# Patient Record
Sex: FEMALE | Race: WHITE | NOT HISPANIC OR LATINO | Employment: OTHER | URBAN - METROPOLITAN AREA
[De-identification: names, ages, dates, MRNs, and addresses within clinical notes are randomized per-mention and may not be internally consistent; named-entity substitution may affect disease eponyms.]

---

## 2017-02-10 ENCOUNTER — TRANSFERRED RECORDS (OUTPATIENT)
Dept: HEALTH INFORMATION MANAGEMENT | Facility: CLINIC | Age: 52
End: 2017-02-10

## 2017-02-17 ENCOUNTER — TRANSFERRED RECORDS (OUTPATIENT)
Dept: HEALTH INFORMATION MANAGEMENT | Facility: CLINIC | Age: 52
End: 2017-02-17

## 2017-02-17 ENCOUNTER — HOSPITAL ENCOUNTER (INPATIENT)
Facility: CLINIC | Age: 52
LOS: 10 days | Discharge: HOME HEALTH | DRG: 579 | End: 2017-02-27
Attending: INTERNAL MEDICINE | Admitting: INTERNAL MEDICINE
Payer: MEDICARE

## 2017-02-17 DIAGNOSIS — Z76.89 HEALTH CARE HOME: Primary | ICD-10-CM

## 2017-02-17 DIAGNOSIS — L89.94: ICD-10-CM

## 2017-02-17 PROBLEM — K85.90 PANCREATITIS: Status: ACTIVE | Noted: 2017-02-17

## 2017-02-17 PROCEDURE — 99223 1ST HOSP IP/OBS HIGH 75: CPT | Mod: AI | Performed by: INTERNAL MEDICINE

## 2017-02-17 PROCEDURE — 12000007 ZZH R&B INTERMEDIATE

## 2017-02-17 RX ORDER — POTASSIUM CHLORIDE 1.5 G/1.58G
20-40 POWDER, FOR SOLUTION ORAL
Status: DISCONTINUED | OUTPATIENT
Start: 2017-02-17 | End: 2017-02-27 | Stop reason: HOSPADM

## 2017-02-17 RX ORDER — GABAPENTIN 600 MG/1
600 TABLET ORAL 2 TIMES DAILY
Status: DISCONTINUED | OUTPATIENT
Start: 2017-02-17 | End: 2017-02-27 | Stop reason: HOSPADM

## 2017-02-17 RX ORDER — GABAPENTIN 300 MG/1
300 CAPSULE ORAL DAILY
Status: DISCONTINUED | OUTPATIENT
Start: 2017-02-18 | End: 2017-02-18

## 2017-02-17 RX ORDER — POTASSIUM CHLORIDE 1500 MG/1
20-40 TABLET, EXTENDED RELEASE ORAL
Status: DISCONTINUED | OUTPATIENT
Start: 2017-02-17 | End: 2017-02-27 | Stop reason: HOSPADM

## 2017-02-17 RX ORDER — POTASSIUM CHLORIDE 29.8 MG/ML
20 INJECTION INTRAVENOUS
Status: DISCONTINUED | OUTPATIENT
Start: 2017-02-17 | End: 2017-02-27 | Stop reason: HOSPADM

## 2017-02-17 RX ORDER — POTASSIUM CHLORIDE 7.45 MG/ML
10 INJECTION INTRAVENOUS
Status: DISCONTINUED | OUTPATIENT
Start: 2017-02-17 | End: 2017-02-27 | Stop reason: HOSPADM

## 2017-02-17 RX ORDER — FERROUS SULFATE 325(65) MG
325 TABLET ORAL
Status: ON HOLD | COMMUNITY
End: 2019-01-09

## 2017-02-17 RX ORDER — OXYBUTYNIN CHLORIDE 5 MG/1
5 TABLET, EXTENDED RELEASE ORAL
Status: DISCONTINUED | OUTPATIENT
Start: 2017-02-18 | End: 2017-02-27 | Stop reason: HOSPADM

## 2017-02-17 RX ORDER — OXYCODONE HYDROCHLORIDE 5 MG/1
5-10 TABLET ORAL
Status: DISCONTINUED | OUTPATIENT
Start: 2017-02-17 | End: 2017-02-27 | Stop reason: HOSPADM

## 2017-02-17 RX ORDER — ONDANSETRON 2 MG/ML
4 INJECTION INTRAMUSCULAR; INTRAVENOUS EVERY 6 HOURS PRN
Status: DISCONTINUED | OUTPATIENT
Start: 2017-02-17 | End: 2017-02-27 | Stop reason: HOSPADM

## 2017-02-17 RX ORDER — MULTIPLE VITAMINS W/ MINERALS TAB 9MG-400MCG
1 TAB ORAL DAILY
Status: ON HOLD | COMMUNITY
End: 2019-01-09

## 2017-02-17 RX ORDER — NALOXONE HYDROCHLORIDE 0.4 MG/ML
.1-.4 INJECTION, SOLUTION INTRAMUSCULAR; INTRAVENOUS; SUBCUTANEOUS
Status: DISCONTINUED | OUTPATIENT
Start: 2017-02-17 | End: 2017-02-27 | Stop reason: HOSPADM

## 2017-02-17 RX ORDER — SODIUM CHLORIDE 9 MG/ML
INJECTION, SOLUTION INTRAVENOUS CONTINUOUS
Status: DISCONTINUED | OUTPATIENT
Start: 2017-02-17 | End: 2017-02-20

## 2017-02-17 RX ORDER — ACETAMINOPHEN 325 MG/1
650 TABLET ORAL EVERY 4 HOURS PRN
Status: DISCONTINUED | OUTPATIENT
Start: 2017-02-17 | End: 2017-02-27 | Stop reason: HOSPADM

## 2017-02-17 RX ORDER — TRAZODONE HYDROCHLORIDE 100 MG/1
100 TABLET ORAL AT BEDTIME
Status: DISCONTINUED | OUTPATIENT
Start: 2017-02-17 | End: 2017-02-27 | Stop reason: HOSPADM

## 2017-02-17 RX ORDER — ONDANSETRON 4 MG/1
4 TABLET, ORALLY DISINTEGRATING ORAL EVERY 6 HOURS PRN
Status: DISCONTINUED | OUTPATIENT
Start: 2017-02-17 | End: 2017-02-27 | Stop reason: HOSPADM

## 2017-02-17 RX ORDER — SUMATRIPTAN SUCCINATE 6 MG/.5ML
6 INJECTION, SOLUTION SUBCUTANEOUS 2 TIMES DAILY PRN
Status: ON HOLD | COMMUNITY
End: 2019-01-09

## 2017-02-17 RX ORDER — HYDROMORPHONE HYDROCHLORIDE 1 MG/ML
.3-.5 INJECTION, SOLUTION INTRAMUSCULAR; INTRAVENOUS; SUBCUTANEOUS
Status: DISCONTINUED | OUTPATIENT
Start: 2017-02-17 | End: 2017-02-27 | Stop reason: HOSPADM

## 2017-02-17 RX ORDER — ZOLPIDEM TARTRATE 5 MG/1
5 TABLET ORAL
Status: DISCONTINUED | OUTPATIENT
Start: 2017-02-17 | End: 2017-02-27 | Stop reason: HOSPADM

## 2017-02-17 NOTE — IP AVS SNAPSHOT
[facility] [facility]    [address] [place] [address]    Phone:  [phone]                                       After Visit Summary   2/17/2017    Felicia Ellison    MRN: [id number]           After Visit Summary Signature Page     I have received my discharge instructions, and my questions have been answered. I have discussed any challenges I see with this plan with the nurse or doctor.    ..........................................................................................................................................  Patient/Patient Representative Signature      ..........................................................................................................................................  Patient Representative Print Name and Relationship to Patient    ..................................................               ................................................  Date                                            Time    ..........................................................................................................................................  Reviewed by Signature/Title    ...................................................              ..............................................  Date                                                            Time

## 2017-02-17 NOTE — IP AVS SNAPSHOT
MRN:[id number]                      After Visit Summary   2/17/2017    Felicia Ellison    MRN: [id number]           Thank you!     Thank you for choosing [facility] for your care. Our goal is always to provide you with excellent care. Hearing back from our patients is one way we can continue to improve our services. Please take a few minutes to complete the written survey that you may receive in the mail after you visit with us. Thank you!        Patient Information     Date Of Birth          1965        About your hospital stay     You were admitted on:  February 17, 2017 You last received care in the:  [facility] [facility]    You were discharged on:  February 27, 2017       Who to Call     For medical emergencies, please call 251.  For non-urgent questions about your medical care, please call your primary care provider or clinic, [phone]  For questions related to your surgery, please call your surgery clinic        Attending Provider     Provider Specialty    Reese Urias MD Internal Medicine       Primary Care Provider Office Phone # Fax #    Ivan Baeza [phone]       [facility] [address] [facility] [address]        After Care Instructions     Diet       Follow this diet upon discharge: Orders Placed This Encounter      Snacks/Supplements Adult: Ensure Plus (Adult); With Meals      Advance Diet as Tolerated: Regular Diet Adult                  Follow-up Appointments     Follow-up and recommended labs and tests        Follow up with primary care provider, Ivan Baeza, within 7 days for hospital follow- up.  No follow up labs or test are needed.                  Additional Services     Home care nursing referral       RN  Home Care:  Agency:    [place], [place]  Clinical Supervisor: Praneeth Mazariegos RN    Call [phone]    [address] [place] [address]  Tel: [phone]  Fax: [phone]  Email: [email or web address]      RN RESUME providing: Skilled nurse visits - frequency per home care evaluation or previous orders    RN to assess --- hydration, nutrition and bowel status, signs/symptoms of infection, neurologic status, skin integrity, respiratory and cardiac status, pain level and activity tolerance, vital signs and weight, lab draws if ordered, home safety.     RN to teach/reinforce teaching ---medication, disease, and symptom management    RN to assist with communication to --- Primary Care Provider  Wound Management:Bilateral IT and and bilateral sacrum:  CWOCN will change [facility] Wound VAC MWF using black foam and -125mmHg, continuous ** recommend continuing with [facility] Wound VAC upon discharge.   See [facility] Inpatient last wound recommendations      ______________________________________________________      Your provider has ordered home care nursing services. If you have not been contacted within 2 days of your discharge please call the home care agency number listed above.    Your provider has ordered home care nursing services. If you have not been contacted within 2 days of your discharge please call the inpatient department phone number at the above #.                  Further instructions from your care team       Going Home with A Wound Vac  Usually your doctor, home health agency or wound care clinic trained in VAC therapy will change your dressing. There are specific dressings that need to be used for your VAC dressing change. There are also specific settings that need to be set on the machine at discharge.   When you are discharged, make sure you bring along the box that has been left in your room from the [facility] representative. (You need to leave with a portable VAC machine, not the hospital s.)  **Dressings will be changed according to what your MD has ordered. It is usually 3 times/week or as needed.  **You should not shower ( or get dressing wet) unless this  has been approved by your MD.  **Keep the machine plugged in as much as possible to prevent having to recharge a full 12 hours. The battery may run for about 12 hours.    **At home: If there is a problem and the machine registers  air leak , it usually means the dressing is loose.  You may try to patch the dressing with new Tegaderm or clear [facility] drape. If this does not work, and Home Care is following the patient, call the Home Care nurse.  If the machine malfunctions, read the manual and/or call [employer] at #[phone]  How to Change the Canister on the Wound VAC  1. Close white clamp on foam dressing tube.  2. Disconnect tubes from each other - hold canister tube up to allow drainage to flow down tube and into canister. Close clamp on canister tube.  3. Press therapy button to off.  4. Push in canister release button at front of machine and remove canister from machine. You may need to jiggle it out.  5. Insert a new canister into machine. Jiggle into place until you hear a click.  6. Hook tubes together, unclamp clamps.  7. Press therapy button; press therapy  on  to restart.      ** If you have any serious bleeding, feel faint or feel dizzy, turn off the machine and call 911.  **You may also need to call your MD for a fever over 102, increase in redness, swelling, bleeding, bad swell, increase in pain warmth around dressing site.  **If you have a question or problem with the bandage call your MD, home care nurse or wound care center.  **If your machine isn t working right and the alarms keep going off: Read about  alarms  in your manual and/or call [employer] at [phone].    Follow up Appointment:  ____________________    Doctor's Phone Number: ____________________      You are being discharged with home care services through [facility]. [facility] will contact you to schedule initial visit. If you have any questions prior to this call, please contact them at [phone].    Wound  "care:(Vac dressing last changed on Friday 2-24-17  Bilateral IT and and bilateral sacrum: CWOCN will change [facility] Wound VAC MWF using black foam and -125mmHg, continuous   1.  Remove all dressings  2. Clean wound with MicroKlenz  3.  Sacral wounds connect under skin bridge  4.  Black sponge to each sacral wound bed with foam piece tunneled under bridge to connect sponges  5.  Black foam to IT wounds. Pack into tunnels and undermining  6.  Skin prep all juan pablo-wound skin   7;  Secure all with Vac drape  8.  Pad and bridges sacral wounds x 2  with Rt IT wound and bridge to anterior aspect of thigh  9. Pad and bridge Lt IT wound to anterior aspect of thigh  10:  Face adaptor so that they come out of patients waistband of pants  11.  VAC @ 125mm/hg cont suction  12.  Use ostomy paste in any crease to create seal     Bilateral heels, right shin, left calf: MWF  1. Clean wound with MicroKlenz Spray, pat dry  2. Paint with No Sting Skin Prep (#287556) and allow to dry thoroughly  3. Press a Mepilex  Border Dressing (#098343) to the area, making sure to conform nicely to skin curvatures  4. Time and date dressing change and yusuf with a \"T\" for treatment of a wound  5. Reposition pt every 1 to 2 hours when in bed and hourly when up to the chair to relieve pressure and promote perfusion to tissue  6. Keep heels elevated at all times    Colostomy pouch: Change on 2-27-17  Pt can resume her pouching system used prior to hosptialization         Pending Results     No orders found from 2/15/2017 to 2/18/2017.            Statement of Approval     Ordered          02/26/17 1353  I have reviewed and agree with all the recommendations and orders detailed in this document.  EFFECTIVE NOW     Approved and electronically signed by:  Michelle Gonzalez MD             Admission Information     Date & Time Department Dept. Phone    2/17/2017 [place] [address] [facility] [phone]      Your Vitals Were     Blood " "Pressure Pulse Temperature Respirations Height Weight    123/64 (BP Location: Right arm) 91 98  F (36.7  C) (Oral) 16 1.753 m (5' 9\") 69 kg (152 lb 1.9 oz)    Pulse Oximetry BMI (Body Mass Index)                98% 22.46 kg/m2          [employer] Information     [employer] gives you secure access to your electronic health record. If you see a primary care provider, you can also send messages to your care team and make appointments. If you have questions, please call your primary care clinic.  If you do not have a primary care provider, please call [phone] and they will assist you.        Care EveryWhere ID     This is your Care EveryWhere ID. This could be used by other organizations to access your [facility] medical records  [id number]           Review of your medicines      START taking        Dose / Directions    sulfamethoxazole-trimethoprim 800-160 MG per tablet   Commonly known as:  BACTRIM DS/SEPTRA DS   Indication:  Skin and Soft Tissue Infection   Used for:  Decubitus skin ulcer, stage IV (H)        Dose:  1 tablet   Take 1 tablet by mouth 2 times daily (with meals) for 14 days   Quantity:  28 tablet   Refills:  0         CONTINUE these medicines which have NOT CHANGED        Dose / Directions    AMBIEN PO        Dose:  10 mg   Take 10 mg by mouth nightly as needed for sleep   Refills:  0       BOOST   Used for:  Paraplegic immobility syndrome        I can a day   Quantity:  30 Can   Refills:  12       CALCIUM CITRATE + D 315-250 MG-UNIT Tabs per tablet   Generic drug:  calcium citrate-vitamin D        Dose:  2 tablet   Take 2 tablets by mouth 2 times daily   Refills:  0       Disposable Underpads 30\"x36\" Misc   Used for:  Osteomyelitis of hip (H), Paraplegia following spinal cord injury (H)        USE UP TO 6 TIMES DAILY AS NEEDED FOR INCONTINENCE   Quantity:  180 pad   Refills:  12       enoxaparin 40 MG/0.4ML injection   Commonly known as:  LOVENOX        Dose:  40 mg   Inject 40 mg Subcutaneous every 12 " hours   Refills:  0       ferrous sulfate 325 (65 FE) MG tablet   Commonly known as:  IRON        Dose:  325 mg   Take 325 mg by mouth daily (with breakfast)   Refills:  0       FUROSEMIDE PO        Dose:  20 mg   Take 20 mg by mouth 2 times daily   Refills:  0       * GABAPENTIN PO        Dose:  600 mg   Take 600 mg by mouth 2 times daily Takes 2 x 300 mg in a.m. 1 x 300 mg in afternoon and 2 x 300 mg at night.   Refills:  0       * GABAPENTIN PO        Dose:  300 mg   Take 300 mg by mouth daily In the afternoon.  Takes 2 x 300 mg in a.m. 1 x 300 mg in afternoon and 2 x 300 mg at night.   Refills:  0       K-DUR PO        Dose:  20 mEq   Take 20 mEq by mouth 2 times daily   Refills:  0       multivitamin, therapeutic with minerals Tabs tablet        Dose:  1 tablet   Take 1 tablet by mouth daily Certavite   Refills:  0       OMEPRAZOLE PO        Dose:  20 mg   Take 20 mg by mouth every morning   Refills:  0       * order for DME   Used for:  Paraplegic immobility syndrome, Pressure ulcer of right buttock, Flaccid paraplegia (H)        Equipment being ordered: Trapeze for hospital bed   Quantity:  1 each   Refills:  0       * order for DME   Used for:  Flaccid paraplegia (H), Paraplegic immobility syndrome        Equipment being ordered: [phone]   Quantity:  1 each   Refills:  0       * order for DME   Used for:  Paraplegia following spinal cord injury (H)        Equipment being ordered: handivan   Quantity:  1 each   Refills:  0       * order for DME   Used for:  Urinary retention, Flaccid paraplegia (H), Paraplegic immobility syndrome        Equipment being ordered:  40 cc bulb for catheter  [phone]   Quantity:  1 Units   Refills:  0       * order for DME   Used for:  Poor appetite, Generalized weakness, Paraplegia following spinal cord injury (H)        Equipment being ordered: protein drink  Prostat [address] [place]   Quantity:  1 Package   Refills:  12       * order  "for DME   Used for:  Paraplegia following spinal cord injury (H), Chronic osteomyelitis of hip (H)        [facility] Phone# [phone] Fax:  [phone]   Group 2 mattress overlay Diagnosis:  Stage 4 Decubitus Ulcer   Quantity:  1 Device   Refills:  0       * order for DME   Used for:  Chronic UTI (urinary tract infection), Neurogenic bladder        Equipment being ordered:  Catheters with bag attached.   Quantity:  3 catheter   Refills:  12       * order for DME   Used for:  Pressure ulcer, buttock(707.05)        Equipment being ordered: [facility] Fax [phone]  Primary Dressing Hydrofera Blue 6x6   Qty 1 box Primary Dressing Fibracol Qty 1box Secondary dressing 4x4 nonsterile gauze   Qty 200 ct Secondary Dressing 2\" medipore tape Qty 3 rolls  Length of Need: 1 month Frequency of dressing change: daily   Quantity:  30 days   Refills:  2       * order for DME   Used for:  Pressure ulcer        Equipment being ordered: eval for appropriate shower bench and adaptive equipment while showering by Rosanna Rivera. [facility] [employer] Fax [phone]  Primary Dressing Fibrocol    Qty 1 box Length of Need: 1 month Frequency of dressing change: daily Eval for appropriate shower bench and adaptive equipment while showering by Rosanna Rivera.   Quantity:  30 days   Refills:  0       oxybutynin 5 MG 24 hr tablet   Commonly known as:  DITROPAN-XL        Dose:  5 mg   Take 5 mg by mouth 2 times daily   Refills:  0       RANITIDINE HCL PO        Dose:  150 mg   Take 150 mg by mouth 2 times daily   Refills:  0       SERTRALINE HCL PO        Dose:  50 mg   Take 50 mg by mouth daily   Refills:  0       STERI-PAD STERILE 4\"X4\" Pads   Used for:  Change of dressing        Gauze pads for ileal diversion dressing daily and prn   Quantity:  1 each   Refills:  12       SUMAtriptan Succinate Refill 6 MG/0.5ML Soct   Commonly known as:  IMITREX        Dose:  6 mg   Inject 6 mg Subcutaneous 2 times daily as needed for " "migraine Inject 6 mg at onset of headache  May repeat x 1 dose in 2 hours if needed.  Max 2 doses  In 24 hrs.   Refills:  0       TRAMADOL HCL PO        Dose:  50 mg   Take 50 mg by mouth every 8 hours as needed for moderate pain or moderate to severe pain   Refills:  0       TRAZODONE HCL PO        Dose:  100 mg   Take 100 mg by mouth At Bedtime 2 x 50 mg tabs   Refills:  0       * Notice:  This list has 11 medication(s) that are the same as other medications prescribed for you. Read the directions carefully, and ask your doctor or other care provider to review them with you.      STOP taking     CEPHALEXIN PO                Where to get your medicines      These medications were sent to [facility] [place]  [place], [place] - [address]  [address], [place] [address]     Phone:  [phone]     sulfamethoxazole-trimethoprim 800-160 MG per tablet                Protect others around you: Learn how to safely use, store and throw away your medicines at www.disposemymeds.org.             Medication List: This is a list of all your medications and when to take them. Check marks below indicate your daily home schedule. Keep this list as a reference.      Medications           Morning Afternoon Evening Bedtime As Needed    AMBIEN PO   Take 10 mg by mouth nightly as needed for sleep   Last time this was given:  5 mg on 2/27/2017 12:06 AM                                   BOOST   I can a day                                   CALCIUM CITRATE + D 315-250 MG-UNIT Tabs per tablet   Take 2 tablets by mouth 2 times daily   Generic drug:  calcium citrate-vitamin D                                      Disposable Underpads 30\"x36\" Misc   USE UP TO 6 TIMES DAILY AS NEEDED FOR INCONTINENCE                                   enoxaparin 40 MG/0.4ML injection   Commonly known as:  LOVENOX   Inject 40 mg Subcutaneous every 12 hours   Last time this was given:  40 mg on 2/27/2017  2:45 PM                            "           ferrous sulfate 325 (65 FE) MG tablet   Commonly known as:  IRON   Take 325 mg by mouth daily (with breakfast)                                   FUROSEMIDE PO   Take 20 mg by mouth 2 times daily   Last time this was given:  40 mg on 2/27/2017  8:19 AM                                      * GABAPENTIN PO   Take 600 mg by mouth 2 times daily Takes 2 x 300 mg in a.m. 1 x 300 mg in afternoon and 2 x 300 mg at night.   Last time this was given:  300 mg on 2/27/2017  2:44 PM                                         * GABAPENTIN PO   Take 300 mg by mouth daily In the afternoon.  Takes 2 x 300 mg in a.m. 1 x 300 mg in afternoon and 2 x 300 mg at night.   Last time this was given:  300 mg on 2/27/2017  2:44 PM                                   K-DUR PO   Take 20 mEq by mouth 2 times daily   Last time this was given:  40 mEq on 2/18/2017  6:51 PM                                      multivitamin, therapeutic with minerals Tabs tablet   Take 1 tablet by mouth daily Certavite   Last time this was given:  1 tablet on 2/27/2017  8:19 AM                                   OMEPRAZOLE PO   Take 20 mg by mouth every morning   Last time this was given:  20 mg on 2/27/2017  8:19 AM                                   * order for DME   Equipment being ordered: Trapeze for hospital bed                                * order for DME   Equipment being ordered: Over the bed table. Fax to- [place] at [phone]                                * order for DME   Equipment being ordered: handivan                                * order for DME   Equipment being ordered:  40 cc bulb for catheter  [phone]                                * order for DME   Equipment being ordered: protein drink  Prostat 64 [address] [place]                                * order for DME   [facility] Phone# [phone] Fax:  [phone]   Group 2 mattress overlay Diagnosis:  Stage 4 Decubitus Ulcer                                * order for DME  "  Equipment being ordered:  Catheters with bag attached.                                * order for DME   Equipment being ordered: [facility] Fax [phone]  Primary Dressing Hydrofera Blue 6x6   Qty 1 box Primary Dressing Fibracol Qty 1box Secondary dressing 4x4 nonsterile gauze   Qty 200 ct Secondary Dressing 2\" medipore tape Qty 3 rolls  Length of Need: 1 month Frequency of dressing change: daily                                * order for DME   Equipment being ordered: eval for appropriate shower bench and adaptive equipment while showering by Rosanna Rivera. [facility] Fax [phone]  Primary Dressing Fibrocol    Qty 1 box Length of Need: 1 month Frequency of dressing change: daily Eval for appropriate shower bench and adaptive equipment while showering by Rosanna Rivera.                                oxybutynin 5 MG 24 hr tablet   Commonly known as:  DITROPAN-XL   Take 5 mg by mouth 2 times daily   Last time this was given:  5 mg on 2/27/2017  8:19 AM                                      RANITIDINE HCL PO   Take 150 mg by mouth 2 times daily   Last time this was given:  150 mg on 2/27/2017  8:19 AM                                      SERTRALINE HCL PO   Take 50 mg by mouth daily   Last time this was given:  50 mg on 2/27/2017  8:19 AM                                   STERI-PAD STERILE 4\"X4\" Pads   Gauze pads for ileal diversion dressing daily and prn                                sulfamethoxazole-trimethoprim 800-160 MG per tablet   Commonly known as:  BACTRIM DS/SEPTRA DS   Take 1 tablet by mouth 2 times daily (with meals) for 14 days   Last time this was given:  1 tablet on 2/27/2017  8:19 AM            For 14 days           For 14 days               SUMAtriptan Succinate Refill 6 MG/0.5ML Soct   Commonly known as:  IMITREX   Inject 6 mg Subcutaneous 2 times daily as needed for migraine Inject 6 mg at onset of headache  May repeat x 1 dose in 2 hours if needed.  Max 2 doses  In 24 hrs.     "                               TRAMADOL HCL PO   Take 50 mg by mouth every 8 hours as needed for moderate pain or moderate to severe pain                                   TRAZODONE HCL PO   Take 100 mg by mouth At Bedtime 2 x 50 mg tabs   Last time this was given:  100 mg on 2/26/2017 10:33 PM                                   * Notice:  This list has 11 medication(s) that are the same as other medications prescribed for you. Read the directions carefully, and ask your doctor or other care provider to review them with you.

## 2017-02-18 ENCOUNTER — ANESTHESIA (OUTPATIENT)
Dept: SURGERY | Facility: CLINIC | Age: 52
DRG: 579 | End: 2017-02-18
Payer: MEDICARE

## 2017-02-18 ENCOUNTER — ANESTHESIA EVENT (OUTPATIENT)
Dept: SURGERY | Facility: CLINIC | Age: 52
DRG: 579 | End: 2017-02-18
Payer: MEDICARE

## 2017-02-18 LAB
ALBUMIN SERPL-MCNC: 1.4 G/DL (ref 3.4–5)
ALP SERPL-CCNC: 97 U/L (ref 40–150)
ALT SERPL W P-5'-P-CCNC: 6 U/L (ref 0–50)
ANION GAP SERPL CALCULATED.3IONS-SCNC: 6 MMOL/L (ref 3–14)
AST SERPL W P-5'-P-CCNC: 8 U/L (ref 0–45)
BILIRUB DIRECT SERPL-MCNC: <0.1 MG/DL (ref 0–0.2)
BILIRUB SERPL-MCNC: 0.3 MG/DL (ref 0.2–1.3)
BUN SERPL-MCNC: 9 MG/DL (ref 7–30)
CALCIUM SERPL-MCNC: 7.6 MG/DL (ref 8.5–10.1)
CHLORIDE SERPL-SCNC: 112 MMOL/L (ref 94–109)
CO2 SERPL-SCNC: 28 MMOL/L (ref 20–32)
CREAT SERPL-MCNC: 0.54 MG/DL (ref 0.52–1.04)
ERYTHROCYTE [DISTWIDTH] IN BLOOD BY AUTOMATED COUNT: 19.2 % (ref 10–15)
GFR SERPL CREATININE-BSD FRML MDRD: ABNORMAL ML/MIN/1.7M2
GLUCOSE SERPL-MCNC: 79 MG/DL (ref 70–99)
GRAM STN SPEC: ABNORMAL
HCT VFR BLD AUTO: 27.3 % (ref 35–47)
HGB BLD-MCNC: 7.6 G/DL (ref 11.7–15.7)
HGB BLD-MCNC: 8 G/DL (ref 11.7–15.7)
INR PPP: 1.38 (ref 0.86–1.14)
LIPASE SERPL-CCNC: 930 U/L (ref 73–393)
Lab: ABNORMAL
MCH RBC QN AUTO: 23.4 PG (ref 26.5–33)
MCHC RBC AUTO-ENTMCNC: 29.3 G/DL (ref 31.5–36.5)
MCV RBC AUTO: 80 FL (ref 78–100)
MICRO REPORT STATUS: ABNORMAL
PLATELET # BLD AUTO: 353 10E9/L (ref 150–450)
POTASSIUM SERPL-SCNC: 2.9 MMOL/L (ref 3.4–5.3)
POTASSIUM SERPL-SCNC: 3.3 MMOL/L (ref 3.4–5.3)
POTASSIUM SERPL-SCNC: 4.5 MMOL/L (ref 3.4–5.3)
PROT SERPL-MCNC: 5.7 G/DL (ref 6.8–8.8)
RBC # BLD AUTO: 3.42 10E12/L (ref 3.8–5.2)
SODIUM SERPL-SCNC: 146 MMOL/L (ref 133–144)
SPECIMEN SOURCE: ABNORMAL
WBC # BLD AUTO: 9.9 10E9/L (ref 4–11)

## 2017-02-18 PROCEDURE — 87076 CULTURE ANAEROBE IDENT EACH: CPT | Performed by: SURGERY

## 2017-02-18 PROCEDURE — 0KBN0ZZ EXCISION OF RIGHT HIP MUSCLE, OPEN APPROACH: ICD-10-PCS | Performed by: SURGERY

## 2017-02-18 PROCEDURE — 25000132 ZZH RX MED GY IP 250 OP 250 PS 637: Mod: GY | Performed by: INTERNAL MEDICINE

## 2017-02-18 PROCEDURE — 25000125 ZZHC RX 250: Performed by: ANESTHESIOLOGY

## 2017-02-18 PROCEDURE — 40000170 ZZH STATISTIC PRE-PROCEDURE ASSESSMENT II: Performed by: SURGERY

## 2017-02-18 PROCEDURE — 99215 OFFICE O/P EST HI 40 MIN: CPT

## 2017-02-18 PROCEDURE — 36000063 ZZH SURGERY LEVEL 4 EA 15 ADDTL MIN: Performed by: SURGERY

## 2017-02-18 PROCEDURE — 87070 CULTURE OTHR SPECIMN AEROBIC: CPT | Performed by: SURGERY

## 2017-02-18 PROCEDURE — 36000093 ZZH SURGERY LEVEL 4 1ST 30 MIN: Performed by: SURGERY

## 2017-02-18 PROCEDURE — 84132 ASSAY OF SERUM POTASSIUM: CPT | Performed by: INTERNAL MEDICINE

## 2017-02-18 PROCEDURE — 11046 DBRDMT MUSC&/FSCA EA ADDL: CPT | Performed by: SURGERY

## 2017-02-18 PROCEDURE — 83690 ASSAY OF LIPASE: CPT | Performed by: INTERNAL MEDICINE

## 2017-02-18 PROCEDURE — 87106 FUNGI IDENTIFICATION YEAST: CPT | Performed by: SURGERY

## 2017-02-18 PROCEDURE — 87205 SMEAR GRAM STAIN: CPT | Performed by: SURGERY

## 2017-02-18 PROCEDURE — 85027 COMPLETE CBC AUTOMATED: CPT | Performed by: INTERNAL MEDICINE

## 2017-02-18 PROCEDURE — 0KBP0ZZ EXCISION OF LEFT HIP MUSCLE, OPEN APPROACH: ICD-10-PCS | Performed by: SURGERY

## 2017-02-18 PROCEDURE — 36415 COLL VENOUS BLD VENIPUNCTURE: CPT | Performed by: SURGERY

## 2017-02-18 PROCEDURE — 25000128 H RX IP 250 OP 636: Performed by: NURSE ANESTHETIST, CERTIFIED REGISTERED

## 2017-02-18 PROCEDURE — 27210995 ZZH RX 272: Performed by: SURGERY

## 2017-02-18 PROCEDURE — 36415 COLL VENOUS BLD VENIPUNCTURE: CPT | Performed by: INTERNAL MEDICINE

## 2017-02-18 PROCEDURE — 87176 TISSUE HOMOGENIZATION CULTR: CPT | Performed by: SURGERY

## 2017-02-18 PROCEDURE — 27210794 ZZH OR GENERAL SUPPLY STERILE: Performed by: SURGERY

## 2017-02-18 PROCEDURE — 37000009 ZZH ANESTHESIA TECHNICAL FEE, EACH ADDTL 15 MIN: Performed by: SURGERY

## 2017-02-18 PROCEDURE — 25800025 ZZH RX 258: Performed by: ANESTHESIOLOGY

## 2017-02-18 PROCEDURE — 37000008 ZZH ANESTHESIA TECHNICAL FEE, 1ST 30 MIN: Performed by: SURGERY

## 2017-02-18 PROCEDURE — A9270 NON-COVERED ITEM OR SERVICE: HCPCS | Mod: GY | Performed by: INTERNAL MEDICINE

## 2017-02-18 PROCEDURE — 87077 CULTURE AEROBIC IDENTIFY: CPT | Performed by: SURGERY

## 2017-02-18 PROCEDURE — 25000128 H RX IP 250 OP 636: Performed by: INTERNAL MEDICINE

## 2017-02-18 PROCEDURE — 71000014 ZZH RECOVERY PHASE 1 LEVEL 2 FIRST HR: Performed by: SURGERY

## 2017-02-18 PROCEDURE — 85610 PROTHROMBIN TIME: CPT | Performed by: INTERNAL MEDICINE

## 2017-02-18 PROCEDURE — 80048 BASIC METABOLIC PNL TOTAL CA: CPT | Performed by: INTERNAL MEDICINE

## 2017-02-18 PROCEDURE — 80076 HEPATIC FUNCTION PANEL: CPT | Performed by: INTERNAL MEDICINE

## 2017-02-18 PROCEDURE — S0077 INJECTION, CLINDAMYCIN PHOSP: HCPCS | Performed by: NURSE ANESTHETIST, CERTIFIED REGISTERED

## 2017-02-18 PROCEDURE — 12000007 ZZH R&B INTERMEDIATE

## 2017-02-18 PROCEDURE — 99207 ZZC MOONLIGHTING INDICATOR: CPT | Performed by: INTERNAL MEDICINE

## 2017-02-18 PROCEDURE — 85018 HEMOGLOBIN: CPT | Performed by: SURGERY

## 2017-02-18 PROCEDURE — 97606 NEG PRS WND THER DME>50 SQCM: CPT

## 2017-02-18 PROCEDURE — 25000128 H RX IP 250 OP 636

## 2017-02-18 PROCEDURE — 99233 SBSQ HOSP IP/OBS HIGH 50: CPT | Performed by: INTERNAL MEDICINE

## 2017-02-18 PROCEDURE — 87186 SC STD MICRODIL/AGAR DIL: CPT | Performed by: SURGERY

## 2017-02-18 PROCEDURE — 71000015 ZZH RECOVERY PHASE 1 LEVEL 2 EA ADDTL HR: Performed by: SURGERY

## 2017-02-18 PROCEDURE — 25000125 ZZHC RX 250: Performed by: NURSE ANESTHETIST, CERTIFIED REGISTERED

## 2017-02-18 PROCEDURE — 11043 DBRDMT MUSC&/FSCA 1ST 20/<: CPT | Performed by: SURGERY

## 2017-02-18 PROCEDURE — 87075 CULTR BACTERIA EXCEPT BLOOD: CPT | Performed by: SURGERY

## 2017-02-18 RX ORDER — ALBUTEROL SULFATE 0.83 MG/ML
2.5 SOLUTION RESPIRATORY (INHALATION) EVERY 4 HOURS PRN
Status: DISCONTINUED | OUTPATIENT
Start: 2017-02-18 | End: 2017-02-18 | Stop reason: HOSPADM

## 2017-02-18 RX ORDER — MAGNESIUM HYDROXIDE 1200 MG/15ML
LIQUID ORAL PRN
Status: DISCONTINUED | OUTPATIENT
Start: 2017-02-18 | End: 2017-02-18 | Stop reason: HOSPADM

## 2017-02-18 RX ORDER — MEROPENEM 1 G/1
1 INJECTION, POWDER, FOR SOLUTION INTRAVENOUS EVERY 6 HOURS
Status: DISCONTINUED | OUTPATIENT
Start: 2017-02-18 | End: 2017-02-18

## 2017-02-18 RX ORDER — MEPERIDINE HYDROCHLORIDE 25 MG/ML
12.5 INJECTION INTRAMUSCULAR; INTRAVENOUS; SUBCUTANEOUS EVERY 5 MIN PRN
Status: DISCONTINUED | OUTPATIENT
Start: 2017-02-18 | End: 2017-02-18 | Stop reason: HOSPADM

## 2017-02-18 RX ORDER — VANCOMYCIN HYDROCHLORIDE 1 G/200ML
1000 INJECTION, SOLUTION INTRAVENOUS EVERY 12 HOURS
Status: DISCONTINUED | OUTPATIENT
Start: 2017-02-19 | End: 2017-02-20

## 2017-02-18 RX ORDER — ONDANSETRON 2 MG/ML
INJECTION INTRAMUSCULAR; INTRAVENOUS PRN
Status: DISCONTINUED | OUTPATIENT
Start: 2017-02-18 | End: 2017-02-18

## 2017-02-18 RX ORDER — HYDROMORPHONE HYDROCHLORIDE 1 MG/ML
.3-.5 INJECTION, SOLUTION INTRAMUSCULAR; INTRAVENOUS; SUBCUTANEOUS EVERY 5 MIN PRN
Status: DISCONTINUED | OUTPATIENT
Start: 2017-02-18 | End: 2017-02-18 | Stop reason: HOSPADM

## 2017-02-18 RX ORDER — FENTANYL CITRATE 50 UG/ML
INJECTION, SOLUTION INTRAMUSCULAR; INTRAVENOUS PRN
Status: DISCONTINUED | OUTPATIENT
Start: 2017-02-18 | End: 2017-02-18

## 2017-02-18 RX ORDER — ONDANSETRON 2 MG/ML
4 INJECTION INTRAMUSCULAR; INTRAVENOUS EVERY 30 MIN PRN
Status: DISCONTINUED | OUTPATIENT
Start: 2017-02-18 | End: 2017-02-18 | Stop reason: HOSPADM

## 2017-02-18 RX ORDER — SODIUM CHLORIDE, SODIUM LACTATE, POTASSIUM CHLORIDE, CALCIUM CHLORIDE 600; 310; 30; 20 MG/100ML; MG/100ML; MG/100ML; MG/100ML
INJECTION, SOLUTION INTRAVENOUS CONTINUOUS
Status: DISCONTINUED | OUTPATIENT
Start: 2017-02-18 | End: 2017-02-18 | Stop reason: HOSPADM

## 2017-02-18 RX ORDER — CLINDAMYCIN PHOSPHATE 900 MG/50ML
INJECTION, SOLUTION INTRAVENOUS PRN
Status: DISCONTINUED | OUTPATIENT
Start: 2017-02-18 | End: 2017-02-18

## 2017-02-18 RX ORDER — FENTANYL CITRATE 50 UG/ML
25-50 INJECTION, SOLUTION INTRAMUSCULAR; INTRAVENOUS
Status: DISCONTINUED | OUTPATIENT
Start: 2017-02-18 | End: 2017-02-18 | Stop reason: HOSPADM

## 2017-02-18 RX ORDER — HYDRALAZINE HYDROCHLORIDE 20 MG/ML
2.5-5 INJECTION INTRAMUSCULAR; INTRAVENOUS EVERY 10 MIN PRN
Status: DISCONTINUED | OUTPATIENT
Start: 2017-02-18 | End: 2017-02-18 | Stop reason: HOSPADM

## 2017-02-18 RX ORDER — PROPOFOL 10 MG/ML
INJECTION, EMULSION INTRAVENOUS CONTINUOUS PRN
Status: DISCONTINUED | OUTPATIENT
Start: 2017-02-18 | End: 2017-02-18

## 2017-02-18 RX ORDER — ONDANSETRON 4 MG/1
4 TABLET, ORALLY DISINTEGRATING ORAL EVERY 30 MIN PRN
Status: DISCONTINUED | OUTPATIENT
Start: 2017-02-18 | End: 2017-02-18 | Stop reason: HOSPADM

## 2017-02-18 RX ORDER — MEROPENEM 500 MG/1
500 INJECTION, POWDER, FOR SOLUTION INTRAVENOUS EVERY 6 HOURS
Status: DISCONTINUED | OUTPATIENT
Start: 2017-02-18 | End: 2017-02-21

## 2017-02-18 RX ORDER — GABAPENTIN 300 MG/1
300 CAPSULE ORAL EVERY 24 HOURS
Status: DISCONTINUED | OUTPATIENT
Start: 2017-02-19 | End: 2017-02-27 | Stop reason: HOSPADM

## 2017-02-18 RX ADMIN — LIDOCAINE HYDROCHLORIDE 0.2 ML: 10 INJECTION, SOLUTION EPIDURAL; INFILTRATION; INTRACAUDAL; PERINEURAL at 11:34

## 2017-02-18 RX ADMIN — TRAZODONE HYDROCHLORIDE 100 MG: 100 TABLET ORAL at 21:40

## 2017-02-18 RX ADMIN — OXYCODONE HYDROCHLORIDE 10 MG: 5 TABLET ORAL at 00:45

## 2017-02-18 RX ADMIN — OXYCODONE HYDROCHLORIDE 10 MG: 5 TABLET ORAL at 03:54

## 2017-02-18 RX ADMIN — PHENYLEPHRINE HYDROCHLORIDE 100 MCG: 10 INJECTION, SOLUTION INTRAMUSCULAR; INTRAVENOUS; SUBCUTANEOUS at 12:37

## 2017-02-18 RX ADMIN — FENTANYL CITRATE 25 MCG: 50 INJECTION, SOLUTION INTRAMUSCULAR; INTRAVENOUS at 12:09

## 2017-02-18 RX ADMIN — POTASSIUM CHLORIDE 40 MEQ: 1500 TABLET, EXTENDED RELEASE ORAL at 05:55

## 2017-02-18 RX ADMIN — OXYCODONE HYDROCHLORIDE 10 MG: 5 TABLET ORAL at 10:46

## 2017-02-18 RX ADMIN — OXYBUTYNIN CHLORIDE 5 MG: 5 TABLET, EXTENDED RELEASE ORAL at 08:36

## 2017-02-18 RX ADMIN — GABAPENTIN 600 MG: 600 TABLET, FILM COATED ORAL at 21:40

## 2017-02-18 RX ADMIN — MIDAZOLAM HYDROCHLORIDE 0.5 MG: 1 INJECTION, SOLUTION INTRAMUSCULAR; INTRAVENOUS at 11:55

## 2017-02-18 RX ADMIN — ENOXAPARIN SODIUM 40 MG: 40 INJECTION SUBCUTANEOUS at 15:38

## 2017-02-18 RX ADMIN — POTASSIUM CHLORIDE 40 MEQ: 1500 TABLET, EXTENDED RELEASE ORAL at 03:53

## 2017-02-18 RX ADMIN — PHENYLEPHRINE HYDROCHLORIDE 100 MCG: 10 INJECTION, SOLUTION INTRAMUSCULAR; INTRAVENOUS; SUBCUTANEOUS at 12:23

## 2017-02-18 RX ADMIN — PROPOFOL 70 MCG/KG/MIN: 10 INJECTION, EMULSION INTRAVENOUS at 12:03

## 2017-02-18 RX ADMIN — ZOLPIDEM TARTRATE 5 MG: 5 TABLET, FILM COATED ORAL at 21:40

## 2017-02-18 RX ADMIN — SODIUM CHLORIDE: 9 INJECTION, SOLUTION INTRAVENOUS at 00:45

## 2017-02-18 RX ADMIN — PHENYLEPHRINE HYDROCHLORIDE 100 MCG: 10 INJECTION, SOLUTION INTRAMUSCULAR; INTRAVENOUS; SUBCUTANEOUS at 12:21

## 2017-02-18 RX ADMIN — PHENYLEPHRINE HYDROCHLORIDE 200 MCG: 10 INJECTION, SOLUTION INTRAMUSCULAR; INTRAVENOUS; SUBCUTANEOUS at 12:26

## 2017-02-18 RX ADMIN — PHENYLEPHRINE HYDROCHLORIDE 100 MCG: 10 INJECTION, SOLUTION INTRAMUSCULAR; INTRAVENOUS; SUBCUTANEOUS at 12:28

## 2017-02-18 RX ADMIN — PHENYLEPHRINE HYDROCHLORIDE 100 MCG: 10 INJECTION, SOLUTION INTRAMUSCULAR; INTRAVENOUS; SUBCUTANEOUS at 12:34

## 2017-02-18 RX ADMIN — SODIUM CHLORIDE, POTASSIUM CHLORIDE, SODIUM LACTATE AND CALCIUM CHLORIDE: 600; 310; 30; 20 INJECTION, SOLUTION INTRAVENOUS at 13:50

## 2017-02-18 RX ADMIN — PHENYLEPHRINE HYDROCHLORIDE 100 MCG: 10 INJECTION, SOLUTION INTRAMUSCULAR; INTRAVENOUS; SUBCUTANEOUS at 12:18

## 2017-02-18 RX ADMIN — OXYBUTYNIN CHLORIDE 5 MG: 5 TABLET, EXTENDED RELEASE ORAL at 15:39

## 2017-02-18 RX ADMIN — PHENYLEPHRINE HYDROCHLORIDE 100 MCG: 10 INJECTION, SOLUTION INTRAMUSCULAR; INTRAVENOUS; SUBCUTANEOUS at 12:38

## 2017-02-18 RX ADMIN — MIDAZOLAM HYDROCHLORIDE 1 MG: 1 INJECTION, SOLUTION INTRAMUSCULAR; INTRAVENOUS at 11:51

## 2017-02-18 RX ADMIN — GABAPENTIN 600 MG: 600 TABLET, FILM COATED ORAL at 00:44

## 2017-02-18 RX ADMIN — PHENYLEPHRINE HYDROCHLORIDE 100 MCG: 10 INJECTION, SOLUTION INTRAMUSCULAR; INTRAVENOUS; SUBCUTANEOUS at 12:31

## 2017-02-18 RX ADMIN — GABAPENTIN 600 MG: 600 TABLET, FILM COATED ORAL at 08:36

## 2017-02-18 RX ADMIN — ONDANSETRON 4 MG: 2 INJECTION INTRAMUSCULAR; INTRAVENOUS at 12:37

## 2017-02-18 RX ADMIN — ENOXAPARIN SODIUM 40 MG: 40 INJECTION SUBCUTANEOUS at 00:44

## 2017-02-18 RX ADMIN — CLINDAMYCIN PHOSPHATE 900 MG: 18 INJECTION, SOLUTION INTRAVENOUS at 12:06

## 2017-02-18 RX ADMIN — SODIUM CHLORIDE, POTASSIUM CHLORIDE, SODIUM LACTATE AND CALCIUM CHLORIDE: 600; 310; 30; 20 INJECTION, SOLUTION INTRAVENOUS at 11:34

## 2017-02-18 RX ADMIN — MIDAZOLAM HYDROCHLORIDE 0.5 MG: 1 INJECTION, SOLUTION INTRAMUSCULAR; INTRAVENOUS at 12:00

## 2017-02-18 RX ADMIN — PHENYLEPHRINE HYDROCHLORIDE 100 MCG: 10 INJECTION, SOLUTION INTRAMUSCULAR; INTRAVENOUS; SUBCUTANEOUS at 12:12

## 2017-02-18 RX ADMIN — SODIUM CHLORIDE 1000 ML: 9 INJECTION, SOLUTION INTRAVENOUS at 10:45

## 2017-02-18 RX ADMIN — POTASSIUM CHLORIDE 40 MEQ: 1500 TABLET, EXTENDED RELEASE ORAL at 18:51

## 2017-02-18 RX ADMIN — GABAPENTIN 300 MG: 300 CAPSULE ORAL at 08:36

## 2017-02-18 RX ADMIN — MEROPENEM 500 MG: 500 INJECTION, POWDER, FOR SOLUTION INTRAVENOUS at 19:59
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
## 2017-02-18 NOTE — PLAN OF CARE
Problem: Goal Outcome Summary  Goal: Goal Outcome Summary  Outcome: No Change  Wound vac dressing changed to bilateral IT wounds, sacral wound more superficial will need surgical debridement, packed with NS wet to dry. Pt self caths her ureterstomy--did x1. Colostomy with small amount of formed stool on pm shift, switched to drainable bag while in hospital

## 2017-02-18 NOTE — ANESTHESIA POSTPROCEDURE EVALUATION
Patient: Felicia Ellison    Procedure(s):  INCISION & DRAINAGE OF BILATERAL DECUBITUS ULCER - Wound Class: II-Clean Contaminated    Diagnosis:UNKNOW  Diagnosis Additional Information: No value filed.    Anesthesia Type:  MAC    Note:  Anesthesia Post Evaluation    Patient location during evaluation: PACU  Patient participation: Able to fully participate in evaluation  Level of consciousness: awake  Pain management: adequate  Airway patency: patent  Cardiovascular status: acceptable  Respiratory status: acceptable  Hydration status: acceptable  PONV: none     Anesthetic complications: None          Last vitals:  Vitals:    02/18/17 1320 02/18/17 1330 02/18/17 1340   BP: (!) 85/47 (!) 83/47 101/57   Pulse:      Resp: 12 12 12   Temp:      SpO2: 100% 100% 99%         Electronically Signed By: Stephanie Byrne MD, MD  February 18, 2017  2:08 PM

## 2017-02-18 NOTE — PROGRESS NOTES
[facility] RN NOTE: Multiple pressure ulcers POA    Pt arrived to unit last evening , a direct admit from [facility]. Pt is usually seen at the [facility], but daughter insisted she come here last night. Pt has seen Dr. Pulido in the past for these chronic PU's, but states not for a couple of years, she has been going to the wound center in [place] weekly to the wound Dr there and has HC RN's and her daughter do the dressing changes.Wounds measure as follow:  1) Left IT:9 x8.5 x2cm with undermining to1 cm from 8-10:00. Wound bed 80% red, moist, 20% stringy, soft yellow slough in the middle of the wound bed.  2) Right IT: 5.5 x3 x 2.1 cmwith undermining present to 4.6 cm from 12-2:00, able to probe to the bone; 20% yellow stringy slough in wound bed, rest is red, though drainage is purulent  3) There are three sacral wounds extending from right to left and measure R) 6x6 x1.5 cm with 50% pink wound bed, 50% adherhant slough/eschar present; Middle)3x 4.3x 0.5 cm with 80% slough/black eschar present; Left)4x5x1 with undermining present to 0.8 cm from 11-1:00. All three wound have deep purple vs dusky wound edges.  4) Left heel: 1.5 x 0.5 x0.2 cm with crusty yellow slough present on outer edge of wound, easily removed with cleansing  5) Inner left calf and anterior right shin both with 3x1cm deep purple DTI present which pt states just happened in the past 24 hours prior to admission.    All wounds cleansed with wound cleanser, wound vac applied to IT wounds, Aquacel Ag and Mepilex to left heel, Mepilex to DTI's, Wet-to dry to sacral wounds til evaluated by surgery for possible debridement. Orders in EPIC. Face to face time > 90 minutes.

## 2017-02-18 NOTE — PROGRESS NOTES
Surgery Update    Extensive debridement required for right sided sacral decubitus ulcer. Sacrum is exposed. Pt will likely require a rotational flap for coverage at some point in the future and this would be best addressed at the [facility]. For now, continue with moist to dry dressings with once daily dressing changes to sacral wounds. Surgery will change at bedside tomorrow and then appreciate Wound RN team ongoing management and recommendations.     Gram stain of tissue culture with GPC, GNR and yeast present, as bone is exposed - she would likely benefit from broad spectrum abx coverage. I will discuss with her Hospitalist.   Sanjana Ladd MD  [facility]  [phone]

## 2017-02-18 NOTE — BRIEF OP NOTE
[facility] Brief Operative Note    Pre-operative diagnosis: Infected decubital ulcer   Post-operative diagnosis same   Procedure: Procedure(s):  LEFT LATERAL POSITION DOWN - Wound Class: II-Clean Contaminated   Surgeon(s): Surgeon(s) and Role:     * Sanjana Ladd MD - Primary     * jaqueline Burciaga MD - Assisting   Estimated blood loss: * No values recorded between 2/18/2017 12:08 PM and 2/18/2017 12:45 PM *    Specimens:   ID Type Source Tests Collected by Time Destination   1 : sacral decubitus ulcer Tissue Spine, Sacrum ANAEROBIC BACTERIAL CULTURE, GRAM STAIN, TISSUE CULTURE AEROBIC BACTERIAL Sanjana Ladd MD 2/18/2017 12:10 PM       Findings: Necrotic and infected tissue resected, some exposuer of the sacrum with ischemic tissue over it

## 2017-02-18 NOTE — PHARMACY-VANCOMYCIN DOSING SERVICE
Pharmacy Vancomycin Initial Note  Date of Service 2017  Patient's  1965  51 year old, female    Indication: Skin and Soft Tissue Infection    Current estimated CrCl = CrCl cannot be calculated (Unknown ideal weight.).    Creatinine for last 3 days  2017:  8:25 AM Creatinine 0.54 mg/dL    Recent Vancomycin Level(s) for last 3 days  No results found for requested labs within last 72 hours.      Vancomycin IV Administrations (past 72 hours)      No vancomycin orders with administrations in past 72 hours.               at 14:00pm patient received 1000 mg IV Vancomycin at outside hospital prior to admission to [facility].      Nephrotoxins and other renal medications (Future)    Start     Dose/Rate Route Frequency Ordered Stop    17 0200  vancomycin (VANCOCIN) 1000 mg in dextrose 5% 200 mL PREMIX      1,000 mg Intravenous EVERY 12 HOURS 17 1348            Contrast Orders - past 72 hours     None                Plan:  1.  Start vancomycin 1000 mg IV q12h.   2.  Goal Trough Level: 10-15 mg/L   3.  Pharmacy will check trough levels as appropriate in 1-3 Days.    4. Serum creatinine levels will be ordered a minimum of twice weekly.    5. Lattimore method utilized to dose vancomycin therapy: Method 1    Shelly Garnett, FatoumataD, BCPS
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
## 2017-02-18 NOTE — PHARMACY-ADMISSION MEDICATION HISTORY
Admission medication history interview status for the 2/17/2017  admission is complete. See EPIC admission navigator for prior to admission medications     Medication history source reliability:Good    Actions taken by pharmacist (provider contacted, etc): Collected additional home medication list from daughter, and added them epic med list.      Additional medication history information not noted on PTA med list :None    Medication reconciliation/reorder completed by provider prior to medication history? No    Time spent in this activity: 20 min    Prior to Admission medications    Medication Sig Last Dose Taking? Auth Provider   RANITIDINE HCL PO Take 150 mg by mouth 2 times daily 2/17/2017 at Unknown time Yes Unknown, Entered By History   OMEPRAZOLE PO Take 20 mg by mouth every morning 2/17/2017 at Unknown time Yes Unknown, Entered By History   SERTRALINE HCL PO Take 50 mg by mouth daily 2/17/2017 at Unknown time Yes Unknown, Entered By History   CEPHALEXIN PO Take 1,000 mg by mouth 3 times daily 2/17/2017 at Unknown time Yes Unknown, Entered By History   enoxaparin (LOVENOX) 40 MG/0.4ML injection Inject 40 mg Subcutaneous every 12 hours 2/17/2017 at am Yes Unknown, Entered By History   GABAPENTIN PO Take 600 mg by mouth 2 times daily Takes 2 x 300 mg in a.m. 1 x 300 mg in afternoon and 2 x 300 mg at night. 2/17/2017 at am Yes Unknown, Entered By History   GABAPENTIN PO Take 300 mg by mouth daily In the afternoon.  Takes 2 x 300 mg in a.m. 1 x 300 mg in afternoon and 2 x 300 mg at night. 2/16/2017 at Unknown time Yes Unknown, Entered By History   Potassium Chloride Jacqueline CR (K-DUR PO) Take 20 mEq by mouth 2 times daily 2/17/2017 at am Yes Unknown, Entered By History   SUMAtriptan Succinate Refill (IMITREX) 6 MG/0.5ML SOCT Inject 6 mg Subcutaneous 2 times daily as needed for migraine Inject 6 mg at onset of headache  May repeat x 1 dose in 2 hours if needed.  Max 2 doses  In 24 hrs. prn Yes Unknown, Entered By  "History   FUROSEMIDE PO Take 20 mg by mouth 2 times daily 2/17/2017 at am Yes Unknown, Entered By History   multivitamin, therapeutic with minerals (THERA-VIT-M) TABS tablet Take 1 tablet by mouth daily Certavite 2/17/2017 at am Yes Unknown, Entered By History   calcium citrate-vitamin D (CALCIUM CITRATE + D) 315-250 MG-UNIT TABS per tablet Take 2 tablets by mouth 2 times daily 2/17/2017 at am Yes Unknown, Entered By History   ferrous sulfate (IRON) 325 (65 FE) MG tablet Take 325 mg by mouth daily (with breakfast) 2/17/2017 at am Yes Unknown, Entered By History   TRAMADOL HCL PO Take 50 mg by mouth every 8 hours as needed for moderate pain or moderate to severe pain prn Yes Unknown, Entered By History   TRAZODONE HCL PO Take 100 mg by mouth At Bedtime 2 x 50 mg tabs 2/16/2017 at hs Yes Unknown, Entered By History   Zolpidem Tartrate (AMBIEN PO) Take 10 mg by mouth nightly as needed for sleep prn Yes Unknown, Entered By History   oxybutynin (DITROPAN-XL) 5 MG 24 hr tablet Take 5 mg by mouth 2 times daily 2/17/2017 at am Yes Reported, Patient   order for DME Equipment being ordered: eval for appropriate shower bench and adaptive equipment while showering by Rosanna Rivera.  [employer] Fax [phone]    Primary Dressing Fibrocol    Qty 1 box  Length of Need: 1 month  Frequency of dressing change: daily  Eval for appropriate shower bench and adaptive equipment while showering by Rosanna Rivera.   Penny Pulido MD   Incontinence Supply Disposable (DISPOSABLE UNDERPADS 30\"X36\") MISC USE UP TO 6 TIMES DAILY AS NEEDED FOR INCONTINENCE   Ivan Baeza MD   order for DME Equipment being ordered: [employer] Fax [phone]    Primary Dressing Hydrofera Blue 6x6   Qty 1 box  Primary Dressing Fibracol Qty 1box  Secondary dressing 4x4 nonsterile gauze   Qty 200 ct  Secondary Dressing 2\" medipore tape Qty 3 rolls    Length of Need: 1 month  Frequency of dressing change: daily   Penny Pulido MD " "  Gauze Pads & Dressings (STERI-PAD STERILE) 4\"X4\" PADS Gauze pads for ileal diversion dressing daily and prn   Ivan Baeza MD   ORDER FOR DME Equipment being ordered:  Catheters with bag attached.   vIan Baeza MD   ORDER FOR DME [employer]  Phone# [phone]  Fax:  [phone]      Group 2 mattress overlay  Diagnosis:  Stage 4 Decubitus Ulcer   Penny Pulido MD   ORDER FOR DME Equipment being ordered: protein drink  Prostat 64  Coborns Ambrose   Ivan Baeza MD   ORDER FOR DME Equipment being ordered:  40 cc bulb for catheter   [phone]   Ivan Baeza MD   Nutritional Supplements (BOOST) LIQD I can a day   Ivan Baeza MD   ORDER FOR DME Equipment being ordered: handivan   Ivan Baeza MD   ORDER FOR DME Equipment being ordered: Over the bed table.  Fax to- Nataliia at [phone]   Ivan Baeza MD   ORDER FOR DME Equipment being ordered: Trapeze for hospital bed   Ivan Baeza MD       "

## 2017-02-18 NOTE — OR NURSING
Spoke to daniel Whitney to transfer to floor with additional 500 cc fluid flush for low SBP at 85. Pt is stable, no complaints noted, AAOx3-4.

## 2017-02-18 NOTE — ANESTHESIA CARE TRANSFER NOTE
Patient: Felicia Ellison    Procedure(s):  LEFT LATERAL POSITION DOWN - Wound Class: II-Clean Contaminated    Diagnosis: UNKNOW  Diagnosis Additional Information: No value filed.    Anesthesia Type:   MAC     Note:  Airway :Face Mask  Patient transferred to:PACU        Vitals: (Last set prior to Anesthesia Care Transfer)    CRNA VITALS  2/18/2017 1214 - 2/18/2017 1251      2/18/2017             Pulse: 90    SpO2: 98 %    Resp Rate (set): 10                Electronically Signed By: VERENA Kirby CRNA  February 18, 2017  12:51 PM

## 2017-02-18 NOTE — ADDENDUM NOTE
Addendum  created 02/18/17 1423 by Tala Torres APRN CRNA    Anesthesia Intra Flowsheets edited

## 2017-02-18 NOTE — ANESTHESIA PREPROCEDURE EVALUATION
Anesthesia Evaluation     .        ROS/MED HX    ENT/Pulmonary:      (-) sleep apnea   Neurologic:     (+)Spinal cord injury (1991) level of injury: T1     Cardiovascular:         METS/Exercise Tolerance:     Hematologic:         Musculoskeletal:         GI/Hepatic:     (+) GERD       Renal/Genitourinary:         Endo:         Psychiatric:         Infectious Disease: Comment: Sacral decubitus ulcer        Malignancy:         Other:                              Anesthesia Plan      History & Physical Review  History and physical reviewed and following examination; no interval change.    ASA Status:  3 .    NPO Status:  > 8 hours    Plan for MAC Reason for MAC:  Deep or markedly invasive procedure (G8)  PONV prophylaxis:  Ondansetron (or other 5HT-3)       Postoperative Care  Postoperative pain management:  IV analgesics.      Consents  Anesthetic plan, risks, benefits and alternatives discussed with:  Patient..                          .

## 2017-02-19 ENCOUNTER — ANESTHESIA EVENT (OUTPATIENT)
Dept: MEDSURG UNIT | Facility: CLINIC | Age: 52
DRG: 579 | End: 2017-02-19
Payer: MEDICARE

## 2017-02-19 ENCOUNTER — ANESTHESIA (OUTPATIENT)
Dept: MEDSURG UNIT | Facility: CLINIC | Age: 52
DRG: 579 | End: 2017-02-19
Payer: MEDICARE

## 2017-02-19 LAB
CHOLEST SERPL-MCNC: 66 MG/DL
ERYTHROCYTE [DISTWIDTH] IN BLOOD BY AUTOMATED COUNT: 19.3 % (ref 10–15)
HCT VFR BLD AUTO: 25.2 % (ref 35–47)
HDLC SERPL-MCNC: 23 MG/DL
HGB BLD-MCNC: 7.3 G/DL (ref 11.7–15.7)
LDLC SERPL CALC-MCNC: 28 MG/DL
LIPASE SERPL-CCNC: 624 U/L (ref 73–393)
MCH RBC QN AUTO: 23.2 PG (ref 26.5–33)
MCHC RBC AUTO-ENTMCNC: 29 G/DL (ref 31.5–36.5)
MCV RBC AUTO: 80 FL (ref 78–100)
NONHDLC SERPL-MCNC: 43 MG/DL
PLATELET # BLD AUTO: 329 10E9/L (ref 150–450)
POTASSIUM SERPL-SCNC: 4.8 MMOL/L (ref 3.4–5.3)
RBC # BLD AUTO: 3.14 10E12/L (ref 3.8–5.2)
TRIGL SERPL-MCNC: 73 MG/DL
WBC # BLD AUTO: 7.8 10E9/L (ref 4–11)

## 2017-02-19 PROCEDURE — 12000007 ZZH R&B INTERMEDIATE

## 2017-02-19 PROCEDURE — 85027 COMPLETE CBC AUTOMATED: CPT | Performed by: FAMILY MEDICINE

## 2017-02-19 PROCEDURE — 99233 SBSQ HOSP IP/OBS HIGH 50: CPT | Performed by: INTERNAL MEDICINE

## 2017-02-19 PROCEDURE — 25000128 H RX IP 250 OP 636: Performed by: INTERNAL MEDICINE

## 2017-02-19 PROCEDURE — 25000125 ZZHC RX 250: Performed by: FAMILY MEDICINE

## 2017-02-19 PROCEDURE — A9270 NON-COVERED ITEM OR SERVICE: HCPCS | Mod: GY | Performed by: INTERNAL MEDICINE

## 2017-02-19 PROCEDURE — 36415 COLL VENOUS BLD VENIPUNCTURE: CPT | Performed by: FAMILY MEDICINE

## 2017-02-19 PROCEDURE — 99207 ZZC MOONLIGHTING INDICATOR: CPT | Performed by: INTERNAL MEDICINE

## 2017-02-19 PROCEDURE — 25000132 ZZH RX MED GY IP 250 OP 250 PS 637: Mod: GY | Performed by: INTERNAL MEDICINE

## 2017-02-19 PROCEDURE — 25000128 H RX IP 250 OP 636

## 2017-02-19 PROCEDURE — 37000011 ZZH ANESTHESIA WARD SERVICE: Performed by: NURSE ANESTHETIST, CERTIFIED REGISTERED

## 2017-02-19 PROCEDURE — 40000671 ZZH STATISTIC ANESTHESIA CASE

## 2017-02-19 PROCEDURE — 80061 LIPID PANEL: CPT | Performed by: FAMILY MEDICINE

## 2017-02-19 PROCEDURE — 84132 ASSAY OF SERUM POTASSIUM: CPT | Performed by: INTERNAL MEDICINE

## 2017-02-19 PROCEDURE — 83690 ASSAY OF LIPASE: CPT | Performed by: FAMILY MEDICINE

## 2017-02-19 RX ADMIN — SODIUM CHLORIDE: 9 INJECTION, SOLUTION INTRAVENOUS at 15:12

## 2017-02-19 RX ADMIN — GABAPENTIN 600 MG: 600 TABLET, FILM COATED ORAL at 21:15

## 2017-02-19 RX ADMIN — ENOXAPARIN SODIUM 40 MG: 40 INJECTION SUBCUTANEOUS at 04:40

## 2017-02-19 RX ADMIN — GABAPENTIN 300 MG: 300 CAPSULE ORAL at 14:11

## 2017-02-19 RX ADMIN — MEROPENEM 500 MG: 500 INJECTION, POWDER, FOR SOLUTION INTRAVENOUS at 21:15

## 2017-02-19 RX ADMIN — ENOXAPARIN SODIUM 40 MG: 40 INJECTION SUBCUTANEOUS at 16:24

## 2017-02-19 RX ADMIN — TRAZODONE HYDROCHLORIDE 100 MG: 100 TABLET ORAL at 21:15

## 2017-02-19 RX ADMIN — OXYCODONE HYDROCHLORIDE 10 MG: 5 TABLET ORAL at 18:06

## 2017-02-19 RX ADMIN — GABAPENTIN 600 MG: 600 TABLET, FILM COATED ORAL at 09:15

## 2017-02-19 RX ADMIN — MEROPENEM 500 MG: 500 INJECTION, POWDER, FOR SOLUTION INTRAVENOUS at 01:34

## 2017-02-19 RX ADMIN — MEROPENEM 500 MG: 500 INJECTION, POWDER, FOR SOLUTION INTRAVENOUS at 08:08

## 2017-02-19 RX ADMIN — OXYBUTYNIN CHLORIDE 5 MG: 5 TABLET, EXTENDED RELEASE ORAL at 16:24

## 2017-02-19 RX ADMIN — SODIUM CHLORIDE: 9 INJECTION, SOLUTION INTRAVENOUS at 01:34

## 2017-02-19 RX ADMIN — MEROPENEM 500 MG: 500 INJECTION, POWDER, FOR SOLUTION INTRAVENOUS at 14:11

## 2017-02-19 RX ADMIN — ZOLPIDEM TARTRATE 5 MG: 5 TABLET, FILM COATED ORAL at 21:15

## 2017-02-19 RX ADMIN — VANCOMYCIN HYDROCHLORIDE 1000 MG: 1 INJECTION, SOLUTION INTRAVENOUS at 02:18

## 2017-02-19 RX ADMIN — VANCOMYCIN HYDROCHLORIDE 1000 MG: 1 INJECTION, SOLUTION INTRAVENOUS at 17:41

## 2017-02-19 RX ADMIN — OXYBUTYNIN CHLORIDE 5 MG: 5 TABLET, EXTENDED RELEASE ORAL at 09:15

## 2017-02-19 NOTE — ANESTHESIA CARE TRANSFER NOTE
Patient: Felicia RIOS [facility] IV start    Diagnosis: pancreatitis     Anesthesia Type:   None    Note:    Patient transferred to:Medical/Surgical Unit  Comments: Patient in hospital bed. Alert, oriented. Tolerated IV start without complications.      Vitals: (Last set prior to Anesthesia Care Transfer)              Electronically Signed By: VERENA Fletcher CRNA  February 19, 2017  5:49 PM

## 2017-02-19 NOTE — PLAN OF CARE
Problem: Goal Outcome Summary  Goal: Goal Outcome Summary  Outcome: No Change  A&OX4.  VSS.  Pain controlled with po pain meds.  Wound vac at 125 mmHg to IT wounds CD&I .  Dressing above wound vac CD&I.  Reposition every 2 hours.  Tolerating clear liquid diet.  Adequate u/o.
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
## 2017-02-19 NOTE — PLAN OF CARE
Problem: Goal Outcome Summary  Goal: Goal Outcome Summary  Outcome: Improving  Required no pain medicine past 12 hours. Allowing staff to reposition her q2h or so. Dressing to sacral area is C/D/I, Wound vac at 125 continuous to bilateral IT wounds, minimal drainage noted.

## 2017-02-19 NOTE — PROGRESS NOTES
General Surgery Progress Note    Date of service: 2/19/2017    Subjective: Denies pain, feels fatigued    Objective:  BP (!) 83/45 (BP Location: Right arm)  Pulse 91  Temp 97.3  F (36.3  C) (Axillary)  Resp 16  SpO2 94%  GEN: NAD  PULM: breathing comfortably  HEART: RRR  ABD: soft, distended, NT  EXTR: normal      Assessment and Plan:  Felicia Ellison is a 51 yof with Hx of paraplegia, multiple medical problems admitted with acute pancreatitis and  Found have an infected decubital ulcer now POD#1 s/p decubital ulcer debridement  - will change the dressing today, ok for nursing to do daily wet-to dry dressing changes afterwards  - continue meropenem/vanc until cultures back  - on CLD, pancreatitis mgmt per primary  - soft BP overnight, will re-check hemoglobin    Steffanie Burciaga MD, PGY4  [phone]    Addendum: pt doing well. Afebrile. Lipase improving. hgb down slightly with intermittently low BP. Dressing changed at bedside and wound bed clean. No bleeding. Dressing replaced with moist kerlix, covered with fluffs and a large ABD pad.   - RN or wound RN to perform future dressing changes, appreciate assistance.   - please call with further questions or concerns  - [facility], Dr. Pulido following discharge for ongoing management of wounds and discussion regarding wound coverage in the future.     Sanjana Ladd MD  [facility]  [phone]

## 2017-02-19 NOTE — PROGRESS NOTES
GI Progress Note:  Upper abdomen still uncomfortable.  Would like to eat, however.  No nausea/vomiting.  Had necrotic sacral ulcers debrided yesterday.  PE: VSS, afebrile.  Abd: soft, tender in upper abdomen, +bs, no masses. Surgical scars, ACE, colostomy.  Labs: lipase was 930 yesterday, 634 today.  A/P: Acute pancreatitis of unclear etiology.  Discussed with MRI--rods are titanium, but they may decrease ability to see the images in that area. Can try.    Plan:  Clear liquids only.  Recheck lipase.  MRCP tomorrow.    Beth Urbina MD  [facility]  Office: [phone]  Cell:  [phone]  Monday through Thursday 8am to 5pm, Friday 8am to 4pm

## 2017-02-19 NOTE — PROGRESS NOTES
[facility] Internal Medicine Progress Note     Date of Service (when I saw the patient): 2/18/2017    REASON FOR ADMISSION / INTERVAL HISTORY:  Pancreatitis and severe decub ulcers to bone. See details below. Feels ok. No new complaints today.    ASSESSMENT/PLAN:   ACUTE PANCREATITIS WITH TRANSVERSE COLITIS  Presented with vomiting, abd pain and elevated lipase. Outside CT showed cystic dilatation of pancreatic duct. GI to see.   -continue clear liq diet. .    B NECROTIC SACRAL ULCERS  For  debridement today.  - Surgery following.    HYPO K  Continue replacement protocol    CRONIC ANEMIA  Likely iron def, cronic disease. Labs at baseline.    GERD  Continue meds    NEUROPATHIC PAIN  Continue meds    DEPRESSION  Continue meds    T1 PARAPLEGIA  The patient is on both Lovenox at 60 mg subcu every 12 hours as well as 1 mg of warfarin at home per record.  -currently on lovenox 40sq daily.    DISPO  Home in 2-4 days.    DAVID FRANCO MD   Pg [phone]    DVT Prhylaxis: Enoxaprain (Lovenox) SQ  Code Status: Full Code    ROS:  As described in A/P and Exam.  Otherwise ALL are  negative.    PHYSICAL EXAM:  All vitals have been reviewed    Blood pressure 94/54, pulse 91, temperature 97.3  F (36.3  C), temperature source Axillary, resp. rate 16, SpO2 94 %, not currently breastfeeding.         GENERAL APPEARANCE: healthy, alert and no distress  EYES: conjunctiva clear, eyes grossly normal  HENT: external ears and nose normal   NECK: supple, no masses or adenopathy  RESP: lungs clear to auscultation - no rales, rhonchi or wheezes  CV: regular rate and rhythm, normal S1 S2, no S3 or S4 and no murmur, click or rub   ABDOMEN: soft, mild tenderness epigastrium, no HSM or masses and bowel sounds normal  MS: no clubbing, cyanosis; no edema--buttock ulcers in dressing  NEURO: -non-focal moves all 4 extr    ROUTINE  LABS (Last four results)  CMP    Recent Labs  Lab 02/18/17 1955 02/18/17  0825 02/18/17  0245   NA  --  146*  --     POTASSIUM 4.5 3.3* 2.9*   CHLORIDE  --  112*  --    CO2  --  28  --    ANIONGAP  --  6  --    GLC  --  79  --    BUN  --  9  --    CR  --  0.54  --    GFRESTIMATED  --  >90Non  GFR Calc  --    GFRESTBLACK  --  >90African American GFR Calc  --    JOSH  --  7.6*  --    PROTTOTAL  --  5.7*  --    ALBUMIN  --  1.4*  --    BILITOTAL  --  0.3  --    ALKPHOS  --  97  --    AST  --  8  --    ALT  --  6  --      CBC    Recent Labs  Lab 02/18/17  1523 02/18/17  0825   WBC  --  9.9   RBC  --  3.42*   HGB 7.6* 8.0*   HCT  --  27.3*   MCV  --  80   MCH  --  23.4*   MCHC  --  29.3*   RDW  --  19.2*   PLT  --  353     INR    Recent Labs  Lab 02/18/17  0825   INR 1.38*     Arterial Blood GasNo lab results found in last 7 days.    No results found for this or any previous visit (from the past 24 hour(s)).

## 2017-02-20 ENCOUNTER — APPOINTMENT (OUTPATIENT)
Dept: MRI IMAGING | Facility: CLINIC | Age: 52
DRG: 579 | End: 2017-02-20
Attending: INTERNAL MEDICINE
Payer: MEDICARE

## 2017-02-20 LAB
ABO + RH BLD: NORMAL
ABO + RH BLD: NORMAL
ALBUMIN SERPL-MCNC: 1.1 G/DL (ref 3.4–5)
ANION GAP SERPL CALCULATED.3IONS-SCNC: 8 MMOL/L (ref 3–14)
BLD GP AB SCN SERPL QL: NORMAL
BLD PROD TYP BPU: NORMAL
BLD PROD TYP BPU: NORMAL
BLD UNIT ID BPU: 0
BLOOD BANK CMNT PATIENT-IMP: NORMAL
BLOOD PRODUCT CODE: NORMAL
BPU ID: NORMAL
BUN SERPL-MCNC: 6 MG/DL (ref 7–30)
CALCIUM SERPL-MCNC: 7.2 MG/DL (ref 8.5–10.1)
CHLORIDE SERPL-SCNC: 112 MMOL/L (ref 94–109)
CO2 SERPL-SCNC: 22 MMOL/L (ref 20–32)
CREAT SERPL-MCNC: 0.48 MG/DL (ref 0.52–1.04)
ERYTHROCYTE [DISTWIDTH] IN BLOOD BY AUTOMATED COUNT: 19.1 % (ref 10–15)
ERYTHROCYTE [SEDIMENTATION RATE] IN BLOOD BY WESTERGREN METHOD: 61 MM/H (ref 0–30)
FERRITIN SERPL-MCNC: 222 NG/ML (ref 8–252)
FOLATE SERPL-MCNC: 5.1 NG/ML
GFR SERPL CREATININE-BSD FRML MDRD: ABNORMAL ML/MIN/1.7M2
GLUCOSE SERPL-MCNC: 80 MG/DL (ref 70–99)
HAPTOGLOB SERPL-MCNC: 203 MG/DL (ref 15–200)
HCT VFR BLD AUTO: 22.8 % (ref 35–47)
HEMOCCULT STL QL: NEGATIVE
HGB BLD-MCNC: 6.9 G/DL (ref 11.7–15.7)
IRON SATN MFR SERPL: 14 % (ref 15–46)
IRON SERPL-MCNC: 10 UG/DL (ref 35–180)
LDH SERPL L TO P-CCNC: 99 U/L (ref 81–234)
LIPASE SERPL-CCNC: 533 U/L (ref 73–393)
MCH RBC QN AUTO: 24 PG (ref 26.5–33)
MCHC RBC AUTO-ENTMCNC: 30.3 G/DL (ref 31.5–36.5)
MCV RBC AUTO: 79 FL (ref 78–100)
NUM BPU REQUESTED: 1
PLATELET # BLD AUTO: 283 10E9/L (ref 150–450)
POTASSIUM SERPL-SCNC: 3.5 MMOL/L (ref 3.4–5.3)
RBC # BLD AUTO: 2.88 10E12/L (ref 3.8–5.2)
SODIUM SERPL-SCNC: 142 MMOL/L (ref 133–144)
SPECIMEN EXP DATE BLD: NORMAL
T4 FREE SERPL-MCNC: 0.96 NG/DL (ref 0.76–1.46)
TIBC SERPL-MCNC: 70 UG/DL (ref 240–430)
TRANSFUSION STATUS PATIENT QL: NORMAL
TRANSFUSION STATUS PATIENT QL: NORMAL
TSH SERPL DL<=0.005 MIU/L-ACNC: 4.05 MU/L (ref 0.4–4)
VANCOMYCIN SERPL-MCNC: 21.5 MG/L
VIT B12 SERPL-MCNC: 315 PG/ML (ref 193–986)
WBC # BLD AUTO: 6.2 10E9/L (ref 4–11)

## 2017-02-20 PROCEDURE — 25500064 ZZH RX 255 OP 636: Performed by: INTERNAL MEDICINE

## 2017-02-20 PROCEDURE — A9585 GADOBUTROL INJECTION: HCPCS | Performed by: INTERNAL MEDICINE

## 2017-02-20 PROCEDURE — 74183 MRI ABD W/O CNTR FLWD CNTR: CPT

## 2017-02-20 PROCEDURE — A9270 NON-COVERED ITEM OR SERVICE: HCPCS | Mod: GY | Performed by: INTERNAL MEDICINE

## 2017-02-20 PROCEDURE — 84439 ASSAY OF FREE THYROXINE: CPT | Performed by: INTERNAL MEDICINE

## 2017-02-20 PROCEDURE — 97606 NEG PRS WND THER DME>50 SQCM: CPT

## 2017-02-20 PROCEDURE — 25800025 ZZH RX 258: Performed by: INTERNAL MEDICINE

## 2017-02-20 PROCEDURE — 80048 BASIC METABOLIC PNL TOTAL CA: CPT | Performed by: INTERNAL MEDICINE

## 2017-02-20 PROCEDURE — 27210995 ZZH RX 272: Performed by: INTERNAL MEDICINE

## 2017-02-20 PROCEDURE — 25000132 ZZH RX MED GY IP 250 OP 250 PS 637: Mod: GY | Performed by: INTERNAL MEDICINE

## 2017-02-20 PROCEDURE — 83690 ASSAY OF LIPASE: CPT | Performed by: INTERNAL MEDICINE

## 2017-02-20 PROCEDURE — 25000128 H RX IP 250 OP 636: Performed by: INTERNAL MEDICINE

## 2017-02-20 PROCEDURE — 12000007 ZZH R&B INTERMEDIATE

## 2017-02-20 PROCEDURE — 27210222 ZZH KIT SHRLOCK 6FR POWER PICC

## 2017-02-20 PROCEDURE — 82607 VITAMIN B-12: CPT | Performed by: INTERNAL MEDICINE

## 2017-02-20 PROCEDURE — 86923 COMPATIBILITY TEST ELECTRIC: CPT | Performed by: INTERNAL MEDICINE

## 2017-02-20 PROCEDURE — 86901 BLOOD TYPING SEROLOGIC RH(D): CPT | Performed by: INTERNAL MEDICINE

## 2017-02-20 PROCEDURE — P9016 RBC LEUKOCYTES REDUCED: HCPCS | Performed by: INTERNAL MEDICINE

## 2017-02-20 PROCEDURE — 99233 SBSQ HOSP IP/OBS HIGH 50: CPT | Performed by: INTERNAL MEDICINE

## 2017-02-20 PROCEDURE — 80202 ASSAY OF VANCOMYCIN: CPT | Performed by: INTERNAL MEDICINE

## 2017-02-20 PROCEDURE — 83550 IRON BINDING TEST: CPT | Performed by: INTERNAL MEDICINE

## 2017-02-20 PROCEDURE — 82746 ASSAY OF FOLIC ACID SERUM: CPT | Performed by: INTERNAL MEDICINE

## 2017-02-20 PROCEDURE — 36415 COLL VENOUS BLD VENIPUNCTURE: CPT | Performed by: INTERNAL MEDICINE

## 2017-02-20 PROCEDURE — 36569 INSJ PICC 5 YR+ W/O IMAGING: CPT

## 2017-02-20 PROCEDURE — 83010 ASSAY OF HAPTOGLOBIN QUANT: CPT | Performed by: INTERNAL MEDICINE

## 2017-02-20 PROCEDURE — 85027 COMPLETE CBC AUTOMATED: CPT | Performed by: INTERNAL MEDICINE

## 2017-02-20 PROCEDURE — 83615 LACTATE (LD) (LDH) ENZYME: CPT | Performed by: INTERNAL MEDICINE

## 2017-02-20 PROCEDURE — 82728 ASSAY OF FERRITIN: CPT | Performed by: INTERNAL MEDICINE

## 2017-02-20 PROCEDURE — 99215 OFFICE O/P EST HI 40 MIN: CPT

## 2017-02-20 PROCEDURE — 82040 ASSAY OF SERUM ALBUMIN: CPT | Performed by: INTERNAL MEDICINE

## 2017-02-20 PROCEDURE — 83540 ASSAY OF IRON: CPT | Performed by: INTERNAL MEDICINE

## 2017-02-20 PROCEDURE — 85652 RBC SED RATE AUTOMATED: CPT | Performed by: INTERNAL MEDICINE

## 2017-02-20 PROCEDURE — 82272 OCCULT BLD FECES 1-3 TESTS: CPT | Performed by: INTERNAL MEDICINE

## 2017-02-20 PROCEDURE — P9047 ALBUMIN (HUMAN), 25%, 50ML: HCPCS | Performed by: INTERNAL MEDICINE

## 2017-02-20 PROCEDURE — 25000125 ZZHC RX 250: Performed by: FAMILY MEDICINE

## 2017-02-20 PROCEDURE — 86900 BLOOD TYPING SEROLOGIC ABO: CPT | Performed by: INTERNAL MEDICINE

## 2017-02-20 PROCEDURE — 86850 RBC ANTIBODY SCREEN: CPT | Performed by: INTERNAL MEDICINE

## 2017-02-20 PROCEDURE — 25000128 H RX IP 250 OP 636

## 2017-02-20 PROCEDURE — 84443 ASSAY THYROID STIM HORMONE: CPT | Performed by: INTERNAL MEDICINE

## 2017-02-20 RX ORDER — GADOBUTROL 604.72 MG/ML
15 INJECTION INTRAVENOUS ONCE
Status: COMPLETED | OUTPATIENT
Start: 2017-02-20 | End: 2017-02-20

## 2017-02-20 RX ORDER — DEXTROSE MONOHYDRATE, SODIUM CHLORIDE, AND POTASSIUM CHLORIDE 50; 1.49; 4.5 G/1000ML; G/1000ML; G/1000ML
INJECTION, SOLUTION INTRAVENOUS CONTINUOUS
Status: DISCONTINUED | OUTPATIENT
Start: 2017-02-20 | End: 2017-02-26

## 2017-02-20 RX ORDER — ALBUMIN (HUMAN) 12.5 G/50ML
12.5 SOLUTION INTRAVENOUS ONCE
Status: COMPLETED | OUTPATIENT
Start: 2017-02-20 | End: 2017-02-20

## 2017-02-20 RX ORDER — FUROSEMIDE 10 MG/ML
20 INJECTION INTRAMUSCULAR; INTRAVENOUS ONCE
Status: COMPLETED | OUTPATIENT
Start: 2017-02-20 | End: 2017-02-20

## 2017-02-20 RX ORDER — ACYCLOVIR 200 MG/1
60 CAPSULE ORAL ONCE
Status: COMPLETED | OUTPATIENT
Start: 2017-02-20 | End: 2017-02-20

## 2017-02-20 RX ORDER — VANCOMYCIN HYDROCHLORIDE 1 G/200ML
1000 INJECTION, SOLUTION INTRAVENOUS
Status: DISCONTINUED | OUTPATIENT
Start: 2017-02-21 | End: 2017-02-27

## 2017-02-20 RX ADMIN — OXYCODONE HYDROCHLORIDE 10 MG: 5 TABLET ORAL at 17:20

## 2017-02-20 RX ADMIN — SODIUM CHLORIDE 60 ML: 9 INJECTION, SOLUTION INTRAMUSCULAR; INTRAVENOUS; SUBCUTANEOUS at 16:16

## 2017-02-20 RX ADMIN — SERTRALINE HYDROCHLORIDE 50 MG: 50 TABLET, FILM COATED ORAL at 18:45

## 2017-02-20 RX ADMIN — MEROPENEM 500 MG: 500 INJECTION, POWDER, FOR SOLUTION INTRAVENOUS at 22:01

## 2017-02-20 RX ADMIN — GADOBUTROL 15 ML: 604.72 INJECTION INTRAVENOUS at 16:16

## 2017-02-20 RX ADMIN — MEROPENEM 500 MG: 500 INJECTION, POWDER, FOR SOLUTION INTRAVENOUS at 17:44

## 2017-02-20 RX ADMIN — FUROSEMIDE 20 MG: 10 INJECTION, SOLUTION INTRAVENOUS at 22:14

## 2017-02-20 RX ADMIN — SODIUM CHLORIDE: 9 INJECTION, SOLUTION INTRAVENOUS at 08:23

## 2017-02-20 RX ADMIN — OXYBUTYNIN CHLORIDE 5 MG: 5 TABLET, EXTENDED RELEASE ORAL at 17:48

## 2017-02-20 RX ADMIN — VANCOMYCIN HYDROCHLORIDE 1000 MG: 1 INJECTION, SOLUTION INTRAVENOUS at 06:25

## 2017-02-20 RX ADMIN — OMEPRAZOLE 20 MG: 20 CAPSULE, DELAYED RELEASE ORAL at 18:45

## 2017-02-20 RX ADMIN — GABAPENTIN 300 MG: 300 CAPSULE ORAL at 17:19

## 2017-02-20 RX ADMIN — OXYBUTYNIN CHLORIDE 5 MG: 5 TABLET, EXTENDED RELEASE ORAL at 07:47

## 2017-02-20 RX ADMIN — ENOXAPARIN SODIUM 40 MG: 40 INJECTION SUBCUTANEOUS at 03:43

## 2017-02-20 RX ADMIN — TRAZODONE HYDROCHLORIDE 100 MG: 100 TABLET ORAL at 22:14

## 2017-02-20 RX ADMIN — GABAPENTIN 600 MG: 600 TABLET, FILM COATED ORAL at 22:13

## 2017-02-20 RX ADMIN — ALBUMIN (HUMAN) 12.5 G: 12.5 SOLUTION INTRAVENOUS at 22:12

## 2017-02-20 RX ADMIN — MEROPENEM 500 MG: 500 INJECTION, POWDER, FOR SOLUTION INTRAVENOUS at 03:43

## 2017-02-20 RX ADMIN — GABAPENTIN 600 MG: 600 TABLET, FILM COATED ORAL at 07:48

## 2017-02-20 RX ADMIN — MEROPENEM 500 MG: 500 INJECTION, POWDER, FOR SOLUTION INTRAVENOUS at 09:08

## 2017-02-20 RX ADMIN — POTASSIUM CHLORIDE, DEXTROSE MONOHYDRATE AND SODIUM CHLORIDE: 150; 5; 450 INJECTION, SOLUTION INTRAVENOUS at 17:23
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
## 2017-02-20 NOTE — PLAN OF CARE
Problem: Goal Outcome Summary  Goal: Goal Outcome Summary  See [facility] nurses note for detailed account of wounds and dressing changes. NPO after breakfast for MRI . Hungry . Daughter here most of day shift and observed dressing changes.  Wound vac patent. Remedios Ruelas RN

## 2017-02-20 NOTE — PROGRESS NOTES
[facility] Internal Medicine Progress Note     Date of Service (when I saw the patient): 02/20/2017      REASON FOR ADMISSION / INTERVAL HISTORY:  Acute pancreatitis, colitis and severe ilateral decub ulcers to bone.     ASSESSMENT/PLAN:   ACUTE PANCREATITIS WITH TRANSVERSE COLITIS  Presented with vomiting, abd pain and elevated lipase. Outside CT showed cystic dilatation of pancreatic duct. 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  Cannot brewer ERCP due to spinal rods.  Plan MRCP this afternoon, and follow-up endoscopy tomorrow.  clinically patient much improved, resume full caloric diet tomorrow afternoon    B NECROTIC SACRAL ULCERS  S/p debridement 2/18. GS showing GPC/GPR.  -continue vanc/meropenam, adjust antibiotics pending culture results    HYPO K- Continue replacement protocol,adjusting IV fluids    CHRONIC ANEMIA- Iron studies negative for deficiency most likely anemia of chronic disease, transfuse one unit of packed red blood cells today for hemoglobin less than seven and significant intravascular protein depletion     HYPOALBUMINEMIA- Due to intermittent poor p.o. Intake and ongoing chronic protein loss from decubitus ulcers  Patient developed progressive edema.   With albumin approaching 1.0 we'll transfuse one vial of albumin in addition to one unit of packed red blood cells.  Lasix ×1 tonight    NEUROPATHIC PAIN- Continue meds    DEPRESSION- Continue meds    T1 PARAPLEGIA- The patient is on both Lovenox at 60 mg subcu every 12 hours as well as 1 mg of warfarin at home per record.  -currently was on lovenox 40sq daily- holding the Lovenox to ensure hemoglobin not having ongoing reduction.    DISPO: due to severe bilateral decubitus ulcers infected with severe hypoalbuminemia and addition to patient's acute but transient pancreatitis-patient may benefit from long-term care hospitalization for wound care and consideration of surgical treatment  of the decubitus prior to returning home.    Ziggy oLpez M.D.    DVT Prhylaxis: Enoxaprain (Lovenox) SQ  Code Status: Full Code    ROS:  As described in A/P and Exam.  Otherwise ALL are  negative.    PHYSICAL EXAM:  All vitals have been reviewed    Blood pressure 114/67, pulse 90, temperature 98.1  F (36.7  C), temperature source Oral, resp. rate 16, SpO2 96 %, not currently breastfeeding.    I/O this shift:  In: 400 [P.O.:400]  Out: 450 [Urine:300; Stool:150]    GENERAL APPEARANCE: healthy, alert and no distress  RESP: lungs clear to auscultation - no rales, rhonchi or wheezes  CV: regular rate and rhythm, normal S1 S2, no S3 or S4 and 1/6 systolic murmur  ABDOMEN: soft, mild tenderness epigastrium, no HSM or masses and bowel sounds normal  MS:  diffuse 1+ edema--ischial and sacral ulcers bilateral and dressed  NEURO: -non-focal moves all 4 extr    ROUTINE  LABS (Last four results)  CMP    Recent Labs  Lab 02/20/17  0814 02/19/17  1105 02/18/17  1955 02/18/17  0825     --   --  146*   POTASSIUM 3.5 4.8 4.5 3.3*   CHLORIDE 112*  --   --  112*   CO2 22  --   --  28   ANIONGAP 8  --   --  6   GLC 80  --   --  79   BUN 6*  --   --  9   CR 0.48*  --   --  0.54   GFRESTIMATED >90Non  GFR Calc  --   --  >90Non  GFR Calc   GFRESTBLACK >90African American GFR Calc  --   --  >90African American GFR Calc   JOSH 7.2*  --   --  7.6*   PROTTOTAL  --   --   --  5.7*   ALBUMIN 1.1*  --   --  1.4*   BILITOTAL  --   --   --  0.3   ALKPHOS  --   --   --  97   AST  --   --   --  8   ALT  --   --   --  6     CBC    Recent Labs  Lab 02/20/17  0814 02/19/17  1105 02/18/17  1523 02/18/17  0825   WBC 6.2 7.8  --  9.9   RBC 2.88* 3.14*  --  3.42*   HGB 6.9* 7.3* 7.6* 8.0*   HCT 22.8* 25.2*  --  27.3*   MCV 79 80  --  80   MCH 24.0* 23.2*  --  23.4*   MCHC 30.3* 29.0*  --  29.3*   RDW 19.1* 19.3*  --  19.2*    329  --  353     INR    Recent Labs  Lab 02/18/17  0825   INR 1.38*     Arterial Blood  GasNo lab results found in last 7 days.    No results found for this or any previous visit (from the past 24 hour(s)).

## 2017-02-20 NOTE — PROGRESS NOTES
GASTROENTEROLOGY PROGRESS NOTE     SUBJECTIVE: Feels well. Denies any abdominal pain. Wants to eat.     OBJECTIVE:   /62 (BP Location: Right arm)  Pulse 94  Temp 98.2  F (36.8  C) (Oral)  Resp 16  SpO2 96%   Temp (24hrs), Av.7  F (37.1  C), Min:98.1  F (36.7  C), Max:100.1  F (37.8  C)     No data found.     Intake/Output Summary (Last 24 hours) at 17 1051  Last data filed at 17 0910   Gross per 24 hour   Intake             4396 ml   Output             1300 ml   Net             3096 ml      PHYSICAL EXAM   Constitutional: Chronically ill appearing, in bed, no acute distress  Abdomen: Soft, non-tender, non-distended, normally active bowel sounds. Colostomy in LLQ with brown/green thin liquid in bag.     Additional Comments:   ROS, FH, SH: See initial GI consult for details.     I have reviewed the patient's new clinical lab results:   Recent Labs   Lab Test  1714  17   1105  17   1523  17   0825   14   0632   10/21/13   0920   WBC  6.2  7.8   --   9.9   --    --    < >   --    HGB  6.9*  7.3*  7.6*  8.0*   < >   --    < >  11.2*   MCV  79  80   --   80   --    --    < >   --    PLT  283  329   --   353   < >   --    < >   --    INR   --    --    --   1.38*   --   1.02   --   1.13    < > = values in this interval not displayed.      Recent Labs   Lab Test  17   0814  17   1105  17   1955  17   0825   14   0815  04/15/14   0700   NA  142   --    --   146*   --    --   142   POTASSIUM  3.5  4.8  4.5  3.3*   < >   --   4.2   CHLORIDE  112*   --    --   112*   --    --   111*   CO2  22   --    --   28   --    --   26   BUN  6*   --    --   9   --    --   10   CR  0.48*   --    --   0.54   --   0.40*  0.40*   ANIONGAP  8   --    --   6   --    --   5.1*   JOSH  7.2*   --    --   7.6*   --    --   8.2*    < > = values in this interval not displayed.      Recent Labs   Lab Test  17   0814  17   0750  17   0825  01/20/15    1230  09/04/14   1059  04/15/14   0700   12/28/13   0848   09/13/13   1446  01/18/13   1700   ALBUMIN  1.1*   --   1.4*  3.6   --   2.3*   < >  3.7*   < >   --    --    BILITOTAL   --    --   0.3   --    --   <0.1*   --   0.6   < >   --    --    ALT   --    --   6   --    --   22   --   10   < >   --    --    AST   --    --   8   --    --   15   --   37   < >   --    --    ALKPHOS   --    --   97   --    --   81   --   173*   < >   --    --    PROTEIN   --    --    --    --   30*   --    --    --    --   30*  Trace*   LIPASE  533*  624*  930*   --    --    --    --    --    --    --    --     < > = values in this interval not displayed.      Assessment:  51 year old female presenting to OSH with abdominal pain and vomiting. Found to have lipase of 1308 and CT suggesting fat stranding in the peripancreatic region consistent with pancreatitis. Circumferential thickening of the gastric wall and thickening of the transverse colon also noted. Lipase trending down. LFTs normal. Hgb has dropped slightly since admission though no signs of bleeding     Plan:    -MRCP today  -Ok to advance diet as tolerated following MRCP  -NPO at midnight  -EGD planned for tomorrow to evaluate gastric thickening seen on CT and anemia  -No need to follow Lipase  -Further recommendations to follow    John Gallegos PA-C  [facility]  Cell:  [phone] Monday through Friday [id number]  Office: [phone]

## 2017-02-20 NOTE — PLAN OF CARE
Problem: Goal Outcome Summary  Goal: Goal Outcome Summary  Outcome: No Change  Progressing per POC

## 2017-02-21 LAB
ANION GAP SERPL CALCULATED.3IONS-SCNC: 8 MMOL/L (ref 3–14)
BUN SERPL-MCNC: 5 MG/DL (ref 7–30)
CALCIUM SERPL-MCNC: 6.7 MG/DL (ref 8.5–10.1)
CHLORIDE SERPL-SCNC: 112 MMOL/L (ref 94–109)
CO2 SERPL-SCNC: 20 MMOL/L (ref 20–32)
CREAT SERPL-MCNC: 0.4 MG/DL (ref 0.52–1.04)
GFR SERPL CREATININE-BSD FRML MDRD: ABNORMAL ML/MIN/1.7M2
GLUCOSE SERPL-MCNC: 250 MG/DL (ref 70–99)
HGB BLD-MCNC: 7.6 G/DL (ref 11.7–15.7)
INR PPP: 1.41 (ref 0.86–1.14)
POTASSIUM SERPL-SCNC: 4.1 MMOL/L (ref 3.4–5.3)
SODIUM SERPL-SCNC: 140 MMOL/L (ref 133–144)

## 2017-02-21 PROCEDURE — 85018 HEMOGLOBIN: CPT | Performed by: INTERNAL MEDICINE

## 2017-02-21 PROCEDURE — 25000128 H RX IP 250 OP 636: Performed by: INTERNAL MEDICINE

## 2017-02-21 PROCEDURE — A9270 NON-COVERED ITEM OR SERVICE: HCPCS | Mod: GY | Performed by: INTERNAL MEDICINE

## 2017-02-21 PROCEDURE — E2402 NEG PRESS WOUND THERAPY PUMP: HCPCS

## 2017-02-21 PROCEDURE — 25000132 ZZH RX MED GY IP 250 OP 250 PS 637: Mod: GY | Performed by: INTERNAL MEDICINE

## 2017-02-21 PROCEDURE — 25800025 ZZH RX 258

## 2017-02-21 PROCEDURE — 25000128 H RX IP 250 OP 636

## 2017-02-21 PROCEDURE — 99233 SBSQ HOSP IP/OBS HIGH 50: CPT | Performed by: INTERNAL MEDICINE

## 2017-02-21 PROCEDURE — 85610 PROTHROMBIN TIME: CPT | Performed by: INTERNAL MEDICINE

## 2017-02-21 PROCEDURE — 40000257 ZZH STATISTIC CONSULT NO CHARGE VASC ACCESS

## 2017-02-21 PROCEDURE — 12000007 ZZH R&B INTERMEDIATE

## 2017-02-21 PROCEDURE — 80048 BASIC METABOLIC PNL TOTAL CA: CPT | Performed by: INTERNAL MEDICINE

## 2017-02-21 PROCEDURE — 25000125 ZZHC RX 250: Performed by: INTERNAL MEDICINE

## 2017-02-21 PROCEDURE — 40000239 ZZH STATISTIC VAT ROUNDS

## 2017-02-21 RX ORDER — BETA-CAROTENE 7500 MCG
25000 CAPSULE ORAL DAILY
Status: DISCONTINUED | OUTPATIENT
Start: 2017-02-21 | End: 2017-02-27 | Stop reason: HOSPADM

## 2017-02-21 RX ORDER — CEFTRIAXONE 2 G/1
2 INJECTION, POWDER, FOR SOLUTION INTRAMUSCULAR; INTRAVENOUS EVERY 24 HOURS
Status: DISCONTINUED | OUTPATIENT
Start: 2017-02-21 | End: 2017-02-27

## 2017-02-21 RX ORDER — ZINC SULFATE 50(220)MG
220 CAPSULE ORAL DAILY
Status: DISCONTINUED | OUTPATIENT
Start: 2017-02-21 | End: 2017-02-27 | Stop reason: HOSPADM

## 2017-02-21 RX ORDER — ASCORBIC ACID 500 MG
500 TABLET ORAL DAILY
Status: DISCONTINUED | OUTPATIENT
Start: 2017-02-21 | End: 2017-02-27 | Stop reason: HOSPADM

## 2017-02-21 RX ORDER — FUROSEMIDE 10 MG/ML
40 INJECTION INTRAMUSCULAR; INTRAVENOUS ONCE
Status: COMPLETED | OUTPATIENT
Start: 2017-02-21 | End: 2017-02-21

## 2017-02-21 RX ORDER — MULTIPLE VITAMINS W/ MINERALS TAB 9MG-400MCG
1 TAB ORAL DAILY
Status: DISCONTINUED | OUTPATIENT
Start: 2017-02-21 | End: 2017-02-27 | Stop reason: HOSPADM

## 2017-02-21 RX ADMIN — OXYBUTYNIN CHLORIDE 5 MG: 5 TABLET, EXTENDED RELEASE ORAL at 17:09

## 2017-02-21 RX ADMIN — MEROPENEM 500 MG: 500 INJECTION, POWDER, FOR SOLUTION INTRAVENOUS at 03:38

## 2017-02-21 RX ADMIN — POTASSIUM CHLORIDE, DEXTROSE MONOHYDRATE AND SODIUM CHLORIDE: 150; 5; 450 INJECTION, SOLUTION INTRAVENOUS at 20:12

## 2017-02-21 RX ADMIN — MEROPENEM 500 MG: 500 INJECTION, POWDER, FOR SOLUTION INTRAVENOUS at 08:41

## 2017-02-21 RX ADMIN — SERTRALINE HYDROCHLORIDE 50 MG: 50 TABLET, FILM COATED ORAL at 08:45

## 2017-02-21 RX ADMIN — OXYBUTYNIN CHLORIDE 5 MG: 5 TABLET, EXTENDED RELEASE ORAL at 08:45

## 2017-02-21 RX ADMIN — GABAPENTIN 300 MG: 300 CAPSULE ORAL at 15:03

## 2017-02-21 RX ADMIN — OXYCODONE HYDROCHLORIDE 10 MG: 5 TABLET ORAL at 02:07

## 2017-02-21 RX ADMIN — IRON SUCROSE 300 MG: 20 INJECTION, SOLUTION INTRAVENOUS at 15:11

## 2017-02-21 RX ADMIN — TRAZODONE HYDROCHLORIDE 100 MG: 100 TABLET ORAL at 20:30

## 2017-02-21 RX ADMIN — OXYCODONE HYDROCHLORIDE AND ACETAMINOPHEN 500 MG: 500 TABLET ORAL at 15:03

## 2017-02-21 RX ADMIN — OMEPRAZOLE 20 MG: 20 CAPSULE, DELAYED RELEASE ORAL at 08:45

## 2017-02-21 RX ADMIN — OXYCODONE HYDROCHLORIDE 5 MG: 5 TABLET ORAL at 08:53

## 2017-02-21 RX ADMIN — MEROPENEM 500 MG: 500 INJECTION, POWDER, FOR SOLUTION INTRAVENOUS at 15:05

## 2017-02-21 RX ADMIN — CEFTRIAXONE 2 G: 2 INJECTION, POWDER, FOR SOLUTION INTRAMUSCULAR; INTRAVENOUS at 17:09

## 2017-02-21 RX ADMIN — GABAPENTIN 600 MG: 600 TABLET, FILM COATED ORAL at 08:45

## 2017-02-21 RX ADMIN — GABAPENTIN 600 MG: 600 TABLET, FILM COATED ORAL at 20:13

## 2017-02-21 RX ADMIN — RANITIDINE 150 MG: 150 TABLET ORAL at 20:13

## 2017-02-21 RX ADMIN — ZOLPIDEM TARTRATE 5 MG: 5 TABLET, FILM COATED ORAL at 20:30

## 2017-02-21 RX ADMIN — POTASSIUM CHLORIDE, DEXTROSE MONOHYDRATE AND SODIUM CHLORIDE: 150; 5; 450 INJECTION, SOLUTION INTRAVENOUS at 07:15

## 2017-02-21 RX ADMIN — FUROSEMIDE 40 MG: 10 INJECTION, SOLUTION INTRAMUSCULAR; INTRAVENOUS at 15:04

## 2017-02-21 RX ADMIN — Medication 25000 UNITS: at 15:03

## 2017-02-21 RX ADMIN — ENOXAPARIN SODIUM 40 MG: 40 INJECTION SUBCUTANEOUS at 15:05

## 2017-02-21 RX ADMIN — MULTIPLE VITAMINS W/ MINERALS TAB 1 TABLET: TAB at 15:03

## 2017-02-21 RX ADMIN — VANCOMYCIN HYDROCHLORIDE 1000 MG: 1 INJECTION, SOLUTION INTRAVENOUS at 02:01

## 2017-02-21 RX ADMIN — ZINC SULFATE CAP 220 MG (50 MG ELEMENTAL ZN) 220 MG: 220 (50 ZN) CAP at 15:03

## 2017-02-21 RX ADMIN — VANCOMYCIN HYDROCHLORIDE 1000 MG: 1 INJECTION, SOLUTION INTRAVENOUS at 20:20

## 2017-02-21 NOTE — CONSULTS
"CLINICAL NUTRITION SERVICES  -  ASSESSMENT NOTE      Recommendations Ordered by Registered Dietitian (RD):   Ensure BID between meals  Per PI protocol: Daily Thera-Vit-M; Vit C (500 mg), Vit A (25,000 IU), Zinc sulfate (220 mg) daily x 10 days     Malnutrition:   Unable to determine due to unknown weight status          REASON FOR ASSESSMENT  Felicia Ellison is a 51 year old female seen by Registered Dietitian for RN Consult - poor healing, pt requesting Boost or Ensure - may need TPN, PICC inserted today.      NUTRITION HISTORY  - Information obtained from the pt. She is on a high-protein diet at home as instructed by the [facility]. She drinks Ensure or Boost, 0-4 servings/day, depending on \"how thirsty I am.\"  She loves milk, usually drinks 3-4 servings/day, \"I'd drink a gallon a day if I could afford it.\"  She usually has ess or meatg at every meal. She also takes a multivit but sometimes runs out and can't afford it.  Pt hasn't had anything solid to eat since last Thursday (2/16). She woke up sick on Friday with N/V.      CURRENT NUTRITION ORDERS  Diet Order:     Full liquid  --> starting regular.  Visited with pt during breakfast, she ordered 2 glasses of milk, a muffin, cereal and a breakfast sandwich.  \"I may not be able to eat it all, it's my first solid meal in 5 days.\"      PHYSICAL FINDINGS  Observed  No nutrition-related physical findings observed  Obtained from Chart/Interdisciplinary Team  Paraplegia  Edema -+1 pitting, extremeties    Per WOCN assessment:  #1, & # 2 Sacral PI: Stage 4 PI - wound VAC in place  #3 & #4 IT PU: Stage 4 PI  #5: Lt heel: Probable healing stage 3 PI  #6: Rt heel: DTI  #7 & #8: DTI's  #9: Lt ear lobe: small unstageable on outer ear      ANTHROPOMETRICS  Height: 5' 9\"  Weight: 110-115# (52.3 kg) per pt's report. She has not been weighed yet.  There is no weight on file to calculate BMI.  IBW: 61.4 kg +/- 10% (adjusted for paraplegia)  Weight History: No recent " weights  Wt Readings from Last 10 Encounters:   03/26/14 56.7 kg (125 lb)   01/15/14 54.3 kg (119 lb 9.6 oz)   12/28/13 54.5 kg (120 lb 2.4 oz)   11/30/13 59 kg (130 lb)   10/30/13 58.5 kg (128 lb 15.5 oz)   10/17/13 59 kg (130 lb)   09/16/13 59 kg (130 lb)   08/29/13 59 kg (130 lb)   08/09/13 59 kg (130 lb)   03/01/13 59 kg (130 lb)       LABS  Labs reviewed    MEDICATIONS  Medications reviewed    Dosing Weight 52.3 kg - estimated wt    ASSESSED NUTRITION NEEDS:  Estimated Energy Needs: 1261-9102 kcals (30-35 Kcal/Kg)  Justification: underweight, protein sparing  Estimated Protein Needs:  grams protein (1.5-2 g pro/Kg)  Justification: wound healing    MALNUTRITION:  % Weight Loss:  Unable to assess  % Intake:  </= 50% for >/= 5 days (severe malnutrition)  Subcutaneous Fat Loss:  None observed  Muscle Loss:  None observed  Fluid Retention:  - not nutrition-related    Malnutrition Diagnosis: Unable to determine due to unknown weight status      NUTRITION DIAGNOSIS:  Increased nutrient needs (protein and vit/min) related to healing as evidenced by multiple PIs    NUTRITION INTERVENTIONS  Recommendations / Nutrition Prescription  Continue current diet  Nutritional supplements    Implementation  Nutrition education: discussed protein and vit/min needs for healing  Composition of Meals and Snacks - encouraged meat/eggs/dairy at every meal Medical Food Supplement - Ensure BID between meals  Multivitamin/Mineral - Per PI protocol ordered: Daily Thera-Vit-M; Vit C (500 mg), Vit A (25,000 IU), Zinc sulfate (220 mg) daily x 10 days    Nutrition Goals  Pt will consume at least 80 gm pro/daily    MONITORING AND EVALUATION:  Progress towards goals will be monitored and evaluated per protocol and Practice Guidelines    Robina Fall RD  Pager [phone] (M-F)            [phone] (W/E & Hol)

## 2017-02-21 NOTE — PROGRESS NOTES
Briefly met with patient today to introduce role of case management I introduced the concept of LTAC at discharge for care after hospital stay. Patient adamantly states she would only go home with her daughter's help and home care agency help. I explained that case management would continue to follow along to help finalize the discharge plan. I returned a phone call to patient's home care agency [facility][note 3]. to Ashleigh Posey and needed to leave a message. Ashleigh's phone number is [phone].

## 2017-02-21 NOTE — PLAN OF CARE
Problem: Goal Outcome Summary  Goal: Goal Outcome Summary  Outcome: No Change  Pt feels very bloated. Abdomen distended. Wt up. Lasix IV given. Self caths using urostomy and minimal assist. Colostomy with flatus and stool. Pt changed pouch herself. Paraplegic. Multiple sacral wounds, dressings changed. Wound vac to other buttocks and hip wounds intact. Turned and repositioned often. Heels elevated.

## 2017-02-21 NOTE — PROGRESS NOTES
"GASTROENTEROLOGY PROGRESS NOTE     SUBJECTIVE: Feeling well. Tolerating regular diet without symptoms. Feels bloated. Legs feel swollen as well.      OBJECTIVE:   /65  Pulse 87  Temp 98  F (36.7  C) (Oral)  Resp 16  Ht 1.753 m (5' 9\")  Wt 71 kg (156 lb 9.6 oz)  SpO2 97%  BMI 23.13 kg/m2   Temp (24hrs), Av.3  F (36.8  C), Min:98.1  F (36.7  C), Max:98.7  F (37.1  C)     No data found.     Intake/Output Summary (Last 24 hours) at 17 1056  Last data filed at 17 0600   Gross per 24 hour   Intake             1850 ml   Output              975 ml   Net              875 ml      PHYSICAL EXAM   Constitutional: Chronically ill appearing, in bed, no acute distress    Additional Comments:   ROS, FH, SH: See initial GI consult for details.     I have reviewed the patient's new clinical lab results:   Recent Labs   Lab Test  17   0650  17   0814  17   1105   17   0825   14   0632   WBC   --   6.2  7.8   --   9.9   --    --    HGB  7.6*  6.9*  7.3*   < >  8.0*   < >   --    MCV   --   79  80   --   80   --    --    PLT   --   283  329   --   353   < >   --    INR  1.41*   --    --    --   1.38*   --   1.02    < > = values in this interval not displayed.      Recent Labs   Lab Test  17   0650  17   0814  17   1105   17   0825   NA  140  142   --    --   146*   POTASSIUM  4.1  3.5  4.8   < >  3.3*   CHLORIDE  112*  112*   --    --   112*   CO2  20  22   --    --   28   BUN  5*  6*   --    --   9   CR  0.40*  0.48*   --    --   0.54   ANIONGAP  8  8   --    --   6   JOSH  6.7*  7.2*   --    --   7.6*    < > = values in this interval not displayed.      Recent Labs   Lab Test  17   0814  17   0750  17   0825  01/20/15   1230  14   1059  04/15/14   0700   13   0848   13   1446  13   1700   ALBUMIN  1.1*   --   1.4*  3.6   --   2.3*   < >  3.7*   < >   --    --    BILITOTAL   --    --   0.3   --    --   <0.1*   --   0.6   " < >   --    --    ALT   --    --   6   --    --   22   --   10   < >   --    --    AST   --    --   8   --    --   15   --   37   < >   --    --    ALKPHOS   --    --   97   --    --   81   --   173*   < >   --    --    PROTEIN   --    --    --    --   30*   --    --    --    --   30*  Trace*   LIPASE  533*  624*  930*   --    --    --    --    --    --    --    --     < > = values in this interval not displayed.   MRCP 2/20/17  FINDINGS: The exam is limited related to artifact due to the presence  of extensive surgical hardware in the thoracolumbar spine. No evidence  for fatty infiltration of the liver. There is mild splenomegaly,  increased slightly since the previous exam. The spleen measures up to  13.5 cm in length. No convincing evidence for pancreatic or biliary  ductal dilatation. There is a small probable fluid collection in or  adjacent to the pancreatic tail (series 1600 image 46) measuring 2.2 x  1.7 cm. The pancreas is otherwise not well seen, but appears atrophic  and/or fatty replaced. No other peripancreatic fluid collections are  identified. The liver, spleen, adrenal glands, and kidneys are  otherwise unremarkable. No hydronephrosis. There is a moderate amount  of free fluid in the pelvis. There is also a small amount of fluid  noted within both paracolic gutters. Moderate amount of stool and  fluid in the ascending colon. There is extensive subcutaneous edema  throughout the abdomen and pelvis.     Assessment: 51 year old female presenting to OSH with abdominal pain and vomiting. Found to have lipase of 1308 and CT suggesting fat stranding in the peripancreatic region consistent with pancreatitis. Circumferential thickening of the gastric wall and thickening of the transverse colon also noted. Lipase trending down. LFTs normal. Hgb has dropped slightly since admission though no signs of bleeding    Plan:   -Diet as tolerated today. NPO at midnight  -EGD planned for tomorrow to evaluate gastric  thickening seen on CT and anemia  -No need to follow Lipase  -Will plan for EUS in 6-8 weeks to reassess fluid collection seen on MRCP  -Will defer further diuresis to IM  -Further recommendations to follow    John Gallegos PA-C  [facility]  Cell:  [phone] Monday through Friday [id number]  Office: [phone]
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
## 2017-02-21 NOTE — PHARMACY-VANCOMYCIN DOSING SERVICE
.Pharmacy Vancomycin Note  Date of Service 2017  Patient's  1965   51 year old, female    Indication: Skin and Soft Tissue Infection with possible Osteomyelitis  Goal Trough Level: 15-20 mg/L  Day of Therapy: 2  Current Vancomycin regimen:  1000 mg IV q12h    Current estimated CrCl = CrCl cannot be calculated (Unknown ideal weight.).    Creatinine for last 3 days  2017:  8:25 AM Creatinine 0.54 mg/dL  2017:  8:14 AM Creatinine 0.48 mg/dL    Recent Vancomycin Levels (past 3 days)  2017:  7:15 PM Vancomycin Level 21.5 mg/L    Vancomycin IV Administrations (past 72 hours)                   vancomycin (VANCOCIN) 1000 mg in dextrose 5% 200 mL PREMIX (mg) 1,000 mg New Bag 17 0625     1,000 mg New Bag 17 1741     1,000 mg New Bag  0218                Nephrotoxins and other renal medications (Future)    Start     Dose/Rate Route Frequency Ordered Stop    17 0100  vancomycin (VANCOCIN) 1000 mg in dextrose 5% 200 mL PREMIX      1,000 mg Intravenous EVERY 18 HOURS 17 2041      17 2230  furosemide (LASIX) injection 20 mg      20 mg Intravenous ONCE 17 1428               Contrast Orders - past 72 hours (72h ago through future)    Start     Dose/Rate Route Frequency Ordered Stop    17 1615  gadobutrol (GADAVIST) injection 15 mL      15 mL Intravenous ONCE 17 1610 17 1616          Interpretation of levels and current regimen:  Trough level is  Supratherapeutic    Has serum creatinine changed > 50% in last 72 hours: No    Urine output:  good urine output    Renal Function: Stable    Plan:  1.  Decrease Dose to 1000 mg IV q18h  2.  Pharmacy will check trough levels as appropriate in 1-3 Days.    3. Serum creatinine levels will be ordered daily for the first week of therapy and at least twice weekly for subsequent weeks.      Shelby Christi        .

## 2017-02-21 NOTE — PROGRESS NOTES
I spoke with Dr. Steffanie Burcigaa MD, PGY4, regarding wound care plan for sacral wounds.  Tomorrow I will do VAC dressing change to the bilateral IT's and if appropriate I will also VAC the sacral wounds, too.    No face to face time today

## 2017-02-21 NOTE — PLAN OF CARE
Problem: Goal Outcome Summary  Goal: Goal Outcome Summary  Outcome: No Change  VSS. Paraplegic. C/o pain in hips relieved with oxycodone. LS clear. Colostomy intact. Voiding - pt self-caths herself. PICC line intact. Pressure wounds to buttocks, wound vac intact. Repositioned q2hrs.

## 2017-02-21 NOTE — PLAN OF CARE
Problem: Goal Outcome Summary  Goal: Goal Outcome Summary  Outcome: No Change  Pt is A/O. AVSS. Pt is paraplegic no sensation below hips.  LS clear. BS active, colostomy bag is intact with BM. Voiding, self caths herself needs some help with getting supplies.  PICC line with triple lumen is intact, infusing. Pt was repositioned q 2 hr. Pt had multiple deep wounds on sacral area, covered with dressings and foam with wound vac. Pt has wounds on legs covered with foam, CDI. Pt tolerating full liquids advance as tolerated. Pt received one unit of blood. Pt is infusing albumin currently. Pain is controlled with Oxy. Denies N/V.
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
## 2017-02-22 ENCOUNTER — ANESTHESIA EVENT (OUTPATIENT)
Dept: GASTROENTEROLOGY | Facility: CLINIC | Age: 52
DRG: 579 | End: 2017-02-22
Payer: MEDICARE

## 2017-02-22 ENCOUNTER — ANESTHESIA (OUTPATIENT)
Dept: GASTROENTEROLOGY | Facility: CLINIC | Age: 52
DRG: 579 | End: 2017-02-22
Payer: MEDICARE

## 2017-02-22 LAB
ANION GAP SERPL CALCULATED.3IONS-SCNC: 9 MMOL/L (ref 3–14)
BUN SERPL-MCNC: 4 MG/DL (ref 7–30)
CALCIUM SERPL-MCNC: 7.1 MG/DL (ref 8.5–10.1)
CHLORIDE SERPL-SCNC: 112 MMOL/L (ref 94–109)
CO2 SERPL-SCNC: 21 MMOL/L (ref 20–32)
CREAT SERPL-MCNC: 0.44 MG/DL (ref 0.52–1.04)
ERYTHROCYTE [DISTWIDTH] IN BLOOD BY AUTOMATED COUNT: 18.3 % (ref 10–15)
GFR SERPL CREATININE-BSD FRML MDRD: ABNORMAL ML/MIN/1.7M2
GLUCOSE SERPL-MCNC: 99 MG/DL (ref 70–99)
HCT VFR BLD AUTO: 28.5 % (ref 35–47)
HGB BLD-MCNC: 9 G/DL (ref 11.7–15.7)
MCH RBC QN AUTO: 24.5 PG (ref 26.5–33)
MCHC RBC AUTO-ENTMCNC: 31.6 G/DL (ref 31.5–36.5)
MCV RBC AUTO: 78 FL (ref 78–100)
PLATELET # BLD AUTO: 278 10E9/L (ref 150–450)
POTASSIUM SERPL-SCNC: 3 MMOL/L (ref 3.4–5.3)
POTASSIUM SERPL-SCNC: 3.5 MMOL/L (ref 3.4–5.3)
RBC # BLD AUTO: 3.67 10E12/L (ref 3.8–5.2)
SODIUM SERPL-SCNC: 142 MMOL/L (ref 133–144)
UPPER GI ENDOSCOPY: NORMAL
WBC # BLD AUTO: 7 10E9/L (ref 4–11)

## 2017-02-22 PROCEDURE — 25000132 ZZH RX MED GY IP 250 OP 250 PS 637: Mod: GY | Performed by: INTERNAL MEDICINE

## 2017-02-22 PROCEDURE — 25000128 H RX IP 250 OP 636: Performed by: INTERNAL MEDICINE

## 2017-02-22 PROCEDURE — 37000008 ZZH ANESTHESIA TECHNICAL FEE, 1ST 30 MIN: Performed by: INTERNAL MEDICINE

## 2017-02-22 PROCEDURE — A9270 NON-COVERED ITEM OR SERVICE: HCPCS | Mod: GY | Performed by: INTERNAL MEDICINE

## 2017-02-22 PROCEDURE — 84132 ASSAY OF SERUM POTASSIUM: CPT | Performed by: INTERNAL MEDICINE

## 2017-02-22 PROCEDURE — 85027 COMPLETE CBC AUTOMATED: CPT | Performed by: INTERNAL MEDICINE

## 2017-02-22 PROCEDURE — 40000239 ZZH STATISTIC VAT ROUNDS

## 2017-02-22 PROCEDURE — 0DJ08ZZ INSPECTION OF UPPER INTESTINAL TRACT, VIA NATURAL OR ARTIFICIAL OPENING ENDOSCOPIC: ICD-10-PCS | Performed by: INTERNAL MEDICINE

## 2017-02-22 PROCEDURE — 25800025 ZZH RX 258

## 2017-02-22 PROCEDURE — 99232 SBSQ HOSP IP/OBS MODERATE 35: CPT | Performed by: HOSPITALIST

## 2017-02-22 PROCEDURE — 40000010 ZZH STATISTIC ANES STAT CODE-CRNA PER MINUTE: Performed by: INTERNAL MEDICINE

## 2017-02-22 PROCEDURE — 80048 BASIC METABOLIC PNL TOTAL CA: CPT | Performed by: INTERNAL MEDICINE

## 2017-02-22 PROCEDURE — 25000125 ZZHC RX 250: Performed by: INTERNAL MEDICINE

## 2017-02-22 PROCEDURE — 43235 EGD DIAGNOSTIC BRUSH WASH: CPT | Performed by: INTERNAL MEDICINE

## 2017-02-22 PROCEDURE — 25800025 ZZH RX 258: Performed by: NURSE ANESTHETIST, CERTIFIED REGISTERED

## 2017-02-22 PROCEDURE — 25000125 ZZHC RX 250: Performed by: NURSE ANESTHETIST, CERTIFIED REGISTERED

## 2017-02-22 PROCEDURE — E2402 NEG PRESS WOUND THERAPY PUMP: HCPCS

## 2017-02-22 PROCEDURE — 99215 OFFICE O/P EST HI 40 MIN: CPT

## 2017-02-22 PROCEDURE — 25000128 H RX IP 250 OP 636: Performed by: NURSE ANESTHETIST, CERTIFIED REGISTERED

## 2017-02-22 PROCEDURE — 25800025 ZZH RX 258: Performed by: SURGERY

## 2017-02-22 PROCEDURE — 12000007 ZZH R&B INTERMEDIATE

## 2017-02-22 PROCEDURE — 97606 NEG PRS WND THER DME>50 SQCM: CPT

## 2017-02-22 RX ORDER — SODIUM CHLORIDE, SODIUM LACTATE, POTASSIUM CHLORIDE, CALCIUM CHLORIDE 600; 310; 30; 20 MG/100ML; MG/100ML; MG/100ML; MG/100ML
INJECTION, SOLUTION INTRAVENOUS CONTINUOUS
Status: DISCONTINUED | OUTPATIENT
Start: 2017-02-22 | End: 2017-02-26

## 2017-02-22 RX ORDER — PROPOFOL 10 MG/ML
INJECTION, EMULSION INTRAVENOUS CONTINUOUS PRN
Status: DISCONTINUED | OUTPATIENT
Start: 2017-02-22 | End: 2017-02-22

## 2017-02-22 RX ORDER — MEPERIDINE HYDROCHLORIDE 25 MG/ML
12.5 INJECTION INTRAMUSCULAR; INTRAVENOUS; SUBCUTANEOUS
Status: DISCONTINUED | OUTPATIENT
Start: 2017-02-22 | End: 2017-02-22

## 2017-02-22 RX ORDER — FENTANYL CITRATE 50 UG/ML
INJECTION, SOLUTION INTRAMUSCULAR; INTRAVENOUS PRN
Status: DISCONTINUED | OUTPATIENT
Start: 2017-02-22 | End: 2017-02-22

## 2017-02-22 RX ORDER — SODIUM CHLORIDE, SODIUM LACTATE, POTASSIUM CHLORIDE, CALCIUM CHLORIDE 600; 310; 30; 20 MG/100ML; MG/100ML; MG/100ML; MG/100ML
INJECTION, SOLUTION INTRAVENOUS CONTINUOUS PRN
Status: DISCONTINUED | OUTPATIENT
Start: 2017-02-22 | End: 2017-02-22

## 2017-02-22 RX ORDER — FENTANYL CITRATE 50 UG/ML
25-50 INJECTION, SOLUTION INTRAMUSCULAR; INTRAVENOUS
Status: DISCONTINUED | OUTPATIENT
Start: 2017-02-22 | End: 2017-02-22 | Stop reason: HOSPADM

## 2017-02-22 RX ORDER — NALOXONE HYDROCHLORIDE 0.4 MG/ML
.1-.4 INJECTION, SOLUTION INTRAMUSCULAR; INTRAVENOUS; SUBCUTANEOUS
Status: DISCONTINUED | OUTPATIENT
Start: 2017-02-22 | End: 2017-02-22

## 2017-02-22 RX ORDER — ONDANSETRON 2 MG/ML
4 INJECTION INTRAMUSCULAR; INTRAVENOUS EVERY 30 MIN PRN
Status: DISCONTINUED | OUTPATIENT
Start: 2017-02-22 | End: 2017-02-22

## 2017-02-22 RX ORDER — ONDANSETRON 4 MG/1
4 TABLET, ORALLY DISINTEGRATING ORAL EVERY 30 MIN PRN
Status: DISCONTINUED | OUTPATIENT
Start: 2017-02-22 | End: 2017-02-22

## 2017-02-22 RX ORDER — LIDOCAINE 40 MG/G
CREAM TOPICAL
Status: DISCONTINUED | OUTPATIENT
Start: 2017-02-22 | End: 2017-02-22 | Stop reason: HOSPADM

## 2017-02-22 RX ADMIN — RANITIDINE 150 MG: 150 TABLET ORAL at 14:23

## 2017-02-22 RX ADMIN — FENTANYL CITRATE 50 MCG: 50 INJECTION, SOLUTION INTRAMUSCULAR; INTRAVENOUS at 12:57

## 2017-02-22 RX ADMIN — POTASSIUM CHLORIDE 20 MEQ: 29.8 INJECTION, SOLUTION INTRAVENOUS at 09:16

## 2017-02-22 RX ADMIN — TRAZODONE HYDROCHLORIDE 100 MG: 100 TABLET ORAL at 20:25

## 2017-02-22 RX ADMIN — IRON SUCROSE 300 MG: 20 INJECTION, SOLUTION INTRAVENOUS at 14:25

## 2017-02-22 RX ADMIN — Medication 25000 UNITS: at 14:38

## 2017-02-22 RX ADMIN — ENOXAPARIN SODIUM 40 MG: 40 INJECTION SUBCUTANEOUS at 03:31

## 2017-02-22 RX ADMIN — OXYCODONE HYDROCHLORIDE AND ACETAMINOPHEN 500 MG: 500 TABLET ORAL at 14:38

## 2017-02-22 RX ADMIN — VANCOMYCIN HYDROCHLORIDE 1000 MG: 1 INJECTION, SOLUTION INTRAVENOUS at 14:35

## 2017-02-22 RX ADMIN — RANITIDINE 150 MG: 150 TABLET ORAL at 19:28

## 2017-02-22 RX ADMIN — MIDAZOLAM HYDROCHLORIDE 1 MG: 1 INJECTION, SOLUTION INTRAMUSCULAR; INTRAVENOUS at 12:57

## 2017-02-22 RX ADMIN — CEFTRIAXONE 2 G: 2 INJECTION, POWDER, FOR SOLUTION INTRAMUSCULAR; INTRAVENOUS at 19:26

## 2017-02-22 RX ADMIN — SODIUM CHLORIDE, POTASSIUM CHLORIDE, SODIUM LACTATE AND CALCIUM CHLORIDE: 600; 310; 30; 20 INJECTION, SOLUTION INTRAVENOUS at 20:32

## 2017-02-22 RX ADMIN — SERTRALINE HYDROCHLORIDE 50 MG: 50 TABLET, FILM COATED ORAL at 14:38

## 2017-02-22 RX ADMIN — MIDAZOLAM HYDROCHLORIDE 1 MG: 1 INJECTION, SOLUTION INTRAMUSCULAR; INTRAVENOUS at 13:00

## 2017-02-22 RX ADMIN — POTASSIUM CHLORIDE, DEXTROSE MONOHYDRATE AND SODIUM CHLORIDE: 150; 5; 450 INJECTION, SOLUTION INTRAVENOUS at 09:19

## 2017-02-22 RX ADMIN — MULTIPLE VITAMINS W/ MINERALS TAB 1 TABLET: TAB at 14:38

## 2017-02-22 RX ADMIN — OXYBUTYNIN CHLORIDE 5 MG: 5 TABLET, EXTENDED RELEASE ORAL at 19:27

## 2017-02-22 RX ADMIN — OMEPRAZOLE 20 MG: 20 CAPSULE, DELAYED RELEASE ORAL at 14:23

## 2017-02-22 RX ADMIN — ZOLPIDEM TARTRATE 5 MG: 5 TABLET, FILM COATED ORAL at 20:25

## 2017-02-22 RX ADMIN — PROPOFOL 100 MCG/KG/MIN: 10 INJECTION, EMULSION INTRAVENOUS at 12:56

## 2017-02-22 RX ADMIN — ZINC SULFATE CAP 220 MG (50 MG ELEMENTAL ZN) 220 MG: 220 (50 ZN) CAP at 14:38

## 2017-02-22 RX ADMIN — GABAPENTIN 600 MG: 600 TABLET, FILM COATED ORAL at 19:27

## 2017-02-22 RX ADMIN — OXYCODONE HYDROCHLORIDE 10 MG: 5 TABLET ORAL at 22:02

## 2017-02-22 RX ADMIN — SODIUM CHLORIDE, POTASSIUM CHLORIDE, SODIUM LACTATE AND CALCIUM CHLORIDE: 600; 310; 30; 20 INJECTION, SOLUTION INTRAVENOUS at 12:51

## 2017-02-22 RX ADMIN — ENOXAPARIN SODIUM 40 MG: 40 INJECTION SUBCUTANEOUS at 14:24

## 2017-02-22 NOTE — PROVIDER NOTIFICATION
Page sent to GI regarding whether to hold Lovenox before tomorrow's procedure. Lovenox scheduled Q12hr, 0330 and 1530. Ok to give dose since EGD is scheduled for 1230.

## 2017-02-22 NOTE — ANESTHESIA CARE TRANSFER NOTE
Patient: Felicia Ellison    Procedure(s):  gastroscopy with mac - Wound Class: II-Clean Contaminated    Diagnosis: anemia  Diagnosis Additional Information: No value filed.    Anesthesia Type:   MAC     Note:  Airway :Nasal Cannula  Patient transferred to:PACU  Comments: O2 NC 3 lpm. Spontaneous respirations. Patient is drowsy and conversing with RN.      Vitals: (Last set prior to Anesthesia Care Transfer)    CRNA VITALS  2/22/2017 1242 - 2/22/2017 1317      2/22/2017             Pulse: 91    SpO2: 97 %    Resp Rate (set): 10                Electronically Signed By: VERENA Ramos CRNA  February 22, 2017  1:17 PM
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
## 2017-02-22 NOTE — PROGRESS NOTES
Surgery Update    There was no evidence of sacral osteomyelitis intraoperatively. The tissue overlying the periosteum was infected but the periosteum appeared healthy. Ok to place wound vac on sacral wounds. Appreciate WOC RN management. Please call with further questions.     Sanjana Ladd MD  [facility]  [phone]
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
## 2017-02-22 NOTE — PLAN OF CARE
Problem: Goal Outcome Summary  Goal: Goal Outcome Summary  Outcome: Improving  wound dressings to wound vac changed by [facility] nurse. the wounds look clean. Has been Npo until EGD. Completed and now taking sips of clear with out any chocking. Increased to regular diet. . Potassium replaced today. Hg 9.0 denies pain. No SOB.
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
## 2017-02-22 NOTE — PROGRESS NOTES
I received a return phone call from Ashleigh Posey RN at [facility] at [phone]. Ashleigh relays that [facility] does wound vac changes twice a week for patient and then patient receives her 3rd wound vac change per week at [facility] wound clinic. Ashleigh relays that their visits are approximately 1 1/2 hr to 2 hours in length each visit. Ashleigh is concerned about more cares if patient were to need IV antibiotics at home. Ashleigh relays that patient has a pressure relieving mattress at home. Patient also has her daughter as her PCA and another family friend as a second PCA. PCA help is not through [facility].

## 2017-02-22 NOTE — PROGRESS NOTES
[facility]  Progress Note  [facility]    Attempted visit but patient was not in room.      Shelbi Nava  Chaplain Resident  Pager [phone]

## 2017-02-22 NOTE — PLAN OF CARE
Problem: Goal Outcome Summary  Goal: Goal Outcome Summary  Outcome: Improving  Pt A/Ox4. VSS on RA. Self caths per urostomy with adequate output. Tolerating regular diet with a poor appetite. Dressings CDI. Wound vac in place to multiple wounds on continuous suction. Repositioned Q2H. Heels elevated. D/C pending.

## 2017-02-22 NOTE — PLAN OF CARE
Problem: Goal Outcome Summary  Goal: Goal Outcome Summary  Outcome: No Change  Repo Q2hr throughout the night. Denies pain. Slept between cares. VSS. Plan for EGD today, scheduled for 1230.

## 2017-02-22 NOTE — PROGRESS NOTES
During VAC dressing change today the pt said her daughter could do the dressing changes faster than me, so it sounds like the daughter does the VAC dressing changes. Yet per vm with Nikko Levi they are doing dressing changes and per discussion w Ros Damon our care care coordinator, per a discussion she had w home care agency they do the dressing changes twice a week.    Since there is so much confusion and no real plan for wound care at this time, upon discharge, I recommended to Ros Damon a care conference to coordinate cares.     Will follow up tomorrow zaynab Hardy

## 2017-02-23 LAB
BACTERIA SPEC CULT: ABNORMAL
CREAT SERPL-MCNC: 0.34 MG/DL (ref 0.52–1.04)
GFR SERPL CREATININE-BSD FRML MDRD: ABNORMAL ML/MIN/1.7M2
LIPASE SERPL-CCNC: 455 U/L (ref 73–393)
MICRO REPORT STATUS: ABNORMAL
MICROORGANISM SPEC CULT: ABNORMAL
MICROORGANISM SPEC CULT: ABNORMAL
SPECIMEN SOURCE: ABNORMAL
VANCOMYCIN SERPL-MCNC: 15 MG/L

## 2017-02-23 PROCEDURE — 25000125 ZZHC RX 250: Performed by: INTERNAL MEDICINE

## 2017-02-23 PROCEDURE — 25000132 ZZH RX MED GY IP 250 OP 250 PS 637: Mod: GY | Performed by: INTERNAL MEDICINE

## 2017-02-23 PROCEDURE — 12000007 ZZH R&B INTERMEDIATE

## 2017-02-23 PROCEDURE — A9270 NON-COVERED ITEM OR SERVICE: HCPCS | Mod: GY | Performed by: INTERNAL MEDICINE

## 2017-02-23 PROCEDURE — 25000128 H RX IP 250 OP 636: Performed by: INTERNAL MEDICINE

## 2017-02-23 PROCEDURE — E2402 NEG PRESS WOUND THERAPY PUMP: HCPCS

## 2017-02-23 PROCEDURE — 99232 SBSQ HOSP IP/OBS MODERATE 35: CPT | Performed by: HOSPITALIST

## 2017-02-23 PROCEDURE — 25000128 H RX IP 250 OP 636: Performed by: PHYSICIAN ASSISTANT

## 2017-02-23 PROCEDURE — 82565 ASSAY OF CREATININE: CPT | Performed by: INTERNAL MEDICINE

## 2017-02-23 PROCEDURE — 80202 ASSAY OF VANCOMYCIN: CPT | Performed by: INTERNAL MEDICINE

## 2017-02-23 PROCEDURE — 83690 ASSAY OF LIPASE: CPT | Performed by: HOSPITALIST

## 2017-02-23 PROCEDURE — 40000239 ZZH STATISTIC VAT ROUNDS

## 2017-02-23 RX ORDER — FUROSEMIDE 40 MG
40 TABLET ORAL DAILY
Status: DISCONTINUED | OUTPATIENT
Start: 2017-02-24 | End: 2017-02-27 | Stop reason: HOSPADM

## 2017-02-23 RX ORDER — FUROSEMIDE 10 MG/ML
40 INJECTION INTRAMUSCULAR; INTRAVENOUS ONCE
Status: COMPLETED | OUTPATIENT
Start: 2017-02-23 | End: 2017-02-23

## 2017-02-23 RX ADMIN — RANITIDINE 150 MG: 150 TABLET ORAL at 08:24

## 2017-02-23 RX ADMIN — GABAPENTIN 600 MG: 600 TABLET, FILM COATED ORAL at 08:24

## 2017-02-23 RX ADMIN — OMEPRAZOLE 20 MG: 20 CAPSULE, DELAYED RELEASE ORAL at 08:23

## 2017-02-23 RX ADMIN — OXYBUTYNIN CHLORIDE 5 MG: 5 TABLET, EXTENDED RELEASE ORAL at 15:58

## 2017-02-23 RX ADMIN — ZINC SULFATE CAP 220 MG (50 MG ELEMENTAL ZN) 220 MG: 220 (50 ZN) CAP at 08:24

## 2017-02-23 RX ADMIN — GABAPENTIN 600 MG: 600 TABLET, FILM COATED ORAL at 20:37

## 2017-02-23 RX ADMIN — OXYCODONE HYDROCHLORIDE 10 MG: 5 TABLET ORAL at 15:58

## 2017-02-23 RX ADMIN — RANITIDINE 150 MG: 150 TABLET ORAL at 20:37

## 2017-02-23 RX ADMIN — ENOXAPARIN SODIUM 40 MG: 40 INJECTION SUBCUTANEOUS at 14:20

## 2017-02-23 RX ADMIN — CEFTRIAXONE 2 G: 2 INJECTION, POWDER, FOR SOLUTION INTRAMUSCULAR; INTRAVENOUS at 18:21

## 2017-02-23 RX ADMIN — SERTRALINE HYDROCHLORIDE 50 MG: 50 TABLET, FILM COATED ORAL at 08:24

## 2017-02-23 RX ADMIN — GABAPENTIN 300 MG: 300 CAPSULE ORAL at 14:24

## 2017-02-23 RX ADMIN — Medication 25000 UNITS: at 08:23

## 2017-02-23 RX ADMIN — OXYCODONE HYDROCHLORIDE AND ACETAMINOPHEN 500 MG: 500 TABLET ORAL at 08:24

## 2017-02-23 RX ADMIN — ZOLPIDEM TARTRATE 5 MG: 5 TABLET, FILM COATED ORAL at 20:37

## 2017-02-23 RX ADMIN — MULTIPLE VITAMINS W/ MINERALS TAB 1 TABLET: TAB at 08:24

## 2017-02-23 RX ADMIN — FUROSEMIDE 40 MG: 10 INJECTION, SOLUTION INTRAMUSCULAR; INTRAVENOUS at 12:27

## 2017-02-23 RX ADMIN — ENOXAPARIN SODIUM 40 MG: 40 INJECTION SUBCUTANEOUS at 02:45

## 2017-02-23 RX ADMIN — OXYBUTYNIN CHLORIDE 5 MG: 5 TABLET, EXTENDED RELEASE ORAL at 08:13

## 2017-02-23 RX ADMIN — OXYCODONE HYDROCHLORIDE 10 MG: 5 TABLET ORAL at 08:24

## 2017-02-23 RX ADMIN — TRAZODONE HYDROCHLORIDE 100 MG: 100 TABLET ORAL at 21:38

## 2017-02-23 RX ADMIN — VANCOMYCIN HYDROCHLORIDE 1000 MG: 1 INJECTION, SOLUTION INTRAVENOUS at 08:11

## 2017-02-23 NOTE — PROGRESS NOTES
"GASTROENTEROLOGY PROGRESS NOTE     SUBJECTIVE: Feeling well. Denies any abdominal pain. Tolerating diet. Feels bloated since Lasix was discontinued     OBJECTIVE:   /67 (BP Location: Right arm)  Pulse 96  Temp 98.3  F (36.8  C) (Oral)  Resp 16  Ht 1.753 m (5' 9\")  Wt 66.7 kg (147 lb 0.8 oz)  SpO2 94%  BMI 21.72 kg/m2   Temp (24hrs), Av.2  F (36.8  C), Min:98.1  F (36.7  C), Max:98.3  F (36.8  C)     Patient Vitals for the past 72 hrs:   Weight   17 0822 66.7 kg (147 lb 0.8 oz)   17 1100 71 kg (156 lb 9.6 oz)      Intake/Output Summary (Last 24 hours) at 17 1106  Last data filed at 17 0900   Gross per 24 hour   Intake              870 ml   Output             2025 ml   Net            -1155 ml      PHYSICAL EXAM   Constitutional: Age appropriate, in bed, no acute distress    Additional Comments:   ROS, FH, SH: See initial GI consult for details.     I have reviewed the patient's new clinical lab results:   Recent Labs   Lab Test  17   0530  17   0650  17   0814  17   1105   17   0825   14   0632   WBC  7.0   --   6.2  7.8   --   9.9   --    --    HGB  9.0*  7.6*  6.9*  7.3*   < >  8.0*   < >   --    MCV  78   --   79  80   --   80   --    --    PLT  278   --   283  329   --   353   < >   --    INR   --   1.41*   --    --    --   1.38*   --   1.02    < > = values in this interval not displayed.      Recent Labs   Lab Test  17   0800  17   0530  17   0650  17   0814   NA   --    --   142  140  142   POTASSIUM   --   3.5  3.0*  4.1  3.5   CHLORIDE   --    --   112*  112*  112*   CO2   --    --   21  20  22   BUN   --    --   4*  5*  6*   CR  0.34*   --   0.44*  0.40*  0.48*   ANIONGAP   --    --   9  8  8   JOSH   --    --   7.1*  6.7*  7.2*      Recent Labs   Lab Test  17   0814  17   0750  17   0825  01/20/15   1230  14   1059  04/15/14   0700   13   0848   13   1446  " 01/18/13   1700   ALBUMIN  1.1*   --   1.4*  3.6   --   2.3*   < >  3.7*   < >   --    --    BILITOTAL   --    --   0.3   --    --   <0.1*   --   0.6   < >   --    --    ALT   --    --   6   --    --   22   --   10   < >   --    --    AST   --    --   8   --    --   15   --   37   < >   --    --    ALKPHOS   --    --   97   --    --   81   --   173*   < >   --    --    PROTEIN   --    --    --    --   30*   --    --    --    --   30*  Trace*   LIPASE  533*  624*  930*   --    --    --    --    --    --    --    --     < > = values in this interval not displayed.      MRCP 2/20/17  FINDINGS: The exam is limited related to artifact due to the presence  of extensive surgical hardware in the thoracolumbar spine. No evidence  for fatty infiltration of the liver. There is mild splenomegaly,  increased slightly since the previous exam. The spleen measures up to  13.5 cm in length. No convincing evidence for pancreatic or biliary  ductal dilatation. There is a small probable fluid collection in or  adjacent to the pancreatic tail (series 1600 image 46) measuring 2.2 x  1.7 cm. The pancreas is otherwise not well seen, but appears atrophic  and/or fatty replaced. No other peripancreatic fluid collections are  identified. The liver, spleen, adrenal glands, and kidneys are  otherwise unremarkable. No hydronephrosis. There is a moderate amount  of free fluid in the pelvis. There is also a small amount of fluid  noted within both paracolic gutters. Moderate amount of stool and  fluid in the ascending colon. There is extensive subcutaneous edema   throughout the abdomen and pelvis.     EGD 2/22/17  Findings:        The esophagus was normal.        The stomach was normal.        The examined duodenum was normal.      Assessment: 51 year old female presenting to OSH with abdominal pain and vomiting. Found to have lipase of 1308 and CT suggesting fat stranding in the peripancreatic region consistent with pancreatitis. Circumferential  thickening of the gastric wall and thickening of the transverse colon also noted. Lipase trending down. LFTs normal. Hgb has dropped slightly since admission though no signs of bleeding     Plan:   -Diet as tolerated   -No need to follow Lipase  -Will plan for EUS in 6-8 weeks to reassess fluid collection seen on MRCP. Our office will call to schedule this  -Will defer further diuresis to IM. Restarted PTA Lasix  -No further GI recommendations at this time. Will sign off. Please call with any questions.    John Gallegos PA-C  [facility]  Cell:  [phone] Monday through Friday [id number]  Office: [phone]
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
## 2017-02-23 NOTE — PROGRESS NOTES
Care Transition Initial Assessment - RN        Met with: Patient.    DATA   Active Problems:    Pancreatitis       Cognitive Status: awake, alert and oriented.      Contact information and PCP information verified: Yes    Lives With: child(rao), adult     Insurance concerns: No Insurance issues identified    ASSESSMENT  Patient currently receives the following services:  Home care through [facility] and attends wound clinic once a week at [facility]        Identified issues/concerns regarding health management: none  Met with patient again today to further discuss discharge plan. I have received input from [facility] ( Ashleigh Posey RN at [facility] at [phone]) and from our wound care RN whom had been in contact with manager at [facility] wound clinic. La Hyatt is the manager I spoke to Vivien whom has cared for patient. The phone number is [phone]. I expressed the information that has been presented to me regarding an increase in the size of her wounds and an increase in the number of wounds being packed for wound vac. Patient very much wants to go home with previous level of care. Patient relays that she has 2 PCA's, her daughter and the mother of her daughter's boyfriend.  She states Marian is there 8 hrs a day Monday through Friday and her daughter takes over the other hours. She relays that her daughter works 6 pm to 6 am, so Marian is there while daughter sleeps. Patient confirms that daughter assists with the dressing changes done by [facility].  Patient has concerns about her current level of feeling bloated and asked about restarting her lasix. I inquired about her nutrition at home and patient states that Marian makes sure I eat breakfast and lunch. Patient states she drinks ensure or boost at home to supplement her diet. I asked for permission to discuss discharge plan with her daughter, patient gave me verbal permission to discuss with her  daughter. I called her daughter this afternoon and left a message for return phone call.     Vivien at [facility] states that patient has a low air loss group 2 mattress at home.   PLAN  Financial costs for the patient include.  Patient given options and choices for discharge yes, chooses to go home again with home care services  Patient anticipates discharging to Home with resumption of previous cares        Patient anticipates needs for home equipment: No    Plan/Disposition: Home   Appointments:         Care  (CTS) will continue to follow as needed.

## 2017-02-23 NOTE — PHARMACY-VANCOMYCIN DOSING SERVICE
Pharmacy Vancomycin Note  Date of Service 2017  Patient's  1965   51 year old, female    Indication: Skin and Soft Tissue Infection  Goal Trough Level: 10-15 mg/L  Day of Therapy: 5  Current Vancomycin regimen:  1000 mg IV q18h    Current estimated CrCl = Estimated Creatinine Clearance: 203.8 mL/min (based on Cr of 0.34).    Creatinine for last 3 days  2017:  6:50 AM Creatinine 0.40 mg/dL  2017:  5:30 AM Creatinine 0.44 mg/dL  2017:  8:00 AM Creatinine 0.34 mg/dL    Recent Vancomycin Levels (past 3 days)  2017:  7:15 PM Vancomycin Level 21.5 mg/L  2017:  8:00 AM Vancomycin Level 15.0 mg/L    Vancomycin IV Administrations (past 72 hours)                   vancomycin (VANCOCIN) 1000 mg in dextrose 5% 200 mL PREMIX (mg) 1,000 mg New Bag 17 0811     1,000 mg New Bag 17 1435     1,000 mg New Bag 17 2020     1,000 mg New Bag  0201                Nephrotoxins and other renal medications (Future)    Start     Dose/Rate Route Frequency Ordered Stop    17 0100  vancomycin (VANCOCIN) 1000 mg in dextrose 5% 200 mL PREMIX      1,000 mg Intravenous EVERY 18 HOURS 17 2041               Contrast Orders - past 72 hours (72h ago through future)    Start     Dose/Rate Route Frequency Ordered Stop    17 1615  gadobutrol (GADAVIST) injection 15 mL      15 mL Intravenous ONCE 17 1610 17 1616          Interpretation of levels and current regimen:  Trough level is  Therapeutic    Has serum creatinine changed > 50% in last 72 hours: No    Urine output:  unable to determine    Renal Function: Stable    Plan:  1.  Continue Current Dose  2.  Pharmacy will check trough levels as appropriate in 3-5 Days.    3. Serum creatinine levels will be ordered a minimum of twice weekly.      Deidra Sparks        .
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
## 2017-02-24 ENCOUNTER — APPOINTMENT (OUTPATIENT)
Dept: GENERAL RADIOLOGY | Facility: CLINIC | Age: 52
DRG: 579 | End: 2017-02-24
Attending: INTERNAL MEDICINE
Payer: MEDICARE

## 2017-02-24 LAB
CREAT SERPL-MCNC: 0.33 MG/DL (ref 0.52–1.04)
GFR SERPL CREATININE-BSD FRML MDRD: ABNORMAL ML/MIN/1.7M2

## 2017-02-24 PROCEDURE — 25000128 H RX IP 250 OP 636: Performed by: INTERNAL MEDICINE

## 2017-02-24 PROCEDURE — 25000132 ZZH RX MED GY IP 250 OP 250 PS 637: Mod: GY | Performed by: PHYSICIAN ASSISTANT

## 2017-02-24 PROCEDURE — A9270 NON-COVERED ITEM OR SERVICE: HCPCS | Mod: GY | Performed by: INTERNAL MEDICINE

## 2017-02-24 PROCEDURE — 40000239 ZZH STATISTIC VAT ROUNDS

## 2017-02-24 PROCEDURE — 25000132 ZZH RX MED GY IP 250 OP 250 PS 637: Mod: GY | Performed by: INTERNAL MEDICINE

## 2017-02-24 PROCEDURE — 82565 ASSAY OF CREATININE: CPT | Performed by: INTERNAL MEDICINE

## 2017-02-24 PROCEDURE — 12000007 ZZH R&B INTERMEDIATE

## 2017-02-24 PROCEDURE — A9270 NON-COVERED ITEM OR SERVICE: HCPCS | Mod: GY | Performed by: PHYSICIAN ASSISTANT

## 2017-02-24 PROCEDURE — 99213 OFFICE O/P EST LOW 20 MIN: CPT

## 2017-02-24 PROCEDURE — 25000125 ZZHC RX 250: Performed by: INTERNAL MEDICINE

## 2017-02-24 PROCEDURE — 74000 XR ABDOMEN 1 VW: CPT

## 2017-02-24 PROCEDURE — 99232 SBSQ HOSP IP/OBS MODERATE 35: CPT | Performed by: HOSPITALIST

## 2017-02-24 PROCEDURE — E2402 NEG PRESS WOUND THERAPY PUMP: HCPCS

## 2017-02-24 PROCEDURE — 97606 NEG PRS WND THER DME>50 SQCM: CPT

## 2017-02-24 RX ORDER — AMOXICILLIN 250 MG
1 CAPSULE ORAL 2 TIMES DAILY
Status: DISCONTINUED | OUTPATIENT
Start: 2017-02-24 | End: 2017-02-27 | Stop reason: HOSPADM

## 2017-02-24 RX ADMIN — OXYCODONE HYDROCHLORIDE AND ACETAMINOPHEN 500 MG: 500 TABLET ORAL at 10:32

## 2017-02-24 RX ADMIN — CEFTRIAXONE 2 G: 2 INJECTION, POWDER, FOR SOLUTION INTRAMUSCULAR; INTRAVENOUS at 19:50

## 2017-02-24 RX ADMIN — GABAPENTIN 600 MG: 600 TABLET, FILM COATED ORAL at 20:37

## 2017-02-24 RX ADMIN — OXYBUTYNIN CHLORIDE 5 MG: 5 TABLET, EXTENDED RELEASE ORAL at 10:32

## 2017-02-24 RX ADMIN — GABAPENTIN 600 MG: 600 TABLET, FILM COATED ORAL at 10:33

## 2017-02-24 RX ADMIN — SERTRALINE HYDROCHLORIDE 50 MG: 50 TABLET, FILM COATED ORAL at 10:33

## 2017-02-24 RX ADMIN — ZOLPIDEM TARTRATE 5 MG: 5 TABLET, FILM COATED ORAL at 21:30

## 2017-02-24 RX ADMIN — ZINC SULFATE CAP 220 MG (50 MG ELEMENTAL ZN) 220 MG: 220 (50 ZN) CAP at 10:32

## 2017-02-24 RX ADMIN — OXYCODONE HYDROCHLORIDE 10 MG: 5 TABLET ORAL at 02:03

## 2017-02-24 RX ADMIN — Medication 25000 UNITS: at 10:33

## 2017-02-24 RX ADMIN — FUROSEMIDE 40 MG: 40 TABLET ORAL at 10:32

## 2017-02-24 RX ADMIN — OXYCODONE HYDROCHLORIDE 10 MG: 5 TABLET ORAL at 06:00

## 2017-02-24 RX ADMIN — VANCOMYCIN HYDROCHLORIDE 1000 MG: 1 INJECTION, SOLUTION INTRAVENOUS at 02:01

## 2017-02-24 RX ADMIN — ENOXAPARIN SODIUM 40 MG: 40 INJECTION SUBCUTANEOUS at 02:03

## 2017-02-24 RX ADMIN — OMEPRAZOLE 20 MG: 20 CAPSULE, DELAYED RELEASE ORAL at 10:32

## 2017-02-24 RX ADMIN — OXYBUTYNIN CHLORIDE 5 MG: 5 TABLET, EXTENDED RELEASE ORAL at 16:39

## 2017-02-24 RX ADMIN — RANITIDINE 150 MG: 150 TABLET ORAL at 10:32

## 2017-02-24 RX ADMIN — GABAPENTIN 300 MG: 300 CAPSULE ORAL at 14:06

## 2017-02-24 RX ADMIN — ENOXAPARIN SODIUM 40 MG: 40 INJECTION SUBCUTANEOUS at 14:06

## 2017-02-24 RX ADMIN — VANCOMYCIN HYDROCHLORIDE 1000 MG: 1 INJECTION, SOLUTION INTRAVENOUS at 20:37

## 2017-02-24 RX ADMIN — SENNOSIDES AND DOCUSATE SODIUM 1 TABLET: 8.6; 5 TABLET ORAL at 20:37

## 2017-02-24 RX ADMIN — RANITIDINE 150 MG: 150 TABLET ORAL at 20:37

## 2017-02-24 RX ADMIN — TRAZODONE HYDROCHLORIDE 100 MG: 100 TABLET ORAL at 21:30

## 2017-02-24 RX ADMIN — MULTIPLE VITAMINS W/ MINERALS TAB 1 TABLET: TAB at 10:33

## 2017-02-24 NOTE — PLAN OF CARE
Problem: Goal Outcome Summary  Goal: Goal Outcome Summary  Outcome: No Change  VSS. Pain controlled with oxycodone. Buttocks wounds & wound vac CDI. Urostomy intact - pt self caths, colostomy intact with gas, minimal output, repositioned q2hrs.

## 2017-02-24 NOTE — PLAN OF CARE
Problem: Goal Outcome Summary  Goal: Goal Outcome Summary  Outcome: Improving  Vitally stable, A/Ox4, pleasant. Pain controlled with PRN Oxycodone.   GI: (+) flatus and stool in ostomy.  : pt straight cath'd self (urostomy) for 1000cc clear yellow urine this evening.   Surg: wound vac to suction, small amount output. Dressings reinforced d/t leak. Now improved.   Reposition Q2H minimally. On specialty mattress. Heels suspended.
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
## 2017-02-24 NOTE — PLAN OF CARE
Problem: Goal Outcome Summary  Goal: Goal Outcome Summary  Outcome: Improving  Pt A/Ox4. VSS on RA. Denies pain. Dressings changed. [place] patent to constant suction. Tolerating regular diet with a good appetite. Self caths per urostomy with adequate output. No output from colostomy. Repositioned Q2H. D/C pending.
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
## 2017-02-25 LAB
BACTERIA SPEC CULT: ABNORMAL
CREAT SERPL-MCNC: 0.33 MG/DL (ref 0.52–1.04)
GFR SERPL CREATININE-BSD FRML MDRD: ABNORMAL ML/MIN/1.7M2
Lab: ABNORMAL
MICRO REPORT STATUS: ABNORMAL
PLATELET # BLD AUTO: 218 10E9/L (ref 150–450)
SPECIMEN SOURCE: ABNORMAL

## 2017-02-25 PROCEDURE — A9270 NON-COVERED ITEM OR SERVICE: HCPCS | Mod: GY | Performed by: INTERNAL MEDICINE

## 2017-02-25 PROCEDURE — 25000132 ZZH RX MED GY IP 250 OP 250 PS 637: Mod: GY | Performed by: INTERNAL MEDICINE

## 2017-02-25 PROCEDURE — 25000125 ZZHC RX 250: Performed by: INTERNAL MEDICINE

## 2017-02-25 PROCEDURE — A9270 NON-COVERED ITEM OR SERVICE: HCPCS | Mod: GY | Performed by: PHYSICIAN ASSISTANT

## 2017-02-25 PROCEDURE — 12000007 ZZH R&B INTERMEDIATE

## 2017-02-25 PROCEDURE — E2402 NEG PRESS WOUND THERAPY PUMP: HCPCS

## 2017-02-25 PROCEDURE — 82565 ASSAY OF CREATININE: CPT | Performed by: INTERNAL MEDICINE

## 2017-02-25 PROCEDURE — 25000132 ZZH RX MED GY IP 250 OP 250 PS 637: Mod: GY | Performed by: PHYSICIAN ASSISTANT

## 2017-02-25 PROCEDURE — 25000128 H RX IP 250 OP 636: Performed by: INTERNAL MEDICINE

## 2017-02-25 PROCEDURE — 85049 AUTOMATED PLATELET COUNT: CPT | Performed by: INTERNAL MEDICINE

## 2017-02-25 PROCEDURE — 99232 SBSQ HOSP IP/OBS MODERATE 35: CPT | Performed by: INTERNAL MEDICINE

## 2017-02-25 PROCEDURE — 40000239 ZZH STATISTIC VAT ROUNDS

## 2017-02-25 RX ORDER — SUMATRIPTAN 25 MG/1
25 TABLET, FILM COATED ORAL ONCE
Status: COMPLETED | OUTPATIENT
Start: 2017-02-25 | End: 2017-02-25

## 2017-02-25 RX ADMIN — ZOLPIDEM TARTRATE 5 MG: 5 TABLET, FILM COATED ORAL at 21:15

## 2017-02-25 RX ADMIN — GABAPENTIN 300 MG: 300 CAPSULE ORAL at 13:38

## 2017-02-25 RX ADMIN — GABAPENTIN 600 MG: 600 TABLET, FILM COATED ORAL at 20:46

## 2017-02-25 RX ADMIN — Medication 25000 UNITS: at 09:35

## 2017-02-25 RX ADMIN — SENNOSIDES AND DOCUSATE SODIUM 1 TABLET: 8.6; 5 TABLET ORAL at 09:35

## 2017-02-25 RX ADMIN — RANITIDINE 150 MG: 150 TABLET ORAL at 20:46

## 2017-02-25 RX ADMIN — OXYCODONE HYDROCHLORIDE AND ACETAMINOPHEN 500 MG: 500 TABLET ORAL at 09:36

## 2017-02-25 RX ADMIN — MULTIPLE VITAMINS W/ MINERALS TAB 1 TABLET: TAB at 09:35

## 2017-02-25 RX ADMIN — RANITIDINE 150 MG: 150 TABLET ORAL at 09:35

## 2017-02-25 RX ADMIN — GABAPENTIN 600 MG: 600 TABLET, FILM COATED ORAL at 09:35

## 2017-02-25 RX ADMIN — OXYCODONE HYDROCHLORIDE 10 MG: 5 TABLET ORAL at 09:34

## 2017-02-25 RX ADMIN — ENOXAPARIN SODIUM 40 MG: 40 INJECTION SUBCUTANEOUS at 13:38

## 2017-02-25 RX ADMIN — ZINC SULFATE CAP 220 MG (50 MG ELEMENTAL ZN) 220 MG: 220 (50 ZN) CAP at 09:35

## 2017-02-25 RX ADMIN — CEFTRIAXONE 2 G: 2 INJECTION, POWDER, FOR SOLUTION INTRAMUSCULAR; INTRAVENOUS at 19:12

## 2017-02-25 RX ADMIN — ENOXAPARIN SODIUM 40 MG: 40 INJECTION SUBCUTANEOUS at 01:03

## 2017-02-25 RX ADMIN — OXYBUTYNIN CHLORIDE 5 MG: 5 TABLET, EXTENDED RELEASE ORAL at 09:35

## 2017-02-25 RX ADMIN — OMEPRAZOLE 20 MG: 20 CAPSULE, DELAYED RELEASE ORAL at 09:35

## 2017-02-25 RX ADMIN — SUMATRIPTAN 25 MG: 25 TABLET, FILM COATED ORAL at 20:47

## 2017-02-25 RX ADMIN — OXYBUTYNIN CHLORIDE 5 MG: 5 TABLET, EXTENDED RELEASE ORAL at 16:39

## 2017-02-25 RX ADMIN — VANCOMYCIN HYDROCHLORIDE 1000 MG: 1 INJECTION, SOLUTION INTRAVENOUS at 13:37

## 2017-02-25 RX ADMIN — TRAZODONE HYDROCHLORIDE 100 MG: 100 TABLET ORAL at 20:47

## 2017-02-25 RX ADMIN — SENNOSIDES AND DOCUSATE SODIUM 1 TABLET: 8.6; 5 TABLET ORAL at 20:46

## 2017-02-25 RX ADMIN — SERTRALINE HYDROCHLORIDE 50 MG: 50 TABLET, FILM COATED ORAL at 09:36

## 2017-02-25 RX ADMIN — FUROSEMIDE 40 MG: 40 TABLET ORAL at 09:36
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
## 2017-02-25 NOTE — PLAN OF CARE
Problem: Goal Outcome Summary  Goal: Goal Outcome Summary  Outcome: No Change  A/O. AVSS. Wound vac dressing changed today, intact. Self caths urostomy. Colostomy has no output for 2+ days, colace ordered now. Trapeze set up in room for patient to reposition self. Continuing to monitor.
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
## 2017-02-25 NOTE — PLAN OF CARE
Problem: Goal Outcome Summary  Goal: Goal Outcome Summary  Outcome: Improving  Pt A/Ox4. Soft BP, otherwise VSS on RA. Wound vac in place. Dressings CDI. Voiding per urostomy with adequate output. BS active. Medium soft BM.Tolerating regular diet with a good appetite. Repositioned Q2H. D/C pending.

## 2017-02-25 NOTE — PLAN OF CARE
Problem: Goal Outcome Summary  Goal: Goal Outcome Summary  Outcome: Improving  Pt A/Ox4. Soft BP, otherwise VSS on RA. Wound vac in place. Dressings CDI. Voiding per urostomy with adequate output. Medium soft BM.Tolerating regular diet with a good appetite. Repositioned Q2H. D/C pending.

## 2017-02-25 NOTE — PLAN OF CARE
Problem: Goal Outcome Summary  Goal: Goal Outcome Summary  Outcome: Improving  A/O, VSS ex soft BP asymptomatic, independent with turning and repositioning using trapeze, ostomy with little yellow/brown output and gas, wound vac WDL brown output, pt refused to self cath tonight (says she does not feel the need to), BS present, 3+ BLE edema, denies pain, possibly d/c Monday.

## 2017-02-26 LAB
CREAT SERPL-MCNC: 0.44 MG/DL (ref 0.52–1.04)
GFR SERPL CREATININE-BSD FRML MDRD: ABNORMAL ML/MIN/1.7M2
VANCOMYCIN SERPL-MCNC: 14.4 MG/L

## 2017-02-26 PROCEDURE — 25000132 ZZH RX MED GY IP 250 OP 250 PS 637: Mod: GY | Performed by: INTERNAL MEDICINE

## 2017-02-26 PROCEDURE — 99232 SBSQ HOSP IP/OBS MODERATE 35: CPT | Performed by: INTERNAL MEDICINE

## 2017-02-26 PROCEDURE — 40000239 ZZH STATISTIC VAT ROUNDS

## 2017-02-26 PROCEDURE — 25000128 H RX IP 250 OP 636: Performed by: INTERNAL MEDICINE

## 2017-02-26 PROCEDURE — 36415 COLL VENOUS BLD VENIPUNCTURE: CPT | Performed by: INTERNAL MEDICINE

## 2017-02-26 PROCEDURE — E2402 NEG PRESS WOUND THERAPY PUMP: HCPCS

## 2017-02-26 PROCEDURE — 12000007 ZZH R&B INTERMEDIATE

## 2017-02-26 PROCEDURE — A9270 NON-COVERED ITEM OR SERVICE: HCPCS | Mod: GY | Performed by: INTERNAL MEDICINE

## 2017-02-26 PROCEDURE — 25000132 ZZH RX MED GY IP 250 OP 250 PS 637: Mod: GY | Performed by: PHYSICIAN ASSISTANT

## 2017-02-26 PROCEDURE — P9047 ALBUMIN (HUMAN), 25%, 50ML: HCPCS | Performed by: INTERNAL MEDICINE

## 2017-02-26 PROCEDURE — 82565 ASSAY OF CREATININE: CPT | Performed by: INTERNAL MEDICINE

## 2017-02-26 PROCEDURE — A9270 NON-COVERED ITEM OR SERVICE: HCPCS | Mod: GY | Performed by: PHYSICIAN ASSISTANT

## 2017-02-26 PROCEDURE — 40000257 ZZH STATISTIC CONSULT NO CHARGE VASC ACCESS

## 2017-02-26 PROCEDURE — 25000125 ZZHC RX 250: Performed by: INTERNAL MEDICINE

## 2017-02-26 PROCEDURE — 80202 ASSAY OF VANCOMYCIN: CPT | Performed by: INTERNAL MEDICINE

## 2017-02-26 RX ORDER — ALBUMIN (HUMAN) 12.5 G/50ML
25 SOLUTION INTRAVENOUS EVERY 6 HOURS
Status: COMPLETED | OUTPATIENT
Start: 2017-02-26 | End: 2017-02-27

## 2017-02-26 RX ORDER — FUROSEMIDE 10 MG/ML
40 INJECTION INTRAMUSCULAR; INTRAVENOUS ONCE
Status: COMPLETED | OUTPATIENT
Start: 2017-02-26 | End: 2017-02-26

## 2017-02-26 RX ORDER — SULFAMETHOXAZOLE AND TRIMETHOPRIM 400; 80 MG/1; MG/1
1 TABLET ORAL 2 TIMES DAILY
Qty: 28 TABLET | Refills: 0 | Status: SHIPPED | OUTPATIENT
Start: 2017-02-26 | End: 2017-02-27

## 2017-02-26 RX ADMIN — ENOXAPARIN SODIUM 40 MG: 40 INJECTION SUBCUTANEOUS at 00:36

## 2017-02-26 RX ADMIN — ZINC SULFATE CAP 220 MG (50 MG ELEMENTAL ZN) 220 MG: 220 (50 ZN) CAP at 07:45

## 2017-02-26 RX ADMIN — GABAPENTIN 600 MG: 600 TABLET, FILM COATED ORAL at 07:44

## 2017-02-26 RX ADMIN — GABAPENTIN 600 MG: 600 TABLET, FILM COATED ORAL at 20:17

## 2017-02-26 RX ADMIN — SENNOSIDES AND DOCUSATE SODIUM 1 TABLET: 8.6; 5 TABLET ORAL at 07:45

## 2017-02-26 RX ADMIN — OXYBUTYNIN CHLORIDE 5 MG: 5 TABLET, EXTENDED RELEASE ORAL at 16:38

## 2017-02-26 RX ADMIN — RANITIDINE 150 MG: 150 TABLET ORAL at 20:17

## 2017-02-26 RX ADMIN — OXYCODONE HYDROCHLORIDE 10 MG: 5 TABLET ORAL at 22:11

## 2017-02-26 RX ADMIN — RANITIDINE 150 MG: 150 TABLET ORAL at 07:46

## 2017-02-26 RX ADMIN — TRAZODONE HYDROCHLORIDE 100 MG: 100 TABLET ORAL at 22:33

## 2017-02-26 RX ADMIN — OXYBUTYNIN CHLORIDE 5 MG: 5 TABLET, EXTENDED RELEASE ORAL at 07:45

## 2017-02-26 RX ADMIN — FUROSEMIDE 40 MG: 10 INJECTION, SOLUTION INTRAMUSCULAR; INTRAVENOUS at 18:40

## 2017-02-26 RX ADMIN — MULTIPLE VITAMINS W/ MINERALS TAB 1 TABLET: TAB at 07:44

## 2017-02-26 RX ADMIN — GABAPENTIN 300 MG: 300 CAPSULE ORAL at 13:16

## 2017-02-26 RX ADMIN — OXYCODONE HYDROCHLORIDE 10 MG: 5 TABLET ORAL at 08:24

## 2017-02-26 RX ADMIN — VANCOMYCIN HYDROCHLORIDE 1000 MG: 1 INJECTION, SOLUTION INTRAVENOUS at 07:39

## 2017-02-26 RX ADMIN — FUROSEMIDE 40 MG: 40 TABLET ORAL at 07:45

## 2017-02-26 RX ADMIN — SENNOSIDES AND DOCUSATE SODIUM 1 TABLET: 8.6; 5 TABLET ORAL at 20:17

## 2017-02-26 RX ADMIN — OMEPRAZOLE 20 MG: 20 CAPSULE, DELAYED RELEASE ORAL at 07:46

## 2017-02-26 RX ADMIN — OXYCODONE HYDROCHLORIDE AND ACETAMINOPHEN 500 MG: 500 TABLET ORAL at 07:46

## 2017-02-26 RX ADMIN — ALBUMIN (HUMAN) 25 G: 12.5 SOLUTION INTRAVENOUS at 22:32

## 2017-02-26 RX ADMIN — HYDROMORPHONE HYDROCHLORIDE 0.5 MG: 1 INJECTION, SOLUTION INTRAMUSCULAR; INTRAVENOUS; SUBCUTANEOUS at 16:49

## 2017-02-26 RX ADMIN — CEFTRIAXONE 2 G: 2 INJECTION, POWDER, FOR SOLUTION INTRAMUSCULAR; INTRAVENOUS at 18:41

## 2017-02-26 RX ADMIN — SERTRALINE HYDROCHLORIDE 50 MG: 50 TABLET, FILM COATED ORAL at 07:45

## 2017-02-26 RX ADMIN — Medication 25000 UNITS: at 07:44

## 2017-02-26 RX ADMIN — ENOXAPARIN SODIUM 40 MG: 40 INJECTION SUBCUTANEOUS at 13:16

## 2017-02-26 RX ADMIN — ALBUMIN (HUMAN) 25 G: 12.5 SOLUTION INTRAVENOUS at 17:43

## 2017-02-26 ASSESSMENT — PAIN DESCRIPTION - DESCRIPTORS: DESCRIPTORS: BURNING
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
## 2017-02-26 NOTE — PROGRESS NOTES
"D: Bedside nurse indicates pt is ready for d/c today per physician report to nurse.  ? If Wound Vac can be approved and delivered today for pt d/c.  I: Contacted [facility] Wound Vac supplier.  They will put in a request for dispensing of wound vac for today.  They should be able to deliver the [facility] Wound Vac Supplies w/i a 2-3 hour time as they have put a \"rush\" order on the delivery.  Bedside RN and Hospitalist aware of response from [facility]. LVM for home nursing: Ashleigh Posey RN at [facility] at [phone] that pt will likely d/c today.   A: [facility] Home Wound Vac to be delivered this afternoon .  P: Care Coord to fax over post hosp IATF and AVS when it becomes available.     Ale Dupree Care Transitions Nurse,  RN, BSN, [facility]  Cell Phone # [phone]      "

## 2017-02-26 NOTE — PLAN OF CARE
Problem: Goal Outcome Summary  Goal: Goal Outcome Summary  Outcome: No Change  VSS. Had a migraine on shift, expressed frustration when soft light on for cares. Wound vac intact. Good light brown stool output in colostomy. Urostomy for voiding. Trapeze in room for patient to self reposition. Pending discharge. PICC not able to draw blood, IR contacted and lab to draw morning labs.

## 2017-02-26 NOTE — PLAN OF CARE
Problem: Goal Outcome Summary  Goal: Goal Outcome Summary  Outcome: Improving  Pt A/Ox4. Soft BP, otherwise VSS on RA. Wound vac in place. Dressings CDI. Voiding per urostomy with adequate output. BS active. PRN oxycodone for pain with relief. Tolerating regular diet with a good appetite. Repositioned Q2H. D/C pending.

## 2017-02-26 NOTE — PROGRESS NOTES
[facility] Progress Note  Chart Reviewed, Pt discussed with CC, RN and physician.   Physician has medically cleared pt for discharge home today with wound vac CC has ordered wound vac to be delivered to hospital. Pt's states that her usual transportation company is not open on weekends and her daughter is sick and unable to transport.  Intervention:   [facility] spoke with pt regarding transportation and discussed [employer] services. Base rate and milage costs were discussed and pt is on [place] so likely that transportation will be covered.    Team Members notified:   RN, CC, [facility]      Plan: Discharge home with wound vac at 4:30pm via [employer] in pt's own chair.  Barriers: Awaiting wound vac delivery.  Follow up plan: Pt has [facility] following up with pt at home tomorrow. All contact information given to pt in AVS.    Addendum: Pt's discharge has been postponed until tomorrow per physician. Anticipate discharge early Monday.  [employer] notified and ride cancelled for today.

## 2017-02-26 NOTE — DISCHARGE SUMMARY
[facility]  Discharge Summary        Felicia Ellison MRN# [id number]   YOB: 1965 Age: 51 year old     Date of Admission:  2/17/2017  Date of Discharge:  2/27/2017  Admitting Physician:  Reese Urias MD  Discharge Physician: Michelle Gonzalez MD  Discharging Service: Hospitalist     Primary Provider: Ivan Espinosa  Primary Care Physician Phone Number: [phone]         Discharge Diagnoses:         Acute pancreatitis and colitits  Decubitus skin ulcer, stage IV (H)  Hypokalemia  Sever malnutrition  Sever hypoalbuminemia        Problem Oriented Hospital Course (Providers):    Felicia Ellison was admitted on 2/17/2017 by Reese Urias MD and I would refer you to their history and physical.  The following problems were addressed during her hospitalization:  Acute pancreatitis/transverse colitis: Resolved.   ---MRCP 02/20 was limited study and showed small probable fluid collection in the pancreatic tail measuring 2.2 cm, no convincing evidence for biliary or pancreatic ductal dilatation.  -- LFT normal except for low alb of 1.4.TG 73.  ---Lipase 930-->624-->533.>> 455 on 2/23/17  ---GI was consulted an followed.   -- EGD was done on 02/21 was unremarkable for any new pathology  ---Plan for EUS in 6-8 weeks to reassess fluid collection seen on MRCP      B/L necrotic sacral decubitus ulcers, s/p excisional debridement on 02/18.  --- Surgery, ID and wound care was consulted and followed  ---Culture growing coag neg staph, Enterococcus faecalis, and Klebsiella pneumoniae.  ---placed on IV vancomycin and ceftriaxone during her hospital stay  --, no osteo and will be on PO bactrim for 2 weeks upon discharge     GERD:  -Cont on PTA ranitidine 150 mg po bid and omeprazole 20 mg daily.      Hypokalemia. Resolved  -Replaced per protocol.      Acute blood loss anemia on Chronic anemia. Stable  Baseline Hgb around 9. Hgb down to 6.9 on 02/20 post-op transfused with one  unit PRBC.  Iron profile: Ferritin 222, iron 10,TIBC 70, ISAT 14% suggestive of both iron deficiency and anemia of chronic disease.  Occult blood neg, B12 315.     Recent Labs  Lab 02/22/17  0530 02/21/17  0650 02/20/17  0814 02/19/17  1105 02/18/17  1523   HGB 9.0* 7.6* 6.9* 7.3* 7.6*      -Conditional order for transfusion if Hgb <7.  -EGD from today(2/22) was unremarkable for source of bleeding.  - continue po iron supplement     Hypoalbuminemia/peripheral edema:  --Alb 1.4. Due to intermittent poor oral intake and ongoing chronic protein loss from decubitus ulcers.  --She was given albumin effusion.  -- advised to supplement diet with high protein content foods     T1 paraplegia with paralytic ileus and neurogenic bladder, s/p diversion ileostomy and urostomy.  -Cont ostomy care per [facility] RN.      Neuropathic pain.  -Cont on PTA Neurontin      Depression.  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      DVT Prhylaxis: Enoxaprain (Lovenox) SQ     Patient was seen prior to discharge and she was found to be stable.           Code Status:      Full Code        Brief Hospital Stay Summary Sent Home With Patient in AVS:               Important Results:      See below.        Pending Results:        Unresulted Labs Ordered in the Past 30 Days of this Admission     No orders found from 12/19/2016 to 2/18/2017.            Discharge Instructions and Follow-Up:      Follow-up Appointments     Follow-up and recommended labs and tests        Follow up with primary care provider, Ivan Baeza, within 7 days for   hospital follow- up.  No follow up labs or test are needed.                      Discharge Disposition:      Discharged to home        Discharge Medications:        Current Discharge Medication List      START taking these medications    Details   sulfamethoxazole-trimethoprim (BACTRIM) 400-80 MG per tablet Take 1 tablet by  mouth 2 times daily for 14 days  Qty: 28 tablet, Refills: 0    Associated Diagnoses: Decubitus skin ulcer, stage IV (H)         CONTINUE these medications which have NOT CHANGED    Details   RANITIDINE HCL PO Take 150 mg by mouth 2 times daily      OMEPRAZOLE PO Take 20 mg by mouth every morning      SERTRALINE HCL PO Take 50 mg by mouth daily      enoxaparin (LOVENOX) 40 MG/0.4ML injection Inject 40 mg Subcutaneous every 12 hours      !! GABAPENTIN PO Take 600 mg by mouth 2 times daily Takes 2 x 300 mg in a.m. 1 x 300 mg in afternoon and 2 x 300 mg at night.      !! GABAPENTIN PO Take 300 mg by mouth daily In the afternoon.  Takes 2 x 300 mg in a.m. 1 x 300 mg in afternoon and 2 x 300 mg at night.      Potassium Chloride Jacqueline CR (K-DUR PO) Take 20 mEq by mouth 2 times daily      SUMAtriptan Succinate Refill (IMITREX) 6 MG/0.5ML SOCT Inject 6 mg Subcutaneous 2 times daily as needed for migraine Inject 6 mg at onset of headache  May repeat x 1 dose in 2 hours if needed.  Max 2 doses  In 24 hrs.      FUROSEMIDE PO Take 20 mg by mouth 2 times daily      multivitamin, therapeutic with minerals (THERA-VIT-M) TABS tablet Take 1 tablet by mouth daily Certavite      calcium citrate-vitamin D (CALCIUM CITRATE + D) 315-250 MG-UNIT TABS per tablet Take 2 tablets by mouth 2 times daily      ferrous sulfate (IRON) 325 (65 FE) MG tablet Take 325 mg by mouth daily (with breakfast)      TRAMADOL HCL PO Take 50 mg by mouth every 8 hours as needed for moderate pain or moderate to severe pain      TRAZODONE HCL PO Take 100 mg by mouth At Bedtime 2 x 50 mg tabs      Zolpidem Tartrate (AMBIEN PO) Take 10 mg by mouth nightly as needed for sleep      oxybutynin (DITROPAN-XL) 5 MG 24 hr tablet Take 5 mg by mouth 2 times daily      !! order for DME Equipment being ordered: eval for appropriate shower bench and adaptive equipment while showering by Rosanna Rivera.  [employer] Fax [phone]    Primary Dressing Fibrocol    Qty 1  "box  Length of Need: 1 month  Frequency of dressing change: daily  Eval for appropriate shower bench and adaptive equipment while showering by Rosanna Rivera.  Qty: 30 days, Refills: 0    Associated Diagnoses: Pressure ulcer      Incontinence Supply Disposable (DISPOSABLE UNDERPADS 30\"X36\") MISC USE UP TO 6 TIMES DAILY AS NEEDED FOR INCONTINENCE  Qty: 180 pad, Refills: 12    Associated Diagnoses: Osteomyelitis of hip (H); Paraplegia following spinal cord injury (H)      !! order for DME Equipment being ordered: [employer] Fax [phone]    Primary Dressing Hydrofera Blue 6x6   Qty 1 box  Primary Dressing Fibracol Qty 1box  Secondary dressing 4x4 nonsterile gauze   Qty 200 ct  Secondary Dressing 2\" medipore tape Qty 3 rolls    Length of Need: 1 month  Frequency of dressing change: daily  Qty: 30 days, Refills: 2    Associated Diagnoses: Pressure ulcer, buttock(707.05)      Gauze Pads & Dressings (STERI-PAD STERILE) 4\"X4\" PADS Gauze pads for ileal diversion dressing daily and prn  Qty: 1 each, Refills: 12    Associated Diagnoses: Change of dressing      !! ORDER FOR DME Equipment being ordered:  Catheters with bag attached.  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 ORDER FOR DME [employer]  Phone# [phone]  Fax:  [phone]      Group 2 mattress overlay  Diagnosis:  Stage 4 Decubitus Ulcer  Qty: 1 Device, Refills: 0    Associated Diagnoses: Paraplegia following spinal cord injury (H); Chronic osteomyelitis of hip (H)      !! ORDER FOR DME Equipment being ordered: protein drink  Prostat 64  [employer] [place]  Qty: 1 Package, Refills: 12    Associated Diagnoses: Poor appetite; Generalized weakness; Paraplegia following spinal cord injury (H)      !! ORDER FOR DME Equipment being ordered:  40 cc bulb for catheter " "  [phone]  Qty: 1 Units, Refills: 0    Associated Diagnoses: Urinary retention; Flaccid paraplegia (H); Paraplegic immobility syndrome      Nutritional Supplements (BOOST) LIQD I can a day  Qty: 30 Can, Refills: 12    Associated Diagnoses: Paraplegic immobility syndrome      !! ORDER FOR DME Equipment being ordered: handivan  Qty: 1 each, Refills: 0    Associated Diagnoses: Paraplegia following spinal cord injury (H)      !! ORDER FOR DME Equipment being ordered: Over the bed table.  Fax to- [facility] at [phone]  Qty: 1 each, Refills: 0    Associated Diagnoses: Flaccid paraplegia (H); Paraplegic immobility syndrome      !! ORDER FOR DME Equipment being ordered: Trapeze for hospital bed  Qty: 1 each, Refills: 0    Comments: Fax to [facility] at [phone]  Associated Diagnoses: Paraplegic immobility syndrome; Pressure ulcer of right buttock; Flaccid paraplegia (H)       !! - Potential duplicate medications found. Please discuss with provider.      STOP taking these medications       CEPHALEXIN PO Comments:   Reason for Stopping:                 Allergies:         Allergies   Allergen Reactions     Penicillins Unknown     Patient states it makes her \"climb the walls and hyperactive.\"     Levaquin Rash     Rash only with po Levaquin...able to take IV Levaquin per pt           Consultations This Hospital Stay:      Consultation during this admission received from gastroenterology, infectious disease and surgery        Condition and Physical on Discharge:      Discharge condition: Stable   Vitals: Heart Rate: 93, Blood pressure 102/62, pulse 93, temperature 97.9  F (36.6  C), temperature source Axillary, resp. rate 18, height 1.753 m (5' 9\"), weight 65.8 kg (145 lb 1 oz), SpO2 92 %, not currently breastfeeding.     Constitutional: Awake, alert. No apparent distress   Lungs: Clear to auscultation bilaterally, no crackles or wheezing   Cardiovascular: Regular HR, normal S1 and S2, and no murmur noted   Abdomen: " Normal bowel sounds, soft, non-distended, non-tender   Skin: No rashes, no cyanosis.   Other: LE:                  Discharge Time:      Greater than 30 minutes.        Image Results From This Hospital Stay (For Non-EPIC Providers):        Results for orders placed or performed during the hospital encounter of 02/17/17   MR Abdomen MRCP w/o & w Contrast    Narrative    MR ABDOMEN MRCP WITHOUT AND WITH CONTRAST   2/20/2017 4:09 PM     HISTORY: Pancreatitis.    TECHNIQUE: Multisequence, multiplanar imaging of the abdomen was  performed without IV gadolinium contrast. A total of 15 mL Gadavist  gadolinium contrast was then administered intravenously. Additional  dynamic postcontrast T1 fat-sat sequences were performed. MRCP imaging  was also performed.    COMPARISON: CT of the abdomen and pelvis performed 12/28/2013.    FINDINGS: The exam is limited related to artifact due to the presence  of extensive surgical hardware in the thoracolumbar spine. No evidence  for fatty infiltration of the liver. There is mild splenomegaly,  increased slightly since the previous exam. The spleen measures up to  13.5 cm in length. No convincing evidence for pancreatic or biliary  ductal dilatation. There is a small probable fluid collection in or  adjacent to the pancreatic tail (series 1600 image 46) measuring 2.2 x  1.7 cm. The pancreas is otherwise not well seen, but appears atrophic  and/or fatty replaced. No other peripancreatic fluid collections are  identified. The liver, spleen, adrenal glands, and kidneys are  otherwise unremarkable. No hydronephrosis. There is a moderate amount  of free fluid in the pelvis. There is also a small amount of fluid  noted within both paracolic gutters. Moderate amount of stool and  fluid in the ascending colon. There is extensive subcutaneous edema  throughout the abdomen and pelvis.      Impression    IMPRESSION:   1. Small probable fluid collection in the pancreatic tail measures 2.2  cm, and may  represent pseudocyst or abscess.  2. Moderate amount of fluid in the pelvis, with a small amount of  fluid in both paracolic gutters.  3. No convincing evidence for biliary or pancreatic ductal dilatation.  4. Extensive subcutaneous edema throughout the abdomen and pelvis.  5. Mild splenomegaly.  6. Moderate amount of stool and fluid in the ascending colon.  7. The exam is limited related to artifact due to the presence of  extensive surgical hardware in the thoracolumbar spine. Consider CT  for further evaluation.    GAL VANESSA MD   XR Abdomen 1 View    Narrative    ABDOMEN ONE VIEW  2/24/2017 6:08 PM     HISTORY:  Possible small bowel obstruction.    COMPARISON: None.    FINDINGS: Normal bowel gas pattern. No evidence for small bowel  obstruction. Postoperative changes in the pelvis and thoracolumbar  spine.      Impression    IMPRESSION: No evidence for bowel obstruction.    KATE LEE MD           Most Recent Lab Results In EPIC (For Non-EPIC Providers):    Most Recent 3 CBC's:  Recent Labs   Lab Test  02/25/17   0629  02/22/17   0530  02/21/17   0650  02/20/17   0814  02/19/17   1105   WBC   --   7.0   --   6.2  7.8   HGB   --   9.0*  7.6*  6.9*  7.3*   MCV   --   78   --   79  80   PLT  218  278   --   283  329      Most Recent 3 BMP's:  Recent Labs   Lab Test  02/26/17   0726  02/25/17   0629  02/24/17   0555   02/22/17 2020 02/22/17   0530  02/21/17   0650  02/20/17   0814   NA   --    --    --    --    --   142  140  142   POTASSIUM   --    --    --    --   3.5  3.0*  4.1  3.5   CHLORIDE   --    --    --    --    --   112*  112*  112*   CO2   --    --    --    --    --   21  20  22   BUN   --    --    --    --    --   4*  5*  6*   CR  0.44*  0.33*  0.33*   < >   --   0.44*  0.40*  0.48*   ANIONGAP   --    --    --    --    --   9  8  8   JOSH   --    --    --    --    --   7.1*  6.7*  7.2*   GLC   --    --    --    --    --   99  250*  80    < > = values in this interval not displayed.      Most Recent 3 Troponin's:No lab results found.    Invalid input(s): TROP, TROPONINIES  Most Recent 3 INR's:  Recent Labs   Lab Test  02/21/17   0650  02/18/17   0825  04/14/14   0632   INR  1.41*  1.38*  1.02     Most Recent 2 LFT's:  Recent Labs   Lab Test  02/18/17   0825  04/15/14   0700   AST  8  15   ALT  6  22   ALKPHOS  97  81   BILITOTAL  0.3  <0.1*     Most Recent Cholesterol Panel:  Recent Labs   Lab Test  02/19/17   0750   CHOL  66   LDL  28   HDL  23*   TRIG  73     Most Recent 6 Bacteria Isolates From Any Culture (See EPIC Reports for Culture Details):  Recent Labs   Lab Test  02/18/17   1210  09/04/14   1105  04/14/14   0955  01/15/14   1005  12/28/13   0922  09/13/13   1502   CULT  Moderate growth Peptostreptococcus anaerobius Susceptibility testing not   routinely done  *  Heavy growth Enterococcus faecalis  Light growth Klebsiella pneumoniae  Light growth Coagulase negative Staphylococcus Susceptibility testing not   routinely done  Light growth Candida glabrata Susceptibility testing not routinely done  *  >100,000 colonies/mL Escherichia coli  10,000 to 50,000 colonies/mL Mixed gram positive tulio  *  Culture negative after 4 weeks  No anaerobes isolated  On day 2, isolated in broth only: Staphylococcus aureus*  Culture negative after 4 weeks  On day 2, isolated in broth only: Gram positive cocci in clusters Refer to  Routine Aerobic Culture for Identification. No anaerobes isolated*  On day 2, isolated in broth only: Staphylococcus aureus*  >100,000 colonies/mL Mixed gram negative and positive tulio  >100,000 colonies/mL Pseudomonas aeruginosa     Most Recent TSH, T4 and HgbA1c:   Recent Labs   Lab Test  02/20/17   1059  02/20/15   1404   TSH  4.05*   --    T4  0.96   --    A1C   --   5.2            Zhao Gonzalez MD  Internal medicine Hospitalist:   Pager [phone]

## 2017-02-26 NOTE — PHARMACY-VANCOMYCIN DOSING SERVICE
Pharmacy Vancomycin Note  Date of Service 2017  Patient's  1965   51 year old, female    Indication: Skin and Soft Tissue Infection  Goal Trough Level: 10-15 mg/L  Day of Therapy: 8  Current Vancomycin regimen:  1000 mg IV q18h    Current estimated CrCl = Estimated Creatinine Clearance: 155.4 mL/min (based on Cr of 0.44).    Creatinine for last 3 days  2017:  5:55 AM Creatinine 0.33 mg/dL  2017:  6:29 AM Creatinine 0.33 mg/dL  2017:  7:26 AM Creatinine 0.44 mg/dL    Recent Vancomycin Levels (past 3 days)  2017:  7:26 AM Vancomycin Level 14.4 mg/L    Vancomycin IV Administrations (past 72 hours)                   vancomycin (VANCOCIN) 1000 mg in dextrose 5% 200 mL PREMIX (mg) 1,000 mg New Bag 17 0739     1,000 mg New Bag 17 1337     1,000 mg New Bag 17 2037     1,000 mg New Bag  0201                Nephrotoxins and other renal medications (Future)    Start     Dose/Rate Route Frequency Ordered Stop    17 0900  furosemide (LASIX) tablet 40 mg      40 mg Oral DAILY 17 1144      17 0100  vancomycin (VANCOCIN) 1000 mg in dextrose 5% 200 mL PREMIX      1,000 mg Intravenous EVERY 18 HOURS 17 2041               Contrast Orders - past 72 hours     None          Interpretation of levels and current regimen:  Trough level is  Therapeutic    Has serum creatinine changed > 50% in last 72 hours: No    Urine output:  unable to determine    Renal Function: Stable    Plan:  1.  Continue Current Dose  2.  Pharmacy will check trough levels as appropriate in 3-5 Days.    3. Serum creatinine levels will be ordered a minimum of twice weekly.      Deidra Sparks        .
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
## 2017-02-26 NOTE — PLAN OF CARE
Problem: Goal Outcome Summary  Goal: Goal Outcome Summary  Outcome: No Change  A/O. AVSS. Wound vac intact. Self caths urostomy. Good soft stool to colostomy. Trapeze set up in room for patient to reposition self. Will likely discharge Monday. Continuing to monitor.

## 2017-02-27 VITALS
RESPIRATION RATE: 16 BRPM | DIASTOLIC BLOOD PRESSURE: 64 MMHG | BODY MASS INDEX: 22.53 KG/M2 | TEMPERATURE: 98 F | HEIGHT: 69 IN | OXYGEN SATURATION: 98 % | WEIGHT: 152.12 LBS | SYSTOLIC BLOOD PRESSURE: 123 MMHG | HEART RATE: 91 BPM

## 2017-02-27 LAB
CREAT SERPL-MCNC: 0.37 MG/DL (ref 0.52–1.04)
GFR SERPL CREATININE-BSD FRML MDRD: ABNORMAL ML/MIN/1.7M2

## 2017-02-27 PROCEDURE — 25000132 ZZH RX MED GY IP 250 OP 250 PS 637: Mod: GY | Performed by: INTERNAL MEDICINE

## 2017-02-27 PROCEDURE — 25000125 ZZHC RX 250: Performed by: INTERNAL MEDICINE

## 2017-02-27 PROCEDURE — 25000128 H RX IP 250 OP 636: Performed by: INTERNAL MEDICINE

## 2017-02-27 PROCEDURE — A9270 NON-COVERED ITEM OR SERVICE: HCPCS | Mod: GY | Performed by: INTERNAL MEDICINE

## 2017-02-27 PROCEDURE — P9047 ALBUMIN (HUMAN), 25%, 50ML: HCPCS | Performed by: INTERNAL MEDICINE

## 2017-02-27 PROCEDURE — 36415 COLL VENOUS BLD VENIPUNCTURE: CPT | Performed by: INTERNAL MEDICINE

## 2017-02-27 PROCEDURE — 82565 ASSAY OF CREATININE: CPT | Performed by: INTERNAL MEDICINE

## 2017-02-27 PROCEDURE — 99211 OFF/OP EST MAY X REQ PHY/QHP: CPT

## 2017-02-27 PROCEDURE — E2402 NEG PRESS WOUND THERAPY PUMP: HCPCS

## 2017-02-27 PROCEDURE — 25000131 ZZH RX MED GY IP 250 OP 636 PS 637: Mod: GY | Performed by: INTERNAL MEDICINE

## 2017-02-27 PROCEDURE — A9270 NON-COVERED ITEM OR SERVICE: HCPCS | Mod: GY | Performed by: PHYSICIAN ASSISTANT

## 2017-02-27 PROCEDURE — 99214 OFFICE O/P EST MOD 30 MIN: CPT

## 2017-02-27 PROCEDURE — 25000132 ZZH RX MED GY IP 250 OP 250 PS 637: Mod: GY | Performed by: PHYSICIAN ASSISTANT

## 2017-02-27 PROCEDURE — 99239 HOSP IP/OBS DSCHRG MGMT >30: CPT | Performed by: INTERNAL MEDICINE

## 2017-02-27 RX ORDER — SULFAMETHOXAZOLE/TRIMETHOPRIM 800-160 MG
1 TABLET ORAL 2 TIMES DAILY WITH MEALS
Status: DISCONTINUED | OUTPATIENT
Start: 2017-02-27 | End: 2017-02-27 | Stop reason: HOSPADM

## 2017-02-27 RX ORDER — SULFAMETHOXAZOLE/TRIMETHOPRIM 800-160 MG
1 TABLET ORAL 2 TIMES DAILY WITH MEALS
Qty: 28 TABLET | Refills: 0 | Status: SHIPPED | OUTPATIENT
Start: 2017-02-27 | End: 2017-03-13

## 2017-02-27 RX ORDER — SUMATRIPTAN 6 MG/.5ML
6 INJECTION, SOLUTION SUBCUTANEOUS 2 TIMES DAILY PRN
Status: DISCONTINUED | OUTPATIENT
Start: 2017-02-27 | End: 2017-02-27 | Stop reason: HOSPADM

## 2017-02-27 RX ADMIN — RANITIDINE 150 MG: 150 TABLET ORAL at 08:19

## 2017-02-27 RX ADMIN — ALBUMIN (HUMAN) 25 G: 12.5 SOLUTION INTRAVENOUS at 04:40

## 2017-02-27 RX ADMIN — ENOXAPARIN SODIUM 40 MG: 40 INJECTION SUBCUTANEOUS at 00:32

## 2017-02-27 RX ADMIN — SERTRALINE HYDROCHLORIDE 50 MG: 50 TABLET, FILM COATED ORAL at 08:19

## 2017-02-27 RX ADMIN — MULTIPLE VITAMINS W/ MINERALS TAB 1 TABLET: TAB at 08:19

## 2017-02-27 RX ADMIN — ZOLPIDEM TARTRATE 5 MG: 5 TABLET, FILM COATED ORAL at 00:06

## 2017-02-27 RX ADMIN — OMEPRAZOLE 20 MG: 20 CAPSULE, DELAYED RELEASE ORAL at 08:19

## 2017-02-27 RX ADMIN — Medication 25000 UNITS: at 08:19

## 2017-02-27 RX ADMIN — SENNOSIDES AND DOCUSATE SODIUM 1 TABLET: 8.6; 5 TABLET ORAL at 08:19

## 2017-02-27 RX ADMIN — VANCOMYCIN HYDROCHLORIDE 1000 MG: 1 INJECTION, SOLUTION INTRAVENOUS at 02:32

## 2017-02-27 RX ADMIN — OXYCODONE HYDROCHLORIDE AND ACETAMINOPHEN 500 MG: 500 TABLET ORAL at 08:19

## 2017-02-27 RX ADMIN — OXYBUTYNIN CHLORIDE 5 MG: 5 TABLET, EXTENDED RELEASE ORAL at 08:19

## 2017-02-27 RX ADMIN — GABAPENTIN 300 MG: 300 CAPSULE ORAL at 14:44

## 2017-02-27 RX ADMIN — ZINC SULFATE CAP 220 MG (50 MG ELEMENTAL ZN) 220 MG: 220 (50 ZN) CAP at 08:19

## 2017-02-27 RX ADMIN — ENOXAPARIN SODIUM 40 MG: 40 INJECTION SUBCUTANEOUS at 14:45

## 2017-02-27 RX ADMIN — FUROSEMIDE 40 MG: 40 TABLET ORAL at 08:19

## 2017-02-27 RX ADMIN — GABAPENTIN 600 MG: 600 TABLET, FILM COATED ORAL at 08:19

## 2017-02-27 RX ADMIN — SULFAMETHOXAZOLE AND TRIMETHOPRIM 1 TABLET: 800; 160 TABLET ORAL at 08:19

## 2017-02-27 RX ADMIN — SUMATRIPTAN SUCCINATE 6 MG: 6 INJECTION SUBCUTANEOUS at 10:41

## 2017-02-27 NOTE — PLAN OF CARE
Problem: Goal Outcome Summary  Goal: Goal Outcome Summary  Outcome: No Change  A/O. AVSS. Wound vac intact. Self caths urostomy. Good soft stool to colostomy. Trapeze set up in room for patient to reposition self. Giving 3 doses of albumin tonight for generalized edema. Will likely discharge Monday. Continuing to monitor.

## 2017-02-27 NOTE — PROGRESS NOTES
Wheelchair ride arranged with [facility] for 3:30 pm today. I spoke to Dano and confirmed wheelchair transport with patient's own wheelchair. Bedside RN and patient updated with the time.     Addendum: Home [employer] wound vac has been delivered to patient's room. Update patient's home care, bedside RN, patient and her daughter Arlette that wound vac changes due tomorrow, deferred one day due to patient preference for earlier discharge time. Discussed with [facility] RN. Home care orders faxed to [facility] at [phone].

## 2017-02-27 NOTE — PLAN OF CARE
Problem: Goal Outcome Summary  Goal: Goal Outcome Summary  Outcome: No Change  Patient A/Ox 4. Tachycardic other VSS, afebrile.LS clear. 2+ edema on legs and ankles and 3+ on feet. Both legs elevated with pillows.  POD 8. Wounds dressing CDI. Wound vac intact. Self caths urostomy. Good soft stool to colostomy. Trapeze set up in room for patient to reposition self. Giving 1 dose of albumin on shift. Will likely discharge today. Will continuing monitoring.

## 2017-02-27 NOTE — PLAN OF CARE
"Problem: Goal Outcome Summary  Goal: Goal Outcome Summary  Outcome: Adequate for Discharge Date Met:  02/27/17  VSS, Lung sounds diminished. Bowel sounds active, passing gas through colostomy. Wound vac to -125hhmg. Dressings to heel, shin and calf changed before patient left. Patient refused to have wound vac changed stating \"my daughter can change it tomorrow.\" Turn and reposition every 2 hours and PRN. Self caths herself through urostomy. Tolerating regular diet. Imitrex given PRN for c/o H/A with good relief. Ceiling lift and assist of 2 to home w/c. Discharge instructions given to patient along with d/c meds. Denies pain.       "

## 2017-02-28 ENCOUNTER — TELEPHONE (OUTPATIENT)
Dept: FAMILY MEDICINE | Facility: OTHER | Age: 52
End: 2017-02-28

## 2017-02-28 NOTE — TELEPHONE ENCOUNTER
ED / Discharge Outreach Protocol    Patient Contact    Attempt # 1    Was call answered?  No.  Unable to leave message. Sounded like someone answered but unable to get through. Will attempt to call at another time.     Susan Spence RN  [facility]

## 2017-02-28 NOTE — TELEPHONE ENCOUNTER
Type of Visit  INPATIENT  Diagnosis/Reason for Visit  Decubitus skin ulcer, stage IV  Date of Visit  02-  # of ED Visits in past year  0      Please call patient for follow up.

## 2017-03-01 NOTE — TELEPHONE ENCOUNTER
Attempted to call the patient. Patient's daughter answered. She said they are no longer seeing Dr. Baeza in [place]. She said they moved and now are 80 miles away from [place]. Daughter said the appointment was scheduled in error for Monday and patient sent a MyChart request to cancel that appointment.     Daughter and patient are requesting for Dr. Baeza to be taken off as her primary. Patient is now seeing Dr. Tony Padilla at [facility].     Chart updated.     Susan Spence RN  [facility]      Other (This appointment should have been scheduled with Dr. Tony Padilla at [facility] as he is my new Primary Care Provider.)

## 2017-03-01 NOTE — PROGRESS NOTES
Call received from Vivien at patient's wound care clinic. Updated notes faxed to Vivien per her request to [phone].

## 2017-03-07 NOTE — ANESTHESIA POSTPROCEDURE EVALUATION
Patient: Felicia Ellison    Procedure(s):  gastroscopy with mac - Wound Class: II-Clean Contaminated    Diagnosis:anemia  Diagnosis Additional Information: No value filed.    Anesthesia Type:  MAC    Note:  Anesthesia Post Evaluation    Patient location during evaluation: PACU  Patient participation: Able to fully participate in evaluation  Level of consciousness: awake and alert  Pain management: adequate  Airway patency: patent  Cardiovascular status: acceptable and hemodynamically stable  Respiratory status: acceptable and unassisted  Hydration status: acceptable  PONV: none     Anesthetic complications: None          Last vitals:  Vitals:    02/27/17 0740 02/27/17 0800 02/27/17 1228   BP: 119/62  123/64   Pulse: 91     Resp: 18 18 16   Temp: 36.8  C (98.3  F)  36.7  C (98  F)   SpO2: 97%  98%         Electronically Signed By: Jonh Mercedes MD  March 7, 2017  7:48 AM

## 2017-04-11 ENCOUNTER — SURGERY (OUTPATIENT)
Age: 52
End: 2017-04-11

## 2017-04-11 ENCOUNTER — HOSPITAL ENCOUNTER (OUTPATIENT)
Facility: CLINIC | Age: 52
Discharge: HOME | End: 2017-04-11
Attending: INTERNAL MEDICINE | Admitting: INTERNAL MEDICINE
Payer: MEDICARE

## 2017-04-11 ENCOUNTER — ANESTHESIA EVENT (OUTPATIENT)
Dept: GASTROENTEROLOGY | Facility: CLINIC | Age: 52
End: 2017-04-11
Payer: MEDICARE

## 2017-04-11 ENCOUNTER — ANESTHESIA (OUTPATIENT)
Dept: GASTROENTEROLOGY | Facility: CLINIC | Age: 52
End: 2017-04-11
Payer: MEDICARE

## 2017-04-11 VITALS
SYSTOLIC BLOOD PRESSURE: 120 MMHG | TEMPERATURE: 97.3 F | DIASTOLIC BLOOD PRESSURE: 84 MMHG | RESPIRATION RATE: 16 BRPM | HEIGHT: 69 IN | OXYGEN SATURATION: 100 %

## 2017-04-11 LAB — UPPER EUS: NORMAL

## 2017-04-11 PROCEDURE — 37000008 ZZH ANESTHESIA TECHNICAL FEE, 1ST 30 MIN: Performed by: INTERNAL MEDICINE

## 2017-04-11 PROCEDURE — 25000128 H RX IP 250 OP 636: Performed by: INTERNAL MEDICINE

## 2017-04-11 PROCEDURE — 25000125 ZZHC RX 250: Performed by: NURSE ANESTHETIST, CERTIFIED REGISTERED

## 2017-04-11 PROCEDURE — 25000128 H RX IP 250 OP 636: Performed by: NURSE ANESTHETIST, CERTIFIED REGISTERED

## 2017-04-11 PROCEDURE — 88172 CYTP DX EVAL FNA 1ST EA SITE: CPT | Mod: 26 | Performed by: INTERNAL MEDICINE

## 2017-04-11 PROCEDURE — 88173 CYTOPATH EVAL FNA REPORT: CPT | Mod: 26 | Performed by: INTERNAL MEDICINE

## 2017-04-11 PROCEDURE — 37000009 ZZH ANESTHESIA TECHNICAL FEE, EACH ADDTL 15 MIN: Performed by: INTERNAL MEDICINE

## 2017-04-11 PROCEDURE — 88172 CYTP DX EVAL FNA 1ST EA SITE: CPT | Performed by: INTERNAL MEDICINE

## 2017-04-11 PROCEDURE — 88305 TISSUE EXAM BY PATHOLOGIST: CPT | Mod: 26 | Performed by: INTERNAL MEDICINE

## 2017-04-11 PROCEDURE — 25800025 ZZH RX 258: Performed by: NURSE ANESTHETIST, CERTIFIED REGISTERED

## 2017-04-11 PROCEDURE — 88173 CYTOPATH EVAL FNA REPORT: CPT | Performed by: INTERNAL MEDICINE

## 2017-04-11 PROCEDURE — 40000010 ZZH STATISTIC ANES STAT CODE-CRNA PER MINUTE: Performed by: INTERNAL MEDICINE

## 2017-04-11 PROCEDURE — 43242 EGD US FINE NEEDLE BX/ASPIR: CPT | Performed by: INTERNAL MEDICINE

## 2017-04-11 PROCEDURE — 25000566 ZZH SEVOFLURANE, EA 15 MIN: Performed by: INTERNAL MEDICINE

## 2017-04-11 PROCEDURE — 88305 TISSUE EXAM BY PATHOLOGIST: CPT | Performed by: INTERNAL MEDICINE

## 2017-04-11 RX ORDER — NALOXONE HYDROCHLORIDE 0.4 MG/ML
.1-.4 INJECTION, SOLUTION INTRAMUSCULAR; INTRAVENOUS; SUBCUTANEOUS
Status: DISCONTINUED | OUTPATIENT
Start: 2017-04-11 | End: 2017-04-11 | Stop reason: HOSPADM

## 2017-04-11 RX ORDER — PROPOFOL 10 MG/ML
INJECTION, EMULSION INTRAVENOUS CONTINUOUS PRN
Status: DISCONTINUED | OUTPATIENT
Start: 2017-04-11 | End: 2017-04-11

## 2017-04-11 RX ORDER — GLYCOPYRROLATE 0.2 MG/ML
INJECTION, SOLUTION INTRAMUSCULAR; INTRAVENOUS PRN
Status: DISCONTINUED | OUTPATIENT
Start: 2017-04-11 | End: 2017-04-11

## 2017-04-11 RX ORDER — HYDRALAZINE HYDROCHLORIDE 20 MG/ML
2.5-5 INJECTION INTRAMUSCULAR; INTRAVENOUS EVERY 10 MIN PRN
Status: DISCONTINUED | OUTPATIENT
Start: 2017-04-11 | End: 2017-04-11 | Stop reason: HOSPADM

## 2017-04-11 RX ORDER — ALBUTEROL SULFATE 0.83 MG/ML
2.5 SOLUTION RESPIRATORY (INHALATION) EVERY 4 HOURS PRN
Status: DISCONTINUED | OUTPATIENT
Start: 2017-04-11 | End: 2017-04-11 | Stop reason: HOSPADM

## 2017-04-11 RX ORDER — FENTANYL CITRATE 50 UG/ML
INJECTION, SOLUTION INTRAMUSCULAR; INTRAVENOUS PRN
Status: DISCONTINUED | OUTPATIENT
Start: 2017-04-11 | End: 2017-04-11

## 2017-04-11 RX ORDER — FENTANYL CITRATE 50 UG/ML
25-50 INJECTION, SOLUTION INTRAMUSCULAR; INTRAVENOUS
Status: DISCONTINUED | OUTPATIENT
Start: 2017-04-11 | End: 2017-04-11 | Stop reason: HOSPADM

## 2017-04-11 RX ORDER — PROPOFOL 10 MG/ML
INJECTION, EMULSION INTRAVENOUS PRN
Status: DISCONTINUED | OUTPATIENT
Start: 2017-04-11 | End: 2017-04-11

## 2017-04-11 RX ORDER — SODIUM CHLORIDE, SODIUM LACTATE, POTASSIUM CHLORIDE, CALCIUM CHLORIDE 600; 310; 30; 20 MG/100ML; MG/100ML; MG/100ML; MG/100ML
INJECTION, SOLUTION INTRAVENOUS CONTINUOUS
Status: DISCONTINUED | OUTPATIENT
Start: 2017-04-11 | End: 2017-04-11 | Stop reason: HOSPADM

## 2017-04-11 RX ORDER — CEFTAZIDIME 1 G/1
1 INJECTION, POWDER, FOR SOLUTION INTRAMUSCULAR; INTRAVENOUS ONCE
Status: COMPLETED | OUTPATIENT
Start: 2017-04-11 | End: 2017-04-11

## 2017-04-11 RX ORDER — LIDOCAINE 40 MG/G
CREAM TOPICAL
Status: DISCONTINUED | OUTPATIENT
Start: 2017-04-11 | End: 2017-04-11 | Stop reason: HOSPADM

## 2017-04-11 RX ORDER — SODIUM CHLORIDE, SODIUM LACTATE, POTASSIUM CHLORIDE, CALCIUM CHLORIDE 600; 310; 30; 20 MG/100ML; MG/100ML; MG/100ML; MG/100ML
INJECTION, SOLUTION INTRAVENOUS CONTINUOUS PRN
Status: DISCONTINUED | OUTPATIENT
Start: 2017-04-11 | End: 2017-04-11

## 2017-04-11 RX ORDER — LIDOCAINE HYDROCHLORIDE 20 MG/ML
INJECTION, SOLUTION INFILTRATION; PERINEURAL PRN
Status: DISCONTINUED | OUTPATIENT
Start: 2017-04-11 | End: 2017-04-11

## 2017-04-11 RX ORDER — ONDANSETRON 4 MG/1
4 TABLET, ORALLY DISINTEGRATING ORAL EVERY 30 MIN PRN
Status: DISCONTINUED | OUTPATIENT
Start: 2017-04-11 | End: 2017-04-11 | Stop reason: HOSPADM

## 2017-04-11 RX ORDER — MEPERIDINE HYDROCHLORIDE 25 MG/ML
12.5 INJECTION INTRAMUSCULAR; INTRAVENOUS; SUBCUTANEOUS
Status: DISCONTINUED | OUTPATIENT
Start: 2017-04-11 | End: 2017-04-11 | Stop reason: HOSPADM

## 2017-04-11 RX ORDER — ONDANSETRON 2 MG/ML
4 INJECTION INTRAMUSCULAR; INTRAVENOUS EVERY 30 MIN PRN
Status: DISCONTINUED | OUTPATIENT
Start: 2017-04-11 | End: 2017-04-11 | Stop reason: HOSPADM

## 2017-04-11 RX ORDER — ONDANSETRON 2 MG/ML
INJECTION INTRAMUSCULAR; INTRAVENOUS PRN
Status: DISCONTINUED | OUTPATIENT
Start: 2017-04-11 | End: 2017-04-11

## 2017-04-11 RX ORDER — FLUMAZENIL 0.1 MG/ML
0.2 INJECTION, SOLUTION INTRAVENOUS
Status: DISCONTINUED | OUTPATIENT
Start: 2017-04-11 | End: 2017-04-11 | Stop reason: HOSPADM

## 2017-04-11 RX ORDER — NEOSTIGMINE METHYLSULFATE 1 MG/ML
VIAL (ML) INJECTION PRN
Status: DISCONTINUED | OUTPATIENT
Start: 2017-04-11 | End: 2017-04-11

## 2017-04-11 RX ADMIN — CEFTAZIDIME 1 G: 1 INJECTION, POWDER, FOR SOLUTION INTRAMUSCULAR; INTRAVENOUS at 12:49

## 2017-04-11 RX ADMIN — FENTANYL CITRATE 75 MCG: 50 INJECTION, SOLUTION INTRAMUSCULAR; INTRAVENOUS at 11:17

## 2017-04-11 RX ADMIN — GLYCOPYRROLATE 0.4 MG: 0.2 INJECTION, SOLUTION INTRAMUSCULAR; INTRAVENOUS at 11:57

## 2017-04-11 RX ADMIN — PROPOFOL 50 MG: 10 INJECTION, EMULSION INTRAVENOUS at 11:20

## 2017-04-11 RX ADMIN — Medication 3 MG: at 11:57

## 2017-04-11 RX ADMIN — SODIUM CHLORIDE, POTASSIUM CHLORIDE, SODIUM LACTATE AND CALCIUM CHLORIDE: 600; 310; 30; 20 INJECTION, SOLUTION INTRAVENOUS at 10:58

## 2017-04-11 RX ADMIN — PHENYLEPHRINE HYDROCHLORIDE 50 MCG: 10 INJECTION, SOLUTION INTRAMUSCULAR; INTRAVENOUS; SUBCUTANEOUS at 11:50

## 2017-04-11 RX ADMIN — PROPOFOL 100 MCG/KG/MIN: 10 INJECTION, EMULSION INTRAVENOUS at 11:01

## 2017-04-11 RX ADMIN — LIDOCAINE HYDROCHLORIDE 40 MG: 20 INJECTION, SOLUTION INFILTRATION; PERINEURAL at 11:01

## 2017-04-11 RX ADMIN — MIDAZOLAM HYDROCHLORIDE 1 MG: 1 INJECTION, SOLUTION INTRAMUSCULAR; INTRAVENOUS at 11:00

## 2017-04-11 RX ADMIN — ONDANSETRON 4 MG: 2 INJECTION INTRAMUSCULAR; INTRAVENOUS at 11:06

## 2017-04-11 RX ADMIN — ROCURONIUM BROMIDE 40 MG: 10 INJECTION INTRAVENOUS at 11:19

## 2017-04-11 RX ADMIN — PHENYLEPHRINE HYDROCHLORIDE 150 MCG: 10 INJECTION, SOLUTION INTRAMUSCULAR; INTRAVENOUS; SUBCUTANEOUS at 11:29

## 2017-04-11 RX ADMIN — MIDAZOLAM HYDROCHLORIDE 1 MG: 1 INJECTION, SOLUTION INTRAMUSCULAR; INTRAVENOUS at 11:04

## 2017-04-11 RX ADMIN — PHENYLEPHRINE HYDROCHLORIDE 150 MCG: 10 INJECTION, SOLUTION INTRAMUSCULAR; INTRAVENOUS; SUBCUTANEOUS at 11:35

## 2017-04-11 RX ADMIN — FENTANYL CITRATE 25 MCG: 50 INJECTION, SOLUTION INTRAMUSCULAR; INTRAVENOUS at 11:01

## 2017-04-11 ASSESSMENT — ENCOUNTER SYMPTOMS: DYSRHYTHMIAS: 0

## 2017-04-11 ASSESSMENT — LIFESTYLE VARIABLES: TOBACCO_USE: 0

## 2017-04-11 ASSESSMENT — COPD QUESTIONNAIRES: COPD: 0
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
## 2017-04-11 NOTE — ANESTHESIA CARE TRANSFER NOTE
Patient: Felicia Ellison    Procedure(s):  COMBINED ENDOSCOPIC ULTRASOUND, ESOPHAGOSCOPY, GASTROSCOPY, DUODENOSCOPY (EGD) - Wound Class: II-Clean Contaminated    Diagnosis: PANCREATITIS,PANCREATIC CYST  Diagnosis Additional Information: No value filed.    Anesthesia Type:   MAC     Note:  Airway :Face Mask  Patient transferred to:PACU        Vitals: (Last set prior to Anesthesia Care Transfer)    CRNA VITALS  4/11/2017 1144 - 4/11/2017 1225      4/11/2017             NIBP: 113/58    Pulse: 75    NIBP Mean: 83    Ht Rate: 75    SpO2: 100 %                Electronically Signed By: VERENA Kirby CRNA  April 11, 2017  12:25 PM

## 2017-04-12 LAB — COPATH REPORT: NORMAL

## 2017-05-26 ENCOUNTER — TELEPHONE (OUTPATIENT)
Dept: SURGERY | Facility: CLINIC | Age: 52
End: 2017-05-26

## 2017-05-26 NOTE — TELEPHONE ENCOUNTER
Attempted to call patient to see if she is still using wound vac.  A RX from [facility] was faxed to our office requesting refill.  Encouraged patient to call at her convenience in order to discuss situation.    Maddy Iraheta RN

## 2017-05-30 ENCOUNTER — TELEPHONE (OUTPATIENT)
Dept: SURGERY | Facility: CLINIC | Age: 52
End: 2017-05-30

## 2017-05-30 NOTE — TELEPHONE ENCOUNTER
Name of caller: Catherine at Wound Vac renewal    Reason for Call:  questions    Surgeon:  Dr. Ladd    Recent Surgery:  No..wondering about picking up the wound vac.    If yes, when & what type:  N/A      Best phone number to reach pt at is: [phone]  Ok to leave a message with medical info? yes    Pharmacy preferred (if calling for a refill): na

## 2017-05-30 NOTE — TELEPHONE ENCOUNTER
Per daughter patient is still using wound vac, let Catherine know this and that patient is being followed by [facility] and [facility]. She made note of this and will discuss a refill request with their clinic.    Annika Ellis RN BSN

## 2017-08-30 ENCOUNTER — SURGERY (OUTPATIENT)
Age: 52
End: 2017-08-30

## 2017-08-30 ENCOUNTER — ANESTHESIA EVENT (OUTPATIENT)
Dept: SURGERY | Facility: CLINIC | Age: 52
End: 2017-08-30
Payer: MEDICARE

## 2017-08-30 ENCOUNTER — ANESTHESIA (OUTPATIENT)
Dept: SURGERY | Facility: CLINIC | Age: 52
End: 2017-08-30
Payer: MEDICARE

## 2017-08-30 ENCOUNTER — HOSPITAL ENCOUNTER (OUTPATIENT)
Facility: CLINIC | Age: 52
Discharge: HOME | End: 2017-08-30
Attending: UROLOGY | Admitting: UROLOGY
Payer: MEDICARE

## 2017-08-30 VITALS
RESPIRATION RATE: 16 BRPM | OXYGEN SATURATION: 98 % | BODY MASS INDEX: 18.51 KG/M2 | DIASTOLIC BLOOD PRESSURE: 70 MMHG | WEIGHT: 125 LBS | TEMPERATURE: 96.6 F | HEIGHT: 69 IN | SYSTOLIC BLOOD PRESSURE: 118 MMHG

## 2017-08-30 DIAGNOSIS — N31.9 NEUROGENIC BLADDER: Primary | ICD-10-CM

## 2017-08-30 LAB — POTASSIUM SERPL-SCNC: 3.8 MMOL/L (ref 3.4–5.3)

## 2017-08-30 PROCEDURE — 25000132 ZZH RX MED GY IP 250 OP 250 PS 637: Mod: GY | Performed by: UROLOGY

## 2017-08-30 PROCEDURE — 27210995 ZZH RX 272: Performed by: UROLOGY

## 2017-08-30 PROCEDURE — 36000093 ZZH SURGERY LEVEL 4 1ST 30 MIN: Performed by: UROLOGY

## 2017-08-30 PROCEDURE — 84132 ASSAY OF SERUM POTASSIUM: CPT | Performed by: ANESTHESIOLOGY

## 2017-08-30 PROCEDURE — C1769 GUIDE WIRE: HCPCS | Performed by: UROLOGY

## 2017-08-30 PROCEDURE — 71000015 ZZH RECOVERY PHASE 1 LEVEL 2 EA ADDTL HR: Performed by: UROLOGY

## 2017-08-30 PROCEDURE — 25800025 ZZH RX 258: Performed by: UROLOGY

## 2017-08-30 PROCEDURE — 25000125 ZZHC RX 250: Performed by: NURSE ANESTHETIST, CERTIFIED REGISTERED

## 2017-08-30 PROCEDURE — 71000014 ZZH RECOVERY PHASE 1 LEVEL 2 FIRST HR: Performed by: UROLOGY

## 2017-08-30 PROCEDURE — 40000170 ZZH STATISTIC PRE-PROCEDURE ASSESSMENT II: Performed by: UROLOGY

## 2017-08-30 PROCEDURE — 36415 COLL VENOUS BLD VENIPUNCTURE: CPT | Performed by: ANESTHESIOLOGY

## 2017-08-30 PROCEDURE — 27210794 ZZH OR GENERAL SUPPLY STERILE: Performed by: UROLOGY

## 2017-08-30 PROCEDURE — 37000008 ZZH ANESTHESIA TECHNICAL FEE, 1ST 30 MIN: Performed by: UROLOGY

## 2017-08-30 PROCEDURE — 82365 CALCULUS SPECTROSCOPY: CPT | Performed by: UROLOGY

## 2017-08-30 PROCEDURE — S0077 INJECTION, CLINDAMYCIN PHOSP: HCPCS | Performed by: UROLOGY

## 2017-08-30 PROCEDURE — 25000566 ZZH SEVOFLURANE, EA 15 MIN: Performed by: UROLOGY

## 2017-08-30 PROCEDURE — 25000125 ZZHC RX 250: Performed by: UROLOGY

## 2017-08-30 PROCEDURE — 71000027 ZZH RECOVERY PHASE 2 EACH 15 MINS: Performed by: UROLOGY

## 2017-08-30 PROCEDURE — 25000128 H RX IP 250 OP 636: Performed by: NURSE ANESTHETIST, CERTIFIED REGISTERED

## 2017-08-30 PROCEDURE — 36000063 ZZH SURGERY LEVEL 4 EA 15 ADDTL MIN: Performed by: UROLOGY

## 2017-08-30 PROCEDURE — 88300 SURGICAL PATH GROSS: CPT | Performed by: UROLOGY

## 2017-08-30 PROCEDURE — 37000009 ZZH ANESTHESIA TECHNICAL FEE, EACH ADDTL 15 MIN: Performed by: UROLOGY

## 2017-08-30 PROCEDURE — 88300 SURGICAL PATH GROSS: CPT | Mod: 26 | Performed by: UROLOGY

## 2017-08-30 PROCEDURE — A9270 NON-COVERED ITEM OR SERVICE: HCPCS | Mod: GY | Performed by: UROLOGY

## 2017-08-30 RX ORDER — SODIUM CHLORIDE, SODIUM LACTATE, POTASSIUM CHLORIDE, CALCIUM CHLORIDE 600; 310; 30; 20 MG/100ML; MG/100ML; MG/100ML; MG/100ML
INJECTION, SOLUTION INTRAVENOUS CONTINUOUS
Status: DISCONTINUED | OUTPATIENT
Start: 2017-08-30 | End: 2017-08-30 | Stop reason: HOSPADM

## 2017-08-30 RX ORDER — ONDANSETRON 4 MG/1
4 TABLET, ORALLY DISINTEGRATING ORAL EVERY 30 MIN PRN
Status: DISCONTINUED | OUTPATIENT
Start: 2017-08-30 | End: 2017-08-30 | Stop reason: HOSPADM

## 2017-08-30 RX ORDER — CLINDAMYCIN PHOSPHATE 900 MG/50ML
900 INJECTION, SOLUTION INTRAVENOUS
Status: COMPLETED | OUTPATIENT
Start: 2017-08-30 | End: 2017-08-30

## 2017-08-30 RX ORDER — SODIUM CHLORIDE, SODIUM LACTATE, POTASSIUM CHLORIDE, CALCIUM CHLORIDE 600; 310; 30; 20 MG/100ML; MG/100ML; MG/100ML; MG/100ML
INJECTION, SOLUTION INTRAVENOUS CONTINUOUS PRN
Status: DISCONTINUED | OUTPATIENT
Start: 2017-08-30 | End: 2017-08-30

## 2017-08-30 RX ORDER — FENTANYL CITRATE 50 UG/ML
INJECTION, SOLUTION INTRAMUSCULAR; INTRAVENOUS PRN
Status: DISCONTINUED | OUTPATIENT
Start: 2017-08-30 | End: 2017-08-30

## 2017-08-30 RX ORDER — LIDOCAINE HYDROCHLORIDE 20 MG/ML
INJECTION, SOLUTION INFILTRATION; PERINEURAL PRN
Status: DISCONTINUED | OUTPATIENT
Start: 2017-08-30 | End: 2017-08-30

## 2017-08-30 RX ORDER — PROPOFOL 10 MG/ML
INJECTION, EMULSION INTRAVENOUS PRN
Status: DISCONTINUED | OUTPATIENT
Start: 2017-08-30 | End: 2017-08-30

## 2017-08-30 RX ORDER — NALOXONE HYDROCHLORIDE 0.4 MG/ML
.1-.4 INJECTION, SOLUTION INTRAMUSCULAR; INTRAVENOUS; SUBCUTANEOUS
Status: DISCONTINUED | OUTPATIENT
Start: 2017-08-30 | End: 2017-08-30 | Stop reason: HOSPADM

## 2017-08-30 RX ORDER — ONDANSETRON 2 MG/ML
INJECTION INTRAMUSCULAR; INTRAVENOUS PRN
Status: DISCONTINUED | OUTPATIENT
Start: 2017-08-30 | End: 2017-08-30

## 2017-08-30 RX ORDER — OXYCODONE HYDROCHLORIDE 5 MG/1
5 TABLET ORAL ONCE
Status: COMPLETED | OUTPATIENT
Start: 2017-08-30 | End: 2017-08-30

## 2017-08-30 RX ORDER — EPHEDRINE SULFATE 50 MG/ML
INJECTION, SOLUTION INTRAMUSCULAR; INTRAVENOUS; SUBCUTANEOUS PRN
Status: DISCONTINUED | OUTPATIENT
Start: 2017-08-30 | End: 2017-08-30

## 2017-08-30 RX ORDER — FENTANYL CITRATE 50 UG/ML
25-50 INJECTION, SOLUTION INTRAMUSCULAR; INTRAVENOUS
Status: DISCONTINUED | OUTPATIENT
Start: 2017-08-30 | End: 2017-08-30 | Stop reason: HOSPADM

## 2017-08-30 RX ORDER — ONDANSETRON 2 MG/ML
4 INJECTION INTRAMUSCULAR; INTRAVENOUS EVERY 30 MIN PRN
Status: DISCONTINUED | OUTPATIENT
Start: 2017-08-30 | End: 2017-08-30 | Stop reason: HOSPADM

## 2017-08-30 RX ORDER — HYDROMORPHONE HYDROCHLORIDE 1 MG/ML
.3-.5 INJECTION, SOLUTION INTRAMUSCULAR; INTRAVENOUS; SUBCUTANEOUS EVERY 10 MIN PRN
Status: DISCONTINUED | OUTPATIENT
Start: 2017-08-30 | End: 2017-08-30 | Stop reason: HOSPADM

## 2017-08-30 RX ORDER — PROPOFOL 10 MG/ML
INJECTION, EMULSION INTRAVENOUS CONTINUOUS PRN
Status: DISCONTINUED | OUTPATIENT
Start: 2017-08-30 | End: 2017-08-30

## 2017-08-30 RX ORDER — CLINDAMYCIN HCL 300 MG
300 CAPSULE ORAL 3 TIMES DAILY
Qty: 15 CAPSULE | Refills: 0 | Status: SHIPPED | OUTPATIENT
Start: 2017-08-30 | End: 2017-09-04

## 2017-08-30 RX ADMIN — PHENYLEPHRINE HYDROCHLORIDE 100 MCG: 10 INJECTION, SOLUTION INTRAMUSCULAR; INTRAVENOUS; SUBCUTANEOUS at 10:36

## 2017-08-30 RX ADMIN — Medication 5 MG: at 10:58

## 2017-08-30 RX ADMIN — CLINDAMYCIN PHOSPHATE 900 MG: 18 INJECTION, SOLUTION INTRAVENOUS at 10:20

## 2017-08-30 RX ADMIN — MIDAZOLAM HYDROCHLORIDE 1 MG: 1 INJECTION, SOLUTION INTRAMUSCULAR; INTRAVENOUS at 10:12

## 2017-08-30 RX ADMIN — Medication 5 MG: at 10:42

## 2017-08-30 RX ADMIN — SODIUM CHLORIDE, POTASSIUM CHLORIDE, SODIUM LACTATE AND CALCIUM CHLORIDE: 600; 310; 30; 20 INJECTION, SOLUTION INTRAVENOUS at 10:11

## 2017-08-30 RX ADMIN — Medication 5 MG: at 10:27

## 2017-08-30 RX ADMIN — LIDOCAINE HYDROCHLORIDE 20 MG: 20 INJECTION, SOLUTION INFILTRATION; PERINEURAL at 10:12

## 2017-08-30 RX ADMIN — Medication 5 MG: at 10:40

## 2017-08-30 RX ADMIN — WATER 1000 ML: 100 IRRIGANT IRRIGATION at 10:39

## 2017-08-30 RX ADMIN — WATER 2000 ML: 100 INJECTION, SOLUTION INTRAVENOUS at 10:39

## 2017-08-30 RX ADMIN — PROPOFOL 100 MCG/KG/MIN: 10 INJECTION, EMULSION INTRAVENOUS at 10:18

## 2017-08-30 RX ADMIN — ONDANSETRON 4 MG: 2 INJECTION INTRAMUSCULAR; INTRAVENOUS at 10:44

## 2017-08-30 RX ADMIN — Medication 5 MG: at 10:45

## 2017-08-30 RX ADMIN — FENTANYL CITRATE 50 MCG: 50 INJECTION, SOLUTION INTRAMUSCULAR; INTRAVENOUS at 10:12

## 2017-08-30 RX ADMIN — PROPOFOL 150 MG: 10 INJECTION, EMULSION INTRAVENOUS at 10:12

## 2017-08-30 RX ADMIN — OXYCODONE HYDROCHLORIDE 5 MG: 5 TABLET ORAL at 12:33

## 2017-08-30 RX ADMIN — WATER 2000 ML: 100 INJECTION, SOLUTION INTRAVENOUS at 11:01

## 2017-08-30 NOTE — ANESTHESIA PREPROCEDURE EVALUATION
Anesthesia Evaluation     . Pt has had prior anesthetic.     History of anesthetic complications   - PONV        ROS/MED HX    ENT/Pulmonary:      (-) sleep apnea   Neurologic:     (+)migraines, Spinal cord injury (T12-L1, lower body flaccid paralysis)     Cardiovascular:     (+) hypertension----. : . . . :. .       METS/Exercise Tolerance:     Hematologic:     (+) Anemia, History of Transfusion -      Musculoskeletal: Comment: Hx of osteomyelitis  (+) arthritis, , , -       GI/Hepatic:     (+) GERD (absolutely no symptoms on medications) Asymptomatic on medication, liver disease (hx of portal vein thrombosis),       Renal/Genitourinary:         Endo:         Psychiatric:     (+) psychiatric history anxiety and depression      Infectious Disease:         Malignancy:         Other:                     Physical Exam  Normal systems: dental    Airway   Mallampati: II  TM distance: >3 FB  Neck ROM: full    Dental     Cardiovascular   Rhythm and rate: regular      Pulmonary    breath sounds clear to auscultation                    Anesthesia Plan      History & Physical Review  History and physical reviewed and following examination; no interval change.    ASA Status:  4 .        Plan for General and LMA with Intravenous induction. Maintenance will be Balanced.    PONV prophylaxis:  Ondansetron (or other 5HT-3) and Dexamethasone or Solumedrol       Postoperative Care  Postoperative pain management:  IV analgesics.      Consents  Anesthetic plan, risks, benefits and alternatives discussed with:  Patient..                          .

## 2017-08-30 NOTE — ANESTHESIA POSTPROCEDURE EVALUATION
Patient: Felicia Ellison    Procedure(s):  FLEXIBLE CYTOSCOPY/ pouchoscopy HOLMIUM LASER LITHOTRIPSY FOR CONTENTIENT URINARY DIVERSION STONES  - Wound Class: II-Clean Contaminated   - Wound Class: II-Clean Contaminated    Diagnosis:NEUROGENIC BLADDER AND BLADDER STONES   Diagnosis Additional Information: No value filed.    Anesthesia Type:  General, LMA    Note:  Anesthesia Post Evaluation    Patient location during evaluation: PACU  Patient participation: Able to fully participate in evaluation  Level of consciousness: awake  Airway patency: patent  Cardiovascular status: acceptable  Respiratory status: acceptable  Hydration status: acceptable     Anesthetic complications: None          Last vitals:  Vitals:    08/30/17 1233 08/30/17 1245 08/30/17 1334   BP:   118/70   Resp:  14 16   Temp:  35.9  C (96.6  F)    SpO2: 99% 98% 98%         Electronically Signed By: Desirae Mchugh  August 30, 2017  3:39 PM

## 2017-08-30 NOTE — OR NURSING
PNDS met, po per I&O sheet. Pt dressed, up in personal wheelchair and transported to Phase 2. [place] catheter secured with Medipore tape and open to gravity drainage.

## 2017-08-30 NOTE — DISCHARGE INSTRUCTIONS
Same Day Surgery Discharge Instructions for  Sedation and General Anesthesia       It's not unusual to feel dizzy, light-headed or faint for up to 24 hours after surgery or while taking pain medication.  If you have these symptoms: sit for a few minutes before standing and have someone assist you when you get up to walk or use the bathroom.      You should rest and relax for the next 24 hours. We recommend you make arrangements to have an adult stay with you for at least 24 hours after your discharge.  Avoid hazardous and strenuous activity.      DO NOT DRIVE any vehicle or operate mechanical equipment for 24 hours following the end of your surgery.  Even though you may feel normal, your reactions may be affected by the medication you have received.      Do not drink alcoholic beverages for 24 hours following surgery.       Slowly progress to your regular diet as you feel able. It's not unusual to feel nauseated and/or vomit after receiving anesthesia.  If you develop these symptoms, drink clear liquids (apple juice, ginger ale, broth, 7-up, etc. ) until you feel better.  If your nausea and vomiting persists for 24 hours, please notify your surgeon.        All narcotic pain medications, along with inactivity and anesthesia, can cause constipation. Drinking plenty of liquids and increasing fiber intake will help.      For any questions of a medical nature, call your surgeon.      Do not make important decisions for 24 hours.      If you had general anesthesia, you may have a sore throat for a couple of days related to the breathing tube used during surgery.  You may use Cepacol lozenges to help with this discomfort.  If it worsens or if you develop a fever, contact your surgeon.       If you feel your pain is not well managed with the pain medications prescribed by your surgeon, please contact your surgeon's office to let them know so they can address your concerns.       Cystoscopy and  Holmium Laser Discharge  Instructions    Holmium laser lithotripsy was used to break up your kidney stone(s). It is normal to have visible blood in the urine, burning, urgency and frequency following this procedure. These symptoms may last for a few days to weeks.     Diet:    To help pass stone fragments and clear the blood out of the urine, it is important to drink 6-8 glasses of fluids per day at home - at least 3-4 glasses should be water.       Return to the diet that you were on before the procedure, unless you are given specific diet instructions.    Activity:    Walk short distances and increase as your strength allows.    You may climb stairs.    Do not do strenuous exercise or heavy lifting until approved by surgeon.    Do not drive while taking narcotic pain medications.    Bathing:    You may take a shower.           Call your physician if these signs/symptoms are present:    Pain that is not relieved by a short rest or ordered pain medications.    Temperature at or above 101.0 F or chills.    Inability or difficulty urinating.     Excessive blood in urine.    Any questions or concerns.      You received 1 Oxycodone 5 mg tablet at 12:35 pm.

## 2017-08-30 NOTE — IP AVS SNAPSHOT
MRN:[id number]                      After Visit Summary   8/30/2017    Felicia Ellison    MRN: [id number]           Thank you!     Thank you for choosing [facility] for your care. Our goal is always to provide you with excellent care. Hearing back from our patients is one way we can continue to improve our services. Please take a few minutes to complete the written survey that you may receive in the mail after you visit with us. Thank you!        Patient Information     Date Of Birth          1965        About your hospital stay     You were admitted on:  August 30, 2017 You last received care in the:  [facility] PACU    You were discharged on:  August 30, 2017       Who to Call     For medical emergencies, please call 911.  For non-urgent questions about your medical care, please call your primary care provider or clinic, [phone]  For questions related to your surgery, please call your surgery clinic        Attending Provider     Provider Specialty    Russ Cristobal MD Urology       Primary Care Provider Office Phone # Fax #    Tony Padilla [phone]      After Care Instructions     Diet Instructions       Resume pre procedure diet            Discharge Instructions       Patient to arrange for follow up appointment in 1 week for catheter removal            Encourage fluids       Encourage fluids at home to keep urine clear to light pink                  Further instructions from your care team       Same Day Surgery Discharge Instructions for  Sedation and General Anesthesia       It's not unusual to feel dizzy, light-headed or faint for up to 24 hours after surgery or while taking pain medication.  If you have these symptoms: sit for a few minutes before standing and have someone assist you when you get up to walk or use the bathroom.      You should rest and relax for the next 24 hours. We recommend you make arrangements to have an adult stay  with you for at least 24 hours after your discharge.  Avoid hazardous and strenuous activity.      DO NOT DRIVE any vehicle or operate mechanical equipment for 24 hours following the end of your surgery.  Even though you may feel normal, your reactions may be affected by the medication you have received.      Do not drink alcoholic beverages for 24 hours following surgery.       Slowly progress to your regular diet as you feel able. It's not unusual to feel nauseated and/or vomit after receiving anesthesia.  If you develop these symptoms, drink clear liquids (apple juice, ginger ale, broth, 7-up, etc. ) until you feel better.  If your nausea and vomiting persists for 24 hours, please notify your surgeon.        All narcotic pain medications, along with inactivity and anesthesia, can cause constipation. Drinking plenty of liquids and increasing fiber intake will help.      For any questions of a medical nature, call your surgeon.      Do not make important decisions for 24 hours.      If you had general anesthesia, you may have a sore throat for a couple of days related to the breathing tube used during surgery.  You may use Cepacol lozenges to help with this discomfort.  If it worsens or if you develop a fever, contact your surgeon.       If you feel your pain is not well managed with the pain medications prescribed by your surgeon, please contact your surgeon's office to let them know so they can address your concerns.       Cystoscopy and  Holmium Laser Discharge Instructions    Holmium laser lithotripsy was used to break up your kidney stone(s). It is normal to have visible blood in the urine, burning, urgency and frequency following this procedure. These symptoms may last for a few days to weeks.     Diet:    To help pass stone fragments and clear the blood out of the urine, it is important to drink 6-8 glasses of fluids per day at home - at least 3-4 glasses should be water.       Return to the diet that you  "were on before the procedure, unless you are given specific diet instructions.    Activity:    Walk short distances and increase as your strength allows.    You may climb stairs.    Do not do strenuous exercise or heavy lifting until approved by surgeon.    Do not drive while taking narcotic pain medications.    Bathing:    You may take a shower.           Call your physician if these signs/symptoms are present:    Pain that is not relieved by a short rest or ordered pain medications.    Temperature at or above 101.0 F or chills.    Inability or difficulty urinating.     Excessive blood in urine.    Any questions or concerns.      You received 1 Oxycodone 5 mg tablet at 12:35 pm.    Pending Results     No orders found from 8/28/2017 to 8/31/2017.            Admission Information     Date & Time Provider Department Dept. Phone    8/30/2017 Russ Cristobal MD [facility] PACU [phone]      Your Vitals Were     Blood Pressure Temperature Respirations Height Weight Last Period    110/63 96.3  F (35.7  C) 12 1.753 m (5' 9\") 56.7 kg (125 lb) (LMP Unknown)    Pulse Oximetry BMI (Body Mass Index)                99% 18.46 kg/m2          MyChart Information     [employer] gives you secure access to your electronic health record. If you see a primary care provider, you can also send messages to your care team and make appointments. If you have questions, please call your primary care clinic.  If you do not have a primary care provider, please call [phone] and they will assist you.        Care EveryWhere ID     This is your Care EveryWhere ID. This could be used by other organizations to access your [facility] medical records  [id number]        Equal Access to Services     [facility] : Amberly Jeff, waelisabeth martin, qaybyg ferrer. So [facility] [phone].    ATENCIÓN: Si habla español, tiene a gomez disposición servicios gratuitos de " "adamepifanio kyaepifanio. Froy al [phone].    We comply with applicable federal civil rights laws and [place] laws. We do not discriminate on the basis of race, color, national origin, age, disability sex, sexual orientation or gender identity.               Review of your medicines      START taking        Dose / Directions    clindamycin 300 MG capsule   Commonly known as:  CLEOCIN   Used for:  Neurogenic bladder        Dose:  300 mg   Take 1 capsule (300 mg) by mouth 3 times daily for 5 days   Quantity:  15 capsule   Refills:  0         CONTINUE these medicines which have NOT CHANGED        Dose / Directions    AMBIEN PO        Dose:  10 mg   Take 10 mg by mouth nightly as needed for sleep   Refills:  0       BOOST   Used for:  Paraplegic immobility syndrome        I can a day   Quantity:  30 Can   Refills:  12       Calcium 250 MG Caps        Dose:  1 tablet   Take 1 tablet by mouth 2 times daily   Refills:  0       CALCIUM CITRATE + D 315-250 MG-UNIT Tabs per tablet   Generic drug:  calcium citrate-vitamin D        Dose:  2 tablet   Take 2 tablets by mouth 2 times daily   Refills:  0       CEPHALEXIN PO        Dose:  500 mg   Take 500 mg by mouth 3 times daily   Refills:  0       COMPAZINE PO        Dose:  10 mg   Take 10 mg by mouth every 6 hours as needed for nausea   Refills:  0       Disposable Underpads 30\"x36\" Misc   Used for:  Osteomyelitis of hip (H), Paraplegia following spinal cord injury (H)        USE UP TO 6 TIMES DAILY AS NEEDED FOR INCONTINENCE   Quantity:  180 pad   Refills:  12       DOXYCYCLINE HYCLATE PO        Dose:  100 mg   Take 100 mg by mouth 2 times daily   Refills:  0       enoxaparin 40 MG/0.4ML injection   Commonly known as:  LOVENOX        Dose:  60 mg   Inject 60 mg Subcutaneous every 12 hours   Refills:  0       ferrous sulfate 325 (65 FE) MG tablet   Commonly known as:  IRON        Dose:  325 mg   Take 325 mg by mouth daily (with breakfast)   Refills:  0       FUROSEMIDE PO "        Dose:  40 mg   Take 40 mg by mouth daily   Refills:  0       K-DUR PO        Dose:  20 mEq   Take 20 mEq by mouth 2 times daily   Refills:  0       LYRICA PO        Dose:  75 mg   Take 75 mg by mouth 2 times daily   Refills:  0       multivitamin, therapeutic with minerals Tabs tablet        Dose:  1 tablet   Take 1 tablet by mouth daily Certavite   Refills:  0       * order for DME   Used for:  Paraplegic immobility syndrome, Pressure ulcer of right buttock, Flaccid paraplegia (H)        Equipment being ordered: Trapeze for hospital bed   Quantity:  1 each   Refills:  0       * order for DME   Used for:  Flaccid paraplegia (H), Paraplegic immobility syndrome        Equipment being ordered: [facility] bed table. [phone]   Quantity:  1 each   Refills:  0       * order for DME   Used for:  Paraplegia following spinal cord injury (H)        Equipment being ordered: handivan   Quantity:  1 each   Refills:  0       * order for DME   Used for:  Urinary retention, Flaccid paraplegia (H), Paraplegic immobility syndrome        Equipment being ordered:  40 cc bulb for catheter  [phone]   Quantity:  1 Units   Refills:  0       * order for DME   Used for:  Poor appetite, Generalized weakness, Paraplegia following spinal cord injury (H)        Equipment being ordered: protein drink  Prostat 64 Coborns Ambrose   Quantity:  1 Package   Refills:  12       * order for DME   Used for:  Paraplegia following spinal cord injury (H), Chronic osteomyelitis of hip (H)        [facility] [employer] Phone# [phone] Fax:  [phone]   Group 2 mattress overlay Diagnosis:  Stage 4 Decubitus Ulcer   Quantity:  1 Device   Refills:  0       * order for DME   Used for:  Chronic UTI (urinary tract infection), Neurogenic bladder        Equipment being ordered:  Catheters with bag attached.   Quantity:  3 catheter   Refills:  12       * order for DME   Used for:  Pressure ulcer, buttock(707.05)        Equipment  "being ordered: [facility] Fax [phone]  Primary Dressing Hydrofera Blue 6x6   Qty 1 box Primary Dressing Fibracol Qty 1box Secondary dressing 4x4 nonsterile gauze   Qty 200 ct Secondary Dressing 2\" medipore tape Qty 3 rolls  Length of Need: 1 month Frequency of dressing change: daily   Quantity:  30 days   Refills:  2       * order for DME   Used for:  Pressure ulcer        Equipment being ordered: eval for appropriate shower bench and adaptive equipment while showering by Rosanna Rivera. [employer] Fax [phone]  Primary Dressing Fibrocol    Qty 1 box Length of Need: 1 month Frequency of dressing change: daily Eval for appropriate shower bench and adaptive equipment while showering by Rosanna Rivera.   Quantity:  30 days   Refills:  0       oxybutynin 5 MG 24 hr tablet   Commonly known as:  DITROPAN-XL        Dose:  10 mg   Take 10 mg by mouth daily   Refills:  0       RANITIDINE HCL PO        Dose:  150 mg   Take 150 mg by mouth 2 times daily   Refills:  0       STERI-PAD STERILE 4\"X4\" Pads   Used for:  Change of dressing        Gauze pads for ileal diversion dressing daily and prn   Quantity:  1 each   Refills:  12       SUMAtriptan Succinate Refill 6 MG/0.5ML Soct   Commonly known as:  IMITREX        Dose:  6 mg   Inject 6 mg Subcutaneous 2 times daily as needed for migraine Inject 6 mg at onset of headache  May repeat x 1 dose in 2 hours if needed.  Max 2 doses  In 24 hrs.   Refills:  0       TRAMADOL HCL PO        Dose:  50 mg   Take 50 mg by mouth every 8 hours as needed for moderate pain or moderate to severe pain   Refills:  0       TRAZODONE HCL PO        Dose:  100 mg   Take 100 mg by mouth At Bedtime 2 x 50 mg tabs   Refills:  0       * Notice:  This list has 9 medication(s) that are the same as other medications prescribed for you. Read the directions carefully, and ask your doctor or other care provider to review them with you.         Where to get your medicines      Some of these " "will need a paper prescription and others can be bought over the counter. Ask your nurse if you have questions.     Bring a paper prescription for each of these medications     clindamycin 300 MG capsule                Protect others around you: Learn how to safely use, store and throw away your medicines at www.disposemymeds.org.             Medication List: This is a list of all your medications and when to take them. Check marks below indicate your daily home schedule. Keep this list as a reference.      Medications           Morning Afternoon Evening Bedtime As Needed    AMBIEN PO   Take 10 mg by mouth nightly as needed for sleep                                BOOST   I can a day                                Calcium 250 MG Caps   Take 1 tablet by mouth 2 times daily                                CALCIUM CITRATE + D 315-250 MG-UNIT Tabs per tablet   Take 2 tablets by mouth 2 times daily   Generic drug:  calcium citrate-vitamin D                                CEPHALEXIN PO   Take 500 mg by mouth 3 times daily                                clindamycin 300 MG capsule   Commonly known as:  CLEOCIN   Take 1 capsule (300 mg) by mouth 3 times daily for 5 days                                COMPAZINE PO   Take 10 mg by mouth every 6 hours as needed for nausea                                Disposable Underpads 30\"x36\" Misc   USE UP TO 6 TIMES DAILY AS NEEDED FOR INCONTINENCE                                DOXYCYCLINE HYCLATE PO   Take 100 mg by mouth 2 times daily                                enoxaparin 40 MG/0.4ML injection   Commonly known as:  LOVENOX   Inject 60 mg Subcutaneous every 12 hours                                ferrous sulfate 325 (65 FE) MG tablet   Commonly known as:  IRON   Take 325 mg by mouth daily (with breakfast)                                FUROSEMIDE PO   Take 40 mg by mouth daily                                K-DUR PO   Take 20 mEq by mouth 2 times daily                                " "LYRICA PO   Take 75 mg by mouth 2 times daily                                multivitamin, therapeutic with minerals Tabs tablet   Take 1 tablet by mouth daily Certavite                                * order for DME   Equipment being ordered: Trapeze for hospital bed                                * order for DME   Equipment being ordered: Over the bed table. Fax to- [facility] at [phone]                                * order for DME   Equipment being ordered: handivan                                * order for DME   Equipment being ordered:  40 cc bulb for catheter  [phone]                                * order for DME   Equipment being ordered: protein drink  Prostat 64 Coborns Ambrose                                * order for DME   [facility] [employer] Phone# [phone] Fax:  [phone]   Group 2 mattress overlay Diagnosis:  Stage 4 Decubitus Ulcer                                * order for DME   Equipment being ordered:  Catheters with bag attached.                                * order for DME   Equipment being ordered: [facility] Fax [phone]  Primary Dressing Hydrofera Blue 6x6   Qty 1 box Primary Dressing Fibracol Qty 1box Secondary dressing 4x4 nonsterile gauze   Qty 200 ct Secondary Dressing 2\" medipore tape Qty 3 rolls  Length of Need: 1 month Frequency of dressing change: daily                                * order for DME   Equipment being ordered: eval for appropriate shower bench and adaptive equipment while showering by Rosanna Rivera. [employer] Fax [phone]  Primary Dressing Fibrocol    Qty 1 box Length of Need: 1 month Frequency of dressing change: daily Eval for appropriate shower bench and adaptive equipment while showering by Rosanna Rivera.                                oxybutynin 5 MG 24 hr tablet   Commonly known as:  DITROPAN-XL   Take 10 mg by mouth daily                                RANITIDINE HCL PO   Take 150 mg by mouth 2 times daily                  " "              STERI-PAD STERILE 4\"X4\" Pads   Gauze pads for ileal diversion dressing daily and prn                                SUMAtriptan Succinate Refill 6 MG/0.5ML Soct   Commonly known as:  IMITREX   Inject 6 mg Subcutaneous 2 times daily as needed for migraine Inject 6 mg at onset of headache  May repeat x 1 dose in 2 hours if needed.  Max 2 doses  In 24 hrs.                                TRAMADOL HCL PO   Take 50 mg by mouth every 8 hours as needed for moderate pain or moderate to severe pain                                TRAZODONE HCL PO   Take 100 mg by mouth At Bedtime 2 x 50 mg tabs                                * Notice:  This list has 9 medication(s) that are the same as other medications prescribed for you. Read the directions carefully, and ask your doctor or other care provider to review them with you.      "

## 2017-08-30 NOTE — ANESTHESIA CARE TRANSFER NOTE
Patient: Felicia Ellison    Procedure(s):  FLEXIBLE CYTOSCOPY, HOLMIUM LASER LITHOTRIPSY FOR CONTENTIENT URINARY DIVERSION STONES  - Wound Class: II-Clean Contaminated   - Wound Class: II-Clean Contaminated    Diagnosis: NEUROGENIC BLADDER AND BLADDER STONES   Diagnosis Additional Information: No value filed.    Anesthesia Type:   General, LMA     Note:  Airway :Face Mask  Patient transferred to:PACU  Comments: O2 8 lpm mask spontaneous respirations. sats are 99%. Patient is awake and conversing. Asking for a drink of water. Report to RN.      Vitals: (Last set prior to Anesthesia Care Transfer)    CRNA VITALS  8/30/2017 1105 - 8/30/2017 1143      8/30/2017             Resp Rate (observed): (!)  1    Resp Rate (set): 10                Electronically Signed By: VERENA Ramos CRNA  August 30, 2017  11:43 AM

## 2017-08-30 NOTE — OR NURSING
Dressing covered decubiti coccyx and bilateral posterior hip area.  Moderate pink clear drainage from coccyx dressing. Patient states has had wound vacuum on all areas currently. Wound vac removed before she came to hospital. Being seeing by wound care nurse.  Protective dressings on bilateral heels. Patient states she has no wounds under the dressings.      Patient states she has hyper sensitivity feeling below hips.    Patient positioned on her side with bilateral feet elevated with pillows.Decubiti discussed with OR nurse. Daughter requests patient be on soft OR bed and turned off decubitus as much as possible.  Also feet elevated off bed for protection.

## 2017-08-30 NOTE — BRIEF OP NOTE
[facility] Brief Operative Note    Pre-operative diagnosis: NEUROGENIC BLADDER AND BLADDER STONES    Post-operative diagnosis: Same   Procedure: Flexible pouchoscopy laser lithotripsy   Surgeon: Russ Cristobal MD, MD   Assistant(s): None   Anesthesia: General endotracheal anesthesia   Estimated blood loss: Minimal   Total IV fluids: (See anesthesia record)   Blood transfusion: No transfusion was given during surgery   Total urine output: Not measured   Drains: Ambrose catheter   Specimens: Fragments of bladder stones   Implants: None   Findings: See dictation   Complications: None   Condition: Stable   Comments: See dictated operative report for full details.    Russ Cristobal MD

## 2017-08-30 NOTE — IP AVS SNAPSHOT
[place] [address]    [place] [address]    Phone:  [phone]                                       After Visit Summary   8/30/2017    Felicia Ellison    MRN: [id number]           After Visit Summary Signature Page     I have received my discharge instructions, and my questions have been answered. I have discussed any challenges I see with this plan with the nurse or doctor.    ..........................................................................................................................................  Patient/Patient Representative Signature      ..........................................................................................................................................  Patient Representative Print Name and Relationship to Patient    ..................................................               ................................................  Date                                            Time    ..........................................................................................................................................  Reviewed by Signature/Title    ...................................................              ..............................................  Date                                                            Time

## 2017-08-30 NOTE — OP NOTE
DATE OF PROCEDURE:  2017      PROCEDURE:  Flexible pouchoscopy and laser lithotripsy.      PREOPERATIVE DIAGNOSIS:  Stones incontinent diversion.      POSTOPERATIVE DIAGNOSIS:  Stones incontinent diversion.      OPERATIVE NOTE:  Informed consent was obtained from Kishor Ellison.  She was brought to the operating room, given anesthesia on the gurney.  The patient and family request that she stay on a gurney if possible for this procedure because of the decubitus ulcer she has.  Sterile prep and drape were applied and surgical timeout was taken.  Flexible cystoscope was introduced through the catheterizable stoma and gained access into the pouch easily.  Inspection was performed and revealed 2 somewhat sizable stones.  The remainder of the pouch was surprisingly stone free (given the appearance of her most recent CT scan).  The findings on the CT may have represented some slightly calcified mucus which has subsequently been irrigated out.  The 365 nanometer laser fiber was placed and laser timeout was taken.  The stones were fragmented using stone dusting setting into numerous tiny fragments.  Once the stone fragmentation was done, I placed a catheter into the pouch and irrigated out, was able to collect some of the fragments were sent for stone analysis.  This was an 18 [place] catheter.  Balloon was inflated with 10 mL sterile water and this was placed to drainage.  We will leave the catheter to drainage for a week and have her return to clinic next week for catheter removal in the office and probably schedule a followup CT scan to be done in 2-3 months.  She tolerated the procedure well and there were no complications, no blood loss.         KASHMIR VALENTE MD             D: 2017 11:40   T: 2017 15:06   MT: KAROLYN#[id number]      Name:     KISHOR ELLISON   MRN:      [id number]        Account:        [id number]   :      1965           Procedure Date: 2017      Document:  [id number]       cc: Russ Cristobal MD

## 2017-08-31 LAB — COPATH REPORT: NORMAL

## 2017-09-03 LAB
APPEARANCE STONE: NORMAL
COMPN STONE: NORMAL
NUMBER STONE: 1
SIZE STONE: NORMAL MM
WT STONE: 10 MG

## 2017-09-27 NOTE — TELEPHONE ENCOUNTER
Routing refill request to provider for review/approval because:  Medication is reported/historical  Patient needs to be seen because it has been more than 1 year since last office visit.  Patient has not been seen since 2015    Susan Spence RN  [facility]

## 2017-09-27 NOTE — TELEPHONE ENCOUNTER
Calcium + D    Last Written Prescription Date: NA  Last Fill Quantity: NA, # refills: NA  Last Office Visit with [facility], [facility] or [facility] prescribing provider: 2/20/15       DEXA Scan:  No order of DX HIP/PELVIS/SPINE is found.     No order of DX HIP/PELVIS/SPINE W LAT FRACTION ANALYSIS is found.       Creatinine   Date Value Ref Range Status   02/27/2017 0.37 (L) 0.52 - 1.04 mg/dL Final

## 2017-09-28 NOTE — TELEPHONE ENCOUNTER
RN called and notified pharmacy that patient is no longer under Dr. Medrano's care.     Susan Spence RN  [facility]

## 2017-09-28 NOTE — TELEPHONE ENCOUNTER
Ivan Baeza MD   You 16 hours ago (4:02 PM)                 Please notify pharmacy patient no longer under my care.   Electronically signed by Ivan Baeza MD   (Routing comment)

## 2017-10-09 NOTE — TELEPHONE ENCOUNTER
calcium citrate-vitamin D (CALCIUM CITRATE + D) 315-250 MG-UNIT TABS per tablet      Last Written Prescription Date:  ?  Last Fill Quantity: ?,   # refills: ?  Last Office Visit with [facility], [facility] or [facility] prescribing provider: 2/20/15  Future Office visit:       Routing refill request to provider for review/approval because:  Medication is reported/historical

## 2017-10-10 NOTE — TELEPHONE ENCOUNTER
RN called and informed pharmacy again that patient is no longer seen under the care of Dr. Baeza.     RX refused.     Susan Spence RN  [facility]

## 2018-12-12 ENCOUNTER — TRANSFERRED RECORDS (OUTPATIENT)
Dept: HEALTH INFORMATION MANAGEMENT | Facility: CLINIC | Age: 53
End: 2018-12-12

## 2018-12-12 ENCOUNTER — HOSPITAL ENCOUNTER (INPATIENT)
Facility: CLINIC | Age: 53
LOS: 28 days | Discharge: HOSPICE | DRG: 870 | End: 2019-01-09
Attending: HOSPITALIST | Admitting: HOSPITALIST
Payer: MEDICARE

## 2018-12-12 ENCOUNTER — APPOINTMENT (OUTPATIENT)
Dept: GENERAL RADIOLOGY | Facility: CLINIC | Age: 53
DRG: 870 | End: 2018-12-12
Attending: HOSPITALIST
Payer: MEDICARE

## 2018-12-12 DIAGNOSIS — R65.21 SEPTIC SHOCK (H): Primary | ICD-10-CM

## 2018-12-12 DIAGNOSIS — J96.22 ACUTE ON CHRONIC RESPIRATORY FAILURE WITH HYPOXIA AND HYPERCAPNIA (H): ICD-10-CM

## 2018-12-12 DIAGNOSIS — Z51.5 HOSPICE CARE PATIENT: ICD-10-CM

## 2018-12-12 DIAGNOSIS — A41.9 SEPTIC SHOCK (H): Primary | ICD-10-CM

## 2018-12-12 DIAGNOSIS — J96.21 ACUTE ON CHRONIC RESPIRATORY FAILURE WITH HYPOXIA AND HYPERCAPNIA (H): ICD-10-CM

## 2018-12-12 PROBLEM — I31.39 PERICARDIAL EFFUSION: Status: ACTIVE | Noted: 2018-12-12

## 2018-12-12 LAB
ALBUMIN SERPL-MCNC: 1.4 G/DL (ref 3.4–5)
ALP SERPL-CCNC: 232 U/L (ref 40–150)
ALT SERPL W P-5'-P-CCNC: 10 U/L (ref 0–50)
ALT SERPL-CCNC: 13 U/L (ref 14–63)
ANION GAP SERPL CALCULATED.3IONS-SCNC: 9 MMOL/L (ref 3–14)
AST SERPL W P-5'-P-CCNC: 9 U/L (ref 0–45)
AST SERPL-CCNC: 15 U/L (ref 15–37)
BASOPHILS # BLD AUTO: 0 10E9/L (ref 0–0.2)
BASOPHILS NFR BLD AUTO: 0.2 %
BILIRUB SERPL-MCNC: 0.4 MG/DL (ref 0.2–1.3)
BUN SERPL-MCNC: 7 MG/DL (ref 7–30)
CALCIUM SERPL-MCNC: 7.3 MG/DL (ref 8.5–10.1)
CHLORIDE SERPL-SCNC: 107 MMOL/L (ref 94–109)
CO2 SERPL-SCNC: 22 MMOL/L (ref 20–32)
CREAT SERPL-MCNC: 0.47 MG/DL (ref 0.51–0.95)
CREAT SERPL-MCNC: 0.5 MG/DL (ref 0.52–1.04)
CRP SERPL-MCNC: 98.6 MG/L (ref 0–8)
DIFFERENTIAL METHOD BLD: ABNORMAL
EOSINOPHIL # BLD AUTO: 0.2 10E9/L (ref 0–0.7)
EOSINOPHIL NFR BLD AUTO: 1.3 %
ERYTHROCYTE [DISTWIDTH] IN BLOOD BY AUTOMATED COUNT: 17.3 % (ref 10–15)
GFR SERPL CREATININE-BSD FRML MDRD: >60 ML/MIN/1.73ME2 (ref 60–150)
GFR SERPL CREATININE-BSD FRML MDRD: >90 ML/MIN/1.7M2
GLUCOSE SERPL-MCNC: 113 MG/DL (ref 70–99)
GLUCOSE SERPL-MCNC: 158 MG/DL (ref 74–100)
HCT VFR BLD AUTO: 34.1 % (ref 35–47)
HGB BLD-MCNC: 10.6 G/DL (ref 11.7–15.7)
IMM GRANULOCYTES # BLD: 0.1 10E9/L (ref 0–0.4)
IMM GRANULOCYTES NFR BLD: 0.6 %
LACTATE BLD-SCNC: 3.6 MMOL/L (ref 0.7–2)
LYMPHOCYTES # BLD AUTO: 1.9 10E9/L (ref 0.8–5.3)
LYMPHOCYTES NFR BLD AUTO: 10.7 %
MCH RBC QN AUTO: 25.7 PG (ref 26.5–33)
MCHC RBC AUTO-ENTMCNC: 31.1 G/DL (ref 31.5–36.5)
MCV RBC AUTO: 83 FL (ref 78–100)
MONOCYTES # BLD AUTO: 0.8 10E9/L (ref 0–1.3)
MONOCYTES NFR BLD AUTO: 4.6 %
NEUTROPHILS # BLD AUTO: 14.6 10E9/L (ref 1.6–8.3)
NEUTROPHILS NFR BLD AUTO: 82.6 %
NRBC # BLD AUTO: 0 10*3/UL
NRBC BLD AUTO-RTO: 0 /100
PLATELET # BLD AUTO: 337 10E9/L (ref 150–450)
POTASSIUM SERPL-SCNC: 3.3 MMOL/L (ref 3.5–5.1)
POTASSIUM SERPL-SCNC: 3.5 MMOL/L (ref 3.4–5.3)
PROT SERPL-MCNC: 6.4 G/DL (ref 6.8–8.8)
RBC # BLD AUTO: 4.12 10E12/L (ref 3.8–5.2)
SODIUM SERPL-SCNC: 138 MMOL/L (ref 133–144)
T4 FREE SERPL-MCNC: 0.97 NG/DL (ref 0.76–1.46)
TSH SERPL DL<=0.005 MIU/L-ACNC: 5.38 MU/L (ref 0.4–4)
WBC # BLD AUTO: 17.7 10E9/L (ref 4–11)

## 2018-12-12 PROCEDURE — 86039 ANTINUCLEAR ANTIBODIES (ANA): CPT | Performed by: HOSPITALIST

## 2018-12-12 PROCEDURE — 99223 1ST HOSP IP/OBS HIGH 75: CPT | Mod: AI | Performed by: HOSPITALIST

## 2018-12-12 PROCEDURE — 25000132 ZZH RX MED GY IP 250 OP 250 PS 637: Mod: GY | Performed by: HOSPITALIST

## 2018-12-12 PROCEDURE — 36415 COLL VENOUS BLD VENIPUNCTURE: CPT | Performed by: HOSPITALIST

## 2018-12-12 PROCEDURE — 83605 ASSAY OF LACTIC ACID: CPT | Performed by: HOSPITALIST

## 2018-12-12 PROCEDURE — 86140 C-REACTIVE PROTEIN: CPT | Performed by: HOSPITALIST

## 2018-12-12 PROCEDURE — 80053 COMPREHEN METABOLIC PANEL: CPT | Performed by: HOSPITALIST

## 2018-12-12 PROCEDURE — 84145 PROCALCITONIN (PCT): CPT | Performed by: HOSPITALIST

## 2018-12-12 PROCEDURE — 25000128 H RX IP 250 OP 636: Performed by: HOSPITALIST

## 2018-12-12 PROCEDURE — 84439 ASSAY OF FREE THYROXINE: CPT | Performed by: HOSPITALIST

## 2018-12-12 PROCEDURE — 71045 X-RAY EXAM CHEST 1 VIEW: CPT

## 2018-12-12 PROCEDURE — 87476 LYME DIS DNA AMP PROBE: CPT | Performed by: HOSPITALIST

## 2018-12-12 PROCEDURE — 84443 ASSAY THYROID STIM HORMONE: CPT | Performed by: HOSPITALIST

## 2018-12-12 PROCEDURE — 85610 PROTHROMBIN TIME: CPT | Performed by: HOSPITALIST

## 2018-12-12 PROCEDURE — 87040 BLOOD CULTURE FOR BACTERIA: CPT | Performed by: HOSPITALIST

## 2018-12-12 PROCEDURE — 86038 ANTINUCLEAR ANTIBODIES: CPT | Performed by: HOSPITALIST

## 2018-12-12 PROCEDURE — 21000001 ZZH R&B HEART CARE

## 2018-12-12 PROCEDURE — 85025 COMPLETE CBC W/AUTO DIFF WBC: CPT | Performed by: HOSPITALIST

## 2018-12-12 PROCEDURE — A9270 NON-COVERED ITEM OR SERVICE: HCPCS | Mod: GY | Performed by: HOSPITALIST

## 2018-12-12 RX ORDER — AMOXICILLIN 250 MG
2 CAPSULE ORAL 2 TIMES DAILY
Status: DISCONTINUED | OUTPATIENT
Start: 2018-12-12 | End: 2018-12-17

## 2018-12-12 RX ORDER — ZOLPIDEM TARTRATE 5 MG/1
10 TABLET ORAL
Status: DISCONTINUED | OUTPATIENT
Start: 2018-12-12 | End: 2018-12-29

## 2018-12-12 RX ORDER — CEFTRIAXONE 2 G/1
2 INJECTION, POWDER, FOR SOLUTION INTRAMUSCULAR; INTRAVENOUS EVERY 24 HOURS
Status: DISCONTINUED | OUTPATIENT
Start: 2018-12-12 | End: 2018-12-13

## 2018-12-12 RX ORDER — PREGABALIN 100 MG/1
100 CAPSULE ORAL 2 TIMES DAILY
Status: DISCONTINUED | OUTPATIENT
Start: 2018-12-12 | End: 2018-12-13

## 2018-12-12 RX ORDER — METOCLOPRAMIDE 5 MG/1
10 TABLET ORAL EVERY 6 HOURS PRN
Status: DISCONTINUED | OUTPATIENT
Start: 2018-12-12 | End: 2019-01-06

## 2018-12-12 RX ORDER — PROCHLORPERAZINE MALEATE 10 MG
10 TABLET ORAL EVERY 6 HOURS PRN
Status: DISCONTINUED | OUTPATIENT
Start: 2018-12-12 | End: 2019-01-09 | Stop reason: HOSPADM

## 2018-12-12 RX ORDER — ONDANSETRON 4 MG/1
4 TABLET, ORALLY DISINTEGRATING ORAL EVERY 6 HOURS PRN
Status: DISCONTINUED | OUTPATIENT
Start: 2018-12-12 | End: 2019-01-09 | Stop reason: HOSPADM

## 2018-12-12 RX ORDER — TRAMADOL HYDROCHLORIDE 50 MG/1
50 TABLET ORAL EVERY 8 HOURS PRN
Status: DISCONTINUED | OUTPATIENT
Start: 2018-12-12 | End: 2018-12-29

## 2018-12-12 RX ORDER — LIDOCAINE 40 MG/G
CREAM TOPICAL
Status: DISCONTINUED | OUTPATIENT
Start: 2018-12-12 | End: 2019-01-05

## 2018-12-12 RX ORDER — PREGABALIN 100 MG/1
100 CAPSULE ORAL 2 TIMES DAILY
Status: ON HOLD | COMMUNITY
End: 2019-01-09

## 2018-12-12 RX ORDER — POTASSIUM CHLORIDE 7.45 MG/ML
10 INJECTION INTRAVENOUS
Status: DISCONTINUED | OUTPATIENT
Start: 2018-12-12 | End: 2018-12-26

## 2018-12-12 RX ORDER — OXYBUTYNIN CHLORIDE 10 MG/1
10 TABLET, EXTENDED RELEASE ORAL DAILY
Status: DISCONTINUED | OUTPATIENT
Start: 2018-12-13 | End: 2018-12-14

## 2018-12-12 RX ORDER — BISACODYL 10 MG
10 SUPPOSITORY, RECTAL RECTAL DAILY PRN
Status: DISCONTINUED | OUTPATIENT
Start: 2018-12-12 | End: 2019-01-09 | Stop reason: HOSPADM

## 2018-12-12 RX ORDER — HYDROMORPHONE HYDROCHLORIDE 1 MG/ML
0.2 INJECTION, SOLUTION INTRAMUSCULAR; INTRAVENOUS; SUBCUTANEOUS
Status: DISCONTINUED | OUTPATIENT
Start: 2018-12-12 | End: 2019-01-06

## 2018-12-12 RX ORDER — NALOXONE HYDROCHLORIDE 0.4 MG/ML
.1-.4 INJECTION, SOLUTION INTRAMUSCULAR; INTRAVENOUS; SUBCUTANEOUS
Status: DISCONTINUED | OUTPATIENT
Start: 2018-12-12 | End: 2018-12-15

## 2018-12-12 RX ORDER — ACETAMINOPHEN 325 MG/1
650 TABLET ORAL EVERY 4 HOURS PRN
Status: DISCONTINUED | OUTPATIENT
Start: 2018-12-12 | End: 2019-01-06

## 2018-12-12 RX ORDER — ONDANSETRON 2 MG/ML
4 INJECTION INTRAMUSCULAR; INTRAVENOUS EVERY 6 HOURS PRN
Status: DISCONTINUED | OUTPATIENT
Start: 2018-12-12 | End: 2019-01-09 | Stop reason: HOSPADM

## 2018-12-12 RX ORDER — POTASSIUM CHLORIDE 29.8 MG/ML
20 INJECTION INTRAVENOUS
Status: DISCONTINUED | OUTPATIENT
Start: 2018-12-12 | End: 2018-12-26

## 2018-12-12 RX ORDER — AMOXICILLIN 250 MG
1 CAPSULE ORAL 2 TIMES DAILY
Status: DISCONTINUED | OUTPATIENT
Start: 2018-12-12 | End: 2018-12-17

## 2018-12-12 RX ORDER — AMOXICILLIN 250 MG
2 CAPSULE ORAL 2 TIMES DAILY PRN
Status: DISCONTINUED | OUTPATIENT
Start: 2018-12-12 | End: 2018-12-29

## 2018-12-12 RX ORDER — PROCHLORPERAZINE 25 MG
25 SUPPOSITORY, RECTAL RECTAL EVERY 12 HOURS PRN
Status: DISCONTINUED | OUTPATIENT
Start: 2018-12-12 | End: 2019-01-09 | Stop reason: HOSPADM

## 2018-12-12 RX ORDER — TRAZODONE HYDROCHLORIDE 50 MG/1
100 TABLET, FILM COATED ORAL AT BEDTIME
Status: DISCONTINUED | OUTPATIENT
Start: 2018-12-12 | End: 2018-12-19

## 2018-12-12 RX ORDER — METOCLOPRAMIDE HYDROCHLORIDE 5 MG/ML
10 INJECTION INTRAMUSCULAR; INTRAVENOUS EVERY 6 HOURS PRN
Status: DISCONTINUED | OUTPATIENT
Start: 2018-12-12 | End: 2019-01-06

## 2018-12-12 RX ORDER — PREGABALIN 75 MG/1
75 CAPSULE ORAL 2 TIMES DAILY
Status: DISCONTINUED | OUTPATIENT
Start: 2018-12-12 | End: 2018-12-12

## 2018-12-12 RX ORDER — POTASSIUM CL/LIDO/0.9 % NACL 10MEQ/0.1L
10 INTRAVENOUS SOLUTION, PIGGYBACK (ML) INTRAVENOUS
Status: DISCONTINUED | OUTPATIENT
Start: 2018-12-12 | End: 2018-12-26

## 2018-12-12 RX ORDER — POTASSIUM CHLORIDE 1500 MG/1
20-40 TABLET, EXTENDED RELEASE ORAL
Status: DISCONTINUED | OUTPATIENT
Start: 2018-12-12 | End: 2018-12-26

## 2018-12-12 RX ORDER — POTASSIUM CHLORIDE 1.5 G/1.58G
20-40 POWDER, FOR SOLUTION ORAL
Status: DISCONTINUED | OUTPATIENT
Start: 2018-12-12 | End: 2018-12-26

## 2018-12-12 RX ORDER — AMOXICILLIN 250 MG
1 CAPSULE ORAL 2 TIMES DAILY PRN
Status: DISCONTINUED | OUTPATIENT
Start: 2018-12-12 | End: 2019-01-09 | Stop reason: HOSPADM

## 2018-12-12 RX ORDER — MAGNESIUM SULFATE HEPTAHYDRATE 40 MG/ML
4 INJECTION, SOLUTION INTRAVENOUS EVERY 4 HOURS PRN
Status: DISCONTINUED | OUTPATIENT
Start: 2018-12-12 | End: 2018-12-15

## 2018-12-12 RX ORDER — MAGNESIUM SULFATE HEPTAHYDRATE 40 MG/ML
2 INJECTION, SOLUTION INTRAVENOUS DAILY PRN
Status: DISCONTINUED | OUTPATIENT
Start: 2018-12-12 | End: 2018-12-15

## 2018-12-12 RX ADMIN — RANITIDINE 150 MG: 150 TABLET ORAL at 23:02

## 2018-12-12 RX ADMIN — SODIUM CHLORIDE 1000 ML: 9 INJECTION, SOLUTION INTRAVENOUS at 23:03

## 2018-12-12 RX ADMIN — TRAZODONE HYDROCHLORIDE 100 MG: 100 TABLET ORAL at 23:03

## 2018-12-12 RX ADMIN — ENOXAPARIN SODIUM 60 MG: 60 INJECTION SUBCUTANEOUS at 23:02

## 2018-12-12 RX ADMIN — PREGABALIN 100 MG: 100 CAPSULE ORAL at 23:02

## 2018-12-12 ASSESSMENT — MIFFLIN-ST. JEOR: SCORE: 1304.38

## 2018-12-13 ENCOUNTER — APPOINTMENT (OUTPATIENT)
Dept: CARDIOLOGY | Facility: CLINIC | Age: 53
DRG: 870 | End: 2018-12-13
Attending: INTERNAL MEDICINE
Payer: MEDICARE

## 2018-12-13 ENCOUNTER — APPOINTMENT (OUTPATIENT)
Dept: GENERAL RADIOLOGY | Facility: CLINIC | Age: 53
DRG: 870 | End: 2018-12-13
Attending: INTERNAL MEDICINE
Payer: MEDICARE

## 2018-12-13 ENCOUNTER — APPOINTMENT (OUTPATIENT)
Dept: GENERAL RADIOLOGY | Facility: CLINIC | Age: 53
DRG: 870 | End: 2018-12-13
Attending: SURGERY
Payer: MEDICARE

## 2018-12-13 LAB
ANION GAP SERPL CALCULATED.3IONS-SCNC: 7 MMOL/L (ref 3–14)
BASE DEFICIT BLDA-SCNC: 2.9 MMOL/L
BUN SERPL-MCNC: 7 MG/DL (ref 7–30)
CA-I BLD-MCNC: 4.3 MG/DL (ref 4.4–5.2)
CALCIUM SERPL-MCNC: 6.7 MG/DL (ref 8.5–10.1)
CHLORIDE SERPL-SCNC: 110 MMOL/L (ref 94–109)
CO2 SERPL-SCNC: 23 MMOL/L (ref 20–32)
CREAT SERPL-MCNC: 0.52 MG/DL (ref 0.52–1.04)
ERYTHROCYTE [DISTWIDTH] IN BLOOD BY AUTOMATED COUNT: 17.3 % (ref 10–15)
FLUAV+FLUBV RNA SPEC QL NAA+PROBE: NEGATIVE
FLUAV+FLUBV RNA SPEC QL NAA+PROBE: NEGATIVE
GFR SERPL CREATININE-BSD FRML MDRD: >90 ML/MIN/1.7M2
GLUCOSE BLDC GLUCOMTR-MCNC: 85 MG/DL (ref 70–99)
GLUCOSE BLDC GLUCOMTR-MCNC: 89 MG/DL (ref 70–99)
GLUCOSE BLDC GLUCOMTR-MCNC: 99 MG/DL (ref 70–99)
GLUCOSE SERPL-MCNC: 110 MG/DL (ref 70–99)
HCO3 BLD-SCNC: 22 MMOL/L (ref 21–28)
HCT VFR BLD AUTO: 28.5 % (ref 35–47)
HGB BLD-MCNC: 8.8 G/DL (ref 11.7–15.7)
INR PPP: 1.63 (ref 0.86–1.14)
L PNEUMO1 AG UR QL IA: NORMAL
LACTATE SERPL-SCNC: 1.4 MMOL/L (ref 0.4–2)
MAGNESIUM SERPL-MCNC: 1.7 MG/DL (ref 1.6–2.3)
MCH RBC QN AUTO: 25.4 PG (ref 26.5–33)
MCHC RBC AUTO-ENTMCNC: 30.9 G/DL (ref 31.5–36.5)
MCV RBC AUTO: 82 FL (ref 78–100)
O2/TOTAL GAS SETTING VFR VENT: 60 %
OXYHGB MFR BLD: 93 % (ref 92–100)
PCO2 BLD: 40 MM HG (ref 35–45)
PH BLD: 7.36 PH (ref 7.35–7.45)
PLATELET # BLD AUTO: 299 10E9/L (ref 150–450)
PO2 BLD: 69 MM HG (ref 80–105)
POTASSIUM SERPL-SCNC: 3.3 MMOL/L (ref 3.4–5.3)
POTASSIUM SERPL-SCNC: 3.3 MMOL/L (ref 3.4–5.3)
PROCALCITONIN SERPL-MCNC: 0.25 NG/ML
PROCALCITONIN SERPL-MCNC: 0.26 NG/ML
RBC # BLD AUTO: 3.47 10E12/L (ref 3.8–5.2)
RSV RNA SPEC NAA+PROBE: NEGATIVE
S PNEUM AG SPEC QL: NORMAL
SODIUM SERPL-SCNC: 140 MMOL/L (ref 133–144)
SPECIMEN SOURCE: NORMAL
WBC # BLD AUTO: 15.2 10E9/L (ref 4–11)

## 2018-12-13 PROCEDURE — 00000146 ZZHCL STATISTIC GLUCOSE BY METER IP

## 2018-12-13 PROCEDURE — 94660 CPAP INITIATION&MGMT: CPT

## 2018-12-13 PROCEDURE — 25000128 H RX IP 250 OP 636

## 2018-12-13 PROCEDURE — 25000132 ZZH RX MED GY IP 250 OP 250 PS 637: Mod: GY | Performed by: HOSPITALIST

## 2018-12-13 PROCEDURE — 80048 BASIC METABOLIC PNL TOTAL CA: CPT | Performed by: INTERNAL MEDICINE

## 2018-12-13 PROCEDURE — 85027 COMPLETE CBC AUTOMATED: CPT | Performed by: SURGERY

## 2018-12-13 PROCEDURE — 93306 TTE W/DOPPLER COMPLETE: CPT | Mod: 26 | Performed by: INTERNAL MEDICINE

## 2018-12-13 PROCEDURE — 25000128 H RX IP 250 OP 636: Performed by: HOSPITALIST

## 2018-12-13 PROCEDURE — 99291 CRITICAL CARE FIRST HOUR: CPT | Performed by: INTERNAL MEDICINE

## 2018-12-13 PROCEDURE — 82805 BLOOD GASES W/O2 SATURATION: CPT | Performed by: INTERNAL MEDICINE

## 2018-12-13 PROCEDURE — 25500064 ZZH RX 255 OP 636: Performed by: HOSPITALIST

## 2018-12-13 PROCEDURE — 71045 X-RAY EXAM CHEST 1 VIEW: CPT

## 2018-12-13 PROCEDURE — 93010 ELECTROCARDIOGRAM REPORT: CPT | Performed by: INTERNAL MEDICINE

## 2018-12-13 PROCEDURE — 99207 ZZC NON-BILLABLE SERV PER CHARTING: CPT | Performed by: INTERNAL MEDICINE

## 2018-12-13 PROCEDURE — 83735 ASSAY OF MAGNESIUM: CPT | Performed by: INTERNAL MEDICINE

## 2018-12-13 PROCEDURE — 25000128 H RX IP 250 OP 636: Performed by: INTERNAL MEDICINE

## 2018-12-13 PROCEDURE — 40000264 ECHOCARDIOGRAM COMPLETE

## 2018-12-13 PROCEDURE — 36415 COLL VENOUS BLD VENIPUNCTURE: CPT | Performed by: INTERNAL MEDICINE

## 2018-12-13 PROCEDURE — 20000003 ZZH R&B ICU

## 2018-12-13 PROCEDURE — 3E043XZ INTRODUCTION OF VASOPRESSOR INTO CENTRAL VEIN, PERCUTANEOUS APPROACH: ICD-10-PCS | Performed by: SURGERY

## 2018-12-13 PROCEDURE — 36600 WITHDRAWAL OF ARTERIAL BLOOD: CPT

## 2018-12-13 PROCEDURE — 93005 ELECTROCARDIOGRAM TRACING: CPT

## 2018-12-13 PROCEDURE — A9270 NON-COVERED ITEM OR SERVICE: HCPCS | Mod: GY | Performed by: HOSPITALIST

## 2018-12-13 PROCEDURE — 87899 AGENT NOS ASSAY W/OPTIC: CPT | Performed by: SURGERY

## 2018-12-13 PROCEDURE — 84132 ASSAY OF SERUM POTASSIUM: CPT | Performed by: INTERNAL MEDICINE

## 2018-12-13 PROCEDURE — 87899 AGENT NOS ASSAY W/OPTIC: CPT | Performed by: INTERNAL MEDICINE

## 2018-12-13 PROCEDURE — 25000128 H RX IP 250 OP 636: Performed by: SURGERY

## 2018-12-13 PROCEDURE — 71045 X-RAY EXAM CHEST 1 VIEW: CPT | Mod: 77

## 2018-12-13 PROCEDURE — 25000125 ZZHC RX 250: Performed by: SURGERY

## 2018-12-13 PROCEDURE — 83605 ASSAY OF LACTIC ACID: CPT | Performed by: INTERNAL MEDICINE

## 2018-12-13 PROCEDURE — 82330 ASSAY OF CALCIUM: CPT | Performed by: INTERNAL MEDICINE

## 2018-12-13 PROCEDURE — 87631 RESP VIRUS 3-5 TARGETS: CPT | Performed by: SURGERY

## 2018-12-13 PROCEDURE — 84145 PROCALCITONIN (PCT): CPT | Performed by: SURGERY

## 2018-12-13 PROCEDURE — 40000275 ZZH STATISTIC RCP TIME EA 10 MIN

## 2018-12-13 PROCEDURE — 99222 1ST HOSP IP/OBS MODERATE 55: CPT | Mod: 25 | Performed by: INTERNAL MEDICINE

## 2018-12-13 PROCEDURE — G0463 HOSPITAL OUTPT CLINIC VISIT: HCPCS | Performed by: NURSE PRACTITIONER

## 2018-12-13 RX ORDER — HYDROMORPHONE HYDROCHLORIDE 1 MG/ML
.3-.5 INJECTION, SOLUTION INTRAMUSCULAR; INTRAVENOUS; SUBCUTANEOUS
Status: DISCONTINUED | OUTPATIENT
Start: 2018-12-13 | End: 2018-12-13

## 2018-12-13 RX ORDER — MAGNESIUM SULFATE HEPTAHYDRATE 40 MG/ML
4 INJECTION, SOLUTION INTRAVENOUS EVERY 4 HOURS PRN
Status: DISCONTINUED | OUTPATIENT
Start: 2018-12-13 | End: 2019-01-06

## 2018-12-13 RX ORDER — POTASSIUM CHLORIDE 29.8 MG/ML
20 INJECTION INTRAVENOUS
Status: DISCONTINUED | OUTPATIENT
Start: 2018-12-13 | End: 2019-01-06

## 2018-12-13 RX ORDER — FENTANYL CITRATE 50 UG/ML
50 INJECTION, SOLUTION INTRAMUSCULAR; INTRAVENOUS ONCE
Status: COMPLETED | OUTPATIENT
Start: 2018-12-13 | End: 2018-12-13

## 2018-12-13 RX ORDER — NOREPINEPHRINE BITARTRATE/D5W 16MG/250ML
0.03-0.4 PLASTIC BAG, INJECTION (ML) INTRAVENOUS CONTINUOUS
Status: DISCONTINUED | OUTPATIENT
Start: 2018-12-13 | End: 2018-12-29

## 2018-12-13 RX ORDER — CEFEPIME HYDROCHLORIDE 1 G/1
1 INJECTION, POWDER, FOR SOLUTION INTRAMUSCULAR; INTRAVENOUS EVERY 12 HOURS
Status: DISCONTINUED | OUTPATIENT
Start: 2018-12-13 | End: 2018-12-26

## 2018-12-13 RX ORDER — POTASSIUM CHLORIDE 1500 MG/1
20-40 TABLET, EXTENDED RELEASE ORAL
Status: DISCONTINUED | OUTPATIENT
Start: 2018-12-13 | End: 2019-01-06

## 2018-12-13 RX ORDER — HEPARIN SODIUM 5000 [USP'U]/.5ML
5000 INJECTION, SOLUTION INTRAVENOUS; SUBCUTANEOUS EVERY 8 HOURS
Status: DISCONTINUED | OUTPATIENT
Start: 2018-12-13 | End: 2018-12-14 | Stop reason: ALTCHOICE

## 2018-12-13 RX ORDER — POTASSIUM CHLORIDE 1.5 G/1.58G
20-40 POWDER, FOR SOLUTION ORAL
Status: DISCONTINUED | OUTPATIENT
Start: 2018-12-13 | End: 2019-01-06

## 2018-12-13 RX ORDER — POTASSIUM CHLORIDE 7.45 MG/ML
10 INJECTION INTRAVENOUS
Status: DISCONTINUED | OUTPATIENT
Start: 2018-12-13 | End: 2019-01-06

## 2018-12-13 RX ORDER — SODIUM CHLORIDE, SODIUM LACTATE, POTASSIUM CHLORIDE, CALCIUM CHLORIDE 600; 310; 30; 20 MG/100ML; MG/100ML; MG/100ML; MG/100ML
INJECTION, SOLUTION INTRAVENOUS CONTINUOUS
Status: DISCONTINUED | OUTPATIENT
Start: 2018-12-13 | End: 2018-12-18

## 2018-12-13 RX ORDER — VANCOMYCIN HYDROCHLORIDE 1 G/200ML
1000 INJECTION, SOLUTION INTRAVENOUS EVERY 8 HOURS
Status: DISCONTINUED | OUTPATIENT
Start: 2018-12-13 | End: 2018-12-14

## 2018-12-13 RX ORDER — MAGNESIUM SULFATE HEPTAHYDRATE 40 MG/ML
2 INJECTION, SOLUTION INTRAVENOUS DAILY PRN
Status: DISCONTINUED | OUTPATIENT
Start: 2018-12-13 | End: 2019-01-06

## 2018-12-13 RX ORDER — POTASSIUM CL/LIDO/0.9 % NACL 10MEQ/0.1L
10 INTRAVENOUS SOLUTION, PIGGYBACK (ML) INTRAVENOUS
Status: DISCONTINUED | OUTPATIENT
Start: 2018-12-13 | End: 2019-01-06

## 2018-12-13 RX ORDER — FENTANYL CITRATE 50 UG/ML
INJECTION, SOLUTION INTRAMUSCULAR; INTRAVENOUS
Status: COMPLETED
Start: 2018-12-13 | End: 2018-12-13

## 2018-12-13 RX ADMIN — POTASSIUM CHLORIDE 20 MEQ: 29.8 INJECTION, SOLUTION INTRAVENOUS at 11:07

## 2018-12-13 RX ADMIN — HEPARIN SODIUM 5000 UNITS: 10000 INJECTION, SOLUTION INTRAVENOUS; SUBCUTANEOUS at 18:55

## 2018-12-13 RX ADMIN — FENTANYL CITRATE 25 MCG: 50 INJECTION, SOLUTION INTRAMUSCULAR; INTRAVENOUS at 06:58

## 2018-12-13 RX ADMIN — POTASSIUM CHLORIDE 20 MEQ: 29.8 INJECTION, SOLUTION INTRAVENOUS at 18:58

## 2018-12-13 RX ADMIN — METRONIDAZOLE 500 MG: 500 INJECTION, SOLUTION INTRAVENOUS at 13:48

## 2018-12-13 RX ADMIN — SODIUM CHLORIDE, POTASSIUM CHLORIDE, SODIUM LACTATE AND CALCIUM CHLORIDE: 600; 310; 30; 20 INJECTION, SOLUTION INTRAVENOUS at 06:23

## 2018-12-13 RX ADMIN — VANCOMYCIN HYDROCHLORIDE 1000 MG: 1 INJECTION, SOLUTION INTRAVENOUS at 12:47

## 2018-12-13 RX ADMIN — RANITIDINE 150 MG: 150 TABLET ORAL at 11:18

## 2018-12-13 RX ADMIN — MAGNESIUM SULFATE IN WATER 2 G: 40 INJECTION, SOLUTION INTRAVENOUS at 12:10

## 2018-12-13 RX ADMIN — AZITHROMYCIN MONOHYDRATE 500 MG: 500 INJECTION, POWDER, LYOPHILIZED, FOR SOLUTION INTRAVENOUS at 11:16

## 2018-12-13 RX ADMIN — METRONIDAZOLE 500 MG: 500 INJECTION, SOLUTION INTRAVENOUS at 06:33

## 2018-12-13 RX ADMIN — HUMAN ALBUMIN MICROSPHERES AND PERFLUTREN 9 ML: 10; .22 INJECTION, SOLUTION INTRAVENOUS at 09:50

## 2018-12-13 RX ADMIN — CEFEPIME 1 G: 1 INJECTION, POWDER, FOR SOLUTION INTRAMUSCULAR; INTRAVENOUS at 17:35

## 2018-12-13 RX ADMIN — CEFTRIAXONE SODIUM 2 G: 2 INJECTION, POWDER, FOR SOLUTION INTRAMUSCULAR; INTRAVENOUS at 00:02

## 2018-12-13 RX ADMIN — POTASSIUM CHLORIDE 20 MEQ: 29.8 INJECTION, SOLUTION INTRAVENOUS at 19:59

## 2018-12-13 RX ADMIN — SODIUM CHLORIDE 500 ML: 9 INJECTION, SOLUTION INTRAVENOUS at 04:23

## 2018-12-13 RX ADMIN — SODIUM CHLORIDE 500 ML: 9 INJECTION, SOLUTION INTRAVENOUS at 03:50

## 2018-12-13 RX ADMIN — VANCOMYCIN HYDROCHLORIDE 1000 MG: 1 INJECTION, SOLUTION INTRAVENOUS at 05:44

## 2018-12-13 RX ADMIN — HEPARIN SODIUM 5000 UNITS: 10000 INJECTION, SOLUTION INTRAVENOUS; SUBCUTANEOUS at 10:47

## 2018-12-13 RX ADMIN — SENNOSIDES AND DOCUSATE SODIUM 2 TABLET: 8.6; 5 TABLET ORAL at 11:18

## 2018-12-13 RX ADMIN — METRONIDAZOLE 500 MG: 500 INJECTION, SOLUTION INTRAVENOUS at 17:45

## 2018-12-13 RX ADMIN — MIDAZOLAM HYDROCHLORIDE 0.5 MG: 1 INJECTION, SOLUTION INTRAMUSCULAR; INTRAVENOUS at 06:56

## 2018-12-13 RX ADMIN — NOREPINEPHRINE BITARTRATE 0.03 MCG/KG/MIN: 1 INJECTION INTRAVENOUS at 07:10

## 2018-12-13 RX ADMIN — SODIUM CHLORIDE, POTASSIUM CHLORIDE, SODIUM LACTATE AND CALCIUM CHLORIDE: 600; 310; 30; 20 INJECTION, SOLUTION INTRAVENOUS at 15:00

## 2018-12-13 RX ADMIN — CEFEPIME 1 G: 1 INJECTION, POWDER, FOR SOLUTION INTRAMUSCULAR; INTRAVENOUS at 06:16

## 2018-12-13 RX ADMIN — VANCOMYCIN HYDROCHLORIDE 1000 MG: 1 INJECTION, SOLUTION INTRAVENOUS at 19:55

## 2018-12-13 RX ADMIN — POTASSIUM CHLORIDE 20 MEQ: 29.8 INJECTION, SOLUTION INTRAVENOUS at 10:09

## 2018-12-13 ASSESSMENT — ACTIVITIES OF DAILY LIVING (ADL)
ADLS_ACUITY_SCORE: 24
ADLS_ACUITY_SCORE: 25
ADLS_ACUITY_SCORE: 24
ADLS_ACUITY_SCORE: 25

## 2018-12-13 ASSESSMENT — MIFFLIN-ST. JEOR: SCORE: 1233.38

## 2018-12-13 NOTE — PROVIDER NOTIFICATION
MD Notification    Notified Person: MD    Notified Person Name: Dr Dickinson    Notification Date/Time: 12/13/18, 0257    Notification Interaction: Talked with MD    Purpose of Notification: hypotensive    Orders Received: stat lactic, cxray, fluid bolus and start abx    Comments:
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
## 2018-12-13 NOTE — PROGRESS NOTES
[facility]    Medicine Rapid Response - Hospitalist Service       Date of Admission:  12/12/2018  Assessment & Plan   Felicia Ellison is a 53 year old female admitted this evening by Dr. Castillo with paraplegia due to spinal cord injury years ago with bilateral decubitus ulcers, ileoconduit, h/o osteomyelitis of the pelvis, h/o MRSA, depression/anxiety.  She was in her usual state of health until a couple of days ago when she began feeling short of breath.  Today she went to the [address] [place] [address] where she was found to have bilateral pleural effusions and a large pericardial effusion without signs of tamponade on echocardiogram.  She was transferred to [facility] for further evaluation. She has a leukocytosis and has developed fever of 101.3. I was called tonight due to persistent hypotension since admission in the 70's to 80's and worsening hypoxia.     Severe sepsis with hypotension (T101.3, SBP 70-80s, lactic acid 3, WBC 17, CRP 98, procal 0.26)   Acute Hypoxic Respiratory Failure suspect secondary to PNA with bilateral interstitial infiltrates with small pleural effusions. , PE ruled out on outside CT  Large pericardial effusion without signs of tamponade, nl LV and valve fxn on outside transthoracic echocardiogram   Bilateral sacral decubitus ulcers without sign of superficial infection, h/o osteomyelitis of the pelvis   H/o MRSA.   T1 paraplegia with paralytic ileus and neurogenic bladder, s/p ileal diversion ileostomy and urostomy  The patient does not have any history of cardiac or renal disease.  Her echocardiogram at outside facility showed normal LV size and function.  No significant valvular abnormalities.  Her creatinine was normal.  It is unclear if this is an infectious or inflammatory process. CT at outside facility negative for PE. Blood cultures from admission remain negative, TSH 5.38. Here lactic acid elevated at 3.6 at admission > down to 1.4 after IVF. CRP  up at 98.6, Procal 0.26.   - Repeat echocardiogram tomorrow and cardiology consulted tomorrow  - S/p 1 L bolus at admission, give additional 1 L now until SBP > 90.   - BiPAP for hypoxia  - Discussed with intensivist, Dr. Sullivan, regarding persistent hypotension and worsening hypoxia, requiring BiPAP. May need central line. Requested he do a bedside echo and order stat echo if needed to assess cardiac function possible tamponade,CHF.   - Broaden abx coverage from ceftriaxone to vancomycin + cefepime.   - Diagnostic thoracentesis in the morning  - Wound care consulted  - ID consulted  - K and Mg protocols ordered.     Chronic Pain - I can not find documentation regarding this but note that she is on Beluca and oxycontin  - She will not receive this while on BiPAP and having low BP.  - Monitor for signs of withdrawal.     Time Spent on this Encounter   I spent 43 minutes managing the critical care of Felicia Schneider GABRIEL Ellison in relation to the issues listed in this note.      Christina Lovelace MD  Hospitalist Service  [facility]    ______________________________________________________________________    Interval History   As above.     Data reviewed today: I reviewed all medications, new labs and imaging results over the last 24 hours. I personally reviewed the chest x-ray image(s) showing as above. Telemetry shows sinus tach.    Physical Exam   Vital Signs: Temp: 101.3  F (38.5  C) Temp src: Oral BP: (!) 85/58 Pulse: 82 Heart Rate: 110 Resp: 16 SpO2: 94 % O2 Device: BiPAP/CPAP Oxygen Delivery: 15 LPM  Weight: 141 lbs 1.51 oz  Constitutional: NAD,   Neuropsyche:alert and oriented to situation but can not name hospital, answers questions appropriately.   Respiratory: Appears mildly SOB, saturating in the 80s on 15 L, decreased lung sounds, crackles noted in the lower lung fields on the left.    Cardiovascular: Regular rate and rhythm, trace edema.  GI: soft, NT/ND, BS normal  Skin/Integumen: No acute  rash, bruising or sign of bleeding. Rolled over to exam decubitus ulcers. All tract deep, no discharge, fluctuance, or warmth to suggest infection.    Data   Recent Labs   Lab 12/12/18 2120 12/12/18  0010   WBC 17.7*  --    HGB 10.6*  --    MCV 83  --      --    INR  --  1.63*     --    POTASSIUM 3.5  --    CHLORIDE 107  --    CO2 22  --    BUN 7  --    CR 0.50*  --    ANIONGAP 9  --    JOSH 7.3*  --    *  --    ALBUMIN 1.4*  --    PROTTOTAL 6.4*  --    BILITOTAL 0.4  --    ALKPHOS 232*  --    ALT 10  --    AST 9  --      No results found for this or any previous visit (from the past 24 hour(s)).
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
## 2018-12-13 NOTE — PHARMACY-ADMISSION MEDICATION HISTORY
Admission medication history interview status for the 12/12/2018  admission is complete. See EPIC admission navigator for prior to admission medications     Medication history source reliability:Moderate    Actions taken by pharmacist (provider contacted, etc):added belbuca per daughter. Removed compazine, keflex.  Clarified Lovenox as 60mg.  Lyrica as 100mg bid. Belbuca as BID. KCl as TID.     Additional medication history information not noted on PTA med list :Patient's pharmacy [facility] is closed.    Medication reconciliation/reorder completed by provider prior to medication history? No    Time spent in this activity: 20 minutes    Prior to Admission medications    Medication Sig Last Dose Taking? Auth Provider   Buprenorphine HCl (BELBUCA) 450 MCG FILM buccal film Place 450 mcg inside cheek 2 times daily 12/11/2018 at Unknown time Yes Unknown, Entered By History   DOXYCYCLINE HYCLATE PO Take 100 mg by mouth 2 times daily 12/11/2018 at Unknown time Yes Reported, Patient   enoxaparin (LOVENOX) 60 MG/0.6ML syringe Inject 60 mg Subcutaneous 2 times daily 12/11/2018 at 2100 Yes Unknown, Entered By History   ferrous sulfate (IRON) 325 (65 FE) MG tablet Take 325 mg by mouth daily (with breakfast) 12/11/2018 at Unknown time Yes Unknown, Entered By History   FUROSEMIDE PO Take 40 mg by mouth daily  12/11/2018 at Unknown time Yes Unknown, Entered By History   multivitamin, therapeutic with minerals (THERA-VIT-M) TABS tablet Take 1 tablet by mouth daily Certavite 12/11/2018 at Unknown time Yes Unknown, Entered By History   oxybutynin (DITROPAN-XL) 5 MG 24 hr tablet Take 10 mg by mouth daily  12/12/2018 at am Yes Reported, Patient   Potassium Chloride Jacqueline CR (K-DUR PO) Take 20 mEq by mouth 3 times daily 12/11/2018 at Unknown time Yes Unknown, Entered By History   Pregabalin (LYRICA PO) Take 100 mg by mouth 2 times daily 12/11/2018 at Unknown time Yes Reported, Patient   RANITIDINE HCL PO Take 150 mg by mouth 2 times daily  12/11/2018 at Unknown time Yes Unknown, Entered By History   SUMAtriptan Succinate Refill (IMITREX) 6 MG/0.5ML SOCT Inject 6 mg Subcutaneous 2 times daily as needed for migraine Inject 6 mg at onset of headache  May repeat x 1 dose in 2 hours if needed.  Max 2 doses  In 24 hrs. prn Yes Unknown, Entered By History   TRAMADOL HCL PO Take 50 mg by mouth every 8 hours as needed for moderate pain or moderate to severe pain prn Yes Unknown, Entered By History   TRAZODONE HCL PO Take 100 mg by mouth At Bedtime 2 x 50 mg tabs 12/11/2018 at hs Yes Unknown, Entered By History   Zolpidem Tartrate (AMBIEN PO) Take 10 mg by mouth nightly as needed for sleep prn Yes Unknown, Entered By History   calcium citrate-vitamin D (CALCIUM CITRATE + D) 315-250 MG-UNIT TABS per tablet Take 2 tablets by mouth 2 times daily More than a month at ran out  Unknown, Entered By History
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
## 2018-12-13 NOTE — PLAN OF CARE
Patient alert and oriented (knows in hospital in [place], knows the president). PERRL. Paraplegic, wheelchair bound at baseline. BP soft, IV fluid bolus give. Femoral line placed at bedside. Levophed started. IV abx infusing. Lung sounds fine crackles/diminished. On BiPAP. NPO. Multiple wounds. Tele: ST. Will continue to monitor.

## 2018-12-13 NOTE — PROGRESS NOTES
Discussed with  Intensivist, will sign off until patient is ready to be transferred out of icu, please call us .

## 2018-12-13 NOTE — PROGRESS NOTES
RRT called for hypotension, AMS, newly febrile and inability to get sats above 90%. Pt put on bipap and sent to ICU.  See chart for details  Report called to Olga ROJAS

## 2018-12-13 NOTE — CONSULTS
"Consult Date:  12/13/2018      CARDIOLOGY CONSULTATION      Ms. Ellison is a 53-year-old paraplegic who was brought in for symptoms of increasing shortness of breath.  She denies any fever, chills, cough, chest pain, nausea, vomiting or diarrhea.  She had some worsening with increasing lower extremity edema, came to the emergency room at [address] [place] [address].  A chest CT was negative for acute pulmonary embolism; however, did show bilateral pleural effusions and possible pericardial effusion.  Apparently, an echo was done at that site showing a \"large pericardial effusion without evidence of tamponade.\"  She was transferred to [facility] and Cardiology consultation was requested.  She was initially admitted to the floor, but became subsequently hypotensive and was transferred to the Intensive Care Unit, where I am seeing her.  She is on BiPAP.  She is alert and oriented x3.  Chest x-ray shows bilateral pulmonary infiltrates.  She has chronic wounds on her skin without cellulitis.  She denies any previous cardiac history.  Upon my seeing the patient, she was getting an echocardiogram performed, which showed a small pericardial effusion, but large bilateral pleural effusions.  LV function was within normal limits.  Chest x-ray again shows bilateral infiltrates and fairly diffuse.  Her EKG shows sinus tachycardia with nonspecific ST abnormalities.  No acute ischemia.      ASSESSMENT AND PLAN:   1.  Pericardial effusion:  My review of the echocardiogram shows actually only a small pericardial effusion.  There is some echogenic material within this of unclear etiology and there is no evidence of tamponade.  Most likely, at the off-site [address] [place] [address], the pleural effusion was mistaken for a pericardial effusion.  Certainly, this is not large enough to tap or have her undergo a pericardial window.  It is not likely cause for her hypotension.   2.  Bilateral infiltrates:  With limited echocardiogram pending, of " course we need to review this in entirety, but there is no significant evidence of valvular heart disease and left ventricular function is normal.  I would be more concerned over possible pneumonia.  I would leave this to the Intensive Care Unit.  Certainly, we could perform a right heart catheterization should this be necessary.   3.  Hypotension:  Concerned over sepsis.  LV dysfunction does not appear to be an issue and there is no evidence of pulmonary embolism.   4.  Neurologic:  The patient is a paraplegic.  She is alert and oriented.      Thank you for having me see this patient.  We will continue to follow along with you as needed.  Please do not hesitate to call me with any questions or concerns.         IVET BHATT MD [facility]             D: 2018   T: 2018   MT: FOSTER      Name:     KISHOR PINEDA   MRN:      [id number]        Account:       [id number]   :      1965           Consult Date:  2018      Document: [id number]       cc: Tony Padilla MD

## 2018-12-13 NOTE — CONSULTS
Consult Date:  12/13/2018      LOCATION:  Room 371, ICU [facility].        REFERRING PHYSICIAN:  Christina Lovelace MD      IMPRESSION:   1.  A 53-year-old female with acute sepsis, underlying paraplegia and a chronic wound, but this appears to be respiratory infection, has infiltrates and significant pleural fluid, initially felt to have major pericardial fluid, but this turns out to be a false result, likely conventional community-acquired pneumonia, although at risk for other pathogens.   2.  Paraplegia including chronic neurogenic bladder with ileal diversion and ileostomy.   3.  Chronic pain syndrome.   4.  Chronic bilateral sacral decubitus wounds, never been diagnosed with osteomyelitis, previous infection 18 months ago but no recent suggestion of infection and appearance not looking like infection currently.   5.  ALLERGIES LISTED TO LEVAQUIN AND PENICILLIN.  The Levaquin was a rash.  The penicillin caused some kind of hyperactive reactive thing so was not a true allergy.      RECOMMENDATIONS:   1.  Continue the broad antibiotics that have been started with cefepime and vancomycin while awaiting cultures.  Add Zithromax on the assumption this is a conventional respiratory infection, in fact the imaging has the appearance of atypical infection.   2.  Await pleural tap, if infected looking, obviously other interventions.      HISTORY OF PRESENT ILLNESS:  This 53-year-old female is seen in consultation due to apparent pneumonia.  The patient is known to our group from previous where she had an infected decubitus wound.  She has chronic paraplegia and has had chronic wounds.  She has not previously been known to have deep infection.  With this, she has also had a history of prior pancreatitis and prior MRSA and also ileal conduit done from a bladder purpose standpoint.  She has not recently required much in the way of antibiotics nor had major infections.  She had a relatively abrupt onset of  respiratory symptoms including cough and shortness of breath and fever.  She was seen at the [facility] ER where a workup suggested a major pericardial and pleural fluid collection along with possible infiltrates and sepsis.  She was transferred here.  Workup now shown the pleural fluid is significant, but the pericardial fluid was not real.  She has been on broad antibiotics, feels better currently, still with active respiratory symptoms and hypoxia.      SOCIAL HISTORY:  No recent travels or exposures.  No one else she has been around has been ill.  She does have children, but has not really had upper respiratory infections recently.      PAST MEDICAL HISTORY:  Chronic pain syndrome, prior paraplegia, bilateral sacral decubitus wounds, prior MRSA, prior recurrent urine infections, has ileal conduit, not really having recurrent infections more recently.      SOCIAL AND FAMILY HISTORY:  No recent travel or exposures, has the known MRSA.      MEDICATIONS:  As listed.      ALLERGIES:  Among them are LEVAQUIN with a rash and PENICILLIN which caused some type of neurologic reaction.      REVIEW OF SYSTEMS:  Ongoing shortness of breath, feels better at present.  Some degree of chills.      PHYSICAL EXAMINATION:   GENERAL:  The patient appears stated age, does not look toxic or ill, although requiring a fair amount of oxygen, cognition is intact.   HEENT:  No thrush or intraoral lesions.  Pupils reactive.   NECK:  Supple and nontender.   HEART:  Regular rhythm, no major murmur.   LUNGS:  Decreased breath sounds laterally.   ABDOMEN:  Soft and nontender.   EXTREMITIES:  The decubitus wound is partially seen.  No real erythema or signs of infection around it.   NEUROLOGIC:  Findings of paraplegia.      LABORATORY AND IMAGING:  As above.  Blood cultures are pending.  White count 17,700.      Thank you very much for this consultation.  I will follow the patient with you.         MICHAELA HUNT MD             D: 12/13/2018   T:  2018   MT: DANIEL      Name:     KISHOR PINEDA JO   MRN:      [id number]        Account:       [id number]   :      1965           Consult Date:  2018      Document: [id number]

## 2018-12-13 NOTE — CONSULTS
ID consult dictated IMP 1 52 yo female acute sepsis, appears to be resp source    REC await cxs, same broad coverage and atypical coverage with zithromax
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
## 2018-12-14 ENCOUNTER — APPOINTMENT (OUTPATIENT)
Dept: ULTRASOUND IMAGING | Facility: CLINIC | Age: 53
DRG: 870 | End: 2018-12-14
Attending: HOSPITALIST
Payer: MEDICARE

## 2018-12-14 LAB
ALBUMIN SERPL-MCNC: 1.1 G/DL (ref 3.4–5)
ALP SERPL-CCNC: 196 U/L (ref 40–150)
ALT SERPL W P-5'-P-CCNC: 8 U/L (ref 0–50)
ANA PAT SER IF-IMP: ABNORMAL
ANA SER QL IF: ABNORMAL
ANA TITR SER IF: ABNORMAL {TITER}
ANION GAP SERPL CALCULATED.3IONS-SCNC: 7 MMOL/L (ref 3–14)
AST SERPL W P-5'-P-CCNC: 14 U/L (ref 0–45)
B BURGDOR DNA SPEC QL NAA+PROBE: NOT DETECTED
BILIRUB SERPL-MCNC: 0.4 MG/DL (ref 0.2–1.3)
BUN SERPL-MCNC: 5 MG/DL (ref 7–30)
CALCIUM SERPL-MCNC: 7.3 MG/DL (ref 8.5–10.1)
CHLORIDE SERPL-SCNC: 115 MMOL/L (ref 94–109)
CO2 SERPL-SCNC: 21 MMOL/L (ref 20–32)
CORTIS SERPL-MCNC: 21.4 UG/DL (ref 4–22)
CREAT SERPL-MCNC: 0.35 MG/DL (ref 0.52–1.04)
ERYTHROCYTE [DISTWIDTH] IN BLOOD BY AUTOMATED COUNT: 17.1 % (ref 10–15)
GFR SERPL CREATININE-BSD FRML MDRD: >90 ML/MIN/1.7M2
GLUCOSE BLDC GLUCOMTR-MCNC: 118 MG/DL (ref 70–99)
GLUCOSE BLDC GLUCOMTR-MCNC: 89 MG/DL (ref 70–99)
GLUCOSE BLDC GLUCOMTR-MCNC: 98 MG/DL (ref 70–99)
GLUCOSE SERPL-MCNC: 98 MG/DL (ref 70–99)
HCT VFR BLD AUTO: 27.9 % (ref 35–47)
HGB BLD-MCNC: 8.8 G/DL (ref 11.7–15.7)
MAGNESIUM SERPL-MCNC: 2.2 MG/DL (ref 1.6–2.3)
MCH RBC QN AUTO: 25.7 PG (ref 26.5–33)
MCHC RBC AUTO-ENTMCNC: 31.5 G/DL (ref 31.5–36.5)
MCV RBC AUTO: 81 FL (ref 78–100)
PLATELET # BLD AUTO: 344 10E9/L (ref 150–450)
POTASSIUM SERPL-SCNC: 3.8 MMOL/L (ref 3.4–5.3)
POTASSIUM SERPL-SCNC: 3.9 MMOL/L (ref 3.4–5.3)
PROT SERPL-MCNC: 5.3 G/DL (ref 6.8–8.8)
RBC # BLD AUTO: 3.43 10E12/L (ref 3.8–5.2)
SODIUM SERPL-SCNC: 143 MMOL/L (ref 133–144)
SPECIMEN SOURCE: NORMAL
VANCOMYCIN SERPL-MCNC: 23 MG/L
WBC # BLD AUTO: 17.5 10E9/L (ref 4–11)

## 2018-12-14 PROCEDURE — 80053 COMPREHEN METABOLIC PANEL: CPT | Performed by: HOSPITALIST

## 2018-12-14 PROCEDURE — 25000128 H RX IP 250 OP 636: Performed by: SURGERY

## 2018-12-14 PROCEDURE — 82533 TOTAL CORTISOL: CPT | Performed by: SURGERY

## 2018-12-14 PROCEDURE — 25000128 H RX IP 250 OP 636: Performed by: INTERNAL MEDICINE

## 2018-12-14 PROCEDURE — 84132 ASSAY OF SERUM POTASSIUM: CPT | Performed by: INTERNAL MEDICINE

## 2018-12-14 PROCEDURE — 25000128 H RX IP 250 OP 636: Performed by: HOSPITALIST

## 2018-12-14 PROCEDURE — 40000275 ZZH STATISTIC RCP TIME EA 10 MIN

## 2018-12-14 PROCEDURE — 94660 CPAP INITIATION&MGMT: CPT

## 2018-12-14 PROCEDURE — 00000146 ZZHCL STATISTIC GLUCOSE BY METER IP

## 2018-12-14 PROCEDURE — 80202 ASSAY OF VANCOMYCIN: CPT | Performed by: HOSPITALIST

## 2018-12-14 PROCEDURE — 25000128 H RX IP 250 OP 636: Performed by: NURSE PRACTITIONER

## 2018-12-14 PROCEDURE — 85027 COMPLETE CBC AUTOMATED: CPT | Performed by: HOSPITALIST

## 2018-12-14 PROCEDURE — G0463 HOSPITAL OUTPT CLINIC VISIT: HCPCS | Performed by: NURSE PRACTITIONER

## 2018-12-14 PROCEDURE — 40000257 ZZH STATISTIC CONSULT NO CHARGE VASC ACCESS

## 2018-12-14 PROCEDURE — 99291 CRITICAL CARE FIRST HOUR: CPT | Performed by: INTERNAL MEDICINE

## 2018-12-14 PROCEDURE — 27211316 ZZ H MASK, CPAP TOTAL HEADGEAR, LATEX FREE

## 2018-12-14 PROCEDURE — 83735 ASSAY OF MAGNESIUM: CPT | Performed by: INTERNAL MEDICINE

## 2018-12-14 PROCEDURE — 20000003 ZZH R&B ICU

## 2018-12-14 PROCEDURE — P9047 ALBUMIN (HUMAN), 25%, 50ML: HCPCS | Performed by: SURGERY

## 2018-12-14 RX ORDER — ALBUMIN (HUMAN) 12.5 G/50ML
SOLUTION INTRAVENOUS
Status: DISCONTINUED
Start: 2018-12-14 | End: 2018-12-15 | Stop reason: HOSPADM

## 2018-12-14 RX ORDER — ALBUMIN (HUMAN) 12.5 G/50ML
25 SOLUTION INTRAVENOUS ONCE
Status: COMPLETED | OUTPATIENT
Start: 2018-12-14 | End: 2018-12-14

## 2018-12-14 RX ORDER — VANCOMYCIN HYDROCHLORIDE 1 G/200ML
1000 INJECTION, SOLUTION INTRAVENOUS EVERY 12 HOURS
Status: DISCONTINUED | OUTPATIENT
Start: 2018-12-14 | End: 2018-12-16

## 2018-12-14 RX ADMIN — Medication 0.2 MG: at 20:55

## 2018-12-14 RX ADMIN — HEPARIN SODIUM 5000 UNITS: 10000 INJECTION, SOLUTION INTRAVENOUS; SUBCUTANEOUS at 09:54

## 2018-12-14 RX ADMIN — Medication 0.2 MG: at 04:37

## 2018-12-14 RX ADMIN — METRONIDAZOLE 500 MG: 500 INJECTION, SOLUTION INTRAVENOUS at 06:48

## 2018-12-14 RX ADMIN — SODIUM CHLORIDE, POTASSIUM CHLORIDE, SODIUM LACTATE AND CALCIUM CHLORIDE 500 ML: 600; 310; 30; 20 INJECTION, SOLUTION INTRAVENOUS at 04:29

## 2018-12-14 RX ADMIN — Medication 0.2 MG: at 18:16

## 2018-12-14 RX ADMIN — POTASSIUM CHLORIDE 20 MEQ: 29.8 INJECTION, SOLUTION INTRAVENOUS at 05:07

## 2018-12-14 RX ADMIN — METRONIDAZOLE 500 MG: 500 INJECTION, SOLUTION INTRAVENOUS at 00:02

## 2018-12-14 RX ADMIN — Medication 0.2 MG: at 11:52

## 2018-12-14 RX ADMIN — CEFEPIME 1 G: 1 INJECTION, POWDER, FOR SOLUTION INTRAMUSCULAR; INTRAVENOUS at 06:15

## 2018-12-14 RX ADMIN — VANCOMYCIN HYDROCHLORIDE 1000 MG: 1 INJECTION, SOLUTION INTRAVENOUS at 19:38

## 2018-12-14 RX ADMIN — HEPARIN SODIUM 5000 UNITS: 10000 INJECTION, SOLUTION INTRAVENOUS; SUBCUTANEOUS at 02:10

## 2018-12-14 RX ADMIN — SODIUM CHLORIDE, POTASSIUM CHLORIDE, SODIUM LACTATE AND CALCIUM CHLORIDE 50 ML/HR: 600; 310; 30; 20 INJECTION, SOLUTION INTRAVENOUS at 04:52

## 2018-12-14 RX ADMIN — CEFEPIME 1 G: 1 INJECTION, POWDER, FOR SOLUTION INTRAMUSCULAR; INTRAVENOUS at 18:02

## 2018-12-14 RX ADMIN — SODIUM CHLORIDE, POTASSIUM CHLORIDE, SODIUM LACTATE AND CALCIUM CHLORIDE: 600; 310; 30; 20 INJECTION, SOLUTION INTRAVENOUS at 23:50

## 2018-12-14 RX ADMIN — POTASSIUM CHLORIDE 20 MEQ: 29.8 INJECTION, SOLUTION INTRAVENOUS at 00:53

## 2018-12-14 RX ADMIN — ALBUMIN HUMAN 25 G: 0.25 SOLUTION INTRAVENOUS at 06:13

## 2018-12-14 RX ADMIN — ALBUMIN HUMAN 25 G: 0.25 SOLUTION INTRAVENOUS at 15:51

## 2018-12-14 RX ADMIN — ENOXAPARIN SODIUM 60 MG: 60 INJECTION SUBCUTANEOUS at 22:55

## 2018-12-14 RX ADMIN — VANCOMYCIN HYDROCHLORIDE 1000 MG: 1 INJECTION, SOLUTION INTRAVENOUS at 05:08

## 2018-12-14 RX ADMIN — HYDROCORTISONE SODIUM SUCCINATE 50 MG: 100 INJECTION, POWDER, FOR SOLUTION INTRAMUSCULAR; INTRAVENOUS at 20:15

## 2018-12-14 RX ADMIN — AZITHROMYCIN MONOHYDRATE 500 MG: 500 INJECTION, POWDER, LYOPHILIZED, FOR SOLUTION INTRAVENOUS at 09:54

## 2018-12-14 ASSESSMENT — ACTIVITIES OF DAILY LIVING (ADL)
ADLS_ACUITY_SCORE: 24
ADLS_ACUITY_SCORE: 24
ADLS_ACUITY_SCORE: 25
ADLS_ACUITY_SCORE: 24

## 2018-12-14 ASSESSMENT — MIFFLIN-ST. JEOR: SCORE: 1508.38

## 2018-12-14 ASSESSMENT — PAIN DESCRIPTION - DESCRIPTORS
DESCRIPTORS: HEADACHE

## 2018-12-14 NOTE — PROVIDER NOTIFICATION
Notified Person: MD hood     Notification Date/Time: 12/14/2018 0546    Notification Interaction: in person    Purpose of Notification: low UOP    Orders Received: YES   Comments:   Give 25 g 25% albumin IV once.

## 2018-12-14 NOTE — PLAN OF CARE
Pts on the vit C study for septic shock. Requiring more levophed for blood pressure overnight. Given 500 ml LR bolus for HR >140. SR post bolus. Low UOP and MD aware. BiPAP all night and sating in mid 90's. Several coccyx and bilateral heel pressure injuries. WOC nurse following. Electrolyte being replaced per protocol. On several abx. Given one time dilaudid for headache. Q 2 hrs turn/oral care. R femoral central line prn dressing change done. Will monitor closely.

## 2018-12-14 NOTE — PLAN OF CARE
Pt able to make needs known. Continues on low dose norepi 0.03mg/kg/min. Pt turned/repo [id number]. AquaCell AG placed in wounds. Daughter at bedside today and updated with plan of care. Pt slept most of shift. Pt continues on bipap- trialed  off on oxymiser 10LPM and pt only maintained sats of 90%. Cont to monitor.

## 2018-12-14 NOTE — PROGRESS NOTES
"CLINICAL NUTRITION SERVICES  -  ASSESSMENT NOTE      RECOMMENDATIONS FOR MD/PROVIDER TO ORDER:   Nutrition to be addressed in the next 48-72 hrs.   Future/Additional Recommendations:   Offer oral liquid nutritional supplements when diet advanced  Consider PI protocol vit/min in future   Malnutrition:   % Weight Loss:  None noted  % Intake:  Unable to determine without recent nutrition history  Subcutaneous Fat Loss:  Unable to assess  Muscle Loss:  Unable to assess  Fluid Retention:  Mild - May possibly be nutrition related    Malnutrition Diagnosis: Unable to determine due to lack of nutrition history        REASON FOR ASSESSMENT  Felicia Ellison is a 53 year old female seen by Registered Dietitian for Interdisciplinary rounds - Multiple wounds and NPO x 3 days      NUTRITION HISTORY  - Unable to obtain nutrition history as pt resting on bipap.     Per RD visit with pt on 2/21/2017:  She is on a high-protein diet at home as instructed by the [facility]. She drinks Ensure or Boost, 0-4 servings/day, depending on \"how thirsty I am.\"  She loves milk, usually drinks 3-4 servings/day, \"I'd drink a gallon a day if I could afford it.\"  She usually has egg or meat at every meal. She also takes a multivit but sometimes runs out and can't afford it.  Note during this admission, pt received Ensure Plus BID and the PI Protocol (Thera-Vit-M, Shoaib C, Vit A, and Zinc sulfate).    Per Epic records, pt lives with her sister and was in her usual state of health up until a couple of days PTA when she becam short of breath.  No food allergies/intolerances noted.      CURRENT NUTRITION ORDERS  Diet Order:     NPO     Current Intake/Tolerance:  NPO x 3 days.      PHYSICAL FINDINGS  Observed  Unable to assess   Obtained from Chart/Interdisciplinary Team  Edema - 2+ LE and 1+ trace generalized  12/13:  WOCN  1. Left ear with 0.7 x 1.2 cm hard black eschar, no drainage.   2. Right achilles pressure ulcer, at least Stage " "III, 0.6 x 2.2 cm, thin eschar, no drainage. Periwound tissue intact. Is using foam cup. Right shin with old healed ulcer.  3.Left foot dorsal 1.5 x 0.6 cm, flat blood blister, not open./ Left heel  3.3 x 1.6 cm light purple,old  healed ulcer.  4.Right IT pressure ulcer 6.6 x 2.4 x 0.3 cm, white moist tissue. Small amount serous drainage. Periwound tissue intact. Has been using AquaCel at home.  5. Coccyx proximal stage III pressure ulcer 1 x 3.2 x 0.3 cm, pink tissue.Scant serous drainage.  6. Coccyx distal shear 3 x 6 x 0.1 cm, red tissue, scant serous drainage.  7. Left IT open area 4 x 7 cm with tunneling 7.2 cm. Pink tissue. Pt habitus is unusual due to Paraplegia & surgeries, difficult to assess exactly where wound is.      ANTHROPOMETRICS  Height: 5' 9\"  Admit Weight:  64 kg   Body mass index is not validated in paraplegia  IBW: 61.4 kg (adjusted for paraplegia)  % IBW:  104% IBW  Weight History:    - Usual body weight 56-69 kg over the past year.  - Question accuracy of today's weight 84.4 kg.    Wt Readings from Last 20 Encounters:   12/14/18 84.4 kg (186 lb 1.1 oz)   08/30/17 56.7 kg (125 lb)   02/27/17 69 kg (152 lb 1.9 oz)   03/26/14 56.7 kg (125 lb)   01/15/14 54.3 kg (119 lb 9.6 oz)   12/28/13 54.5 kg (120 lb 2.4 oz)   11/30/13 59 kg (130 lb)   10/30/13 58.5 kg (128 lb 15.5 oz)   10/17/13 59 kg (130 lb)   09/16/13 59 kg (130 lb)   08/29/13 59 kg (130 lb)   08/09/13 59 kg (130 lb)   03/01/13 59 kg (130 lb)   01/31/13 59.1 kg (130 lb 4.8 oz)   01/28/13 59.1 kg (130 lb 4.8 oz)   01/02/13 56.7 kg (125 lb)   12/26/12 56.9 kg (125 lb 6.4 oz)   12/04/12 59.2 kg (130 lb 8 oz)   11/15/12 59.2 kg (130 lb 8 oz)   11/08/12 59.2 kg (130 lb 8 oz)       LABS  Labs reviewed  BUN 5 (L) - Likely indicates indaquate protein intake    MEDICATIONS  Medications reviewed  LR at 50 mL/hr - for fluid delivery  Norepinephrine - for hypotension      ASSESSED NUTRITION NEEDS PER APPROVED PRACTICE GUIDELINES:    Dosing Weight:  64 " kg (admit wt)   Estimated Energy Needs:  4809-3319 kcals (25-30 Kcal/Kg)  Justification: wound and paraplegia  Estimated Protein Needs:   grams protein (1.5-1.8 g pro/Kg)  Justification: wound healing and hypercatabolism with acute illness  Estimated Fluid Needs:  0026-0185 mL (1 mL/Kcal)  Justification: maintenance    MALNUTRITION:  % Weight Loss:  None noted  % Intake:  Unable to determine without recent nutrition history  Subcutaneous Fat Loss:  Unable to assess  Muscle Loss:  Unable to assess  Fluid Retention:  Mild - May possibly be nutrition related    Malnutrition Diagnosis: Unable to determine due to lack of nutrition history      NUTRITION DIAGNOSIS:  Inadequate protein-energy intake related to impaired respiratory status on bipap and increased needs for wound healing as evidenced by PI stage 3, NPO, and meeting 0% estimated needs      NUTRITION INTERVENTIONS  Recommendations / Nutrition Prescription  ADAT and offer oral liquid nutritional supplements  Consider PI protocol vit/min in future      Implementation  Nutrition education: Not appropriate at this time due to patient condition  Collaboration and Referral of Nutrition care - Pt was discussed during ICU interdisciplinary rounds this morning.    Nutrition Goals  Nutrition to be addressed in the next 48-72 hrs.      MONITORING AND EVALUATION:  Progress towards goals will be monitored and evaluated per protocol and Practice Guidelines    Nirmala Mijares, RD, LD, CNSC
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
## 2018-12-14 NOTE — PROVIDER NOTIFICATION
Notified Person: MD hood    Notification Date/Time: 12/14/2018 0425     Notification Interaction: telephone    Purpose of Notification: tachycardia 140's and no UOP for 4 hrs @0200/0400 f    Orders Received: yes    Comments:  Give 500 LR bolus one time.  Will reassess after am lab.

## 2018-12-14 NOTE — PHARMACY-VANCOMYCIN DOSING SERVICE
Pharmacy Vancomycin Note  Date of Service 2018  Patient's  1965   53 year old, female    Indication: Sepsis  Goal Trough Level: 15-20 mg/L  Day of Therapy: 2  Current Vancomycin regimen:  1000 mg IV q8h    Current estimated CrCl = Estimated Creatinine Clearance: 165.1 mL/min (A) (based on SCr of 0.35 mg/dL (L)).    Creatinine for last 3 days  2018:  9:20 PM Creatinine 0.50 mg/dL  2018:  3:20 AM Creatinine 0.52 mg/dL  2018:  4:18 AM Creatinine 0.35 mg/dL    Recent Vancomycin Levels (past 3 days)  2018:  4:18 AM Vancomycin Level 23.0 mg/L    Vancomycin IV Administrations (past 72 hours)                   vancomycin (VANCOCIN) 1000 mg in dextrose 5% 200 mL PREMIX (mg) 1,000 mg New Bag 18 0508     1,000 mg New Bag 18 1955     1,000 mg New Bag  1247     1,000 mg New Bag  0544                Nephrotoxins and other renal medications (From now, onward)    Start     Dose/Rate Route Frequency Ordered Stop    18 1800  vancomycin (VANCOCIN) 1000 mg in dextrose 5% 200 mL PREMIX      1,000 mg  200 mL/hr over 1 Hours Intravenous EVERY 12 HOURS 18 0526      18 0630  phenylephrine (PATITO-SYNEPHRINE) 50 mg in sodium chloride 0.9 % 250 mL infusion      0.5-6 mcg/kg/min × 56.9 kg  8.5-102.4 mL/hr  Intravenous CONTINUOUS 18 0627      18 0615  norepinephrine (LEVOPHED) 16 mg in D5W 250 mL infusion      0.03-0.4 mcg/kg/min × 56.9 kg  1.6-21.3 mL/hr  Intravenous CONTINUOUS 18 0605               Contrast Orders - past 72 hours (72h ago, onward)    Start     Dose/Rate Route Frequency Ordered Stop    18 1000  perflutren diluted 1mL to 2mL with saline (OPTISON) diluted injection 9 mL     Comments:  NDC # 4962-2992-92    9 mL Intravenous ONCE 18 0950 18 0950          Interpretation of levels and current regimen:  Trough level is  Supratherapeutic    Has serum creatinine changed > 50% in last 72 hours: No    Urine output:      Renal  Function: Stable    Plan:  1.  Decrease Dose to 1000mg q12h  2.  Pharmacy will check trough levels as appropriate in 1-3 Days.    3. Serum creatinine levels will be ordered daily for the first week of therapy and at least twice weekly for subsequent weeks.      Omar Del Angel        .

## 2018-12-14 NOTE — PROGRESS NOTES
Patient has been on BiPAP 16/8 FiO2 70%. Alarm volume level 10 all day.   Switched to total face mask this am.  SpO2 low to mid 90's. Desaturates quickly when BiPAP mask removed.     Will continue to monitor.    12/14/2018  Celina Lovett, RRT

## 2018-12-14 NOTE — PROGRESS NOTES
[facility]  Infectious Disease Progress Note          Assessment and Plan:   IMPRESSION:   1.  A 53-year-old female with acute sepsis, underlying paraplegia and a chronic wound, but this appears to be respiratory infection, has infiltrates and significant pleural fluid, initially felt to have major pericardial fluid, but this turns out to be a false result, likely conventional community-acquired pneumonia, although at risk for other pathogens.   2.  Paraplegia including chronic neurogenic bladder with ileal diversion and ileostomy.   3.  Chronic pain syndrome.   4.  Chronic bilateral sacral decubitus wounds, never been diagnosed with osteomyelitis, previous infection 18 months ago but no recent suggestion of infection and appearance not looking like infection currently.   5.  ALLERGIES LISTED TO LEVAQUIN AND PENICILLIN.  The Levaquin was a rash.  The penicillin caused some kind of hyperactive reactive thing so was not a true allergy.   6 MRSA colonized     RECOMMENDATIONS:   1.  Continue  cefepime and vancomycin while awaiting cultures.  AZithromax on the assumption this is a conventional respiratory infection, in fact the imaging has the appearance of atypical infection.                Interval History:   no new complaints and doing well; no cp, sob, n/v/d, or abd pain. No fever recurrence, O2 about same no new focal sxs, WBC 17 K flu neg              Medications:       azithromycin  500 mg Intravenous Q24H     Buprenorphine HCl  450 mcg Buccal BID     ceFEPIme (MAXIPIME) IV  1 g Intravenous Q12H     heparin  5,000 Units Subcutaneous Q8H     ranitidine  150 mg Oral BID     senna-docusate  1 tablet Oral BID    Or     senna-docusate  2 tablet Oral BID     sodium chloride (PF)  3 mL Intracatheter Q8H     traZODone  100 mg Oral At Bedtime     vancomycin (VANCOCIN) IV  1,000 mg Intravenous Q12H                  Physical Exam:   Blood pressure 117/59, pulse 82, temperature 98.3  F (36.8  C),  "temperature source Axillary, resp. rate (!) 32, height 1.753 m (5' 9\"), weight 84.4 kg (186 lb 1.1 oz), SpO2 92 %, not currently breastfeeding.  Wt Readings from Last 2 Encounters:   12/14/18 84.4 kg (186 lb 1.1 oz)   08/30/17 56.7 kg (125 lb)     Vital Signs with Ranges  Temp:  [97.7  F (36.5  C)-98.7  F (37.1  C)] 98.3  F (36.8  C)  Heart Rate:  [] 100  Resp:  [10-99] 32  BP: ()/() 117/59  FiO2 (%):  [60 %] 60 %  SpO2:  [88 %-99 %] 92 %    Constitutional: Awake, alert, cooperative, mild rsp  apparent distress    Lungs: Clear to auscultation bilaterally, few crackles no wheezing   Cardiovascular: Regular rate and rhythm, normal S1 and S2, and no murmur noted   Abdomen: Normal bowel sounds, soft, non-distended, non-tender   Skin: No rashes, no cyanosis, no edema wd not seen   Other:           Data:   All microbiology laboratory data reviewed.  Recent Labs   Lab Test 12/14/18  0418 12/13/18  1111 12/12/18 2120   WBC 17.5* 15.2* 17.7*   HGB 8.8* 8.8* 10.6*   HCT 27.9* 28.5* 34.1*   MCV 81 82 83    299 337     Recent Labs   Lab Test 12/14/18  0418 12/13/18  0320 12/12/18  2120   CR 0.35* 0.52 0.50*     Recent Labs   Lab Test 02/20/17  1745   SED 61*     Recent Labs   Lab Test 12/12/18  2120 12/12/18  2115 02/18/17  1210 09/04/14  1105 04/14/14  0955 01/15/14  1005 12/28/13  0922 09/13/13  1502 01/18/13  1700   CULT No growth after 2 days No growth after 2 days Moderate growth Peptostreptococcus anaerobius Susceptibility testing not   routinely done  *  Heavy growth Enterococcus faecalis  Light growth Klebsiella pneumoniae  Light growth Coagulase negative Staphylococcus Susceptibility testing not   routinely done  Light growth Candida glabrata Susceptibility testing not routinely done  * >100,000 colonies/mL Escherichia coli  10,000 to 50,000 colonies/mL Mixed gram positive tulio  * Culture negative after 4 weeks  No anaerobes isolated  On day 2, isolated in broth only: Staphylococcus " aureus* Culture negative after 4 weeks  On day 2, isolated in broth only: Gram positive cocci in clusters Refer to  Routine Aerobic Culture for Identification. No anaerobes isolated*  On day 2, isolated in broth only: Staphylococcus aureus* >100,000 colonies/mL Mixed gram negative and positive tulio >100,000 colonies/mL Pseudomonas aeruginosa <10,000 colonies/mL Mixed gram positive tulio

## 2018-12-14 NOTE — PROGRESS NOTES
PICC order noted.   In reviewing chart found that  blood culture was  drawn on 12/12/18. Negative at this point, but will natanael for 72 hour results on 12/15/18, to place PICC.   Pt. Has functional groin line, MD okay with leaving one more day.

## 2018-12-15 ENCOUNTER — APPOINTMENT (OUTPATIENT)
Dept: GENERAL RADIOLOGY | Facility: CLINIC | Age: 53
DRG: 870 | End: 2018-12-15
Attending: SURGERY
Payer: MEDICARE

## 2018-12-15 ENCOUNTER — ANESTHESIA (OUTPATIENT)
Dept: INTENSIVE CARE | Facility: CLINIC | Age: 53
DRG: 870 | End: 2018-12-15
Payer: MEDICARE

## 2018-12-15 ENCOUNTER — ANESTHESIA EVENT (OUTPATIENT)
Dept: INTENSIVE CARE | Facility: CLINIC | Age: 53
DRG: 870 | End: 2018-12-15
Payer: MEDICARE

## 2018-12-15 ENCOUNTER — APPOINTMENT (OUTPATIENT)
Dept: GENERAL RADIOLOGY | Facility: CLINIC | Age: 53
DRG: 870 | End: 2018-12-15
Attending: HOSPITALIST
Payer: MEDICARE

## 2018-12-15 LAB
ANION GAP SERPL CALCULATED.3IONS-SCNC: 10 MMOL/L (ref 3–14)
BASE DEFICIT BLDV-SCNC: 11.4 MMOL/L
BUN SERPL-MCNC: 5 MG/DL (ref 7–30)
CALCIUM SERPL-MCNC: 7.7 MG/DL (ref 8.5–10.1)
CHLORIDE SERPL-SCNC: 119 MMOL/L (ref 94–109)
CO2 SERPL-SCNC: 17 MMOL/L (ref 20–32)
CREAT SERPL-MCNC: 0.35 MG/DL (ref 0.52–1.04)
ERYTHROCYTE [DISTWIDTH] IN BLOOD BY AUTOMATED COUNT: 17.6 % (ref 10–15)
GFR SERPL CREATININE-BSD FRML MDRD: >90 ML/MIN/1.7M2
GLUCOSE BLDC GLUCOMTR-MCNC: 131 MG/DL (ref 70–99)
GLUCOSE SERPL-MCNC: 118 MG/DL (ref 70–99)
HCO3 BLDV-SCNC: 17 MMOL/L (ref 21–28)
HCT VFR BLD AUTO: 26.2 % (ref 35–47)
HGB BLD-MCNC: 8.1 G/DL (ref 11.7–15.7)
INTERPRETATION ECG - MUSE: NORMAL
MCH RBC QN AUTO: 25.7 PG (ref 26.5–33)
MCHC RBC AUTO-ENTMCNC: 30.9 G/DL (ref 31.5–36.5)
MCV RBC AUTO: 83 FL (ref 78–100)
OXYHGB MFR BLDV: 82 %
PCO2 BLDV: 47 MM HG (ref 40–50)
PH BLDV: 7.16 PH (ref 7.32–7.43)
PLATELET # BLD AUTO: 309 10E9/L (ref 150–450)
PO2 BLDV: 55 MM HG (ref 25–47)
POTASSIUM SERPL-SCNC: 3.6 MMOL/L (ref 3.4–5.3)
PROCALCITONIN SERPL-MCNC: 0.36 NG/ML
RBC # BLD AUTO: 3.15 10E12/L (ref 3.8–5.2)
SODIUM SERPL-SCNC: 146 MMOL/L (ref 133–144)
WBC # BLD AUTO: 21.5 10E9/L (ref 4–11)

## 2018-12-15 PROCEDURE — 25000132 ZZH RX MED GY IP 250 OP 250 PS 637: Mod: GY | Performed by: SURGERY

## 2018-12-15 PROCEDURE — 25000125 ZZHC RX 250: Performed by: INTERNAL MEDICINE

## 2018-12-15 PROCEDURE — 25000128 H RX IP 250 OP 636: Performed by: NURSE PRACTITIONER

## 2018-12-15 PROCEDURE — 25000125 ZZHC RX 250: Performed by: SURGERY

## 2018-12-15 PROCEDURE — 82805 BLOOD GASES W/O2 SATURATION: CPT | Performed by: SURGERY

## 2018-12-15 PROCEDURE — 25000128 H RX IP 250 OP 636: Performed by: INTERNAL MEDICINE

## 2018-12-15 PROCEDURE — 85027 COMPLETE CBC AUTOMATED: CPT | Performed by: SURGERY

## 2018-12-15 PROCEDURE — 99291 CRITICAL CARE FIRST HOUR: CPT | Performed by: INTERNAL MEDICINE

## 2018-12-15 PROCEDURE — 25000125 ZZHC RX 250: Performed by: NURSE ANESTHETIST, CERTIFIED REGISTERED

## 2018-12-15 PROCEDURE — 40000008 ZZH STATISTIC AIRWAY CARE

## 2018-12-15 PROCEDURE — A9270 NON-COVERED ITEM OR SERVICE: HCPCS | Mod: GY | Performed by: SURGERY

## 2018-12-15 PROCEDURE — 25000128 H RX IP 250 OP 636: Performed by: SURGERY

## 2018-12-15 PROCEDURE — 84145 PROCALCITONIN (PCT): CPT | Performed by: INTERNAL MEDICINE

## 2018-12-15 PROCEDURE — 71045 X-RAY EXAM CHEST 1 VIEW: CPT

## 2018-12-15 PROCEDURE — 27210222 ZZH KIT SHRLOCK 6FR POWER PICC

## 2018-12-15 PROCEDURE — 20000003 ZZH R&B ICU

## 2018-12-15 PROCEDURE — 36569 INSJ PICC 5 YR+ W/O IMAGING: CPT

## 2018-12-15 PROCEDURE — 40000671 ZZH STATISTIC ANESTHESIA CASE

## 2018-12-15 PROCEDURE — 40000275 ZZH STATISTIC RCP TIME EA 10 MIN

## 2018-12-15 PROCEDURE — 5A1955Z RESPIRATORY VENTILATION, GREATER THAN 96 CONSECUTIVE HOURS: ICD-10-PCS | Performed by: INTERNAL MEDICINE

## 2018-12-15 PROCEDURE — 25000128 H RX IP 250 OP 636: Performed by: HOSPITALIST

## 2018-12-15 PROCEDURE — 40000986 XR CHEST PORT 1 VW

## 2018-12-15 PROCEDURE — 94002 VENT MGMT INPAT INIT DAY: CPT

## 2018-12-15 PROCEDURE — 94660 CPAP INITIATION&MGMT: CPT

## 2018-12-15 PROCEDURE — 87633 RESP VIRUS 12-25 TARGETS: CPT | Performed by: INTERNAL MEDICINE

## 2018-12-15 PROCEDURE — 00000146 ZZHCL STATISTIC GLUCOSE BY METER IP

## 2018-12-15 PROCEDURE — 25000128 H RX IP 250 OP 636: Performed by: NURSE ANESTHETIST, CERTIFIED REGISTERED

## 2018-12-15 PROCEDURE — 80048 BASIC METABOLIC PNL TOTAL CA: CPT | Performed by: SURGERY

## 2018-12-15 PROCEDURE — P9047 ALBUMIN (HUMAN), 25%, 50ML: HCPCS | Performed by: INTERNAL MEDICINE

## 2018-12-15 PROCEDURE — 25000128 H RX IP 250 OP 636

## 2018-12-15 PROCEDURE — 31500 INSERT EMERGENCY AIRWAY: CPT

## 2018-12-15 RX ORDER — PROPOFOL 10 MG/ML
INJECTION, EMULSION INTRAVENOUS PRN
Status: DISCONTINUED | OUTPATIENT
Start: 2018-12-15 | End: 2018-12-15

## 2018-12-15 RX ORDER — PROPOFOL 10 MG/ML
INJECTION, EMULSION INTRAVENOUS
Status: COMPLETED
Start: 2018-12-15 | End: 2018-12-15

## 2018-12-15 RX ORDER — PROPOFOL 10 MG/ML
5-75 INJECTION, EMULSION INTRAVENOUS CONTINUOUS
Status: DISCONTINUED | OUTPATIENT
Start: 2018-12-15 | End: 2018-12-17

## 2018-12-15 RX ORDER — NALOXONE HYDROCHLORIDE 0.4 MG/ML
.1-.4 INJECTION, SOLUTION INTRAMUSCULAR; INTRAVENOUS; SUBCUTANEOUS
Status: DISCONTINUED | OUTPATIENT
Start: 2018-12-15 | End: 2019-01-06

## 2018-12-15 RX ORDER — CHLORHEXIDINE GLUCONATE ORAL RINSE 1.2 MG/ML
15 SOLUTION DENTAL EVERY 12 HOURS
Status: DISCONTINUED | OUTPATIENT
Start: 2018-12-15 | End: 2019-01-03

## 2018-12-15 RX ORDER — ALBUMIN (HUMAN) 12.5 G/50ML
50 SOLUTION INTRAVENOUS ONCE
Status: COMPLETED | OUTPATIENT
Start: 2018-12-15 | End: 2018-12-15

## 2018-12-15 RX ORDER — SODIUM CHLORIDE 9 MG/ML
INJECTION, SOLUTION INTRAVENOUS CONTINUOUS
Status: DISCONTINUED | OUTPATIENT
Start: 2018-12-15 | End: 2018-12-18

## 2018-12-15 RX ORDER — LIDOCAINE HYDROCHLORIDE 20 MG/ML
INJECTION, SOLUTION INFILTRATION; PERINEURAL PRN
Status: DISCONTINUED | OUTPATIENT
Start: 2018-12-15 | End: 2018-12-15

## 2018-12-15 RX ADMIN — PROPOFOL 60 MG: 10 INJECTION, EMULSION INTRAVENOUS at 06:18

## 2018-12-15 RX ADMIN — HYDROCORTISONE SODIUM SUCCINATE 50 MG: 100 INJECTION, POWDER, FOR SOLUTION INTRAMUSCULAR; INTRAVENOUS at 03:05

## 2018-12-15 RX ADMIN — PROPOFOL 5 MCG/KG/MIN: 10 INJECTION, EMULSION INTRAVENOUS at 06:35

## 2018-12-15 RX ADMIN — MIDAZOLAM HYDROCHLORIDE 2 MG: 1 INJECTION, SOLUTION INTRAMUSCULAR; INTRAVENOUS at 05:45

## 2018-12-15 RX ADMIN — CHLORHEXIDINE GLUCONATE 15 ML: 1.2 RINSE ORAL at 20:26

## 2018-12-15 RX ADMIN — CEFEPIME 1 G: 1 INJECTION, POWDER, FOR SOLUTION INTRAMUSCULAR; INTRAVENOUS at 18:24

## 2018-12-15 RX ADMIN — HYDROCORTISONE SODIUM SUCCINATE 50 MG: 100 INJECTION, POWDER, FOR SOLUTION INTRAMUSCULAR; INTRAVENOUS at 20:24

## 2018-12-15 RX ADMIN — HYDROCORTISONE SODIUM SUCCINATE 50 MG: 100 INJECTION, POWDER, FOR SOLUTION INTRAMUSCULAR; INTRAVENOUS at 16:40

## 2018-12-15 RX ADMIN — SODIUM CHLORIDE, POTASSIUM CHLORIDE, SODIUM LACTATE AND CALCIUM CHLORIDE: 600; 310; 30; 20 INJECTION, SOLUTION INTRAVENOUS at 20:03

## 2018-12-15 RX ADMIN — FAMOTIDINE 20 MG: 10 INJECTION, SOLUTION INTRAVENOUS at 21:53

## 2018-12-15 RX ADMIN — PROPOFOL 45 MCG/KG/MIN: 10 INJECTION, EMULSION INTRAVENOUS at 14:27

## 2018-12-15 RX ADMIN — ENOXAPARIN SODIUM 60 MG: 60 INJECTION SUBCUTANEOUS at 21:51

## 2018-12-15 RX ADMIN — HYDROCORTISONE SODIUM SUCCINATE 50 MG: 100 INJECTION, POWDER, FOR SOLUTION INTRAMUSCULAR; INTRAVENOUS at 09:54

## 2018-12-15 RX ADMIN — FAMOTIDINE 20 MG: 10 INJECTION, SOLUTION INTRAVENOUS at 09:54

## 2018-12-15 RX ADMIN — LIDOCAINE HYDROCHLORIDE 80 MG: 20 INJECTION, SOLUTION INFILTRATION; PERINEURAL at 06:18

## 2018-12-15 RX ADMIN — VANCOMYCIN HYDROCHLORIDE 1000 MG: 1 INJECTION, SOLUTION INTRAVENOUS at 20:28

## 2018-12-15 RX ADMIN — VANCOMYCIN HYDROCHLORIDE 1000 MG: 1 INJECTION, SOLUTION INTRAVENOUS at 07:51

## 2018-12-15 RX ADMIN — AZITHROMYCIN MONOHYDRATE 500 MG: 500 INJECTION, POWDER, LYOPHILIZED, FOR SOLUTION INTRAVENOUS at 12:04

## 2018-12-15 RX ADMIN — ENOXAPARIN SODIUM 60 MG: 60 INJECTION SUBCUTANEOUS at 11:25

## 2018-12-15 RX ADMIN — PROPOFOL 45 MCG/KG/MIN: 10 INJECTION, EMULSION INTRAVENOUS at 10:08

## 2018-12-15 RX ADMIN — CEFEPIME 1 G: 1 INJECTION, POWDER, FOR SOLUTION INTRAMUSCULAR; INTRAVENOUS at 06:08

## 2018-12-15 RX ADMIN — ALBUMIN HUMAN 50 G: 0.25 SOLUTION INTRAVENOUS at 09:55

## 2018-12-15 RX ADMIN — NOREPINEPHRINE BITARTRATE 0.1 MCG/KG/MIN: 1 INJECTION INTRAVENOUS at 11:33

## 2018-12-15 RX ADMIN — PROPOFOL 45 MCG/KG/MIN: 10 INJECTION, EMULSION INTRAVENOUS at 18:27

## 2018-12-15 RX ADMIN — CHLORHEXIDINE GLUCONATE 15 ML: 1.2 RINSE ORAL at 09:53

## 2018-12-15 RX ADMIN — PROPOFOL 40 MCG/KG/MIN: 10 INJECTION, EMULSION INTRAVENOUS at 22:07

## 2018-12-15 ASSESSMENT — ACTIVITIES OF DAILY LIVING (ADL)
ADLS_ACUITY_SCORE: 25

## 2018-12-15 ASSESSMENT — MIFFLIN-ST. JEOR: SCORE: 1532.38

## 2018-12-15 NOTE — PROGRESS NOTES
Patient evaluated due to increased RR. Bipap changed from full facemask shield to face mask with no change in settings. Patient's RR increased from mid to high 20's to now consistently in the 30's. However she is able to have a conversation with me. Denies current difficulty breathing, but did state to her nurse that she is having a harder time breathing.     Exam reveals increased WOB with worsening CXR. 2 mg of Versed given to help relax the patient. She is mildly sedate with continued increased RR. I spoke with patient and daughter and recommend intubation.

## 2018-12-15 NOTE — PROGRESS NOTES
[facility] ICU RESPIRATORY NOTE  Date of Admission:12/12/18  Date of Intubation (most recent):12/15/18  Reason for Mechanical Ventilation: airway protection  Number of Days on Mechanical Ventilation: 1  Met Criteria for Pressure Support Trial: Yes  Length of Pressure Support Trial:  Reason for Stopping Pressure Support Trial:  Reason for No Pressure Support Trial: Per MD    Significant Events Today: none  Ventilation Mode: CMV/AC  (Continuous Mandatory Ventilation/ Assist Control)  FiO2 (%): 50 %  Rate Set (breaths/minute): 16 breaths/min  Tidal Volume Set (mL): 450 mL  PEEP (cm H2O): 10 cmH2O  Oxygen Concentration (%): 50 %  Resp: 15         ABG Results:  Recent Labs   Lab 12/13/18  0538   PH 7.36   PCO2 40   PO2 69*   HCO3 22   O2PER 60        ETT appearance on chest x-ray: good position    Plan:   Continue full vent support and assess for weaning readiness

## 2018-12-15 NOTE — PROGRESS NOTES
Patient on BiPAP 16/8 FiO2 80% total face mask overnight. Alarm volume set at 10. Spo2 in the mid 90's. RT will continue to monitor.

## 2018-12-15 NOTE — PROGRESS NOTES
[facility]  Infectious Disease Progress Note          Assessment and Plan:   IMPRESSION:   1.  A 53-year-old female with acute sepsis, underlying paraplegia and a chronic wound, but this appears to be respiratory infection, has infiltrates and significant pleural fluid, initially felt to have major pericardial fluid, but this turns out to be a false result, likely conventional community-acquired pneumonia, although at risk for other pathogens.   2.  Paraplegia including chronic neurogenic bladder with ileal diversion and ileostomy.   3.  Chronic pain syndrome.   4.  Chronic bilateral sacral decubitus wounds, never been diagnosed with osteomyelitis, previous infection 18 months ago but no recent suggestion of infection and appearance not looking like infection currently.   5.  ALLERGIES LISTED TO LEVAQUIN AND PENICILLIN.  The Levaquin was a rash.  The penicillin caused some kind of hyperactive reactive thing so was not a true allergy.   6 MRSA colonized     RECOMMENDATIONS:   1.  Continue  cefepime and vancomycin while awaiting cultures.  Zithromax on the assumption this is a conventional respiratory infection, in fact the imaging has the appearance of atypical infection.   2 Vent and sedated 70% O2               Interval History:   Vent sedated. No fever recurrence, O2 about same no new focal sxs, WBC 17 K flu neg              Medications:       azithromycin  500 mg Intravenous Q24H     Buprenorphine HCl  450 mcg Buccal BID     ceFEPIme (MAXIPIME) IV  1 g Intravenous Q12H     chlorhexidine  15 mL Mouth/Throat Q12H     enoxaparin  60 mg Subcutaneous Q12H     famotidine  20 mg Intravenous Q12H     hydrocortisone sodium succinate PF  50 mg Intravenous Q6H     senna-docusate  1 tablet Oral BID    Or     senna-docusate  2 tablet Oral BID     sodium chloride (PF)  10 mL Intracatheter Q8H     sodium chloride (PF)  3 mL Intracatheter Q8H     traZODone  100 mg Oral At Bedtime     vancomycin (VANCOCIN)  "IV  1,000 mg Intravenous Q12H                  Physical Exam:   Blood pressure 94/42, pulse 82, temperature 98.7  F (37.1  C), temperature source Axillary, resp. rate 14, height 1.753 m (5' 9\"), weight 86.8 kg (191 lb 5.8 oz), SpO2 100 %, not currently breastfeeding.  Wt Readings from Last 2 Encounters:   12/15/18 86.8 kg (191 lb 5.8 oz)   08/30/17 56.7 kg (125 lb)     Vital Signs with Ranges  Temp:  [98.5  F (36.9  C)-98.7  F (37.1  C)] 98.7  F (37.1  C)  Heart Rate:  [] 83  Resp:  [12-41] 14  BP: ()/(42-90) 94/42  FiO2 (%):  [60 %-100 %] 100 %  SpO2:  [73 %-100 %] 100 %    Constitutional: Vent and sedated   Lungs: Congestion to auscultation bilaterally, few crackles no wheezing   Cardiovascular: Regular rate and rhythm, normal S1 and S2, and no murmur noted   Abdomen: Normal bowel sounds, soft, non-distended, non-tender   Skin: No rashes, no cyanosis, no edema wd not seen   Other:           Data:   All microbiology laboratory data reviewed.  Recent Labs   Lab Test 12/15/18  0440 12/14/18  0418 12/13/18  1111   WBC 21.5* 17.5* 15.2*   HGB 8.1* 8.8* 8.8*   HCT 26.2* 27.9* 28.5*   MCV 83 81 82    344 299     Recent Labs   Lab Test 12/15/18  0440 12/14/18  0418 12/13/18  0320   CR 0.35* 0.35* 0.52     Recent Labs   Lab Test 02/20/17  1745   SED 61*     Recent Labs   Lab Test 12/12/18  2120 12/12/18  2115 02/18/17  1210 09/04/14  1105 04/14/14  0955 01/15/14  1005 12/28/13  0922 09/13/13  1502 01/18/13  1700   CULT No growth after 3 days No growth after 3 days Moderate growth Peptostreptococcus anaerobius Susceptibility testing not   routinely done  *  Heavy growth Enterococcus faecalis  Light growth Klebsiella pneumoniae  Light growth Coagulase negative Staphylococcus Susceptibility testing not   routinely done  Light growth Candida glabrata Susceptibility testing not routinely done  * >100,000 colonies/mL Escherichia coli  10,000 to 50,000 colonies/mL Mixed gram positive tulio  * Culture negative " after 4 weeks  No anaerobes isolated  On day 2, isolated in broth only: Staphylococcus aureus* Culture negative after 4 weeks  On day 2, isolated in broth only: Gram positive cocci in clusters Refer to  Routine Aerobic Culture for Identification. No anaerobes isolated*  On day 2, isolated in broth only: Staphylococcus aureus* >100,000 colonies/mL Mixed gram negative and positive tulio >100,000 colonies/mL Pseudomonas aeruginosa <10,000 colonies/mL Mixed gram positive tulio

## 2018-12-15 NOTE — PLAN OF CARE
Pt is alert and oriented, becoming increasingly anxious overnight. Increased O2 demands and shortness of breath. BP stable on Levo. Decision was made to intubate around 0640. Daughter notified. Multiple coccyx/buttocks pressure ulcers- all dressings changed. Ambrose required irrigation in order to drain urine. No output from colostomy. CXR this am worsening. PICC to be placed today.
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
## 2018-12-15 NOTE — PLAN OF CARE
[facility]   Intensive Care Unit   Nursing Note    Vital signs stable during the day on levo to keep MAP > 65.  Pt on ABX.  PERRL.  Moves upper extremities, lower paralyzed. Follows commands.  Tolerating Bipap. Dtr updated at the bedside this AM. Labs noted.  Planning for a PICC for tomorrow. Continue to monitor closely.        ROUTINE IP LABS (Last four results)  BMP  Recent Labs   Lab 12/14/18  0418 12/14/18  0000 12/13/18  1740 12/13/18  0320 12/12/18 2120     --   --  140 138   POTASSIUM 3.9 3.8 3.3* 3.3* 3.5   CHLORIDE 115*  --   --  110* 107   JOSH 7.3*  --   --  6.7* 7.3*   CO2 21  --   --  23 22   BUN 5*  --   --  7 7   CR 0.35*  --   --  0.52 0.50*   GLC 98  --   --  110* 113*     CBC  Recent Labs   Lab 12/14/18  0418 12/13/18  1111 12/12/18 2120   WBC 17.5* 15.2* 17.7*   RBC 3.43* 3.47* 4.12   HGB 8.8* 8.8* 10.6*   HCT 27.9* 28.5* 34.1*   MCV 81 82 83   MCH 25.7* 25.4* 25.7*   MCHC 31.5 30.9* 31.1*   RDW 17.1* 17.3* 17.3*    299 337       Richard Bah RN

## 2018-12-16 LAB
ABO + RH BLD: NORMAL
ABO + RH BLD: NORMAL
ALBUMIN SERPL-MCNC: 2.3 G/DL (ref 3.4–5)
ALP SERPL-CCNC: 109 U/L (ref 40–150)
ALT SERPL W P-5'-P-CCNC: 6 U/L (ref 0–50)
ANION GAP SERPL CALCULATED.3IONS-SCNC: 9 MMOL/L (ref 3–14)
ANION GAP SERPL CALCULATED.3IONS-SCNC: NORMAL MMOL/L (ref 6–17)
AST SERPL W P-5'-P-CCNC: 17 U/L (ref 0–45)
BASOPHILS # BLD AUTO: 0 10E9/L (ref 0–0.2)
BASOPHILS NFR BLD AUTO: 0 %
BILIRUB DIRECT SERPL-MCNC: 0.3 MG/DL (ref 0–0.2)
BILIRUB SERPL-MCNC: 0.7 MG/DL (ref 0.2–1.3)
BLD GP AB SCN SERPL QL: NORMAL
BLD PROD TYP BPU: NORMAL
BLOOD BANK CMNT PATIENT-IMP: NORMAL
BUN SERPL-MCNC: 5 MG/DL (ref 7–30)
BUN SERPL-MCNC: NORMAL MG/DL (ref 7–30)
CALCIUM SERPL-MCNC: 7.7 MG/DL (ref 8.5–10.1)
CALCIUM SERPL-MCNC: NORMAL MG/DL (ref 8.5–10.1)
CHLORIDE SERPL-SCNC: 121 MMOL/L (ref 94–109)
CHLORIDE SERPL-SCNC: NORMAL MMOL/L (ref 94–109)
CO2 SERPL-SCNC: 19 MMOL/L (ref 20–32)
CO2 SERPL-SCNC: NORMAL MMOL/L (ref 20–32)
CREAT SERPL-MCNC: 0.32 MG/DL (ref 0.52–1.04)
CREAT SERPL-MCNC: NORMAL MG/DL (ref 0.52–1.04)
DIFFERENTIAL METHOD BLD: ABNORMAL
EOSINOPHIL # BLD AUTO: 0 10E9/L (ref 0–0.7)
EOSINOPHIL NFR BLD AUTO: 0.2 %
ERYTHROCYTE [DISTWIDTH] IN BLOOD BY AUTOMATED COUNT: 17.2 % (ref 10–15)
ERYTHROCYTE [DISTWIDTH] IN BLOOD BY AUTOMATED COUNT: 17.2 % (ref 10–15)
ERYTHROCYTE [DISTWIDTH] IN BLOOD BY AUTOMATED COUNT: NORMAL % (ref 10–15)
FLUAV H1 2009 PAND RNA SPEC QL NAA+PROBE: NEGATIVE
FLUAV H1 RNA SPEC QL NAA+PROBE: NEGATIVE
FLUAV H3 RNA SPEC QL NAA+PROBE: NEGATIVE
FLUAV RNA SPEC QL NAA+PROBE: NEGATIVE
FLUBV RNA SPEC QL NAA+PROBE: NEGATIVE
GFR SERPL CREATININE-BSD FRML MDRD: >90 ML/MIN/1.7M2
GFR SERPL CREATININE-BSD FRML MDRD: NORMAL ML/MIN/1.7M2
GLUCOSE BLDC GLUCOMTR-MCNC: 100 MG/DL (ref 70–99)
GLUCOSE BLDC GLUCOMTR-MCNC: 125 MG/DL (ref 70–99)
GLUCOSE SERPL-MCNC: 113 MG/DL (ref 70–99)
GLUCOSE SERPL-MCNC: NORMAL MG/DL (ref 70–99)
HADV DNA SPEC QL NAA+PROBE: NEGATIVE
HADV DNA SPEC QL NAA+PROBE: NEGATIVE
HCT VFR BLD AUTO: 20.8 % (ref 35–47)
HCT VFR BLD AUTO: 22.1 % (ref 35–47)
HCT VFR BLD AUTO: NORMAL % (ref 35–47)
HGB BLD-MCNC: 6.6 G/DL (ref 11.7–15.7)
HGB BLD-MCNC: 7 G/DL (ref 11.7–15.7)
HGB BLD-MCNC: NORMAL G/DL (ref 11.7–15.7)
HMPV RNA SPEC QL NAA+PROBE: NEGATIVE
HPIV1 RNA SPEC QL NAA+PROBE: NEGATIVE
HPIV2 RNA SPEC QL NAA+PROBE: NEGATIVE
HPIV3 RNA SPEC QL NAA+PROBE: NEGATIVE
IMM GRANULOCYTES # BLD: 0 10E9/L (ref 0–0.4)
IMM GRANULOCYTES NFR BLD: 0.7 %
LDH SERPL L TO P-CCNC: 265 U/L (ref 81–234)
LYMPHOCYTES # BLD AUTO: 0.6 10E9/L (ref 0.8–5.3)
LYMPHOCYTES NFR BLD AUTO: 11.1 %
MAGNESIUM SERPL-MCNC: 1.9 MG/DL (ref 1.6–2.3)
MCH RBC QN AUTO: 25.3 PG (ref 26.5–33)
MCH RBC QN AUTO: 25.5 PG (ref 26.5–33)
MCH RBC QN AUTO: NORMAL PG (ref 26.5–33)
MCHC RBC AUTO-ENTMCNC: 31.7 G/DL (ref 31.5–36.5)
MCHC RBC AUTO-ENTMCNC: 31.7 G/DL (ref 31.5–36.5)
MCHC RBC AUTO-ENTMCNC: NORMAL G/DL (ref 31.5–36.5)
MCV RBC AUTO: 80 FL (ref 78–100)
MCV RBC AUTO: 80 FL (ref 78–100)
MCV RBC AUTO: NORMAL FL (ref 78–100)
MICROBIOLOGIST REVIEW: NORMAL
MONOCYTES # BLD AUTO: 0.2 10E9/L (ref 0–1.3)
MONOCYTES NFR BLD AUTO: 3.4 %
NEUTROPHILS # BLD AUTO: 4.7 10E9/L (ref 1.6–8.3)
NEUTROPHILS NFR BLD AUTO: 84.6 %
NRBC # BLD AUTO: 0 10*3/UL
NRBC BLD AUTO-RTO: 0 /100
NUM BPU REQUESTED: 1
PLATELET # BLD AUTO: 128 10E9/L (ref 150–450)
PLATELET # BLD AUTO: 139 10E9/L (ref 150–450)
PLATELET # BLD AUTO: NORMAL 10E9/L (ref 150–450)
POTASSIUM SERPL-SCNC: 2.3 MMOL/L (ref 3.4–5.3)
POTASSIUM SERPL-SCNC: 3.3 MMOL/L (ref 3.4–5.3)
POTASSIUM SERPL-SCNC: 3.5 MMOL/L (ref 3.4–5.3)
POTASSIUM SERPL-SCNC: NORMAL MMOL/L (ref 3.4–5.3)
PROT SERPL-MCNC: 5.4 G/DL (ref 6.8–8.8)
RBC # BLD AUTO: 2.59 10E12/L (ref 3.8–5.2)
RBC # BLD AUTO: 2.77 10E12/L (ref 3.8–5.2)
RBC # BLD AUTO: NORMAL 10E12/L (ref 3.8–5.2)
RHINOVIRUS RNA SPEC QL NAA+PROBE: NEGATIVE
RSV RNA SPEC QL NAA+PROBE: NEGATIVE
RSV RNA SPEC QL NAA+PROBE: NEGATIVE
SODIUM SERPL-SCNC: 149 MMOL/L (ref 133–144)
SODIUM SERPL-SCNC: NORMAL MMOL/L (ref 133–144)
SPECIMEN EXP DATE BLD: NORMAL
SPECIMEN SOURCE: NORMAL
VANCOMYCIN SERPL-MCNC: 25.6 MG/L
WBC # BLD AUTO: 5.6 10E9/L (ref 4–11)
WBC # BLD AUTO: 5.7 10E9/L (ref 4–11)
WBC # BLD AUTO: NORMAL 10E9/L (ref 4–11)

## 2018-12-16 PROCEDURE — 25000132 ZZH RX MED GY IP 250 OP 250 PS 637: Mod: GY | Performed by: HOSPITALIST

## 2018-12-16 PROCEDURE — 83615 LACTATE (LD) (LDH) ENZYME: CPT | Performed by: INTERNAL MEDICINE

## 2018-12-16 PROCEDURE — 25000128 H RX IP 250 OP 636: Performed by: NURSE PRACTITIONER

## 2018-12-16 PROCEDURE — 83735 ASSAY OF MAGNESIUM: CPT | Performed by: INTERNAL MEDICINE

## 2018-12-16 PROCEDURE — 20000003 ZZH R&B ICU

## 2018-12-16 PROCEDURE — A9270 NON-COVERED ITEM OR SERVICE: HCPCS | Mod: GY | Performed by: HOSPITALIST

## 2018-12-16 PROCEDURE — 25000128 H RX IP 250 OP 636: Performed by: INTERNAL MEDICINE

## 2018-12-16 PROCEDURE — 80202 ASSAY OF VANCOMYCIN: CPT | Performed by: HOSPITALIST

## 2018-12-16 PROCEDURE — 25000132 ZZH RX MED GY IP 250 OP 250 PS 637: Mod: GY | Performed by: SURGERY

## 2018-12-16 PROCEDURE — 86901 BLOOD TYPING SEROLOGIC RH(D): CPT | Performed by: HOSPITALIST

## 2018-12-16 PROCEDURE — A9270 NON-COVERED ITEM OR SERVICE: HCPCS | Mod: GY | Performed by: SURGERY

## 2018-12-16 PROCEDURE — 25000128 H RX IP 250 OP 636: Performed by: HOSPITALIST

## 2018-12-16 PROCEDURE — 80076 HEPATIC FUNCTION PANEL: CPT | Performed by: INTERNAL MEDICINE

## 2018-12-16 PROCEDURE — 86923 COMPATIBILITY TEST ELECTRIC: CPT | Performed by: HOSPITALIST

## 2018-12-16 PROCEDURE — 40000008 ZZH STATISTIC AIRWAY CARE

## 2018-12-16 PROCEDURE — 25000125 ZZHC RX 250: Performed by: INTERNAL MEDICINE

## 2018-12-16 PROCEDURE — 40000275 ZZH STATISTIC RCP TIME EA 10 MIN

## 2018-12-16 PROCEDURE — 94003 VENT MGMT INPAT SUBQ DAY: CPT

## 2018-12-16 PROCEDURE — 25000128 H RX IP 250 OP 636: Performed by: SURGERY

## 2018-12-16 PROCEDURE — 86900 BLOOD TYPING SEROLOGIC ABO: CPT | Performed by: HOSPITALIST

## 2018-12-16 PROCEDURE — 40000239 ZZH STATISTIC VAT ROUNDS

## 2018-12-16 PROCEDURE — 00000146 ZZHCL STATISTIC GLUCOSE BY METER IP

## 2018-12-16 PROCEDURE — 99291 CRITICAL CARE FIRST HOUR: CPT | Performed by: INTERNAL MEDICINE

## 2018-12-16 PROCEDURE — 85027 COMPLETE CBC AUTOMATED: CPT | Performed by: HOSPITALIST

## 2018-12-16 PROCEDURE — 86850 RBC ANTIBODY SCREEN: CPT | Performed by: HOSPITALIST

## 2018-12-16 PROCEDURE — 84132 ASSAY OF SERUM POTASSIUM: CPT | Performed by: INTERNAL MEDICINE

## 2018-12-16 PROCEDURE — 87070 CULTURE OTHR SPECIMN AEROBIC: CPT | Performed by: INTERNAL MEDICINE

## 2018-12-16 PROCEDURE — 85025 COMPLETE CBC W/AUTO DIFF WBC: CPT | Performed by: INTERNAL MEDICINE

## 2018-12-16 PROCEDURE — 80048 BASIC METABOLIC PNL TOTAL CA: CPT | Performed by: HOSPITALIST

## 2018-12-16 RX ADMIN — CHLORHEXIDINE GLUCONATE 15 ML: 1.2 RINSE ORAL at 09:18

## 2018-12-16 RX ADMIN — HYDROCORTISONE SODIUM SUCCINATE 50 MG: 100 INJECTION, POWDER, FOR SOLUTION INTRAMUSCULAR; INTRAVENOUS at 13:57

## 2018-12-16 RX ADMIN — SENNOSIDES AND DOCUSATE SODIUM 2 TABLET: 8.6; 5 TABLET ORAL at 21:49

## 2018-12-16 RX ADMIN — PROPOFOL 40 MCG/KG/MIN: 10 INJECTION, EMULSION INTRAVENOUS at 20:00

## 2018-12-16 RX ADMIN — POTASSIUM CHLORIDE 20 MEQ: 29.8 INJECTION, SOLUTION INTRAVENOUS at 07:41

## 2018-12-16 RX ADMIN — HYDROCORTISONE SODIUM SUCCINATE 50 MG: 100 INJECTION, POWDER, FOR SOLUTION INTRAMUSCULAR; INTRAVENOUS at 09:10

## 2018-12-16 RX ADMIN — TRAZODONE HYDROCHLORIDE 100 MG: 100 TABLET ORAL at 21:49

## 2018-12-16 RX ADMIN — CHLORHEXIDINE GLUCONATE 15 ML: 1.2 RINSE ORAL at 21:48

## 2018-12-16 RX ADMIN — POTASSIUM CHLORIDE 20 MEQ: 29.8 INJECTION, SOLUTION INTRAVENOUS at 17:11

## 2018-12-16 RX ADMIN — FAMOTIDINE 20 MG: 10 INJECTION, SOLUTION INTRAVENOUS at 21:49

## 2018-12-16 RX ADMIN — CEFEPIME 1 G: 1 INJECTION, POWDER, FOR SOLUTION INTRAMUSCULAR; INTRAVENOUS at 17:11

## 2018-12-16 RX ADMIN — Medication 0.2 MG: at 21:49

## 2018-12-16 RX ADMIN — AZITHROMYCIN MONOHYDRATE 500 MG: 500 INJECTION, POWDER, LYOPHILIZED, FOR SOLUTION INTRAVENOUS at 09:42

## 2018-12-16 RX ADMIN — POTASSIUM CHLORIDE 20 MEQ: 29.8 INJECTION, SOLUTION INTRAVENOUS at 14:42

## 2018-12-16 RX ADMIN — PROPOFOL 40 MCG/KG/MIN: 10 INJECTION, EMULSION INTRAVENOUS at 07:48

## 2018-12-16 RX ADMIN — CEFEPIME 1 G: 1 INJECTION, POWDER, FOR SOLUTION INTRAMUSCULAR; INTRAVENOUS at 06:15

## 2018-12-16 RX ADMIN — MAGNESIUM SULFATE HEPTAHYDRATE 2 G: 40 INJECTION, SOLUTION INTRAVENOUS at 16:00

## 2018-12-16 RX ADMIN — PROPOFOL 40 MCG/KG/MIN: 10 INJECTION, EMULSION INTRAVENOUS at 03:41

## 2018-12-16 RX ADMIN — HYDROCORTISONE SODIUM SUCCINATE 50 MG: 100 INJECTION, POWDER, FOR SOLUTION INTRAMUSCULAR; INTRAVENOUS at 21:48

## 2018-12-16 RX ADMIN — PROPOFOL 35 MCG/KG/MIN: 10 INJECTION, EMULSION INTRAVENOUS at 16:00

## 2018-12-16 RX ADMIN — POTASSIUM CHLORIDE 20 MEQ: 29.8 INJECTION, SOLUTION INTRAVENOUS at 08:43

## 2018-12-16 RX ADMIN — FAMOTIDINE 20 MG: 10 INJECTION, SOLUTION INTRAVENOUS at 09:12

## 2018-12-16 RX ADMIN — HYDROCORTISONE SODIUM SUCCINATE 50 MG: 100 INJECTION, POWDER, FOR SOLUTION INTRAMUSCULAR; INTRAVENOUS at 02:13

## 2018-12-16 RX ADMIN — POTASSIUM CHLORIDE 20 MEQ: 29.8 INJECTION, SOLUTION INTRAVENOUS at 06:23

## 2018-12-16 RX ADMIN — ENOXAPARIN SODIUM 60 MG: 60 INJECTION SUBCUTANEOUS at 21:48

## 2018-12-16 ASSESSMENT — ACTIVITIES OF DAILY LIVING (ADL)
ADLS_ACUITY_SCORE: 25

## 2018-12-16 ASSESSMENT — MIFFLIN-ST. JEOR: SCORE: 1557.38

## 2018-12-16 NOTE — PROGRESS NOTES
[facility] ICU RESPIRATORY NOTE    Date of Admission: 12/12/2018  Date of Intubation (most recent): 12/16/2018  Reason for Mechanical Ventilation: Respiratory failure  Number of Days on Mechanical Ventilation: 1  Met Criteria for Pressure Support Trial: No  Length of Pressure Support Trial: N/A  Reason for Stopping Pressure Support Trial: N/A  Reason for No Pressure Support Trial: per MD    Significant Events Today: None overnight    ABG Results:   Recent Labs   Lab 12/13/18  0538   PH 7.36   PCO2 40   PO2 69*   HCO3 22   O2PER 60     Ventilation Mode: CMV/AC  (Continuous Mandatory Ventilation/ Assist Control)  FiO2 (%): 50 %  Rate Set (breaths/minute): 16 breaths/min  Tidal Volume Set (mL): 450 mL  PEEP (cm H2O): 10 cmH2O  Oxygen Concentration (%): 50 %  Resp: 8    Plan: Continue patient on full ventilatory support and assess for weaning/PS. RT will continue to monitor.

## 2018-12-16 NOTE — PLAN OF CARE
Arouses to vigorous stimulation. Appears quite weak, minimal movement to BUE; paraplegia. PERRL. Able to nod yes and no to questions. Sedation vacation performed; became tachypneic into 40s overbreathing vent; sedation resumed. Levophed required intermittently to keep MAP > 65. Adequate UO per ileal conduit; lots of sediment and mucous noted; positional, needing irrigation periodically. Colostomy with small amt output. Multiple wounds to buttocks/coccyx, bilateral heels, bilateral ears, shins. Dressings C/D/I. WOC consult placed for L ear suspected PI. Prevalon boots placed. PICC patent. K replaced, recheck at 3.3; replacement in progress. Also, Mg ordered from pharmacy for replacement. Dr. Hong stated to hold off on blood transfusion after recheck Hgb. Lovenox held per Dr. Hong d/t drop in platelets. Daughter updated.

## 2018-12-16 NOTE — PROGRESS NOTES
[facility]  Infectious Disease Progress Note          Assessment and Plan:   IMPRESSION:   1.  A 53-year-old female with acute sepsis, underlying paraplegia and a chronic wound, but this appears to be respiratory infection, has infiltrates and significant pleural fluid, initially felt to have major pericardial fluid, but this turns out to be a false result, likely conventional community-acquired pneumonia, although at risk for other pathogens.   2.  Paraplegia including chronic neurogenic bladder with ileal diversion and ileostomy.   3.  Chronic pain syndrome.   4.  Chronic bilateral sacral decubitus wounds, never been diagnosed with osteomyelitis, previous infection 18 months ago but no recent suggestion of infection and appearance not looking like infection currently.   5.  ALLERGIES LISTED TO LEVAQUIN AND PENICILLIN.  The Levaquin was a rash.  The penicillin caused some kind of hyperactive reactive thing so was not a true allergy.   6 MRSA colonized     RECOMMENDATIONS:   1.  Continue  cefepime and vancomycin , cxs all neg.  Zithromax on the assumption this is a conventional respiratory infection, in fact the imaging has the appearance of atypical infection.   2 Vent and sedated 40% O2, slight better               Interval History:   Vent sedated. No fever recurrence, O2 about same no new focal sxs, WBC 5 K flu neg              Medications:       azithromycin  500 mg Intravenous Q24H     Buprenorphine HCl  450 mcg Buccal BID     ceFEPIme (MAXIPIME) IV  1 g Intravenous Q12H     chlorhexidine  15 mL Mouth/Throat Q12H     enoxaparin  60 mg Subcutaneous Q12H     famotidine  20 mg Intravenous Q12H     hydrocortisone sodium succinate PF  50 mg Intravenous Q6H     senna-docusate  1 tablet Oral BID    Or     senna-docusate  2 tablet Oral BID     sodium chloride (PF)  10 mL Intracatheter Q8H     sodium chloride (PF)  3 mL Intracatheter Q8H     traZODone  100 mg Oral At Bedtime     vancomycin place  "paige - receiving intermittent dosing  1 each Intravenous See Admin Instructions                  Physical Exam:   Blood pressure 97/48, pulse 82, temperature 97.5  F (36.4  C), temperature source Axillary, resp. rate 17, height 1.753 m (5' 9\"), weight 89.3 kg (196 lb 13.9 oz), SpO2 100 %, not currently breastfeeding.  Wt Readings from Last 2 Encounters:   12/16/18 89.3 kg (196 lb 13.9 oz)   08/30/17 56.7 kg (125 lb)     Vital Signs with Ranges  Temp:  [97.3  F (36.3  C)-97.5  F (36.4  C)] 97.5  F (36.4  C)  Heart Rate:  [65-93] 65  Resp:  [0-33] 17  BP: ()/(46-77) 97/48  FiO2 (%):  [50 %-60 %] 50 %  SpO2:  [79 %-100 %] 100 %    Constitutional: Vent and sedated   Lungs: Congestion to auscultation bilaterally, few crackles no wheezing   Cardiovascular: Regular rate and rhythm, normal S1 and S2, and no murmur noted   Abdomen: Normal bowel sounds, soft, non-distended, non-tender   Skin: No rashes, no cyanosis, no edema wd not seen   Other:           Data:   All microbiology laboratory data reviewed.  Recent Labs   Lab Test 12/16/18  0525 12/16/18  0437 12/15/18  0440   WBC 5.7 Canceled, Test credited 21.5*   HGB 6.6* Canceled, Test credited 8.1*   HCT 20.8* Canceled, Test credited 26.2*   MCV 80 Canceled, Test credited 83   * Canceled, Test credited 309     Recent Labs   Lab Test 12/16/18  0525 12/16/18  0437 12/15/18  0440   CR 0.32* Canceled, Test credited 0.35*     Recent Labs   Lab Test 02/20/17  1745   SED 61*     Recent Labs   Lab Test 12/12/18  2120 12/12/18  2115 02/18/17  1210 09/04/14  1105 04/14/14  0955 01/15/14  1005 12/28/13  0922 09/13/13  1502 01/18/13  1700   CULT No growth after 4 days No growth after 4 days Moderate growth Peptostreptococcus anaerobius Susceptibility testing not   routinely done  *  Heavy growth Enterococcus faecalis  Light growth Klebsiella pneumoniae  Light growth Coagulase negative Staphylococcus Susceptibility testing not   routinely done  Light growth Candida " glabrata Susceptibility testing not routinely done  * >100,000 colonies/mL Escherichia coli  10,000 to 50,000 colonies/mL Mixed gram positive tulio  * Culture negative after 4 weeks  No anaerobes isolated  On day 2, isolated in broth only: Staphylococcus aureus* Culture negative after 4 weeks  On day 2, isolated in broth only: Gram positive cocci in clusters Refer to  Routine Aerobic Culture for Identification. No anaerobes isolated*  On day 2, isolated in broth only: Staphylococcus aureus* >100,000 colonies/mL Mixed gram negative and positive tulio >100,000 colonies/mL Pseudomonas aeruginosa <10,000 colonies/mL Mixed gram positive tulio

## 2018-12-16 NOTE — PROGRESS NOTES
[facility] RESP[facility]  Date of Admission:12/12/2018  Date of Intubation (most recent):12/15/2018  Reason for Mechanical Ventilation:Respiratory failure  Number of Days on Mechanical Ventilation:2  Met Criteria for Pressure Support Trial:No  Reason for No Pressure Support Trial:PEEP 10    Ventilation Mode: CMV/AC  (Continuous Mandatory Ventilation/ Assist Control)  FiO2 (%): 40 %  Rate Set (breaths/minute): 16 breaths/min  Tidal Volume Set (mL): 450 mL  PEEP (cm H2O): 10 cmH2O  Oxygen Concentration (%): 40 %  Resp: 25        Significant Events Today:None    ABG Results:  Recent Labs   Lab 12/13/18  0538   PH 7.36   PCO2 40   PO2 69*   HCO3 22   O2PER 60         ETT appearance on chest x-ray: In good position    Plan:  Will cont full vent support for now and assess for weaning readiness daily.  12/16/2018  Kylie Silva RRT

## 2018-12-16 NOTE — PHARMACY-VANCOMYCIN DOSING SERVICE
Pharmacy Vancomycin Note  Date of Service 2018  Patient's  1965   53 year old, female    Indication: Sepsis  Goal Trough Level: 15-20 mg/L  Day of Therapy: 4  Current Vancomycin regimen:  1000 mg IV q12h    Current estimated CrCl = Estimated Creatinine Clearance: 239.2 mL/min (A) (based on SCr of 0.32 mg/dL (L)).    Creatinine for last 3 days  2018:  4:18 AM Creatinine 0.35 mg/dL  12/15/2018:  4:40 AM Creatinine 0.35 mg/dL  2018:  4:37 AM Creatinine Canceled, Test credited mg/dL;  5:25 AM Creatinine 0.32 mg/dL    Recent Vancomycin Levels (past 3 days)  2018:  4:18 AM Vancomycin Level 23.0 mg/L  2018:  9:00 AM Vancomycin Level 25.6 mg/L    Vancomycin IV Administrations (past 72 hours)                   vancomycin (VANCOCIN) 1000 mg in dextrose 5% 200 mL PREMIX (mg) 1,000 mg New Bag 12/15/18 2028     1,000 mg New Bag  0751     1,000 mg New Bag 18 1938                Nephrotoxins and other renal medications (From now, onward)    Start     Dose/Rate Route Frequency Ordered Stop    18 1800  vancomycin (VANCOCIN) 1000 mg in dextrose 5% 200 mL PREMIX      1,000 mg  200 mL/hr over 1 Hours Intravenous EVERY 12 HOURS 18 0526      18 0630  phenylephrine (PATITO-SYNEPHRINE) 50 mg in sodium chloride 0.9 % 250 mL infusion      0.5-6 mcg/kg/min × 56.9 kg  8.5-102.4 mL/hr  Intravenous CONTINUOUS 18 0627      18 0615  norepinephrine (LEVOPHED) 16 mg in D5W 250 mL infusion      0.03-0.4 mcg/kg/min × 56.9 kg  1.6-21.3 mL/hr  Intravenous CONTINUOUS 18 0605               Contrast Orders - past 72 hours (72h ago, onward)    None          Interpretation of levels and current regimen:  Trough level is  Supratherapeutic    Has serum creatinine changed > 50% in last 72 hours: No    Urine output:  diminished urine output    Renal Function: Worsening    Plan:  1.  Change to Intermittent doses based on levels  2.  Pharmacy will check trough levels as appropriate  in AM 12-17.    3. Serum creatinine levels will be ordered a minimum of twice weekly.      Laura Sykes        .

## 2018-12-16 NOTE — PLAN OF CARE
[facility]   Intensive Care Unit   Nursing Note    Vital signs stable during the day.  PERRL.  Pt was sedated and tolerating ventilator on propofol at 45.  Good urine output. Labs noted.  Dtr updated at the bedside. Continue to monitor closely.      ROUTINE IP LABS (Last four results)  BMP  Recent Labs   Lab 12/15/18  0440 12/14/18  0418 12/14/18  0000 12/13/18  1740 12/13/18  0320 12/12/18 2120   * 143  --   --  140 138   POTASSIUM 3.6 3.9 3.8 3.3* 3.3* 3.5   CHLORIDE 119* 115*  --   --  110* 107   JOSH 7.7* 7.3*  --   --  6.7* 7.3*   CO2 17* 21  --   --  23 22   BUN 5* 5*  --   --  7 7   CR 0.35* 0.35*  --   --  0.52 0.50*   * 98  --   --  110* 113*     CBC  Recent Labs   Lab 12/15/18  0440 12/14/18  0418 12/13/18  1111 12/12/18 2120   WBC 21.5* 17.5* 15.2* 17.7*   RBC 3.15* 3.43* 3.47* 4.12   HGB 8.1* 8.8* 8.8* 10.6*   HCT 26.2* 27.9* 28.5* 34.1*   MCV 83 81 82 83   MCH 25.7* 25.7* 25.4* 25.7*   MCHC 30.9* 31.5 30.9* 31.1*   RDW 17.6* 17.1* 17.3* 17.3*    344 299 337       Richard Bah RN

## 2018-12-16 NOTE — PROGRESS NOTES
TELE:  Notified of hgb 6.6 this morning and k of 2.3.  K repletion protocol already ordered.  Ordering one unit of blood--pt is hemodynamically stable, no active bleeding known.

## 2018-12-17 ENCOUNTER — APPOINTMENT (OUTPATIENT)
Dept: GENERAL RADIOLOGY | Facility: CLINIC | Age: 53
DRG: 870 | End: 2018-12-17
Attending: INTERNAL MEDICINE
Payer: MEDICARE

## 2018-12-17 LAB
ANION GAP SERPL CALCULATED.3IONS-SCNC: 13 MMOL/L (ref 3–14)
BUN SERPL-MCNC: 7 MG/DL (ref 7–30)
CALCIUM SERPL-MCNC: 7.9 MG/DL (ref 8.5–10.1)
CHLORIDE SERPL-SCNC: 120 MMOL/L (ref 94–109)
CO2 SERPL-SCNC: 17 MMOL/L (ref 20–32)
CREAT SERPL-MCNC: 0.35 MG/DL (ref 0.52–1.04)
ERYTHROCYTE [DISTWIDTH] IN BLOOD BY AUTOMATED COUNT: 17.5 % (ref 10–15)
GFR SERPL CREATININE-BSD FRML MDRD: >90 ML/MIN/1.7M2
GLUCOSE SERPL-MCNC: 115 MG/DL (ref 70–99)
HCT VFR BLD AUTO: 23.1 % (ref 35–47)
HGB BLD-MCNC: 7.4 G/DL (ref 11.7–15.7)
MAGNESIUM SERPL-MCNC: 2.5 MG/DL (ref 1.6–2.3)
MCH RBC QN AUTO: 25.5 PG (ref 26.5–33)
MCHC RBC AUTO-ENTMCNC: 32 G/DL (ref 31.5–36.5)
MCV RBC AUTO: 80 FL (ref 78–100)
PLATELET # BLD AUTO: 165 10E9/L (ref 150–450)
POTASSIUM SERPL-SCNC: 3.1 MMOL/L (ref 3.4–5.3)
POTASSIUM SERPL-SCNC: 3.4 MMOL/L (ref 3.4–5.3)
RBC # BLD AUTO: 2.9 10E12/L (ref 3.8–5.2)
SODIUM SERPL-SCNC: 150 MMOL/L (ref 133–144)
VANCOMYCIN SERPL-MCNC: 17.6 MG/L
WBC # BLD AUTO: 6.6 10E9/L (ref 4–11)

## 2018-12-17 PROCEDURE — A9270 NON-COVERED ITEM OR SERVICE: HCPCS | Mod: GY | Performed by: INTERNAL MEDICINE

## 2018-12-17 PROCEDURE — A9270 NON-COVERED ITEM OR SERVICE: HCPCS | Mod: GY | Performed by: HOSPITALIST

## 2018-12-17 PROCEDURE — 40000275 ZZH STATISTIC RCP TIME EA 10 MIN

## 2018-12-17 PROCEDURE — 25000128 H RX IP 250 OP 636: Performed by: INTERNAL MEDICINE

## 2018-12-17 PROCEDURE — 25000128 H RX IP 250 OP 636: Performed by: NURSE PRACTITIONER

## 2018-12-17 PROCEDURE — 40000239 ZZH STATISTIC VAT ROUNDS

## 2018-12-17 PROCEDURE — 71045 X-RAY EXAM CHEST 1 VIEW: CPT

## 2018-12-17 PROCEDURE — 25000125 ZZHC RX 250: Performed by: NURSE PRACTITIONER

## 2018-12-17 PROCEDURE — 84132 ASSAY OF SERUM POTASSIUM: CPT | Performed by: INTERNAL MEDICINE

## 2018-12-17 PROCEDURE — 25000125 ZZHC RX 250: Performed by: INTERNAL MEDICINE

## 2018-12-17 PROCEDURE — 20000003 ZZH R&B ICU

## 2018-12-17 PROCEDURE — 80202 ASSAY OF VANCOMYCIN: CPT | Performed by: SURGERY

## 2018-12-17 PROCEDURE — 83735 ASSAY OF MAGNESIUM: CPT | Performed by: SURGERY

## 2018-12-17 PROCEDURE — 25000132 ZZH RX MED GY IP 250 OP 250 PS 637: Mod: GY | Performed by: INTERNAL MEDICINE

## 2018-12-17 PROCEDURE — 25000128 H RX IP 250 OP 636: Performed by: SURGERY

## 2018-12-17 PROCEDURE — 94003 VENT MGMT INPAT SUBQ DAY: CPT

## 2018-12-17 PROCEDURE — 80048 BASIC METABOLIC PNL TOTAL CA: CPT | Performed by: SURGERY

## 2018-12-17 PROCEDURE — 40000008 ZZH STATISTIC AIRWAY CARE

## 2018-12-17 PROCEDURE — 25000128 H RX IP 250 OP 636: Performed by: HOSPITALIST

## 2018-12-17 PROCEDURE — 85027 COMPLETE CBC AUTOMATED: CPT | Performed by: SURGERY

## 2018-12-17 PROCEDURE — 99291 CRITICAL CARE FIRST HOUR: CPT | Performed by: INTERNAL MEDICINE

## 2018-12-17 PROCEDURE — 25000132 ZZH RX MED GY IP 250 OP 250 PS 637: Mod: GY | Performed by: HOSPITALIST

## 2018-12-17 RX ORDER — VANCOMYCIN HYDROCHLORIDE 1 G/200ML
1000 INJECTION, SOLUTION INTRAVENOUS ONCE
Status: COMPLETED | OUTPATIENT
Start: 2018-12-17 | End: 2018-12-17

## 2018-12-17 RX ORDER — ZINC SULFATE 50(220)MG
220 CAPSULE ORAL DAILY
Status: DISCONTINUED | OUTPATIENT
Start: 2018-12-17 | End: 2018-12-17

## 2018-12-17 RX ORDER — ZINC SULFATE 50(220)MG
220 CAPSULE ORAL
Status: DISCONTINUED | OUTPATIENT
Start: 2018-12-17 | End: 2018-12-19

## 2018-12-17 RX ORDER — FUROSEMIDE 10 MG/ML
20 INJECTION INTRAMUSCULAR; INTRAVENOUS ONCE
Status: COMPLETED | OUTPATIENT
Start: 2018-12-17 | End: 2018-12-17

## 2018-12-17 RX ORDER — PROPOFOL 10 MG/ML
5-75 INJECTION, EMULSION INTRAVENOUS CONTINUOUS
Status: DISCONTINUED | OUTPATIENT
Start: 2018-12-17 | End: 2018-12-18

## 2018-12-17 RX ADMIN — SODIUM CHLORIDE, POTASSIUM CHLORIDE, SODIUM LACTATE AND CALCIUM CHLORIDE: 600; 310; 30; 20 INJECTION, SOLUTION INTRAVENOUS at 16:56

## 2018-12-17 RX ADMIN — SODIUM CHLORIDE, POTASSIUM CHLORIDE, SODIUM LACTATE AND CALCIUM CHLORIDE: 600; 310; 30; 20 INJECTION, SOLUTION INTRAVENOUS at 01:23

## 2018-12-17 RX ADMIN — POTASSIUM CHLORIDE 40 MEQ: 1.5 POWDER, FOR SOLUTION ORAL at 06:07

## 2018-12-17 RX ADMIN — AZITHROMYCIN MONOHYDRATE 500 MG: 500 INJECTION, POWDER, LYOPHILIZED, FOR SOLUTION INTRAVENOUS at 10:38

## 2018-12-17 RX ADMIN — HYDROCORTISONE SODIUM SUCCINATE 50 MG: 100 INJECTION, POWDER, FOR SOLUTION INTRAMUSCULAR; INTRAVENOUS at 01:31

## 2018-12-17 RX ADMIN — POTASSIUM CHLORIDE 20 MEQ: 1.5 POWDER, FOR SOLUTION ORAL at 08:54

## 2018-12-17 RX ADMIN — SODIUM CHLORIDE: 9 INJECTION, SOLUTION INTRAVENOUS at 00:27

## 2018-12-17 RX ADMIN — MULTIVITAMIN 15 ML: LIQUID ORAL at 18:36

## 2018-12-17 RX ADMIN — HYDROCORTISONE SODIUM SUCCINATE 50 MG: 100 INJECTION, POWDER, FOR SOLUTION INTRAMUSCULAR; INTRAVENOUS at 08:51

## 2018-12-17 RX ADMIN — CEFEPIME 1 G: 1 INJECTION, POWDER, FOR SOLUTION INTRAMUSCULAR; INTRAVENOUS at 18:34

## 2018-12-17 RX ADMIN — CHLORHEXIDINE GLUCONATE 15 ML: 1.2 RINSE ORAL at 08:50

## 2018-12-17 RX ADMIN — CEFEPIME 1 G: 1 INJECTION, POWDER, FOR SOLUTION INTRAMUSCULAR; INTRAVENOUS at 06:01

## 2018-12-17 RX ADMIN — POTASSIUM CHLORIDE 20 MEQ: 29.8 INJECTION, SOLUTION INTRAVENOUS at 21:51

## 2018-12-17 RX ADMIN — SODIUM CHLORIDE 1000 ML: 9 INJECTION, SOLUTION INTRAVENOUS at 18:42

## 2018-12-17 RX ADMIN — HYDROCORTISONE SODIUM SUCCINATE 50 MG: 100 INJECTION, POWDER, FOR SOLUTION INTRAMUSCULAR; INTRAVENOUS at 18:44

## 2018-12-17 RX ADMIN — PROPOFOL 10 MCG/KG/MIN: 10 INJECTION, EMULSION INTRAVENOUS at 16:51

## 2018-12-17 RX ADMIN — FAMOTIDINE 20 MG: 10 INJECTION, SOLUTION INTRAVENOUS at 08:58

## 2018-12-17 RX ADMIN — SENNOSIDES AND DOCUSATE SODIUM 1 TABLET: 8.6; 5 TABLET ORAL at 08:54

## 2018-12-17 RX ADMIN — Medication 7 ML: at 18:36

## 2018-12-17 RX ADMIN — CHLORHEXIDINE GLUCONATE 15 ML: 1.2 RINSE ORAL at 20:51

## 2018-12-17 RX ADMIN — ZINC SULFATE CAP 220 MG (50 MG ELEMENTAL ZN) 220 MG: 220 (50 ZN) CAP at 18:37

## 2018-12-17 RX ADMIN — DEXMEDETOMIDINE HYDROCHLORIDE 0.2 MCG/KG/HR: 100 INJECTION, SOLUTION INTRAVENOUS at 12:25

## 2018-12-17 RX ADMIN — FUROSEMIDE 20 MG: 10 INJECTION, SOLUTION INTRAMUSCULAR; INTRAVENOUS at 02:57

## 2018-12-17 RX ADMIN — ENOXAPARIN SODIUM 60 MG: 60 INJECTION SUBCUTANEOUS at 12:26

## 2018-12-17 RX ADMIN — FAMOTIDINE 20 MG: 10 INJECTION, SOLUTION INTRAVENOUS at 21:10

## 2018-12-17 RX ADMIN — VANCOMYCIN HYDROCHLORIDE 1000 MG: 1 INJECTION, SOLUTION INTRAVENOUS at 10:42

## 2018-12-17 RX ADMIN — PROPOFOL 40 MCG/KG/MIN: 10 INJECTION, EMULSION INTRAVENOUS at 01:24

## 2018-12-17 RX ADMIN — PROPOFOL 40 MCG/KG/MIN: 10 INJECTION, EMULSION INTRAVENOUS at 06:06

## 2018-12-17 ASSESSMENT — ACTIVITIES OF DAILY LIVING (ADL)
ADLS_ACUITY_SCORE: 25

## 2018-12-17 ASSESSMENT — MIFFLIN-ST. JEOR: SCORE: 1573.38

## 2018-12-17 NOTE — PROGRESS NOTES
[facility]  Infectious Disease Progress Note          Assessment and Plan:   IMPRESSION:   1.  A 53-year-old female with acute sepsis, underlying paraplegia and a chronic wound, but this appears to be respiratory infection, has infiltrates and significant pleural fluid, initially felt to have major pericardial fluid, but this turns out to be a false result, likely conventional community-acquired pneumonia, although at risk for other pathogens.   2.  Paraplegia including chronic neurogenic bladder with ileal diversion and ileostomy.   3.  Chronic pain syndrome.   4.  Chronic bilateral sacral decubitus wounds, never been diagnosed with osteomyelitis, previous infection 18 months ago but no recent suggestion of infection and appearance not looking like infection currently.   5.  ALLERGIES LISTED TO LEVAQUIN AND PENICILLIN.  The Levaquin was a rash.  The penicillin caused some kind of hyperactive reactive thing so was not a true allergy.   6 MRSA colonized     RECOMMENDATIONS:   1.  Continue  cefepime and vancomycin , cxs all neg.  Zithromax on the assumption this is a conventional respiratory infection, in fact the imaging has the appearance of atypical infection( day 5 completes tx today).   2 Vent and sedated 40% O2, slight better overall               Interval History:   Vent sedated. No fever recurrence, O2 about same no new focal sxs, WBC 5 K flu neg              Medications:       azithromycin  500 mg Intravenous Q24H     Buprenorphine HCl  450 mcg Buccal BID     ceFEPIme (MAXIPIME) IV  1 g Intravenous Q12H     chlorhexidine  15 mL Mouth/Throat Q12H     enoxaparin  60 mg Subcutaneous Q12H     famotidine  20 mg Intravenous Q12H     hydrocortisone sodium succinate PF  50 mg Intravenous Q6H     senna-docusate  1 tablet Oral BID    Or     senna-docusate  2 tablet Oral BID     sodium chloride (PF)  10 mL Intracatheter Q8H     sodium chloride (PF)  3 mL Intracatheter Q8H     traZODone  100 mg  "Oral At Bedtime     vancomycin place paige - receiving intermittent dosing  1 each Intravenous See Admin Instructions                  Physical Exam:   Blood pressure (!) 87/60, pulse 82, temperature 97.4  F (36.3  C), temperature source Axillary, resp. rate 29, height 1.753 m (5' 9\"), weight 90.9 kg (200 lb 6.4 oz), SpO2 98 %, not currently breastfeeding.  Wt Readings from Last 2 Encounters:   12/17/18 90.9 kg (200 lb 6.4 oz)   08/30/17 56.7 kg (125 lb)     Vital Signs with Ranges  Temp:  [97.4  F (36.3  C)-97.7  F (36.5  C)] 97.4  F (36.3  C)  Heart Rate:  [] 74  Resp:  [12-31] 29  BP: ()/(36-79) 87/60  FiO2 (%):  [40 %] 40 %  SpO2:  [94 %-100 %] 98 %    Constitutional: Vent and sedated   Lungs: Congestion to auscultation bilaterally, few crackles no wheezing   Cardiovascular: Regular rate and rhythm, normal S1 and S2, and no murmur noted   Abdomen: Normal bowel sounds, soft, non-distended, non-tender   Skin: No rashes, no cyanosis, no edema wd not seen   Other:           Data:   All microbiology laboratory data reviewed.  Recent Labs   Lab Test 12/17/18  0420 12/16/18  1050 12/16/18  0525   WBC 6.6 5.6 5.7   HGB 7.4* 7.0* 6.6*   HCT 23.1* 22.1* 20.8*   MCV 80 80 80    139* 128*     Recent Labs   Lab Test 12/17/18  0420 12/16/18  0525 12/16/18  0437   CR 0.35* 0.32* Canceled, Test credited     Recent Labs   Lab Test 02/20/17  1745   SED 61*     Recent Labs   Lab Test 12/16/18  0920 12/12/18  2120 12/12/18  2115 02/18/17  1210 09/04/14  1105 04/14/14  0955 01/15/14  1005 12/28/13  0922 09/13/13  1502   CULT Culture negative monitoring continues No growth after 5 days No growth after 5 days Moderate growth Peptostreptococcus anaerobius Susceptibility testing not   routinely done  *  Heavy growth Enterococcus faecalis  Light growth Klebsiella pneumoniae  Light growth Coagulase negative Staphylococcus Susceptibility testing not   routinely done  Light growth Candida glabrata Susceptibility testing " not routinely done  * >100,000 colonies/mL Escherichia coli  10,000 to 50,000 colonies/mL Mixed gram positive tulio  * Culture negative after 4 weeks  No anaerobes isolated  On day 2, isolated in broth only: Staphylococcus aureus* Culture negative after 4 weeks  On day 2, isolated in broth only: Gram positive cocci in clusters Refer to  Routine Aerobic Culture for Identification. No anaerobes isolated*  On day 2, isolated in broth only: Staphylococcus aureus* >100,000 colonies/mL Mixed gram negative and positive tulio >100,000 colonies/mL Pseudomonas aeruginosa

## 2018-12-17 NOTE — PROGRESS NOTES
MD notified: 24mL UO in past 3hrs, irrigated with  positional return. bladder scanned per MD. One time dose IV Lasix 20mg given. Will continue to monitor.
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
## 2018-12-17 NOTE — CONSULTS
CLINICAL NUTRITION SERVICES - REASSESSMENT NOTE      Recommendations Ordered by Registered Dietitian (RD): Goal TF Replete with Fiber at 75 mL/hr to provide:  1800 kcal (28 kcal/kg), 115 g protein (1.8 g/kg), 223 g CHO, 27 g fiber, 1494 mL H2O, 3600 International Units Vit A, 360 mg Vit C, 29 mg Elemental Zinc (128 mg Zinc Sulfate)  Flushes 60 mL every 4 hours   Add the following per Pressure Injury Protocol --->  Certavite daily  Tri-Vi-Sol 7 mL daily x 10 days (5250 International Units Vit A, 245 mg Vit C, 2800 International Units Vit D)  Zinc Sulfate 250 mg every 48 hours x 10 days   Total (TF + Tri-Vi-Sol + Zinc Sulfate)= 8850 International Units Vit A, 605 mg Vit C, and 375 mg Zinc Sulfate (daily average)   Malnutrition:   % Weight Loss:  None noted  % Intake:  </= 50% for >/= 5 days (severe malnutrition)  Subcutaneous Fat Loss:  None observed  Muscle Loss:  None observed  Fluid Retention:  None noted    Malnutrition Diagnosis: Patient does not meet two of the above criteria necessary for diagnosing malnutrition       EVALUATION OF PROGRESS TOWARD GOALS   Diet:  NPO on vent, now day #6 NPO  Nutrition Support:  Asked to see patient for TF initiation today       ASSESSED NUTRITION NEEDS:  Dosing Weight:  64 kg (admit wt)   Estimated Energy Needs:  7034-9879 kcals (25-30 Kcal/Kg)  Justification: wound and paraplegia  Estimated Protein Needs:   grams protein (1.5-1.8 g pro/Kg)  Justification: wound healing and hypercatabolism with acute illness      NEW FINDINGS:   12/15:  Intubated  12/17:  Begin TF via OG today    IVF LR at 50 mL/hr  Na 150 (H), K 3.1 (L), Mg 2.5 (H)  Stool 200 mL, NG 25 mL   Weight 90.9 kg (up 26.9 kg from admit)    Per WOCN eval 12/13:  1. Left ear with 0.7 x 1.2 cm hard black eschar, no drainage.   2. Right achilles pressure ulcer, at least Stage III, 0.6 x 2.2 cm, thin eschar, no drainage. Periwound tissue intact. Is using foam cup. Right shin with old healed ulcer.  3.Left foot dorsal  1.5 x 0.6 cm, flat blood blister, not open./ Left heel  3.3 x 1.6 cm light purple,old  healed ulcer.  4.Right IT pressure ulcer 6.6 x 2.4 x 0.3 cm, white moist tissue. Small amount serous drainage. Periwound tissue intact. Has been using AquaCel at home.  5. Coccyx proximal stage III pressure ulcer 1 x 3.2 x 0.3 cm, pink tissue.Scant serous drainage.  6. Coccyx distal shear 3 x 6 x 0.1 cm, red tissue, scant serous drainage.  7. Left IT open area 4 x 7 cm with tunneling 7.2 cm. Pink tissue. Pt habitus is unusual due to Paraplegia & surgeries, difficult to assess exactly where wound is.      Previous Goals (12/14):   Nutrition to be addressed in the next 48-72 hrs  Evaluation: Met    Previous Nutrition Diagnosis (12/14):   Inadequate protein-energy intake related to impaired respiratory status on bipap and increased needs for wound healing as evidenced by PI stage 3, NPO, and meeting 0% estimated needs  Evaluation: No change      MALNUTRITION  % Weight Loss:  None noted  % Intake:  </= 50% for >/= 5 days (severe malnutrition)  Subcutaneous Fat Loss:  None observed  Muscle Loss:  None observed  Fluid Retention:  None noted    Malnutrition Diagnosis: Patient does not meet two of the above criteria necessary for diagnosing malnutrition    CURRENT NUTRITION DIAGNOSIS  Inadequate protein-energy intake related to NPO on vent, plan to start TF as evidenced by meeting 0% needs     INTERVENTIONS  Recommendations / Nutrition Prescription  Goal TF Replete with Fiber at 75 mL/hr to provide:  1800 kcal (28 kcal/kg), 115 g protein (1.8 g/kg), 223 g CHO, 27 g fiber, 1494 mL H2O, 3600 International Units Vit A, 360 mg Vit C, 29 mg Elemental Zinc (128 mg Zinc Sulfate)  Flushes 60 mL every 4 hours   Add the following per Pressure Injury Protocol --->  Certavite daily  Tri-Vi-Sol 7 mL daily x 10 days (5250 International Units Vit A, 245 mg Vit C, 2800 International Units Vit D)  Zinc Sulfate 250 mg every 48 hours x 10 days   Total (TF +  Tri-Vi-Sol + Zinc Sulfate)= 8850 International Units Vit A, 605 mg Vit C, and 375 mg Zinc Sulfate (daily average)    Implementation  EN Composition, EN Schedule, and Feeding Tube Flush:  Orders placed to begin TF at 15 mL/hr and increase every 12 hours by 20 mL to goal.  Flushes as above.  Multivitamin/Mineral:  Tri-Vi-Sol and Zinc Sulfate ordered as above.  Collaboration and Referral of Nutrition care:  Patient discussed today during interdisciplinary bedside rounds.     Goals  TF will meet % needs     MONITORING AND EVALUATION:  Progress towards goals will be monitored and evaluated per protocol and Practice Guidelines    Susanna Tao RD, LD, CNSC   Clinical Dietitian - [facility]

## 2018-12-17 NOTE — PROGRESS NOTES
Called for low UOP.  Cr has been stable.  Patient overall volume up approx 6 liters.  245 in last shift.  Suprapubic cath flushing well.  On LR infusion.    Continue with present cares.  If no urine output over next 2-3 hours re scan bladder.  Otherwise no urgent need for fluid/diuretics.    Tate Hoang MD  ICU    Addendum: no UOP after several hours. Anasarca.  Lasix 20. Will need to follow K closely.

## 2018-12-17 NOTE — PROGRESS NOTES
[facility] ICU RESPIRATORY NOTE    Date of Admission: 12/12/2018  Date of Intubation (most recent): 12/15/2018  Reason for Mechanical Ventilation: Respiratory failure  Number of Days on Mechanical Ventilation: 3  Met Criteria for Pressure Support Trial: No  Length of Pressure Support Trial: N/A  Reason for Stopping Pressure Support Trial: N/A  Reason for No Pressure Support Trial: per MD    Significant Events Today: None overnight    ABG Results:   Recent Labs   Lab 12/13/18  0538   PH 7.36   PCO2 40   PO2 69*   HCO3 22   O2PER 60     Ventilation Mode: CMV/AC  (Continuous Mandatory Ventilation/ Assist Control)  FiO2 (%): 40 %  Rate Set (breaths/minute): 16 breaths/min  Tidal Volume Set (mL): 450 mL  PEEP (cm H2O): 10 cmH2O  Oxygen Concentration (%): 40 %  Resp: 19    Plan: Continue patient on full ventilatory support and assess for daily weaning readiness.

## 2018-12-18 ENCOUNTER — APPOINTMENT (OUTPATIENT)
Dept: CT IMAGING | Facility: CLINIC | Age: 53
DRG: 870 | End: 2018-12-18
Attending: INTERNAL MEDICINE
Payer: MEDICARE

## 2018-12-18 ENCOUNTER — APPOINTMENT (OUTPATIENT)
Dept: GENERAL RADIOLOGY | Facility: CLINIC | Age: 53
DRG: 870 | End: 2018-12-18
Attending: INTERNAL MEDICINE
Payer: MEDICARE

## 2018-12-18 LAB
ANION GAP SERPL CALCULATED.3IONS-SCNC: 11 MMOL/L (ref 3–14)
ANION GAP SERPL CALCULATED.3IONS-SCNC: 9 MMOL/L (ref 3–14)
BACTERIA SPEC CULT: NO GROWTH
BASE DEFICIT BLDA-SCNC: 9.1 MMOL/L
BUN SERPL-MCNC: 10 MG/DL (ref 7–30)
BUN SERPL-MCNC: 12 MG/DL (ref 7–30)
CALCIUM SERPL-MCNC: 7.8 MG/DL (ref 8.5–10.1)
CALCIUM SERPL-MCNC: 7.8 MG/DL (ref 8.5–10.1)
CHLORIDE SERPL-SCNC: 122 MMOL/L (ref 94–109)
CHLORIDE SERPL-SCNC: 122 MMOL/L (ref 94–109)
CO2 SERPL-SCNC: 16 MMOL/L (ref 20–32)
CO2 SERPL-SCNC: 19 MMOL/L (ref 20–32)
CREAT SERPL-MCNC: 0.33 MG/DL (ref 0.52–1.04)
CREAT SERPL-MCNC: 0.35 MG/DL (ref 0.52–1.04)
ERYTHROCYTE [DISTWIDTH] IN BLOOD BY AUTOMATED COUNT: 17.9 % (ref 10–15)
GFR SERPL CREATININE-BSD FRML MDRD: >90 ML/MIN/1.7M2
GFR SERPL CREATININE-BSD FRML MDRD: >90 ML/MIN/1.7M2
GLUCOSE BLDC GLUCOMTR-MCNC: 130 MG/DL (ref 70–99)
GLUCOSE BLDC GLUCOMTR-MCNC: 145 MG/DL (ref 70–99)
GLUCOSE SERPL-MCNC: 126 MG/DL (ref 70–99)
GLUCOSE SERPL-MCNC: 143 MG/DL (ref 70–99)
HCO3 BLD-SCNC: 15 MMOL/L (ref 21–28)
HCT VFR BLD AUTO: 23.7 % (ref 35–47)
HGB BLD-MCNC: 7.6 G/DL (ref 11.7–15.7)
Lab: NORMAL
Lab: NORMAL
MAGNESIUM SERPL-MCNC: 2.4 MG/DL (ref 1.6–2.3)
MCH RBC QN AUTO: 25.6 PG (ref 26.5–33)
MCHC RBC AUTO-ENTMCNC: 32.1 G/DL (ref 31.5–36.5)
MCV RBC AUTO: 80 FL (ref 78–100)
O2/TOTAL GAS SETTING VFR VENT: 50 %
OXYHGB MFR BLD: 95 % (ref 92–100)
PCO2 BLD: 27 MM HG (ref 35–45)
PH BLD: 7.37 PH (ref 7.35–7.45)
PHOSPHATE SERPL-MCNC: 1.6 MG/DL (ref 2.5–4.5)
PHOSPHATE SERPL-MCNC: 2.6 MG/DL (ref 2.5–4.5)
PLATELET # BLD AUTO: 201 10E9/L (ref 150–450)
PO2 BLD: 78 MM HG (ref 80–105)
POTASSIUM SERPL-SCNC: 3.2 MMOL/L (ref 3.4–5.3)
POTASSIUM SERPL-SCNC: 3.7 MMOL/L (ref 3.4–5.3)
POTASSIUM SERPL-SCNC: 3.7 MMOL/L (ref 3.4–5.3)
PROCALCITONIN SERPL-MCNC: 0.16 NG/ML
RBC # BLD AUTO: 2.97 10E12/L (ref 3.8–5.2)
SODIUM SERPL-SCNC: 149 MMOL/L (ref 133–144)
SODIUM SERPL-SCNC: 150 MMOL/L (ref 133–144)
SPECIMEN SOURCE: NORMAL
VANCOMYCIN SERPL-MCNC: 18.3 MG/L
WBC # BLD AUTO: 10.8 10E9/L (ref 4–11)

## 2018-12-18 PROCEDURE — 93005 ELECTROCARDIOGRAM TRACING: CPT

## 2018-12-18 PROCEDURE — 85027 COMPLETE CBC AUTOMATED: CPT | Performed by: INTERNAL MEDICINE

## 2018-12-18 PROCEDURE — 36600 WITHDRAWAL OF ARTERIAL BLOOD: CPT

## 2018-12-18 PROCEDURE — 84100 ASSAY OF PHOSPHORUS: CPT | Performed by: INTERNAL MEDICINE

## 2018-12-18 PROCEDURE — 25000128 H RX IP 250 OP 636: Performed by: INTERNAL MEDICINE

## 2018-12-18 PROCEDURE — 25000132 ZZH RX MED GY IP 250 OP 250 PS 637: Mod: GY | Performed by: INTERNAL MEDICINE

## 2018-12-18 PROCEDURE — 82805 BLOOD GASES W/O2 SATURATION: CPT | Performed by: INTERNAL MEDICINE

## 2018-12-18 PROCEDURE — 99291 CRITICAL CARE FIRST HOUR: CPT | Performed by: INTERNAL MEDICINE

## 2018-12-18 PROCEDURE — 25000125 ZZHC RX 250

## 2018-12-18 PROCEDURE — 25000128 H RX IP 250 OP 636: Performed by: ANESTHESIOLOGY

## 2018-12-18 PROCEDURE — 40000275 ZZH STATISTIC RCP TIME EA 10 MIN

## 2018-12-18 PROCEDURE — A9270 NON-COVERED ITEM OR SERVICE: HCPCS | Mod: GY | Performed by: HOSPITALIST

## 2018-12-18 PROCEDURE — 84132 ASSAY OF SERUM POTASSIUM: CPT | Performed by: INTERNAL MEDICINE

## 2018-12-18 PROCEDURE — P9041 ALBUMIN (HUMAN),5%, 50ML: HCPCS | Performed by: INTERNAL MEDICINE

## 2018-12-18 PROCEDURE — 25000128 H RX IP 250 OP 636: Performed by: NURSE PRACTITIONER

## 2018-12-18 PROCEDURE — 00000146 ZZHCL STATISTIC GLUCOSE BY METER IP

## 2018-12-18 PROCEDURE — 40000281 ZZH STATISTIC TRANSPORT TIME EA 15 MIN

## 2018-12-18 PROCEDURE — 83735 ASSAY OF MAGNESIUM: CPT | Performed by: INTERNAL MEDICINE

## 2018-12-18 PROCEDURE — 80048 BASIC METABOLIC PNL TOTAL CA: CPT | Performed by: INTERNAL MEDICINE

## 2018-12-18 PROCEDURE — 27210995 ZZH RX 272: Performed by: ANESTHESIOLOGY

## 2018-12-18 PROCEDURE — 80202 ASSAY OF VANCOMYCIN: CPT | Performed by: INTERNAL MEDICINE

## 2018-12-18 PROCEDURE — 25000132 ZZH RX MED GY IP 250 OP 250 PS 637: Mod: GY | Performed by: HOSPITALIST

## 2018-12-18 PROCEDURE — 84145 PROCALCITONIN (PCT): CPT | Performed by: INTERNAL MEDICINE

## 2018-12-18 PROCEDURE — 25000128 H RX IP 250 OP 636: Performed by: SURGERY

## 2018-12-18 PROCEDURE — 20000003 ZZH R&B ICU

## 2018-12-18 PROCEDURE — 25000125 ZZHC RX 250: Performed by: INTERNAL MEDICINE

## 2018-12-18 PROCEDURE — 27210429 ZZH NUTRITION PRODUCT INTERMEDIATE LITER

## 2018-12-18 PROCEDURE — 93010 ELECTROCARDIOGRAM REPORT: CPT | Performed by: INTERNAL MEDICINE

## 2018-12-18 PROCEDURE — 74176 CT ABD & PELVIS W/O CONTRAST: CPT

## 2018-12-18 PROCEDURE — 71045 X-RAY EXAM CHEST 1 VIEW: CPT

## 2018-12-18 PROCEDURE — 40000239 ZZH STATISTIC VAT ROUNDS

## 2018-12-18 PROCEDURE — 94003 VENT MGMT INPAT SUBQ DAY: CPT

## 2018-12-18 PROCEDURE — 25000125 ZZHC RX 250: Performed by: SURGERY

## 2018-12-18 PROCEDURE — 25000128 H RX IP 250 OP 636: Performed by: HOSPITALIST

## 2018-12-18 PROCEDURE — A9270 NON-COVERED ITEM OR SERVICE: HCPCS | Mod: GY | Performed by: INTERNAL MEDICINE

## 2018-12-18 RX ORDER — SODIUM CHLORIDE 9 MG/ML
INJECTION, SOLUTION INTRAVENOUS CONTINUOUS
Status: DISCONTINUED | OUTPATIENT
Start: 2018-12-18 | End: 2018-12-18

## 2018-12-18 RX ORDER — DEXTROSE MONOHYDRATE 50 MG/ML
INJECTION, SOLUTION INTRAVENOUS CONTINUOUS
Status: DISCONTINUED | OUTPATIENT
Start: 2018-12-18 | End: 2018-12-27 | Stop reason: ALTCHOICE

## 2018-12-18 RX ORDER — PROPOFOL 10 MG/ML
5-75 INJECTION, EMULSION INTRAVENOUS CONTINUOUS
Status: DISCONTINUED | OUTPATIENT
Start: 2018-12-18 | End: 2018-12-21

## 2018-12-18 RX ORDER — ALBUMIN, HUMAN INJ 5% 5 %
SOLUTION INTRAVENOUS
Status: DISCONTINUED
Start: 2018-12-18 | End: 2018-12-19 | Stop reason: HOSPADM

## 2018-12-18 RX ORDER — ALBUMIN, HUMAN INJ 5% 5 %
25 SOLUTION INTRAVENOUS ONCE
Status: COMPLETED | OUTPATIENT
Start: 2018-12-18 | End: 2018-12-18

## 2018-12-18 RX ORDER — SODIUM CHLORIDE 450 MG/100ML
INJECTION, SOLUTION INTRAVENOUS CONTINUOUS
Status: DISCONTINUED | OUTPATIENT
Start: 2018-12-18 | End: 2018-12-22

## 2018-12-18 RX ORDER — VANCOMYCIN HYDROCHLORIDE 1 G/200ML
1000 INJECTION, SOLUTION INTRAVENOUS ONCE
Status: COMPLETED | OUTPATIENT
Start: 2018-12-18 | End: 2018-12-18

## 2018-12-18 RX ADMIN — MULTIVITAMIN 15 ML: LIQUID ORAL at 08:36

## 2018-12-18 RX ADMIN — CEFEPIME 1 G: 1 INJECTION, POWDER, FOR SOLUTION INTRAMUSCULAR; INTRAVENOUS at 06:17

## 2018-12-18 RX ADMIN — ENOXAPARIN SODIUM 60 MG: 60 INJECTION SUBCUTANEOUS at 11:46

## 2018-12-18 RX ADMIN — CEFEPIME 1 G: 1 INJECTION, POWDER, FOR SOLUTION INTRAMUSCULAR; INTRAVENOUS at 18:25

## 2018-12-18 RX ADMIN — DEXTROSE MONOHYDRATE: 50 INJECTION, SOLUTION INTRAVENOUS at 17:52

## 2018-12-18 RX ADMIN — POTASSIUM CHLORIDE 20 MEQ: 29.8 INJECTION, SOLUTION INTRAVENOUS at 05:03

## 2018-12-18 RX ADMIN — ENOXAPARIN SODIUM 60 MG: 60 INJECTION SUBCUTANEOUS at 01:14

## 2018-12-18 RX ADMIN — PROPOFOL 30 MCG/KG/MIN: 10 INJECTION, EMULSION INTRAVENOUS at 12:46

## 2018-12-18 RX ADMIN — LIDOCAINE HYDROCHLORIDE: 20 JELLY TOPICAL at 11:25

## 2018-12-18 RX ADMIN — VANCOMYCIN HYDROCHLORIDE 1000 MG: 1 INJECTION, SOLUTION INTRAVENOUS at 12:51

## 2018-12-18 RX ADMIN — HYDROCORTISONE SODIUM SUCCINATE 50 MG: 100 INJECTION, POWDER, FOR SOLUTION INTRAMUSCULAR; INTRAVENOUS at 08:03

## 2018-12-18 RX ADMIN — HYDROCORTISONE SODIUM SUCCINATE 50 MG: 100 INJECTION, POWDER, FOR SOLUTION INTRAMUSCULAR; INTRAVENOUS at 20:56

## 2018-12-18 RX ADMIN — NOREPINEPHRINE BITARTRATE 0.03 MCG/KG/MIN: 1 INJECTION INTRAVENOUS at 00:02

## 2018-12-18 RX ADMIN — MIDAZOLAM 1 MG/HR: 5 INJECTION INTRAMUSCULAR; INTRAVENOUS at 14:22

## 2018-12-18 RX ADMIN — PROPOFOL 30 MCG/KG/MIN: 10 INJECTION, EMULSION INTRAVENOUS at 08:11

## 2018-12-18 RX ADMIN — ALBUMIN HUMAN 25 G: 0.05 INJECTION, SOLUTION INTRAVENOUS at 12:41

## 2018-12-18 RX ADMIN — Medication 7 ML: at 08:36

## 2018-12-18 RX ADMIN — TRAZODONE HYDROCHLORIDE 100 MG: 100 TABLET ORAL at 01:15

## 2018-12-18 RX ADMIN — CHLORHEXIDINE GLUCONATE 15 ML: 1.2 RINSE ORAL at 19:56

## 2018-12-18 RX ADMIN — POTASSIUM CHLORIDE 20 MEQ: 29.8 INJECTION, SOLUTION INTRAVENOUS at 17:11

## 2018-12-18 RX ADMIN — HYDROCORTISONE SODIUM SUCCINATE 50 MG: 100 INJECTION, POWDER, FOR SOLUTION INTRAMUSCULAR; INTRAVENOUS at 14:36

## 2018-12-18 RX ADMIN — SODIUM PHOSPHATE, MONOBASIC, MONOHYDRATE 20 MMOL: 276; 142 INJECTION, SOLUTION INTRAVENOUS at 05:12

## 2018-12-18 RX ADMIN — Medication 25 MCG/HR: at 00:23

## 2018-12-18 RX ADMIN — SODIUM CHLORIDE: 9 INJECTION, SOLUTION INTRAVENOUS at 00:25

## 2018-12-18 RX ADMIN — FAMOTIDINE 20 MG: 10 INJECTION, SOLUTION INTRAVENOUS at 08:47

## 2018-12-18 RX ADMIN — ENOXAPARIN SODIUM 60 MG: 60 INJECTION SUBCUTANEOUS at 23:49

## 2018-12-18 RX ADMIN — PROPOFOL 30 MCG/KG/MIN: 10 INJECTION, EMULSION INTRAVENOUS at 01:56

## 2018-12-18 RX ADMIN — POTASSIUM CHLORIDE 20 MEQ: 1.5 POWDER, FOR SOLUTION ORAL at 11:45

## 2018-12-18 RX ADMIN — POTASSIUM CHLORIDE 20 MEQ: 29.8 INJECTION, SOLUTION INTRAVENOUS at 03:55

## 2018-12-18 RX ADMIN — HYDROCORTISONE SODIUM SUCCINATE 50 MG: 100 INJECTION, POWDER, FOR SOLUTION INTRAMUSCULAR; INTRAVENOUS at 01:18

## 2018-12-18 RX ADMIN — TRAZODONE HYDROCHLORIDE 100 MG: 100 TABLET ORAL at 22:18

## 2018-12-18 RX ADMIN — SODIUM CHLORIDE: 4.5 INJECTION, SOLUTION INTRAVENOUS at 17:51

## 2018-12-18 RX ADMIN — FAMOTIDINE 20 MG: 10 INJECTION, SOLUTION INTRAVENOUS at 20:56

## 2018-12-18 RX ADMIN — CHLORHEXIDINE GLUCONATE 15 ML: 1.2 RINSE ORAL at 08:37

## 2018-12-18 ASSESSMENT — ACTIVITIES OF DAILY LIVING (ADL)
ADLS_ACUITY_SCORE: 25
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
## 2018-12-18 NOTE — PROGRESS NOTES
Date of Admission: 12/12/2018  Date of Intubation (most recent): 12/15/2018  Reason for Mechanical Ventilation: Respiratory failure  Number of Days on Mechanical Ventilation: 4  Met Criteria for Pressure Support Trial: No  Length of Pressure Support Trial: N/A  Reason for Stopping Pressure Support Trial: N/A  Reason for No Pressure Support Trial: per MD    Ventilation Mode: CMV/AC  (Continuous Mandatory Ventilation/ Assist Control)  FiO2 (%): 40 %  Rate Set (breaths/minute): 16 breaths/min  Tidal Volume Set (mL): 400 mL  PEEP (cm H2O): 5 cmH2O  Oxygen Concentration (%): 40 %  Resp: (!) 31    Plan: continue with full vent support.  Assess for daily weaning readiness.  Bull Pantoja

## 2018-12-18 NOTE — PLAN OF CARE
Patient started shift just returned to bed from chair, RR high 30s, bilateral upper extremities cold; adjusted patient in bed, elevated legs to try to compensate for dependent edema during time in chair.  Mid-shift, patient's BPs steadily declined to MAP below 65, Propofol paused briefly, levophed restarted, fentanyl drip started.  Patient had an initial response with sinus bradycardia into the 40s, Dr. Biggs called to bedside, but patient resolved to A-Fib with RVR by the time he arrived.  Patient ends shift with another slow decline in BP and a bump up in Levophed, RR dropped below 30 with O2 sats dropping to 87%.; RT increased FiO2 to 50%.

## 2018-12-18 NOTE — PLAN OF CARE
Assumed care of patient from [id number]. Propofol weaned to off today and pressure support trial attempted. Resp rate into 30s with this attempt at pressure support trial so returned vent settings to CMV. Switched from propofol to dex this shift but was unable to maintain due to bradycardia on dex. Currently on low dose propofol and goal RASS achieved. Levo titrated off and MAP within goal of > 65. Restraints dc'd at beginning of shift this a.m. because patient is not moving upper extremities. UO dropped off toward end of shift. Discussed with Dr. Dominguez and received order for a liter bolus of NS which is currently infusing. Patient was diligently turned every two hours to protect skin. Patient has multiple wounds and wound cleansing and multiple dressing changes were done today. Reviewed plan of care with patient's daughter via phone. All daughter's questions answered. Patient was safely transferred up to chair this shift. See other documentation.     Mary Bach RN
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
## 2018-12-18 NOTE — PROVIDER NOTIFICATION
Na recheck this afternoon 150 after free water increased this morning (prior Na 149).  Tube feed residuals high and unsure how much of free water patient is absorbing.  Dr. Way notified.  IV fluids changed.

## 2018-12-19 ENCOUNTER — APPOINTMENT (OUTPATIENT)
Dept: GENERAL RADIOLOGY | Facility: CLINIC | Age: 53
DRG: 870 | End: 2018-12-19
Attending: INTERNAL MEDICINE
Payer: MEDICARE

## 2018-12-19 ENCOUNTER — APPOINTMENT (OUTPATIENT)
Dept: GENERAL RADIOLOGY | Facility: CLINIC | Age: 53
DRG: 870 | End: 2018-12-19
Attending: ORTHOPAEDIC SURGERY
Payer: MEDICARE

## 2018-12-19 ENCOUNTER — APPOINTMENT (OUTPATIENT)
Dept: ULTRASOUND IMAGING | Facility: CLINIC | Age: 53
DRG: 870 | End: 2018-12-19
Attending: HOSPITALIST
Payer: MEDICARE

## 2018-12-19 LAB
ALBUMIN SERPL-MCNC: 2.4 G/DL (ref 3.4–5)
ALP SERPL-CCNC: 91 U/L (ref 40–150)
ALT SERPL W P-5'-P-CCNC: 10 U/L (ref 0–50)
ANA PAT SER IF-IMP: ABNORMAL
ANA SER QL IF: ABNORMAL
ANA TITR SER IF: ABNORMAL {TITER}
ANION GAP SERPL CALCULATED.3IONS-SCNC: 7 MMOL/L (ref 3–14)
APPEARANCE FLD: CLEAR
APPEARANCE FLD: NORMAL
AST SERPL W P-5'-P-CCNC: 12 U/L (ref 0–45)
BILIRUB DIRECT SERPL-MCNC: 0.2 MG/DL (ref 0–0.2)
BILIRUB SERPL-MCNC: 0.5 MG/DL (ref 0.2–1.3)
BLD PROD TYP BPU: NORMAL
BLD UNIT ID BPU: 0
BLOOD PRODUCT CODE: NORMAL
BPU ID: NORMAL
BUN SERPL-MCNC: 12 MG/DL (ref 7–30)
CALCIUM SERPL-MCNC: 7.3 MG/DL (ref 8.5–10.1)
CHLORIDE SERPL-SCNC: 118 MMOL/L (ref 94–109)
CO2 SERPL-SCNC: 19 MMOL/L (ref 20–32)
COLOR FLD: NORMAL
COLOR FLD: YELLOW
CREAT SERPL-MCNC: 0.32 MG/DL (ref 0.52–1.04)
CRYSTALS SNV MICRO: NORMAL
ERYTHROCYTE [DISTWIDTH] IN BLOOD BY AUTOMATED COUNT: 18.5 % (ref 10–15)
GFR SERPL CREATININE-BSD FRML MDRD: >90 ML/MIN/{1.73_M2}
GLUCOSE BLDC GLUCOMTR-MCNC: 125 MG/DL (ref 70–99)
GLUCOSE BLDC GLUCOMTR-MCNC: 127 MG/DL (ref 70–99)
GLUCOSE SERPL-MCNC: 189 MG/DL (ref 70–99)
GRAM STN SPEC: NORMAL
HCT VFR BLD AUTO: 22.2 % (ref 35–47)
HGB BLD-MCNC: 6.8 G/DL (ref 11.7–15.7)
INR PPP: 1.43 (ref 0.86–1.14)
LDH FLD L TO P-CCNC: 140 U/L
LYMPHOCYTES NFR FLD MANUAL: 49 %
LYMPHOCYTES NFR FLD MANUAL: 7 %
MAGNESIUM SERPL-MCNC: 2.2 MG/DL (ref 1.6–2.3)
MCH RBC QN AUTO: 25.3 PG (ref 26.5–33)
MCHC RBC AUTO-ENTMCNC: 30.6 G/DL (ref 31.5–36.5)
MCV RBC AUTO: 83 FL (ref 78–100)
MONOS+MACROS NFR FLD MANUAL: 9 %
NEUTS BAND NFR FLD MANUAL: 27 %
NEUTS BAND NFR FLD MANUAL: 93 %
OTHER CELLS FLD MANUAL: 15 %
PHOSPHATE SERPL-MCNC: 2.4 MG/DL (ref 2.5–4.5)
PLATELET # BLD AUTO: 180 10E9/L (ref 150–450)
POTASSIUM SERPL-SCNC: 3.6 MMOL/L (ref 3.4–5.3)
PROCALCITONIN SERPL-MCNC: 0.17 NG/ML
PROT FLD-MCNC: 2.1 G/DL
PROT SERPL-MCNC: 5.4 G/DL (ref 6.8–8.8)
RBC # BLD AUTO: 2.69 10E12/L (ref 3.8–5.2)
RHEUMATOID FACT SER NEPH-ACNC: <20 IU/ML (ref 0–20)
SODIUM SERPL-SCNC: 144 MMOL/L (ref 133–144)
SPECIMEN SOURCE FLD: NORMAL
SPECIMEN SOURCE: NORMAL
SPECIMEN SOURCE: NORMAL
TRANSFUSION STATUS PATIENT QL: NORMAL
TRANSFUSION STATUS PATIENT QL: NORMAL
TROPONIN I SERPL-MCNC: 0.02 UG/L (ref 0–0.04)
VANCOMYCIN SERPL-MCNC: 16.6 MG/L
WBC # BLD AUTO: 17.5 10E9/L (ref 4–11)
WBC # FLD AUTO: 6261 /UL
WBC # FLD AUTO: NORMAL /UL

## 2018-12-19 PROCEDURE — 84145 PROCALCITONIN (PCT): CPT | Performed by: INTERNAL MEDICINE

## 2018-12-19 PROCEDURE — 25000125 ZZHC RX 250: Performed by: SURGERY

## 2018-12-19 PROCEDURE — 89060 EXAM SYNOVIAL FLUID CRYSTALS: CPT | Performed by: PHYSICIAN ASSISTANT

## 2018-12-19 PROCEDURE — A9270 NON-COVERED ITEM OR SERVICE: HCPCS | Mod: GY | Performed by: INTERNAL MEDICINE

## 2018-12-19 PROCEDURE — 87070 CULTURE OTHR SPECIMN AEROBIC: CPT | Performed by: INTERNAL MEDICINE

## 2018-12-19 PROCEDURE — 84100 ASSAY OF PHOSPHORUS: CPT | Performed by: INTERNAL MEDICINE

## 2018-12-19 PROCEDURE — 40000275 ZZH STATISTIC RCP TIME EA 10 MIN

## 2018-12-19 PROCEDURE — 87015 SPECIMEN INFECT AGNT CONCNTJ: CPT | Performed by: INTERNAL MEDICINE

## 2018-12-19 PROCEDURE — 99291 CRITICAL CARE FIRST HOUR: CPT | Mod: 25 | Performed by: INTERNAL MEDICINE

## 2018-12-19 PROCEDURE — P9016 RBC LEUKOCYTES REDUCED: HCPCS | Performed by: HOSPITALIST

## 2018-12-19 PROCEDURE — A9270 NON-COVERED ITEM OR SERVICE: HCPCS | Mod: GY | Performed by: HOSPITALIST

## 2018-12-19 PROCEDURE — 25000125 ZZHC RX 250: Performed by: INTERNAL MEDICINE

## 2018-12-19 PROCEDURE — 25000125 ZZHC RX 250: Performed by: HOSPITALIST

## 2018-12-19 PROCEDURE — 84157 ASSAY OF PROTEIN OTHER: CPT | Performed by: HOSPITALIST

## 2018-12-19 PROCEDURE — 86431 RHEUMATOID FACTOR QUANT: CPT | Performed by: INTERNAL MEDICINE

## 2018-12-19 PROCEDURE — 80202 ASSAY OF VANCOMYCIN: CPT | Performed by: HOSPITALIST

## 2018-12-19 PROCEDURE — 25000132 ZZH RX MED GY IP 250 OP 250 PS 637: Mod: GY | Performed by: INTERNAL MEDICINE

## 2018-12-19 PROCEDURE — 20000003 ZZH R&B ICU

## 2018-12-19 PROCEDURE — 86039 ANTINUCLEAR ANTIBODIES (ANA): CPT | Performed by: INTERNAL MEDICINE

## 2018-12-19 PROCEDURE — 88305 TISSUE EXAM BY PATHOLOGIST: CPT | Performed by: HOSPITALIST

## 2018-12-19 PROCEDURE — 25000128 H RX IP 250 OP 636: Performed by: SURGERY

## 2018-12-19 PROCEDURE — 00000146 ZZHCL STATISTIC GLUCOSE BY METER IP

## 2018-12-19 PROCEDURE — 00000155 ZZHCL STATISTIC H-CELL BLOCK W/STAIN: Performed by: HOSPITALIST

## 2018-12-19 PROCEDURE — 87205 SMEAR GRAM STAIN: CPT | Performed by: PHYSICIAN ASSISTANT

## 2018-12-19 PROCEDURE — 89051 BODY FLUID CELL COUNT: CPT | Performed by: HOSPITALIST

## 2018-12-19 PROCEDURE — 25000128 H RX IP 250 OP 636: Performed by: INTERNAL MEDICINE

## 2018-12-19 PROCEDURE — 0W9B3ZZ DRAINAGE OF LEFT PLEURAL CAVITY, PERCUTANEOUS APPROACH: ICD-10-PCS | Performed by: PHYSICIAN ASSISTANT

## 2018-12-19 PROCEDURE — 87015 SPECIMEN INFECT AGNT CONCNTJ: CPT | Performed by: PHYSICIAN ASSISTANT

## 2018-12-19 PROCEDURE — 40000008 ZZH STATISTIC AIRWAY CARE

## 2018-12-19 PROCEDURE — 85027 COMPLETE CBC AUTOMATED: CPT | Performed by: INTERNAL MEDICINE

## 2018-12-19 PROCEDURE — 25000128 H RX IP 250 OP 636: Performed by: HOSPITALIST

## 2018-12-19 PROCEDURE — 0S9B3ZX DRAINAGE OF LEFT HIP JOINT, PERCUTANEOUS APPROACH, DIAGNOSTIC: ICD-10-PCS | Performed by: PHYSICIAN ASSISTANT

## 2018-12-19 PROCEDURE — 80048 BASIC METABOLIC PNL TOTAL CA: CPT | Performed by: INTERNAL MEDICINE

## 2018-12-19 PROCEDURE — 84484 ASSAY OF TROPONIN QUANT: CPT | Performed by: SURGERY

## 2018-12-19 PROCEDURE — G0463 HOSPITAL OUTPT CLINIC VISIT: HCPCS

## 2018-12-19 PROCEDURE — 20610 DRAIN/INJ JOINT/BURSA W/O US: CPT | Mod: LT

## 2018-12-19 PROCEDURE — 86038 ANTINUCLEAR ANTIBODIES: CPT | Performed by: INTERNAL MEDICINE

## 2018-12-19 PROCEDURE — 40000239 ZZH STATISTIC VAT ROUNDS

## 2018-12-19 PROCEDURE — 36620 INSERTION CATHETER ARTERY: CPT | Performed by: INTERNAL MEDICINE

## 2018-12-19 PROCEDURE — 94003 VENT MGMT INPAT SUBQ DAY: CPT

## 2018-12-19 PROCEDURE — 88112 CYTOPATH CELL ENHANCE TECH: CPT | Performed by: HOSPITALIST

## 2018-12-19 PROCEDURE — 25000128 H RX IP 250 OP 636: Performed by: NURSE PRACTITIONER

## 2018-12-19 PROCEDURE — 88305 TISSUE EXAM BY PATHOLOGIST: CPT | Mod: 26 | Performed by: HOSPITALIST

## 2018-12-19 PROCEDURE — 89051 BODY FLUID CELL COUNT: CPT | Performed by: PHYSICIAN ASSISTANT

## 2018-12-19 PROCEDURE — 83735 ASSAY OF MAGNESIUM: CPT | Performed by: INTERNAL MEDICINE

## 2018-12-19 PROCEDURE — 85610 PROTHROMBIN TIME: CPT | Performed by: INTERNAL MEDICINE

## 2018-12-19 PROCEDURE — 40000281 ZZH STATISTIC TRANSPORT TIME EA 15 MIN

## 2018-12-19 PROCEDURE — 87070 CULTURE OTHR SPECIMN AEROBIC: CPT | Performed by: PHYSICIAN ASSISTANT

## 2018-12-19 PROCEDURE — 83615 LACTATE (LD) (LDH) ENZYME: CPT | Performed by: HOSPITALIST

## 2018-12-19 PROCEDURE — 40000986 XR CHEST PORT 1 VW

## 2018-12-19 PROCEDURE — 00000102 ZZHCL STATISTIC CYTO WRIGHT STAIN TC: Performed by: HOSPITALIST

## 2018-12-19 PROCEDURE — 87205 SMEAR GRAM STAIN: CPT | Performed by: INTERNAL MEDICINE

## 2018-12-19 PROCEDURE — 88112 CYTOPATH CELL ENHANCE TECH: CPT | Mod: 26 | Performed by: HOSPITALIST

## 2018-12-19 PROCEDURE — 32555 ASPIRATE PLEURA W/ IMAGING: CPT

## 2018-12-19 PROCEDURE — 27210995 ZZH RX 272: Performed by: ANESTHESIOLOGY

## 2018-12-19 PROCEDURE — 25000132 ZZH RX MED GY IP 250 OP 250 PS 637: Mod: GY | Performed by: HOSPITALIST

## 2018-12-19 PROCEDURE — 80076 HEPATIC FUNCTION PANEL: CPT | Performed by: INTERNAL MEDICINE

## 2018-12-19 RX ORDER — WATER 10 ML/10ML
INJECTION INTRAMUSCULAR; INTRAVENOUS; SUBCUTANEOUS
Status: DISCONTINUED
Start: 2018-12-19 | End: 2018-12-19 | Stop reason: HOSPADM

## 2018-12-19 RX ORDER — DEXTROSE MONOHYDRATE 25 G/50ML
25-50 INJECTION, SOLUTION INTRAVENOUS
Status: CANCELLED | OUTPATIENT
Start: 2018-12-19

## 2018-12-19 RX ORDER — VANCOMYCIN HYDROCHLORIDE 1 G/200ML
1000 INJECTION, SOLUTION INTRAVENOUS ONCE
Status: COMPLETED | OUTPATIENT
Start: 2018-12-19 | End: 2018-12-19

## 2018-12-19 RX ORDER — NICOTINE POLACRILEX 4 MG
15-30 LOZENGE BUCCAL
Status: CANCELLED | OUTPATIENT
Start: 2018-12-19

## 2018-12-19 RX ADMIN — POTASSIUM CHLORIDE 20 MEQ: 1.5 POWDER, FOR SOLUTION ORAL at 06:50

## 2018-12-19 RX ADMIN — NOREPINEPHRINE BITARTRATE 0.1 MCG/KG/MIN: 1 INJECTION INTRAVENOUS at 17:30

## 2018-12-19 RX ADMIN — CEFEPIME 1 G: 1 INJECTION, POWDER, FOR SOLUTION INTRAMUSCULAR; INTRAVENOUS at 08:18

## 2018-12-19 RX ADMIN — DOCUSATE SODIUM 100 MG: 50 LIQUID ORAL at 08:18

## 2018-12-19 RX ADMIN — VANCOMYCIN HYDROCHLORIDE 1000 MG: 1 INJECTION, SOLUTION INTRAVENOUS at 18:03

## 2018-12-19 RX ADMIN — MIDAZOLAM 1 MG/HR: 5 INJECTION INTRAMUSCULAR; INTRAVENOUS at 23:27

## 2018-12-19 RX ADMIN — SODIUM CHLORIDE 50 ML/HR: 4.5 INJECTION, SOLUTION INTRAVENOUS at 23:28

## 2018-12-19 RX ADMIN — MULTIVITAMIN 15 ML: LIQUID ORAL at 08:18

## 2018-12-19 RX ADMIN — SODIUM PHOSPHATE, MONOBASIC, MONOHYDRATE 15 MMOL: 276; 142 INJECTION, SOLUTION INTRAVENOUS at 08:42

## 2018-12-19 RX ADMIN — Medication 7 ML: at 08:50

## 2018-12-19 RX ADMIN — ENOXAPARIN SODIUM 60 MG: 60 INJECTION SUBCUTANEOUS at 14:19

## 2018-12-19 RX ADMIN — ENOXAPARIN SODIUM 60 MG: 60 INJECTION SUBCUTANEOUS at 23:42

## 2018-12-19 RX ADMIN — LIDOCAINE HYDROCHLORIDE 3 ML: 10 INJECTION, SOLUTION EPIDURAL; INFILTRATION; INTRACAUDAL; PERINEURAL at 12:57

## 2018-12-19 RX ADMIN — CEFEPIME 1 G: 1 INJECTION, POWDER, FOR SOLUTION INTRAMUSCULAR; INTRAVENOUS at 17:09

## 2018-12-19 RX ADMIN — FAMOTIDINE 20 MG: 10 INJECTION, SOLUTION INTRAVENOUS at 08:34

## 2018-12-19 RX ADMIN — Medication 50 MCG/HR: at 07:49

## 2018-12-19 RX ADMIN — CHLORHEXIDINE GLUCONATE 15 ML: 1.2 RINSE ORAL at 08:50

## 2018-12-19 RX ADMIN — CHLORHEXIDINE GLUCONATE 15 ML: 1.2 RINSE ORAL at 20:03

## 2018-12-19 RX ADMIN — SODIUM CHLORIDE: 4.5 INJECTION, SOLUTION INTRAVENOUS at 08:14

## 2018-12-19 RX ADMIN — FAMOTIDINE 20 MG: 10 INJECTION, SOLUTION INTRAVENOUS at 21:58

## 2018-12-19 ASSESSMENT — ACTIVITIES OF DAILY LIVING (ADL)
ADLS_ACUITY_SCORE: 25
ADLS_ACUITY_SCORE: 23
ADLS_ACUITY_SCORE: 25
ADLS_ACUITY_SCORE: 25

## 2018-12-19 ASSESSMENT — MIFFLIN-ST. JEOR: SCORE: 1640.38

## 2018-12-19 NOTE — PROGRESS NOTES
[facility]    Urology Progress Note     Assessment & Plan  Felicia Ellison is a 53 year old female with septic shock from unknown origin and respiratory distress in the context of urinary pouch diversion. Mcintyre in place on CT and pouch is decompressed. .    Plan:    Hand irrigate mcintyre qshift and PRN to clear mucus obstruction    Mcintyre and Abx per primary team    Urology will sign off for now. Please re-consult if we can help in any way.    Walter Ruelas PA-C 12/19/2018 10:20 AM  [facility]  Mon-Fri, 7am - 5pm  Text page [phone]  Office: [phone]      Interval History   Mcintyre output picked up and appears to be draining well on CT.    Physical Exam   Temp: 96.8  F (36  C) Temp src: Esophageal BP: 107/65   Heart Rate: 107 Resp: 16 SpO2: 97 % O2 Device: Mechanical Ventilator    Vitals:    12/16/18 0430 12/17/18 0400 12/19/18 0400   Weight: 89.3 kg (196 lb 13.9 oz) 90.9 kg (200 lb 6.4 oz) 97.6 kg (215 lb 2.7 oz)     Vital Signs with Ranges  Temp:  [92.7  F (33.7  C)-97.7  F (36.5  C)] 96.8  F (36  C)  Heart Rate:  [] 107  Resp:  [0-41] 16  BP: ()/(35-83) 107/65  FiO2 (%):  [60 %] 60 %  SpO2:  [92 %-99 %] 97 %  I/O last 3 completed shifts:  In: 5122.58 [I.V.:2382.58; NG/GT:1380]  Out: 665 [Urine:485; Other:70; Stool:110]    General: Patient sedated intubated, RN at bedside.  Head: Normocephalic, atraumatic  Neck: Symmetric  Respiratory: Intubated  Genitourinary: Mcintyre in place draining clear urine    Medications     IV fluid REPLACEMENT ONLY       D5W 10 mL/hr at 12/19/18 0817     fentaNYL 50 mcg/hr (12/19/18 0749)     - MEDICATION INSTRUCTIONS -       midazolam 3 mg/hr (12/19/18 0814)     - MEDICATION INSTRUCTIONS -       norepinephrine 0.13 mcg/kg/min (12/19/18 0816)     propofol (DIPRIVAN) infusion Stopped (12/18/18 1700)     NaCl 50 mL/hr at 12/19/18 0814       Buprenorphine HCl  450 mcg Buccal BID     ceFEPIme (MAXIPIME) IV  1 g Intravenous Q12H      chlorhexidine  15 mL Mouth/Throat Q12H     sennosides  5-10 mL Oral or Feeding Tube BID    And     docusate   mg Oral BID     enoxaparin  60 mg Subcutaneous Q12H     famotidine  20 mg Intravenous Q12H     [START ON 12/24/2018] influenza vaccine adult (product based on age)  0.5 mL Intramuscular Prior to discharge     multivitamins w/minerals  15 mL Per Feeding Tube Daily     sodium chloride (PF)  10 mL Intracatheter Q8H     sterile water (preservative free)         traZODone  100 mg Oral At Bedtime     vancomycin place paige - receiving intermittent dosing  1 each Intravenous See Admin Instructions     vitamin A-D & C drops  7 mL Per OG Tube Daily     zinc sulfate  220 mg Per OG Tube Q48H       Data   Results for orders placed or performed during the hospital encounter of 12/12/18 (from the past 24 hour(s))   CT Chest Abdomen Pelvis w/o Contrast    Narrative    PROCEDURE:  CT of the chest, abdomen and pelvis without contrast    DATE OF PROCEDURE:  12/18/2018 10:52 AM    DOSE:  677 mGy-cm    CLINICAL HISTORY/INDICATION:  Complication of internal prosthetic device, implants and grafts.  Evaluate ileal conduit.    COMPARISON:  None    TECHNIQUE:  CT of the chest, abdomen and pelvis was performed without the  administration of intravenous contrast. Coronal and axial MIP  reformats were performed. Radiation dose for this scan was reduced  using automated exposure control, adjustment of the mA and/or kV  according to patient size, or iterative reconstruction technique.     FINDINGS:  Support lines and tubes:  Left upper extremity PICC tip terminates in the low SVC. Endotracheal  tube terminates above the karen. Enteric tube terminates in the  gastric antrum. Percutaneous jejunostomy tube retention balloon  inflated in the left lower quadrant jejunum.    Chest:   The heart is not enlarged. Thyroid gland is unremarkable. Bilateral  moderate to large pleural effusions. Small volume pericardial  effusion. Diffuse  bilateral airspace opacities with bilateral  compressive atelectasis. The central airways are patent.    Abdomen/Pelvis:  Extensive metallic streak artifact from spinal hardware. Examination  of the abdominal viscera is limited without intravenous contrast.  Diffuse anasarca. The liver is noncirrhotic in morphology. The spleen  and bilateral adrenal glands are unremarkable. No hydronephrosis.  Moderate volume ascites. Right lower quadrant ileal conduit is patent,  there is a small bowel containing parastomal hernia. Hyperdense linear  surgical suture material extends from the percutaneous jejunostomy  site through the peritoneal lining and terminates in the midline  posterior pelvis no evidence of obstruction, the large and small bowel  are nondistended.    Prior right femoral head resection. Moderate to large left hip  effusion and left hip superior/posterior subluxation      Impression    IMPRESSION:  1.  Bilateral moderate to large pleural effusions.  2.  Extensive bilateral pulmonary opacities with atelectasis  3.  Small bowel containing para-stomal hernia  4.  Diffuse anasarca  5.  Moderate to large left hip effusion.  6.  Superior posterior left hip subluxation.    BAKARI FISHER MD   EKG 12-lead, tracing only   Result Value Ref Range    Interpretation ECG Click View Image link to view waveform and result    Basic metabolic panel   Result Value Ref Range    Sodium 150 (H) 133 - 144 mmol/L    Potassium 3.7 3.4 - 5.3 mmol/L    Chloride 122 (H) 94 - 109 mmol/L    Carbon Dioxide 19 (L) 20 - 32 mmol/L    Anion Gap 9 3 - 14 mmol/L    Glucose 143 (H) 70 - 99 mg/dL    Urea Nitrogen 12 7 - 30 mg/dL    Creatinine 0.33 (L) 0.52 - 1.04 mg/dL    GFR Estimate >90 >60 mL/min/1.7m2    GFR Estimate If Black >90 >60 mL/min/1.7m2    Calcium 7.8 (L) 8.5 - 10.1 mg/dL   Glucose by meter   Result Value Ref Range    Glucose 145 (H) 70 - 99 mg/dL   Orthopedic Surgery IP Consult: Patient to be seen: Routine - within 24 hours; could  left hip effusion be infected?; Consultant may enter orders: Yes    Narrative    Juancarlos Stinson MD     12/18/2018  8:34 PM  Ortho consult received.  Imaging reviewed.  IR aspiration of the   left hip ordered to eval for infection.  Full consult to follow.        Juancarlos Stinson MD  [phone]     Phosphorus   Result Value Ref Range    Phosphorus 2.6 2.5 - 4.5 mg/dL   Glucose by meter   Result Value Ref Range    Glucose 127 (H) 70 - 99 mg/dL   CBC (AM Draw)   Result Value Ref Range    WBC 17.5 (H) 4.0 - 11.0 10e9/L    RBC Count 2.69 (L) 3.8 - 5.2 10e12/L    Hemoglobin 6.8 (LL) 11.7 - 15.7 g/dL    Hematocrit 22.2 (L) 35.0 - 47.0 %    MCV 83 78 - 100 fl    MCH 25.3 (L) 26.5 - 33.0 pg    MCHC 30.6 (L) 31.5 - 36.5 g/dL    RDW 18.5 (H) 10.0 - 15.0 %    Platelet Count 180 150 - 450 10e9/L   Basic metabolic panel   Result Value Ref Range    Sodium 144 133 - 144 mmol/L    Potassium 3.6 3.4 - 5.3 mmol/L    Chloride 118 (H) 94 - 109 mmol/L    Carbon Dioxide 19 (L) 20 - 32 mmol/L    Anion Gap 7 3 - 14 mmol/L    Glucose 189 (H) 70 - 99 mg/dL    Urea Nitrogen 12 7 - 30 mg/dL    Creatinine 0.32 (L) 0.52 - 1.04 mg/dL    GFR Estimate >90 >60 mL/min/[1.73_m2]    GFR Estimate If Black >90 >60 mL/min/[1.73_m2]    Calcium 7.3 (L) 8.5 - 10.1 mg/dL   Magnesium (AM Draw)   Result Value Ref Range    Magnesium 2.2 1.6 - 2.3 mg/dL   Phosphorus (AM Draw)   Result Value Ref Range    Phosphorus 2.4 (L) 2.5 - 4.5 mg/dL   Rheumatoid factor   Result Value Ref Range    Rheumatoid Factor <20 <20 IU/mL   Procalcitonin   Result Value Ref Range    Procalcitonin 0.17 ng/ml   INR (AM Draw)   Result Value Ref Range    INR 1.43 (H) 0.86 - 1.14   Hepatic panel   Result Value Ref Range    Bilirubin Direct 0.2 0.0 - 0.2 mg/dL    Bilirubin Total 0.5 0.2 - 1.3 mg/dL    Albumin 2.4 (L) 3.4 - 5.0 g/dL    Protein Total 5.4 (L) 6.8 - 8.8 g/dL    Alkaline Phosphatase 91 40 - 150 U/L    ALT 10 0 - 50 U/L    AST 12 0 - 45 U/L   Troponin I   Result Value Ref Range     Troponin I ES 0.023 0.000 - 0.045 ug/L
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
## 2018-12-19 NOTE — PROGRESS NOTES
RADIOLOGY PROCEDURE NOTE  Patient name: Felicia Ellison  MRN: [id number]  : 1965    Pre-procedure diagnosis: Left hip pain, effusion  Post-procedure diagnosis: Same    Procedure Date/Time: 2018  12:56 PM  Procedure: Left hip aspiration  Estimated blood loss: None  Specimen(s) collected with description: 8 CC red synovial fluid  The patient tolerated the procedure well with no immediate complications.  Significant findings:none    See imaging dictation for procedural details.    Provider name: Allen Salvador  Assistant(s):None

## 2018-12-19 NOTE — PROVIDER NOTIFICATION
This morning bladder scanned patient as she had low UOP all night and known issues with positional mcintyre to ileal conduit.  Bladder scan read >999.  Irrigated mcintyre with no more urine out and so mcintyre removed and new one placed.  No urine returned.  Updated Dr. Dominguez who consulted urology.  Urology at bedside.  Irrigated mcintyre which seems to be patent.  Bladder scanned after irrigating and bladder scan read in the low 100's even though very little besides irrigant was returned.  So bladder scans may not be accurate on this patient, per urology.  Orders placed to flush the mcintyre in the ileal conduit every 8 hours.  CT obtained of abdomen/pelvis per urologies recommendations.

## 2018-12-19 NOTE — PLAN OF CARE
Pt sedated, not following commands. Opens eyes with turns and repeated stimulation. On levo gtt.Coarse lung sounds. Irrigated and adequate UO from ileal conduit. Small amount of stool in ileostomy overnight. Potassium replaced this morning. One unit of blood given. MD notified of pt's change in heart rhythm, troponin being checked. Will continue to monitor
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
## 2018-12-19 NOTE — PHARMACY-VANCOMYCIN DOSING SERVICE
Pharmacy Vancomycin Note  Date of Service 2018  Patient's  1965   53 year old, female    Indication: Sepsis  Goal Trough Level: 15-20 mg/L  Day of Therapy: 7  Current Vancomycin regimen Intermittent dosing per levels    Current estimated CrCl = Estimated Creatinine Clearance: 250 mL/min (A) (based on SCr of 0.32 mg/dL (L)).    Creatinine for last 3 days  2018:  4:20 AM Creatinine 0.35 mg/dL  2018:  3:10 AM Creatinine 0.35 mg/dL;  2:53 PM Creatinine 0.33 mg/dL  2018:  4:20 AM Creatinine 0.32 mg/dL    Recent Vancomycin Levels (past 3 days)  2018:  4:20 AM Vancomycin Level 17.6 mg/L  2018:  9:58 AM Vancomycin Level 18.3 mg/L  2018:  2:30 PM Vancomycin Level 16.6 mg/L    Vancomycin IV Administrations (past 72 hours)                   vancomycin (VANCOCIN) 1000 mg in dextrose 5% 200 mL PREMIX (mg) 1,000 mg New Bag 18 1251                Nephrotoxins and other renal medications (From now, onward)    Start     Dose/Rate Route Frequency Ordered Stop    18 1615  vancomycin (VANCOCIN) 1000 mg in dextrose 5% 200 mL PREMIX      1,000 mg  200 mL/hr over 1 Hours Intravenous ONCE 18 1604      18 1013  vancomycin place paige - receiving intermittent dosing      1 each Intravenous SEE ADMIN INSTRUCTIONS 18 1013      18 0615  norepinephrine (LEVOPHED) 16 mg in D5W 250 mL infusion      0.03-0.4 mcg/kg/min × 56.9 kg  1.6-21.3 mL/hr  Intravenous CONTINUOUS 18 0605               Contrast Orders - past 72 hours (72h ago, onward)    None          Interpretation of levels and current regimen:  Trough level is  Therapeutic    Has serum creatinine changed > 50% in last 72 hours: No    Urine output:  good urine output    Renal Function: Stable    Plan:  1.  give 1000mg x 1 dose  2.  Pharmacy will check trough levels as appropriate in 1-3 Days.    3. Serum creatinine levels will be ordered daily for the first week of therapy and at least twice  weekly for subsequent weeks.      Reese Peralta        .

## 2018-12-19 NOTE — PROGRESS NOTES
Patient is scheduled for a left hip aspiration on 12/19/18 at 1130.  NPO is  3 hours prior to procedure.  Patient's present medications and allergies were reviewed with the patient s nurse and the patient is on lovenox.  The nurse will talk to the patient s provider to enter the proper medication hold and lab work if applicable.   The patient will be consented for the procedure when they come down to radiology.   Blood Thinner Holds for Joint Injections  No need to hold-     Aspirin, NSAIDs-Ibuprofen, Motrin, Advil, etc. BOSCH-2 inhibitors-Celebrex, Vioxx, Bextra. Salsalate    Platelet inhibitors- Clopidogrel/Plavix; Ticlopidine/Ticlid; Dipyridamole/Persantine; Aggrenox; Cilostazol/Pletal, Prasugrel /Effient;  Ticagrelor/Brilinta    Blood thinners    Coumadin (Warfarin)-Hold 3 days, need INR 1.5 or less.     Dabigatrin (Pradaxa) Hold 2 days (4 doses)     Rivaroxaban (Xarelto) Hold 24 hours (1dose)    IV Heparin (therapeutic-infusion) -Hold 4 hours & check PTT  prior to procedure   SC Heparin (Prophylactic-5000 units BID or TID) -Hold 8-12 hours (1 dose)    SC Enoxaparin     Therapeutic-1 mg/kg Q24h or q12h)-hold 24 hrs (2 doses)     Prophylactic-- 30mg BID or 40mg Qday) -hold 12-24 hrs (1 dose)  SC Arixtra-     Therapeutic-5-7.5mg q24h)- Hold 24 hours (1 dose)    Prophylactic-2.5 mg q24h)- hold 24 hours (1 dose)

## 2018-12-19 NOTE — PROGRESS NOTES
[facility]  Infectious Disease Progress Note          Assessment and Plan:   IMPRESSION:   1.  A 53-year-old female with acute sepsis, underlying paraplegia and a chronic wound, but this appears to be respiratory infection, has infiltrates and significant pleural fluid, initially felt to have major pericardial fluid, but this turns out to be a false result, likely conventional community-acquired pneumonia, although at risk for other pathogens.   2.  Paraplegia including chronic neurogenic bladder with ileal diversion and ileostomy.   3.  Chronic pain syndrome.   4.  Chronic bilateral sacral decubitus wounds, never been diagnosed with osteomyelitis, previous infection 18 months ago but no recent suggestion of infection and appearance not looking like infection currently.   5.  ALLERGIES LISTED TO LEVAQUIN AND PENICILLIN.  The Levaquin was a rash.  The penicillin caused some kind of hyperactive reactive thing so was not a true allergy.   6 MRSA colonized  7 Fluid collection in hip ?     RECOMMENDATIONS:   1.  Continue  cefepime and vancomycin , cxs all neg.  Zithromax on the assumption this is a conventional respiratory infection, in fact the imaging has the appearance of atypical infection(  completed tx ). Probably discontinue vanco soon  2 Vent and sedated 50% O2,worsened incl CXR last PM  3 Ileal conduit dysfct, urology noted  4 Hip fluid, aspirate pending               Interval History:   Vent sedated. No fever recurrence, O2 worse  no new focal sxs, WBC 10 K flu neg ileal conduit not draining 1000cc residual CT noted. ? Hip issue aspiration pending              Medications:       ceFEPIme (MAXIPIME) IV  1 g Intravenous Q12H     chlorhexidine  15 mL Mouth/Throat Q12H     sennosides  5-10 mL Oral or Feeding Tube BID    And     docusate   mg Oral BID     enoxaparin  60 mg Subcutaneous Q12H     famotidine  20 mg Intravenous Q12H     [START ON 12/24/2018] influenza vaccine adult (product  "based on age)  0.5 mL Intramuscular Prior to discharge     multivitamins w/minerals  15 mL Per Feeding Tube Daily     sterile water (preservative free)         traZODone  100 mg Oral At Bedtime     vancomycin place paige - receiving intermittent dosing  1 each Intravenous See Admin Instructions     vitamin A-D & C drops  7 mL Per OG Tube Daily     zinc sulfate  220 mg Per OG Tube Q48H                  Physical Exam:   Blood pressure 97/54, pulse 82, temperature 96.1  F (35.6  C), resp. rate 16, height 1.753 m (5' 9\"), weight 97.6 kg (215 lb 2.7 oz), SpO2 98 %, not currently breastfeeding.  Wt Readings from Last 2 Encounters:   12/19/18 97.6 kg (215 lb 2.7 oz)   08/30/17 56.7 kg (125 lb)     Vital Signs with Ranges  Temp:  [92.7  F (33.7  C)-97.7  F (36.5  C)] 96.1  F (35.6  C)  Heart Rate:  [] 90  Resp:  [0-41] 16  BP: ()/(35-83) 97/54  FiO2 (%):  [60 %] 60 %  SpO2:  [87 %-100 %] 98 %    Constitutional: Vent and sedated   Lungs: Congestion to auscultation bilaterally, few crackles no wheezing   Cardiovascular: Regular rate and rhythm, normal S1 and S2, and no murmur noted   Abdomen: Normal bowel sounds, soft, non-distended, non-tender   Skin: No rashes, no cyanosis, no edema wd not seen   Other:           Data:   All microbiology laboratory data reviewed.  Recent Labs   Lab Test 12/19/18  0420 12/18/18  0310 12/17/18  0420   WBC 17.5* 10.8 6.6   HGB 6.8* 7.6* 7.4*   HCT 22.2* 23.7* 23.1*   MCV 83 80 80    201 165     Recent Labs   Lab Test 12/19/18  0420 12/18/18  1453 12/18/18  0310   CR 0.32* 0.33* 0.35*     Recent Labs   Lab Test 02/20/17  1745   SED 61*     Recent Labs   Lab Test 12/16/18  0920 12/12/18  2120 12/12/18  2115 02/18/17  1210 09/04/14  1105 04/14/14  0955 01/15/14  1005 12/28/13  0922 09/13/13  1502   CULT No growth No growth No growth Moderate growth Peptostreptococcus anaerobius Susceptibility testing not   routinely done  *  Heavy growth Enterococcus faecalis  Light growth " Klebsiella pneumoniae  Light growth Coagulase negative Staphylococcus Susceptibility testing not   routinely done  Light growth Candida glabrata Susceptibility testing not routinely done  * >100,000 colonies/mL Escherichia coli  10,000 to 50,000 colonies/mL Mixed gram positive tulio  * Culture negative after 4 weeks  No anaerobes isolated  On day 2, isolated in broth only: Staphylococcus aureus* Culture negative after 4 weeks  On day 2, isolated in broth only: Gram positive cocci in clusters Refer to  Routine Aerobic Culture for Identification. No anaerobes isolated*  On day 2, isolated in broth only: Staphylococcus aureus* >100,000 colonies/mL Mixed gram negative and positive tulio >100,000 colonies/mL Pseudomonas aeruginosa

## 2018-12-19 NOTE — CONSULTS
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      Impression    IMPRESSION:  Ultrasound-guided left thoracentesis.     MESERET PEREZ PA-C   XR Chest Port 1 View    Narrative    CHEST ONE VIEW PORTABLE   12/19/2018 2:40 PM     HISTORY: Post left thoracentesis.    COMPARISON: 12/18/2018      Impression    IMPRESSION: No pneumothorax on either side. The tip of the  endotracheal tube is 4.5 cm above the karen. A feeding catheter  extends below the left hemidiaphragm. Left-sided PICC line unchanged.  Groundglass opacities in left lung have improved. There are persistent  groundglass opacities throughout the right lung. No significant  pleural effusion on either side.    VINCENT WALLACE MD   Lactic acid level STAT for sepsis protocol   Result Value Ref Range    Lactate for Sepsis Protocol 3.6 (H) 0.7 - 2.0 mmol/L   Comprehensive metabolic panel   Result Value Ref Range    Sodium 138 133 - 144 mmol/L    Potassium 3.5 3.4 - 5.3 mmol/L    Chloride 107 94 - 109 mmol/L    Carbon Dioxide 22 20 - 32 mmol/L    Anion Gap 9 3 - 14 mmol/L    Glucose 113 (H) 70 - 99 mg/dL    Urea Nitrogen 7 7 - 30 mg/dL    Creatinine 0.50 (L) 0.52 - 1.04 mg/dL    GFR Estimate >90 >60 mL/min/1.7m2    GFR Estimate If Black >90 >60 mL/min/1.7m2    Calcium 7.3 (L)  8.5 - 10.1 mg/dL    Bilirubin Total 0.4 0.2 - 1.3 mg/dL    Albumin 1.4 (L) 3.4 - 5.0 g/dL    Protein Total 6.4 (L) 6.8 - 8.8 g/dL    Alkaline Phosphatase 232 (H) 40 - 150 U/L    ALT 10 0 - 50 U/L    AST 9 0 - 45 U/L   CBC with platelets differential   Result Value Ref Range    WBC 17.7 (H) 4.0 - 11.0 10e9/L    RBC Count 4.12 3.8 - 5.2 10e12/L    Hemoglobin 10.6 (L) 11.7 - 15.7 g/dL    Hematocrit 34.1 (L) 35.0 - 47.0 %    MCV 83 78 - 100 fl    MCH 25.7 (L) 26.5 - 33.0 pg    MCHC 31.1 (L) 31.5 - 36.5 g/dL    RDW 17.3 (H) 10.0 - 15.0 %    Platelet Count 337 150 - 450 10e9/L    Diff Method Automated Method     % Neutrophils 82.6 %    % Lymphocytes 10.7 %    % Monocytes 4.6 %    % Eosinophils 1.3 %    % Basophils 0.2 %    % Immature Granulocytes 0.6 %    Nucleated RBCs 0 0 /100    Absolute Neutrophil 14.6 (H) 1.6 - 8.3 10e9/L    Absolute Lymphocytes 1.9 0.8 - 5.3 10e9/L    Absolute Monocytes 0.8 0.0 - 1.3 10e9/L    Absolute Eosinophils 0.2 0.0 - 0.7 10e9/L    Absolute Basophils 0.0 0.0 - 0.2 10e9/L    Abs Immature Granulocytes 0.1 0 - 0.4 10e9/L    Absolute Nucleated RBC 0.0    CRP inflammation   Result Value Ref Range    CRP Inflammation 98.6 (H) 0.0 - 8.0 mg/L   TSH with free T4 reflex   Result Value Ref Range    TSH 5.38 (H) 0.40 - 4.00 mU/L   Procalcitonin   Result Value Ref Range    Procalcitonin 0.26 ng/ml   Anti Nuclear Radha IgG by IFA with Reflex   Result Value Ref Range    JJ interpretation Borderline Positive (A) NEG^Negative    JJ pattern 1 HOMOGENEOUS     JJ titer 1 1:80    Lyme disease DNA detection by PCR   Result Value Ref Range    Lyme DNAPCR Specimen Plasma     Lyme DNAPCR Not Detected    T4 free   Result Value Ref Range    T4 Free 0.97 0.76 - 1.46 ng/dL   INR   Result Value Ref Range    INR 1.63 (H) 0.86 - 1.14   Lactic acid   Result Value Ref Range    Lactic Acid 1.4 0.4 - 2.0 mmol/L   Basic metabolic panel   Result Value Ref Range    Sodium 140 133 - 144 mmol/L    Potassium 3.3 (L) 3.4 - 5.3 mmol/L     Chloride 110 (H) 94 - 109 mmol/L    Carbon Dioxide 23 20 - 32 mmol/L    Anion Gap 7 3 - 14 mmol/L    Glucose 110 (H) 70 - 99 mg/dL    Urea Nitrogen 7 7 - 30 mg/dL    Creatinine 0.52 0.52 - 1.04 mg/dL    GFR Estimate >90 >60 mL/min/1.7m2    GFR Estimate If Black >90 >60 mL/min/1.7m2    Calcium 6.7 (L) 8.5 - 10.1 mg/dL   Magnesium   Result Value Ref Range    Magnesium 1.7 1.6 - 2.3 mg/dL   Glucose by meter   Result Value Ref Range    Glucose 89 70 - 99 mg/dL   Blood gas arterial and oxyhgb   Result Value Ref Range    pH Arterial 7.36 7.35 - 7.45 pH    pCO2 Arterial 40 35 - 45 mm Hg    pO2 Arterial 69 (L) 80 - 105 mm Hg    Bicarbonate Arterial 22 21 - 28 mmol/L    FIO2 60     Oxyhemoglobin Arterial 93 92 - 100 %    Base Deficit Art 2.9 mmol/L   Calcium ionized whole blood   Result Value Ref Range    Calcium Ionized Whole Blood 4.3 (L) 4.4 - 5.2 mg/dL   Legionella pneumonia antigen urine   Result Value Ref Range    Specimen Description Catheterized Urine     L Pneumo Urine Antigen       Presumptive negative for Legionella pneumophilia serogroup 1 antigen in urine, suggesting   no recent or current infection.  Infection due to Legionella cannot be ruled out, since   other serogroups and species may cause disease, antigen may not be present in urine in   early infection, and the level of antigen present in the urine may be below detectable   limits of the test.     CBC with platelets   Result Value Ref Range    WBC 15.2 (H) 4.0 - 11.0 10e9/L    RBC Count 3.47 (L) 3.8 - 5.2 10e12/L    Hemoglobin 8.8 (L) 11.7 - 15.7 g/dL    Hematocrit 28.5 (L) 35.0 - 47.0 %    MCV 82 78 - 100 fl    MCH 25.4 (L) 26.5 - 33.0 pg    MCHC 30.9 (L) 31.5 - 36.5 g/dL    RDW 17.3 (H) 10.0 - 15.0 %    Platelet Count 299 150 - 450 10e9/L   Procalcitonin   Result Value Ref Range    Procalcitonin 0.25 ng/ml   Potassium   Result Value Ref Range    Potassium 3.3 (L) 3.4 - 5.3 mmol/L   Glucose by meter   Result Value Ref Range    Glucose 99 70 - 99  mg/dL   Glucose by meter   Result Value Ref Range    Glucose 85 70 - 99 mg/dL   Vancomycin level   Result Value Ref Range    Vancomycin Level 23.0 mg/L   Potassium   Result Value Ref Range    Potassium 3.8 3.4 - 5.3 mmol/L   Magnesium   Result Value Ref Range    Magnesium 2.2 1.6 - 2.3 mg/dL   CBC with platelets   Result Value Ref Range    WBC 17.5 (H) 4.0 - 11.0 10e9/L    RBC Count 3.43 (L) 3.8 - 5.2 10e12/L    Hemoglobin 8.8 (L) 11.7 - 15.7 g/dL    Hematocrit 27.9 (L) 35.0 - 47.0 %    MCV 81 78 - 100 fl    MCH 25.7 (L) 26.5 - 33.0 pg    MCHC 31.5 31.5 - 36.5 g/dL    RDW 17.1 (H) 10.0 - 15.0 %    Platelet Count 344 150 - 450 10e9/L   Comprehensive metabolic panel (AM Draw)   Result Value Ref Range    Sodium 143 133 - 144 mmol/L    Potassium 3.9 3.4 - 5.3 mmol/L    Chloride 115 (H) 94 - 109 mmol/L    Carbon Dioxide 21 20 - 32 mmol/L    Anion Gap 7 3 - 14 mmol/L    Glucose 98 70 - 99 mg/dL    Urea Nitrogen 5 (L) 7 - 30 mg/dL    Creatinine 0.35 (L) 0.52 - 1.04 mg/dL    GFR Estimate >90 >60 mL/min/1.7m2    GFR Estimate If Black >90 >60 mL/min/1.7m2    Calcium 7.3 (L) 8.5 - 10.1 mg/dL    Bilirubin Total 0.4 0.2 - 1.3 mg/dL    Albumin 1.1 (L) 3.4 - 5.0 g/dL    Protein Total 5.3 (L) 6.8 - 8.8 g/dL    Alkaline Phosphatase 196 (H) 40 - 150 U/L    ALT 8 0 - 50 U/L    AST 14 0 - 45 U/L   Glucose by meter   Result Value Ref Range    Glucose 98 70 - 99 mg/dL   Glucose by meter   Result Value Ref Range    Glucose 89 70 - 99 mg/dL   Cortisol   Result Value Ref Range    Cortisol Serum 21.4 4 - 22 ug/dL   Glucose by meter   Result Value Ref Range    Glucose 118 (H) 70 - 99 mg/dL   CBC (AM Draw)   Result Value Ref Range    WBC 21.5 (H) 4.0 - 11.0 10e9/L    RBC Count 3.15 (L) 3.8 - 5.2 10e12/L    Hemoglobin 8.1 (L) 11.7 - 15.7 g/dL    Hematocrit 26.2 (L) 35.0 - 47.0 %    MCV 83 78 - 100 fl    MCH 25.7 (L) 26.5 - 33.0 pg    MCHC 30.9 (L) 31.5 - 36.5 g/dL    RDW 17.6 (H) 10.0 - 15.0 %    Platelet Count 309 150 - 450 10e9/L   Basic  metabolic panel   Result Value Ref Range    Sodium 146 (H) 133 - 144 mmol/L    Potassium 3.6 3.4 - 5.3 mmol/L    Chloride 119 (H) 94 - 109 mmol/L    Carbon Dioxide 17 (L) 20 - 32 mmol/L    Anion Gap 10 3 - 14 mmol/L    Glucose 118 (H) 70 - 99 mg/dL    Urea Nitrogen 5 (L) 7 - 30 mg/dL    Creatinine 0.35 (L) 0.52 - 1.04 mg/dL    GFR Estimate >90 >60 mL/min/1.7m2    GFR Estimate If Black >90 >60 mL/min/1.7m2    Calcium 7.7 (L) 8.5 - 10.1 mg/dL   Blood gas venous with oxyhemoglobin (AM Draw)   Result Value Ref Range    Ph Venous 7.16 (LL) 7.32 - 7.43 pH    PCO2 Venous 47 40 - 50 mm Hg    PO2 Venous 55 (H) 25 - 47 mm Hg    Bicarbonate Venous 17 (L) 21 - 28 mmol/L    Oxyhemoglobin Venous 82 %    Base Deficit Venous 11.4 mmol/L   Procalcitonin level   Result Value Ref Range    Procalcitonin 0.36 ng/ml   Glucose by meter   Result Value Ref Range    Glucose 131 (H) 70 - 99 mg/dL   CBC (AM Draw)   Result Value Ref Range    WBC Canceled, Test credited 4.0 - 11.0 10e9/L    RBC Count Canceled, Test credited 3.8 - 5.2 10e12/L    Hemoglobin Canceled, Test credited 11.7 - 15.7 g/dL    Hematocrit Canceled, Test credited 35.0 - 47.0 %    MCV Canceled, Test credited 78 - 100 fl    MCH Canceled, Test credited 26.5 - 33.0 pg    MCHC Canceled, Test credited 31.5 - 36.5 g/dL    RDW Canceled, Test credited 10.0 - 15.0 %    Platelet Count Canceled, Test credited 150 - 450 10e9/L   Basic metabolic panel   Result Value Ref Range    Sodium Canceled, Test credited 133 - 144 mmol/L    Potassium Canceled, Test credited 3.4 - 5.3 mmol/L    Chloride Canceled, Test credited 94 - 109 mmol/L    Carbon Dioxide Canceled, Test credited 20 - 32 mmol/L    Anion Gap Canceled, Test credited 6 - 17 mmol/L    Glucose Canceled, Test credited 70 - 99 mg/dL    Urea Nitrogen Canceled, Test credited 7 - 30 mg/dL    Creatinine Canceled, Test credited 0.52 - 1.04 mg/dL    GFR Estimate Canceled, Test credited >60 mL/min/1.7m2    GFR Estimate If Black Canceled, Test  credited >60 mL/min/1.7m2    Calcium Canceled, Test credited 8.5 - 10.1 mg/dL     *Note: Due to a large number of results and/or encounters for the requested time period, some results have not been displayed. A complete set of results can be found in Results Review.          Attestation:  I have reviewed today's vital signs, notes, medications, labs and imaging with Dr. Stinson.  Amount of time performed on this consult: 30 minutes.    Christina Cabello PA-C

## 2018-12-19 NOTE — PROCEDURES
"Procedure/Surgery Information   [facility]     Procedure Note  Date of Service (when I performed the procedure): 12/19/2018    Procedure: arterial line insertion       I have reviewed the lab findings, diagnostic data, medications, and the plan for procedure. I have determined this patient to be an appropriate candidate for the planned procedure and have reassessed the patient IMMEDIATELY PRIOR to ocedure.  Prior to the start of the procedure and with procedural staff participation, I verbally confirmed the patient s identity using two indicators, relevant allergies, that the procedure was appropriate and matched the consent or emergent situation, and that the correct equipment/implants were available. Immediately prior to starting the procedure I conducted the Time Out with the procedural staff and re-confirmed the patient s name, procedure, and site/side. (The Joint Commission universal protocol was followed.)  Yes    Insert arterial line  Date/Time: 12/19/2018 5:04 PM  Performed by: Mitchell Dominguez MD  Authorized by: Mitchell Dominguez MD   Consent: Verbal consent obtained. Written consent obtained.  Consent given by: guardian  Patient understanding: patient states understanding of the procedure being performed  Patient consent: the patient's understanding of the procedure matches consent given  Procedure consent: procedure consent matches procedure scheduled  Relevant documents: relevant documents present and verified  Test results: test results available and properly labeled  Site marked: the operative site was marked  Imaging studies: imaging studies available  Required items: required blood products, implants, devices, and special equipment available  Patient identity confirmed: hospital-assigned identification number  Time out: Immediately prior to procedure a \"time out\" was called to verify the correct patient, procedure, equipment, support staff and site/side marked as " required.  Preparation: Patient was prepped and draped in the usual sterile fashion.  Indications: multiple ABGs, respiratory failure and hemodynamic monitoring  Location: right brachial  Anesthesia: local infiltration    Anesthesia:  Local Anesthetic: lidocaine 1% without epinephrine  Anesthetic total: 3 mL    Sedation:  Patient sedated: no    Needle gauge: 20  Seldinger technique: Seldinger technique used  Number of attempts: 4  Post-procedure: dressing applied  Post-procedure CMS: normal  Patient tolerance: Patient tolerated the procedure well with no immediate complications        Performed by: Mitchell Dominguez  Authorized by: Mitchell Dominguez

## 2018-12-19 NOTE — PROGRESS NOTES
Unable to complete [place] suicide severity rating scale due to patient status. Intubated and sedated. Unable to complete screening tool for decision maker/ guardianship

## 2018-12-19 NOTE — PLAN OF CARE
Patient started shift out this morning quite tachypneic on vent (35-40).  Chest x-ray and ABG obtained.  Fent gtt increased which helped significantly with resp rate.  FiO2 up to 60% and PEEP increased to 8.  Down for chest/abd/pelvis CT after patient seen by urology for difficulties with ileal conduit (see note).  Hopes for a bronch this afternoon but unable to reach Arlette (daughter) for consent before Dr. Dominguez left for the day.  When she called back, phone consent signed per Dr. Way and writing staff for bronch tomorrow.  Switched from Propofol to Versed to see if this would help with pressor needs.  SR this morning and this afternoon patient has been having frequent PVC's and PAC's (12 lead obtained) which seemed to affect patients blood pressure (levo titrated to keep MAP 65).  Not mobilized to chair today d/t tachypnea.  Tube feedings increasing toward goal and free water increased for high sodiums.  Residuals increasing.  Sodium remained high tonight so IV fluids changed.  Potassium replaced today x3 and phos replaced.  Low UOP (md's aware).  500ml albumin given over 2 hours this afternoon with no improvement in UOP or BP's.  Daughter updated over phone and Trang REYNA at bedside to visit.

## 2018-12-19 NOTE — CONSULTS
Ortho consult received.  Imaging reviewed.  IR aspiration of the left hip ordered to eval for infection.  Full consult to follow.      Juancarlos Stinson MD  [phone]

## 2018-12-20 LAB
ALBUMIN SERPL-MCNC: 2 G/DL (ref 3.4–5)
ALP SERPL-CCNC: 95 U/L (ref 40–150)
ALT SERPL W P-5'-P-CCNC: 10 U/L (ref 0–50)
ANION GAP SERPL CALCULATED.3IONS-SCNC: 8 MMOL/L (ref 3–14)
APPEARANCE FLD: NORMAL
AST SERPL W P-5'-P-CCNC: 18 U/L (ref 0–45)
BASE DEFICIT BLDA-SCNC: 6.5 MMOL/L
BILIRUB SERPL-MCNC: 0.7 MG/DL (ref 0.2–1.3)
BUN SERPL-MCNC: 11 MG/DL (ref 7–30)
CALCIUM SERPL-MCNC: 7.6 MG/DL (ref 8.5–10.1)
CHLORIDE SERPL-SCNC: 121 MMOL/L (ref 94–109)
CO2 SERPL-SCNC: 19 MMOL/L (ref 20–32)
COLOR FLD: COLORLESS
CREAT SERPL-MCNC: 0.32 MG/DL (ref 0.52–1.04)
EOSINOPHIL NFR FLD MANUAL: 6 %
ERYTHROCYTE [DISTWIDTH] IN BLOOD BY AUTOMATED COUNT: 18.2 % (ref 10–15)
GFR SERPL CREATININE-BSD FRML MDRD: >90 ML/MIN/{1.73_M2}
GLUCOSE BLDC GLUCOMTR-MCNC: 111 MG/DL (ref 70–99)
GLUCOSE BLDC GLUCOMTR-MCNC: 113 MG/DL (ref 70–99)
GLUCOSE BLDC GLUCOMTR-MCNC: 78 MG/DL (ref 70–99)
GLUCOSE BLDC GLUCOMTR-MCNC: 90 MG/DL (ref 70–99)
GLUCOSE SERPL-MCNC: 87 MG/DL (ref 70–99)
GRAM STN SPEC: NORMAL
HCO3 BLD-SCNC: 19 MMOL/L (ref 21–28)
HCT VFR BLD AUTO: 27.3 % (ref 35–47)
HGB BLD-MCNC: 8.7 G/DL (ref 11.7–15.7)
KOH PREP SPEC: NORMAL
LYMPHOCYTES NFR FLD MANUAL: 5 %
MAGNESIUM SERPL-MCNC: 2.1 MG/DL (ref 1.6–2.3)
MCH RBC QN AUTO: 25.8 PG (ref 26.5–33)
MCHC RBC AUTO-ENTMCNC: 31.9 G/DL (ref 31.5–36.5)
MCV RBC AUTO: 81 FL (ref 78–100)
MONOS+MACROS NFR FLD MANUAL: 2 %
NEUTS BAND NFR FLD MANUAL: 87 %
O2/TOTAL GAS SETTING VFR VENT: 60 %
OXYHGB MFR BLD: 99 % (ref 92–100)
PCO2 BLD: 35 MM HG (ref 35–45)
PH BLD: 7.34 PH (ref 7.35–7.45)
PHOSPHATE SERPL-MCNC: 2 MG/DL (ref 2.5–4.5)
PLATELET # BLD AUTO: 101 10E9/L (ref 150–450)
PO2 BLD: 147 MM HG (ref 80–105)
POTASSIUM SERPL-SCNC: 3.2 MMOL/L (ref 3.4–5.3)
POTASSIUM SERPL-SCNC: 3.7 MMOL/L (ref 3.4–5.3)
PROT SERPL-MCNC: 4.9 G/DL (ref 6.8–8.8)
RBC # BLD AUTO: 3.37 10E12/L (ref 3.8–5.2)
SODIUM SERPL-SCNC: 148 MMOL/L (ref 133–144)
SPECIMEN SOURCE FLD: NORMAL
SPECIMEN SOURCE: NORMAL
SPECIMEN SOURCE: NORMAL
VANCOMYCIN SERPL-MCNC: 16.5 MG/L
WBC # BLD AUTO: 18.4 10E9/L (ref 4–11)
WBC # FLD AUTO: 883 /UL

## 2018-12-20 PROCEDURE — 80053 COMPREHEN METABOLIC PANEL: CPT | Performed by: INTERNAL MEDICINE

## 2018-12-20 PROCEDURE — 00000146 ZZHCL STATISTIC GLUCOSE BY METER IP

## 2018-12-20 PROCEDURE — 84132 ASSAY OF SERUM POTASSIUM: CPT | Performed by: INTERNAL MEDICINE

## 2018-12-20 PROCEDURE — 40000257 ZZH STATISTIC CONSULT NO CHARGE VASC ACCESS

## 2018-12-20 PROCEDURE — 25000125 ZZHC RX 250: Performed by: INTERNAL MEDICINE

## 2018-12-20 PROCEDURE — 87106 FUNGI IDENTIFICATION YEAST: CPT | Performed by: INTERNAL MEDICINE

## 2018-12-20 PROCEDURE — 25000128 H RX IP 250 OP 636: Performed by: INTERNAL MEDICINE

## 2018-12-20 PROCEDURE — 40000008 ZZH STATISTIC AIRWAY CARE

## 2018-12-20 PROCEDURE — 84100 ASSAY OF PHOSPHORUS: CPT | Performed by: INTERNAL MEDICINE

## 2018-12-20 PROCEDURE — 31624 DX BRONCHOSCOPE/LAVAGE: CPT | Performed by: INTERNAL MEDICINE

## 2018-12-20 PROCEDURE — 87070 CULTURE OTHR SPECIMN AEROBIC: CPT | Performed by: INTERNAL MEDICINE

## 2018-12-20 PROCEDURE — 99291 CRITICAL CARE FIRST HOUR: CPT | Mod: 25 | Performed by: INTERNAL MEDICINE

## 2018-12-20 PROCEDURE — 94003 VENT MGMT INPAT SUBQ DAY: CPT

## 2018-12-20 PROCEDURE — 87116 MYCOBACTERIA CULTURE: CPT | Performed by: INTERNAL MEDICINE

## 2018-12-20 PROCEDURE — 87015 SPECIMEN INFECT AGNT CONCNTJ: CPT | Performed by: INTERNAL MEDICINE

## 2018-12-20 PROCEDURE — 83735 ASSAY OF MAGNESIUM: CPT | Performed by: INTERNAL MEDICINE

## 2018-12-20 PROCEDURE — 80202 ASSAY OF VANCOMYCIN: CPT | Performed by: INTERNAL MEDICINE

## 2018-12-20 PROCEDURE — 40000275 ZZH STATISTIC RCP TIME EA 10 MIN

## 2018-12-20 PROCEDURE — 87081 CULTURE SCREEN ONLY: CPT | Performed by: INTERNAL MEDICINE

## 2018-12-20 PROCEDURE — 85027 COMPLETE CBC AUTOMATED: CPT | Performed by: INTERNAL MEDICINE

## 2018-12-20 PROCEDURE — 25000128 H RX IP 250 OP 636: Performed by: NURSE PRACTITIONER

## 2018-12-20 PROCEDURE — G0463 HOSPITAL OUTPT CLINIC VISIT: HCPCS

## 2018-12-20 PROCEDURE — 87206 SMEAR FLUORESCENT/ACID STAI: CPT | Performed by: INTERNAL MEDICINE

## 2018-12-20 PROCEDURE — 88108 CYTOPATH CONCENTRATE TECH: CPT | Performed by: INTERNAL MEDICINE

## 2018-12-20 PROCEDURE — 40000239 ZZH STATISTIC VAT ROUNDS

## 2018-12-20 PROCEDURE — 31622 DX BRONCHOSCOPE/WASH: CPT | Performed by: INTERNAL MEDICINE

## 2018-12-20 PROCEDURE — 25000128 H RX IP 250 OP 636: Performed by: ANESTHESIOLOGY

## 2018-12-20 PROCEDURE — 82805 BLOOD GASES W/O2 SATURATION: CPT | Performed by: INTERNAL MEDICINE

## 2018-12-20 PROCEDURE — 87210 SMEAR WET MOUNT SALINE/INK: CPT | Performed by: INTERNAL MEDICINE

## 2018-12-20 PROCEDURE — 88108 CYTOPATH CONCENTRATE TECH: CPT | Mod: 26 | Performed by: INTERNAL MEDICINE

## 2018-12-20 PROCEDURE — 0B9F8ZX DRAINAGE OF RIGHT LOWER LUNG LOBE, VIA NATURAL OR ARTIFICIAL OPENING ENDOSCOPIC, DIAGNOSTIC: ICD-10-PCS | Performed by: INTERNAL MEDICINE

## 2018-12-20 PROCEDURE — 88312 SPECIAL STAINS GROUP 1: CPT | Performed by: INTERNAL MEDICINE

## 2018-12-20 PROCEDURE — 20000003 ZZH R&B ICU

## 2018-12-20 PROCEDURE — 87102 FUNGUS ISOLATION CULTURE: CPT | Performed by: INTERNAL MEDICINE

## 2018-12-20 PROCEDURE — 89051 BODY FLUID CELL COUNT: CPT | Performed by: INTERNAL MEDICINE

## 2018-12-20 PROCEDURE — 87205 SMEAR GRAM STAIN: CPT | Performed by: INTERNAL MEDICINE

## 2018-12-20 PROCEDURE — 87633 RESP VIRUS 12-25 TARGETS: CPT | Performed by: INTERNAL MEDICINE

## 2018-12-20 PROCEDURE — 88312 SPECIAL STAINS GROUP 1: CPT | Mod: 26 | Performed by: INTERNAL MEDICINE

## 2018-12-20 RX ORDER — FENTANYL CITRATE 50 UG/ML
50 INJECTION, SOLUTION INTRAMUSCULAR; INTRAVENOUS ONCE
Status: COMPLETED | OUTPATIENT
Start: 2018-12-20 | End: 2018-12-20

## 2018-12-20 RX ORDER — VANCOMYCIN HYDROCHLORIDE 1 G/200ML
1000 INJECTION, SOLUTION INTRAVENOUS EVERY 24 HOURS
Status: DISCONTINUED | OUTPATIENT
Start: 2018-12-20 | End: 2018-12-23

## 2018-12-20 RX ADMIN — POTASSIUM CHLORIDE 20 MEQ: 29.8 INJECTION, SOLUTION INTRAVENOUS at 07:35

## 2018-12-20 RX ADMIN — CEFEPIME 1 G: 1 INJECTION, POWDER, FOR SOLUTION INTRAMUSCULAR; INTRAVENOUS at 17:52

## 2018-12-20 RX ADMIN — Medication 50 MCG/HR: at 11:03

## 2018-12-20 RX ADMIN — ENOXAPARIN SODIUM 60 MG: 60 INJECTION SUBCUTANEOUS at 11:03

## 2018-12-20 RX ADMIN — DEXTROSE MONOHYDRATE: 50 INJECTION, SOLUTION INTRAVENOUS at 08:45

## 2018-12-20 RX ADMIN — CHLORHEXIDINE GLUCONATE 15 ML: 1.2 RINSE ORAL at 19:32

## 2018-12-20 RX ADMIN — MIDAZOLAM HYDROCHLORIDE 2 MG: 1 INJECTION, SOLUTION INTRAMUSCULAR; INTRAVENOUS at 11:41

## 2018-12-20 RX ADMIN — FAMOTIDINE 20 MG: 10 INJECTION, SOLUTION INTRAVENOUS at 09:57

## 2018-12-20 RX ADMIN — DEXTROSE MONOHYDRATE: 50 INJECTION, SOLUTION INTRAVENOUS at 17:52

## 2018-12-20 RX ADMIN — CEFEPIME 1 G: 1 INJECTION, POWDER, FOR SOLUTION INTRAMUSCULAR; INTRAVENOUS at 06:04

## 2018-12-20 RX ADMIN — FAMOTIDINE 20 MG: 10 INJECTION, SOLUTION INTRAVENOUS at 21:13

## 2018-12-20 RX ADMIN — FENTANYL CITRATE 50 MCG: 50 INJECTION, SOLUTION INTRAMUSCULAR; INTRAVENOUS at 11:41

## 2018-12-20 RX ADMIN — CHLORHEXIDINE GLUCONATE 15 ML: 1.2 RINSE ORAL at 08:11

## 2018-12-20 RX ADMIN — POTASSIUM CHLORIDE 20 MEQ: 29.8 INJECTION, SOLUTION INTRAVENOUS at 00:23

## 2018-12-20 RX ADMIN — POTASSIUM CHLORIDE 20 MEQ: 29.8 INJECTION, SOLUTION INTRAVENOUS at 06:06

## 2018-12-20 RX ADMIN — DEXTROSE MONOHYDRATE 10 ML/HR: 50 INJECTION, SOLUTION INTRAVENOUS at 06:05

## 2018-12-20 RX ADMIN — VANCOMYCIN HYDROCHLORIDE 1000 MG: 1 INJECTION, SOLUTION INTRAVENOUS at 19:33

## 2018-12-20 RX ADMIN — POTASSIUM CHLORIDE 20 MEQ: 29.8 INJECTION, SOLUTION INTRAVENOUS at 13:05

## 2018-12-20 RX ADMIN — SODIUM PHOSPHATE, MONOBASIC, MONOHYDRATE 15 MMOL: 276; 142 INJECTION, SOLUTION INTRAVENOUS at 09:57

## 2018-12-20 ASSESSMENT — ACTIVITIES OF DAILY LIVING (ADL)
ADLS_ACUITY_SCORE: 25

## 2018-12-20 ASSESSMENT — MIFFLIN-ST. JEOR: SCORE: 1636.38

## 2018-12-20 NOTE — PROGRESS NOTES
Bronchoscopy done at bedside from 11:40 to 11:47, 2 mg Versed, 50 mcg Fentanyl given per MD order. Time out done 11:40, consent signed, R BAL sample sent.

## 2018-12-20 NOTE — OR NURSING
Patient tolerated bronchoscopy procedure well, with sedation and monitoring per ICU RN staff.  BAL specimen was obtained.  Labeling and processing of specimen done by staff in ICU.  Procedure time 4 minutes.
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
## 2018-12-20 NOTE — PLAN OF CARE
Bronchoscopy completed today, pt tolerated well. RASS -1 on 0.5 mg Versed, titrating Levophed. Up to chair. CMV @ 50% FiO2, 16 R, 400 Vt, PEEP 7. Ambrose irrigated. K and phos replaced. Opens eyes to command at times, not following other commands.

## 2018-12-20 NOTE — PROGRESS NOTES
Patient sleeping soundly on my visit   Continue to watch cultures - although patient has been on antibiotics   Cell count 6261, not concerning at this time for native hip infection    Will continue to follow  Christina Cabello PAC

## 2018-12-20 NOTE — PROGRESS NOTES
[facility]  Infectious Disease Progress Note          Assessment and Plan:   IMPRESSION:   1.  A 53-year-old female with acute sepsis, underlying paraplegia and a chronic wound, but this appears to be respiratory infection, has infiltrates and significant pleural fluid, initially felt to have major pericardial fluid, but this turns out to be a false result, likely conventional community-acquired pneumonia, although at risk for other pathogens.   2.  Paraplegia including chronic neurogenic bladder with ileal diversion and ileostomy.   3.  Chronic pain syndrome.   4.  Chronic bilateral sacral decubitus wounds, never been diagnosed with osteomyelitis, previous infection 18 months ago but no recent suggestion of infection and appearance not looking like infection currently.   5.  ALLERGIES LISTED TO LEVAQUIN AND PENICILLIN.  The Levaquin was a rash.  The penicillin caused some kind of hyperactive reactive thing so was not a true allergy.   6 MRSA colonized  7 Fluid collection in hip ?     RECOMMENDATIONS:   1.   cefepime and vancomycin day 8 , cxs all neg.  Zithromax   completed tx ). ? discontinue vanco soon  2 Vent and sedated 50% O2,some pressors  3 Ileal conduit dysfct, urology noted  4 Hip fluid, aspirate 6 K WBc with minimal PMNs , cx pending, even on ABX low WBC/PMNsso doubt  infection  5 Discussed with Dr Dominguez, general failure to improve, not clearly active untreated infection issue(procal low, no fever, cxs neg)               Interval History:   Vent sedated. No fever recurrence, O2 worse  no new focal sxs, WBC 10 K flu neg ileal conduit not draining 1000cc residual CT noted. ? Hip issue aspiration 6 K WBc not PMNs              Medications:       ceFEPIme (MAXIPIME) IV  1 g Intravenous Q12H     chlorhexidine  15 mL Mouth/Throat Q12H     enoxaparin  60 mg Subcutaneous Q12H     famotidine  20 mg Intravenous Q12H     [START ON 12/24/2018] influenza vaccine adult (product based on age)   "0.5 mL Intramuscular Prior to discharge     vancomycin place paige - receiving intermittent dosing  1 each Intravenous See Admin Instructions                  Physical Exam:   Blood pressure 123/69, pulse 82, temperature 97  F (36.1  C), resp. rate 27, height 1.753 m (5' 9\"), weight 97.2 kg (214 lb 4.6 oz), SpO2 95 %, not currently breastfeeding.  Wt Readings from Last 2 Encounters:   12/20/18 97.2 kg (214 lb 4.6 oz)   08/30/17 56.7 kg (125 lb)     Vital Signs with Ranges  Temp:  [95.4  F (35.2  C)-97.3  F (36.3  C)] 97  F (36.1  C)  Heart Rate:  [] 91  Resp:  [8-27] 27  BP: ()/(45-92) 123/69  MAP:  [59 mmHg-101 mmHg] 101 mmHg  Arterial Line BP: ()/(40-70) 141/70  FiO2 (%):  [60 %] 60 %  SpO2:  [87 %-100 %] 95 %    Constitutional: Vent and sedated   Lungs: Congestion to auscultation bilaterally, few crackles no wheezing   Cardiovascular: Regular rate and rhythm, normal S1 and S2, and no murmur noted   Abdomen: Normal bowel sounds, soft, non-distended, non-tender   Skin: No rashes, no cyanosis, no edema wd not seen   Other:           Data:   All microbiology laboratory data reviewed.  Recent Labs   Lab Test 12/20/18  0533 12/19/18  0420 12/18/18  0310   WBC 18.4* 17.5* 10.8   HGB 8.7* 6.8* 7.6*   HCT 27.3* 22.2* 23.7*   MCV 81 83 80   * 180 201     Recent Labs   Lab Test 12/20/18  0533 12/19/18  0420 12/18/18  1453   CR 0.32* 0.32* 0.33*     Recent Labs   Lab Test 02/20/17  1745   SED 61*     Recent Labs   Lab Test 12/19/18  1250 12/16/18  0920 12/12/18  2120 12/12/18  2115 02/18/17  1210 09/04/14  1105 04/14/14  0955 01/15/14  1005 12/28/13  0922   CULT PENDING No growth No growth No growth Moderate growth Peptostreptococcus anaerobius Susceptibility testing not   routinely done  *  Heavy growth Enterococcus faecalis  Light growth Klebsiella pneumoniae  Light growth Coagulase negative Staphylococcus Susceptibility testing not   routinely done  Light growth Candida glabrata Susceptibility " testing not routinely done  * >100,000 colonies/mL Escherichia coli  10,000 to 50,000 colonies/mL Mixed gram positive tulio  * Culture negative after 4 weeks  No anaerobes isolated  On day 2, isolated in broth only: Staphylococcus aureus* Culture negative after 4 weeks  On day 2, isolated in broth only: Gram positive cocci in clusters Refer to  Routine Aerobic Culture for Identification. No anaerobes isolated*  On day 2, isolated in broth only: Staphylococcus aureus* >100,000 colonies/mL Mixed gram negative and positive tulio

## 2018-12-20 NOTE — PLAN OF CARE
Patient had L thoracentesis and L Hip aspiration. Bronch scheduled for tomorrow. Weaning Levo and versed. Arterial line placed. Conduit draining properly urology at bedside this morning will irrigate as needed adaquate flow. TF on hold due to high residuals.

## 2018-12-20 NOTE — PROGRESS NOTES
Date of Admission: 12/12/2018  Date of Intubation (most recent): 12/15/2018  Reason for Mechanical Ventilation: Respiratory failure  Number of Days on Mechanical Ventilation: 5  Met Criteria for Pressure Support Trial: No  Length of Pressure Support Trial: N/A  Reason for Stopping Pressure Support Trial: N/A  Reason for No Pressure Support Trial: per MD    Ventilation Mode: CMV/AC  (Continuous Mandatory Ventilation/ Assist Control)  FiO2 (%): 60 %  Rate Set (breaths/minute): 16 breaths/min  Tidal Volume Set (mL): 400 mL  PEEP (cm H2O): 8 cmH2O  Oxygen Concentration (%): 60 %  Resp: 23    Recent Labs   Lab 12/18/18  0829 12/13/18  0538   PH 7.37 7.36   PCO2 27* 40   PO2 78* 69*   HCO3 15* 22   O2PER 50 60     Plan: Continue with full vent support. Assess for daily weaning readiness.  Will continue to follow.  Bull Pantoja
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
## 2018-12-20 NOTE — PROCEDURES
Procedure/Surgery Information   [facility]     Procedure Note  Date of Service (when I performed the procedure): 12/20/2018    Procedure: Bronchoscopy with bronchoalveolar lavage    Indication:  Sedation is required to allow for severe bilateral infiltrates in the setting of respiratory failure and mechanical ventilation     Consent obtained from relative (daughter) after discussing the risks, benefits and alternatives.    PO Intake:  Appropriately NPO for procedure    Objective:  Vitals were reviewed  Physical Exam    I have reviewed the lab findings, diagnostic data, medications, and the plan for procedure. I have determined this patient to be an appropriate candidate for the planned procedure and have reassessed the patient IMMEDIATELY PRIOR to ocedure.  Prior to the start of the procedure and with procedural staff participation, I verbally confirmed the patient s identity using two indicators, relevant allergies, that the procedure was appropriate and matched the consent or emergent situation, and that the correct equipment/implants were available. Immediately prior to starting the procedure I conducted the Time Out with the procedural staff and re-confirmed the patient s name, procedure, and site/side. (The Joint Commission universal protocol was followed.)  Yes    Procedures      Sedatives: routine mechanical ventilation medications     Vital signs, airway and pulse oximetry were monitored and remained stable throughout the procedure and appropriate sedation was maintained until the procedure was complete.  The patient was monitored by staff until sedation discharge criteria were met.    Patient tolerance: Patient tolerated the procedure well with no immediate complications.    Time of sedation in minutes:  5 minutes from beginning to end of physician one to one monitoring.    Findings: a mild degree of diffuse erythema throughout airways in both lungs with a mild amount of purulent  secretions. BAL was performed in RLL.     10-20 mls of BAL fluid were sent to lab for evaluation.     Performed by: Mitchell Dominguez  Authorized by: Mitchell Dominguez

## 2018-12-21 LAB
ALBUMIN SERPL-MCNC: 1.6 G/DL (ref 3.4–5)
ALP SERPL-CCNC: 95 U/L (ref 40–150)
ALT SERPL W P-5'-P-CCNC: 8 U/L (ref 0–50)
ANION GAP SERPL CALCULATED.3IONS-SCNC: 8 MMOL/L (ref 3–14)
AST SERPL W P-5'-P-CCNC: 13 U/L (ref 0–45)
BILIRUB SERPL-MCNC: 0.7 MG/DL (ref 0.2–1.3)
BUN SERPL-MCNC: 9 MG/DL (ref 7–30)
CALCIUM SERPL-MCNC: 7.1 MG/DL (ref 8.5–10.1)
CHLORIDE SERPL-SCNC: 118 MMOL/L (ref 94–109)
CMV DNA SPEC NAA+PROBE-ACNC: NORMAL [IU]/ML
CMV DNA SPEC NAA+PROBE-LOG#: NORMAL {LOG_IU}/ML
CO2 SERPL-SCNC: 19 MMOL/L (ref 20–32)
COPATH REPORT: NORMAL
COPATH REPORT: NORMAL
CREAT SERPL-MCNC: 0.28 MG/DL (ref 0.52–1.04)
ERYTHROCYTE [DISTWIDTH] IN BLOOD BY AUTOMATED COUNT: 19 % (ref 10–15)
FLUAV H1 2009 PAND RNA SPEC QL NAA+PROBE: NEGATIVE
FLUAV H1 RNA SPEC QL NAA+PROBE: NEGATIVE
FLUAV H3 RNA SPEC QL NAA+PROBE: NEGATIVE
FLUAV RNA SPEC QL NAA+PROBE: NEGATIVE
FLUBV RNA SPEC QL NAA+PROBE: NEGATIVE
GFR SERPL CREATININE-BSD FRML MDRD: >90 ML/MIN/{1.73_M2}
GLUCOSE BLDC GLUCOMTR-MCNC: 102 MG/DL (ref 70–99)
GLUCOSE BLDC GLUCOMTR-MCNC: 104 MG/DL (ref 70–99)
GLUCOSE BLDC GLUCOMTR-MCNC: 113 MG/DL (ref 70–99)
GLUCOSE BLDC GLUCOMTR-MCNC: 118 MG/DL (ref 70–99)
GLUCOSE SERPL-MCNC: 97 MG/DL (ref 70–99)
HADV DNA SPEC QL NAA+PROBE: NEGATIVE
HADV DNA SPEC QL NAA+PROBE: NEGATIVE
HBA1C MFR BLD: NORMAL % (ref 0–5.6)
HCT VFR BLD AUTO: 25.2 % (ref 35–47)
HGB BLD-MCNC: 8.2 G/DL (ref 11.7–15.7)
HMPV RNA SPEC QL NAA+PROBE: NEGATIVE
HPIV1 RNA SPEC QL NAA+PROBE: NEGATIVE
HPIV2 RNA SPEC QL NAA+PROBE: NEGATIVE
HPIV3 RNA SPEC QL NAA+PROBE: NEGATIVE
MAGNESIUM SERPL-MCNC: 2 MG/DL (ref 1.6–2.3)
MCH RBC QN AUTO: 26.1 PG (ref 26.5–33)
MCHC RBC AUTO-ENTMCNC: 32.5 G/DL (ref 31.5–36.5)
MCV RBC AUTO: 80 FL (ref 78–100)
MICROBIOLOGIST REVIEW: NORMAL
PHOSPHATE SERPL-MCNC: 2 MG/DL (ref 2.5–4.5)
PLATELET # BLD AUTO: 89 10E9/L (ref 150–450)
POTASSIUM SERPL-SCNC: 2.9 MMOL/L (ref 3.4–5.3)
POTASSIUM SERPL-SCNC: 3.9 MMOL/L (ref 3.4–5.3)
PROT SERPL-MCNC: 4.3 G/DL (ref 6.8–8.8)
RBC # BLD AUTO: 3.14 10E12/L (ref 3.8–5.2)
RHINOVIRUS RNA SPEC QL NAA+PROBE: NEGATIVE
RSV RNA SPEC QL NAA+PROBE: NEGATIVE
RSV RNA SPEC QL NAA+PROBE: NEGATIVE
SODIUM SERPL-SCNC: 145 MMOL/L (ref 133–144)
SPECIMEN SOURCE: NORMAL
SPECIMEN SOURCE: NORMAL
TRIGL SERPL-MCNC: 101 MG/DL
WBC # BLD AUTO: 18.3 10E9/L (ref 4–11)

## 2018-12-21 PROCEDURE — A9270 NON-COVERED ITEM OR SERVICE: HCPCS | Mod: GY | Performed by: INTERNAL MEDICINE

## 2018-12-21 PROCEDURE — 99291 CRITICAL CARE FIRST HOUR: CPT | Performed by: INTERNAL MEDICINE

## 2018-12-21 PROCEDURE — 3E0436Z INTRODUCTION OF NUTRITIONAL SUBSTANCE INTO CENTRAL VEIN, PERCUTANEOUS APPROACH: ICD-10-PCS | Performed by: HOSPITALIST

## 2018-12-21 PROCEDURE — 94003 VENT MGMT INPAT SUBQ DAY: CPT

## 2018-12-21 PROCEDURE — 83735 ASSAY OF MAGNESIUM: CPT | Performed by: INTERNAL MEDICINE

## 2018-12-21 PROCEDURE — 84100 ASSAY OF PHOSPHORUS: CPT | Performed by: INTERNAL MEDICINE

## 2018-12-21 PROCEDURE — 25000128 H RX IP 250 OP 636: Performed by: SURGERY

## 2018-12-21 PROCEDURE — 25000125 ZZHC RX 250: Performed by: INTERNAL MEDICINE

## 2018-12-21 PROCEDURE — 25000128 H RX IP 250 OP 636: Performed by: INTERNAL MEDICINE

## 2018-12-21 PROCEDURE — 25000128 H RX IP 250 OP 636: Performed by: NURSE PRACTITIONER

## 2018-12-21 PROCEDURE — 80053 COMPREHEN METABOLIC PANEL: CPT | Performed by: INTERNAL MEDICINE

## 2018-12-21 PROCEDURE — 25000125 ZZHC RX 250: Performed by: HOSPITALIST

## 2018-12-21 PROCEDURE — 00000146 ZZHCL STATISTIC GLUCOSE BY METER IP

## 2018-12-21 PROCEDURE — 25000125 ZZHC RX 250: Performed by: SURGERY

## 2018-12-21 PROCEDURE — 40000275 ZZH STATISTIC RCP TIME EA 10 MIN

## 2018-12-21 PROCEDURE — 83036 HEMOGLOBIN GLYCOSYLATED A1C: CPT | Performed by: HOSPITALIST

## 2018-12-21 PROCEDURE — 27210995 ZZH RX 272: Performed by: ANESTHESIOLOGY

## 2018-12-21 PROCEDURE — 20000003 ZZH R&B ICU

## 2018-12-21 PROCEDURE — 40000008 ZZH STATISTIC AIRWAY CARE

## 2018-12-21 PROCEDURE — 84478 ASSAY OF TRIGLYCERIDES: CPT | Performed by: INTERNAL MEDICINE

## 2018-12-21 PROCEDURE — 85027 COMPLETE CBC AUTOMATED: CPT | Performed by: INTERNAL MEDICINE

## 2018-12-21 PROCEDURE — 84132 ASSAY OF SERUM POTASSIUM: CPT | Performed by: INTERNAL MEDICINE

## 2018-12-21 PROCEDURE — 40000239 ZZH STATISTIC VAT ROUNDS

## 2018-12-21 PROCEDURE — 25000132 ZZH RX MED GY IP 250 OP 250 PS 637: Mod: GY | Performed by: INTERNAL MEDICINE

## 2018-12-21 RX ORDER — NICOTINE POLACRILEX 4 MG
15-30 LOZENGE BUCCAL
Status: DISCONTINUED | OUTPATIENT
Start: 2018-12-21 | End: 2019-01-06

## 2018-12-21 RX ORDER — DEXTROSE MONOHYDRATE 25 G/50ML
25-50 INJECTION, SOLUTION INTRAVENOUS
Status: DISCONTINUED | OUTPATIENT
Start: 2018-12-21 | End: 2019-01-06

## 2018-12-21 RX ORDER — BUMETANIDE 0.25 MG/ML
1 INJECTION INTRAMUSCULAR; INTRAVENOUS EVERY 12 HOURS
Status: DISCONTINUED | OUTPATIENT
Start: 2018-12-22 | End: 2018-12-22

## 2018-12-21 RX ADMIN — POTASSIUM CHLORIDE 40 MEQ: 1.5 POWDER, FOR SOLUTION ORAL at 06:30

## 2018-12-21 RX ADMIN — FAMOTIDINE 20 MG: 10 INJECTION, SOLUTION INTRAVENOUS at 10:31

## 2018-12-21 RX ADMIN — ENOXAPARIN SODIUM 60 MG: 60 INJECTION SUBCUTANEOUS at 00:04

## 2018-12-21 RX ADMIN — Medication 50 MCG/HR: at 17:22

## 2018-12-21 RX ADMIN — ASCORBIC ACID, VITAMIN A PALMITATE, CHOLECALCIFEROL, THIAMINE HYDROCHLORIDE, RIBOFLAVIN-5 PHOSPHATE SODIUM, PYRIDOXINE HYDROCHLORIDE, NIACINAMIDE, DEXPANTHENOL, ALPHA-TOCOPHEROL ACETATE, VITAMIN K1, FOLIC ACID, BIOTIN, CYANOCOBALAMIN: 200; 3300; 200; 6; 3.6; 6; 40; 15; 10; 150; 600; 60; 5 INJECTION, SOLUTION INTRAVENOUS at 16:25

## 2018-12-21 RX ADMIN — CEFEPIME 1 G: 1 INJECTION, POWDER, FOR SOLUTION INTRAMUSCULAR; INTRAVENOUS at 05:03

## 2018-12-21 RX ADMIN — FAMOTIDINE 20 MG: 10 INJECTION, SOLUTION INTRAVENOUS at 21:53

## 2018-12-21 RX ADMIN — VANCOMYCIN HYDROCHLORIDE 1000 MG: 1 INJECTION, SOLUTION INTRAVENOUS at 19:58

## 2018-12-21 RX ADMIN — CHLORHEXIDINE GLUCONATE 15 ML: 1.2 RINSE ORAL at 20:03

## 2018-12-21 RX ADMIN — POTASSIUM CHLORIDE 20 MEQ: 1.5 POWDER, FOR SOLUTION ORAL at 11:48

## 2018-12-21 RX ADMIN — NOREPINEPHRINE BITARTRATE 0.1 MCG/KG/MIN: 1 INJECTION INTRAVENOUS at 11:49

## 2018-12-21 RX ADMIN — DEXTROSE MONOHYDRATE: 50 INJECTION, SOLUTION INTRAVENOUS at 18:57

## 2018-12-21 RX ADMIN — CHLORHEXIDINE GLUCONATE 15 ML: 1.2 RINSE ORAL at 08:12

## 2018-12-21 RX ADMIN — ENOXAPARIN SODIUM 60 MG: 60 INJECTION SUBCUTANEOUS at 11:48

## 2018-12-21 RX ADMIN — I.V. FAT EMULSION 250 ML: 20 EMULSION INTRAVENOUS at 20:03

## 2018-12-21 RX ADMIN — DEXTROSE MONOHYDRATE: 50 INJECTION, SOLUTION INTRAVENOUS at 10:41

## 2018-12-21 RX ADMIN — SODIUM PHOSPHATE, MONOBASIC, MONOHYDRATE 15 MMOL: 276; 142 INJECTION, SOLUTION INTRAVENOUS at 05:46

## 2018-12-21 RX ADMIN — DEXTROSE MONOHYDRATE: 50 INJECTION, SOLUTION INTRAVENOUS at 00:20

## 2018-12-21 RX ADMIN — MAGNESIUM SULFATE HEPTAHYDRATE 2 G: 40 INJECTION, SOLUTION INTRAVENOUS at 05:19

## 2018-12-21 RX ADMIN — POTASSIUM CHLORIDE 40 MEQ: 1.5 POWDER, FOR SOLUTION ORAL at 04:35

## 2018-12-21 RX ADMIN — CEFEPIME 1 G: 1 INJECTION, POWDER, FOR SOLUTION INTRAMUSCULAR; INTRAVENOUS at 18:27

## 2018-12-21 RX ADMIN — SODIUM CHLORIDE: 4.5 INJECTION, SOLUTION INTRAVENOUS at 13:38

## 2018-12-21 ASSESSMENT — ACTIVITIES OF DAILY LIVING (ADL)
ADLS_ACUITY_SCORE: 27
ADLS_ACUITY_SCORE: 25

## 2018-12-21 ASSESSMENT — MIFFLIN-ST. JEOR: SCORE: 1614.38

## 2018-12-21 NOTE — PROGRESS NOTES
Ortho will continue to follow peripherally  Please call if questions or concerns or patient needed to be evaluated for any changes    Christina MORE

## 2018-12-21 NOTE — PROGRESS NOTES
[facility]  Infectious Disease Progress Note          Assessment and Plan:   IMPRESSION:   1.  A 53-year-old female with acute sepsis, underlying paraplegia and a chronic wound, but this appears to be respiratory infection, has infiltrates and significant pleural fluid, initially felt to have major pericardial fluid, but this turns out to be a false result, likely conventional community-acquired pneumonia, although at risk for other pathogens.   2.  Paraplegia including chronic neurogenic bladder with ileal diversion and ileostomy.   3.  Chronic pain syndrome.   4.  Chronic bilateral sacral decubitus wounds, never been diagnosed with osteomyelitis, previous infection 18 months ago but no recent suggestion of infection and appearance not looking like infection currently.   5.  ALLERGIES LISTED TO LEVAQUIN AND PENICILLIN.  The Levaquin was a rash.  The penicillin caused some kind of hyperactive reactive thing so was not a true allergy.   6 MRSA colonized  7 Fluid collection in hip ?     RECOMMENDATIONS:   1.   cefepime and vancomycin day 9 , cxs all neg.  Zithromax   completed tx   2 Vent and sedated 50% O2,some pressors  3 Ileal conduit dysfct, urology noted  4 Hip fluid, aspirate 6 K WBc with minimal PMNs , cx pending, even on ABX low WBC/PMNsso doubt  infection  5 Discussed with Dr Dominguez, general failure to improve, not clearly active untreated infection issue(procal low, no fever, cxs neg)               Interval History:   Vent sedated. No fever recurrence, O2 worse  no new focal sxs, WBC 10 K flu neg ileal conduit not draining 1000cc residual CT noted. ? Hip issue aspiration 6 K WBc not PMNs              Medications:       ceFEPIme (MAXIPIME) IV  1 g Intravenous Q12H     chlorhexidine  15 mL Mouth/Throat Q12H     enoxaparin  60 mg Subcutaneous Q12H     famotidine  20 mg Intravenous Q12H     [START ON 12/24/2018] influenza vaccine adult (product based on age)  0.5 mL Intramuscular Prior  "to discharge     vancomycin (VANCOCIN) IV  1,000 mg Intravenous Q24H                  Physical Exam:   Blood pressure 123/69, pulse 82, temperature 98.2  F (36.8  C), resp. rate 28, height 1.753 m (5' 9\"), weight 95 kg (209 lb 7 oz), SpO2 93 %, not currently breastfeeding.  Wt Readings from Last 2 Encounters:   12/21/18 95 kg (209 lb 7 oz)   08/30/17 56.7 kg (125 lb)     Vital Signs with Ranges  Temp:  [96.98  F (36.1  C)-98.4  F (36.9  C)] 98.2  F (36.8  C)  Heart Rate:  [] 81  Resp:  [11-35] 28  MAP:  [59 mmHg-107 mmHg] 71 mmHg  Arterial Line BP: ()/(39-77) 104/48  FiO2 (%):  [50 %-60 %] 60 %  SpO2:  [91 %-100 %] 93 %    Constitutional: Vent and sedated   Lungs: Congestion to auscultation bilaterally, few crackles no wheezing   Cardiovascular: Regular rate and rhythm, normal S1 and S2, and no murmur noted   Abdomen: Normal bowel sounds, soft, non-distended, non-tender   Skin: No rashes, no cyanosis, no edema wd not seen   Other:           Data:   All microbiology laboratory data reviewed.  Recent Labs   Lab Test 12/21/18  0408 12/20/18  0533 12/19/18  0420   WBC 18.3* 18.4* 17.5*   HGB 8.2* 8.7* 6.8*   HCT 25.2* 27.3* 22.2*   MCV 80 81 83   PLT 89* 101* 180     Recent Labs   Lab Test 12/21/18  0408 12/20/18  0533 12/19/18  0420   CR 0.28* 0.32* 0.32*     Recent Labs   Lab Test 02/20/17  1745   SED 61*     Recent Labs   Lab Test 12/20/18  1149 12/19/18  1425 12/19/18  1250 12/16/18  0920 12/12/18  2120 12/12/18  2115 02/18/17  1210 09/04/14  1105 04/14/14  0955   CULT PENDING  PENDING  PENDING Culture negative monitoring continues Culture negative monitoring continues No growth No growth No growth Moderate growth Peptostreptococcus anaerobius Susceptibility testing not   routinely done  *  Heavy growth Enterococcus faecalis  Light growth Klebsiella pneumoniae  Light growth Coagulase negative Staphylococcus Susceptibility testing not   routinely done  Light growth Candida glabrata Susceptibility " testing not routinely done  * >100,000 colonies/mL Escherichia coli  10,000 to 50,000 colonies/mL Mixed gram positive tulio  * Culture negative after 4 weeks  No anaerobes isolated  On day 2, isolated in broth only: Staphylococcus aureus*

## 2018-12-21 NOTE — PROGRESS NOTES
[facility] ICU RESPIRATORY NOTE  Date of Admission:  Date of Intubation (most recent): 12/12/2018  Reason for Mechanical Ventilation: Airway protection   Number of Days on Mechanical Ventilation: 9  Met Criteria for Pressure Support Trial:  NO   Length of Pressure Support Trial:  Reason for Stopping Pressure Support Trial:  Reason for No Pressure Support Trial: High PEEP and per MD     Significant Events Today: desaturated to 85-87%   Ventilation Mode: CMV/AC  (Continuous Mandatory Ventilation/ Assist Control)  FiO2 (%): 60 %  Rate Set (breaths/minute): 16 breaths/min  Tidal Volume Set (mL): 400 mL  PEEP (cm H2O): 7 cmH2O  Oxygen Concentration (%): 50 %  Resp: 29    ABG Results:  Recent Labs   Lab 12/20/18  0533 12/18/18  0829   PH 7.34* 7.37   PCO2 35 27*   PO2 147* 78*   HCO3 19* 15*   O2PER 60 50       ETT appearance on chest x-ray: ETT in good position    Plan: Patient remain on full vent support.    Amara Narayanan RT  12/21/2018

## 2018-12-21 NOTE — PLAN OF CARE
Remains sedated/intubated. RASS -1 on Fentanyl and Versed. Requiring slightly more Levo than previous shift. K+/Mg/Phos replaced. Continue to monitor.

## 2018-12-21 NOTE — PROGRESS NOTES
[facility] ICU RESPIRATORY NOTE    Date of Admission: 12/12/2018  Date of Intubation (most recent): 12/12/18  Reason for Mechanical Ventilation: Airway protection  Number of Days on Mechanical Ventilation: 10  Met Criteria for Pressure Support Trial: No  Length of Pressure Support Trial: N/A  Reason for Stopping Pressure Support Trial: N/A  Reason for No Pressure Support Trial: per MD    Significant Events Today: None overnight    ABG Results:   Recent Labs   Lab 12/20/18  0533 12/18/18  0829   PH 7.34* 7.37   PCO2 35 27*   PO2 147* 78*   HCO3 19* 15*   O2PER 60 50       Ventilation Mode: CMV/AC  (Continuous Mandatory Ventilation/ Assist Control)  FiO2 (%): 50 %  Rate Set (breaths/minute): 16 breaths/min  Tidal Volume Set (mL): 400 mL  PEEP (cm H2O): 7 cmH2O  Oxygen Concentration (%): 50 %  Resp: 20    Plan: Continue patient on full ventilatory support and assess for daily weaning readiness.
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
## 2018-12-21 NOTE — CONSULTS
CLINICAL NUTRITION SERVICES - REASSESSMENT NOTE      RECOMMENDATIONS FOR MD/PROVIDER TO ORDER:   Recommend discontinue D5 IVF to avoid over-feeding.      Recommendations Ordered by Registered Dietitian (RD):     -Discontinue TF regimen and PI protocol via enteral route.     Parenteral Regimen: 3-step progression per risk for refeeding syndrome    Step 1: Initiate D15 AA5 at rate of 50 mL/hr with 250 mL 20% lipids ever-other day.   This will provide 1102 Kcals (17 Kcal/kg), 60 g pro (0.9g/kg), 180 g CHO (GIR 2), and 23% of Kcals from fat.   --ONLY once pt receives ~100% of initial continuous TPN with K+/Mg++/Phos within acceptable range, advance TPN to step 2.     Step 2: Initiate (custom) D11.8, AA6 at 80 mL/hr with 250 mL 20% lipids 5 days per week.   This will provide 1592 Kcals (25 Kcal/kg), 115 g pro (1.8 g/kg), 228 g CHO (GIR 2.5), and 22% Kcals from fat.   -Per PI protocol, add 500 mg Vitamin C x 10 days, and 5 additional mg Zinc daily x 10 days.   --ONLY once pt receives ~100% of initial continuous TPN with K+/Mg++/Phos within acceptable range, advance TPN to step 3.     Step 3: Advance custom PN to D14.3, AA6, at 80 mL/hr and 250 mL 20% lipids every day.   This will provide 1900 Kcals (30 Kcal/kg), 115 g pro (1.8 g/kg), 276 g CHO (GIR 3), and 26% Kcals from fat.      --Use dosing weight 64 kg     Malnutrition: 12/21  % Weight Loss:  None noted  % Intake:  </= 50% for >/= 5 days (severe malnutrition)  Subcutaneous Fat Loss:  None observed  Muscle Loss:  None observed  Fluid Retention:  4+ generalized anasarca/likely partly nutrition related      Malnutrition Diagnosis: Severe malnutrition in the context of acute illness.      EVALUATION OF PROGRESS TOWARD GOALS   Diet: NPO     Nutrition Support:   -TF initiated 12/17 via OGT: Replete with fiber at 75 mL/hr to provide 1800 Kcals (28 Kcal/kg), 115 g protein (1.8 g/kg), 223 g CHO, 27 g fiber, 1494 mL H2O with 60 mL   -Free H2O flushes Q4h.   -Pt was also ordered  the following per Pressure Injury Protocol --->  Certavite daily, Tri-Vi-Sol 7 mL daily x 10 days (5250 International Units Vit A, 245 mg Vit C, 2800 International Units Vit D)  Zinc Sulfate 250 mg every 48 hours x 10 days.   -D5 IV running at 60 mL/hr providing 72 g CHO, 245 Kcal  -1/2 NS running at 50 mL/hr.     Intake/tolerance:    -TF has been held since 12/19 per high GRV.   -BGM 90, 111, 113, 104, 97, 113 - no sliding scale insulin at this time  -No stool output per colostomy noted to I/O since 12/19    ASSESSED NUTRITION NEEDS:  Dosing Weight: 64 kg (per admit wt 12/12)   Estimated Energy Needs:  (Three-steps, due to risk for refeeding)  Step 1: 960-1280 Kcals (15-20 Kcal/kg)  Step 2: 8286-3426 Kcals (20-25 Kcal/kg)  Step 3: 4072-3696 Kcal (25-30 Kcal/kg)  - wound and paraplegia  Estimated Protein Needs:   grams protein (1.5-1.8 g pro/Kg) - wound healing and hypercatabolism with acute illness    NEW FINDINGS:   -Nutrition support: Referral received 12/21 to discontinue EN, and begin PN per poor tolerance to TF.   -Current weight 95 kg; Weight up 4.1 kg since last nutrition assessment 12/17. (Likely r/t severe anasarca/4+ edema noted per chart)  -Refeeding labs:   K+ 2.9 (L) --> 3.9 (acceptable after IV bump) on 12/21  Mg++ 2 acceptable on 12/21  Phos 2 (L) on 12/21  Sodium 145 (H) - improved from 148 yesterday.    12/18:  CT of abd/chest/pelvis =  1.  Bilateral moderate to large pleural effusions.  2.  Extensive bilateral pulmonary opacities with atelectasis  3.  Small bowel containing para-stomal hernia  4.  Diffuse anasarca  5.  Moderate to large left hip effusion.  6.  Superior posterior left hip subluxation.  12/19:  Left hip aspiration --> 8 CC red synovial fluid removed   12/19:  Left thoracentesis = 900 mL removed   12/20:  Bronchoscopy with bronchoalveolar lavage  12/21: Family care conference pending.     Previous Goals: 12/17  TF will meet % needs   Evaluation: Not met    Previous  Nutrition Diagnosis:   Inadequate protein-energy intake related to NPO on vent, plan to start TF as evidenced by meeting 0% needs   Evaluation: Improving, modified to nutrition dx below    Malnutrition: 12/21 - updated  % Weight Loss:  None noted  % Intake:  </= 50% for >/= 5 days (severe malnutrition)  Subcutaneous Fat Loss:  None observed  Muscle Loss:  None observed  Fluid Retention:  4+ generalized anasarca/likely partly nutrition related      Malnutrition Diagnosis: Severe malnutrition in the context of acute illness.     CURRENT NUTRITION DIAGNOSIS  Inadequate protein-energy intake related to TF held x 3 days per high GRV with continued NPO status on vent as evidenced by nutrition needs unmet x 9 days since admission per failed EN, D5 IVF meeting 15% of energy and 0% protein needs, with referral to begin TPN support.    INTERVENTIONS  Recommendations / Nutrition Prescription    Nutrition Support Parenteral:  Type of Access: Central  Parenteral Frequency:  Continuous  Parenteral Regimen: 3-step progression per risk for refeeding syndrome    Step 1: Initiate D15 AA5 at rate of 50 mL/hr with 250 mL 20% lipids ever-other day.   This will provide 1102 Kcals (17 Kcal/kg), 60 g pro (0.9g/kg), 180 g CHO (GIR 2), and 23% of Kcals from fat.   --ONLY once pt receives ~100% of initial continuous TPN with K+/Mg++/Phos within acceptable range, advance TPN to step 2.     Recommend discontinue D5 IVF to avoid over-feeding.     Step 2: Initiate (custom) D11.8, AA6 at 80 mL/hr with 250 mL 20% lipids 5 days per week.   This will provide 1592 Kcals (25 Kcal/kg), 115 g pro (1.8 g/kg), 228 g CHO (GIR 2.5), and 22% Kcals from fat.   -Per PI protocol, add 500 mg Vitamin C x 10 days, and 5 additional mg Zinc daily x 10 days.   --ONLY once pt receives ~100% of initial continuous TPN with K+/Mg++/Phos within acceptable range, advance TPN to step 3.     Step 3: Advance custom PN to D14.3, AA6, at 80 mL/hr and 250 mL 20% lipids every day.    This will provide 1900 Kcals (30 Kcal/kg), 115 g pro (1.8 g/kg), 276 g CHO (GIR 3), and 26% Kcals from fat.    --Use dosing weight 64 kg      Implementation  Collaboration of care: Patient was discussed during interdisciplinary rounds, and later with Dr. Dominguez, who advised it would be acceptable to initiate a standard TPN with moderate fluid delivery. He will address adjustment of IVFs later today. Discussed with pharmacist, the plan to begin Clinimix at low rate tonight.     Discontinue EN regimen, and PI protocol via enteral route.   Initiate PN Composition as above.     Goals  Patient will tolerate PN without refeeding syndrome.       MONITORING AND EVALUATION:  Progress towards goals will be monitored and evaluated per protocol and Practice Guidelines    Stephanie Tidwell Dietetic Intern

## 2018-12-21 NOTE — PHARMACY-VANCOMYCIN DOSING SERVICE
Pharmacy Vancomycin Note  Date of Service 2018  Patient's  1965   53 year old, female    Indication: Sepsis  Goal Trough Level: 15-20 mg/L  Day of Therapy: 8  Current Vancomycin regimen: intermittent based on levels. Currently getting 1gm about every 24 hours    Current estimated CrCl = Estimated Creatinine Clearance: 249.4 mL/min (A) (based on SCr of 0.32 mg/dL (L)).    Creatinine for last 3 days  2018:  3:10 AM Creatinine 0.35 mg/dL;  2:53 PM Creatinine 0.33 mg/dL  2018:  4:20 AM Creatinine 0.32 mg/dL  2018:  5:33 AM Creatinine 0.32 mg/dL    Recent Vancomycin Levels (past 3 days)  2018:  9:58 AM Vancomycin Level 18.3 mg/L  2018:  2:30 PM Vancomycin Level 16.6 mg/L  2018:  6:00 PM Vancomycin Level 16.5 mg/L    Vancomycin IV Administrations (past 72 hours)                   vancomycin (VANCOCIN) 1000 mg in dextrose 5% 200 mL PREMIX (mg) 1,000 mg New Bag 18 1803                Nephrotoxins and other renal medications (From now, onward)    Start     Dose/Rate Route Frequency Ordered Stop    18 1900  vancomycin (VANCOCIN) 1000 mg in dextrose 5% 200 mL PREMIX      1,000 mg  200 mL/hr over 1 Hours Intravenous EVERY 24 HOURS 18 1852      18 0615  norepinephrine (LEVOPHED) 16 mg in D5W 250 mL infusion      0.03-0.4 mcg/kg/min × 56.9 kg  1.6-21.3 mL/hr  Intravenous CONTINUOUS 18 0605               Contrast Orders - past 72 hours (72h ago, onward)    None          Interpretation of levels and current regimen:  Trough level is  Therapeutic  Has serum creatinine changed > 50% in last 72 hours: No  Urine output:  good urine output  Renal Function: Stable    Plan:  1.  Schedule Vancomycin 1000mg IV q24hrs  2.  Pharmacy will check trough levels as appropriate in 1-3 Days.    3. Serum creatinine levels will be ordered daily for the first week of therapy and at least twice weekly for subsequent weeks.      Jillian Parker        .

## 2018-12-21 NOTE — PROGRESS NOTES
[facility] ICU RESPIRATORY NOTE  Date of Admission: 12/12/2018  Date of Intubation (most recent): 12/12/18  Reason for Mechanical Ventilation: airway protection  Number of Days on Mechanical Ventilation: 9  Met Criteria for Pressure Support Trial: No   Reason for No Pressure Support Trial: per MD    Ventilation Mode: CMV/AC  (Continuous Mandatory Ventilation/ Assist Control)  FiO2 (%): 50 %  Rate Set (breaths/minute): 16 breaths/min  Tidal Volume Set (mL): 400 mL  PEEP (cm H2O): 7 cmH2O  Oxygen Concentration (%): 50 %  Resp: 19      Significant Events Today: Patient had a bronchoscopy done today.     ABG Results:   Recent Labs   Lab 12/20/18  0533 12/18/18  0829   PH 7.34* 7.37   PCO2 35 27*   PO2 147* 78*   HCO3 19* 15*   O2PER 60 50       Plan:  Will continue full ventilatory support for now and assess for weaning readiness daily.       12/20/2018  Celina Lovett, RRT

## 2018-12-22 ENCOUNTER — APPOINTMENT (OUTPATIENT)
Dept: GENERAL RADIOLOGY | Facility: CLINIC | Age: 53
DRG: 870 | End: 2018-12-22
Attending: INTERNAL MEDICINE
Payer: MEDICARE

## 2018-12-22 LAB
ALBUMIN SERPL-MCNC: 1.4 G/DL (ref 3.4–5)
ALP SERPL-CCNC: 101 U/L (ref 40–150)
ALT SERPL W P-5'-P-CCNC: 9 U/L (ref 0–50)
ANION GAP SERPL CALCULATED.3IONS-SCNC: 10 MMOL/L (ref 3–14)
AST SERPL W P-5'-P-CCNC: 15 U/L (ref 0–45)
BACTERIA SPEC CULT: ABNORMAL
BASE DEFICIT BLDA-SCNC: 4.2 MMOL/L
BILIRUB DIRECT SERPL-MCNC: 0.2 MG/DL (ref 0–0.2)
BILIRUB SERPL-MCNC: 0.5 MG/DL (ref 0.2–1.3)
BUN SERPL-MCNC: 6 MG/DL (ref 7–30)
CALCIUM SERPL-MCNC: 6.8 MG/DL (ref 8.5–10.1)
CHLORIDE SERPL-SCNC: 114 MMOL/L (ref 94–109)
CO2 SERPL-SCNC: 19 MMOL/L (ref 20–32)
CREAT SERPL-MCNC: 0.29 MG/DL (ref 0.52–1.04)
ERYTHROCYTE [DISTWIDTH] IN BLOOD BY AUTOMATED COUNT: 20 % (ref 10–15)
GFR SERPL CREATININE-BSD FRML MDRD: >90 ML/MIN/{1.73_M2}
GLUCOSE BLDC GLUCOMTR-MCNC: 135 MG/DL (ref 70–99)
GLUCOSE BLDC GLUCOMTR-MCNC: 143 MG/DL (ref 70–99)
GLUCOSE BLDC GLUCOMTR-MCNC: 149 MG/DL (ref 70–99)
GLUCOSE BLDC GLUCOMTR-MCNC: 149 MG/DL (ref 70–99)
GLUCOSE BLDC GLUCOMTR-MCNC: 153 MG/DL (ref 70–99)
GLUCOSE BLDC GLUCOMTR-MCNC: 153 MG/DL (ref 70–99)
GLUCOSE SERPL-MCNC: 144 MG/DL (ref 70–99)
HCO3 BLD-SCNC: 20 MMOL/L (ref 21–28)
HCT VFR BLD AUTO: 24.4 % (ref 35–47)
HGB BLD-MCNC: 7.8 G/DL (ref 11.7–15.7)
INR PPP: 1.53 (ref 0.86–1.14)
MAGNESIUM SERPL-MCNC: 2.2 MG/DL (ref 1.6–2.3)
MCH RBC QN AUTO: 25.7 PG (ref 26.5–33)
MCHC RBC AUTO-ENTMCNC: 32 G/DL (ref 31.5–36.5)
MCV RBC AUTO: 81 FL (ref 78–100)
OXYHGB MFR BLD: 95 % (ref 92–100)
PCO2 BLD: 32 MM HG (ref 35–45)
PH BLD: 7.41 PH (ref 7.35–7.45)
PHOSPHATE SERPL-MCNC: 2 MG/DL (ref 2.5–4.5)
PLATELET # BLD AUTO: 88 10E9/L (ref 150–450)
PO2 BLD: 76 MM HG (ref 80–105)
POTASSIUM SERPL-SCNC: 2.9 MMOL/L (ref 3.4–5.3)
POTASSIUM SERPL-SCNC: 3.6 MMOL/L (ref 3.4–5.3)
PROT SERPL-MCNC: 4.2 G/DL (ref 6.8–8.8)
RBC # BLD AUTO: 3.03 10E12/L (ref 3.8–5.2)
SODIUM SERPL-SCNC: 143 MMOL/L (ref 133–144)
SPECIMEN SOURCE: ABNORMAL
WBC # BLD AUTO: 18.3 10E9/L (ref 4–11)

## 2018-12-22 PROCEDURE — 25000128 H RX IP 250 OP 636: Performed by: INTERNAL MEDICINE

## 2018-12-22 PROCEDURE — 25000125 ZZHC RX 250: Performed by: INTERNAL MEDICINE

## 2018-12-22 PROCEDURE — 99291 CRITICAL CARE FIRST HOUR: CPT | Performed by: INTERNAL MEDICINE

## 2018-12-22 PROCEDURE — 40000239 ZZH STATISTIC VAT ROUNDS

## 2018-12-22 PROCEDURE — 84132 ASSAY OF SERUM POTASSIUM: CPT | Performed by: INTERNAL MEDICINE

## 2018-12-22 PROCEDURE — 40000275 ZZH STATISTIC RCP TIME EA 10 MIN

## 2018-12-22 PROCEDURE — 71045 X-RAY EXAM CHEST 1 VIEW: CPT

## 2018-12-22 PROCEDURE — 84100 ASSAY OF PHOSPHORUS: CPT | Performed by: INTERNAL MEDICINE

## 2018-12-22 PROCEDURE — 25000131 ZZH RX MED GY IP 250 OP 636 PS 637: Mod: GY | Performed by: HOSPITALIST

## 2018-12-22 PROCEDURE — 85610 PROTHROMBIN TIME: CPT | Performed by: INTERNAL MEDICINE

## 2018-12-22 PROCEDURE — 82248 BILIRUBIN DIRECT: CPT | Performed by: INTERNAL MEDICINE

## 2018-12-22 PROCEDURE — 94003 VENT MGMT INPAT SUBQ DAY: CPT

## 2018-12-22 PROCEDURE — 82805 BLOOD GASES W/O2 SATURATION: CPT | Performed by: INTERNAL MEDICINE

## 2018-12-22 PROCEDURE — 80053 COMPREHEN METABOLIC PANEL: CPT | Performed by: INTERNAL MEDICINE

## 2018-12-22 PROCEDURE — 20000003 ZZH R&B ICU

## 2018-12-22 PROCEDURE — 85027 COMPLETE CBC AUTOMATED: CPT | Performed by: INTERNAL MEDICINE

## 2018-12-22 PROCEDURE — 83735 ASSAY OF MAGNESIUM: CPT | Performed by: INTERNAL MEDICINE

## 2018-12-22 PROCEDURE — 25000128 H RX IP 250 OP 636: Performed by: NURSE PRACTITIONER

## 2018-12-22 PROCEDURE — 00000146 ZZHCL STATISTIC GLUCOSE BY METER IP

## 2018-12-22 PROCEDURE — 94002 VENT MGMT INPAT INIT DAY: CPT

## 2018-12-22 RX ORDER — BUMETANIDE 0.25 MG/ML
1 INJECTION INTRAMUSCULAR; INTRAVENOUS EVERY 8 HOURS
Status: DISCONTINUED | OUTPATIENT
Start: 2018-12-22 | End: 2018-12-29

## 2018-12-22 RX ADMIN — SODIUM PHOSPHATE, MONOBASIC, MONOHYDRATE 15 MMOL: 276; 142 INJECTION, SOLUTION INTRAVENOUS at 11:28

## 2018-12-22 RX ADMIN — BUMETANIDE 1 MG: 0.25 INJECTION INTRAMUSCULAR; INTRAVENOUS at 08:26

## 2018-12-22 RX ADMIN — FAMOTIDINE 20 MG: 10 INJECTION, SOLUTION INTRAVENOUS at 10:01

## 2018-12-22 RX ADMIN — FAMOTIDINE 20 MG: 10 INJECTION, SOLUTION INTRAVENOUS at 22:00

## 2018-12-22 RX ADMIN — INSULIN ASPART 1 UNITS: 100 INJECTION, SOLUTION INTRAVENOUS; SUBCUTANEOUS at 00:38

## 2018-12-22 RX ADMIN — POTASSIUM CHLORIDE 20 MEQ: 29.8 INJECTION, SOLUTION INTRAVENOUS at 07:38

## 2018-12-22 RX ADMIN — DEXTROSE MONOHYDRATE: 50 INJECTION, SOLUTION INTRAVENOUS at 15:30

## 2018-12-22 RX ADMIN — VANCOMYCIN HYDROCHLORIDE 1000 MG: 1 INJECTION, SOLUTION INTRAVENOUS at 19:34

## 2018-12-22 RX ADMIN — Medication 50 MCG/HR: at 22:45

## 2018-12-22 RX ADMIN — BUMETANIDE 1 MG: 0.25 INJECTION INTRAMUSCULAR; INTRAVENOUS at 15:49

## 2018-12-22 RX ADMIN — INSULIN ASPART 1 UNITS: 100 INJECTION, SOLUTION INTRAVENOUS; SUBCUTANEOUS at 04:18

## 2018-12-22 RX ADMIN — CHLORHEXIDINE GLUCONATE 15 ML: 1.2 RINSE ORAL at 19:34

## 2018-12-22 RX ADMIN — INSULIN ASPART 1 UNITS: 100 INJECTION, SOLUTION INTRAVENOUS; SUBCUTANEOUS at 08:38

## 2018-12-22 RX ADMIN — POTASSIUM CHLORIDE 20 MEQ: 29.8 INJECTION, SOLUTION INTRAVENOUS at 09:06

## 2018-12-22 RX ADMIN — ENOXAPARIN SODIUM 60 MG: 60 INJECTION SUBCUTANEOUS at 00:38

## 2018-12-22 RX ADMIN — ENOXAPARIN SODIUM 60 MG: 60 INJECTION SUBCUTANEOUS at 13:20

## 2018-12-22 RX ADMIN — CEFEPIME 1 G: 1 INJECTION, POWDER, FOR SOLUTION INTRAMUSCULAR; INTRAVENOUS at 18:16

## 2018-12-22 RX ADMIN — POTASSIUM CHLORIDE 20 MEQ: 29.8 INJECTION, SOLUTION INTRAVENOUS at 16:29

## 2018-12-22 RX ADMIN — INSULIN ASPART 1 UNITS: 100 INJECTION, SOLUTION INTRAVENOUS; SUBCUTANEOUS at 15:49

## 2018-12-22 RX ADMIN — POTASSIUM CHLORIDE 20 MEQ: 29.8 INJECTION, SOLUTION INTRAVENOUS at 06:35

## 2018-12-22 RX ADMIN — CHLORHEXIDINE GLUCONATE 15 ML: 1.2 RINSE ORAL at 07:38

## 2018-12-22 RX ADMIN — INSULIN ASPART 1 UNITS: 100 INJECTION, SOLUTION INTRAVENOUS; SUBCUTANEOUS at 12:12

## 2018-12-22 RX ADMIN — CEFEPIME 1 G: 1 INJECTION, POWDER, FOR SOLUTION INTRAMUSCULAR; INTRAVENOUS at 05:56

## 2018-12-22 ASSESSMENT — ACTIVITIES OF DAILY LIVING (ADL)
ADLS_ACUITY_SCORE: 27
ADLS_ACUITY_SCORE: 23
ADLS_ACUITY_SCORE: 27
ADLS_ACUITY_SCORE: 27
ADLS_ACUITY_SCORE: 23
ADLS_ACUITY_SCORE: 23

## 2018-12-22 NOTE — PLAN OF CARE
Neuro: Opens eyes to voice.  PERRLA.  Does not follow commands. On fent and versed drips. Paraplegic.    Cardiac:  SR/SR. Requires Norepi to maintain MAP > 65.  Resp:  SpO2 drops any time she is positioned on her left side.  Increased FiO2 from 50% to 70% and repositioned stayed off left side.  LS are coarse throughout.      GI:  Minimal output in colostomy.  OG to LIS-300 ml ouput.  TPN started early evening.    :  Ileo conduit requires frequent irrigation d/t mucus plugging.   Labs: see flow sheet-electrolytes replaced per protocol.    Plan: Family conference today-Code status changed to DNR
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
## 2018-12-22 NOTE — PROGRESS NOTES
[facility]  Infectious Disease Progress Note          Assessment and Plan:   IMPRESSION:   1.  A 53-year-old female with acute sepsis, underlying paraplegia and a chronic wound, but this appears to be respiratory infection, has infiltrates and significant pleural fluid, initially felt to have major pericardial fluid, but this turns out to be a false result, likely conventional community-acquired pneumonia, although at risk for other pathogens.   2.  Paraplegia including chronic neurogenic bladder with ileal diversion and ileostomy.   3.  Chronic pain syndrome.   4.  Chronic bilateral sacral decubitus wounds, never been diagnosed with osteomyelitis, previous infection 18 months ago but no recent suggestion of infection and appearance not looking like infection currently.   5.  ALLERGIES LISTED TO LEVAQUIN AND PENICILLIN.  The Levaquin was a rash.  The penicillin caused some kind of hyperactive reactive thing so was not a true allergy.   6 MRSA colonized.        RECOMMENDATIONS:   1. Cefepime and vancomycin day 10, cultures continue to be neg  2 Ileal conduit dysfct, urology noted  3 Hip fluid, aspirate 6 K WBc with minimal PMNs , cx pending, even on ABX low WBC/PMNsso doubt  infection  4 General failure to improve, not clearly active untreated infection issue(procal low, no fever, cxs neg)               Interval History:   Vent sedated. No fever recurrence, O2 worse  no new focal sxs, WBC 10 K flu neg ileal conduit not draining 1000cc residual CT noted. ? Hip issue aspiration 6 K WBc not PMNs              Medications:       bumetanide  1 mg Intravenous Q8H     ceFEPIme (MAXIPIME) IV  1 g Intravenous Q12H     chlorhexidine  15 mL Mouth/Throat Q12H     enoxaparin  60 mg Subcutaneous Q12H     famotidine  20 mg Intravenous Q12H     [START ON 12/24/2018] influenza vaccine adult (product based on age)  0.5 mL Intramuscular Prior to discharge     insulin aspart  1-12 Units Subcutaneous Q4H     lipids  " 250 mL Intravenous Q48H     vancomycin (VANCOCIN) IV  1,000 mg Intravenous Q24H                  Physical Exam:   Blood pressure 123/69, pulse 82, temperature 99  F (37.2  C), resp. rate (!) 32, height 1.753 m (5' 9\"), weight 95 kg (209 lb 7 oz), SpO2 93 %, not currently breastfeeding.  Wt Readings from Last 2 Encounters:   12/21/18 95 kg (209 lb 7 oz)   08/30/17 56.7 kg (125 lb)     Vital Signs with Ranges  Temp:  [97.2  F (36.2  C)-99  F (37.2  C)] 99  F (37.2  C)  Heart Rate:  [] 87  Resp:  [16-70] 32  MAP:  [59 mmHg-101 mmHg] 67 mmHg  Arterial Line BP: ()/(41-74) 97/48  FiO2 (%):  [60 %-70 %] 70 %  SpO2:  [86 %-98 %] 93 %    Constitutional: Vent and sedated   Lungs: Congestion to auscultation bilaterally, few crackles no wheezing   Cardiovascular: Regular rate and rhythm, normal S1 and S2, and no murmur noted   Abdomen: Normal bowel sounds, soft, non-distended, non-tender   Skin: No rashes, no cyanosis, no edema wd not seen   Other:           Data:   All microbiology laboratory data reviewed.  Recent Labs   Lab Test 12/22/18  0415 12/21/18  0408 12/20/18  0533   WBC 18.3* 18.3* 18.4*   HGB 7.8* 8.2* 8.7*   HCT 24.4* 25.2* 27.3*   MCV 81 80 81   PLT 88* 89* 101*     Recent Labs   Lab Test 12/22/18  0415 12/21/18  0408 12/20/18  0533   CR 0.29* 0.28* 0.32*     Recent Labs   Lab Test 02/20/17  1745   SED 61*     Recent Labs   Lab Test 12/20/18  1149 12/19/18  1425 12/19/18  1250 12/16/18  0920 12/12/18  2120 12/12/18  2115 02/18/17  1210 09/04/14  1105 04/14/14  0955   CULT Culture in progress  Culture negative monitoring continues  No growth after 18 hours  Culture negative after 18 hours Culture negative monitoring continues Culture negative monitoring continues No growth No growth No growth Moderate growth Peptostreptococcus anaerobius Susceptibility testing not   routinely done  *  Heavy growth Enterococcus faecalis  Light growth Klebsiella pneumoniae  Light growth Coagulase negative " Staphylococcus Susceptibility testing not   routinely done  Light growth Candida glabrata Susceptibility testing not routinely done  * >100,000 colonies/mL Escherichia coli  10,000 to 50,000 colonies/mL Mixed gram positive tulio  * Culture negative after 4 weeks  No anaerobes isolated  On day 2, isolated in broth only: Staphylococcus aureus*
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
## 2018-12-22 NOTE — PLAN OF CARE
AVSS, no notable pain. Tolerating vent. Daughter and son at bedside most of day    Neuro: Obtunded, withdraws with upper extremities to pain. Not following commands. PERRLA  CV: MAP 65 maintained with levo. SR/ST 90s-110s. Pulses weak. Severe anasarca.  Pulm: Full vent all day, increased PEEP from 7 to 8. Coarse lungs  GI/: OG to LIS. BS hypoactive, no output to colostomy. TPN infusing. Good UOP from catheter. Bumex given 2x  Skin: Numerous skin tears and pressure injuries. Dressings changed per POC  Plan: Family plans to go to comfort care once all family members have had a chance to say goodbye.

## 2018-12-22 NOTE — PROGRESS NOTES
[facility] ICU RESPIRATORY NOTE  Date of Admission: 12/12/2018  Date of Intubation (most recent): 12/12/18  Reason for Mechanical Ventilation: airway protection  Number of Days on Mechanical Ventilation: 10  Met Criteria for Pressure Support Trial:  Length of Pressure Support Trial:  Reason for Stopping Pressure Support Trial:  Reason for No Pressure Support Trial: high peep and per MD    Significant Events Today:  NO issues overnight. Pt on full vent support.   ABG Results:  arterial blood gases    Plan:  Pt to remain on full vent support. And assess and wean per protocol.     Julio Cesar Tiwari, RRT

## 2018-12-22 NOTE — PLAN OF CARE
Neuro: Opens eyes to voice.  PERRLA.  Does not follow commands. On fentanyl and versed drips. Paraplegic baseline    Cardiac:  SR/ST. Requires Levophed to maintain MAP > 65.  Resp:  Lung sounds coarse throughout. No issues with sats dropping with positioning      GI:  Minimal output in colostomy.  OG to LIS-100 ml ouput.  TPN running   :  Ileo conduit requires frequent irrigation d/t mucus plugging, adequate urine output   Labs: Potassium and Phosphorus requiring replacement this AM   Plan: Family updated this morning. Will continue to monitor.

## 2018-12-23 ENCOUNTER — APPOINTMENT (OUTPATIENT)
Dept: GENERAL RADIOLOGY | Facility: CLINIC | Age: 53
DRG: 870 | End: 2018-12-23
Attending: SURGERY
Payer: MEDICARE

## 2018-12-23 LAB
ANION GAP SERPL CALCULATED.3IONS-SCNC: 8 MMOL/L (ref 3–14)
BASE EXCESS BLDA CALC-SCNC: 0.5 MMOL/L
BUN SERPL-MCNC: 5 MG/DL (ref 7–30)
CALCIUM SERPL-MCNC: 6.7 MG/DL (ref 8.5–10.1)
CHLORIDE SERPL-SCNC: 111 MMOL/L (ref 94–109)
CO2 SERPL-SCNC: 24 MMOL/L (ref 20–32)
CREAT SERPL-MCNC: 0.23 MG/DL (ref 0.52–1.04)
ERYTHROCYTE [DISTWIDTH] IN BLOOD BY AUTOMATED COUNT: 21.1 % (ref 10–15)
GFR SERPL CREATININE-BSD FRML MDRD: >90 ML/MIN/{1.73_M2}
GLUCOSE BLDC GLUCOMTR-MCNC: 133 MG/DL (ref 70–99)
GLUCOSE BLDC GLUCOMTR-MCNC: 133 MG/DL (ref 70–99)
GLUCOSE BLDC GLUCOMTR-MCNC: 144 MG/DL (ref 70–99)
GLUCOSE BLDC GLUCOMTR-MCNC: 145 MG/DL (ref 70–99)
GLUCOSE BLDC GLUCOMTR-MCNC: 149 MG/DL (ref 70–99)
GLUCOSE BLDC GLUCOMTR-MCNC: 151 MG/DL (ref 70–99)
GLUCOSE SERPL-MCNC: 134 MG/DL (ref 70–99)
HCO3 BLD-SCNC: 25 MMOL/L (ref 21–28)
HCT VFR BLD AUTO: 23.8 % (ref 35–47)
HGB BLD-MCNC: 7.7 G/DL (ref 11.7–15.7)
INTERPRETATION ECG - MUSE: NORMAL
MAGNESIUM SERPL-MCNC: 2.1 MG/DL (ref 1.6–2.3)
MCH RBC QN AUTO: 26.3 PG (ref 26.5–33)
MCHC RBC AUTO-ENTMCNC: 32.4 G/DL (ref 31.5–36.5)
MCV RBC AUTO: 81 FL (ref 78–100)
OXYHGB MFR BLD: 97 % (ref 92–100)
PCO2 BLD: 37 MM HG (ref 35–45)
PH BLD: 7.43 PH (ref 7.35–7.45)
PHOSPHATE SERPL-MCNC: 2.3 MG/DL (ref 2.5–4.5)
PLATELET # BLD AUTO: 75 10E9/L (ref 150–450)
PO2 BLD: 100 MM HG (ref 80–105)
POTASSIUM SERPL-SCNC: 2.7 MMOL/L (ref 3.4–5.3)
POTASSIUM SERPL-SCNC: 3.4 MMOL/L (ref 3.4–5.3)
RBC # BLD AUTO: 2.93 10E12/L (ref 3.8–5.2)
SODIUM SERPL-SCNC: 143 MMOL/L (ref 133–144)
VANCOMYCIN SERPL-MCNC: 10.4 MG/L
WBC # BLD AUTO: 14.6 10E9/L (ref 4–11)

## 2018-12-23 PROCEDURE — 25000132 ZZH RX MED GY IP 250 OP 250 PS 637: Mod: GY | Performed by: SURGERY

## 2018-12-23 PROCEDURE — 40000239 ZZH STATISTIC VAT ROUNDS

## 2018-12-23 PROCEDURE — 40000008 ZZH STATISTIC AIRWAY CARE

## 2018-12-23 PROCEDURE — 71045 X-RAY EXAM CHEST 1 VIEW: CPT

## 2018-12-23 PROCEDURE — 25000125 ZZHC RX 250: Performed by: INTERNAL MEDICINE

## 2018-12-23 PROCEDURE — 25000128 H RX IP 250 OP 636: Performed by: NURSE PRACTITIONER

## 2018-12-23 PROCEDURE — 82805 BLOOD GASES W/O2 SATURATION: CPT | Performed by: SURGERY

## 2018-12-23 PROCEDURE — 94003 VENT MGMT INPAT SUBQ DAY: CPT

## 2018-12-23 PROCEDURE — 80202 ASSAY OF VANCOMYCIN: CPT | Performed by: HOSPITALIST

## 2018-12-23 PROCEDURE — A9270 NON-COVERED ITEM OR SERVICE: HCPCS | Mod: GY | Performed by: SURGERY

## 2018-12-23 PROCEDURE — 84100 ASSAY OF PHOSPHORUS: CPT | Performed by: INTERNAL MEDICINE

## 2018-12-23 PROCEDURE — 25000128 H RX IP 250 OP 636: Performed by: INTERNAL MEDICINE

## 2018-12-23 PROCEDURE — 00000146 ZZHCL STATISTIC GLUCOSE BY METER IP

## 2018-12-23 PROCEDURE — 40000275 ZZH STATISTIC RCP TIME EA 10 MIN

## 2018-12-23 PROCEDURE — 99291 CRITICAL CARE FIRST HOUR: CPT | Performed by: INTERNAL MEDICINE

## 2018-12-23 PROCEDURE — 20000003 ZZH R&B ICU

## 2018-12-23 PROCEDURE — 25000125 ZZHC RX 250: Performed by: HOSPITALIST

## 2018-12-23 PROCEDURE — 85027 COMPLETE CBC AUTOMATED: CPT | Performed by: INTERNAL MEDICINE

## 2018-12-23 PROCEDURE — 83735 ASSAY OF MAGNESIUM: CPT | Performed by: INTERNAL MEDICINE

## 2018-12-23 PROCEDURE — 84132 ASSAY OF SERUM POTASSIUM: CPT | Performed by: INTERNAL MEDICINE

## 2018-12-23 PROCEDURE — 80048 BASIC METABOLIC PNL TOTAL CA: CPT | Performed by: INTERNAL MEDICINE

## 2018-12-23 RX ADMIN — FAMOTIDINE 20 MG: 10 INJECTION, SOLUTION INTRAVENOUS at 09:16

## 2018-12-23 RX ADMIN — FAMOTIDINE 20 MG: 10 INJECTION, SOLUTION INTRAVENOUS at 22:04

## 2018-12-23 RX ADMIN — POTASSIUM CHLORIDE 20 MEQ: 29.8 INJECTION, SOLUTION INTRAVENOUS at 06:15

## 2018-12-23 RX ADMIN — ENOXAPARIN SODIUM 60 MG: 60 INJECTION SUBCUTANEOUS at 00:17

## 2018-12-23 RX ADMIN — POTASSIUM CHLORIDE 20 MEQ: 29.8 INJECTION, SOLUTION INTRAVENOUS at 18:31

## 2018-12-23 RX ADMIN — ENOXAPARIN SODIUM 60 MG: 60 INJECTION SUBCUTANEOUS at 11:54

## 2018-12-23 RX ADMIN — POTASSIUM CHLORIDE 20 MEQ: 29.8 INJECTION, SOLUTION INTRAVENOUS at 09:03

## 2018-12-23 RX ADMIN — VANCOMYCIN HYDROCHLORIDE 1500 MG: 5 INJECTION, POWDER, LYOPHILIZED, FOR SOLUTION INTRAVENOUS at 19:49

## 2018-12-23 RX ADMIN — POTASSIUM CHLORIDE 20 MEQ: 29.8 INJECTION, SOLUTION INTRAVENOUS at 07:48

## 2018-12-23 RX ADMIN — ASCORBIC ACID, VITAMIN A PALMITATE, CHOLECALCIFEROL, THIAMINE HYDROCHLORIDE, RIBOFLAVIN-5 PHOSPHATE SODIUM, PYRIDOXINE HYDROCHLORIDE, NIACINAMIDE, DEXPANTHENOL, ALPHA-TOCOPHEROL ACETATE, VITAMIN K1, FOLIC ACID, BIOTIN, CYANOCOBALAMIN: 200; 3300; 200; 6; 3.6; 6; 40; 15; 10; 150; 600; 60; 5 INJECTION, SOLUTION INTRAVENOUS at 13:03

## 2018-12-23 RX ADMIN — MICAFUNGIN SODIUM 100 MG: 10 INJECTION, POWDER, LYOPHILIZED, FOR SOLUTION INTRAVENOUS at 11:54

## 2018-12-23 RX ADMIN — CHLORHEXIDINE GLUCONATE 15 ML: 1.2 RINSE ORAL at 09:16

## 2018-12-23 RX ADMIN — INSULIN ASPART 1 UNITS: 100 INJECTION, SOLUTION INTRAVENOUS; SUBCUTANEOUS at 13:04

## 2018-12-23 RX ADMIN — I.V. FAT EMULSION 250 ML: 20 EMULSION INTRAVENOUS at 20:01

## 2018-12-23 RX ADMIN — SODIUM PHOSPHATE, MONOBASIC, MONOHYDRATE 15 MMOL: 276; 142 INJECTION, SOLUTION INTRAVENOUS at 10:30

## 2018-12-23 RX ADMIN — HYDROCORTISONE SODIUM SUCCINATE 50 MG: 100 INJECTION, POWDER, FOR SOLUTION INTRAMUSCULAR; INTRAVENOUS at 22:04

## 2018-12-23 RX ADMIN — BUMETANIDE 1 MG: 0.25 INJECTION INTRAMUSCULAR; INTRAVENOUS at 00:16

## 2018-12-23 RX ADMIN — INSULIN ASPART 1 UNITS: 100 INJECTION, SOLUTION INTRAVENOUS; SUBCUTANEOUS at 16:18

## 2018-12-23 RX ADMIN — INSULIN ASPART 1 UNITS: 100 INJECTION, SOLUTION INTRAVENOUS; SUBCUTANEOUS at 08:54

## 2018-12-23 RX ADMIN — BUMETANIDE 1 MG: 0.25 INJECTION INTRAMUSCULAR; INTRAVENOUS at 08:44

## 2018-12-23 RX ADMIN — BUMETANIDE 1 MG: 0.25 INJECTION INTRAMUSCULAR; INTRAVENOUS at 16:07

## 2018-12-23 RX ADMIN — INSULIN ASPART 1 UNITS: 100 INJECTION, SOLUTION INTRAVENOUS; SUBCUTANEOUS at 04:19

## 2018-12-23 RX ADMIN — CHLORHEXIDINE GLUCONATE 15 ML: 1.2 RINSE ORAL at 20:00

## 2018-12-23 RX ADMIN — CEFEPIME 1 G: 1 INJECTION, POWDER, FOR SOLUTION INTRAMUSCULAR; INTRAVENOUS at 17:46

## 2018-12-23 RX ADMIN — CEFEPIME 1 G: 1 INJECTION, POWDER, FOR SOLUTION INTRAMUSCULAR; INTRAVENOUS at 05:13

## 2018-12-23 ASSESSMENT — ACTIVITIES OF DAILY LIVING (ADL)
ADLS_ACUITY_SCORE: 27
ADLS_ACUITY_SCORE: 31
ADLS_ACUITY_SCORE: 27
ADLS_ACUITY_SCORE: 31

## 2018-12-23 NOTE — PROGRESS NOTES
[facility]  Infectious Disease Progress Note          Assessment and Plan:   IMPRESSION:   1.  A 53-year-old female with acute sepsis, underlying paraplegia and a chronic wound, but this appears to be respiratory infection, has infiltrates and significant pleural fluid, initially felt to have major pericardial fluid, but this turns out to be a false result, likely conventional community-acquired pneumonia, although at risk for other pathogens.   2.  Paraplegia including chronic neurogenic bladder with ileal diversion and ileostomy.   3.  Chronic pain syndrome.   4.  Chronic bilateral sacral decubitus wounds, never been diagnosed with osteomyelitis, previous infection 18 months ago but no recent suggestion of infection and appearance not looking like infection currently.   5.  ALLERGIES LISTED TO LEVAQUIN AND PENICILLIN.  The Levaquin was a rash.  The penicillin caused some kind of hyperactive reactive thing so was not a true allergy.   6 MRSA colonized  7 Fluid collection in hip, 6 K WBc with minimal PMNs , cx pending, even on ABX low WBC/PMNsso doubt  Infection       RECOMMENDATIONS:   1. Cefepime and vancomycin day 11, cultures continue to be neg  General failure to improve, not clearly active untreated infection issue(procal low, no fever, cxs neg)  2. I am aware of the candida in the BAL cultures, not a pathogen in these settings, discussed with Dr. Mead.                Interval History:   On the vent, awake but non responsive               Medications:       bumetanide  1 mg Intravenous Q8H     ceFEPIme (MAXIPIME) IV  1 g Intravenous Q12H     chlorhexidine  15 mL Mouth/Throat Q12H     enoxaparin  60 mg Subcutaneous Q12H     famotidine  20 mg Intravenous Q12H     [START ON 12/24/2018] influenza vaccine adult (product based on age)  0.5 mL Intramuscular Prior to discharge     insulin aspart  1-12 Units Subcutaneous Q4H     lipids  250 mL Intravenous Q48H     micafungin  100 mg  "Intravenous Q24H     vancomycin (VANCOCIN) IV  1,000 mg Intravenous Q24H                  Physical Exam:   Blood pressure 123/69, pulse 82, temperature 98.1  F (36.7  C), resp. rate (!) 42, height 1.753 m (5' 9\"), weight 95 kg (209 lb 7 oz), SpO2 91 %, not currently breastfeeding.  Wt Readings from Last 2 Encounters:   12/21/18 95 kg (209 lb 7 oz)   08/30/17 56.7 kg (125 lb)     Vital Signs with Ranges  Temp:  [97.7  F (36.5  C)-99  F (37.2  C)] 98.1  F (36.7  C)  Heart Rate:  [] 109  Resp:  [12-47] 42  MAP:  [62 mmHg-103 mmHg] 83 mmHg  Arterial Line BP: (101-157)/(45-76) 126/60  FiO2 (%):  [70 %] 70 %  SpO2:  [90 %-97 %] 91 %    Constitutional: Vent and sedated   Lungs: Congestion to auscultation bilaterally, few crackles no wheezing   Cardiovascular: Regular rate and rhythm, normal S1 and S2, and no murmur noted   Abdomen: Normal bowel sounds, soft, non-distended, non-tender   Skin: No rashes, no cyanosis, no edema wd not seen   Other:           Data:   All microbiology laboratory data reviewed.  Recent Labs   Lab Test 12/23/18  0420 12/22/18  0415 12/21/18  0408   WBC 14.6* 18.3* 18.3*   HGB 7.7* 7.8* 8.2*   HCT 23.8* 24.4* 25.2*   MCV 81 81 80   PLT 75* 88* 89*     Recent Labs   Lab Test 12/23/18  0420 12/22/18  0415 12/21/18  0408   CR 0.23* 0.29* 0.28*     Recent Labs   Lab Test 02/20/17  1745   SED 61*     Recent Labs   Lab Test 12/20/18  1149 12/19/18  1425 12/19/18  1250 12/16/18  0920 12/12/18  2120 12/12/18  2115 02/18/17  1210 09/04/14  1105 04/14/14  0955   CULT Light growth  Candida albicans / dubliniensis  Candida albicans and Candida dubliniensis are not routinely speciated  Susceptibility testing not routinely done  *  Culture negative monitoring continues  No growth after 18 hours  Culture negative after 18 hours Culture negative monitoring continues Culture negative monitoring continues No growth No growth No growth Moderate growth Peptostreptococcus anaerobius Susceptibility testing " not   routinely done  *  Heavy growth Enterococcus faecalis  Light growth Klebsiella pneumoniae  Light growth Coagulase negative Staphylococcus Susceptibility testing not   routinely done  Light growth Candida glabrata Susceptibility testing not routinely done  * >100,000 colonies/mL Escherichia coli  10,000 to 50,000 colonies/mL Mixed gram positive tulio  * Culture negative after 4 weeks  No anaerobes isolated  On day 2, isolated in broth only: Staphylococcus aureus*

## 2018-12-23 NOTE — PROGRESS NOTES
[facility] ICU RESPIRATORY NOTE  Date of Admission: 12/12/2018  Date of Intubation (most recent): 12/12/18  Reason for Mechanical Ventilation: Airway protection  Number of Days on Mechanical Ventilation: 11  Met Criteria for Pressure Support Trial:NO  Reason for No Pressure Support Trial: high peep and per MD     Significant Events Today: Increased Pplat and Ppeak pressures overnight, MD notified, vent changes made, chest xray/abg ordered.    Recent Labs   Lab 12/22/18  0640 12/20/18  0533 12/18/18  0829   PH 7.41 7.34* 7.37   PCO2 32* 35 27*   PO2 76* 147* 78*   HCO3 20* 19* 15*   O2PER  --  60 50     Ventilation Mode: CMV/AC  (Continuous Mandatory Ventilation/ Assist Control)  FiO2 (%): 70 %  Rate Set (breaths/minute): 20 breaths/min  Tidal Volume Set (mL): 350 mL  PEEP (cm H2O): 5 cmH2O  Oxygen Concentration (%): 70 %  Resp: (!) 31    Seamus Armstrong RT

## 2018-12-23 NOTE — PROGRESS NOTES
TELE- ICU Staff:    Called for increased plateau pressures.  Will increase the vent rate and decrease the tidal volumes.  Will check a CXR.  Will recheck the ABG after the changes.  I examined the patient today.  I reviewed the patient's medical record and the progress notes today.  I  agree with the exam findings as documented in the ICU team progress note today.   I agree with the clinical assessment and the surgical care plan that I formulated with the ICU team members today.    Tc Escobedo MD, PhD

## 2018-12-23 NOTE — PLAN OF CARE
Neuro: Opens eyes to voice.  PERRLA.  Does not follow commands. On fentanyl and versed drips. Paraplegic baseline    Cardiac:  SR/ST. Requires Levophed to maintain MAP > 65.  Resp:  Lung sounds coarse throughout. No issues with sats dropping with positioning. Decreased tidal volume and increased respiratory rate after ABG     GI:  Minimal output in colostomy.  OG to LIS-100 ml ouput.  TPN running   :  Ileo conduit requires occasional irrigation d/t mucus plugging, large urine output   Labs: Potassium and Phosphorus requiring replacement this AM   Plan: Will continue to monitor.

## 2018-12-23 NOTE — PROGRESS NOTES
[facility] ICU RESPIRATORY NOTE  Date of Admission: 12/12/2018  Date of Intubation (most recent): 12/12/18  Reason for Mechanical Ventilation: Airway protection  Number of Days on Mechanical Ventilation: 10  Met Criteria for Pressure Support Trial:NO  Reason for No Pressure Support Trial: high peep and per MD     Significant Events Today: PEEP increased from 7 to PEEP of 8.    ABG Results:7.41/ 32/ 76/ 20 @06:40 today 12/22/18  Plan:  Pt remain on full ventilator support and will assess daily for PS.     Jonathan Valentino RT.
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
## 2018-12-24 ENCOUNTER — APPOINTMENT (OUTPATIENT)
Dept: CARDIOLOGY | Facility: CLINIC | Age: 53
DRG: 870 | End: 2018-12-24
Attending: INTERNAL MEDICINE
Payer: MEDICARE

## 2018-12-24 LAB
ACID FAST STN SPEC QL: NORMAL
ACID FAST STN SPEC QL: NORMAL
ALBUMIN SERPL-MCNC: 1.6 G/DL (ref 3.4–5)
ALP SERPL-CCNC: 142 U/L (ref 40–150)
ALT SERPL W P-5'-P-CCNC: 9 U/L (ref 0–50)
ANION GAP SERPL CALCULATED.3IONS-SCNC: 6 MMOL/L (ref 3–14)
AST SERPL W P-5'-P-CCNC: 21 U/L (ref 0–45)
BACTERIA SPEC CULT: NO GROWTH
BACTERIA SPEC CULT: NO GROWTH
BILIRUB SERPL-MCNC: 0.5 MG/DL (ref 0.2–1.3)
BUN SERPL-MCNC: 5 MG/DL (ref 7–30)
CALCIUM SERPL-MCNC: 6.9 MG/DL (ref 8.5–10.1)
CHLORIDE SERPL-SCNC: 108 MMOL/L (ref 94–109)
CO2 SERPL-SCNC: 28 MMOL/L (ref 20–32)
CREAT SERPL-MCNC: 0.22 MG/DL (ref 0.52–1.04)
ERYTHROCYTE [DISTWIDTH] IN BLOOD BY AUTOMATED COUNT: 21.7 % (ref 10–15)
GFR SERPL CREATININE-BSD FRML MDRD: >90 ML/MIN/{1.73_M2}
GLUCOSE BLDC GLUCOMTR-MCNC: 143 MG/DL (ref 70–99)
GLUCOSE BLDC GLUCOMTR-MCNC: 146 MG/DL (ref 70–99)
GLUCOSE BLDC GLUCOMTR-MCNC: 158 MG/DL (ref 70–99)
GLUCOSE BLDC GLUCOMTR-MCNC: 168 MG/DL (ref 70–99)
GLUCOSE SERPL-MCNC: 156 MG/DL (ref 70–99)
HCT VFR BLD AUTO: 24.7 % (ref 35–47)
HGB BLD-MCNC: 7.8 G/DL (ref 11.7–15.7)
INR PPP: 1.17 (ref 0.86–1.14)
LACTATE BLD-SCNC: 1.3 MMOL/L (ref 0.7–2)
MAGNESIUM SERPL-MCNC: 2 MG/DL (ref 1.6–2.3)
MCH RBC QN AUTO: 26.2 PG (ref 26.5–33)
MCHC RBC AUTO-ENTMCNC: 31.6 G/DL (ref 31.5–36.5)
MCV RBC AUTO: 83 FL (ref 78–100)
PHOSPHATE SERPL-MCNC: 2.6 MG/DL (ref 2.5–4.5)
PLATELET # BLD AUTO: 89 10E9/L (ref 150–450)
POTASSIUM SERPL-SCNC: 2.9 MMOL/L (ref 3.4–5.3)
POTASSIUM SERPL-SCNC: 3.2 MMOL/L (ref 3.4–5.3)
PREALB SERPL IA-MCNC: 7 MG/DL (ref 15–45)
PROT SERPL-MCNC: 5.5 G/DL (ref 6.8–8.8)
RBC # BLD AUTO: 2.98 10E12/L (ref 3.8–5.2)
SODIUM SERPL-SCNC: 142 MMOL/L (ref 133–144)
SPECIMEN SOURCE: NORMAL
WBC # BLD AUTO: 14 10E9/L (ref 4–11)

## 2018-12-24 PROCEDURE — 40000275 ZZH STATISTIC RCP TIME EA 10 MIN

## 2018-12-24 PROCEDURE — 25000128 H RX IP 250 OP 636: Performed by: INTERNAL MEDICINE

## 2018-12-24 PROCEDURE — 85610 PROTHROMBIN TIME: CPT | Performed by: INTERNAL MEDICINE

## 2018-12-24 PROCEDURE — 94003 VENT MGMT INPAT SUBQ DAY: CPT

## 2018-12-24 PROCEDURE — 85027 COMPLETE CBC AUTOMATED: CPT | Performed by: INTERNAL MEDICINE

## 2018-12-24 PROCEDURE — 25000125 ZZHC RX 250: Performed by: INTERNAL MEDICINE

## 2018-12-24 PROCEDURE — 40000239 ZZH STATISTIC VAT ROUNDS

## 2018-12-24 PROCEDURE — 84134 ASSAY OF PREALBUMIN: CPT | Performed by: INTERNAL MEDICINE

## 2018-12-24 PROCEDURE — 83605 ASSAY OF LACTIC ACID: CPT | Performed by: INTERNAL MEDICINE

## 2018-12-24 PROCEDURE — 99291 CRITICAL CARE FIRST HOUR: CPT | Performed by: INTERNAL MEDICINE

## 2018-12-24 PROCEDURE — 93321 DOPPLER ECHO F-UP/LMTD STD: CPT | Mod: 26 | Performed by: INTERNAL MEDICINE

## 2018-12-24 PROCEDURE — 93325 DOPPLER ECHO COLOR FLOW MAPG: CPT | Mod: 26 | Performed by: INTERNAL MEDICINE

## 2018-12-24 PROCEDURE — 84100 ASSAY OF PHOSPHORUS: CPT | Performed by: INTERNAL MEDICINE

## 2018-12-24 PROCEDURE — 25500064 ZZH RX 255 OP 636: Performed by: HOSPITALIST

## 2018-12-24 PROCEDURE — 00000146 ZZHCL STATISTIC GLUCOSE BY METER IP

## 2018-12-24 PROCEDURE — 40000264 ECHOCARDIOGRAM LIMITED

## 2018-12-24 PROCEDURE — 40000008 ZZH STATISTIC AIRWAY CARE

## 2018-12-24 PROCEDURE — 80053 COMPREHEN METABOLIC PANEL: CPT | Performed by: INTERNAL MEDICINE

## 2018-12-24 PROCEDURE — 83735 ASSAY OF MAGNESIUM: CPT | Performed by: INTERNAL MEDICINE

## 2018-12-24 PROCEDURE — 25000128 H RX IP 250 OP 636: Performed by: NURSE PRACTITIONER

## 2018-12-24 PROCEDURE — 40000257 ZZH STATISTIC CONSULT NO CHARGE VASC ACCESS

## 2018-12-24 PROCEDURE — 20000003 ZZH R&B ICU

## 2018-12-24 PROCEDURE — 93308 TTE F-UP OR LMTD: CPT | Mod: 26 | Performed by: INTERNAL MEDICINE

## 2018-12-24 PROCEDURE — 84132 ASSAY OF SERUM POTASSIUM: CPT | Performed by: INTERNAL MEDICINE

## 2018-12-24 RX ORDER — SODIUM CHLORIDE 9 MG/ML
INJECTION, SOLUTION INTRAVENOUS CONTINUOUS
Status: DISCONTINUED | OUTPATIENT
Start: 2018-12-24 | End: 2019-01-05

## 2018-12-24 RX ADMIN — INSULIN ASPART 2 UNITS: 100 INJECTION, SOLUTION INTRAVENOUS; SUBCUTANEOUS at 12:22

## 2018-12-24 RX ADMIN — INSULIN ASPART 1 UNITS: 100 INJECTION, SOLUTION INTRAVENOUS; SUBCUTANEOUS at 17:10

## 2018-12-24 RX ADMIN — MAGNESIUM SULFATE HEPTAHYDRATE 2 G: 40 INJECTION, SOLUTION INTRAVENOUS at 17:06

## 2018-12-24 RX ADMIN — DEXTROSE MONOHYDRATE: 50 INJECTION, SOLUTION INTRAVENOUS at 03:18

## 2018-12-24 RX ADMIN — BUMETANIDE 1 MG: 0.25 INJECTION INTRAMUSCULAR; INTRAVENOUS at 17:04

## 2018-12-24 RX ADMIN — HUMAN ALBUMIN MICROSPHERES AND PERFLUTREN 7 ML: 10; .22 INJECTION, SOLUTION INTRAVENOUS at 14:00

## 2018-12-24 RX ADMIN — INSULIN ASPART 1 UNITS: 100 INJECTION, SOLUTION INTRAVENOUS; SUBCUTANEOUS at 19:58

## 2018-12-24 RX ADMIN — HYDROCORTISONE SODIUM SUCCINATE 50 MG: 100 INJECTION, POWDER, FOR SOLUTION INTRAMUSCULAR; INTRAVENOUS at 15:06

## 2018-12-24 RX ADMIN — FAMOTIDINE 20 MG: 10 INJECTION, SOLUTION INTRAVENOUS at 11:37

## 2018-12-24 RX ADMIN — ENOXAPARIN SODIUM 60 MG: 60 INJECTION SUBCUTANEOUS at 12:21

## 2018-12-24 RX ADMIN — CHLORHEXIDINE GLUCONATE 15 ML: 1.2 RINSE ORAL at 08:58

## 2018-12-24 RX ADMIN — Medication 50 MCG/HR: at 04:50

## 2018-12-24 RX ADMIN — SODIUM CHLORIDE: 9 INJECTION, SOLUTION INTRAVENOUS at 08:54

## 2018-12-24 RX ADMIN — POTASSIUM CHLORIDE 20 MEQ: 29.8 INJECTION, SOLUTION INTRAVENOUS at 08:59

## 2018-12-24 RX ADMIN — INSULIN ASPART 1 UNITS: 100 INJECTION, SOLUTION INTRAVENOUS; SUBCUTANEOUS at 00:56

## 2018-12-24 RX ADMIN — HYDROCORTISONE SODIUM SUCCINATE 50 MG: 100 INJECTION, POWDER, FOR SOLUTION INTRAMUSCULAR; INTRAVENOUS at 06:53

## 2018-12-24 RX ADMIN — POTASSIUM CHLORIDE 20 MEQ: 29.8 INJECTION, SOLUTION INTRAVENOUS at 15:07

## 2018-12-24 RX ADMIN — CEFEPIME 1 G: 1 INJECTION, POWDER, FOR SOLUTION INTRAMUSCULAR; INTRAVENOUS at 18:27

## 2018-12-24 RX ADMIN — ENOXAPARIN SODIUM 60 MG: 60 INJECTION SUBCUTANEOUS at 00:37

## 2018-12-24 RX ADMIN — CEFEPIME 1 G: 1 INJECTION, POWDER, FOR SOLUTION INTRAMUSCULAR; INTRAVENOUS at 06:53

## 2018-12-24 RX ADMIN — BUMETANIDE 1 MG: 0.25 INJECTION INTRAMUSCULAR; INTRAVENOUS at 08:59

## 2018-12-24 RX ADMIN — VANCOMYCIN HYDROCHLORIDE 1500 MG: 5 INJECTION, POWDER, LYOPHILIZED, FOR SOLUTION INTRAVENOUS at 20:00

## 2018-12-24 RX ADMIN — MIDAZOLAM 2 MG/HR: 5 INJECTION INTRAMUSCULAR; INTRAVENOUS at 01:13

## 2018-12-24 RX ADMIN — INSULIN ASPART 2 UNITS: 100 INJECTION, SOLUTION INTRAVENOUS; SUBCUTANEOUS at 09:09

## 2018-12-24 RX ADMIN — BUMETANIDE 1 MG: 0.25 INJECTION INTRAMUSCULAR; INTRAVENOUS at 00:37

## 2018-12-24 RX ADMIN — HYDROCORTISONE SODIUM SUCCINATE 50 MG: 100 INJECTION, POWDER, FOR SOLUTION INTRAMUSCULAR; INTRAVENOUS at 20:18

## 2018-12-24 RX ADMIN — POTASSIUM CHLORIDE 20 MEQ: 29.8 INJECTION, SOLUTION INTRAVENOUS at 11:37

## 2018-12-24 RX ADMIN — CHLORHEXIDINE GLUCONATE 15 ML: 1.2 RINSE ORAL at 19:53

## 2018-12-24 ASSESSMENT — ACTIVITIES OF DAILY LIVING (ADL)
ADLS_ACUITY_SCORE: 27
ADLS_ACUITY_SCORE: 31

## 2018-12-24 ASSESSMENT — MIFFLIN-ST. JEOR: SCORE: 1574.38

## 2018-12-24 NOTE — PROGRESS NOTES
[facility] ICU RESPIRATORY NOTE  Date of Admission: 12/12/2018  Date of Intubation (most recent): 12/12/18  Reason for Mechanical Ventilation: Acute respiratory failure  Number of Days on Mechanical Ventilation: 13  Met Criteria for Pressure Support Trial: No  Reason for No Pressure Support Trial: No per MD    Significant Events Today: None    ABG Results:  Recent Labs   Lab 12/23/18  0415 12/22/18  0640 12/20/18  0533 12/18/18  0829   PH 7.43 7.41 7.34* 7.37   PCO2 37 32* 35 27*   PO2 100 76* 147* 78*   HCO3 25 20* 19* 15*   O2PER  --   --  60 50     Ventilation Mode: CMV/AC  (Continuous Mandatory Ventilation/ Assist Control)  FiO2 (%): (S) 70 %  Rate Set (breaths/minute): 20 breaths/min  Tidal Volume Set (mL): 350 mL  PEEP (cm H2O): 10 cmH2O  Oxygen Concentration (%): 70 %  Resp: 20    Plan:  Will continue full ventilatory support for now and assess for weaning readiness daily.     12/24/2018  Celina Lovett, RRT

## 2018-12-24 NOTE — PHARMACY-VANCOMYCIN DOSING SERVICE
Pharmacy Vancomycin Note  Date of Service 2018  Patient's  1965   53 year old, female    Indication: Sepsis  Goal Trough Level: 15-20 mg/L  Day of Therapy: 10  Current Vancomycin regimen:  1000 mg IV q24h    Current estimated CrCl = Estimated Creatinine Clearance: 343 mL/min (A) (based on SCr of 0.23 mg/dL (L)).    Creatinine for last 3 days  2018:  4:08 AM Creatinine 0.28 mg/dL  2018:  4:15 AM Creatinine 0.29 mg/dL  2018:  4:20 AM Creatinine 0.23 mg/dL    Recent Vancomycin Levels (past 3 days)  2018:  5:55 PM Vancomycin Level 10.4 mg/L    Vancomycin IV Administrations (past 72 hours)                   vancomycin (VANCOCIN) 1000 mg in dextrose 5% 200 mL PREMIX (mg) 1,000 mg New Bag 18 193     1,000 mg New Bag 18     1,000 mg New Bag 18 1933                Nephrotoxins and other renal medications (From now, onward)    Start     Dose/Rate Route Frequency Ordered Stop    18 1900  vancomycin (VANCOCIN) 1,500 mg in sodium chloride 0.9 % 250 mL intermittent infusion      1,500 mg  over 90 Minutes Intravenous EVERY 24 HOURS 18 1841      18 1626  bumetanide (BUMEX) injection 1 mg      1 mg Intravenous EVERY 8 HOURS 18 0920      18 0615  norepinephrine (LEVOPHED) 16 mg in D5W 250 mL infusion      0.03-0.4 mcg/kg/min × 56.9 kg  1.6-21.3 mL/hr  Intravenous CONTINUOUS 18 0605          Interpretation of levels and current regimen:  Trough level is  Subtherapeutic  Has serum creatinine changed > 50% in last 72 hours: No  Urine output:  good urine output  Renal Function: Stable    Plan:  1.  Increase Dose to 1500mg (15mg/kg) IV q24hrs  2.  Pharmacy will check trough levels as appropriate in 3-4 doses.    3. Serum creatinine levels will be ordered daily for the first week of therapy and at least twice weekly for subsequent weeks.      Jillian Rea

## 2018-12-24 NOTE — PROGRESS NOTES
[facility]  Infectious Disease Progress Note          Assessment and Plan:   IMPRESSION:   1.  A 53-year-old female with acute sepsis, underlying paraplegia and a chronic wound, but this appears to be respiratory infection, has infiltrates and significant pleural fluid, initially felt to have major pericardial fluid, but this turns out to be a false result, likely conventional community-acquired pneumonia, although at risk for other pathogens.   2.  Paraplegia including chronic neurogenic bladder with ileal diversion and ileostomy.   3.  Chronic pain syndrome.   4.  Chronic bilateral sacral decubitus wounds, never been diagnosed with osteomyelitis, previous infection 18 months ago but no recent suggestion of infection and appearance not looking like infection currently.   5.  ALLERGIES LISTED TO LEVAQUIN AND PENICILLIN.  The Levaquin was a rash.  The penicillin caused some kind of hyperactive reactive thing so was not a true allergy.   6 MRSA colonized  7 Fluid collection in hip, 6 K WBc with minimal PMNs , cx pending, even on ABX low WBC/PMNsso doubt  Infection       RECOMMENDATIONS:   1. Cefepime and vancomycin day 12, cultures continue to be neg  General failure to improve, not clearly active untreated infection issue(procal low, no fever, cxs neg)  2. I am aware of the candida in the BAL cultures, not a pathogen in these settings, discussed with Dr. Mead on 12/23               Interval History:   On the vent, awake but non responsive               Medications:       bumetanide  1 mg Intravenous Q8H     ceFEPIme (MAXIPIME) IV  1 g Intravenous Q12H     chlorhexidine  15 mL Mouth/Throat Q12H     enoxaparin  60 mg Subcutaneous Q12H     famotidine  20 mg Intravenous Q12H     hydrocortisone sodium succinate PF  50 mg Intravenous Q8H     influenza vaccine adult (product based on age)  0.5 mL Intramuscular Prior to discharge     insulin aspart  1-12 Units Subcutaneous Q4H     lipids  250 mL  "Intravenous Q48H     vancomycin (VANCOCIN) IV  1,500 mg Intravenous Q24H                  Physical Exam:   Blood pressure 103/59, pulse 82, temperature 97.9  F (36.6  C), resp. rate 25, height 1.753 m (5' 9\"), weight 91 kg (200 lb 9.9 oz), SpO2 97 %, not currently breastfeeding.  Wt Readings from Last 2 Encounters:   12/24/18 91 kg (200 lb 9.9 oz)   08/30/17 56.7 kg (125 lb)     Vital Signs with Ranges  Temp:  [97.2  F (36.2  C)-98.4  F (36.9  C)] 97.9  F (36.6  C)  Heart Rate:  [] 98  Resp:  [19-82] 25  BP: ()/(40-69) 103/59  MAP:  [46 mmHg-109 mmHg] 86 mmHg  Arterial Line BP: ()/(34-76) 130/60  FiO2 (%):  [70 %-100 %] 80 %  SpO2:  [88 %-100 %] 97 %    Constitutional: Vent and sedated   Lungs: Congestion to auscultation bilaterally, few crackles no wheezing   Cardiovascular: Regular rate and rhythm, normal S1 and S2, and no murmur noted   Abdomen: Normal bowel sounds, soft, non-distended, non-tender   Skin: No rashes, no cyanosis, no edema wd not seen   Other:           Data:   All microbiology laboratory data reviewed.  Recent Labs   Lab Test 12/24/18  0325 12/23/18  0420 12/22/18  0415   WBC 14.0* 14.6* 18.3*   HGB 7.8* 7.7* 7.8*   HCT 24.7* 23.8* 24.4*   MCV 83 81 81   PLT 89* 75* 88*     Recent Labs   Lab Test 12/24/18  0325 12/23/18  0420 12/22/18  0415   CR 0.22* 0.23* 0.29*     Recent Labs   Lab Test 02/20/17  1745   SED 61*     Recent Labs   Lab Test 12/20/18  1149 12/19/18  1425 12/19/18  1250 12/16/18  0920 12/12/18  2120 12/12/18  2115 02/18/17  1210 09/04/14  1105 04/14/14  0955   CULT Culture received and in progress.  Positive AFB results are called as soon as detected.    Final report to follow in 7 to 8 weeks.    Light growth  Candida albicans / dubliniensis  Candida albicans and Candida dubliniensis are not routinely speciated  Susceptibility testing not routinely done  *  Culture negative monitoring continues  No growth after 18 hours  Culture negative after 18 hours No " growth No growth No growth No growth No growth Moderate growth Peptostreptococcus anaerobius Susceptibility testing not   routinely done  *  Heavy growth Enterococcus faecalis  Light growth Klebsiella pneumoniae  Light growth Coagulase negative Staphylococcus Susceptibility testing not   routinely done  Light growth Candida glabrata Susceptibility testing not routinely done  * >100,000 colonies/mL Escherichia coli  10,000 to 50,000 colonies/mL Mixed gram positive tulio  * Culture negative after 4 weeks  No anaerobes isolated  On day 2, isolated in broth only: Staphylococcus aureus*

## 2018-12-24 NOTE — PROGRESS NOTES
[facility] ICU RESPIRATORY NOTE  Date of Admission: 12/12/2018  Date of Intubation (most recent): 12/12/2018  Reason for Mechanical Ventilation: Acute respiratory failure   Number of Days on Mechanical Ventilation:  12  Reason for No Pressure Support Trial: High PEEP per MD     Significant Events Today: Patient increased PEEP to 10     Ventilation Mode: CMV/AC  (Continuous Mandatory Ventilation/ Assist Control)  FiO2 (%): 70 %  Rate Set (breaths/minute): 20 breaths/min  Tidal Volume Set (mL): 350 mL  PEEP (cm H2O): 10 cmH2O  Oxygen Concentration (%): 70 %  Resp: (!) 33    ABG Results:  Recent Labs   Lab 12/23/18  0415 12/22/18  0640 12/20/18  0533 12/18/18  0829   PH 7.43 7.41 7.34* 7.37   PCO2 37 32* 35 27*   PO2 100 76* 147* 78*   HCO3 25 20* 19* 15*   O2PER  --   --  60 50     ETT appearance on chest x-ray: ETT in good position     Plan:  Patient remain on full vent support and will continue to monitor.    Amara Narayanan RT  12/23/2018

## 2018-12-24 NOTE — PLAN OF CARE
Pt rested quietly through the night attempted to wean levo from .14 to .11,  pt was ok with this until pt repositioned, pt remains on versed@ 2mgs and fentanyl at 50mcgs. Neuro status remains the same opens eyes and moves arms spontaneously but not on command. Copious amt of urine drained after bumex given , cath did need to be flushed to allow to drain better.

## 2018-12-24 NOTE — PROGRESS NOTES
[facility] ICU RESPIRATORY NOTE  Date of Admission: 12/12/2018  Date of Intubation (most recent): 12/12/2018  Reason for Mechanical Ventilation: Acute respiratory failure   Number of Days on Mechanical Ventilation:  13  Reason for No Pressure Support Trial: High PEEP per MD      Significant Events Today: None overnight    Recent Labs   Lab 12/23/18  0415 12/22/18  0640 12/20/18  0533 12/18/18  0829   PH 7.43 7.41 7.34* 7.37   PCO2 37 32* 35 27*   PO2 100 76* 147* 78*   HCO3 25 20* 19* 15*   O2PER  --   --  60 50     Ventilation Mode: CMV/AC  (Continuous Mandatory Ventilation/ Assist Control)  FiO2 (%): 80 %  Rate Set (breaths/minute): 20 breaths/min  Tidal Volume Set (mL): 350 mL  PEEP (cm H2O): 10 cmH2O  Oxygen Concentration (%): 80 %  Resp: 26      Seamus Armstrong RT

## 2018-12-25 LAB
GLUCOSE BLDC GLUCOMTR-MCNC: 153 MG/DL (ref 70–99)
GLUCOSE BLDC GLUCOMTR-MCNC: 163 MG/DL (ref 70–99)
GLUCOSE BLDC GLUCOMTR-MCNC: 163 MG/DL (ref 70–99)
GLUCOSE BLDC GLUCOMTR-MCNC: 167 MG/DL (ref 70–99)
GLUCOSE BLDC GLUCOMTR-MCNC: 171 MG/DL (ref 70–99)
GLUCOSE BLDC GLUCOMTR-MCNC: 173 MG/DL (ref 70–99)
MAGNESIUM SERPL-MCNC: 2.5 MG/DL (ref 1.6–2.3)
PHOSPHATE SERPL-MCNC: 1.9 MG/DL (ref 2.5–4.5)
POTASSIUM SERPL-SCNC: 3 MMOL/L (ref 3.4–5.3)
POTASSIUM SERPL-SCNC: 3.5 MMOL/L (ref 3.4–5.3)

## 2018-12-25 PROCEDURE — 25000125 ZZHC RX 250: Performed by: INTERNAL MEDICINE

## 2018-12-25 PROCEDURE — 40000275 ZZH STATISTIC RCP TIME EA 10 MIN

## 2018-12-25 PROCEDURE — 25000128 H RX IP 250 OP 636: Performed by: INTERNAL MEDICINE

## 2018-12-25 PROCEDURE — 83876 ASSAY MYELOPEROXIDASE: CPT | Performed by: INTERNAL MEDICINE

## 2018-12-25 PROCEDURE — 94003 VENT MGMT INPAT SUBQ DAY: CPT

## 2018-12-25 PROCEDURE — 84132 ASSAY OF SERUM POTASSIUM: CPT | Performed by: INTERNAL MEDICINE

## 2018-12-25 PROCEDURE — 40000008 ZZH STATISTIC AIRWAY CARE

## 2018-12-25 PROCEDURE — 25000128 H RX IP 250 OP 636: Performed by: NURSE PRACTITIONER

## 2018-12-25 PROCEDURE — 20000003 ZZH R&B ICU

## 2018-12-25 PROCEDURE — 25000128 H RX IP 250 OP 636: Performed by: SURGERY

## 2018-12-25 PROCEDURE — 84100 ASSAY OF PHOSPHORUS: CPT | Performed by: INTERNAL MEDICINE

## 2018-12-25 PROCEDURE — 25000125 ZZHC RX 250: Performed by: SURGERY

## 2018-12-25 PROCEDURE — 83516 IMMUNOASSAY NONANTIBODY: CPT | Performed by: INTERNAL MEDICINE

## 2018-12-25 PROCEDURE — 25000125 ZZHC RX 250: Performed by: HOSPITALIST

## 2018-12-25 PROCEDURE — 00000146 ZZHCL STATISTIC GLUCOSE BY METER IP

## 2018-12-25 PROCEDURE — 83735 ASSAY OF MAGNESIUM: CPT | Performed by: INTERNAL MEDICINE

## 2018-12-25 PROCEDURE — 40000239 ZZH STATISTIC VAT ROUNDS

## 2018-12-25 RX ADMIN — FAMOTIDINE 20 MG: 10 INJECTION, SOLUTION INTRAVENOUS at 00:01

## 2018-12-25 RX ADMIN — INSULIN ASPART 1 UNITS: 100 INJECTION, SOLUTION INTRAVENOUS; SUBCUTANEOUS at 00:10

## 2018-12-25 RX ADMIN — SODIUM PHOSPHATE, MONOBASIC, MONOHYDRATE 20 MMOL: 276; 142 INJECTION, SOLUTION INTRAVENOUS at 14:29

## 2018-12-25 RX ADMIN — ENOXAPARIN SODIUM 60 MG: 60 INJECTION SUBCUTANEOUS at 00:01

## 2018-12-25 RX ADMIN — CHLORHEXIDINE GLUCONATE 15 ML: 1.2 RINSE ORAL at 20:23

## 2018-12-25 RX ADMIN — BUMETANIDE 1 MG: 0.25 INJECTION INTRAMUSCULAR; INTRAVENOUS at 16:32

## 2018-12-25 RX ADMIN — POTASSIUM CHLORIDE 20 MEQ: 29.8 INJECTION, SOLUTION INTRAVENOUS at 08:08

## 2018-12-25 RX ADMIN — HYDROCORTISONE SODIUM SUCCINATE 50 MG: 100 INJECTION, POWDER, FOR SOLUTION INTRAMUSCULAR; INTRAVENOUS at 20:13

## 2018-12-25 RX ADMIN — NOREPINEPHRINE BITARTRATE 0.08 MCG/KG/MIN: 1 INJECTION INTRAVENOUS at 08:35

## 2018-12-25 RX ADMIN — INSULIN ASPART 1 UNITS: 100 INJECTION, SOLUTION INTRAVENOUS; SUBCUTANEOUS at 20:26

## 2018-12-25 RX ADMIN — ASCORBIC ACID, VITAMIN A PALMITATE, CHOLECALCIFEROL, THIAMINE HYDROCHLORIDE, RIBOFLAVIN-5 PHOSPHATE SODIUM, PYRIDOXINE HYDROCHLORIDE, NIACINAMIDE, DEXPANTHENOL, ALPHA-TOCOPHEROL ACETATE, VITAMIN K1, FOLIC ACID, BIOTIN, CYANOCOBALAMIN: 200; 3300; 200; 6; 3.6; 6; 40; 15; 10; 150; 600; 60; 5 INJECTION, SOLUTION INTRAVENOUS at 08:02

## 2018-12-25 RX ADMIN — INSULIN ASPART 2 UNITS: 100 INJECTION, SOLUTION INTRAVENOUS; SUBCUTANEOUS at 16:39

## 2018-12-25 RX ADMIN — BUMETANIDE 1 MG: 0.25 INJECTION INTRAMUSCULAR; INTRAVENOUS at 00:01

## 2018-12-25 RX ADMIN — CEFEPIME 1 G: 1 INJECTION, POWDER, FOR SOLUTION INTRAMUSCULAR; INTRAVENOUS at 18:38

## 2018-12-25 RX ADMIN — ENOXAPARIN SODIUM 60 MG: 60 INJECTION SUBCUTANEOUS at 11:37

## 2018-12-25 RX ADMIN — CEFEPIME 1 G: 1 INJECTION, POWDER, FOR SOLUTION INTRAMUSCULAR; INTRAVENOUS at 08:01

## 2018-12-25 RX ADMIN — FAMOTIDINE 20 MG: 10 INJECTION, SOLUTION INTRAVENOUS at 11:37

## 2018-12-25 RX ADMIN — FAMOTIDINE 20 MG: 10 INJECTION, SOLUTION INTRAVENOUS at 23:08

## 2018-12-25 RX ADMIN — INSULIN ASPART 1 UNITS: 100 INJECTION, SOLUTION INTRAVENOUS; SUBCUTANEOUS at 11:43

## 2018-12-25 RX ADMIN — BUMETANIDE 1 MG: 0.25 INJECTION INTRAMUSCULAR; INTRAVENOUS at 08:08

## 2018-12-25 RX ADMIN — HYDROCORTISONE SODIUM SUCCINATE 50 MG: 100 INJECTION, POWDER, FOR SOLUTION INTRAMUSCULAR; INTRAVENOUS at 08:07

## 2018-12-25 RX ADMIN — Medication 50 MCG/HR: at 11:48

## 2018-12-25 RX ADMIN — ENOXAPARIN SODIUM 60 MG: 60 INJECTION SUBCUTANEOUS at 23:08

## 2018-12-25 RX ADMIN — I.V. FAT EMULSION 250 ML: 20 EMULSION INTRAVENOUS at 20:14

## 2018-12-25 RX ADMIN — HYDROCORTISONE SODIUM SUCCINATE 50 MG: 100 INJECTION, POWDER, FOR SOLUTION INTRAMUSCULAR; INTRAVENOUS at 14:22

## 2018-12-25 RX ADMIN — HYDROCORTISONE SODIUM SUCCINATE 50 MG: 100 INJECTION, POWDER, FOR SOLUTION INTRAMUSCULAR; INTRAVENOUS at 02:43

## 2018-12-25 RX ADMIN — CHLORHEXIDINE GLUCONATE 15 ML: 1.2 RINSE ORAL at 08:28

## 2018-12-25 RX ADMIN — VANCOMYCIN HYDROCHLORIDE 1500 MG: 5 INJECTION, POWDER, LYOPHILIZED, FOR SOLUTION INTRAVENOUS at 19:17

## 2018-12-25 RX ADMIN — INSULIN ASPART 2 UNITS: 100 INJECTION, SOLUTION INTRAVENOUS; SUBCUTANEOUS at 08:28

## 2018-12-25 ASSESSMENT — ACTIVITIES OF DAILY LIVING (ADL)
ADLS_ACUITY_SCORE: 26
ADLS_ACUITY_SCORE: 27
ADLS_ACUITY_SCORE: 26
ADLS_ACUITY_SCORE: 26

## 2018-12-25 NOTE — PROGRESS NOTES
[facility]  Infectious Disease Progress Note          Assessment and Plan:   IMPRESSION:   1.  A 53-year-old female with acute sepsis, underlying paraplegia and a chronic wound, but this appears to be respiratory infection, has infiltrates and significant pleural fluid, initially felt to have major pericardial fluid, but this turns out to be a false result, likely conventional community-acquired pneumonia, although at risk for other pathogens.   2.  Paraplegia including chronic neurogenic bladder with ileal diversion and ileostomy.   3.  Chronic pain syndrome.   4.  Chronic bilateral sacral decubitus wounds, never been diagnosed with osteomyelitis, previous infection 18 months ago but no recent suggestion of infection and appearance not looking like infection currently.   5.  ALLERGIES LISTED TO LEVAQUIN AND PENICILLIN.  The Levaquin was a rash.  The penicillin caused some kind of hyperactive reactive thing so was not a true allergy.   6 MRSA colonized  7 Fluid collection in hip, 6 K WBc with minimal PMNs , cx pending, even on ABX low WBC/PMNsso doubt  Infection       RECOMMENDATIONS:   1. Cefepime and vancomycin day 13,empiric tx cultures continue to be neg, no fever  General failure to improve, not clearly active untreated infection issue(procal low, no fever, cxs neg)  2.  candida in the BAL cultures, not a pathogen in these settings,   3Agree poor prognsis, ongoing discussion               Interval History:   On the vent, awake but minimally  responsive  cxs neg except candida sputum              Medications:       bumetanide  1 mg Intravenous Q8H     ceFEPIme (MAXIPIME) IV  1 g Intravenous Q12H     chlorhexidine  15 mL Mouth/Throat Q12H     enoxaparin  60 mg Subcutaneous Q12H     famotidine  20 mg Intravenous Q12H     hydrocortisone sodium succinate PF  50 mg Intravenous Q6H     influenza vaccine adult (product based on age)  0.5 mL Intramuscular Prior to discharge     insulin aspart   "1-12 Units Subcutaneous Q4H     lipids  250 mL Intravenous Q48H     vancomycin (VANCOCIN) IV  1,500 mg Intravenous Q24H                  Physical Exam:   Blood pressure 150/83, pulse 82, temperature 98.1  F (36.7  C), resp. rate 20, height 1.753 m (5' 9\"), weight 91 kg (200 lb 9.9 oz), SpO2 93 %, not currently breastfeeding.  Wt Readings from Last 2 Encounters:   12/24/18 91 kg (200 lb 9.9 oz)   08/30/17 56.7 kg (125 lb)     Vital Signs with Ranges  Temp:  [97.7  F (36.5  C)-98.4  F (36.9  C)] 98.1  F (36.7  C)  Heart Rate:  [] 68  Resp:  [9-30] 20  BP: (150)/(83) 150/83  MAP:  [69 mmHg-107 mmHg] 81 mmHg  Arterial Line BP: (109-153)/(48-75) 118/57  FiO2 (%):  [70 %] 70 %  SpO2:  [92 %-99 %] 93 %    Constitutional: Vent and sedated   Lungs: Congestion to auscultation bilaterally, few crackles no wheezing   Cardiovascular: Regular rate and rhythm, normal S1 and S2, and no murmur noted   Abdomen: Normal bowel sounds, soft, non-distended, non-tender   Skin: No rashes, no cyanosis, no edema wd not seen   Other:           Data:   All microbiology laboratory data reviewed.  Recent Labs   Lab Test 12/24/18  0325 12/23/18  0420 12/22/18  0415   WBC 14.0* 14.6* 18.3*   HGB 7.8* 7.7* 7.8*   HCT 24.7* 23.8* 24.4*   MCV 83 81 81   PLT 89* 75* 88*     Recent Labs   Lab Test 12/24/18  0325 12/23/18  0420 12/22/18  0415   CR 0.22* 0.23* 0.29*     Recent Labs   Lab Test 02/20/17  1745   SED 61*     Recent Labs   Lab Test 12/20/18  1149 12/19/18  1425 12/19/18  1250 12/16/18  0920 12/12/18  2120 12/12/18  2115 02/18/17  1210 09/04/14  1105 04/14/14  0955   CULT Yeast isolated*  Culture received and in progress.  Positive AFB results are called as soon as detected.    Final report to follow in 7 to 8 weeks.    Light growth  Candida albicans / dubliniensis  Candida albicans and Candida dubliniensis are not routinely speciated  Susceptibility testing not routinely done  *  Culture negative monitoring continues  No growth after " 18 hours No growth No growth No growth No growth No growth Moderate growth Peptostreptococcus anaerobius Susceptibility testing not   routinely done  *  Heavy growth Enterococcus faecalis  Light growth Klebsiella pneumoniae  Light growth Coagulase negative Staphylococcus Susceptibility testing not   routinely done  Light growth Candida glabrata Susceptibility testing not routinely done  * >100,000 colonies/mL Escherichia coli  10,000 to 50,000 colonies/mL Mixed gram positive tulio  * Culture negative after 4 weeks  No anaerobes isolated  On day 2, isolated in broth only: Staphylococcus aureus*

## 2018-12-25 NOTE — PLAN OF CARE
Pt sedated and intubated. VSS. Allyson and L PICC dressing changed. Repositioned frequently to prevent additional skin breakdown. Will continue to monitor.

## 2018-12-25 NOTE — PROGRESS NOTES
[facility] ICU RESPIRATORY NOTE    Date of Admission: 12/12/2018  Date of Intubation (most recent): 12/12/2018  Reason for Mechanical Ventilation: Acute Respiratory Failure  Number of Days on Mechanical Ventilation: 14  Met Criteria for Pressure Support Trial: No  Length of Pressure Support Trial: N/A  Reason for Stopping Pressure Support Trial: N/A  Reason for No Pressure Support Trial: per MD    Significant Events Today: None overnight    ABG Results:   Recent Labs   Lab 12/23/18  0415 12/22/18  0640 12/20/18  0533 12/18/18  0829   PH 7.43 7.41 7.34* 7.37   PCO2 37 32* 35 27*   PO2 100 76* 147* 78*   HCO3 25 20* 19* 15*   O2PER  --   --  60 50     Ventilation Mode: CMV/AC  (Continuous Mandatory Ventilation/ Assist Control)  FiO2 (%): 70 %  Rate Set (breaths/minute): 20 breaths/min  Tidal Volume Set (mL): 350 mL  PEEP (cm H2O): 10 cmH2O  Oxygen Concentration (%): 70 %  Resp: 24    Plan: Continue patient on full ventilatory support and assess for weaning readiness daily.
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
## 2018-12-25 NOTE — PLAN OF CARE
Pt remains intubated and sedated. Opens eyes. Withdraws. Not following commands. Edematous, weeping. Remains on levophed. Multiple wounds. Family at bedside. Planning for comfort cares on Wednesday. Updated on plan of care. Continue to monitor.

## 2018-12-26 ENCOUNTER — APPOINTMENT (OUTPATIENT)
Dept: GENERAL RADIOLOGY | Facility: CLINIC | Age: 53
DRG: 870 | End: 2018-12-26
Attending: INTERNAL MEDICINE
Payer: MEDICARE

## 2018-12-26 LAB
ANION GAP SERPL CALCULATED.3IONS-SCNC: 7 MMOL/L (ref 3–14)
BUN SERPL-MCNC: 14 MG/DL (ref 7–30)
CALCIUM SERPL-MCNC: 7 MG/DL (ref 8.5–10.1)
CHLORIDE SERPL-SCNC: 103 MMOL/L (ref 94–109)
CO2 SERPL-SCNC: 33 MMOL/L (ref 20–32)
CREAT SERPL-MCNC: 0.21 MG/DL (ref 0.52–1.04)
GFR SERPL CREATININE-BSD FRML MDRD: >90 ML/MIN/{1.73_M2}
GLUCOSE BLDC GLUCOMTR-MCNC: 110 MG/DL (ref 70–99)
GLUCOSE BLDC GLUCOMTR-MCNC: 130 MG/DL (ref 70–99)
GLUCOSE BLDC GLUCOMTR-MCNC: 133 MG/DL (ref 70–99)
GLUCOSE BLDC GLUCOMTR-MCNC: 137 MG/DL (ref 70–99)
GLUCOSE BLDC GLUCOMTR-MCNC: 140 MG/DL (ref 70–99)
GLUCOSE BLDC GLUCOMTR-MCNC: 141 MG/DL (ref 70–99)
GLUCOSE BLDC GLUCOMTR-MCNC: 152 MG/DL (ref 70–99)
GLUCOSE SERPL-MCNC: 133 MG/DL (ref 70–99)
MYELOPEROXIDASE AB SER-ACNC: <0.2 AI (ref 0–0.9)
POTASSIUM SERPL-SCNC: 2.6 MMOL/L (ref 3.4–5.3)
PROTEINASE3 IGG SER-ACNC: <0.2 AI (ref 0–0.9)
SODIUM SERPL-SCNC: 143 MMOL/L (ref 133–144)

## 2018-12-26 PROCEDURE — 40000239 ZZH STATISTIC VAT ROUNDS

## 2018-12-26 PROCEDURE — 20000003 ZZH R&B ICU

## 2018-12-26 PROCEDURE — 25000128 H RX IP 250 OP 636: Performed by: INTERNAL MEDICINE

## 2018-12-26 PROCEDURE — 80048 BASIC METABOLIC PNL TOTAL CA: CPT | Performed by: INTERNAL MEDICINE

## 2018-12-26 PROCEDURE — 71045 X-RAY EXAM CHEST 1 VIEW: CPT

## 2018-12-26 PROCEDURE — 40000008 ZZH STATISTIC AIRWAY CARE

## 2018-12-26 PROCEDURE — 99291 CRITICAL CARE FIRST HOUR: CPT | Performed by: INTERNAL MEDICINE

## 2018-12-26 PROCEDURE — 25000132 ZZH RX MED GY IP 250 OP 250 PS 637: Mod: GY | Performed by: NURSE PRACTITIONER

## 2018-12-26 PROCEDURE — 25000125 ZZHC RX 250: Performed by: SURGERY

## 2018-12-26 PROCEDURE — 25000132 ZZH RX MED GY IP 250 OP 250 PS 637: Mod: GY | Performed by: INTERNAL MEDICINE

## 2018-12-26 PROCEDURE — 25000125 ZZHC RX 250: Performed by: INTERNAL MEDICINE

## 2018-12-26 PROCEDURE — 25000128 H RX IP 250 OP 636: Performed by: SURGERY

## 2018-12-26 PROCEDURE — 40000275 ZZH STATISTIC RCP TIME EA 10 MIN

## 2018-12-26 PROCEDURE — 94003 VENT MGMT INPAT SUBQ DAY: CPT

## 2018-12-26 PROCEDURE — 25000128 H RX IP 250 OP 636: Performed by: NURSE PRACTITIONER

## 2018-12-26 PROCEDURE — A9270 NON-COVERED ITEM OR SERVICE: HCPCS | Mod: GY | Performed by: NURSE PRACTITIONER

## 2018-12-26 PROCEDURE — 27210429 ZZH NUTRITION PRODUCT INTERMEDIATE LITER

## 2018-12-26 PROCEDURE — 00000146 ZZHCL STATISTIC GLUCOSE BY METER IP

## 2018-12-26 RX ADMIN — BUMETANIDE 1 MG: 0.25 INJECTION INTRAMUSCULAR; INTRAVENOUS at 08:11

## 2018-12-26 RX ADMIN — POTASSIUM CHLORIDE 20 MEQ: 29.8 INJECTION, SOLUTION INTRAVENOUS at 12:27

## 2018-12-26 RX ADMIN — HYDROCORTISONE SODIUM SUCCINATE 50 MG: 100 INJECTION, POWDER, FOR SOLUTION INTRAMUSCULAR; INTRAVENOUS at 02:31

## 2018-12-26 RX ADMIN — FAMOTIDINE 20 MG: 10 INJECTION, SOLUTION INTRAVENOUS at 09:31

## 2018-12-26 RX ADMIN — MULTIVITAMIN 15 ML: LIQUID ORAL at 18:06

## 2018-12-26 RX ADMIN — CHLORHEXIDINE GLUCONATE 15 ML: 1.2 RINSE ORAL at 19:43

## 2018-12-26 RX ADMIN — ENOXAPARIN SODIUM 60 MG: 60 INJECTION SUBCUTANEOUS at 23:21

## 2018-12-26 RX ADMIN — ENOXAPARIN SODIUM 60 MG: 60 INJECTION SUBCUTANEOUS at 14:15

## 2018-12-26 RX ADMIN — HYDROCORTISONE SODIUM SUCCINATE 50 MG: 100 INJECTION, POWDER, FOR SOLUTION INTRAMUSCULAR; INTRAVENOUS at 21:03

## 2018-12-26 RX ADMIN — POTASSIUM CHLORIDE 20 MEQ: 29.8 INJECTION, SOLUTION INTRAVENOUS at 11:03

## 2018-12-26 RX ADMIN — POTASSIUM CHLORIDE 20 MEQ: 29.8 INJECTION, SOLUTION INTRAVENOUS at 02:31

## 2018-12-26 RX ADMIN — BUMETANIDE 1 MG: 0.25 INJECTION INTRAMUSCULAR; INTRAVENOUS at 15:45

## 2018-12-26 RX ADMIN — BUMETANIDE 1 MG: 0.25 INJECTION INTRAMUSCULAR; INTRAVENOUS at 23:39

## 2018-12-26 RX ADMIN — HYDROCORTISONE SODIUM SUCCINATE 50 MG: 100 INJECTION, POWDER, FOR SOLUTION INTRAMUSCULAR; INTRAVENOUS at 14:43

## 2018-12-26 RX ADMIN — BUMETANIDE 1 MG: 0.25 INJECTION INTRAMUSCULAR; INTRAVENOUS at 02:31

## 2018-12-26 RX ADMIN — HYDROCORTISONE SODIUM SUCCINATE 50 MG: 100 INJECTION, POWDER, FOR SOLUTION INTRAMUSCULAR; INTRAVENOUS at 08:11

## 2018-12-26 RX ADMIN — Medication 50 MCG/HR: at 18:04

## 2018-12-26 RX ADMIN — MICONAZOLE NITRATE: 2 POWDER TOPICAL at 23:21

## 2018-12-26 RX ADMIN — INSULIN ASPART 1 UNITS: 100 INJECTION, SOLUTION INTRAVENOUS; SUBCUTANEOUS at 19:46

## 2018-12-26 RX ADMIN — POTASSIUM CHLORIDE 20 MEQ: 29.8 INJECTION, SOLUTION INTRAVENOUS at 09:31

## 2018-12-26 RX ADMIN — FAMOTIDINE 20 MG: 10 INJECTION, SOLUTION INTRAVENOUS at 21:19

## 2018-12-26 RX ADMIN — CHLORHEXIDINE GLUCONATE 15 ML: 1.2 RINSE ORAL at 08:50

## 2018-12-26 RX ADMIN — CEFEPIME 1 G: 1 INJECTION, POWDER, FOR SOLUTION INTRAMUSCULAR; INTRAVENOUS at 06:20

## 2018-12-26 RX ADMIN — MIDAZOLAM 1 MG/HR: 5 INJECTION INTRAMUSCULAR; INTRAVENOUS at 15:39

## 2018-12-26 RX ADMIN — NOREPINEPHRINE BITARTRATE 0.03 MCG/KG/MIN: 1 INJECTION INTRAVENOUS at 23:22

## 2018-12-26 ASSESSMENT — ACTIVITIES OF DAILY LIVING (ADL)
ADLS_ACUITY_SCORE: 27
ADLS_ACUITY_SCORE: 29
ADLS_ACUITY_SCORE: 29
ADLS_ACUITY_SCORE: 27

## 2018-12-26 NOTE — PROGRESS NOTES
[facility] ICU RESPIRATORY NOTE  Date of Admission: 12/12/2018  Date of Intubation (most recent): 12/15/2018  Reason for Mechanical Ventilation: Septic shock   Number of Days on Mechanical Ventilation: 12  Met Criteria for Pressure Support Trial: No  Reason for No Pressure Support Trial: Per MD    Significant Events Today: None   Ventilation Mode: CMV/AC  (Continuous Mandatory Ventilation/ Assist Control)  FiO2 (%): 50 %  Rate Set (breaths/minute): 20 breaths/min  Tidal Volume Set (mL): 350 mL  PEEP (cm H2O): 5 cmH2O  Oxygen Concentration (%): 50 %  Resp: 21    ABG Results:   Recent Labs   Lab 12/23/18  0415 12/22/18  0640 12/20/18  0533   PH 7.43 7.41 7.34*   PCO2 37 32* 35   PO2 100 76* 147*   HCO3 25 20* 19*   O2PER  --   --  60     ETT appearance on chest x-ray: ETT in good position     Plan:  Patient to remain on full vent support     Amara Narayanan RT  12/26/2018

## 2018-12-26 NOTE — PLAN OF CARE
Family told of meeting with palliative care on 12/27/18 at 10am. Patient's children will be present.

## 2018-12-26 NOTE — PROGRESS NOTES
SPIRITUAL HEALTH SERVICES Progress Note  [facility] ICU    Visit attempts per palliative consult and lengthy hospitalization.  Pt vented, no family present.  SH team will continue to follow and chaplains are available for pt/family support.                                                                                                                                                 Dagmar Lima M.A.  Staff   Pager [phone]  Phone [phone]

## 2018-12-26 NOTE — PROGRESS NOTES
[facility]  Infectious Disease Progress Note          Assessment and Plan:   IMPRESSION:   1.  A 53-year-old female with acute sepsis, underlying paraplegia and a chronic wound, but this appears to be respiratory infection, has infiltrates and significant pleural fluid, initially felt to have major pericardial fluid, but this turns out to be a false result, likely conventional community-acquired pneumonia, although at risk for other pathogens.   2.  Paraplegia including chronic neurogenic bladder with ileal diversion and ileostomy.   3.  Chronic pain syndrome.   4.  Chronic bilateral sacral decubitus wounds, never been diagnosed with osteomyelitis, previous infection 18 months ago but no recent suggestion of infection and appearance not looking like infection currently.   5.  ALLERGIES LISTED TO LEVAQUIN AND PENICILLIN.  The Levaquin was a rash.  The penicillin caused some kind of hyperactive reactive thing so was not a true allergy.   6 MRSA colonized  7 Fluid collection in hip, 6 K WBc with minimal PMNs , cx pending, even on ABX low WBC/PMNsso doubt  Infection       RECOMMENDATIONS:   1. Cefepime and vancomycin  13days,empiric tx cultures continue to be neg, no fever  General failure to improve, not clearly active untreated infection issue(procal low, no fever, cxs neg)  2.  candida in the BAL cultures, not a pathogen in these settings,   3Agree poor prognsis, ongoing discussion, discussed with family and Dr Hong stop ABX and watch               Interval History:   On the vent, awake but again quite  responsive  cxs neg except candida sputum              Medications:       bumetanide  1 mg Intravenous Q8H     chlorhexidine  15 mL Mouth/Throat Q12H     enoxaparin  60 mg Subcutaneous Q12H     famotidine  20 mg Intravenous Q12H     hydrocortisone sodium succinate PF  50 mg Intravenous Q6H     influenza vaccine adult (product based on age)  0.5 mL Intramuscular Prior to discharge     insulin  "aspart  1-12 Units Subcutaneous Q4H     multivitamins w/minerals  15 mL Per Feeding Tube Daily                  Physical Exam:   Blood pressure 106/57, pulse 82, temperature 98.1  F (36.7  C), resp. rate 19, height 1.753 m (5' 9\"), weight 91 kg (200 lb 9.9 oz), SpO2 96 %, not currently breastfeeding.  Wt Readings from Last 2 Encounters:   12/24/18 91 kg (200 lb 9.9 oz)   08/30/17 56.7 kg (125 lb)     Vital Signs with Ranges  Temp:  [96.8  F (36  C)-98.1  F (36.7  C)] 98.1  F (36.7  C)  Heart Rate:  [] 86  Resp:  [8-32] 19  BP: ()/(55-79) 106/57  MAP:  [66 mmHg-108 mmHg] 84 mmHg  Arterial Line BP: ()/(44-86) 91/79  FiO2 (%):  [60 %] 60 %  SpO2:  [91 %-99 %] 96 %    Constitutional: Vent and awake   Lungs: Congestion to auscultation bilaterally, few crackles no wheezing   Cardiovascular: Regular rate and rhythm, normal S1 and S2, and no murmur noted   Abdomen: Normal bowel sounds, soft, non-distended, non-tender   Skin: No rashes, no cyanosis, no edema wd not seen   Other:           Data:   All microbiology laboratory data reviewed.  Recent Labs   Lab Test 12/24/18  0325 12/23/18  0420 12/22/18  0415   WBC 14.0* 14.6* 18.3*   HGB 7.8* 7.7* 7.8*   HCT 24.7* 23.8* 24.4*   MCV 83 81 81   PLT 89* 75* 88*     Recent Labs   Lab Test 12/26/18  0840 12/24/18  0325 12/23/18  0420   CR 0.21* 0.22* 0.23*     Recent Labs   Lab Test 02/20/17  1745   SED 61*     Recent Labs   Lab Test 12/20/18  1149 12/19/18  1425 12/19/18  1250 12/16/18  0920 12/12/18  2120 12/12/18  2115 02/18/17  1210 09/04/14  1105 04/14/14  0955   CULT No growth after 6 days  Candida albicans / dubliniensis  isolated  Candida albicans and Candida dubliniensis are not routinely speciated  *  Culture received and in progress.  Positive AFB results are called as soon as detected.    Final report to follow in 7 to 8 weeks.    Light growth  Candida albicans / dubliniensis  Candida albicans and Candida dubliniensis are not routinely " speciated  Susceptibility testing not routinely done  *  Culture negative monitoring continues No growth No growth No growth No growth No growth Moderate growth Peptostreptococcus anaerobius Susceptibility testing not   routinely done  *  Heavy growth Enterococcus faecalis  Light growth Klebsiella pneumoniae  Light growth Coagulase negative Staphylococcus Susceptibility testing not   routinely done  Light growth Candida glabrata Susceptibility testing not routinely done  * >100,000 colonies/mL Escherichia coli  10,000 to 50,000 colonies/mL Mixed gram positive tulio  * Culture negative after 4 weeks  No anaerobes isolated  On day 2, isolated in broth only: Staphylococcus aureus*

## 2018-12-26 NOTE — PROGRESS NOTES
[facility] ICU RESPIRATORY NOTE  Date of Admission: 12/12/2018  Date of Intubation (most recent):  12/15/18  Reason for Mechanical Ventilation:  Septic shock  Number of Days on Mechanical Ventilation:   11  Met Criteria for Pressure Support Trial:  No  Length of Pressure Support Trial:  Reason for Stopping Pressure Support Trial:  Reason for No Pressure Support Trial:  Pt on FIO2 70% peep 10    Significant Events Today:  None    ABG Results:    ETT appearance on chest x-ray:    Skin Assessment:    Plan:  Pt to remain on full vent support

## 2018-12-26 NOTE — PROVIDER NOTIFICATION
DATE:  12/26/2018   TIME OF RECEIPT FROM LAB:  0915  LAB TEST:  potassium  LAB VALUE:  2.6  RESULTS GIVEN WITH READ-BACK TO (PROVIDER):  Kathryn Suazo NP   TIME LAB VALUE REPORTED TO PROVIDER:   0919

## 2018-12-26 NOTE — CONSULTS
CLINICAL NUTRITION SERVICES - REASSESSMENT NOTE      Recommendations Ordered by Registered Dietitian (RD):   1.  Tonight at 6 pm taper and discontinue TPN and Lipid per standard protocol   2.  Begin trophic TF - Replete with Fiber at 15 mL/hr to provide:  360 kcal (6 kcal/kg), 23 g protein (0.4 g/kg), 45 g CHO, 5 g fiber, 299 mL H2O  Total with D5= 482 kcal (8 kcal/kg)  Flushes 60 mL every 4 hours  Certavite via FT    Future/Additional Recommendations: If above tolerated, recommend eventual goal TF of Replete with Fiber at 75 mL/hr to provide:  1800 Kcals (28 kcal/kg), 115 g protein (1.8 g/kg), 223 g CHO, 27 g fiber, 1494 mL H2O   Malnutrition: (12/21)  % Weight Loss:  None noted  % Intake:  </= 50% for >/= 5 days (severe malnutrition)  Subcutaneous Fat Loss:  None observed  Muscle Loss:  None observed  Fluid Retention:  4+ generalized anasarca/likely partly nutrition related      Malnutrition Diagnosis: Severe malnutrition in the context of acute illness       EVALUATION OF PROGRESS TOWARD GOALS   Diet:  NPO on vent   Nutrition Support:  Patient started on TPN 12/21 (step #1) and continues as follows ~    Nutrition Support Parenteral:  Type of Access: Central line   Parenteral Frequency:  Continuous  Parenteral Regimen: D15 AA5 at 50 mL/hr + Lipid 250 mL every 48 hours   Total Parenteral Provisions: 1102 kcal (17 kcal/kg), 60 g protein (0.9 g/kg), 180 g CHO (GIR 2), 23% fat   Patient also receiving D5 at 30 mL/hr= 36 g CHO, 122 kcal   Total = 1224 kcal (19 kcal/kg), 216 g CHO     Intake/Tolerance:    TPN has remained at step #1 (of 3 steps) due to refeeding labs:    12/26:  K 2.6 (L), no Mg or Phos  12/25:  K 3.0 (L), Mg 2.5 (H), Phos 1.9 (L)  12/24:  K 2.9 (L), Mg and Phos normal     -171 range with High sliding scale insulin on Solucortef  Weight 91 kg  (12/24) (up 27 kg from admit), I/O 4016/3010 - On IV Bumex       ASSESSED NUTRITION NEEDS:  Dosing Weight: 64 kg (per admit wt 12/12)   Estimated Energy  Needs:  (Three-steps, due to risk for refeeding)  Step 1: 960-1280 Kcals (15-20 Kcal/kg)  Step 2: 0846-9281 Kcals (20-25 Kcal/kg)  Step 3: 9038-5952 Kcal (25-30 Kcal/kg)  - wound and paraplegia  Estimated Protein Needs:   grams protein (1.5-1.8 g pro/Kg) - wound healing and hypercatabolism with acute illness      NEW FINDINGS:   Patient was previously receiving the following per Pressure Injury Protocol (however they were discontinued on 12/19, two days into treatment course)  Certavite daily, Tri-Vi-Sol 7 mL daily x 10 days (5250 International Units Vit A, 245 mg Vit C, 2800 International Units Vit D)  Zinc Sulfate 250 mg every 48 hours x 10 days    No new WOCN eval since 12/19     Have been asked to start trickle TF and discontinue TPN tonight per Dr. Hong       Previous Goals (12/21):   Patient will tolerate PN without refeeding syndrome.  Evaluation: Not met    Previous Nutrition Diagnosis (12/21):   Inadequate protein-energy intake related to TF held x 3 days per high GRV with continued NPO status on vent as evidenced by nutrition needs unmet x 9 days since admission per failed EN, D5 IVF meeting 15% of energy and 0% protein needs, with referral to begin TPN support.  Evaluation: Improving      CURRENT NUTRITION DIAGNOSIS  Inadequate protein-energy intake related to NPO, TPN remains at step #1 due to refeeding risk as evidenced by meeting 2/3 protein and 3/4 energy needs via current regimen     INTERVENTIONS  Recommendations / Nutrition Prescription  1.  Tonight at 6 pm taper and discontinue TPN and Lipid per standard protocol   2.  Begin trophic TF - Replete with Fiber at 15 mL/hr to provide:  360 kcal (6 kcal/kg), 23 g protein (0.4 g/kg), 45 g CHO, 5 g fiber, 299 mL H2O  Total with D5= 482 kcal (8 kcal/kg)  Flushes 60 mL every 4 hours  Certavite via FT     If above tolerated, recommend eventual goal TF of Replete with Fiber at 75 mL/hr to provide:  1800 Kcals (28 kcal/kg), 115 g protein (1.8 g/kg),  223 g CHO, 27 g fiber, 1494 mL H2O    Implementation  EN Composition, EN Schedule, PN Composition, PN Schedule, Flushes, and Multivitamin/Mineral:  Above TPN, TF, flushes, and Certavite orders written.  Collaboration and Referral of Nutrition care:  Patient discussed today during interdisciplinary bedside rounds.  Discussed above plan with Dr. Hong who was in agreement.     Goals  Patient will achieve goal TF within the next 2-3 days     MONITORING AND EVALUATION:  Progress towards goals will be monitored and evaluated per protocol and Practice Guidelines    Susanna Tao RD, LD, [facility]   Clinical Dietitian - [facility]

## 2018-12-26 NOTE — PLAN OF CARE
Pt remains intubated and sedated. Opens eyes. Withdraws. Following some commands. Nods head. Edematous, weeping. Remains on levophed. Multiple wounds. Family at bedside. Updated on plan of care. Continue to monitor.

## 2018-12-26 NOTE — PROGRESS NOTES
[facility] ICU RESPIRATORY NOTE  Date of Admission: 12/12/2018  Date of Intubation (most recent):  12/15/18  Reason for Mechanical Ventilation:  Septic shock  Number of Days on Mechanical Ventilation:   12  Met Criteria for Pressure Support Trial:  No  Length of Pressure Support Trial:  Reason for Stopping Pressure Support Trial:  Reason for No Pressure Support Trial:  Per MD     Significant Events Today:  None     ABG Results:   Recent Labs   Lab 12/23/18  0415 12/22/18  0640 12/20/18  0533   PH 7.43 7.41 7.34*   PCO2 37 32* 35   PO2 100 76* 147*   HCO3 25 20* 19*   O2PER  --   --  60     Ventilation Mode: CMV/AC  (Continuous Mandatory Ventilation/ Assist Control)  FiO2 (%): 60 %  Rate Set (breaths/minute): 20 breaths/min  Tidal Volume Set (mL): 350 mL  PEEP (cm H2O): 10 cmH2O  Oxygen Concentration (%): 60 %  Resp: 20         ETT appearance on chest x-ray:     Skin Assessment:     Plan:  Pt to remain on full vent support    Seamus Armstrong RT

## 2018-12-27 ENCOUNTER — APPOINTMENT (OUTPATIENT)
Dept: GENERAL RADIOLOGY | Facility: CLINIC | Age: 53
DRG: 870 | End: 2018-12-27
Attending: ANESTHESIOLOGY
Payer: MEDICARE

## 2018-12-27 LAB
ALBUMIN SERPL-MCNC: 1.5 G/DL (ref 3.4–5)
ALP SERPL-CCNC: 202 U/L (ref 40–150)
ALT SERPL W P-5'-P-CCNC: 10 U/L (ref 0–50)
ANION GAP SERPL CALCULATED.3IONS-SCNC: 6 MMOL/L (ref 3–14)
AST SERPL W P-5'-P-CCNC: 15 U/L (ref 0–45)
BILIRUB SERPL-MCNC: 0.3 MG/DL (ref 0.2–1.3)
BUN SERPL-MCNC: 15 MG/DL (ref 7–30)
CALCIUM SERPL-MCNC: 6.7 MG/DL (ref 8.5–10.1)
CHLORIDE SERPL-SCNC: 105 MMOL/L (ref 94–109)
CO2 SERPL-SCNC: 32 MMOL/L (ref 20–32)
CREAT SERPL-MCNC: 0.23 MG/DL (ref 0.52–1.04)
GFR SERPL CREATININE-BSD FRML MDRD: >90 ML/MIN/{1.73_M2}
GLUCOSE BLDC GLUCOMTR-MCNC: 123 MG/DL (ref 70–99)
GLUCOSE BLDC GLUCOMTR-MCNC: 131 MG/DL (ref 70–99)
GLUCOSE BLDC GLUCOMTR-MCNC: 133 MG/DL (ref 70–99)
GLUCOSE BLDC GLUCOMTR-MCNC: 134 MG/DL (ref 70–99)
GLUCOSE BLDC GLUCOMTR-MCNC: 165 MG/DL (ref 70–99)
GLUCOSE SERPL-MCNC: 124 MG/DL (ref 70–99)
MAGNESIUM SERPL-MCNC: 2.1 MG/DL (ref 1.6–2.3)
PHOSPHATE SERPL-MCNC: 2.3 MG/DL (ref 2.5–4.5)
PLATELET # BLD AUTO: 92 10E9/L (ref 150–450)
POTASSIUM SERPL-SCNC: 2.3 MMOL/L (ref 3.4–5.3)
POTASSIUM SERPL-SCNC: 2.6 MMOL/L (ref 3.4–5.3)
PROT SERPL-MCNC: 4.9 G/DL (ref 6.8–8.8)
SODIUM SERPL-SCNC: 143 MMOL/L (ref 133–144)

## 2018-12-27 PROCEDURE — 25000128 H RX IP 250 OP 636: Performed by: INTERNAL MEDICINE

## 2018-12-27 PROCEDURE — A9270 NON-COVERED ITEM OR SERVICE: HCPCS | Mod: GY | Performed by: NURSE PRACTITIONER

## 2018-12-27 PROCEDURE — 25000125 ZZHC RX 250: Performed by: INTERNAL MEDICINE

## 2018-12-27 PROCEDURE — 25000128 H RX IP 250 OP 636: Performed by: SURGERY

## 2018-12-27 PROCEDURE — 84100 ASSAY OF PHOSPHORUS: CPT | Performed by: INTERNAL MEDICINE

## 2018-12-27 PROCEDURE — 83735 ASSAY OF MAGNESIUM: CPT | Performed by: INTERNAL MEDICINE

## 2018-12-27 PROCEDURE — 25000128 H RX IP 250 OP 636: Performed by: NURSE PRACTITIONER

## 2018-12-27 PROCEDURE — P9047 ALBUMIN (HUMAN), 25%, 50ML: HCPCS | Performed by: INTERNAL MEDICINE

## 2018-12-27 PROCEDURE — 36593 DECLOT VASCULAR DEVICE: CPT

## 2018-12-27 PROCEDURE — 84132 ASSAY OF SERUM POTASSIUM: CPT | Performed by: NURSE PRACTITIONER

## 2018-12-27 PROCEDURE — 40000008 ZZH STATISTIC AIRWAY CARE

## 2018-12-27 PROCEDURE — 40000275 ZZH STATISTIC RCP TIME EA 10 MIN

## 2018-12-27 PROCEDURE — 25000132 ZZH RX MED GY IP 250 OP 250 PS 637: Mod: GY | Performed by: HOSPITALIST

## 2018-12-27 PROCEDURE — 40000239 ZZH STATISTIC VAT ROUNDS

## 2018-12-27 PROCEDURE — A9270 NON-COVERED ITEM OR SERVICE: HCPCS | Mod: GY | Performed by: HOSPITALIST

## 2018-12-27 PROCEDURE — 71045 X-RAY EXAM CHEST 1 VIEW: CPT

## 2018-12-27 PROCEDURE — 40000257 ZZH STATISTIC CONSULT NO CHARGE VASC ACCESS

## 2018-12-27 PROCEDURE — 25000132 ZZH RX MED GY IP 250 OP 250 PS 637: Mod: GY | Performed by: ANESTHESIOLOGY

## 2018-12-27 PROCEDURE — 25000132 ZZH RX MED GY IP 250 OP 250 PS 637: Mod: GY | Performed by: NURSE PRACTITIONER

## 2018-12-27 PROCEDURE — 80053 COMPREHEN METABOLIC PANEL: CPT | Performed by: INTERNAL MEDICINE

## 2018-12-27 PROCEDURE — 20000003 ZZH R&B ICU

## 2018-12-27 PROCEDURE — 00000146 ZZHCL STATISTIC GLUCOSE BY METER IP

## 2018-12-27 PROCEDURE — 99291 CRITICAL CARE FIRST HOUR: CPT | Performed by: INTERNAL MEDICINE

## 2018-12-27 PROCEDURE — 94003 VENT MGMT INPAT SUBQ DAY: CPT

## 2018-12-27 PROCEDURE — 25000125 ZZHC RX 250

## 2018-12-27 PROCEDURE — 25000125 ZZHC RX 250: Performed by: SURGERY

## 2018-12-27 PROCEDURE — 99223 1ST HOSP IP/OBS HIGH 75: CPT | Performed by: NURSE PRACTITIONER

## 2018-12-27 PROCEDURE — A9270 NON-COVERED ITEM OR SERVICE: HCPCS | Mod: GY | Performed by: ANESTHESIOLOGY

## 2018-12-27 PROCEDURE — 85049 AUTOMATED PLATELET COUNT: CPT | Performed by: INTERNAL MEDICINE

## 2018-12-27 PROCEDURE — 27210429 ZZH NUTRITION PRODUCT INTERMEDIATE LITER

## 2018-12-27 RX ORDER — POTASSIUM CHLORIDE 29.8 MG/ML
20 INJECTION INTRAVENOUS
Status: DISPENSED | OUTPATIENT
Start: 2018-12-27 | End: 2018-12-27

## 2018-12-27 RX ORDER — WATER 10 ML/10ML
INJECTION INTRAMUSCULAR; INTRAVENOUS; SUBCUTANEOUS
Status: COMPLETED
Start: 2018-12-27 | End: 2018-12-27

## 2018-12-27 RX ORDER — POTASSIUM CHLORIDE 1.5 G/1.58G
40 POWDER, FOR SOLUTION ORAL ONCE
Status: COMPLETED | OUTPATIENT
Start: 2018-12-27 | End: 2018-12-27

## 2018-12-27 RX ORDER — ALBUMIN (HUMAN) 12.5 G/50ML
50 SOLUTION INTRAVENOUS ONCE
Status: COMPLETED | OUTPATIENT
Start: 2018-12-27 | End: 2018-12-27

## 2018-12-27 RX ADMIN — Medication 1 MG: at 00:56

## 2018-12-27 RX ADMIN — HYDROCORTISONE SODIUM SUCCINATE 50 MG: 100 INJECTION, POWDER, FOR SOLUTION INTRAMUSCULAR; INTRAVENOUS at 02:52

## 2018-12-27 RX ADMIN — POTASSIUM CHLORIDE 40 MEQ: 1.5 POWDER, FOR SOLUTION ORAL at 06:52

## 2018-12-27 RX ADMIN — FAMOTIDINE 20 MG: 10 INJECTION, SOLUTION INTRAVENOUS at 20:38

## 2018-12-27 RX ADMIN — POTASSIUM CHLORIDE 20 MEQ: 29.8 INJECTION, SOLUTION INTRAVENOUS at 22:00

## 2018-12-27 RX ADMIN — POTASSIUM CHLORIDE 20 MEQ: 29.8 INJECTION, SOLUTION INTRAVENOUS at 18:15

## 2018-12-27 RX ADMIN — POTASSIUM CHLORIDE 20 MEQ: 29.8 INJECTION, SOLUTION INTRAVENOUS at 20:35

## 2018-12-27 RX ADMIN — ALBUMIN HUMAN 50 G: 0.25 SOLUTION INTRAVENOUS at 08:36

## 2018-12-27 RX ADMIN — NOREPINEPHRINE BITARTRATE 0.03 MCG/KG/MIN: 1 INJECTION INTRAVENOUS at 02:52

## 2018-12-27 RX ADMIN — CHLORHEXIDINE GLUCONATE 15 ML: 1.2 RINSE ORAL at 07:26

## 2018-12-27 RX ADMIN — WATER: 1 INJECTION INTRAMUSCULAR; INTRAVENOUS; SUBCUTANEOUS at 16:28

## 2018-12-27 RX ADMIN — HYDROCORTISONE SODIUM SUCCINATE 50 MG: 100 INJECTION, POWDER, FOR SOLUTION INTRAMUSCULAR; INTRAVENOUS at 20:37

## 2018-12-27 RX ADMIN — FAMOTIDINE 20 MG: 10 INJECTION, SOLUTION INTRAVENOUS at 08:06

## 2018-12-27 RX ADMIN — SODIUM CHLORIDE: 9 INJECTION, SOLUTION INTRAVENOUS at 02:53

## 2018-12-27 RX ADMIN — SODIUM PHOSPHATE, MONOBASIC, MONOHYDRATE 15 MMOL: 276; 142 INJECTION, SOLUTION INTRAVENOUS at 14:08

## 2018-12-27 RX ADMIN — HYDROCORTISONE SODIUM SUCCINATE 50 MG: 100 INJECTION, POWDER, FOR SOLUTION INTRAMUSCULAR; INTRAVENOUS at 08:04

## 2018-12-27 RX ADMIN — POTASSIUM CHLORIDE 20 MEQ: 29.8 INJECTION, SOLUTION INTRAVENOUS at 19:24

## 2018-12-27 RX ADMIN — HYDROCORTISONE SODIUM SUCCINATE 50 MG: 100 INJECTION, POWDER, FOR SOLUTION INTRAMUSCULAR; INTRAVENOUS at 15:11

## 2018-12-27 RX ADMIN — ALTEPLASE 2 MG: 2.2 INJECTION, POWDER, LYOPHILIZED, FOR SOLUTION INTRAVENOUS at 14:21

## 2018-12-27 RX ADMIN — MULTIVITAMIN 15 ML: LIQUID ORAL at 08:06

## 2018-12-27 RX ADMIN — ENOXAPARIN SODIUM 60 MG: 60 INJECTION SUBCUTANEOUS at 12:34

## 2018-12-27 RX ADMIN — BUMETANIDE 1 MG: 0.25 INJECTION INTRAMUSCULAR; INTRAVENOUS at 16:58

## 2018-12-27 RX ADMIN — MIDAZOLAM 1 MG/HR: 5 INJECTION INTRAMUSCULAR; INTRAVENOUS at 02:52

## 2018-12-27 RX ADMIN — CHLORHEXIDINE GLUCONATE 15 ML: 1.2 RINSE ORAL at 20:39

## 2018-12-27 RX ADMIN — BUMETANIDE 1 MG: 0.25 INJECTION INTRAMUSCULAR; INTRAVENOUS at 08:05

## 2018-12-27 ASSESSMENT — ACTIVITIES OF DAILY LIVING (ADL)
ADLS_ACUITY_SCORE: 29
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
## 2018-12-27 NOTE — PROGRESS NOTES
[facility] ICU RESPIRATORY NOTE  Date of Admission: 12/12/2018  Date of Intubation (most recent): 12/15/2018  Reason for Mechanical Ventilation: Septic shock   Number of Days on Mechanical Ventilation: 13  Met Criteria for Pressure Support Trial: No  Reason for No Pressure Support Trial: Per MD     Significant Events Today: family care conference done    Ventilation Mode: CMV/AC  (Continuous Mandatory Ventilation/ Assist Control)  FiO2 (%): 60 %  Rate Set (breaths/minute): 20 breaths/min  Tidal Volume Set (mL): 350 mL  PEEP (cm H2O): 8 cmH2O  Oxygen Concentration (%): 60 %  Resp: 19    Recent Labs   Lab 12/23/18  0415 12/22/18  0640   PH 7.43 7.41   PCO2 37 32*   PO2 100 76*   HCO3 25 20*       ETT appearance on chest x-ray: ETT in good position      Plan:  Patient to remain on full vent support      Amara Narayanan RT  12/27/2018

## 2018-12-27 NOTE — PLAN OF CARE
Pt neuro unchanged remains alert and oriented with gestures to assessment. Pt spontaneously moves RUE, LUE weak but spontaneous, Parapalegic. Pt gestures she can sense above the waist. Pt back on levophed d/t BP parameters. Pt still on full vent support with versed gtt. Pt minimal output out of illeostomy and tolerating tube feeds. Pt urostomy with great output from bumex iv doses. Pt skin intact with dressings being followed by wound care nurse. Pt explained care and given choices through shift. Family updated on days yesterday and plans to have conference today.

## 2018-12-27 NOTE — PROGRESS NOTES
RN observed ETT at 20 cm at the lip, prev. CXR confirmed proper placement at 22 cm at the lip. RT advanced ETT back to 22 cm. RN to consult MD for repeat CXR if necessary.     Seamus Armstrong RT

## 2018-12-27 NOTE — PROGRESS NOTES
[facility]  Infectious Disease Progress Note          Assessment and Plan:   IMPRESSION:   1.  A 53-year-old female with acute sepsis, underlying paraplegia and a chronic wound, but this appears to be respiratory infection, has infiltrates and significant pleural fluid, initially felt to have major pericardial fluid, but this turns out to be a false result, likely conventional community-acquired pneumonia, although at risk for other pathogens.   2.  Paraplegia including chronic neurogenic bladder with ileal diversion and ileostomy.   3.  Chronic pain syndrome.   4.  Chronic bilateral sacral decubitus wounds, never been diagnosed with osteomyelitis, previous infection 18 months ago but no recent suggestion of infection and appearance not looking like infection currently.   5.  ALLERGIES LISTED TO LEVAQUIN AND PENICILLIN.  The Levaquin was a rash.  The penicillin caused some kind of hyperactive reactive thing so was not a true allergy.   6 MRSA colonized  7 Fluid collection in hip, 6 K WBc with minimal PMNs , cx pending, even on ABX low WBC/PMNsso doubt  Infection       RECOMMENDATIONS:   1. Cefepime and vancomycin  13days,empiric tx cultures continue to be neg, no fever, now off wo change  General failure to improve, not clearly active untreated infection issue(procal low, no fever, cxs neg)  2.  candida in the BAL cultures, not a pathogen in these settings,   3Agree poor prognsis, ongoing discussion, discussed with family , will follow peripherally               Interval History:   On the vent, awake but again quite  responsive  cxs neg except candida sputum              Medications:       alteplase  2 mg Intravenous Once     bumetanide  1 mg Intravenous Q8H     chlorhexidine  15 mL Mouth/Throat Q12H     enoxaparin  60 mg Subcutaneous Q12H     famotidine  20 mg Intravenous Q12H     hydrocortisone sodium succinate PF  50 mg Intravenous Q6H     influenza vaccine adult (product based on age)   "0.5 mL Intramuscular Prior to discharge     insulin aspart  1-12 Units Subcutaneous Q4H     multivitamins w/minerals  15 mL Per Feeding Tube Daily                  Physical Exam:   Blood pressure 100/53, pulse 82, temperature 98.8  F (37.1  C), resp. rate 19, height 1.753 m (5' 9\"), weight 91 kg (200 lb 9.9 oz), SpO2 97 %, not currently breastfeeding.  Wt Readings from Last 2 Encounters:   12/24/18 91 kg (200 lb 9.9 oz)   08/30/17 56.7 kg (125 lb)     Vital Signs with Ranges  Temp:  [97.16  F (36.2  C)-99  F (37.2  C)] 98.8  F (37.1  C)  Heart Rate:  [] 93  Resp:  [9-30] 19  BP: ()/(44-87) 100/53  FiO2 (%):  [50 %-60 %] 60 %  SpO2:  [90 %-100 %] 97 %    Constitutional: Vent and awake   Lungs: Congestion to auscultation bilaterally, few crackles no wheezing   Cardiovascular: Regular rate and rhythm, normal S1 and S2, and no murmur noted   Abdomen: Normal bowel sounds, soft, non-distended, non-tender   Skin: No rashes, no cyanosis, no edema wd not seen   Other:           Data:   All microbiology laboratory data reviewed.  Recent Labs   Lab Test 12/27/18  0604 12/24/18  0325 12/23/18  0420 12/22/18  0415   WBC  --  14.0* 14.6* 18.3*   HGB  --  7.8* 7.7* 7.8*   HCT  --  24.7* 23.8* 24.4*   MCV  --  83 81 81   PLT 92* 89* 75* 88*     Recent Labs   Lab Test 12/27/18  0604 12/26/18  0840 12/24/18  0325   CR 0.23* 0.21* 0.22*     Recent Labs   Lab Test 02/20/17  1745   SED 61*     Recent Labs   Lab Test 12/20/18  1149 12/19/18  1425 12/19/18  1250 12/16/18  0920 12/12/18  2120 12/12/18  2115 02/18/17  1210 09/04/14  1105 04/14/14  0955   CULT No growth after 6 days  Candida albicans / dubliniensis  isolated  Candida albicans and Candida dubliniensis are not routinely speciated  *  Culture received and in progress.  Positive AFB results are called as soon as detected.    Final report to follow in 7 to 8 weeks.    Light growth  Candida albicans / dubliniensis  Candida albicans and Candida dubliniensis are not " routinely speciated  Susceptibility testing not routinely done  *  Culture negative monitoring continues No growth No growth No growth No growth No growth Moderate growth Peptostreptococcus anaerobius Susceptibility testing not   routinely done  *  Heavy growth Enterococcus faecalis  Light growth Klebsiella pneumoniae  Light growth Coagulase negative Staphylococcus Susceptibility testing not   routinely done  Light growth Candida glabrata Susceptibility testing not routinely done  * >100,000 colonies/mL Escherichia coli  10,000 to 50,000 colonies/mL Mixed gram positive tulio  * Culture negative after 4 weeks  No anaerobes isolated  On day 2, isolated in broth only: Staphylococcus aureus*
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
## 2018-12-27 NOTE — PROGRESS NOTES
[facility] ICU RESPIRATORY NOTE  Date of Admission: 12/12/2018  Date of Intubation (most recent): 12/15/2018  Reason for Mechanical Ventilation: Septic shock   Number of Days on Mechanical Ventilation: 13  Met Criteria for Pressure Support Trial: No  Reason for No Pressure Support Trial: Per MD     Significant Events Today: None     Recent Labs   Lab 12/23/18  0415 12/22/18  0640 12/20/18  0533   PH 7.43 7.41 7.34*   PCO2 37 32* 35   PO2 100 76* 147*   HCO3 25 20* 19*   O2PER  --   --  60     Ventilation Mode: CMV/AC  (Continuous Mandatory Ventilation/ Assist Control)  FiO2 (%): 60 %  Rate Set (breaths/minute): 20 breaths/min  Tidal Volume Set (mL): 350 mL  PEEP (cm H2O): 8 cmH2O  Oxygen Concentration (%): 60 %  Resp: 20    Will continue to follow.     Seamus Armstrong RT

## 2018-12-27 NOTE — PROGRESS NOTES
Patient continues with bilateral arm edema.  Note clot in Right Subclavian and Right internal jugular.  Picc line in Left Arm.  Frequent dressing changes secondary to weepy. Spoke with RN about possible ultrasound.  Red lumen flushes but doesn't draw.  RN to obtain alteplase order.

## 2018-12-27 NOTE — PROGRESS NOTES
BRIEF NUTRITION NOTE:    Monitoring for ability to increase TF rate.    A full Nutrition Reassessment ws completed 12/26.  See note for details.    NEW FINDINGS:  TPN was discontinued yesterday.  D5 IVF is now off.  Pt is tolerating TF via NG Replete with Fiber at 15 mL/hr.  Stool:  50 mL yest, 400 mL today.  GRV 15, 25 (minimal).  K 2.6 (L)  Mg 2.1 (NL)  Phos 2.3 (L) - Pt on replacement protocols.    INTERVENTIONS:  Collaboration with other providers - Discussed pt during ICU interdisciplinary rounds and was advised by Kathryn Schmidt CNP to go ahead and advance the TF rate towards goal.    Enteral Nutrition - Modify rate -->   Step 1:  Will increase TF Replete with Fiber now to 30 mL/hr and after 8 hrs increase to 45 mL/hr = 1080 kcals (17 kcal/kg), 69 gm pro (1.1 gm/kg), 900 mL H20, 16 gm fiber, 134 gm CHO.    Step 2:  After 24 hrs will increase TF Replete with Fiber to 60 mL/hr = 1440 kcals (23 kcal/kg), 92 gm pro (1.4 gm/kg), 1195 mL H20, 22 gm fiber, 179 gm CHO    Step 3:  After 24 hrs will increase TF Replete with Fiber to goal 75 mL/hr = 1800 Kcals (28 kcal/kg), 115 g protein (1.8 g/kg), 223 g CHO, 27 g fiber, 1494 mL H2O     Nirmala Mijares RD, LD, [facility]

## 2018-12-28 LAB
BLD PROD TYP BPU: NORMAL
BLD UNIT ID BPU: 0
BLOOD PRODUCT CODE: NORMAL
BPU ID: NORMAL
ERYTHROCYTE [DISTWIDTH] IN BLOOD BY AUTOMATED COUNT: 23.3 % (ref 10–15)
GLUCOSE BLDC GLUCOMTR-MCNC: 128 MG/DL (ref 70–99)
GLUCOSE BLDC GLUCOMTR-MCNC: 131 MG/DL (ref 70–99)
GLUCOSE BLDC GLUCOMTR-MCNC: 140 MG/DL (ref 70–99)
GLUCOSE BLDC GLUCOMTR-MCNC: 141 MG/DL (ref 70–99)
GLUCOSE BLDC GLUCOMTR-MCNC: 149 MG/DL (ref 70–99)
GLUCOSE BLDC GLUCOMTR-MCNC: 155 MG/DL (ref 70–99)
HCT VFR BLD AUTO: 21.9 % (ref 35–47)
HGB BLD-MCNC: 6.7 G/DL (ref 11.7–15.7)
HGB BLD-MCNC: 7.4 G/DL (ref 11.7–15.7)
MAGNESIUM SERPL-MCNC: 2.2 MG/DL (ref 1.6–2.3)
MCH RBC QN AUTO: 26.3 PG (ref 26.5–33)
MCHC RBC AUTO-ENTMCNC: 30.6 G/DL (ref 31.5–36.5)
MCV RBC AUTO: 86 FL (ref 78–100)
PHOSPHATE SERPL-MCNC: 2.8 MG/DL (ref 2.5–4.5)
PLATELET # BLD AUTO: 123 10E9/L (ref 150–450)
POTASSIUM SERPL-SCNC: 3.3 MMOL/L (ref 3.4–5.3)
POTASSIUM SERPL-SCNC: 4.1 MMOL/L (ref 3.4–5.3)
RBC # BLD AUTO: 2.55 10E12/L (ref 3.8–5.2)
TRANSFUSION STATUS PATIENT QL: NORMAL
TRANSFUSION STATUS PATIENT QL: NORMAL
WBC # BLD AUTO: 6.5 10E9/L (ref 4–11)

## 2018-12-28 PROCEDURE — 25000128 H RX IP 250 OP 636: Performed by: INTERNAL MEDICINE

## 2018-12-28 PROCEDURE — 25000128 H RX IP 250 OP 636: Performed by: NURSE PRACTITIONER

## 2018-12-28 PROCEDURE — 85018 HEMOGLOBIN: CPT | Performed by: INTERNAL MEDICINE

## 2018-12-28 PROCEDURE — 40000008 ZZH STATISTIC AIRWAY CARE

## 2018-12-28 PROCEDURE — 27210429 ZZH NUTRITION PRODUCT INTERMEDIATE LITER

## 2018-12-28 PROCEDURE — A9270 NON-COVERED ITEM OR SERVICE: HCPCS | Mod: GY | Performed by: NURSE PRACTITIONER

## 2018-12-28 PROCEDURE — 99291 CRITICAL CARE FIRST HOUR: CPT | Performed by: INTERNAL MEDICINE

## 2018-12-28 PROCEDURE — 84132 ASSAY OF SERUM POTASSIUM: CPT | Performed by: NURSE PRACTITIONER

## 2018-12-28 PROCEDURE — 86923 COMPATIBILITY TEST ELECTRIC: CPT | Performed by: HOSPITALIST

## 2018-12-28 PROCEDURE — P9016 RBC LEUKOCYTES REDUCED: HCPCS | Performed by: HOSPITALIST

## 2018-12-28 PROCEDURE — 25000132 ZZH RX MED GY IP 250 OP 250 PS 637: Mod: GY | Performed by: INTERNAL MEDICINE

## 2018-12-28 PROCEDURE — 84100 ASSAY OF PHOSPHORUS: CPT | Performed by: NURSE PRACTITIONER

## 2018-12-28 PROCEDURE — A9270 NON-COVERED ITEM OR SERVICE: HCPCS | Mod: GY | Performed by: INTERNAL MEDICINE

## 2018-12-28 PROCEDURE — 40000239 ZZH STATISTIC VAT ROUNDS

## 2018-12-28 PROCEDURE — 20000003 ZZH R&B ICU

## 2018-12-28 PROCEDURE — 25000132 ZZH RX MED GY IP 250 OP 250 PS 637: Mod: GY | Performed by: NURSE PRACTITIONER

## 2018-12-28 PROCEDURE — 86900 BLOOD TYPING SEROLOGIC ABO: CPT | Performed by: HOSPITALIST

## 2018-12-28 PROCEDURE — 94003 VENT MGMT INPAT SUBQ DAY: CPT

## 2018-12-28 PROCEDURE — 25000125 ZZHC RX 250: Performed by: INTERNAL MEDICINE

## 2018-12-28 PROCEDURE — 86901 BLOOD TYPING SEROLOGIC RH(D): CPT | Performed by: HOSPITALIST

## 2018-12-28 PROCEDURE — 40000275 ZZH STATISTIC RCP TIME EA 10 MIN

## 2018-12-28 PROCEDURE — 83735 ASSAY OF MAGNESIUM: CPT | Performed by: NURSE PRACTITIONER

## 2018-12-28 PROCEDURE — 00000146 ZZHCL STATISTIC GLUCOSE BY METER IP

## 2018-12-28 PROCEDURE — 86850 RBC ANTIBODY SCREEN: CPT | Performed by: HOSPITALIST

## 2018-12-28 PROCEDURE — 85027 COMPLETE CBC AUTOMATED: CPT | Performed by: NURSE PRACTITIONER

## 2018-12-28 RX ADMIN — CHLORHEXIDINE GLUCONATE 15 ML: 1.2 RINSE ORAL at 07:38

## 2018-12-28 RX ADMIN — Medication 50 MCG/HR: at 00:56

## 2018-12-28 RX ADMIN — INSULIN ASPART 1 UNITS: 100 INJECTION, SOLUTION INTRAVENOUS; SUBCUTANEOUS at 00:28

## 2018-12-28 RX ADMIN — SODIUM CHLORIDE: 9 INJECTION, SOLUTION INTRAVENOUS at 13:26

## 2018-12-28 RX ADMIN — ENOXAPARIN SODIUM 60 MG: 60 INJECTION SUBCUTANEOUS at 00:26

## 2018-12-28 RX ADMIN — HYDROCORTISONE SODIUM SUCCINATE 50 MG: 100 INJECTION, POWDER, FOR SOLUTION INTRAMUSCULAR; INTRAVENOUS at 20:47

## 2018-12-28 RX ADMIN — HYDROCORTISONE SODIUM SUCCINATE 50 MG: 100 INJECTION, POWDER, FOR SOLUTION INTRAMUSCULAR; INTRAVENOUS at 04:34

## 2018-12-28 RX ADMIN — INSULIN ASPART 1 UNITS: 100 INJECTION, SOLUTION INTRAVENOUS; SUBCUTANEOUS at 06:23

## 2018-12-28 RX ADMIN — INSULIN ASPART 1 UNITS: 100 INJECTION, SOLUTION INTRAVENOUS; SUBCUTANEOUS at 19:45

## 2018-12-28 RX ADMIN — FAMOTIDINE 20 MG: 10 INJECTION, SOLUTION INTRAVENOUS at 20:47

## 2018-12-28 RX ADMIN — BUMETANIDE 1 MG: 0.25 INJECTION INTRAMUSCULAR; INTRAVENOUS at 16:12

## 2018-12-28 RX ADMIN — MULTIVITAMIN 15 ML: LIQUID ORAL at 09:16

## 2018-12-28 RX ADMIN — FAMOTIDINE 20 MG: 10 INJECTION, SOLUTION INTRAVENOUS at 09:36

## 2018-12-28 RX ADMIN — POTASSIUM CHLORIDE 20 MEQ: 1.5 POWDER, FOR SOLUTION ORAL at 04:34

## 2018-12-28 RX ADMIN — POTASSIUM CHLORIDE 40 MEQ: 1.5 POWDER, FOR SOLUTION ORAL at 01:49

## 2018-12-28 RX ADMIN — ENOXAPARIN SODIUM 60 MG: 60 INJECTION SUBCUTANEOUS at 12:44

## 2018-12-28 RX ADMIN — HYDROCORTISONE SODIUM SUCCINATE 50 MG: 100 INJECTION, POWDER, FOR SOLUTION INTRAMUSCULAR; INTRAVENOUS at 16:10

## 2018-12-28 RX ADMIN — BUMETANIDE 1 MG: 0.25 INJECTION INTRAMUSCULAR; INTRAVENOUS at 09:29

## 2018-12-28 RX ADMIN — HYDROCORTISONE SODIUM SUCCINATE 50 MG: 100 INJECTION, POWDER, FOR SOLUTION INTRAMUSCULAR; INTRAVENOUS at 09:34

## 2018-12-28 RX ADMIN — CHLORHEXIDINE GLUCONATE 15 ML: 1.2 RINSE ORAL at 19:45

## 2018-12-28 RX ADMIN — INSULIN ASPART 1 UNITS: 100 INJECTION, SOLUTION INTRAVENOUS; SUBCUTANEOUS at 12:50

## 2018-12-28 RX ADMIN — BUMETANIDE 1 MG: 0.25 INJECTION INTRAMUSCULAR; INTRAVENOUS at 00:26

## 2018-12-28 ASSESSMENT — ACTIVITIES OF DAILY LIVING (ADL)
ADLS_ACUITY_SCORE: 29

## 2018-12-28 ASSESSMENT — MIFFLIN-ST. JEOR: SCORE: 1533.38

## 2018-12-28 NOTE — PLAN OF CARE
AVSS, no notable pain. Tolerating vent. Daughter and son at bedside most of day     Neuro: Alert, following commands. PERRLA  CV: Normotensive. Off levo most of day.   Pulm: Full vent all day. Coarse lungs  GI/: OG to LIS. BS hypoactive, good output to colostomy. TF infusing. Good UOP from catheter. Bumex given 2x  Skin: Numerous skin tears and pressure injuries. Dressings changed per POC  Plan: Family met with palliative and pt wishes to stay on vent and get a trach if needed.

## 2018-12-28 NOTE — PROGRESS NOTES
"[facility]  Infectious Disease Progress Note          Assessment and Plan:   IMPRESSION:   1.  A 53-year-old female with acute sepsis, underlying paraplegia and a chronic wound, but this appears to be respiratory infection, has infiltrates and significant pleural fluid, initially felt to have major pericardial fluid, but this turns out to be a false result, likely conventional community-acquired pneumonia, although at risk for other pathogens.   2.  Paraplegia including chronic neurogenic bladder with ileal diversion and ileostomy.   3.  Chronic pain syndrome.   4.  Chronic bilateral sacral decubitus wounds, never been diagnosed with osteomyelitis, previous infection 18 months ago but no recent suggestion of infection and appearance not looking like infection currently.   5.  ALLERGIES LISTED TO LEVAQUIN AND PENICILLIN.  The Levaquin was a rash.  The penicillin caused some kind of hyperactive reactive thing so was not a true allergy.   6 MRSA colonized  7 Fluid collection in hip, 6 K WBc with minimal PMNs , cx pending, even on ABX low WBC/PMNsso doubt  Infection       RECOMMENDATIONS:   1. Off abx no clear infection, will sign off, call if issues               Interval History:   On the vent, awake but again quite  responsive  cxs neg except candida sputum              Medications:       bumetanide  1 mg Intravenous Q8H     chlorhexidine  15 mL Mouth/Throat Q12H     enoxaparin  60 mg Subcutaneous Q12H     famotidine  20 mg Intravenous Q12H     hydrocortisone sodium succinate PF  50 mg Intravenous Q6H     influenza vaccine adult (product based on age)  0.5 mL Intramuscular Prior to discharge     insulin aspart  1-12 Units Subcutaneous Q4H     multivitamins w/minerals  15 mL Per Feeding Tube Daily                  Physical Exam:   Blood pressure 114/61, pulse 82, temperature 98.6  F (37  C), resp. rate 19, height 1.753 m (5' 9\"), weight 86.9 kg (191 lb 9.3 oz), SpO2 95 %, not currently " breastfeeding.  Wt Readings from Last 2 Encounters:   12/28/18 86.9 kg (191 lb 9.3 oz)   08/30/17 56.7 kg (125 lb)     Vital Signs with Ranges  Temp:  [96.4  F (35.8  C)-99.1  F (37.3  C)] 98.6  F (37  C)  Heart Rate:  [71-99] 77  Resp:  [10-27] 19  BP: ()/(40-85) 114/61  FiO2 (%):  [40 %-60 %] 40 %  SpO2:  [94 %-100 %] 95 %    Constitutional: Vent and awake   Lungs: Congestion to auscultation bilaterally, few crackles no wheezing   Cardiovascular: Regular rate and rhythm, normal S1 and S2, and no murmur noted   Abdomen: Normal bowel sounds, soft, non-distended, non-tender   Skin: No rashes, no cyanosis, no edema wd not seen   Other:           Data:   All microbiology laboratory data reviewed.  Recent Labs   Lab Test 12/28/18  0615 12/27/18  0604 12/24/18  0325 12/23/18  0420   WBC 6.5  --  14.0* 14.6*   HGB 6.7*  --  7.8* 7.7*   HCT 21.9*  --  24.7* 23.8*   MCV 86  --  83 81   * 92* 89* 75*     Recent Labs   Lab Test 12/27/18  0604 12/26/18  0840 12/24/18  0325   CR 0.23* 0.21* 0.22*     Recent Labs   Lab Test 02/20/17  1745   SED 61*     Recent Labs   Lab Test 12/20/18  1149 12/19/18  1425 12/19/18  1250 12/16/18  0920 12/12/18  2120 12/12/18  2115 02/18/17  1210 09/04/14  1105 04/14/14  0955   CULT No growth after 8 days  Candida albicans / dubliniensis  isolated  Candida albicans and Candida dubliniensis are not routinely speciated  *  Culture received and in progress.  Positive AFB results are called as soon as detected.    Final report to follow in 7 to 8 weeks.    Light growth  Candida albicans / dubliniensis  Candida albicans and Candida dubliniensis are not routinely speciated  Susceptibility testing not routinely done  *  Culture negative monitoring continues No growth No growth No growth No growth No growth Moderate growth Peptostreptococcus anaerobius Susceptibility testing not   routinely done  *  Heavy growth Enterococcus faecalis  Light growth Klebsiella pneumoniae  Light growth  Coagulase negative Staphylococcus Susceptibility testing not   routinely done  Light growth Candida glabrata Susceptibility testing not routinely done  * >100,000 colonies/mL Escherichia coli  10,000 to 50,000 colonies/mL Mixed gram positive tulio  * Culture negative after 4 weeks  No anaerobes isolated  On day 2, isolated in broth only: Staphylococcus aureus*

## 2018-12-28 NOTE — PROGRESS NOTES
SPIRITUAL HEALTH SERVICES Progress Note  [facility] ICU    Visited per follow up for palliative consult.      visited with Pt and Pt's daughter. Pt was awake, alert, and feeling better. Pt shared she is hungry and wishes to eat not using a feeding tube.  inquired for further needs. Pt had no needs at this time from .      provided a kind and caring presence, listening, acknowledged Pt's improvements and desire to eat not through a feeding tube.      is available if needs arise.      Mando Bernardo  Chaplain Resident
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
## 2018-12-28 NOTE — PROGRESS NOTES
Date of Admission: 12/12/2018  Date of Intubation (most recent): 12/15/2018  Reason for Mechanical Ventilation: Septic shock   Number of Days on Mechanical Ventilation: 14  Met Criteria for Pressure Support Trial: No  Reason for No Pressure Support Trial: Per MD    Ventilation Mode: CMV/AC  (Continuous Mandatory Ventilation/ Assist Control)  FiO2 (%): 60 %  Rate Set (breaths/minute): 20 breaths/min  Tidal Volume Set (mL): 350 mL  PEEP (cm H2O): 8 cmH2O  Oxygen Concentration (%): 60 %  Resp: 20      Recent Labs   Lab 12/23/18  0415 12/22/18  0640   PH 7.43 7.41   PCO2 37 32*   PO2 100 76*   HCO3 25 20*     Will continue to follow.  Bull Pantoja

## 2018-12-28 NOTE — PROVIDER NOTIFICATION
notified Person: MD gibbons    Notification Date/Time: 12/28/2018 0648    Notification Interaction: telephone    Purpose of Notification:     Orders Received: yes    Comments:  1 unit PRBC
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
## 2018-12-29 ENCOUNTER — APPOINTMENT (OUTPATIENT)
Dept: ULTRASOUND IMAGING | Facility: CLINIC | Age: 53
DRG: 870 | End: 2018-12-29
Attending: INTERNAL MEDICINE
Payer: MEDICARE

## 2018-12-29 LAB
ERYTHROCYTE [DISTWIDTH] IN BLOOD BY AUTOMATED COUNT: 21.7 % (ref 10–15)
GLUCOSE BLDC GLUCOMTR-MCNC: 104 MG/DL (ref 70–99)
GLUCOSE BLDC GLUCOMTR-MCNC: 126 MG/DL (ref 70–99)
GLUCOSE BLDC GLUCOMTR-MCNC: 140 MG/DL (ref 70–99)
GLUCOSE BLDC GLUCOMTR-MCNC: 142 MG/DL (ref 70–99)
GLUCOSE BLDC GLUCOMTR-MCNC: 162 MG/DL (ref 70–99)
HCT VFR BLD AUTO: 23.2 % (ref 35–47)
HGB BLD-MCNC: 7.2 G/DL (ref 11.7–15.7)
MAGNESIUM SERPL-MCNC: 2.1 MG/DL (ref 1.6–2.3)
MCH RBC QN AUTO: 27.4 PG (ref 26.5–33)
MCHC RBC AUTO-ENTMCNC: 31 G/DL (ref 31.5–36.5)
MCV RBC AUTO: 88 FL (ref 78–100)
PHOSPHATE SERPL-MCNC: 2.6 MG/DL (ref 2.5–4.5)
PLATELET # BLD AUTO: 90 10E9/L (ref 150–450)
POTASSIUM SERPL-SCNC: 2.5 MMOL/L (ref 3.4–5.3)
POTASSIUM SERPL-SCNC: 2.9 MMOL/L (ref 3.4–5.3)
POTASSIUM SERPL-SCNC: 3.1 MMOL/L (ref 3.4–5.3)
RBC # BLD AUTO: 2.63 10E12/L (ref 3.8–5.2)
WBC # BLD AUTO: 5.2 10E9/L (ref 4–11)

## 2018-12-29 PROCEDURE — 25000128 H RX IP 250 OP 636: Performed by: INTERNAL MEDICINE

## 2018-12-29 PROCEDURE — 84132 ASSAY OF SERUM POTASSIUM: CPT | Performed by: INTERNAL MEDICINE

## 2018-12-29 PROCEDURE — 93971 EXTREMITY STUDY: CPT | Mod: RT

## 2018-12-29 PROCEDURE — 25000132 ZZH RX MED GY IP 250 OP 250 PS 637: Mod: GY | Performed by: NURSE PRACTITIONER

## 2018-12-29 PROCEDURE — 25000132 ZZH RX MED GY IP 250 OP 250 PS 637: Mod: GY | Performed by: INTERNAL MEDICINE

## 2018-12-29 PROCEDURE — 40000275 ZZH STATISTIC RCP TIME EA 10 MIN

## 2018-12-29 PROCEDURE — 83735 ASSAY OF MAGNESIUM: CPT | Performed by: INTERNAL MEDICINE

## 2018-12-29 PROCEDURE — 25000132 ZZH RX MED GY IP 250 OP 250 PS 637: Mod: GY | Performed by: HOSPITALIST

## 2018-12-29 PROCEDURE — 20000003 ZZH R&B ICU

## 2018-12-29 PROCEDURE — 85027 COMPLETE CBC AUTOMATED: CPT | Performed by: INTERNAL MEDICINE

## 2018-12-29 PROCEDURE — A9270 NON-COVERED ITEM OR SERVICE: HCPCS | Mod: GY | Performed by: HOSPITALIST

## 2018-12-29 PROCEDURE — A9270 NON-COVERED ITEM OR SERVICE: HCPCS | Mod: GY | Performed by: INTERNAL MEDICINE

## 2018-12-29 PROCEDURE — 84100 ASSAY OF PHOSPHORUS: CPT | Performed by: INTERNAL MEDICINE

## 2018-12-29 PROCEDURE — 40000008 ZZH STATISTIC AIRWAY CARE

## 2018-12-29 PROCEDURE — 25000128 H RX IP 250 OP 636: Performed by: NURSE PRACTITIONER

## 2018-12-29 PROCEDURE — 40000239 ZZH STATISTIC VAT ROUNDS

## 2018-12-29 PROCEDURE — 27210429 ZZH NUTRITION PRODUCT INTERMEDIATE LITER

## 2018-12-29 PROCEDURE — 94003 VENT MGMT INPAT SUBQ DAY: CPT

## 2018-12-29 PROCEDURE — A9270 NON-COVERED ITEM OR SERVICE: HCPCS | Mod: GY | Performed by: NURSE PRACTITIONER

## 2018-12-29 PROCEDURE — 00000146 ZZHCL STATISTIC GLUCOSE BY METER IP

## 2018-12-29 PROCEDURE — 99291 CRITICAL CARE FIRST HOUR: CPT | Performed by: INTERNAL MEDICINE

## 2018-12-29 RX ORDER — BUMETANIDE 0.25 MG/ML
2 INJECTION INTRAMUSCULAR; INTRAVENOUS EVERY 8 HOURS
Status: DISCONTINUED | OUTPATIENT
Start: 2018-12-29 | End: 2018-12-30

## 2018-12-29 RX ORDER — MAGNESIUM SULFATE HEPTAHYDRATE 40 MG/ML
2 INJECTION, SOLUTION INTRAVENOUS ONCE
Status: COMPLETED | OUTPATIENT
Start: 2018-12-29 | End: 2018-12-30

## 2018-12-29 RX ADMIN — POTASSIUM CHLORIDE 40 MEQ: 1.5 POWDER, FOR SOLUTION ORAL at 21:42

## 2018-12-29 RX ADMIN — MULTIVITAMIN 15 ML: LIQUID ORAL at 09:10

## 2018-12-29 RX ADMIN — BUMETANIDE 2 MG: 0.25 INJECTION INTRAMUSCULAR; INTRAVENOUS at 16:27

## 2018-12-29 RX ADMIN — POTASSIUM CHLORIDE 40 MEQ: 1.5 POWDER, FOR SOLUTION ORAL at 06:47

## 2018-12-29 RX ADMIN — INSULIN ASPART 1 UNITS: 100 INJECTION, SOLUTION INTRAVENOUS; SUBCUTANEOUS at 00:35

## 2018-12-29 RX ADMIN — BUMETANIDE 2 MG: 0.25 INJECTION INTRAMUSCULAR; INTRAVENOUS at 09:09

## 2018-12-29 RX ADMIN — BUMETANIDE 1 MG: 0.25 INJECTION INTRAMUSCULAR; INTRAVENOUS at 00:34

## 2018-12-29 RX ADMIN — ENOXAPARIN SODIUM 60 MG: 60 INJECTION SUBCUTANEOUS at 00:34

## 2018-12-29 RX ADMIN — INSULIN ASPART 1 UNITS: 100 INJECTION, SOLUTION INTRAVENOUS; SUBCUTANEOUS at 04:29

## 2018-12-29 RX ADMIN — HYDROCORTISONE SODIUM SUCCINATE 50 MG: 100 INJECTION, POWDER, FOR SOLUTION INTRAMUSCULAR; INTRAVENOUS at 16:22

## 2018-12-29 RX ADMIN — CHLORHEXIDINE GLUCONATE 15 ML: 1.2 RINSE ORAL at 08:40

## 2018-12-29 RX ADMIN — Medication 25 MCG/HR: at 08:16

## 2018-12-29 RX ADMIN — HYDROCORTISONE SODIUM SUCCINATE 50 MG: 100 INJECTION, POWDER, FOR SOLUTION INTRAMUSCULAR; INTRAVENOUS at 04:19

## 2018-12-29 RX ADMIN — RANITIDINE HYDROCHLORIDE 150 MG: 150 SOLUTION ORAL at 21:42

## 2018-12-29 RX ADMIN — RANITIDINE HYDROCHLORIDE 150 MG: 150 SOLUTION ORAL at 09:12

## 2018-12-29 RX ADMIN — POTASSIUM CHLORIDE 40 MEQ: 1.5 POWDER, FOR SOLUTION ORAL at 04:57

## 2018-12-29 RX ADMIN — CHLORHEXIDINE GLUCONATE 15 ML: 1.2 RINSE ORAL at 19:58

## 2018-12-29 RX ADMIN — INSULIN ASPART 1 UNITS: 100 INJECTION, SOLUTION INTRAVENOUS; SUBCUTANEOUS at 12:38

## 2018-12-29 RX ADMIN — POTASSIUM CHLORIDE 40 MEQ: 1.5 POWDER, FOR SOLUTION ORAL at 12:06

## 2018-12-29 RX ADMIN — POTASSIUM CHLORIDE 40 MEQ: 1.5 POWDER, FOR SOLUTION ORAL at 14:23

## 2018-12-29 RX ADMIN — ENOXAPARIN SODIUM 60 MG: 60 INJECTION SUBCUTANEOUS at 12:06

## 2018-12-29 RX ADMIN — INSULIN ASPART 1 UNITS: 100 INJECTION, SOLUTION INTRAVENOUS; SUBCUTANEOUS at 19:57

## 2018-12-29 ASSESSMENT — MIFFLIN-ST. JEOR: SCORE: 1533.38

## 2018-12-29 ASSESSMENT — ACTIVITIES OF DAILY LIVING (ADL)
ADLS_ACUITY_SCORE: 29
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
## 2018-12-29 NOTE — PROGRESS NOTES
[facility] ICU RESPIRATORY NOTE  Date of Admission: 12/12/2018  Date of Intubation (most recent):  12/12/18  Reason for Mechanical Ventilation:  Septic shock  Number of Days on Mechanical Ventilation:  18  Met Criteria for Pressure Support Trial:  Yes  Length of Pressure Support Trial:  16/8 for 50 minutes  Reason for Stopping Pressure Support Trial:  Reason for No Pressure Support Trial:  Per MD    Significant Events Today:  None    ABG Results:    ETT appearance on chest x-ray:    Skin Assessment:    Plan:  Pt to remain on full vent support overnight

## 2018-12-29 NOTE — PROGRESS NOTES
Neuro: Alert. Follows commands.   Pulmonary: On full vent support. PS 18/7 for an hour.   CV: SR.   : urostomy patent with adequate UOP  GI: Colostomy on LLQ. TF at 60 with water flush.   Extremities: RUE purposeful movement. LUE localized movements. BLE paraplegic.   Skin: multiple chronic wounds. +3 generalized edema.   Line: PICC on LUE.   Plan: daily PS. Continue to monitor.
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
## 2018-12-29 NOTE — PROGRESS NOTES
[facility] ICU RESPIRATORY NOTE    Date of Admission: 12/12/2018  Date of Intubation (most recent): 12/15/2018  Reason for Mechanical Ventilation: Septic shock   Number of Days on Mechanical Ventilation: 15  Met Criteria for Pressure Support Trial: No  Reason for No Pressure Support Trial: Per MD     Significant Events Today: None overnight    Recent Labs   Lab 12/23/18  0415 12/22/18  0640   PH 7.43 7.41   PCO2 37 32*   PO2 100 76*   HCO3 25 20*     Ventilation Mode: CMV/AC  (Continuous Mandatory Ventilation/ Assist Control)  FiO2 (%): 40 %  Rate Set (breaths/minute): 20 breaths/min  Tidal Volume Set (mL): 350 mL  PEEP (cm H2O): 8 cmH2O  Oxygen Concentration (%): 40 %  Resp: 19    Plan: Continue patient on full ventilatory support and assess for daily weaning as tolerated.

## 2018-12-29 NOTE — PLAN OF CARE
Pt resting comfortably in bed today, denies pain or other complaints, continuing to titrate down FiO2 as able, see VS, well tolerated, pt remains off pressors today, see MAR, minimal versed gtt and fentanyl gtt to maintain comfort, however pt still alert, oriented, able to follow directions and communicate using letter-board appropriately, IV steroids and bumex continue, see MAR

## 2018-12-30 LAB
ANION GAP SERPL CALCULATED.3IONS-SCNC: 4 MMOL/L (ref 3–14)
BASE EXCESS BLDV CALC-SCNC: 10.9 MMOL/L
BASE EXCESS BLDV CALC-SCNC: 13.3 MMOL/L
BUN SERPL-MCNC: 18 MG/DL (ref 7–30)
CALCIUM SERPL-MCNC: 7.4 MG/DL (ref 8.5–10.1)
CHLORIDE SERPL-SCNC: 110 MMOL/L (ref 94–109)
CO2 SERPL-SCNC: 34 MMOL/L (ref 20–32)
CREAT SERPL-MCNC: 0.26 MG/DL (ref 0.52–1.04)
ERYTHROCYTE [DISTWIDTH] IN BLOOD BY AUTOMATED COUNT: 22.8 % (ref 10–15)
GFR SERPL CREATININE-BSD FRML MDRD: >90 ML/MIN/{1.73_M2}
GLUCOSE BLDC GLUCOMTR-MCNC: 112 MG/DL (ref 70–99)
GLUCOSE BLDC GLUCOMTR-MCNC: 123 MG/DL (ref 70–99)
GLUCOSE BLDC GLUCOMTR-MCNC: 125 MG/DL (ref 70–99)
GLUCOSE BLDC GLUCOMTR-MCNC: 136 MG/DL (ref 70–99)
GLUCOSE BLDC GLUCOMTR-MCNC: 137 MG/DL (ref 70–99)
GLUCOSE BLDC GLUCOMTR-MCNC: 142 MG/DL (ref 70–99)
GLUCOSE BLDC GLUCOMTR-MCNC: 142 MG/DL (ref 70–99)
GLUCOSE BLDC GLUCOMTR-MCNC: 197 MG/DL (ref 70–99)
GLUCOSE SERPL-MCNC: 141 MG/DL (ref 70–99)
HCO3 BLDV-SCNC: 36 MMOL/L (ref 21–28)
HCO3 BLDV-SCNC: 38 MMOL/L (ref 21–28)
HCT VFR BLD AUTO: 26 % (ref 35–47)
HGB BLD-MCNC: 7.7 G/DL (ref 11.7–15.7)
MAGNESIUM SERPL-MCNC: 2.7 MG/DL (ref 1.6–2.3)
MCH RBC QN AUTO: 27 PG (ref 26.5–33)
MCHC RBC AUTO-ENTMCNC: 29.6 G/DL (ref 31.5–36.5)
MCV RBC AUTO: 91 FL (ref 78–100)
OXYHGB MFR BLDV: 65 %
OXYHGB MFR BLDV: 77 %
PCO2 BLDV: 46 MM HG (ref 40–50)
PCO2 BLDV: 51 MM HG (ref 40–50)
PH BLDV: 7.46 PH (ref 7.32–7.43)
PH BLDV: 7.52 PH (ref 7.32–7.43)
PHOSPHATE SERPL-MCNC: 1.6 MG/DL (ref 2.5–4.5)
PLATELET # BLD AUTO: 102 10E9/L (ref 150–450)
PO2 BLDV: 36 MM HG (ref 25–47)
PO2 BLDV: 42 MM HG (ref 25–47)
POTASSIUM SERPL-SCNC: 2.7 MMOL/L (ref 3.4–5.3)
POTASSIUM SERPL-SCNC: 3.2 MMOL/L (ref 3.4–5.3)
POTASSIUM SERPL-SCNC: 3.6 MMOL/L (ref 3.4–5.3)
POTASSIUM SERPL-SCNC: 3.9 MMOL/L (ref 3.4–5.3)
PREALB SERPL IA-MCNC: 20 MG/DL (ref 15–45)
RBC # BLD AUTO: 2.85 10E12/L (ref 3.8–5.2)
SODIUM SERPL-SCNC: 148 MMOL/L (ref 133–144)
WBC # BLD AUTO: 16.9 10E9/L (ref 4–11)

## 2018-12-30 PROCEDURE — 94003 VENT MGMT INPAT SUBQ DAY: CPT

## 2018-12-30 PROCEDURE — 80048 BASIC METABOLIC PNL TOTAL CA: CPT | Performed by: INTERNAL MEDICINE

## 2018-12-30 PROCEDURE — 25000132 ZZH RX MED GY IP 250 OP 250 PS 637: Mod: GY | Performed by: HOSPITALIST

## 2018-12-30 PROCEDURE — 20000003 ZZH R&B ICU

## 2018-12-30 PROCEDURE — 25000125 ZZHC RX 250

## 2018-12-30 PROCEDURE — 25000125 ZZHC RX 250: Performed by: INTERNAL MEDICINE

## 2018-12-30 PROCEDURE — A9270 NON-COVERED ITEM OR SERVICE: HCPCS | Mod: GY | Performed by: HOSPITALIST

## 2018-12-30 PROCEDURE — 00000146 ZZHCL STATISTIC GLUCOSE BY METER IP

## 2018-12-30 PROCEDURE — 84132 ASSAY OF SERUM POTASSIUM: CPT | Performed by: INTERNAL MEDICINE

## 2018-12-30 PROCEDURE — 27210429 ZZH NUTRITION PRODUCT INTERMEDIATE LITER

## 2018-12-30 PROCEDURE — 84100 ASSAY OF PHOSPHORUS: CPT | Performed by: INTERNAL MEDICINE

## 2018-12-30 PROCEDURE — 25000132 ZZH RX MED GY IP 250 OP 250 PS 637: Mod: GY | Performed by: INTERNAL MEDICINE

## 2018-12-30 PROCEDURE — A9270 NON-COVERED ITEM OR SERVICE: HCPCS | Mod: GY | Performed by: NURSE PRACTITIONER

## 2018-12-30 PROCEDURE — A9270 NON-COVERED ITEM OR SERVICE: HCPCS | Mod: GY | Performed by: SURGERY

## 2018-12-30 PROCEDURE — 40000239 ZZH STATISTIC VAT ROUNDS

## 2018-12-30 PROCEDURE — 40000008 ZZH STATISTIC AIRWAY CARE

## 2018-12-30 PROCEDURE — 84134 ASSAY OF PREALBUMIN: CPT | Performed by: INTERNAL MEDICINE

## 2018-12-30 PROCEDURE — 25000128 H RX IP 250 OP 636: Performed by: INTERNAL MEDICINE

## 2018-12-30 PROCEDURE — 99291 CRITICAL CARE FIRST HOUR: CPT | Performed by: INTERNAL MEDICINE

## 2018-12-30 PROCEDURE — 25000128 H RX IP 250 OP 636: Performed by: SURGERY

## 2018-12-30 PROCEDURE — 25000132 ZZH RX MED GY IP 250 OP 250 PS 637: Mod: GY | Performed by: SURGERY

## 2018-12-30 PROCEDURE — 25000128 H RX IP 250 OP 636: Performed by: NURSE PRACTITIONER

## 2018-12-30 PROCEDURE — 40000275 ZZH STATISTIC RCP TIME EA 10 MIN

## 2018-12-30 PROCEDURE — 84132 ASSAY OF SERUM POTASSIUM: CPT | Performed by: SURGERY

## 2018-12-30 PROCEDURE — 82805 BLOOD GASES W/O2 SATURATION: CPT | Performed by: SURGERY

## 2018-12-30 PROCEDURE — 85027 COMPLETE CBC AUTOMATED: CPT | Performed by: INTERNAL MEDICINE

## 2018-12-30 PROCEDURE — 83735 ASSAY OF MAGNESIUM: CPT | Performed by: INTERNAL MEDICINE

## 2018-12-30 PROCEDURE — 25000132 ZZH RX MED GY IP 250 OP 250 PS 637: Mod: GY | Performed by: NURSE PRACTITIONER

## 2018-12-30 PROCEDURE — 40000257 ZZH STATISTIC CONSULT NO CHARGE VASC ACCESS

## 2018-12-30 PROCEDURE — A9270 NON-COVERED ITEM OR SERVICE: HCPCS | Mod: GY | Performed by: INTERNAL MEDICINE

## 2018-12-30 RX ORDER — POTASSIUM CHLORIDE 29.8 MG/ML
20 INJECTION INTRAVENOUS ONCE
Status: COMPLETED | OUTPATIENT
Start: 2018-12-30 | End: 2018-12-30

## 2018-12-30 RX ORDER — ACETAZOLAMIDE 250 MG/1
500 TABLET ORAL ONCE
Status: COMPLETED | OUTPATIENT
Start: 2018-12-30 | End: 2018-12-30

## 2018-12-30 RX ORDER — ACETAZOLAMIDE 500 MG/5ML
250 INJECTION, POWDER, LYOPHILIZED, FOR SOLUTION INTRAVENOUS ONCE
Status: COMPLETED | OUTPATIENT
Start: 2018-12-30 | End: 2018-12-30

## 2018-12-30 RX ORDER — FUROSEMIDE 10 MG/ML
20 INJECTION INTRAMUSCULAR; INTRAVENOUS DAILY
Status: DISCONTINUED | OUTPATIENT
Start: 2018-12-31 | End: 2018-12-31

## 2018-12-30 RX ORDER — WATER 10 ML/10ML
INJECTION INTRAMUSCULAR; INTRAVENOUS; SUBCUTANEOUS
Status: COMPLETED
Start: 2018-12-30 | End: 2018-12-30

## 2018-12-30 RX ADMIN — MAGNESIUM SULFATE IN WATER 2 G: 40 INJECTION, SOLUTION INTRAVENOUS at 00:13

## 2018-12-30 RX ADMIN — POTASSIUM CHLORIDE 40 MEQ: 1.5 POWDER, FOR SOLUTION ORAL at 20:52

## 2018-12-30 RX ADMIN — ENOXAPARIN SODIUM 60 MG: 60 INJECTION SUBCUTANEOUS at 23:40

## 2018-12-30 RX ADMIN — ENOXAPARIN SODIUM 60 MG: 60 INJECTION SUBCUTANEOUS at 00:16

## 2018-12-30 RX ADMIN — POTASSIUM CHLORIDE 20 MEQ: 1.5 POWDER, FOR SOLUTION ORAL at 11:02

## 2018-12-30 RX ADMIN — SODIUM CHLORIDE: 9 INJECTION, SOLUTION INTRAVENOUS at 19:42

## 2018-12-30 RX ADMIN — RANITIDINE HYDROCHLORIDE 150 MG: 150 SOLUTION ORAL at 08:45

## 2018-12-30 RX ADMIN — POTASSIUM CHLORIDE 20 MEQ: 29.8 INJECTION, SOLUTION INTRAVENOUS at 01:27

## 2018-12-30 RX ADMIN — ENOXAPARIN SODIUM 60 MG: 60 INJECTION SUBCUTANEOUS at 11:55

## 2018-12-30 RX ADMIN — BUMETANIDE 2 MG: 0.25 INJECTION INTRAMUSCULAR; INTRAVENOUS at 00:16

## 2018-12-30 RX ADMIN — INSULIN ASPART 3 UNITS: 100 INJECTION, SOLUTION INTRAVENOUS; SUBCUTANEOUS at 08:51

## 2018-12-30 RX ADMIN — SODIUM PHOSPHATE, MONOBASIC, MONOHYDRATE 20 MMOL: 276; 142 INJECTION, SOLUTION INTRAVENOUS at 07:01

## 2018-12-30 RX ADMIN — ACETAZOLAMIDE 500 MG: 250 TABLET ORAL at 16:55

## 2018-12-30 RX ADMIN — HYDROCORTISONE SODIUM SUCCINATE 50 MG: 100 INJECTION, POWDER, FOR SOLUTION INTRAMUSCULAR; INTRAVENOUS at 04:34

## 2018-12-30 RX ADMIN — Medication 1 MG: at 01:26

## 2018-12-30 RX ADMIN — MULTIVITAMIN 15 ML: LIQUID ORAL at 08:45

## 2018-12-30 RX ADMIN — Medication 1 MG: at 23:40

## 2018-12-30 RX ADMIN — POTASSIUM CHLORIDE 40 MEQ: 1.5 POWDER, FOR SOLUTION ORAL at 05:48

## 2018-12-30 RX ADMIN — METOPROLOL TARTRATE 12.5 MG: 100 TABLET ORAL at 02:34

## 2018-12-30 RX ADMIN — POTASSIUM CHLORIDE 40 MEQ: 1.5 POWDER, FOR SOLUTION ORAL at 18:56

## 2018-12-30 RX ADMIN — METOPROLOL TARTRATE 25 MG: 100 TABLET ORAL at 09:25

## 2018-12-30 RX ADMIN — METOPROLOL TARTRATE 12.5 MG: 100 TABLET ORAL at 00:16

## 2018-12-30 RX ADMIN — RANITIDINE HYDROCHLORIDE 150 MG: 150 SOLUTION ORAL at 20:24

## 2018-12-30 RX ADMIN — CHLORHEXIDINE GLUCONATE 15 ML: 1.2 RINSE ORAL at 20:24

## 2018-12-30 RX ADMIN — WATER: 1 INJECTION INTRAMUSCULAR; INTRAVENOUS; SUBCUTANEOUS at 04:34

## 2018-12-30 RX ADMIN — CHLORHEXIDINE GLUCONATE 15 ML: 1.2 RINSE ORAL at 08:58

## 2018-12-30 RX ADMIN — ACETAZOLAMIDE 250 MG: 500 INJECTION, POWDER, LYOPHILIZED, FOR SOLUTION INTRAVENOUS at 02:29

## 2018-12-30 RX ADMIN — INSULIN ASPART 1 UNITS: 100 INJECTION, SOLUTION INTRAVENOUS; SUBCUTANEOUS at 20:24

## 2018-12-30 RX ADMIN — METOPROLOL TARTRATE 25 MG: 100 TABLET ORAL at 20:52

## 2018-12-30 ASSESSMENT — ACTIVITIES OF DAILY LIVING (ADL)
ADLS_ACUITY_SCORE: 33
ADLS_ACUITY_SCORE: 29

## 2018-12-30 NOTE — PLAN OF CARE
Neuro: Pt is alert and will call appropriately. Pt does not complain of any pain. Continuous fent gtt remains the same, as well as versed gtt. Pt did request melatonin for sleep which seemed to help.   CV: SR to ST. See previous note about abnormal hr. Bp stable. Metoprolol given x2 per md. Potassium replaced this am, as well as phosphorus.   Pulm: Increased FiO2 to 50%, 350, RR 20, peep 10. Coarse LS. Thick secretions through ett.  GI/: Active bs. Good urine output. 1 time dose of diamox per md. Medium soft stool out of ostomy.   Skin: See wound LDA.   Plan: Pt is aware of her POC, plan for p/s today. Will continue to monitor.

## 2018-12-30 NOTE — PROGRESS NOTES
Notified MD of pt going into what looks like SVT in the 150's. Pt shortly after converted back into a SR. Orders to draw potassium, given 2g of magnesium, draw vbg, increase FiO2 to 50% and give metoprolol. Will continue to monitor.
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
## 2018-12-30 NOTE — PROGRESS NOTES
ICU staff:    Patient with run of SVT.  Electrolytes replaced and small dose of oral metoprolol started.  VBG demonstrates a metabolic alkalosis most likely due to diuresis.  The patient is now nearly 10 Kg below her admit weight.  DCed the Bumex and given one dose of diamox.  Will recheck the VBG in the am.  No additional runs of SVT noted.      Tc Escobedo MD, PhD

## 2018-12-31 LAB
ABO + RH BLD: NORMAL
ABO + RH BLD: NORMAL
ALBUMIN SERPL-MCNC: 1.9 G/DL (ref 3.4–5)
ALBUMIN UR-MCNC: 30 MG/DL
ALP SERPL-CCNC: 237 U/L (ref 40–150)
ALT SERPL W P-5'-P-CCNC: 13 U/L (ref 0–50)
ANION GAP SERPL CALCULATED.3IONS-SCNC: 4 MMOL/L (ref 3–14)
APPEARANCE UR: CLEAR
AST SERPL W P-5'-P-CCNC: 12 U/L (ref 0–45)
BILIRUB SERPL-MCNC: 0.5 MG/DL (ref 0.2–1.3)
BILIRUB UR QL STRIP: NEGATIVE
BLD GP AB SCN SERPL QL: NORMAL
BLD PROD TYP BPU: NORMAL
BLD PROD TYP BPU: NORMAL
BLD UNIT ID BPU: 0
BLOOD BANK CMNT PATIENT-IMP: NORMAL
BLOOD PRODUCT CODE: NORMAL
BPU ID: NORMAL
BUN SERPL-MCNC: 20 MG/DL (ref 7–30)
CALCIUM SERPL-MCNC: 7.2 MG/DL (ref 8.5–10.1)
CHLORIDE SERPL-SCNC: 113 MMOL/L (ref 94–109)
CO2 SERPL-SCNC: 31 MMOL/L (ref 20–32)
COLOR UR AUTO: YELLOW
CREAT SERPL-MCNC: 0.31 MG/DL (ref 0.52–1.04)
ERYTHROCYTE [DISTWIDTH] IN BLOOD BY AUTOMATED COUNT: 23.2 % (ref 10–15)
GFR SERPL CREATININE-BSD FRML MDRD: >90 ML/MIN/{1.73_M2}
GLUCOSE BLDC GLUCOMTR-MCNC: 120 MG/DL (ref 70–99)
GLUCOSE BLDC GLUCOMTR-MCNC: 121 MG/DL (ref 70–99)
GLUCOSE BLDC GLUCOMTR-MCNC: 137 MG/DL (ref 70–99)
GLUCOSE BLDC GLUCOMTR-MCNC: 148 MG/DL (ref 70–99)
GLUCOSE SERPL-MCNC: 147 MG/DL (ref 70–99)
GLUCOSE UR STRIP-MCNC: NEGATIVE MG/DL
GRAM STN SPEC: NORMAL
GRAM STN SPEC: NORMAL
HCT VFR BLD AUTO: 22.6 % (ref 35–47)
HGB BLD-MCNC: 6.8 G/DL (ref 11.7–15.7)
HGB BLD-MCNC: 7.7 G/DL (ref 11.7–15.7)
HGB UR QL STRIP: NEGATIVE
INR PPP: 1.42 (ref 0.86–1.14)
KETONES UR STRIP-MCNC: NEGATIVE MG/DL
LEUKOCYTE ESTERASE UR QL STRIP: NEGATIVE
MAGNESIUM SERPL-MCNC: 2.4 MG/DL (ref 1.6–2.3)
MCH RBC QN AUTO: 27.4 PG (ref 26.5–33)
MCHC RBC AUTO-ENTMCNC: 30.1 G/DL (ref 31.5–36.5)
MCV RBC AUTO: 91 FL (ref 78–100)
MUCOUS THREADS #/AREA URNS LPF: PRESENT /LPF
NITRATE UR QL: NEGATIVE
NUM BPU REQUESTED: 2
PH UR STRIP: 8 PH (ref 5–7)
PHOSPHATE SERPL-MCNC: 1.9 MG/DL (ref 2.5–4.5)
PHOSPHATE SERPL-MCNC: 2.8 MG/DL (ref 2.5–4.5)
PLATELET # BLD AUTO: 98 10E9/L (ref 150–450)
POTASSIUM SERPL-SCNC: 4.2 MMOL/L (ref 3.4–5.3)
PREALB SERPL IA-MCNC: 14 MG/DL (ref 15–45)
PROCALCITONIN SERPL-MCNC: 0.27 NG/ML
PROT SERPL-MCNC: 5.4 G/DL (ref 6.8–8.8)
RBC # BLD AUTO: 2.48 10E12/L (ref 3.8–5.2)
RBC #/AREA URNS AUTO: 1 /HPF (ref 0–2)
SODIUM SERPL-SCNC: 148 MMOL/L (ref 133–144)
SOURCE: ABNORMAL
SP GR UR STRIP: 1.01 (ref 1–1.03)
SPECIMEN EXP DATE BLD: NORMAL
SPECIMEN SOURCE: NORMAL
TRANS CELLS #/AREA URNS HPF: 1 /HPF (ref 0–1)
TRANSFUSION STATUS PATIENT QL: NORMAL
TRANSFUSION STATUS PATIENT QL: NORMAL
UROBILINOGEN UR STRIP-MCNC: NORMAL MG/DL (ref 0–2)
WBC # BLD AUTO: 15.5 10E9/L (ref 4–11)
WBC #/AREA URNS AUTO: 20 /HPF (ref 0–5)

## 2018-12-31 PROCEDURE — 80053 COMPREHEN METABOLIC PANEL: CPT | Performed by: INTERNAL MEDICINE

## 2018-12-31 PROCEDURE — A9270 NON-COVERED ITEM OR SERVICE: HCPCS | Mod: GY | Performed by: HOSPITALIST

## 2018-12-31 PROCEDURE — 99291 CRITICAL CARE FIRST HOUR: CPT | Performed by: INTERNAL MEDICINE

## 2018-12-31 PROCEDURE — 84100 ASSAY OF PHOSPHORUS: CPT | Performed by: INTERNAL MEDICINE

## 2018-12-31 PROCEDURE — 00000146 ZZHCL STATISTIC GLUCOSE BY METER IP

## 2018-12-31 PROCEDURE — 87205 SMEAR GRAM STAIN: CPT | Performed by: INTERNAL MEDICINE

## 2018-12-31 PROCEDURE — 25000132 ZZH RX MED GY IP 250 OP 250 PS 637: Mod: GY | Performed by: SURGERY

## 2018-12-31 PROCEDURE — 87086 URINE CULTURE/COLONY COUNT: CPT | Performed by: HOSPITALIST

## 2018-12-31 PROCEDURE — 83735 ASSAY OF MAGNESIUM: CPT | Performed by: INTERNAL MEDICINE

## 2018-12-31 PROCEDURE — 27210429 ZZH NUTRITION PRODUCT INTERMEDIATE LITER

## 2018-12-31 PROCEDURE — 25000128 H RX IP 250 OP 636: Performed by: INTERNAL MEDICINE

## 2018-12-31 PROCEDURE — 25000132 ZZH RX MED GY IP 250 OP 250 PS 637: Mod: GY | Performed by: NURSE PRACTITIONER

## 2018-12-31 PROCEDURE — 85018 HEMOGLOBIN: CPT | Performed by: INTERNAL MEDICINE

## 2018-12-31 PROCEDURE — 87070 CULTURE OTHR SPECIMN AEROBIC: CPT | Performed by: INTERNAL MEDICINE

## 2018-12-31 PROCEDURE — A9270 NON-COVERED ITEM OR SERVICE: HCPCS | Mod: GY | Performed by: NURSE PRACTITIONER

## 2018-12-31 PROCEDURE — 40000239 ZZH STATISTIC VAT ROUNDS

## 2018-12-31 PROCEDURE — 25000128 H RX IP 250 OP 636: Performed by: NURSE PRACTITIONER

## 2018-12-31 PROCEDURE — 85027 COMPLETE CBC AUTOMATED: CPT | Performed by: INTERNAL MEDICINE

## 2018-12-31 PROCEDURE — 87106 FUNGI IDENTIFICATION YEAST: CPT | Performed by: INTERNAL MEDICINE

## 2018-12-31 PROCEDURE — 84134 ASSAY OF PREALBUMIN: CPT | Performed by: INTERNAL MEDICINE

## 2018-12-31 PROCEDURE — 81001 URINALYSIS AUTO W/SCOPE: CPT | Performed by: INTERNAL MEDICINE

## 2018-12-31 PROCEDURE — A9270 NON-COVERED ITEM OR SERVICE: HCPCS | Mod: GY | Performed by: INTERNAL MEDICINE

## 2018-12-31 PROCEDURE — 94003 VENT MGMT INPAT SUBQ DAY: CPT

## 2018-12-31 PROCEDURE — 40000008 ZZH STATISTIC AIRWAY CARE

## 2018-12-31 PROCEDURE — 25000132 ZZH RX MED GY IP 250 OP 250 PS 637: Mod: GY | Performed by: HOSPITALIST

## 2018-12-31 PROCEDURE — 20000003 ZZH R&B ICU

## 2018-12-31 PROCEDURE — 40000275 ZZH STATISTIC RCP TIME EA 10 MIN

## 2018-12-31 PROCEDURE — A9270 NON-COVERED ITEM OR SERVICE: HCPCS | Mod: GY | Performed by: SURGERY

## 2018-12-31 PROCEDURE — 25000132 ZZH RX MED GY IP 250 OP 250 PS 637: Mod: GY | Performed by: INTERNAL MEDICINE

## 2018-12-31 PROCEDURE — 85610 PROTHROMBIN TIME: CPT | Performed by: INTERNAL MEDICINE

## 2018-12-31 PROCEDURE — 84145 PROCALCITONIN (PCT): CPT | Performed by: INTERNAL MEDICINE

## 2018-12-31 PROCEDURE — 25000125 ZZHC RX 250: Performed by: INTERNAL MEDICINE

## 2018-12-31 PROCEDURE — P9016 RBC LEUKOCYTES REDUCED: HCPCS | Performed by: HOSPITALIST

## 2018-12-31 RX ORDER — DEXTROSE MONOHYDRATE 50 MG/ML
INJECTION, SOLUTION INTRAVENOUS CONTINUOUS
Status: DISCONTINUED | OUTPATIENT
Start: 2018-12-31 | End: 2019-01-01

## 2018-12-31 RX ORDER — FUROSEMIDE 10 MG/ML
20 INJECTION INTRAMUSCULAR; INTRAVENOUS 2 TIMES DAILY
Status: DISCONTINUED | OUTPATIENT
Start: 2018-12-31 | End: 2019-01-01

## 2018-12-31 RX ADMIN — FUROSEMIDE 20 MG: 10 INJECTION, SOLUTION INTRAMUSCULAR; INTRAVENOUS at 08:37

## 2018-12-31 RX ADMIN — SODIUM CHLORIDE: 9 INJECTION, SOLUTION INTRAVENOUS at 02:41

## 2018-12-31 RX ADMIN — INSULIN ASPART 1 UNITS: 100 INJECTION, SOLUTION INTRAVENOUS; SUBCUTANEOUS at 04:32

## 2018-12-31 RX ADMIN — DEXTROSE MONOHYDRATE: 50 INJECTION, SOLUTION INTRAVENOUS at 22:52

## 2018-12-31 RX ADMIN — RANITIDINE HYDROCHLORIDE 150 MG: 150 SOLUTION ORAL at 21:54

## 2018-12-31 RX ADMIN — POTASSIUM CHLORIDE 20 MEQ: 1.5 POWDER, FOR SOLUTION ORAL at 00:02

## 2018-12-31 RX ADMIN — DEXTROSE MONOHYDRATE: 50 INJECTION, SOLUTION INTRAVENOUS at 13:13

## 2018-12-31 RX ADMIN — INSULIN ASPART 1 UNITS: 100 INJECTION, SOLUTION INTRAVENOUS; SUBCUTANEOUS at 08:48

## 2018-12-31 RX ADMIN — FUROSEMIDE 20 MG: 10 INJECTION, SOLUTION INTRAVENOUS at 20:58

## 2018-12-31 RX ADMIN — METOPROLOL TARTRATE 25 MG: 100 TABLET ORAL at 21:53

## 2018-12-31 RX ADMIN — POTASSIUM PHOSPHATE, MONOBASIC AND POTASSIUM PHOSPHATE, DIBASIC 20 MMOL: 224; 236 INJECTION, SOLUTION INTRAVENOUS at 06:04

## 2018-12-31 RX ADMIN — MULTIVITAMIN 15 ML: LIQUID ORAL at 08:37

## 2018-12-31 RX ADMIN — CHLORHEXIDINE GLUCONATE 15 ML: 1.2 RINSE ORAL at 08:37

## 2018-12-31 RX ADMIN — ENOXAPARIN SODIUM 60 MG: 60 INJECTION SUBCUTANEOUS at 13:12

## 2018-12-31 RX ADMIN — RANITIDINE HYDROCHLORIDE 150 MG: 150 SOLUTION ORAL at 08:37

## 2018-12-31 RX ADMIN — CHLORHEXIDINE GLUCONATE 15 ML: 1.2 RINSE ORAL at 20:58

## 2018-12-31 ASSESSMENT — ACTIVITIES OF DAILY LIVING (ADL)
ADLS_ACUITY_SCORE: 33

## 2018-12-31 ASSESSMENT — MIFFLIN-ST. JEOR: SCORE: 1507.38

## 2018-12-31 NOTE — PROGRESS NOTES
Date of Admission: 12/12/2018  Date of Intubation (most recent): 12/15/2018  Reason for Mechanical Ventilation: Septic shock   Number of Days on Mechanical Ventilation: 17  Met Criteria for Pressure Support Trial: No  Reason for No Pressure Support Trial: Per MD     Significant Events Today: None overnight    Ventilation Mode: CMV/AC  (Continuous Mandatory Ventilation/ Assist Control)  FiO2 (%): 40 %  Rate Set (breaths/minute): 16 breaths/min  Tidal Volume Set (mL): 350 mL  PEEP (cm H2O): 10 cmH2O  Pressure Support (cm H2O): 16 cmH2O  Oxygen Concentration (%): 40 %  Resp: 25      No lab results found in last 7 days.   Plan: continue full vent support tonight

## 2018-12-31 NOTE — PLAN OF CARE
VSS - Alert and oriented today, on P/S 16/10 for 2 hours today - thick creamy secretions, small to moderate amount, lungs are coarse, 350 out in the colostomy in a 12 hour period, leaking around the mcintyre cath today so it was replaced and she continued to leak around the mcintyre cath, so the indwelling cath was removed and we are doing her home straight cath kits now, large amounts of sediment has been coming out, 1500 ml of urine showed up on the bladder scanner and 1200 ml was removed, this will be done q 4 hours or as needed.  K+ 2.7 is being replaced, TF is at goal, wound dressings are due to be changed alvaro, all dressings are C/D/I, dtr has been updated twice today

## 2018-12-31 NOTE — PLAN OF CARE
Pt intubated, alert and following commands. On fentanyl gtt. Able to nod yes and no to questions appropriately. Coarse lung sounds. VS stable throughout night. TF at goal. Colostomy bag emptied once during shift. Straight catheterized q 4 hours with good UO. Potassium replaced. Potassium phosphate replaced. Hemoglobin of 6.8 this morning. Transfusing 1 unit of blood. Will continue to monitor

## 2018-12-31 NOTE — PROGRESS NOTES
Brief Palliative Care team note.    Chart reviewed and discussed with Dr Casanova. Pt continues to improve slowly, will likely need a trach but expected to have recovery of lung function. Pt and family agreeable with plan for trach. Palliative Care will sign off today, please do not hesitate to re-consult if further needs arise. Thank you.    Shonna ALBARADO CNP  Pager: [phone]  Palliative Medicine  December 31, 2018

## 2018-12-31 NOTE — PLAN OF CARE
Patient remains vented, did not tolerate vent wean this shift. Tolerating vent well, fentanyl drip discontinued per Md, patient denies pain and is resting comfortable. Moves upper extremities well, alert and follows all commands, nods head appropriately to questions. Vital signs stable except temp 38.0. AM metoprolol held per Dr Hong for decreased blood pressure. No visitors this shift.
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
## 2019-01-01 ENCOUNTER — APPOINTMENT (OUTPATIENT)
Dept: GENERAL RADIOLOGY | Facility: CLINIC | Age: 54
DRG: 870 | End: 2019-01-01
Attending: INTERNAL MEDICINE
Payer: MEDICARE

## 2019-01-01 LAB
ANION GAP SERPL CALCULATED.3IONS-SCNC: 7 MMOL/L (ref 3–14)
BACTERIA SPEC CULT: NO GROWTH
BASE EXCESS BLDV CALC-SCNC: 4.1 MMOL/L
BASE EXCESS BLDV CALC-SCNC: 4.6 MMOL/L
BASOPHILS # BLD AUTO: 0 10E9/L (ref 0–0.2)
BASOPHILS NFR BLD AUTO: 0.1 %
BUN SERPL-MCNC: 16 MG/DL (ref 7–30)
CALCIUM SERPL-MCNC: 7.5 MG/DL (ref 8.5–10.1)
CHLORIDE SERPL-SCNC: 106 MMOL/L (ref 94–109)
CO2 SERPL-SCNC: 28 MMOL/L (ref 20–32)
CREAT SERPL-MCNC: 0.28 MG/DL (ref 0.52–1.04)
DIFFERENTIAL METHOD BLD: ABNORMAL
EOSINOPHIL # BLD AUTO: 0.7 10E9/L (ref 0–0.7)
EOSINOPHIL NFR BLD AUTO: 6.2 %
ERYTHROCYTE [DISTWIDTH] IN BLOOD BY AUTOMATED COUNT: 20.9 % (ref 10–15)
GFR SERPL CREATININE-BSD FRML MDRD: >90 ML/MIN/{1.73_M2}
GLUCOSE BLDC GLUCOMTR-MCNC: 118 MG/DL (ref 70–99)
GLUCOSE BLDC GLUCOMTR-MCNC: 122 MG/DL (ref 70–99)
GLUCOSE BLDC GLUCOMTR-MCNC: 125 MG/DL (ref 70–99)
GLUCOSE BLDC GLUCOMTR-MCNC: 145 MG/DL (ref 70–99)
GLUCOSE BLDC GLUCOMTR-MCNC: 87 MG/DL (ref 70–99)
GLUCOSE BLDC GLUCOMTR-MCNC: 93 MG/DL (ref 70–99)
GLUCOSE SERPL-MCNC: 142 MG/DL (ref 70–99)
HCO3 BLDV-SCNC: 29 MMOL/L (ref 21–28)
HCO3 BLDV-SCNC: 29 MMOL/L (ref 21–28)
HCT VFR BLD AUTO: 24.2 % (ref 35–47)
HGB BLD-MCNC: 7.6 G/DL (ref 11.7–15.7)
IMM GRANULOCYTES # BLD: 0 10E9/L (ref 0–0.4)
IMM GRANULOCYTES NFR BLD: 0.4 %
LYMPHOCYTES # BLD AUTO: 1 10E9/L (ref 0.8–5.3)
LYMPHOCYTES NFR BLD AUTO: 9.2 %
Lab: NORMAL
MAGNESIUM SERPL-MCNC: 2.2 MG/DL (ref 1.6–2.3)
MCH RBC QN AUTO: 27.6 PG (ref 26.5–33)
MCHC RBC AUTO-ENTMCNC: 31.4 G/DL (ref 31.5–36.5)
MCV RBC AUTO: 88 FL (ref 78–100)
MONOCYTES # BLD AUTO: 0.4 10E9/L (ref 0–1.3)
MONOCYTES NFR BLD AUTO: 3.8 %
NEUTROPHILS # BLD AUTO: 8.5 10E9/L (ref 1.6–8.3)
NEUTROPHILS NFR BLD AUTO: 80.3 %
NRBC # BLD AUTO: 0 10*3/UL
NRBC BLD AUTO-RTO: 0 /100
O2/TOTAL GAS SETTING VFR VENT: ABNORMAL %
OXYHGB MFR BLDV: 70 %
OXYHGB MFR BLDV: 75 %
PCO2 BLDV: 42 MM HG (ref 40–50)
PCO2 BLDV: 44 MM HG (ref 40–50)
PH BLDV: 7.43 PH (ref 7.32–7.43)
PH BLDV: 7.45 PH (ref 7.32–7.43)
PHOSPHATE SERPL-MCNC: 2.5 MG/DL (ref 2.5–4.5)
PLATELET # BLD AUTO: 77 10E9/L (ref 150–450)
PO2 BLDV: 37 MM HG (ref 25–47)
PO2 BLDV: 41 MM HG (ref 25–47)
POTASSIUM SERPL-SCNC: 3 MMOL/L (ref 3.4–5.3)
POTASSIUM SERPL-SCNC: 3.1 MMOL/L (ref 3.4–5.3)
RBC # BLD AUTO: 2.75 10E12/L (ref 3.8–5.2)
SODIUM SERPL-SCNC: 141 MMOL/L (ref 133–144)
SPECIMEN SOURCE: NORMAL
WBC # BLD AUTO: 10.6 10E9/L (ref 4–11)

## 2019-01-01 PROCEDURE — 25000128 H RX IP 250 OP 636: Performed by: INTERNAL MEDICINE

## 2019-01-01 PROCEDURE — 25000132 ZZH RX MED GY IP 250 OP 250 PS 637: Mod: GY | Performed by: NURSE PRACTITIONER

## 2019-01-01 PROCEDURE — 80048 BASIC METABOLIC PNL TOTAL CA: CPT | Performed by: INTERNAL MEDICINE

## 2019-01-01 PROCEDURE — A9270 NON-COVERED ITEM OR SERVICE: HCPCS | Mod: GY | Performed by: HOSPITALIST

## 2019-01-01 PROCEDURE — 40000275 ZZH STATISTIC RCP TIME EA 10 MIN

## 2019-01-01 PROCEDURE — 25000128 H RX IP 250 OP 636

## 2019-01-01 PROCEDURE — 40000239 ZZH STATISTIC VAT ROUNDS

## 2019-01-01 PROCEDURE — 94003 VENT MGMT INPAT SUBQ DAY: CPT

## 2019-01-01 PROCEDURE — A9270 NON-COVERED ITEM OR SERVICE: HCPCS | Mod: GY | Performed by: NURSE PRACTITIONER

## 2019-01-01 PROCEDURE — 83735 ASSAY OF MAGNESIUM: CPT | Performed by: INTERNAL MEDICINE

## 2019-01-01 PROCEDURE — 25000128 H RX IP 250 OP 636: Performed by: NURSE PRACTITIONER

## 2019-01-01 PROCEDURE — 82805 BLOOD GASES W/O2 SATURATION: CPT | Performed by: INTERNAL MEDICINE

## 2019-01-01 PROCEDURE — 25000132 ZZH RX MED GY IP 250 OP 250 PS 637: Mod: GY | Performed by: SURGERY

## 2019-01-01 PROCEDURE — 84132 ASSAY OF SERUM POTASSIUM: CPT | Performed by: INTERNAL MEDICINE

## 2019-01-01 PROCEDURE — 84100 ASSAY OF PHOSPHORUS: CPT | Performed by: INTERNAL MEDICINE

## 2019-01-01 PROCEDURE — 00000146 ZZHCL STATISTIC GLUCOSE BY METER IP

## 2019-01-01 PROCEDURE — 85025 COMPLETE CBC W/AUTO DIFF WBC: CPT | Performed by: INTERNAL MEDICINE

## 2019-01-01 PROCEDURE — A9270 NON-COVERED ITEM OR SERVICE: HCPCS | Mod: GY | Performed by: SURGERY

## 2019-01-01 PROCEDURE — 25000132 ZZH RX MED GY IP 250 OP 250 PS 637: Mod: GY | Performed by: HOSPITALIST

## 2019-01-01 PROCEDURE — 25000128 H RX IP 250 OP 636: Performed by: HOSPITALIST

## 2019-01-01 PROCEDURE — 25000125 ZZHC RX 250: Performed by: SURGERY

## 2019-01-01 PROCEDURE — 71045 X-RAY EXAM CHEST 1 VIEW: CPT

## 2019-01-01 PROCEDURE — 20000003 ZZH R&B ICU

## 2019-01-01 RX ORDER — FUROSEMIDE 10 MG/ML
40 INJECTION INTRAMUSCULAR; INTRAVENOUS EVERY 8 HOURS
Status: DISCONTINUED | OUTPATIENT
Start: 2019-01-01 | End: 2019-01-02

## 2019-01-01 RX ORDER — LORAZEPAM 2 MG/ML
INJECTION INTRAMUSCULAR
Status: COMPLETED
Start: 2019-01-01 | End: 2019-01-01

## 2019-01-01 RX ORDER — METOPROLOL TARTRATE 1 MG/ML
5 INJECTION, SOLUTION INTRAVENOUS EVERY 12 HOURS
Status: DISCONTINUED | OUTPATIENT
Start: 2019-01-01 | End: 2019-01-02

## 2019-01-01 RX ORDER — LORAZEPAM 2 MG/ML
1 INJECTION INTRAMUSCULAR ONCE
Status: COMPLETED | OUTPATIENT
Start: 2019-01-01 | End: 2019-01-01

## 2019-01-01 RX ADMIN — DEXTROSE MONOHYDRATE: 50 INJECTION, SOLUTION INTRAVENOUS at 08:43

## 2019-01-01 RX ADMIN — INSULIN ASPART 1 UNITS: 100 INJECTION, SOLUTION INTRAVENOUS; SUBCUTANEOUS at 08:35

## 2019-01-01 RX ADMIN — ACETAMINOPHEN 650 MG: 325 TABLET, FILM COATED ORAL at 05:54

## 2019-01-01 RX ADMIN — METOPROLOL TARTRATE 5 MG: 5 INJECTION, SOLUTION INTRAVENOUS at 21:23

## 2019-01-01 RX ADMIN — POTASSIUM CHLORIDE 20 MEQ: 29.8 INJECTION, SOLUTION INTRAVENOUS at 16:39

## 2019-01-01 RX ADMIN — POTASSIUM CHLORIDE 20 MEQ: 29.8 INJECTION, SOLUTION INTRAVENOUS at 23:45

## 2019-01-01 RX ADMIN — CHLORHEXIDINE GLUCONATE 15 ML: 1.2 RINSE ORAL at 08:29

## 2019-01-01 RX ADMIN — Medication 0.2 MG: at 18:13

## 2019-01-01 RX ADMIN — FUROSEMIDE 40 MG: 10 INJECTION, SOLUTION INTRAVENOUS at 17:13

## 2019-01-01 RX ADMIN — ENOXAPARIN SODIUM 60 MG: 60 INJECTION SUBCUTANEOUS at 12:09

## 2019-01-01 RX ADMIN — ACETAMINOPHEN 650 MG: 325 TABLET, FILM COATED ORAL at 00:20

## 2019-01-01 RX ADMIN — ENOXAPARIN SODIUM 60 MG: 60 INJECTION SUBCUTANEOUS at 00:20

## 2019-01-01 RX ADMIN — METOPROLOL TARTRATE 25 MG: 100 TABLET ORAL at 08:44

## 2019-01-01 RX ADMIN — POTASSIUM CHLORIDE 20 MEQ: 29.8 INJECTION, SOLUTION INTRAVENOUS at 18:13

## 2019-01-01 RX ADMIN — MULTIVITAMIN 15 ML: LIQUID ORAL at 08:44

## 2019-01-01 RX ADMIN — Medication 0.2 MG: at 20:04

## 2019-01-01 RX ADMIN — LORAZEPAM 1 MG: 2 INJECTION, SOLUTION INTRAMUSCULAR; INTRAVENOUS at 23:43

## 2019-01-01 RX ADMIN — FUROSEMIDE 20 MG: 10 INJECTION, SOLUTION INTRAVENOUS at 08:44

## 2019-01-01 RX ADMIN — RANITIDINE HYDROCHLORIDE 150 MG: 150 SOLUTION ORAL at 08:44

## 2019-01-01 RX ADMIN — LORAZEPAM 1 MG: 2 INJECTION INTRAMUSCULAR at 23:43

## 2019-01-01 ASSESSMENT — ACTIVITIES OF DAILY LIVING (ADL)
ADLS_ACUITY_SCORE: 32
ADLS_ACUITY_SCORE: 33

## 2019-01-01 ASSESSMENT — MIFFLIN-ST. JEOR: SCORE: 1515.38

## 2019-01-01 ASSESSMENT — PAIN DESCRIPTION - DESCRIPTORS: DESCRIPTORS: HEADACHE
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
## 2019-01-01 NOTE — PROGRESS NOTES
Pt was extubated @ 12:20am to BIPAP 12/5 40% RR 12. BBS clear equally, no stridor. Pt feels comfortable and denies shortness of breath. Vital signs stable. SpO2 98%. Will continue to follow.

## 2019-01-01 NOTE — PROGRESS NOTES
On PS 7/5 40% for 80 minutes with RR in high 20's but comfortable and denies shortness of breath.  O2 saturations and HR unchanged. VBG essentially unchanged c/w earlier today on full ventilator support    P: extubate to bipap.      -------------------------------------------------------  12:37 PM  Updated Note    Doing well on bipap 12/5 with  and comfortable.  Will plan on at least 50% on bipap in next 24 hours.

## 2019-01-01 NOTE — PLAN OF CARE
Remains vented, VSS. PRN tylenol for headaches. Straight cathed x 3, good urine output. Liquid stool from ostomy. Temp normalized overnight.

## 2019-01-02 ENCOUNTER — APPOINTMENT (OUTPATIENT)
Dept: SPEECH THERAPY | Facility: CLINIC | Age: 54
DRG: 870 | End: 2019-01-02
Attending: NURSE PRACTITIONER
Payer: MEDICARE

## 2019-01-02 LAB
ANION GAP SERPL CALCULATED.3IONS-SCNC: 7 MMOL/L (ref 3–14)
BACTERIA SPEC CULT: ABNORMAL
BUN SERPL-MCNC: 11 MG/DL (ref 7–30)
CALCIUM SERPL-MCNC: 7.4 MG/DL (ref 8.5–10.1)
CHLORIDE SERPL-SCNC: 108 MMOL/L (ref 94–109)
CO2 SERPL-SCNC: 29 MMOL/L (ref 20–32)
CREAT SERPL-MCNC: 0.24 MG/DL (ref 0.52–1.04)
ERYTHROCYTE [DISTWIDTH] IN BLOOD BY AUTOMATED COUNT: 20.4 % (ref 10–15)
GFR SERPL CREATININE-BSD FRML MDRD: >90 ML/MIN/{1.73_M2}
GLUCOSE BLDC GLUCOMTR-MCNC: 108 MG/DL (ref 70–99)
GLUCOSE BLDC GLUCOMTR-MCNC: 81 MG/DL (ref 70–99)
GLUCOSE SERPL-MCNC: 78 MG/DL (ref 70–99)
HCT VFR BLD AUTO: 26.6 % (ref 35–47)
HGB BLD-MCNC: 8.2 G/DL (ref 11.7–15.7)
MAGNESIUM SERPL-MCNC: 2.1 MG/DL (ref 1.6–2.3)
MCH RBC QN AUTO: 27.2 PG (ref 26.5–33)
MCHC RBC AUTO-ENTMCNC: 30.8 G/DL (ref 31.5–36.5)
MCV RBC AUTO: 88 FL (ref 78–100)
PHOSPHATE SERPL-MCNC: 3 MG/DL (ref 2.5–4.5)
PLATELET # BLD AUTO: 97 10E9/L (ref 150–450)
POTASSIUM SERPL-SCNC: 3.4 MMOL/L (ref 3.4–5.3)
POTASSIUM SERPL-SCNC: 3.7 MMOL/L (ref 3.4–5.3)
RBC # BLD AUTO: 3.02 10E12/L (ref 3.8–5.2)
SODIUM SERPL-SCNC: 144 MMOL/L (ref 133–144)
SPECIMEN SOURCE: ABNORMAL
WBC # BLD AUTO: 7.5 10E9/L (ref 4–11)

## 2019-01-02 PROCEDURE — 00000146 ZZHCL STATISTIC GLUCOSE BY METER IP

## 2019-01-02 PROCEDURE — 25000132 ZZH RX MED GY IP 250 OP 250 PS 637: Mod: GY | Performed by: HOSPITALIST

## 2019-01-02 PROCEDURE — 40000225 ZZH STATISTIC SLP WARD VISIT: Performed by: SPEECH-LANGUAGE PATHOLOGIST

## 2019-01-02 PROCEDURE — 25000128 H RX IP 250 OP 636: Performed by: NURSE PRACTITIONER

## 2019-01-02 PROCEDURE — 84132 ASSAY OF SERUM POTASSIUM: CPT | Performed by: SURGERY

## 2019-01-02 PROCEDURE — 25000128 H RX IP 250 OP 636: Performed by: INTERNAL MEDICINE

## 2019-01-02 PROCEDURE — 25000128 H RX IP 250 OP 636

## 2019-01-02 PROCEDURE — 25000128 H RX IP 250 OP 636: Performed by: HOSPITALIST

## 2019-01-02 PROCEDURE — 25000132 ZZH RX MED GY IP 250 OP 250 PS 637: Mod: GY | Performed by: INTERNAL MEDICINE

## 2019-01-02 PROCEDURE — 20000003 ZZH R&B ICU

## 2019-01-02 PROCEDURE — 85027 COMPLETE CBC AUTOMATED: CPT | Performed by: INTERNAL MEDICINE

## 2019-01-02 PROCEDURE — 84100 ASSAY OF PHOSPHORUS: CPT | Performed by: INTERNAL MEDICINE

## 2019-01-02 PROCEDURE — 92610 EVALUATE SWALLOWING FUNCTION: CPT | Mod: GN | Performed by: SPEECH-LANGUAGE PATHOLOGIST

## 2019-01-02 PROCEDURE — 80048 BASIC METABOLIC PNL TOTAL CA: CPT | Performed by: INTERNAL MEDICINE

## 2019-01-02 PROCEDURE — 92526 ORAL FUNCTION THERAPY: CPT | Mod: GN | Performed by: SPEECH-LANGUAGE PATHOLOGIST

## 2019-01-02 PROCEDURE — A9270 NON-COVERED ITEM OR SERVICE: HCPCS | Mod: GY | Performed by: SURGERY

## 2019-01-02 PROCEDURE — 25000132 ZZH RX MED GY IP 250 OP 250 PS 637: Mod: GY | Performed by: SURGERY

## 2019-01-02 PROCEDURE — 25000125 ZZHC RX 250: Performed by: SURGERY

## 2019-01-02 PROCEDURE — A9270 NON-COVERED ITEM OR SERVICE: HCPCS | Mod: GY | Performed by: INTERNAL MEDICINE

## 2019-01-02 PROCEDURE — A9270 NON-COVERED ITEM OR SERVICE: HCPCS | Mod: GY | Performed by: HOSPITALIST

## 2019-01-02 PROCEDURE — 83735 ASSAY OF MAGNESIUM: CPT | Performed by: INTERNAL MEDICINE

## 2019-01-02 PROCEDURE — 40000239 ZZH STATISTIC VAT ROUNDS

## 2019-01-02 RX ORDER — TRAZODONE HYDROCHLORIDE 50 MG/1
100 TABLET, FILM COATED ORAL AT BEDTIME
Status: DISCONTINUED | OUTPATIENT
Start: 2019-01-02 | End: 2019-01-02

## 2019-01-02 RX ORDER — FUROSEMIDE 20 MG
20 TABLET ORAL DAILY
Status: DISCONTINUED | OUTPATIENT
Start: 2019-01-03 | End: 2019-01-06

## 2019-01-02 RX ORDER — LORAZEPAM 2 MG/ML
INJECTION INTRAMUSCULAR
Status: COMPLETED
Start: 2019-01-02 | End: 2019-01-02

## 2019-01-02 RX ORDER — LORAZEPAM 2 MG/ML
1 INJECTION INTRAMUSCULAR ONCE
Status: COMPLETED | OUTPATIENT
Start: 2019-01-02 | End: 2019-01-02

## 2019-01-02 RX ORDER — TRAZODONE HYDROCHLORIDE 100 MG/1
100 TABLET ORAL AT BEDTIME
Status: DISCONTINUED | OUTPATIENT
Start: 2019-01-02 | End: 2019-01-06

## 2019-01-02 RX ORDER — METOPROLOL TARTRATE 25 MG/1
25 TABLET, FILM COATED ORAL 2 TIMES DAILY
Status: DISCONTINUED | OUTPATIENT
Start: 2019-01-02 | End: 2019-01-03

## 2019-01-02 RX ADMIN — Medication 0.2 MG: at 21:20

## 2019-01-02 RX ADMIN — FUROSEMIDE 40 MG: 10 INJECTION, SOLUTION INTRAVENOUS at 08:27

## 2019-01-02 RX ADMIN — APIXABAN 5 MG: 5 TABLET, FILM COATED ORAL at 23:43

## 2019-01-02 RX ADMIN — ENOXAPARIN SODIUM 60 MG: 60 INJECTION SUBCUTANEOUS at 13:42

## 2019-01-02 RX ADMIN — FUROSEMIDE 40 MG: 10 INJECTION, SOLUTION INTRAVENOUS at 01:35

## 2019-01-02 RX ADMIN — METOPROLOL TARTRATE 5 MG: 5 INJECTION, SOLUTION INTRAVENOUS at 08:27

## 2019-01-02 RX ADMIN — LORAZEPAM 1 MG: 2 INJECTION INTRAMUSCULAR; INTRAVENOUS at 02:13

## 2019-01-02 RX ADMIN — ENOXAPARIN SODIUM 60 MG: 60 INJECTION SUBCUTANEOUS at 01:35

## 2019-01-02 RX ADMIN — POTASSIUM CHLORIDE 20 MEQ: 29.8 INJECTION, SOLUTION INTRAVENOUS at 01:36

## 2019-01-02 RX ADMIN — POTASSIUM CHLORIDE 20 MEQ: 29.8 INJECTION, SOLUTION INTRAVENOUS at 05:28

## 2019-01-02 RX ADMIN — TRAZODONE HYDROCHLORIDE 100 MG: 50 TABLET ORAL at 23:43

## 2019-01-02 RX ADMIN — LORAZEPAM 1 MG: 2 INJECTION INTRAMUSCULAR at 02:13

## 2019-01-02 RX ADMIN — METOPROLOL TARTRATE 25 MG: 25 TABLET ORAL at 21:20

## 2019-01-02 RX ADMIN — ACETAMINOPHEN 650 MG: 325 TABLET, FILM COATED ORAL at 19:39

## 2019-01-02 ASSESSMENT — ACTIVITIES OF DAILY LIVING (ADL)
ADLS_ACUITY_SCORE: 28

## 2019-01-02 ASSESSMENT — MIFFLIN-ST. JEOR: SCORE: 1518.38

## 2019-01-02 NOTE — PROGRESS NOTES
CLINICAL NUTRITION SERVICES - REASSESSMENT NOTE      Malnutrition: (12/21)  % Weight Loss:  None noted  % Intake:  </= 50% for >/= 5 days (severe malnutrition)  Subcutaneous Fat Loss:  None observed  Muscle Loss:  None observed  Fluid Retention:  4+ generalized anasarca/likely partly nutrition related      Malnutrition Diagnosis: Severe malnutrition in the context of acute illness       EVALUATION OF PROGRESS TOWARD GOALS   Diet: Full Liquid Nectar Thick (initiated 1/2)  Nutrition Support:  Patient started on TF 12/26, advanced to goal on 12/28, but off 1/1 for extubation.  Now on diet per SLP (as above)  Intake:  Noted 60 mL po so far today (only to take small amounts via spoon per SLP)      ASSESSED NUTRITION NEEDS:  Dosing Weight: 64 kg (per admit wt 12/12)   Estimated Energy Needs: 3640-9324 Kcal (25-30 Kcal/kg) - wound and paraplegia  Estimated Protein Needs:   grams protein (1.5-1.8 g pro/Kg) - wound healing and hypercatabolism with acute illness      NEW FINDINGS:   1/1:  Extubated     I/O 1555/6315, weight up to 85.4 kg (up 21.4 kg from admit) - On IV Lasix   BGM < 150 - High sliding scale insulin   Stool 25 mL   Patient receiving Certavite daily per [facility] Protocol     Noted change to DNR/DNI status     Previous Goals (12/26):   Patient will achieve goal TF within the next 2-3 days   Evaluation: Met    Previous Nutrition Diagnosis (12/26):   Inadequate protein-energy intake related to NPO, TPN remains at step #1 due to refeeding risk as evidenced by meeting 2/3 protein and 3/4 energy needs via current regimen   Evaluation: Declining      CURRENT NUTRITION DIAGNOSIS  Inadequate protein-energy intake related to TF off yesterday with extubation, now on thickened liquids via spoon only as evidenced by meeting minimal needs (60 mL po so far today)    INTERVENTIONS  Recommendations / Nutrition Prescription  Continue Full Liquid Nectar Thick diet as tolerated - advance diet per SLP      Implementation  Collaboration and Referral of Nutrition care:  Patient discussed today during interdisciplinary bedside rounds     Goals  Diet will advance within the next 48 hours     MONITORING AND EVALUATION:  Progress towards goals will be monitored and evaluated per protocol and Practice Guidelines    Susanna Tao RD, LD, CNSC   Clinical Dietitian - [facility]
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
## 2019-01-03 ENCOUNTER — APPOINTMENT (OUTPATIENT)
Dept: SPEECH THERAPY | Facility: CLINIC | Age: 54
DRG: 870 | End: 2019-01-03
Attending: NURSE PRACTITIONER
Payer: MEDICARE

## 2019-01-03 LAB
BACTERIA SPEC CULT: NORMAL
GLUCOSE BLDC GLUCOMTR-MCNC: 111 MG/DL (ref 70–99)
GLUCOSE BLDC GLUCOMTR-MCNC: 75 MG/DL (ref 70–99)
GLUCOSE BLDC GLUCOMTR-MCNC: 85 MG/DL (ref 70–99)
GLUCOSE BLDC GLUCOMTR-MCNC: 87 MG/DL (ref 70–99)
LACTATE BLD-SCNC: 1.4 MMOL/L (ref 0.7–2)
SPECIMEN SOURCE: NORMAL

## 2019-01-03 PROCEDURE — G0463 HOSPITAL OUTPT CLINIC VISIT: HCPCS

## 2019-01-03 PROCEDURE — 83605 ASSAY OF LACTIC ACID: CPT | Performed by: INTERNAL MEDICINE

## 2019-01-03 PROCEDURE — 92526 ORAL FUNCTION THERAPY: CPT | Mod: GN | Performed by: SPEECH-LANGUAGE PATHOLOGIST

## 2019-01-03 PROCEDURE — 25000132 ZZH RX MED GY IP 250 OP 250 PS 637: Mod: GY | Performed by: INTERNAL MEDICINE

## 2019-01-03 PROCEDURE — A9270 NON-COVERED ITEM OR SERVICE: HCPCS | Mod: GY | Performed by: INTERNAL MEDICINE

## 2019-01-03 PROCEDURE — A9270 NON-COVERED ITEM OR SERVICE: HCPCS | Mod: GY | Performed by: SURGERY

## 2019-01-03 PROCEDURE — 00000146 ZZHCL STATISTIC GLUCOSE BY METER IP

## 2019-01-03 PROCEDURE — 25000128 H RX IP 250 OP 636: Performed by: INTERNAL MEDICINE

## 2019-01-03 PROCEDURE — 40000239 ZZH STATISTIC VAT ROUNDS

## 2019-01-03 PROCEDURE — 25000132 ZZH RX MED GY IP 250 OP 250 PS 637: Mod: GY | Performed by: SURGERY

## 2019-01-03 PROCEDURE — 12000000 ZZH R&B MED SURG/OB

## 2019-01-03 PROCEDURE — 40000225 ZZH STATISTIC SLP WARD VISIT: Performed by: SPEECH-LANGUAGE PATHOLOGIST

## 2019-01-03 RX ORDER — METOPROLOL TARTRATE 50 MG
50 TABLET ORAL 2 TIMES DAILY
Status: DISCONTINUED | OUTPATIENT
Start: 2019-01-03 | End: 2019-01-06

## 2019-01-03 RX ADMIN — SODIUM CHLORIDE: 9 INJECTION, SOLUTION INTRAVENOUS at 20:03

## 2019-01-03 RX ADMIN — METOPROLOL TARTRATE 25 MG: 25 TABLET ORAL at 08:00

## 2019-01-03 RX ADMIN — APIXABAN 5 MG: 5 TABLET, FILM COATED ORAL at 13:28

## 2019-01-03 RX ADMIN — FUROSEMIDE 20 MG: 20 TABLET ORAL at 08:00

## 2019-01-03 RX ADMIN — TRAZODONE HYDROCHLORIDE 100 MG: 50 TABLET ORAL at 21:08

## 2019-01-03 RX ADMIN — METOPROLOL TARTRATE 50 MG: 50 TABLET ORAL at 21:07

## 2019-01-03 ASSESSMENT — ACTIVITIES OF DAILY LIVING (ADL)
ADLS_ACUITY_SCORE: 28

## 2019-01-03 ASSESSMENT — PAIN DESCRIPTION - DESCRIPTORS: DESCRIPTORS: ACHING

## 2019-01-03 ASSESSMENT — MIFFLIN-ST. JEOR: SCORE: 1475.38

## 2019-01-03 NOTE — PROGRESS NOTES
[facility]    Urology Progress Note     Assessment & Plan   Felicia Ellison is a 53 year old female who was admitted on 12/12/2018. Patient with Mitrofanoff urinary diversion, urology originally consulted given concerns for urinary obstruction with elevated PVRs; now re-consulted for evaluation and management of urinary leakage from urostomy site.     Plan:   -Renal fxn stable, continue to monitor along with UOP   -Mcintyre within urostomy/urinary diversion site replaced without issue, irrigated easily and good UOP-- urine is clear. Would recommend continuing with mcintyre and urostomy pouch for leakage.   -Originally discussed with RN to irrigate mcintyre catheter qshift for mucous-- after discussion with Dr. Tom, unlikely mucous will be in issue so would only recommend irrigations prn if mcintyre not draining well.   -If patient begins to feel full or mcintyre appears to not be draining well, RN to manipulate mcintyre position (can push further into diversion site) and irrigate until good output or easy irrigation.     Will reevaluate tomorrow AM.     Continue mcintyre until patient returns to baseline and out of ICU, then will attempt resuming intermittent self catheterizations.     Vera Xiao PA-C  [facility]  [email or web address]  Text Page (7am to 5pm)    Interval History   Urology asked to re-evaluate patient, now with concerns for urinary leakage at diversion/urostomy site and catheter within urostomy only intermittently draining.   Patient does note she feels full at times, like her urine is not adequately draining. Currently, she denies any discomfort.     AVSS  Creat stable     Physical Exam   Temp: 98.6  F (37  C) Temp src: Axillary BP: 141/68   Heart Rate: 103 Resp: (!) 34 SpO2: 95 % O2 Device: Nasal cannula Oxygen Delivery: 5 LPM  Vitals:    01/01/19 0400 01/02/19 0630 01/03/19 0600   Weight: 85.1 kg (187 lb 9.8 oz) 85.4 kg (188 lb 4.4 oz) 81.1 kg (178  lb 12.7 oz)     Vital Signs with Ranges  Temp:  [98  F (36.7  C)-99.1  F (37.3  C)] 98.6  F (37  C)  Heart Rate:  [] 103  Resp:  [17-46] 34  BP: (119-178)/(52-97) 141/68  SpO2:  [89 %-100 %] 95 %  I/O last 3 completed shifts:  In: 495 [P.O.:300; I.V.:195]  Out: 1040 [Urine:940; Stool:100]    Constitutional: Sitting up in bed, NA  Respiratory: breathing somewhat labored   GI: soft, NT, mildly distended  Genitourinary: urinary diversion site in right abdomen. Mcintyre replaced with ostomy appliance around site (see below procedure note). Urine clear/yellow.  Neuropsychiatric: Alert     PROCEDURE:   After removal of current mcintyre within urostomy and removal of ostomy appliance, betadine was used to sterilize the site. A well lubricated 16F mcintyre was inserted into the urostomy site without resistance. It was inserted approximately half the length of the catheter and then irrigated with NS, 30-40cc at a time, returns were clear/yellow and without mucous. An ostomy appliance was then applied over the diversion site with the mcintyre looped inside the bag. The ostomy appliance was connected to catheter bag tubing and she rapidly put out approximately 200cc of yellow urine.     Medications     IV fluid REPLACEMENT ONLY       sodium chloride Stopped (01/03/19 0930)       apixaban ANTICOAGULANT  5 mg Oral BID    Followed by     [START ON 1/10/2019] apixaban ANTICOAGULANT  5 mg Oral BID     chlorhexidine  15 mL Mouth/Throat Q12H     furosemide  20 mg Oral Daily     influenza vaccine adult (product based on age)  0.5 mL Intramuscular Prior to discharge     insulin aspart  1-12 Units Subcutaneous Q4H     metoprolol tartrate  50 mg Oral BID     multivitamins w/minerals  15 mL Per Feeding Tube Daily     traZODone  100 mg Oral At Bedtime       Data   Results for orders placed or performed during the hospital encounter of 12/12/18 (from the past 24 hour(s))   Glucose by meter   Result Value Ref Range    Glucose 108 (H) 70 - 99 mg/dL    Glucose by meter   Result Value Ref Range    Glucose 87 70 - 99 mg/dL   Glucose by meter   Result Value Ref Range    Glucose 75 70 - 99 mg/dL   Glucose by meter   Result Value Ref Range    Glucose 85 70 - 99 mg/dL

## 2019-01-03 NOTE — PLAN OF CARE
Pt alert and oriented x4. On NC 5L. Clear lung sounds. VS stable throughout night. Replaced mcintyre catheter into ileal conduit. Draining appropriately. Colostomy bag emptied two times this shift. Will transfer to floor today.

## 2019-01-03 NOTE — PLAN OF CARE
Discharge Planner SLP   Patient plan for discharge: Did not state  Current status: SLP: Pt alert with family present. Pt requesting thin liquids and more solid foods (regular foods at baseline without lower dentures). Thin liquids and ice chips trialed with good timing/initiation and no overt s/sx of aspiration during the trials. Pt tolerated puree and semi-solids. Solid arlin cracker trialed with prolonged oral management and increased work of breathing. Pt/family verbalized agreement with recommended: upgrade to dysphagia diet level 2 and thin liquids (straws ok) 1:1 assist for feeding with pt sitting fully upright. Slow rate, small bites and sips, alternate solids and liquids.  Barriers to return to prior living situation: Deconditioning  Recommendations for discharge: Per Care Team  Rationale for recommendations: Pt will likely meet SLP goals during hospitalization       Entered by: Violet Singh 01/03/2019 3:24 PM

## 2019-01-03 NOTE — PLAN OF CARE
Pt A&Ox4. VSS on 5l NC. LS coarse. Colostomy patent. Ileal conduit leaking, balloon deflated and advanced, secured deeply and leakage resolved. SLP ordered full liquid diet with nectar thick liquids, pt tolerating well. To tx to floor tomorrow. Will cont to monitor.
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
## 2019-01-03 NOTE — PROGRESS NOTES
[facility]    Hospitalist Progress Note/ICU signout note    Assessment & Plan   Felicia Ellison is a 53 year old female who was admitted on 12/12/2018.  She was admitted to the floor with concerns of pneumonia and later developed acute respiratory failure.  She is a  paraplegic.  In the emergency room during her admission she had a CT chest was negative for PE but indicated bilateral pleural effusions and possible pericardial effusion, a transthoracic echocardiogram was performed and it showed normal LV function and no significant valvular abnormalities.  She had a large pericardial effusion without signs of tamponade noted in the echo.  Following admission on 12/13 she developed fever of 101.3 and also became persistently hypotensive with the worsening hypoxia.  The patient was transferred to ICU the same night and later on she got intubated.  Patient was seen by infectious disease and was on multiple IV antibiotics.  Patient had an extensive stay in the ICU of 22 days.  Acute respiratory failure  --Patient had developed ARDS when was on ventilator for total 19 days and got extubated 1/1.  Currently tolerating 5 L of O2 and she has decided to be DNI for future needs  --Continue nebs, diuresis, wean oxygen as possible  --She had received Diamox for short while  --Continue Lasix 20 mg once a day  --Her weight remains higher than admission weight but patient appears to be adequately diuresed.  Septic shock  --Off pressors and antibiotics since 1 week  --No positive cultures or fever noted up until 1/1  --Current plan is to monitor off antibiotics  --Leukopenia noted, please monitor  Few episodes of SVT  --Continue metoprolol 50 mg twice daily  Paraplegia with neurogenic bladder  Chronic ileostomy and ileal conduit  --Concern of drainage around her ileal conduit noted  --Had intermittent catheterization via a 14 F Lorene-cath catheter -- but was leaking and having high residuals so mcintyre replaced.   Still leaking--so urostomy bag placed.  Decubitus ulcers  --Wound care RN to follow  Pericardial effusion  --Resolved  Anemia of chronic disease  --Monitor hemoglobin, received 1 unit PRBC on 12/23  Thrombocytopenia  --Stable  Left hip effusion on CT-no infection  Chronic pain  --continue prn medications   Right upper extremity DVT with history of portal vein thrombosis  Right upper extremity nonocclusive DVT in right subclavian and IJ  -- she was on warfarin for a while and family had switched to Lovenox due to difficulty of maintaining her INR  --She was started on Eliquis 5 mg twice daily-plan is to change it into 2.5 mg twice daily after 1 month  --She has currently PICC line Access   --She received Lovenox at treatment dose for 2 weeks already.  Decubitus ulcer   --Right achilles pressure ulcer, at least Stage III  --Coccyx proximal stage III pressure ulcer, wound care RN consult   Emaciation due to Malnutrition  --  Wt. loss due to decreased intake, anorexia,  starvation, malnutrition  --Severe malnutrition in the context of acute illness  DVT Prophylaxis: On Eliquis  Code Status: DNR/DNI     Disposition: Expected discharge in 4-5 days to TCU versus LTAC    Tasha Way MD  Text Page   (7am to 6pm)    Interval History   Patient is feeling much better today, she is on 5 L nasal cannula oxygen, tired, tolerating diet okay.  -Data reviewed today: I reviewed all new labs and imaging results over the last 24 hours.     Physical Exam   Temp: 98.6  F (37  C) Temp src: Axillary BP: (!) 178/97   Heart Rate: 87 Resp: (!) 32 SpO2: 90 % O2 Device: Nasal cannula Oxygen Delivery: 5 LPM  Vitals:    01/01/19 0400 01/02/19 0630 01/03/19 0600   Weight: 85.1 kg (187 lb 9.8 oz) 85.4 kg (188 lb 4.4 oz) 81.1 kg (178 lb 12.7 oz)     Vital Signs with Ranges  Temp:  [98  F (36.7  C)-99.1  F (37.3  C)] 98.6  F (37  C)  Heart Rate:  [] 87  Resp:  [17-46] 32  BP: (119-178)/(52-97) 178/97  SpO2:  [90 %-100 %] 90 %  I/O last 3  completed shifts:  In: 420 [P.O.:180; I.V.:240]  Out: 1205 [Urine:1105; Stool:100]    Constitutional: Awake, alert, cooperative, tired looking  Respiratory: Bilateral basilar crackles present  Cardiovascular: Regular rate and rhythm, normal S1 and S2, and no murmur noted  GI: Normal bowel sounds, soft, non-distended, non-tender  Skin/Integumen: No rashes, no cyanosis, 2+ lower extremity edema present  Neuro : Paraplegic, some contractions bilateral lower extremity   Medications     IV fluid REPLACEMENT ONLY       sodium chloride Stopped (01/03/19 0930)       apixaban ANTICOAGULANT  5 mg Oral BID    Followed by     [START ON 1/10/2019] apixaban ANTICOAGULANT  5 mg Oral BID     chlorhexidine  15 mL Mouth/Throat Q12H     furosemide  20 mg Oral Daily     influenza vaccine adult (product based on age)  0.5 mL Intramuscular Prior to discharge     insulin aspart  1-12 Units Subcutaneous Q4H     metoprolol tartrate  50 mg Oral BID     multivitamins w/minerals  15 mL Per Feeding Tube Daily     traZODone  100 mg Oral At Bedtime       Data   Recent Labs   Lab 01/02/19  0835 01/02/19  0438 01/01/19  2248 01/01/19  1128 01/01/19  0430 12/31/18  1219 12/31/18  0406   WBC  --  7.5  --   --  10.6  --  15.5*   HGB  --  8.2*  --   --  7.6* 7.7* 6.8*   MCV  --  88  --   --  88  --  91   PLT  --  97*  --   --  77*  --  98*   INR  --   --   --   --   --   --  1.42*   NA  --  144  --  141  --   --  148*   POTASSIUM 3.7 3.4 3.1* 3.0*  --   --  4.2   CHLORIDE  --  108  --  106  --   --  113*   CO2  --  29  --  28  --   --  31   BUN  --  11  --  16  --   --  20   CR  --  0.24*  --  0.28*  --   --  0.31*   ANIONGAP  --  7  --  7  --   --  4   JOSH  --  7.4*  --  7.5*  --   --  7.2*   GLC  --  78  --  142*  --   --  147*   ALBUMIN  --   --   --   --   --   --  1.9*   PROTTOTAL  --   --   --   --   --   --  5.4*   BILITOTAL  --   --   --   --   --   --  0.5   ALKPHOS  --   --   --   --   --   --  237*   ALT  --   --   --   --   --   --  13    AST  --   --   --   --   --   --  12     Recent Labs   Lab 01/03/19  0758 01/03/19  0408 01/02/19  2354 01/02/19  1943 01/02/19  1340 01/02/19  0438  01/01/19  1128  12/31/18  0406  12/30/18  0450   GLC  --   --   --   --   --  78  --  142*  --  147*  --  141*   BGM 85 75 87 108* 81  --    < >  --    < >  --    < >  --     < > = values in this interval not displayed.       Imaging:   No results found for this or any previous visit (from the past 24 hour(s)).

## 2019-01-03 NOTE — PROVIDER NOTIFICATION
notified of pt's leaking and minimal UO mcintyre catheter that was placed into her ileal conduit. Removed mcintyre and straight cathed with 100 ml of urine output. Placed a 16f mcintyre catheter with excellent UO. MD now aware

## 2019-01-04 ENCOUNTER — APPOINTMENT (OUTPATIENT)
Dept: SPEECH THERAPY | Facility: CLINIC | Age: 54
DRG: 870 | End: 2019-01-04
Attending: HOSPITALIST
Payer: MEDICARE

## 2019-01-04 LAB
GLUCOSE BLDC GLUCOMTR-MCNC: 100 MG/DL (ref 70–99)
GLUCOSE BLDC GLUCOMTR-MCNC: 120 MG/DL (ref 70–99)
GLUCOSE BLDC GLUCOMTR-MCNC: 149 MG/DL (ref 70–99)
GLUCOSE BLDC GLUCOMTR-MCNC: 88 MG/DL (ref 70–99)

## 2019-01-04 PROCEDURE — 40000225 ZZH STATISTIC SLP WARD VISIT

## 2019-01-04 PROCEDURE — A9270 NON-COVERED ITEM OR SERVICE: HCPCS | Mod: GY | Performed by: SURGERY

## 2019-01-04 PROCEDURE — A9270 NON-COVERED ITEM OR SERVICE: HCPCS | Mod: GY | Performed by: INTERNAL MEDICINE

## 2019-01-04 PROCEDURE — 40000239 ZZH STATISTIC VAT ROUNDS

## 2019-01-04 PROCEDURE — 25000132 ZZH RX MED GY IP 250 OP 250 PS 637: Mod: GY | Performed by: INTERNAL MEDICINE

## 2019-01-04 PROCEDURE — 94640 AIRWAY INHALATION TREATMENT: CPT

## 2019-01-04 PROCEDURE — 25000132 ZZH RX MED GY IP 250 OP 250 PS 637: Mod: GY | Performed by: SURGERY

## 2019-01-04 PROCEDURE — A9270 NON-COVERED ITEM OR SERVICE: HCPCS | Mod: GY | Performed by: HOSPITALIST

## 2019-01-04 PROCEDURE — 25000132 ZZH RX MED GY IP 250 OP 250 PS 637: Mod: GY | Performed by: HOSPITALIST

## 2019-01-04 PROCEDURE — 00000146 ZZHCL STATISTIC GLUCOSE BY METER IP

## 2019-01-04 PROCEDURE — 92526 ORAL FUNCTION THERAPY: CPT | Mod: GN

## 2019-01-04 PROCEDURE — 99232 SBSQ HOSP IP/OBS MODERATE 35: CPT | Performed by: HOSPITALIST

## 2019-01-04 PROCEDURE — 25000125 ZZHC RX 250

## 2019-01-04 PROCEDURE — 12000000 ZZH R&B MED SURG/OB

## 2019-01-04 PROCEDURE — 99207 ZZC APP CREDIT; MD BILLING SHARED VISIT: CPT | Performed by: INTERNAL MEDICINE

## 2019-01-04 RX ORDER — ALBUTEROL SULFATE 0.83 MG/ML
SOLUTION RESPIRATORY (INHALATION)
Status: COMPLETED
Start: 2019-01-04 | End: 2019-01-04

## 2019-01-04 RX ORDER — FUROSEMIDE 20 MG
20 TABLET ORAL ONCE
Status: COMPLETED | OUTPATIENT
Start: 2019-01-04 | End: 2019-01-04

## 2019-01-04 RX ADMIN — FUROSEMIDE 20 MG: 20 TABLET ORAL at 23:07

## 2019-01-04 RX ADMIN — TRAZODONE HYDROCHLORIDE 100 MG: 50 TABLET ORAL at 20:07

## 2019-01-04 RX ADMIN — METOPROLOL TARTRATE 50 MG: 50 TABLET ORAL at 20:07

## 2019-01-04 RX ADMIN — Medication 1 MG: at 01:20

## 2019-01-04 RX ADMIN — ACETAMINOPHEN 650 MG: 325 TABLET, FILM COATED ORAL at 01:20

## 2019-01-04 RX ADMIN — ALBUTEROL SULFATE 2.5 MG: 2.5 SOLUTION RESPIRATORY (INHALATION) at 23:59

## 2019-01-04 RX ADMIN — Medication 1 MG: at 20:07

## 2019-01-04 RX ADMIN — APIXABAN 5 MG: 5 TABLET, FILM COATED ORAL at 01:06

## 2019-01-04 RX ADMIN — FUROSEMIDE 20 MG: 20 TABLET ORAL at 08:55

## 2019-01-04 RX ADMIN — METOPROLOL TARTRATE 50 MG: 50 TABLET ORAL at 08:55

## 2019-01-04 RX ADMIN — ACETAMINOPHEN 650 MG: 325 TABLET, FILM COATED ORAL at 12:20

## 2019-01-04 RX ADMIN — APIXABAN 5 MG: 5 TABLET, FILM COATED ORAL at 12:20

## 2019-01-04 ASSESSMENT — ACTIVITIES OF DAILY LIVING (ADL)
ADLS_ACUITY_SCORE: 28
ADLS_ACUITY_SCORE: 28
ADLS_ACUITY_SCORE: 29
ADLS_ACUITY_SCORE: 28

## 2019-01-04 NOTE — PLAN OF CARE
A&Ox4, slow to respond (baseline). VSS on 5L NC. Reports generalized aches, repositioning helps. Paraplegic, no sensation below waist. Up with lift, up to chair during day time. Multiple wounds to buttocks, bilat ears, bilat feet and heels, WOC following. Repo 2hr, boots on. L PICC, triple lumen, good blood return. PCU collect. Colostomy. Ileal conduit, covered with colostomy bag with catheter in stoma. TELE NSR/ST with occ PVCs. Bgm q4hr. DD2 diet with thin liquids, needs help to feed. Plan for dismissal to TCU in 4-5 days.

## 2019-01-04 NOTE — PROVIDER NOTIFICATION
MD Notification    Notified Person: MD    Notified Person Name: Juanjo     Notification Date/Time: 1/4/19 3852    Notification Interaction: web-paged     Purpose of Notification: Do you still want Q4 blood sugar checks and NS @ 10ml/hr? Eating and drinking appropriately. Not diabetic. Thank you      Orders Received:    Comments:

## 2019-01-04 NOTE — PROVIDER NOTIFICATION
MD Notification    Notified Person: MD    Notified Person Name: Dameon    Notification Date/Time:1621    Notification Interaction: No response    Purpose of Notification:Low UOP, only 100cc on day shift, bladder scanned for 208. Irrigated/manipulated position.    Orders Received:    Comments:

## 2019-01-04 NOTE — PROGRESS NOTES
CLINICAL NUTRITION SERVICES - REASSESSMENT NOTE      Recommendations Ordered by Registered Dietitian (RD):   Medical Food Supplement - will send Boost Plus for PM supplement; send ProStat drink with all meals and ProStat applesauce with lunch     Malnutrition: (12/21)  % Weight Loss:  None noted  % Intake:  </= 50% for >/= 5 days (severe malnutrition)  Subcutaneous Fat Loss:  None observed  Muscle Loss:  None observed  Fluid Retention:  4+ generalized anasarca/likely partly nutrition related      Malnutrition Diagnosis: Severe malnutrition in the context of acute illness       EVALUATION OF PROGRESS TOWARD GOALS   Diet:  DD2, thin liquids  Intake: Poor - she had mashed potatoes for lunch. She is willing to try some supplents.        NEW FINDINGS:   Weights are trending back down.  Vitals:    12/12/18 2100 12/13/18 0454 12/14/18 0600 12/15/18 0615   Weight: 64 kg (141 lb 1.5 oz) 56.9 kg (125 lb 7.1 oz) 84.4 kg (186 lb 1.1 oz) 86.8 kg (191 lb 5.8 oz)    12/16/18 0430 12/17/18 0400 12/19/18 0400 12/20/18 0645   Weight: 89.3 kg (196 lb 13.9 oz) 90.9 kg (200 lb 6.4 oz) 97.6 kg (215 lb 2.7 oz) 97.2 kg (214 lb 4.6 oz)    12/21/18 0000 12/24/18 0530 12/28/18 0645 12/29/18 0445   Weight: 95 kg (209 lb 7 oz) 91 kg (200 lb 9.9 oz) 86.9 kg (191 lb 9.3 oz) 86.9 kg (191 lb 9.3 oz)    12/31/18 0000 01/01/19 0400 01/02/19 0630 01/03/19 0600   Weight: 84.3 kg (185 lb 13.6 oz) 85.1 kg (187 lb 9.8 oz) 85.4 kg (188 lb 4.4 oz) 81.1 kg (178 lb 12.7 oz)         Previous Goals (1/2):   Diet will advance within the next 48 hours   Evaluation: Met    Previous Nutrition Diagnosis:   Inadequate protein-energy intake related to TF off yesterday with extubation, now on thickened liquids via spoon only as evidenced by meeting minimal needs (60 mL po so far today)  Evaluation: Improving      CURRENT NUTRITION DIAGNOSIS  Inadequate oral intake related to decreased appetite as evidenced by ordering minimally at  meals    INTERVENTIONS  Recommendations / Nutrition Prescription  Diet per SLP    Implementation  Composition of Meals and Snacks - encouraged pt to order milk with meals and other protein sources.  Medical Food Supplement - will send Boost Plus for PM supplement; send ProStat drink with all meals and ProStat applesauce with lunch    Goals  Pt will consume at least 75% of supplements      MONITORING AND EVALUATION:  Progress towards goals will be monitored and evaluated per protocol and Practice Guidelines    Robina Fall RD  Pager [phone] (M-F)            [phone] (W/E & Hol)

## 2019-01-04 NOTE — PLAN OF CARE
Discharge Planner SLP   Patient plan for discharge: Not stated this date.   Current status: Swallow Tx provided this AM. Pt tolerated semi-solids with thin liquids by cup and straw. No overt s/sx of aspiration noted. Pt maintained O2 stats between 93-96 during po intake, however via observation and pt report increased effortful breath was noted. SLP assessed tolerance of regular solids. Increased effortful breath was noted, as well as allowance of cracker to partically dissolve in oral cavity prior to swallow initation, resulting in prolonged oral phase of the swallow and increased fatigue. Recommend continuation of Dysphagia Diet Level 2 semi-solids with thin liquids (straws ok). Recommend use of safe swallow precautions sitting upright, small bites/sips, alternate liquids/solids. ST will continue to follow to ensure tolerance and advance diet as indicated.   Barriers to return to prior living situation: Weakness, level of assist   Recommendations for discharge: TCU  Rationale for recommendations: Skilled ST intervention indicated at next level of care to ensure safe po intake and advance diet as indicated.        Entered by: Joelle Barrera 01/04/2019 10:56 AM

## 2019-01-04 NOTE — PLAN OF CARE
A+Ox4, up with lift. Total cares, paraplegic. Turn/repo Q2. Colostomy intact. Illeal Conduit with catheter intact, low output. Tolerating DD2 with thins. Q4 B/149. Left PICC. LS clear, abd muscle use. Able to wean to 3L via NC from 5L oxymask. Tylenol x1 for generalized pain, with relief. All dressings CDI. Tele: NSR. IVF @ 10. Other VSS, denies SOB, n/v. Urology/speech following. Nursing will continue to monitor.    1515: UO total 100 for my shift. Manipulated catheter. Bladder scan 208. To irrigate and attempt appropriate catheter position.

## 2019-01-04 NOTE — PROGRESS NOTES
[facility]    Urology Progress Note     Assessment & Plan   Felicia Ellison is a 53 year old female who was admitted on 12/12/2018. Urology following for management of continent urinary diversion with native bladder, mcintyre currently in place through diversion as well as utilization of ostomy bag given leakage. Urine draining well.     Plan:   -Continue mcintyre through diversion site and ostomy device until patient back to baseline and ready for discharge, then resume intermittent self catheterizations   -RN to monitor for UOP, if catheter does not appear to be draining well or patient feeling full, irrigate and manipulate mcnityre positioning until draining/irrigating well.     Vera Xiao PA-C  [facility]  [email or web address]  Text Page (7am to 5pm)    Interval History   No issues with catheter, UOP adequate, clear in appearance   Now out of ICU   Denies pain     Physical Exam   Temp: 98.1  F (36.7  C) Temp src: Axillary BP: 131/72   Heart Rate: 87 Resp: 24 SpO2: 95 % O2 Device: Oxymask Oxygen Delivery: 4 LPM  Vitals:    01/01/19 0400 01/02/19 0630 01/03/19 0600   Weight: 85.1 kg (187 lb 9.8 oz) 85.4 kg (188 lb 4.4 oz) 81.1 kg (178 lb 12.7 oz)     Vital Signs with Ranges  Temp:  [97.3  F (36.3  C)-98.6  F (37  C)] 98.1  F (36.7  C)  Heart Rate:  [] 87  Resp:  [24-45] 24  BP: (131-155)/(68-79) 131/72  SpO2:  [86 %-98 %] 95 %  I/O last 3 completed shifts:  In: 409.5 [P.O.:350; I.V.:59.5]  Out: 945 [Urine:945]    Constitutional: Sitting up in bed, NA  Respiratory: breathing somewhat labored   GI: soft, NT, mildly distended  Genitourinary: urinary diversion site in right abdomen. Mcintyre in place with ostomy appliance around site. Urine clear/yellow.  Neuropsychiatric: Alert       Medications     IV fluid REPLACEMENT ONLY       sodium chloride 10 mL/hr at 01/03/19 2003       apixaban ANTICOAGULANT  5 mg Oral BID    Followed by     [START ON 1/10/2019]  apixaban ANTICOAGULANT  5 mg Oral BID     furosemide  20 mg Oral Daily     influenza vaccine adult (product based on age)  0.5 mL Intramuscular Prior to discharge     insulin aspart  1-12 Units Subcutaneous Q4H     metoprolol tartrate  50 mg Oral BID     multivitamins w/minerals  15 mL Per Feeding Tube Daily     traZODone  100 mg Oral At Bedtime       Data   Results for orders placed or performed during the hospital encounter of 12/12/18 (from the past 24 hour(s))   Lactic acid level STAT for sepsis protocol   Result Value Ref Range    Lactate for Sepsis Protocol 1.4 0.7 - 2.0 mmol/L   Glucose by meter   Result Value Ref Range    Glucose 111 (H) 70 - 99 mg/dL   Glucose by meter   Result Value Ref Range    Glucose 88 70 - 99 mg/dL   Glucose by meter   Result Value Ref Range    Glucose 120 (H) 70 - 99 mg/dL   Glucose by meter   Result Value Ref Range    Glucose 100 (H) 70 - 99 mg/dL

## 2019-01-04 NOTE — PROGRESS NOTES
[facility]    Medicine Progress Note - Hospitalist Service       Date of Admission:  12/12/2018  Assessment & Plan     Felicia Ellison is a 53 year old female who was admitted on 12/12/2018.  She was admitted to the floor with concerns of pneumonia and later developed acute respiratory failure.  She is a  paraplegic.  In the emergency room during her admission she had a CT chest was negative for PE but indicated bilateral pleural effusions and possible pericardial effusion, a transthoracic echocardiogram was performed and it showed normal LV function and no significant valvular abnormalities.  She had a large pericardial effusion without signs of tamponade noted in the echo.  Following admission on 12/13 she developed fever of 101.3 and also became persistently hypotensive with the worsening hypoxia.  The patient was transferred to ICU the same night and later on she got intubated.  Patient was seen by infectious disease and was on multiple IV antibiotics.  Patient had an extensive stay in the ICU of 22 days.  She was transferred out of the intensive care unit on January 3.    Acute respiratory failure  --Patient had developed ARDS when was on ventilator for total 19 days and got extubated 1/1.  Currently tolerating 5 L of O2 and she has decided to be DNI for future needs  --Continue nebs, diuresis, wean oxygen as possible  --She had received Diamox for short while  --Continue Lasix 20 mg once a day  --Her weight remains higher than admission weight but patient appears to be adequately diuresed.  January 4- no weight recorded today but yesterday she was down 4.3 kg from the previous day.    Septic shock  --Off pressors and antibiotics since 1 week  --No positive cultures or fever noted up until 1/1  --Current plan is to monitor off antibiotics  January 4- the patient is afebrile and her last white blood cell count was 7.5 thousand on January 2.      Episodes of SVT  --Continue metoprolol 50 mg  twice daily    Paraplegia with neurogenic bladder  Chronic ileostomy and ileal conduit  --Concern of drainage around her ileal conduit noted  --Had intermittent catheterization via a 14 F Lorene-cath catheter -- but was leaking and having high residuals so mcintyre replaced.  Still leaking--so urostomy bag placed.    Decubitus ulcers  --Wound care RN following    Pericardial effusion  --Resolved    Anemia of chronic disease  --Monitor hemoglobin, received 1 unit PRBC on 12/23 January 4- hemoglobin was 8.2 g on January 2.  We will repeat this in the morning.    Thrombocytopenia  Repeat platelet count tomorrow    Left hip effusion on CT-no infection  Chronic pain  --continue prn medications     Right upper extremity DVT with history of portal vein thrombosis  Right upper extremity nonocclusive DVT in right subclavian and IJ  -- she was on warfarin for a while and family had switched to Lovenox due to difficulty of maintaining her INR  --She was started on Eliquis 5 mg twice daily-plan is to change it into 2.5 mg twice daily after 1 month  --She has currently PICC line Access   --She received Lovenox at treatment dose for 2 weeks already.  January 4-she is on apixaban    DVT Prophylaxis: On apixaban  Code Status: DNR/DNI   Disposition: Expected discharge in 3-4 days to TCU versus LTAC  Diet: Combination Diet Dysphagia Diet Level 2: Mechan Altered; Thin Liquids (water, ice chips, juice, milk gelatin, ice cream, etc)  Snacks/Supplements Adult: Boost Plus; Between Meals  Snacks/Supplements Adult: Other; ProStat with H2O at all meals; ProStat with applesauce at lunch.; With Meals         The patient's care was discussed with the Patient.    Efrain Geiger MD  Hospitalist Service  [facility] [place]    ______________________________________________________________________    Interval History   No complaints     Data reviewed today: I reviewed all medications, new labs and imaging results over the last 24 hours.  I personally reviewed no images or EKG's today.    Physical Exam   Vital Signs: Temp: 98.1  F (36.7  C) Temp src: Axillary BP: 131/72   Heart Rate: 87 Resp: 20 SpO2: 95 % O2 Device: Nasal cannula Oxygen Delivery: 3 LPM  Weight: 178 lbs 12.69 oz  Constitutional: awake, alert, cooperative, no apparent distress, and appears stated age  Respiratory: No increased work of breathing, good air exchange, clear to auscultation bilaterally, no crackles or wheezing  Cardiovascular: Normal apical impulse, regular rate and rhythm, normal S1 and S2, no S3 or S4, and no murmur noted  Skin: no rashes  Neurologic: Awake, alert, oriented to name, place and time.  Cranial nerves II-XII are grossly intact.  Motor is 5 out of 5 bilaterally.  Cerebellar finger to nose, heel to shin intact.  Sensory is intact.  Babinski down going, Romberg negative, and gait is normal.  Neuropsychiatric: General: normal, calm and normal eye contact    Data   Recent Labs   Lab 01/02/19  0835 01/02/19  0438 01/01/19  2248 01/01/19  1128 01/01/19  0430 12/31/18  1219 12/31/18  0406   WBC  --  7.5  --   --  10.6  --  15.5*   HGB  --  8.2*  --   --  7.6* 7.7* 6.8*   MCV  --  88  --   --  88  --  91   PLT  --  97*  --   --  77*  --  98*   INR  --   --   --   --   --   --  1.42*   NA  --  144  --  141  --   --  148*   POTASSIUM 3.7 3.4 3.1* 3.0*  --   --  4.2   CHLORIDE  --  108  --  106  --   --  113*   CO2  --  29  --  28  --   --  31   BUN  --  11  --  16  --   --  20   CR  --  0.24*  --  0.28*  --   --  0.31*   ANIONGAP  --  7  --  7  --   --  4   JOSH  --  7.4*  --  7.5*  --   --  7.2*   GLC  --  78  --  142*  --   --  147*   ALBUMIN  --   --   --   --   --   --  1.9*   PROTTOTAL  --   --   --   --   --   --  5.4*   BILITOTAL  --   --   --   --   --   --  0.5   ALKPHOS  --   --   --   --   --   --  237*   ALT  --   --   --   --   --   --  13   AST  --   --   --   --   --   --  12     No results found for this or any previous visit (from the past 24  hour(s)).

## 2019-01-04 NOTE — PLAN OF CARE
A&Ox4, slow to respond (baseline). VSS on 5L NC. C/o lower back pain, prn tylenol given with relief, Paraplegic, no sensation below waist. Up with 2 assist/lift. Multiple wounds to buttocks, bilat ears, bilat feet and heels, WOC following. Repo 2hr, boots on. L PICC, triple lumen, Colostomy. Ileal conduit, covered with colostomy bag with catheter in stoma.tele NSR. Bgm q4hr 88 and 120. DD2 diet with thin liquids, needs help to feed. Plan for TCU at discharge.

## 2019-01-04 NOTE — PROVIDER NOTIFICATION
MD Notification    Notified Person: MD    Notified Person Name: Juanjo    Notification Date/Time: 1630    Notification Interaction: Talked with MD    Purpose of Notification: Low UOP, q4 blood sugars?    Orders Received: Discontinue q4 blood sugars and insulin, start NS at 100ml/hr.    Comments:

## 2019-01-05 ENCOUNTER — APPOINTMENT (OUTPATIENT)
Dept: GENERAL RADIOLOGY | Facility: CLINIC | Age: 54
DRG: 870 | End: 2019-01-05
Attending: HOSPITALIST
Payer: MEDICARE

## 2019-01-05 LAB
ANION GAP SERPL CALCULATED.3IONS-SCNC: 5 MMOL/L (ref 3–14)
ANION GAP SERPL CALCULATED.3IONS-SCNC: 9 MMOL/L (ref 3–14)
BASE DEFICIT BLDA-SCNC: 1.4 MMOL/L
BASE EXCESS BLDA CALC-SCNC: 2.4 MMOL/L
BASE EXCESS BLDA CALC-SCNC: 2.7 MMOL/L
BUN SERPL-MCNC: 13 MG/DL (ref 7–30)
BUN SERPL-MCNC: 9 MG/DL (ref 7–30)
CALCIUM SERPL-MCNC: 7.6 MG/DL (ref 8.5–10.1)
CALCIUM SERPL-MCNC: 8.3 MG/DL (ref 8.5–10.1)
CHLORIDE SERPL-SCNC: 110 MMOL/L (ref 94–109)
CHLORIDE SERPL-SCNC: 114 MMOL/L (ref 94–109)
CO2 SERPL-SCNC: 24 MMOL/L (ref 20–32)
CO2 SERPL-SCNC: 28 MMOL/L (ref 20–32)
CREAT SERPL-MCNC: 0.24 MG/DL (ref 0.52–1.04)
CREAT SERPL-MCNC: 0.6 MG/DL (ref 0.52–1.04)
ERYTHROCYTE [DISTWIDTH] IN BLOOD BY AUTOMATED COUNT: 19.7 % (ref 10–15)
GFR SERPL CREATININE-BSD FRML MDRD: >90 ML/MIN/{1.73_M2}
GFR SERPL CREATININE-BSD FRML MDRD: >90 ML/MIN/{1.73_M2}
GLUCOSE BLDC GLUCOMTR-MCNC: 117 MG/DL (ref 70–99)
GLUCOSE SERPL-MCNC: 151 MG/DL (ref 70–99)
GLUCOSE SERPL-MCNC: 97 MG/DL (ref 70–99)
HCO3 BLD-SCNC: 26 MMOL/L (ref 21–28)
HCO3 BLD-SCNC: 27 MMOL/L (ref 21–28)
HCO3 BLD-SCNC: 28 MMOL/L (ref 21–28)
HCT VFR BLD AUTO: 27.8 % (ref 35–47)
HGB BLD-MCNC: 8.2 G/DL (ref 11.7–15.7)
LACTATE BLD-SCNC: 0.7 MMOL/L (ref 0.7–2)
MAGNESIUM SERPL-MCNC: 2 MG/DL (ref 1.6–2.3)
MCH RBC QN AUTO: 26.3 PG (ref 26.5–33)
MCHC RBC AUTO-ENTMCNC: 29.5 G/DL (ref 31.5–36.5)
MCV RBC AUTO: 89 FL (ref 78–100)
NT-PROBNP SERPL-MCNC: 3276 PG/ML (ref 0–900)
O2/TOTAL GAS SETTING VFR VENT: 100 %
O2/TOTAL GAS SETTING VFR VENT: 40 %
O2/TOTAL GAS SETTING VFR VENT: 80 %
OXYHGB MFR BLD: 94 % (ref 92–100)
OXYHGB MFR BLD: 94 % (ref 92–100)
OXYHGB MFR BLD: 96 % (ref 92–100)
PCO2 BLD: 38 MM HG (ref 35–45)
PCO2 BLD: 44 MM HG (ref 35–45)
PCO2 BLD: 56 MM HG (ref 35–45)
PH BLD: 7.28 PH (ref 7.35–7.45)
PH BLD: 7.4 PH (ref 7.35–7.45)
PH BLD: 7.45 PH (ref 7.35–7.45)
PHOSPHATE SERPL-MCNC: 3 MG/DL (ref 2.5–4.5)
PLATELET # BLD AUTO: 220 10E9/L (ref 150–450)
PO2 BLD: 71 MM HG (ref 80–105)
PO2 BLD: 83 MM HG (ref 80–105)
PO2 BLD: 88 MM HG (ref 80–105)
POTASSIUM SERPL-SCNC: 3 MMOL/L (ref 3.4–5.3)
POTASSIUM SERPL-SCNC: 4.9 MMOL/L (ref 3.4–5.3)
PROCALCITONIN SERPL-MCNC: <0.05 NG/ML
RBC # BLD AUTO: 3.12 10E12/L (ref 3.8–5.2)
SODIUM SERPL-SCNC: 143 MMOL/L (ref 133–144)
SODIUM SERPL-SCNC: 147 MMOL/L (ref 133–144)
TROPONIN I SERPL-MCNC: 0.04 UG/L (ref 0–0.04)
TROPONIN I SERPL-MCNC: 0.24 UG/L (ref 0–0.04)
TROPONIN I SERPL-MCNC: 0.32 UG/L (ref 0–0.04)
WBC # BLD AUTO: 6.8 10E9/L (ref 4–11)

## 2019-01-05 PROCEDURE — 25000128 H RX IP 250 OP 636: Performed by: INTERNAL MEDICINE

## 2019-01-05 PROCEDURE — 25000132 ZZH RX MED GY IP 250 OP 250 PS 637: Mod: GY | Performed by: HOSPITALIST

## 2019-01-05 PROCEDURE — A9270 NON-COVERED ITEM OR SERVICE: HCPCS | Mod: GY | Performed by: INTERNAL MEDICINE

## 2019-01-05 PROCEDURE — 25000128 H RX IP 250 OP 636: Performed by: HOSPITALIST

## 2019-01-05 PROCEDURE — 25000128 H RX IP 250 OP 636: Performed by: NURSE PRACTITIONER

## 2019-01-05 PROCEDURE — 83605 ASSAY OF LACTIC ACID: CPT | Performed by: INTERNAL MEDICINE

## 2019-01-05 PROCEDURE — 93010 ELECTROCARDIOGRAM REPORT: CPT | Performed by: INTERNAL MEDICINE

## 2019-01-05 PROCEDURE — 40000239 ZZH STATISTIC VAT ROUNDS

## 2019-01-05 PROCEDURE — 84484 ASSAY OF TROPONIN QUANT: CPT | Performed by: NURSE PRACTITIONER

## 2019-01-05 PROCEDURE — 94640 AIRWAY INHALATION TREATMENT: CPT | Mod: 76

## 2019-01-05 PROCEDURE — 84145 PROCALCITONIN (PCT): CPT | Performed by: NURSE PRACTITIONER

## 2019-01-05 PROCEDURE — 21000001 ZZH R&B HEART CARE

## 2019-01-05 PROCEDURE — 40000275 ZZH STATISTIC RCP TIME EA 10 MIN

## 2019-01-05 PROCEDURE — 82805 BLOOD GASES W/O2 SATURATION: CPT | Performed by: INTERNAL MEDICINE

## 2019-01-05 PROCEDURE — 00000146 ZZHCL STATISTIC GLUCOSE BY METER IP

## 2019-01-05 PROCEDURE — 36600 WITHDRAWAL OF ARTERIAL BLOOD: CPT

## 2019-01-05 PROCEDURE — 99233 SBSQ HOSP IP/OBS HIGH 50: CPT | Performed by: HOSPITALIST

## 2019-01-05 PROCEDURE — 83735 ASSAY OF MAGNESIUM: CPT | Performed by: HOSPITALIST

## 2019-01-05 PROCEDURE — 83880 ASSAY OF NATRIURETIC PEPTIDE: CPT | Performed by: HOSPITALIST

## 2019-01-05 PROCEDURE — 94660 CPAP INITIATION&MGMT: CPT

## 2019-01-05 PROCEDURE — 84100 ASSAY OF PHOSPHORUS: CPT | Performed by: HOSPITALIST

## 2019-01-05 PROCEDURE — 82805 BLOOD GASES W/O2 SATURATION: CPT | Performed by: NURSE PRACTITIONER

## 2019-01-05 PROCEDURE — 93005 ELECTROCARDIOGRAM TRACING: CPT

## 2019-01-05 PROCEDURE — 80048 BASIC METABOLIC PNL TOTAL CA: CPT | Performed by: HOSPITALIST

## 2019-01-05 PROCEDURE — 85027 COMPLETE CBC AUTOMATED: CPT | Performed by: HOSPITALIST

## 2019-01-05 PROCEDURE — 25000132 ZZH RX MED GY IP 250 OP 250 PS 637: Mod: GY | Performed by: INTERNAL MEDICINE

## 2019-01-05 PROCEDURE — 71045 X-RAY EXAM CHEST 1 VIEW: CPT

## 2019-01-05 PROCEDURE — 80048 BASIC METABOLIC PNL TOTAL CA: CPT | Performed by: NURSE PRACTITIONER

## 2019-01-05 RX ORDER — LORAZEPAM 0.5 MG/1
.5-1 TABLET ORAL EVERY 6 HOURS PRN
Status: DISCONTINUED | OUTPATIENT
Start: 2019-01-05 | End: 2019-01-06

## 2019-01-05 RX ORDER — ALBUTEROL SULFATE 0.83 MG/ML
2.5 SOLUTION RESPIRATORY (INHALATION)
Status: DISCONTINUED | OUTPATIENT
Start: 2019-01-05 | End: 2019-01-09 | Stop reason: HOSPADM

## 2019-01-05 RX ORDER — FUROSEMIDE 10 MG/ML
40 INJECTION INTRAMUSCULAR; INTRAVENOUS ONCE
Status: COMPLETED | OUTPATIENT
Start: 2019-01-05 | End: 2019-01-05

## 2019-01-05 RX ORDER — MULTIPLE VITAMINS W/ MINERALS TAB 9MG-400MCG
1 TAB ORAL DAILY
Status: DISCONTINUED | OUTPATIENT
Start: 2019-01-05 | End: 2019-01-06

## 2019-01-05 RX ORDER — KETOROLAC TROMETHAMINE 15 MG/ML
15 INJECTION, SOLUTION INTRAMUSCULAR; INTRAVENOUS ONCE
Status: COMPLETED | OUTPATIENT
Start: 2019-01-05 | End: 2019-01-05

## 2019-01-05 RX ORDER — NITROGLYCERIN 0.4 MG/1
0.4 TABLET SUBLINGUAL EVERY 5 MIN PRN
Status: DISCONTINUED | OUTPATIENT
Start: 2019-01-05 | End: 2019-01-09 | Stop reason: HOSPADM

## 2019-01-05 RX ORDER — LIDOCAINE 40 MG/G
CREAM TOPICAL
Status: DISCONTINUED | OUTPATIENT
Start: 2019-01-05 | End: 2019-01-09 | Stop reason: HOSPADM

## 2019-01-05 RX ORDER — NITROGLYCERIN 20 MG/100ML
0.07-2 INJECTION INTRAVENOUS CONTINUOUS
Status: DISCONTINUED | OUTPATIENT
Start: 2019-01-05 | End: 2019-01-06

## 2019-01-05 RX ADMIN — POTASSIUM CHLORIDE 10 MEQ: 7.46 INJECTION, SOLUTION INTRAVENOUS at 10:38

## 2019-01-05 RX ADMIN — FUROSEMIDE 20 MG: 20 TABLET ORAL at 08:07

## 2019-01-05 RX ADMIN — FUROSEMIDE 40 MG: 10 INJECTION, SOLUTION INTRAVENOUS at 10:38

## 2019-01-05 RX ADMIN — ONDANSETRON 4 MG: 2 INJECTION INTRAMUSCULAR; INTRAVENOUS at 13:58

## 2019-01-05 RX ADMIN — POTASSIUM CHLORIDE 10 MEQ: 7.46 INJECTION, SOLUTION INTRAVENOUS at 09:35

## 2019-01-05 RX ADMIN — POTASSIUM CHLORIDE 10 MEQ: 7.46 INJECTION, SOLUTION INTRAVENOUS at 08:19

## 2019-01-05 RX ADMIN — SODIUM CHLORIDE: 9 INJECTION, SOLUTION INTRAVENOUS at 00:36

## 2019-01-05 RX ADMIN — MULTIPLE VITAMINS W/ MINERALS TAB 1 TABLET: TAB at 08:19

## 2019-01-05 RX ADMIN — ENOXAPARIN SODIUM 80 MG: 80 INJECTION SUBCUTANEOUS at 18:20

## 2019-01-05 RX ADMIN — KETOROLAC TROMETHAMINE 15 MG: 15 INJECTION, SOLUTION INTRAMUSCULAR; INTRAVENOUS at 18:48

## 2019-01-05 RX ADMIN — METOPROLOL TARTRATE 50 MG: 50 TABLET ORAL at 08:07

## 2019-01-05 RX ADMIN — POTASSIUM CHLORIDE 10 MEQ: 7.46 INJECTION, SOLUTION INTRAVENOUS at 11:39

## 2019-01-05 RX ADMIN — APIXABAN 5 MG: 5 TABLET, FILM COATED ORAL at 00:13

## 2019-01-05 ASSESSMENT — ACTIVITIES OF DAILY LIVING (ADL)
ADLS_ACUITY_SCORE: 29
ADLS_ACUITY_SCORE: 29
ADLS_ACUITY_SCORE: 31
ADLS_ACUITY_SCORE: 29
ADLS_ACUITY_SCORE: 31

## 2019-01-05 ASSESSMENT — PAIN DESCRIPTION - DESCRIPTORS: DESCRIPTORS: SORE

## 2019-01-05 NOTE — PROGRESS NOTES
Hospitalist Cross Cover  1/4/2019    Called about low urine output. Chart reviewed. Patient has neurogenic bladder, ileostomy & ileal conduit with concern of ongoing leaking. Low urine output today and saline IVF ordered. Still with ~ 100 ml urine output in 6 hours. Suspect IVF not helping urine output in this case due to very low albumin (1.9) and 3rd spacing, is chronically lasix dependent. Prior to admission on chronic lasix 40 mg daily but has only had this resumed on 1/2 at half her usual dose. This was entirely appropriate given septic shock and acute respiratory failure.     Plan: Give additional 20 mg lasix tonight. Depending on response, increase to home dose (40 mg daily) tomorrow morning. Consider stopping IVF at that time.     Addendum: Called about patient appearing more short of breath. O2 sat 94% on 4 L. 190 ml of urine output since lasix dose above. She is paraplegic so has difficulty using usual muscles of breathing and difficulty with cough. Suspect IVF from earlier today is causing pulmonary edema. Plan: Discontinue IVF immediately. Trial of Bipap to see if this helps reduce work of breathing.     Tiara Radford MD

## 2019-01-05 NOTE — PROVIDER NOTIFICATION
MD Notification    Notified Person: MD    Notified Person Name: Arlette Holm (House PA)    Notification Date/Time: 1/5/18 1450    Notification Interaction: verbal     Purpose of Notification: Troponin 0.241    Orders Received: look at nitroglycerin options    Comments:

## 2019-01-05 NOTE — SIGNIFICANT EVENT
Flying squad called for respiratory distress.  BiPAP placed per RT and sats still in 70's.  Sternal retractions and accessory muscle use.  House officer, Arlette OVERTON at bedside.  Stat chest xray ordered.  Patient is DNR/DNI. Attempted to call daughter, Arlette, with no answer.  Will call back and leave a message.

## 2019-01-05 NOTE — PROGRESS NOTES
Transfer    S- Transfer to [facility].    B- Cart with bypap and on Tele    A- Brief systems assessment: On Bipap    R- Transfer to CICU per physician orders. Continue to monitor pt and update physician as needed.      Code status: Full Code  Skin: Decubitus ulcers, wound care orders present  Fall Risk: Yes- Department fall risk interventions implemented.  Isolation: None  Patient belongings: Sent with patient  Medication drips upon transfer: None

## 2019-01-05 NOTE — PROVIDER NOTIFICATION
MD Notification    Notified Person: MD    Notified Person Name: Justus (on-call urologist)    Notification Date/Time: 1/5/19 1322    Notification Interaction: web-paged    Purpose of Notification: Catheter not draining. Scanned 999.    Orders Received:    Comments: 2nd page No reply, re-paging

## 2019-01-05 NOTE — PROVIDER NOTIFICATION
MD Notification    Notified Person: MD    Notified Person Name: Justus    Notification Date/Time: 1/5/18 6057    Notification Interaction: answering service called MD's cell    Purpose of Notification: No output in bag. Bladder scan 999    Orders Received: Verbally per MD, remove current indwelling catheter, either straight cath or place another indwelling to remove current volume.    Comments: (4th page)......pager system was down.

## 2019-01-05 NOTE — PLAN OF CARE
SLP: Per chart review, pt is on Bipap with respiratory decline. Will hold SLP treatment and PO trials while pt is on Bipap and until stable 2-4 hours off Bipap.

## 2019-01-05 NOTE — SIGNIFICANT EVENT
Went up to 88 to transfer patient down to CCU.  Report had been given.  Patient transferred on BiPAP per RT, NA and flying lovelace RN to 274.  Tolerated well.  Family accompanied on transfer.  Spoke with Arlette (daughter) at length and she feels like she may want to move toward comfort cares.  Currently speaking with her mom.  Sivakumar NP updated.

## 2019-01-05 NOTE — PROGRESS NOTES
House NP follow up note    I reviewed pt's labs   -trop, elevated 0.04, possibly stress induced from hypoxia  -abg improving  -on arrival to revisit pt, she was taken off of NIV and placed on oxymask 12L  -RN provided update re: malfunctioning urostomy    As staff were trying to manipulate urostomy she became progressively more hypoxic and required reinitiation of bipap because  She was noted to be quite hypoxic as low as 60s on oxymask despite increase to 15L.  O2 tubing was not connected to wall supply, once reconnected, her oxygenation did improve albeit on 16/8, 1.0 FiO2.      Problem list    Acute hypoxic resp failure, failed trial of oxymask after <15 minutes w/ recurrent, severe hypoxia, as low as 61-68% on 15L oxymask  -continue bipap  -I called dght to inform her of significant change in pt's status.  She reiterated several times over pt was DNI and that pt had discussed this w/ Dr. Hong (Intensivist) and insisted on DNI.  Dght Arlette wants to honor pt's wishes  -dght will come to bedside    CP, troponinemia  -continue to cycle trops  -repeat ekg, unchanged from earlier today except now with TWI in V3 (previously just in V1-V2)  -subsequent even higher 0.241  -will change apixaban to enoxparin  -start nitroglycerin infusion     Urinary retention/malfunctioning urostomy  -RN staff have d/w urology, continue to try to drain urine w/ straight cath through urostomy    Pt will be transferred to higher level of care CICU for ongoing bipap support.  She is now bipap dependent at this point, having failed oxymask trial. We will reinitiate palliative consultation.     An additional 45 minutes was spent w/ pt from 2-245pm.  Case d/w Dr. Juanjo Holm, [facility]  Hospitalist house NP  [phone]    Addendum:    Upon transfer to CICU pt's dght arrived.  Dr. Hong has graciously agreed to speak w/ pt and dght re: revisiting goals of treatment as her current condition is likely to not be survivable given the severity  of her hypoxic respiratory failure and bipap dependency.  Plan is as follows:  -continue current treatment until am with low threshold to initiate comfort care in event of clinical deterioration or worsening hypoxia or dyspnea overnight  -dght will stay w/ pt tonight.   -[facility] plans to remove life supporting therapies in am of 1/6/19 and focus soley on comfort, honor pt's wishes and not prolong her suffering.   -[facility] is calling additional loved ones tonight.   -letter provided to pt's [facility]'s employer for work excuse    Arlette Holm [facility]  Hospitalist house NP  [phone]
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
## 2019-01-05 NOTE — PLAN OF CARE
A+Ox4, up with lift. Total cares, paraplegic. Turn/repo Q2. Colostomy intact. Abd discomfort/distention. Bladder scanned for 999, Urology contacted. Illeal Conduit ostomy removed. Straight cathed for 525 with multiple attempts, poor drainage, mucus plugs, PA contacted flying squad nurse. NPO, unable to take off BIPAP after 0900. Left PICC. LS clear, abd muscle use. All dressings CDI. Nausea gave Zofran x1 with relief. Tele: NSR. Other VSS. Urology/speech following. Nursing will continue to monitor.    1500: will transfer to CICU.

## 2019-01-05 NOTE — PROVIDER NOTIFICATION
MD Notification    Notified Person: MD    Notified Person Name: Belen (Urology)    Notification Date/Time: 1/5/18 1250     Notification Interaction: web-paged    Purpose of Notification: Catheter still not draining despite manipulations/irrigation. Bladder scanned for 999. Please advise.    Orders Received:    Comments: no answer, so re-paged service, Dr. Jerome is on-call

## 2019-01-05 NOTE — PROGRESS NOTES
[facility]    Medicine Progress Note - Hospitalist Service       Date of Admission:  12/12/2018  Assessment & Plan     Felicia Ellison is a 53 year old female who was admitted on 12/12/2018.  She was admitted to the floor with concerns of pneumonia and later developed acute respiratory failure.  She is a  paraplegic.  In the emergency room during her admission she had a CT chest was negative for PE but indicated bilateral pleural effusions and possible pericardial effusion, a transthoracic echocardiogram was performed and it showed normal LV function and no significant valvular abnormalities.  She had a large pericardial effusion without signs of tamponade noted in the echo.  Following admission on 12/13 she developed fever of 101.3 and also became persistently hypotensive with the worsening hypoxia.  The patient was transferred to ICU the same night and later on she got intubated.  Patient was seen by infectious disease and was on multiple IV antibiotics.  Patient had an extensive stay in the ICU of 22 days.  She was transferred out of the intensive care unit on January 3.  This morning she suddenly become hypoxic and is requiring BiLevel positive airway pressure.    Acute respiratory failure  --Patient had developed ARDS when was on ventilator for total 19 days and got extubated 1/1.  Currently tolerating 5 L of O2 and she has decided to be DNI for future needs  --Continue nebs, diuresis, wean oxygen as possible  --She had received Diamox for short while  --Continue Lasix 20 mg once a day  --Her weight remains higher than admission weight but patient appears to be adequately diuresed.  January 4- no weight recorded today but yesterday she was down 4.3 kg from the previous day.  January 5- She suddenly become hypoxic and is on BiLevel positive airway pressure.  She has not responded to Lasix.  Her daughter does not want her intubated.  Chest X-ray shows bilateral  Infiltrates likely from the  ARDS.  Pro-calcitonin is negative.  Troponin is borderline.  She has been on apixaban so pulmonary embolism seems less likely. We will continue with BiLevel positive airway pressure but her prognosis is guarded.  She will probably be moved to a higher level of care- either C or back to the intensive care unit.    Septic shock  --Off pressors and antibiotics since 1 week  --No positive cultures or fever noted up until 1/1  --Current plan is to monitor off antibiotics  January 4- the patient is afebrile and her last white blood cell count was 7.5 thousand on January 2.      Episodes of SVT  --Continue metoprolol 50 mg twice daily    Paraplegia with neurogenic bladder  Chronic ileostomy and ileal conduit  --Concern of drainage around her ileal conduit noted  --Had intermittent catheterization via a 14 F Lorene-cath catheter -- but was leaking and having high residuals so mcintyre replaced.  Still leaking--so urostomy bag placed.    Decubitus ulcers  --Wound care RN following    Pericardial effusion  --Resolved    Anemia of chronic disease  --Monitor hemoglobin, received 1 unit PRBC on 12/23 January 4- hemoglobin was 8.2 g on January 2.  We will repeat this in the morning.    Thrombocytopenia  Repeat platelet count tomorrow    Left hip effusion on CT-no infection  Chronic pain  --continue prn medications     Right upper extremity DVT with history of portal vein thrombosis  Right upper extremity nonocclusive DVT in right subclavian and IJ  -- she was on warfarin for a while and family had switched to Lovenox due to difficulty of maintaining her INR  --She was started on Eliquis 5 mg twice daily-plan is to change it into 2.5 mg twice daily after 1 month  --She has currently PICC line Access   --She received Lovenox at treatment dose for 2 weeks already.  January 4-she is on apixaban    DVT Prophylaxis: On apixaban  Code Status: DNR/DNI   Disposition: Expected discharge was to transitional care unit in a few days but now  unclear with her acute respiratory decompensation  Diet: Snacks/Supplements Adult: Boost Plus; Between Meals  Snacks/Supplements Adult: Other; ProStat with H2O at all meals; ProStat with applesauce at lunch.; With Meals  Combination Diet Dysphagia Diet Level 2: Mechan Altered; Thin Liquids (water, ice chips, juice, milk gelatin, ice cream, etc)  Room Service         The patient's care was discussed with the Patient.    Efrain Geiger MD  Hospitalist Service  [facility]    ______________________________________________________________________    Interval History   No complaints     Data reviewed today: I reviewed all medications, new labs and imaging results over the last 24 hours. I personally reviewed no images or EKG's today.    Physical Exam   Vital Signs: Temp: 98.4  F (36.9  C) Temp src: Axillary BP: 135/73 Pulse: 98 Heart Rate: 90 Resp: 29 SpO2: 93 % O2 Device: BiPAP/CPAP Oxygen Delivery: 15 LPM  Weight: 178 lbs 12.69 oz  Constitutional: on BiLevel positive airway pressure   Respiratory: trachypneic, bilateral rhonchi present  Cardiovascular:  regular rate and rhythm, normal S1 and S2, no S3 or S4, and no murmur noted  Skin: no rashes  Neurologic: she was awake and alert this morning- she is still following commands  Neuropsychiatric: General: normal, calm and normal eye contact    Data   Recent Labs   Lab 01/05/19  1420 01/05/19  1045 01/05/19  0600 01/02/19  0835 01/02/19  0438  01/01/19  0430  12/31/18  0406   WBC  --   --  6.8  --  7.5  --  10.6  --  15.5*   HGB  --   --  8.2*  --  8.2*  --  7.6*   < > 6.8*   MCV  --   --  89  --  88  --  88  --  91   PLT  --   --  220  --  97*  --  77*  --  98*   INR  --   --   --   --   --   --   --   --  1.42*     --  147*  --  144   < >  --   --  148*   POTASSIUM 4.9  --  3.0* 3.7 3.4   < >  --   --  4.2   CHLORIDE 110*  --  114*  --  108   < >  --   --  113*   CO2 24  --  28  --  29   < >  --   --  31   BUN 13  --  9  --  11   < >  --    --  20   CR 0.60  --  0.24*  --  0.24*   < >  --   --  0.31*   ANIONGAP 9  --  5  --  7   < >  --   --  4   JOSH 8.3*  --  7.6*  --  7.4*   < >  --   --  7.2*   *  --  97  --  78   < >  --   --  147*   ALBUMIN  --   --   --   --   --   --   --   --  1.9*   PROTTOTAL  --   --   --   --   --   --   --   --  5.4*   BILITOTAL  --   --   --   --   --   --   --   --  0.5   ALKPHOS  --   --   --   --   --   --   --   --  237*   ALT  --   --   --   --   --   --   --   --  13   AST  --   --   --   --   --   --   --   --  12   TROPI 0.241* 0.040  --   --   --   --   --   --   --     < > = values in this interval not displayed.     Recent Results (from the past 24 hour(s))   XR Chest Port 1 View    Narrative    PORTABLE CHEST ONE VIEW   1/5/2019 10:00 AM    HISTORY: Declining respiratory status.    COMPARISON: 1/1/2019      Impression    IMPRESSION: Again seen is a left arm PICC line in the superior vena  cava. Previously seen endotracheal tube and nasogastric tube have been  removed. Again seen are surgical changes of the lower thoracic spine.  There continues to be moderate to marked diffuse hazy opacification of  both mid to lower lungs consistent with infiltrates. These are  unchanged. No other abnormality is noted. Other than for the removal  of the endotracheal tube and nasogastric tube, I see no other  significant change since a previous study.     TONY RDZ MD

## 2019-01-05 NOTE — PROVIDER NOTIFICATION
MD Notification    Notified Person: MD    Notified Person Name: brenton velasco    Notification Date/Time: 2235    Notification Interaction: talked to MD    Purpose of Notification: low UOP    Orders Received: 20 mg PO lasix to be ordered.    Comments:

## 2019-01-05 NOTE — PROGRESS NOTES
0900: RRT called for worsening respiratory status. Diaphoretic, increased external muscle use with respirations, increased respiratory rate, and more lethargic (talking less). I increased O2, switched to oxymask 10L, no improvement. @ 0920 RT paged to put back on BIPAP, RRT called as well. See MD note for interventions.

## 2019-01-05 NOTE — PLAN OF CARE
A/Ox4. VS: last /93, on scheduled metoprolol, Tachypnic 24 RR, O2 95% on 4L NC. Other VSS. DD2 thin liquid, total feed, tolerated diet. Good intake. Colostomy patent. Ileal conduit leaking, mcintyre in place, low UOP this shift, urology following - irrigated/repostioned at beginning of shift per urology note, MD notified, NS 100ml/hr started. Still low UOP at HS, 100cc this shift, MD notified again, 20mg PO lasix ordered. Max bladder scan 194cc. PICC infusing. Electrolytes to be checked in AM. BS/insulin discontinued this evening. Tele NSR. Dressings CD&I. Turn/repo q2. Braces to bilat ankles. Contact precautions maintained. Nursing continue to monitor.

## 2019-01-06 LAB — TROPONIN I SERPL-MCNC: 0.21 UG/L (ref 0–0.04)

## 2019-01-06 PROCEDURE — 40000239 ZZH STATISTIC VAT ROUNDS

## 2019-01-06 PROCEDURE — 99232 SBSQ HOSP IP/OBS MODERATE 35: CPT | Performed by: INTERNAL MEDICINE

## 2019-01-06 PROCEDURE — 40000257 ZZH STATISTIC CONSULT NO CHARGE VASC ACCESS

## 2019-01-06 PROCEDURE — 25000128 H RX IP 250 OP 636: Performed by: INTERNAL MEDICINE

## 2019-01-06 PROCEDURE — 40000886 ZZH STATISTIC STEP DOWN HRS DAY

## 2019-01-06 PROCEDURE — 40000275 ZZH STATISTIC RCP TIME EA 10 MIN

## 2019-01-06 PROCEDURE — 94660 CPAP INITIATION&MGMT: CPT

## 2019-01-06 PROCEDURE — 25000132 ZZH RX MED GY IP 250 OP 250 PS 637: Mod: GY | Performed by: INTERNAL MEDICINE

## 2019-01-06 PROCEDURE — 84484 ASSAY OF TROPONIN QUANT: CPT | Performed by: HOSPITALIST

## 2019-01-06 PROCEDURE — 40000884 ZZH STATISTIC STEP DOWN HRS NIGHT

## 2019-01-06 PROCEDURE — 25000128 H RX IP 250 OP 636: Performed by: HOSPITALIST

## 2019-01-06 PROCEDURE — 12000000 ZZH R&B MED SURG/OB

## 2019-01-06 RX ORDER — HYDROMORPHONE HYDROCHLORIDE 1 MG/ML
.3-.5 INJECTION, SOLUTION INTRAMUSCULAR; INTRAVENOUS; SUBCUTANEOUS
Status: DISCONTINUED | OUTPATIENT
Start: 2019-01-06 | End: 2019-01-06

## 2019-01-06 RX ORDER — PROCHLORPERAZINE 25 MG
25 SUPPOSITORY, RECTAL RECTAL EVERY 12 HOURS PRN
Status: DISCONTINUED | OUTPATIENT
Start: 2019-01-06 | End: 2019-01-06

## 2019-01-06 RX ORDER — ACETAMINOPHEN 325 MG/1
650 TABLET ORAL EVERY 6 HOURS PRN
Status: DISCONTINUED | OUTPATIENT
Start: 2019-01-06 | End: 2019-01-09 | Stop reason: HOSPADM

## 2019-01-06 RX ORDER — LORAZEPAM 0.5 MG/1
.5-1 TABLET ORAL
Status: DISCONTINUED | OUTPATIENT
Start: 2019-01-06 | End: 2019-01-09 | Stop reason: HOSPADM

## 2019-01-06 RX ORDER — MORPHINE SULFATE 10 MG/5ML
5-10 SOLUTION ORAL
Status: DISCONTINUED | OUTPATIENT
Start: 2019-01-06 | End: 2019-01-09 | Stop reason: HOSPADM

## 2019-01-06 RX ORDER — LORAZEPAM 2 MG/ML
.5-1 INJECTION INTRAMUSCULAR
Status: DISCONTINUED | OUTPATIENT
Start: 2019-01-06 | End: 2019-01-09 | Stop reason: HOSPADM

## 2019-01-06 RX ORDER — ACETAMINOPHEN 650 MG/1
650 SUPPOSITORY RECTAL EVERY 6 HOURS PRN
Status: DISCONTINUED | OUTPATIENT
Start: 2019-01-06 | End: 2019-01-09 | Stop reason: HOSPADM

## 2019-01-06 RX ORDER — ONDANSETRON 4 MG/1
4 TABLET, ORALLY DISINTEGRATING ORAL EVERY 6 HOURS PRN
Status: DISCONTINUED | OUTPATIENT
Start: 2019-01-06 | End: 2019-01-06

## 2019-01-06 RX ORDER — LORAZEPAM 2 MG/ML
.5-1 INJECTION INTRAMUSCULAR EVERY 4 HOURS PRN
Status: DISCONTINUED | OUTPATIENT
Start: 2019-01-06 | End: 2019-01-06

## 2019-01-06 RX ORDER — ONDANSETRON 2 MG/ML
4 INJECTION INTRAMUSCULAR; INTRAVENOUS EVERY 6 HOURS PRN
Status: DISCONTINUED | OUTPATIENT
Start: 2019-01-06 | End: 2019-01-06

## 2019-01-06 RX ORDER — NALOXONE HYDROCHLORIDE 0.4 MG/ML
.1-.4 INJECTION, SOLUTION INTRAMUSCULAR; INTRAVENOUS; SUBCUTANEOUS
Status: DISCONTINUED | OUTPATIENT
Start: 2019-01-06 | End: 2019-01-09 | Stop reason: HOSPADM

## 2019-01-06 RX ORDER — SALIVA STIMULANT COMB. NO.3
1 SPRAY, NON-AEROSOL (ML) MUCOUS MEMBRANE
Status: DISCONTINUED | OUTPATIENT
Start: 2019-01-06 | End: 2019-01-09 | Stop reason: HOSPADM

## 2019-01-06 RX ORDER — PROCHLORPERAZINE MALEATE 5 MG
10 TABLET ORAL EVERY 6 HOURS PRN
Status: DISCONTINUED | OUTPATIENT
Start: 2019-01-06 | End: 2019-01-06

## 2019-01-06 RX ORDER — BISACODYL 10 MG
10 SUPPOSITORY, RECTAL RECTAL
Status: DISCONTINUED | OUTPATIENT
Start: 2019-01-09 | End: 2019-01-09 | Stop reason: HOSPADM

## 2019-01-06 RX ORDER — MORPHINE SULFATE 100 MG/5ML
5-10 SOLUTION ORAL
Status: DISCONTINUED | OUTPATIENT
Start: 2019-01-06 | End: 2019-01-09 | Stop reason: HOSPADM

## 2019-01-06 RX ORDER — ATROPINE SULFATE 10 MG/ML
1-2 SOLUTION/ DROPS OPHTHALMIC
Status: DISCONTINUED | OUTPATIENT
Start: 2019-01-06 | End: 2019-01-09 | Stop reason: HOSPADM

## 2019-01-06 RX ORDER — MORPHINE SULFATE 2 MG/ML
1-2 INJECTION, SOLUTION INTRAMUSCULAR; INTRAVENOUS
Status: DISCONTINUED | OUTPATIENT
Start: 2019-01-06 | End: 2019-01-09 | Stop reason: HOSPADM

## 2019-01-06 RX ADMIN — LORAZEPAM 1 MG: 2 INJECTION, SOLUTION INTRAMUSCULAR; INTRAVENOUS at 00:50

## 2019-01-06 RX ADMIN — LORAZEPAM 1 MG: 2 INJECTION, SOLUTION INTRAMUSCULAR; INTRAVENOUS at 09:22

## 2019-01-06 RX ADMIN — ONDANSETRON 4 MG: 2 INJECTION INTRAMUSCULAR; INTRAVENOUS at 18:26

## 2019-01-06 RX ADMIN — LORAZEPAM 1 MG: 2 INJECTION, SOLUTION INTRAMUSCULAR; INTRAVENOUS at 05:07

## 2019-01-06 RX ADMIN — Medication 0.2 MG: at 02:15

## 2019-01-06 RX ADMIN — MORPHINE SULFATE 10 MG: 100 SOLUTION ORAL at 19:55

## 2019-01-06 RX ADMIN — MORPHINE SULFATE 5 MG: 100 SOLUTION ORAL at 14:03

## 2019-01-06 RX ADMIN — HYDROMORPHONE HYDROCHLORIDE 0.3 MG: 1 INJECTION, SOLUTION INTRAMUSCULAR; INTRAVENOUS; SUBCUTANEOUS at 06:15

## 2019-01-06 RX ADMIN — HYDROMORPHONE HYDROCHLORIDE 0.5 MG: 1 INJECTION, SOLUTION INTRAMUSCULAR; INTRAVENOUS; SUBCUTANEOUS at 08:36

## 2019-01-06 RX ADMIN — MORPHINE SULFATE 1 MG: 2 INJECTION, SOLUTION INTRAMUSCULAR; INTRAVENOUS at 12:51

## 2019-01-06 RX ADMIN — Medication 0.2 MG: at 04:57

## 2019-01-06 RX ADMIN — LORAZEPAM 0.5 MG: 2 INJECTION INTRAMUSCULAR; INTRAVENOUS at 18:28

## 2019-01-06 RX ADMIN — MORPHINE SULFATE 10 MG: 100 SOLUTION ORAL at 22:38

## 2019-01-06 ASSESSMENT — ACTIVITIES OF DAILY LIVING (ADL)
ADLS_ACUITY_SCORE: 29
ADLS_ACUITY_SCORE: 29

## 2019-01-06 NOTE — PROGRESS NOTES
Pt was started with Non-invasive ventilatory support for decreased level of consciousness and increased oxygen demands. BiPAP setting were adjusted per patient comfort and oxygen demands. Pt tolerated the BiPAP fine throughout my shift.and pt is transferred to CCU on BiPAP without incident. CCU RT will continue to follow.      Thanks.

## 2019-01-06 NOTE — PLAN OF CARE
Speech Language Therapy Discharge Summary    Reason for therapy discharge:    Change in medical status.    Progress towards therapy goal(s). See goals on Care Plan in [facility] electronic health record for goal details.  Change to comfort care     Therapy recommendation(s):    No further therapy is recommended.

## 2019-01-06 NOTE — PROVIDER NOTIFICATION
MD Notification    Notified Person: MD    Notified Person Name: Carroll negron    Notification Date/Time: 01/05/19 7:54 PM     Notification Interaction: Talked with MD    Purpose of Notification: fyi increased trop    Orders Received: trop at midnight, starting heparin    Comments:

## 2019-01-06 NOTE — PROGRESS NOTES
[facility]    Hospitalist Progress Note    Assessment & Plan     Felicia Ellison is a 53 year old female who was admitted on 12/12/2018.  She was admitted to the floor with concerns of pneumonia and later developed acute respiratory failure.  She is a  paraplegic.  In the emergency room during her admission she had a CT chest was negative for PE but indicated bilateral pleural effusions and possible pericardial effusion, a transthoracic echocardiogram was performed and it showed normal LV function and no significant valvular abnormalities.  She had a large pericardial effusion without signs of tamponade noted in the echo.  Following admission on 12/13 she developed fever of 101.3 and also became persistently hypotensive with the worsening hypoxia.  The patient was transferred to ICU the same night and later on she got intubated.  Patient was seen by infectious disease and was on multiple IV antibiotics.  Patient had an extensive stay in the ICU of 22 days.  She was transferred out of the intensive care unit on January 3  -today, 1/6/19, the patient and family requested to be comfort care.       Acute respiratory failure  --Patient had developed ARDS when was on ventilator for total 19 days and got extubated 1/1.  Currently tolerating 5 L of O2 and she has decided to be DNI for future needs  --Continue nebs, diuresis, wean oxygen as possible  --She had received Diamox for short while  --Continue Lasix 20 mg once a day  --Her weight remains higher than admission weight but patient appears to be adequately diuresed.  January 4- no weight recorded today but yesterday she was down 4.3 kg from the previous day.  January 5- She suddenly become hypoxic and is on BiLevel positive airway pressure.  She has not responded to Lasix.  Her daughter does not want her intubated.  Chest X-ray shows bilateral  Infiltrates likely from the ARDS.  Pro-calcitonin is negative.  Troponin is borderline.  She has been on  apixaban   -off bipap, and  Comfort care  -transfer to 8th floor  -palliative care consult as they had signed off  -SW consult in am for hospice     Septic shock  --Off pressors and antibiotics since 1 week  --No positive cultures or fever noted up until 1/1  --Current plan is to monitor off antibiotics  January 4- the patient is afebrile and her last white blood cell count was 7.5 thousand on January 2.        Episodes of SVT  --stop metoprolol 50 mg twice daily due to comfort care     Paraplegia with neurogenic bladder  Chronic ileostomy and ileal conduit  --Concern of drainage around her ileal conduit noted  --Had intermittent catheterization via a 14 F Lorene-cath catheter -- but was leaking and having high residuals so mcintyre replaced.  Still leaking--so urostomy bag placed.     Decubitus ulcers  --Wound care RN following     Pericardial effusion  --Resolved     Anemia of chronic disease  --Monitor hemoglobin, received 1 unit PRBC on 12/23 January 4- hemoglobin was 8.2 g on January 2. .     Thrombocytopenia     Left hip effusion on CT-no infection  Chronic pain  --continue prn medications      Right upper extremity DVT with history of portal vein thrombosis  Right upper extremity nonocclusive DVT in right subclavian and IJ  -- she was on warfarin for a while and family had switched to Lovenox due to difficulty of maintaining her INR  --She was started on Eliquis 5 mg twice daily-plan is to change it into 2.5 mg twice daily after 1 month  --She has currently PICC line Access   --She received Lovenox at treatment dose for 2 weeks already.  January 4-she is on apixaban   1/5-stop all nonessental meds     DVT Prophylaxis: On apixaban  Code Status: DNR/DNI   Disposition:comfort care, probable hospice         # Pain Assessment:  Current Pain Score 1/6/2019   Patient currently in pain? yes   Pain score (0-10) 4   Pain location -   Pain descriptors -   CPOT pain score -   end of life meds like morphine    DVT Prophylaxis:  comfort care  Code Status: DNR/DNI        Text Page (7am - 6pm)  Eli Menard MD    Interval History   Patient decided on comfort care.  Discussed with daughter in room and son by facetime phone.  They are in agreement as this is their mother's wish.    -Data reviewed today: I reviewed all new labs and imaging results over the last 24 hours. I personally reviewed no images or EKG's today.    Physical Exam   Temp: 98.4  F (36.9  C) Temp src: Axillary BP: 109/70 Pulse: 107 Heart Rate: 116 Resp: 18 SpO2: 93 % O2 Device: Nasal cannula Oxygen Delivery: 8 LPM  Vitals:    01/01/19 0400 01/02/19 0630 01/03/19 0600   Weight: 85.1 kg (187 lb 9.8 oz) 85.4 kg (188 lb 4.4 oz) 81.1 kg (178 lb 12.7 oz)     Vital Signs with Ranges  Temp:  [97.8  F (36.6  C)-98.4  F (36.9  C)] 98.4  F (36.9  C)  Pulse:  [] 107  Heart Rate:  [] 116  Resp:  [18-46] 18  BP: (100-153)/(55-86) 109/70  FiO2 (%):  [60 %-100 %] 60 %  SpO2:  [85 %-100 %] 93 %  I/O last 3 completed shifts:  In: 20 [I.V.:20]  Out: 1435 [Urine:1435]    Constitutional: alert   Respiratory: coarse breath sounds   Cardiovascular: regular rate and rhythm without murmer or rub   GI:positive bowel sounds, non-tender   Skin/Integumen: no rashes    Other:        Medications     IV fluid REPLACEMENT ONLY       - MEDICATION INSTRUCTIONS -       - MEDICATION INSTRUCTIONS -         influenza vaccine adult (product based on age)  0.5 mL Intramuscular Prior to discharge       Data   Recent Labs   Lab 01/06/19  0023 01/05/19  1910 01/05/19  1420  01/05/19  0600 01/02/19  0835 01/02/19  0438  01/01/19  0430  12/31/18  0406   WBC  --   --   --   --  6.8  --  7.5  --  10.6  --  15.5*   HGB  --   --   --   --  8.2*  --  8.2*  --  7.6*   < > 6.8*   MCV  --   --   --   --  89  --  88  --  88  --  91   PLT  --   --   --   --  220  --  97*  --  77*  --  98*   INR  --   --   --   --   --   --   --   --   --   --  1.42*   NA  --   --  143  --  147*  --  144   < >  --   --  148*    POTASSIUM  --   --  4.9  --  3.0* 3.7 3.4   < >  --   --  4.2   CHLORIDE  --   --  110*  --  114*  --  108   < >  --   --  113*   CO2  --   --  24  --  28  --  29   < >  --   --  31   BUN  --   --  13  --  9  --  11   < >  --   --  20   CR  --   --  0.60  --  0.24*  --  0.24*   < >  --   --  0.31*   ANIONGAP  --   --  9  --  5  --  7   < >  --   --  4   JOSH  --   --  8.3*  --  7.6*  --  7.4*   < >  --   --  7.2*   GLC  --   --  151*  --  97  --  78   < >  --   --  147*   ALBUMIN  --   --   --   --   --   --   --   --   --   --  1.9*   PROTTOTAL  --   --   --   --   --   --   --   --   --   --  5.4*   BILITOTAL  --   --   --   --   --   --   --   --   --   --  0.5   ALKPHOS  --   --   --   --   --   --   --   --   --   --  237*   ALT  --   --   --   --   --   --   --   --   --   --  13   AST  --   --   --   --   --   --   --   --   --   --  12   TROPI 0.208* 0.322* 0.241*   < >  --   --   --   --   --   --   --     < > = values in this interval not displayed.       Recent Results (from the past 24 hour(s))   XR Chest Port 1 View    Narrative    PORTABLE CHEST ONE VIEW   1/5/2019 10:00 AM    HISTORY: Declining respiratory status.    COMPARISON: 1/1/2019      Impression    IMPRESSION: Again seen is a left arm PICC line in the superior vena  cava. Previously seen endotracheal tube and nasogastric tube have been  removed. Again seen are surgical changes of the lower thoracic spine.  There continues to be moderate to marked diffuse hazy opacification of  both mid to lower lungs consistent with infiltrates. These are  unchanged. No other abnormality is noted. Other than for the removal  of the endotracheal tube and nasogastric tube, I see no other  significant change since a previous study.     TONY RDZ MD

## 2019-01-06 NOTE — PROVIDER NOTIFICATION
MD Notification    Notified Person: MD    Notified Person Name: Adonay    Notification Date/Time: 01/06/19 5:44 AM     Notification Interaction: Talked with MD    Purpose of Notification: Gave pt ativan and dilaudid, continues to be uncomfortable, still quite alert. Requesting increased comfort medication. Came off bipap this am.     Orders Received: increased dilaudid    Comments:

## 2019-01-06 NOTE — PROGRESS NOTES
Cross Cover    Notified that patient's troponin continues to rise. 0.04--> 0.241 --> 0.322. Could be secondary to hypoxia, will need to trend.  Switch from lovenox to heparin gtt.  Recheck troponin at midnight.    Patient without chest pain currently. Nitroglycerin gtt not running. Continue to monitor for chest pain, provide comfort measures as necessary. Patient not interested in Angiogram even if needed.

## 2019-01-06 NOTE — PLAN OF CARE
Comfort cares made over night. Ordered reviewed with morning with Dr. Menard. Palliative care reconsulted for 1/7. Transfer to station 88 for comfort cares with all belongings and family.

## 2019-01-06 NOTE — PROGRESS NOTES
Report to [facility] on [facility] for transfer. Daughter, Arlette, took all belongings with pt. Pt's dentures in a cup in personal belongings bag and transported to stat 88. Pt eyes open spontaneously on transfer. Use of air mat to move pt. NA took pt upstairs, volunteer took belongings. Denies pain on transfer. Continue comfort cares, 8 L oxygen NC on transport as pt was requiring 60% bipap over noc and wanted to have pt settled without changes during transfer.

## 2019-01-06 NOTE — PROGRESS NOTES
MD Notification    Notified Person: MD    Notified Person Name: Adonay    Notification Date/Time: 01/06/19 1:35 AM     Notification Interaction: Talked with MD    Purpose of Notification: Pt and her family have decided to go comfort cares in the morning. They have requested that we not take BP overnight and would like to possibly take off bipap.     Orders Received: Discontinuing certain medications, adding comfort care medications.     Comments:

## 2019-01-06 NOTE — PLAN OF CARE
"A/O, calm, VSS ex O2sats % on bipap 70% FiO2, RR 20s-30s c accessory muscle use, feels breathing not significantly improved from AM. Tele SR 90s. Denied CP or pressure so nitroglycerin gtt held, house NP aware. C/o buttocks pain 5/10, offered IV dilaudid but pt declined, \"I'm not ready to die,\" confirmed she was afraid narcotics would negatively impact outcome. Given one time dose of IV Toradol instead with improvement, also discussed how smaller dilaudid doses could be used if her pain persisted as well as the benefits of reducing air hunger.   Ileal conduit straigtht cathed with 125 ml output. Colostomy intact. PICC site WDL ex grey lumen occluded and white lumen does not give blood return. Troponin collected, pending. Plan to continue bipap therapy.     "

## 2019-01-06 NOTE — PROVIDER NOTIFICATION
MD Notification    Notified Person: MD    Notified Person Name: Adonay     Notification Date/Time: 01/06/19 12:43 AM     Notification Interaction: Talked with MD    Purpose of Notification: Need po ativan switched to iv ativan r/t NPO status while on bipap    Orders Received: Switched to iv ativan    Comments:

## 2019-01-06 NOTE — PROGRESS NOTES
SPIRITUAL HEALTH SERVICES  Progress Note  [facility] ONCOLOGY    Pt transferred from [facility] to Oncology today. Follow-up  visit given pt has been critical, and was seen by on-call  yesterday.     Pt calm and peace, and family/friends gathered and eating, appreciative of support, but essentially denying any spiritual or emotional care needs at this time.      assured pt/family of our continued availability, prayers and support as needed and helpful to them.  team f/u per unit /on-call  coverage as requested.       GABRIEL Paula.Div  Staff   Pager [phone]

## 2019-01-06 NOTE — PROGRESS NOTES
"SPIRITUAL HEALTH SERVICES Progress Note  [facility] CCU    Visit per on call page.  Pt removed from bipap this morning and comfort care started.  Daughter and friend present at bedside.  Pt was awake when  arrived.       provided scripture, prayer and commendation.  Daughter and friend shared their love and support with pt.  Pt expressed thankfulness that she was not alone.      Pt's son left yesterday for [place], \"before they got the news.\"      SH remains available as needs arise.      Areli Mcnally  Chaplain Resident  "

## 2019-01-06 NOTE — PLAN OF CARE
"Pt on Bipap for most of the night, family present during duration of shift. Family together decided to switch to comfort cares. Pt came off bipap in the am, gave dilaudid and ativan for comfort. Pt refused VS. Tele ST. A&Ox4. Contact precautions maintained. Turned and repositioned through the night for comfort, straight cathed urethral conduit x2, second cath yielded very little urine. Does c/o SOB with coming off bipap, improved with medications. Pt and family state \"we do not want to prolong life, we are only interested in remaining comfortable\". Pt was able to get some sleep while on bipap. Pt and family would like to talk to palliative today to discuss plan. Continue to monitor.    "

## 2019-01-07 PROCEDURE — 25000128 H RX IP 250 OP 636: Performed by: INTERNAL MEDICINE

## 2019-01-07 PROCEDURE — 40000239 ZZH STATISTIC VAT ROUNDS

## 2019-01-07 PROCEDURE — 25000125 ZZHC RX 250: Performed by: INTERNAL MEDICINE

## 2019-01-07 PROCEDURE — 12000000 ZZH R&B MED SURG/OB

## 2019-01-07 PROCEDURE — 99232 SBSQ HOSP IP/OBS MODERATE 35: CPT | Performed by: STUDENT IN AN ORGANIZED HEALTH CARE EDUCATION/TRAINING PROGRAM

## 2019-01-07 PROCEDURE — 25000132 ZZH RX MED GY IP 250 OP 250 PS 637: Mod: GY | Performed by: INTERNAL MEDICINE

## 2019-01-07 PROCEDURE — A9270 NON-COVERED ITEM OR SERVICE: HCPCS | Mod: GY | Performed by: INTERNAL MEDICINE

## 2019-01-07 RX ADMIN — MORPHINE SULFATE 5 MG: 10 SOLUTION ORAL at 08:18

## 2019-01-07 RX ADMIN — MORPHINE SULFATE 5 MG: 10 SOLUTION ORAL at 18:19

## 2019-01-07 RX ADMIN — LORAZEPAM 0.5 MG: 0.5 TABLET ORAL at 10:37

## 2019-01-07 RX ADMIN — ATROPINE SULFATE 2 DROP: 10 SOLUTION/ DROPS OPHTHALMIC at 22:49

## 2019-01-07 RX ADMIN — LORAZEPAM 0.5 MG: 2 INJECTION INTRAMUSCULAR; INTRAVENOUS at 03:27

## 2019-01-07 RX ADMIN — LORAZEPAM 0.5 MG: 0.5 TABLET ORAL at 16:36

## 2019-01-07 RX ADMIN — MORPHINE SULFATE 5 MG: 10 SOLUTION ORAL at 14:22

## 2019-01-07 RX ADMIN — MORPHINE SULFATE 5 MG: 10 SOLUTION ORAL at 22:49

## 2019-01-07 ASSESSMENT — ACTIVITIES OF DAILY LIVING (ADL)
ADLS_ACUITY_SCORE: 29
ADLS_ACUITY_SCORE: 29
ADLS_ACUITY_SCORE: 27
ADLS_ACUITY_SCORE: 28
ADLS_ACUITY_SCORE: 29
ADLS_ACUITY_SCORE: 29

## 2019-01-07 NOTE — PLAN OF CARE
Comfort cares continued. RR 20s reporting shortness of breath occasionally. Pt feels more comfortable remaining on O2 for comfort (3LPM). Ativan given with improvement. Morphine given x2 as well for pain. Offered repositioning for comfort and patient refused a few times. Pt also requested that wound dressings not be changed. Pt awake and tolerating ice chips and some pop. Straight cathed via illeal conduit x2, last at 1430. SW sent referrals for hospice placement.
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
## 2019-01-07 NOTE — PLAN OF CARE
Comfort cares. Alert but unable to speak, garbled language. Able to communicate discomfort. PRN morphine given x1 for dyspnea and pain, PRN ativan given x1. 6L O2 for comfort, family requesting O2 to be monitored. Satting mid 90s. Wounds throughout body, dressings CDI. Family requesting dressings not to be changed unless saturated. Illeal conduit-straight cath as needed. Last at 0430 for 500ml. Colostomy, mostly air. PICC SL. Bedrest. Turn/repo as family allows, frequently refusing. Plan for palliative meeting today. Continue to monitor.
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
## 2019-01-07 NOTE — PROGRESS NOTES
BRIEF NUTRITION NOTE    Reason for note:  Felicia Ellison is a 53 year old female originally seen by Registered Dietitian for Provider Order - Registered Dietitian to Assess and Order TF per Medical Nutrition protocol    Pt has transferred to [facility], nutrition will no longer be following. Please re-consult if a Registered Dietitian is needed in the future.       Eliana Brewster, RD, LD  Clinical Dietitian

## 2019-01-07 NOTE — PROGRESS NOTES
[facility]    Hospitalist Progress Note    Assessment & Plan     Felicia Ellison is a 53 year old female who was admitted on 12/12/2018.  She was admitted to the floor with concerns of pneumonia and later developed acute respiratory failure.  She is a  paraplegic.  In the emergency room during her admission she had a CT chest was negative for PE but indicated bilateral pleural effusions and possible pericardial effusion, a transthoracic echocardiogram was performed and it showed normal LV function and no significant valvular abnormalities.  She had a large pericardial effusion without signs of tamponade noted in the echo.  Following admission on 12/13 she developed fever of 101.3 and also became persistently hypotensive with the worsening hypoxia.  The patient was transferred to ICU the same night and later on she got intubated.  Patient was seen by infectious disease and was on multiple IV antibiotics.  Patient had an extensive stay in the ICU of 22 days.  She was transferred out of the intensive care unit on January 3  1/6/19, the patient and family requested to be comfort care.    Acute respiratory failure  --Patient had developed ARDS when was on ventilator for total 19 days and got extubated 1/1.  Currently tolerating 5 L of O2 and she has decided to be DNI for future needs  --Continue nebs, diuresis, wean oxygen as possible  --She had received Diamox for short while  --Continue Lasix 20 mg once a day  --Her weight remains higher than admission weight but patient appears to be adequately diuresed.  January 4- no weight recorded today but yesterday she was down 4.3 kg from the previous day.  January 5- She suddenly become hypoxic and is on BiLevel positive airway pressure.  She has not responded to Lasix.  Her daughter does not want her intubated.  Chest X-ray shows bilateral  Infiltrates likely from the ARDS.  Pro-calcitonin is negative.  Troponin is borderline.  She has been on apixaban    - off bipap  - Comfort care  - SW consulted for hospice     Septic shock  --Off pressors and antibiotics since 1 week  --No positive cultures or fever noted up until 1/1  --Current plan is to monitor off antibiotics  January 4- the patient is afebrile and her last white blood cell count was 7.5 thousand on January 2.  01/07 - comfort care, no more blood draws        Episodes of SVT  --stop metoprolol 50 mg twice daily due to comfort care     Paraplegia with neurogenic bladder  Chronic ileostomy and ileal conduit  --Concern of drainage around her ileal conduit noted  --Had intermittent catheterization via a 14 F Lorene-cath catheter -- but was leaking and having high residuals so mcintyre replaced.  Still leaking--so urostomy bag placed.     Decubitus ulcers  --Wound care RN following     Pericardial effusion  --Resolved     Anemia of chronic disease  --Monitor hemoglobin, received 1 unit PRBC on 12/23 January 4- hemoglobin was 8.2 g on January 2. No further hgb checks     Thrombocytopenia     Left hip effusion on CT-no infection  Chronic pain  --continue prn medications for comfort only     Right upper extremity DVT with history of portal vein thrombosis  Right upper extremity nonocclusive DVT in right subclavian and IJ  -- she was on warfarin for a while and family had switched to Lovenox due to difficulty of maintaining her INR  --She was started on Eliquis 5 mg twice daily-plan is to change it into 2.5 mg twice daily after 1 month  --She has currently PICC line Access   --She received Lovenox at treatment dose for 2 weeks already.  January 4-she is on apixaban   1/5-stop all nonessental meds     DVT Prophylaxis: None, on comfort care  Code Status: DNR/DNI   Disposition:comfort care, hospice    # Pain Assessment:  Current Pain Score 1/7/2019   Patient currently in pain? yes   Pain score (0-10) -   Pain location -   Pain descriptors -   rFLACC pain score 3   CPOT pain score -   end of life meds like morphine    DVT  Prophylaxis: comfort care  Code Status: DNR/DNI    Text Page (7am - 6pm)  Renan Benton MD    Interval History     Patient on comfort care.  Discussed with daughter in room  No new complaints, patient is comfortable  Family would like hospice center    -Data reviewed today: I reviewed all new labs and imaging results over the last 24 hours. I personally reviewed no images or EKG's today.    Physical Exam             Resp: 22 SpO2: 95 % O2 Device: Nasal cannula Oxygen Delivery: 3 LPM  Vitals:    01/01/19 0400 01/02/19 0630 01/03/19 0600   Weight: 85.1 kg (187 lb 9.8 oz) 85.4 kg (188 lb 4.4 oz) 81.1 kg (178 lb 12.7 oz)     Vital Signs with Ranges  Resp:  [18-24] 22  SpO2:  [92 %-95 %] 95 %  I/O last 3 completed shifts:  In: 10 [P.O.:10]  Out: 700 [Urine:700]    Constitutional: alert   Respiratory: coarse breath sounds   Cardiovascular: regular rate and rhythm without murmer or rub   GI:positive bowel sounds, non-tender   Skin/Integumen: no rashes    Other:  normal mood and affect      Medications     IV fluid REPLACEMENT ONLY       - MEDICATION INSTRUCTIONS -       - MEDICATION INSTRUCTIONS -         influenza vaccine adult (product based on age)  0.5 mL Intramuscular Prior to discharge       Data   Recent Labs   Lab 01/06/19  0023 01/05/19  1910 01/05/19  1420  01/05/19  0600 01/02/19  0835 01/02/19  0438  01/01/19  0430   WBC  --   --   --   --  6.8  --  7.5  --  10.6   HGB  --   --   --   --  8.2*  --  8.2*  --  7.6*   MCV  --   --   --   --  89  --  88  --  88   PLT  --   --   --   --  220  --  97*  --  77*   NA  --   --  143  --  147*  --  144   < >  --    POTASSIUM  --   --  4.9  --  3.0* 3.7 3.4   < >  --    CHLORIDE  --   --  110*  --  114*  --  108   < >  --    CO2  --   --  24  --  28  --  29   < >  --    BUN  --   --  13  --  9  --  11   < >  --    CR  --   --  0.60  --  0.24*  --  0.24*   < >  --    ANIONGAP  --   --  9  --  5  --  7   < >  --    JOSH  --   --  8.3*  --  7.6*  --  7.4*   < >  --    GLC  --    --  151*  --  97  --  78   < >  --    TROPI 0.208* 0.322* 0.241*   < >  --   --   --   --   --     < > = values in this interval not displayed.       No results found for this or any previous visit (from the past 24 hour(s)).

## 2019-01-07 NOTE — PLAN OF CARE
Alert and oriented X4.  Transitioned to comfort cares this evening.  Encouraged reposition every 2 hours.  Colostomy intact, no output this shift.  DD2/thin liquid diet, only had ice chips this evening.  Weaned from 8L NC to 4L NC.  Left PICC line Saline locked.  Ativan 0.5mg given X1 (1828) for some dyspnea.  Zofran 4mg given X1 (1826) for nausea.  Several wounds covered with mepilex dressings, family requesting not to do dressing changes unless they are saturated.  Will continue to monitor.

## 2019-01-08 LAB
INTERPRETATION ECG - MUSE: NORMAL
INTERPRETATION ECG - MUSE: NORMAL

## 2019-01-08 PROCEDURE — 40000239 ZZH STATISTIC VAT ROUNDS

## 2019-01-08 PROCEDURE — 25000132 ZZH RX MED GY IP 250 OP 250 PS 637: Mod: GY | Performed by: INTERNAL MEDICINE

## 2019-01-08 PROCEDURE — A9270 NON-COVERED ITEM OR SERVICE: HCPCS | Mod: GY | Performed by: INTERNAL MEDICINE

## 2019-01-08 PROCEDURE — 99231 SBSQ HOSP IP/OBS SF/LOW 25: CPT | Performed by: STUDENT IN AN ORGANIZED HEALTH CARE EDUCATION/TRAINING PROGRAM

## 2019-01-08 PROCEDURE — 12000000 ZZH R&B MED SURG/OB

## 2019-01-08 PROCEDURE — 25000131 ZZH RX MED GY IP 250 OP 636 PS 637: Mod: GY | Performed by: HOSPITALIST

## 2019-01-08 RX ADMIN — MORPHINE SULFATE 5 MG: 10 SOLUTION ORAL at 05:50

## 2019-01-08 RX ADMIN — ACETAMINOPHEN 650 MG: 325 TABLET, FILM COATED ORAL at 11:11

## 2019-01-08 RX ADMIN — MORPHINE SULFATE 5 MG: 10 SOLUTION ORAL at 21:04

## 2019-01-08 RX ADMIN — MORPHINE SULFATE 5 MG: 10 SOLUTION ORAL at 07:58

## 2019-01-08 RX ADMIN — ONDANSETRON 4 MG: 4 TABLET, ORALLY DISINTEGRATING ORAL at 11:11

## 2019-01-08 ASSESSMENT — ACTIVITIES OF DAILY LIVING (ADL)
ADLS_ACUITY_SCORE: 27

## 2019-01-08 ASSESSMENT — MIFFLIN-ST. JEOR: SCORE: 1480.38

## 2019-01-08 NOTE — PLAN OF CARE
Comfort cares continue. A&O, slightly garbled speech. Good spirits, making jokes. Straight cathed x2 today, output 500 and 300. Turning and repo for pt's comfort only, repositioned a few times. Dressing changed on coccyx.  DD2 + thin liquids, good appetite. Morphine given x1 for pain at beginning of shift. Tylenol given x1 for headache, effective. No pain rest of day. Zofran x1 for nausea, effective. Awaiting discharge placement.

## 2019-01-08 NOTE — PLAN OF CARE
Formal assessment and vital signs deferred due to comfort cares. Opens eyes to voice and will attempt to respond but garbled speech. On 2.5 L of O2 for comfort. Gave morphine x1 for pain-effective. Family requests not to have dressing changes on wound. Illeal conduit straight cathed q4. Colostomy with zero output. PICC saline lock. Bedrest, turned and repositioned as tolerated.

## 2019-01-08 NOTE — PROGRESS NOTES
SPIRITUAL HEALTH SERVICES Progress Note  [facility]    Pt was sleeping soundly, no family present.      SH will continue to follow, available as needs arise.      Areli Mcnally  Chaplain Resident

## 2019-01-08 NOTE — PLAN OF CARE
Oriented.  Comfort cares.  Morphine given x2.  Repositioned x3.  Straight cath x2, nikki urine.  Ice chips given.  Nursing will continue to monitor.

## 2019-01-08 NOTE — PROGRESS NOTES
[facility]    Hospitalist Progress Note    Date of Service: 01/08/2019    Assessment & Plan     Felicia Ellison is a 53 year old female who was admitted on 12/12/2018.  She was admitted to the floor with concerns of pneumonia and later developed acute respiratory failure.  She is a  paraplegic.  In the emergency room during her admission she had a CT chest was negative for PE but indicated bilateral pleural effusions and possible pericardial effusion, a transthoracic echocardiogram was performed and it showed normal LV function and no significant valvular abnormalities.  She had a large pericardial effusion without signs of tamponade noted in the echo.  Following admission on 12/13 she developed fever of 101.3 and also became persistently hypotensive with the worsening hypoxia.  The patient was transferred to ICU the same night and later on she got intubated.  Patient was seen by infectious disease and was on multiple IV antibiotics.  Patient had an extensive stay in the ICU of 22 days.  She was transferred out of the intensive care unit on January 3  1/6/19, the patient and family requested to be comfort care. Currently working on placement    Acute respiratory failure  --Patient had developed ARDS when was on ventilator for total 19 days and got extubated 1/1.  Currently tolerating 5 L of O2 and she has decided to be DNI for future needs  --Continue nebs, diuresis, wean oxygen as possible  --She had received Diamox for short while  --Continue Lasix 20 mg once a day  --Her weight remains higher than admission weight but patient appears to be adequately diuresed.  January 4- no weight recorded today but yesterday she was down 4.3 kg from the previous day.  January 5- She suddenly become hypoxic and is on BiLevel positive airway pressure.  She has not responded to Lasix.  Her daughter does not want her intubated.  Chest X-ray shows bilateral  Infiltrates likely from the ARDS.  Pro-calcitonin is  negative.  Troponin is borderline.  She has been on apixaban that has now been topped  - off bipap  - Comfort care  - SW consulted for hospice     Septic shock  --Off pressors and antibiotics since 1 week  --No positive cultures or fever noted up until 1/1  --Current plan is to monitor off antibiotics  January 4- the patient is afebrile and her last white blood cell count was 7.5 thousand on January 2.  01/07 - comfort care, no more blood draws        Episodes of SVT  --stop metoprolol 50 mg twice daily due to comfort care     Paraplegia with neurogenic bladder  Chronic ileostomy and ileal conduit  --Concern of drainage around her ileal conduit noted  --Had intermittent catheterization via a 14 F Lorene-cath catheter -- but was leaking and having high residuals so mcintyre replaced.  Still leaking--so urostomy bag placed.     Decubitus ulcers  --Wound care RN following     Pericardial effusion  --Resolved     Anemia of chronic disease  --Monitor hemoglobin, received 1 unit PRBC on 12/23 January 4- hemoglobin was 8.2 g on January 2. No further hgb checks     Thrombocytopenia     Left hip effusion on CT-no infection  Chronic pain  --continue prn medications for comfort only     Right upper extremity DVT with history of portal vein thrombosis  Right upper extremity nonocclusive DVT in right subclavian and IJ  -- she was on warfarin for a while and family had switched to Lovenox due to difficulty of maintaining her INR  --She was started on Eliquis 5 mg twice daily-plan is to change it into 2.5 mg twice daily after 1 month  --She has currently PICC line Access   --She received Lovenox at treatment dose for 2 weeks already.  January 4-she is on apixaban - now stopped  1/5-stop all nonessental meds     DVT Prophylaxis: None, on comfort care  Code Status: DNR/DNI   Disposition:comfort care, hospice    # Pain Assessment:  Current Pain Score 1/8/2019   Patient currently in pain? yes   Pain score (0-10) 5   Pain location -   Pain  descriptors -   rFLACC pain score -   CPOT pain score -   end of life meds like morphine    DVT Prophylaxis: comfort care  Code Status: DNR/DNI    Text Page (7am - 6pm)  Renan Benton MD    Interval History     Patient on comfort care.  Discussed with daughter in room  Comfortable, no complaints today  Currently working on placement    -Data reviewed today: I reviewed all new labs and imaging results over the last 24 hours. I personally reviewed no images or EKG's today.    Physical Exam             Resp: 20        Vitals:    01/02/19 0630 01/03/19 0600 01/08/19 0705   Weight: 85.4 kg (188 lb 4.4 oz) 81.1 kg (178 lb 12.7 oz) 81.6 kg (179 lb 14.3 oz)     Vital Signs with Ranges  Resp:  [20-24] 20  I/O last 3 completed shifts:  In: -   Out: 600 [Urine:600]    Constitutional: alert   Respiratory: coarse breath sounds   Cardiovascular: regular rate and rhythm without murmer or rub   GI:positive bowel sounds, non-tender   Skin/Integumen: no rashes    Other:  normal mood and affect      Medications     IV fluid REPLACEMENT ONLY       - MEDICATION INSTRUCTIONS -       - MEDICATION INSTRUCTIONS -         influenza vaccine adult (product based on age)  0.5 mL Intramuscular Prior to discharge       Data   Recent Labs   Lab 01/06/19  0023 01/05/19  1910 01/05/19  1420  01/05/19  0600 01/02/19  0835 01/02/19  0438   WBC  --   --   --   --  6.8  --  7.5   HGB  --   --   --   --  8.2*  --  8.2*   MCV  --   --   --   --  89  --  88   PLT  --   --   --   --  220  --  97*   NA  --   --  143  --  147*  --  144   POTASSIUM  --   --  4.9  --  3.0* 3.7 3.4   CHLORIDE  --   --  110*  --  114*  --  108   CO2  --   --  24  --  28  --  29   BUN  --   --  13  --  9  --  11   CR  --   --  0.60  --  0.24*  --  0.24*   ANIONGAP  --   --  9  --  5  --  7   JOSH  --   --  8.3*  --  7.6*  --  7.4*   GLC  --   --  151*  --  97  --  78   TROPI 0.208* 0.322* 0.241*   < >  --   --   --     < > = values in this interval not displayed.       No results  found for this or any previous visit (from the past 24 hour(s)).

## 2019-01-08 NOTE — PROGRESS NOTES
Patient is comfort cares.  Per RN will defer routine Picc checks unless dressing becomes nonocclusive.  Staff will call us if RD needed.

## 2019-01-09 VITALS
DIASTOLIC BLOOD PRESSURE: 70 MMHG | WEIGHT: 179.9 LBS | HEIGHT: 69 IN | RESPIRATION RATE: 20 BRPM | HEART RATE: 107 BPM | BODY MASS INDEX: 26.64 KG/M2 | OXYGEN SATURATION: 95 % | SYSTOLIC BLOOD PRESSURE: 109 MMHG | TEMPERATURE: 98.4 F

## 2019-01-09 PROBLEM — Z51.5 HOSPICE CARE PATIENT: Status: ACTIVE | Noted: 2019-01-09

## 2019-01-09 PROCEDURE — 25000132 ZZH RX MED GY IP 250 OP 250 PS 637: Mod: GY | Performed by: INTERNAL MEDICINE

## 2019-01-09 PROCEDURE — A9270 NON-COVERED ITEM OR SERVICE: HCPCS | Mod: GY | Performed by: INTERNAL MEDICINE

## 2019-01-09 PROCEDURE — G0463 HOSPITAL OUTPT CLINIC VISIT: HCPCS

## 2019-01-09 PROCEDURE — 40000239 ZZH STATISTIC VAT ROUNDS

## 2019-01-09 PROCEDURE — 40000905 ZZH STATISTIC WOC PT EDUCATION, > 60 MIN

## 2019-01-09 PROCEDURE — 99238 HOSP IP/OBS DSCHRG MGMT 30/<: CPT | Performed by: HOSPITALIST

## 2019-01-09 PROCEDURE — 40000257 ZZH STATISTIC CONSULT NO CHARGE VASC ACCESS

## 2019-01-09 RX ORDER — SALIVA STIMULANT COMB. NO.3
1 SPRAY, NON-AEROSOL (ML) MUCOUS MEMBRANE
Start: 2019-01-09 | End: 2019-02-08

## 2019-01-09 RX ORDER — LORAZEPAM 2 MG/ML
1 CONCENTRATE ORAL EVERY 8 HOURS PRN
Qty: 30 ML | Refills: 0 | Status: SHIPPED | OUTPATIENT
Start: 2019-01-09 | End: 2019-02-08

## 2019-01-09 RX ORDER — ALBUTEROL SULFATE 0.83 MG/ML
2.5 SOLUTION RESPIRATORY (INHALATION)
Status: ON HOLD
Start: 2019-01-09 | End: 2019-05-19

## 2019-01-09 RX ORDER — ACETAMINOPHEN 325 MG/1
650 TABLET ORAL EVERY 6 HOURS PRN
Start: 2019-01-09 | End: 2019-02-08

## 2019-01-09 RX ORDER — ONDANSETRON HYDROCHLORIDE 4 MG/5ML
4 SOLUTION ORAL 2 TIMES DAILY PRN
Start: 2019-01-09 | End: 2019-01-16

## 2019-01-09 RX ORDER — ATROPINE SULFATE 10 MG/ML
1 SOLUTION/ DROPS OPHTHALMIC
Start: 2019-01-09 | End: 2019-01-10

## 2019-01-09 RX ORDER — MORPHINE SULFATE 100 MG/5ML
5 SOLUTION ORAL
Qty: 118 ML | Refills: 0 | Status: SHIPPED | OUTPATIENT
Start: 2019-01-09 | End: 2019-02-08

## 2019-01-09 RX ADMIN — MORPHINE SULFATE 10 MG: 10 SOLUTION ORAL at 17:17

## 2019-01-09 RX ADMIN — ACETAMINOPHEN 650 MG: 325 TABLET, FILM COATED ORAL at 01:18

## 2019-01-09 RX ADMIN — MORPHINE SULFATE 10 MG: 10 SOLUTION ORAL at 01:48

## 2019-01-09 ASSESSMENT — ACTIVITIES OF DAILY LIVING (ADL)
ADLS_ACUITY_SCORE: 27

## 2019-01-09 NOTE — DISCHARGE SUMMARY
[facility]  Hospitalist Discharge Summary       Date of Admission:  12/12/2018  Date of Discharge:  1/9/2019  Discharging Provider: Efrain Geiger MD    Discharge Diagnoses   Septic shock due to pneumonia   Acute respiratory failure requiring mechanical ventilation due to acute respiratory distress syndrome  Right upper extremity deep venous thrombosis   Supraventricular tachycardia  Left hip effusion on CT  Paraplegia with neurogenic bladder  Chronic ileostomy and ileal conduit  Decubitus ulcers  Anemia of chronic disease  Thrombocytopenia  Left hip effusion on CT    Follow-ups Needed After Discharge   Follow-up Appointments     Follow-up and recommended labs and tests       As needed               Unresulted Labs Ordered in the Past 30 Days of this Admission     Date and Time Order Name Status Description    12/20/2018 1152 Nocardia culture - BAL Site 1 Preliminary     12/20/2018 1152 Fungus Culture, non-blood - BAL Site 1 Preliminary     12/20/2018 1152 AFB Culture Non Blood - BAL site 1 Preliminary     12/12/2018 2120 T4 free In process       These results will be followed up by primary care provider     Hospital Course    Felicia Ellison is a 53 year old female who was admitted on 12/12/2018.  She was admitted to the floor with concerns of pneumonia and later developed acute respiratory failure.  She is a  paraplegic.  In the emergency room during her admission she had a CT chest was negative for PE but indicated bilateral pleural effusions and possible pericardial effusion, a transthoracic echocardiogram was performed and it showed normal LV function and no significant valvular abnormalities.  She had a large pericardial effusion without signs of tamponade noted in the echo.  Following admission on 12/13 she developed fever of 101.3 and also became persistently hypotensive with the worsening hypoxia.  The patient was transferred to ICU the same night and later on she got intubated.   Patient was seen by infectious disease and was on multiple IV antibiotics.  Patient had an extensive stay in the ICU of 22 days.  She was transferred out of the intensive care unit on January 3  1/6/19, the patient and family requested to be comfort care.       Acute respiratory failure  --Patient had developed ARDS when was on ventilator for total 19 days and got extubated 1/1.  Currently tolerating 5 L of O2 and she has decided to be DNI for future needs  --Continue nebs, diuresis, wean oxygen as possible  --She had received Diamox for short while  --Continue Lasix 20 mg once a day  --Her weight remains higher than admission weight but patient appears to be adequately diuresed.  January 4- no weight recorded today but yesterday she was down 4.3 kg from the previous day.  January 5- She suddenly become hypoxic and is on BiLevel positive airway pressure.  She has not responded to Lasix.  Her daughter does not want her intubated.  Chest X-ray shows bilateral  Infiltrates likely from the ARDS.  Pro-calcitonin is negative.  Troponin is borderline.  She has been on apixaban   - off bipap  - Comfort care  - SW consulted for hospice     Septic shock  --Off pressors and antibiotics since 1 week  --No positive cultures or fever noted up until 1/1  --Current plan is to monitor off antibiotics  January 4- the patient is afebrile and her last white blood cell count was 7.5 thousand on January 2.  01/07 - comfort care, no more blood draws        Episodes of SVT  --stop metoprolol 50 mg twice daily due to comfort care     Paraplegia with neurogenic bladder  Chronic ileostomy and ileal conduit  --Concern of drainage around her ileal conduit noted  --Had intermittent catheterization via a 14 F Lorene-cath catheter -- but was leaking and having high residuals so mcintyre replaced.  Still leaking--so urostomy bag placed.     Decubitus ulcers  --Wound care RN following     Pericardial effusion  --Resolved     Anemia of chronic  disease  --Monitor hemoglobin, received 1 unit PRBC on 12/23 January 4- hemoglobin was 8.2 g on January 2. No further hgb checks     Thrombocytopenia     Left hip effusion on CT-no infection  Chronic pain  --continue prn medications for comfort only     Right upper extremity DVT with history of portal vein thrombosis  Right upper extremity nonocclusive DVT in right subclavian and IJ  -- she was on warfarin for a while and family had switched to Lovenox due to difficulty of maintaining her INR  --She was started on Eliquis 5 mg twice daily-plan is to change it into 2.5 mg twice daily after 1 month  --She has currently PICC line Access   --She received Lovenox at treatment dose for 2 weeks already.  January 4-she is on apixaban   1/5-stop all nonessental meds    Consultations This Hospital Stay   CARDIOLOGY IP CONSULT  PHARMACY IP CONSULT  [id number]  PHARMACY TO DOSE VANCO  PHARMACY TO DOSE VANCO  WOUND OSTOMY CONTINENCE NURSE  IP CONSULT  INTENSIVIST IP CONSULT  INFECTIOUS DISEASES IP CONSULT  VASCULAR ACCESS ADULT IP CONSULT  WOUND OSTOMY CONTINENCE NURSE  IP CONSULT  NUTRITION SERVICES ADULT IP CONSULT  PHARMACY IP CONSULT  UROLOGY IP CONSULT  CARDIOLOGY IP CONSULT  CARDIOLOGY IP CONSULT  ORTHOPEDIC SURGERY IP CONSULT  PHARMACY/NUTRITION TO START AND MANAGE TPN  PHARMACY IP CONSULT  PHARMACY IP CONSULT  PHARMACY IP CONSULT  PALLIATIVE CARE ADULT IP CONSULT  NUTRITION SERVICES ADULT IP CONSULT  PHARMACY IP CONSULT  PHARMACY IP CONSULT  SPEECH LANGUAGE PATH ADULT IP CONSULT  UROLOGY IP CONSULT  UROLOGY IP CONSULT  PHARMACY TO DOSE HEPARIN  SPIRITUAL HEALTH SERVICES IP CONSULT  PALLIATIVE CARE ADULT IP CONSULT  PALLIATIVE CARE ADULT IP CONSULT  SOCIAL WORK IP CONSULT    Code Status   DNR/DNI    Time Spent on this Encounter   I, Efrain Geiger, personally saw the patient today and spent less than or equal to 30 minutes discharging this patient.       Efrain Geiger MD  [facility]  Hospital  ______________________________________________________________________    Physical Exam   Vital Signs:           Resp: 20        Weight: 179 lbs 14.33 oz  Constitutional: awake, alert, cooperative, no apparent distress, and appears stated age  Neurologic: Awake, alert, oriented to name, place and time.  Cranial nerves II-XII are grossly intact.  Motor is 5 out of 5 bilaterally.  Cerebellar finger to nose, heel to shin intact.  Sensory is intact.  Babinski down going, Romberg negative, and gait is normal.  Neuropsychiatric: General: normal, calm and normal eye contact       Primary Care Physician   Tony Padilla    Discharge Disposition   Discharged to short-term care facility  Condition at discharge: Terminal    Significant Results and Procedures   Most Recent 3 CBC's:  Recent Labs   Lab Test 01/05/19  0600 01/02/19  0438 01/01/19  0430   WBC 6.8 7.5 10.6   HGB 8.2* 8.2* 7.6*   MCV 89 88 88    97* 77*     Most Recent 3 BMP's:  Recent Labs   Lab Test 01/05/19  1420 01/05/19  0600 01/02/19  0835 01/02/19  0438    147*  --  144   POTASSIUM 4.9 3.0* 3.7 3.4   CHLORIDE 110* 114*  --  108   CO2 24 28  --  29   BUN 13 9  --  11   CR 0.60 0.24*  --  0.24*   ANIONGAP 9 5  --  7   JOSH 8.3* 7.6*  --  7.4*   * 97  --  78   ,   Results for orders placed or performed during the hospital encounter of 12/12/18   XR Chest Port 1 View    Narrative    XR CHEST PORT 1 VW  12/12/2018 10:54 PM     HISTORY:  sob; sob    COMPARISON: None.    FINDINGS:  The heart is enlarged. There are bilateral pleural  effusions, left larger than right. There are extensive bilateral  interstitial and alveolar opacities.      Impression    IMPRESSION: Cardiomegaly, pleural effusions and bilateral interstitial  and alveolar opacities. This pattern would be compatible with  congestive heart failure or fluid overload. Pneumonia cannot be  excluded.    PIPER ENCISO MD   XR Chest Port 1 View    Narrative    CHEST SINGLE VIEW  PORTABLE   12/13/2018 5:50 AM     HISTORY: Increased oxygen demand.    COMPARISON: 12/12/2018 at 2247 hours.    FINDINGS: Several patchy opacities scattered within both lungs, right  lung more prominent than left, mildly increased. Possible  cardiomegaly. Probable small left pleural effusion again noted.  Partial visualization of fusion hardware in the lower thoracic and  upper lumbar spine.      Impression    IMPRESSION:  1. Several patchy opacities scattered within both lungs, mildly  increased since 12/12/2018 at 2247 hours. These are nonspecific, but  most likely represent pneumonia or pulmonary edema.  2. Probable small left pleural effusion again noted.    JOS HARO MD   XR Chest Port 1 View    Narrative    XR CHEST PORT 1 VW 12/13/2018 7:40 AM    HISTORY: Attempted placement of subclavian line. Evaluate for  pneumothorax.    COMPARISON: 12/13/2018    FINDINGS: No pneumothorax. Increasing bilateral airspace opacity.      Impression    IMPRESSION: No pneumothorax.    MICHAELA SILVA MD   US Upper Ext Venous Duplex Limited Bilat    Narrative    ULTRASOUND UPPER EXTREMITY VENOUS DUPLEX LIMITED BILATERAL 12/14/2018  11:48 AM    HISTORY: Evaluate for clot/stenosis. Extremity swelling.    TECHNIQUE: Venous Doppler US.?Color flow and spectral Doppler with  waveform analysis performed.    FINDINGS: Nonocclusive thrombus seen within the right subclavian and  internal jugular vein. No evidence of left upper extremity deep venous  thrombosis.       Impression    IMPRESSION: Positive for right internal jugular and subclavian DVT.    AMANDA YOUNG MD   XR Chest Port 1 View    Narrative    XR CHEST PORT 1 VIEW 12/15/2018 5:50 AM     HISTORY: Evaluate for effusions.     COMPARISON: 12/13/2018.    FINDINGS: Spinal fusion rods again seen. The degree of cardiac  enlargement is similar. Suboptimal inspiration with hypoventilatory  changes.      Impression    IMPRESSION:  Prominent bilateral pulmonary edema versus  infiltrate,  which is increased on the left. No pleural effusion is identified.    AMANDA YOUNG MD   XR Chest Port 1 View    Narrative    CHEST PORTABLE ONE VIEW December 15, 2018 6:54 AM     INDICATION: Tube placement.    COMPARISON: 12/15/2018 at 0528 hours.      Impression    IMPRESSION: New ETT with tip in the mid trachea approximately 2.6 cm  above the karen. Otherwise no significant change. Stable heart size.  Diffuse bilateral alveolar opacities. Spinal fusion rods again seen.    SHAYNA JAIME MD   XR Chest 1 View    Narrative    CHEST ONE VIEW  12/17/2018 11:50 AM     HISTORY: Follow up respiratory failure.    COMPARISON: 12/15/2018.      Impression    IMPRESSION: Interval placement of a left-sided PICC line with its tip  in the expected region of the cavoatrial junction. Endotracheal tube  is again noted, unchanged in position, about 3 cm above the karen.  Interval placement of a nasogastric tube coursing into the stomach.  The previously seen bilateral pulmonary infiltrates have moderately  diminished with increased aeration on both sides. Heart size remains  normal.    JUDITH RASCON MD   XR Chest Port 1 View    Narrative    CHEST PORTABLE ONE VIEW 12/18/2018 8:00 AM     HISTORY: Follow up respiratory failure.    COMPARISON: 12/17/2018 at 1151.      Impression    IMPRESSION: No change in position of previously seen tubes and lines.  Interval addition of an esophageal lead with its tip near the  gastroesophageal junction. Interval increase in pulmonary vascular  congestion with increasing confluent infiltrates in the right mid and  lower lung zone. New small left pleural effusion.    JUDITH RASCON MD   CT Chest Abdomen Pelvis w/o Contrast    Narrative    PROCEDURE:  CT of the chest, abdomen and pelvis without contrast    DATE OF PROCEDURE:  12/18/2018 10:52 AM    DOSE:  677 mGy-cm    CLINICAL HISTORY/INDICATION:  Complication of internal prosthetic device, implants and grafts.  Evaluate ileal  conduit.    COMPARISON:  None    TECHNIQUE:  CT of the chest, abdomen and pelvis was performed without the  administration of intravenous contrast. Coronal and axial MIP  reformats were performed. Radiation dose for this scan was reduced  using automated exposure control, adjustment of the mA and/or kV  according to patient size, or iterative reconstruction technique.     FINDINGS:  Support lines and tubes:  Left upper extremity PICC tip terminates in the low SVC. Endotracheal  tube terminates above the karen. Enteric tube terminates in the  gastric antrum. Percutaneous jejunostomy tube retention balloon  inflated in the left lower quadrant jejunum.    Chest:   The heart is not enlarged. Thyroid gland is unremarkable. Bilateral  moderate to large pleural effusions. Small volume pericardial  effusion. Diffuse bilateral airspace opacities with bilateral  compressive atelectasis. The central airways are patent.    Abdomen/Pelvis:  Extensive metallic streak artifact from spinal hardware. Examination  of the abdominal viscera is limited without intravenous contrast.  Diffuse anasarca. The liver is noncirrhotic in morphology. The spleen  and bilateral adrenal glands are unremarkable. No hydronephrosis.  Moderate volume ascites. Right lower quadrant ileal conduit is patent,  there is a small bowel containing parastomal hernia. Hyperdense linear  surgical suture material extends from the percutaneous jejunostomy  site through the peritoneal lining and terminates in the midline  posterior pelvis no evidence of obstruction, the large and small bowel  are nondistended.    Prior right femoral head resection. Moderate to large left hip  effusion and left hip superior/posterior subluxation      Impression    IMPRESSION:  1.  Bilateral moderate to large pleural effusions.  2.  Extensive bilateral pulmonary opacities with atelectasis  3.  Small bowel containing para-stomal hernia  4.  Diffuse anasarca  5.  Moderate to large left  hip effusion.  6.  Superior posterior left hip subluxation.    BAKARI FISHER MD   XR Joint Aspiration Major Left    Narrative    EXAM: XR JOINT ASPIRATION MAJOR LEFT  12/19/2018 1:20 PM       History:  Left hip effusion, sepsis.     PROCEDURE: The risks (including bleeding, infection, and allergy to  contrast and medications) and benefits of the procedure were explained  to the patient and consent was obtained.  Using sterile technique and  fluoroscopic guidance, a #20 gauge needle was placed into the Left hip  joint using an anterior approach.  About 8 mL of thin, red synovial  fluid was aspirated..  No initial complication. Fluid sent to lab for  analysis.      Fluoroscopy time: 0.1 minutes.  Number of Images: 1  Medications used: 3 mL Local Lidocaine 1%.      Impression    IMPRESSION:  Technically successful left hip aspiration.    MESERET PEREZ PA-C   US Thoracentesis    Narrative     EXAM: US THORACENTESIS       12/19/2018 2:28 PM       HISTORY: Pleural effusions.      PROCEDURE:  Written informed consent was obtained from the patient  prior to the procedure. The risks and benefits including bleeding,  infection and pneumothorax were discussed and the patient wished to  continue. Initial ultrasound images demonstrated a left pleural fluid  collection. The skin overlying this collection was marked, prepped,  draped and anesthetized in usual sterile fashion utilizing 10mL  lidocaine.  Thoracentesis catheter was then placed into the pleural  fluid collection with return of  900 mL nikki-colored fluid. Patient  tolerated the procedure well. Followup chest x-ray was ordered.      US guidance was utilized to enter the left pleural space for  thoracentesis with permanent image recoding.      Impression    IMPRESSION:  Ultrasound-guided left thoracentesis.     MESERET PEREZ PA-C   XR Chest Port 1 View    Narrative    CHEST ONE VIEW PORTABLE   12/19/2018 2:40 PM     HISTORY: Post left  thoracentesis.    COMPARISON: 12/18/2018      Impression    IMPRESSION: No pneumothorax on either side. The tip of the  endotracheal tube is 4.5 cm above the karen. A feeding catheter  extends below the left hemidiaphragm. Left-sided PICC line unchanged.  Groundglass opacities in left lung have improved. There are persistent  groundglass opacities throughout the right lung. No significant  pleural effusion on either side.    VINCENT WALLACE MD   XR Chest Port 1 View    Narrative    XR CHEST PORT 1 VW 12/22/2018 9:45 AM    COMPARISON: 12/19/2018    HISTORY: Follow-up respiratory failure.      Impression    IMPRESSION: Support devices unchanged. Diffuse opacities over both  lungs are again seen, unchanged on the RIGHT and slightly worsened on  the LEFT. No pneumothorax seen on either side.    XI CABRERA MD   XR Chest Port 1 View    Narrative    CHEST ONE VIEW PORTABLE   12/23/2018 6:08 AM     HISTORY: Increasing plateau pressures. Evaluate chest x-ray and rule  out pneumothorax.    COMPARISON: 12/22/2018      Impression    IMPRESSION: Endotracheal tube in good position. Nasogastric tube  passes into the abdomen. Bilateral alveolar infiltrates in both lungs  are unchanged. Thoracolumbar spinal fusion instrumentation.    SANTO ARGUELLES MD   XR Chest Port 1 View    Narrative    CHEST ONE VIEW SEMI-UPRIGHT 12/26/2018 4:36 PM     HISTORY: Intubated on high oxygen levels.    COMPARISON: December 23, 2018      Impression    IMPRESSION: Increased bilateral infiltrates compared to previous.  Tubes and lines unchanged.    ARIN HUYNH MD   XR Chest 1 View    Narrative    XR CHEST 1 VW  12/27/2018 12:40 AM     HISTORY: Reverify ET tube placement.    COMPARISON: 12/26/2018.    FINDINGS: Semiupright portable chest. ET tube in place with tip 4 cm  above the karen. NG tube passes into the stomach. A left PICC is in  place. There is no pneumothorax. The heart size is normal. There are  again bilateral pulmonary infiltrates  without significant change.      Impression    IMPRESSION: ET tube 4 cm above the karen.    MAURILIO LEONG MD   US Upper Extremity Venous Duplex Right    Narrative    US UPPER EXTREMITY VENOUS DUPLEX RIGHT 12/29/2018 4:02 PM    HISTORY: Previous DVT. Evaluate for resolution.    COMPARISON: 12/14/2018    TECHNIQUE:  Venous Doppler US of the right upper extremity. Color flow  and spectral Doppler with waveform analysis performed.    FINDINGS: Persistent nonocclusive thrombus in the right internal  jugular vein and right subclavian vein. The deep veins in the right  upper extremity are otherwise compressible and demonstrate normal  venous augmentation, waveforms and color Doppler flow.       Impression    IMPRESSION: Persistent nonocclusive thrombus in the internal jugular  and subclavian veins, similar to 12/14/2018.    MICHAELA SILVA MD   XR Chest Port 1 View    Narrative    XR CHEST PORT 1 VW  1/1/2019 5:20 AM     HISTORY:  ARDS    COMPARISON: Film performed on 12/27/2018    FINDINGS:  ET tube and NG tube are in place. Temperature probe is also  noted. Left PICC line tip is projected over the superior vena cava.  There are extensive interstitial and alveolar infiltrates in both  lungs. These have not changed significantly.      Impression    IMPRESSION: Stable, no significant change in the bilateral  infiltrates.    PIPER ENCISO MD   XR Chest Port 1 View    Narrative    PORTABLE CHEST ONE VIEW   1/5/2019 10:00 AM    HISTORY: Declining respiratory status.    COMPARISON: 1/1/2019      Impression    IMPRESSION: Again seen is a left arm PICC line in the superior vena  cava. Previously seen endotracheal tube and nasogastric tube have been  removed. Again seen are surgical changes of the lower thoracic spine.  There continues to be moderate to marked diffuse hazy opacification of  both mid to lower lungs consistent with infiltrates. These are  unchanged. No other abnormality is noted. Other than for the removal  of the  endotracheal tube and nasogastric tube, I see no other  significant change since a previous study.     TONY RDZ MD       Discharge Orders      General info for SNF    Length of Stay Estimate: Short Term Care: Estimated # of Days 31-90  Condition at Discharge: Terminal  Level of care:skilled   Rehabilitation Potential: Poor  Admission H&P remains valid and up-to-date: Yes  Recent Chemotherapy: N/A  Use Nursing Home Standing Orders: Yes     Mantoux instructions    Give two-step Mantoux (PPD) Per Facility Policy Yes     Reason for your hospital stay    Severe pneumonia     Activity - Up with nursing assistance     Follow-up and recommended labs and tests     As needed     DNR/DNI     Oxygen - Nasal cannula    2 Lpm by nasal cannula to keep O2 sats 92% or greater.     Advance Diet as Tolerated    Follow this diet upon discharge: Orders Placed This Encounter      Combination Diet Dysphagia Diet Level 2: Mechan Altered; Thin Liquids (water, ice chips, juice, milk gelatin, ice cream, etc)     Discharge Medications   Current Discharge Medication List      START taking these medications    Details   acetaminophen (TYLENOL) 325 MG tablet Take 2 tablets (650 mg) by mouth every 6 hours as needed for mild pain or fever (temperature over 100  F )    Associated Diagnoses: Hospice care patient      albuterol (PROVENTIL) (2.5 MG/3ML) 0.083% neb solution Take 1 vial (2.5 mg) by nebulization every 2 hours as needed for wheezing    Associated Diagnoses: Hospice care patient      artificial saliva (BIOTENE MT) SOLN solution Take 1 mL (1 spray) by mouth every hour as needed for dry mouth    Associated Diagnoses: Hospice care patient      atropine 1 % ophthalmic solution Place 1 drop under the tongue every hour as needed for other (secretions)    Associated Diagnoses: Hospice care patient      hypromellose-dextran (ARTIFICAL TEARS) 0.1-0.3 % ophthalmic solution Place 1 drop into both eyes every hour as needed for dry eyes     Associated Diagnoses: Hospice care patient      LORazepam (ATIVAN) 2 MG/ML (HIGH CONC) solution Take 0.5 mLs (1 mg) by mouth every 8 hours as needed for anxiety  Qty: 30 mL, Refills: 0    Associated Diagnoses: Hospice care patient      morphine sulfate HIGH CONCENTRATE (ROXANOL) 10 mg/0.5 mL (HIGH CONC) solution Place 0.25 mLs (5 mg) under the tongue every 2 hours as needed  Qty: 118 mL, Refills: 0    Associated Diagnoses: Hospice care patient      ondansetron (ZOFRAN) 4 MG/5ML solution Take 5 mLs (4 mg) by mouth 2 times daily as needed for nausea or vomiting    Associated Diagnoses: Hospice care patient         STOP taking these medications       Buprenorphine HCl (BELBUCA) 450 MCG FILM buccal film Comments:   Reason for Stopping:         calcium citrate-vitamin D (CALCIUM CITRATE + D) 315-250 MG-UNIT TABS per tablet Comments:   Reason for Stopping:         DOXYCYCLINE HYCLATE PO Comments:   Reason for Stopping:         enoxaparin (LOVENOX) 60 MG/0.6ML syringe Comments:   Reason for Stopping:         ferrous sulfate (IRON) 325 (65 FE) MG tablet Comments:   Reason for Stopping:         FUROSEMIDE PO Comments:   Reason for Stopping:         multivitamin, therapeutic with minerals (THERA-VIT-M) TABS tablet Comments:   Reason for Stopping:         oxybutynin (DITROPAN-XL) 5 MG 24 hr tablet Comments:   Reason for Stopping:         Potassium Chloride Jacqueline CR (K-DUR PO) Comments:   Reason for Stopping:         pregabalin (LYRICA) 100 MG capsule Comments:   Reason for Stopping:         RANITIDINE HCL PO Comments:   Reason for Stopping:         SUMAtriptan Succinate Refill (IMITREX) 6 MG/0.5ML SOCT Comments:   Reason for Stopping:         TRAMADOL HCL PO Comments:   Reason for Stopping:         TRAZODONE HCL PO Comments:   Reason for Stopping:         Zolpidem Tartrate (AMBIEN PO) Comments:   Reason for Stopping:             Allergies   Allergies   Allergen Reactions     Penicillins Anaphylaxis     Patient states it makes  "her \"climb the walls and hyperactive.\"     Acetaminophen Nausea and Vomiting     Levaquin Rash     Rash only with po Levaquin...able to take IV Levaquin per pt     "

## 2019-01-09 NOTE — PLAN OF CARE
Comfort cares continue. Turned and repositioned for comfort only. PICC in right arm, will be removed later today prior to discharge. Ileostomy patent, good output. Ileal conduit present on RLQ, straight cathed q4h. DD2 with thins, good appetite. Did not need pain meds this shift. Plan to discharge to TCU today at 5:30.

## 2019-01-09 NOTE — PLAN OF CARE
Comfort cares maintained, formal assessment and vital signs deferred. PRN morphine given x1. 2.5L of O2 for comfort. Illeal conduit- straight cath q4-q6. Colostomy patent. PICC saline locked. Bedrest and turned and repositioned q2h.

## 2019-01-09 NOTE — PROGRESS NOTES
VALARIE Note:    D/I:  SW spoke with patient's daughter Liliam about additional referrals being sent to facilities closer to the [facility] as we had not heard from any of the facilities near [place] being able to accept patient.  Daughter does not want patient to go to [facility] in [place].      SW received call from patient's daughter Liliam requesting that referrals be sent to [facility] as she is staying with her boyfriend close to [place].  SW sent referrals via DOD.     Addendum:    Discharge Planning: Received discharge orders for patient.  Patient has been accepted at [facility] for today. Patient in need of stretcher transportation to be arranged secondary to discharging on hospice and unable to manage oxygen needs.  Call placed to [facility] and patient will be transported at 5:30 today. Patient and daughter Liliam updated of the plan and are in agreement with the plan.  Updated facility and Maria Isabel at [facility] ([phone]) and faxed the orders ([phone]) and the PAS.  PCS form completed and faxed to [facility] ([phone]). Original along with facesheet at [facility] station for HE . [facility] Confirmed they will take care of medications for patient.     PAS-RR    D: Per DHS regulation, SW completed and submitted PAS-RR to [place] [employer] Direct Connect via the Senior LinkAge Line.  PAS-RR confirmation # is : [id number].    I: SW spoke with patient and they are aware a PAS-RR has been submitted.  VALARIE reviewed with patient that they may be contacted for a follow up appointment within 10 days of hospital discharge if their SNF stay is < 30 days.  Contact information for Senior [employer] Line was also provided.    A: Patient verbalized understanding.    P: Further questions may be directed to Senior [employer] Line at #[phone], option #4 for PAS-RR staff.      Praneeth Chaudhry, MSW, LGSW
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
## 2019-01-10 NOTE — PROGRESS NOTES
Pt discharged to [facility] in [place].  Pt received oral liquid morphine prior to discharge for 6/10 bilat hip pian.  PICC line removed earlier in the day.  Pt discharged via stretcher and transport.  Packet was sent with transport.

## 2019-01-17 LAB
BACTERIA SPEC CULT: NORMAL
FUNGUS SPEC CULT: ABNORMAL
FUNGUS SPEC CULT: ABNORMAL
SPECIMEN SOURCE: ABNORMAL
SPECIMEN SOURCE: NORMAL

## 2019-02-15 LAB
MYCOBACTERIUM SPEC CULT: NORMAL
MYCOBACTERIUM SPEC CULT: NORMAL
SPECIMEN SOURCE: NORMAL

## 2019-05-18 ENCOUNTER — TRANSFERRED RECORDS (OUTPATIENT)
Dept: HEALTH INFORMATION MANAGEMENT | Facility: CLINIC | Age: 54
End: 2019-05-18

## 2019-05-18 ENCOUNTER — HOSPITAL ENCOUNTER (INPATIENT)
Facility: CLINIC | Age: 54
LOS: 9 days | Discharge: HOME HEALTH | DRG: 100 | End: 2019-05-27
Attending: INTERNAL MEDICINE | Admitting: HOSPITALIST
Payer: COMMERCIAL

## 2019-05-18 ENCOUNTER — APPOINTMENT (OUTPATIENT)
Dept: GENERAL RADIOLOGY | Facility: CLINIC | Age: 54
DRG: 100 | End: 2019-05-18
Attending: SURGERY
Payer: COMMERCIAL

## 2019-05-18 DIAGNOSIS — N31.9 NEUROGENIC BLADDER: Primary | ICD-10-CM

## 2019-05-18 DIAGNOSIS — R56.9 SEIZURES (H): ICD-10-CM

## 2019-05-18 LAB
ALBUMIN SERPL-MCNC: 2.6 G/DL (ref 3.4–5)
ALP SERPL-CCNC: 204 U/L (ref 40–150)
ALT SERPL W P-5'-P-CCNC: 15 U/L (ref 0–50)
ALT SERPL-CCNC: 18 U/L (ref 14–63)
AMYLASE SERPL-CCNC: 35 U/L (ref 30–110)
ANION GAP SERPL CALCULATED.3IONS-SCNC: 13 MMOL/L (ref 3–14)
AST SERPL W P-5'-P-CCNC: 14 U/L (ref 0–45)
AST SERPL-CCNC: 19 U/L (ref 15–37)
BASE DEFICIT BLDA-SCNC: 7.4 MMOL/L
BILIRUB SERPL-MCNC: 0.3 MG/DL (ref 0.2–1.3)
BUN SERPL-MCNC: 17 MG/DL (ref 7–30)
CALCIUM SERPL-MCNC: 8 MG/DL (ref 8.5–10.1)
CHLORIDE SERPL-SCNC: 119 MMOL/L (ref 94–109)
CO2 SERPL-SCNC: 19 MMOL/L (ref 20–32)
CREAT SERPL-MCNC: 0.32 MG/DL (ref 0.51–0.95)
CREAT SERPL-MCNC: 0.32 MG/DL (ref 0.52–1.04)
ERYTHROCYTE [DISTWIDTH] IN BLOOD BY AUTOMATED COUNT: 22 % (ref 10–15)
GFR SERPL CREATININE-BSD FRML MDRD: >60 ML/MIN/1.73ME2 (ref 60–150)
GFR SERPL CREATININE-BSD FRML MDRD: >90 ML/MIN/{1.73_M2}
GLUCOSE BLDC GLUCOMTR-MCNC: 80 MG/DL (ref 70–99)
GLUCOSE SERPL-MCNC: 101 MG/DL (ref 70–99)
GLUCOSE SERPL-MCNC: 102 MG/DL (ref 74–100)
HCO3 BLD-SCNC: 18 MMOL/L (ref 21–28)
HCT VFR BLD AUTO: 35.7 % (ref 35–47)
HGB BLD-MCNC: 11.6 G/DL (ref 11.7–15.7)
INR PPP: 1.42 (ref 0.86–1.14)
LACTATE BLD-SCNC: 0.8 MMOL/L (ref 0.7–2)
LIPASE SERPL-CCNC: 42 U/L (ref 73–393)
MAGNESIUM SERPL-MCNC: 2.1 MG/DL (ref 1.6–2.3)
MCH RBC QN AUTO: 27.3 PG (ref 26.5–33)
MCHC RBC AUTO-ENTMCNC: 32.5 G/DL (ref 31.5–36.5)
MCV RBC AUTO: 84 FL (ref 78–100)
OXYHGB MFR BLD: 99 % (ref 92–100)
PCO2 BLD: 33 MM HG (ref 35–45)
PH BLD: 7.33 PH (ref 7.35–7.45)
PLATELET # BLD AUTO: 148 10E9/L (ref 150–450)
PO2 BLD: 179 MM HG (ref 80–105)
POTASSIUM SERPL-SCNC: 3 MMOL/L (ref 3.4–5.3)
POTASSIUM SERPL-SCNC: 3.6 MMOL/L (ref 3.5–5.1)
PROT SERPL-MCNC: 6.7 G/DL (ref 6.8–8.8)
RBC # BLD AUTO: 4.25 10E12/L (ref 3.8–5.2)
SODIUM SERPL-SCNC: 151 MMOL/L (ref 133–144)
WBC # BLD AUTO: 4.1 10E9/L (ref 4–11)

## 2019-05-18 PROCEDURE — 94002 VENT MGMT INPAT INIT DAY: CPT

## 2019-05-18 PROCEDURE — 40000275 ZZH STATISTIC RCP TIME EA 10 MIN

## 2019-05-18 PROCEDURE — 5A1945Z RESPIRATORY VENTILATION, 24-96 CONSECUTIVE HOURS: ICD-10-PCS | Performed by: SURGERY

## 2019-05-18 PROCEDURE — 20000003 ZZH R&B ICU

## 2019-05-18 PROCEDURE — 85027 COMPLETE CBC AUTOMATED: CPT | Performed by: SURGERY

## 2019-05-18 PROCEDURE — 99291 CRITICAL CARE FIRST HOUR: CPT | Mod: 25 | Performed by: SURGERY

## 2019-05-18 PROCEDURE — 82150 ASSAY OF AMYLASE: CPT | Performed by: SURGERY

## 2019-05-18 PROCEDURE — 00000146 ZZHCL STATISTIC GLUCOSE BY METER IP

## 2019-05-18 PROCEDURE — 40000986 XR CHEST PORT 1 VW

## 2019-05-18 PROCEDURE — 83735 ASSAY OF MAGNESIUM: CPT | Performed by: SURGERY

## 2019-05-18 PROCEDURE — 25000128 H RX IP 250 OP 636: Performed by: SURGERY

## 2019-05-18 PROCEDURE — 82805 BLOOD GASES W/O2 SATURATION: CPT | Performed by: SURGERY

## 2019-05-18 PROCEDURE — 80053 COMPREHEN METABOLIC PANEL: CPT | Performed by: SURGERY

## 2019-05-18 PROCEDURE — 83605 ASSAY OF LACTIC ACID: CPT | Performed by: SURGERY

## 2019-05-18 PROCEDURE — 85610 PROTHROMBIN TIME: CPT | Performed by: SURGERY

## 2019-05-18 PROCEDURE — 36620 INSERTION CATHETER ARTERY: CPT | Performed by: SURGERY

## 2019-05-18 PROCEDURE — 25800030 ZZH RX IP 258 OP 636: Performed by: SURGERY

## 2019-05-18 PROCEDURE — 83690 ASSAY OF LIPASE: CPT | Performed by: SURGERY

## 2019-05-18 PROCEDURE — 40000008 ZZH STATISTIC AIRWAY CARE

## 2019-05-18 RX ORDER — PROPOFOL 10 MG/ML
INJECTION, EMULSION INTRAVENOUS
Status: DISCONTINUED
Start: 2019-05-18 | End: 2019-05-19 | Stop reason: HOSPADM

## 2019-05-18 RX ORDER — NALOXONE HYDROCHLORIDE 0.4 MG/ML
.1-.4 INJECTION, SOLUTION INTRAMUSCULAR; INTRAVENOUS; SUBCUTANEOUS
Status: DISCONTINUED | OUTPATIENT
Start: 2019-05-18 | End: 2019-05-18

## 2019-05-18 RX ORDER — LEVETIRACETAM 10 MG/ML
1000 INJECTION INTRAVASCULAR EVERY 12 HOURS
Status: COMPLETED | OUTPATIENT
Start: 2019-05-19 | End: 2019-05-23

## 2019-05-18 RX ORDER — NALOXONE HYDROCHLORIDE 0.4 MG/ML
.1-.4 INJECTION, SOLUTION INTRAMUSCULAR; INTRAVENOUS; SUBCUTANEOUS
Status: DISCONTINUED | OUTPATIENT
Start: 2019-05-18 | End: 2019-05-27 | Stop reason: HOSPADM

## 2019-05-18 RX ORDER — CHLORHEXIDINE GLUCONATE ORAL RINSE 1.2 MG/ML
15 SOLUTION DENTAL EVERY 12 HOURS
Status: DISCONTINUED | OUTPATIENT
Start: 2019-05-18 | End: 2019-05-21

## 2019-05-18 RX ADMIN — MIDAZOLAM 2 MG/HR: 5 INJECTION INTRAMUSCULAR; INTRAVENOUS at 23:34

## 2019-05-19 ENCOUNTER — APPOINTMENT (OUTPATIENT)
Dept: MRI IMAGING | Facility: CLINIC | Age: 54
DRG: 100 | End: 2019-05-19
Attending: INTERNAL MEDICINE
Payer: COMMERCIAL

## 2019-05-19 LAB
ALBUMIN UR-MCNC: 100 MG/DL
ANION GAP SERPL CALCULATED.3IONS-SCNC: 9 MMOL/L (ref 3–14)
APPEARANCE UR: ABNORMAL
BILIRUB UR QL STRIP: NEGATIVE
BUN SERPL-MCNC: 16 MG/DL (ref 7–30)
CALCIUM SERPL-MCNC: 8.4 MG/DL (ref 8.5–10.1)
CHLORIDE SERPL-SCNC: 121 MMOL/L (ref 94–109)
CO2 SERPL-SCNC: 19 MMOL/L (ref 20–32)
COLOR UR AUTO: YELLOW
CREAT SERPL-MCNC: 0.3 MG/DL (ref 0.52–1.04)
ERYTHROCYTE [DISTWIDTH] IN BLOOD BY AUTOMATED COUNT: 22.1 % (ref 10–15)
GFR SERPL CREATININE-BSD FRML MDRD: >90 ML/MIN/{1.73_M2}
GLUCOSE BLDC GLUCOMTR-MCNC: 106 MG/DL (ref 70–99)
GLUCOSE BLDC GLUCOMTR-MCNC: 65 MG/DL (ref 70–99)
GLUCOSE BLDC GLUCOMTR-MCNC: 66 MG/DL (ref 70–99)
GLUCOSE BLDC GLUCOMTR-MCNC: 69 MG/DL (ref 70–99)
GLUCOSE BLDC GLUCOMTR-MCNC: 76 MG/DL (ref 70–99)
GLUCOSE BLDC GLUCOMTR-MCNC: 79 MG/DL (ref 70–99)
GLUCOSE BLDC GLUCOMTR-MCNC: 82 MG/DL (ref 70–99)
GLUCOSE SERPL-MCNC: 83 MG/DL (ref 70–99)
GLUCOSE UR STRIP-MCNC: NEGATIVE MG/DL
HCT VFR BLD AUTO: 34.2 % (ref 35–47)
HGB BLD-MCNC: 11.1 G/DL (ref 11.7–15.7)
HGB UR QL STRIP: ABNORMAL
KETONES UR STRIP-MCNC: 40 MG/DL
LEUKOCYTE ESTERASE UR QL STRIP: ABNORMAL
MCH RBC QN AUTO: 27.2 PG (ref 26.5–33)
MCHC RBC AUTO-ENTMCNC: 32.5 G/DL (ref 31.5–36.5)
MCV RBC AUTO: 84 FL (ref 78–100)
MRSA DNA SPEC QL NAA+PROBE: NEGATIVE
MUCOUS THREADS #/AREA URNS LPF: PRESENT /LPF
NITRATE UR QL: POSITIVE
PH UR STRIP: 8 PH (ref 5–7)
PLATELET # BLD AUTO: 172 10E9/L (ref 150–450)
POTASSIUM SERPL-SCNC: 4.4 MMOL/L (ref 3.4–5.3)
PROCALCITONIN SERPL-MCNC: <0.05 NG/ML
RBC # BLD AUTO: 4.08 10E12/L (ref 3.8–5.2)
RBC #/AREA URNS AUTO: 35 /HPF (ref 0–2)
SODIUM SERPL-SCNC: 149 MMOL/L (ref 133–144)
SOURCE: ABNORMAL
SP GR UR STRIP: 1.01 (ref 1–1.03)
SPECIMEN SOURCE: NORMAL
SQUAMOUS #/AREA URNS AUTO: 1 /HPF (ref 0–1)
UROBILINOGEN UR STRIP-MCNC: NORMAL MG/DL (ref 0–2)
WBC # BLD AUTO: 5.9 10E9/L (ref 4–11)
WBC #/AREA URNS AUTO: 16 /HPF (ref 0–5)
WBC CLUMPS #/AREA URNS HPF: PRESENT /HPF
YEAST #/AREA URNS HPF: ABNORMAL /HPF

## 2019-05-19 PROCEDURE — 87086 URINE CULTURE/COLONY COUNT: CPT | Performed by: SURGERY

## 2019-05-19 PROCEDURE — 25000132 ZZH RX MED GY IP 250 OP 250 PS 637: Performed by: INTERNAL MEDICINE

## 2019-05-19 PROCEDURE — 00000146 ZZHCL STATISTIC GLUCOSE BY METER IP

## 2019-05-19 PROCEDURE — 84145 PROCALCITONIN (PCT): CPT | Performed by: INTERNAL MEDICINE

## 2019-05-19 PROCEDURE — 40000275 ZZH STATISTIC RCP TIME EA 10 MIN

## 2019-05-19 PROCEDURE — 20000003 ZZH R&B ICU

## 2019-05-19 PROCEDURE — 95951 ZZHC EEG VIDEO EACH 24 HR: CPT

## 2019-05-19 PROCEDURE — 25000128 H RX IP 250 OP 636: Performed by: SURGERY

## 2019-05-19 PROCEDURE — 25000128 H RX IP 250 OP 636: Performed by: INTERNAL MEDICINE

## 2019-05-19 PROCEDURE — 40000008 ZZH STATISTIC AIRWAY CARE

## 2019-05-19 PROCEDURE — 87640 STAPH A DNA AMP PROBE: CPT | Performed by: SURGERY

## 2019-05-19 PROCEDURE — 93010 ELECTROCARDIOGRAM REPORT: CPT | Performed by: INTERNAL MEDICINE

## 2019-05-19 PROCEDURE — 36556 INSERT NON-TUNNEL CV CATH: CPT | Performed by: SURGERY

## 2019-05-19 PROCEDURE — 93005 ELECTROCARDIOGRAM TRACING: CPT

## 2019-05-19 PROCEDURE — 87186 SC STD MICRODIL/AGAR DIL: CPT | Performed by: SURGERY

## 2019-05-19 PROCEDURE — 80048 BASIC METABOLIC PNL TOTAL CA: CPT | Performed by: SURGERY

## 2019-05-19 PROCEDURE — 25800025 ZZH RX 258

## 2019-05-19 PROCEDURE — 99291 CRITICAL CARE FIRST HOUR: CPT | Performed by: INTERNAL MEDICINE

## 2019-05-19 PROCEDURE — A9585 GADOBUTROL INJECTION: HCPCS | Performed by: INTERNAL MEDICINE

## 2019-05-19 PROCEDURE — 87641 MR-STAPH DNA AMP PROBE: CPT | Performed by: SURGERY

## 2019-05-19 PROCEDURE — 94003 VENT MGMT INPAT SUBQ DAY: CPT

## 2019-05-19 PROCEDURE — 25500064 ZZH RX 255 OP 636: Performed by: INTERNAL MEDICINE

## 2019-05-19 PROCEDURE — 81001 URINALYSIS AUTO W/SCOPE: CPT | Performed by: SURGERY

## 2019-05-19 PROCEDURE — 25800030 ZZH RX IP 258 OP 636: Performed by: SURGERY

## 2019-05-19 PROCEDURE — 87088 URINE BACTERIA CULTURE: CPT | Performed by: SURGERY

## 2019-05-19 PROCEDURE — 70553 MRI BRAIN STEM W/O & W/DYE: CPT

## 2019-05-19 PROCEDURE — 25000132 ZZH RX MED GY IP 250 OP 250 PS 637: Performed by: SURGERY

## 2019-05-19 PROCEDURE — 99221 1ST HOSP IP/OBS SF/LOW 40: CPT | Mod: GC | Performed by: PSYCHIATRY & NEUROLOGY

## 2019-05-19 PROCEDURE — 40000281 ZZH STATISTIC TRANSPORT TIME EA 15 MIN

## 2019-05-19 PROCEDURE — 85027 COMPLETE CBC AUTOMATED: CPT | Performed by: SURGERY

## 2019-05-19 RX ORDER — METHADONE HYDROCHLORIDE 10 MG/ML
2.5 CONCENTRATE ORAL EVERY 12 HOURS
Status: DISCONTINUED | OUTPATIENT
Start: 2019-05-19 | End: 2019-05-27 | Stop reason: HOSPADM

## 2019-05-19 RX ORDER — POTASSIUM CHLORIDE 1500 MG/1
20 TABLET, EXTENDED RELEASE ORAL 2 TIMES DAILY
Status: ON HOLD | COMMUNITY
End: 2019-05-19

## 2019-05-19 RX ORDER — CEFTRIAXONE 1 G/1
1 INJECTION, POWDER, FOR SOLUTION INTRAMUSCULAR; INTRAVENOUS EVERY 24 HOURS
Status: COMPLETED | OUTPATIENT
Start: 2019-05-19 | End: 2019-05-21

## 2019-05-19 RX ORDER — ALBUTEROL SULFATE 0.83 MG/ML
2.5 SOLUTION RESPIRATORY (INHALATION)
Status: ON HOLD | COMMUNITY
End: 2020-01-04

## 2019-05-19 RX ORDER — SODIUM CHLORIDE 9 MG/ML
INJECTION, SOLUTION INTRAVENOUS CONTINUOUS
Status: DISCONTINUED | OUTPATIENT
Start: 2019-05-19 | End: 2019-05-22

## 2019-05-19 RX ORDER — FERROUS SULFATE 325(65) MG
325 TABLET ORAL
Status: ON HOLD | COMMUNITY
End: 2019-05-19

## 2019-05-19 RX ORDER — TRAZODONE HYDROCHLORIDE 50 MG/1
100 TABLET, FILM COATED ORAL AT BEDTIME
COMMUNITY
End: 2020-11-10

## 2019-05-19 RX ORDER — HEPARIN SODIUM 5000 [USP'U]/.5ML
5000 INJECTION, SOLUTION INTRAVENOUS; SUBCUTANEOUS EVERY 12 HOURS
Status: DISCONTINUED | OUTPATIENT
Start: 2019-05-19 | End: 2019-05-19

## 2019-05-19 RX ORDER — POTASSIUM CHLORIDE 1.5 G/1.58G
20-40 POWDER, FOR SOLUTION ORAL
Status: DISCONTINUED | OUTPATIENT
Start: 2019-05-19 | End: 2019-05-26

## 2019-05-19 RX ORDER — ACETAMINOPHEN 325 MG/1
650 TABLET ORAL EVERY 6 HOURS PRN
COMMUNITY
End: 2021-09-08

## 2019-05-19 RX ORDER — ONDANSETRON 4 MG/1
4 TABLET, FILM COATED ORAL DAILY PRN
Status: ON HOLD | COMMUNITY
End: 2019-05-19

## 2019-05-19 RX ORDER — ONDANSETRON 4 MG/1
4 TABLET, ORALLY DISINTEGRATING ORAL 2 TIMES DAILY PRN
COMMUNITY
End: 2020-09-02

## 2019-05-19 RX ORDER — MAGNESIUM SULFATE HEPTAHYDRATE 40 MG/ML
2 INJECTION, SOLUTION INTRAVENOUS DAILY PRN
Status: DISCONTINUED | OUTPATIENT
Start: 2019-05-19 | End: 2019-05-26

## 2019-05-19 RX ORDER — ZOLPIDEM TARTRATE 10 MG/1
10 TABLET ORAL AT BEDTIME
COMMUNITY
End: 2020-10-29

## 2019-05-19 RX ORDER — FAMOTIDINE 40 MG/5ML
20 POWDER, FOR SUSPENSION ORAL 2 TIMES DAILY
Status: DISCONTINUED | OUTPATIENT
Start: 2019-05-19 | End: 2019-05-19

## 2019-05-19 RX ORDER — OXYBUTYNIN CHLORIDE 5 MG/1
5 TABLET, EXTENDED RELEASE ORAL DAILY
Status: ON HOLD | COMMUNITY
End: 2020-01-04

## 2019-05-19 RX ORDER — DEXTROSE MONOHYDRATE 25 G/50ML
INJECTION, SOLUTION INTRAVENOUS
Status: COMPLETED
Start: 2019-05-19 | End: 2019-05-19

## 2019-05-19 RX ORDER — POTASSIUM CHLORIDE 1.5 G/1.58G
20 POWDER, FOR SOLUTION ORAL
Status: ON HOLD | COMMUNITY
End: 2022-04-24

## 2019-05-19 RX ORDER — BISACODYL 10 MG
10 SUPPOSITORY, RECTAL RECTAL DAILY PRN
Status: ON HOLD | COMMUNITY
End: 2020-01-04

## 2019-05-19 RX ORDER — MORPHINE SULFATE 30 MG/1
5 TABLET ORAL
Status: ON HOLD | COMMUNITY
End: 2020-01-04

## 2019-05-19 RX ORDER — FUROSEMIDE 20 MG
40 TABLET ORAL DAILY
COMMUNITY
End: 2020-12-15

## 2019-05-19 RX ORDER — MULTIVITAMIN,THERAPEUTIC
1 TABLET ORAL DAILY
Status: ON HOLD | COMMUNITY
End: 2019-05-19

## 2019-05-19 RX ORDER — POTASSIUM CHLORIDE 7.45 MG/ML
10 INJECTION INTRAVENOUS
Status: DISCONTINUED | OUTPATIENT
Start: 2019-05-19 | End: 2019-05-26

## 2019-05-19 RX ORDER — PREGABALIN 50 MG/1
50 CAPSULE ORAL 2 TIMES DAILY
Status: ON HOLD | COMMUNITY
End: 2019-05-19

## 2019-05-19 RX ORDER — POTASSIUM CHLORIDE 29.8 MG/ML
20 INJECTION INTRAVENOUS
Status: DISCONTINUED | OUTPATIENT
Start: 2019-05-19 | End: 2019-05-26

## 2019-05-19 RX ORDER — DEXTROSE, SODIUM CHLORIDE, SODIUM LACTATE, POTASSIUM CHLORIDE, AND CALCIUM CHLORIDE 5; .6; .31; .03; .02 G/100ML; G/100ML; G/100ML; G/100ML; G/100ML
INJECTION, SOLUTION INTRAVENOUS CONTINUOUS
Status: DISCONTINUED | OUTPATIENT
Start: 2019-05-19 | End: 2019-05-25

## 2019-05-19 RX ORDER — TRAZODONE HYDROCHLORIDE 100 MG/1
100 TABLET ORAL DAILY
Status: ON HOLD | COMMUNITY
End: 2019-05-19

## 2019-05-19 RX ORDER — DIPHENHYDRAMINE HCL 25 MG
25 CAPSULE ORAL DAILY PRN
Status: ON HOLD | COMMUNITY
End: 2019-05-19

## 2019-05-19 RX ORDER — OXYBUTYNIN CHLORIDE 5 MG/1
10 TABLET ORAL DAILY
Status: ON HOLD | COMMUNITY
End: 2019-05-19

## 2019-05-19 RX ORDER — GADOBUTROL 604.72 MG/ML
6 INJECTION INTRAVENOUS ONCE
Status: COMPLETED | OUTPATIENT
Start: 2019-05-19 | End: 2019-05-19

## 2019-05-19 RX ORDER — LORAZEPAM 2 MG/ML
1 CONCENTRATE ORAL EVERY 8 HOURS PRN
COMMUNITY
End: 2020-09-02 | Stop reason: ALTCHOICE

## 2019-05-19 RX ORDER — POTASSIUM CL/LIDO/0.9 % NACL 10MEQ/0.1L
10 INTRAVENOUS SOLUTION, PIGGYBACK (ML) INTRAVENOUS
Status: DISCONTINUED | OUTPATIENT
Start: 2019-05-19 | End: 2019-05-26

## 2019-05-19 RX ORDER — MORPHINE SULFATE 30 MG/1
5 TABLET ORAL EVERY 4 HOURS
Status: ON HOLD | COMMUNITY
End: 2020-01-04

## 2019-05-19 RX ORDER — PROCHLORPERAZINE MALEATE 10 MG
10 TABLET ORAL EVERY 6 HOURS PRN
Status: ON HOLD | COMMUNITY
End: 2019-05-19

## 2019-05-19 RX ORDER — SUMATRIPTAN 50 MG/1
50 TABLET, FILM COATED ORAL
Status: ON HOLD | COMMUNITY
End: 2019-05-19

## 2019-05-19 RX ORDER — POTASSIUM CHLORIDE 1500 MG/1
20-40 TABLET, EXTENDED RELEASE ORAL
Status: DISCONTINUED | OUTPATIENT
Start: 2019-05-19 | End: 2019-05-26

## 2019-05-19 RX ORDER — MAGNESIUM SULFATE HEPTAHYDRATE 40 MG/ML
4 INJECTION, SOLUTION INTRAVENOUS EVERY 4 HOURS PRN
Status: DISCONTINUED | OUTPATIENT
Start: 2019-05-19 | End: 2019-05-26

## 2019-05-19 RX ORDER — AMOXICILLIN 250 MG
2 CAPSULE ORAL DAILY
Status: ON HOLD | COMMUNITY
End: 2020-01-04

## 2019-05-19 RX ORDER — METHADONE HYDROCHLORIDE 5 MG/1
2.5 TABLET ORAL 2 TIMES DAILY
Status: ON HOLD | COMMUNITY
End: 2020-01-04

## 2019-05-19 RX ORDER — MAGNESIUM OXIDE 400 MG/1
400 TABLET ORAL 2 TIMES DAILY
Status: ON HOLD | COMMUNITY
End: 2019-05-19

## 2019-05-19 RX ADMIN — POTASSIUM CHLORIDE 40 MEQ: 1.5 POWDER, FOR SOLUTION ORAL at 01:56

## 2019-05-19 RX ADMIN — HEPARIN SODIUM 5000 UNITS: 5000 INJECTION, SOLUTION INTRAVENOUS; SUBCUTANEOUS at 10:21

## 2019-05-19 RX ADMIN — LEVETIRACETAM 1000 MG: 10 INJECTION INTRAVENOUS at 01:56

## 2019-05-19 RX ADMIN — CHLORHEXIDINE GLUCONATE 15 ML: 1.2 RINSE ORAL at 00:00

## 2019-05-19 RX ADMIN — GADOBUTROL 6 ML: 604.72 INJECTION INTRAVENOUS at 16:37

## 2019-05-19 RX ADMIN — CHLORHEXIDINE GLUCONATE 15 ML: 1.2 RINSE ORAL at 20:08

## 2019-05-19 RX ADMIN — METHADONE HYDROCHLORIDE 2.5 MG: 10 CONCENTRATE ORAL at 14:44

## 2019-05-19 RX ADMIN — ENOXAPARIN SODIUM 60 MG: 60 INJECTION SUBCUTANEOUS at 14:44

## 2019-05-19 RX ADMIN — POTASSIUM CHLORIDE 20 MEQ: 1.5 POWDER, FOR SOLUTION ORAL at 04:22

## 2019-05-19 RX ADMIN — CEFTRIAXONE 1 G: 1 INJECTION, POWDER, FOR SOLUTION INTRAMUSCULAR; INTRAVENOUS at 14:44

## 2019-05-19 RX ADMIN — CHLORHEXIDINE GLUCONATE 15 ML: 1.2 RINSE ORAL at 09:18

## 2019-05-19 RX ADMIN — DEXTROSE MONOHYDRATE 25 ML: 500 INJECTION PARENTERAL at 18:51

## 2019-05-19 RX ADMIN — RANITIDINE HYDROCHLORIDE 150 MG: 150 SOLUTION ORAL at 20:50

## 2019-05-19 RX ADMIN — LEVETIRACETAM 1000 MG: 10 INJECTION INTRAVENOUS at 13:14

## 2019-05-19 RX ADMIN — SODIUM CHLORIDE, SODIUM LACTATE, POTASSIUM CHLORIDE, CALCIUM CHLORIDE AND DEXTROSE MONOHYDRATE: 5; 600; 310; 30; 20 INJECTION, SOLUTION INTRAVENOUS at 11:30

## 2019-05-19 RX ADMIN — RANITIDINE HYDROCHLORIDE 150 MG: 150 SOLUTION ORAL at 10:21

## 2019-05-19 RX ADMIN — SODIUM CHLORIDE, POTASSIUM CHLORIDE, SODIUM LACTATE AND CALCIUM CHLORIDE 500 ML: 600; 310; 30; 20 INJECTION, SOLUTION INTRAVENOUS at 01:28

## 2019-05-19 ASSESSMENT — ACTIVITIES OF DAILY LIVING (ADL)
ADLS_ACUITY_SCORE: 27
ADLS_ACUITY_SCORE: 28
ADLS_ACUITY_SCORE: 28
ADLS_ACUITY_SCORE: 27
ADLS_ACUITY_SCORE: 27
ADLS_ACUITY_SCORE: 28

## 2019-05-19 NOTE — PROGRESS NOTES
LTV EEG [id number] started at 10:37am  Ordered by Dr. White.  Video consent by Dr. White for medical emergency

## 2019-05-19 NOTE — PROGRESS NOTES
INITIAL REPORT of Video-EEG Monitoring              DATE OF RECORDIN2019         I reviewed the first 1 hour of video-EEG monitoring of Felicia Ellison.        The EEG during sedated coma was abnormal due to generalized delta-theta slowing, with periods of brief, monorhythmic (1.5-2 Hz), triphasic waves or generalized periodic epileptiform discharges and brief, generalized electrodecrements.  No electrographic seizures were observed.         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  This recording did not show evidence of non-convulsive status epilepticus.    Screening for intermittent seizures will require a longer period of recording.   Seamus Del Cid M.D., [facility] [phone]
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
## 2019-05-19 NOTE — PROGRESS NOTES
Intensive Care Unit Note    Felicia Ellison MRN# [id number]   Age: 53 year old YOB: 1965          Requesting Physician: No referring provider defined for this encounter.       Assessment and Plan: 53-year-old woman with a history of paraplegia, history of complex hospital stay in December 2018 for pneumonia and effusions, admitted for encephalopathy and possible seizure activity.    Neuro: Possible seizures- concerning for complex partial seizures per transfer ED.  On Keppra.  On Ativan.  Neuro has been consulted MRI ordered EEG ordered.  Wean sedation to evaluate for extubation after MRI.    Therapeutic sedation-procedure meds.    Acute encephalopathy-likely due to above    CV: Personally stable.  Monitor closely.    Pulm: Acute respiratory failure secondary to neurologic issue.  Continue mechanical ventilation.    Renal: Very low creatinine makes assessment of kidney function difficult.  Avoid nephrotoxins.  Aim for volume even.    GI: N.p.o. for now.  Will need to consider starting tube feeds if she remains intubated tomorrow.    Heme: Mild anemia.  Monitor.    Mild coagulopathy: Concerning for nutritional deficit.  Monitor.    ID: No infectious prodrome.  No fever, low procalcitonin, normal white count.  PT to the neuro ICU but there does not seem to be concern clinically for meningitis.    Code Status: Full code    Family: Obtained by telephone today    Lines: Central line and arterial line    Drains: None    Diet: nP.o.    DVT Ppx: Start prophylaxis    PPI: Start famotidine.    30 minutes of critical care time thus far today.         History:     Felicia Ellison is a 53 year old female with sig h/o for prolonged intubation for pneumonia, paraplegia who is here for possible seizures and encephalopathy..    Synopsis: MRI and EEG today.         Medications:     Current Facility-Administered Medications   Medication     chlorhexidine (PERIDEX) 0.12 % solution 15 mL     levETIRAcetam (KEPPRA)  intermittent infusion 1,000 mg     magnesium sulfate 2 g in water intermittent infusion     magnesium sulfate 4 g in 100 mL sterile water (premade)     midazolam (VERSED) 1 mg/mL in sodium chloride 0.9 % 100 mL infusion     midazolam (VERSED) injection 1-3 mg     naloxone (NARCAN) injection 0.1-0.4 mg     potassium chloride (KLOR-CON) Packet 20-40 mEq     potassium chloride 10 mEq in 100 mL intermittent infusion with 10 mg lidocaine     potassium chloride 10 mEq in 100 mL sterile water intermittent infusion (premix)     potassium chloride 20 mEq in 50 mL intermittent infusion     potassium chloride ER (K-DUR/KLOR-CON M) CR tablet 20-40 mEq     potassium phosphate 10 mmol in D5W 250 mL intermittent infusion     potassium phosphate 15 mmol in D5W 250 mL intermittent infusion     potassium phosphate 20 mmol in D5W 250 mL intermittent infusion     potassium phosphate 20 mmol in D5W 500 mL intermittent infusion     potassium phosphate 25 mmol in D5W 500 mL intermittent infusion          Review of Systems:   Pertinent in history.         Physical Exam:     Temp:  [94.5  F (34.7  C)-97.3  F (36.3  C)] 97.3  F (36.3  C)  Pulse:  [78-97] 85  Heart Rate:  [] 85  Resp:  [9-28] 13  BP: ()/(43-76) 119/62  MAP:  [61 mmHg-108 mmHg] 73 mmHg  Arterial Line BP: ()/(47-82) 106/51  FiO2 (%):  [30 %] 30 %  SpO2:  [100 %] 100 %  Wt Readings from Last 4 Encounters:   01/08/19 81.6 kg (179 lb 14.3 oz)   08/30/17 56.7 kg (125 lb)   02/27/17 69 kg (152 lb 1.9 oz)   03/26/14 56.7 kg (125 lb)     General: Intubated and sedated    HEENT: Pupils equal round reactive to light, intubated    Lungs: Very small basilar crackles bilaterally.    Cardiovascular: Regular rate and rhythm, no murmurs    Abdomen: Abdomen soft, nontender    Musculoskeletal: Decreased bulk    Neurologic: Pupils equal round reactive to light, sedated, no clear neurologic deficits now.    Skin: No cyanosis, normal nails.         Current Laboratory Data:   All  laboratory and imaging data reviewed.    Results for orders placed or performed during the hospital encounter of 05/18/19 (from the past 24 hour(s))   Glucose by meter   Result Value Ref Range    Glucose 80 70 - 99 mg/dL   XR Chest Port 1 View    Narrative    CHEST ONE VIEW PORTABLE   5/18/2019 9:35 PM     HISTORY: Line placement.    COMPARISON: 1/5/2019      Impression    IMPRESSION: Endotracheal tube in good position. Nasogastric tube  passes into the stomach. Lungs are clear, normal heart size and  pulmonary vascularity. Thoracolumbar fusion instrumentation. Interval  clearing of pulmonary infiltrates since previous exam.    SANTO ARGUELLES MD   CBC with platelets   Result Value Ref Range    WBC 4.1 4.0 - 11.0 10e9/L    RBC Count 4.25 3.8 - 5.2 10e12/L    Hemoglobin 11.6 (L) 11.7 - 15.7 g/dL    Hematocrit 35.7 35.0 - 47.0 %    MCV 84 78 - 100 fl    MCH 27.3 26.5 - 33.0 pg    MCHC 32.5 31.5 - 36.5 g/dL    RDW 22.0 (H) 10.0 - 15.0 %    Platelet Count 148 (L) 150 - 450 10e9/L   Blood gas arterial and oxyhgb   Result Value Ref Range    pH Arterial 7.33 (L) 7.35 - 7.45 pH    pCO2 Arterial 33 (L) 35 - 45 mm Hg    pO2 Arterial 179 (H) 80 - 105 mm Hg    Bicarbonate Arterial 18 (L) 21 - 28 mmol/L    Oxyhemoglobin Arterial 99 92 - 100 %    Base Deficit Art 7.4 mmol/L   Magnesium   Result Value Ref Range    Magnesium 2.1 1.6 - 2.3 mg/dL   Comprehensive metabolic panel   Result Value Ref Range    Sodium 151 (H) 133 - 144 mmol/L    Potassium 3.0 (L) 3.4 - 5.3 mmol/L    Chloride 119 (H) 94 - 109 mmol/L    Carbon Dioxide 19 (L) 20 - 32 mmol/L    Anion Gap 13 3 - 14 mmol/L    Glucose 101 (H) 70 - 99 mg/dL    Urea Nitrogen 17 7 - 30 mg/dL    Creatinine 0.32 (L) 0.52 - 1.04 mg/dL    GFR Estimate >90 >60 mL/min/[1.73_m2]    GFR Estimate If Black >90 >60 mL/min/[1.73_m2]    Calcium 8.0 (L) 8.5 - 10.1 mg/dL    Bilirubin Total 0.3 0.2 - 1.3 mg/dL    Albumin 2.6 (L) 3.4 - 5.0 g/dL    Protein Total 6.7 (L) 6.8 - 8.8 g/dL    Alkaline  Phosphatase 204 (H) 40 - 150 U/L    ALT 15 0 - 50 U/L    AST 14 0 - 45 U/L   Lactic acid whole blood   Result Value Ref Range    Lactic Acid 0.8 0.7 - 2.0 mmol/L   Amylase   Result Value Ref Range    Amylase 35 30 - 110 U/L   Lipase   Result Value Ref Range    Lipase 42 (L) 73 - 393 U/L   INR   Result Value Ref Range    INR 1.42 (H) 0.86 - 1.14   UA with Microscopic reflex to Culture   Result Value Ref Range    Color Urine Yellow     Appearance Urine Cloudy     Glucose Urine Negative NEG^Negative mg/dL    Bilirubin Urine Negative NEG^Negative    Ketones Urine 40 (A) NEG^Negative mg/dL    Specific Gravity Urine 1.012 1.003 - 1.035    Blood Urine Moderate (A) NEG^Negative    pH Urine 8.0 (H) 5.0 - 7.0 pH    Protein Albumin Urine 100 (A) NEG^Negative mg/dL    Urobilinogen mg/dL Normal 0.0 - 2.0 mg/dL    Nitrite Urine Positive (A) NEG^Negative    Leukocyte Esterase Urine Large (A) NEG^Negative    Source Catheterized Urine     WBC Urine 16 (H) 0 - 5 /HPF    RBC Urine 35 (H) 0 - 2 /HPF    WBC Clumps Present (A) NEG^Negative /HPF    Yeast Urine Few (A) NEG^Negative /HPF    Squamous Epithelial /HPF Urine 1 0 - 1 /HPF    Mucous Urine Present (A) NEG^Negative /LPF   Glucose by meter   Result Value Ref Range    Glucose 79 70 - 99 mg/dL   CBC (1200)   Result Value Ref Range    WBC 5.9 4.0 - 11.0 10e9/L    RBC Count 4.08 3.8 - 5.2 10e12/L    Hemoglobin 11.1 (L) 11.7 - 15.7 g/dL    Hematocrit 34.2 (L) 35.0 - 47.0 %    MCV 84 78 - 100 fl    MCH 27.2 26.5 - 33.0 pg    MCHC 32.5 31.5 - 36.5 g/dL    RDW 22.1 (H) 10.0 - 15.0 %    Platelet Count 172 150 - 450 10e9/L   Basic metabolic panel   Result Value Ref Range    Sodium 149 (H) 133 - 144 mmol/L    Potassium 4.4 3.4 - 5.3 mmol/L    Chloride 121 (H) 94 - 109 mmol/L    Carbon Dioxide 19 (L) 20 - 32 mmol/L    Anion Gap 9 3 - 14 mmol/L    Glucose 83 70 - 99 mg/dL    Urea Nitrogen 16 7 - 30 mg/dL    Creatinine 0.30 (L) 0.52 - 1.04 mg/dL    GFR Estimate >90 >60 mL/min/[1.73_m2]    GFR  Estimate If Black >90 >60 mL/min/[1.73_m2]    Calcium 8.4 (L) 8.5 - 10.1 mg/dL   EKG 12-lead   Result Value Ref Range    Interpretation ECG Click View Image link to view waveform and result    Glucose by meter   Result Value Ref Range    Glucose 76 70 - 99 mg/dL   Procalcitonin   Result Value Ref Range    Procalcitonin <0.05 ng/ml

## 2019-05-19 NOTE — PROGRESS NOTES
[facility] ICU RESPIRATORY NOTE        Date of Admission: 5/18/2019    Date of Intubation (most recent): 5/19/2019    Reason for Mechanical Ventilation: Airway protection    Number of Days on Mechanical Ventilation: 1    Met Criteria for Pressure Support Trial: No    Reason for No Pressure Support Trial: Per MD    Significant Events Today: Pt transported to [facility].    ABG Results:   Recent Labs   Lab 05/18/19  2150   PH 7.33*   PCO2 33*   PO2 179*   HCO3 18*       Current Vent Settings: Ventilation Mode: CMV/AC  (Continuous Mandatory Ventilation/ Assist Control)  FiO2 (%): 30 %  Rate Set (breaths/minute): 14 breaths/min  Tidal Volume Set (mL): 400 mL  PEEP (cm H2O): 5 cmH2O  Oxygen Concentration (%): 30 %  Resp: 20      Plan: Will cont full vent support for now and will assess for weaning readiness daily.    Kylie Silva RRT
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
## 2019-05-19 NOTE — PROCEDURES
ICU Staff:      The patient was in the Trendelenburg position. The right neck and shoulder were prepped, and draped (head to toe) 1% lidocaine was infiltrated into the subcutaneous tissue and the right internal jugular vein could not be  accessed using US and the right subclavian vine was accessed but the wire would not thread. The patient was placed in the supine position. The right groin was prepped and draped and the right femoral vein was accessed using US and the triple lumen central venous cathter was placed to 20 cm.  All three ports rosibel back and flushed well.   Seldinger technique used and sterile technique maintained through out the procedure. 3-0 silk suture used to secure the central venous line. Antibacterial cuff placed and sterile dressing applied. CXR demonstrated no pneumothorax after attempted right internal jugular and right subclavian central line placement.   No complications noted.??     Tc Escobedo MD, PhD

## 2019-05-19 NOTE — PHARMACY-ADMISSION MEDICATION HISTORY
Admission medication history interview status for the 5/18/2019  admission is complete. See EPIC admission navigator for prior to admission medications     Medication history source reliability:Good    Actions taken by pharmacist (provider contacted, etc): [email or web address] and pt's daughter for medication information     Additional medication history information not noted on PTA med list : [facility] not send any information regarding a PCA. Patient appears to take morphine tablets for pain control.    Medications added: all except albuterol (albuterol was the only medication on the initial PTA medication list).    Medication reconciliation/reorder completed by provider prior to medication history? No    Time spent in this activity: 20 minutes    Prior to Admission medications    Medication Sig Last Dose Taking? Auth Provider   acetaminophen (TYLENOL) 325 MG tablet Take 650 mg by mouth every 6 hours as needed for mild pain  at prn Yes Unknown, Entered By History   albuterol (PROVENTIL) (2.5 MG/3ML) 0.083% neb solution Take 2.5 mg by nebulization every 2 hours as needed for wheezing  at prn Yes Unknown, Entered By History   bisacodyl (DULCOLAX) 10 MG suppository Place 10 mg rectally daily as needed for constipation  at prn Yes Unknown, Entered By History   enoxaparin (LOVENOX) 60 MG/0.6ML syringe Inject 60 mg Subcutaneous every 12 hours  5/17/2019 Yes Unknown, Entered By History   furosemide (LASIX) 20 MG tablet Take 40 mg by mouth daily  5/17/2019 Yes Unknown, Entered By History   hyoscyamine (LEVSIN/SL) 0.125 MG sublingual tablet Place 0.125 mg under the tongue every 4 hours as needed  at prn Yes Unknown, Entered By History   hypromellose-dextran (ARTIFICAL TEARS) 0.1-0.3 % ophthalmic solution Place 1 drop into both eyes every hour as needed for dry eyes  at prn Yes Unknown, Entered By History   LORazepam (ATIVAN) 2 MG/ML (HIGH CONC) solution Take 1 mg by mouth every 8 hours as needed for  anxiety May repeat after an hour if ineffective  at prn Yes Unknown, Entered By History   methadone (DOLOPHINE) 5 MG tablet Take 2.5 mg by mouth 2 times daily 5/17/2019 Yes Unknown, Entered By History   morphine 5 MG solu-tab Take 5 mg by mouth every 2 hours as needed   at prn Yes Unknown, Entered By History   morphine 5 MG solu-tab Take 5 mg by mouth every 4 hours 5/17/2019 Yes Unknown, Entered By History   ondansetron (ZOFRAN-ODT) 4 MG ODT tab Take 4 mg by mouth 2 times daily as needed for nausea or vomiting  at prn Yes Unknown, Entered By History   oxybutynin ER (DITROPAN-XL) 5 MG 24 hr tablet Take 5 mg by mouth daily 5/17/2019 Yes Unknown, Entered By History   potassium chloride (KLOR-CON) 20 MEQ packet Take 20 mEq by mouth 3 times daily With meals 5/17/2019 Yes Unknown, Entered By History   senna-docusate (SENOKOT-S/PERICOLACE) 8.6-50 MG tablet Take 2 tablets by mouth daily 5/17/2019 Yes Unknown, Entered By History   traZODone (DESYREL) 50 MG tablet Take 50 mg by mouth At Bedtime  5/17/2019 Yes Unknown, Entered By History   UNABLE TO FIND Take 1 spray by mouth every 2 hours as needed MEDICATION NAME: Seamus-Stir/saliva substitute (e-lytes/carboxymethylcellulose)  at prn Yes Unknown, Entered By History   zolpidem (AMBIEN) 10 MG tablet Take 10 mg by mouth At Bedtime 5/17/2019 Yes Unknown, Entered By History

## 2019-05-19 NOTE — PROGRESS NOTES
Intensivist:  Notified that home med list completed.  Restarted home methadone and lovenox.    Also notified that pt has dirty UA.  Added empiric ceftriaxone while awaiting cultures.

## 2019-05-19 NOTE — PROCEDURES
ICU Staff:     The patient was in the supine position. The right groin was clipped, prepped, and draped (head to toe) 1% lidocaine was infiltrated into the subcutaneous tissue and the right femoral artery was accessed using US and the arterial line cathter was placed. Seldinger technique used and sterile technique maintained through out the procedure. 3-0 silk suture used to secure the arterial line. Antibacterial cuff placed and sterile dressing applied. Good arterial tracing. No complications noted.??     Tc Escobedo MD, PhD

## 2019-05-19 NOTE — PLAN OF CARE
The patient responds to repeated stimuli. She does not open her eyes or follow commands. Pupils equal 5mm and briskly reactive to light.   Patient is intubated and sedated with versed at 2mg/hr. Patient tolerating ventilator. O2 saturations >92%.   Patient's daughter called around 02:00 to inquire how the patient accessed her ileo-conduit for urine draining. Instructed to use 14Fr straight catheter. Attempted x2 with patient's home catheters that were sent with her. Catheters were inserted but no flow occurred. Each time the catheter was removed it was filled with cottage cheese consistency mucous/sediment. 14Fr mcintyre was placed per Dr. Escobedo's instruction and urine flow was achieved. Very sediment and mucous laden. Valeria color. Foul odor.   Mcintyre left in place per MD orders.   Patient's ileostomy bag was changed. Stoma protrudes. Intact/pink.   Patient has multiple wounds on her buttocks/coccyx/sacral area with some tunneling. Abrasions on right arm and leg.   BP within normal limits per Arterial line.   Potassium replaced via NG tube. Tube secured 59cm at the nare.   Reji Park RN 9:53 AM 05/19/19

## 2019-05-20 LAB
ANION GAP SERPL CALCULATED.3IONS-SCNC: 9 MMOL/L (ref 3–14)
BASE DEFICIT BLDA-SCNC: 5.5 MMOL/L
BUN SERPL-MCNC: 10 MG/DL (ref 7–30)
CALCIUM SERPL-MCNC: 8.3 MG/DL (ref 8.5–10.1)
CHLORIDE SERPL-SCNC: 124 MMOL/L (ref 94–109)
CO2 SERPL-SCNC: 20 MMOL/L (ref 20–32)
CREAT SERPL-MCNC: 0.35 MG/DL (ref 0.52–1.04)
GFR SERPL CREATININE-BSD FRML MDRD: >90 ML/MIN/{1.73_M2}
GLUCOSE BLDC GLUCOMTR-MCNC: 90 MG/DL (ref 70–99)
GLUCOSE BLDC GLUCOMTR-MCNC: 94 MG/DL (ref 70–99)
GLUCOSE BLDC GLUCOMTR-MCNC: 96 MG/DL (ref 70–99)
GLUCOSE BLDC GLUCOMTR-MCNC: 96 MG/DL (ref 70–99)
GLUCOSE SERPL-MCNC: 108 MG/DL (ref 70–99)
HCO3 BLD-SCNC: 18 MMOL/L (ref 21–28)
MAGNESIUM SERPL-MCNC: 1.9 MG/DL (ref 1.6–2.3)
O2/TOTAL GAS SETTING VFR VENT: 30 %
PCO2 BLD: 28 MM HG (ref 35–45)
PH BLD: 7.41 PH (ref 7.35–7.45)
PHOSPHATE SERPL-MCNC: 2.5 MG/DL (ref 2.5–4.5)
PO2 BLD: 183 MM HG (ref 80–105)
POTASSIUM SERPL-SCNC: 2.8 MMOL/L (ref 3.4–5.3)
POTASSIUM SERPL-SCNC: 3.5 MMOL/L (ref 3.4–5.3)
SODIUM SERPL-SCNC: 150 MMOL/L (ref 133–144)
SODIUM SERPL-SCNC: 153 MMOL/L (ref 133–144)

## 2019-05-20 PROCEDURE — 84132 ASSAY OF SERUM POTASSIUM: CPT | Performed by: SURGERY

## 2019-05-20 PROCEDURE — 25000128 H RX IP 250 OP 636: Performed by: INTERNAL MEDICINE

## 2019-05-20 PROCEDURE — 40000275 ZZH STATISTIC RCP TIME EA 10 MIN

## 2019-05-20 PROCEDURE — 20000003 ZZH R&B ICU

## 2019-05-20 PROCEDURE — 99291 CRITICAL CARE FIRST HOUR: CPT | Performed by: SURGERY

## 2019-05-20 PROCEDURE — G0463 HOSPITAL OUTPT CLINIC VISIT: HCPCS

## 2019-05-20 PROCEDURE — 25800030 ZZH RX IP 258 OP 636: Performed by: SURGERY

## 2019-05-20 PROCEDURE — 25000128 H RX IP 250 OP 636: Performed by: SURGERY

## 2019-05-20 PROCEDURE — 82803 BLOOD GASES ANY COMBINATION: CPT | Performed by: SURGERY

## 2019-05-20 PROCEDURE — 94003 VENT MGMT INPAT SUBQ DAY: CPT

## 2019-05-20 PROCEDURE — 99233 SBSQ HOSP IP/OBS HIGH 50: CPT | Mod: GC | Performed by: PSYCHIATRY & NEUROLOGY

## 2019-05-20 PROCEDURE — 00000146 ZZHCL STATISTIC GLUCOSE BY METER IP

## 2019-05-20 PROCEDURE — 80048 BASIC METABOLIC PNL TOTAL CA: CPT | Performed by: INTERNAL MEDICINE

## 2019-05-20 PROCEDURE — 27210429 ZZH NUTRITION PRODUCT INTERMEDIATE LITER

## 2019-05-20 PROCEDURE — 25000128 H RX IP 250 OP 636

## 2019-05-20 PROCEDURE — 25000132 ZZH RX MED GY IP 250 OP 250 PS 637: Performed by: SURGERY

## 2019-05-20 PROCEDURE — 95951 ZZHC EEG VIDEO EACH 24 HR: CPT

## 2019-05-20 PROCEDURE — 25000132 ZZH RX MED GY IP 250 OP 250 PS 637: Performed by: INTERNAL MEDICINE

## 2019-05-20 PROCEDURE — 25000125 ZZHC RX 250: Performed by: SURGERY

## 2019-05-20 PROCEDURE — 40000008 ZZH STATISTIC AIRWAY CARE

## 2019-05-20 PROCEDURE — 84100 ASSAY OF PHOSPHORUS: CPT | Performed by: SURGERY

## 2019-05-20 PROCEDURE — 83735 ASSAY OF MAGNESIUM: CPT | Performed by: SURGERY

## 2019-05-20 PROCEDURE — 84295 ASSAY OF SERUM SODIUM: CPT | Performed by: SURGERY

## 2019-05-20 RX ORDER — OXYCODONE HCL 5 MG/5 ML
2.5-5 SOLUTION, ORAL ORAL EVERY 4 HOURS PRN
Status: DISCONTINUED | OUTPATIENT
Start: 2019-05-20 | End: 2019-05-25

## 2019-05-20 RX ORDER — ONDANSETRON 2 MG/ML
INJECTION INTRAMUSCULAR; INTRAVENOUS
Status: COMPLETED
Start: 2019-05-20 | End: 2019-05-20

## 2019-05-20 RX ORDER — ZINC SULFATE 50(220)MG
220 CAPSULE ORAL EVERY OTHER DAY
Status: DISCONTINUED | OUTPATIENT
Start: 2019-05-20 | End: 2019-05-27 | Stop reason: HOSPADM

## 2019-05-20 RX ORDER — ONDANSETRON 2 MG/ML
4 INJECTION INTRAMUSCULAR; INTRAVENOUS EVERY 6 HOURS PRN
Status: DISCONTINUED | OUTPATIENT
Start: 2019-05-20 | End: 2019-05-25

## 2019-05-20 RX ADMIN — MULTIVITAMIN 15 ML: LIQUID ORAL at 18:07

## 2019-05-20 RX ADMIN — RANITIDINE HYDROCHLORIDE 150 MG: 150 SOLUTION ORAL at 09:15

## 2019-05-20 RX ADMIN — POTASSIUM CHLORIDE 40 MEQ: 1.5 POWDER, FOR SOLUTION ORAL at 05:53

## 2019-05-20 RX ADMIN — SODIUM CHLORIDE, SODIUM LACTATE, POTASSIUM CHLORIDE, CALCIUM CHLORIDE AND DEXTROSE MONOHYDRATE: 5; 600; 310; 30; 20 INJECTION, SOLUTION INTRAVENOUS at 07:42

## 2019-05-20 RX ADMIN — POTASSIUM CHLORIDE 20 MEQ: 1.5 POWDER, FOR SOLUTION ORAL at 16:26

## 2019-05-20 RX ADMIN — Medication 7 ML: at 18:07

## 2019-05-20 RX ADMIN — ENOXAPARIN SODIUM 60 MG: 60 INJECTION SUBCUTANEOUS at 13:41

## 2019-05-20 RX ADMIN — OXYCODONE HYDROCHLORIDE 5 MG: 5 SOLUTION ORAL at 18:07

## 2019-05-20 RX ADMIN — ENOXAPARIN SODIUM 60 MG: 60 INJECTION SUBCUTANEOUS at 01:31

## 2019-05-20 RX ADMIN — ONDANSETRON 4 MG: 2 INJECTION INTRAMUSCULAR; INTRAVENOUS at 14:42

## 2019-05-20 RX ADMIN — CHLORHEXIDINE GLUCONATE 15 ML: 1.2 RINSE ORAL at 20:07

## 2019-05-20 RX ADMIN — POTASSIUM CHLORIDE 20 MEQ: 1.5 POWDER, FOR SOLUTION ORAL at 09:15

## 2019-05-20 RX ADMIN — METHADONE HYDROCHLORIDE 2.5 MG: 10 CONCENTRATE ORAL at 14:24

## 2019-05-20 RX ADMIN — POTASSIUM PHOSPHATE, MONOBASIC AND POTASSIUM PHOSPHATE, DIBASIC 10 MMOL: 224; 236 INJECTION, SOLUTION INTRAVENOUS at 18:07

## 2019-05-20 RX ADMIN — LEVETIRACETAM 1000 MG: 10 INJECTION INTRAVENOUS at 01:29

## 2019-05-20 RX ADMIN — LEVETIRACETAM 1000 MG: 10 INJECTION INTRAVENOUS at 13:40

## 2019-05-20 RX ADMIN — MAGNESIUM SULFATE HEPTAHYDRATE 2 G: 40 INJECTION, SOLUTION INTRAVENOUS at 16:26

## 2019-05-20 RX ADMIN — RANITIDINE HYDROCHLORIDE 150 MG: 150 SOLUTION ORAL at 20:07

## 2019-05-20 RX ADMIN — MIDAZOLAM HYDROCHLORIDE 2 MG: 1 INJECTION, SOLUTION INTRAMUSCULAR; INTRAVENOUS at 21:05

## 2019-05-20 RX ADMIN — CHLORHEXIDINE GLUCONATE 15 ML: 1.2 RINSE ORAL at 07:42

## 2019-05-20 RX ADMIN — CEFTRIAXONE 1 G: 1 INJECTION, POWDER, FOR SOLUTION INTRAMUSCULAR; INTRAVENOUS at 13:41

## 2019-05-20 RX ADMIN — ZINC SULFATE CAP 220 MG (50 MG ELEMENTAL ZN) 220 MG: 220 (50 ZN) CAP at 18:07

## 2019-05-20 ASSESSMENT — ACTIVITIES OF DAILY LIVING (ADL)
ADLS_ACUITY_SCORE: 28

## 2019-05-20 NOTE — PLAN OF CARE
Patient versed off this morning. Patient awake, answering yes/no ?s appropriately. Attempting to write notes but is too weak. Did poorly on wean. Will attempt to wean again this afternoon. EEg continues for today. WOC RN changed all dressings. UOP okay. Irrigated x1 this shift. UOP still with lots of sediment. Will continue to monitor closely.

## 2019-05-20 NOTE — PROGRESS NOTES
EEG CLINICAL NEUROPHYSIOLOGY PRELIMINARY REPORT    Video-EEG through 0800 today reviewed. Off sedative drips. Runs of semirhythmic delta. Runs of generalized periodic discharges (GPDs) at 1.5 Hz. GPDs occupy about 30% of ongoing tracing. About half of tracing consists of diffuse alpha and theta that does not appear to be reactive. No sleep rhythms.    Study continues consistent with moderate to severe diffuse encephalopathy. No seizures. Features do not meet standard criteria for nonconvulsive status epilepticus. Full report to follow.    Louis Escalante MD  Pager [phone]

## 2019-05-20 NOTE — PROGRESS NOTES
[facility] ICU RESPIRATORY NOTE        Date of Admission: 5/18/2019    Date of Intubation (most recent): 5/19/19    Reason for Mechanical Ventilation: Airway protection    Number of Days on Mechanical Ventilation: 2    Met Criteria for Pressure Support Trial: yes    Length of Pressure Support Trial: PS 1215 to 1255, tolerated ok, tidal volumes at times in the 200's.  Back to CMV, end of designated trial.      ABG Results:   Recent Labs   Lab 05/20/19  0433 05/18/19  2150   PH 7.41 7.33*   PCO2 28* 33*   PO2 183* 179*   HCO3 18* 18*   O2PER 30  --        Current Vent Settings: Ventilation Mode: CMV/AC  (Continuous Mandatory Ventilation/ Assist Control)  FiO2 (%): 30 %  Rate Set (breaths/minute): 14 breaths/min  Tidal Volume Set (mL): 400 mL  PEEP (cm H2O): 5 cmH2O  Pressure Support (cm H2O): 5 cmH2O  Oxygen Concentration (%): 30 %  Resp: 18      Plan: Continue ventilatory support.  SBT daily.    Sanjana Hodges, RRT

## 2019-05-20 NOTE — PROGRESS NOTES
[facility] ICU RESPIRATORY NOTE    Date of Admission: 5/18/2019  Date of Intubation (most recent): 5/19/2019  Reason for Mechanical Ventilation: Airway protection  Number of Days on Mechanical Ventilation: 2  Met Criteria for Pressure Support Trial: No  Reason for No Pressure Support Trial: per MD    Significant Events Today: None overnight    ABG Results:   Recent Labs   Lab 05/20/19  0433 05/18/19  2150   PH 7.41 7.33*   PCO2 28* 33*   PO2 183* 179*   HCO3 18* 18*   O2PER 30  --      Current Vent Settings: Ventilation Mode: CMV/AC  (Continuous Mandatory Ventilation/ Assist Control)  FiO2 (%): 30 %  Rate Set (breaths/minute): 14 breaths/min  Tidal Volume Set (mL): 400 mL  PEEP (cm H2O): 5 cmH2O  Oxygen Concentration (%): 30 %  Resp: 20    Skin Assessment: Clean, dry, intact    Plan: Continue patient on full ventilatory support and assess for weaning readiness.

## 2019-05-20 NOTE — PROCEDURES
Procedure Date: 05/20/2019      EEG # [facility] -1      TYPE OF STUDY:  Inpatient video EEG monitoring.      DURATION OF RECORDING:  EEG starts at 05/19/2019 at 10:37:04, ends at 05/20/2019 at 12:20:30 for a total of 24 hours 33 minutes and 51 seconds       HISTORY:  Inpatient video EEG monitoring was performed in Felicia Ellison, a 53 year old who presents with acute on chronic encephalopathy with acute respiratory failure.  He is encephalopathic and intubated in ICU.  He is currently off sedative drips.      TECHNICAL SUMMARY:  EEG is recorded from scalp electrodes placed according to the 10-20 International system.  Additional electrodes were utilized for referencing, artifact detection, and recording from other cerebral regions.  Video was continuously recorded.  Video was reviewed for clinical correlation and to assist with EEG interpretation.      FINDINGS:  Irregular diffuse delta predominates during the majority of the tracing.  There are brief desynchronizations, usually lasting no longer than 2 seconds.  Runs of generalized periodic sharp waves, typically occurring at a rate of 1.5 Hz, often with triphasic configuration are seen.  These do show in a AP lag with longitudinal bipolar montages.  Overall, generalized periodic discharges occupy about 30% of the ongoing tracing.  Rarely, these take on a spikier configuration but never for more than 1 second.  There are also periods of diffuse alpha and theta that do not appear to be reactive.  Normal sleep EEG patterns are not seen.  Techs do not yusuf periods with stimulation.  No particular changes are noted during self-arousals as marked by EMG      OTHER INTERICTAL ABNORMALITIES:  No focal epileptiform discharges or periodic lateralizing epileptiform discharges.      ICTAL ABNORMALITIES:  No electrographic seizures.      IMPRESSION:  Abnormal, consistent with a moderate to severe diffuse encephalopathy.  Triphasic waves are seen during portions of the  recording suggesting a metabolic encephalopathy.  No seizures are recorded.  Features of EEG do not meet standard criteria for nonconvulsive status epilepticus.         CELESTINE ARCEO MD             D: 2019   T: 2019   MT: JUVENTINO      Name:     KISHOR PINEDA   MRN:      [id number]        Account:        [id number]   :      1965           Procedure Date: 2019      Document: [id number]

## 2019-05-20 NOTE — PROGRESS NOTES
ICU staff  DOS 5/20/2019    Sleepy but responds to voice, weakly follows commands. EEG overnight demonstrated no convincing non-convulsive status epilepticus. Methadone added yesterday, also had empiric ceftriaxone started for possible UTI.    Vitals:   98.5  70s-80s  120s-130s/50s-60s  15-17  100    14/500/5/30, 7.41/28/183/18    I/O: 1019/1315    Exam:  Gen: Sleepy, awakens to voice, no apparent distress  HEENT: NC/AT, PERRL, anicteric  Pulm: Breathing comfortably on the ventilator, symmetric chest rise  Cor: RRR  Abdomen/GI: Soft, NT/ND, ileostomy viable/productive of liquid stool  : Ileal conduit with clear urine  Extremities: Warm, LE contractures  Skin: Warm, perfused  Neuro: Followed weakly with (B)UE  Psych: Appeared calm, comfortable    Data:   Labs reviewed  - Na 153 and trending up, K 2.8  - PCT negative  Video EEG not suggestive of NCSE    Assessment/plan:  54 y/o woman admitted with seizures and concern for status epilepticus.  CNS: Alert, followed some commands.  - Seizure disorder, ?status epilepticus: On levetiracetam. Neurology following.   - Stopped midazolam per neurology recommendations, start propofol if something needed for sedation.  - Paraplegia, neurogenic bladder/bowel  - Chronic pain: On home methadone.  Pulm: 14/400/5/30.  - Acute respiratory failure: Continue ventilator support. SBTs today.  CV: BP adequately controlled.  FEN/GI: Abdomen benign.  - Nutrition: Placed TF consult.  - Ileostomy status: Effluent pretty liquid; follow once we start diet. Fiber, etc., as indicated after that.  : UOP adequate overall. On D5LR MIVF.  - Hypernatremia: Trending up, added enteral free water to slowly correct. Will put in for a repeat level at 1900.   - Hypokalemia: Correct on protocols.  Heme: Hb 11.1 (11.6).   Msk: Extremities warm, well-perfused. WOCN consultation pending for multiple PTA skin wounds.  Endo: Glucose .  ID: Afebrile, WBCs 5.9.   - UTI: UA positive, culture pending. On  ceftriaxone in the meantime.  ICU: SCDs, LMWH, H2.  - Palliative care consultation for goals of care is pending. The patient had been DNR/DNI prior to her seizure episode.    This patient is critically ill to my assessment and requires ICU monitoring and cares. A total of 30 minutes critical care time spent on 5/20/2019.     Pedro Petersen MD, PhD  Surgical critical care  May 20, 2019, 2:14 PM

## 2019-05-20 NOTE — PROGRESS NOTES
Neuro: opens eyes with voice during sedation vacation.   Pulmonary: on full vent support.   CV: SR/ST  : ileal conduit draining after irrigation. Adequate UOP  GI: ostomy on LUQ with liquid stool  Extremities: some strength on BUE. Flaccid BLE  Skin: scattered abrasions. Wound on buttocks and L great toe.   Line: femoral line on R groin. A line on R groin  Family: Arlette updated at the bedside.   Plan: continue EEG. Pt likes to get updated.

## 2019-05-20 NOTE — CONSULTS
CLINICAL NUTRITION SERVICES  -  ASSESSMENT NOTE      RECOMMENDATIONS FOR MD/PROVIDER TO ORDER:   Recommend decrease D5 IVF rate as TF rate increases.   Recommendations Ordered by Registered Dietitian (RD):   Today:  Begin TF Replete with Fiber at 15 mL/hr = 360 kcals, 23 gm pro (0.4 gm/kg), 45 gm CHO, 5 gm fiber, 300 mL H20.  Total (TF + D5 IVF):  564 kcals (9 kcal/kg).  Free H20 60 mL every 4 hrs  Certavite daily     Per PI Protocol:  Tri-Vi-Sol 7 mL daily x 10 days = 5250 International Units Vit A, 245 mg Vit C, 2800 International Units Vit D.  Zinc Sulfate 220 mg every other day x 10 days (avg 25 mg Elemental Zinc per day)   Future/Additional Recommendations:   Recommend increase TF as tolerated as follows:  Step 1:  Increase TF Replete with Fiber now to 30 mL/hr and after 8 hrs increase to 45 mL/hr = 1080 kcals (17 kcal/kg), 69 gm pro (1.1 gm/kg), 900 mL H20, 16 gm fiber, 134 gm CHO.     Step 2:  After 24 hrs, if labs acceptable, increase TF Replete with Fiber to 60 mL/hr = 1440 kcals (23 kcal/kg), 92 gm pro (1.4 gm/kg), 1195 mL H20, 22 gm fiber, 179 gm CHO     Step 3:  After 24 hrs, if labs acceptable, increase TF Replete with Fiber to goal 75 mL/hr = 1800 Kcals (28 kcal/kg), 115 g protein (1.8 g/kg), 223 g CHO, 27 g fiber, 1494 mL H2O, 3600 International Units Vit A, 360 mg Vit C, 29 mg elemental Zinc.  Total (TF + Tri-Vi-Sol + Zinc Sulfate)= 8850 International Units Vit A, 605 mg Vit C, and 54 mg Zinc Sulfate (daily average)   Malnutrition:   % Weight Loss:  Up to 5% in 1 month (non-severe malnutrition)  % Intake:  Unable to determine without recent nutrition history  Subcutaneous Fat Loss:  Orbital region mild depletion and Upper arm region mild depletion  Muscle Loss:  Temporal region moderate depletion and Clavicle bone region moderate depletion  Fluid Retention:  Mild     Malnutrition Diagnosis: Severe malnutrition  In Context of:  Acute illness or injury  Chronic illness or disease        REASON FOR  "ASSESSMENT  Felicia Ellison is a 53 year old female seen by Registered Dietitian for Provider Order - Registered Dietitian to Assess and Order TF per Medical Nutrition Therapy Protocol      NUTRITION HISTORY  - Unable to obtain nutrition history as pt intubated and no family available.  Information obtained from Epic records.    Per RD visit with pt on 2/21/2017:  She is on a high-protein diet at home as instructed by the [facility] [place] [facility]. She drinks Ensure or Boost, 0-4 servings/day, depending on \"how thirsty I am.\"  She loves milk, usually drinks 3-4 servings/day, \"I'd drink a gallon a day if I could afford it.\"  She usually has egg or meat at every meal. She also takes a multivit but sometimes runs out and can't afford it.  Note during this admission, pt received Ensure Plus BID and the PI Protocol (Thera-Vit-M, Shoaib C, Vit A, and Zinc sulfate).     During lengthy [facility] stay in December 2018, pt was on TPN (with extra vitamins/minerals per PI Protocol) and then transitioned to TF for nutrition.    Her goal TF at that time was Replete with Fiber at 75 mL/hr which was eventually discontinued.  She received a DD2 with thin liquids diet + supplements Boost Plus and ProStat added to beverage and applesauce.  Pt was then transitioned to comfort cares (Jan 2019).    Per rounds, pt was previously DNR/DNI and in hospice care, but changed now to full code.  Palliative Care consult is pending.    CURRENT NUTRITION ORDERS  Diet Order:     NPO   Was asked to initiate TF via NG tube.    Current Intake/Tolerance:  NPO x 3 days.      NUTRITION FOCUSED PHYSICAL ASSESSMENT (NFPA)  Completed:  Yes Partial assessment          Observed:    Muscle wasting (refer to documentation in Malnutrition section)  Subcutaneous fat loss (refer to documentation in Malnutrition section)  Lackluster hair    Obtained from Chart/Interdisciplinary Team:  Edema - 2+ mild generalized  Pressure Injuries - WOCN completing assessment this pm & " "advised me to go ahead and order the aggressive PI Protocol.  Multiple wounds on her buttocks/coccyx/sacral area with some tunneling. Abrasions on right arm and leg.  Pt with h/o stage 3 PI's (multiple).    ANTHROPOMETRICS  Height: 5' 9\"  Weight:  61 kg  Body mass index is not validated with paraplegia.  Weight Status:  Appears underwt  IBW:  61.4 kg (adjusted for paraplegia)  % IBW:  99%  Weight History:  Weight down 3 kg (5%) in 5 months    Wt Readings from Last 20 Encounters:   05/19/19 61 kg (134 lb 7.7 oz)   12/12/18 64 kg   08/30/17 56.7 kg (125 lb)   02/27/17 69 kg (152 lb 1.9 oz)   03/26/14 56.7 kg (125 lb)   01/15/14 54.3 kg (119 lb 9.6 oz)   12/28/13 54.5 kg (120 lb 2.4 oz)   11/30/13 59 kg (130 lb)   10/30/13 58.5 kg (128 lb 15.5 oz)   10/17/13 59 kg (130 lb)   09/16/13 59 kg (130 lb)   08/29/13 59 kg (130 lb)   08/09/13 59 kg (130 lb)   03/01/13 59 kg (130 lb)   01/31/13 59.1 kg (130 lb 4.8 oz)   01/28/13 59.1 kg (130 lb 4.8 oz)   01/02/13 56.7 kg (125 lb)   12/26/12 56.9 kg (125 lb 6.4 oz)   12/04/12 59.2 kg (130 lb 8 oz)   11/15/12 59.2 kg (130 lb 8 oz)       LABS  Labs reviewed  Na 153 (H)  K 2.8 (L) --> 3.5 (NL)    MEDICATIONS  Medications reviewed  IVF D5 LR at 50 mL/hr = 60 gm CHO, 204 kcals - for fluid delivery      ASSESSED NUTRITION NEEDS PER APPROVED PRACTICE GUIDELINES:    Dosing Weight:  61 kg (current wt)  Estimated Energy Needs: (3 step progression due to risk for refeeding syndrome)'  Step 1:  915-1220 kcals (15-20 kcal/kg)  Step 2:  4969-9876 kcals (20-25 kcal/kg)  Step 3:  4150-4600 kcals (25-30 Kcal/Kg)  Justification: maintenance  Estimated Protein Needs:   grams protein (1.5-1.8 g pro/Kg)  Justification: Repletion, wound healing and hypercatabolism with critical illness  Estimated Fluid Needs:  3982-3253 mL (1 mL/Kcal)  Justification: maintenance    MALNUTRITION:  % Weight Loss:  Up to 5% in 1 month (non-severe malnutrition)  % Intake:  Unable to determine without recent " nutrition history  Subcutaneous Fat Loss:  Orbital region mild depletion and Upper arm region mild depletion  Muscle Loss:  Temporal region moderate depletion and Clavicle bone region moderate depletion  Fluid Retention:  Mild     Malnutrition Diagnosis: Severe malnutrition  In Context of:  Acute illness or injury  Chronic illness or disease    NUTRITION DIAGNOSIS:  Inadequate protein-energy intake related to intubation as evidenced by NPO status, D5 IVF meeting 13% energy and 0% protein needs, and TF planned.      NUTRITION INTERVENTIONS  Recommendations / Nutrition Prescription  Today:  Begin TF Replete with Fiber at 15 mL/hr = 360 kcals, 23 gm pro (0.4 gm/kg), 45 gm CHO, 5 gm fiber, 300 mL H20.  Total (TF + D5 IVF):  564 kcals (9 kcal/kg).  Free H20 60 mL every 4 hrs  Certavite daily     Per PI Protocol:  Tri-Vi-Sol 7 mL daily x 10 days = 5250 International Units Vit A, 245 mg Vit C, 2800 International Units Vit D.  Zinc Sulfate 220 mg every other day x 10 days (avg 25 mg Elemental Zinc per day)    Recommend increase TF as tolerated as follows:  Step 1:  Increase TF Replete with Fiber now to 30 mL/hr and after 8 hrs increase to 45 mL/hr = 1080 kcals (17 kcal/kg), 69 gm pro (1.1 gm/kg), 900 mL H20, 16 gm fiber, 134 gm CHO.     Step 2:  After 24 hrs, if labs acceptable, increase TF Replete with Fiber to 60 mL/hr = 1440 kcals (23 kcal/kg), 92 gm pro (1.4 gm/kg), 1195 mL H20, 22 gm fiber, 179 gm CHO     Step 3:  After 24 hrs, if labs acceptable, increase TF Replete with Fiber to goal 75 mL/hr = 1800 Kcals (28 kcal/kg), 115 g protein (1.8 g/kg), 223 g CHO, 27 g fiber, 1494 mL H2O, 3600 International Units Vit A, 360 mg Vit C, 29 mg elemental Zinc.  Total (TF + Tri-Vi-Sol + Zinc Sulfate)= 8850 International Units Vit A, 605 mg Vit C, and 54 mg Zinc Sulfate (daily average)    Recommend decrease D5 IVF rate as TF rate increases.    Implementation  Nutrition education: Not appropriate at this time due to patient  condition  Collaboration and Referral of Nutrition care - Pt was discussed during ICU interdisciplinary rounds this morning.    EN Composition, EN Schedule - Entered TF orders (trickle to begin) as above.  Feeding Tube Flush - Ordered std flushes for now  Multivitamin/Mineral - Ordered as above.    Nutrition Goals  Pt will tolerate TF without refeeding syndrome.      MONITORING AND EVALUATION:  Progress towards goals will be monitored and evaluated per protocol and Practice Guidelines    Nirmala Mijares, RD, LD, CNSC

## 2019-05-21 LAB
ANION GAP SERPL CALCULATED.3IONS-SCNC: 7 MMOL/L (ref 3–14)
BACTERIA SPEC CULT: ABNORMAL
BUN SERPL-MCNC: 4 MG/DL (ref 7–30)
CALCIUM SERPL-MCNC: 7.7 MG/DL (ref 8.5–10.1)
CHLORIDE SERPL-SCNC: 125 MMOL/L (ref 94–109)
CO2 SERPL-SCNC: 20 MMOL/L (ref 20–32)
CREAT SERPL-MCNC: 0.31 MG/DL (ref 0.52–1.04)
ERYTHROCYTE [DISTWIDTH] IN BLOOD BY AUTOMATED COUNT: 22.4 % (ref 10–15)
GFR SERPL CREATININE-BSD FRML MDRD: >90 ML/MIN/{1.73_M2}
GLUCOSE BLDC GLUCOMTR-MCNC: 98 MG/DL (ref 70–99)
GLUCOSE SERPL-MCNC: 104 MG/DL (ref 70–99)
HCT VFR BLD AUTO: 29.5 % (ref 35–47)
HGB BLD-MCNC: 9.5 G/DL (ref 11.7–15.7)
INTERPRETATION ECG - MUSE: NORMAL
Lab: ABNORMAL
MAGNESIUM SERPL-MCNC: 2.6 MG/DL (ref 1.6–2.3)
MCH RBC QN AUTO: 27.2 PG (ref 26.5–33)
MCHC RBC AUTO-ENTMCNC: 32.2 G/DL (ref 31.5–36.5)
MCV RBC AUTO: 85 FL (ref 78–100)
PHOSPHATE SERPL-MCNC: 3 MG/DL (ref 2.5–4.5)
PLATELET # BLD AUTO: 121 10E9/L (ref 150–450)
POTASSIUM SERPL-SCNC: 3.4 MMOL/L (ref 3.4–5.3)
POTASSIUM SERPL-SCNC: 3.7 MMOL/L (ref 3.4–5.3)
RBC # BLD AUTO: 3.49 10E12/L (ref 3.8–5.2)
SODIUM SERPL-SCNC: 152 MMOL/L (ref 133–144)
SPECIMEN SOURCE: ABNORMAL
WBC # BLD AUTO: 4.1 10E9/L (ref 4–11)

## 2019-05-21 PROCEDURE — 40000275 ZZH STATISTIC RCP TIME EA 10 MIN

## 2019-05-21 PROCEDURE — 85027 COMPLETE CBC AUTOMATED: CPT | Performed by: SURGERY

## 2019-05-21 PROCEDURE — 25000132 ZZH RX MED GY IP 250 OP 250 PS 637: Performed by: INTERNAL MEDICINE

## 2019-05-21 PROCEDURE — 25000128 H RX IP 250 OP 636: Performed by: INTERNAL MEDICINE

## 2019-05-21 PROCEDURE — 84100 ASSAY OF PHOSPHORUS: CPT | Performed by: INTERNAL MEDICINE

## 2019-05-21 PROCEDURE — 20000003 ZZH R&B ICU

## 2019-05-21 PROCEDURE — 84132 ASSAY OF SERUM POTASSIUM: CPT | Performed by: INTERNAL MEDICINE

## 2019-05-21 PROCEDURE — 25000132 ZZH RX MED GY IP 250 OP 250 PS 637: Performed by: SURGERY

## 2019-05-21 PROCEDURE — 94003 VENT MGMT INPAT SUBQ DAY: CPT

## 2019-05-21 PROCEDURE — 00000146 ZZHCL STATISTIC GLUCOSE BY METER IP

## 2019-05-21 PROCEDURE — 40000008 ZZH STATISTIC AIRWAY CARE

## 2019-05-21 PROCEDURE — 83735 ASSAY OF MAGNESIUM: CPT | Performed by: INTERNAL MEDICINE

## 2019-05-21 PROCEDURE — 40000061 ZZH STATISTIC EEG TIME EA 10 MIN

## 2019-05-21 PROCEDURE — 80048 BASIC METABOLIC PNL TOTAL CA: CPT | Performed by: SURGERY

## 2019-05-21 PROCEDURE — 99291 CRITICAL CARE FIRST HOUR: CPT | Performed by: SURGERY

## 2019-05-21 PROCEDURE — 36569 INSJ PICC 5 YR+ W/O IMAGING: CPT

## 2019-05-21 PROCEDURE — 27210219 ZZH KIT SHRLOCK 5FR DBL LUM PWR P

## 2019-05-21 PROCEDURE — 99232 SBSQ HOSP IP/OBS MODERATE 35: CPT | Mod: GC | Performed by: PSYCHIATRY & NEUROLOGY

## 2019-05-21 PROCEDURE — 27211407 ZZ H KIT CATH IV 18 OR 20 G, POWERGLIDE BASIC

## 2019-05-21 PROCEDURE — 25000128 H RX IP 250 OP 636: Performed by: SURGERY

## 2019-05-21 PROCEDURE — 25000128 H RX IP 250 OP 636: Performed by: NURSE PRACTITIONER

## 2019-05-21 RX ORDER — LIDOCAINE 40 MG/G
CREAM TOPICAL
Status: DISCONTINUED | OUTPATIENT
Start: 2019-05-21 | End: 2019-05-27 | Stop reason: HOSPADM

## 2019-05-21 RX ORDER — HEPARIN SODIUM,PORCINE 10 UNIT/ML
2-5 VIAL (ML) INTRAVENOUS
Status: DISCONTINUED | OUTPATIENT
Start: 2019-05-21 | End: 2019-05-27 | Stop reason: HOSPADM

## 2019-05-21 RX ADMIN — POTASSIUM CHLORIDE 20 MEQ: 1.5 POWDER, FOR SOLUTION ORAL at 09:24

## 2019-05-21 RX ADMIN — Medication 7 ML: at 08:51

## 2019-05-21 RX ADMIN — SODIUM CHLORIDE, SODIUM LACTATE, POTASSIUM CHLORIDE, CALCIUM CHLORIDE AND DEXTROSE MONOHYDRATE: 5; 600; 310; 30; 20 INJECTION, SOLUTION INTRAVENOUS at 01:40

## 2019-05-21 RX ADMIN — MULTIVITAMIN 15 ML: LIQUID ORAL at 08:51

## 2019-05-21 RX ADMIN — CHLORHEXIDINE GLUCONATE 15 ML: 1.2 RINSE ORAL at 08:51

## 2019-05-21 RX ADMIN — OXYCODONE HYDROCHLORIDE 5 MG: 5 SOLUTION ORAL at 21:42

## 2019-05-21 RX ADMIN — ENOXAPARIN SODIUM 60 MG: 60 INJECTION SUBCUTANEOUS at 13:51

## 2019-05-21 RX ADMIN — CEFTRIAXONE 1 G: 1 INJECTION, POWDER, FOR SOLUTION INTRAMUSCULAR; INTRAVENOUS at 13:51

## 2019-05-21 RX ADMIN — SODIUM CHLORIDE, SODIUM LACTATE, POTASSIUM CHLORIDE, CALCIUM CHLORIDE AND DEXTROSE MONOHYDRATE: 5; 600; 310; 30; 20 INJECTION, SOLUTION INTRAVENOUS at 21:49

## 2019-05-21 RX ADMIN — OXYCODONE HYDROCHLORIDE 5 MG: 5 SOLUTION ORAL at 01:27

## 2019-05-21 RX ADMIN — LEVETIRACETAM 1000 MG: 10 INJECTION INTRAVENOUS at 13:51

## 2019-05-21 RX ADMIN — ENOXAPARIN SODIUM 60 MG: 60 INJECTION SUBCUTANEOUS at 01:27

## 2019-05-21 RX ADMIN — MIDAZOLAM HYDROCHLORIDE 2 MG: 1 INJECTION, SOLUTION INTRAMUSCULAR; INTRAVENOUS at 01:27

## 2019-05-21 RX ADMIN — METHADONE HYDROCHLORIDE 2.5 MG: 10 CONCENTRATE ORAL at 02:30

## 2019-05-21 RX ADMIN — RANITIDINE HYDROCHLORIDE 150 MG: 150 SOLUTION ORAL at 08:51

## 2019-05-21 RX ADMIN — METHADONE HYDROCHLORIDE 2.5 MG: 10 CONCENTRATE ORAL at 14:39

## 2019-05-21 RX ADMIN — LEVETIRACETAM 1000 MG: 10 INJECTION INTRAVENOUS at 01:27

## 2019-05-21 ASSESSMENT — MIFFLIN-ST. JEOR: SCORE: 1242.38

## 2019-05-21 ASSESSMENT — ACTIVITIES OF DAILY LIVING (ADL)
ADLS_ACUITY_SCORE: 28
ADLS_ACUITY_SCORE: 27
ADLS_ACUITY_SCORE: 28
ADLS_ACUITY_SCORE: 27
ADLS_ACUITY_SCORE: 28
ADLS_ACUITY_SCORE: 28

## 2019-05-21 NOTE — PROGRESS NOTES
Attemped picc but unable to thread past shoulder.  Midline placed in same basilic vein that was accessed, Midline flushes and draws blood w/o difficulty. Floor nurse aware,

## 2019-05-21 NOTE — PROGRESS NOTES
[facility] ICU RESPIRATORY NOTE        Date of Admission: 5/18/2019    Date of Intubation (most recent): 5/19/2019    Reason for Mechanical Ventilation: Airway protection    Number of Days on Mechanical Ventilation: 4    Met Criteria for Pressure Support Trial: Yes    Reason for No Pressure Support Trial: Per MD    Significant Events Today: None    ABG Results:   Recent Labs   Lab 05/20/19  0433 05/18/19  2150   PH 7.41 7.33*   PCO2 28* 33*   PO2 183* 179*   HCO3 18* 18*   O2PER 30  --        Current Vent Settings: Ventilation Mode: CMV/AC  (Continuous Mandatory Ventilation/ Assist Control)  FiO2 (%): 21 %  Rate Set (breaths/minute): 14 breaths/min  Tidal Volume Set (mL): 400 mL  PEEP (cm H2O): 5 cmH2O  Pressure Support (cm H2O): 5 cmH2O  Oxygen Concentration (%): 21 %  Resp: 14      Skin Assessment: No skin issues at this time    Plan: To continue ventilatory support and assess for ability to wean.    Jean Torres

## 2019-05-21 NOTE — PROGRESS NOTES
ICU staff  DOS: 5/21/2019    I spoke with Ms Ellison this afternoon, several hours after extubation. She is alert, oriented and appropriate. She would like to remain full code status for now.    Pedro Petersen MD, PhD  Surgical critical care  May 21, 2019, 5:00 PM
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
## 2019-05-21 NOTE — PROGRESS NOTES
Pt extubated to Aerosol mask 35%, 5Litres.Breath sounds, Clear and diminished with no stridor. Respiratory status stable    Will continue to monitor
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
## 2019-05-21 NOTE — PROGRESS NOTES
Patient seen for a complete assessment yesterday - see note dated 5/20/19 for details.     Currently TF running as follows ~  Replete with Fiber at 15 mL/hr = 360 kcals, 23 gm pro (0.4 gm/kg), 45 gm CHO, 5 gm fiber, 300 mL H20  IVF D5 LR at 50 mL/hr = 60 gm CHO, 204 kcals  Total (TF + D5 IVF):  564 kcals (9 kcal/kg)  Free H20 60 mL every 4 hrs  Certavite daily     Per PI Protocol:  Tri-Vi-Sol 7 mL daily x 10 days = 5250 International Units Vit A, 245 mg Vit C, 2800 International Units Vit D.  Zinc Sulfate 220 mg every other day x 10 days (avg 25 mg Elemental Zinc per day)    Refeeding labs today --> K and Phos normal, Mg 2.6 (H)    Orders placed to increase TF to step #1 as recommended in yesterday's assessment:  Increase TF Replete with Fiber now to 30 mL/hr and after 8 hrs increase to 45 mL/hr = 1080 kcals (17 kcal/kg), 69 gm pro (1.1 gm/kg), 900 mL H20, 16 gm fiber, 134 gm CHO    Will monitor refeeding labs (K, Mg, and Phos) and ability to continue to increase TF as follows:  Step 2:  After 24 hrs, if labs acceptable, increase TF Replete with Fiber to 60 mL/hr = 1440 kcals (23 kcal/kg), 92 gm pro (1.4 gm/kg), 1195 mL H20, 22 gm fiber, 179 gm CHO     Step 3:  After 24 hrs, if labs acceptable, increase TF Replete with Fiber to goal 75 mL/hr = 1800 Kcals (28 kcal/kg), 115 g protein (1.8 g/kg), 223 g CHO, 27 g fiber, 1494 mL H2O, 3600 International Units Vit A, 360 mg Vit C, 29 mg elemental Zinc.  Total (TF + Tri-Vi-Sol + Zinc Sulfate)= 8850 International Units Vit A, 605 mg Vit C, and 54 mg Zinc Sulfate (daily average)    Susanna Tao, RD, LD, CNSC   Clinical Dietitian - [facility]

## 2019-05-21 NOTE — PROCEDURES
DATE OF STUDY:  05/20/2019       TYPE OF STUDY:  Inpatient video EEG monitoring        EEG # -2       DURATION OF THE RECORDING:  Study starts on 05/20/2019 at 12:22:04 p.m. and ends on 05/21/2019 at 10:42:26 a.m., for a total of 22 hours, 17 minutes and 20 seconds.      HISTORY:  Inpatient video EEG monitoring performed on Felicia Ellison, a 53-year-old who presented with acute on chronic encephalopathy with acute respiratory failure.  During treatment, jerking activity was noted that was thought to be potential seizure.  He has been treated with levetiracetam.  He is encephalopathic and intubated in the ICU.      TECHNICAL SUMMARY:  EEG is recorded from scalp electrodes placed according to the 10-20 international system.  Additional electrodes were utilized for referencing, artifact detection, and recording from other cerebral regions.  Video was continuously recorded.  Video was reviewed for clinical correlation and to assist with EEG interpretation.        FINDINGS:  The patient was reportedly on a midazolam drip at the beginning of the study, but this was tapered and discontinued as the study continued.  At the beginning of the study, 2 Hz delta activity predominates.  Occasional generalized sharp waves are noted.  These sometimes occur in brief runs at the rate of about 3 Hz, but for no longer than a second.  Individual generalized periodic sharp waves are seen on occasion as well.  As study continues, a posterior dominant rhythm emerges in the range of 9-10 Hz.  This occurs at around 8:00 p.m.  Runs of diffuse delta are seen, usually bifrontal, sometimes rhythmic.  Triphasic waves or sharp waves are not seen in the last 12 hours of the study or so.  In the morning hours of 05/21, periods with 9-10 Hz posterior dominant rhythm are noted with irregular delta.  When the patient awakens, persistence of delta is reduced.  During periods of clear wakefulness with myogenic activity and eye blink movement,  persistence of diffuse delta is about 30%.      IMPRESSION:  Abnormal.  Moderate to severe diffuse encephalopathy at study onset, improving to mild to moderate intensity as the study continues.  There is also the reemergence of a well-sustained and reactive posterior dominant rhythm.  This improvement occurs in tandem with the discontinuation of midazolam drip.  Triphasic waves also abated as the study was ongoing consistent with improving diffuse encephalopathy.  Electrographic seizures were not seen at any point.         CELESTINE ARCEO MD             D: 2019   T: 2019   MT: FOSTER      Name:     KISHOR PINEDA JO   MRN:      [id number]        Account:        [id number]   :      1965           Procedure Date: 2019      Document: [id number]

## 2019-05-21 NOTE — PROGRESS NOTES
ICU staff  DOS 5/21/2019    Alert this morning, interactive. Has been on pressure support for a couple of hours at the time of my visit. EEG reported as showing improvement over the last 8 hours.    Vitals:   97,9  50s-70s  13-17  110s-130s/50s-60s  %    14/400/5/21 <-> 5/5/21    I/O: 2202/1585    Exam:  Gen: Alert, interactive, no apparent distress  HEENT: NC/AT, anicteric  Pulm: Clear bilaterally, symmetric air entry; Vt 300s/RR 16, Vc ~1L  Cor: RRR  Abdomen/GI: Soft, nondistended, stoma viable  : Urostomy tube in place  Extremities: Warm, perfused  Skin: Warm, dry  Neuro: Eyes open spontaneously, follows commands with good strength, paraplegic  Psych: Appears calm    Data:   Labs awaiting collection  No new imaging  Urine culture from 5/19 resulting as polymicrobial    Assessment/plan:  54 y/o woman admitted with possible status epilepticus, respiratory failure requiring intubation. Improving from a mental status standpoint.  CNS: Alert, following commands with upper extremities.  - Pain: Acute/chronic. Continues on methadone.  - Sedation off.  - Seizures, status epilepticus: Continues on levetiracetam.  - Paraplegia  Pulm: Did well on PST this morning.  - Acute respiratory failure: Extubated today.  CV: BP controlled.  FEN/GI: Abdomen benign, stoma viable.  - Protein-calorie malnutrition: Continue tube feedings.   : UOP adequate overall via urostomy.  - Hypernatremia: Recheck labs now that midline is in place, continue on free water replacement for now.  Heme: Hb 11.1 yesterday, awaiting today's result.  Msk: Extremities warm, well-perfused.   Endo: Glucose 98.  ID: Afebrile, WBCs 5.9.  - ?UTI: vs colonized sample given that the patient has a urostomy. Stop antibiotic today.  ICU: LMWH, H2.    This patient is critically ill to my assessment and requires ICU monitoring and cares. A total of 30 minutes critical care time spent on 5/21/2019.     Pedro Petersen MD, PhD  Surgical critical care  May 21,  2019, 4:52 PM

## 2019-05-21 NOTE — PROGRESS NOTES
Palliative Care Team Note.    Consult received, chart reviewed and discussed with the ICU team. Felicia Schneider is known to our service. She was extubated this morning, appears to be stable. Nursing notes indicate pt's daughter will be coming tomorrow. Will hold off on consult until tomorrow when pt's daughter can be present to join in discussion. If more urgent needs arise, please do not hesitate to contact our team. Thank you.    Shonna ALBARADO CNP  Pager: [phone]  Palliative Medicine  May 21, 2019

## 2019-05-21 NOTE — PROGRESS NOTES
EEG CLINICAL NEUROPHYSIOLOGY PRELIMINARY REPORT    Video-EEG through 0600 today reviewed. Clearly improved over last 8 hours or so. Seven to 8 Hz posterior dominant rhythm now seen. Reactive. Moderate amounts of diffuse delta at times. Generalized periodic activity with triphasic configuration no longer noted.    No seizures. There has been clear improvement in the severity of encephalopathy tho still consistent with mild to moderate, at times moderate diffuse encephalopathy.    Louis Escalante MD  Pager [phone]

## 2019-05-21 NOTE — PLAN OF CARE
More  awake all day, extubated around 1200 doing well now on NC 4L.   Hoarse voice, no pain noted, lisa. TF.  VSS, urostomy positional with borderline urine output after irrigation.  Lots of flatus per illeostomy bag.  Cont. To monitor resp. Status.

## 2019-05-21 NOTE — PLAN OF CARE
Neuro: perrla, nods/gestures appropriately, fc, moves upper ext., lowers flaccid  CV: SR, normotensive, pp+  Pulm: full vent support, minimal clear secretions  GI/: abd soft bs+, tf started , mcintyre- irrigated per order (is positional-dressing changed)  Wound care nurse here for dressing changes at start of shift.     Gtts: D5LR IVMF;   Lines: art line,central line, vent    Update: Pt fc appears alert. Pt will ps today. Pt explained plan of care. Pt's daughter updated via phone and stated that she worked overnights but will be here tomorrow afternoon (5/22/19).

## 2019-05-22 ENCOUNTER — APPOINTMENT (OUTPATIENT)
Dept: OCCUPATIONAL THERAPY | Facility: CLINIC | Age: 54
DRG: 100 | End: 2019-05-22
Attending: NURSE PRACTITIONER
Payer: COMMERCIAL

## 2019-05-22 ENCOUNTER — APPOINTMENT (OUTPATIENT)
Dept: SPEECH THERAPY | Facility: CLINIC | Age: 54
DRG: 100 | End: 2019-05-22
Attending: SURGERY
Payer: COMMERCIAL

## 2019-05-22 LAB
ANION GAP SERPL CALCULATED.3IONS-SCNC: 7 MMOL/L (ref 3–14)
BUN SERPL-MCNC: 5 MG/DL (ref 7–30)
CALCIUM SERPL-MCNC: 7.6 MG/DL (ref 8.5–10.1)
CHLORIDE SERPL-SCNC: 122 MMOL/L (ref 94–109)
CO2 SERPL-SCNC: 20 MMOL/L (ref 20–32)
CREAT SERPL-MCNC: 0.29 MG/DL (ref 0.52–1.04)
ERYTHROCYTE [DISTWIDTH] IN BLOOD BY AUTOMATED COUNT: 22.1 % (ref 10–15)
GFR SERPL CREATININE-BSD FRML MDRD: >90 ML/MIN/{1.73_M2}
GLUCOSE BLDC GLUCOMTR-MCNC: 96 MG/DL (ref 70–99)
GLUCOSE SERPL-MCNC: 114 MG/DL (ref 70–99)
HCT VFR BLD AUTO: 29.2 % (ref 35–47)
HGB BLD-MCNC: 9.4 G/DL (ref 11.7–15.7)
MAGNESIUM SERPL-MCNC: 2.2 MG/DL (ref 1.6–2.3)
MCH RBC QN AUTO: 27.2 PG (ref 26.5–33)
MCHC RBC AUTO-ENTMCNC: 32.2 G/DL (ref 31.5–36.5)
MCV RBC AUTO: 85 FL (ref 78–100)
PHOSPHATE SERPL-MCNC: 2.7 MG/DL (ref 2.5–4.5)
PLATELET # BLD AUTO: 88 10E9/L (ref 150–450)
POTASSIUM SERPL-SCNC: 3.6 MMOL/L (ref 3.4–5.3)
RBC # BLD AUTO: 3.45 10E12/L (ref 3.8–5.2)
SODIUM SERPL-SCNC: 149 MMOL/L (ref 133–144)
WBC # BLD AUTO: 3.5 10E9/L (ref 4–11)

## 2019-05-22 PROCEDURE — 00000146 ZZHCL STATISTIC GLUCOSE BY METER IP

## 2019-05-22 PROCEDURE — 85027 COMPLETE CBC AUTOMATED: CPT | Performed by: SURGERY

## 2019-05-22 PROCEDURE — 25000132 ZZH RX MED GY IP 250 OP 250 PS 637: Performed by: SURGERY

## 2019-05-22 PROCEDURE — 80048 BASIC METABOLIC PNL TOTAL CA: CPT | Performed by: SURGERY

## 2019-05-22 PROCEDURE — 97110 THERAPEUTIC EXERCISES: CPT | Mod: GO

## 2019-05-22 PROCEDURE — 99231 SBSQ HOSP IP/OBS SF/LOW 25: CPT | Performed by: SURGERY

## 2019-05-22 PROCEDURE — 25000125 ZZHC RX 250: Performed by: SURGERY

## 2019-05-22 PROCEDURE — 84100 ASSAY OF PHOSPHORUS: CPT | Performed by: SURGERY

## 2019-05-22 PROCEDURE — 25800030 ZZH RX IP 258 OP 636: Performed by: SURGERY

## 2019-05-22 PROCEDURE — 25000132 ZZH RX MED GY IP 250 OP 250 PS 637: Performed by: HOSPITALIST

## 2019-05-22 PROCEDURE — 25000132 ZZH RX MED GY IP 250 OP 250 PS 637: Performed by: INTERNAL MEDICINE

## 2019-05-22 PROCEDURE — 92526 ORAL FUNCTION THERAPY: CPT | Mod: GN | Performed by: SPEECH-LANGUAGE PATHOLOGIST

## 2019-05-22 PROCEDURE — 12000000 ZZH R&B MED SURG/OB

## 2019-05-22 PROCEDURE — 83735 ASSAY OF MAGNESIUM: CPT | Performed by: SURGERY

## 2019-05-22 PROCEDURE — 92610 EVALUATE SWALLOWING FUNCTION: CPT | Mod: GN | Performed by: SPEECH-LANGUAGE PATHOLOGIST

## 2019-05-22 PROCEDURE — 97165 OT EVAL LOW COMPLEX 30 MIN: CPT | Mod: GO

## 2019-05-22 PROCEDURE — 99207 ZZC NON-BILLABLE SERV PER CHARTING: CPT | Performed by: HOSPITALIST

## 2019-05-22 PROCEDURE — 25000128 H RX IP 250 OP 636: Performed by: SURGERY

## 2019-05-22 PROCEDURE — 25000128 H RX IP 250 OP 636: Performed by: INTERNAL MEDICINE

## 2019-05-22 PROCEDURE — 40000239 ZZH STATISTIC VAT ROUNDS

## 2019-05-22 RX ORDER — ZOLPIDEM TARTRATE 5 MG/1
5-10 TABLET ORAL
Status: DISCONTINUED | OUTPATIENT
Start: 2019-05-22 | End: 2019-05-27 | Stop reason: HOSPADM

## 2019-05-22 RX ORDER — ACETAMINOPHEN 325 MG/1
650 TABLET ORAL EVERY 6 HOURS PRN
Status: DISCONTINUED | OUTPATIENT
Start: 2019-05-22 | End: 2019-05-27 | Stop reason: HOSPADM

## 2019-05-22 RX ORDER — ALBUTEROL SULFATE 0.83 MG/ML
2.5 SOLUTION RESPIRATORY (INHALATION)
Status: DISCONTINUED | OUTPATIENT
Start: 2019-05-22 | End: 2019-05-27 | Stop reason: HOSPADM

## 2019-05-22 RX ORDER — TRAZODONE HYDROCHLORIDE 50 MG/1
50 TABLET, FILM COATED ORAL AT BEDTIME
Status: DISCONTINUED | OUTPATIENT
Start: 2019-05-22 | End: 2019-05-27 | Stop reason: HOSPADM

## 2019-05-22 RX ORDER — BISACODYL 10 MG
10 SUPPOSITORY, RECTAL RECTAL DAILY PRN
Status: DISCONTINUED | OUTPATIENT
Start: 2019-05-22 | End: 2019-05-27 | Stop reason: HOSPADM

## 2019-05-22 RX ORDER — MORPHINE SULFATE 10 MG/5ML
5 SOLUTION ORAL
Status: DISCONTINUED | OUTPATIENT
Start: 2019-05-22 | End: 2019-05-25

## 2019-05-22 RX ORDER — OXYBUTYNIN CHLORIDE 5 MG/1
5 TABLET, EXTENDED RELEASE ORAL DAILY
Status: DISCONTINUED | OUTPATIENT
Start: 2019-05-22 | End: 2019-05-22 | Stop reason: ALTCHOICE

## 2019-05-22 RX ADMIN — OXYBUTYNIN CHLORIDE 2.5 MG: 5 TABLET ORAL at 20:58

## 2019-05-22 RX ADMIN — Medication 7 ML: at 13:50

## 2019-05-22 RX ADMIN — TRAZODONE HYDROCHLORIDE 50 MG: 50 TABLET ORAL at 21:01

## 2019-05-22 RX ADMIN — METHADONE HYDROCHLORIDE 2.5 MG: 10 CONCENTRATE ORAL at 13:45

## 2019-05-22 RX ADMIN — ZOLPIDEM TARTRATE 5 MG: 5 TABLET, FILM COATED ORAL at 20:58

## 2019-05-22 RX ADMIN — ENOXAPARIN SODIUM 60 MG: 60 INJECTION SUBCUTANEOUS at 01:08

## 2019-05-22 RX ADMIN — MULTIVITAMIN 15 ML: LIQUID ORAL at 13:50

## 2019-05-22 RX ADMIN — LEVETIRACETAM 1000 MG: 10 INJECTION INTRAVENOUS at 01:08

## 2019-05-22 RX ADMIN — METHADONE HYDROCHLORIDE 2.5 MG: 10 CONCENTRATE ORAL at 02:57

## 2019-05-22 RX ADMIN — LEVETIRACETAM 1000 MG: 10 INJECTION INTRAVENOUS at 13:13

## 2019-05-22 RX ADMIN — POTASSIUM PHOSPHATE, MONOBASIC AND POTASSIUM PHOSPHATE, DIBASIC 10 MMOL: 224; 236 INJECTION, SOLUTION INTRAVENOUS at 06:42

## 2019-05-22 RX ADMIN — POTASSIUM CHLORIDE 20 MEQ: 1.5 POWDER, FOR SOLUTION ORAL at 06:42

## 2019-05-22 RX ADMIN — ENOXAPARIN SODIUM 60 MG: 60 INJECTION SUBCUTANEOUS at 13:13

## 2019-05-22 RX ADMIN — ZINC SULFATE CAP 220 MG (50 MG ELEMENTAL ZN) 220 MG: 220 (50 ZN) CAP at 13:50

## 2019-05-22 ASSESSMENT — ACTIVITIES OF DAILY LIVING (ADL)
ADLS_ACUITY_SCORE: 24
ADLS_ACUITY_SCORE: 27
ADLS_ACUITY_SCORE: 24
ADLS_ACUITY_SCORE: 26

## 2019-05-22 NOTE — PLAN OF CARE
Pt. Passed bedside swollow per ST, A + O x4,  Irrigated urostomy x2 for thick mucous returns, urine output adequate. VSS, afebrile, mostly flatus from illeostomy.  Transferred to  [facility] 1 per cart and N/A and flying squad.  Walt. Tf well.
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
## 2019-05-22 NOTE — PLAN OF CARE
"Discharge Planner OT   Patient plan for discharge: return home     Current status: order received, chart reviewed, eval completed, tx initiated. Pt admitted for generalized seizures. Pt lives in wheelchair accessible home with daughter. Paraplegic at baseline and is wheelchair bound. Pt has 24/7 A with all ADL/IADL's and transfers from daughter or PCA that is with her when daughter works. Pt reports daughter or PCA \"lift her\" into wheelchair as she has no trunk control. Pt reports she is able to self propel her w/c with BUE's and able to self feed. Pt reports she spends most of the day in wheelchair or bed. Pt has private company that provides transportation as needed with PCA assistance.     OT instructed on BUE AROM exercises. Pt completed 3-4 sets of BUE AROM exercises x 10 reps each exercise including shoulder flexion, elbow flex/ext, forearm sup/pronation, wrist flex/ext, finger flex/ext, shoulder horizontal add/abduction. Pt required total A to brush hair this date. OT will continue to provide intervention for HEP for BUE strengthen as pt self propels wheelchair with BUE's at baseline.     Barriers to return to prior living situation: none anticipated     Recommendations for discharge: home with 24/7 A from PCA and daughter with all ADl/IADL's and transfers     Rationale for recommendations: anticipate pt is at/near baseline with function. Pt has 24/7 A available for all ADL/IADL's and transfers from daughter and PCA. OT will continue to see for BUE strengthening and ROM for functional mobility in wheelchair.        Entered by: Christina Eid 05/22/2019 1:36 PM       "

## 2019-05-22 NOTE — PROVIDER NOTIFICATION
YAJAIRA FERMIN called if pt. Could go to the medical floor.  MD approved with neuro checks q shift.

## 2019-05-22 NOTE — PLAN OF CARE
Pt's neuros unchanged this shift period. Pt fc, leona, a&o. Pt frustrated at times and mentions wanting to be discharged. Pt wound followed by wound care. Pt explained plans of care and all questions answered as able.

## 2019-05-22 NOTE — PLAN OF CARE
Freq on call light. OK for ice chips per Dr. Dominguez. Walt well. TF rate increased to 30ml/h. Attempting to refuse repositioning. Discussed need for every 2 hour turns d/t skin breakdown. Pt reluctantly agrees to repos. VSS. Cont to monitor.
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
## 2019-05-22 NOTE — PROGRESS NOTES
MD Notification    Notified Person: MD    Notified Person Name: Dr Mejía    Notification Date/Time:1710    Notification Interaction: page    Purpose of Notification:  Can you please change Oxybutynin to 2.5mg IR instead of 5mg ER since ER can't be crushed?    Orders Received:    Comments:

## 2019-05-22 NOTE — PLAN OF CARE
Pt AO, VSS on RA. Denies pain. Colostomy, Urostomy patent. Colostomy with small liquids stool. Urostomy with some sediment. Urostomy flushed.TF rate increased to 60ml/hr at 1800. Blancheable erythema on coccyx, mepilex in place. Foam drsg on R forearm. Mepilex on R shin, L foot, R groin. Rook boots on. Contact precautions. TF with DD1 thin liquids per speech. Midline on LUE. Turn/repo, A2/lift. Continue to monitor.

## 2019-05-22 NOTE — PLAN OF CARE
Discharge Planner SLP   Patient plan for discharge: Not state.   Current status: Bedside swallow evaluation completed per MD orders. Patient presents with moderate oral and pharyngeal dysphagia secondary to post intubation. Oral motor function was mildly impaired for ROM, her voice was good and cough was adequate. She did not have her upper denture here at time of the evaluation. She demonstrated premature entry of thin liquids via the cup and straw. No overt Sx of aspiration via the cup with small single sips. Reduced bolus coordination and AP transport of a pureed. Good bolus extraction and clearing. She completed 2-3 swallows per teaspoon amounts. No vocal or vital changes noted. Recommend: 1. DDL 1 with thin liquids and continue with TF. 2. Upright, no straws, small sinlge sips, verify each swallow and alternate liquids/solids every few bites. Hold diet if Sx of aspiration present or changes in her respiratory status.   Barriers to return to prior living situation: Deconditioning  Recommendations for discharge: TCU  Rationale for recommendations: Will need on going skilled ST needs if goals not met as an IP. Patient is below her baseline diet of regular and thin liquids. Patient motivated to return to previous function.        Entered by: Kylie Roach 05/22/2019 2:25 PM

## 2019-05-22 NOTE — PLAN OF CARE
"Discharge Planner PT   Patient plan for discharge: Per chart return home.  Current status: Evaluation orders acknowledged. Discussed pt with OT who states the pt is non-ambulatory, has assist for all transfers via \"lift\" from her daughter or PCA. The pt has services 24/7. Pt does propel her wc with UEs. OT to continue to see for conditioning/strength benefits. PT to complete order as pt demonstrates no acute skilled PT needs at this time.  Barriers to return to prior living situation: See OT note.  Recommendations for discharge: See OT note.  Rationale for recommendations: No skilled PT needs, PT order completed. OT to address current ADL and mobility needs.       Entered by: Annika Almeida 05/22/2019 4:46 PM       "

## 2019-05-22 NOTE — PROGRESS NOTES
ICU staff  DOS 5/22/2019    No major events overnight. Ms Ellison has no complaints this morning other than some long-standing lower extremity pain. She denies dyspnea.    Vitals:   98.3  60s-80s  13-15  110s/50s  99-100RA    I/O: 2230/1375    Exam:  Gen: Alert, appropriate, NAD  HEENT: NC/AT, anicteric  Pulm: Breathing comfortably on room air  Cor: RRR  Abdomen/GI: Soft, nontender/nondistended  : Deferred  Extremities: Warm, nonedmatous  Skin: Well-perfused  Neuro: GCS 15, follows commands   Psych: Calm, appropriate    Data:   Labs and imaging reviewed  - Platelets lower at 88 today    Assessment/plan:  52 y/o woman admitted with possible status epilepticus and respiratory failure; improving. Is extubated, on room air, and appropriate for transfer out of the ICU.  CNS: Alert, appropriate.  - Seizures: Continue levetiracetam, outpatient neurology follow-up.  - Pain: Acute/chronic. Methadone, oxycodone.  - Paraplegia: No acute issues.  Pulm: On room air.  CV: No acute hemodynamic issues.  FEN/GI: Abdomen benign.  - Protein-calorie malnutrition: Continue tube feedings.   - Speech therapy evaluation ordered.  - Ileostomy status  : UOP has been adequate overall.  - Ileal conduit/urostomy in place  - Mild hypernatremia slowly correcting with enteral free water.  Heme: Stable chronic anemia with no indication to transfuse. Thrombocytopenia of uncertain significance -- low probability of DON by 4t score (has been on enoxaparin since December 2018). Follow for now.  Msk: Extremities warm, well-perfused.   Endo: Glucose 104-114.  ID: Off antibiotics. (Ceftriaxone 5/19-5/21 for ?UTI vs polymicrobial colonization.)  ICU: LMWH, H2.  - Signed out to the hospital service, anticipate transfer.    Level 1 visit on 5/22/2019.     Pedro Petersen MD, PhD  Surgical critical care  May 22, 2019, 1:52 PM

## 2019-05-22 NOTE — PROGRESS NOTES
BRIEF NUTRITION NOTE:    Monitoring ability to increase TF rate.  A full Nutrition Assessment outlining TF plan was completed 5/20.  See note for details.    NEW FINDINGS:  TF Replete with Fiber via NG was increased from 30 mL/hr --> 45 mL/hr this am (0600).  D5 IVF changed from 50 mL/hr --> TKO.  Na 149 (H)    K 3.6 (NL)  Mg 2.2 (NL)  Phos 2.7 (NL) - acceptable.    INTERVENTIONS:  Enteral Nutrition - Modify rate --> At 6 pm tonight, will increase TF Replete with Fiber to 60 mL/hr = 1440 kcals (23 kcal/kg), 92 gm pro (1.4 gm/kg), 1195 mL H20, 22 gm fiber, 179 gm CHO.    RECOMMENDATIONS/FOLLOW UP:  Will monitor ability to increase TF rate to eventual goal 75 mL/hr tomorrow.    Nirmala Mijares, RD, LD,CNSC

## 2019-05-22 NOTE — PROGRESS NOTES
Transfer    S- Transfer to 611-1 from ICU    B- Patient alert/orient X4, paraplegic, has wounds to her coccyx/right groin skin irritated from removal of line/skin tear to RUE/bruising throughout.  Tube feeding infusing at 45cc/hr increasing to 60cc at 6pm. IVF at TKO.  Colostomy intact/neph tube draining light yellow with sediment.  Vss, denied any pain at this time.    A- Brief systems assessment: See above    R- Transfer to station 66 per physician orders. Continue to monitor pt and update physician as needed.     Code status:Full  Skin: pressure area to coccyx/bruising throughout/skin tear RUE/right groin has skin irritant from removal of line.  Fall Risk: Yes  Isolation: Yes  Patient belongings: At bedside  Medication drips upon transfer: Yes

## 2019-05-23 ENCOUNTER — APPOINTMENT (OUTPATIENT)
Dept: OCCUPATIONAL THERAPY | Facility: CLINIC | Age: 54
DRG: 100 | End: 2019-05-23
Attending: INTERNAL MEDICINE
Payer: COMMERCIAL

## 2019-05-23 ENCOUNTER — ANESTHESIA (OUTPATIENT)
Dept: MEDSURG UNIT | Facility: CLINIC | Age: 54
DRG: 100 | End: 2019-05-23
Payer: COMMERCIAL

## 2019-05-23 ENCOUNTER — APPOINTMENT (OUTPATIENT)
Dept: SPEECH THERAPY | Facility: CLINIC | Age: 54
DRG: 100 | End: 2019-05-23
Attending: SURGERY
Payer: COMMERCIAL

## 2019-05-23 ENCOUNTER — ANESTHESIA EVENT (OUTPATIENT)
Dept: MEDSURG UNIT | Facility: CLINIC | Age: 54
DRG: 100 | End: 2019-05-23
Payer: COMMERCIAL

## 2019-05-23 LAB
ANION GAP SERPL CALCULATED.3IONS-SCNC: 6 MMOL/L (ref 3–14)
BUN SERPL-MCNC: 7 MG/DL (ref 7–30)
CALCIUM SERPL-MCNC: 7.8 MG/DL (ref 8.5–10.1)
CHLORIDE SERPL-SCNC: 114 MMOL/L (ref 94–109)
CO2 SERPL-SCNC: 23 MMOL/L (ref 20–32)
CREAT SERPL-MCNC: 0.25 MG/DL (ref 0.52–1.04)
ERYTHROCYTE [DISTWIDTH] IN BLOOD BY AUTOMATED COUNT: 21.5 % (ref 10–15)
GFR SERPL CREATININE-BSD FRML MDRD: >90 ML/MIN/{1.73_M2}
GLUCOSE SERPL-MCNC: 109 MG/DL (ref 70–99)
HCT VFR BLD AUTO: 28.8 % (ref 35–47)
HGB BLD-MCNC: 9.4 G/DL (ref 11.7–15.7)
LACTATE BLD-SCNC: 1.4 MMOL/L (ref 0.7–2)
MAGNESIUM SERPL-MCNC: 2.1 MG/DL (ref 1.6–2.3)
MCH RBC QN AUTO: 27.4 PG (ref 26.5–33)
MCHC RBC AUTO-ENTMCNC: 32.6 G/DL (ref 31.5–36.5)
MCV RBC AUTO: 84 FL (ref 78–100)
PHOSPHATE SERPL-MCNC: 2.1 MG/DL (ref 2.5–4.5)
PLATELET # BLD AUTO: 85 10E9/L (ref 150–450)
POTASSIUM SERPL-SCNC: 4 MMOL/L (ref 3.4–5.3)
RBC # BLD AUTO: 3.43 10E12/L (ref 3.8–5.2)
SODIUM SERPL-SCNC: 143 MMOL/L (ref 133–144)
WBC # BLD AUTO: 3.3 10E9/L (ref 4–11)

## 2019-05-23 PROCEDURE — 25000132 ZZH RX MED GY IP 250 OP 250 PS 637: Performed by: HOSPITALIST

## 2019-05-23 PROCEDURE — 37000011 ZZH ANESTHESIA WARD SERVICE: Performed by: NURSE ANESTHETIST, CERTIFIED REGISTERED

## 2019-05-23 PROCEDURE — 40000239 ZZH STATISTIC VAT ROUNDS

## 2019-05-23 PROCEDURE — 83605 ASSAY OF LACTIC ACID: CPT | Performed by: HOSPITALIST

## 2019-05-23 PROCEDURE — 25000125 ZZHC RX 250: Performed by: HOSPITALIST

## 2019-05-23 PROCEDURE — 25000128 H RX IP 250 OP 636: Performed by: HOSPITALIST

## 2019-05-23 PROCEDURE — 83735 ASSAY OF MAGNESIUM: CPT | Performed by: HOSPITALIST

## 2019-05-23 PROCEDURE — 80048 BASIC METABOLIC PNL TOTAL CA: CPT | Performed by: HOSPITALIST

## 2019-05-23 PROCEDURE — 12000000 ZZH R&B MED SURG/OB

## 2019-05-23 PROCEDURE — 25800030 ZZH RX IP 258 OP 636: Performed by: HOSPITALIST

## 2019-05-23 PROCEDURE — 84100 ASSAY OF PHOSPHORUS: CPT | Performed by: HOSPITALIST

## 2019-05-23 PROCEDURE — 92526 ORAL FUNCTION THERAPY: CPT | Mod: GN

## 2019-05-23 PROCEDURE — 99232 SBSQ HOSP IP/OBS MODERATE 35: CPT | Performed by: HOSPITALIST

## 2019-05-23 PROCEDURE — 40000671 ZZH STATISTIC ANESTHESIA CASE

## 2019-05-23 PROCEDURE — 27210429 ZZH NUTRITION PRODUCT INTERMEDIATE LITER

## 2019-05-23 PROCEDURE — 25000131 ZZH RX MED GY IP 250 OP 636 PS 637: Performed by: HOSPITALIST

## 2019-05-23 PROCEDURE — 85027 COMPLETE CBC AUTOMATED: CPT | Performed by: HOSPITALIST

## 2019-05-23 RX ORDER — ONDANSETRON 4 MG/1
4 TABLET, ORALLY DISINTEGRATING ORAL EVERY 6 HOURS PRN
Status: DISCONTINUED | OUTPATIENT
Start: 2019-05-23 | End: 2019-05-27 | Stop reason: HOSPADM

## 2019-05-23 RX ORDER — PROCHLORPERAZINE MALEATE 5 MG
5 TABLET ORAL EVERY 6 HOURS PRN
Status: DISCONTINUED | OUTPATIENT
Start: 2019-05-23 | End: 2019-05-27 | Stop reason: HOSPADM

## 2019-05-23 RX ORDER — PROCHLORPERAZINE 25 MG
25 SUPPOSITORY, RECTAL RECTAL EVERY 12 HOURS PRN
Status: DISCONTINUED | OUTPATIENT
Start: 2019-05-23 | End: 2019-05-27 | Stop reason: HOSPADM

## 2019-05-23 RX ADMIN — ENOXAPARIN SODIUM 60 MG: 60 INJECTION SUBCUTANEOUS at 00:59

## 2019-05-23 RX ADMIN — POTASSIUM PHOSPHATE, MONOBASIC AND POTASSIUM PHOSPHATE, DIBASIC 15 MMOL: 224; 236 INJECTION, SOLUTION INTRAVENOUS at 09:59

## 2019-05-23 RX ADMIN — ZOLPIDEM TARTRATE 10 MG: 5 TABLET, FILM COATED ORAL at 20:51

## 2019-05-23 RX ADMIN — ONDANSETRON 4 MG: 4 TABLET, ORALLY DISINTEGRATING ORAL at 15:15

## 2019-05-23 RX ADMIN — POTASSIUM CHLORIDE 20 MEQ: 1.5 POWDER, FOR SOLUTION ORAL at 20:51

## 2019-05-23 RX ADMIN — OXYBUTYNIN CHLORIDE 2.5 MG: 5 TABLET ORAL at 20:51

## 2019-05-23 RX ADMIN — OXYCODONE HYDROCHLORIDE 5 MG: 5 SOLUTION ORAL at 14:20

## 2019-05-23 RX ADMIN — MORPHINE SULFATE 5 MG: 10 SOLUTION ORAL at 12:08

## 2019-05-23 RX ADMIN — LEVETIRACETAM 1000 MG: 10 INJECTION INTRAVENOUS at 00:53

## 2019-05-23 RX ADMIN — Medication 7 ML: at 09:59

## 2019-05-23 RX ADMIN — LEVETIRACETAM 1000 MG: 10 INJECTION INTRAVENOUS at 14:20

## 2019-05-23 RX ADMIN — METHADONE HYDROCHLORIDE 2.5 MG: 10 CONCENTRATE ORAL at 01:48

## 2019-05-23 RX ADMIN — TRAZODONE HYDROCHLORIDE 50 MG: 50 TABLET ORAL at 20:52

## 2019-05-23 RX ADMIN — MULTIVITAMIN 15 ML: LIQUID ORAL at 09:59

## 2019-05-23 RX ADMIN — PROCHLORPERAZINE EDISYLATE 5 MG: 5 INJECTION INTRAMUSCULAR; INTRAVENOUS at 20:51

## 2019-05-23 RX ADMIN — OXYBUTYNIN CHLORIDE 2.5 MG: 5 TABLET ORAL at 09:57

## 2019-05-23 RX ADMIN — OXYCODONE HYDROCHLORIDE 2.5 MG: 5 SOLUTION ORAL at 03:15

## 2019-05-23 RX ADMIN — METHADONE HYDROCHLORIDE 2.5 MG: 10 CONCENTRATE ORAL at 15:15

## 2019-05-23 ASSESSMENT — ACTIVITIES OF DAILY LIVING (ADL)
ADLS_ACUITY_SCORE: 28
ADLS_ACUITY_SCORE: 26
ADLS_ACUITY_SCORE: 26
ADLS_ACUITY_SCORE: 28
ADLS_ACUITY_SCORE: 28
ADLS_ACUITY_SCORE: 26

## 2019-05-23 ASSESSMENT — MIFFLIN-ST. JEOR: SCORE: 1254.38

## 2019-05-23 NOTE — PLAN OF CARE
Discharge Planner OT   Patient plan for discharge: return home  Current status: OTS educated Pt on UE resistive band exercises and dispensed handout. Pt was able to mimic exercises following OTS demonstration. Pt c/o of bladder pain, she stated she had informed nursing.  Barriers to return to prior living situation: none anticipated   Recommendations for discharge: home with 24/7 A from PCA and daughter with all ADl/IADL's and transfers   Rationale for recommendations: anticipate pt is at/near baseline with function. Pt has 24/7 A available for all ADL/IADL's and transfers from daughter and PCA. No further skilled IP OT needs identified at this time. Will complete order.       Entered by: Helena Lagunas 05/23/2019 12:37 PM     Occupational Therapy Discharge Summary    Reason for therapy discharge:    No further expectations of functional progress.    Progress towards therapy goal(s). See goals on Care Plan in [facility] electronic health record for goal details.  Goals partially met.  Barriers to achieving goals:   no further needs identified.    Therapy recommendation(s):    No further therapy is recommended.

## 2019-05-23 NOTE — ANESTHESIA CARE TRANSFER NOTE
Patient: Felicia Ellison    * No procedures listed *    Diagnosis: * No pre-op diagnosis entered *  Diagnosis Additional Information: No value filed.    Anesthesia Type:   No value filed.     Note:  Airway :Room Air  Patient transferred to:Medical/Surgical Unit  Comments: Report given to RN caring for ptHandoff Report: Identifed the Patient, Identified the Reponsible Provider, Reviewed the pertinent medical history, Discussed the surgical course, Reviewed Intra-OP anesthesia mangement and issues during anesthesia, Set expectations for post-procedure period and Allowed opportunity for questions and acknowledgement of understanding      Vitals: (Last set prior to Anesthesia Care Transfer)              Electronically Signed By: VERENA Cardenas CRNA  May 23, 2019  5:00 PM

## 2019-05-23 NOTE — PLAN OF CARE
DATE & TIME: 5/23 5167-1377  ORIENTATION: A&Ox4, calm, cooperative   BEHAVIOR & AGGRESSION TOOL COLOR: Green  CIWA SCORE: n/a  ABNL VS/O2: VSS except elevated -110 - sepsis protocol fired - lactic negative  MOBILITY: Total - pt is a paraplegic turn and repo q2h  PAIN MANAGMENT: morphine x1 , scheduled methadone, zofran for nausea, currently paging on call for compazine  DIET: upgraded to DD2 with thin liquids + TF   BOWEL/BLADDER: Urostomy and colostomy   ABNL LAB/BG: phos 2.1,  replaced.  K+ 4.0(on high protocol, to be replaced) PLT 85, lovenox discontinued  DRAIN/DEVICES: Urostomy, colostomy, L midline, NG tube for TF  TELEMETRY RHYTHM: n/a  SKIN: Wound to coccyx, mepilex in place. Foam drsg on R forearm. Mepilex on R shin, L foot, R groin  TESTS/PROCEDURES: n/a  D/C DAY/GOALS/PLACE: Pending   OTHER IMPORTANT INFO: TF running at 60 mL/hr, with 60 mL free water flushes q2h. Urostomy was irrigated with 50cc normal saline. Pt is on contact precautions for MRSA.

## 2019-05-23 NOTE — PLAN OF CARE
Discharge Planner SLP   Patient plan for discharge: Did not discuss  Current status: Pt tolerated thin liquids without overt s/sx of aspiration. Mastication of soft solids was slow but functional given time. Pt does not have her dentures but states her daughter may bring them by later. Discussed choosing softer foods until she has her dentures.     Recommend dysphagia diet 2 and thin liquids. Fully upright positioning, no straws, small bites/sips, select softer food items, slow rate of intake, alternate solids and liquids     Barriers to return to prior living situation: Below baseline  Recommendations for discharge: Home with 24 hr supervision from PCA and daughter per PT/OT recommendations   Rationale for recommendations: SLP at next level of care for management of dysphagia.          Entered by: Velia Pereira 05/23/2019 10:38 AM

## 2019-05-23 NOTE — PROGRESS NOTES
[facility]    Medicine Progress Note - Hospitalist Service       Date of Admission:  5/18/2019  Assessment & Plan     The patient is a 53-year-old female with a past medical history of paraplegia secondary to spinal cord injury, neurogenic bladder secondary to spinal cord injury, malnutrition, urinary retention, gastroesophageal reflux disease, osteoarthritis, malnutrition, and history of chronic pain, who was admitted to the ICU, intubated on 05/18/2019 and has since stabilized, extubated and ready for transfer off of the ICU on 05/22/2019.      Status epilepticus  Initially admitted in [place] and started on Keppra.  She required sedation and intubation for persistent seizures.  She was transferred to [facility] and underwent EEG monitoring and MRI)negative).  She stabilized on Keppra and was extubated on 5/21.  No further seizures- she is awake and alert.  Continue intravenous Keppra until she can tolerate oral medications.    Acute respiratory failure requiring mechanical ventiliation  Extubated 05/21/2019    Acute dysphagia   Continue tube feedings   Speech will re-evaluate today    Hypernatremia  Resolved   Continue D5LR today    Acute on chronic anemia  Hemoglobin stable overnight, monitor    Paraplegia secondary to spinal cord injury  Neurogenic bowel and bladder status post colostomy and urostomy   Stable     Multiple skin pressure wounds present on admission  WOC team following    Chronic pain  Continue methadone     Insomnia  Continue trazodone and zolpidem p.r.n.     Diet: Adult Formula Drip Feeding: Continuous Replete with Fiber; Nasogastric tube; Goal Rate: 60; mL/hr; Medication - Feeding Tube Flush Frequency: At least 15-30 mL water before and after medication administration and with tube clogging; Amount to Send...  Combination Diet Dysphagia Diet Level 2: Mechan Altered; Thin Liquids (water, ice chips, juice, milk gelatin, ice cream, etc)    DVT Prophylaxis: Enoxaparin (Lovenox) SQ  Ambrose  Catheter: not present  Code Status: Full Code      Disposition Plan   Expected discharge: 4 - 7 days, recommended to transitional care unit once neurologic status is stable and the patient is able to tolerate a diet.  Entered: Efrain Geiger MD 05/23/2019, 12:45 PM       The patient's care was discussed with the Care Coordinator/ and Patient.    Efrain Geiger MD  Hospitalist Service  [facility]    ______________________________________________________________________    Interval History   No pain or complaints     Data reviewed today: I reviewed all medications, new labs and imaging results over the last 24 hours. I personally reviewed no images or EKG's today.    Physical Exam   Vital Signs: Temp: 98.9  F (37.2  C) Temp src: Oral BP: 126/72 Pulse: 83 Heart Rate: 100 Resp: 16 SpO2: 99 % O2 Device: None (Room air)    Weight: 130 lbs 1.14 oz  Constitutional: awake, alert, cooperative, no apparent distress  Respiratory: No increased work of breathing, good air exchange, clear to auscultation bilaterally, no crackles or wheezing  Cardiovascular:  regular rate and rhythm, normal S1 and S2, no S3 or S4, and no murmur noted  GI: soft, + bowel sounds.  +colostomy and urostomy  Skin: no rashes and no jaundice  Neurologic: Awake, alert, conversant  Neuropsychiatric: General: normal, calm and normal eye contact  +nasogastric tube     Data   Recent Labs   Lab 05/23/19  0515 05/22/19  0511 05/21/19  1810  05/18/19  2150   WBC 3.3* 3.5* 4.1   < > 4.1   HGB 9.4* 9.4* 9.5*   < > 11.6*   MCV 84 85 85   < > 84   PLT 85* 88* 121*   < > 148*   INR  --   --   --   --  1.42*    149* 152*   < > 151*   POTASSIUM 4.0 3.6 3.7   < > 3.0*   CHLORIDE 114* 122* 125*   < > 119*   CO2 23 20 20   < > 19*   BUN 7 5* 4*   < > 17   CR 0.25* 0.29* 0.31*   < > 0.32*   ANIONGAP 6 7 7   < > 13   JOSH 7.8* 7.6* 7.7*   < > 8.0*   * 114* 104*   < > 101*   ALBUMIN  --   --   --   --  2.6*   PROTTOTAL   --   --   --   --  6.7*   BILITOTAL  --   --   --   --  0.3   ALKPHOS  --   --   --   --  204*   ALT  --   --   --   --  15   AST  --   --   --   --  14   LIPASE  --   --   --   --  42*    < > = values in this interval not displayed.     No results found for this or any previous visit (from the past 24 hour(s)).

## 2019-05-23 NOTE — PLAN OF CARE
DATE & TIME: 5/22-23 [id number]  ORIENTATION: A&Ox4, calm, cooperative   BEHAVIOR & AGGRESSION TOOL COLOR: Green  CIWA SCORE: n/a  ABNL VS/O2: VSS on RA   MOBILITY: Total - pt is a paraplegic  PAIN MANAGMENT: Oxycodone x1   DIET: DD1 with thin liquids + TF   BOWEL/BLADDER: Urostomy and colostomy   ABNL LAB/BG:Na 149, Cr 0.29, Ca 7.6, WBC 3.5, Hgb 9.4, Plt 88  DRAIN/DEVICES: Urostomy, colostomy, L midline, NG tube   TELEMETRY RHYTHM: n/a  SKIN: Wound to coccyx, mepilex in place. Foam drsg on R forearm. Mepilex on R shin, L foot, R groin  TESTS/PROCEDURES: n/a  D/C DAY/GOALS/PLACE: Pending   OTHER IMPORTANT INFO: TF running at 60 mL/hr, with 60 mL free water flushes q2h. Urostomy was irrigated with 50cc normal saline. Pt is on contact precautions for MRSA. L midline TKO at 10 mL/hr.

## 2019-05-24 ENCOUNTER — APPOINTMENT (OUTPATIENT)
Dept: GENERAL RADIOLOGY | Facility: CLINIC | Age: 54
DRG: 100 | End: 2019-05-24
Attending: HOSPITALIST
Payer: COMMERCIAL

## 2019-05-24 LAB
ALBUMIN UR-MCNC: 10 MG/DL
ANION GAP SERPL CALCULATED.3IONS-SCNC: 7 MMOL/L (ref 3–14)
APPEARANCE UR: CLEAR
BACTERIA #/AREA URNS HPF: ABNORMAL /HPF
BILIRUB UR QL STRIP: NEGATIVE
BUN SERPL-MCNC: 17 MG/DL (ref 7–30)
CALCIUM SERPL-MCNC: 8 MG/DL (ref 8.5–10.1)
CHLORIDE SERPL-SCNC: 111 MMOL/L (ref 94–109)
CO2 SERPL-SCNC: 23 MMOL/L (ref 20–32)
COLOR UR AUTO: ABNORMAL
CREAT SERPL-MCNC: 0.34 MG/DL (ref 0.52–1.04)
GFR SERPL CREATININE-BSD FRML MDRD: >90 ML/MIN/{1.73_M2}
GLUCOSE SERPL-MCNC: 120 MG/DL (ref 70–99)
GLUCOSE UR STRIP-MCNC: NEGATIVE MG/DL
HGB BLD-MCNC: 10.4 G/DL (ref 11.7–15.7)
HGB UR QL STRIP: ABNORMAL
KETONES UR STRIP-MCNC: NEGATIVE MG/DL
LACTATE BLD-SCNC: 1.2 MMOL/L (ref 0.7–2)
LEUKOCYTE ESTERASE UR QL STRIP: ABNORMAL
MUCOUS THREADS #/AREA URNS LPF: PRESENT /LPF
NITRATE UR QL: NEGATIVE
PH UR STRIP: 7.5 PH (ref 5–7)
PHOSPHATE SERPL-MCNC: 1.9 MG/DL (ref 2.5–4.5)
PHOSPHATE SERPL-MCNC: 2.8 MG/DL (ref 2.5–4.5)
PLATELET # BLD AUTO: 130 10E9/L (ref 150–450)
POTASSIUM SERPL-SCNC: 4.5 MMOL/L (ref 3.4–5.3)
RBC #/AREA URNS AUTO: 2 /HPF (ref 0–2)
SODIUM SERPL-SCNC: 141 MMOL/L (ref 133–144)
SOURCE: ABNORMAL
SP GR UR STRIP: 1.01 (ref 1–1.03)
UROBILINOGEN UR STRIP-MCNC: NORMAL MG/DL (ref 0–2)
WBC #/AREA URNS AUTO: 9 /HPF (ref 0–5)

## 2019-05-24 PROCEDURE — 99232 SBSQ HOSP IP/OBS MODERATE 35: CPT | Performed by: HOSPITALIST

## 2019-05-24 PROCEDURE — 74019 RADEX ABDOMEN 2 VIEWS: CPT

## 2019-05-24 PROCEDURE — 25800030 ZZH RX IP 258 OP 636: Performed by: HOSPITALIST

## 2019-05-24 PROCEDURE — 40000141 ZZH STATISTIC PERIPHERAL IV START W/O US GUIDANCE

## 2019-05-24 PROCEDURE — 83605 ASSAY OF LACTIC ACID: CPT | Performed by: HOSPITALIST

## 2019-05-24 PROCEDURE — 87088 URINE BACTERIA CULTURE: CPT | Performed by: SURGERY

## 2019-05-24 PROCEDURE — 25000125 ZZHC RX 250: Performed by: HOSPITALIST

## 2019-05-24 PROCEDURE — 36415 COLL VENOUS BLD VENIPUNCTURE: CPT | Performed by: HOSPITALIST

## 2019-05-24 PROCEDURE — 87086 URINE CULTURE/COLONY COUNT: CPT | Performed by: SURGERY

## 2019-05-24 PROCEDURE — 27210429 ZZH NUTRITION PRODUCT INTERMEDIATE LITER

## 2019-05-24 PROCEDURE — 85049 AUTOMATED PLATELET COUNT: CPT | Performed by: HOSPITALIST

## 2019-05-24 PROCEDURE — 12000000 ZZH R&B MED SURG/OB

## 2019-05-24 PROCEDURE — 80048 BASIC METABOLIC PNL TOTAL CA: CPT | Performed by: HOSPITALIST

## 2019-05-24 PROCEDURE — 84100 ASSAY OF PHOSPHORUS: CPT | Performed by: HOSPITALIST

## 2019-05-24 PROCEDURE — 25000132 ZZH RX MED GY IP 250 OP 250 PS 637: Performed by: HOSPITALIST

## 2019-05-24 PROCEDURE — 25000128 H RX IP 250 OP 636: Performed by: HOSPITALIST

## 2019-05-24 PROCEDURE — 87186 SC STD MICRODIL/AGAR DIL: CPT | Performed by: SURGERY

## 2019-05-24 PROCEDURE — 81001 URINALYSIS AUTO W/SCOPE: CPT | Performed by: HOSPITALIST

## 2019-05-24 PROCEDURE — 85018 HEMOGLOBIN: CPT | Performed by: HOSPITALIST

## 2019-05-24 RX ORDER — LEVETIRACETAM 500 MG/1
1000 TABLET ORAL 2 TIMES DAILY
Status: DISCONTINUED | OUTPATIENT
Start: 2019-05-24 | End: 2019-05-27 | Stop reason: HOSPADM

## 2019-05-24 RX ADMIN — OXYCODONE HYDROCHLORIDE 5 MG: 5 SOLUTION ORAL at 06:13

## 2019-05-24 RX ADMIN — POTASSIUM PHOSPHATE, MONOBASIC AND POTASSIUM PHOSPHATE, DIBASIC 20 MMOL: 224; 236 INJECTION, SOLUTION INTRAVENOUS at 10:54

## 2019-05-24 RX ADMIN — TRAZODONE HYDROCHLORIDE 50 MG: 50 TABLET ORAL at 21:51

## 2019-05-24 RX ADMIN — OXYCODONE HYDROCHLORIDE 5 MG: 5 SOLUTION ORAL at 17:01

## 2019-05-24 RX ADMIN — MULTIVITAMIN 15 ML: LIQUID ORAL at 09:14

## 2019-05-24 RX ADMIN — Medication 7 ML: at 09:14

## 2019-05-24 RX ADMIN — PROCHLORPERAZINE EDISYLATE 5 MG: 5 INJECTION INTRAMUSCULAR; INTRAVENOUS at 06:13

## 2019-05-24 RX ADMIN — OXYBUTYNIN CHLORIDE 2.5 MG: 5 TABLET ORAL at 21:51

## 2019-05-24 RX ADMIN — ZOLPIDEM TARTRATE 10 MG: 5 TABLET, FILM COATED ORAL at 21:51

## 2019-05-24 RX ADMIN — ZINC SULFATE CAP 220 MG (50 MG ELEMENTAL ZN) 220 MG: 220 (50 ZN) CAP at 09:14

## 2019-05-24 RX ADMIN — METHADONE HYDROCHLORIDE 2.5 MG: 10 CONCENTRATE ORAL at 13:41

## 2019-05-24 RX ADMIN — OXYBUTYNIN CHLORIDE 2.5 MG: 5 TABLET ORAL at 09:14

## 2019-05-24 RX ADMIN — METHADONE HYDROCHLORIDE 2.5 MG: 10 CONCENTRATE ORAL at 01:08

## 2019-05-24 RX ADMIN — LEVETIRACETAM 1000 MG: 500 TABLET, FILM COATED ORAL at 21:51

## 2019-05-24 ASSESSMENT — ACTIVITIES OF DAILY LIVING (ADL)
ADLS_ACUITY_SCORE: 26
ADLS_ACUITY_SCORE: 24
ADLS_ACUITY_SCORE: 26

## 2019-05-24 ASSESSMENT — MIFFLIN-ST. JEOR: SCORE: 1313.02

## 2019-05-24 NOTE — PROGRESS NOTES
CLINICAL NUTRITION SERVICES - REASSESSMENT NOTE      Recommendations Ordered by Registered Dietitian (RD):   Replete with Fiber goal 75 mL/hr = 1800 Kcals (28 kcal/kg), 115 g pro (1.8 g/kg), 223 g CHO, 27 g fiber, 1500 mL H2O, 3600 IU Vit A, 360 mg Vit C, 29 mg elemental Zinc.  Total (TF + Tri-Vi-Sol + Zinc Sulfate)= 8850 IU Vit A, 605 mg Vit C, and 54 mg Zinc Sulfate (daily avg)    Continue free H20 60 mL every 2 hrs (720 mL) T (TF + flushes) = 2220 mL/day   Malnutrition (5/20:   Malnutrition:   % Weight Loss:  Up to 5% in 1 month (non-severe malnutrition)  % Intake:  Unable to determine without recent nutrition history  Subcutaneous Fat Loss:  Orbital region mild depletion and Upper arm region mild depletion  Muscle Loss:  Temporal region moderate depletion and Clavicle bone region moderate depletion  Fluid Retention:  Mild      Malnutrition Diagnosis: Severe malnutrition  In Context of:  Acute illness or injury  Chronic illness or disease        EVALUATION OF PROGRESS TOWARD GOALS   Diet:  DD2, thin liquids. Ate well yesterday for breakfast but then developed abdominal pain and some nausea.  Also having pain today but RN thinks it's from the ileal conduit. Pt is to go down for xra.    Nutrition Support:  Step 2 - Replete with Fiber to 60 mL/hr = 1440 kcals (23 kcal/kg), 92 gm pro (1.4 gm/kg), 1195 mL H20, 22 gm fiber, 179 gm CHO  Free H20 60 mL every 2 hrs (720 mL) - MD order    Intake/Tolerance:  Pt appears to be tolerating TF. She is having stools via ostomy.  Note Phos has been low, 1.9 today, but is being replaced. K and Mag have been WNL.  Will advance TF to goal.      ASSESSED NUTRITION NEEDS:  Estimated Energy Needs: (3 step progression due to risk for refeeding syndrome)'  Step 1:  915-1220 kcals (15-20 kcal/kg)  Step 2:  5805-2452 kcals (20-25 kcal/kg)  Step 3:  9344-6368 kcals (25-30 Kcal/Kg)  Justification: maintenance  Estimated Protein Needs:   grams protein (1.5-1.8 g pro/Kg)  Justification:  Repletion, wound healing and hypercatabolism with critical illness  Estimated Fluid Needs:  8165-9004 mL (1 mL/Kcal)  Justification: maintenance      NEW FINDINGS:   Vitals:    05/19/19 0800 05/21/19 0500 05/23/19 0540 05/24/19 0608   Weight: 61 kg (134 lb 7.7 oz) 57.8 kg (127 lb 6.8 oz) 59 kg (130 lb 1.1 oz) 64.9 kg (143 lb)         Previous Goals:   Pt will tolerate TF without refeeding syndrome  Evaluation: Met    Previous Nutrition Diagnosis:   Inadequate protein-energy intake related to intubation as evidenced by NPO status, D5 IVF meeting 13% energy and 0% protein needs, and TF planned  Evaluation: Improving      CURRENT NUTRITION DIAGNOSIS  Predicted inadequate (oral) intake related to abdominal pain and nausea    INTERVENTIONS  Recommendations / Nutrition Prescription  Diet per SLP     Replete with Fiber goal 75 mL/hr = 1800 Kcals (28 kcal/kg), 115 g pro (1.8 g/kg), 223 g CHO, 27 g fiber, 1500 mL H2O, 3600 IU Vit A, 360 mg Vit C, 29 mg elemental Zinc.  Total (TF + Tri-Vi-Sol + Zinc Sulfate)= 8850 IU Vit A, 605 mg Vit C, and 54 mg Zinc Sulfate (daily avg)    Continue free H20 60 mL every 2 hrs (720 mL) T (TF + flushes) = 2220 mL/day    Implementation  EN Schedule - ordered the above increase  Collaboration and Referral of Nutrition care - discussed TF with BOB Singleton    Goals  TF will meet 100% nutritional needs      MONITORING AND EVALUATION:  Progress towards goals will be monitored and evaluated per protocol and Practice Guidelines      Robina Fall RD  Pager [phone] (M-F)            [phone] (W/E & Hol)

## 2019-05-24 NOTE — PROGRESS NOTES
[facility]    Medicine Progress Note - Hospitalist Service       Date of Admission:  5/18/2019  Assessment & Plan     The patient is a 53-year-old female with a past medical history of paraplegia secondary to spinal cord injury, neurogenic bladder secondary to spinal cord injury, malnutrition, gastroesophageal reflux disease, osteoarthritis, malnutrition, and history of chronic pain, who was admitted to the ICU on May 18, 2019 in status epilepticus, intubated and sedated.  She was treated with IV Keppra, extubated and transferred out of the ICU on 05/22/2019.     Status epilepticus  Initially admitted in [place] and started on Keppra.  She required sedation and intubation for persistent seizures.  She was transferred to [facility] and underwent EEG monitoring and MRI)negative).  She stabilized on Keppra and was extubated on 5/21.  No further seizures- she is awake and alert.  Change Keppra to oral.    Acute respiratory failure requiring mechanical ventiliation  Extubated 05/21/2019    Acute dysphagia   The patient is cleared for NDD2 diet.  Stop tube feedings when her abdominal pain has resolved.  See below.    Hypernatremia  Resolved   Continue D5LR today  Sodium   Date Value Ref Range Status   05/24/2019 141 133 - 144 mmol/L Final     Hypophosphatemia  Replace per protocol    Acute on chronic anemia  Hemoglobin stable overnight, monitor  Hemoglobin   Date Value Ref Range Status   05/24/2019 10.4 (L) 11.7 - 15.7 g/dL Final     Acute thrombocytopenia   Platelet count is increasing.  Enoxaparin was stopped yesterday.  We will check a PF4 antibody.  Platelet Count   Date Value Ref Range Status   05/24/2019 130 (L) 150 - 450 10e9/L Final     Paraplegia secondary to spinal cord injury  Neurogenic bowel and bladder status post colostomy and urostomy   Patient is having pain over her urostomy/ileal conduit site.  The nurse reports decreased urine output.  We will consult the urologist to evaluate  this.    Multiple skin pressure wounds present on admission  WOC team following    Chronic pain  Continue methadone     Insomnia  Continue trazodone and zolpidem p.r.n.     Admission here from December 12, 2018 to January 9, 2019 with septic shock due to pneumonia and right upper extremity DVT    Diet: Combination Diet Dysphagia Diet Level 2: Mechan Altered; Thin Liquids (water, ice chips, juice, milk gelatin, ice cream, etc)  Adult Formula Drip Feeding: Continuous Replete with Fiber; Nasogastric tube; Goal Rate: 75; mL/hr; Medication - Feeding Tube Flush Frequency: At least 15-30 mL water before and after medication administration and with tube clogging; Amount to Send...    DVT Prophylaxis: pneumatic compression device   Ambrose Catheter: not present  Code Status: Full Code      Disposition Plan   Expected discharge: 2-3 days, recommended to transitional care unit once neurologic status is stable and the patient is able to tolerate a diet.  Entered: Efrain Geiger MD 05/24/2019, 2:16 PM       The patient's care was discussed with the patient and nurse    Efrain Geiger MD  Hospitalist Service  [facility]    ______________________________________________________________________    Interval History   The patient is having right lower quadrant pain over her ilial conduit stoma.    Data reviewed today: I reviewed all medications, new labs and imaging results over the last 24 hours. I personally reviewed no images or EKG's today.    Physical Exam   Vital Signs: Temp: 98.6  F (37  C) Temp src: Oral BP: 117/66   Heart Rate: 115 Resp: 18 SpO2: 92 % O2 Device: None (Room air)    Weight: 143 lbs 0 oz  Constitutional: awake, alert, cooperative, no apparent distress  Respiratory: No increased work of breathing, good air exchange, clear to auscultation bilaterally, no crackles or wheezing  Cardiovascular:  regular rate and rhythm, normal S1 and S2, no S3 or S4, and no murmur noted  GI: There is  some distention and tenderness over the ilial conduit stoma.  There is brown liquid stool in the colostomy bag.  Good bowel sounds are present.  Skin: no rashes and no jaundice  Neurologic: Awake, alert, conversant  Neuropsychiatric: General: normal, calm and normal eye contact  +nasogastric tube     Data   Recent Labs   Lab 05/24/19  0922 05/23/19  0515 05/22/19  0511 05/21/19  1810  05/18/19  2150   WBC  --  3.3* 3.5* 4.1   < > 4.1   HGB 10.4* 9.4* 9.4* 9.5*   < > 11.6*   MCV  --  84 85 85   < > 84   * 85* 88* 121*   < > 148*   INR  --   --   --   --   --  1.42*    143 149* 152*   < > 151*   POTASSIUM 4.5 4.0 3.6 3.7   < > 3.0*   CHLORIDE 111* 114* 122* 125*   < > 119*   CO2 23 23 20 20   < > 19*   BUN 17 7 5* 4*   < > 17   CR 0.34* 0.25* 0.29* 0.31*   < > 0.32*   ANIONGAP 7 6 7 7   < > 13   JOSH 8.0* 7.8* 7.6* 7.7*   < > 8.0*   * 109* 114* 104*   < > 101*   ALBUMIN  --   --   --   --   --  2.6*   PROTTOTAL  --   --   --   --   --  6.7*   BILITOTAL  --   --   --   --   --  0.3   ALKPHOS  --   --   --   --   --  204*   ALT  --   --   --   --   --  15   AST  --   --   --   --   --  14   LIPASE  --   --   --   --   --  42*    < > = values in this interval not displayed.     No results found for this or any previous visit (from the past 24 hour(s)).

## 2019-05-24 NOTE — PLAN OF CARE
[employer]   [05/24/19 1419 Areli Art, RN - Registered Nurse]   DATE & TIME: 5/24 7-3pm  ORIENTATION: A&Ox4, calm, cooperative   BEHAVIOR & AGGRESSION TOOL COLOR: Green  CIWA SCORE: n/a  ABNL VS/O2: Tachycardia, otherwise VSS on RA.  MOBILITY: Total - pt is a paraplegic turn and repo q2h  PAIN MANAGMENT: abdominal pain due to urostomy not draining, urology involved & coming to see her  DIET: DD2 with thin liquids + TF   BOWEL/BLADDER: Urostomy and colostomy   ABNL LAB/BG: Plt 85, K+4.5, PHos 1.9/replaced & recheck at 6pm & in am  DRAIN/DEVICES: Urostomy, colostomy, NG tube for TF, PIV to R lower forearm  TELEMETRY RHYTHM: n/a  SKIN: Wound to coccyx, mepilex in place. Foam drsg on R forearm. Mepilex on R shin, L foot, R groin  TESTS/PROCEDURES: n/a  D/C DAY/GOALS/PLACE: Pending   OTHER IMPORTANT INFO: TF running at 75 mL/hr, with 60 mL free water flushes q2h. Pt on contact precautions for MRSA. R PIV TKO at 10 mL/hr. Irrigated ileal conduit with 50 ml NS, but not draining/urology consulted. No nausea, some pain but did not give anything for it.
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
## 2019-05-24 NOTE — PLAN OF CARE
SLP: ST meal follow up attempted. Pt pleasantly refused any PO at this time due to abdominal pain and nausea. Discussed SLP POC. Pt reported that she enjoys and agrees with DD2 diet until family is able to bring in her dentures. Pt agreeable to SLP meal follow up when she feels better.

## 2019-05-25 ENCOUNTER — APPOINTMENT (OUTPATIENT)
Dept: SPEECH THERAPY | Facility: CLINIC | Age: 54
DRG: 100 | End: 2019-05-25
Attending: INTERNAL MEDICINE
Payer: COMMERCIAL

## 2019-05-25 LAB
ERYTHROCYTE [DISTWIDTH] IN BLOOD BY AUTOMATED COUNT: 22 % (ref 10–15)
GLUCOSE BLDC GLUCOMTR-MCNC: 95 MG/DL (ref 70–99)
HCT VFR BLD AUTO: 28.5 % (ref 35–47)
HGB BLD-MCNC: 9.3 G/DL (ref 11.7–15.7)
MCH RBC QN AUTO: 27.4 PG (ref 26.5–33)
MCHC RBC AUTO-ENTMCNC: 32.6 G/DL (ref 31.5–36.5)
MCV RBC AUTO: 84 FL (ref 78–100)
PHOSPHATE SERPL-MCNC: 2.4 MG/DL (ref 2.5–4.5)
PLATELET # BLD AUTO: 112 10E9/L (ref 150–450)
RBC # BLD AUTO: 3.39 10E12/L (ref 3.8–5.2)
WBC # BLD AUTO: 7.2 10E9/L (ref 4–11)

## 2019-05-25 PROCEDURE — 85027 COMPLETE CBC AUTOMATED: CPT | Performed by: HOSPITALIST

## 2019-05-25 PROCEDURE — 99233 SBSQ HOSP IP/OBS HIGH 50: CPT | Performed by: INTERNAL MEDICINE

## 2019-05-25 PROCEDURE — 12000000 ZZH R&B MED SURG/OB

## 2019-05-25 PROCEDURE — 84100 ASSAY OF PHOSPHORUS: CPT | Performed by: HOSPITALIST

## 2019-05-25 PROCEDURE — 25000132 ZZH RX MED GY IP 250 OP 250 PS 637: Performed by: HOSPITALIST

## 2019-05-25 PROCEDURE — 36415 COLL VENOUS BLD VENIPUNCTURE: CPT | Performed by: HOSPITALIST

## 2019-05-25 PROCEDURE — 25000125 ZZHC RX 250: Performed by: HOSPITALIST

## 2019-05-25 PROCEDURE — 25000132 ZZH RX MED GY IP 250 OP 250 PS 637: Performed by: INTERNAL MEDICINE

## 2019-05-25 PROCEDURE — 25800030 ZZH RX IP 258 OP 636: Performed by: HOSPITALIST

## 2019-05-25 PROCEDURE — 25000128 H RX IP 250 OP 636: Performed by: HOSPITALIST

## 2019-05-25 PROCEDURE — 00000146 ZZHCL STATISTIC GLUCOSE BY METER IP

## 2019-05-25 PROCEDURE — 92526 ORAL FUNCTION THERAPY: CPT | Mod: GN

## 2019-05-25 RX ORDER — MORPHINE SULFATE 10 MG/5ML
5 SOLUTION ORAL EVERY 6 HOURS PRN
Status: DISCONTINUED | OUTPATIENT
Start: 2019-05-25 | End: 2019-05-26 | Stop reason: ALTCHOICE

## 2019-05-25 RX ORDER — FUROSEMIDE 20 MG
20 TABLET ORAL DAILY
Status: DISCONTINUED | OUTPATIENT
Start: 2019-05-25 | End: 2019-05-27 | Stop reason: HOSPADM

## 2019-05-25 RX ORDER — ONDANSETRON 2 MG/ML
4 INJECTION INTRAMUSCULAR; INTRAVENOUS EVERY 4 HOURS PRN
Status: DISCONTINUED | OUTPATIENT
Start: 2019-05-25 | End: 2019-05-27 | Stop reason: HOSPADM

## 2019-05-25 RX ORDER — OXYCODONE HCL 5 MG/5 ML
2.5-5 SOLUTION, ORAL ORAL EVERY 6 HOURS PRN
Status: DISCONTINUED | OUTPATIENT
Start: 2019-05-25 | End: 2019-05-27 | Stop reason: HOSPADM

## 2019-05-25 RX ADMIN — LEVETIRACETAM 1000 MG: 500 TABLET, FILM COATED ORAL at 10:10

## 2019-05-25 RX ADMIN — LEVETIRACETAM 1000 MG: 500 TABLET, FILM COATED ORAL at 20:41

## 2019-05-25 RX ADMIN — Medication 7 ML: at 10:10

## 2019-05-25 RX ADMIN — METHADONE HYDROCHLORIDE 2.5 MG: 10 CONCENTRATE ORAL at 14:27

## 2019-05-25 RX ADMIN — TRAZODONE HYDROCHLORIDE 50 MG: 50 TABLET ORAL at 21:54

## 2019-05-25 RX ADMIN — METHADONE HYDROCHLORIDE 2.5 MG: 10 CONCENTRATE ORAL at 01:13

## 2019-05-25 RX ADMIN — OXYBUTYNIN CHLORIDE 2.5 MG: 5 TABLET ORAL at 10:10

## 2019-05-25 RX ADMIN — ONDANSETRON 4 MG: 2 INJECTION INTRAMUSCULAR; INTRAVENOUS at 04:41

## 2019-05-25 RX ADMIN — OXYBUTYNIN CHLORIDE 2.5 MG: 5 TABLET ORAL at 20:41

## 2019-05-25 RX ADMIN — MULTIVITAMIN 15 ML: LIQUID ORAL at 10:10

## 2019-05-25 RX ADMIN — FUROSEMIDE 20 MG: 20 TABLET ORAL at 20:41

## 2019-05-25 RX ADMIN — ZOLPIDEM TARTRATE 10 MG: 5 TABLET, FILM COATED ORAL at 21:54

## 2019-05-25 RX ADMIN — POTASSIUM PHOSPHATE, MONOBASIC AND POTASSIUM PHOSPHATE, DIBASIC 15 MMOL: 224; 236 INJECTION, SOLUTION INTRAVENOUS at 21:44

## 2019-05-25 RX ADMIN — OXYCODONE HYDROCHLORIDE 5 MG: 5 SOLUTION ORAL at 08:47

## 2019-05-25 ASSESSMENT — ACTIVITIES OF DAILY LIVING (ADL)
ADLS_ACUITY_SCORE: 24
ADLS_ACUITY_SCORE: 24
ADLS_ACUITY_SCORE: 29
ADLS_ACUITY_SCORE: 24
ADLS_ACUITY_SCORE: 25
ADLS_ACUITY_SCORE: 29

## 2019-05-25 ASSESSMENT — MIFFLIN-ST. JEOR: SCORE: 1341.15

## 2019-05-25 NOTE — PLAN OF CARE
DATE & TIME: 5/25/19 day shift   ORIENTATION: alert and oriented x4   BEHAVIOR & AGGRESSION TOOL COLOR: GREEN  CIWA SCORE: NA   ABNL VS/O2:   MOBILITY: WC bound, uses lift, total care, turned and repositioned every 2 hours  PAIN MANAGMENT: oxycodone 5mg PO solution as needed  DIET:  Regular- DD2 with thin liquids  BOWEL/BLADDER: urostomy-great output. Must irrigate every shift. Colostomy bag, minimal output.  ABNL LAB/BG: Phos 2.4, will get replaced.  DRAIN/DEVICES: Urostomy attached to mcintyre bag, colostomy bag, and IV SL on L arm  TELEMETRY RHYTHM: NA  SKIN: edema +2/3, pale, wounds  TESTS/PROCEDURES: none ordered  D/C DAY/GOALS/PLACE: If tolerated lunch, NG can be pulled. Neurology following. Probably can discharge in 1-2 days once tolerating diet and neurology medications finalized.   OTHER IMPORTANT INFO: Patient complained of abdominal pain this AM, irrigated urostomy/mcintyre and over 2L of urine was drained.. Patient felt much better and had no more complains.

## 2019-05-25 NOTE — PROGRESS NOTES
"Urology Brief Follow-up Note:  Dx:  Neurogenic bladder, hx cystectomy and continent urinary diversion.      Subjective:  Having some occasional mucous plugging of the catheter.  Does clear with irrigation. Having goor urine output.  Urine clear.          Objective:    /61 (BP Location: Left arm)   Pulse 117   Temp 98.5  F (36.9  C) (Oral)   Resp 16   Ht 1.753 m (5' 9\")   Wt 67.7 kg (149 lb 3.2 oz)   LMP  (LMP Unknown)   SpO2 98%   BMI 22.03 kg/m        Intake/Output Summary (Last 24 hours) at 5/25/2019 1649  Last data filed at 5/25/2019 1340  Gross per 24 hour   Intake 6354 ml   Output 3650 ml   Net 2704 ml       Labs:    ROUTINE IP LABS (Last four results)  BMP  Recent Labs   Lab 05/24/19  0922 05/23/19  0515 05/22/19  0511 05/21/19  1810    143 149* 152*   POTASSIUM 4.5 4.0 3.6 3.7   CHLORIDE 111* 114* 122* 125*   JOSH 8.0* 7.8* 7.6* 7.7*   CO2 23 23 20 20   BUN 17 7 5* 4*   CR 0.34* 0.25* 0.29* 0.31*   * 109* 114* 104*     CBC  Recent Labs   Lab 05/25/19  0945 05/24/19  0922 05/23/19  0515 05/22/19  0511 05/21/19  1810   WBC 7.2  --  3.3* 3.5* 4.1   RBC 3.39*  --  3.43* 3.45* 3.49*   HGB 9.3* 10.4* 9.4* 9.4* 9.5*   HCT 28.5*  --  28.8* 29.2* 29.5*   MCV 84  --  84 85 85   MCH 27.4  --  27.4 27.2 27.2   MCHC 32.6  --  32.6 32.2 32.2   RDW 22.0*  --  21.5* 22.1* 22.4*   * 130* 85* 88* 121*     INR  Recent Labs   Lab 05/18/19  2150   INR 1.42*         Exam:    Gen: awake and alert  Ab:soft, catheter in her stoma  : as above  Ext/Neuro:  Stable deficits from SCI, no seizure activity.        Assessment/Plan:     1.  Continent urinary diversion, mucous plugging    2.  Continue q shift and PRN irrigation for clogging          Russ Cristobal MD  [facility]      "

## 2019-05-25 NOTE — PROGRESS NOTES
DATE & TIME: 2300-0730 5/25/19    ORIENTATION: A/Ox4  BEHAVIOR & AGGRESSION TOOL COLOR: GREEN  CIWA SCORE:  ABNL VS/O2: tachycardic. T 99.5  MOBILITY: Ax2; paraplegic; T/R   PAIN MANAGMENT: denies   DIET: DD2; thins. NG tube feedings   BOWEL/BLADDER: urostomy and colostomy   ABNL LAB/BG:   DRAIN/DEVICES: D5LR @10  TELEMETRY RHYTHM:   SKIN: wounds on coccyx; rt shin; ft foot; lf foot.   TESTS/PROCEDURES:   D/C DAY/GOALS/PLACE: Discharge in 2-3 days   OTHER IMPORTANT INFO:

## 2019-05-25 NOTE — PLAN OF CARE
DATE & TIME: 5/24 3598-7926  ORIENTATION: A&Ox4, calm, cooperative   BEHAVIOR & AGGRESSION TOOL COLOR: Green  CIWA SCORE: n/a  ABNL VS/O2: VSS except elevated -110 - sepsis protocol fired - lactic negative  MOBILITY: Total - pt is a paraplegic turn and repo q2h  PAIN MANAGMENT: oxycodone given x1 for generalized pain and HA  DIET: DD2 with thin liquids + TF   BOWEL/BLADDER: Urostomy and colostomy   ABNL LAB/BG: phos replaced , recheck 2000:  DRAIN/DEVICES: Urostomy, colostomy, PIV to TKO, NG tube for TF  TELEMETRY RHYTHM: n/a  SKIN: Wound to coccyx, mepilex in place CDI. Foam drsg on R forearm. Mepilex on R shin, L foot, R groin  TESTS/PROCEDURES: n/a  D/C DAY/GOALS/PLACE: Pending   OTHER IMPORTANT INFO: TF running at 75 mL/hr, with 60 mL free water flushes q2h. Urostomy was irrigated with 100cc normal saline. Pt is on contact precautions for MRSA.

## 2019-05-25 NOTE — PROGRESS NOTES
[facility]    Medicine Progress Note - Hospitalist Service       Date of Admission:  5/18/2019  Assessment & Plan   Felicia is a pleasant 53-year-old woman with a past medical history of paraplegia secondary to spinal cord injury, neurogenic bladder secondary to spinal cord injury, malnutrition, gastroesophageal reflux disease, osteoarthritis, malnutrition, and history of chronic pain, who was admitted to the ICU on May 18, 2019 in status epilepticus, intubated and sedated.  She was treated with IV Keppra, extubated and transferred out of the ICU on 05/22/2019.   Current problems include:    Status epilepticus  Initially admitted in [place] and started on Keppra.  She required sedation and intubation for persistent seizures.  She was transferred to [facility] and underwent EEG monitoring and MRI (negative).  She stabilized on IV Keppra and was extubated 5/21.  No further seizures and had been awake and alert.  - continue PO Keppra and seizure precautions    Acute respiratory failure requiring mechanical ventilation; resolved.    Nausea; much improved.    Edema, lower extremities and Right arm, all likely dependent and somewhat chronic, per Felicia.  - resume PTA lasix; discontinue IVF    Acute dysphagia; likely improving.   - continue NDD2 diet.  NG was removed.    Hypernatremia; resolved.  - discontinue IVF    Sodium   Date Value Ref Range Status   05/24/2019 141 133 - 144 mmol/L Final     Hypophosphatemia; improving.  - replace per protocol  - RD to reassess    Acute on chronic anemia; hemoglobin sl. Lower.  - recheck CBC 5/26    Hemoglobin   Date Value Ref Range Status   05/25/2019 9.3 (L) 11.7 - 15.7 g/dL Final     Acute thrombocytopenia; lower today.  Enoxaparin was stopped (?) 5/23.  - review PF4 antibody results tomorrow, if available    Platelet Count   Date Value Ref Range Status   05/25/2019 112 (L) 150 - 450 10e9/L Final     Paraplegia secondary to spinal cord injury; stable.    Neurogenic bowel  and bladder status post colostomy and urostomy; nausea and urine retention improved today.  - appreciate f/u by Urology    Multiple skin pressure wounds present on admission  - continue topical care per WOC team    Chronic pain; stable.  - continue methadone; have decreased frequency of PRN morphine and oxyCodone.     Insomnia  - continue trazodone and zolpidem p.r.n.       Diet: Combination Diet Dysphagia Diet Level 2: Mechan Altered; Thin Liquids (water, ice chips, juice, milk gelatin, ice cream, etc)    DVT Prophylaxis: pneumatic compression device   Ambrose Catheter: not present  Code Status: Full Code      Disposition Plan   Expected discharge: 2-3 days, recommended to transitional care unit once neurologic status is stable and the patient is able to tolerate a diet.  Will review options w/ LSW/ 5/26.    Entered: MIKE Huynh MD 05/25/2019, 11:46 PM      The patient's care was discussed with the patient and her nurse    Omar Huynh MD  Hospitalist Service  [facility]    ______________________________________________________________________    Interval History   Doing well today; nausea earlier has now resolved; NG tube was removed this afternoon.  Appetite for dinner good.  Asking to have her lasix re-started for her leg edema.  No abdom. Pain at present.  Had urinary retention ealier: RN flushed urostomy tube for 2000 ml output.    Data reviewed today: I reviewed all medications, new labs and imaging results over the last 24 hours. I personally reviewed no images or EKG's today.    Physical Exam   Vital Signs: Temp: 98.5  F (36.9  C) Temp src: Oral BP: 110/61   Heart Rate: 109 Resp: 16 SpO2: 98 % O2 Device: None (Room air)    Weight: 149 lbs 3.2 oz  Constitutional: awake, alert, cooperative, no apparent distress; sitting up in chair  HEENT: sclerae clear; edentulous. EOMI. MM's moist  Respiratory: good air exchange bilaterally; decreased at bases. No wheezing or  rhonchi  Cardiovascular:  regular rate and rhythm, normal S1 and S2, no S3 or S4, and no murmur noted  GI: abd. Flat; + BS. Ilial conduit stoma/catheter covered by dressing.  Colostomy bag intact. NT/ND  Skin: no rashes and no jaundice  Neurologic: Awake, conversant; no focal deficits      Data   Recent Labs   Lab 05/25/19  0945 05/24/19  0922 05/23/19  0515 05/22/19  0511   WBC 7.2  --  3.3* 3.5*   HGB 9.3* 10.4* 9.4* 9.4*   MCV 84  --  84 85   * 130* 85* 88*   NA  --  141 143 149*   POTASSIUM  --  4.5 4.0 3.6   CHLORIDE  --  111* 114* 122*   CO2  --  23 23 20   BUN  --  17 7 5*   CR  --  0.34* 0.25* 0.29*   ANIONGAP  --  7 6 7   JOSH  --  8.0* 7.8* 7.6*   GLC  --  120* 109* 114*

## 2019-05-25 NOTE — PLAN OF CARE
Discharge Planner SLP   Patient plan for discharge: Not stated this date.   Current status: Swallow Tx provided this date. Pt tolerated po trials of semi-solids and thin liuids by cup with no overt s/s of aspiration. Pt verbalized increased appetite due to reduction in nausea. Pt reported continued throat pain/soreness and agreed with continuation of current diet. Recommend continuation of DD2 semi-solids with thin liquids; straws ok; when pt sitting upright, alternate liquids/solids, small bites/sips. ST will follow to advance diet as indicated in alignment with access to dentures.   Barriers to return to prior living situation: Below baseline  Recommendations for discharge: Home with 24 hr supervision from PCA and daughter per PT/OT recommendations   Rationale for recommendations: Skilled ST intervention at next level of care for management of dysphagia.        Entered by: Joelle Barrera 05/25/2019 12:17 PM

## 2019-05-26 ENCOUNTER — APPOINTMENT (OUTPATIENT)
Dept: SPEECH THERAPY | Facility: CLINIC | Age: 54
DRG: 100 | End: 2019-05-26
Attending: INTERNAL MEDICINE
Payer: COMMERCIAL

## 2019-05-26 LAB
BACTERIA SPEC CULT: ABNORMAL
BACTERIA SPEC CULT: ABNORMAL
ERYTHROCYTE [DISTWIDTH] IN BLOOD BY AUTOMATED COUNT: 22.1 % (ref 10–15)
HCT VFR BLD AUTO: 29.1 % (ref 35–47)
HGB BLD-MCNC: 9.5 G/DL (ref 11.7–15.7)
Lab: ABNORMAL
MAGNESIUM SERPL-MCNC: 1.8 MG/DL (ref 1.6–2.3)
MCH RBC QN AUTO: 27.8 PG (ref 26.5–33)
MCHC RBC AUTO-ENTMCNC: 32.6 G/DL (ref 31.5–36.5)
MCV RBC AUTO: 85 FL (ref 78–100)
PHOSPHATE SERPL-MCNC: 3.8 MG/DL (ref 2.5–4.5)
PLATELET # BLD AUTO: 99 10E9/L (ref 150–450)
POTASSIUM SERPL-SCNC: 3.8 MMOL/L (ref 3.4–5.3)
RBC # BLD AUTO: 3.42 10E12/L (ref 3.8–5.2)
SPECIMEN SOURCE: ABNORMAL
WBC # BLD AUTO: 5 10E9/L (ref 4–11)

## 2019-05-26 PROCEDURE — 99207 ZZC CDG-MDM COMPONENT: MEETS LOW - DOWN CODED: CPT | Performed by: INTERNAL MEDICINE

## 2019-05-26 PROCEDURE — 25000132 ZZH RX MED GY IP 250 OP 250 PS 637: Performed by: HOSPITALIST

## 2019-05-26 PROCEDURE — 83735 ASSAY OF MAGNESIUM: CPT | Performed by: INTERNAL MEDICINE

## 2019-05-26 PROCEDURE — 12000000 ZZH R&B MED SURG/OB

## 2019-05-26 PROCEDURE — 25000132 ZZH RX MED GY IP 250 OP 250 PS 637: Performed by: INTERNAL MEDICINE

## 2019-05-26 PROCEDURE — 84100 ASSAY OF PHOSPHORUS: CPT | Performed by: INTERNAL MEDICINE

## 2019-05-26 PROCEDURE — 99232 SBSQ HOSP IP/OBS MODERATE 35: CPT | Performed by: INTERNAL MEDICINE

## 2019-05-26 PROCEDURE — 36415 COLL VENOUS BLD VENIPUNCTURE: CPT | Performed by: INTERNAL MEDICINE

## 2019-05-26 PROCEDURE — 85027 COMPLETE CBC AUTOMATED: CPT | Performed by: INTERNAL MEDICINE

## 2019-05-26 PROCEDURE — 92526 ORAL FUNCTION THERAPY: CPT | Mod: GN

## 2019-05-26 PROCEDURE — 84132 ASSAY OF SERUM POTASSIUM: CPT | Performed by: INTERNAL MEDICINE

## 2019-05-26 RX ORDER — MULTIPLE VITAMINS W/ MINERALS TAB 9MG-400MCG
1 TAB ORAL DAILY
Status: DISCONTINUED | OUTPATIENT
Start: 2019-05-26 | End: 2019-05-27 | Stop reason: HOSPADM

## 2019-05-26 RX ORDER — POTASSIUM CL/LIDO/0.9 % NACL 10MEQ/0.1L
10 INTRAVENOUS SOLUTION, PIGGYBACK (ML) INTRAVENOUS
Status: DISCONTINUED | OUTPATIENT
Start: 2019-05-26 | End: 2019-05-27 | Stop reason: HOSPADM

## 2019-05-26 RX ORDER — MAGNESIUM SULFATE HEPTAHYDRATE 40 MG/ML
4 INJECTION, SOLUTION INTRAVENOUS EVERY 4 HOURS PRN
Status: DISCONTINUED | OUTPATIENT
Start: 2019-05-26 | End: 2019-05-27 | Stop reason: HOSPADM

## 2019-05-26 RX ORDER — POTASSIUM CHLORIDE 29.8 MG/ML
20 INJECTION INTRAVENOUS
Status: DISCONTINUED | OUTPATIENT
Start: 2019-05-26 | End: 2019-05-27 | Stop reason: HOSPADM

## 2019-05-26 RX ORDER — POTASSIUM CHLORIDE 7.45 MG/ML
10 INJECTION INTRAVENOUS
Status: DISCONTINUED | OUTPATIENT
Start: 2019-05-26 | End: 2019-05-27 | Stop reason: HOSPADM

## 2019-05-26 RX ORDER — SULFAMETHOXAZOLE/TRIMETHOPRIM 800-160 MG
1 TABLET ORAL 2 TIMES DAILY
Qty: 30 TABLET | Refills: 1 | Status: ON HOLD | OUTPATIENT
Start: 2019-05-26 | End: 2020-01-04

## 2019-05-26 RX ORDER — POTASSIUM CHLORIDE 1500 MG/1
20-40 TABLET, EXTENDED RELEASE ORAL
Status: DISCONTINUED | OUTPATIENT
Start: 2019-05-26 | End: 2019-05-27 | Stop reason: HOSPADM

## 2019-05-26 RX ORDER — POTASSIUM CHLORIDE 1.5 G/1.58G
20-40 POWDER, FOR SOLUTION ORAL
Status: DISCONTINUED | OUTPATIENT
Start: 2019-05-26 | End: 2019-05-27 | Stop reason: HOSPADM

## 2019-05-26 RX ADMIN — FUROSEMIDE 20 MG: 20 TABLET ORAL at 08:49

## 2019-05-26 RX ADMIN — LEVETIRACETAM 1000 MG: 500 TABLET, FILM COATED ORAL at 08:49

## 2019-05-26 RX ADMIN — ZINC SULFATE CAP 220 MG (50 MG ELEMENTAL ZN) 220 MG: 220 (50 ZN) CAP at 08:49

## 2019-05-26 RX ADMIN — METHADONE HYDROCHLORIDE 2.5 MG: 10 CONCENTRATE ORAL at 14:18

## 2019-05-26 RX ADMIN — ZOLPIDEM TARTRATE 10 MG: 5 TABLET, FILM COATED ORAL at 22:01

## 2019-05-26 RX ADMIN — TRAZODONE HYDROCHLORIDE 50 MG: 50 TABLET ORAL at 22:01

## 2019-05-26 RX ADMIN — OXYBUTYNIN CHLORIDE 2.5 MG: 5 TABLET ORAL at 08:49

## 2019-05-26 RX ADMIN — MULTIPLE VITAMINS W/ MINERALS TAB 1 TABLET: TAB at 14:17

## 2019-05-26 RX ADMIN — OXYBUTYNIN CHLORIDE 2.5 MG: 5 TABLET ORAL at 22:01

## 2019-05-26 RX ADMIN — LEVETIRACETAM 1000 MG: 500 TABLET, FILM COATED ORAL at 22:01

## 2019-05-26 ASSESSMENT — ACTIVITIES OF DAILY LIVING (ADL)
ADLS_ACUITY_SCORE: 24
ADLS_ACUITY_SCORE: 27
ADLS_ACUITY_SCORE: 25
ADLS_ACUITY_SCORE: 23

## 2019-05-26 NOTE — PROGRESS NOTES
DATE & TIME: 2300-0730 5/26/19    ORIENTATION: A/Ox4   BEHAVIOR & AGGRESSION TOOL COLOR: GREEN  CIWA SCORE:    ABNL VS/O2: VSS. RA  MOBILITY: Ax2; paraplegic   PAIN MANAGMENT: denies   DIET: DD2; thins   BOWEL/BLADDER: ileostomy intact, output 0; urostomy intact, output 1300ml; irrigated 100 ml, ouput 150 ml. Denies nausea/vomiting/abdominal pain   ABNL LAB/BG: Ph 2.4. K 4.5. Mg 2.1.   DRAIN/DEVICES: saline locked   TELEMETRY RHYTHM:   SKIN: wounds: coccyx, ft foot, lf foot; CDI. Pulsate mattress in place.   TESTS/PROCEDURES:   D/C DAY/GOALS/PLACE: Discharge possible today or tomorrow; SW/PT/OT/ST consults today  OTHER IMPORTANT INFO:

## 2019-05-26 NOTE — PROGRESS NOTES
SW:  D:Per  Dr Huynh patient request to discharge today.  Her daughter is her primary care giver.   Writer spoke with daughter Arlette.    Daughter confirms patient has 24/7 care provided by herself, a private care giver and 17 year old grandtr.  She shares she works tonight and would feel better if she is initially at home when patient arrives to make sure patient is settled in before other caregivers take over their shifts.  Writer updated Dr Huynh.  Writer is asking MD to order resumption of home.  Per daughter home care is thru [facility].  [facility] will transport via w/c at 1300 tomorrow.

## 2019-05-26 NOTE — CONSULTS
"CLINICAL NUTRITION SERVICES - REASSESSMENT NOTE      Malnutrition: (5/20):   % Weight Loss:  Up to 5% in 1 month (non-severe malnutrition)  % Intake:  Unable to determine without recent nutrition history  Subcutaneous Fat Loss:  Orbital region mild depletion and Upper arm region mild depletion  Muscle Loss:  Temporal region moderate depletion and Clavicle bone region moderate depletion  Fluid Retention:  Mild      Malnutrition Diagnosis: Severe malnutrition  In Context of:  Acute illness or injury  Chronic illness or disease        EVALUATION OF PROGRESS TOWARD GOALS   Diet:  Regular diet with thin liquids     Nutrition Support:  TF was discontinued yesterday     Intake/Tolerance:  Visited with patient this afternoon.  She states that her appetite is good, \"it just takes me a long time to eat\".  She was able to eat 100% of the Cream of Wheat, yogurt, and whole milk this morning.  She is currently working on her lunch and has been able to eat 50% of a pizza and all of the sarita food cake so far.  She is also drinking whole milk.  Her abdominal pain is improved - \"it was my bladder -- it was full of phlegm and mucous\".      Asked to eval patient today per Provider Order --> Please f/u re. hypophosphatemia...any rec's prior to discharge?  Thanks.      NEW FINDINGS:   Noted Phos today 3.8 (NL) - Phos levels were running low when TF was advancing towards goal (refeeding risk), no recommendations on this today as level was normal     Patient is receiving the following per Pressure Injury Protocol -->     Per PI Protocol: (5/20-5/30)  Tri-Vi-Sol 7 mL daily x 10 days = 5250 IU Vit A, 245 mg Vit C, 2800 IU Vit D.  Zinc Sulfate 220 mg every other day x 10 days (avg 25 mg Elemental Zinc per day)  Certavtie daily     She has been refusing some on occasion  Tells me she is discharging tomorrow     Previous Goals (5/24):   TF will meet 100% nutritional needs  Evaluation: Not met, TF is off     Previous Nutrition Diagnosis (5/24): "   Predicted inadequate (oral) intake related to abdominal pain and nausea  Evaluation: Resolved       CURRENT NUTRITION DIAGNOSIS  Malnutrition related to weight loss with subsequent fat/muscle loss as evidenced by diagnosis of severe malnutrition     INTERVENTIONS  Recommendations / Nutrition Prescription  Continue regular diet with thin liquids   Continue Pressure Injury Protocol until discharge     Implementation  None     Goals  Patient will continue to consume > 75% meals    MONITORING AND EVALUATION:  Progress towards goals will be monitored and evaluated per protocol and Practice Guidelines    Susanna Tao RD, LD, CNSC   Clinical Dietitian - [facility]

## 2019-05-26 NOTE — PLAN OF CARE
DATE & TIME: 5/25/19, evening shift   ORIENTATION: alert and oriented x4   BEHAVIOR & AGGRESSION TOOL COLOR: GREEN  CIWA SCORE: NA   ABNL VS/O2: , other VS WDL  MOBILITY: lift, paraplegia  PAIN MANAGMENT: none needed  DIET: DD2 with thins, regular  BOWEL/BLADDER: urostomy attached to mcintyre bag; colostomy bag  ABNL LAB/BG: Phos 2.4, being replaced. Recheck in AM.  DRAIN/DEVICES: IV on L arm. Mcintyre bag; colostomy baf  TELEMETRY RHYTHM: NA  SKIN: very bruised; wounds as charted  TESTS/PROCEDURES: none  D/C DAY/GOALS/PLACE: PT/OT/ST and SW consulted for tomorrow. TCU vs home discharge.   OTHER IMPORTANT INFO: Urostomy irrigated with 100 ml sterile water, lots of mucus. Total UO was over 1L. Patient has no complains of abdominal pain, no nausea, vomiting. NG taken out. Pulsate mattress ordered and installed for skin prevention.

## 2019-05-26 NOTE — PLAN OF CARE
Discharge Planner SLP   Patient plan for discharge: home  Current status: Swallow tx completed. RN reported pt wishing to have regular diet. Pt had NGTF removed prior day, pt reports no more throat pain since removal and that eating/drinking is easier/no more globus sensation. Pt seen with thin liquids, general solids. Dentures not present but mastication complete with mild increased time. She reports only sometimes eating with dentures in at home but that she can eat pizza, fajitas without them in. No overt signs/sx aspiration noted. Pt judged appropriate for upgrades to regular solids/thin liquid with general safe swallow precautions.   Barriers to return to prior living situation: no SLP barriers  Recommendations for discharge: home  Rationale for recommendations: plan to follow up x1-2 more sessions during IP stay, if clinically tolerating her baseline diet will discharge; anticipate SLP goals will be met and pt will not require any follow up.        Entered by: Christina Devries 05/26/2019 10:55 AM

## 2019-05-26 NOTE — PLAN OF CARE
DATE & TIME: 5/26/19, day shift   ORIENTATION: alert and oriented x4   BEHAVIOR & AGGRESSION TOOL COLOR: GREEN  CIWA SCORE: NA   ABNL VS/O2: , other VS on RA  MOBILITY: lift, refuses to get out of bed into chair for meals  PAIN MANAGMENT: none needed, scheduled methadone  DIET: Regular  BOWEL/BLADDER: urostomy draining well; ileostomy good output (bag changed)  ABNL LAB/BG: WDL  DRAIN/DEVICES: mcintyre bag attached to urostomy  TELEMETRY RHYTHM: NA  SKIN: bruised, paled, wounds  TESTS/PROCEDURES: none  D/C DAY/GOALS/PLACE: Patient very adamant on leaving today, MD aware. SW/RN looking at scheduling a ride. Patient states she is ok to stay if no ride available.   OTHER IMPORTANT INFO: Irrigated urostomy x1 with 100cc, mucous clots seen.

## 2019-05-26 NOTE — PLAN OF CARE
"Discharge Planner PT   Patient plan for discharge: Home with 24 hour assist  Current status: New orders acknowledged. Per chart review, the pt is non-ambulatory, has assist for all transfers via \"lift\" from her daughter or PCA. The pt has services 24/7.  PT to complete order as pt demonstrates no acute skilled PT needs at this time.  Barriers to return to prior living situation: None  Recommendations for discharge: Per OT note, return home with resumption of 24 hour assist  Rationale for recommendations: No skilled PT needs, PT order completed.        Entered by: Tala Jesus 05/26/2019 10:18 AM      "

## 2019-05-26 NOTE — PROGRESS NOTES
[facility]    Medicine Progress Note - Hospitalist Service       Date of Admission:  5/18/2019  Assessment & Plan   Felicia is a pleasant 53-year-old woman with a past medical history of paraplegia secondary to spinal cord injury, neurogenic bladder secondary to spinal cord injury, malnutrition, gastroesophageal reflux disease, osteoarthritis, malnutrition, and history of chronic pain, who was admitted to the ICU on May 18, 2019 in status epilepticus, intubated and sedated.  She was treated with IV Keppra, extubated and transferred out of the ICU on 05/22/2019.   Current problems include:    Status epilepticus; resolved.  Initially admitted in [place] and started on Keppra; required sedation and intubation for persistent seizures.  Was transferred to [facility] and underwent EEG monitoring and MRI (negative).  She stabilized on IV Keppra and was extubated 5/21.  No further seizures and now back to baseline cognitive status.  - continue PO Keppra and seizure precautions  - f/u w/ Neurology 4-6 weeks to determine duration of Keppra    Acute respiratory failure requiring mechanical ventilation; resolved.    Nausea; much improved.    Edema, lower extremities and Right arm, all likely dependent and somewhat chronic, per Felicia.  Improved.  - continue PTA lasix.    Acute dysphagia; likely improving.   - continue NDD2 diet.  NG was removed.    Hypernatremia; resolved.  - discontinue IVF    Sodium   Date Value Ref Range Status   05/24/2019 141 133 - 144 mmol/L Final     Hypophosphatemia; improved.  - replaced per protocol    Acute on chronic anemia; hemoglobin stable.  - f/u w/ PCP after discharge.    Hemoglobin   Date Value Ref Range Status   05/26/2019 9.5 (L) 11.7 - 15.7 g/dL Final     Acute on chronic/recurrent thrombocytopenia; stable.  Enoxaparin was stopped (?) 5/23.  - PF4 antibody results not available in EHR.     Platelet Count   Date Value Ref Range Status   05/26/2019 99 (L) 150 - 450 10e9/L Final      Paraplegia secondary to spinal cord injury; stable.    Neurogenic bowel and bladder status post colostomy and urostomy; nausea and urine retention improved today.  - appreciate Urology f/u; discontinue catheter prior to discharge (Felicia will then resume self-cathing at home)    Recent urine Cx abnormalities; has grown multiple organisms in last 2 cultures and was treated w/ several days of ceftriaxone earlier this admission.  No Sx at present.  - will hold on further ABx at this time    Multiple skin pressure wounds present on admission; stable.  - continue topical care per WO team    Chronic pain; stable.  - continue methadone; have decreased frequency of PRN morphine and oxyCodone.     Insomnia  - continue trazodone and zolpidem p.r.n.       Diet: Combination Diet Regular Diet Adult    DVT Prophylaxis: pneumatic compression device   Ambrose Catheter: not present  Code Status: Full Code      Disposition Plan   Expected discharge: 5/27 to her daughter's home [place] [facility] working on logistics for tomorrow; I reviewed options w/ her today, 5/26.    Entered: MIKE Huynh MD 05/26/2019, 3:22 PM     The patient's care was discussed with the patient and her nurse.        Omar Huynh MD  Hospitalist Service  [facility]    ______________________________________________________________________    Interval History   No new issues today; doing well; no further nausea.  Appetite remains good.  No abdominal pain.  No F/C/SOB; no chest Sx. No new leg/arm Sx.    Data reviewed today: I reviewed all medications, new labs and imaging results over the last 24 hours. I personally reviewed no images or EKG's today.    Physical Exam   Vital Signs: Temp: 98.3  F (36.8  C) Temp src: Oral BP: 108/59   Heart Rate: 105 Resp: 16 SpO2: 99 % O2 Device: None (Room air)    Weight: 149 lbs 3.2 oz  Constitutional: awake, alert, cooperative, no apparent distress; sitting up in chair  HEENT: sclerae clear;  edentulous. EOMI. MM's moist  Respiratory: good air exchange bilaterally; decreased at bases. No wheezing or rhonchi  Cardiovascular:  regular rate and rhythm, normal S1 and S2, no S3 or S4, and no murmur noted  GI: abd. Flat; + BS. Ilial conduit stoma/catheter covered by dressing.  Colostomy bag intact. NT/ND  Skin: no rashes and no jaundice  Extremities: no edema.  Neurologic: Awake, conversant; no focal deficits      Data   Recent Labs   Lab 05/26/19  0931 05/25/19  0945 05/24/19  0922 05/23/19  0515 05/22/19  0511   WBC 5.0 7.2  --  3.3* 3.5*   HGB 9.5* 9.3* 10.4* 9.4* 9.4*   MCV 85 84  --  84 85   PLT 99* 112* 130* 85* 88*   NA  --   --  141 143 149*   POTASSIUM 3.8  --  4.5 4.0 3.6   CHLORIDE  --   --  111* 114* 122*   CO2  --   --  23 23 20   BUN  --   --  17 7 5*   CR  --   --  0.34* 0.25* 0.29*   ANIONGAP  --   --  7 6 7   JOSH  --   --  8.0* 7.8* 7.6*   GLC  --   --  120* 109* 114*

## 2019-05-26 NOTE — PROGRESS NOTES
"Urology Brief Follow-up Note:  Dx:  Continent urinary diversion.      Subjective:  Having good drainage via catheter, might need irrigation again soon.  Urine clear        Objective:    /59 (BP Location: Right arm)   Pulse 117   Temp 98.3  F (36.8  C) (Oral)   Resp 16   Ht 1.753 m (5' 9\")   Wt 67.7 kg (149 lb 3.2 oz)   LMP  (LMP Unknown)   SpO2 99%   BMI 22.03 kg/m        Intake/Output Summary (Last 24 hours) at 5/26/2019 0914  Last data filed at 5/26/2019 0500  Gross per 24 hour   Intake --   Output 4300 ml   Net -4300 ml       Labs:    ROUTINE IP LABS (Last four results)  BMP  Recent Labs   Lab 05/24/19  0922 05/23/19  0515 05/22/19  0511 05/21/19  1810    143 149* 152*   POTASSIUM 4.5 4.0 3.6 3.7   CHLORIDE 111* 114* 122* 125*   JOSH 8.0* 7.8* 7.6* 7.7*   CO2 23 23 20 20   BUN 17 7 5* 4*   CR 0.34* 0.25* 0.29* 0.31*   * 109* 114* 104*     CBC  Recent Labs   Lab 05/25/19  0945 05/24/19  0922 05/23/19  0515 05/22/19  0511 05/21/19  1810   WBC 7.2  --  3.3* 3.5* 4.1   RBC 3.39*  --  3.43* 3.45* 3.49*   HGB 9.3* 10.4* 9.4* 9.4* 9.5*   HCT 28.5*  --  28.8* 29.2* 29.5*   MCV 84  --  84 85 85   MCH 27.4  --  27.4 27.2 27.2   MCHC 32.6  --  32.6 32.2 32.2   RDW 22.0*  --  21.5* 22.1* 22.4*   * 130* 85* 88* 121*     INRNo lab results found in last 7 days.      Exam:    Gen: alert  Ab:soft  : mcintyre in place through [place]  Ext: no changes      Assessment/Plan:     1.  Continent urinary diversion.    2.  Can irrigate and remove the catheter before discharge.    3.  Send out with rx for generic bactrim for use at home if needed.          Russ Cristobal MD  [facility]  Pager:  [phone]  After 5pm and on weekends, please call [phone]    "

## 2019-05-27 VITALS
OXYGEN SATURATION: 97 % | DIASTOLIC BLOOD PRESSURE: 70 MMHG | BODY MASS INDEX: 22.1 KG/M2 | TEMPERATURE: 98.5 F | RESPIRATION RATE: 16 BRPM | HEIGHT: 69 IN | WEIGHT: 149.2 LBS | SYSTOLIC BLOOD PRESSURE: 131 MMHG | HEART RATE: 117 BPM

## 2019-05-27 PROCEDURE — 25000132 ZZH RX MED GY IP 250 OP 250 PS 637: Performed by: HOSPITALIST

## 2019-05-27 PROCEDURE — 99238 HOSP IP/OBS DSCHRG MGMT 30/<: CPT | Performed by: INTERNAL MEDICINE

## 2019-05-27 PROCEDURE — 25000132 ZZH RX MED GY IP 250 OP 250 PS 637: Performed by: INTERNAL MEDICINE

## 2019-05-27 PROCEDURE — 84100 ASSAY OF PHOSPHORUS: CPT | Performed by: HOSPITALIST

## 2019-05-27 RX ORDER — LEVETIRACETAM 1000 MG/1
1000 TABLET ORAL 2 TIMES DAILY
Qty: 60 TABLET | Refills: 0 | Status: ON HOLD | OUTPATIENT
Start: 2019-05-27 | End: 2020-01-04

## 2019-05-27 RX ADMIN — MULTIPLE VITAMINS W/ MINERALS TAB 1 TABLET: TAB at 08:44

## 2019-05-27 RX ADMIN — OXYCODONE HYDROCHLORIDE 5 MG: 5 SOLUTION ORAL at 12:15

## 2019-05-27 RX ADMIN — OXYBUTYNIN CHLORIDE 2.5 MG: 5 TABLET ORAL at 08:44

## 2019-05-27 RX ADMIN — LEVETIRACETAM 1000 MG: 500 TABLET, FILM COATED ORAL at 08:44

## 2019-05-27 RX ADMIN — FUROSEMIDE 20 MG: 20 TABLET ORAL at 08:44

## 2019-05-27 ASSESSMENT — ACTIVITIES OF DAILY LIVING (ADL)
ADLS_ACUITY_SCORE: 22
ADLS_ACUITY_SCORE: 22
ADLS_ACUITY_SCORE: 23
ADLS_ACUITY_SCORE: 22

## 2019-05-27 NOTE — PROGRESS NOTES
DATE & TIME: 2300-0730 5/27/19    ORIENTATION: A/Ox4   BEHAVIOR & AGGRESSION TOOL COLOR: GREEN  CIWA SCORE:    ABNL VS/O2: tachycardic. RA  MOBILITY: Ax2; paraplegic   PAIN MANAGMENT: denies  DIET: regular   BOWEL/BLADDER:  Urostomy present; ileostomy present.   ABNL LAB/BG: Mg 1.8. Ph 3.8. K 3.8. hgb 9.5.   DRAIN/DEVICES: saline locked   TELEMETRY RHYTHM:   SKIN: wounds present; see flowsheet  TESTS/PROCEDURES:   D/C DAY/GOALS/PLACE: Discharge home today @ 1300   OTHER IMPORTANT INFO: pt refusing seizure pads. Contact precautions maintained.

## 2019-05-27 NOTE — PROGRESS NOTES
Patient had VSS, denied any chest pain and SOB. Patient denies any pain. Ambrose discontinued that was in her urostomy. Irrigated first and then pulled. ABD dressing applied. IV acces taken out and tip intact. All wounds were cleaned and dressed yesterday and per WOC, to be changed every other day- on even days. All wounds are CDI. Medications were sent to home pharmacy. Patient's all belongings packed and sent with patient. Colostomy WDL. HE to  patient at 1300 with own wheelchair. Daughter updated by VALARIE. Will follow up with Neurology in 4-6 weeks.

## 2019-05-27 NOTE — DISCHARGE SUMMARY
[facility]  Hospitalist Discharge Summary       Date of Admission:  5/18/2019  Date of Discharge:  5/27/2019  1:18 PM  Discharging Provider: Aric Chapman DO      Discharge Diagnoses   1. Seizure with status epilepticus  2. Acute respiratory failure due to inability to protect the airway from #1  3. Hypernatremia   4. Acute on chronic anemia  5. Acute on chronic thrombocytopenia   6. Paraplegia secondary to spinal cord injury  7. Neurogenic bowel and bladder due to #6  8. Chronic pain   9. Multiple skin pressure wounds present on admission       Follow-ups Needed After Discharge   Follow-up Appointments     Follow-up and recommended labs and tests       Follow up with primary care provider, Tony Padilla, within 1-2 weeks,   for hospital follow- up. No follow up labs or test are needed.  Follow up with neurology as planned             Unresulted Labs Ordered in the Past 30 Days of this Admission     No orders found from 3/19/2019 to 5/19/2019.          Discharge Disposition   Discharged to home  Condition at discharge: Stable    Hospital Course     Status epilepticus  Initially admitted in [place] and started on Keppra; required sedation and intubation for persistent seizures.  Was transferred to [facility] and underwent EEG monitoring and MRI (negative).  She stabilized on IV Keppra and was extubated 5/21.  No further seizures and now back to baseline cognitive status.  - Continue PO Keppra and seizure precautions  - Follow up w/Neurology 4-6 weeks to determine duration of Keppra     Acute respiratory failure requiring mechanical ventilation; resolved.      Consultations This Hospital Stay   WOUND OSTOMY CONTINENCE NURSE  IP CONSULT  NEUROLOGY IP CONSULT  PALLIATIVE CARE ADULT IP CONSULT  NUTRITION SERVICES ADULT IP CONSULT  PHARMACY IP CONSULT  PHYSICAL THERAPY ADULT IP CONSULT  OCCUPATIONAL THERAPY ADULT IP CONSULT  VASCULAR ACCESS ADULT IP CONSULT  SPEECH LANGUAGE PATH ADULT IP CONSULT  UROLOGY  IP CONSULT  PHYSICAL THERAPY ADULT IP CONSULT  OCCUPATIONAL THERAPY ADULT IP CONSULT  SOCIAL WORK IP CONSULT  PHYSICAL THERAPY ADULT IP CONSULT  SOCIAL WORK IP CONSULT  NUTRITION SERVICES ADULT IP CONSULT    Code Status   Full Code    Time Spent on this Encounter   I, Aric OVERTONRona Champan, personally saw the patient today and spent less than or equal to 30 minutes discharging this patient.       Aric Chapman, DO  [facility]  ______________________________________________________________________    Physical Exam   Vital Signs: Temp: 98.5  F (36.9  C) Temp src: Oral BP: 131/70   Heart Rate: 103 Resp: 16 SpO2: 97 % O2 Device: None (Room air)    Weight: 149 lbs 3.2 oz  General Appearance: Resting comfortably.  NAD   Respiratory: Clear to auscultation.  No respiratory distress   Cardiovascular: RRR.  No murmurs   GI: Bowel sounds present.  Non-tender  Skin: No rashes.  No cyanosis  Other: No edema         Primary Care Physician   Tony Padilla    Discharge Orders      Home care nursing referral      Reason for your hospital stay    Seizure     Follow-up and recommended labs and tests     Follow up with primary care provider, Tony Padilla, within 1-2 weeks, for hospital follow- up. No follow up labs or test are needed.  Follow up with neurology as planned     Activity    Your activity upon discharge: activity as tolerated     Diet    Follow this diet upon discharge: Orders Placed This Encounter      Combination Diet Regular Diet Adult       Significant Results and Procedures   Most Recent 3 CBC's:  Recent Labs   Lab Test 05/26/19  0931 05/25/19  0945 05/24/19  0922 05/23/19  0515   WBC 5.0 7.2  --  3.3*   HGB 9.5* 9.3* 10.4* 9.4*   MCV 85 84  --  84   PLT 99* 112* 130* 85*     Most Recent 3 BMP's:  Recent Labs   Lab Test 05/26/19  0931 05/24/19  0922 05/23/19  0515 05/22/19  0511   NA  --  141 143 149*   POTASSIUM 3.8 4.5 4.0 3.6   CHLORIDE  --  111* 114* 122*   CO2  --  23 23 20   BUN  --  17 7 5*    CR  --  0.34* 0.25* 0.29*   ANIONGAP  --  7 6 7   JOSH  --  8.0* 7.8* 7.6*   GLC  --  120* 109* 114*   ,   Results for orders placed or performed during the hospital encounter of 05/18/19   XR Chest Port 1 View    Narrative    CHEST ONE VIEW PORTABLE   5/18/2019 9:35 PM     HISTORY: Line placement.    COMPARISON: 1/5/2019      Impression    IMPRESSION: Endotracheal tube in good position. Nasogastric tube  passes into the stomach. Lungs are clear, normal heart size and  pulmonary vascularity. Thoracolumbar fusion instrumentation. Interval  clearing of pulmonary infiltrates since previous exam.    SANTO ARGUELLES MD   MR Brain w/o & w Contrast    Narrative    MRI BRAIN WITHOUT AND WITH CONTRAST  5/19/2019 4:30 PM    HISTORY: Generalized weakness. Persistent seizures. Outside CT scan  negative. Recent 22 day ICU stay in December 2018.    TECHNIQUE:  Multiplanar, multisequence MRI of the brain without and  with 6 mL MU.    COMPARISON: None.    FINDINGS:  There is thinning of the posterior aspects of the body of  the corpus callosum and slight widening of the posterior aspects of  the walls of lateral ventricles associated with mild T2 hyperintensity  in the periventricular white matter. There are also small scattered  foci of prolonged T2 relaxation in the cortex and subcortical white  matter of the superior frontal gyri. Artifact from metal in the skin  is noted at the parietal vertex. The temporal lobes and hippocampal  formations in particular appear normal.  There is no evidence of  hemorrhage, mass, acute infarct, or anomaly.  There are no gadolinium  enhancing lesions.    The facial structures appear normal. The arteries at the base of the  brain and the dural venous sinuses appear patent.       Impression    IMPRESSION:  1. No acute abnormality.  2. Nonspecific white matter lesions in the parietal lobes associated  with thinning of the posterior aspect of the body of the corpus  callosum and slight widening of  the lateral aspects of the lateral  ventricles. The findings are suggestive of  periventricular  leukomalacia as the etiology, but differential diagnosis includes  chronic demyelination.      SANTO ARGUELLES MD   XR Abdomen 2 Views    Narrative    ABDOMEN TWO-THREE VIEWS  2019 5:29 PM     HISTORY: Abdominal pain    COMPARISON: None.    FINDINGS: Orogastric tube tip projects within the stomach bubble.  Minimal amount of stool. No free air. There are no air filled  distended loops of small bowel. The colon is not distended. The lung  bases are unremarkable.      Impression    IMPRESSION: Nonobstructed bowel gas pattern.    ARIN HUYNH MD       Discharge Medications   Current Discharge Medication List      START taking these medications    Details   levETIRAcetam (KEPPRA) 1000 MG tablet Take 1 tablet (1,000 mg) by mouth 2 times daily  Qty: 60 tablet, Refills: 0    Comments: Future refills by PCP Dr. Tony Padilla with phone number [phone].  Associated Diagnoses: Seizures (H)      sulfamethoxazole-trimethoprim (BACTRIM DS/SEPTRA DS) 800-160 MG tablet Take 1 tablet by mouth 2 times daily  Qty: 30 tablet, Refills: 1    Associated Diagnoses: Neurogenic bladder         CONTINUE these medications which have NOT CHANGED    Details   acetaminophen (TYLENOL) 325 MG tablet Take 650 mg by mouth every 6 hours as needed for mild pain      albuterol (PROVENTIL) (2.5 MG/3ML) 0.083% neb solution Take 2.5 mg by nebulization every 2 hours as needed for wheezing      bisacodyl (DULCOLAX) 10 MG suppository Place 10 mg rectally daily as needed for constipation      furosemide (LASIX) 20 MG tablet Take 40 mg by mouth daily       hyoscyamine (LEVSIN/SL) 0.125 MG sublingual tablet Place 0.125 mg under the tongue every 4 hours as needed      hypromellose-dextran (ARTIFICAL TEARS) 0.1-0.3 % ophthalmic solution Place 1 drop into both eyes every hour as needed for dry eyes      LORazepam (ATIVAN) 2 MG/ML (HIGH CONC) solution  "Take 1 mg by mouth every 8 hours as needed for anxiety May repeat after an hour if ineffective      methadone (DOLOPHINE) 5 MG tablet Take 2.5 mg by mouth 2 times daily      !! morphine 5 MG solu-tab Take 5 mg by mouth every 2 hours as needed       !! morphine 5 MG solu-tab Take 5 mg by mouth every 4 hours      ondansetron (ZOFRAN-ODT) 4 MG ODT tab Take 4 mg by mouth 2 times daily as needed for nausea or vomiting      oxybutynin ER (DITROPAN-XL) 5 MG 24 hr tablet Take 5 mg by mouth daily      potassium chloride (KLOR-CON) 20 MEQ packet Take 20 mEq by mouth 3 times daily With meals      senna-docusate (SENOKOT-S/PERICOLACE) 8.6-50 MG tablet Take 2 tablets by mouth daily      traZODone (DESYREL) 50 MG tablet Take 50 mg by mouth At Bedtime       UNABLE TO FIND Take 1 spray by mouth every 2 hours as needed MEDICATION NAME: Seamus-Stir/saliva substitute (e-lytes/carboxymethylcellulose)      zolpidem (AMBIEN) 10 MG tablet Take 10 mg by mouth At Bedtime       !! - Potential duplicate medications found. Please discuss with provider.      STOP taking these medications       enoxaparin (LOVENOX) 60 MG/0.6ML syringe Comments:   Reason for Stopping:             Allergies   Allergies   Allergen Reactions     Penicillins Anaphylaxis     Patient states it makes her \"climb the walls and hyperactive.\"     Acetaminophen Nausea and Vomiting     Levaquin Rash     Rash only with po Levaquin...able to take IV Levaquin per pt     "

## 2019-05-27 NOTE — PLAN OF CARE
DATE & TIME: 5/26/19, evening shift   ORIENTATION: alert and oriented x4   BEHAVIOR & AGGRESSION TOOL COLOR: GREEN  CIWA SCORE: NA   ABNL VS/O2: , other VS on RA   MOBILITY: lift, paraplegic  PAIN MANAGMENT: none needed  DIET: Regular diet  BOWEL/BLADDER: urostomy attached to mcintyre bag with good UO; colostomy bag-no BM this shift  ABNL LAB/BG: none  DRAIN/DEVICES: mcintyre bag; urostomy bag; IV SL on L arm  TELEMETRY RHYTHM: NA  SKIN: bruised, pale, wounds on buttocks, R great toe, L groin area- all cleaned and new dressings applied. Bilateral heels covered with mepilex as prevention  TESTS/PROCEDURES: none  D/C DAY/GOALS/PLACE: Most likely discharge tomorrow to home. PT/OT/ST and Neurology signed off. SW following for help in discharge planning and scheduling ride. Daughter updated by SW.  OTHER IMPORTANT INFO: Patient adamantly refusing seizure pads.

## 2019-05-27 NOTE — PLAN OF CARE
Discharge    Patient discharged to home via  with HE  Care plan note:done    Listed belongings gathered and returned to patient. Yes  Care Plan and Patient education resolved: Yes  Prescriptions if needed, hard copies sent with patient  Yes  Home and hospital acquired medications returned to patient: NA  Medication Bin checked and emptied on discharge Yes  Follow up appointment made for patient: No

## 2019-05-27 NOTE — PROGRESS NOTES
"Urology Brief Follow-up Note:  Dx:  Continent diversion    Subjective:  Urine draining OK, little mucous.  going home today.  Septra rx sent to [facility] in [place].          Objective:    /70 (BP Location: Left arm)   Pulse 117   Temp 98.5  F (36.9  C) (Oral)   Resp 16   Ht 1.753 m (5' 9\")   Wt 67.7 kg (149 lb 3.2 oz)   LMP  (LMP Unknown)   SpO2 97%   BMI 22.03 kg/m        Intake/Output Summary (Last 24 hours) at 5/27/2019 0935  Last data filed at 5/27/2019 0615  Gross per 24 hour   Intake 360 ml   Output 2025 ml   Net -1665 ml       Labs:    ROUTINE IP LABS (Last four results)  BMP  Recent Labs   Lab 05/26/19  0931 05/24/19  0922 05/23/19  0515 05/22/19  0511 05/21/19  1810   NA  --  141 143 149* 152*   POTASSIUM 3.8 4.5 4.0 3.6 3.7   CHLORIDE  --  111* 114* 122* 125*   JOSH  --  8.0* 7.8* 7.6* 7.7*   CO2  --  23 23 20 20   BUN  --  17 7 5* 4*   CR  --  0.34* 0.25* 0.29* 0.31*   GLC  --  120* 109* 114* 104*     CBC  Recent Labs   Lab 05/26/19  0931 05/25/19  0945 05/24/19  0922 05/23/19  0515 05/22/19  0511   WBC 5.0 7.2  --  3.3* 3.5*   RBC 3.42* 3.39*  --  3.43* 3.45*   HGB 9.5* 9.3* 10.4* 9.4* 9.4*   HCT 29.1* 28.5*  --  28.8* 29.2*   MCV 85 84  --  84 85   MCH 27.8 27.4  --  27.4 27.2   MCHC 32.6 32.6  --  32.6 32.2   RDW 22.1* 22.0*  --  21.5* 22.1*   PLT 99* 112* 130* 85* 88*     INRNo lab results found in last 7 days.      Exam:    Gen: alert   Ab:soft   : mcintyre in continent diversion  Ext: no change      Assessment/Plan:     1.  Continent diversion    2.  Please irrigate catheter & diversion before removing the mcintyre prior to discharge           Russ Cristobal MD  [facility]  Pager:  [phone]  After 5pm and on weekends, please call [phone]    "

## 2019-05-28 NOTE — PLAN OF CARE
Speech Language Therapy Discharge Summary    Reason for therapy discharge:    Discharged to home.    Progress towards therapy goal(s). See goals on Care Plan in [facility] electronic health record for goal details.  Goals met    Therapy recommendation(s):    No further therapy is recommended.Patient discharged on a regular diet and thin liquids.

## 2019-07-29 NOTE — PLAN OF CARE
Attempted to call patient and no answer. No voicemail to leave a message.   VSS. Levo weaned off. Tele SR. Tolerating current vent settings. Sedation per propofol. Good urine output. Colostomy bag changed. K+ low- replacing. Hgb low this am- MD notified. Needs blood consent obtained from daughter.

## 2019-12-21 NOTE — PROGRESS NOTES
CLINICAL NUTRITION SERVICES - REASSESSMENT NOTE      Recommendations Ordered by Registered Dietitian (RD):   Will send Ensure with meals instead of in-between         EVALUATION OF PROGRESS TOWARD GOALS   Diet:  Regular. Ensure BID between meals.    Intake:  Pt is eating % of large meals. She is drinking the Ensure and ordering 2 glasses of whole milk every meal.  She is ordering 100-150 gm pro daily.  Pt states the Ensure isn't very cold, likely has been sitting on the cart for a while before being brought to her. She would rather have it sent with meals.      Dosing Weight 52.3 kg - estimated wt   ASSESSED NUTRITION NEEDS:  Estimated Energy Needs: 6574-4928 kcals (30-35 Kcal/Kg)  Justification: underweight, protein sparing  Estimated Protein Needs:  grams protein (1.5-2 g pro/Kg)  Justification: wound healing    New Findings:  Per PI protocol: Daily Thera-Vit-M; Vit C (500 mg), Vit A (25,000 IU), Zinc sulfate (220 mg) daily x 10 days (ordered 2/21)    Previous Goals:   Pt will consume at least 80 gm pro/daily   Evaluation: Met    Previous Nutrition Diagnosis:   Increased nutrient needs (protein and vit/min) related to healing as evidenced by multiple PIs  Evaluation: No change      CURRENT NUTRITION DIAGNOSIS  Increased nutrient needs (protein and vit/min) related to healing as evidenced by multiple PIs    INTERVENTIONS  Recommendations / Nutrition Prescription  Continue regular diet, will send the Ensure with meals instead of in-between (and add ice)    Implementation  No interventions at this time    Goals  Pt will continue to consume at least 80 gm pro/day      MONITORING AND EVALUATION:  Progress towards goals will be monitored and evaluated per protocol and Practice Guidelines    Robina Fall RD  Pager [phone] (M-F)            [phone] (W/E & Hol)           Mom notified on negative pertussis culture results

## 2020-01-03 ENCOUNTER — TRANSFERRED RECORDS (OUTPATIENT)
Dept: HEALTH INFORMATION MANAGEMENT | Facility: CLINIC | Age: 55
End: 2020-01-03

## 2020-01-03 LAB
ALT SERPL-CCNC: 13 U/L (ref 14–63)
AST SERPL-CCNC: 15 U/L (ref 15–37)
CREAT SERPL-MCNC: 0.82 MG/DL (ref 0.51–0.95)
GFR SERPL CREATININE-BSD FRML MDRD: >60 ML/MIN/1.73ME2 (ref 60–150)
GLUCOSE SERPL-MCNC: 151 MG/DL (ref 74–100)
POTASSIUM SERPL-SCNC: 3.5 MMOL/L (ref 3.5–5.1)

## 2020-01-04 ENCOUNTER — APPOINTMENT (OUTPATIENT)
Dept: GENERAL RADIOLOGY | Facility: CLINIC | Age: 55
DRG: 100 | End: 2020-01-04
Attending: PHYSICIAN ASSISTANT
Payer: COMMERCIAL

## 2020-01-04 ENCOUNTER — HOSPITAL ENCOUNTER (INPATIENT)
Facility: CLINIC | Age: 55
LOS: 7 days | Discharge: HOME HEALTH | DRG: 100 | End: 2020-01-11
Attending: HOSPITALIST | Admitting: INTERNAL MEDICINE
Payer: COMMERCIAL

## 2020-01-04 DIAGNOSIS — N39.0 CHRONIC UTI (URINARY TRACT INFECTION): ICD-10-CM

## 2020-01-04 DIAGNOSIS — Z76.89 HEALTH CARE HOME: ICD-10-CM

## 2020-01-04 DIAGNOSIS — R56.9 SEIZURES (H): ICD-10-CM

## 2020-01-04 DIAGNOSIS — E53.8 FOLIC ACID DEFICIENCY: Primary | ICD-10-CM

## 2020-01-04 PROBLEM — R41.82 AMS (ALTERED MENTAL STATUS): Status: ACTIVE | Noted: 2020-01-04

## 2020-01-04 LAB
ALBUMIN SERPL-MCNC: 2.9 G/DL (ref 3.4–5)
ALP SERPL-CCNC: 206 U/L (ref 40–150)
ALT SERPL W P-5'-P-CCNC: 11 U/L (ref 0–50)
AMMONIA PLAS-SCNC: 46 UMOL/L (ref 10–50)
ANION GAP SERPL CALCULATED.3IONS-SCNC: 14 MMOL/L (ref 3–14)
AST SERPL W P-5'-P-CCNC: 14 U/L (ref 0–45)
BASE DEFICIT BLDA-SCNC: 17.4 MMOL/L
BASE DEFICIT BLDV-SCNC: 12.4 MMOL/L
BILIRUB DIRECT SERPL-MCNC: 0.1 MG/DL (ref 0–0.2)
BILIRUB SERPL-MCNC: 0.5 MG/DL (ref 0.2–1.3)
BUN SERPL-MCNC: 30 MG/DL (ref 7–30)
CALCIUM SERPL-MCNC: 8.1 MG/DL (ref 8.5–10.1)
CHLORIDE SERPL-SCNC: 120 MMOL/L (ref 94–109)
CK SERPL-CCNC: 59 U/L (ref 30–225)
CO2 SERPL-SCNC: 10 MMOL/L (ref 20–32)
CREAT SERPL-MCNC: 0.5 MG/DL (ref 0.52–1.04)
ERYTHROCYTE [DISTWIDTH] IN BLOOD BY AUTOMATED COUNT: 15.6 % (ref 10–15)
GFR SERPL CREATININE-BSD FRML MDRD: >90 ML/MIN/{1.73_M2}
GLUCOSE SERPL-MCNC: 140 MG/DL (ref 70–99)
HCO3 BLD-SCNC: 11 MMOL/L (ref 21–28)
HCO3 BLDV-SCNC: 13 MMOL/L (ref 21–28)
HCT VFR BLD AUTO: 38.3 % (ref 35–47)
HGB BLD-MCNC: 13 G/DL (ref 11.7–15.7)
LACTATE BLD-SCNC: 1.1 MMOL/L (ref 0.7–2)
LACTATE BLD-SCNC: 2.1 MMOL/L (ref 0.7–2)
MCH RBC QN AUTO: 27.7 PG (ref 26.5–33)
MCHC RBC AUTO-ENTMCNC: 33.9 G/DL (ref 31.5–36.5)
MCV RBC AUTO: 82 FL (ref 78–100)
O2/TOTAL GAS SETTING VFR VENT: ABNORMAL %
OXYHGB MFR BLD: 40 % (ref 92–100)
OXYHGB MFR BLDV: 71 %
PCO2 BLD: 34 MM HG (ref 35–45)
PCO2 BLDV: 26 MM HG (ref 40–50)
PH BLD: 7.12 PH (ref 7.35–7.45)
PH BLDV: 7.29 PH (ref 7.32–7.43)
PLATELET # BLD AUTO: 52 10E9/L (ref 150–450)
PO2 BLD: 27 MM HG (ref 80–105)
PO2 BLDV: 38 MM HG (ref 25–47)
POTASSIUM SERPL-SCNC: 2.9 MMOL/L (ref 3.4–5.3)
POTASSIUM SERPL-SCNC: 3.9 MMOL/L (ref 3.4–5.3)
PROT SERPL-MCNC: 6.9 G/DL (ref 6.8–8.8)
RBC # BLD AUTO: 4.69 10E12/L (ref 3.8–5.2)
SODIUM SERPL-SCNC: 144 MMOL/L (ref 133–144)
VALPROATE SERPL-MCNC: <3 MG/L (ref 50–100)
WBC # BLD AUTO: 9.5 10E9/L (ref 4–11)

## 2020-01-04 PROCEDURE — 40000061 ZZH STATISTIC EEG TIME EA 10 MIN

## 2020-01-04 PROCEDURE — 82140 ASSAY OF AMMONIA: CPT | Performed by: INTERNAL MEDICINE

## 2020-01-04 PROCEDURE — 80076 HEPATIC FUNCTION PANEL: CPT | Performed by: INTERNAL MEDICINE

## 2020-01-04 PROCEDURE — 0SJB3ZZ INSPECTION OF LEFT HIP JOINT, PERCUTANEOUS APPROACH: ICD-10-PCS | Performed by: RADIOLOGY

## 2020-01-04 PROCEDURE — 93005 ELECTROCARDIOGRAM TRACING: CPT

## 2020-01-04 PROCEDURE — 72170 X-RAY EXAM OF PELVIS: CPT

## 2020-01-04 PROCEDURE — 80164 ASSAY DIPROPYLACETIC ACD TOT: CPT | Performed by: PSYCHIATRY & NEUROLOGY

## 2020-01-04 PROCEDURE — 85027 COMPLETE CBC AUTOMATED: CPT | Performed by: INTERNAL MEDICINE

## 2020-01-04 PROCEDURE — 36415 COLL VENOUS BLD VENIPUNCTURE: CPT | Performed by: HOSPITALIST

## 2020-01-04 PROCEDURE — 80048 BASIC METABOLIC PNL TOTAL CA: CPT | Performed by: INTERNAL MEDICINE

## 2020-01-04 PROCEDURE — 25000128 H RX IP 250 OP 636: Performed by: INTERNAL MEDICINE

## 2020-01-04 PROCEDURE — 83605 ASSAY OF LACTIC ACID: CPT | Performed by: INTERNAL MEDICINE

## 2020-01-04 PROCEDURE — 99223 1ST HOSP IP/OBS HIGH 75: CPT | Mod: AI | Performed by: INTERNAL MEDICINE

## 2020-01-04 PROCEDURE — 40000275 ZZH STATISTIC RCP TIME EA 10 MIN

## 2020-01-04 PROCEDURE — 82805 BLOOD GASES W/O2 SATURATION: CPT | Performed by: HOSPITALIST

## 2020-01-04 PROCEDURE — 36415 COLL VENOUS BLD VENIPUNCTURE: CPT | Performed by: INTERNAL MEDICINE

## 2020-01-04 PROCEDURE — 84132 ASSAY OF SERUM POTASSIUM: CPT | Performed by: INTERNAL MEDICINE

## 2020-01-04 PROCEDURE — 80177 DRUG SCRN QUAN LEVETIRACETAM: CPT | Performed by: INTERNAL MEDICINE

## 2020-01-04 PROCEDURE — 95813 EEG EXTND MNTR 61-119 MIN: CPT

## 2020-01-04 PROCEDURE — 99207 ZZC APP CREDIT; MD BILLING SHARED VISIT: CPT | Performed by: INTERNAL MEDICINE

## 2020-01-04 PROCEDURE — 25800030 ZZH RX IP 258 OP 636: Performed by: INTERNAL MEDICINE

## 2020-01-04 PROCEDURE — 20610 DRAIN/INJ JOINT/BURSA W/O US: CPT | Mod: LT

## 2020-01-04 PROCEDURE — 93010 ELECTROCARDIOGRAM REPORT: CPT | Performed by: INTERNAL MEDICINE

## 2020-01-04 PROCEDURE — 25000125 ZZHC RX 250: Performed by: RADIOLOGY

## 2020-01-04 PROCEDURE — 12000047 ZZH R&B IMCU

## 2020-01-04 PROCEDURE — 82805 BLOOD GASES W/O2 SATURATION: CPT | Performed by: INTERNAL MEDICINE

## 2020-01-04 PROCEDURE — 82550 ASSAY OF CK (CPK): CPT | Performed by: INTERNAL MEDICINE

## 2020-01-04 PROCEDURE — 25000128 H RX IP 250 OP 636: Performed by: HOSPITALIST

## 2020-01-04 PROCEDURE — 36415 COLL VENOUS BLD VENIPUNCTURE: CPT | Performed by: PSYCHIATRY & NEUROLOGY

## 2020-01-04 RX ORDER — LIDOCAINE 40 MG/G
CREAM TOPICAL
Status: DISCONTINUED | OUTPATIENT
Start: 2020-01-04 | End: 2020-01-05

## 2020-01-04 RX ORDER — POTASSIUM CHLORIDE 1.5 G/1.58G
20-40 POWDER, FOR SOLUTION ORAL
Status: DISCONTINUED | OUTPATIENT
Start: 2020-01-04 | End: 2020-01-11 | Stop reason: HOSPADM

## 2020-01-04 RX ORDER — ACETAMINOPHEN 325 MG/1
650 TABLET ORAL EVERY 6 HOURS PRN
Status: DISCONTINUED | OUTPATIENT
Start: 2020-01-04 | End: 2020-01-04

## 2020-01-04 RX ORDER — POTASSIUM CHLORIDE 7.45 MG/ML
10 INJECTION INTRAVENOUS
Status: DISCONTINUED | OUTPATIENT
Start: 2020-01-04 | End: 2020-01-11 | Stop reason: HOSPADM

## 2020-01-04 RX ORDER — PROPRANOLOL HYDROCHLORIDE 40 MG/1
40 TABLET ORAL 2 TIMES DAILY
COMMUNITY
End: 2020-11-10

## 2020-01-04 RX ORDER — ACETAMINOPHEN 650 MG/1
650 SUPPOSITORY RECTAL EVERY 4 HOURS PRN
Status: DISCONTINUED | OUTPATIENT
Start: 2020-01-04 | End: 2020-01-11 | Stop reason: HOSPADM

## 2020-01-04 RX ORDER — OXYBUTYNIN CHLORIDE 5 MG/1
5 TABLET ORAL 2 TIMES DAILY
COMMUNITY
End: 2020-10-15

## 2020-01-04 RX ORDER — LEVETIRACETAM 750 MG/1
750 TABLET ORAL 2 TIMES DAILY
Status: ON HOLD | COMMUNITY
End: 2020-01-11

## 2020-01-04 RX ORDER — AMOXICILLIN 250 MG
2 CAPSULE ORAL 2 TIMES DAILY PRN
Status: DISCONTINUED | OUTPATIENT
Start: 2020-01-04 | End: 2020-01-11 | Stop reason: HOSPADM

## 2020-01-04 RX ORDER — NITROGLYCERIN 0.4 MG/1
0.4 TABLET SUBLINGUAL EVERY 5 MIN PRN
Status: DISCONTINUED | OUTPATIENT
Start: 2020-01-04 | End: 2020-01-08

## 2020-01-04 RX ORDER — CEFEPIME HYDROCHLORIDE 1 G/1
1 INJECTION, POWDER, FOR SOLUTION INTRAMUSCULAR; INTRAVENOUS EVERY 12 HOURS
Status: DISCONTINUED | OUTPATIENT
Start: 2020-01-04 | End: 2020-01-04 | Stop reason: DRUGHIGH

## 2020-01-04 RX ORDER — POTASSIUM CHLORIDE 1500 MG/1
20-40 TABLET, EXTENDED RELEASE ORAL
Status: DISCONTINUED | OUTPATIENT
Start: 2020-01-04 | End: 2020-01-11 | Stop reason: HOSPADM

## 2020-01-04 RX ORDER — LIDOCAINE 40 MG/G
CREAM TOPICAL
Status: DISCONTINUED | OUTPATIENT
Start: 2020-01-04 | End: 2020-01-06

## 2020-01-04 RX ORDER — CELECOXIB 200 MG/1
200 CAPSULE ORAL DAILY
COMMUNITY
End: 2020-09-02

## 2020-01-04 RX ORDER — LEVETIRACETAM 10 MG/ML
1000 INJECTION INTRAVASCULAR EVERY 12 HOURS
Status: DISCONTINUED | OUTPATIENT
Start: 2020-01-04 | End: 2020-01-04

## 2020-01-04 RX ORDER — NALOXONE HYDROCHLORIDE 0.4 MG/ML
.1-.4 INJECTION, SOLUTION INTRAMUSCULAR; INTRAVENOUS; SUBCUTANEOUS
Status: DISCONTINUED | OUTPATIENT
Start: 2020-01-04 | End: 2020-01-11 | Stop reason: HOSPADM

## 2020-01-04 RX ORDER — TOPIRAMATE 25 MG/1
25 TABLET, FILM COATED ORAL DAILY
Status: ON HOLD | COMMUNITY
End: 2020-01-11

## 2020-01-04 RX ORDER — VANCOMYCIN HYDROCHLORIDE 1 G/200ML
1000 INJECTION, SOLUTION INTRAVENOUS EVERY 12 HOURS
Status: DISCONTINUED | OUTPATIENT
Start: 2020-01-04 | End: 2020-01-05

## 2020-01-04 RX ORDER — ACETAMINOPHEN 325 MG/1
650 TABLET ORAL EVERY 4 HOURS PRN
Status: DISCONTINUED | OUTPATIENT
Start: 2020-01-04 | End: 2020-01-11 | Stop reason: HOSPADM

## 2020-01-04 RX ORDER — AMOXICILLIN 250 MG
1 CAPSULE ORAL 2 TIMES DAILY PRN
Status: DISCONTINUED | OUTPATIENT
Start: 2020-01-04 | End: 2020-01-11 | Stop reason: HOSPADM

## 2020-01-04 RX ORDER — POTASSIUM CHLORIDE 29.8 MG/ML
20 INJECTION INTRAVENOUS
Status: DISCONTINUED | OUTPATIENT
Start: 2020-01-04 | End: 2020-01-11 | Stop reason: HOSPADM

## 2020-01-04 RX ORDER — ONDANSETRON 4 MG/1
4 TABLET, ORALLY DISINTEGRATING ORAL EVERY 6 HOURS PRN
Status: DISCONTINUED | OUTPATIENT
Start: 2020-01-04 | End: 2020-01-11 | Stop reason: HOSPADM

## 2020-01-04 RX ORDER — ALBUTEROL SULFATE 0.83 MG/ML
2.5 SOLUTION RESPIRATORY (INHALATION)
Status: DISCONTINUED | OUTPATIENT
Start: 2020-01-04 | End: 2020-01-11 | Stop reason: HOSPADM

## 2020-01-04 RX ORDER — POTASSIUM CL/LIDO/0.9 % NACL 10MEQ/0.1L
10 INTRAVENOUS SOLUTION, PIGGYBACK (ML) INTRAVENOUS
Status: DISCONTINUED | OUTPATIENT
Start: 2020-01-04 | End: 2020-01-11 | Stop reason: HOSPADM

## 2020-01-04 RX ORDER — SUMATRIPTAN 50 MG/1
50 TABLET, FILM COATED ORAL
COMMUNITY
End: 2021-02-10

## 2020-01-04 RX ORDER — ONDANSETRON 2 MG/ML
4 INJECTION INTRAMUSCULAR; INTRAVENOUS EVERY 6 HOURS PRN
Status: DISCONTINUED | OUTPATIENT
Start: 2020-01-04 | End: 2020-01-11 | Stop reason: HOSPADM

## 2020-01-04 RX ADMIN — Medication 10 MEQ: at 15:00

## 2020-01-04 RX ADMIN — VANCOMYCIN HYDROCHLORIDE 1000 MG: 1 INJECTION, SOLUTION INTRAVENOUS at 18:23

## 2020-01-04 RX ADMIN — Medication 10 MEQ: at 09:42

## 2020-01-04 RX ADMIN — Medication 10 MEQ: at 13:02

## 2020-01-04 RX ADMIN — LIDOCAINE HYDROCHLORIDE 3 ML: 10 INJECTION, SOLUTION EPIDURAL; INFILTRATION; INTRACAUDAL; PERINEURAL at 13:59

## 2020-01-04 RX ADMIN — LEVETIRACETAM 1000 MG: 10 INJECTION INTRAVENOUS at 03:43

## 2020-01-04 RX ADMIN — DEXTROSE AND SODIUM CHLORIDE: 5; 900 INJECTION, SOLUTION INTRAVENOUS at 02:51

## 2020-01-04 RX ADMIN — VANCOMYCIN HYDROCHLORIDE 1000 MG: 1 INJECTION, SOLUTION INTRAVENOUS at 05:35

## 2020-01-04 RX ADMIN — Medication 10 MEQ: at 07:02

## 2020-01-04 RX ADMIN — CEFEPIME HYDROCHLORIDE 1 G: 1 INJECTION, POWDER, FOR SOLUTION INTRAMUSCULAR; INTRAVENOUS at 04:48

## 2020-01-04 RX ADMIN — Medication 10 MEQ: at 10:54

## 2020-01-04 RX ADMIN — Medication 10 MEQ: at 08:23

## 2020-01-04 RX ADMIN — CEFEPIME HYDROCHLORIDE 2 G: 2 INJECTION, POWDER, FOR SOLUTION INTRAVENOUS at 16:28

## 2020-01-04 RX ADMIN — LEVETIRACETAM 1000 MG: 10 INJECTION INTRAVENOUS at 16:29

## 2020-01-04 ASSESSMENT — ACTIVITIES OF DAILY LIVING (ADL)
ADLS_ACUITY_SCORE: 27

## 2020-01-04 NOTE — PROGRESS NOTES
MD Notification    Notified Person: MD    Notified Person Name: Lance    Notification Date/Time: 1/4/20 0400    Notification Interaction: Page    Purpose of Notification: No urine output despite multiple Ambrose attempts and placement verification, bladder scan 100s-200s    Orders Received: Observe, continue bladder scanning as needed     Comments:

## 2020-01-04 NOTE — PLAN OF CARE
SLP: Orders received, chart reviewed. Evaluation attempted however pt too lethargic, not responsive to verbal/tactile stimulation. Will complete evaluation when appropriate. Page [phone] as needed.
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
## 2020-01-04 NOTE — CONSULTS
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 total   Result Value Ref Range    CK Total 59 30 - 225 U/L   Blood gas venous with oxyhemoglobin   Result Value Ref Range    Ph Venous 7.29 (L) 7.32 - 7.43 pH    PCO2 Venous 26 (L) 40 - 50 mm Hg    PO2 Venous 38 25 - 47 mm Hg    Bicarbonate Venous 13 (L) 21 - 28 mmol/L    FIO2  Boby     Oxyhemoglobin Venous 71 %    Base Deficit Venous 12.4 mmol/L         total time : 70 minutes  More than 50% of the time was spent on  discussing/coordinating care with hospital staffs.

## 2020-01-04 NOTE — PROGRESS NOTES
Routine 1 HR VIDEO EEG  completed at patient's bedside. Procedure explained to patient prior to testing.   Ordered by Dr RAI. Video Observation initiated, patient informed. Dr Rai stated video was medically necessary for testing.    Halima Dougherty

## 2020-01-04 NOTE — CONSULTS
Consult Date:  01/04/2020      INFECTIOUS DISEASE CONSULTATION      REFERRING PHYSICIAN:  Dilcia Lucas MD      IMPRESSION:   1.  A 54-year-old female well known to me from prior admissions, currently admitted with altered mental status, question recurrent seizure problems versus a diffuse encephalopathy related to infection -- not entirely clear.   2.  Chronic paraplegia from prior spinal cord injury.   3.  Question current active infection, abnormal urine, new finding in her left hip, chronic wounds, history of aspiration, so multiple possible infection sites, but not entirely clear.  Cultures from the outside hospital are pending.   4.  New finding of left hip fracture, near destroyed head of the joint, question infection versus other causes.   5.  Prior history of right hip infection related to chronic wounds.   6.  Multiple chronic wounds without clear deep infection.   7.  Urinary tract infections and nephrolithiasis previously.   8.  History of aspiration.   9.  Seizure disorder.      RECOMMENDATIONS:   1.  Currently getting broad antibiotics with vancomycin and cefepime.  Await outside cultures and follow fever and clinical symptoms and adjust.   2.  Orthopedics evaluating the hip finding, difficult to know how to interpret this.  Some fluid present but not entirely clear that this is infection.   3.  Where to go with the antibiotics and overall treatment depending on clinical course, cultures, etc.      HISTORY:  This is a 54-year-old female is well known to us from prior admissions.  She has a history of chronic paraplegia and some cognitive dysfunction.  She has a history of both recurrent infections and also seizure disorder.  She has a history of chronic multiple wounds including active current wounds.  Previously she had a right hip area infection that required debridement and got extended antibiotics back in 2014, treated at the [facility].  She has subsequent to that had some wound infection issues.   I see a culture with multiple organisms done at [facility] from October, significance of which is unclear.  She has had some antibiotics as well.  She has also had a history of recurrent urine infections and urosepsis and at least 1 episode of aspiration pneumonia.  She currently presents with mental status changes and cognitive dysfunction.  Unclear whether she has had new active seizure or not, had some signs of infection and sepsis.  She had imaging done that showed some fluid and issues in her left hip that were new from any prior imaging.  These chronic changes in the right hip are no different.  Her wounds are otherwise stable at present.      PAST MEDICAL HISTORY:  Multiple chronic prior wounds, prior flap closure and prior right hip related issue in 2014, history of recurrent urosepsis, history of abnormal urinary anatomy and also significant nephrolithiasis, prior history of at least 1 episode of aspiration pneumonia, history of seizure disorder.      SOCIAL AND FAMILY HISTORY:  Prior history of MRSA.  No active alcohol or tobacco use.      ALLERGIES:  PENICILLIN WITH RASH.      REVIEW OF SYSTEMS:  Largely unobtainable at present and, per the records, unclear whether any focal symptoms were occurring previously.      PHYSICAL EXAMINATION:   GENERAL:  The patient is currently not able to be aroused.  Unclear whether she is postictal or simply this lethargic.   VITAL SIGNS:  Vital signs here have so far been unremarkable, although was mildly tachycardic at 100.  She is afebrile.   HEENT:  Nothing visibly noted.   NECK:  Supple and nontender.   HEART:  Slightly tachycardic at 100.   LUNGS:  Few crackles at the right base.   ABDOMEN:  Soft, nontender.   EXTREMITIES:  Wounds not fully seen.  Notes regarding description of this noted.  Extremities otherwise with the neurologic findings of paraplegia.      LABORATORY AND IMAGING:  Outside cultures are pending.  Imaging showed a new left hip abnormality, the  significance of which is unclear.  Creatinine within normal limits currently.  Lactic acid 2.1.  White blood cell count 9500.      Thank you very much for the consultation.  I will follow patient with you.         MICHAELA HUNT MD             D: 2020   T: 2020   MT: JUVENTINO      Name:     KISHOR PINEDA   MRN:      [id number]        Account:       [id number]   :      1965           Consult Date:  2020      Document: [id number]       cc: Tony Padilla MD

## 2020-01-04 NOTE — PHARMACY-ADMISSION MEDICATION HISTORY
Pharmacy Medication History  Admission medication history interview status for the 1/4/2020  admission is complete. See EPIC admission navigator for prior to admission medications     Medication history sources: Patient's family/friend (daughter) and [employer]  Medication history source reliability: Good  Adherence assessment: Good    Significant changes made to the medication list:  Removed albuterol, dulcolax, levsin, art tears, methadone, morphine, senna s, bactrim, saliva. Changed ditropan, keppra. Added topamax, sumatriptan, propranolol, celebrex from [facility]       Additional medication history information:   Pt is confused right now and not interviewable. She manages her own meds, but her daughter was very knowledgeable about them    Medication reconciliation completed by provider prior to medication history? Yes    Time spent in this activity: 60 minutes      Prior to Admission medications    Medication Sig Last Dose Taking? Auth Provider   acetaminophen (TYLENOL) 325 MG tablet Take 650 mg by mouth every 6 hours as needed for mild pain prn Yes Unknown, Entered By History   celecoxib (CELEBREX) 200 MG capsule Take 200 mg by mouth daily Past Week at Unknown time Yes Unknown, Entered By History   enoxaparin ANTICOAGULANT (LOVENOX) 60 MG/0.6ML syringe Inject 60 mg Subcutaneous daily Past Week at Unknown time Yes Unknown, Entered By History   furosemide (LASIX) 20 MG tablet Take 40 mg by mouth daily 20mg tab x2 = 40mg Past Week at Unknown time Yes Unknown, Entered By History   levETIRAcetam (KEPPRA) 750 MG tablet Take 750 mg by mouth 2 times daily Past Week at Unknown time Yes Unknown, Entered By History   LORazepam (ATIVAN) 2 MG/ML (HIGH CONC) solution Take 1 mg by mouth every 8 hours as needed for anxiety May repeat after an hour if ineffective prn Yes Unknown, Entered By History   ondansetron (ZOFRAN-ODT) 4 MG ODT tab Take 4 mg by mouth 2 times daily as needed for nausea or vomiting prn Yes Unknown,  Entered By History   oxybutynin (DITROPAN) 5 MG tablet Take 5 mg by mouth 2 times daily Past Week at Unknown time Yes Unknown, Entered By History   potassium chloride (KLOR-CON) 20 MEQ packet Take 20 mEq by mouth 3 times daily (with meals) With meals  Past Week at Unknown time Yes Unknown, Entered By History   propranolol (INDERAL) 40 MG tablet Take 40 mg by mouth 2 times daily Past Week at Unknown time Yes Unknown, Entered By History   SUMAtriptan (IMITREX) 50 MG tablet Take 50 mg by mouth at onset of headache for migraine Past Week at Unknown time Yes Unknown, Entered By History   topiramate (TOPAMAX) 25 MG tablet Take 25 mg by mouth daily Past Week at Unknown time Yes Unknown, Entered By History   traZODone (DESYREL) 50 MG tablet Take 100 mg by mouth At Bedtime 50mg tab x2 = 100mg Past Week at Unknown time Yes Unknown, Entered By History   zolpidem (AMBIEN) 10 MG tablet Take 10 mg by mouth At Bedtime Past Week at Unknown time Yes Unknown, Entered By History   Talked to Gwendolyn home care nurse . Called [facility] pharmacy for topamax, imitrex, propranolol, lovenox, celebrex details. She has not been compliant with fills of propranolol, lovenox. Gwendolyn has a list with differing information because patient does not tell her of any med changes per Gwendolyn. PLEASE NOTE lovenox is prescribed as 60 mg subcutaneous q12h. Added back albuterol nebs and tears based on Gwendolyn's list. Mukul Gama [facility]

## 2020-01-04 NOTE — CONSULTS
ID consult dictated IMP 1 55 yo female para, now ? Sz, mental status changes, not clear infection    REc await [facility] cxs, follow fever/sxs, broad ABX for now, not clear how tointerpret L hip imaging

## 2020-01-04 NOTE — PROVIDER NOTIFICATION
RT unable to draw blood for ABG and requested MD be paged to see if venous blood gas can be ordered. Order received.

## 2020-01-04 NOTE — PROGRESS NOTES
"Interim WOC note:  Asked to assist staff with catheterizing patient's neobladder which we were successful at doing about 06:30. Dark nikki, mucosy urine was obtained. Required manual massage over the area after the catheter was inserted to within 1 inch of the hub, to empty completely. Pt also has a colostomy in LUQ and had a closed end pouch on upon arrival which was full. I replaced with [place] 1 piece #[id number] flat drainable with Kaye applied to the back.Stoma is 1\" round, pink with lumen at apex but tips toward 7:00. I spoke with Deb Barragan, regular WOC RN to get input for wound care to chronic non-healing sacral and coccyx ulcers. I removed old dressings, cleansed with NS and repacked the three areas with damp rolled kerlix tucked into two of them which had tunnels present. All wound beds appear pink and moist with minimal slough present and serous drainage present.Covered entire area with large ABD pad, will do daily til seen on Monday by regular WOC staff.Orders placed in Epic for staff. In addition, rather than have staff attempt to catheterize her 4x/day, I placed a 14 FR red catheter into the neobladder and secured it with a stat-lock to her right pelvic region and connected to a mcintyre bag.  "

## 2020-01-04 NOTE — PROGRESS NOTES
MD Notification    Notified Person: MD    Notified Person Name: Lance    Notification Date/Time: 1/4/20 0515    Notification Interaction: Page    Purpose of Notification: Critical lab value blood gases- pH=7.12 and PO2=27    Orders Received:    Comments:

## 2020-01-04 NOTE — PROVIDER NOTIFICATION
MD Notification    Notified Person: MD    Notified Person Name: Lance     Notification Date/Time: 1/4/20 06:15    Notification Interaction: Page    Purpose of Notification: Critical lab value, CO2=10    Orders Received:    Comments:

## 2020-01-04 NOTE — PROCEDURES
Radiology procedure note:     Multiple attempts were made to aspirate fluid from the left hip without success. Landmarks of where the fluid was were used for the procedure. There is near complete destruction the femoral head and neck so contrast was not injected.     Jaycee Taylor, DO  Interventional Radiology

## 2020-01-04 NOTE — H&P
Admitted:     01/04/2020      PRIMARY CARE PHYSICIAN:  Tony Padilla MD       CHIEF COMPLAINT:  Altered mental status.      HISTORY OF PRESENT ILLNESS:  Had been limited due to the patient's altered mental status.  I did review her chart.  She is a transfer from [facility].  I also discussed with her daughter, Amanda, over the phone.      Mrs. 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  The patient lives at home with her daughter.  As mentioned above, she is wheelchair bound due to her paraplegia, but mentation seems to be fine.  Her daughter states that usually she is awake, alert, oriented x4.  Her daughter states that her mom was in her usual state of health on Thursday.  She went to work overnight.  Apparently on Friday morning, her granddaughter noticed the patient to be more confused and disoriented.  When her daughter came back home around 3:00 p.m., the patient was altered.  Her daughter describes as she was not able to wake her up.  There was initial concern for patient taking extra sleeping pills, at home she is on trazodone and Ambien, but her daughter states that only 1 pill of Ambien was missing.  Upon further questioning, her daughter states that on Thursday she did complain of some headache, described as a migraine headache.  Apparently, there were no recent fevers, reported no nausea, no vomiting, no reported chest pain, shortness of breath, no coughing.  There is no reported abdominal pain, nausea, diarrhea, no constipation.      The patient does have a history of seizure disorder.  In 05/2019, she was admitted to  [facility] with status epilepticus.  She was started on Keppra.  Apparently she did see a neurologist recently and was started on Topamax also.  Her daughter is not sure if Topamax was prescribed for seizures versus migraine.  The daughter states that there were no other seizures that she is aware of since 05/2019.  No reported seizure-like activity recently.      In [facility], her vitals seemed to be stable.  Her blood pressure was 115/91.  She had a pulse of 100-120s, respiratory rate was 20, oxygen saturation 99%-100% on room air, temperature is 98.2 orally and 99.2 rectally.  Her vitals remained stable during the ER stay.  She had basic blood work which showed a CBC with white blood cell count of 21.5, hemoglobin 16, hematocrit 48.9 and platelet count 573.  Lactic acid was 3.8.  Her ammonia level was elevated 177.  Her alcohol level was negative.  Lipase 48.  BMP with sodium 137, potassium 3.5, chloride 101, bicarb 9, BUN 50, creatinine 0.82, calcium 9.  LFTs with albumin 3.9, total protein 9.1, total bilirubin 0.7, AST 15, ALT 13.  Glucose was 151.  Her UA from straight catheterization showed yellow cloudy urine with white blood cells 20-50, negative nitrites.  Blood cultures were sent from ER.  Urine toxicology was negative.  Troponin was less than 0.017.  She also had a VBG which showed a low pH of 7.09, pCO2 of 27, pO2 of 49.  She had a CT of the head which did not show any acute abnormalities.  She had a CT of the chest, abdomen and pelvis which showed chronic bladder calculi and wall thickening of the ileal diversion, no sign of ascending infection, infection of the neobladder is not excluded.  There is a chronic-appearing but new from comparison  of a subcapital femoral neck fracture of the chronically neuropathic left hip with some fluid and presumed synovial thickening that extends to the left ischial decubitus ulcer and septic arthritis cannot be excluded.  Given her  elevated ammonia level, NG tube was placed, 1 dose of lactulose was given in ER.  She was also given 1 dose of cefepime and 1 dose of vancomycin and she was transferred to [facility].      I saw the patient after she arrived on station [id number].  The patient remains altered, able to wake up, but falls back to sleep.  She is paraplegic.  She seems to move both upper extremities.      Blood work that was done after she arrived here showed again the low pH on ABG of 7.12, pCO2 of 34, pO2 of 27, bicarbonate of 11.  Her BMP with sodium of 144, potassium 2.9, chloride 120, bicarbonate 10.  Her BUN was 30, creatinine 0.5.  LFTs with albumin 2.9, total protein 6.9.  Total bilirubin 0.5, alkaline phosphatase 206, ALT 11, AST 14.  Repeat ammonia level is 46.  CBC with white blood cells 9.5, hemoglobin 13, hematocrit 38.3 and platelet count 52.      PAST MEDICAL HISTORY:   1.  Paraplegia following a spinal cord injury.   2.  Decubitus ulcers.   3.  History of osteomyelitis of the hip.   4.  Neurogenic bladder, status post ileal diversion and neobladder.   5.  Insomnia.   6.  Gastroesophageal reflux disease.   7.  Depression.   8.  Migraine.   9.  Seizure disorders.   10.  Chronic anemia.   11.  History of portal vein thrombosis.      PAST SURGICAL HISTORY:   Past Surgical History:   Procedure Laterality Date     APPENDECTOMY       ARTHROTOMY HIP  4/14/2014    Procedure: Right Proximal  Femur Partial Resection,  Closure;  Surgeon: Roman Villegas MD;  Location: [facility]     BACK SURGERY  1991    stabilization of T12-L1 fracture     BRONCHOSCOPY FLEXIBLE N/A 12/20/2018    Procedure: BRONCHOSCOPY FLEXIBLE;  Surgeon: Mitchell Dominguez MD;  Location:  GI     C SKIN ALLOGRFT, TRNK/ARM/LEG <100SQCM  1992     CHOLECYSTECTOMY       COLONOSCOPY N/A 10/20/2014    Procedure: COLONOSCOPY;  Surgeon: Mike Barnett MD;  Location:  GI     COMBINED IRRIGATION AND DEBRIDEMENT HIP WITH FLAP CLOSURE  1/15/2014     Procedure: COMBINED IRRIGATION AND DEBRIDEMENT HIP WITH FLAP CLOSURE;  Right Trochantric Irrigation and Debridement,  VAC Placement and Right Ishial I&D with wound dressing applied.;  Surgeon: Penny Pulido MD;  [facility] OR     COMBINED IRRIGATION AND DEBRIDEMENT HIP WITH FLAP CLOSURE  4/14/2014    Procedure: Closure of Right Trochanteric Decubutus;  Surgeon: Penny Pulido MD;  [facility]: [facility] OR     CYSTOSCOPY FLEXIBLE N/A 8/30/2017    Procedure: CYSTOSCOPY FLEXIBLE;;  Surgeon: Russ Cristobal MD;  Location: [facility] OR     CYSTOSCOPY, CYSTOGRAM, COMBINED  9/16/2013    Procedure: COMBINED CYSTOSCOPY, CYSTOGRAM;  cystoscopy under anesthesia with cystogram;  Surgeon: Russ Cristobal MD;  Location:  OR     ESOPHAGOSCOPY, GASTROSCOPY, DUODENOSCOPY (EGD), COMBINED N/A 2/22/2017    Procedure: COMBINED ESOPHAGOSCOPY, GASTROSCOPY, DUODENOSCOPY (EGD);  Surgeon: Yosi Jeronimo DO;  Location:  GI     ESOPHAGOSCOPY, GASTROSCOPY, DUODENOSCOPY (EGD), COMBINED N/A 4/11/2017    Procedure: COMBINED ENDOSCOPIC ULTRASOUND, ESOPHAGOSCOPY, GASTROSCOPY, DUODENOSCOPY (EGD), FINE NEEDLE ASPIRATE/BIOPSY;  Surgeon: Taina Quarles MD;  Location:  GI     ILEAL DIVERSION  10/21/2013    Procedure: ILEAL DIVERSION;  CONTINENT URINARY DIVERSION WITH CATHETERIZABLE STOMA , RIGHT SALPHINGO-OOPHORECTOMY;  Surgeon: Russ Cristobal MD;  Location:  OR     INCISION AND DRAINAGE DECUBITUS WOUND, COMBINED N/A 2/18/2017    Procedure: COMBINED INCISION AND DRAINAGE DECUBITUS WOUND;  Surgeon: Sanjana Ladd MD;  Location: [facility] OR     IRRIGATION AND DEBRIDEMENT DECUBITUS WOUND, COMBINED  10/1/2012    Procedure: COMBINED IRRIGATION AND DEBRIDEMENT DECUBITUS WOUND;  Irrigation and Debridement of Bilateral Ischial Tuberosity Ulcers with Wound Vac Placement;  Surgeon: Roman Villegas MD;  Location: [facility] OR     IRRIGATION AND DEBRIDEMENT HIP, COMBINED  5/22/13    [facility]      LASER HOLMIUM  LITHOTRIPSY BLADDER N/A 8/30/2017    Procedure: LASER HOLMIUM LITHOTRIPSY BLADDER;  FLEXIBLE CYTOSCOPY/ pouchoscopy HOLMIUM LASER LITHOTRIPSY FOR CONTENTIENT URINARY DIVERSION STONES ;  Surgeon: Russ Cristobal MD;  Location: [facility] OR     RESECT FEMUR PROXIMAL WITH ALLOGRAFT  10/1/2012    Procedure: RESECT FEMUR PROXIMAL WITH ALLOGRAFT;  Right Proximal Femur Resection.          SOCIAL HISTORY:  The patient lives with her daughter.  Apparently, no reported smoking, no drug use and drinks alcohol occasionally.      FAMILY HISTORY:   Family History     Problem (# of Occurrences) Relation (Name,Age of Onset)    C.A.D. (1) Father    Cancer (1) Maternal Grandmother: unknown type     Diabetes (2) Father, Brother       Negative family history of: Breast Cancer           PRIOR TO ADMISSION MEDICATIONS:  Needs to be verified by the pharmacy.    Medication Sig Last Dose Taking? Auth Provider   acetaminophen (TYLENOL) 325 MG tablet Take 650 mg by mouth every 6 hours as needed for mild pain prn Yes Unknown, Entered By History   celecoxib (CELEBREX) 200 MG capsule Take 200 mg by mouth daily Past Week at Unknown time Yes Unknown, Entered By History   enoxaparin ANTICOAGULANT (LOVENOX) 60 MG/0.6ML syringe Inject 60 mg Subcutaneous daily Past Week at Unknown time Yes Unknown, Entered By History   furosemide (LASIX) 20 MG tablet Take 40 mg by mouth daily 20mg tab x2 = 40mg Past Week at Unknown time Yes Unknown, Entered By History   levETIRAcetam (KEPPRA) 750 MG tablet Take 750 mg by mouth 2 times daily Past Week at Unknown time Yes Unknown, Entered By History   LORazepam (ATIVAN) 2 MG/ML (HIGH CONC) solution Take 1 mg by mouth every 8 hours as needed for anxiety May repeat after an hour if ineffective prn Yes Unknown, Entered By History   ondansetron (ZOFRAN-ODT) 4 MG ODT tab Take 4 mg by mouth 2 times daily as needed for nausea or vomiting prn Yes Unknown, Entered By History   oxybutynin (DITROPAN) 5 MG tablet Take 5 mg by mouth  "2 times daily Past Week at Unknown time Yes Unknown, Entered By History   potassium chloride (KLOR-CON) 20 MEQ packet Take 20 mEq by mouth 3 times daily (with meals) With meals  Past Week at Unknown time Yes Unknown, Entered By History   propranolol (INDERAL) 40 MG tablet Take 40 mg by mouth 2 times daily Past Week at Unknown time Yes Unknown, Entered By History   SUMAtriptan (IMITREX) 50 MG tablet Take 50 mg by mouth at onset of headache for migraine Past Week at Unknown time Yes Unknown, Entered By History   topiramate (TOPAMAX) 25 MG tablet Take 25 mg by mouth daily Past Week at Unknown time Yes Unknown, Entered By History   traZODone (DESYREL) 50 MG tablet Take 100 mg by mouth At Bedtime 50mg tab x2 = 100mg Past Week at Unknown time Yes Unknown, Entered By History   zolpidem (AMBIEN) 10 MG tablet Take 10 mg by mouth At Bedtime Past Week at Unknown time Yes Unknown, Entered By History          ALLERGIES:    Allergies   Allergen Reactions     Penicillins Anaphylaxis     Patient states it makes her \"climb the walls and hyperactive.\"     Acetaminophen Nausea and Vomiting     Levaquin Rash     Rash only with po Levaquin...able to take IV Levaquin per pt        REVIEW OF SYSTEMS:  A 10-point review of systems was conducted and it was negative except for pertinent positives mentioned in history of present illness.      PHYSICAL EXAMINATION:   VITAL SIGNS:  Blood pressure is 130/60, heart rate 55, respiratory rate 20, oxygen saturation 100% on room air, temperature 98.4.   GENERAL:  The patient is sleepy, able to wake up, but falls back to sleep.  She seems confused.   HEENT:  Normocephalic, atraumatic.  Pupils are equally round and reactive to light.  Oral mucosa is moist.   NECK:  Supple.  No cervical lymphadenopathy, no thyromegaly.   CHEST:  There is bilateral air entry, no wheezing, no rales, no crackles.   CARDIOVASCULAR:  There is normal S1, S2, regular rate and rhythm, no murmurs, no rubs.   ABDOMEN:  Soft, " nontender, nondistended.  Colostomy bag in place with light brown soft stools.   EXTREMITIES:  No leg swelling, no calf tenderness.   NEUROLOGIC:  The patient is altered, unable to follow commands, but she seems to move upper extremities freely.  Unable to perform a full neurological examination.   PSYCHIATRIC EVALUATION:  Unable to assess.   MUSCULOSKELETAL:  She has paraplegia.  No obvious joint deformities.   SKIN:  She has a decubitus ulcer that is covered with a dressing.      LABORATORY DATA:  Reviewed and dictated above.      ASSESSMENT:  Ms. Felicia Ellison is a 54-year-old female with past medical history of flaccid paraplegia following a spinal cord injury, wheelchair bound, decubitus ulcers, osteomyelitis of the right hip, chronic anemia, malnutrition, history of seizure disorder, gastroesophageal reflux disease who initially was brought in to [facility] for evaluation of her altered mental status.  There was concern for sepsis, possibly related to septic arthritis of the right hip and she was transferred to [facility].      PLAN:   1.  Acute encephalopathy, unclear etiology at this time, but highly concerning for seizures.   As per the daughter, the patient is awake, alert, oriented x3 at baseline, she is wheelchair bound.  Apparently on Thursday she was at her baseline, but on Friday morning her granddaughter noticed her to be slightly more confused and later on during the day she was very sleepy and unable to stay awake.  She was taken initially to [place].  There was initial concern for sepsis due to her elevated white blood cells and lactic acid.  Her UA was abnormal and a CT of the abdomen and pelvis showed some fluid, presumed synovial thickening that extends to the left ischial decubitus ulcer and septic arthritis cannot be excluded.  She was given 1 dose of cefepime and 1 dose of vancomycin in [facility] and she was transferred to [facility].  She is noted to be acidotic with a pH of 7.12 and bicarbonate of 11 on ABG and bicarbonate of 10 on a BMP.  She has a history of seizure disorder with admission to [facility] for status epilepticus in 05/2019.  She had maintained on Keppra since then.  Apparently, she was recently started on Topamax by her neurologist for migraine.  She does not have leukocytosis, no fever.  Her lactic acid improved to 2.1.  Her vitals remain stable.  She is not hypoxic, not hypotensive.  We will check a Keppra level.  We will place her on seizure precautions.  Neurology consult requested.  Of note, her alcohol level was negative and urine toxicology was negative as well.   2.  Rule out septic arthritis.   She has a history of osteomyelitis of the right hip.  The details are not clear at this time.  In the [facility], her white blood cells were elevated at 22,000.  Lactic acid was also elevated, so there was a concern for sepsis.  CAT scan of the abdomen with findings as above.  She was given 1 dose of vancomycin and 1 dose of cefepime in ER.  Will continue same antibiotic coverage at this time.  Infectious Disease consult and Ortho consult requested.   3.  Possible urinary tract infection.  She has neobladder and she is able to straight cath herself 4 times daily.  The urinalysis obtained in [facility] was abnormal with white blood cells 20-50.  Again, she is getting cefepime and vancomycin at this time.  We will follow up urine cultures and monitor fever curve and white blood cell trend.   4.  Decubitus ulcers.  Wound care consult requested.   5.  Flaccid paraplegia/wheelchair-bound status.  Supportive care and frequent repositioning.   6.  History of insomnia.  I am holding her prior to admission trazodone and Ambien for now given altered mental status.   7.  Deep vein thrombosis prophylaxis:  Pneumatic compression device.      CODE STATUS:  I was not able to discuss code status at this time given  her altered mental status.  We will order full code as per prior code status orders.  This should be readdressed once her mentation improves.      DISPOSITION:  Admit inpatient.  Anticipate a couple of days of hospitalization.         BRIANNA GA MD             D: 2020   T: 2020   MT: MAITE      Name:     KISHOR PINEDA   MRN:      [id number]        Account:      [id number]   :      1965        Admitted:     2020                   Document: [id number]       cc: Tony Padilla MD
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
## 2020-01-04 NOTE — PROGRESS NOTES
[facility]  1 HR VIDEO  Video EEG dated 1/4/2020    Video EEG was performed without consent due to medical necessity.    Description:  This is a digital EEG performed in the standard international 10-20 electrode system.      Generalized slowing in both hemisphere with frequent delta waves 2-3 Hz.  There are frequent sharp waves seen on the left greater than the right, frontocentral areas.     No normal posterior alpha rhythm or normal sleep structures is present during the study.       Photic stimulation and hyperventilation were omitted. No seizures was present during the study and no seizure or seizure like movement was recorded in the video.  The one-lead EKG was unremarkable.     Impression:      Abnormal EEG with global slowing and frequent epileptiform activity on the both hemisphere, left greater than the right,   frontocentral area.    No seizure event during the study.

## 2020-01-04 NOTE — PROGRESS NOTES
Patient admitted today morning as a transfer from [facility] emergency department.  The patient had an NG tube placed in the emergency room  And  given lactulose due to altered mental status.  Her altered mental status seems to be multifactorial but the possibility of a seizure leading to this cannot be completely ruled out.  Discussed with the neurologist, she does have EEG indicating high probability of seizure.  On admission patient was in metabolic acidosis with increased lactate and ammonia levels.  This could be from sepsis versus a recent seizure.  We will recheck lactate levels, ammonia level and an ABG now.  Patient remained unresponsive during the day, mostly responding only to pain.  Her oxygen saturation is stable in room air, will transfer the patient to [facility] with neuro telemetry and neuro checks every 2 hours, continue seizure precautions, she is currently status post aspiration of her hip, labs are pending, continue cefepime and vancomycin, her urine culture is pending, they have been done at [facility].  Appreciate input from Ortho, neurology and infectious disease.  Continue hydration for now, wait for pending labs.  Transfer orders in place, discussed with the nursing staff.  The patient has an MRI which is pending, there is a plan for video EEG which will start tomorrow.  As per report from family even though the patient was  wheelchair-bound she was alert and oriented x3.

## 2020-01-04 NOTE — PHARMACY-VANCOMYCIN DOSING SERVICE
Pharmacy Vancomycin Initial Note  Date of Service 2020  Patient's  1965  54 year old, female    Indication: Sepsis    Current estimated CrCl = CrCl cannot be calculated (Patient's most recent lab result is older than the maximum 90 days allowed.).    Creatinine for last 3 days  No results found for requested labs within last 72 hours.    Recent Vancomycin Level(s) for last 3 days  No results found for requested labs within last 72 hours.      Vancomycin IV Administrations (past 72 hours)      No vancomycin orders with administrations in past 72 hours.                Nephrotoxins and other renal medications (From now, onward)    Start     Dose/Rate Route Frequency Ordered Stop    20 0500  vancomycin (VANCOCIN) 1000 mg in dextrose 5% 200 mL PREMIX      1,000 mg  200 mL/hr over 1 Hours Intravenous EVERY 12 HOURS 20 0218            Contrast Orders - past 72 hours (72h ago, onward)    None                Plan:  1.  Start vancomycin  1000 mg IV q12h.   2.  Goal Trough Level: 15-20 mg/L   3.  Pharmacy will check trough levels as appropriate in 1-3 Days.    4. Serum creatinine levels will be ordered daily for the first week of therapy and at least twice weekly for subsequent weeks.    5. [place] method utilized to dose vancomycin therapy: Method 1    Rayo Vogel [facility]

## 2020-01-04 NOTE — CONSULTS
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 5.3 mmol/L   Lactic acid whole blood   Result Value Ref Range    Lactic Acid 1.1 0.7 - 2.0 mmol/L   CK total   Result Value Ref Range    CK Total 59 30 - 225 U/L     total face to face time : 70 minutes

## 2020-01-05 ENCOUNTER — APPOINTMENT (OUTPATIENT)
Dept: GENERAL RADIOLOGY | Facility: CLINIC | Age: 55
DRG: 100 | End: 2020-01-05
Attending: HOSPITALIST
Payer: COMMERCIAL

## 2020-01-05 LAB
CREAT SERPL-MCNC: 0.43 MG/DL (ref 0.52–1.04)
GFR SERPL CREATININE-BSD FRML MDRD: >90 ML/MIN/{1.73_M2}
GLUCOSE BLDC GLUCOMTR-MCNC: 121 MG/DL (ref 70–99)
GLUCOSE BLDC GLUCOMTR-MCNC: 132 MG/DL (ref 70–99)
LEVETIRACETAM SERPL-MCNC: 46 UG/ML (ref 12–46)
MAGNESIUM SERPL-MCNC: 2 MG/DL (ref 1.6–2.3)
POTASSIUM SERPL-SCNC: 2.2 MMOL/L (ref 3.4–5.3)
TROPONIN I SERPL-MCNC: 0.02 UG/L (ref 0–0.04)
VANCOMYCIN SERPL-MCNC: 29.1 MG/L

## 2020-01-05 PROCEDURE — 93010 ELECTROCARDIOGRAM REPORT: CPT | Mod: 76 | Performed by: INTERNAL MEDICINE

## 2020-01-05 PROCEDURE — 40000986 XR ABDOMEN PORT 1 VW

## 2020-01-05 PROCEDURE — 83735 ASSAY OF MAGNESIUM: CPT | Performed by: HOSPITALIST

## 2020-01-05 PROCEDURE — 00000146 ZZHCL STATISTIC GLUCOSE BY METER IP

## 2020-01-05 PROCEDURE — 36415 COLL VENOUS BLD VENIPUNCTURE: CPT | Performed by: HOSPITALIST

## 2020-01-05 PROCEDURE — 25800030 ZZH RX IP 258 OP 636: Performed by: PSYCHIATRY & NEUROLOGY

## 2020-01-05 PROCEDURE — 93005 ELECTROCARDIOGRAM TRACING: CPT

## 2020-01-05 PROCEDURE — 95714 VEEG EA 12-26 HR UNMNTR: CPT

## 2020-01-05 PROCEDURE — 25000128 H RX IP 250 OP 636: Performed by: PSYCHIATRY & NEUROLOGY

## 2020-01-05 PROCEDURE — 82565 ASSAY OF CREATININE: CPT | Performed by: HOSPITALIST

## 2020-01-05 PROCEDURE — 84484 ASSAY OF TROPONIN QUANT: CPT | Performed by: HOSPITALIST

## 2020-01-05 PROCEDURE — 95700 EEG CONT REC W/VID EEG TECH: CPT

## 2020-01-05 PROCEDURE — 12000047 ZZH R&B IMCU

## 2020-01-05 PROCEDURE — 25800030 ZZH RX IP 258 OP 636: Performed by: HOSPITALIST

## 2020-01-05 PROCEDURE — 84132 ASSAY OF SERUM POTASSIUM: CPT | Performed by: HOSPITALIST

## 2020-01-05 PROCEDURE — 80177 DRUG SCRN QUAN LEVETIRACETAM: CPT | Performed by: HOSPITALIST

## 2020-01-05 PROCEDURE — 25000128 H RX IP 250 OP 636: Performed by: HOSPITALIST

## 2020-01-05 PROCEDURE — 80202 ASSAY OF VANCOMYCIN: CPT | Performed by: HOSPITALIST

## 2020-01-05 PROCEDURE — 25800030 ZZH RX IP 258 OP 636: Performed by: INTERNAL MEDICINE

## 2020-01-05 PROCEDURE — 99233 SBSQ HOSP IP/OBS HIGH 50: CPT | Performed by: INTERNAL MEDICINE

## 2020-01-05 RX ORDER — POTASSIUM CHLORIDE 1.5 G/1.58G
60 POWDER, FOR SOLUTION ORAL ONCE
Status: DISCONTINUED | OUTPATIENT
Start: 2020-01-05 | End: 2020-01-05

## 2020-01-05 RX ORDER — LIDOCAINE 40 MG/G
CREAM TOPICAL
Status: DISCONTINUED | OUTPATIENT
Start: 2020-01-05 | End: 2020-01-11 | Stop reason: HOSPADM

## 2020-01-05 RX ORDER — DEXTROSE MONOHYDRATE, SODIUM CHLORIDE, AND POTASSIUM CHLORIDE 50; 1.49; 9 G/1000ML; G/1000ML; G/1000ML
INJECTION, SOLUTION INTRAVENOUS CONTINUOUS
Status: DISCONTINUED | OUTPATIENT
Start: 2020-01-05 | End: 2020-01-06

## 2020-01-05 RX ORDER — VANCOMYCIN HYDROCHLORIDE 1 G/200ML
1000 INJECTION, SOLUTION INTRAVENOUS EVERY 24 HOURS
Status: DISCONTINUED | OUTPATIENT
Start: 2020-01-05 | End: 2020-01-07

## 2020-01-05 RX ADMIN — CEFEPIME HYDROCHLORIDE 2 G: 2 INJECTION, POWDER, FOR SOLUTION INTRAVENOUS at 17:23

## 2020-01-05 RX ADMIN — LEVETIRACETAM 1500 MG: 100 INJECTION, SOLUTION INTRAVENOUS at 16:31

## 2020-01-05 RX ADMIN — CEFEPIME HYDROCHLORIDE 2 G: 2 INJECTION, POWDER, FOR SOLUTION INTRAVENOUS at 09:09

## 2020-01-05 RX ADMIN — VANCOMYCIN HYDROCHLORIDE 1000 MG: 1 INJECTION, SOLUTION INTRAVENOUS at 18:20

## 2020-01-05 RX ADMIN — LEVETIRACETAM 1500 MG: 100 INJECTION, SOLUTION INTRAVENOUS at 02:53

## 2020-01-05 RX ADMIN — DEXTROSE AND SODIUM CHLORIDE: 5; 900 INJECTION, SOLUTION INTRAVENOUS at 04:35

## 2020-01-05 RX ADMIN — CEFEPIME HYDROCHLORIDE 2 G: 2 INJECTION, POWDER, FOR SOLUTION INTRAVENOUS at 00:13

## 2020-01-05 RX ADMIN — DEXTROSE AND SODIUM CHLORIDE: 5; 900 INJECTION, SOLUTION INTRAVENOUS at 22:40

## 2020-01-05 RX ADMIN — SODIUM CHLORIDE, POTASSIUM CHLORIDE, SODIUM LACTATE AND CALCIUM CHLORIDE 1000 ML: 600; 310; 30; 20 INJECTION, SOLUTION INTRAVENOUS at 23:23

## 2020-01-05 ASSESSMENT — ACTIVITIES OF DAILY LIVING (ADL)
ADLS_ACUITY_SCORE: 28

## 2020-01-05 NOTE — PROGRESS NOTES
[facility]  Infectious Disease Progress Note          Assessment and Plan:   IMPRESSION:   1.  A 54-year-old female well known to me from prior admissions, currently admitted with altered mental status, question recurrent seizure problems versus a diffuse encephalopathy related to infection -- not entirely clear.   2.  Chronic paraplegia from prior spinal cord injury.   3.  Question current active infection, abnormal urine, new finding in her left hip, chronic wounds, history of aspiration, so multiple possible infection sites, but not entirely clear.  Cultures from the outside hospital are pending.   4.  New finding of left hip fracture, near destroyed head of the joint, question infection versus other causes.   5.  Prior history of right hip infection related to chronic wounds.   6.  Multiple chronic wounds without clear deep infection.   7.  Urinary tract infections and nephrolithiasis previously.   8.  History of aspiration.   9.  Seizure disorder.      RECOMMENDATIONS:   1.  Currently getting broad antibiotics with vancomycin and cefepime.  Await outside cultures and follow fever and clinical symptoms and adjust.   2.  Orthopedics evaluating the hip finding, difficult to know how to interpret this.  Some fluid present but not entirely clear that this is infection.   3.  Where to go with the antibiotics and overall treatment depending on clinical course, cultures, etc. No fever , not awakening, not clear infection, someone call for OSH cxs later today             Interval History:   Not ill looking but not awakening, no cxs here WBC 9500              Medications:       ceFEPIme (MAXIPIME) IV  2 g Intravenous Q8H     levETIRAcetam  1,500 mg Intravenous Q12H     sodium chloride (PF)  10 mL INTRA-ARTICULAR Once     sodium chloride (PF)  3 mL Intracatheter Q8H     sodium chloride (PF)  3 mL Intracatheter Q8H     vancomycin (VANCOCIN) IV  1,000 mg Intravenous Q24H                  Physical  Exam:   Blood pressure 117/58, pulse 118, temperature 98.6  F (37  C), temperature source Axillary, resp. rate 16, weight 48.4 kg (106 lb 11.2 oz), SpO2 98 %, not currently breastfeeding.  Wt Readings from Last 2 Encounters:   01/04/20 48.4 kg (106 lb 11.2 oz)   05/25/19 67.7 kg (149 lb 3.2 oz)     Vital Signs with Ranges  Temp:  [97.6  F (36.4  C)-98.8  F (37.1  C)] 98.6  F (37  C)  Pulse:  [] 118  Resp:  [16-20] 16  BP: (117-137)/(58-80) 117/58  SpO2:  [98 %-100 %] 98 %    Constitutional: Not awakening   Lungs: Clear to auscultation bilaterally, no crackles or wheezing   Cardiovascular: Regular rate and rhythm, normal S1 and S2, and no murmur noted   Abdomen: Normal bowel sounds, soft, non-distended, non-tender   Skin: No rashes, no cyanosis, no edema wds not seen   Other:           Data:   All microbiology laboratory data reviewed.  Recent Labs   Lab Test 01/04/20  0549 05/26/19  0931 05/25/19  0945   WBC 9.5 5.0 7.2   HGB 13.0 9.5* 9.3*   HCT 38.3 29.1* 28.5*   MCV 82 85 84   PLT 52* 99* 112*     Recent Labs   Lab Test 01/05/20  0513 01/04/20  0549 05/24/19  0922   CR 0.43* 0.50* 0.34*     Recent Labs   Lab Test 02/20/17  1745   SED 61*     Recent Labs   Lab Test 05/24/19  1115 05/19/19  0400 12/31/18  1452 12/31/18  1442 12/20/18  1149 12/19/18  1425 12/19/18  1250 12/16/18  0920 12/12/18  2120   CULT <10,000 colonies/mL  Pseudomonas aeruginosa  *  >100,000 colonies/mL  Enterococcus faecalis  * >100,000 colonies/mL  Klebsiella oxytoca  Susceptibility testing in progress  *  10,000 to 50,000 colonies/mL  Non lactose fermenting gram negative rods  *  10,000 to 50,000 colonies/mL  Gram negative rods  *  10,000 to 50,000 colonies/mL  Gram positive cocci  *  10,000 to 50,000 colonies/mL  Gram positive cocci in pairs and chains  *  Susceptibility testing not routinely done  No further identification   Light growth  Candida albicans / dubliniensis  Candida albicans and Candida dubliniensis are not  routinely speciated  Susceptibility testing not routinely done  * No growth Culture negative for acid fast bacilli  Assayed at [facility] [address] [place] [id number] [phone]  No growth after 4 weeks  Candida albicans / dubliniensis  isolated  Candida albicans and Candida dubliniensis are not routinely speciated  *  No additional fungi cultured after 4 weeks incubation  No Legionella species isolated  Light growth  Candida albicans / dubliniensis  Candida albicans and Candida dubliniensis are not routinely speciated  Susceptibility testing not routinely done  * No growth No growth No growth No growth

## 2020-01-05 NOTE — PROGRESS NOTES
Report given to the assigned RN, Pt is transferring to 73. Will notify pt's daughter about pt being transferred to 73 w/ her belonging.

## 2020-01-05 NOTE — PROGRESS NOTES
[facility]    Hospitalist Progress Note    Assessment & Plan   Felicia Ellison is a 54 year old female who was admitted on 1/4/2020.  She has a history of flaccid paraplegia and being wheelchair-bound presented to  [facility] as a direct admit from [facility] for evaluation of altered mental status.  Acute encephalopathy, unclear etiology at this time, but highly concerning for seizures  --Referred EEG showing left greater than right bihemispheric frequent sharp waves indicating epileptiform activity.  --Appreciate neurology input, patient was transferred 1/4 to neuro telemetry with plans for video EEG today.  --MRI pending  --Patient was noticed to have a bilateral upper extremity weakness, I talked to the daughter who mentions that she has some weakness in her bilateral upper extremities but currently patient is barely obeying commands, difficult to assess strength, pending MRI.  --Ammonia levels are better now, she has an NG in place, will leave that there for now until she is more awake in case we need to do any oral medications.  --Lactic acidosis has improved, lactate levels back to normal, Keppra levels higher limit of normal, Keppra is now 1500 mg.   --Considering most  antibiotics used for UTI lower seizure threshold ,will have to continue cephalosporins for now.  Possible septic arthritis of left hip  --Status post IR aspiration of left hip, pending cultures  --Continue antibiotics for now.  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    Tasha Way MD  Text Page   (7am to 6pm)    Interval History   Patient is more alert today, she does not obey commands, she does have some weakness on bilateral upper extremities, she does answer questions with I am fine, as per her daughter this is similar to when she had seizure in the past, she denied having any fever at home for the patient, daughter has the flu, they mentioned that the patient had headache and had 1 episode of vomiting on the day prior to this admission.    -Data reviewed today: I reviewed all new labs and imaging results over the last 24 hours.    Physical Exam   Temp: 98.3  F (36.8  C) Temp src: Axillary BP: 123/77 Pulse: 111   Resp: 22 SpO2: 100 % O2 Device: None (Room air)    Vitals:    01/04/20 0051   Weight: 48.4 kg (106 lb 11.2 oz)     Vital Signs with Ranges  Temp:  [97.6  F (36.4  C)-98.8  F (37.1  C)] 98.3  F (36.8  C)  Pulse:  [] 111  Resp:  [16-24] 22  BP: (117-137)/(58-80) 123/77  SpO2:  [98 %-100 %] 100 %  I/O last 3 completed shifts:  In: 341 [I.V.:341]  Out: 2375 [Urine:2375]    Constitutional: Awake, keeps on saying I am fine, does not obey commands  Respiratory: Clear to auscultation bilaterally, no crackles or wheezing  Cardiovascular: Regular rate and rhythm, normal S1 and S2, and no murmur noted  GI: Normal bowel sounds, she has a neobladder, Ambrose present  Skin/Integumen: No rashes, no cyanosis, no edema  Neuro : Paraplegic, currently noticed significant weakness bilateral upper extremities    Medications     dextrose 5% and 0.9% NaCl 75 mL/hr at 01/05/20 0435       ceFEPIme (MAXIPIME) IV  2 g Intravenous Q8H     levETIRAcetam  1,500 mg Intravenous Q12H     sodium chloride (PF)  10 mL INTRA-ARTICULAR Once     sodium chloride (PF)  3 mL  Intracatheter Q8H     sodium chloride (PF)  3 mL Intracatheter Q8H     vancomycin (VANCOCIN) IV  1,000 mg Intravenous Q24H       Data   Recent Labs   Lab 01/05/20  0513 01/04/20  1753 01/04/20  0549   WBC  --   --  9.5   HGB  --   --  13.0   MCV  --   --  82   PLT  --   --  52*   NA  --   --  144   POTASSIUM  --  3.9 2.9*   CHLORIDE  --   --  120*   CO2  --   --  10*   BUN  --   --  30   CR 0.43*  --  0.50*   ANIONGAP  --   --  14   JOSH  --   --  8.1*   GLC  --   --  140*   ALBUMIN  --   --  2.9*   PROTTOTAL  --   --  6.9   BILITOTAL  --   --  0.5   ALKPHOS  --   --  206*   ALT  --   --  11   AST  --   --  14     Recent Labs   Lab 01/05/20  1033 01/04/20  0549   GLC  --  140*   *  --        Imaging:   Recent Results (from the past 24 hour(s))   XR Pelvis 1/2 Views    Narrative    XR PELVIS 1/2 VW 1/4/2020 2:38 PM     HISTORY: hx of left hip fracture. ? septic arthritis.    COMPARISON: 5/24/2019      Impression    IMPRESSION: Chronic fracture of the left femoral neck. The  intertrochanteric region is displaced superiorly and laterally.  Resection or resorption of the right femur level of the proximal  diaphysis. Remodeling of the right acetabulum and ischium. Osteopenia.  Catheter in the pelvis. Surgical clips in the pelvis and right thigh.  Findings appear unchanged in the interval.    DAVEY COLES MD

## 2020-01-05 NOTE — PROVIDER NOTIFICATION
Called talked to  Ros[place], Pharmacist, verifying ok to give vanco dose at 1800 with critical level this AM. Ok to give per Rosetta, recheck vancomycin levels after 2 doses.

## 2020-01-05 NOTE — PROGRESS NOTES
IV team called stating that ID is following & may need to await approval before midline can be placed. BC still pending from [place]. Working IV currently--placed by flying lovelace. Will await for ID clearance before placing midline.

## 2020-01-05 NOTE — PROVIDER NOTIFICATION
"Paged Dr. Nash, \"L pupil looks slightly larger than R 5mm L & 4mm R. Pupil more awake now. Able to state no & I'm fine. Still no movement in UEs, no withdrawal in UE. With say \"ow\" after a few secs to painful stimuli. Any concerns?\"  "

## 2020-01-05 NOTE — PROGRESS NOTES
Talked with Dr Nash and she wants to hold on MRI till she comes in and see's the patient and reads EEG. Spoke with tobi in MRI to update them.

## 2020-01-05 NOTE — PLAN OF CARE
Pt is lethargic/ obtunded, EVA orientation, pt did not respond to any assessment. Per daughter (via phone) pt has been A&O and able to do her activity independently except ambulation d/t paraplegic prior to 2 days. Ambrose patent w/ adequate output. Colostomy patent w/ small amt of soft stool, wound care done, NG tube patent. On cont. Pulseox, O2 sat % on RA. IVF infusing, potassium replaced this shift. Repositioned Q2H, provided oral care. Will continue to monitor.

## 2020-01-05 NOTE — PLAN OF CARE
Orientation:  Pt obtunded & lethargic this morning (required painful & vigorous stimuli to arouse)--continued to improve throughout the afternoon. Lethargic but now able to arouse to voice. EVA orientation d/t minimal speech--now able to state her birthday & name of her kids. More SLP around 1300--perseverating her responses.  ADDENDUM [id number]: More alert. More talkative. Still perseverates intermittently. A/Ox3, disoriented to time.   Neuros: Very limited neuro exam. Pupils--hippus bilaterally. 0900 assessment L>R. Difficult to assess because pt pinches eyes close. Continues to have no movement to painful stimuli to BUEs (updated neuro & hospitalist). Intermittent tremor is forearm.   Baseline paraplegia in BLE. R facial asymmetry dentures in place.   ADDENDUM [id number]: Localized movement 2/5 in UEs. Moderate hand grasp in R hand. Absent in L hand.   VS: Stable ex tachycardic. On RA. RR 16-24.  Tele: ST with occassional bigeminal PVCs  Diet: NPO--SLP to evaluate tomorrow. Oral care. Has NG in, per Dr. Way leave in until tomorrow & will evaluate if still needed.   Activity: Bedrest. Turn q2h. Avoiding supine positioning d/t bedsores.   Skin: 3 Tunneled pressure injuries--repacked & redressed. Pulsate mattress  & heel protective boots in place.   GI: Colostomy. Red/pink protruding. WNL Small amount of stool in bag.  : Neobladder with catheter in place continuously. Replaced today with flying squad.  Large mucous, sediment, & purulent urine. Needs frequent monitoring.   Plan for MRI once cleared by neurologist today. Awaiting for report on EEG.   ADDENDUM [id number]: Ok to get MRI today.

## 2020-01-05 NOTE — PROVIDER NOTIFICATION
Pt noted to have new hippus in both eyes. Paged Dr. Nash with general neurology.     Talked with Dr. Nash, pupils are equal still so no new orders at this time. Continue to monitor respiratory status & pupils. Discussed that pt does not withdraw in UE to painful stimuli but does grimace & pt not tracking eyes--will have eyes open slightly but doesn't track around room--no changes from yesterday assessment per Dr. Nash. Plan for continuous EEG today.

## 2020-01-05 NOTE — PLAN OF CARE
Pt here with seizures and UTI. Obtunded, lethargic Neuros extremely difficult to assess, not responding to pain on BUE at times, slight contraction to BLE, not blinking to threat bilaterally, paraplegic at baseline. VSS, except tachycardia. Tele ST, intermittent bigeminal PVC's. NPO diet. Has NG in place, currently clamped and did not use this shift. Bedrest this shift, turn and repo Q2. Tunneled wounds to buttocks, dressings in place. Neobladder with catheter in place, draining well when massaged. Urine with sediment. Colostomy, minimal output. No nonverbal signs of pain. Pulsate mattress should arrive today. Pt scoring green on the Aggression Stop Light Tool. Plan repeat EEG today. Discharge pending.

## 2020-01-05 NOTE — PROVIDER NOTIFICATION
"Paged Dr. Way, \"Please see patient care order recommendations for neobladder. Flying squad RN assisted to replace catheter & required flushing with saline & abdomen massage- sludge purulent sediment urine. Preliminary UC faxed & in chart. Please advise if further recommendations.\"  "

## 2020-01-05 NOTE — PROVIDER NOTIFICATION
"Paged Dr. Way, \"3 ivs infiltrated on pt since overnight. Difficult stick. Have one currently. Do you think she would be appropriate for a midline. Thanks\"  "

## 2020-01-05 NOTE — PROVIDER NOTIFICATION
Paged Dr. Nash with neurology asking if EEG has been reviewed to ensure pt is stable to go down to MRI. Awaiting call back.     Dr. Nash reviewed EEG, not currently in status. Ok for MRI. Called MRI & they will call when available.

## 2020-01-05 NOTE — PROGRESS NOTES
[facility]  Day 1 started @ 2870. Ordered by Dr. aNsh. Explained procedure to patient  prior to hook up.  Video Observation initiated, patient informed. Dr Nash stated video and audio were medically necessary for testing    Halima Dougherty

## 2020-01-06 ENCOUNTER — APPOINTMENT (OUTPATIENT)
Dept: SPEECH THERAPY | Facility: CLINIC | Age: 55
DRG: 100 | End: 2020-01-06
Attending: NURSE PRACTITIONER
Payer: COMMERCIAL

## 2020-01-06 ENCOUNTER — APPOINTMENT (OUTPATIENT)
Dept: MRI IMAGING | Facility: CLINIC | Age: 55
DRG: 100 | End: 2020-01-06
Attending: NURSE PRACTITIONER
Payer: COMMERCIAL

## 2020-01-06 ENCOUNTER — APPOINTMENT (OUTPATIENT)
Dept: CT IMAGING | Facility: CLINIC | Age: 55
DRG: 100 | End: 2020-01-06
Attending: NURSE PRACTITIONER
Payer: COMMERCIAL

## 2020-01-06 ENCOUNTER — APPOINTMENT (OUTPATIENT)
Dept: GENERAL RADIOLOGY | Facility: CLINIC | Age: 55
DRG: 100 | End: 2020-01-06
Payer: COMMERCIAL

## 2020-01-06 LAB
ALBUMIN SERPL-MCNC: 2.6 G/DL (ref 3.4–5)
ALP SERPL-CCNC: 156 U/L (ref 40–150)
ALT SERPL W P-5'-P-CCNC: 8 U/L (ref 0–50)
AMMONIA PLAS-SCNC: 40 UMOL/L (ref 10–50)
AMMONIA PLAS-SCNC: 52 UMOL/L (ref 10–50)
ANION GAP SERPL CALCULATED.3IONS-SCNC: 10 MMOL/L (ref 3–14)
ANION GAP SERPL CALCULATED.3IONS-SCNC: 8 MMOL/L (ref 3–14)
AST SERPL W P-5'-P-CCNC: 10 U/L (ref 0–45)
BASE DEFICIT BLDV-SCNC: 8.8 MMOL/L
BILIRUB SERPL-MCNC: 0.7 MG/DL (ref 0.2–1.3)
BUN SERPL-MCNC: 12 MG/DL (ref 7–30)
BUN SERPL-MCNC: 12 MG/DL (ref 7–30)
CALCIUM SERPL-MCNC: 8.3 MG/DL (ref 8.5–10.1)
CALCIUM SERPL-MCNC: 8.7 MG/DL (ref 8.5–10.1)
CHLORIDE SERPL-SCNC: 129 MMOL/L (ref 94–109)
CHLORIDE SERPL-SCNC: 131 MMOL/L (ref 94–109)
CO2 SERPL-SCNC: 16 MMOL/L (ref 20–32)
CO2 SERPL-SCNC: 18 MMOL/L (ref 20–32)
CREAT SERPL-MCNC: 0.4 MG/DL (ref 0.52–1.04)
CREAT SERPL-MCNC: 0.49 MG/DL (ref 0.52–1.04)
ERYTHROCYTE [DISTWIDTH] IN BLOOD BY AUTOMATED COUNT: 16.5 % (ref 10–15)
GFR SERPL CREATININE-BSD FRML MDRD: >90 ML/MIN/{1.73_M2}
GFR SERPL CREATININE-BSD FRML MDRD: >90 ML/MIN/{1.73_M2}
GLUCOSE BLDC GLUCOMTR-MCNC: 110 MG/DL (ref 70–99)
GLUCOSE BLDC GLUCOMTR-MCNC: 110 MG/DL (ref 70–99)
GLUCOSE BLDC GLUCOMTR-MCNC: 115 MG/DL (ref 70–99)
GLUCOSE BLDC GLUCOMTR-MCNC: 122 MG/DL (ref 70–99)
GLUCOSE BLDC GLUCOMTR-MCNC: 127 MG/DL (ref 70–99)
GLUCOSE BLDC GLUCOMTR-MCNC: 99 MG/DL (ref 70–99)
GLUCOSE SERPL-MCNC: 118 MG/DL (ref 70–99)
GLUCOSE SERPL-MCNC: 130 MG/DL (ref 70–99)
HCO3 BLDV-SCNC: 15 MMOL/L (ref 21–28)
HCT VFR BLD AUTO: 36.2 % (ref 35–47)
HGB BLD-MCNC: 12.3 G/DL (ref 11.7–15.7)
INR PPP: 1.64 (ref 0.86–1.14)
LACTATE BLD-SCNC: 2.1 MMOL/L (ref 0.7–2)
LEVETIRACETAM SERPL-MCNC: 95 UG/ML (ref 12–46)
MCH RBC QN AUTO: 27.1 PG (ref 26.5–33)
MCHC RBC AUTO-ENTMCNC: 34 G/DL (ref 31.5–36.5)
MCV RBC AUTO: 80 FL (ref 78–100)
O2/TOTAL GAS SETTING VFR VENT: ABNORMAL %
OXYHGB MFR BLDV: 75 %
PCO2 BLDV: 27 MM HG (ref 40–50)
PH BLDV: 7.36 PH (ref 7.32–7.43)
PLATELET # BLD AUTO: 162 10E9/L (ref 150–450)
PO2 BLDV: 39 MM HG (ref 25–47)
POTASSIUM SERPL-SCNC: 2.2 MMOL/L (ref 3.4–5.3)
POTASSIUM SERPL-SCNC: 2.8 MMOL/L (ref 3.4–5.3)
POTASSIUM SERPL-SCNC: 3.6 MMOL/L (ref 3.4–5.3)
PROCALCITONIN SERPL-MCNC: <0.05 NG/ML
PROT SERPL-MCNC: 6.3 G/DL (ref 6.8–8.8)
RBC # BLD AUTO: 4.54 10E12/L (ref 3.8–5.2)
SODIUM SERPL-SCNC: 147 MMOL/L (ref 133–144)
SODIUM SERPL-SCNC: 155 MMOL/L (ref 133–144)
SODIUM SERPL-SCNC: 156 MMOL/L (ref 133–144)
SODIUM SERPL-SCNC: 157 MMOL/L (ref 133–144)
TROPONIN I SERPL-MCNC: 0.01 UG/L (ref 0–0.04)
TROPONIN I SERPL-MCNC: 0.02 UG/L (ref 0–0.04)
WBC # BLD AUTO: 7.3 10E9/L (ref 4–11)

## 2020-01-06 PROCEDURE — 25000128 H RX IP 250 OP 636: Performed by: NURSE PRACTITIONER

## 2020-01-06 PROCEDURE — G0463 HOSPITAL OUTPT CLINIC VISIT: HCPCS

## 2020-01-06 PROCEDURE — 25800030 ZZH RX IP 258 OP 636

## 2020-01-06 PROCEDURE — 25800030 ZZH RX IP 258 OP 636: Performed by: INTERNAL MEDICINE

## 2020-01-06 PROCEDURE — 82805 BLOOD GASES W/O2 SATURATION: CPT | Performed by: NURSE PRACTITIONER

## 2020-01-06 PROCEDURE — 84484 ASSAY OF TROPONIN QUANT: CPT | Performed by: HOSPITALIST

## 2020-01-06 PROCEDURE — 84484 ASSAY OF TROPONIN QUANT: CPT

## 2020-01-06 PROCEDURE — 84145 PROCALCITONIN (PCT): CPT | Performed by: NURSE PRACTITIONER

## 2020-01-06 PROCEDURE — 80177 DRUG SCRN QUAN LEVETIRACETAM: CPT | Performed by: HOSPITALIST

## 2020-01-06 PROCEDURE — 93005 ELECTROCARDIOGRAM TRACING: CPT

## 2020-01-06 PROCEDURE — 25800025 ZZH RX 258: Performed by: NURSE PRACTITIONER

## 2020-01-06 PROCEDURE — 85610 PROTHROMBIN TIME: CPT | Performed by: NURSE PRACTITIONER

## 2020-01-06 PROCEDURE — 40000061 ZZH STATISTIC EEG TIME EA 10 MIN

## 2020-01-06 PROCEDURE — 25000128 H RX IP 250 OP 636: Performed by: HOSPITALIST

## 2020-01-06 PROCEDURE — 70450 CT HEAD/BRAIN W/O DYE: CPT

## 2020-01-06 PROCEDURE — 99291 CRITICAL CARE FIRST HOUR: CPT | Performed by: NURSE PRACTITIONER

## 2020-01-06 PROCEDURE — 82140 ASSAY OF AMMONIA: CPT

## 2020-01-06 PROCEDURE — 84132 ASSAY OF SERUM POTASSIUM: CPT | Performed by: INTERNAL MEDICINE

## 2020-01-06 PROCEDURE — 85027 COMPLETE CBC AUTOMATED: CPT | Performed by: NURSE PRACTITIONER

## 2020-01-06 PROCEDURE — 93010 ELECTROCARDIOGRAM REPORT: CPT | Mod: 76 | Performed by: INTERNAL MEDICINE

## 2020-01-06 PROCEDURE — 40000901 ZZH STATISTIC WOC PT EDUCATION, 0-15 MIN

## 2020-01-06 PROCEDURE — A9585 GADOBUTROL INJECTION: HCPCS | Performed by: HOSPITALIST

## 2020-01-06 PROCEDURE — 00000146 ZZHCL STATISTIC GLUCOSE BY METER IP

## 2020-01-06 PROCEDURE — 36556 INSERT NON-TUNNEL CV CATH: CPT | Mod: LT

## 2020-01-06 PROCEDURE — 36415 COLL VENOUS BLD VENIPUNCTURE: CPT | Performed by: NURSE PRACTITIONER

## 2020-01-06 PROCEDURE — 25000132 ZZH RX MED GY IP 250 OP 250 PS 637: Performed by: NURSE PRACTITIONER

## 2020-01-06 PROCEDURE — 99233 SBSQ HOSP IP/OBS HIGH 50: CPT | Performed by: INTERNAL MEDICINE

## 2020-01-06 PROCEDURE — 25800030 ZZH RX IP 258 OP 636: Performed by: HOSPITALIST

## 2020-01-06 PROCEDURE — 40000986 XR CHEST PORT 1 VW

## 2020-01-06 PROCEDURE — 92526 ORAL FUNCTION THERAPY: CPT | Mod: GN

## 2020-01-06 PROCEDURE — 80048 BASIC METABOLIC PNL TOTAL CA: CPT

## 2020-01-06 PROCEDURE — 25800030 ZZH RX IP 258 OP 636: Performed by: NURSE PRACTITIONER

## 2020-01-06 PROCEDURE — 84295 ASSAY OF SERUM SODIUM: CPT | Performed by: INTERNAL MEDICINE

## 2020-01-06 PROCEDURE — 80053 COMPREHEN METABOLIC PANEL: CPT | Performed by: NURSE PRACTITIONER

## 2020-01-06 PROCEDURE — 82140 ASSAY OF AMMONIA: CPT | Performed by: NURSE PRACTITIONER

## 2020-01-06 PROCEDURE — 92610 EVALUATE SWALLOWING FUNCTION: CPT | Mod: GN

## 2020-01-06 PROCEDURE — 20000003 ZZH R&B ICU

## 2020-01-06 PROCEDURE — 70553 MRI BRAIN STEM W/O & W/DYE: CPT

## 2020-01-06 PROCEDURE — 84484 ASSAY OF TROPONIN QUANT: CPT | Performed by: NURSE PRACTITIONER

## 2020-01-06 PROCEDURE — 25500064 ZZH RX 255 OP 636: Performed by: HOSPITALIST

## 2020-01-06 PROCEDURE — 83605 ASSAY OF LACTIC ACID: CPT | Performed by: NURSE PRACTITIONER

## 2020-01-06 RX ORDER — SODIUM CHLORIDE 9 MG/ML
INJECTION, SOLUTION INTRAVENOUS CONTINUOUS
Status: DISCONTINUED | OUTPATIENT
Start: 2020-01-06 | End: 2020-01-06

## 2020-01-06 RX ORDER — ALBUTEROL SULFATE 0.83 MG/ML
2.5 SOLUTION RESPIRATORY (INHALATION)
COMMUNITY
End: 2020-12-02

## 2020-01-06 RX ORDER — GADOBUTROL 604.72 MG/ML
5 INJECTION INTRAVENOUS ONCE
Status: COMPLETED | OUTPATIENT
Start: 2020-01-06 | End: 2020-01-06

## 2020-01-06 RX ORDER — MAGNESIUM SULFATE HEPTAHYDRATE 40 MG/ML
4 INJECTION, SOLUTION INTRAVENOUS EVERY 4 HOURS PRN
Status: DISCONTINUED | OUTPATIENT
Start: 2020-01-06 | End: 2020-01-11 | Stop reason: HOSPADM

## 2020-01-06 RX ORDER — MAGNESIUM SULFATE HEPTAHYDRATE 40 MG/ML
2 INJECTION, SOLUTION INTRAVENOUS DAILY PRN
Status: DISCONTINUED | OUTPATIENT
Start: 2020-01-06 | End: 2020-01-11 | Stop reason: HOSPADM

## 2020-01-06 RX ORDER — DEXTROSE MONOHYDRATE, SODIUM CHLORIDE, AND POTASSIUM CHLORIDE 50; 1.49; 4.5 G/1000ML; G/1000ML; G/1000ML
INJECTION, SOLUTION INTRAVENOUS CONTINUOUS
Status: DISCONTINUED | OUTPATIENT
Start: 2020-01-06 | End: 2020-01-09

## 2020-01-06 RX ADMIN — POTASSIUM CHLORIDE, DEXTROSE MONOHYDRATE AND SODIUM CHLORIDE: 150; 5; 900 INJECTION, SOLUTION INTRAVENOUS at 05:10

## 2020-01-06 RX ADMIN — SODIUM CHLORIDE: 9 INJECTION, SOLUTION INTRAVENOUS at 03:08

## 2020-01-06 RX ADMIN — VANCOMYCIN HYDROCHLORIDE 1000 MG: 1 INJECTION, SOLUTION INTRAVENOUS at 17:57

## 2020-01-06 RX ADMIN — LEVETIRACETAM 1500 MG: 100 INJECTION, SOLUTION INTRAVENOUS at 17:54

## 2020-01-06 RX ADMIN — LEVETIRACETAM 1500 MG: 100 INJECTION, SOLUTION INTRAVENOUS at 03:48

## 2020-01-06 RX ADMIN — CEFEPIME HYDROCHLORIDE 2 G: 2 INJECTION, POWDER, FOR SOLUTION INTRAVENOUS at 15:58

## 2020-01-06 RX ADMIN — POTASSIUM CHLORIDE 40 MEQ: 1.5 POWDER, FOR SOLUTION ORAL at 06:57

## 2020-01-06 RX ADMIN — CEFEPIME HYDROCHLORIDE 2 G: 2 INJECTION, POWDER, FOR SOLUTION INTRAVENOUS at 08:29

## 2020-01-06 RX ADMIN — GADOBUTROL 5 ML: 604.72 INJECTION INTRAVENOUS at 10:35

## 2020-01-06 RX ADMIN — POTASSIUM CHLORIDE 40 MEQ: 1.5 POWDER, FOR SOLUTION ORAL at 08:52

## 2020-01-06 RX ADMIN — SODIUM CHLORIDE, POTASSIUM CHLORIDE, SODIUM LACTATE AND CALCIUM CHLORIDE 1000 ML: 600; 310; 30; 20 INJECTION, SOLUTION INTRAVENOUS at 03:04

## 2020-01-06 RX ADMIN — POTASSIUM CHLORIDE, DEXTROSE MONOHYDRATE AND SODIUM CHLORIDE: 150; 5; 450 INJECTION, SOLUTION INTRAVENOUS at 19:25

## 2020-01-06 RX ADMIN — POTASSIUM CHLORIDE 60 MEQ: 1.5 POWDER, FOR SOLUTION ORAL at 01:30

## 2020-01-06 RX ADMIN — MAGNESIUM SULFATE HEPTAHYDRATE 2 G: 40 INJECTION, SOLUTION INTRAVENOUS at 05:28

## 2020-01-06 RX ADMIN — POTASSIUM CHLORIDE, DEXTROSE MONOHYDRATE AND SODIUM CHLORIDE: 150; 5; 450 INJECTION, SOLUTION INTRAVENOUS at 08:25

## 2020-01-06 RX ADMIN — CEFEPIME HYDROCHLORIDE 2 G: 2 INJECTION, POWDER, FOR SOLUTION INTRAVENOUS at 03:21

## 2020-01-06 ASSESSMENT — ACTIVITIES OF DAILY LIVING (ADL)
ADLS_ACUITY_SCORE: 28

## 2020-01-06 NOTE — PROGRESS NOTES
INTERNAL MEDICINE UPDATE    Called re: sodium    Recent Labs   Lab 01/06/20  0617 01/06/20  0050 01/04/20  0549   * 155* 144     PLAN:  - Change IVF's from D5 NS to D5 1/2 NS with 20 meq KCl at 100 ml/hr.    Michael Jackson Jr., MD  [phone] (p)  Text Page

## 2020-01-06 NOTE — PROVIDER NOTIFICATION
Provider paged:   Patient having sustained tachycardia in 140-150 range. EKG completed. No chest pain per patient. Changes from previous EKG done earlier today. Please advise.

## 2020-01-06 NOTE — PROCEDURES
Central Venous Catheterization.    Indication: Tachycardia, need for venous access.    Consent obtained from Daughter, Arlette.   Time out performed  Patient was in the supine position.    Ultrasound guidance showed a patent Left internal jugular. Right internal jugular is thrombosed.   Chlorhexidine to prep the skin  Patient was draped  5 mL of 1% lidocaine infiltrated into the skin  Using ultrasound, needle placed into left internal jugular. Good blood return on 1st attempt.   Wire passed and confirmed in the vein by ultrasound.   Skin was nicked. Not dilated.   Venous catheter advanced to 16 cm and sutured into place.   Tagaderm dressing applied.     CXR pending.   No complications.     Evans Ruby MD

## 2020-01-06 NOTE — PROGRESS NOTES
[facility]  Infectious Disease Progress Note          Assessment and Plan:   IMPRESSION:   1.  A 54-year-old female well known to me from prior admissions, currently admitted with altered mental status, question recurrent seizure problems versus a diffuse encephalopathy related to infection -- not entirely clear.   2.  Chronic paraplegia from prior spinal cord injury.   3.  Question current active infection, abnormal urine, new finding in her left hip, chronic wounds, history of aspiration, so multiple possible infection sites, but not entirely clear.  Cultures from the outside hospital are pending.   4.  New finding of left hip fracture, near destroyed head of the joint, question infection versus other causes.   5.  Prior history of right hip infection related to chronic wounds.   6.  Multiple chronic wounds without clear deep infection.   7.  Urinary tract infections and nephrolithiasis previously.   8.  History of aspiration.   9.  Seizure disorder.      RECOMMENDATIONS:   1.  Currently getting broad antibiotics with vancomycin and cefepime.   outside cultures only likely not sig enterococcus, BC neg and follow fever and clinical symptoms and adjust.   2.  Orthopedics evaluating the hip finding, difficult to know how to interpret this.  Some fluid present but not entirely clear that this is infection.   3.  Where to go with the antibiotics and overall treatment depending on clinical course, cultures, etc. No fever , now awakening, not clear infection, ? Simplify or discontinue soon  4 In ICU for tachy             Interval History:   Not ill looking now awakening, no cxs here WBC 9500 cxs as above no fever              Medications:       ceFEPIme (MAXIPIME) IV  2 g Intravenous Q8H     levETIRAcetam  1,500 mg Intravenous Q12H     sodium chloride (PF)  3 mL Intracatheter Q8H     vancomycin (VANCOCIN) IV  1,000 mg Intravenous Q24H                  Physical Exam:   Blood pressure 118/66, pulse  105, temperature 98  F (36.7  C), temperature source Oral, resp. rate 18, weight 46 kg (101 lb 6.6 oz), SpO2 100 %, not currently breastfeeding.  Wt Readings from Last 2 Encounters:   01/06/20 46 kg (101 lb 6.6 oz)   05/25/19 67.7 kg (149 lb 3.2 oz)     Vital Signs with Ranges  Temp:  [97.5  F (36.4  C)-98.6  F (37  C)] 98  F (36.7  C)  Pulse:  [] 105  Heart Rate:  [] 112  Resp:  [12-23] 18  BP: ()/() 118/66  SpO2:  [98 %-100 %] 100 %    Constitutional: Not awakening   Lungs: Clear to auscultation bilaterally, no crackles or wheezing   Cardiovascular: Regular rate and rhythm, normal S1 and S2, and no murmur noted   Abdomen: Normal bowel sounds, soft, non-distended, non-tender   Skin: No rashes, no cyanosis, no edema wds not seen   Other:           Data:   All microbiology laboratory data reviewed.  Recent Labs   Lab Test 01/06/20  0050 01/04/20  0549 05/26/19  0931   WBC 7.3 9.5 5.0   HGB 12.3 13.0 9.5*   HCT 36.2 38.3 29.1*   MCV 80 82 85    52* 99*     Recent Labs   Lab Test 01/06/20  0617 01/06/20  0050 01/05/20  0513   CR 0.40* 0.49* 0.43*     Recent Labs   Lab Test 02/20/17  1745   SED 61*     Recent Labs   Lab Test 05/24/19  1115 05/19/19  0400 12/31/18  1452 12/31/18  1442 12/20/18  1149 12/19/18  1425 12/19/18  1250 12/16/18  0920 12/12/18  2120   CULT <10,000 colonies/mL  Pseudomonas aeruginosa  *  >100,000 colonies/mL  Enterococcus faecalis  * >100,000 colonies/mL  Klebsiella oxytoca  Susceptibility testing in progress  *  10,000 to 50,000 colonies/mL  Non lactose fermenting gram negative rods  *  10,000 to 50,000 colonies/mL  Gram negative rods  *  10,000 to 50,000 colonies/mL  Gram positive cocci  *  10,000 to 50,000 colonies/mL  Gram positive cocci in pairs and chains  *  Susceptibility testing not routinely done  No further identification   Light growth  Candida albicans / dubliniensis  Candida albicans and Candida dubliniensis are not routinely  speciated  Susceptibility testing not routinely done  * No growth Culture negative for acid fast bacilli  Assayed at [facility] [address] [place] [phone]  No growth after 4 weeks  Candida albicans / dubliniensis  isolated  Candida albicans and Candida dubliniensis are not routinely speciated  *  No additional fungi cultured after 4 weeks incubation  No Legionella species isolated  Light growth  Candida albicans / dubliniensis  Candida albicans and Candida dubliniensis are not routinely speciated  Susceptibility testing not routinely done  * No growth No growth No growth No growth

## 2020-01-06 NOTE — PLAN OF CARE
Pt here with UTI, seizures. Lethargic, EVA orientation, does respond to her name. Neuros bilateral eyes with hippus, not responding to painful stimuli on LUE and BLE, baseline paraplegic, not following most commands, so very difficult to assess. Vital signs stable minus HR which is running in the 190's and is irregular. NPO diet. NG in place, verified by x-ray and ok to use. Up with lift and assist x2, BR this shift. Denies pain. Colostomy. Neobladder with mcintyre catheter in place, urine with sediment. Wounds to buttocks, covered with dressings. Boots to bilateral feet. See RRT note regarding IV issues. Pt scoring green on the Aggression Stop Light Tool. Plan transfer to ICU to receive central line. Report called to ICU, all belongings sent.

## 2020-01-06 NOTE — PROGRESS NOTES
Brief ICU note:  Transferred from the floor for tachycardia.  IV access unable to obtain on the floor.  She is getting continuous EEG monitoring and being worked up for infection.      Current , /67.      I will place a central venous catheter.  Please place formal consult the the patient's hemodynamics or respiratory status worsens.     Evans Ruby MD

## 2020-01-06 NOTE — PROGRESS NOTES
Paged for progressive tachycardia and bigeminal PVC's on telemetry; EKG shows sinus tachycardia 150 with worsening of diffuse ST segment depressions that were also seen on EKG earlier at noon though they were less pronounced then when rate was 102. She is unable to provide history.    Likely sinus tachycardia is multi-factorial related to infection and encephalopathy. I will order a set of cardiac enzymes, expecting them possibly to be mildly positive but will look for extent and trend of myonecrosis. Will order STAT K and Mag and replace as needed to keep K 4 and Mag 2. Will order a bolus of one to two liters of IV fluid STAT and plan to reassess.    Addendum: K 2.2, Mag 2.0. Will give 60 meq KCl. First Troponin 0.020.

## 2020-01-06 NOTE — PROGRESS NOTES
Chart reviewed     Patient WBC wnl, a febrile  Hip aspiration attempted with no fluid yield  Patient with previous right Girdlestone procedure and left severe degeneration and resorption of the femoral head/neck as well as decubitus ulcer    At this time low concern for left septic hip but will continue to follow clinically    Christina Irineo MORE

## 2020-01-06 NOTE — PROGRESS NOTES
Remove EEG electrodes , patient refused anymore glue in her hair.  Following all commands and cooperative.

## 2020-01-06 NOTE — PROVIDER NOTIFICATION
MD Notification    Notified Person: MD    Notified Person Name: Dr. Melchor    Notification Date/Time: 1/5/20 1605    Notification Interaction: Critical Lab, K+ 2.2    Purpose of Notification:    Orders Received:    Comments:

## 2020-01-06 NOTE — PROGRESS NOTES
ICU Multi-Disciplinary Note  Patient condition reviewed and discussed while on multidisciplinary rounds today.   No new interventions initiated by the critical care team today.  The Critical Care service will continue to follow peripherally while the patient is within the ICU. We are readily available should issues arise. Please feel free to contact us for critical care issues with which we may be of assistance. For all other concerns, please contact primary service first.   Yecenia Parker NP

## 2020-01-06 NOTE — PROGRESS NOTES
[facility]    Neurology Progress Note     Assessment & Plan   Felicia Ellison is a 54 year old female who was admitted on 1/4/2020. I was asked to see the patient for AMS.     In ER, the patient was acidotic, increased lactic acid, increased ammonia, positive UA (wbc 20-50 at [facility]) with leukocytosis 22 concerning for septic arthritis and UTI.  Patient received vancomycin and cefepime at ER.     EEG 1/4 showed left greater than the right bihemispheric frequent sharp indicating epileptiform activity.     EEG on 1/5 ( continuous) -global bihemispheric slowing with less frequent sharp waves    Today the patient has improved and appears more awake and speaks few words and watching football game.    Altered mental status with toxic metabolic encephalopathy -infection ( septic arthritis and UTI.), high ammonia, acidosis and epileptiform discharge.     -Check Keppra level  -On Keppra to 1500 mg  -On continuous EEG - will discontinue  tomorrow  -Brain MRI with and without-pending  -Follow ammonia, UA-pending  -Avoid cephalosporin (lowers seizure threshold)    Disposition Plan   Expected discharge in xx days to prior living arrangement once medical issue is resolved..     Entered: Addis Nash 01/05/2020, 8:41 PM       Addis Nash MD    Interval History     Review of Systems   The 10 point Review of Systems is negative other than noted in the HPI or here.     Physical Exam   Temp: 98.2  F (36.8  C) Temp src: Axillary BP: 108/56 Pulse: 100   Resp: 18 SpO2: 100 % O2 Device: None (Room air)    Vitals:    01/04/20 0051   Weight: 48.4 kg (106 lb 11.2 oz)     Vital Signs with Ranges  Temp:  [97.5  F (36.4  C)-98.8  F (37.1  C)] 98.2  F (36.8  C)  Pulse:  [] 100  Resp:  [16-24] 18  BP: (108-130)/(56-80) 108/56  SpO2:  [98 %-100 %] 100 %  I/O last 3 completed shifts:  In: 341 [I.V.:341]  Out: 1550 [Urine:1550]    Cardiovascular: No Carotid bruit   Skin: No rash  Eye: Anicteric, no conjunctival  injection     General appearance:No acute distress, spontaneous eye opening, tracking, and answer some questions with perseveration neuro/Psych:Awake, alert, oriented to year, place  Cranial nerves: II-XII intact   Fundi/Optic disc:   PERRLA, Extraocular movements intact. Visual field intact, No nystagmus, Face appears symmetric. Facial sensation intact. Hearing intact to finger rub. Palate midline. Shoulder shrug/SCM intact bilaterally. Tongue at midline with no fasciculations  Motor strength: 1/5 upper extremities paraplegia and lower extremities,   range of motion: Limited      Tone/ position: Flaccid and lower extremities  Sensory: Can feel light touch  Reflexes: 3+ upper, trace in lower extremities  Babinski/Clonus/Hoover: absent.   Coordination: Not available  Romberg not available    Medications     dextrose 5% and 0.9% NaCl 75 mL/hr at 01/05/20 0435       ceFEPIme (MAXIPIME) IV  2 g Intravenous Q8H     levETIRAcetam  1,500 mg Intravenous Q12H     sodium chloride (PF)  10 mL Intracatheter Q8H     sodium chloride (PF)  10 mL INTRA-ARTICULAR Once     sodium chloride (PF)  3 mL Intracatheter Q8H     sodium chloride (PF)  3 mL Intracatheter Q8H     vancomycin (VANCOCIN) IV  1,000 mg Intravenous Q24H       Data   Results for orders placed or performed during the hospital encounter of 01/04/20 (from the past 24 hour(s))   Creatinine   Result Value Ref Range    Creatinine 0.43 (L) 0.52 - 1.04 mg/dL    GFR Estimate >90 >60 mL/min/[1.73_m2]    GFR Estimate If Black >90 >60 mL/min/[1.73_m2]   Vancomycin level   Result Value Ref Range    Vancomycin Level 29.1 (HH) mg/L   Glucose by meter   Result Value Ref Range    Glucose 121 (H) 70 - 99 mg/dL   EKG 12-lead, tracing only   Result Value Ref Range    Interpretation ECG Click View Image link to view waveform and result    Glucose by meter   Result Value Ref Range    Glucose 132 (H) 70 - 99 mg/dL      total face to face time : 25minutes  More than 50% of the time was  spent on counseling on the Pathophysiology of the mental status change of EEG .

## 2020-01-06 NOTE — PHARMACY
Prescriber Notification Note    The pharmacist has communicated with this patient's provider regarding a concern or therapy recommendation.    Notified Person: Neurology, Dr Hernandez  Date/Time of Notification: 1/6/19 at 1200  Interaction: Face to face  Concern/Recommendation: Keppra level today =95.  Yesterday was 46. Please advise     Comments/Additional Details: Re-draw level before next dose.  Check to see if dose was infusing when level was drawn (in fact it was drawn ~1 hour after dose was given).     Patricia Soriano, PharmD

## 2020-01-06 NOTE — PROGRESS NOTES
[facility]    Hospitalist Progress Note    Assessment & Plan   Felicia Ellison is a 54 year old female who was admitted on 1/4/2020.  She has a history of flaccid paraplegia and being wheelchair-bound presented to  [facility] as a direct admit from [facility] for evaluation of altered mental status.  Acute encephalopathy, unclear etiology at this time, but highly concerning for seizures  --First EEG showing left greater than right bihemispheric frequent sharp waves indicating epileptiform activity.  Continuous EEG ongoing  --Appreciate neurology input, patient was transferred 1/4 to neuro telemetry with plans for video EEG , patient was transferred to ICU on 1/5 for tachycardia.  --MRI obtained 1/6 and does not show any new abnormalities  --Patient is more alert today 1/6 in her upper extremities appears to be baseline in strength  --NG tube were placed as the patient was obtunded and needed lactulose, since she is more awake we will remove the NG tube.  --Lactic acidosis has improved, lactate levels back to normal, Keppra levels higher limit of normal, Keppra is now 1500 mg.   --Considering most  antibiotics used for UTI lower seizure threshold ,will have to continue cephalosporins for now.  Infectious disease following.  Possible septic arthritis of left hip  --Status post IR aspiration of left hip, pending cultures. as per Ortho does not appear to be septic arthritis on the hip.  --Continue antibiotics for now.  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  The patient is noted to be on Lovenox 60 mg twice daily, I did review the chart but could not identify a reason why she is on this high dose of therapeutic Lovenox.   DVT Prophylaxis:scd   Code Status: Full Code     Disposition: Expected discharge in 2-3 days       Tasha Way MD  Text Page   (7am to 6pm)    Interval History   And is more alert and obeying commands, she did move bilateral upper extremities, does not appear to have any significant weakness than baseline, as per daughter she does have some weakness in her upper extremities, her MRI reviewed, she is undergoing video EEG now, seems her mental status have improved, denies any nausea, vomiting or abdominal pain.  No chest pain or shortness of breath noted.    -Data reviewed today: I reviewed all new labs and imaging results over the last 24 hours.    Physical Exam   Temp: 98  F (36.7  C) Temp src: Oral BP: 118/66 Pulse: 105 Heart Rate: 112 Resp: 18 SpO2: 100 % O2 Device: None (Room air)    Vitals:    01/04/20 0051 01/06/20 0140   Weight: 48.4 kg (106 lb 11.2 oz) 46 kg (101 lb 6.6 oz)     Vital Signs with Ranges  Temp:  [97.5  F (36.4  C)-98.6  F (37  C)] 98  F (36.7  C)  Pulse:  [] 105  Heart Rate:  [] 112  Resp:  [12-23] 18  BP: ()/() 118/66  SpO2:  [98 %-100 %] 100 %  I/O last 3 completed shifts:  In: 1491.17 [I.V.:491.17; IV Piggyback:1000]  Out: 1640 [Urine:1470; Stool:170]    Constitutional: Awake, keeps on saying I am fine, does not obey commands  Respiratory: Clear to auscultation bilaterally, no crackles or wheezing  Cardiovascular: Regular rate and rhythm, normal S1 and S2, and no murmur noted  GI: Normal bowel sounds, she has a neobladder, Ambrose present  Skin/Integumen: No rashes, no cyanosis, no edema  Neuro :  Paraplegic, bilateral upper extremity strength appears to be baseline    Medications     dextrose 5% and 0.45% NaCl + KCl 20 mEq/L 100 mL/hr at 01/06/20 0825     sodium chloride 10 mL/hr at 01/06/20 0308       ceFEPIme (MAXIPIME) IV  2 g Intravenous Q8H     levETIRAcetam  1,500 mg Intravenous Q12H     sodium chloride (PF)  3 mL Intracatheter Q8H     vancomycin (VANCOCIN) IV  1,000 mg Intravenous Q24H       Data   Recent Labs   Lab 01/06/20  0617 01/06/20  0050 01/05/20  2252 01/05/20  0513  01/04/20  0549   WBC  --  7.3  --   --   --  9.5   HGB  --  12.3  --   --   --  13.0   MCV  --  80  --   --   --  82   PLT  --  162  --   --   --  52*   INR  --  1.64*  --   --   --   --    * 155*  --   --   --  144   POTASSIUM 2.8* 2.2* 2.2*  --    < > 2.9*   CHLORIDE 131* 129*  --   --   --  120*   CO2 18* 16*  --   --   --  10*   BUN 12 12  --   --   --  30   CR 0.40* 0.49*  --  0.43*  --  0.50*   ANIONGAP 8 10  --   --   --  14   JOSH 8.3* 8.7  --   --   --  8.1*   * 118*  --   --   --  140*   ALBUMIN  --  2.6*  --   --   --  2.9*   PROTTOTAL  --  6.3*  --   --   --  6.9   BILITOTAL  --  0.7  --   --   --  0.5   ALKPHOS  --  156*  --   --   --  206*   ALT  --  8  --   --   --  11   AST  --  10  --   --   --  14   TROPI 0.015 0.021 0.020  --   --   --     < > = values in this interval not displayed.     Recent Labs   Lab 01/06/20  1211 01/06/20  0815 01/06/20  0617 01/06/20  0134 01/06/20  0050 01/06/20  0048 01/05/20  1247  01/04/20  0549   GLC  --   --  130*  --  118*  --   --   --  140*   * 110*  --  99  --  110* 132*   < >  --     < > = values in this interval not displayed.       Imaging:   Recent Results (from the past 24 hour(s))   XR Abdomen Port 1 View    Narrative    EXAM: XR ABDOMEN PORT 1 VW  LOCATION: [facility]  DATE/TIME: 1/5/2020 11:49 PM    INDICATION: NG tube placement.  COMPARISON: None.      Impression    IMPRESSION: NG tube tip in the body of the stomach.    CT Head w/o  Contrast    Narrative    EXAM: CT HEAD W/O CONTRAST  LOCATION: [facility]  DATE/TIME: 1/6/2020 1:16 AM    INDICATION: Altered level of consciousness (LOC), unexplained  COMPARISON: 05/19/2019  TECHNIQUE: Routine without IV contrast. Multiplanar reformats. Dose reduction techniques were used.    FINDINGS:  The exam is moderately degraded by motion.    INTRACRANIAL CONTENTS: No intracranial hemorrhage, extraaxial collection, or mass effect.  No CT evidence of acute infarct. Normal parenchymal attenuation. Normal ventricles and sulci.     VISUALIZED ORBITS/SINUSES/MASTOIDS: No intraorbital abnormality. Opacified left sphenoid locule. No middle ear or mastoid effusion.    BONES/SOFT TISSUES: No acute abnormality.      Impression    IMPRESSION:  1.  The exam is moderately degraded by motion.  2.  Within these limitations, no definite acute intracranial pathology identified.  3.  Opacified left sphenoid locule.   XR Chest Port 1 View    Narrative    EXAM: XR CHEST PORT 1 VW  LOCATION: [facility]  DATE/TIME: 1/6/2020 2:51 AM    INDICATION: Central line placement.  COMPARISON: 05/18/2019.    FINDINGS: Semiupright portable chest. Left IJ central venous catheter projects over the distal SVC. NG tube passes into the stomach. No pneumothorax. The heart size is normal. The lungs are clear.      Impression    IMPRESSION:   No pneumothorax post central line placement.   MR Brain w/o & w Contrast    Narrative    MRI BRAIN WITHOUT AND WITH CONTRAST  1/6/2020 10:43 AM    HISTORY:  Acute encephalopathy. Wheelchair bound due to paraplegia.  Recurrent seizure problem versus a diffuse encephalopathy related to  infection.     TECHNIQUE:  Multiplanar, multisequence MRI of the brain without and  with 5 mL Gadavist.     COMPARISON: None.    FINDINGS:  There is mild atrophy of the brain. Scattered areas of T2  hyperintensity are seen in the periventricular through subcortical  white matter of the cerebral  hemispheres, nonspecific as to etiology.   There is no evidence of hemorrhage, mass, acute infarct, or anomaly.   There are no gadolinium enhancing lesions.     The facial structures appear normal. The arteries at the base of the  brain and the dural venous sinuses appear patent.       Impression    IMPRESSION:    1. No acute abnormality.  2. Nonspecific supratentorial white matter T2 hyperintensities,  possibly due to sequelae of small vessel ischemic disease.    SANTO ARGUELLES MD

## 2020-01-06 NOTE — PROGRESS NOTES
01/06/20 1112   General Information   Onset Date 01/04/20   Start of Care Date 01/06/20   Referring Physician Jonathan Chavira APRN CNP   Patient Profile Review/OT: Additional Occupational Profile Info See Profile for full history and prior level of function   Patient/Family Goals Statement Wants a coke    Swallowing Evaluation Bedside swallow evaluation   Behaviorial Observations WFL (within functional limits);Alert;Distractible   Mode of current nutrition NPO   Respiratory Status O2 Supply   Type of O2 supply Nasal cannula   Comments Felicia Ellison is a 54 year old female who was admitted on 1/4/2020 for AMS.Medical history significant for: Flaccid paraplegia following spinal cord injury, seizure disorder, portal vein thrombosis, chronic anemia, neurogenic bladder with ileal diversion. Pt alert and appropriate for evaluation. She is eager to try a coke. She denies dysphagia. Pt has been evaluated by SLP Department in the past with recommendations for regular solids and thin liquids    Clinical Swallow Evaluation   Oral Musculature generally intact   Structural Abnormalities none present   Dentition upper dentures  (not placed as she needs adhesive )   Mucosal Quality adequate   Oral Labial Strength and Mobility WFL;impaired coordination   Lingual Strength and Mobility WFL;impaired coordination   Laryngeal Function Cough;Swallow;Voicing initiated   Additional Documentation Yes   Swallow Eval   Feeding Assistance frequent cues/help required   Clinical Swallow Eval: Thin Liquid Texture Trial   Mode of Presentation, Thin Liquids cup;straw;fed by clinician   Volume of Liquid or Food Presented 6 oz   Oral Phase of Swallow WFL   Pharyngeal Phase of Swallow intact   Diagnostic Statement No overt s/sx of aspiration    Clinical Swallow Eval: Puree Solid Texture Trial   Mode of Presentation, Puree spoon;fed by clinician   Volume of Puree Presented 4 oz    Oral Phase, Puree WFL   Pharyngeal Phase, Puree intact    Diagnostic Statement Adequate oral manipulation, no s/sx of aspiration, pt denies any difficulty    Swallow Compensations   Swallow Compensations Alternate viscosity of consistencies;Pacing   Results No difficulties noted   Esophageal Phase of Swallow   Patient reports or presents with symptoms of esophageal dysphagia No   General Therapy Interventions   Planned Therapy Interventions Dysphagia Treatment   Dysphagia treatment Modified diet education;Instruction of safe swallow strategies   Swallow Eval: Clinical Impressions   Skilled Criteria for Therapy Intervention Skilled criteria met.  Treatment indicated.   Functional Assessment Scale (FAS) 4   Treatment Diagnosis Mild-moderate oropharyngeal dysphagia    Diet texture recommendations Dysphagia diet level 1;Thin liquids   Recommended Feeding/Eating Techniques alternate between small bites and sips of food/liquid;check mouth frequently for oral residue/pocketing;hard swallow w/ each bite or sip;small sips/bites   Therapy Frequency 5x/week   Anticipated Discharge Disposition extended care facility   Risks and Benefits of Treatment have been explained. Yes   Patient, family and/or staff in agreement with Plan of Care Yes   Clinical Impression Comments Pt seen for clinical swallow evaluation. She is alert and agreeable, eager to try a coke. Oral mech is WFL - upper dentures present but not placed as she needs adhesive. Pt tolerated 6 oz of thin liquids via straw sip without overt s/sx of aspiration, no changes in breath sounds or vocal quality. Pt also tolerated 4 oz of puree with adequate oral manipulation and without s/sx of aspiration. Swallow is timely, pt denies any difficulty. Not appropriate for more advanced solids given current dentition. Recommend puree/dysphagia diet 1 and thin liquids. General safe swallow precautions recommended. Will continue to follow and advance diet as tolerated.    Total Evaluation Time   Total Evaluation Time (Minutes) 12

## 2020-01-06 NOTE — PLAN OF CARE
Discharge Planner SLP   Patient plan for discharge: Did not discuss  Current status: Pt seen for clinical swallow evaluation. S Pt tolerated 6 oz of thin liquids via straw sip without overt s/sx of aspiration, no changes in breath sounds or vocal quality. Pt also tolerated 4 oz of puree with adequate oral manipulation and without s/sx of aspiration. Not appropriate for more advanced solids given current dentition and general weakness     Recommend puree/dysphagia diet 1 and thin liquids. General safe swallow precautions recommended. Will continue to follow and advance diet as tolerated.     Barriers to return to prior living situation: Below baseline  Recommendations for discharge: TCU  Rationale for recommendations: SLP at next level of care given swallow function is below baseline          Entered by: Velia Pereira 01/06/2020 11:20 AM

## 2020-01-06 NOTE — PLAN OF CARE
Patient following commands, answers most questions appropriately, vital signs stable. Had MRI scan, tolerated well. Patient refused to have the eeg reconnected, Md aware. Updated daughter per telephone.

## 2020-01-06 NOTE — PLAN OF CARE
Neuro: Pt alert, disoriented to time and situation, R facial droop, pupillary hippus, EVA arm drift due to weakness. Pt denies pain. Follows commands. Speech slow and slightly garbled at times.  CV: tachycardic when first arrived to ICU, possible afib. Now SR without ectopy, BP stable after 1 L LR bolus. Received 60 mEq KCl per report immediately prior to transfer to ICU  Pulm: lungs clear, RA  GI/: Pt has neobladder - 16 Fr mcintyre replaced at 0500 with immediate return of cloudy sludge-like urine which patient's daughter says is baseline  Skin: dressings intact to buttocks wounds  Other: Hospitalist paged re: Na 157  AM K 2.8

## 2020-01-06 NOTE — CODE/RAPID RESPONSE
[facility]    House JANNA RRT Note  1/6/2020   Time Called: 0000    RRT called for: AMS, tachycardia    Assessment & Plan     New onset atrial fibrillation with rapid ventricular response likely 2/2 sepsis, dehydration.  Acute encephalopathy suspect 2/2 sepsis (UTI, L hip septic arthritis) vs seizure?.  - Upon arrival, pt lying in bed, supine, in no apparent distress, lethargic appearing, with anesthesia present at bedside attempting to place PIV (CRNA and MDA both attempted twice with ultrasound).  Pt's VS noting HR 190s, intermittently irregular, SBP 110s, O2 sats > 92% on RA, RR 10s, afebrile.  Due to inability to place PIV and pt remains in rapid a-fib with noted demand ischemia changes noted on EKG, will transfer pt to ICU for central line placement for possible adenosine administration vs AV trenton agent.    Of note, once pt had CT head completed, pt's HR was noted to decrease to 120s, appears sinus at this time, without intervention.  Will defer adenosine or AV trenton blocks at this time; however, pt still needs IV access to administer fluid bolus, electrolyte replacements and possible AV trenton agent if a-fib RVR reoccurs.      INTERVENTIONS:  - Stat INR, CBC, CMP, ammonia, lactic acid, VBG, procalcitonin  - Stat repeat EKG; will repeat EKG in ICU with slower rate to ensure improvement in overall ischemic changes  - Stat CT head without  - Transfer to ICU for central line access; appreciate intensivist's expertise.  Will defer formal consult at this time given hemodynamic stability.  Greatly appreciate placing central line.  - Please administer 1L LR now  - Discussion with pt's daughter, Arlette, via phone, who states she makes all pt's medical decisions.  Discussed with daughter pt's current clinical status and inability to obtain PIV; therefore, central access in ICU is required.  Pt's daughter in agreement and will be able to consent via phone once intensivist is ready.  Pt remains FULL CODE.  -  "Unfortunately, with transfer, EEG needed to be disconnected.  Will defer resuming to neurology if deemed appropriate.  - Noted pt continues on PTA Keppra at this time    At the end of the RRT pt transferred to ICU.    Discussed with and defer further cares to nursing, Dr. Jackson, hospitalist and Dr. Ruby, intensivist.    Interval History     Felicia Ellison is a 54 year old female who was admitted on 1/4/2020 for AMS.    Medical history significant for: Flaccid paraplegia following spinal cord injury, seizure disorder, portal vein thrombosis, chronic anemia, neurogenic bladder with ileal diversion    Code Status: Full Code    Allergies   Allergies   Allergen Reactions     Penicillins Anaphylaxis     Patient states it makes her \"climb the walls and hyperactive.\"     Acetaminophen Nausea and Vomiting     Levaquin Rash     Rash only with po Levaquin...able to take IV Levaquin per pt     Physical Exam   Vital Signs with Ranges:  Temp:  [97.5  F (36.4  C)-98.8  F (37.1  C)] 98.5  F (36.9  C)  Pulse:  [] 179  Resp:  [16-24] 16  BP: (108-131)/(56-80) 114/57  SpO2:  [98 %-100 %] 100 %  I/O last 3 completed shifts:  In: 0   Out: 2100 [Urine:2100]    Constitutional: Pt lying supine in bed, eyes slightly open, in no apparent distress  Pulmonary: In no apparent respiratory distress, clear to auscultation bilaterally, no crackles or wheezes noted  Cardiovascular: Tachycardic, irregular rate and rhythm, normal S1S2, no murmur, rub or gallop noted  GI: Soft, nondistended, nontender to palpation  Skin/Integumen: Warm, dry  Neuro: Lethargic appearing, slurred speech (?due to lack to dentition), PERRL  Psych:  Calm  Extremities: No peripheral edema    Data     EKG:  Interpreted by VERENA Mcnair CNP  Time reviewed: 0049  Symptoms at time of EKG: None   Rhythm: atrial fibrillation - rapid  Rate: >160  Axis: Normal  Ectopy: none  Conduction: normal  ST Segments/ T Waves: Non-specific ST-T wave changes  Q Waves: " none  Comparison to prior: Now a-fib RVR    Clinical Impression: atrial fibrillation (new onset)    Troponin:    Recent Labs   Lab Test 01/06/20  0050   TROPI 0.021     IMAGING: (X-ray/CT/MRI)   Recent Results (from the past 24 hour(s))   XR Abdomen Port 1 View    Narrative    EXAM: XR ABDOMEN PORT 1 VW  LOCATION: [facility]  DATE/TIME: 1/5/2020 11:49 PM    INDICATION: NG tube placement.  COMPARISON: None.      Impression    IMPRESSION: NG tube tip in the body of the stomach.    CT Head w/o Contrast    Narrative    EXAM: CT HEAD W/O CONTRAST  LOCATION: [facility]  DATE/TIME: 1/6/2020 1:16 AM    INDICATION: Altered level of consciousness (LOC), unexplained  COMPARISON: 05/19/2019  TECHNIQUE: Routine without IV contrast. Multiplanar reformats. Dose reduction techniques were used.    FINDINGS:  The exam is moderately degraded by motion.    INTRACRANIAL CONTENTS: No intracranial hemorrhage, extraaxial collection, or mass effect.  No CT evidence of acute infarct. Normal parenchymal attenuation. Normal ventricles and sulci.     VISUALIZED ORBITS/SINUSES/MASTOIDS: No intraorbital abnormality. Opacified left sphenoid locule. No middle ear or mastoid effusion.    BONES/SOFT TISSUES: No acute abnormality.      Impression    IMPRESSION:  1.  The exam is moderately degraded by motion.  2.  Within these limitations, no definite acute intracranial pathology identified.  3.  Opacified left sphenoid locule.     CBC with Diff:  Recent Labs   Lab Test 01/06/20  0050   WBC 7.3   HGB 12.3   MCV 80      INR 1.64*      Lactic Acid:    Lab Results   Component Value Date    LACT 2.1 01/06/2020         Comprehensive Metabolic Panel:  Recent Labs   Lab 01/06/20  0050 01/05/20  2252   *  --    POTASSIUM 2.2* 2.2*   CHLORIDE 129*  --    CO2 16*  --    ANIONGAP 10  --    *  --    BUN 12  --    CR 0.49*  --    GFRESTIMATED >90  --    GFRESTBLACK >90  --    JOSH 8.7  --    MAG  --  2.0   PROTTOTAL  6.3*  --    ALBUMIN 2.6*  --    BILITOTAL 0.7  --    ALKPHOS 156*  --    AST 10  --    ALT 8  --      INR:    Recent Labs   Lab Test 01/06/20  0050   INR 1.64*     Recent Labs   Lab 01/06/20  0050 01/04/20  1901 01/04/20  0148   PH  --   --  7.12*   PHV 7.36 7.29*  --    PO2  --   --  27*   PO2V 39 38  --    PCO2  --   --  34*   PCO2V 27* 26*  --    HCO3  --   --  11*   HCO3V 15* 13*  --      Time Spent on this Encounter   I spent 70 minutes of critical care time on the unit/floor managing the care of Sarahi M Terra. Upon evaluation, this patient had a high probability of imminent or life-threatening deterioration due to  tachyarhythmia, which required my direct attention, intervention, and personal management. 100% of my time was spent at the bedside counseling the patient and/or coordinating care regarding services listed in this note.    VERENA Mcnair CNP

## 2020-01-07 ENCOUNTER — APPOINTMENT (OUTPATIENT)
Dept: SPEECH THERAPY | Facility: CLINIC | Age: 55
DRG: 100 | End: 2020-01-07
Attending: HOSPITALIST
Payer: COMMERCIAL

## 2020-01-07 LAB
ANION GAP SERPL CALCULATED.3IONS-SCNC: 9 MMOL/L (ref 3–14)
BUN SERPL-MCNC: 7 MG/DL (ref 7–30)
CALCIUM SERPL-MCNC: 7.9 MG/DL (ref 8.5–10.1)
CHLORIDE SERPL-SCNC: 124 MMOL/L (ref 94–109)
CO2 SERPL-SCNC: 14 MMOL/L (ref 20–32)
CREAT SERPL-MCNC: 0.33 MG/DL (ref 0.52–1.04)
ERYTHROCYTE [DISTWIDTH] IN BLOOD BY AUTOMATED COUNT: 16.9 % (ref 10–15)
GFR SERPL CREATININE-BSD FRML MDRD: >90 ML/MIN/{1.73_M2}
GLUCOSE BLDC GLUCOMTR-MCNC: 86 MG/DL (ref 70–99)
GLUCOSE SERPL-MCNC: 97 MG/DL (ref 70–99)
HCT VFR BLD AUTO: 29.1 % (ref 35–47)
HGB BLD-MCNC: 10 G/DL (ref 11.7–15.7)
INTERPRETATION ECG - MUSE: NORMAL
LEVETIRACETAM SERPL-MCNC: 47 UG/ML (ref 12–46)
MAGNESIUM SERPL-MCNC: 2.2 MG/DL (ref 1.6–2.3)
MCH RBC QN AUTO: 28 PG (ref 26.5–33)
MCHC RBC AUTO-ENTMCNC: 34.4 G/DL (ref 31.5–36.5)
MCV RBC AUTO: 82 FL (ref 78–100)
PLATELET # BLD AUTO: 63 10E9/L (ref 150–450)
PLATELET # BLD AUTO: ABNORMAL 10E9/L (ref 150–450)
POTASSIUM SERPL-SCNC: 3 MMOL/L (ref 3.4–5.3)
POTASSIUM SERPL-SCNC: 3.8 MMOL/L (ref 3.4–5.3)
RBC # BLD AUTO: 3.57 10E12/L (ref 3.8–5.2)
SODIUM SERPL-SCNC: 146 MMOL/L (ref 133–144)
SODIUM SERPL-SCNC: 146 MMOL/L (ref 133–144)
SODIUM SERPL-SCNC: 147 MMOL/L (ref 133–144)
SODIUM SERPL-SCNC: 147 MMOL/L (ref 133–144)
WBC # BLD AUTO: 6.2 10E9/L (ref 4–11)

## 2020-01-07 PROCEDURE — 84295 ASSAY OF SERUM SODIUM: CPT | Performed by: INTERNAL MEDICINE

## 2020-01-07 PROCEDURE — 00000146 ZZHCL STATISTIC GLUCOSE BY METER IP

## 2020-01-07 PROCEDURE — 20000003 ZZH R&B ICU

## 2020-01-07 PROCEDURE — 92526 ORAL FUNCTION THERAPY: CPT | Mod: GN

## 2020-01-07 PROCEDURE — 83735 ASSAY OF MAGNESIUM: CPT | Performed by: INTERNAL MEDICINE

## 2020-01-07 PROCEDURE — 25800030 ZZH RX IP 258 OP 636: Performed by: HOSPITALIST

## 2020-01-07 PROCEDURE — 25000128 H RX IP 250 OP 636: Performed by: NURSE PRACTITIONER

## 2020-01-07 PROCEDURE — 25000132 ZZH RX MED GY IP 250 OP 250 PS 637: Performed by: NURSE PRACTITIONER

## 2020-01-07 PROCEDURE — 84132 ASSAY OF SERUM POTASSIUM: CPT | Performed by: INTERNAL MEDICINE

## 2020-01-07 PROCEDURE — 80048 BASIC METABOLIC PNL TOTAL CA: CPT | Performed by: INTERNAL MEDICINE

## 2020-01-07 PROCEDURE — 85049 AUTOMATED PLATELET COUNT: CPT | Performed by: INTERNAL MEDICINE

## 2020-01-07 PROCEDURE — 25000128 H RX IP 250 OP 636: Performed by: HOSPITALIST

## 2020-01-07 PROCEDURE — 85027 COMPLETE CBC AUTOMATED: CPT | Performed by: INTERNAL MEDICINE

## 2020-01-07 PROCEDURE — 99233 SBSQ HOSP IP/OBS HIGH 50: CPT | Performed by: INTERNAL MEDICINE

## 2020-01-07 RX ADMIN — POTASSIUM CHLORIDE 20 MEQ: 1500 TABLET, EXTENDED RELEASE ORAL at 10:31

## 2020-01-07 RX ADMIN — POTASSIUM CHLORIDE, DEXTROSE MONOHYDRATE AND SODIUM CHLORIDE: 150; 5; 450 INJECTION, SOLUTION INTRAVENOUS at 07:53

## 2020-01-07 RX ADMIN — POTASSIUM CHLORIDE, DEXTROSE MONOHYDRATE AND SODIUM CHLORIDE: 150; 5; 450 INJECTION, SOLUTION INTRAVENOUS at 12:49

## 2020-01-07 RX ADMIN — SODIUM CHLORIDE 500 ML: 9 INJECTION, SOLUTION INTRAVENOUS at 21:13

## 2020-01-07 RX ADMIN — CEFEPIME HYDROCHLORIDE 2 G: 2 INJECTION, POWDER, FOR SOLUTION INTRAVENOUS at 00:07

## 2020-01-07 RX ADMIN — POTASSIUM CHLORIDE 40 MEQ: 1500 TABLET, EXTENDED RELEASE ORAL at 08:06

## 2020-01-07 RX ADMIN — CEFEPIME HYDROCHLORIDE 2 G: 2 INJECTION, POWDER, FOR SOLUTION INTRAVENOUS at 08:03

## 2020-01-07 RX ADMIN — LEVETIRACETAM 1500 MG: 100 INJECTION, SOLUTION INTRAVENOUS at 17:53

## 2020-01-07 RX ADMIN — LEVETIRACETAM 1500 MG: 100 INJECTION, SOLUTION INTRAVENOUS at 05:57

## 2020-01-07 ASSESSMENT — ACTIVITIES OF DAILY LIVING (ADL)
ADLS_ACUITY_SCORE: 28

## 2020-01-07 NOTE — PLAN OF CARE
A&OX4, forgetful. Neuros with garbled, slow speech, paraplegic, facial droop. Tele SR-ST with HR 90-100s. Lungs clear, maintaining O2 sats on RA. Tolerating pureed diet, Ostomy with dark liquid output. Urine catheter output sediment, odorous, flushed per order. Wounds to buttocks/sacral area covered - changed previous shift, intact. L internal jugular infusing D5/45NaCl/K20 @ 50ml/hr. Sodium trending q6h.

## 2020-01-07 NOTE — PROGRESS NOTES
X cover [id number]    Called for Sodium level, trended down rapidly from 156---147  Currently on D5 at 100 ml/hr--- will decrease to 50 ml/hr  - monitor Na q 6 hrs

## 2020-01-07 NOTE — PROGRESS NOTES
[facility]  Infectious Disease Progress Note          Assessment and Plan:   IMPRESSION:   1.  A 54-year-old female well known to me from prior admissions, currently admitted with altered mental status, question recurrent seizure problems versus a diffuse encephalopathy related to infection -- not entirely clear.   2.  Chronic paraplegia from prior spinal cord injury.   3.  Question current active infection, abnormal urine, new finding in her left hip, chronic wounds, history of aspiration, so multiple possible infection sites, but not entirely clear.  Cultures from the outside hospital are pending.   4.  New finding of left hip fracture, near destroyed head of the joint, question infection versus other causes.   5.  Prior history of right hip infection related to chronic wounds.   6.  Multiple chronic wounds without clear deep infection.   7.  Urinary tract infections and nephrolithiasis previously.   8.  History of aspiration.   9.  Seizure disorder.      RECOMMENDATIONS:   1.  Currently  vancomycin and cefepime.   outside cultures only,  likely not sig enterococcus in UC, BC neg and no fever or  clinical symptoms to suggest focal infection, discontinue ABX  2.  Orthopedics evaluating the hip finding, difficult to know how to interpret this.  Some fluid present but not entirely clear that this is infection, if it is infection ABX alone of no value.   3.  Watch off ABX             Interval History:   Not ill looking now awakening, no cxs here WBC 9500 cxs as above no fever              Medications:       levETIRAcetam  1,500 mg Intravenous Q12H     sodium chloride (PF)  3 mL Intracatheter Q8H                  Physical Exam:   Blood pressure 113/62, pulse 75, temperature 98.6  F (37  C), temperature source Oral, resp. rate 21, weight 48.9 kg (107 lb 12.9 oz), SpO2 100 %, not currently breastfeeding.  Wt Readings from Last 2 Encounters:   01/07/20 48.9 kg (107 lb 12.9 oz)   05/25/19 67.7 kg  (149 lb 3.2 oz)     Vital Signs with Ranges  Temp:  [97.6  F (36.4  C)-98.7  F (37.1  C)] 98.6  F (37  C)  Pulse:  [] 75  Heart Rate:  [] 91  Resp:  [10-23] 21  BP: ()/(38-91) 113/62  SpO2:  [95 %-100 %] 100 %    Constitutional: Not awakening   Lungs: Clear to auscultation bilaterally, no crackles or wheezing   Cardiovascular: Regular rate and rhythm, normal S1 and S2, and no murmur noted   Abdomen: Normal bowel sounds, soft, non-distended, non-tender   Skin: No rashes, no cyanosis, no edema wds not seen   Other:           Data:   All microbiology laboratory data reviewed.  Recent Labs   Lab Test 01/07/20  0745 01/07/20  0558 01/06/20  0050 01/04/20  0549   WBC  --  6.2 7.3 9.5   HGB  --  10.0* 12.3 13.0   HCT  --  29.1* 36.2 38.3   MCV  --  82 80 82   PLT 63* Platelets clumped, platelet count unavailable 162 52*     Recent Labs   Lab Test 01/07/20  0558 01/06/20  0617 01/06/20  0050   CR 0.33* 0.40* 0.49*     Recent Labs   Lab Test 02/20/17  1745   SED 61*     Recent Labs   Lab Test 05/24/19  1115 05/19/19  0400 12/31/18  1452 12/31/18  1442 12/20/18  1149 12/19/18  1425 12/19/18  1250 12/16/18  0920 12/12/18  2120   CULT <10,000 colonies/mL  Pseudomonas aeruginosa  *  >100,000 colonies/mL  Enterococcus faecalis  * >100,000 colonies/mL  Klebsiella oxytoca  Susceptibility testing in progress  *  10,000 to 50,000 colonies/mL  Non lactose fermenting gram negative rods  *  10,000 to 50,000 colonies/mL  Gram negative rods  *  10,000 to 50,000 colonies/mL  Gram positive cocci  *  10,000 to 50,000 colonies/mL  Gram positive cocci in pairs and chains  *  Susceptibility testing not routinely done  No further identification   Light growth  Candida albicans / dubliniensis  Candida albicans and Candida dubliniensis are not routinely speciated  Susceptibility testing not routinely done  * No growth Culture negative for acid fast bacilli  Assayed at [facility] [address] [place] [address]  [phone]  No growth after 4 weeks  Candida albicans / dubliniensis  isolated  Candida albicans and Candida dubliniensis are not routinely speciated  *  No additional fungi cultured after 4 weeks incubation  No Legionella species isolated  Light growth  Candida albicans / dubliniensis  Candida albicans and Candida dubliniensis are not routinely speciated  Susceptibility testing not routinely done  * No growth No growth No growth No growth

## 2020-01-07 NOTE — PLAN OF CARE
Discharge Planner SLP   Patient plan for discharge: Did not discuss  Current status: Pt masticated and manipulation regular solids with effort and extra time required given absent lower dentition and general weakness. Pt tolerated 4 oz of thin liquids via straw sip, cough x 1 suspect d/t pt talking to quickly after a drink.     Recommend dysphagia diet 2 and thin liquids. Feeding assistance required, small bites/sips, slow rate of intake, and alternate solids and liquids. Must be seated upright     Barriers to return to prior living situation: None per SLP  Recommendations for discharge: Previous living environment   Rationale for recommendations: Short term SLP follow up given swallow function is below baseline as per previous documentation pt consumes regular solids despite missing lower dentition          Entered by: Velia Pereira 01/07/2020 9:49 AM
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
## 2020-01-07 NOTE — PROGRESS NOTES
[facility]    Hospitalist Progress Note    Assessment & Plan   Felicia Ellison is a 54 year old female who was admitted on 1/4/2020.  She has a history of flaccid paraplegia and being wheelchair-bound presented to  [facility] as a direct admit from [facility] for evaluation of altered mental status.  Acute encephalopathy, unclear etiology at this time, but highly concerning for seizures  --First EEG showing left greater than right bihemispheric frequent sharp waves indicating epileptiform activity.  Continuous EEG ongoing  --Appreciate neurology input, patient was transferred 1/4 to neuro telemetry with plans for video EEG , patient was transferred to ICU on 1/5 for tachycardia.  --MRI obtained 1/6 and does not show any new abnormalities  --Patient is more alert on 1/6 in her upper extremities appears to be baseline in strength  --NG tube were placed as the patient was obtunded and needed lactulose, since she is more awake we will remove the NG tube.  NG removed on 1/6, swallow evaluation performed, plan to initiate diet.  --Lactic acidosis has improved, lactate levels back to normal, Keppra levels higher limit of normal, Keppra is now 1500 mg.   --Antibiotics stopped by infectious disease not significant enterococcus in urine culture.  --Patient was not seen by neurology/no notes available from 1/6, repeat consult placed, she was placed on continuous EEG,, currently not on continuous EEG.  Pending neurology input, patient can be transferred out of the ICU if bed needed later today.  Possible septic arthritis of left hip  --Status post IR aspiration of left hip, pending cultures. as per Ortho does not appear to be septic arthritis on the hip.  --Antibiotics stopped by infectious disease 1/7, plan to monitor without antibiotics at this point.  Possible urinary tract infection  --Culture report faxed back from a [place] ER  --Appreciate infectious disease input,  She was   on cefepime and vancomycin since admission, both stopped on 1/7, outside cultures showing some growth of enterococcus.  --She has a neobladder and she used to straight cath herself 4 times daily, continue cares here  Decubitus ulcers  --Wound care consulted  Flaccid paraplegia/wheelchair-bound status  --Supportive care with frequent repositioning to continue  --We will get PT OT involved  History of insomnia  --On trazodone and Ambien PTA, currently on hold  Hyponatremia  --Due to reduced oral intake, continue D5 half NS for now, repeat sodium levels every 6 hours and monitor closely  --Infusion adjusted according to sodium levels, correcting well.  Hypokalemia  --Replace per protocol.  The patient is noted to be on Lovenox 60 mg twice daily, I did review the chart but could not identify a reason why she is on this high dose of therapeutic Lovenox.   DVT Prophylaxis:scd   Code Status: Full Code     Disposition: Expected discharge in 1 to 2 days, query TCU      Tasha Way MD  Text Page   (7am to 6pm)    Interval History   Patient is more pleasant, awake alert, responding well to questions, obeying commands, bilateral lower extremity weakness is chronic present, no new weakness noted.  Tolerating fluids.    -Data reviewed today: I reviewed all new labs and imaging results over the last 24 hours.    Physical Exam   Temp: 98.6  F (37  C) Temp src: Oral BP: 131/80 Pulse: 83 Heart Rate: 85 Resp: 17 SpO2: 100 % O2 Device: None (Room air)    Vitals:    01/04/20 0051 01/06/20 0140 01/07/20 0500   Weight: 48.4 kg (106 lb 11.2 oz) 46 kg (101 lb 6.6 oz) 48.9 kg (107 lb 12.9 oz)     Vital Signs with Ranges  Temp:  [97.6  F (36.4  C)-98.7  F (37.1  C)] 98.6  F (37  C)  Pulse:  [] 83  Heart Rate:  [] 85  Resp:  [10-23] 17  BP: ()/(38-91) 131/80  SpO2:  [95 %-100 %] 100 %  I/O last 3 completed shifts:  In: 3761.67 [P.O.:1200; I.V.:2501.67; NG/GT:60]  Out: 2205 [Urine:1530; Stool:675]    Constitutional: Awake,  alert, obeys commands  Respiratory: Clear to auscultation bilaterally, no crackles or wheezing  Cardiovascular: Regular rate and rhythm, normal S1 and S2, and no murmur noted  GI: Normal bowel sounds, she has a neobladder, Ambrose present  Skin/Integumen: No rashes, no cyanosis, no edema  Neuro : Paraplegic, bilateral upper extremity strength appears to be baseline    Medications     dextrose 5% and 0.45% NaCl + KCl 20 mEq/L 50 mL/hr at 01/07/20 0753       levETIRAcetam  1,500 mg Intravenous Q12H     sodium chloride (PF)  3 mL Intracatheter Q8H       Data   Recent Labs   Lab 01/07/20  0745 01/07/20  0558 01/07/20  0200 01/06/20  2030 01/06/20  1410 01/06/20  0617 01/06/20  0050 01/05/20  2252  01/04/20  0549   WBC  --  6.2  --   --   --   --  7.3  --   --  9.5   HGB  --  10.0*  --   --   --   --  12.3  --   --  13.0   MCV  --  82  --   --   --   --  80  --   --  82   PLT 63* Platelets clumped, platelet count unavailable  --   --   --   --  162  --   --  52*   INR  --   --   --   --   --   --  1.64*  --   --   --    NA  --  147* 147* 147* 156* 157* 155*  --   --  144   POTASSIUM  --  3.0*  --   --  3.6 2.8* 2.2* 2.2*   < > 2.9*   CHLORIDE  --  124*  --   --   --  131* 129*  --   --  120*   CO2  --  14*  --   --   --  18* 16*  --   --  10*   BUN  --  7  --   --   --  12 12  --   --  30   CR  --  0.33*  --   --   --  0.40* 0.49*  --    < > 0.50*   ANIONGAP  --  9  --   --   --  8 10  --   --  14   JOSH  --  7.9*  --   --   --  8.3* 8.7  --   --  8.1*   GLC  --  97  --   --   --  130* 118*  --   --  140*   ALBUMIN  --   --   --   --   --   --  2.6*  --   --  2.9*   PROTTOTAL  --   --   --   --   --   --  6.3*  --   --  6.9   BILITOTAL  --   --   --   --   --   --  0.7  --   --  0.5   ALKPHOS  --   --   --   --   --   --  156*  --   --  206*   ALT  --   --   --   --   --   --  8  --   --  11   AST  --   --   --   --   --   --  10  --   --  14   TROPI  --   --   --   --   --  0.015 0.021 0.020  --   --     < > = values in  this interval not displayed.     Recent Labs   Lab 01/07/20  0558 01/07/20  0202 01/06/20  2221 01/06/20  1642 01/06/20  1211 01/06/20  0815 01/06/20  0617  01/06/20  0050  01/04/20  0549   GLC 97  --   --   --   --   --  130*  --  118*  --  140*   BGM  --  86 127* 115* 122* 110*  --    < >  --    < >  --     < > = values in this interval not displayed.       Imaging:   No results found for this or any previous visit (from the past 24 hour(s)).

## 2020-01-08 ENCOUNTER — APPOINTMENT (OUTPATIENT)
Dept: SPEECH THERAPY | Facility: CLINIC | Age: 55
DRG: 100 | End: 2020-01-08
Attending: HOSPITALIST
Payer: COMMERCIAL

## 2020-01-08 LAB
ANION GAP SERPL CALCULATED.3IONS-SCNC: 8 MMOL/L (ref 3–14)
BUN SERPL-MCNC: 6 MG/DL (ref 7–30)
CALCIUM SERPL-MCNC: 7.3 MG/DL (ref 8.5–10.1)
CHLORIDE SERPL-SCNC: 122 MMOL/L (ref 94–109)
CO2 SERPL-SCNC: 15 MMOL/L (ref 20–32)
CREAT SERPL-MCNC: 0.29 MG/DL (ref 0.52–1.04)
FOLATE SERPL-MCNC: 2.2 NG/ML
GFR SERPL CREATININE-BSD FRML MDRD: >90 ML/MIN/{1.73_M2}
GLUCOSE SERPL-MCNC: 100 MG/DL (ref 70–99)
POTASSIUM SERPL-SCNC: 3.3 MMOL/L (ref 3.4–5.3)
POTASSIUM SERPL-SCNC: 4 MMOL/L (ref 3.4–5.3)
SODIUM SERPL-SCNC: 145 MMOL/L (ref 133–144)
TSH SERPL DL<=0.005 MIU/L-ACNC: 0.91 MU/L (ref 0.4–4)
VIT B12 SERPL-MCNC: 370 PG/ML (ref 193–986)

## 2020-01-08 PROCEDURE — 80048 BASIC METABOLIC PNL TOTAL CA: CPT | Performed by: INTERNAL MEDICINE

## 2020-01-08 PROCEDURE — 25000132 ZZH RX MED GY IP 250 OP 250 PS 637: Performed by: INTERNAL MEDICINE

## 2020-01-08 PROCEDURE — 84132 ASSAY OF SERUM POTASSIUM: CPT | Performed by: INTERNAL MEDICINE

## 2020-01-08 PROCEDURE — 99232 SBSQ HOSP IP/OBS MODERATE 35: CPT | Performed by: INTERNAL MEDICINE

## 2020-01-08 PROCEDURE — 25000128 H RX IP 250 OP 636: Performed by: INTERNAL MEDICINE

## 2020-01-08 PROCEDURE — 25000132 ZZH RX MED GY IP 250 OP 250 PS 637: Performed by: NURSE PRACTITIONER

## 2020-01-08 PROCEDURE — 25000128 H RX IP 250 OP 636: Performed by: HOSPITALIST

## 2020-01-08 PROCEDURE — 92526 ORAL FUNCTION THERAPY: CPT | Mod: GN | Performed by: SPEECH-LANGUAGE PATHOLOGIST

## 2020-01-08 PROCEDURE — 25800030 ZZH RX IP 258 OP 636: Performed by: HOSPITALIST

## 2020-01-08 PROCEDURE — 82607 VITAMIN B-12: CPT | Performed by: INTERNAL MEDICINE

## 2020-01-08 PROCEDURE — 82746 ASSAY OF FOLIC ACID SERUM: CPT | Performed by: INTERNAL MEDICINE

## 2020-01-08 PROCEDURE — 25800030 ZZH RX IP 258 OP 636: Performed by: INTERNAL MEDICINE

## 2020-01-08 PROCEDURE — 84443 ASSAY THYROID STIM HORMONE: CPT | Performed by: INTERNAL MEDICINE

## 2020-01-08 PROCEDURE — 12000000 ZZH R&B MED SURG/OB

## 2020-01-08 RX ORDER — MULTIPLE VITAMINS W/ MINERALS TAB 9MG-400MCG
1 TAB ORAL DAILY
Status: DISCONTINUED | OUTPATIENT
Start: 2020-01-08 | End: 2020-01-11 | Stop reason: HOSPADM

## 2020-01-08 RX ORDER — POTASSIUM CHLORIDE 1.5 G/1.58G
20 POWDER, FOR SOLUTION ORAL
Status: DISCONTINUED | OUTPATIENT
Start: 2020-01-08 | End: 2020-01-08

## 2020-01-08 RX ORDER — CELECOXIB 200 MG/1
200 CAPSULE ORAL DAILY
Status: DISCONTINUED | OUTPATIENT
Start: 2020-01-08 | End: 2020-01-11 | Stop reason: HOSPADM

## 2020-01-08 RX ORDER — POTASSIUM CHLORIDE 1.5 G/1.58G
20 POWDER, FOR SOLUTION ORAL
Status: DISCONTINUED | OUTPATIENT
Start: 2020-01-09 | End: 2020-01-09 | Stop reason: ALTCHOICE

## 2020-01-08 RX ORDER — SIMETHICONE 20 MG/.3ML
40 EMULSION ORAL EVERY 6 HOURS PRN
Status: DISCONTINUED | OUTPATIENT
Start: 2020-01-08 | End: 2020-01-11 | Stop reason: HOSPADM

## 2020-01-08 RX ORDER — OXYBUTYNIN CHLORIDE 5 MG/1
5 TABLET ORAL 2 TIMES DAILY
Status: DISCONTINUED | OUTPATIENT
Start: 2020-01-08 | End: 2020-01-11 | Stop reason: HOSPADM

## 2020-01-08 RX ADMIN — OXYBUTYNIN CHLORIDE 5 MG: 5 TABLET ORAL at 21:38

## 2020-01-08 RX ADMIN — LEVETIRACETAM 1500 MG: 100 INJECTION, SOLUTION INTRAVENOUS at 05:35

## 2020-01-08 RX ADMIN — POTASSIUM CHLORIDE, DEXTROSE MONOHYDRATE AND SODIUM CHLORIDE: 150; 5; 450 INJECTION, SOLUTION INTRAVENOUS at 12:35

## 2020-01-08 RX ADMIN — SIMETHICONE 40 MG: 20 EMULSION ORAL at 02:53

## 2020-01-08 RX ADMIN — POTASSIUM CHLORIDE 20 MEQ: 1500 TABLET, EXTENDED RELEASE ORAL at 11:26

## 2020-01-08 RX ADMIN — POTASSIUM CHLORIDE 40 MEQ: 1500 TABLET, EXTENDED RELEASE ORAL at 09:50

## 2020-01-08 RX ADMIN — LEVETIRACETAM 1500 MG: 100 INJECTION, SOLUTION INTRAVENOUS at 18:06

## 2020-01-08 RX ADMIN — CELECOXIB 200 MG: 200 CAPSULE ORAL at 14:01

## 2020-01-08 RX ADMIN — OXYBUTYNIN CHLORIDE 5 MG: 5 TABLET ORAL at 14:01

## 2020-01-08 RX ADMIN — MULTIPLE VITAMINS W/ MINERALS TAB 1 TABLET: TAB at 16:25

## 2020-01-08 ASSESSMENT — ACTIVITIES OF DAILY LIVING (ADL)
ADLS_ACUITY_SCORE: 28
ADLS_ACUITY_SCORE: 27

## 2020-01-08 NOTE — PROGRESS NOTES
X cover 2054    Called for low urine output and soft BP in 90s    - will order 500 ml NS fluid bolus

## 2020-01-08 NOTE — PROVIDER NOTIFICATION
Patient has illio-counduit for bladder elimination. Have had difficulties getting urine out. Catheter has been repositioned, flushed, take out and replaced, and bolus given. Bladder scanned showing urine in bladder. Some urine out ( See Flowsheets) but has stopped again. MD made aware of situation, no new orders as of now. Will continue to monitor.     Samantha Yadav RN
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
## 2020-01-08 NOTE — PLAN OF CARE
Pt upgraded to dysphagia level II diet, tolerating increased textures, reports good appetite.  Note no neuro changes, continues on Keppra.  Turned frequently, mepilex on bony prominences, wound care done on coccyx as ordered.

## 2020-01-08 NOTE — PROGRESS NOTES
[facility]  Infectious Disease Progress Note          Assessment and Plan:   IMPRESSION:   1.  A 54-year-old female well known to me from prior admissions, currently admitted with altered mental status, question recurrent seizure problems versus a diffuse encephalopathy related to infection -- not entirely clear.   2.  Chronic paraplegia from prior spinal cord injury.   3.  Question current active infection, abnormal urine, new finding in her left hip, chronic wounds, history of aspiration, so multiple possible infection sites, but not entirely clear.  Cultures from the outside hospital are pending.   4.  New finding of left hip fracture, near destroyed head of the joint, question infection versus other causes.   5.  Prior history of right hip infection related to chronic wounds.   6.  Multiple chronic wounds without clear deep infection.   7.  Urinary tract infections and nephrolithiasis previously.   8.  History of aspiration.   9.  Seizure disorder.      RECOMMENDATIONS:   1.  Currently  vancomycin and cefepime.   outside cultures only,  likely not significant enterococcus in UC, BC neg and no fever or  clinical symptoms to suggest focal infection, discontinue ABX  2.  Orthopedics evaluating the hip finding, difficult to know how to interpret this.  Some fluid present but not entirely clear that this is infection, if it is infection ABX alone of no value.   3.  Watch off ABX, no fever or overall worsening             Interval History:   Not ill looking now awakening, no cxs here WBC 9500 cxs as above no fever              Medications:       levETIRAcetam  1,500 mg Intravenous Q12H     sodium chloride (PF)  10 mL Intracatheter Q8H                  Physical Exam:   Blood pressure 107/58, pulse 75, temperature 98.6  F (37  C), temperature source Oral, resp. rate 21, weight 48.2 kg (106 lb 4.2 oz), SpO2 98 %, not currently breastfeeding.  Wt Readings from Last 2 Encounters:   01/08/20 48.2 kg  (106 lb 4.2 oz)   05/25/19 67.7 kg (149 lb 3.2 oz)     Vital Signs with Ranges  Temp:  [98.5  F (36.9  C)-98.7  F (37.1  C)] 98.6  F (37  C)  Pulse:  [] 75  Heart Rate:  [] 82  Resp:  [8-21] 21  BP: ()/(51-94) 107/58  SpO2:  [96 %-100 %] 98 %    Constitutional: Not awakening   Lungs: Clear to auscultation bilaterally, no crackles or wheezing   Cardiovascular: Regular rate and rhythm, normal S1 and S2, and no murmur noted   Abdomen: Normal bowel sounds, soft, non-distended, non-tender   Skin: No rashes, no cyanosis, no edema wds not seen   Other:           Data:   All microbiology laboratory data reviewed.  Recent Labs   Lab Test 01/07/20  0745 01/07/20  0558 01/06/20  0050 01/04/20  0549   WBC  --  6.2 7.3 9.5   HGB  --  10.0* 12.3 13.0   HCT  --  29.1* 36.2 38.3   MCV  --  82 80 82   PLT 63* Platelets clumped, platelet count unavailable 162 52*     Recent Labs   Lab Test 01/08/20  0620 01/07/20  0558 01/06/20  0617   CR 0.29* 0.33* 0.40*     Recent Labs   Lab Test 02/20/17  1745   SED 61*     Recent Labs   Lab Test 05/24/19  1115 05/19/19  0400 12/31/18  1452 12/31/18  1442 12/20/18  1149 12/19/18  1425 12/19/18  1250 12/16/18  0920 12/12/18  2120   CULT <10,000 colonies/mL  Pseudomonas aeruginosa  *  >100,000 colonies/mL  Enterococcus faecalis  * >100,000 colonies/mL  Klebsiella oxytoca  Susceptibility testing in progress  *  10,000 to 50,000 colonies/mL  Non lactose fermenting gram negative rods  *  10,000 to 50,000 colonies/mL  Gram negative rods  *  10,000 to 50,000 colonies/mL  Gram positive cocci  *  10,000 to 50,000 colonies/mL  Gram positive cocci in pairs and chains  *  Susceptibility testing not routinely done  No further identification   Light growth  Candida albicans / dubliniensis  Candida albicans and Candida dubliniensis are not routinely speciated  Susceptibility testing not routinely done  * No growth Culture negative for acid fast bacilli  Assayed at [facility] [address] [place] [id number] [phone]  No growth after 4 weeks  Candida albicans / dubliniensis  isolated  Candida albicans and Candida dubliniensis are not routinely speciated  *  No additional fungi cultured after 4 weeks incubation  No Legionella species isolated  Light growth  Candida albicans / dubliniensis  Candida albicans and Candida dubliniensis are not routinely speciated  Susceptibility testing not routinely done  * No growth No growth No growth No growth

## 2020-01-08 NOTE — PROGRESS NOTES
[facility]    Hospitalist Progress Note    Assessment & Plan   Felicia Ellison is a 54 year old female who was admitted on 1/4/2020.  She has a history of flaccid paraplegia and being wheelchair-bound presented to  [facility] as a direct admit from [facility] for evaluation of altered mental status.    Acute encephalopathy, unclear etiology at this time, but highly concerning for seizures  Lactic acidosis  - has h/o seizures, h/o status epilepticus in May 2019; PTA on Keppra 1000 mg po BID  - no seizure- like activity witnessed at home  - had ammonia level elevated at [place] [facility], NGT placed and Lactulose given, ammonia level improved to 46; LFTs- normal (just mildly elevated Alk Phosph), no h/p chronic liver disease  - Neurology consult appreciated  - First EEG showing left greater than right bihemispheric frequent sharp waves indicating epileptiform activity.    - CT head and MRO brain 01/06- no acute pathology  - transferred to Neuro-Tele on 01/04, then ICU on 01/05  - PTA Keppra increased to 1500mg BID  - mentation back to baseline now  - lactic acidosis improved, LA back to normal  - check vit B12, folate, TSH, fT4  - SLP- DD2 with thin liquids    Possible septic arthritis of left hip  - she was transferred to [facility] because concern for septic arthritis   - CT abd/pelvis- showed chronic-appearing but new from comparison  of a subcapital femoral neck fracture of the chronically neuropathic left hip with some fluid and presumed synovial thickening that extends to the left ischial decubitus ulcer and septic arthritis cannot be excluded  - ID consult and Ortho consult appreciated  - started on Cefepime and Vanco on admission  - s/p attempted left hip aspiration, no fluid yield; as per Ortho does not appear to be septic arthritis on the hip.  - Antibiotics stopped by infectious disease 1/7, plan to monitor without antibiotics at this point.    Possible urinary tract  "infection  - Culture report faxed back from a [place] ER  - Appreciate infectious disease input,  She was  on cefepime and vancomycin since admission, both stopped on 1/7, outside cultures showing some growth of enterococcus.  - She has a neobladder and she used to straight cath herself 4 times daily, continue cares here    Decubitus ulcers  - Wound care consulted    Flaccid paraplegia/wheelchair-bound status  - Supportive care with frequent repositioning to continue  - PT/OT    History of insomnia  - On trazodone and Ambien PTA, currently on hold    Hypernatremia  - Na was 157 on 01/06- due to reduced oral intake  - started on  continue D5 half NS   - Na improving 147--146--145  - BMP in am    Hypokalemia  --Replace per protocol.  - resume PTA KCl supplementation     .   DVT Prophylaxis: PTA On Lovenox 60 mg BID, not clear why she is on therapeutic dose of Lovenox; here started on PCD; noted with low Plts count 63 on 01/07; recheck Plts count and if stable- may start prophylactic dose of Lovenox.    Code Status: Full Code     Disposition: Stable to be transferred out of ICU; expected discharge in 1 to 2 days, query TCU      Dilcia Lucas MD    Interval History   Doing better, mentation greatly improved, AAOX3, feels \"almost back to baseline\"; denies chest pain, no SOB, no abd pain; reports buttocks pain; discussed with RN    -Data reviewed today: I reviewed all new labs and imaging results over the last 24 hours.    Physical Exam   Temp: 98.6  F (37  C) Temp src: Oral BP: 107/58 Pulse: 75 Heart Rate: 82 Resp: 21 SpO2: 98 % O2 Device: None (Room air)    Vitals:    01/06/20 0140 01/07/20 0500 01/08/20 0600   Weight: 46 kg (101 lb 6.6 oz) 48.9 kg (107 lb 12.9 oz) 48.2 kg (106 lb 4.2 oz)     Vital Signs with Ranges  Temp:  [98.5  F (36.9  C)-98.7  F (37.1  C)] 98.6  F (37  C)  Pulse:  [] 75  Heart Rate:  [] 82  Resp:  [8-21] 21  BP: ()/(51-94) 107/58  SpO2:  [96 %-100 %] 98 %  I/O last 3 " completed shifts:  In: 2420 [P.O.:720; I.V.:1200; IV Piggyback:500]  Out: 1980 [Urine:1680; Stool:300]    Constitutional: Awake, alert, NAD  Respiratory: Clear to auscultation bilaterally, no crackles or wheezing  Cardiovascular: Regular rate and rhythm, normal S1 and S2, and no murmur noted  GI: Normal bowel sounds, she has a neobladder, Ambrose present  Skin/Integumen: No rashes, no cyanosis, no edema  Neuro : AAO to self, place and time; Paraplegic, bilateral upper extremity strength appears to be baseline    Medications     dextrose 5% and 0.45% NaCl + KCl 20 mEq/L 50 mL/hr at 01/07/20 1249       levETIRAcetam  1,500 mg Intravenous Q12H     sodium chloride (PF)  10 mL Intracatheter Q8H       Data   Recent Labs   Lab 01/08/20  0620 01/07/20  2000 01/07/20  1343 01/07/20  0745 01/07/20  0558  01/06/20  0617 01/06/20  0050 01/05/20  2252  01/04/20  0549   WBC  --   --   --   --  6.2  --   --  7.3  --   --  9.5   HGB  --   --   --   --  10.0*  --   --  12.3  --   --  13.0   MCV  --   --   --   --  82  --   --  80  --   --  82   PLT  --   --   --  63* Platelets clumped, platelet count unavailable  --   --  162  --   --  52*   INR  --   --   --   --   --   --   --  1.64*  --   --   --    * 146* 146*  --  147*   < > 157* 155*  --   --  144   POTASSIUM 3.3*  --  3.8  --  3.0*   < > 2.8* 2.2* 2.2*   < > 2.9*   CHLORIDE 122*  --   --   --  124*  --  131* 129*  --   --  120*   CO2 15*  --   --   --  14*  --  18* 16*  --   --  10*   BUN 6*  --   --   --  7  --  12 12  --   --  30   CR 0.29*  --   --   --  0.33*  --  0.40* 0.49*  --    < > 0.50*   ANIONGAP 8  --   --   --  9  --  8 10  --   --  14   JOSH 7.3*  --   --   --  7.9*  --  8.3* 8.7  --   --  8.1*   *  --   --   --  97  --  130* 118*  --   --  140*   ALBUMIN  --   --   --   --   --   --   --  2.6*  --   --  2.9*   PROTTOTAL  --   --   --   --   --   --   --  6.3*  --   --  6.9   BILITOTAL  --   --   --   --   --   --   --  0.7  --   --  0.5   ALKPHOS  --    --   --   --   --   --   --  156*  --   --  206*   ALT  --   --   --   --   --   --   --  8  --   --  11   AST  --   --   --   --   --   --   --  10  --   --  14   TROPI  --   --   --   --   --   --  0.015 0.021 0.020  --   --     < > = values in this interval not displayed.     Recent Labs   Lab 01/08/20  0620 01/07/20  0558 01/07/20  0202 01/06/20  2221 01/06/20  1642 01/06/20  1211 01/06/20  0815 01/06/20  0617  01/06/20  0050  01/04/20  0549   * 97  --   --   --   --   --  130*  --  118*  --  140*   BGM  --   --  86 127* 115* 122* 110*  --    < >  --    < >  --     < > = values in this interval not displayed.       Imaging:   No results found for this or any previous visit (from the past 24 hour(s)).

## 2020-01-08 NOTE — PLAN OF CARE
Discharge Planner SLP   Patient plan for discharge: Did not discuss  Current status: Swallow Tx was provided this am.  Pt tolerated regular solids and thin liquids with min-mod cues/assist.  Pt chewed solids carefully.  No signs of aspiration were observed after swallows.  Progress made toward swallow Tx goal.    Recommend a diet upgrade to regular solids and thin liquids with reminders to sit at 90 degrees, take small bites/sips, chew carefully, and use a slow rate.    Plan to provide 1-2 additional swallow Tx sessions to assess diet tolerance and use of strategies.     Barriers to return to prior living situation: None per SLP  Recommendations for discharge: Previous living environment   Rationale for recommendations: Anticipate SLP swallow Tx goal to be met prior to discharge         Entered by: Desirae Phelan 01/08/2020 11:55 AM

## 2020-01-08 NOTE — PLAN OF CARE
Patient VSS, no acute events overnight. Neurologically unchanged, UO good, see previous note. Dressing on coccyx intact. Continuing to monitor.     Samantha Yadav RN

## 2020-01-08 NOTE — CONSULTS
"CLINICAL NUTRITION SERVICES  -  ASSESSMENT NOTE      Recommendations Ordered by Registered Dietitian (RD):   Add Chocolate Boost Plus with meals for nutrition push.  Thera-Vit-M daily - for supplementation - per PI protocol   Malnutrition:   % Weight Loss:  > 10% in 6 months (severe malnutrition)  % Intake:  <75% for > 7 days (non-severe malnutrition)  Subcutaneous Fat Loss:  Orbital region moderate depletion and Upper arm region mild depletion  Muscle Loss:  Temporal region mild depletion, Clavicle bone region moderate depletion and Acromion bone region moderate depletion, Chronic LE muscle wasting due to paraplegia  Fluid Retention:  None noted    Malnutrition Diagnosis: Severe malnutrition  In Context of:  Acute illness or injury  Chronic illness or disease  Environmental or social circumstances        REASON FOR ASSESSMENT  Felicia Ellison is a 54 year old female seen by Registered Dietitian for Admission Nutrition Risk Screen for reduced oral intake over the last month and Interdisciplinary Rounds (Low BMI and Pressure Injuries)      NUTRITION HISTORY  - Information obtained from Epic records and patient.  - Patient is on a regular diet at home.  - Typical food/fluid intake is poor-fair at best.  - Diet history:  Pt is a vague historian.  She states her daughter and granddaughter do the shopping and cooking for her at home.  She skips breakfast but does drink milk.  She is especially fond of drinking milk.  Lunch and dinner vary.  She does like to eat eggs.   - Supplements:  None.  She used to drink either Ensure or Boost but not anymore because \"it's too expensive.\"   - No food allergies/intolerances.  Pt is wheelchair bound with paraplegia following a spinal cord injury.      CURRENT NUTRITION ORDERS  Diet Order:     Regular     Current Intake/Tolerance:  Overall decreased oral intake since admission x 5 days.  Yesterday she took 720 mL oral.  Stool 300 mL.  Today for lunch she only took bites of her " turkey with mashed potatoes and gravy as complained it was too cold.  RD offered to send a Boost supplement now, to which she was agreeable to.    1/6:  RRT called for: AMS, tachycardia --> tx'd to ICU  1/6:  SLP bedside swallow = Mild-moderate oropharyngeal dysphagia --> DD1 with thin liquids  1/7:  SLP adv diet to DD2 with thin liquids  1/8:  SLP adv diet to Reg with thin liquids      NUTRITION FOCUSED PHYSICAL ASSESSMENT FOR DIAGNOSING MALNUTRITION)  Yes         Observed:    Muscle wasting (refer to documentation in Malnutrition section)   Subcutaneous fat loss (refer to documentation in Malnutrition section)    Obtained from Chart/Interdisciplinary Team:  Pressure Injury - Per WOCN visit 1/6:  - Chronic wounds with no obvious signs of infection.   - Sacral and Rt IT: Stage 4 PI, chronic and community acquired  - Rt heel:  Healing Stage 3 PI community acquired      ANTHROPOMETRICS  Height:  5 ft 9 in  Admit Wt:  48.4 kg  Body mass index:  Not validated in paraplegia   IBW:  61.5 kg (adjusted for paraplegia)  % IBW:  79% IBW  Weight History:  Weight loss 12.8 kg (20%) over past 8 months    Wt Readings from Last 20 Encounters:   01/08/20 48.2 kg (106 lb 4.2 oz)   05/25/19 67.7 kg (149 lb 3.2 oz)   05/19/19 61 kg   01/08/19 81.6 kg (179 lb 14.3 oz)   08/30/17 56.7 kg (125 lb)   02/27/17 69 kg (152 lb 1.9 oz)   03/26/14 56.7 kg (125 lb)   01/15/14 54.3 kg (119 lb 9.6 oz)   12/28/13 54.5 kg (120 lb 2.4 oz)   11/30/13 59 kg (130 lb)   10/30/13 58.5 kg (128 lb 15.5 oz)   10/17/13 59 kg (130 lb)   09/16/13 59 kg (130 lb)   08/29/13 59 kg (130 lb)   08/09/13 59 kg (130 lb)   03/01/13 59 kg (130 lb)   01/31/13 59.1 kg (130 lb 4.8 oz)   01/28/13 59.1 kg (130 lb 4.8 oz)   01/02/13 56.7 kg (125 lb)   12/26/12 56.9 kg (125 lb 6.4 oz)   12/04/12 59.2 kg (130 lb 8 oz)         LABS  Labs reviewed  Na 145 (H)  K 3.3 (L)    BUN 6 (L) - indicates poor protein intake    MEDICATIONS  Medications reviewed  IVF D5 1/2 NaCl + 20 KCl at  50 mL/hr = 60 gm CHO, 204 kcals - for fluid delivery    ASSESSED NUTRITION NEEDS PER APPROVED PRACTICE GUIDELINES:    Dosing Weight:  48.2 kg  Estimated Energy Needs: 6865-5786 kcals (30-35 Kcal/Kg)  Justification: repletion  Estimated Protein Needs:  72-87 grams protein (1.5-1.8 g pro/Kg)  Justification: Repletion, wound healing and hypercatabolism with acute illness  Estimated Fluid Needs:  1708-5819 mL (1 mL/Kcal)  Justification: maintenance    MALNUTRITION:  % Weight Loss:  > 10% in 6 months (severe malnutrition)  % Intake:  <75% for > 7 days (non-severe malnutrition)  Subcutaneous Fat Loss:  Orbital region moderate depletion and Upper arm region mild depletion  Muscle Loss:  Temporal region mild depletion, Clavicle bone region moderate depletion and Acromion bone region moderate depletion, Chronic LE muscle wasting due to paraplegia  Fluid Retention:  None noted    Malnutrition Diagnosis: Severe malnutrition  In Context of:  Acute illness or injury  Chronic illness or disease  Environmental or social circumstances    NUTRITION DIAGNOSIS:  Inadequate oral intake related to recent poor mentation, decreased appetite, early satiety, and financial issues as evidenced by decreased oral intake over the past week, 20% weight loss over past 8 months, and loss of subcutaneous fat and muscle mass, only 79% IBW.      NUTRITION INTERVENTIONS  Recommendations / Nutrition Prescription  Add Chocolate Boost Plus with meals for nutrition push.  Thera-Vit-M daily - for supplementation - per PI protocol      Implementation  Nutrition education: Provided education on the use of supplements to optimize intake.  Collaboration and Referral of Nutrition care - Pt was discussed during ICU interdisciplinary rounds this morning.  Medical Food Supplement, Multivitamin/Mineral - Ordered above in EPIC    Nutrition Goals  Pt will consume > 75% meals and > 50% supplements in 3-5 days.      MONITORING AND EVALUATION:  Progress towards goals  will be monitored and evaluated per protocol and Practice Guidelines    Nirmala Mijares, MIESHA, LD, CNSC

## 2020-01-08 NOTE — PLAN OF CARE
Temp of 99. No pain. Placed on left side. Belongings at the bedside. Pills locked out. Receiving IV fluids.

## 2020-01-09 ENCOUNTER — APPOINTMENT (OUTPATIENT)
Dept: OCCUPATIONAL THERAPY | Facility: CLINIC | Age: 55
DRG: 100 | End: 2020-01-09
Attending: HOSPITALIST
Payer: COMMERCIAL

## 2020-01-09 LAB
ALBUMIN UR-MCNC: 30 MG/DL
ANION GAP SERPL CALCULATED.3IONS-SCNC: 4 MMOL/L (ref 3–14)
APPEARANCE UR: ABNORMAL
BILIRUB UR QL STRIP: NEGATIVE
BUN SERPL-MCNC: 6 MG/DL (ref 7–30)
CALCIUM SERPL-MCNC: 7.5 MG/DL (ref 8.5–10.1)
CHLORIDE SERPL-SCNC: 120 MMOL/L (ref 94–109)
CO2 SERPL-SCNC: 18 MMOL/L (ref 20–32)
COLOR UR AUTO: YELLOW
CREAT SERPL-MCNC: 0.28 MG/DL (ref 0.52–1.04)
ERYTHROCYTE [DISTWIDTH] IN BLOOD BY AUTOMATED COUNT: 17.6 % (ref 10–15)
GFR SERPL CREATININE-BSD FRML MDRD: >90 ML/MIN/{1.73_M2}
GLUCOSE SERPL-MCNC: 97 MG/DL (ref 70–99)
GLUCOSE UR STRIP-MCNC: NEGATIVE MG/DL
HCT VFR BLD AUTO: 25.9 % (ref 35–47)
HGB BLD-MCNC: 8.6 G/DL (ref 11.7–15.7)
HGB UR QL STRIP: ABNORMAL
INTERPRETATION ECG - MUSE: NORMAL
KETONES UR STRIP-MCNC: NEGATIVE MG/DL
LEUKOCYTE ESTERASE UR QL STRIP: ABNORMAL
MCH RBC QN AUTO: 27.7 PG (ref 26.5–33)
MCHC RBC AUTO-ENTMCNC: 33.2 G/DL (ref 31.5–36.5)
MCV RBC AUTO: 83 FL (ref 78–100)
MUCOUS THREADS #/AREA URNS LPF: PRESENT /LPF
NITRATE UR QL: NEGATIVE
PH UR STRIP: 7 PH (ref 5–7)
PLATELET # BLD AUTO: 57 10E9/L (ref 150–450)
POTASSIUM SERPL-SCNC: 3.9 MMOL/L (ref 3.4–5.3)
RBC # BLD AUTO: 3.11 10E12/L (ref 3.8–5.2)
RBC #/AREA URNS AUTO: <1 /HPF (ref 0–2)
SODIUM SERPL-SCNC: 142 MMOL/L (ref 133–144)
SOURCE: ABNORMAL
SP GR UR STRIP: 1.01 (ref 1–1.03)
UROBILINOGEN UR STRIP-MCNC: 0.2 MG/DL (ref 0–2)
WBC # BLD AUTO: 6.5 10E9/L (ref 4–11)
WBC #/AREA URNS AUTO: >182 /HPF (ref 0–5)
WBC CLUMPS #/AREA URNS HPF: PRESENT /HPF
YEAST #/AREA URNS HPF: ABNORMAL /HPF

## 2020-01-09 PROCEDURE — 85027 COMPLETE CBC AUTOMATED: CPT | Performed by: INTERNAL MEDICINE

## 2020-01-09 PROCEDURE — 81001 URINALYSIS AUTO W/SCOPE: CPT | Performed by: INTERNAL MEDICINE

## 2020-01-09 PROCEDURE — 87186 SC STD MICRODIL/AGAR DIL: CPT | Performed by: INTERNAL MEDICINE

## 2020-01-09 PROCEDURE — 87086 URINE CULTURE/COLONY COUNT: CPT | Performed by: INTERNAL MEDICINE

## 2020-01-09 PROCEDURE — 40000893 ZZH STATISTIC PT IP EVAL DEFER: Performed by: PHYSICAL THERAPIST

## 2020-01-09 PROCEDURE — 25800030 ZZH RX IP 258 OP 636: Performed by: INTERNAL MEDICINE

## 2020-01-09 PROCEDURE — 87088 URINE BACTERIA CULTURE: CPT | Performed by: INTERNAL MEDICINE

## 2020-01-09 PROCEDURE — 99232 SBSQ HOSP IP/OBS MODERATE 35: CPT | Performed by: INTERNAL MEDICINE

## 2020-01-09 PROCEDURE — 99207 ZZC NON-BILLABLE SERV PER CHARTING: CPT | Performed by: INTERNAL MEDICINE

## 2020-01-09 PROCEDURE — 12000000 ZZH R&B MED SURG/OB

## 2020-01-09 PROCEDURE — 25000128 H RX IP 250 OP 636: Performed by: INTERNAL MEDICINE

## 2020-01-09 PROCEDURE — 80048 BASIC METABOLIC PNL TOTAL CA: CPT | Performed by: INTERNAL MEDICINE

## 2020-01-09 PROCEDURE — 25000132 ZZH RX MED GY IP 250 OP 250 PS 637: Performed by: INTERNAL MEDICINE

## 2020-01-09 PROCEDURE — 97110 THERAPEUTIC EXERCISES: CPT | Mod: GO | Performed by: OCCUPATIONAL THERAPIST

## 2020-01-09 PROCEDURE — 97165 OT EVAL LOW COMPLEX 30 MIN: CPT | Mod: GO | Performed by: OCCUPATIONAL THERAPIST

## 2020-01-09 RX ORDER — LEVETIRACETAM 500 MG/1
1500 TABLET ORAL 2 TIMES DAILY
Status: DISCONTINUED | OUTPATIENT
Start: 2020-01-09 | End: 2020-01-11 | Stop reason: HOSPADM

## 2020-01-09 RX ORDER — FOLIC ACID 1 MG/1
1 TABLET ORAL DAILY
Status: DISCONTINUED | OUTPATIENT
Start: 2020-01-09 | End: 2020-01-11 | Stop reason: HOSPADM

## 2020-01-09 RX ORDER — POTASSIUM CHLORIDE 1500 MG/1
20 TABLET, EXTENDED RELEASE ORAL
Status: DISCONTINUED | OUTPATIENT
Start: 2020-01-09 | End: 2020-01-11 | Stop reason: HOSPADM

## 2020-01-09 RX ADMIN — POTASSIUM CHLORIDE 20 MEQ: 1500 TABLET, EXTENDED RELEASE ORAL at 17:50

## 2020-01-09 RX ADMIN — LEVETIRACETAM 1500 MG: 100 INJECTION, SOLUTION INTRAVENOUS at 06:22

## 2020-01-09 RX ADMIN — MULTIPLE VITAMINS W/ MINERALS TAB 1 TABLET: TAB at 08:41

## 2020-01-09 RX ADMIN — POTASSIUM CHLORIDE 20 MEQ: 1500 TABLET, EXTENDED RELEASE ORAL at 11:19

## 2020-01-09 RX ADMIN — LEVETIRACETAM 1500 MG: 500 TABLET, FILM COATED ORAL at 17:50

## 2020-01-09 RX ADMIN — OXYBUTYNIN CHLORIDE 5 MG: 5 TABLET ORAL at 20:40

## 2020-01-09 RX ADMIN — FOLIC ACID 1 MG: 1 TABLET ORAL at 16:48

## 2020-01-09 RX ADMIN — OXYBUTYNIN CHLORIDE 5 MG: 5 TABLET ORAL at 08:41

## 2020-01-09 RX ADMIN — POTASSIUM CHLORIDE, DEXTROSE MONOHYDRATE AND SODIUM CHLORIDE: 150; 5; 450 INJECTION, SOLUTION INTRAVENOUS at 06:23

## 2020-01-09 RX ADMIN — CELECOXIB 200 MG: 200 CAPSULE ORAL at 08:41

## 2020-01-09 ASSESSMENT — ACTIVITIES OF DAILY LIVING (ADL)
ADLS_ACUITY_SCORE: 27
ADLS_ACUITY_SCORE: 29
ADLS_ACUITY_SCORE: 29
ADLS_ACUITY_SCORE: 27
IADL_COMMENTS: FAMILY MANAGES

## 2020-01-09 NOTE — PLAN OF CARE
Cognitive Concerns/ Orientation : A&Ox4  BEHAVIOR & AGGRESSION TOOL COLOR: green  ABNL VS/O2: VSS on RA  MOBILITY: lift, paraplegic, T/R q2h  PAIN MANAGMENT: denies  DIET: regular  BOWEL/BLADDER: ileostomy and suprapubic catheter.  Catheter mucous, sediment.  Irrigated x1.  Last overnight reported need to irrigate as well.  ABNL LAB/BG:   DRAIN/DEVICES: PIV and LIJ SL, suprapubic catheter and ileostomy  TELEMETRY RHYTHM: NSR  SKIN: wounds on coccyx, dressing CDI  TESTS/PROCEDURES:   D/C DAY/GOALS/PLACE: pending therapies, consults, and discharge disposition.  OTHER IMPORTANT INFO: no seizure activity    Urine sent, patient has mucous in urine.  Lab unable to test, requesting additional specimen.  Orders placed, will report to oncoming RN.

## 2020-01-09 NOTE — PROGRESS NOTES
"SPIRITUAL HEALTH SERVICES Progress Note  [facility]    Visit with pt per her lengthy hospital stay.  Pt seemed to remember me from past hospitalizations.  She says that she has almost  four times now, so she thinks she might only have five lives left.  Pt said, \"My doctors say I'm enigma.\"  She shared ways that she has defied odds and expectations, and she seems to anticipate that to continue.  Provided reflective conversation and support.  SH team available per on-going request or need.                                                                                                                                               Dagmar Lima M.A.  Staff   Pager [phone]  Phone [phone]      "

## 2020-01-09 NOTE — PROGRESS NOTES
01/09/20 1523   Quick Adds   Type of Visit Initial Occupational Therapy Evaluation   Living Environment   Lives With child(rao), adult   Living Arrangements house   Home Accessibility no concerns   Living Environment Comment w/c accessable. daughter, granddaughter and PCA with her 90% of the time. always has assist with transfer to and from w/c   Self-Care   Usual Activity Tolerance moderate   Current Activity Tolerance fair   Regular Exercise No   Equipment Currently Used at Home wheelchair, manual   Activity/Exercise/Self-Care Comment was mostly ind with dressing, grooming, managing cath and cholostomy bag.    General Information   Onset of Illness/Injury or Date of Surgery - Date 01/04/20   Referring Physician Lance   Patient/Family Goals Statement I want to get strong so I can transfer myself   Additional Occupational Profile Info/Pertinent History of Current Problem  Pt admitted with altered mental status and possible septic hip.  AMS now resolved, chronic hip issues, per pt has had partial hip joint removal.   Precautions/Limitations fall precautions   General Observations pt is parapalegic for 29 years   Cognitive Status Examination   Orientation orientation to person, place and time   Level of Consciousness alert   Follows Commands (Cognition) WNL   Cognitive Comment appears functional, pt states she feels at baseline   Visual Perception   Visual Perception Wears glasses   Visual Perception Comments denies blurry or double vision   Sensory Examination   Sensory Quick Adds No deficits were identified   Sensory Comments no complaints with B UE   Pain Assessment   Patient Currently in Pain No   Integumentary/Edema   Integumentary/Edema no deficits were identifed   Range of Motion (ROM)   ROM Quick Adds No deficits were identified   Strength   Strength Comments overall 4/5   Muscle Tone Assessment   Muscle Tone Comments WFL   Coordination   Coordination Comments pt reports she feels like she has decreased  "FMC in R hand.    Transfer Skill: Bed to Chair/Chair to Bed   Level of [place]: Bed to Chair unable to perform   Transfer Skill: Sit to Stand   Level of [place]: Sit/Stand unable to perform   Upper Body Dressing   Level of [place]: Dress Upper Body independent   Physical Assist/Nonphysical Assist: Dress Upper Body set-up required   Lower Body Dressing   Level of [place]: Dress Lower Body moderate assist (50% patients effort)   Grooming   Level of [place]: Grooming independent   Eating/Self Feeding   Level of [place]: Eating independent   Instrumental Activities of Daily Living (IADL)   IADL Comments family manages   Activities of Daily Living Analysis   Impairments Contributing to Impaired Activities of Daily Living strength decreased   General Therapy Interventions   Planned Therapy Interventions fine motor coordination training   Clinical Impression   Criteria for Skilled Therapeutic Interventions Met yes, treatment indicated   OT Diagnosis decreased FMC   Influenced by the following impairments decreased strength    Assessment of Occupational Performance 1-3 Performance Deficits   Identified Performance Deficits decreased dressing, writing   Clinical Decision Making (Complexity) Low complexity   Therapy Frequency Daily   Predicted Duration of Therapy Intervention (days/wks) 1 session   Anticipated Discharge Disposition Home with Outpatient Therapy   Risks and Benefits of Treatment have been explained. Yes   Patient, Family & other staff in agreement with plan of care Yes   [facility] TM \"6 Clicks\"   2016, Trustees of [facility], under license to [employer].  All rights reserved.   6 Clicks Short Forms Daily Activity Inpatient Short Form   [facility]  \"6 Clicks\" Daily Activity Inpatient Short Form   1. Putting on and taking off regular lower body clothing? 2 - A Lot   2. Bathing (including washing, rinsing, drying)? 2 - A Lot   3. Toileting, which " includes using toilet, bedpan or urinal? 3 - A Little   4. Putting on and taking off regular upper body clothing? 4 - None   5. Taking care of personal grooming such as brushing teeth? 4 - None   6. Eating meals? 4 - None   Daily Activity Raw Score (Score out of 24.Lower scores equate to lower levels of function) 19   Total Evaluation Time   Total Evaluation Time (Minutes) 15

## 2020-01-09 NOTE — PLAN OF CARE
[id number]: Alert and oriented x4. VSS. Denies pain. Up with lift. Wound dressings changed. Turn and repo [id number]. Suparpubic cath patent. Also has ileostomy. Plan to continue to monitor tonight.

## 2020-01-09 NOTE — PROGRESS NOTES
[facility]  Infectious Disease Progress Note          Assessment and Plan:   IMPRESSION:   1.  A 54-year-old female well known to me from prior admissions, currently admitted with altered mental status, question recurrent seizure problems versus a diffuse encephalopathy related to infection -- not entirely clear.   2.  Chronic paraplegia from prior spinal cord injury.   3.  Question current active infection, abnormal urine, new finding in her left hip, chronic wounds, history of aspiration, so multiple possible infection sites, but not entirely clear.  Cultures from the outside hospital are pending.   4.  New finding of left hip fracture, near destroyed head of the joint, question infection versus other causes.   5.  Prior history of right hip infection related to chronic wounds.   6.  Multiple chronic wounds without clear deep infection.   7.  Urinary tract infections and nephrolithiasis previously. Neobladder, self caths  8.  History of aspiration.   9.  Seizure disorder.      RECOMMENDATIONS:   1.   outside cultures only,  likely not significant,  enterococcus in UC, BC neg and no fever or  clinical symptoms to suggest focal infection, discontinue ABX  2.  Orthopedics evaluating the hip finding, difficult to know how to interpret this.  Some fluid present but not entirely clear that this is infection, if it is infection ABX alone of no value will need intervention, but seems unlikely it is infected.   3.  Watch off ABX, no fever or overall worsening             Interval History:   Not ill looking now awakening, no cxs here WBC 9500 cxs as above no fever              Medications:       celecoxib  200 mg Oral Daily     levETIRAcetam  1,500 mg Intravenous Q12H     multivitamin w/minerals  1 tablet Oral Daily     oxybutynin  5 mg Oral BID     potassium chloride  20 mEq Oral TID w/meals     sodium chloride (PF)  10 mL Intracatheter Q8H                  Physical Exam:   Blood pressure 126/65,  pulse 83, temperature 98.1  F (36.7  C), temperature source Oral, resp. rate 16, weight 52.9 kg (116 lb 10 oz), SpO2 100 %, not currently breastfeeding.  Wt Readings from Last 2 Encounters:   01/09/20 52.9 kg (116 lb 10 oz)   05/25/19 67.7 kg (149 lb 3.2 oz)     Vital Signs with Ranges  Temp:  [98.1  F (36.7  C)-99  F (37.2  C)] 98.1  F (36.7  C)  Pulse:  [75-98] 83  Heart Rate:  [] 77  Resp:  [9-22] 16  BP: ()/(49-76) 126/65  SpO2:  [99 %-100 %] 100 %    Constitutional: Not awakening   Lungs: Clear to auscultation bilaterally, no crackles or wheezing   Cardiovascular: Regular rate and rhythm, normal S1 and S2, and no murmur noted   Abdomen: Normal bowel sounds, soft, non-distended, non-tender   Skin: No rashes, no cyanosis, no edema wds not seen   Other:           Data:   All microbiology laboratory data reviewed.  Recent Labs   Lab Test 01/09/20  0652 01/07/20  0745 01/07/20  0558 01/06/20  0050   WBC 6.5  --  6.2 7.3   HGB 8.6*  --  10.0* 12.3   HCT 25.9*  --  29.1* 36.2   MCV 83  --  82 80   PLT 57* 63* Platelets clumped, platelet count unavailable 162     Recent Labs   Lab Test 01/09/20  0652 01/08/20  0620 01/07/20  0558   CR 0.28* 0.29* 0.33*     Recent Labs   Lab Test 02/20/17  1745   SED 61*     Recent Labs   Lab Test 05/24/19  1115 05/19/19  0400 12/31/18  1452 12/31/18  1442 12/20/18  1149 12/19/18  1425 12/19/18  1250 12/16/18  0920 12/12/18  2120   CULT <10,000 colonies/mL  Pseudomonas aeruginosa  *  >100,000 colonies/mL  Enterococcus faecalis  * >100,000 colonies/mL  Klebsiella oxytoca  Susceptibility testing in progress  *  10,000 to 50,000 colonies/mL  Non lactose fermenting gram negative rods  *  10,000 to 50,000 colonies/mL  Gram negative rods  *  10,000 to 50,000 colonies/mL  Gram positive cocci  *  10,000 to 50,000 colonies/mL  Gram positive cocci in pairs and chains  *  Susceptibility testing not routinely done  No further identification   Light growth  Candida albicans /  dubliniensis  Candida albicans and Candida dubliniensis are not routinely speciated  Susceptibility testing not routinely done  * No growth Culture negative for acid fast bacilli  Assayed at [facility] [address] [place] [address] [phone]  No growth after 4 weeks  Candida albicans / dubliniensis  isolated  Candida albicans and Candida dubliniensis are not routinely speciated  *  No additional fungi cultured after 4 weeks incubation  No Legionella species isolated  Light growth  Candida albicans / dubliniensis  Candida albicans and Candida dubliniensis are not routinely speciated  Susceptibility testing not routinely done  * No growth No growth No growth No growth

## 2020-01-09 NOTE — PLAN OF CARE
Discharge Planner PT   Patient plan for discharge: home with family  Current status: PT order's rec'v'd.  Pt admitted with altered mental status and possible septic hip.  AMS now resolved, chronic hip issues, per pt has had partial hip joint removal.  Pt lives with dtr and grand children, dtr and grand dtr are both her PCA and reports she has an additional PCA, pt is WC bound and has A for pivot transfer to her WC, she states she dresses herself and straight caths herself, but has been having recent bladder problems.  Pt states she can wash herself.  Pt unwilling to transfer this am as her WC not her, requested to have family bring in.  Barriers to return to prior living situation: defer to OT  Recommendations for discharge: defer to OT  Rationale for recommendations: Will defer therapies to OT for transfers and ADL's. No skilled acute care PT need identified.       Entered by: GOLDIE CARLOS 01/09/2020 8:41 AM

## 2020-01-09 NOTE — PLAN OF CARE
Discharge Planner OT   Patient plan for discharge: none stated  Current status: eval completed and treatment initiated. Pt lives with daughter and granddaughter at baseline who are her PCA. She also has an additional PCA that assists. States she has someone with her 90% of the time. Pt is a lift transfer to manual w/c. Has assist with dressing, bathing and transfers. Pt did report she noted some decreased FMC in her R hand. Provided with ex's and blue sponge. Pt is having coloring book brought in which is a good ex's. No further skilled OT needs at this time. If FMC continues to be an issue after discharge can follow up with OP OT  Barriers to return to prior living situation: none anticipated  Recommendations for discharge: home with assist from family and PCA.   Rationale for recommendations: will complete OT order. Goals met       Entered by: Reyna Pacheco 01/09/2020 3:41 PM

## 2020-01-09 NOTE — PROGRESS NOTES
[facility]    Hospitalist Progress Note    Assessment & Plan   Felicia Ellison is a 54 year old female who was admitted on 1/4/2020.  She has a history of flaccid paraplegia and being wheelchair-bound presented to  [facility] as a direct admit from [facility] for evaluation of altered mental status.    Acute encephalopathy, unclear etiology at this time, but highly concerning for seizures  Lactic acidosis  - has h/o seizures, h/o status epilepticus in May 2019; PTA on Keppra 1000 mg po BID  - no seizure- like activity witnessed at home  - had ammonia level elevated at [place] [facility], NGT placed and Lactulose given, ammonia level improved to 46; LFTs- normal (just mildly elevated Alk Phosph), no h/p chronic liver disease  - Neurology consult appreciated  - First EEG showing left greater than right bihemispheric frequent sharp waves indicating epileptiform activity.    - CT head and MRO brain 01/06- no acute pathology  - transferred to Neuro-Tele on 01/04, then ICU on 01/05  - PTA Keppra increased to 1500mg BID  - mentation back to baseline now  - lactic acidosis improved, LA back to normal  - check vit B12- 379, folate 2.2, TSH 0.91  - start folic acid 1mg po daily  - SLP- regular diet with thin liquids  - PT/OT    Possible septic arthritis of left hip  - she was transferred to [facility] because concern for septic arthritis   - CT abd/pelvis- showed chronic-appearing but new from comparison  of a subcapital femoral neck fracture of the chronically neuropathic left hip with some fluid and presumed synovial thickening that extends to the left ischial decubitus ulcer and septic arthritis cannot be excluded  - ID consult and Ortho consult appreciated  - started on Cefepime and Vanco on admission  - s/p attempted left hip aspiration, no fluid yield; as per Ortho does not appear to be septic arthritis on the hip.  - Antibiotics stopped by infectious disease 1/7, plan to monitor without  "antibiotics at this point.    Possible urinary tract infection  - Culture report faxed back from a [place] ER  - Appreciate infectious disease input,  She was  on cefepime and vancomycin since admission, both stopped on 1/7, outside cultures showing some growth of enterococcus.  - She has a neobladder and she used to straight cath herself 4 times daily at home  - indwelling mcintyre cath placed here  - she states that her urine has foul smelling with \"mucus\" and she thinks she has a UTI; states that usually Bactrim works when she gets an UTI  - recheck UA/UC    Decubitus ulcers  - Wound care consulted    Flaccid paraplegia/wheelchair-bound status  - Supportive care with frequent repositioning to continue  - PT/OT    History of insomnia  - On trazodone and Ambien PTA, currently on hold    Hypernatremia- resolved  - Na was 157 on 01/06- due to reduced oral intake  - started on  continue D5 half NS   - Na improving 147--146--145--142  - stop iv fluids now; she is eating and drinking po     Hypokalemia  --Replace per protocol.  - resume PTA KCl supplementation     H/o DVT  - PTA on Lovenox 60 mg BID; here Lovenox was stopped on admission; PCD ordered  - attempted to resume Lovenox on 1/8 but noted that plts were low 63; today plts 57; no evidence of active bleeding  - repeat plts in am    .   DVT Prophylaxis: PCD  Code Status: Full Code     Disposition: possible discharge home tomorrow      Dilcia Lucas MD    Interval History   Doing better, mentation greatly improved, AAOX3, back to baseline; thinks she has an UTI because cloudy and foul-smelling urine; denies chest pain, no SOB, no abd pain; discussed with RN    -Data reviewed today: I reviewed all new labs and imaging results over the last 24 hours.    Physical Exam   Temp: 99.3  F (37.4  C) Temp src: Oral BP: 104/55 Pulse: 83 Heart Rate: 97 Resp: 18 SpO2: 100 % O2 Device: None (Room air)    Vitals:    01/07/20 0500 01/08/20 0600 01/09/20 0606   Weight: 48.9 " kg (107 lb 12.9 oz) 48.2 kg (106 lb 4.2 oz) 52.9 kg (116 lb 10 oz)     Vital Signs with Ranges  Temp:  [98.1  F (36.7  C)-99.3  F (37.4  C)] 99.3  F (37.4  C)  Pulse:  [83] 83  Heart Rate:  [77-97] 97  Resp:  [16-18] 18  BP: ()/(49-65) 104/55  SpO2:  [100 %] 100 %  I/O last 3 completed shifts:  In: 3723 [P.O.:360; I.V.:3363]  Out: 450 [Urine:450]    Constitutional: Awake, alert, NAD  Respiratory: Clear to auscultation bilaterally, no crackles or wheezing  Cardiovascular: Regular rate and rhythm, normal S1 and S2, and no murmur noted  GI: Normal bowel sounds, she has a neobladder, Ambrose present  Skin/Integumen: No rashes, no cyanosis, no edema  Neuro : AAO to self, place and time; Paraplegic, bilateral upper extremity strength appears to be baseline    Medications       celecoxib  200 mg Oral Daily     levETIRAcetam  1,500 mg Oral BID     multivitamin w/minerals  1 tablet Oral Daily     oxybutynin  5 mg Oral BID     potassium chloride  20 mEq Oral TID w/meals     sodium chloride (PF)  10 mL Intracatheter Q8H       Data   Recent Labs   Lab 01/09/20  0652 01/08/20  1400 01/08/20  0620 01/07/20  2000  01/07/20  0745 01/07/20  0558  01/06/20  0617 01/06/20  0050 01/05/20  2252  01/04/20  0549   WBC 6.5  --   --   --   --   --  6.2  --   --  7.3  --   --  9.5   HGB 8.6*  --   --   --   --   --  10.0*  --   --  12.3  --   --  13.0   MCV 83  --   --   --   --   --  82  --   --  80  --   --  82   PLT 57*  --   --   --   --  63* Platelets clumped, platelet count unavailable  --   --  162  --   --  52*   INR  --   --   --   --   --   --   --   --   --  1.64*  --   --   --      --  145* 146*   < >  --  147*   < > 157* 155*  --   --  144   POTASSIUM 3.9 4.0 3.3*  --    < >  --  3.0*   < > 2.8* 2.2* 2.2*   < > 2.9*   CHLORIDE 120*  --  122*  --   --   --  124*  --  131* 129*  --   --  120*   CO2 18*  --  15*  --   --   --  14*  --  18* 16*  --   --  10*   BUN 6*  --  6*  --   --   --  7  --  12 12  --   --  30   CR  0.28*  --  0.29*  --   --   --  0.33*  --  0.40* 0.49*  --    < > 0.50*   ANIONGAP 4  --  8  --   --   --  9  --  8 10  --   --  14   JOSH 7.5*  --  7.3*  --   --   --  7.9*  --  8.3* 8.7  --   --  8.1*   GLC 97  --  100*  --   --   --  97  --  130* 118*  --   --  140*   ALBUMIN  --   --   --   --   --   --   --   --   --  2.6*  --   --  2.9*   PROTTOTAL  --   --   --   --   --   --   --   --   --  6.3*  --   --  6.9   BILITOTAL  --   --   --   --   --   --   --   --   --  0.7  --   --  0.5   ALKPHOS  --   --   --   --   --   --   --   --   --  156*  --   --  206*   ALT  --   --   --   --   --   --   --   --   --  8  --   --  11   AST  --   --   --   --   --   --   --   --   --  10  --   --  14   TROPI  --   --   --   --   --   --   --   --  0.015 0.021 0.020  --   --     < > = values in this interval not displayed.     Recent Labs   Lab 01/09/20  0652 01/08/20  0620 01/07/20  0558 01/07/20  0202 01/06/20  2221 01/06/20  1642 01/06/20  1211 01/06/20  0815 01/06/20  0617  01/06/20  0050   GLC 97 100* 97  --   --   --   --   --  130*  --  118*   BGM  --   --   --  86 127* 115* 122* 110*  --    < >  --     < > = values in this interval not displayed.       Imaging:   No results found for this or any previous visit (from the past 24 hour(s)).

## 2020-01-09 NOTE — PLAN OF CARE
DATE & TIME: 1/8-1/9/2020, 5587-0961  Cognitive Concerns/ Orientation : A&Ox4  BEHAVIOR & AGGRESSION TOOL COLOR: green  ABNL VS/O2: VSS on RA, ex BP soft 95/49  MOBILITY: lift, paraplegic, T/R q2h  PAIN MANAGMENT: denies  DIET: regular  BOWEL/BLADDER: ileotomy and suprapubic catheter, mucous in urine, required irrigation x1 due to leaking around catheter  ABNL LAB/BG:   DRAIN/DEVICES: PIV and RIJ with IVF, suprapubic catheter and ileostomy  TELEMETRY RHYTHM: NSR  SKIN: wounds on coccyx, dressing CDI  TESTS/PROCEDURES:   D/C DAY/GOALS/PLACE: TBD  OTHER IMPORTANT INFO: no seizure activity

## 2020-01-10 ENCOUNTER — APPOINTMENT (OUTPATIENT)
Dept: SPEECH THERAPY | Facility: CLINIC | Age: 55
DRG: 100 | End: 2020-01-10
Attending: HOSPITALIST
Payer: COMMERCIAL

## 2020-01-10 LAB
ANION GAP SERPL CALCULATED.3IONS-SCNC: 3 MMOL/L (ref 3–14)
BUN SERPL-MCNC: 9 MG/DL (ref 7–30)
CALCIUM SERPL-MCNC: 7.8 MG/DL (ref 8.5–10.1)
CHLORIDE SERPL-SCNC: 118 MMOL/L (ref 94–109)
CO2 SERPL-SCNC: 22 MMOL/L (ref 20–32)
CREAT SERPL-MCNC: 0.29 MG/DL (ref 0.52–1.04)
ERYTHROCYTE [DISTWIDTH] IN BLOOD BY AUTOMATED COUNT: 18.6 % (ref 10–15)
GFR SERPL CREATININE-BSD FRML MDRD: >90 ML/MIN/{1.73_M2}
GLUCOSE SERPL-MCNC: 92 MG/DL (ref 70–99)
HCT VFR BLD AUTO: 26.1 % (ref 35–47)
HGB BLD-MCNC: 8.8 G/DL (ref 11.7–15.7)
MCH RBC QN AUTO: 28.4 PG (ref 26.5–33)
MCHC RBC AUTO-ENTMCNC: 33.7 G/DL (ref 31.5–36.5)
MCV RBC AUTO: 84 FL (ref 78–100)
PLATELET # BLD AUTO: 74 10E9/L (ref 150–450)
POTASSIUM SERPL-SCNC: 4.1 MMOL/L (ref 3.4–5.3)
RBC # BLD AUTO: 3.1 10E12/L (ref 3.8–5.2)
SODIUM SERPL-SCNC: 143 MMOL/L (ref 133–144)
WBC # BLD AUTO: 7.4 10E9/L (ref 4–11)

## 2020-01-10 PROCEDURE — 92526 ORAL FUNCTION THERAPY: CPT | Mod: GN | Performed by: SPEECH-LANGUAGE PATHOLOGIST

## 2020-01-10 PROCEDURE — 99232 SBSQ HOSP IP/OBS MODERATE 35: CPT | Performed by: INTERNAL MEDICINE

## 2020-01-10 PROCEDURE — 12000000 ZZH R&B MED SURG/OB

## 2020-01-10 PROCEDURE — 25000132 ZZH RX MED GY IP 250 OP 250 PS 637: Performed by: INTERNAL MEDICINE

## 2020-01-10 PROCEDURE — 85027 COMPLETE CBC AUTOMATED: CPT | Performed by: INTERNAL MEDICINE

## 2020-01-10 PROCEDURE — 80048 BASIC METABOLIC PNL TOTAL CA: CPT | Performed by: INTERNAL MEDICINE

## 2020-01-10 PROCEDURE — 40000257 ZZH STATISTIC CONSULT NO CHARGE VASC ACCESS

## 2020-01-10 RX ORDER — SULFAMETHOXAZOLE/TRIMETHOPRIM 800-160 MG
1 TABLET ORAL 2 TIMES DAILY
Status: DISCONTINUED | OUTPATIENT
Start: 2020-01-10 | End: 2020-01-11 | Stop reason: HOSPADM

## 2020-01-10 RX ORDER — SULFAMETHOXAZOLE/TRIMETHOPRIM 800-160 MG
1 TABLET ORAL 2 TIMES DAILY
Status: DISCONTINUED | OUTPATIENT
Start: 2020-01-10 | End: 2020-01-10

## 2020-01-10 RX ADMIN — POTASSIUM CHLORIDE 20 MEQ: 1500 TABLET, EXTENDED RELEASE ORAL at 12:50

## 2020-01-10 RX ADMIN — MULTIPLE VITAMINS W/ MINERALS TAB 1 TABLET: TAB at 08:27

## 2020-01-10 RX ADMIN — FOLIC ACID 1 MG: 1 TABLET ORAL at 08:27

## 2020-01-10 RX ADMIN — POTASSIUM CHLORIDE 20 MEQ: 1500 TABLET, EXTENDED RELEASE ORAL at 08:27

## 2020-01-10 RX ADMIN — SULFAMETHOXAZOLE AND TRIMETHOPRIM 1 TABLET: 800; 160 TABLET ORAL at 15:04

## 2020-01-10 RX ADMIN — OXYBUTYNIN CHLORIDE 5 MG: 5 TABLET ORAL at 20:57

## 2020-01-10 RX ADMIN — LEVETIRACETAM 1500 MG: 500 TABLET, FILM COATED ORAL at 05:27

## 2020-01-10 RX ADMIN — SULFAMETHOXAZOLE AND TRIMETHOPRIM 1 TABLET: 800; 160 TABLET ORAL at 20:57

## 2020-01-10 RX ADMIN — POTASSIUM CHLORIDE 20 MEQ: 1500 TABLET, EXTENDED RELEASE ORAL at 18:46

## 2020-01-10 RX ADMIN — OXYBUTYNIN CHLORIDE 5 MG: 5 TABLET ORAL at 08:28

## 2020-01-10 RX ADMIN — LEVETIRACETAM 1500 MG: 500 TABLET, FILM COATED ORAL at 18:46

## 2020-01-10 RX ADMIN — CELECOXIB 200 MG: 200 CAPSULE ORAL at 08:27

## 2020-01-10 ASSESSMENT — ACTIVITIES OF DAILY LIVING (ADL)
ADLS_ACUITY_SCORE: 27

## 2020-01-10 NOTE — PLAN OF CARE
Discharge Planner SLP   Patient plan for discharge: Home ASAP  Current status: Swallow Tx was provided this pm.  Pt tolerated regular solids and thin liquids without overt signs of aspiration after swallows given min cues to take small bites/sips.  Pt independently chewed carefully and used double swallows to clear oral cavity.  Min dry cough noted x 1 throughout the session.  Swallow Tx goal has been met.    Recommend a continued regular diet and thin liquids with safe swallow strategies: sit at 90 degrees, take small bites/sips, chew carefully, and use a slow rate.     Barriers to return to prior living situation: No SLP barriers  Recommendations for discharge: No SLP needs  Rationale for recommendations: No SLP needs after discharge; goal has been met, pt appears to be at baseline       Entered by: Desirae Phelan 01/10/2020 12:12 PM        Speech Language Therapy Discharge Summary    Reason for therapy discharge:    All goals and outcomes met, no further needs identified.    Progress towards therapy goal(s). See goals on Care Plan in [facility] electronic health record for goal details.  Goals met    Therapy recommendation(s):    No further therapy is recommended.

## 2020-01-10 NOTE — PROGRESS NOTES
Discussed with daughter Arlette. She is not able to provide transportation and care this evening for her mother. She will be able to bring patient's wheelchair tomorrow and provide transport home with family assist at that time. MD gomez

## 2020-01-10 NOTE — PROGRESS NOTES
[facility]    Hospitalist Progress Note    Assessment & Plan   Felicia Ellison is a 54 year old female who was admitted on 1/4/2020.  She has a history of flaccid paraplegia and being wheelchair-bound presented to  [facility] as a direct admit from [facility] for evaluation of altered mental status.    Acute encephalopathy, unclear etiology at this time, but highly concerning for seizures  Lactic acidosis- resolved  - has h/o seizures, h/o status epilepticus in May 2019; PTA on Keppra 750 mg po BID; follows up with Neurology; apparently- she was recently started on Topamax- but not sure if Topamax was started for seizures vs migraine   - no seizure- like activity witnessed at home  - had ammonia level elevated at [place] [facility], NGT placed and Lactulose given, ammonia level improved to 46; LFTs- normal (just mildly elevated Alk Phosph), no h/p chronic liver disease  - Neurology consult appreciated  - First EEG showing left greater than right bihemispheric frequent sharp waves indicating epileptiform activity.    - CT head and MRO brain 01/06- no acute pathology  - transferred to Neuro-Tele on 01/04, then ICU on 01/05  - PTA Keppra increased to 1500mg BID  - mentation back to baseline now  - lactic acidosis improved, LA back to normal  - check vit B12- 379, folate 2.2, TSH 0.91  - start folic acid 1mg po daily  - SLP- regular diet with thin liquids  - PT/OT  - plan to discharge home on Keppra 1500mg BID; would hold Topamax after discharge and follow up with primary Neurologist    Possible septic arthritis of left hip  - she was transferred to [facility] because concern for septic arthritis   - CT abd/pelvis- showed chronic-appearing but new from comparison  of a subcapital femoral neck fracture of the chronically neuropathic left hip with some fluid and presumed synovial thickening that extends to the left ischial decubitus ulcer and septic arthritis cannot be excluded  - ID consult  "and Ortho consult appreciated  - started on Cefepime and Vanco on admission  - s/p attempted left hip aspiration, no fluid yield; as per Ortho does not appear to be septic arthritis on the hip.  - Antibiotics stopped by infectious disease 1/7, plan to monitor without antibiotics at this point.    Possible urinary tract infection  - Culture report faxed back from a [place] ER  - Appreciate infectious disease input,  She was  on cefepime and vancomycin since admission, both stopped on 1/7, outside cultures showing some growth of enterococcus.  - She has a neobladder and she used to straight cath herself 4 times daily at home  - indwelling mcintyre cath placed here  - pm 1/09- she stated that her urine has foul smelling with \"mucus\" and she thinks she has a UTI; states that usually Bactrim works when she gets an UTI  - UA abnormal with WBCs 182, clumps WBCs, trace leucocyte esterase  - no fever, no leucocytosis, not clear if this represents real UTI vs colonization; will start empiric Bactrim at this time  - plan to remove indwelling cath at the time of the d.c and she will continue with straight cath prn    Decubitus ulcers  - Wound care consulted    Flaccid paraplegia/wheelchair-bound status  - Supportive care with frequent repositioning to continue  - PT/OT    History of insomnia  - On trazodone and Ambien PTA, currently on hold but likely will resume after d/c    Hypernatremia- resolved  - Na was 157 on 01/06- due to reduced oral intake  - started on  continue D5 1/2 NS   - Na improving 147--146--145--142--143  - off iv fluids now; she is eating and drinking po     Hypokalemia  --Replace per protocol.  - resume PTA KCl supplementation     H/o DVT  - PTA on Lovenox 60 mg BID; here Lovenox was stopped on admission; PCD ordered  - attempted to resume Lovenox on 1/8 but noted that plts were low 63--57; no evidence of active bleeding  - plts today 74  - repeat plts in am    .   DVT Prophylaxis: PCD  Code Status: Full " Code     Disposition: medically ready for discharge, daughter not available to take care of her today, plan to discharge home tomorrow      Dilcia Lucas MD    Interval History   Doing fine, denies chest pain, no SOB, no N/V, mentation at baseline; urine looks better today; discussed with RNGABRIEL HOWARD and her daughter, Arlette over the phone.    -Data reviewed today: I reviewed all new labs and imaging results over the last 24 hours.    Physical Exam   Temp: 99  F (37.2  C) Temp src: Oral BP: 119/76   Heart Rate: 115 Resp: 16 SpO2: 98 % O2 Device: None (Room air)    Vitals:    01/08/20 0600 01/09/20 0606 01/10/20 0500   Weight: 48.2 kg (106 lb 4.2 oz) 52.9 kg (116 lb 10 oz) 53 kg (116 lb 13.5 oz)     Vital Signs with Ranges  Temp:  [97.9  F (36.6  C)-99.3  F (37.4  C)] 99  F (37.2  C)  Heart Rate:  [] 115  Resp:  [16-18] 16  BP: (104-119)/(55-76) 119/76  SpO2:  [97 %-100 %] 98 %  I/O last 3 completed shifts:  In: 840 [P.O.:840]  Out: 1700 [Urine:1700]    Constitutional: Awake, alert, NAD  Respiratory: Clear to auscultation bilaterally, no crackles or wheezing  Cardiovascular: Regular rate and rhythm, normal S1 and S2, and no murmur noted  GI: Normal bowel sounds, she has a neobladder, Ambrose present  Skin/Integumen: No rashes, no cyanosis, no edema  Neuro : AAO to self, place and time; Paraplegic, bilateral upper extremity strength appears to be baseline    Medications       celecoxib  200 mg Oral Daily     folic acid  1 mg Oral Daily     levETIRAcetam  1,500 mg Oral BID     multivitamin w/minerals  1 tablet Oral Daily     oxybutynin  5 mg Oral BID     potassium chloride  20 mEq Oral TID w/meals     sodium chloride (PF)  10 mL Intracatheter Q8H       Data   Recent Labs   Lab 01/10/20  0645 01/09/20  0652 01/08/20  1400 01/08/20  0620  01/07/20  0745 01/07/20  0558  01/06/20  0617 01/06/20  0050 01/05/20  2252  01/04/20  0549   WBC 7.4 6.5  --   --   --   --  6.2  --   --  7.3  --   --  9.5   HGB 8.8* 8.6*  --   --    --   --  10.0*  --   --  12.3  --   --  13.0   MCV 84 83  --   --   --   --  82  --   --  80  --   --  82   PLT 74* 57*  --   --   --  63* Platelets clumped, platelet count unavailable  --   --  162  --   --  52*   INR  --   --   --   --   --   --   --   --   --  1.64*  --   --   --     142  --  145*   < >  --  147*   < > 157* 155*  --   --  144   POTASSIUM 4.1 3.9 4.0 3.3*   < >  --  3.0*   < > 2.8* 2.2* 2.2*   < > 2.9*   CHLORIDE 118* 120*  --  122*  --   --  124*  --  131* 129*  --   --  120*   CO2 22 18*  --  15*  --   --  14*  --  18* 16*  --   --  10*   BUN 9 6*  --  6*  --   --  7  --  12 12  --   --  30   CR 0.29* 0.28*  --  0.29*  --   --  0.33*  --  0.40* 0.49*  --    < > 0.50*   ANIONGAP 3 4  --  8  --   --  9  --  8 10  --   --  14   JOSH 7.8* 7.5*  --  7.3*  --   --  7.9*  --  8.3* 8.7  --   --  8.1*   GLC 92 97  --  100*  --   --  97  --  130* 118*  --   --  140*   ALBUMIN  --   --   --   --   --   --   --   --   --  2.6*  --   --  2.9*   PROTTOTAL  --   --   --   --   --   --   --   --   --  6.3*  --   --  6.9   BILITOTAL  --   --   --   --   --   --   --   --   --  0.7  --   --  0.5   ALKPHOS  --   --   --   --   --   --   --   --   --  156*  --   --  206*   ALT  --   --   --   --   --   --   --   --   --  8  --   --  11   AST  --   --   --   --   --   --   --   --   --  10  --   --  14   TROPI  --   --   --   --   --   --   --   --  0.015 0.021 0.020  --   --     < > = values in this interval not displayed.     Recent Labs   Lab 01/10/20  0645 01/09/20  0652 01/08/20  0620 01/07/20  0558 01/07/20  0202 01/06/20  2221 01/06/20  1642 01/06/20  1211 01/06/20  0815 01/06/20  0617   GLC 92 97 100* 97  --   --   --   --   --  130*   BGM  --   --   --   --  86 127* 115* 122* 110*  --        Imaging:   No results found for this or any previous visit (from the past 24 hour(s)).

## 2020-01-10 NOTE — PROGRESS NOTES
Hospitalist service cross-cover note:     Paged regarding abnormal UA. Checked due to patient concern for UTI due to mucous and foul smell. No suprapubic tenderness or fever. Enterococcus in UC from outside hospital, ID following and recommending monitoring off of antibiotics. Continue to monitor off of antibiotics unless develops fevers or systemic signs of illness.     Hossein Patel MD   Hospitalist  [phone]

## 2020-01-10 NOTE — PROGRESS NOTES
Anticipating Discharge:    Spoke with daughter this AM.    She works overnights and plans to sleep until approximately 12:30 today.    She intends to deliver her mother's wheelchair this afternoon, however is unable to do so until 16:00.      She states that her mother uses another transportation service named [employer], in [place], [place].  Patient agrees with this option.    Writer will obtain information and share with CC/SW.

## 2020-01-10 NOTE — PROVIDER NOTIFICATION
MD Notification    Notified Person: MD    Notified Person Name: Amanda    Notification Date/Time: 1/9/2019, 2154    Notification Interaction: paged, spoke on telephone    Purpose of Notification: UA appears abnormal, any new orders?    Orders Received: ID currently following. No new changes at this time    Comments:

## 2020-01-10 NOTE — PLAN OF CARE
DATE & TIME: 1/9-1/10/20 1402-1872  Cognitive Concerns/ Orientation : A&Ox4  BEHAVIOR & AGGRESSION TOOL COLOR: green  ABNL VS/O2: VSS on RA  MOBILITY: lift, paraplegic, T/R q2h  PAIN MANAGMENT: denies  DIET: regular  BOWEL/BLADDER: ileostomy and suprapubic catheter. Mucous in urine.  ABNL LAB/BG: UA abnormal, MD notified, no new changes  DRAIN/DEVICES: PIV and RIJ , suprapubic catheter and ileostomy  TELEMETRY RHYTHM: NSR  SKIN: wounds on coccyx, dressing CDI  TESTS/PROCEDURES:   D/C DAY/GOALS/PLACE: pending therapies, consults, and discharge disposition.  OTHER IMPORTANT INFO: no seizure activity

## 2020-01-10 NOTE — PLAN OF CARE
Cognitive Concerns/ Orientation : A&Ox4  BEHAVIOR & AGGRESSION TOOL COLOR: green  ABNL VS/O2: VSS on RA  MOBILITY: lift, paraplegic, T/R q2h, refuses repositioning/turns intermittently.  PAIN MANAGMENT: denies  DIET: regular  BOWEL/BLADDER: ileostomy and suprapubic catheter. Mucous in urine.  Trend has been q-shift irrigation for patency.  ABNL LAB/BG: UA abnormal, MD aware  DRAIN/DEVICES: PIV and RIJ , suprapubic catheter and ileostomy  TELEMETRY RHYTHM: refusing tele this shift, MD aware.  Tele discontinued.  SKIN: wounds on coccyx, dressing CDI  TESTS/PROCEDURES:   D/C DAY/GOALS/PLACE: tomorrow.  OTHER IMPORTANT INFO: no seizure activity    Daughter will bring patient's wheelchair tomorrow.  MD will call and update daughter, answer questions, etc.  Writer updated daughter this AM,  however she would like to speak to the doctor.    Sacral dressings done

## 2020-01-10 NOTE — PROGRESS NOTES
[facility]  Infectious Disease Progress Note          Assessment and Plan:   IMPRESSION:   1.  A 54-year-old female well known to me from prior admissions, currently admitted with altered mental status, question recurrent seizure problems versus a diffuse encephalopathy related to infection -- not entirely clear.   2.  Chronic paraplegia from prior spinal cord injury.   3.  Question current active infection, abnormal urine, new finding in her left hip, chronic wounds, history of aspiration, so multiple possible infection sites, but not entirely clear.  Cultures from the outside hospital are pending.   4.  New finding of left hip fracture, near destroyed head of the joint, question infection versus other causes.   5.  Prior history of right hip infection related to chronic wounds.   6.  Multiple chronic wounds without clear deep infection.   7.  Urinary tract infections and nephrolithiasis previously. Neobladder, self caths  8.  History of aspiration.   9.  Seizure disorder.      RECOMMENDATIONS:   1.   outside cultures only,  likely not significant,  enterococcus in UC, BC neg and no fever or  clinical symptoms to suggest focal infection, discontinue ABX  2.  Orthopedics evaluating the hip finding, difficult to know how to interpret this.  Some fluid present but not entirely clear that this is infection, if it is infection ABX alone of no value will need intervention, but seems unlikely it is infected.   3.  Watch off ABX, no fever or overall worsening, call if issues             Interval History:   Not ill looking now awake and baseline, no cxs here WBC 9500 cxs as above no fever              Medications:       celecoxib  200 mg Oral Daily     folic acid  1 mg Oral Daily     levETIRAcetam  1,500 mg Oral BID     multivitamin w/minerals  1 tablet Oral Daily     oxybutynin  5 mg Oral BID     potassium chloride  20 mEq Oral TID w/meals     sodium chloride (PF)  10 mL Intracatheter Q8H                   Physical Exam:   Blood pressure 119/76, pulse 83, temperature 99  F (37.2  C), temperature source Oral, resp. rate 16, weight 53 kg (116 lb 13.5 oz), SpO2 98 %, not currently breastfeeding.  Wt Readings from Last 2 Encounters:   01/10/20 53 kg (116 lb 13.5 oz)   05/25/19 67.7 kg (149 lb 3.2 oz)     Vital Signs with Ranges  Temp:  [97.9  F (36.6  C)-99.3  F (37.4  C)] 99  F (37.2  C)  Heart Rate:  [] 115  Resp:  [16-18] 16  BP: (104-119)/(55-76) 119/76  SpO2:  [97 %-100 %] 98 %    Constitutional: Not awakening   Lungs: Clear to auscultation bilaterally, no crackles or wheezing   Cardiovascular: Regular rate and rhythm, normal S1 and S2, and no murmur noted   Abdomen: Normal bowel sounds, soft, non-distended, non-tender   Skin: No rashes, no cyanosis, no edema wds not seen   Other:           Data:   All microbiology laboratory data reviewed.  Recent Labs   Lab Test 01/10/20  0645 01/09/20  0652 01/07/20  0745 01/07/20  0558   WBC 7.4 6.5  --  6.2   HGB 8.8* 8.6*  --  10.0*   HCT 26.1* 25.9*  --  29.1*   MCV 84 83  --  82   PLT 74* 57* 63* Platelets clumped, platelet count unavailable     Recent Labs   Lab Test 01/10/20  0645 01/09/20  0652 01/08/20  0620   CR 0.29* 0.28* 0.29*     Recent Labs   Lab Test 02/20/17  1745   SED 61*     Recent Labs   Lab Test 01/09/20  2055 05/24/19  1115 05/19/19  0400 12/31/18  1452 12/31/18  1442 12/20/18  1149 12/19/18  1425 12/19/18  1250 12/16/18  0920   CULT PENDING <10,000 colonies/mL  Pseudomonas aeruginosa  *  >100,000 colonies/mL  Enterococcus faecalis  * >100,000 colonies/mL  Klebsiella oxytoca  Susceptibility testing in progress  *  10,000 to 50,000 colonies/mL  Non lactose fermenting gram negative rods  *  10,000 to 50,000 colonies/mL  Gram negative rods  *  10,000 to 50,000 colonies/mL  Gram positive cocci  *  10,000 to 50,000 colonies/mL  Gram positive cocci in pairs and chains  *  Susceptibility testing not routinely done  No further identification    Light growth  Candida albicans / dubliniensis  Candida albicans and Candida dubliniensis are not routinely speciated  Susceptibility testing not routinely done  * No growth Culture negative for acid fast bacilli  Assayed at [facility] [address] [place] [id number] [phone]  No growth after 4 weeks  Candida albicans / dubliniensis  isolated  Candida albicans and Candida dubliniensis are not routinely speciated  *  No additional fungi cultured after 4 weeks incubation  No Legionella species isolated  Light growth  Candida albicans / dubliniensis  Candida albicans and Candida dubliniensis are not routinely speciated  Susceptibility testing not routinely done  * No growth No growth No growth

## 2020-01-11 VITALS
OXYGEN SATURATION: 100 % | RESPIRATION RATE: 16 BRPM | BODY MASS INDEX: 18.56 KG/M2 | TEMPERATURE: 98 F | DIASTOLIC BLOOD PRESSURE: 76 MMHG | SYSTOLIC BLOOD PRESSURE: 120 MMHG | WEIGHT: 125.66 LBS | HEART RATE: 83 BPM

## 2020-01-11 LAB
ERYTHROCYTE [DISTWIDTH] IN BLOOD BY AUTOMATED COUNT: 19 % (ref 10–15)
HCT VFR BLD AUTO: 28.3 % (ref 35–47)
HGB BLD-MCNC: 8.9 G/DL (ref 11.7–15.7)
MCH RBC QN AUTO: 26.8 PG (ref 26.5–33)
MCHC RBC AUTO-ENTMCNC: 31.4 G/DL (ref 31.5–36.5)
MCV RBC AUTO: 85 FL (ref 78–100)
PLATELET # BLD AUTO: 98 10E9/L (ref 150–450)
RBC # BLD AUTO: 3.32 10E12/L (ref 3.8–5.2)
WBC # BLD AUTO: 5.7 10E9/L (ref 4–11)

## 2020-01-11 PROCEDURE — 25000132 ZZH RX MED GY IP 250 OP 250 PS 637: Performed by: INTERNAL MEDICINE

## 2020-01-11 PROCEDURE — 36415 COLL VENOUS BLD VENIPUNCTURE: CPT | Performed by: INTERNAL MEDICINE

## 2020-01-11 PROCEDURE — 99239 HOSP IP/OBS DSCHRG MGMT >30: CPT | Performed by: INTERNAL MEDICINE

## 2020-01-11 PROCEDURE — 85027 COMPLETE CBC AUTOMATED: CPT | Performed by: INTERNAL MEDICINE

## 2020-01-11 RX ORDER — LEVETIRACETAM 750 MG/1
1500 TABLET ORAL 2 TIMES DAILY
Qty: 120 TABLET | Refills: 0 | Status: SHIPPED | OUTPATIENT
Start: 2020-01-11 | End: 2020-09-28

## 2020-01-11 RX ORDER — SULFAMETHOXAZOLE/TRIMETHOPRIM 800-160 MG
1 TABLET ORAL 2 TIMES DAILY
Qty: 8 TABLET | Refills: 0 | Status: SHIPPED | OUTPATIENT
Start: 2020-01-11 | End: 2020-09-02

## 2020-01-11 RX ORDER — FOLIC ACID 1 MG/1
1 TABLET ORAL DAILY
Qty: 30 TABLET | Refills: 0 | Status: SHIPPED | OUTPATIENT
Start: 2020-01-12 | End: 2020-09-02

## 2020-01-11 RX ADMIN — SULFAMETHOXAZOLE AND TRIMETHOPRIM 1 TABLET: 800; 160 TABLET ORAL at 08:01

## 2020-01-11 RX ADMIN — POTASSIUM CHLORIDE 20 MEQ: 1500 TABLET, EXTENDED RELEASE ORAL at 12:27

## 2020-01-11 RX ADMIN — FOLIC ACID 1 MG: 1 TABLET ORAL at 08:01

## 2020-01-11 RX ADMIN — MULTIPLE VITAMINS W/ MINERALS TAB 1 TABLET: TAB at 08:01

## 2020-01-11 RX ADMIN — ACETAMINOPHEN 650 MG: 325 TABLET, FILM COATED ORAL at 04:31

## 2020-01-11 RX ADMIN — CELECOXIB 200 MG: 200 CAPSULE ORAL at 08:01

## 2020-01-11 RX ADMIN — OXYBUTYNIN CHLORIDE 5 MG: 5 TABLET ORAL at 08:00

## 2020-01-11 RX ADMIN — LEVETIRACETAM 1500 MG: 500 TABLET, FILM COATED ORAL at 05:09

## 2020-01-11 RX ADMIN — POTASSIUM CHLORIDE 20 MEQ: 1500 TABLET, EXTENDED RELEASE ORAL at 08:01

## 2020-01-11 ASSESSMENT — ACTIVITIES OF DAILY LIVING (ADL)
ADLS_ACUITY_SCORE: 27

## 2020-01-11 NOTE — PROGRESS NOTES
Care Coordination:    Following for discharge planning. Pt gets homecare through [facility] ([phone]),  [facility] in [place] was closed today, spoke with Tamanna at [facility] who is aware of pt's discharge from hospital and plans to resume Home care.  No orders are needed per Tamanna.    Met with patient and she wishes to set up own follow up appts.  Voices no needs for discharge. W/C in room and daugther coming shortly to transport patient.        Tricia Herrera RN BSN  Inpatient Care Coordination  [facility]  [phone]

## 2020-01-11 NOTE — PLAN OF CARE
DATE & TIME: 1/10/2020 7812-4085                        Cognitive Concerns/ Orientation : A&Ox4   BEHAVIOR & AGGRESSION TOOL COLOR: Green  CIWA SCORE: NA    ABNL VS/O2: VSS on RA  MOBILITY: Lift assist, refusing turning/repositioning at times  PAIN MANAGMENT: Denies  DIET: Regular  BOWEL/BLADDER: Suprapubic catheter, colostomy  ABNL LAB/BG: see epic  DRAIN/DEVICES: Suprapubic catheter, colostomy  TELEMETRY RHYTHM: NA  SKIN: Multiple wounds, covered with mepilex; BLE elevated with pillows  TESTS/PROCEDURES: NA  D/C DAY/GOALS/PLACE: Pending  OTHER IMPORTANT INFO: No IV access. L internal jugular site dressing, C/D/I. Seizure precautions maintained. Neuros intact except for baseline paraplegia. Nursing will continue to monitor.

## 2020-01-11 NOTE — PLAN OF CARE
DATE & TIME: 1/10/20 1900 - 1/11/20 0700    COGNITION/BEHAVIOR: A&Ox4.  Makes mildly inappropriate suggestive comments to nursing staff.  Vital signs:  Temp: 98.6  F (37  C) Temp src: Oral BP: 107/61   Heart Rate: 101 Resp: 16 SpO2: 97 % via RA  MOBILITY: Total, turn/repo.  Generally only allows turn/repo Q4H.  PAIN: Reported headache this morning, given PRN Tylenol  DIET: Regular  RESP: LS clear  CV/PV: HRRR.  Tolerates PCD's.  GI/: Colostomy and ileal conduit.  Had a Ambrose in the ileal conduit that was plugged, leaking, and not draining well despite irrigation.  Ultimately she asked for it to be removed and she independently placed a home straight cath in the conduit.  SKIN: Has wounds but refused assessment of them as the dressings were changed on the day shift and she was worried that dressings would dislodge with removal of pants.  LINES: Had a left internal jugular that has a dressing in place that is CDI.  Ileal conduit with straight cath in place.  Colostomy LLQ.  OTHER: Neuros intact with the exception of lower extremities due to paraplegia.  Remains on contact isolation for MRSA.

## 2020-01-11 NOTE — PLAN OF CARE
"DATE & TIME: 1/11/20 7830-5580  COGNITION/BEHAVIOR: A&Ox4.    MOBILITY: Total, turn/repo.    PAIN: PRN Tylenol  DIET: Regular  RESP: LS clear  CV/PV:  Tolerates PCD's.  GI/: Colostomy and ileal conduit.  She independently places a home straight cath in the conduit.  SKIN: Has wounds but refused assessment this AM, \"I'm already dressed to go home.\"  LINES: Had a left internal jugular that has a dressing in place that is CDI.  Ileal conduit. Colostomy LLQ.  OTHER: Neuros intact with the exception of lower extremities due to paraplegia.  Remains on contact isolation for MRSA.  "

## 2020-01-11 NOTE — DISCHARGE SUMMARY
[facility]    Discharge Summary  Hospitalist    Date of Admission:  1/4/2020  Date of Discharge:  1/11/2020  1:21 PM  Discharging Provider: Dilcia Lucas MD  Date of Service (when I saw the patient): 01/11/20    Discharge Diagnoses   Acute encephalopathy- resolved  Seizures  Lactic acidosis- resolved  Possible UTI  Hypernatremia- resolved  Hypokalemia  Decubitus ulcers- POA  Flaccid paraplegia/wheelchair-bound  H/o DVT    History of Present Illness   Felicia Ellison is a 54 year old female who was admitted on 1/4/2020.  She has a history of flaccid paraplegia and being wheelchair-bound presented to  [facility] as a direct admit from [facility] for evaluation of altered mental status; for a detailed HPI-  please refer to H&P done by myself on 01/04/2020.    Hospital Course   Felicia Ellison was admitted on 1/4/2020.  The following problems were addressed during her hospitalization:    Acute encephalopathy- resolved  Seizures  Lactic acidosis- resolved  - There was initial concern for sepsis due to her elevated white blood cells and lactic acid; there was also initial concern that she took extra sleeping pills (PTA on trazodone and Ambien, but her daughter states that only 1 pill of Ambien was missing)  - has h/o seizures, h/o status epilepticus in May 2019; PTA on Keppra 750 mg po BID; follows up with Neurology; apparently- she was recently started on Topamax and Keppra dose was decreased, as per her daughter; but not sure if Topamax was started for seizures vs migraine   - no seizure- like activity witnessed at home  - had ammonia level elevated at [facility], NGT placed and Lactulose given, ammonia level improved to 46; LFTs- normal (just mildly elevated Alk Phosph), no h/o chronic liver disease  - Neurology consult appreciated  - First EEG showing left greater than right bihemispheric frequent sharp waves indicating epileptiform activity.    - CT head and MRI  "brain 01/06- no acute pathology  - transferred to Neuro-Tele on 01/04, then ICU on 01/05  - PTA Keppra increased to 1500mg BID  - mentation gradually improved, back to baseline now  - lactic acidosis resolved, LA back to normal  - check vit B12- 379, folate 2.2, TSH 0.91  - start folic acid 1mg po daily  - SLP- regular diet with thin liquids  - PT/OT  - discussed with the patient and her daughter--plan to discharge home on Keppra 1500mg BID; would hold Topamax after discharge and follow up with primary Neurologist.  - resume [facility] services at the time of the discharge.     Concern for septic arthritis of left hip- less likely  - she was transferred to [facility] because concern for septic arthritis   - CT abd/pelvis at [place]- showed chronic-appearing but new from comparison  of a subcapital femoral neck fracture of the chronically neuropathic left hip with some fluid and presumed synovial thickening that extends to the left ischial decubitus ulcer and septic arthritis cannot be excluded  - ID consult and Ortho consult appreciated  - started on Cefepime and Vanco on admission  - s/p attempted left hip aspiration, no fluid yield; as per Ortho does not appear to be septic arthritis on the hip.  - Antibiotics stopped by infectious disease 1/7, plan to monitor without antibiotics at this point.     Possible urinary tract infection  - She has a neobladder and she used to straight cath herself 4 times daily at home  - UA from [place] [facility] was abnormal; she also had leucocytosis;   - initially started on Cefepime and Vanco on admission; seen by ID; outside cultures showing some growth of enterococcus; on cefepime and vancomycin since admission, both stopped on 1/7 as per ID.  - indwelling mcintyre cath placed here  - on 1/09- she stated that her urine has foul smelling with \"mucus\" and she thinks she has a UTI; states that usually Bactrim works when she gets an UTI  - UA abnormal with WBCs 182, clumps WBCs, trace leucocyte " esterase  - no fever, no leucocytosis, not clear if this represents real UTI vs colonization; started empiric Bactrim at this time  - UC pending at the time of the discharge; she plans to follow up with her ID.  - indwelling cath at the time of the d.c and she will continue with straight cath prn     Decubitus ulcers  - Wound care consulted     Flaccid paraplegia/wheelchair-bound status  - Supportive care with frequent repositioning to continue  - PT/OT     History of insomnia  - On trazodone and Ambien PTA, which were held during the hospitalization due to AMS; mentation back to baseline now and plan to resume then after d/c     Hypernatremia- resolved  - Na was 157 on 01/06- due to reduced oral intake and free water deficit.  - started on  hypotonic fluids D5 1/2 NS   - Na improving 147--146--145--142--143  - encourage po fluid intake.     Hypokalemia  - Replaced per protocol.  - resume PTA KCl supplementation after discharge.     H/o DVT  - PTA on Lovenox 60 mg BID; here Lovenox was stopped on admission; PCD ordered  - attempted to resume Lovenox on 1/8 but noted that plts were low 63--57; no evidence of active bleeding  - plts improving 74--98  - plan to resume PTA Lovenox after discharge and follow up with PMD.       Dilcia Lucas MD    Significant Results and Procedures   EEG 1/4 showed left greater than the right bihemispheric frequent sharp indicating epileptiform activity.     EEG on 1/5 ( continuous) -global bihemispheric slowing with less frequent sharp waves    Pending Results   These results will be followed up by ID  Unresulted Labs Ordered in the Past 30 Days of this Admission     Date and Time Order Name Status Description    1/9/2020 2055 Urine Culture Aerobic Bacterial Preliminary           Code Status   Full Code       Primary Care Physician   Tony Padilla    Physical Exam   Temp: 98  F (36.7  C) Temp src: Axillary BP: 120/76   Heart Rate: 101 Resp: 16 SpO2: 100 % O2 Device: None (Room  air)    Vitals:    01/09/20 0606 01/10/20 0500 01/11/20 0103   Weight: 52.9 kg (116 lb 10 oz) 53 kg (116 lb 13.5 oz) 57 kg (125 lb 10.6 oz)     Vital Signs with Ranges  Temp:  [98  F (36.7  C)-98.8  F (37.1  C)] 98  F (36.7  C)  Heart Rate:  [100-101] 101  Resp:  [16] 16  BP: (107-120)/(61-76) 120/76  SpO2:  [97 %-100 %] 100 %  I/O last 3 completed shifts:  In: 300 [P.O.:300]  Out: 1775 [Urine:1775]    Constitutional: Awake, alert, NAD  Respiratory: Clear to auscultation bilaterally, no crackles or wheezing  Cardiovascular: Regular rate and rhythm, normal S1 and S2, and no murmur noted  GI: Normal bowel sounds, she has a neobladder, [place] present  Skin/Integumen: No rashes, no cyanosis, no edema  Neuro : AAO to self, place and time; Paraplegic, bilateral upper extremity strength appears to be baseline       Discharge Disposition   Discharged to home  Condition at discharge: Good    Consultations This Hospital Stay   SPEECH LANGUAGE PATH ADULT IP CONSULT  WOUND OSTOMY CONTINENCE NURSE  IP CONSULT  ORTHOPEDICS IP CONSULT  INFECTIOUS DISEASES IP CONSULT  PHARMACY TO DOSE VANCO  WOUND OSTOMY CONTINENCE NURSE  IP CONSULT  NEUROLOGY IP CONSULT  VASCULAR ACCESS ADULT IP CONSULT  INTENSIVIST IP CONSULT  NEUROLOGY IP CONSULT  OCCUPATIONAL THERAPY ADULT IP CONSULT  PHYSICAL THERAPY ADULT IP CONSULT    Time Spent on this Encounter   IDilcia MD, personally saw the patient today and spent greater than 30 minutes discharging this patient.    Discharge Orders      Home care nursing referral      Follow-up and recommended labs and tests     Please keep you [facility] Appointment on 1/16.     Reason for your hospital stay    Seizures     Follow-up and recommended labs and tests     Follow up with primary care provider, Tony Padilla, within 7 days for hospital follow- up.  The following labs/tests are recommended: Plts count.    Follow up with primary Neurologist in 1-2 weeks.     Activity     Your activity upon discharge: activity as tolerated     When to contact your care team    Call your primary doctor or return to ER if you have any of the following: temperature greater than 100.5 or less than 96, chills, dizziness, loss of consciousness, seizures, increased confusion or lethargy, severe headache, abnormal bleeding, abdominal pain, severe diarrhea or constipation.     Full Code     Diet    Follow this diet upon discharge: Orders Placed This Encounter      Snacks/Supplements Adult: Boost Plus; With Meals      Combination Diet Regular Diet Adult; Thin Liquids (water, ice chips, juice, milk, gelatin, ice cream, etc)     Discharge Medications   Current Discharge Medication List      START taking these medications    Details   folic acid (FOLVITE) 1 MG tablet Take 1 tablet (1 mg) by mouth daily  Qty: 30 tablet, Refills: 0    Comments: Future refills by PCP Dr. Tony Padilla with phone number [phone].  Associated Diagnoses: Folic acid deficiency      sulfamethoxazole-trimethoprim (BACTRIM DS/SEPTRA DS) 800-160 MG tablet Take 1 tablet by mouth 2 times daily  Qty: 8 tablet, Refills: 0    Associated Diagnoses: Chronic UTI (urinary tract infection)         CONTINUE these medications which have CHANGED    Details   levETIRAcetam (KEPPRA) 750 MG tablet Take 2 tablets (1,500 mg) by mouth 2 times daily  Qty: 120 tablet, Refills: 0    Comments: Future refills by PCP Dr. Tony Padilla with phone number [phone].  Associated Diagnoses: Seizures (H)         CONTINUE these medications which have NOT CHANGED    Details   acetaminophen (TYLENOL) 325 MG tablet Take 650 mg by mouth every 6 hours as needed for mild pain      albuterol (PROVENTIL) (2.5 MG/3ML) 0.083% neb solution Take 2.5 mg by nebulization every 2 hours as needed for shortness of breath / dyspnea or wheezing      celecoxib (CELEBREX) 200 MG capsule Take 200 mg by mouth daily      enoxaparin ANTICOAGULANT (LOVENOX) 60 MG/0.6ML syringe Inject 60  "mg Subcutaneous every 12 hours       furosemide (LASIX) 20 MG tablet Take 40 mg by mouth daily 20mg tab x2 = 40mg      hypromellose-dextran (ARTIFICAL TEARS) 0.1-0.3 % ophthalmic solution Place 1 drop into both eyes every 4 hours as needed for dry eyes      LORazepam (ATIVAN) 2 MG/ML (HIGH CONC) solution Take 1 mg by mouth every 8 hours as needed for anxiety May repeat after an hour if ineffective      ondansetron (ZOFRAN-ODT) 4 MG ODT tab Take 4 mg by mouth 2 times daily as needed for nausea or vomiting      oxybutynin (DITROPAN) 5 MG tablet Take 5 mg by mouth 2 times daily      potassium chloride (KLOR-CON) 20 MEQ packet Take 20 mEq by mouth 3 times daily (with meals) With meals       propranolol (INDERAL) 40 MG tablet Take 40 mg by mouth 2 times daily      SUMAtriptan (IMITREX) 50 MG tablet Take 50 mg by mouth at onset of headache for migraine May repeat in 2 hours if no relief      traZODone (DESYREL) 50 MG tablet Take 100 mg by mouth At Bedtime 50mg tab x2 = 100mg      zolpidem (AMBIEN) 10 MG tablet Take 10 mg by mouth At Bedtime         STOP taking these medications       topiramate (TOPAMAX) 25 MG tablet Comments:   Reason for Stopping:             Allergies   Allergies   Allergen Reactions     Penicillins Anaphylaxis     Patient states it makes her \"climb the walls and hyperactive.\"     Acetaminophen Nausea and Vomiting     Levaquin Rash     Rash only with po Levaquin...able to take IV Levaquin per pt     Data   Most Recent 3 CBC's:  Recent Labs   Lab Test 01/11/20  0929 01/10/20  0645 01/09/20  0652   WBC 5.7 7.4 6.5   HGB 8.9* 8.8* 8.6*   MCV 85 84 83   PLT 98* 74* 57*      Most Recent 3 BMP's:  Recent Labs   Lab Test 01/10/20  0645 01/09/20  0652 01/08/20  1400 01/08/20  0620    142  --  145*   POTASSIUM 4.1 3.9 4.0 3.3*   CHLORIDE 118* 120*  --  122*   CO2 22 18*  --  15*   BUN 9 6*  --  6*   CR 0.29* 0.28*  --  0.29*   ANIONGAP 3 4  --  8   JOSH 7.8* 7.5*  --  7.3*   GLC 92 97  --  100*     Most " Recent 2 LFT's:  Recent Labs   Lab Test 01/06/20  0050 01/04/20  0549   AST 10 14   ALT 8 11   ALKPHOS 156* 206*   BILITOTAL 0.7 0.5     Most Recent INR's and Anticoagulation Dosing History:  Anticoagulation Dose History     Recent Dosing and Labs Latest Ref Rng & Units 12/12/2018 12/19/2018 12/22/2018 12/24/2018 12/31/2018 5/18/2019 1/6/2020    INR 0.86 - 1.14 1.63(H) 1.43(H) 1.53(H) 1.17(H) 1.42(H) 1.42(H) 1.64(H)        Most Recent 3 Troponin's:  Recent Labs   Lab Test 01/06/20  0617 01/06/20  0050 01/05/20  2252   TROPI 0.015 0.021 0.020     Most Recent Cholesterol Panel:  Recent Labs   Lab Test 12/21/18  1031 02/19/17  0750   CHOL  --  66   LDL  --  28   HDL  --  23*   TRIG 101 73     Most Recent 6 Bacteria Isolates From Any Culture (See EPIC Reports for Culture Details):  Recent Labs   Lab Test 01/09/20  2055 05/24/19  1115 05/19/19  0400 12/31/18  1452 12/31/18  1442 12/20/18  1149   CULT Culture in progress <10,000 colonies/mL  Pseudomonas aeruginosa  *  >100,000 colonies/mL  Enterococcus faecalis  * >100,000 colonies/mL  Klebsiella oxytoca  Susceptibility testing in progress  *  10,000 to 50,000 colonies/mL  Non lactose fermenting gram negative rods  *  10,000 to 50,000 colonies/mL  Gram negative rods  *  10,000 to 50,000 colonies/mL  Gram positive cocci  *  10,000 to 50,000 colonies/mL  Gram positive cocci in pairs and chains  *  Susceptibility testing not routinely done  No further identification   Light growth  Candida albicans / dubliniensis  Candida albicans and Candida dubliniensis are not routinely speciated  Susceptibility testing not routinely done  * No growth Culture negative for acid fast bacilli  Assayed at [facility] [address] [place] [id number] [phone]  No growth after 4 weeks  Candida albicans / dubliniensis  isolated  Candida albicans and Candida dubliniensis are not routinely speciated  *  No additional fungi cultured after 4 weeks incubation  No Legionella  species isolated  Light growth  Candida albicans / dubliniensis  Candida albicans and Candida dubliniensis are not routinely speciated  Susceptibility testing not routinely done  *     Most Recent TSH, T4 and A1c Labs:  Recent Labs   Lab Test 01/08/20  1400 12/21/18 2010 12/12/18 2120   TSH 0.91  --  5.38*   T4  --   --  0.97   A1C  --  Canceled, Test credited  --      Results for orders placed or performed during the hospital encounter of 01/04/20   XR Joint Aspiration Major Left    Narrative    JOINT ASPIRATION MAJOR LEFT  1/4/2020 2:22 PM       HISTORY:  Left hip rule out septic arthritis.     PROCEDURE: The risks (including bleeding, infection, and allergy to  contrast and medications) and benefits of the procedure were explained  to the patient's daughter and consent was obtained.  Using sterile  technique and fluoroscopic guidance, a #20 gauge needle was placed  into the Left hip joint using an anterior approach.  No initial  complication.     Fluoroscopy time: 0.2 minutes  Number of Images: 1  Medications used: 3 mL lidocaine 1%.      Impression    IMPRESSION: Multiple attempts were made to aspirate fluid from the  left hip without success.    MICHAEL ROBB, DO   XR Pelvis 1/2 Views    Narrative    XR PELVIS 1/2 VW 1/4/2020 2:38 PM     HISTORY: hx of left hip fracture. ? septic arthritis.    COMPARISON: 5/24/2019      Impression    IMPRESSION: Chronic fracture of the left femoral neck. The  intertrochanteric region is displaced superiorly and laterally.  Resection or resorption of the right femur level of the proximal  diaphysis. Remodeling of the right acetabulum and ischium. Osteopenia.  Catheter in the pelvis. Surgical clips in the pelvis and right thigh.  Findings appear unchanged in the interval.    DAVEY COLES MD   MR Brain w/o & w Contrast    Narrative    MRI BRAIN WITHOUT AND WITH CONTRAST  1/6/2020 10:43 AM    HISTORY:  Acute encephalopathy. Wheelchair bound due to paraplegia.  Recurrent  seizure problem versus a diffuse encephalopathy related to  infection.     TECHNIQUE:  Multiplanar, multisequence MRI of the brain without and  with 5 mL Gadavist.     COMPARISON: None.    FINDINGS:  There is mild atrophy of the brain. Scattered areas of T2  hyperintensity are seen in the periventricular through subcortical  white matter of the cerebral hemispheres, nonspecific as to etiology.   There is no evidence of hemorrhage, mass, acute infarct, or anomaly.   There are no gadolinium enhancing lesions.     The facial structures appear normal. The arteries at the base of the  brain and the dural venous sinuses appear patent.       Impression    IMPRESSION:    1. No acute abnormality.  2. Nonspecific supratentorial white matter T2 hyperintensities,  possibly due to sequelae of small vessel ischemic disease.    SANTO ARGUELLES MD   XR Abdomen Port 1 View    Narrative    EXAM: XR ABDOMEN PORT 1 VW  LOCATION: [facility]  DATE/TIME: 1/5/2020 11:49 PM    INDICATION: NG tube placement.  COMPARISON: None.      Impression    IMPRESSION: NG tube tip in the body of the stomach.    CT Head w/o Contrast    Narrative    EXAM: CT HEAD W/O CONTRAST  LOCATION: [facility]  DATE/TIME: 1/6/2020 1:16 AM    INDICATION: Altered level of consciousness (LOC), unexplained  COMPARISON: 05/19/2019  TECHNIQUE: Routine without IV contrast. Multiplanar reformats. Dose reduction techniques were used.    FINDINGS:  The exam is moderately degraded by motion.    INTRACRANIAL CONTENTS: No intracranial hemorrhage, extraaxial collection, or mass effect.  No CT evidence of acute infarct. Normal parenchymal attenuation. Normal ventricles and sulci.     VISUALIZED ORBITS/SINUSES/MASTOIDS: No intraorbital abnormality. Opacified left sphenoid locule. No middle ear or mastoid effusion.    BONES/SOFT TISSUES: No acute abnormality.      Impression    IMPRESSION:  1.  The exam is moderately degraded by motion.  2.  Within these  limitations, no definite acute intracranial pathology identified.  3.  Opacified left sphenoid locule.   XR Chest Port 1 View    Narrative    EXAM: XR CHEST PORT 1 VW  LOCATION: [facility]  DATE/TIME: 1/6/2020 2:51 AM    INDICATION: Central line placement.  COMPARISON: 05/18/2019.    FINDINGS: Semiupright portable chest. Left IJ central venous catheter projects over the distal SVC. NG tube passes into the stomach. No pneumothorax. The heart size is normal. The lungs are clear.      Impression    IMPRESSION:   No pneumothorax post central line placement.

## 2020-01-13 NOTE — PROGRESS NOTES
[facility]  [facility]    Video EEG dated 1/5/2020- 1/6/2020    Video EEG was performed without consent due to medical necessity.    Description:  This is a digital EEG performed in the standard international 10-20 electrode system.      Generalized delta slowing in both hemisphere with frequent sharply contoured morphology at 1.5- 2 Hz mostly with intermittent 3Hz delta wave.  There are frequent sharp waves seen on the left greater than the right, centroparietal region (C3, P3)     No normal posterior alpha rhythm or normal sleep structures is present during the study.       No seizures was present during the study and no seizure or seizure like movement was recorded in the video.  The one-lead EKG was unremarkable.     Impression:      Abnormal EEG with global delta slowing and frequent epileptiform activity on the both hemisphere, left greater than the right, centroparietal area.    No seizure event during the study.

## 2020-01-14 LAB
BACTERIA SPEC CULT: ABNORMAL
BACTERIA SPEC CULT: ABNORMAL
Lab: ABNORMAL
SPECIMEN SOURCE: ABNORMAL

## 2020-02-08 ENCOUNTER — HEALTH MAINTENANCE LETTER (OUTPATIENT)
Age: 55
End: 2020-02-08

## 2020-03-04 NOTE — DISCHARGE INSTRUCTIONS
Endocrinology Going Home with A Wound Vac  Usually your doctor, home health agency or wound care clinic trained in VAC therapy will change your dressing. There are specific dressings that need to be used for your VAC dressing change. There are also specific settings that need to be set on the machine at discharge.   When you are discharged, make sure you bring along the box that has been left in your room from the [facility] representative. (You need to leave with a portable VAC machine, not the hospital s.)  **Dressings will be changed according to what your MD has ordered. It is usually 3 times/week or as needed.  **You should not shower ( or get dressing wet) unless this has been approved by your MD.  **Keep the machine plugged in as much as possible to prevent having to recharge a full 12 hours. The battery may run for about 12 hours.    **At home: If there is a problem and the machine registers  air leak , it usually means the dressing is loose.  You may try to patch the dressing with new Tegaderm or clear [employer]CI drape. If this does not work, and Home Care is following the patient, call the Home Care nurse.  If the machine malfunctions, read the manual and/or call [facility] at #[phone]  How to Change the Canister on the Wound VAC  1. Close white clamp on foam dressing tube.  2. Disconnect tubes from each other - hold canister tube up to allow drainage to flow down tube and into canister. Close clamp on canister tube.  3. Press therapy button to off.  4. Push in canister release button at front of machine and remove canister from machine. You may need to jiggle it out.  5. Insert a new canister into machine. Jiggle into place until you hear a click.  6. Hook tubes together, unclamp clamps.  7. Press therapy button; press therapy  on  to restart.      ** If you have any serious bleeding, feel faint or feel dizzy, turn off the machine and call 911.  **You may also need to call your MD for a fever over 102, increase in redness, swelling,  "bleeding, bad swell, increase in pain warmth around dressing site.  **If you have a question or problem with the bandage call your MD, home care nurse or wound care center.  **If your machine isn t working right and the alarms keep going off: Read about  alarms  in your manual and/or call [employer] at [phone].    Follow up Appointment:  ____________________    Doctor's Phone Number: ____________________      You are being discharged with home care services through [facility]. [facility] will contact you to schedule initial visit. If you have any questions prior to this call, please contact them at [phone].    Wound care:(Vac dressing last changed on Friday 2-24-17  Bilateral IT and and bilateral sacrum: CWOCN will change KCI Wound VAC MWF using black foam and -125mmHg, continuous   1.  Remove all dressings  2. Clean wound with MicroKlenz  3.  Sacral wounds connect under skin bridge  4.  Black sponge to each sacral wound bed with foam piece tunneled under bridge to connect sponges  5.  Black foam to IT wounds. Pack into tunnels and undermining  6.  Skin prep all juan pablo-wound skin   7;  Secure all with Vac drape  8.  Pad and bridges sacral wounds x 2  with Rt IT wound and bridge to anterior aspect of thigh  9. Pad and bridge Lt IT wound to anterior aspect of thigh  10:  Face adaptor so that they come out of patients waistband of pants  11.  VAC @ 125mm/hg cont suction  12.  Use ostomy paste in any crease to create seal     Bilateral heels, right shin, left calf: MWF  1. Clean wound with MicroKlenz Spray, pat dry  2. Paint with No Sting Skin Prep (#231474) and allow to dry thoroughly  3. Press a Mepilex  Border Dressing (#[id number]) to the area, making sure to conform nicely to skin curvatures  4. Time and date dressing change and yusuf with a \"T\" for treatment of a wound  5. Reposition pt every 1 to 2 hours when in bed and hourly when up to the chair to relieve pressure and promote " perfusion to tissue  6. Keep heels elevated at all times    Colostomy pouch: Change on 2-27-17  Pt can resume her pouching system used prior to hosptialization

## 2020-06-08 NOTE — PROGRESS NOTES
Arrived to unit at 1330 from [facility] on comfort cares.  Gave 5mg SL Morphine per family request.  Patient eyes open, nonverbal.  Bladder scanned for 66cc.  Fan at bedside.  Repositioning for comfort per family request.  On 8L O2 NC, family wishes to lower O2 this evening and continue Morphine/ativan.  Plan to continue comfort cares.   Imaging Studies/Labs

## 2020-07-28 ENCOUNTER — HOSPITAL LABORATORY (OUTPATIENT)
Dept: NURSING HOME | Facility: OTHER | Age: 55
End: 2020-07-28

## 2020-07-29 ENCOUNTER — HOSPITAL LABORATORY (OUTPATIENT)
Dept: NURSING HOME | Facility: OTHER | Age: 55
End: 2020-07-29

## 2020-07-31 LAB
BACTERIA SPEC CULT: ABNORMAL
BACTERIA SPEC CULT: ABNORMAL
Lab: ABNORMAL
SPECIMEN SOURCE: ABNORMAL

## 2020-08-05 LAB
ALBUMIN UR-MCNC: 30 MG/DL
ALBUMIN UR-MCNC: NORMAL MG/DL
APPEARANCE UR: ABNORMAL
APPEARANCE UR: NORMAL
BACTERIA #/AREA URNS HPF: ABNORMAL /HPF
BACTERIA #/AREA URNS HPF: NORMAL /HPF
BACTERIA SPEC CULT: ABNORMAL
BACTERIA SPEC CULT: ABNORMAL
BILIRUB UR QL STRIP: NEGATIVE
BILIRUB UR QL STRIP: NORMAL
COLOR UR AUTO: NORMAL
COLOR UR AUTO: YELLOW
GLUCOSE UR STRIP-MCNC: NEGATIVE MG/DL
GLUCOSE UR STRIP-MCNC: NORMAL MG/DL
HGB UR QL STRIP: NEGATIVE
HGB UR QL STRIP: NORMAL
KETONES UR STRIP-MCNC: NEGATIVE MG/DL
KETONES UR STRIP-MCNC: NORMAL MG/DL
LEUKOCYTE ESTERASE UR QL STRIP: ABNORMAL
LEUKOCYTE ESTERASE UR QL STRIP: NORMAL
MUCOUS THREADS #/AREA URNS LPF: NORMAL /LPF
MUCOUS THREADS #/AREA URNS LPF: PRESENT /LPF
NITRATE UR QL: NEGATIVE
NITRATE UR QL: NORMAL
PH UR STRIP: 8 PH (ref 5–7)
PH UR STRIP: NORMAL PH (ref 5–7)
RBC #/AREA URNS AUTO: 26 /HPF (ref 0–2)
RBC #/AREA URNS AUTO: NORMAL /HPF (ref 0–2)
SOURCE: ABNORMAL
SOURCE: NORMAL
SP GR UR STRIP: 1.01 (ref 1–1.03)
SP GR UR STRIP: NORMAL (ref 1–1.03)
SPECIMEN SOURCE: ABNORMAL
UROBILINOGEN UR STRIP-MCNC: 0.2 MG/DL (ref 0–2)
UROBILINOGEN UR STRIP-MCNC: NORMAL MG/DL (ref 0–2)
WBC #/AREA URNS AUTO: 108 /HPF (ref 0–5)
WBC #/AREA URNS AUTO: NORMAL /HPF
WBC CLUMPS #/AREA URNS HPF: NORMAL /HPF
WBC CLUMPS #/AREA URNS HPF: PRESENT /HPF

## 2020-08-14 DIAGNOSIS — R56.9 SEIZURES (H): ICD-10-CM

## 2020-08-14 NOTE — TELEPHONE ENCOUNTER
Routing refill request to provider for review/approval because:  Drug not on the [facility] refill protocol     Requested Prescriptions   Pending Prescriptions Disp Refills     levETIRAcetam (KEPPRA) 750 MG tablet [Pharmacy Med Name: LEVETIRACETAM 750MG TABLET] 190 tablet      Sig: TAKE 2 TABS (1,500MG) BY MOUTH TWICE A DAY       There is no refill protocol information for this order      Last Written Prescription Date:  1/11/2020  Last Fill Quantity: 120,  # refills: 0   Last office visit: Visit date not found with prescribing provider:     Future Office Visit:   Next 5 appointments (look out 90 days)    Sep 02, 2020  2:40 PM CDT  Office Visit with Ivan [facility]aeza MD  [facility] [address] [place] [id number] [phone]           Priti Peralta RN

## 2020-08-18 ENCOUNTER — TELEPHONE (OUTPATIENT)
Dept: FAMILY MEDICINE | Facility: OTHER | Age: 55
End: 2020-08-18

## 2020-08-18 NOTE — TELEPHONE ENCOUNTER
Reason for Call: 8.26 Appointment, Requested Provider:  Ivan Baeza M.D.    PCP: No Ref-Primary, Physician    Reason for visit: Re-establish care and check wounds.     Duration of symptoms:     Have you been treated for this in the past? Yes    Additional comments: Nurse calling and states pt needs to be seen asap.     Can we leave a detailed message on this number? YES    Phone number patient can be reached at: [facility] [phone]    Best Time:     Call taken on 8/18/2020 at 9:09 AM by Bernarda Quach

## 2020-08-18 NOTE — TELEPHONE ENCOUNTER
Reason for Call: Request for an order or referral:    Order or referral being requested: Verbal orders for home care to do wound care.     Date needed: as soon as possible    Has the patient been seen by the PCP for this problem? YES    Additional comments: Please advise if there is a covering provider to give verbal in Dr Baeza absence.     Phone number Patient can be reached at:      Best Time:      Can we leave a detailed message on this number?  YES    Call taken on 8/18/2020 at 9:12 AM by Bernarda Quach

## 2020-08-18 NOTE — TELEPHONE ENCOUNTER
Patient is scheduled for an establish care visit with Dr Baeza.  Home Care will address wound care at this time.    Shonna Aguirre XRO/

## 2020-09-02 ENCOUNTER — OFFICE VISIT (OUTPATIENT)
Dept: FAMILY MEDICINE | Facility: CLINIC | Age: 55
End: 2020-09-02
Payer: COMMERCIAL

## 2020-09-02 VITALS
RESPIRATION RATE: 14 BRPM | DIASTOLIC BLOOD PRESSURE: 70 MMHG | OXYGEN SATURATION: 97 % | TEMPERATURE: 97.5 F | HEART RATE: 82 BPM | SYSTOLIC BLOOD PRESSURE: 102 MMHG

## 2020-09-02 DIAGNOSIS — N39.0 CHRONIC UTI (URINARY TRACT INFECTION): ICD-10-CM

## 2020-09-02 DIAGNOSIS — E87.6 HYPOKALEMIA: ICD-10-CM

## 2020-09-02 DIAGNOSIS — Z76.89 ESTABLISHING CARE WITH NEW DOCTOR, ENCOUNTER FOR: Primary | ICD-10-CM

## 2020-09-02 DIAGNOSIS — L89.309 PRESSURE INJURY OF SKIN OF BUTTOCK, UNSPECIFIED INJURY STAGE, UNSPECIFIED LATERALITY: ICD-10-CM

## 2020-09-02 DIAGNOSIS — G82.20: ICD-10-CM

## 2020-09-02 LAB
ANION GAP SERPL CALCULATED.3IONS-SCNC: 7 MMOL/L (ref 3–14)
BUN SERPL-MCNC: 15 MG/DL (ref 7–30)
CALCIUM SERPL-MCNC: 8.7 MG/DL (ref 8.5–10.1)
CHLORIDE SERPL-SCNC: 109 MMOL/L (ref 94–109)
CO2 SERPL-SCNC: 24 MMOL/L (ref 20–32)
CREAT SERPL-MCNC: 0.43 MG/DL (ref 0.52–1.04)
GFR SERPL CREATININE-BSD FRML MDRD: >90 ML/MIN/{1.73_M2}
GLUCOSE SERPL-MCNC: 113 MG/DL (ref 70–99)
POTASSIUM SERPL-SCNC: 3.3 MMOL/L (ref 3.4–5.3)
SODIUM SERPL-SCNC: 140 MMOL/L (ref 133–144)

## 2020-09-02 PROCEDURE — 99204 OFFICE O/P NEW MOD 45 MIN: CPT | Performed by: FAMILY MEDICINE

## 2020-09-02 PROCEDURE — 80048 BASIC METABOLIC PNL TOTAL CA: CPT | Performed by: FAMILY MEDICINE

## 2020-09-02 PROCEDURE — 36415 COLL VENOUS BLD VENIPUNCTURE: CPT | Performed by: FAMILY MEDICINE

## 2020-09-02 RX ORDER — LIDOCAINE 4 G/G
1 PATCH TOPICAL EVERY 24 HOURS
COMMUNITY
End: 2022-10-26

## 2020-09-02 RX ORDER — TRAMADOL HYDROCHLORIDE 50 MG/1
50 TABLET ORAL
COMMUNITY
Start: 2020-09-01 | End: 2020-10-15

## 2020-09-02 RX ORDER — HYDROXYZINE HYDROCHLORIDE 10 MG/1
10 TABLET, FILM COATED ORAL
Status: ON HOLD | COMMUNITY
Start: 2020-08-31 | End: 2022-04-24

## 2020-09-02 ASSESSMENT — PAIN SCALES - GENERAL: PAINLEVEL: EXTREME PAIN (9)

## 2020-09-02 NOTE — PROGRESS NOTES
Subjective     Felicia Ellison is a 54 year old female who presents to clinic today for the following health issues:    HPI       New Patient/Transfer of Care Wondering if can get a different than tramadol.     Andrés Ramirez is a 54-year-old female who presents to clinic today to reestablish primary care.  She had previously been a patient of mine up until  when she moved to [facility].  There she was cared for at the [facility] in [place].  She lived in an assisted living there.  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  To her previous clinic she had been receiving regular wound care treatment to her chronic decubitus ulcers.  Her primary was Dr. Suraj Padilla.  We do have records in care everywhere to review.  Today's visit is in part for face-to-face encounter prior to authorization for both wound care as well as DME for padded shower chair.    He is recently moved back to [place] to be closer to her mother after her father .  She is currently living in [facility] living in [place].  To establish care here she is in need of a wound care referral for ongoing wound care management through home care.  They would also like a padded shower chair as the unpadded 1 has caused disruption of her decubitus ulcers.    She herself would like to change from her tramadol for pain but at this point we do not have a good indication as to why she wants to change and at this point will decline.  In review of [place] prescription monitoring she has been receiving tramadol on a regular basis for many years.  Review of her previous chart demonstrated tramadol had been used successfully for many years after she was discontinued on oxycodone and hydrocodone.    I reviewed all of her current medications  and completed medication reconciliation from her previous chart.  She is currently on Lovenox 60 mg subcutaneous every 12 hours because of history of DVT.  She is on Lasix 40 mg once daily and does have a history of intermittent hypokalemia for which she is on potassium replacement 20 mEq 3 times daily.  She has a history of seizure disorder and she takes Keppra 1500 mg twice daily.I am unsure of when her last level was drawn or when her last labs were obtained.  I do know that many of her medications were refilled prior to her discharge on September 1.  She also takes oxybutynin 5 mg twice daily for neurogenic bladder and has a urostomy tube.  She does complain of some ongoing fall urine and recently did have a urinary tract infection for which she was treated with Bactrim.  She is not on any chronic antibiotic for urinary prophylaxis.  She is currently on propranolol 40 mg twice daily for migraine prophylaxis.  She takes Imitrex 50 mg for rescue medication for migraines.  And she also has tramadol 50 mg to be used up to 4 times daily.  She takes trazodone 100 mg at bedtime for insomnia as well as Ambien 10 mg at bedtime.    She is past due for multiple recommended health maintenance evaluations and declines these at this time.          Patient Active Problem List   Diagnosis     Migraine headache     Urinary retention     GERD (gastroesophageal reflux disease)     Flaccid paraplegia (H)     Decubitus ulcer of buttock     Pressure ulcer of heel     Poor appetite     Nausea     Generalized weakness     Burn     Health Care Home     Paraplegia following spinal cord injury (H)     ACP (advance care planning)     Osteomyelitis of hip (right periacetabular infection with osteomyelitis)     Severe protein-calorie malnutrition (H)     Microcytic anemia     Neurogenic bladder     Anxiety     Insomnia     Chronic UTI (urinary tract infection)     Skin ulcer of buttock (bilateral ischial tuberosity ulcers)     CARDIOVASCULAR  SCREENING; LDL GOAL LESS THAN 160     Open wound of foot except toes with complication     MRSA (methicillin resistant Staphylococcus aureus)     LLQ abdominal pain     Paralytic ileus (H)     Chest wall pain     Decubitus skin ulcer     S/P flap graft     Pancreatitis     Pericardial effusion     Hospice care patient     Seizures (H)     AMS (altered mental status)     Admission for hospice care     Odynophagia     Portal vein thrombosis     Current Outpatient Medications   Medication Sig Dispense Refill     enoxaparin ANTICOAGULANT (LOVENOX) 60 MG/0.6ML syringe Inject 60 mg Subcutaneous every 12 hours        furosemide (LASIX) 20 MG tablet Take 40 mg by mouth daily 20mg tab x2 = 40mg       hydrOXYzine (ATARAX) 10 MG tablet Take 10 mg by mouth       hypromellose-dextran (ARTIFICAL TEARS) 0.1-0.3 % ophthalmic solution Place 1 drop into both eyes every 4 hours as needed for dry eyes       levETIRAcetam (KEPPRA) 750 MG tablet Take 2 tablets (1,500 mg) by mouth 2 times daily 120 tablet 0     Lidocaine (LIDOCARE) 4 % Patch Place 1 patch onto the skin every 24 hours To prevent lidocaine toxicity, patient should be patch free for 12 hrs daily.       oxybutynin (DITROPAN) 5 MG tablet Take 5 mg by mouth 2 times daily       potassium chloride (KLOR-CON) 20 MEQ packet Take 20 mEq by mouth 3 times daily (with meals) With meals        propranolol (INDERAL) 40 MG tablet Take 40 mg by mouth 2 times daily       SUMAtriptan (IMITREX) 50 MG tablet Take 50 mg by mouth at onset of headache for migraine May repeat in 2 hours if no relief       traMADol (ULTRAM) 50 MG tablet Take 50 mg by mouth       traZODone (DESYREL) 50 MG tablet Take 100 mg by mouth At Bedtime 50mg tab x2 = 100mg       zolpidem (AMBIEN) 10 MG tablet Take 10 mg by mouth At Bedtime       acetaminophen (TYLENOL) 325 MG tablet Take 650 mg by mouth every 6 hours as needed for mild pain       albuterol (PROVENTIL) (2.5 MG/3ML) 0.083% neb solution Take 2.5 mg by  nebulization every 2 hours as needed for shortness of breath / dyspnea or wheezing         Review of Systems   CONSTITUTIONAL: NEGATIVE for fever, chills, change in weight  ENT/MOUTH: NEGATIVE for ear, mouth and throat problems  RESP: NEGATIVE for significant cough or SOB  CV: NEGATIVE for chest pain, palpitations or peripheral edema  : Urostomy tube with occasional cloudy urine.  NEURO: NEGATIVE for weakness, dizziness or paresthesias and POSITIVE for Flaccid paraplegia and seizure disorder.  ROS otherwise negative      Objective    /70 (BP Location: Right arm, Patient Position: Chair, Cuff Size: Adult Small)   Pulse 82   Temp 97.5  F (36.4  C) (Temporal)   Resp 14   LMP  (LMP Unknown)   SpO2 97%   There is no height or weight on file to calculate BMI.  Physical Exam   GENERAL: healthy, alert and no distress  EYES: Eyes grossly normal to inspection, PERRL and conjunctivae and sclerae normal  HENT: ear canals and TM's normal, nose and mouth without ulcers or lesions  NECK: no adenopathy, no asymmetry, masses, or scars and thyroid normal to palpation  RESP: lungs clear to auscultation - no rales, rhonchi or wheezes  CV: regular rate and rhythm, normal S1 S2, no S3 or S4, no murmur, click or rub, no peripheral edema and peripheral pulses strong  ABDOMEN: soft, nontender, no hepatosplenomegaly, no masses and bowel sounds normal  MS: no gross musculoskeletal defects noted, no edema  SKIN: History of bilateral decubitus ulcers, not examined today  NEURO: Normal strength and tone, weakness of bilateral LE, decreased tone bilateral LE, sensory exam grossly normal and mentation intact    Hospital Laboratory on 07/29/2020   Component Date Value Ref Range Status     Specimen Description 07/28/2020 Unspecified Urine   Final     Special Requests 07/28/2020 Specimen received in preservative   Final     Culture Micro 07/28/2020 *  Final                    Value:>100,000 colonies/mL  Escherichia coli       Culture  Micro 07/28/2020    Final                    Value:<10,000 colonies/mL  mixed urogenital tulio  Susceptibility testing not routinely done       Color Urine 07/29/2020 Yellow   Final     Appearance Urine 07/29/2020 Cloudy   Final     Glucose Urine 07/29/2020 Negative  NEG^Negative mg/dL Final     Bilirubin Urine 07/29/2020 Negative  NEG^Negative Final     Ketones Urine 07/29/2020 Negative  NEG^Negative mg/dL Final     Specific Gravity Urine 07/29/2020 1.009  1.003 - 1.035 Final     Blood Urine 07/29/2020 Negative  NEG^Negative Final     pH Urine 07/29/2020 8.0* 5.0 - 7.0 pH Final     Protein Albumin Urine 07/29/2020 30* NEG^Negative mg/dL Final     Urobilinogen mg/dL 07/29/2020 0.2  0.0 - 2.0 mg/dL Final     Nitrite Urine 07/29/2020 Negative  NEG^Negative Final     Leukocyte Esterase Urine 07/29/2020 Trace* NEG^Negative Final     Source 07/29/2020 Unspecified Urine   Final     Specimen Description 07/29/2020 Unspecified Urine   Final     Culture Micro 07/29/2020 *  Final                    Value:>100,000 colonies/mL  Escherichia coli       Culture Micro 07/29/2020    Final                    Value:<10,000 colonies/mL  mixed urogenital tulio  Susceptibility testing not routinely done       WBC Urine 07/29/2020 108  0 - 5 /HPF Final     RBC Urine 07/29/2020 26  0 - 2 /HPF Final     WBC Clumps 07/29/2020 Present* NEG^Negative /HPF Final     Bacteria Urine 07/29/2020 Few* NEG^Negative /HPF Final     Mucous Urine 07/29/2020 Present* NEG^Negative /LPF Final     Results for orders placed or performed in visit on 09/02/20   Basic metabolic panel     Status: Abnormal   Result Value Ref Range    Sodium 140 133 - 144 mmol/L    Potassium 3.3 (L) 3.4 - 5.3 mmol/L    Chloride 109 94 - 109 mmol/L    Carbon Dioxide 24 20 - 32 mmol/L    Anion Gap 7 3 - 14 mmol/L    Glucose 113 (H) 70 - 99 mg/dL    Urea Nitrogen 15 7 - 30 mg/dL    Creatinine 0.43 (L) 0.52 - 1.04 mg/dL    GFR Estimate >90 >60 mL/min/[1.73_m2]    GFR Estimate If Black  >90 >60 mL/min/[1.73_m2]    Calcium 8.7 8.5 - 10.1 mg/dL             Assessment & Plan     Establishing care with new doctor, encounter for  Andrés Ramirez is a 54-year-old female who presents to clinic today to reestablish primary care.  She had previously been a patient of mine up until 2015 when she moved to [facility].  There she was cared for at the [facility] in [place].  She lived in an assisted living there.  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  To her previous clinic she had been receiving regular wound care treatment to her chronic decubitus ulcers.  Her primary was Dr. Suraj Padilla.  We do have records in care everywhere to review.  Today's visit is in part for face-to-face encounter prior to authorization for both wound care as well as DME for padded shower chair.    Chronic UTI (urinary tract infection)  Neurogenic bladder status post urostomy tube with chronic UTI. Will recheck urine today.  - *UA reflex to Microscopic and Culture ([facility]; [facility]; [facility]; [facility]; [facility]; [place]; [place]); Future    Hypokalemia  Chronic due to Lasix. Potassium is slightly low and possibly due to inconsistent compliance with Potassium replacement. Encourage compliance and consider increase to 4 times daily.  - Basic metabolic panel    Pressure injury of skin of buttock, unspecified injury stage, unspecified laterality  Chronic, she has received wound care through home care at previous residence. She is living at [facility] with home care wound care available but needs referral. She also needs padded shower chair.  - WOUND CARE REFERRAL; Future  - Bath Seat Order for DME - ONLY FOR DME    Flaccid paraplegia (H)  Chronic, she has received wound care  through home care at previous residence. She is living at [facility] with home care wound care available but needs referral. She also needs padded shower chair.  - Bath Seat Order for DME - ONLY FOR DME       CONSULTATION/REFERRAL to Wound Care    Return in about 1 month (around 10/2/2020) for Recheck pain medication.    Ivan Baeza MD  [facility]

## 2020-09-04 DIAGNOSIS — N39.0 CHRONIC UTI (URINARY TRACT INFECTION): ICD-10-CM

## 2020-09-04 LAB
ALBUMIN UR-MCNC: NEGATIVE MG/DL
APPEARANCE UR: ABNORMAL
BACTERIA #/AREA URNS HPF: ABNORMAL /HPF
BILIRUB UR QL STRIP: NEGATIVE
COLOR UR AUTO: YELLOW
GLUCOSE UR STRIP-MCNC: NEGATIVE MG/DL
HGB UR QL STRIP: NEGATIVE
KETONES UR STRIP-MCNC: NEGATIVE MG/DL
LEUKOCYTE ESTERASE UR QL STRIP: NEGATIVE
MUCOUS THREADS #/AREA URNS LPF: PRESENT /LPF
NITRATE UR QL: NEGATIVE
PH UR STRIP: 8 PH (ref 5–7)
RBC #/AREA URNS AUTO: 2 /HPF (ref 0–2)
SOURCE: ABNORMAL
SP GR UR STRIP: 1.01 (ref 1–1.03)
UROBILINOGEN UR STRIP-MCNC: 0 MG/DL (ref 0–2)
WBC #/AREA URNS AUTO: 5 /HPF (ref 0–5)

## 2020-09-04 PROCEDURE — 81001 URINALYSIS AUTO W/SCOPE: CPT | Performed by: FAMILY MEDICINE

## 2020-09-23 ENCOUNTER — TELEPHONE (OUTPATIENT)
Dept: FAMILY MEDICINE | Facility: OTHER | Age: 55
End: 2020-09-23

## 2020-09-23 NOTE — TELEPHONE ENCOUNTER
Reason for Call: Request for an order or referral:    Order or referral being requested: Wound care orders     Date needed: as soon as possible    Has the patient been seen by the PCP for this problem? YES    Additional comments: Has wounds on right and left buttocks and heal -Needs twice weekly, 9/2/2020 clinic notes needed and 1 prior if we have it, MA F2F as well. Hannah will fax this over for [place] to fill out.     Phone number Hannah can be reached at:  Other phone number: [phone]    Best Time:  Any     Can we leave a detailed message on this number?  YES    Call taken on 9/23/2020 at 12:57 PM by Summer Becker

## 2020-09-23 NOTE — TELEPHONE ENCOUNTER
Dr. Baeza, a form from [facility],  in [place],  was just faxed and put on your desk to fill out and sign orders for her wound care.  Melanie has them.     Their phone  # is [phone]   FAX# [phone]    BOB Lopez

## 2020-09-25 ENCOUNTER — TELEPHONE (OUTPATIENT)
Dept: FAMILY MEDICINE | Facility: OTHER | Age: 55
End: 2020-09-25

## 2020-09-25 DIAGNOSIS — R56.9 SEIZURES (H): ICD-10-CM

## 2020-09-25 DIAGNOSIS — L89.309 PRESSURE INJURY OF SKIN OF BUTTOCK, UNSPECIFIED INJURY STAGE, UNSPECIFIED LATERALITY: Primary | ICD-10-CM

## 2020-09-25 DIAGNOSIS — G82.20: ICD-10-CM

## 2020-09-25 NOTE — TELEPHONE ENCOUNTER
Reason for Call: Request for an order or referral:    Order or referral being requested: Himanshu states she is going to put in a verbal order for Skilled Nursing/Wound Care to start on Monday 09/28.    Date needed: as soon as possible    Has the patient been seen by the PCP for this problem?     Additional comments: Per Himanshu this needs to be done asap    Phone number Patient can be reached at:  [phone]    Best Time:  any    Can we leave a detailed message on this number?  YES    Call taken on 9/25/2020 at 12:18 PM by Christine Beck

## 2020-09-25 NOTE — TELEPHONE ENCOUNTER
Called and spoke to Ese with [facility]. She received all the paperwork yesterday. She thanked Dr. Baeza for doing it so quickly. Ese said they normally have 48 hours to go see the patient after they get all the paperwork. Since 48 hours from yesterday lands them on a weekend, it will be Monday before they can see the patient. They have a scheduled visit with the patient for Monday 9/28/2020. Ese just wanted to update Dr. Baeza and give an FYI.     Nothing more is needed at this time.     Will route to PCP as FYI.     Susan Spence, CHRISTINAN, RN  [facility]

## 2020-09-25 NOTE — TELEPHONE ENCOUNTER
This paperwork was completed earlier this week and was faxed from [place] and the Wound Care Referral  Order placed on 9/2/20. What else do they need?  Electronically signed by Ivan Baeza MD

## 2020-09-28 ENCOUNTER — MEDICAL CORRESPONDENCE (OUTPATIENT)
Dept: HEALTH INFORMATION MANAGEMENT | Facility: CLINIC | Age: 55
End: 2020-09-28

## 2020-09-28 RX ORDER — LEVETIRACETAM 750 MG/1
1500 TABLET ORAL 2 TIMES DAILY
Qty: 120 TABLET | Refills: 0 | Status: SHIPPED | OUTPATIENT
Start: 2020-09-28 | End: 2020-11-10

## 2020-09-28 NOTE — TELEPHONE ENCOUNTER
Ivan Baeza MD  Dyad 1 Triage 9 minutes ago (3:44 PM)       OK for verbal orders to Home Care. Please call.   Electronically signed by Ivan Baeza MD    Message text

## 2020-09-28 NOTE — TELEPHONE ENCOUNTER
Requesting wound care orders.  She has three stage 2 pressure ulcers and one stage one ulcer on her heal.  She is requesting 3 visits for 1 week and possibly 3 visits a visits a week for 4 weeks.      Claire and [phone]

## 2020-09-28 NOTE — TELEPHONE ENCOUNTER
Left detailed message on [facility] with the OK per home care orders per Dr. Baeza.  Closing this encounter.  CHRISTINA GregoryN, RN

## 2020-09-28 NOTE — TELEPHONE ENCOUNTER
Routing refill request to provider for review/approval because:  Drug not on the [facility] refill protocol     Requested Prescriptions   Pending Prescriptions Disp Refills     levETIRAcetam (KEPPRA) 750 MG tablet 120 tablet 0     Sig: Take 2 tablets (1,500 mg) by mouth 2 times daily       There is no refill protocol information for this order      Last Written Prescription Date:  1/11/2020  Last Fill Quantity: 120,  # refills: 0   Last office visit: 9/2/2020   Future Office Visit:    Seizures (H) [R56.9]     Priti Peralta, RN

## 2020-10-01 NOTE — TELEPHONE ENCOUNTER
Jane calling patients , she needs orders on how to treat the wounds. Please call to advise. Jane is aware that Dr. Baeza is out of the office today, would like another provider to advise her or else she will wait until tomorrow to hear back

## 2020-10-02 NOTE — TELEPHONE ENCOUNTER
Please follow up with [facility] home care/wound care. These documents were signed and faxed last week. She has bilateral buttock ulcers and right heal ulcer.  Electronically signed by Ivan Baeza MD

## 2020-10-02 NOTE — TELEPHONE ENCOUNTER
Called and spoke to Kylie,  through [facility]. She said the patient's wounds are pretty extensive. They are very interested in getting patient in with Presley, wound care nurse, at [facility]. They are aware they would have to go to [facility] and they are OK with that. Kylie is wondering if they can get a referral for the wound care clinic in [place].     Will route to PCP to advise.     CHRISTINA ObandoN, RN  [facility]

## 2020-10-13 ENCOUNTER — TELEPHONE (OUTPATIENT)
Dept: FAMILY MEDICINE | Facility: OTHER | Age: 55
End: 2020-10-13

## 2020-10-13 DIAGNOSIS — L89.309 PRESSURE INJURY OF SKIN OF BUTTOCK, UNSPECIFIED INJURY STAGE, UNSPECIFIED LATERALITY: Primary | ICD-10-CM

## 2020-10-13 DIAGNOSIS — G82.20 PARAPLEGIA FOLLOWING SPINAL CORD INJURY (H): ICD-10-CM

## 2020-10-13 DIAGNOSIS — L89.609 PRESSURE INJURY OF SKIN OF HEEL, UNSPECIFIED INJURY STAGE, UNSPECIFIED LATERALITY: ICD-10-CM

## 2020-10-13 NOTE — TELEPHONE ENCOUNTER
Reason for Call:  Other call back    Detailed comments: Jane ROJAS calling with [facility] needing verbal orders for, nystatin powder to be applied to the groin, and also order Rooke boots, For pressure reduction, please advise.     Phone Number Patient can be reached at: Other phone number:  [phone]    Best Time: Anytime     Can we leave a detailed message on this number? YES    Call taken on 10/13/2020 at 8:48 AM by Miranda Seipel Vaccari

## 2020-10-14 ENCOUNTER — TELEPHONE (OUTPATIENT)
Dept: FAMILY MEDICINE | Facility: OTHER | Age: 55
End: 2020-10-14

## 2020-10-14 DIAGNOSIS — Z53.9 DIAGNOSIS NOT YET DEFINED: Primary | ICD-10-CM

## 2020-10-14 PROCEDURE — G0180 MD CERTIFICATION HHA PATIENT: HCPCS | Performed by: FAMILY MEDICINE

## 2020-10-14 NOTE — TELEPHONE ENCOUNTER
Jane calling back, needs Rx called into pharmacy for the nystatin power, Jane also needs order for Tramadol. Patient states she was on this in the past and wants to get back on this. Jane states that patient is an assisted living and doesn't know what pharmacy they use. Please call to advise.

## 2020-10-14 NOTE — TELEPHONE ENCOUNTER
Home Health Certification and Plan of Care forms received.     Certification Period from 9/28/2020 to 11/26/2020.    RN reviewed medications.     Forms given to PCP to sign.     ALONSO Obando, RN  [facility]

## 2020-10-15 RX ORDER — TRAMADOL HYDROCHLORIDE 50 MG/1
50 TABLET ORAL EVERY 6 HOURS PRN
Qty: 30 TABLET | Refills: 0 | Status: SHIPPED | OUTPATIENT
Start: 2020-10-15 | End: 2020-11-11

## 2020-10-15 RX ORDER — NYSTATIN 100000 [USP'U]/G
POWDER TOPICAL
Qty: 60 G | Refills: 3 | Status: SHIPPED | OUTPATIENT
Start: 2020-10-15 | End: 2021-12-17

## 2020-10-15 NOTE — TELEPHONE ENCOUNTER
Please call [facility]. I don't know what Rooke boots are. Please get more information and place pending order.  Thanks.  Electronically signed by Ivan Baeza MD

## 2020-10-15 NOTE — TELEPHONE ENCOUNTER
Jane notified with rx's being sent [facility]. She is wondering about order for Rooke Boots? Please advise.     Melanie Lovelace MA 10/15/2020  10:26 AM

## 2020-10-15 NOTE — TELEPHONE ENCOUNTER
Refill Rx approved.  Signed Prescriptions:                        Disp   Refills    nystatin (MYCOSTATIN) [id number] UNIT/GM exter*60 g   3        Sig: Apply topically per wound care orders  Authorizing Provider: IVAN MARIE    traMADol (ULTRAM) 50 MG tablet             30 tab*0        Sig: Take 1 tablet (50 mg) by mouth every 6 hours as           needed for severe pain  Authorizing Provider: IVAN MARIE      Electronically signed by Ivan Marie MD

## 2020-10-15 NOTE — TELEPHONE ENCOUNTER
Called and spoke to Jane with Homecare. She said the patient has really bad pressure spots on her feet. Jane said patient's feet lay to the side and they get pressure ulcers. Right now patient has soft pressure reduction boots. Jane said they are not really doing the trick and they don't seem to keep the feet up like they should.     Jane said Rooke Boots are pressure reducing boots on steroids. They have a hard part that helps keep the foot up, they are more sturdy, and have a better strap to keep them on.     Jane is thinking a DME signed by provider and sent to a medical supply store would be the easiest route. She said if we fax the DME to [facility] they can likely arrange with whatever medical store they use.     RN called and spoke to [facility] nurse Olena. She said they use [employer] for their DME things. Olena said we can fax the DME to [facility] and they will arrange everything with [employer]. [facility]'s fax # is [phone].    Will route to PCP to sign DME.     CHRISTINA ObandoN, RN  [facility]

## 2020-10-19 ENCOUNTER — OFFICE VISIT (OUTPATIENT)
Dept: SURGERY | Facility: CLINIC | Age: 55
End: 2020-10-19
Payer: COMMERCIAL

## 2020-10-19 VITALS — TEMPERATURE: 97 F | DIASTOLIC BLOOD PRESSURE: 60 MMHG | SYSTOLIC BLOOD PRESSURE: 104 MMHG

## 2020-10-19 DIAGNOSIS — E44.0 MODERATE PROTEIN-CALORIE MALNUTRITION (H): ICD-10-CM

## 2020-10-19 DIAGNOSIS — L89.153 SACRAL DECUBITUS ULCER, STAGE III (H): Primary | ICD-10-CM

## 2020-10-19 PROCEDURE — 99203 OFFICE O/P NEW LOW 30 MIN: CPT | Performed by: SPECIALIST
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
## 2020-10-29 DIAGNOSIS — F51.01 PRIMARY INSOMNIA: Primary | ICD-10-CM

## 2020-10-29 RX ORDER — ZOLPIDEM TARTRATE 10 MG/1
TABLET ORAL
Qty: 30 TABLET | Refills: 0 | Status: SHIPPED | OUTPATIENT
Start: 2020-11-01 | End: 2020-11-24

## 2020-10-29 NOTE — TELEPHONE ENCOUNTER
Requested Prescriptions   Pending Prescriptions Disp Refills     zolpidem (AMBIEN) 10 MG tablet [Pharmacy Med Name: ZOLPIDEM 10MG TABLET] 30 tablet      Sig: TAKE 1 TABLET BY MOUTH AT BEDTIME AS NEEDED     Last Written Prescription Date:  N/a  Last Fill Quantity: N/A,   # refills: N/A  Last Office Visit: 09/02/2020  Future Office visit:       Routing refill request to provider for review/approval because:  Drug not on the [facility], P or [facility] refill protocol or controlled substance  Medication is reported/historical    Cheyenne Serna MA

## 2020-11-02 ENCOUNTER — TELEPHONE (OUTPATIENT)
Dept: FAMILY MEDICINE | Facility: OTHER | Age: 55
End: 2020-11-02

## 2020-11-02 NOTE — TELEPHONE ENCOUNTER
"Form faxed to clinic stating they are \"Requesting orders for Suresite to cover stage 3 pressure wounds to buttocks.  To be placed over Zetuvit.\"    RN called and left a detailed message on secure VM asking what she is looking for.  Does this paper need to be signed and faxed back over?  Do orders need to be signed in Epic and sent over, is a phone order OK?    She is asked to call back and specify what they are looking for.  ALONSO Gregory, RN        "

## 2020-11-03 NOTE — TELEPHONE ENCOUNTER
Called RN back.  She states she just needs a VO for this.  Message handled by Nurse Triage with [facility] - provider name: Dr. Paz.  OK for this order.  RN understands and agrees to this plan.    Closing this encounter.  CHRISTINA GregoryN, RN

## 2020-11-06 ENCOUNTER — MEDICAL CORRESPONDENCE (OUTPATIENT)
Dept: HEALTH INFORMATION MANAGEMENT | Facility: CLINIC | Age: 55
End: 2020-11-06

## 2020-11-08 ENCOUNTER — HEALTH MAINTENANCE LETTER (OUTPATIENT)
Age: 55
End: 2020-11-08

## 2020-11-09 DIAGNOSIS — F51.01 PRIMARY INSOMNIA: Primary | ICD-10-CM

## 2020-11-09 DIAGNOSIS — R56.9 SEIZURES (H): ICD-10-CM

## 2020-11-10 DIAGNOSIS — G43.709 CHRONIC MIGRAINE WITHOUT AURA WITHOUT STATUS MIGRAINOSUS, NOT INTRACTABLE: Primary | ICD-10-CM

## 2020-11-10 RX ORDER — PROPRANOLOL HYDROCHLORIDE 40 MG/1
40 TABLET ORAL 2 TIMES DAILY
Qty: 180 TABLET | Refills: 1 | Status: SHIPPED | OUTPATIENT
Start: 2020-11-10 | End: 2021-05-17

## 2020-11-10 RX ORDER — TRAZODONE HYDROCHLORIDE 50 MG/1
TABLET, FILM COATED ORAL
Qty: 180 TABLET | Refills: 0 | Status: SHIPPED | OUTPATIENT
Start: 2020-11-10 | End: 2021-03-22

## 2020-11-10 RX ORDER — LEVETIRACETAM 750 MG/1
1500 TABLET ORAL 2 TIMES DAILY
Qty: 120 TABLET | Refills: 3 | Status: SHIPPED | OUTPATIENT
Start: 2020-11-10 | End: 2021-03-22

## 2020-11-10 NOTE — TELEPHONE ENCOUNTER
This is a patient reported medication.  Routing refill request to provider for review/approval because:  Medication is reported/historical    Last office visit: Visit date not found with prescribing provider:  9/2/2020   Future Office Visit:      CHRISTINA GregoryN, RN

## 2020-11-10 NOTE — TELEPHONE ENCOUNTER
Routing refill request to provider for review/approval because:  Drug not on the [facility] refill protocol (Keppra)  Medication is reported/historical (Trazodone)    CHRISTINA ObandoN, RN  [facility]

## 2020-11-11 DIAGNOSIS — G82.20 PARAPLEGIA FOLLOWING SPINAL CORD INJURY (H): ICD-10-CM

## 2020-11-11 DIAGNOSIS — L89.309 PRESSURE INJURY OF SKIN OF BUTTOCK, UNSPECIFIED INJURY STAGE, UNSPECIFIED LATERALITY: ICD-10-CM

## 2020-11-11 PROCEDURE — 87070 CULTURE OTHR SPECIMN AEROBIC: CPT | Performed by: FAMILY MEDICINE

## 2020-11-11 PROCEDURE — 87186 SC STD MICRODIL/AGAR DIL: CPT | Performed by: FAMILY MEDICINE

## 2020-11-11 PROCEDURE — 87077 CULTURE AEROBIC IDENTIFY: CPT | Performed by: FAMILY MEDICINE

## 2020-11-11 PROCEDURE — 87205 SMEAR GRAM STAIN: CPT | Performed by: FAMILY MEDICINE

## 2020-11-11 RX ORDER — TRAMADOL HYDROCHLORIDE 50 MG/1
50 TABLET ORAL EVERY 6 HOURS PRN
Qty: 30 TABLET | Refills: 0 | Status: SHIPPED | OUTPATIENT
Start: 2020-11-11 | End: 2020-12-14

## 2020-11-11 NOTE — TELEPHONE ENCOUNTER
Tramadol      Last Written Prescription Date:  10/15/2020  Last Fill Quantity: 30,   # refills: 0  Last Office Visit: 9/2/2020  Future Office visit:       Routing refill request to provider for review/approval because:  Drug not on the [facility], P or [facility] refill protocol or controlled substance

## 2020-11-12 LAB
GRAM STN SPEC: ABNORMAL
Lab: ABNORMAL
SPECIMEN SOURCE: ABNORMAL

## 2020-11-15 LAB
BACTERIA SPEC CULT: ABNORMAL
Lab: ABNORMAL
SPECIMEN SOURCE: ABNORMAL

## 2020-11-20 ENCOUNTER — HOSPITAL ENCOUNTER (OUTPATIENT)
Dept: WOUND CARE | Facility: CLINIC | Age: 55
Discharge: HOME | End: 2020-11-20
Attending: FAMILY MEDICINE | Admitting: FAMILY MEDICINE
Payer: MEDICARE

## 2020-11-20 PROCEDURE — G0463 HOSPITAL OUTPT CLINIC VISIT: HCPCS

## 2020-11-20 NOTE — DISCHARGE INSTRUCTIONS
Today is the first time for me to see your wounds.  The large wounds on the sacrum and right IT appear clean, there is granular tissue present, no signs of infection but there is significant undermining in both.  The current cares for those two wounds with the use of Yu Promogran and plain packing strip done every other day appears appropriate.  The small wound on the left IT appears more like a skin tear and I would only cover that with dry gauze and secure as needed to keep the dressing in place and dry.  The two small wounds on the inner left groin/upper left thigh are both clean.  You had Hydrofera Blue in place and this is appropriate as long as it does not become overly damp.  The right heel wound appears to moist in the center.  I used Aquacel Ag on that wound today as the primary dressing, covered with dry gauze, secured with roll gauze and tape.  I would not use the Tegaderm or similar occlusive type of dressing on the right heel.  Just gauzes it has some area to be slightly drier.    I believe all the wound cares as ordered by Dr Nunez are appropriate with cares to be done every other day.    I will plan to see you again in four weeks on Fri December the 18 th at 1 pm.  If you or your care givers have any concerns of infection you need to be seen by Dr Zavala or another Dr To be assessed for the need for antibiotics.  I don't see any infection signs today.  I however can not order antibiotics, those have to come from an MD, nurse practitioner or Physicians assistant.  I you or your care givers have any questions they can call [phone] and they will assist you to reach me in the Wound and Ostomy clinic.  That same number can be called if you need to reschedule date or time of your next appointment with me.    Thanks for coming in today and I will see you in four weeks.    Presley Chester RN [email or web address]
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
## 2020-11-23 ENCOUNTER — TELEPHONE (OUTPATIENT)
Dept: FAMILY MEDICINE | Facility: OTHER | Age: 55
End: 2020-11-23

## 2020-11-23 NOTE — TELEPHONE ENCOUNTER
Nurse is called and informed of this message on SVM.  Nurse is informed to call back with any additional questions or if patient is having any symptoms.  Closing this encounter.    Desirae Singh RN

## 2020-11-23 NOTE — TELEPHONE ENCOUNTER
Please call patient to triage symptoms.  OK for verbal orders.  Electronically signed by Ivan Baeza MD

## 2020-11-23 NOTE — TELEPHONE ENCOUNTER
Reason for Call: Request for an order or referral:    Order or referral being requested: Verbal orders to continue skilled nursing 3 times per week.     Date needed: as soon as possible    Has the patient been seen by the PCP for this problem? YES    Additional comments:     Phone number Patient can be reached at: [phone] Jane    Best Time:  asap    Can we leave a detailed message on this number?  YES    Call taken on 11/23/2020 at 11:19 AM by Bernarda Quach

## 2020-11-27 ENCOUNTER — TELEPHONE (OUTPATIENT)
Dept: FAMILY MEDICINE | Facility: CLINIC | Age: 55
End: 2020-11-27

## 2020-11-27 NOTE — TELEPHONE ENCOUNTER
Phone number given is not valid.    Found older home care call with  # [phone]- this is the correct number.   Left message at number to call back.     Ines Martin RN on 11/27/2020 at 2:50 PM

## 2020-11-27 NOTE — TELEPHONE ENCOUNTER
Reason for Call: Request for an order or referral:    Order or referral being requested: Home care    Date needed: as soon as possible    Has the patient been seen by the PCP for this problem? NO    Additional comments: Change in treatment--heel wound care: change to medhoney,discontinue aquacell-ag. New wound on rt buttocks, skin care=apply skin prep, cover with hydrocolloid.    Phone number Patient can be reached at:  Other phone number:   [phone]    Best Time:  anytime    Can we leave a detailed message on this number?  YES    Call taken on 11/27/2020 at 1:25 PM by CARLOS MANZO

## 2020-12-02 ENCOUNTER — MEDICAL CORRESPONDENCE (OUTPATIENT)
Dept: HEALTH INFORMATION MANAGEMENT | Facility: CLINIC | Age: 55
End: 2020-12-02

## 2020-12-02 ENCOUNTER — TELEPHONE (OUTPATIENT)
Dept: FAMILY MEDICINE | Facility: CLINIC | Age: 55
End: 2020-12-02

## 2020-12-02 NOTE — TELEPHONE ENCOUNTER
Home Health Certification and Plan of Care forms received.     Certification Period from 11.27.20 to 1/25/21.    RN reviewed medications.     Forms given to PCP to sign.     BOB Lopez  [facility]

## 2020-12-02 NOTE — TELEPHONE ENCOUNTER
Reason for Call:  Form, our goal is to have forms completed with 72 hours, however, some forms may require a visit or additional information.    Type of letter, form or note:  Home Health Certification    Who is the form from?: Home care    Where did the form come from: form was faxed in    What clinic location was the form placed at?: [facility]    Where the form was placed: Given to MA/RN [facility]    What number is listed as a contact on the form?: [phone]       Additional comments:     Call taken on 12/2/2020 at 3:38 PM by Pooja Simmons

## 2020-12-03 DIAGNOSIS — Z53.9 DIAGNOSIS NOT YET DEFINED: Primary | ICD-10-CM

## 2020-12-03 PROCEDURE — G0179 MD RECERTIFICATION HHA PT: HCPCS | Performed by: FAMILY MEDICINE

## 2020-12-04 ENCOUNTER — MEDICAL CORRESPONDENCE (OUTPATIENT)
Dept: HEALTH INFORMATION MANAGEMENT | Facility: CLINIC | Age: 55
End: 2020-12-04

## 2020-12-04 NOTE — TELEPHONE ENCOUNTER
Signed Home Health Certification and Plan of Care faxed back to [facility] and to scanning. Celina Guy LPN

## 2020-12-14 DIAGNOSIS — G82.20 PARAPLEGIA FOLLOWING SPINAL CORD INJURY (H): ICD-10-CM

## 2020-12-14 DIAGNOSIS — L89.309 PRESSURE INJURY OF SKIN OF BUTTOCK, UNSPECIFIED INJURY STAGE, UNSPECIFIED LATERALITY: ICD-10-CM

## 2020-12-14 RX ORDER — TRAMADOL HYDROCHLORIDE 50 MG/1
50 TABLET ORAL EVERY 6 HOURS PRN
Qty: 30 TABLET | Refills: 0 | Status: SHIPPED | OUTPATIENT
Start: 2020-12-14 | End: 2021-01-14

## 2020-12-14 NOTE — TELEPHONE ENCOUNTER
Requested Prescriptions   Pending Prescriptions Disp Refills     traMADol (ULTRAM) 50 MG tablet [Pharmacy Med Name: TRAMADOL 50MG TABLET] 30 tablet      Sig: TAKE 1 TABLET (50 MG) BY MOUTH EVERY 6 HOURS AS NEEDED FOR SEVERE PAIN     Last Written Prescription Date:  11/11/2020  Last Fill Quantity: 30,   # refills: 0  Last Office Visit: 09/02/2020  Future Office visit:    Next 5 appointments (look out 90 days)    Dec 18, 2020  1:00 PM  Return Visit with PH WOUND EXAM ROOM  [facility] [place] ([facility]) [address] [place] [id number] [phone]           Routing refill request to provider for review/approval because:  Drug not on the G, P or [facility] refill protocol or controlled substance    Cheyenne Serna MA

## 2020-12-15 DIAGNOSIS — R60.9 DEPENDENT EDEMA: Primary | ICD-10-CM

## 2020-12-15 NOTE — TELEPHONE ENCOUNTER
furosemide (LASIX) 20 MG tablet      Last Written Prescription Date:  Unknown, historical  Last Fill Quantity: 0,   # refills: 0  Last Office Visit: 9/2/2020  Future Office visit:    Next 5 appointments (look out 90 days)    Dec 18, 2020  1:00 PM  Return Visit with PH WOUND EXAM ROOM  [facility] [place] ([facility]) [address] [place] [id number]  [phone]           Routing refill request to provider for review/approval because:  Medication is reported/historical

## 2020-12-16 RX ORDER — FUROSEMIDE 20 MG
40 TABLET ORAL DAILY
Qty: 180 TABLET | Refills: 1 | Status: SHIPPED | OUTPATIENT
Start: 2020-12-16 | End: 2021-06-17

## 2021-01-04 DIAGNOSIS — I82.5Y9 CHRONIC DEEP VEIN THROMBOSIS (DVT) OF PROXIMAL VEIN OF LOWER EXTREMITY, UNSPECIFIED LATERALITY (H): Primary | ICD-10-CM

## 2021-01-05 NOTE — TELEPHONE ENCOUNTER
Requested Prescriptions   Pending Prescriptions Disp Refills     enoxaparin ANTICOAGULANT (LOVENOX) 60 MG/0.6ML syringe [Pharmacy Med Name: ENOXAPARIN 60MG/0.6ML FOR INJ] 36 mL      Sig: INJECT 0.6ML EVERY 12 HOURS     Last Written Prescription Date:  N/A  Last Fill Quantity: N/A,   # refills: N/A  Last Office Visit: 09/02/2020  Future Office visit:       Routing refill request to provider for review/approval because:  Drug not on the [facility], P or [facility] refill protocol or controlled substance  Medication is reported/historical    Cheyenne Serna MA

## 2021-01-06 ENCOUNTER — HOSPITAL ENCOUNTER (EMERGENCY)
Facility: CLINIC | Age: 56
Discharge: HOME | End: 2021-01-06
Attending: EMERGENCY MEDICINE | Admitting: EMERGENCY MEDICINE
Payer: MEDICARE

## 2021-01-06 VITALS
BODY MASS INDEX: 15.51 KG/M2 | TEMPERATURE: 98.4 F | DIASTOLIC BLOOD PRESSURE: 77 MMHG | RESPIRATION RATE: 14 BRPM | OXYGEN SATURATION: 99 % | WEIGHT: 105 LBS | HEART RATE: 62 BPM | SYSTOLIC BLOOD PRESSURE: 128 MMHG

## 2021-01-06 DIAGNOSIS — Z79.01 CURRENT USE OF LONG TERM ANTICOAGULATION: ICD-10-CM

## 2021-01-06 DIAGNOSIS — N39.0 CHRONIC UTI (URINARY TRACT INFECTION): ICD-10-CM

## 2021-01-06 PROCEDURE — 96372 THER/PROPH/DIAG INJ SC/IM: CPT | Performed by: EMERGENCY MEDICINE

## 2021-01-06 PROCEDURE — 99284 EMERGENCY DEPT VISIT MOD MDM: CPT | Performed by: EMERGENCY MEDICINE

## 2021-01-06 PROCEDURE — 250N000011 HC RX IP 250 OP 636: Performed by: EMERGENCY MEDICINE

## 2021-01-06 RX ADMIN — ENOXAPARIN SODIUM 50 MG: 60 INJECTION SUBCUTANEOUS at 18:37
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
## 2021-01-06 NOTE — ED NOTES
Water brought to patient and told Doctor about patient concern with urine. Doctor chose not to treat at this time.

## 2021-01-06 NOTE — DISCHARGE INSTRUCTIONS
1.  Authorize approval for refill of Lovenox with dosing at 1 mg/kg/day.  Please dose Lovenox 50 mg subcutaneous injection once daily    2.  Please arrange for follow-up appointment with Ivan Baeza MD.  Your primary care provider needs to continue to provide authorization for future Lovenox refills.     3.  When you see your primary care provider discussed the benefits of seeing a hematologist to help determine which is the best long-term anticoagulation medication to be taking.

## 2021-01-06 NOTE — ED AVS SNAPSHOT
[facility]  [address] [place] [address]  Phone: [phone]  Fax: [phone]                                    Felicia Ellison   MRN: [id number]    Department: [facility]   Date of Visit: 1/6/2021           After Visit Summary Signature Page    I have received my discharge instructions, and my questions have been answered. I have discussed any challenges I see with this plan with the nurse or doctor.    ..........................................................................................................................................  Patient/Patient Representative Signature      ..........................................................................................................................................  Patient Representative Print Name and Relationship to Patient    ..................................................               ................................................  Date                                   Time    ..........................................................................................................................................  Reviewed by Signature/Title    ...................................................              ..............................................  Date                                               Time          22EPIC Rev 08/18

## 2021-01-14 DIAGNOSIS — L89.309 PRESSURE INJURY OF SKIN OF BUTTOCK, UNSPECIFIED INJURY STAGE, UNSPECIFIED LATERALITY: ICD-10-CM

## 2021-01-14 DIAGNOSIS — G82.20 PARAPLEGIA FOLLOWING SPINAL CORD INJURY (H): ICD-10-CM

## 2021-01-14 RX ORDER — TRAMADOL HYDROCHLORIDE 50 MG/1
50 TABLET ORAL EVERY 6 HOURS PRN
Qty: 30 TABLET | Refills: 0 | Status: SHIPPED | OUTPATIENT
Start: 2021-01-14 | End: 2021-02-12

## 2021-01-14 NOTE — TELEPHONE ENCOUNTER
Tramadol      Last Written Prescription Date:  12/14/2020  Last Fill Quantity: 30,   # refills: 0  Last Office Visit: 9/2/2020  Future Office visit:   9/2/2020 - Felisha [place] refill request to provider for review/approval because:  Drug not on the [facility], [facility] or [facility] refill protocol or controlled substance    Lizett Bland RN

## 2021-01-22 ENCOUNTER — TELEPHONE (OUTPATIENT)
Dept: FAMILY MEDICINE | Facility: CLINIC | Age: 56
End: 2021-01-22

## 2021-01-25 NOTE — TELEPHONE ENCOUNTER
Attempted to call Jane with [facility]. No answer. VM was identified and per message below, OK for message to be left. RN left detailed messaging giving verbal OK per Dr. Baeza. Advised to call back with any questions or concerns.     ALONSO Obando, RN  [facility]

## 2021-02-05 ENCOUNTER — TELEPHONE (OUTPATIENT)
Dept: FAMILY MEDICINE | Facility: CLINIC | Age: 56
End: 2021-02-05

## 2021-02-05 NOTE — TELEPHONE ENCOUNTER
Home Health Certification and Plan of Care forms received.     Certification Period from 1/26/2021 to 3/26/2021.    Forms placed in the [facility] folder.    CHRISTINA ObandoN, RN  [facility]

## 2021-02-08 DIAGNOSIS — G43.709 CHRONIC MIGRAINE WITHOUT AURA WITHOUT STATUS MIGRAINOSUS, NOT INTRACTABLE: Primary | ICD-10-CM

## 2021-02-08 NOTE — TELEPHONE ENCOUNTER
Medications reconciled on [facility] form, changes noted on form.  Form to PCP for signature.              Theresa Ortega RN  [facility]

## 2021-02-09 DIAGNOSIS — F51.01 PRIMARY INSOMNIA: ICD-10-CM

## 2021-02-10 DIAGNOSIS — Z53.9 DIAGNOSIS NOT YET DEFINED: Primary | ICD-10-CM

## 2021-02-10 PROCEDURE — G0179 MD RECERTIFICATION HHA PT: HCPCS | Performed by: FAMILY MEDICINE

## 2021-02-10 RX ORDER — SUMATRIPTAN 50 MG/1
TABLET, FILM COATED ORAL
Qty: 9 TABLET | Refills: 1 | Status: SHIPPED | OUTPATIENT
Start: 2021-02-10 | End: 2021-05-05

## 2021-02-10 RX ORDER — ZOLPIDEM TARTRATE 10 MG/1
TABLET ORAL
Qty: 30 TABLET | Refills: 0 | Status: SHIPPED | OUTPATIENT
Start: 2021-02-10 | End: 2021-03-12

## 2021-02-10 NOTE — TELEPHONE ENCOUNTER
Routing refill request to provider for review/approval because:  Medication is reported/historical    CHRISTINA ObandoN, RN  [facility]

## 2021-02-10 NOTE — TELEPHONE ENCOUNTER
Ambien      Last Written Prescription Date:  12/31/2020  Last Fill Quantity: 30,   # refills: 0  Last Office Visit: 9/2/2020  Future Office visit:       Routing refill request to provider for review/approval because:  Drug not on the [facility], [facility] or [facility] refill protocol or controlled substance

## 2021-02-11 DIAGNOSIS — L89.309 PRESSURE INJURY OF SKIN OF BUTTOCK, UNSPECIFIED INJURY STAGE, UNSPECIFIED LATERALITY: ICD-10-CM

## 2021-02-11 DIAGNOSIS — G82.20 PARAPLEGIA FOLLOWING SPINAL CORD INJURY (H): ICD-10-CM

## 2021-02-12 RX ORDER — TRAMADOL HYDROCHLORIDE 50 MG/1
50 TABLET ORAL EVERY 6 HOURS PRN
Qty: 30 TABLET | Refills: 0 | Status: SHIPPED | OUTPATIENT
Start: 2021-02-12 | End: 2021-03-15

## 2021-02-12 NOTE — TELEPHONE ENCOUNTER
Routing to covering provider to review. No current PCP listed     Routing refill request to provider for review/approval because:  Drug not on the [facility] refill protocol     tramadol  Last Written Prescription Date:  1/14/2021  Last Fill Quantity: 30,  # refills: 0   Last office visit: 9/2/2020 with prescribing provider:  Felisha   Future Office Visit:      Requested Prescriptions   Pending Prescriptions Disp Refills     traMADol (ULTRAM) 50 MG tablet [Pharmacy Med Name: TRAMADOL 50MG TABLET] 30 tablet      Sig: TAKE 1 TABLET (50 MG) BY MOUTH EVERY 6 HOURS AS NEEDED FOR SEVERE PAIN       There is no refill protocol information for this order        Penny Jimenez RN on 2/12/2021 at 12:30 PM

## 2021-03-04 ENCOUNTER — TRANSFERRED RECORDS (OUTPATIENT)
Dept: HEALTH INFORMATION MANAGEMENT | Facility: CLINIC | Age: 56
End: 2021-03-04

## 2021-03-11 DIAGNOSIS — F51.01 PRIMARY INSOMNIA: ICD-10-CM

## 2021-03-12 DIAGNOSIS — L89.309 PRESSURE INJURY OF SKIN OF BUTTOCK, UNSPECIFIED INJURY STAGE, UNSPECIFIED LATERALITY: ICD-10-CM

## 2021-03-12 DIAGNOSIS — G82.20 PARAPLEGIA FOLLOWING SPINAL CORD INJURY (H): ICD-10-CM

## 2021-03-12 RX ORDER — ZOLPIDEM TARTRATE 10 MG/1
TABLET ORAL
Qty: 30 TABLET | Refills: 0 | Status: SHIPPED | OUTPATIENT
Start: 2021-03-12 | End: 2021-04-16

## 2021-03-12 NOTE — TELEPHONE ENCOUNTER
Requested Prescriptions   Pending Prescriptions Disp Refills     zolpidem (AMBIEN) 10 MG tablet [Pharmacy Med Name: ZOLPIDEM 10MG TABLET] 30 tablet      Sig: TAKE 1 TABLET BY MOUTH DAILY AT BEDTIME AS NEEDED     Last Written Prescription Date:  02/10/2021  Last Fill Quantity: 30,   # refills: 0  Last Office Visit: 09/02/2020  Future Office visit:       Routing refill request to provider for review/approval because:  Drug not on the [facility], [facility] or [facility] refill protocol or controlled substance    Cheyenne Serna MA

## 2021-03-15 RX ORDER — TRAMADOL HYDROCHLORIDE 50 MG/1
50 TABLET ORAL EVERY 6 HOURS PRN
Qty: 30 TABLET | Refills: 0 | Status: SHIPPED | OUTPATIENT
Start: 2021-03-15 | End: 2021-04-09

## 2021-03-15 NOTE — TELEPHONE ENCOUNTER
Tramadol      Last Written Prescription Date:  2/12/2021  Last Fill Quantity: 30,   # refills: 0  Last Office Visit: 9/2/2020  Future Office visit:       Routing refill request to provider for review/approval because:  Drug not on the [facility], [facility] or [facility] refill protocol or controlled substance

## 2021-03-19 DIAGNOSIS — R56.9 SEIZURES (H): ICD-10-CM

## 2021-03-19 DIAGNOSIS — F51.01 PRIMARY INSOMNIA: ICD-10-CM

## 2021-03-22 RX ORDER — LEVETIRACETAM 750 MG/1
1500 TABLET ORAL 2 TIMES DAILY
Qty: 120 TABLET | Refills: 3 | Status: SHIPPED | OUTPATIENT
Start: 2021-03-22 | End: 2021-07-20

## 2021-03-22 RX ORDER — TRAZODONE HYDROCHLORIDE 50 MG/1
TABLET, FILM COATED ORAL
Qty: 180 TABLET | Refills: 0 | Status: SHIPPED | OUTPATIENT
Start: 2021-03-22 | End: 2021-06-17

## 2021-03-22 NOTE — TELEPHONE ENCOUNTER
Keppra      Last Written Prescription Date:  11/10/2020  Last Fill Quantity: 120,   # refills: 3  Last Office Visit: 9/2/2020  Future Office visit:       Routing refill request to provider for review/approval because:  Drug not on the [facility], [facility] or [facility] refill protocol or controlled substance

## 2021-03-28 ENCOUNTER — HEALTH MAINTENANCE LETTER (OUTPATIENT)
Age: 56
End: 2021-03-28

## 2021-04-08 ENCOUNTER — HOSPITAL LABORATORY (OUTPATIENT)
Facility: OTHER | Age: 56
End: 2021-04-08

## 2021-04-08 DIAGNOSIS — G82.20 PARAPLEGIA FOLLOWING SPINAL CORD INJURY (H): ICD-10-CM

## 2021-04-08 DIAGNOSIS — L89.309 PRESSURE INJURY OF SKIN OF BUTTOCK, UNSPECIFIED INJURY STAGE, UNSPECIFIED LATERALITY: ICD-10-CM

## 2021-04-09 ENCOUNTER — HOSPITAL LABORATORY (OUTPATIENT)
Facility: OTHER | Age: 56
End: 2021-04-09

## 2021-04-09 LAB
ALBUMIN UR-MCNC: 100 MG/DL
APPEARANCE UR: ABNORMAL
BACTERIA #/AREA URNS HPF: ABNORMAL /HPF
BILIRUB UR QL STRIP: NEGATIVE
COLOR UR AUTO: YELLOW
GLUCOSE UR STRIP-MCNC: NEGATIVE MG/DL
HGB UR QL STRIP: NEGATIVE
KETONES UR STRIP-MCNC: NEGATIVE MG/DL
LEUKOCYTE ESTERASE UR QL STRIP: NEGATIVE
NITRATE UR QL: NEGATIVE
PH UR STRIP: 9 PH (ref 5–7)
RBC #/AREA URNS AUTO: 2 /HPF (ref 0–2)
SOURCE: ABNORMAL
SP GR UR STRIP: 1.01 (ref 1–1.03)
UROBILINOGEN UR STRIP-MCNC: 0 MG/DL (ref 0–2)
WBC #/AREA URNS AUTO: 34 /HPF (ref 0–5)
WBC CLUMPS #/AREA URNS HPF: PRESENT /HPF
YEAST #/AREA URNS HPF: ABNORMAL /HPF

## 2021-04-09 RX ORDER — TRAMADOL HYDROCHLORIDE 50 MG/1
50 TABLET ORAL EVERY 6 HOURS PRN
Qty: 30 TABLET | Refills: 0 | Status: SHIPPED | OUTPATIENT
Start: 2021-04-09 | End: 2021-10-08

## 2021-04-09 NOTE — TELEPHONE ENCOUNTER
Tramadol      Last Written Prescription Date:  03/15/2021  Last Fill Quantity: 30,   # refills: 0  Last Office Visit: 09/02/2020  Future Office visit:   None  Routing refill request to provider for review/approval because:  Drug not on the [facility], [facility] or [facility] refill protocol or controlled substance    Lizett Bland Rn

## 2021-04-10 LAB
BACTERIA SPEC CULT: ABNORMAL
BACTERIA SPEC CULT: ABNORMAL
Lab: ABNORMAL
SPECIMEN SOURCE: ABNORMAL

## 2021-04-16 DIAGNOSIS — F51.01 PRIMARY INSOMNIA: ICD-10-CM

## 2021-04-16 RX ORDER — ZOLPIDEM TARTRATE 10 MG/1
TABLET ORAL
Qty: 30 TABLET | Refills: 0 | Status: SHIPPED | OUTPATIENT
Start: 2021-04-16 | End: 2021-06-24

## 2021-04-16 NOTE — TELEPHONE ENCOUNTER
Pending Prescriptions:                       Disp   Refills    zolpidem (AMBIEN) 10 MG tablet [Pharmacy M*30 tab*         Sig: TAKE 1 TABLET BY MOUTH DAILY AT BEDTIME AS NEEDED    Routing refill request to provider for review/approval because:  Drug not on the [facility] refill protocol

## 2021-04-21 ENCOUNTER — TELEPHONE (OUTPATIENT)
Dept: FAMILY MEDICINE | Facility: CLINIC | Age: 56
End: 2021-04-21

## 2021-04-21 NOTE — TELEPHONE ENCOUNTER
Yandy calling on status of forms faxed to Dr. Baeza in regards to catheter supplies for this patient. Informed of no noted documentation of forms. She is re-faxing form and informed we see that a covering provider addresses in the absence of Dr. Baeza. Celina Guy LPN

## 2021-05-05 DIAGNOSIS — G43.709 CHRONIC MIGRAINE WITHOUT AURA WITHOUT STATUS MIGRAINOSUS, NOT INTRACTABLE: ICD-10-CM

## 2021-05-05 RX ORDER — SUMATRIPTAN 50 MG/1
TABLET, FILM COATED ORAL
Qty: 9 TABLET | Refills: 1 | Status: ON HOLD | OUTPATIENT
Start: 2021-05-05 | End: 2022-04-24

## 2021-05-05 NOTE — TELEPHONE ENCOUNTER
Prescription approved per [facility] Refill Protocol.    CHRISTINA ObandoN, RN  [facility]

## 2021-05-07 ENCOUNTER — HOSPITAL LABORATORY (OUTPATIENT)
Facility: OTHER | Age: 56
End: 2021-05-07

## 2021-05-13 RX ORDER — LEVETIRACETAM 750 MG/1
TABLET ORAL
Qty: 190 TABLET | OUTPATIENT
Start: 2021-05-13

## 2021-05-17 DIAGNOSIS — G43.709 CHRONIC MIGRAINE WITHOUT AURA WITHOUT STATUS MIGRAINOSUS, NOT INTRACTABLE: ICD-10-CM

## 2021-05-17 RX ORDER — PROPRANOLOL HYDROCHLORIDE 40 MG/1
40 TABLET ORAL 2 TIMES DAILY
Qty: 180 TABLET | Refills: 0 | Status: SHIPPED | OUTPATIENT
Start: 2021-05-17 | End: 2021-07-21

## 2021-05-17 NOTE — TELEPHONE ENCOUNTER
Prescription approved per [facility] Refill Protocol.    CHRISTINA ObandoN, RN  [facility]

## 2021-05-25 LAB
ALBUMIN UR-MCNC: NEGATIVE MG/DL
AMORPH CRY #/AREA URNS HPF: ABNORMAL /HPF
APPEARANCE UR: ABNORMAL
BACTERIA #/AREA URNS HPF: ABNORMAL /HPF
BACTERIA SPEC CULT: NORMAL
BILIRUB UR QL STRIP: NEGATIVE
COLOR UR AUTO: YELLOW
GLUCOSE UR STRIP-MCNC: NEGATIVE MG/DL
HGB UR QL STRIP: NEGATIVE
HYALINE CASTS #/AREA URNS LPF: 5 /LPF (ref 0–2)
KETONES UR STRIP-MCNC: NEGATIVE MG/DL
LEUKOCYTE ESTERASE UR QL STRIP: NEGATIVE
Lab: NORMAL
MUCOUS THREADS #/AREA URNS LPF: PRESENT /LPF
NITRATE UR QL: NEGATIVE
PH UR STRIP: 8 PH (ref 5–7)
RBC #/AREA URNS AUTO: 0 /HPF (ref 0–2)
SOURCE: ABNORMAL
SP GR UR STRIP: 1.01 (ref 1–1.03)
SPECIMEN SOURCE: NORMAL
UROBILINOGEN UR STRIP-MCNC: 0 MG/DL (ref 0–2)
WBC #/AREA URNS AUTO: 5 /HPF (ref 0–5)

## 2021-05-26 ENCOUNTER — HOSPITAL LABORATORY (OUTPATIENT)
Facility: OTHER | Age: 56
End: 2021-05-26

## 2021-05-28 LAB
ALBUMIN SERPL-MCNC: 2.7 G/DL (ref 3.4–5)
ALP SERPL-CCNC: 158 U/L (ref 40–150)
ALT SERPL W P-5'-P-CCNC: 8 U/L (ref 0–50)
ANION GAP SERPL CALCULATED.3IONS-SCNC: 6 MMOL/L (ref 3–14)
AST SERPL W P-5'-P-CCNC: 8 U/L (ref 0–45)
BILIRUB SERPL-MCNC: 0.2 MG/DL (ref 0.2–1.3)
BUN SERPL-MCNC: 19 MG/DL (ref 7–30)
CALCIUM SERPL-MCNC: 8.3 MG/DL (ref 8.5–10.1)
CHLORIDE SERPL-SCNC: 112 MMOL/L (ref 94–109)
CHOLEST SERPL-MCNC: 135 MG/DL
CO2 SERPL-SCNC: 24 MMOL/L (ref 20–32)
CREAT SERPL-MCNC: 0.47 MG/DL (ref 0.52–1.04)
DEPRECATED CALCIDIOL+CALCIFEROL SERPL-MC: 19 UG/L (ref 20–75)
ERYTHROCYTE [DISTWIDTH] IN BLOOD BY AUTOMATED COUNT: 15.2 % (ref 10–15)
GFR SERPL CREATININE-BSD FRML MDRD: >90 ML/MIN/{1.73_M2}
GLUCOSE SERPL-MCNC: 71 MG/DL (ref 70–99)
HBA1C MFR BLD: 5.1 % (ref 0–5.6)
HCT VFR BLD AUTO: 33.1 % (ref 35–47)
HDLC SERPL-MCNC: 33 MG/DL
HGB BLD-MCNC: 10.2 G/DL (ref 11.7–15.7)
LDLC SERPL CALC-MCNC: 82 MG/DL
MCH RBC QN AUTO: 25.6 PG (ref 26.5–33)
MCHC RBC AUTO-ENTMCNC: 30.8 G/DL (ref 31.5–36.5)
MCV RBC AUTO: 83 FL (ref 78–100)
NONHDLC SERPL-MCNC: 102 MG/DL
PLATELET # BLD AUTO: 318 10E9/L (ref 150–450)
POTASSIUM SERPL-SCNC: 4.1 MMOL/L (ref 3.4–5.3)
PROT SERPL-MCNC: 7.5 G/DL (ref 6.8–8.8)
RBC # BLD AUTO: 3.98 10E12/L (ref 3.8–5.2)
SODIUM SERPL-SCNC: 142 MMOL/L (ref 133–144)
TRIGL SERPL-MCNC: 99 MG/DL
TSH SERPL DL<=0.005 MIU/L-ACNC: 1.74 MU/L (ref 0.4–4)
WBC # BLD AUTO: 5.5 10E9/L (ref 4–11)

## 2021-06-14 DIAGNOSIS — F51.01 PRIMARY INSOMNIA: ICD-10-CM

## 2021-06-17 RX ORDER — TRAZODONE HYDROCHLORIDE 50 MG/1
TABLET, FILM COATED ORAL
Qty: 180 TABLET | Refills: 0 | Status: SHIPPED | OUTPATIENT
Start: 2021-06-17 | End: 2021-09-15

## 2021-06-17 NOTE — TELEPHONE ENCOUNTER
"Prescription approved per RN refill protocol.    trazodone  Last Written Prescription Date:  3/22/2021  Last Fill Quantity: 180,  # refills: 0   Last office visit: 9/2/2020 with prescribing provider:  austen     Requested Prescriptions   Pending Prescriptions Disp Refills     traZODone (DESYREL) 50 MG tablet [Pharmacy Med Name: TRAZODONE 50MG TABLET] 180 tablet 0     Sig: TAKE 2 TABLETS (100MG) BY MOUTH DAILY AT BEDTIME       Serotonin Modulators Passed - 6/14/2021  3:53 PM        Passed - Recent (12 mo) or future (30 days) visit within the authorizing provider's specialty     Patient has had an office visit with the authorizing provider or a provider within the authorizing providers department within the previous 12 mos or has a future within next 30 days. See \"Patient Info\" tab in inbasket, or \"Choose Columns\" in Meds & Orders section of the refill encounter.              Passed - Medication is active on med list        Passed - Patient is age 18 or older        Passed - No active pregnancy on record        Passed - No positive pregnancy test in past 12 months           Penny Jimenez RN on 6/17/2021 at 9:29 AM    "

## 2021-06-24 DIAGNOSIS — F51.01 PRIMARY INSOMNIA: ICD-10-CM

## 2021-06-24 RX ORDER — ZOLPIDEM TARTRATE 5 MG/1
5 TABLET ORAL
Qty: 30 TABLET | Refills: 0 | Status: SHIPPED | OUTPATIENT
Start: 2021-06-24 | End: 2021-10-12

## 2021-06-24 NOTE — TELEPHONE ENCOUNTER
Zolpidem        Last Written Prescription Date:  4/16/2021  Last Fill Quantity: 30,   # refills: 0  Last Office Visit: 9/2/2020  Future Office visit:       Routing refill request to provider for review/approval because:  Drug not on the [facility], P or [facility] refill protocol or controlled substance

## 2021-07-02 NOTE — PROVIDER NOTIFICATION
MD Notification    Notified Person: MD    Notified Person Name: Juanjo    Notification Date/Time: 1/5/18 0923    Notification Interaction: web-paged    Purpose of Notification: pt states shes feeling anxious, is there a low dose Ativan shes able to have? Thanks    Orders Received:    Comments: MD ordered PRN ativan. Didn't give d/t sudden change in respiratory status       no

## 2021-07-19 DIAGNOSIS — G43.709 CHRONIC MIGRAINE WITHOUT AURA WITHOUT STATUS MIGRAINOSUS, NOT INTRACTABLE: ICD-10-CM

## 2021-07-19 DIAGNOSIS — R56.9 SEIZURES (H): ICD-10-CM

## 2021-07-20 RX ORDER — LEVETIRACETAM 750 MG/1
1500 TABLET ORAL 2 TIMES DAILY
Qty: 120 TABLET | Refills: 3 | Status: SHIPPED | OUTPATIENT
Start: 2021-07-20 | End: 2021-10-19

## 2021-07-20 NOTE — TELEPHONE ENCOUNTER
Requested Prescriptions   Pending Prescriptions Disp Refills     levETIRAcetam (KEPPRA) 750 MG tablet [Pharmacy Med Name: LEVETIRACETAM 750MG TABLET] 120 tablet 3     Sig: TAKE 2 TABLETS (1,500 MG) BY MOUTH 2 TIMES DAILY        Last Written Prescription Date:  03/22/2021  Last Fill Quantity: 120,   # refills: 3  Last Office Visit: 09/02/2020  Future Office visit:    Next 5 appointments (look out 90 days)    Aug 05, 2021  3:40 PM  Office Visit with Ivan Baeza MD  [facility] ([facility] ) [address] [place] [address] [id number] [phone]           Routing refill request to provider for review/approval because:  Drug not on the [facility], [facility] or [facility] refill protocol or controlled substance

## 2021-07-21 RX ORDER — PROPRANOLOL HYDROCHLORIDE 40 MG/1
40 TABLET ORAL 2 TIMES DAILY
Qty: 60 TABLET | Refills: 0 | Status: SHIPPED | OUTPATIENT
Start: 2021-07-21 | End: 2021-09-08

## 2021-07-21 NOTE — TELEPHONE ENCOUNTER
"[place] refill given per RN protocol.   Pharmacy note to inform pt of office visit needed for continued medication use.     Last office visit: 9/2/2020 with prescribing provider:  Felisha     Requested Prescriptions   Pending Prescriptions Disp Refills     propranolol (INDERAL) 40 MG tablet [Pharmacy Med Name: PROPRANOLOL 40MG TABLET] 180 tablet 0     Sig: TAKE 1 TABLET (40 MG) BY MOUTH 2 TIMES DAILY       Beta-Blockers Protocol Passed - 7/19/2021 10:46 AM        Passed - Blood pressure under 140/90 in past 12 months     BP Readings from Last 3 Encounters:   01/06/21 128/77   10/19/20 104/60   09/02/20 102/70                 Passed - Patient is age 6 or older        Passed - Recent (12 mo) or future (30 days) visit within the authorizing provider's specialty     Patient has had an office visit with the authorizing provider or a provider within the authorizing providers department within the previous 12 mos or has a future within next 30 days. See \"Patient Info\" tab in inbasket, or \"Choose Columns\" in Meds & Orders section of the refill encounter.              Passed - Medication is active on med list           Penny Jimenez RN on 7/21/2021 at 2:01 PM    "

## 2021-08-09 DIAGNOSIS — F51.01 PRIMARY INSOMNIA: ICD-10-CM

## 2021-08-09 RX ORDER — ZOLPIDEM TARTRATE 5 MG/1
5 TABLET ORAL
Qty: 30 TABLET | OUTPATIENT
Start: 2021-08-09

## 2021-08-09 NOTE — TELEPHONE ENCOUNTER
Requested Prescriptions   Pending Prescriptions Disp Refills     zolpidem (AMBIEN) 5 MG tablet [Pharmacy Med Name: ZOLPIDEM 5MG TABLET] 30 tablet      Sig: TAKE 1 TABLET (5 MG) BY MOUTH NIGHTLY AS NEEDED FOR SLEEP     Last Written Prescription Date:  06/24/2021  Last Fill Quantity: 30,   # refills: 0  Last Office Visit: 09/02/2020  Future Office visit:       Routing refill request to provider for review/approval because:  Drug not on the [facility], P or [facility] refill protocol or controlled substance

## 2021-08-16 DIAGNOSIS — F51.01 PRIMARY INSOMNIA: ICD-10-CM

## 2021-08-18 RX ORDER — TRAZODONE HYDROCHLORIDE 50 MG/1
TABLET, FILM COATED ORAL
Qty: 180 TABLET | Refills: 0 | OUTPATIENT
Start: 2021-08-18

## 2021-08-18 NOTE — TELEPHONE ENCOUNTER
Has refills to 9/17/21.  Ask if she has taken more than prescribed.    Routing to team to set up F2F appointment for patient for yearly.

## 2021-08-20 NOTE — TELEPHONE ENCOUNTER
I have attempted to call the pt with the following message. I left a message for pt to call back. I will call back another time. Mabel Burgess CMA ([facility])

## 2021-09-02 ENCOUNTER — TELEPHONE (OUTPATIENT)
Dept: FAMILY MEDICINE | Facility: CLINIC | Age: 56
End: 2021-09-02

## 2021-09-02 NOTE — TELEPHONE ENCOUNTER
Reason for Call:  Other appointment    Detailed comments: Pt needs a hosp follow-up appt, was at [facility] on 8/25. She needs her urine recheck, had ecoli and infection in bladder was put on antibiotics. Also needs her bp checked.     Phone Number Patient can be reached at: Home number on file [phone] (home)    Best Time: anytime    Can we leave a detailed message on this number? YES    Call taken on 9/2/2021 at 11:21 AM by Leyla Hernández

## 2021-09-02 NOTE — TELEPHONE ENCOUNTER
Pt is scheduled to have a virtual noris with Dr. Baeza next Wednesday, 9/8/21, at 2pm. She is to have her questions and BP/P readings ready at times of call to report to the provider....................BOB Lopez

## 2021-09-08 ENCOUNTER — VIRTUAL VISIT (OUTPATIENT)
Dept: FAMILY MEDICINE | Facility: CLINIC | Age: 56
End: 2021-09-08
Payer: MEDICAID

## 2021-09-08 DIAGNOSIS — N39.0 CHRONIC UTI (URINARY TRACT INFECTION): ICD-10-CM

## 2021-09-08 DIAGNOSIS — G43.709 CHRONIC MIGRAINE WITHOUT AURA WITHOUT STATUS MIGRAINOSUS, NOT INTRACTABLE: ICD-10-CM

## 2021-09-08 DIAGNOSIS — I95.2 HYPOTENSION DUE TO DRUGS: Primary | ICD-10-CM

## 2021-09-08 PROCEDURE — 99441 PR PHYSICIAN TELEPHONE EVALUATION 5-10 MIN: CPT | Performed by: FAMILY MEDICINE

## 2021-09-08 RX ORDER — PROPRANOLOL HYDROCHLORIDE 40 MG/1
20 TABLET ORAL 2 TIMES DAILY
Qty: 60 TABLET | Refills: 0 | Status: SHIPPED | OUTPATIENT
Start: 2021-09-08 | End: 2021-10-20

## 2021-09-08 NOTE — PROGRESS NOTES
Felicia is a 55 year old who is being evaluated via a billable telephone visit.      What phone number would you like to be contacted at? [phone]  How would you like to obtain your AVS? MyChart    Assessment & Plan     Chronic migraine without aura without status migrainosus, not intractable  Chronic prophylaxis. She has not had recent migraine and is having chronic symptomatic low blood pressures.  We will decrease her propranolol prophylaxis from 40 mg twice daily to 20 mg twice daily.  She will continue to monitor her blood pressure daily through home care services.  - propranolol (INDERAL) 40 MG tablet; Take 0.5 tablets (20 mg) by mouth 2 times daily    Hypotension due to drugs  She is taking propranolol 40 mg twice daily for migraine prophylaxis.  This is causing symptomatic hypotension.  We will decrease the propranolol prophylaxis from 40 mg twice daily to 20 mg twice daily.  She will continue to monitor her blood pressures daily through home care services.    Chronic UTI (urinary tract infection)  Chronic UTI due to indwelling catheter.  She was treated through the emergency department initially with Keflex and now is completing amoxicillin.  She is currently asymptomatic relative to urinary tract infection.  She will complete the amoxicillin.  She is asked that we refill her catheter supplies.  This is refilled through home care company which we do not have the order for.  She will send that to us so that can be refilled for her.      Review of prior external note(s) from - CareEverywhere information from [facility] reviewed         SELF MONITORING:       - Please check blood pressure readings daily    No follow-ups on file.    Ivan Baeza MD  [facility]    Subjective   Felicia is a 55 year old who presents for the following health issues     HPI     BP is running low.    UTI sx from 8-25 is not better.   Andrés is a 55-year-old female history of traumatic paraplegia who I have seen  over the years.  She is not been seen in clinic for many many years.  She was recently seen through the [place] emergency department for concern about possible recurrent DVT in her left arm.  Fortunately evaluation did not show any DVT.  She was also noted to have urinary tract infection.  She has an indwelling catheter because of chronic bladder spasm.  Her urine is frequently contaminated.  She was felt to be minimally symptomatic at the time of presentation with some low blood pressure.  She was started initially on Keflex.  The culture grew out E. coli which was sensitive to the Keflex.  It also grew out Aerococcus sensitivities were not completed.  She was subsequently started amoxicillin to cover for Aerococcus.  Interestingly she has a penicillin allergy documented but has had no problems taking the amoxicillin.  Today she states her symptoms have improved she is not having any lower urinary tract symptoms.  She denies any fever or chills.  She states her blood pressure continues to run low in the 90s systolic.  She is currently taking propranolol 40 mg twice daily for migraine prophylaxis.  She denies any recent migraine headaches.    She also requests refill of her catheter supplies.  I do not have documentation of the supplies that she needs because that has been many years since they have been refilled here.  She will obtain those requests for refills through her home care agency and we will approve those when they arrive.    Chief Complaint:   Vascular Problems    History of Present Illness:  Felicia Ellison is a 55 y.o. female with a history of traumatic paraplegia and prior thromboembolic disease who presents to the emergency department for chills sensation and low blood pressure. The patient related the symptoms to similar sensation when she was diagnosed with a blood clot in her left upper extremity. She checked her blood pressure and found that it was lower on the left side. She denied any  associated fever. This morning she became increasingly concerned due to a low blood pressure of 70 systolic measured at home. This prompted her to call the ambulance for transfer to the ED. The patient does have an established medical directive that is for DNR/DNI and do not hospitalize. However, she is okay with blood draws for diagnostic reasons. She does not know of any recent Covid contacts. However, she was not vaccinated. She denied cough or shortness of breath. She also has not had any chest pain or abdominal discomfort.    Allergies:  Levofloxacin; Penicillin g benzathin,procain; and Tylenol-codeine solution    Medications:  acetaminophen (TYLENOL) 325 mg tablet  albuterol (PROVENTIL) 0.083 % neb solution  celecoxib (CELEBREX) 200 mg capsule  dextran 70-hypromellose (NATURAL BALANCE TEARS) 0.1-0.3 % ophthalmic solution  enoxaparin (LOVENOX) 60 mg/0.6 mL injection  folic acid 1 mg tablet  furosemide (LASIX) 20 mg tablet  levETIRAcetam (KEPPRA) 750 mg tablet  ondansetron (ZOFRAN ODT) 4 mg disintegrating tablet  oxybutynin XL (DITROPAN XL) 5 mg CR tablet  potassium chloride (K-JADA) 20 mEq packet  propranolol (INDERAL) 40 mg tablet  SUMAtriptan (IMITREX) 50 mg tablet  traZODone (DESYREL) 50 mg tablet  zolpidem (AMBIEN) 10 mg tablet    Past Medical History:  Past Medical History:   . Date     Decubitus ulcer of buttock, stage 4 (HC) 2012     Neurogenic bladder     Pancreatitis 03/2017     Paraplegia (HC) 1991     Past Surgical History:  Past Surgical History:   . Laterality Date     Appendectomy     Back surgery     Cholecystectomy     Esophagogastroduodenoscopy 7-     Muscle skin flap-buttock 2001     Family/Social History:  Family History   Problem Relation Age of Onset     Cancer Maternal Grandmother     Cancer Maternal Uncle     Hypertension Mother     Hypertension Father     Diabetes Father     REVIEW OF SYSTEMS:  Review of Systems   Constitutional: Positive for chills. Negative for fever.  "  Respiratory: Negative for cough and shortness of breath.   Cardiovascular: Negative for chest pain.   Neurological: Positive for tingling (upper extremities). Negative for sensory change and focal weakness.   All other systems reviewed and are negative.    Physical Examination:  BP 92/52 (Cuff Size: Adult Small)  Pulse 65  Temp 99.2  F (37.3  C)  Resp 16  Ht 1.753 m (5' 9\")  Wt 49.9 kg (110 lb)  LMP 11/18/2013  SpO2 100%  BMI 16.24 kg/m      Nursing note and vitals reviewed.  Constitutional: Patient is alert and interactive. Appears chronically ill, cachectic.  HENT: Mouth/Throat: Oropharynx is clear and moist.   Eyes: Conjunctivae and EOM are normal. Pupils are equal, round, and reactive to light. No scleral icterus.   Neck: Normal range of motion and full passive range of motion without pain. No rigidity.  Cardiovascular: Normal rate, regular rhythm, normal heart sounds and intact distal pulses. Exam reveals no gallop and no friction rub. No murmur heard.  Pulmonary/Chest: Effort normal and breath sounds normal. No accessory muscle usage. Not tachypneic. No respiratory distress. No wheezes, or rhonchi.   Musculoskeletal: Normal range of motion of the upper extremities. Exhibits no edema or swelling and no tenderness.   Neurological: Oriented to person, place, and time. Has normal sensation and normal strength of the upper extremities, paraplegic in the lower extremities.. No cranial nerve deficit. Exhibits normal muscle tone. Coordination normal.   Skin: Skin is warm and dry. No rash noted. Patient is not diaphoretic.     Labs/EKG:  Results for orders placed or performed during the hospital encounter of 08/25/21   LACTATE VENOUS   Result Value Ref Range Status   LACTATE,VENOUS 0.9 0.5 - 2.0 mmol/L Final   BASIC METABOLIC PANEL   Result Value Ref Range Status   SODIUM 138 135 - 145 mmol/L Final   POTASSIUM 3.8 3.5 - 5.0 mmol/L Final   CHLORIDE 112 (H) 98 - 110 mmol/L Final   CO2,TOTAL 19 (L) 21 - 31 " mmol/L Final   ANION GAP 7 5 - 18 Final   GLUCOSE 94 65 - 100 mg/dL Final   CALCIUM 8.8 8.5 - 10.5 mg/dL Final   BUN 15 8 - 25 mg/dL Final   CREATININE 0.53 (L) 0.57 - 1.11 mg/dL Final   BUN/CREAT RATIO 28 (H) 10 - 20 Final   GFR if African American >60 >60 ml/min/1.73m2 Final   GFR if not African American >60 >60 ml/min/1.73m2 Final   CBC WITH AUTO DIFFERENTIAL   Result Value Ref Range Status   WHITE BLOOD COUNT 6.4 4.5 - 11.0 thou/cu mm Final   RED BLOOD COUNT 3.83 (L) 4.00 - 5.20 mil/cu mm Final   HEMOGLOBIN 10.5 (L) 12.0 - 16.0 g/dL Final   HEMATOCRIT 33.2 33.0 - 51.0 % Final   MCV 87 80 - 100 fL Final   MCH 27.4 26.0 - 34.0 pg Final   MCHC 31.6 (L) 32.0 - 36.0 g/dL Final   RDW 16.0 (H) 11.5 - 15.5 % Final   PLATELET COUNT 143 140 - 440 thou/cu mm Final   MPV 9.8 6.5 - 11.0 fL Final   NEUTROPHILS 58.0 % Final   LYMPHOCYTES 30.7 % Final   MONOCYTES 9.4 % Final   EOSINOPHILS 1.4 % Final   BASOPHILS 0.3 % Final   IMMATURE GRANULOCYTES(METAS,MYELOS,PROS) 0.2 % Final   ABSOLUTE NEUTROPHILS 3.7 1.7 - 7.0 thou/cu mm Final   ABSOLUTE LYMPHOCYTES 2.0 0.9 - 2.9 thou/cu mm Final   ABSOLUTE MONOCYTES 0.6 <0.9 thou/cu mm Final   ABSOLUTE EOSINOPHILS 0.1 <0.5 thou/cu mm Final   ABSOLUTE BASOPHILS 0.0 <0.3 thou/cu mm Final   ABSOLUTE IMMATURE GRANULOCYTES(METAS,MYELOS,PROS) 0.0 <0.3 thou/cu mm Final   NUCLEATED RED BLOOD CELLS AS PERCENT OF BLOOD LEUKOCYTES   Result Value Ref Range Status   NRBC 0.0 % Final   ABS NRBC 0.0 thou /cu mm Final   URINALYSIS W REFLEX MICROSCOPIC IF POSITIVE   Result Value Ref Range Status   COLOR Yellow Yellow Color Final   CLARITY Cloudy (A) Clear Clarity Final   SPECIFIC GRAVITY,URINE 1.010 1.010, 1.015, 1.020, 1.025 Final   PH,URINE 7.5 6.0, 7.0, 8.0, 5.5, 6.5, 7.5, 8.5 Final   UROBILINOGEN,QUALITATIVE Normal Normal EU/dl Final   PROTEIN, URINE Negative Negative mg/dL Final   GLUCOSE, URINE Negative Negative mg/dL Final   KETONES,URINE Negative Negative mg/dL Final   BILIRUBIN,URINE Negative  Negative Final   OCCULT BLOOD,URINE Trace (A) Negative Final   NITRITE Positive (A) Negative Final   LEUKOCYTE ESTERASE Negative Negative Final   URINALYSIS MICROSCOPIC   Result Value Ref Range Status   RBC 3-5 (A) 0-2, None Seen /HPF Final   WBC 6-10 (A) 0-2, 3-5, None Seen /HPF Final   BACTERIA Many (A) None Seen, Rare, Few Bacteria/HPF Final   EPITHELIAL CELLS None Seen None Seen, Few Epi/HPF Final     Imaging:  US VENOUS UPPER EXTREMITY LEFT    Result Date: 8/25/2021  US VENOUS DOPPLER UPPER EXTREMITY LEFT VENOUS DOPPLER UPPER EXTREMITY ULTRASOUND 8/25/2021 Technologist: Joselyn Modi INDICATION: VASCULAR PROBLEMS FINDINGS: Sonographic imaging of the LEFT upper extremity were obtained using gray scale imaging with color Doppler and spectral analysis. Normal flow, compressability and augmentation/waveforms is demonstrated throughout. The cephalic vein confluence is also imaged and appears to be normal. Chronic appearing nonocclusive thrombus in the right IJV. The contralateral innominate and subclavian were also imaged and demonstrate normal waveforms. CONCLUSION: No evidence of deep venous thrombosis in the left upper extremity veins. Chronic appearing non occlusive thrombus right internal jugular vein.    Interventions:  Cephalexin 500 mg PO x 1    Impression and Plan:  This patient presents with a report of chills and low blood pressure reading at home. She denied any associated lightheadedness or shortness of breath. Her concerns actually related to the possibility of recurrent blood clots due to similar symptoms when she was diagnosed with upper extremity DVTs. She had normal vital signs in the ED except for some lower blood pressures. These were not associated with any new symptoms. Her examination was essentially normal. I originally focused on excluding life-threatening causes of low blood pressure. She tested negative for evidence of developing sepsis. There is no significant metabolic derangement. I  did rule out acute DVT of the upper extremity. When I was doing discharge planning the patient reported that her urine had been more cloudy and foul recently. We obtained a catheterized specimen from her urostomy site. This was quite cloudy. I felt that empiric treatment with antibiotics was appropriate treatment. I will culture the specimen for follow-up by her primary care. I asked that she arrange a recheck in a clinic setting within the next several days. I reminded her to return should she have any high fever, repeat vomiting episodes, difficulty breathing or new abdominal pain. She left with complete understanding and agreement with this plan and no other complaints.      Diagnosis:  ENCOUNTER DIAGNOSES   ICD-10-CM   1. Chills R68.83   2. Dark urine R82.998 cephalexin (KEFLEX) 500 mg capsule   3. Urinary tract infection without hematuria, site unspecified N39.0       Mitchell Denton MD  8/25/2021  [facility]      Patient Active Problem List   Diagnosis     Migraine headache     Urinary retention     GERD (gastroesophageal reflux disease)     Flaccid paraplegia (H)     Decubitus ulcer of buttock     Pressure ulcer of heel     Poor appetite     Nausea     Generalized weakness     Burn     Health Care Home     Paraplegia following spinal cord injury (H)     ACP (advance care planning)     Osteomyelitis of hip (right periacetabular infection with osteomyelitis)     Severe protein-calorie malnutrition (H)     Microcytic anemia     Neurogenic bladder     Anxiety     Insomnia     Chronic UTI (urinary tract infection)     Skin ulcer of buttock (bilateral ischial tuberosity ulcers)     CARDIOVASCULAR SCREENING; LDL GOAL LESS THAN 160     Open wound of foot except toes with complication     MRSA (methicillin resistant Staphylococcus aureus)     LLQ abdominal pain     Paralytic ileus (H)     Chest wall pain     Decubitus skin ulcer     S/P flap graft     Pancreatitis     Pericardial effusion      Hospice care patient     Seizures (H)     AMS (altered mental status)     Admission for hospice care     Odynophagia     Portal vein thrombosis     Current Outpatient Medications   Medication Sig Dispense Refill     enoxaparin ANTICOAGULANT (LOVENOX) 60 MG/0.6ML syringe Inject 0.5 mLs (50 mg) Subcutaneous daily 30 Syringe 1     furosemide (LASIX) 20 MG tablet TAKE 2 TABLETS (40MG) BY MOUTH EVERY  tablet 1     hydrOXYzine (ATARAX) 10 MG tablet Take 10 mg by mouth       levETIRAcetam (KEPPRA) 750 MG tablet TAKE 2 TABLETS (1,500 MG) BY MOUTH 2 TIMES DAILY 120 tablet 3     Lidocaine (LIDOCARE) 4 % Patch Place 1 patch onto the skin every 24 hours To prevent lidocaine toxicity, patient should be patch free for 12 hrs daily.       nystatin (MYCOSTATIN) 705835 UNIT/GM external powder Apply topically per wound care orders 60 g 3     oxybutynin (DITROPAN) 5 MG tablet TAKE 2 TABLETS (10MG) BY MOUTH EVERY MORNING 180 tablet 3     potassium chloride (KLOR-CON) 20 MEQ packet Take 20 mEq by mouth 3 times daily (with meals) With meals        propranolol (INDERAL) 40 MG tablet TAKE 1 TABLET (40 MG) BY MOUTH 2 TIMES DAILY 60 tablet 0     SUMAtriptan (IMITREX) 50 MG tablet TAKE 1 TABLET BY MOUTH AS SOON AS HEADACHE IS DETECTED;REPEAT IN 2 HOURS IF HEADACHE HAS NOT RESOLVED 9 tablet 1     traMADol (ULTRAM) 50 MG tablet TAKE 1 TABLET (50 MG) BY MOUTH EVERY 6 HOURS AS NEEDED FOR SEVERE PAIN 30 tablet 0     traZODone (DESYREL) 50 MG tablet TAKE 2 TABLETS (100MG) BY MOUTH DAILY AT BEDTIME 180 tablet 0     zolpidem (AMBIEN) 5 MG tablet Take 1 tablet (5 mg) by mouth nightly as needed for sleep 30 tablet 0         Review of Systems   Constitutional, HEENT, cardiovascular, pulmonary, gi and gu systems are negative, except as otherwise noted.      Objective           Vitals:  No vitals were obtained today due to virtual visit.    Physical Exam   healthy, alert and no distress  PSYCH: Alert and oriented times 3; coherent speech, normal    rate and volume, able to articulate logical thoughts, able   to abstract reason, no tangential thoughts, no hallucinations   or delusions  Her affect is normal  RESP: No cough, no audible wheezing, able to talk in full sentences  Remainder of exam unable to be completed due to telephone visits    Hospital Laboratory on 05/26/2021   Component Date Value Ref Range Status     Cholesterol 05/26/2021 135  <200 mg/dL Final     Triglycerides 05/26/2021 99  <150 mg/dL Final     HDL Cholesterol 05/26/2021 33* >49 mg/dL Final     LDL Cholesterol Calculated 05/26/2021 82  <100 mg/dL Final    Desirable:       <100 mg/dl     Non HDL Cholesterol 05/26/2021 102  <130 mg/dL Final     WBC 05/26/2021 5.5  4.0 - 11.0 10e9/L Final     RBC Count 05/26/2021 3.98  3.8 - 5.2 10e12/L Final     Hemoglobin 05/26/2021 10.2* 11.7 - 15.7 g/dL Final     Hematocrit 05/26/2021 33.1* 35.0 - 47.0 % Final     MCV 05/26/2021 83  78 - 100 fl Final     MCH 05/26/2021 25.6* 26.5 - 33.0 pg Final     MCHC 05/26/2021 30.8* 31.5 - 36.5 g/dL Final     RDW 05/26/2021 15.2* 10.0 - 15.0 % Final     Platelet Count 05/26/2021 318  150 - 450 10e9/L Final     Sodium 05/26/2021 142  133 - 144 mmol/L Final     Potassium 05/26/2021 4.1  3.4 - 5.3 mmol/L Final     Chloride 05/26/2021 112* 94 - 109 mmol/L Final     Carbon Dioxide 05/26/2021 24  20 - 32 mmol/L Final     Anion Gap 05/26/2021 6  3 - 14 mmol/L Final     Glucose 05/26/2021 71  70 - 99 mg/dL Final     Urea Nitrogen 05/26/2021 19  7 - 30 mg/dL Final     Creatinine 05/26/2021 0.47* 0.52 - 1.04 mg/dL Final     GFR Estimate 05/26/2021 >90  >60 mL/min/[1.73_m2] Final    Comment: Non  GFR Calc  Starting 12/18/2018, serum creatinine based estimated GFR (eGFR) will be   calculated using the Chronic Kidney Disease Epidemiology Collaboration   (CKD-EPI) equation.       GFR Estimate If Black 05/26/2021 >90  >60 mL/min/[1.73_m2] Final    Comment:  GFR Calc  Starting 12/18/2018, serum  creatinine based estimated GFR (eGFR) will be   calculated using the Chronic Kidney Disease Epidemiology Collaboration   (CKD-EPI) equation.       Calcium 05/26/2021 8.3* 8.5 - 10.1 mg/dL Final     Bilirubin Total 05/26/2021 0.2  0.2 - 1.3 mg/dL Final     Albumin 05/26/2021 2.7* 3.4 - 5.0 g/dL Final     Protein Total 05/26/2021 7.5  6.8 - 8.8 g/dL Final     Alkaline Phosphatase 05/26/2021 158* 40 - 150 U/L Final     ALT 05/26/2021 8  0 - 50 U/L Final     AST 05/26/2021 8  0 - 45 U/L Final     Hemoglobin A1C 05/26/2021 5.1  0 - 5.6 % Final    Comment: Normal <5.7% Prediabetes 5.7-6.4%  Diabetes 6.5% or higher - adopted from ADA   consensus guidelines.       TSH 05/26/2021 1.74  0.40 - 4.00 mU/L Final     Vitamin D Deficiency screening 05/26/2021 19* 20 - 75 ug/L Final    Comment: Season, race, dietary intake, and treatment affect the concentration of   25-hydroxy-Vitamin D. Values may decrease during winter months and increase   during summer months. Values 20-29 ug/L may indicate Vitamin D insufficiency   and values <20 ug/L may indicate Vitamin D deficiency.  Vitamin D determination is routinely performed by an immunoassay specific for   25 hydroxyvitamin D3.  If an individual is on vitamin D2 (ergocalciferol)   supplementation, please specify 25 OH vitamin D2 and D3 level determination by   LCMSMS test [id number].                   Phone call duration: 10 minutes

## 2021-09-11 ENCOUNTER — HEALTH MAINTENANCE LETTER (OUTPATIENT)
Age: 56
End: 2021-09-11

## 2021-09-15 ENCOUNTER — TELEPHONE (OUTPATIENT)
Dept: FAMILY MEDICINE | Facility: CLINIC | Age: 56
End: 2021-09-15

## 2021-09-15 DIAGNOSIS — F51.01 PRIMARY INSOMNIA: ICD-10-CM

## 2021-09-15 NOTE — TELEPHONE ENCOUNTER
Michaela from [facility] in [place] is calling and requesting self catheter and supplies for patient to be sent to the facility[facility], as soon as possible.    Will forward to provider requested, Dr. Felisha MD for review.      [facility] at [phone]      Priti Peralta RN

## 2021-09-16 RX ORDER — TRAZODONE HYDROCHLORIDE 50 MG/1
TABLET, FILM COATED ORAL
Qty: 180 TABLET | Refills: 0 | Status: SHIPPED | OUTPATIENT
Start: 2021-09-16 | End: 2021-10-12

## 2021-09-16 NOTE — TELEPHONE ENCOUNTER
Patient has been seen in the past 30 days, 9/8/21, Dr. Baeza  Routing to PCP to advise.....................BOB Lopez

## 2021-09-21 ENCOUNTER — TELEPHONE (OUTPATIENT)
Dept: FAMILY MEDICINE | Facility: CLINIC | Age: 56
End: 2021-09-21

## 2021-09-21 DIAGNOSIS — N31.9 NEUROGENIC BLADDER: Primary | ICD-10-CM

## 2021-09-21 DIAGNOSIS — K56.0 PARALYTIC ILEUS (H): ICD-10-CM

## 2021-09-21 NOTE — TELEPHONE ENCOUNTER
[facility] needs a written order for patient to have all ostomy supplies.    Patient has been out of supplies for 2 weeks.    Please fax to [facility]  Fax# [phone]    Ines Martin RN on 9/21/2021 at 9:57 AM

## 2021-09-21 NOTE — TELEPHONE ENCOUNTER
I don't believe that this is a patient for whom I care.      Javi    (doesn't proper English sound weird?)

## 2021-09-24 NOTE — TELEPHONE ENCOUNTER
This is a form that needs to be filled out and faxed.  This form was completed and faxed to [facility].

## 2021-09-30 ENCOUNTER — TELEPHONE (OUTPATIENT)
Dept: FAMILY MEDICINE | Facility: CLINIC | Age: 56
End: 2021-09-30

## 2021-09-30 DIAGNOSIS — R33.9 URINARY RETENTION: Primary | ICD-10-CM

## 2021-09-30 NOTE — TELEPHONE ENCOUNTER
Reason for Call:  Other Addendum to orders    Detailed comments: [facility] Catheter orders need to be addended with the following items, permanence usually R 33.9 retention needs to state for how long. Ex. Lifetime, a year, months, weeks... Medical necessity for use of catheter kits, ex. Multiple UTI's in 1 year period, exact frequency of catheter changes needs to say 5 times a day. It can not say a variation of time it must stated exactly 5 times a day. Then once those changes are added please have the MD sign and date it. Then fax back to [facility] at [phone]    Phone Number Patient can be reached at: Other phone number:  [phone]    Best Time: Any    Can we leave a detailed message on this number? YES    Call taken on 9/30/2021 at 2:38 PM by Amanda Taylor

## 2021-10-01 NOTE — TELEPHONE ENCOUNTER
Need to addendum note and to make sure that this is included in the office note.  Show lab work as well in the office note of how many she has had.  [facility] is aware you are out until then.

## 2021-10-02 PROCEDURE — G0179 MD RECERTIFICATION HHA PT: HCPCS | Performed by: SPECIALIST

## 2021-10-06 ENCOUNTER — TELEPHONE (OUTPATIENT)
Dept: FAMILY MEDICINE | Facility: CLINIC | Age: 56
End: 2021-10-06

## 2021-10-06 DIAGNOSIS — L89.309 PRESSURE INJURY OF SKIN OF BUTTOCK, UNSPECIFIED INJURY STAGE, UNSPECIFIED LATERALITY: ICD-10-CM

## 2021-10-06 DIAGNOSIS — G82.20 PARAPLEGIA FOLLOWING SPINAL CORD INJURY (H): ICD-10-CM

## 2021-10-06 NOTE — TELEPHONE ENCOUNTER
Michaela calling [facility] and stating patient needs an appointment to discuss medications and need for change in her pain medication. Patient presently takes Tramadol which is not helping her pain. Appointment has been scheduled with Dr. Caldwell for Tuesday, 10/12. Celina Guy LPN

## 2021-10-08 RX ORDER — TRAMADOL HYDROCHLORIDE 50 MG/1
50 TABLET ORAL EVERY 6 HOURS PRN
Qty: 30 TABLET | Refills: 0 | Status: SHIPPED | OUTPATIENT
Start: 2021-10-08 | End: 2021-12-01

## 2021-10-12 ENCOUNTER — OFFICE VISIT (OUTPATIENT)
Dept: INTERNAL MEDICINE | Facility: CLINIC | Age: 56
End: 2021-10-12
Payer: COMMERCIAL

## 2021-10-12 VITALS
RESPIRATION RATE: 18 BRPM | TEMPERATURE: 96.7 F | OXYGEN SATURATION: 99 % | DIASTOLIC BLOOD PRESSURE: 60 MMHG | HEART RATE: 70 BPM | SYSTOLIC BLOOD PRESSURE: 104 MMHG

## 2021-10-12 DIAGNOSIS — G82.20 PARAPLEGIA FOLLOWING SPINAL CORD INJURY (H): ICD-10-CM

## 2021-10-12 DIAGNOSIS — L89.309 PRESSURE INJURY OF SKIN OF BUTTOCK, UNSPECIFIED INJURY STAGE, UNSPECIFIED LATERALITY: ICD-10-CM

## 2021-10-12 DIAGNOSIS — F51.01 PRIMARY INSOMNIA: Primary | ICD-10-CM

## 2021-10-12 PROCEDURE — 99213 OFFICE O/P EST LOW 20 MIN: CPT | Performed by: INTERNAL MEDICINE

## 2021-10-12 RX ORDER — ZOLPIDEM TARTRATE 5 MG/1
5 TABLET ORAL
Qty: 30 TABLET | Refills: 0 | Status: SHIPPED | OUTPATIENT
Start: 2021-10-12 | End: 2022-01-11

## 2021-10-12 RX ORDER — TRAZODONE HYDROCHLORIDE 50 MG/1
TABLET, FILM COATED ORAL
Qty: 180 TABLET | Refills: 0 | Status: SHIPPED | OUTPATIENT
Start: 2021-10-12 | End: 2021-12-22

## 2021-10-12 RX ORDER — HYDROCODONE BITARTRATE AND ACETAMINOPHEN 5; 325 MG/1; MG/1
1 TABLET ORAL EVERY 6 HOURS PRN
Qty: 18 TABLET | Refills: 0 | Status: SHIPPED | OUTPATIENT
Start: 2021-10-12 | End: 2022-03-21

## 2021-10-12 ASSESSMENT — PAIN SCALES - GENERAL: PAINLEVEL: SEVERE PAIN (7)

## 2021-10-12 NOTE — PROGRESS NOTES
Itzel Duarte is a 56 year old who presents for the following health issues     HPI     Medication Followup of Tramadol    Taking Medication as prescribed: yes    Side Effects:  None    Medication Helping Symptoms:  NO-not helping pain at all       EMR reviewed including:             Complaint, History of Chief Complaint, Corresponding Review of Systems, and Complaint Specific Physical Examination.    #1   Persistent low back and decubitus pain.  Paraplegic for several years.  Wheelchair-bound.  Generally has wound care performed by other facility  Does not see primary provider very often.  Last seen about a year ago.  Notes that tramadol is not covering her pain.        Exam:   Constitutional: The patient appears to be in no acute distress. The patient appears to be adequately hydrated. No acute respiratory or hemodynamic distress is noted at this time.   PSYCH: The patient appears grossly appropriate. Maintains good eye contact, does not have any jittery or atypical motion. Displays appropriate affect.   LUNGS: clear bilaterally, airflow is brisk, no intercostal retraction or stridor is noted. No coughing is noted during visit.   HEART:  regular without rubs, clicks, gallops, or murmurs. PMI is nondisplaced. Upstrokes are brisk. S1,S2 are heard.          Vital Signs:   /60 (BP Location: Right arm, Patient Position: Sitting, Cuff Size: Adult Regular)   Pulse 70   Temp (!) 96.7  F (35.9  C) (Temporal)   Resp 18   LMP  (LMP Unknown)   SpO2 99%              Decision Making    Problem and Complexity     1. 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    2. Pressure injury of skin of buttock, unspecified injury stage, unspecified laterality  Patient working with wound clinic from other system  Patient having increased pain.   Will prescribe limited number of hydrocodone and give patient time to reestablish with her primary care provider.  If she chooses to continue with her out of  system provider, would recommend she contact them for further pain management.    3. Primary insomnia  Refill per request  - zolpidem (AMBIEN) 5 MG tablet; Take 1 tablet (5 mg) by mouth nightly as needed for sleep  Dispense: 30 tablet; Refill: 0  - traZODone (DESYREL) 50 MG tablet; TAKE 2 TABLETS (100MG) BY MOUTH DAILY AT BEDTIME  Dispense: 180 tablet; Refill: 0                                    FOLLOW UP   I have asked the patient to make an appointment for followup with me as needed.  She will follow-up with her primary care provider for her routine and ongoing medical care.  Have recommended that she sees her primary care provider more frequently if we are going to continue managing and maintaining her medications.          I have carefully explained the diagnosis and treatment options to the patient.  The patient has displayed an understanding of the above, and all subsequent questions were answered.      DO JUVENAL Geronimo    Portions of this note were produced using [employer]  Although every attempt at real-time proof reading has been made, occasional grammar/syntax errors may have been missed.

## 2021-10-16 DIAGNOSIS — N39.0 CHRONIC UTI (URINARY TRACT INFECTION): ICD-10-CM

## 2021-10-16 DIAGNOSIS — G43.709 CHRONIC MIGRAINE WITHOUT AURA WITHOUT STATUS MIGRAINOSUS, NOT INTRACTABLE: ICD-10-CM

## 2021-10-16 DIAGNOSIS — R56.9 SEIZURES (H): ICD-10-CM

## 2021-10-18 ENCOUNTER — TELEPHONE (OUTPATIENT)
Dept: FAMILY MEDICINE | Facility: CLINIC | Age: 56
End: 2021-10-18

## 2021-10-18 NOTE — TELEPHONE ENCOUNTER
Reason for Call:  Form, our goal is to have forms completed with 72 hours, however, some forms may require a visit or additional information.    Type of letter, form or note:  Home Health Certification    Who is the form from?: Home care    Where did the form come from: form was faxed in    What clinic location was the form placed at?: [facility]     Where the form was placed: Given to MA/BOB MOREIRA    What number is listed as a contact on the form?: [phone]       Additional comments:

## 2021-10-18 NOTE — TELEPHONE ENCOUNTER
Keppra      Last Written Prescription Date:  7/20/2021  Last Fill Quantity: 120,   # refills: 3  Last Office Visit: 10/12/2021  Future Office visit:       Routing refill request to provider for review/approval because:  Drug not on the [facility], P or [facility] refill protocol or controlled substance

## 2021-10-19 RX ORDER — LEVETIRACETAM 750 MG/1
1500 TABLET ORAL 2 TIMES DAILY
Qty: 120 TABLET | Refills: 2 | Status: SHIPPED | OUTPATIENT
Start: 2021-10-19 | End: 2021-12-22

## 2021-10-20 RX ORDER — PROPRANOLOL HYDROCHLORIDE 40 MG/1
TABLET ORAL
Qty: 60 TABLET | Refills: 2 | Status: SHIPPED | OUTPATIENT
Start: 2021-10-20 | End: 2022-03-11

## 2021-10-20 RX ORDER — PROPRANOLOL HYDROCHLORIDE 40 MG/1
40 TABLET ORAL 2 TIMES DAILY
COMMUNITY
End: 2022-02-07

## 2021-10-20 RX ORDER — LEVETIRACETAM 750 MG/1
1500 TABLET ORAL 2 TIMES DAILY
COMMUNITY
End: 2021-12-13

## 2021-10-20 RX ORDER — OXYBUTYNIN CHLORIDE 5 MG/1
10 TABLET ORAL EVERY MORNING
COMMUNITY
End: 2022-02-23

## 2021-10-20 RX ORDER — OXYBUTYNIN CHLORIDE 5 MG/1
TABLET ORAL
Qty: 180 TABLET | Refills: 1 | Status: SHIPPED | OUTPATIENT
Start: 2021-10-20 | End: 2022-03-11

## 2021-10-20 NOTE — TELEPHONE ENCOUNTER
Medication reconciliation completed by RN. Form and chart forwarded to PCP for signatures.  PCP:  MD Priti Lindquist RN

## 2021-10-22 DIAGNOSIS — Z53.9 DIAGNOSIS NOT YET DEFINED: Primary | ICD-10-CM

## 2021-11-11 DIAGNOSIS — R60.9 DEPENDENT EDEMA: ICD-10-CM

## 2021-11-12 RX ORDER — FUROSEMIDE 20 MG
TABLET ORAL
Qty: 180 TABLET | Refills: 0 | Status: SHIPPED | OUTPATIENT
Start: 2021-11-12 | End: 2022-01-12

## 2021-11-12 NOTE — TELEPHONE ENCOUNTER
Pending Prescriptions:                       Disp   Refills    furosemide (LASIX) 20 MG tablet [Pharmacy *180 ta*0        Sig: TAKE 2 TABLETS (40MG) BY MOUTH EVERY DAY      Routing refill request to provider for review/approval because:  Labs out of range:  creatinine    Ines Martin RN on 11/12/2021 at 1:08 PM

## 2021-11-17 ENCOUNTER — TELEPHONE (OUTPATIENT)
Dept: FAMILY MEDICINE | Facility: CLINIC | Age: 56
End: 2021-11-17
Payer: COMMERCIAL

## 2021-11-17 DIAGNOSIS — N39.0 URINARY TRACT INFECTION, SITE NOT SPECIFIED: Primary | ICD-10-CM

## 2021-11-17 DIAGNOSIS — N39.0 CHRONIC UTI (URINARY TRACT INFECTION): Primary | ICD-10-CM

## 2021-11-17 LAB
ALBUMIN UR-MCNC: 100 MG/DL
AMORPH CRY #/AREA URNS HPF: ABNORMAL /HPF
APPEARANCE UR: ABNORMAL
BACTERIA #/AREA URNS HPF: ABNORMAL /HPF
BILIRUB UR QL STRIP: NEGATIVE
COLOR UR AUTO: YELLOW
GLUCOSE UR STRIP-MCNC: NEGATIVE MG/DL
HGB UR QL STRIP: ABNORMAL
KETONES UR STRIP-MCNC: NEGATIVE MG/DL
LEUKOCYTE ESTERASE UR QL STRIP: ABNORMAL
MUCOUS THREADS #/AREA URNS LPF: PRESENT /LPF
NITRATE UR QL: NEGATIVE
PH UR STRIP: 8 [PH] (ref 5–7)
RBC URINE: >182 /HPF
SP GR UR STRIP: 1.01 (ref 1–1.03)
UROBILINOGEN UR STRIP-MCNC: NORMAL MG/DL
WBC URINE: >182 /HPF

## 2021-11-17 PROCEDURE — 81001 URINALYSIS AUTO W/SCOPE: CPT | Performed by: FAMILY MEDICINE

## 2021-11-17 PROCEDURE — 87086 URINE CULTURE/COLONY COUNT: CPT | Performed by: FAMILY MEDICINE

## 2021-11-17 NOTE — TELEPHONE ENCOUNTER
I called and left a message on the nurse voice mail at [facility] telling them the UA order has been placed so they can bring in a specimen.

## 2021-11-17 NOTE — TELEPHONE ENCOUNTER
Reason for Call:  Other call back    Detailed comments: [facility] [place] called stating they need an UA order for her because the patient is stating she thinks she has a bladder infection. You can call the order to [phone] or fax it to [phone]    Phone Number Patient can be reached at: Other phone number:  [phone]    Best Time: Any    Can we leave a detailed message on this number? YES    Call taken on 11/17/2021 at 2:26 PM by Amanda Taylor

## 2021-11-18 RX ORDER — NITROFURANTOIN 25; 75 MG/1; MG/1
100 CAPSULE ORAL 2 TIMES DAILY
Qty: 14 CAPSULE | Refills: 0 | Status: SHIPPED | OUTPATIENT
Start: 2021-11-18 | End: 2021-11-25

## 2021-11-19 DIAGNOSIS — L89.309 PRESSURE INJURY OF SKIN OF BUTTOCK, UNSPECIFIED INJURY STAGE, UNSPECIFIED LATERALITY: ICD-10-CM

## 2021-11-19 LAB — BACTERIA UR CULT: NORMAL

## 2021-11-19 NOTE — TELEPHONE ENCOUNTER
Routing refill request to provider for review/approval because:  Drug not on the [facility] refill protocol     norco  Last Written Prescription Date:  10/12/2021-10/15/2021  Last Fill Quantity: 18,  # refills: 0     Jessica Jimenez, RN, BSN

## 2021-11-22 RX ORDER — HYDROCODONE BITARTRATE AND ACETAMINOPHEN 5; 325 MG/1; MG/1
TABLET ORAL
Qty: 18 TABLET | Refills: 0 | OUTPATIENT
Start: 2021-11-22

## 2021-11-29 DIAGNOSIS — G82.20 PARAPLEGIA FOLLOWING SPINAL CORD INJURY (H): ICD-10-CM

## 2021-11-29 DIAGNOSIS — L89.309 PRESSURE INJURY OF SKIN OF BUTTOCK, UNSPECIFIED INJURY STAGE, UNSPECIFIED LATERALITY: ICD-10-CM

## 2021-11-30 NOTE — TELEPHONE ENCOUNTER
Tramadol      Last Written Prescription Date:  10/8/21  Last Fill Quantity: 30,   # refills: 0  Last Office Visit: 10/12/21  Future Office visit: None     Routing refill request to provider for review/approval because:  Drug not on the [facility], [facility] or [facility] refill protocol or controlled substance    Alia Hensley RN

## 2021-12-01 ENCOUNTER — TELEPHONE (OUTPATIENT)
Dept: FAMILY MEDICINE | Facility: CLINIC | Age: 56
End: 2021-12-01
Payer: COMMERCIAL

## 2021-12-01 DIAGNOSIS — L89.309 PRESSURE INJURY OF SKIN OF BUTTOCK, UNSPECIFIED INJURY STAGE, UNSPECIFIED LATERALITY: Primary | ICD-10-CM

## 2021-12-01 RX ORDER — TRAMADOL HYDROCHLORIDE 50 MG/1
50 TABLET ORAL EVERY 6 HOURS PRN
Qty: 30 TABLET | Refills: 0 | Status: SHIPPED | OUTPATIENT
Start: 2021-12-01 | End: 2021-12-27

## 2021-12-01 NOTE — TELEPHONE ENCOUNTER
Jessica calling from [facility] with update on patients wound care. The Nenaen jell that they use on her wound is on back order and they don't no when or even if it will become available. They are questioning new orders for wound care. Celina Guy LPN

## 2021-12-02 NOTE — TELEPHONE ENCOUNTER
Call placed to Jessica and informed of providers message below in regards to dressings and that they should contact [facility] for preferred dressings. Celina Guy LPN

## 2021-12-02 NOTE — TELEPHONE ENCOUNTER
As the patient's wound care has previously been directed by her providers at [facility].  I would recommend contacting them for their opinion on preferred dressings.      Javi

## 2021-12-07 NOTE — TELEPHONE ENCOUNTER
Jessica from [facility] called and stated the patient does not want to [facility] care with [facility] because it is to far to drive and so the patient is asking for a wound care referral for a wound care clinic in [place]. Jessica states the patient does need to be seen and followed for this wound and was hoping this referral could be done. If you have any questions you can call Jessica at [phone] and then ask for Jessica and they will transfer you to her number.

## 2021-12-08 ENCOUNTER — TELEPHONE (OUTPATIENT)
Dept: FAMILY MEDICINE | Facility: CLINIC | Age: 56
End: 2021-12-08
Payer: COMMERCIAL

## 2021-12-08 NOTE — TELEPHONE ENCOUNTER
Markel from [employer] is calling and requesting an update on the return signature for an order that was requested for a barrier film for patient's ostomy.  He stated this request was sent on 11/19/2021.  This RN did not see a faxed request from [facility] for this item.  Markel asked for the fax number here at the clinic for Dr. Felisha MD.  This RN gave Markel the number [phone].  Markel stated he will re-fax order request.    Priti Peralta RN

## 2021-12-09 ENCOUNTER — TELEPHONE (OUTPATIENT)
Dept: FAMILY MEDICINE | Facility: CLINIC | Age: 56
End: 2021-12-09
Payer: COMMERCIAL

## 2021-12-09 NOTE — TELEPHONE ENCOUNTER
Reason for Call:  Form, our goal is to have forms completed with 72 hours, however, some forms may require a visit or additional information.    Type of letter, form or note:  Home Health Certification    Who is the form from?: Home care    Where did the form come from: form was faxed in    What clinic location was the form placed at?: [facility]    Where the form was placed: Given to MA/BOB Adams    What number is listed as a contact on the form?: [phone]       Additional comments: [facility]    Call taken on 12/9/2021 at 1:51 PM by Shonna Aguirre

## 2021-12-13 DIAGNOSIS — Z53.9 DIAGNOSIS NOT YET DEFINED: Primary | ICD-10-CM

## 2021-12-13 PROCEDURE — G0179 MD RECERTIFICATION HHA PT: HCPCS | Performed by: INTERNAL MEDICINE

## 2021-12-13 NOTE — TELEPHONE ENCOUNTER
Medication reconciliation completed by RN. Form and chart forwarded to PCP for signatures.    Priti Peralta RN

## 2021-12-15 DIAGNOSIS — L89.309 PRESSURE INJURY OF SKIN OF BUTTOCK, UNSPECIFIED INJURY STAGE, UNSPECIFIED LATERALITY: ICD-10-CM

## 2021-12-17 RX ORDER — NYSTATIN 100000 [USP'U]/G
POWDER TOPICAL
Qty: 60 G | Refills: 2 | Status: ON HOLD | OUTPATIENT
Start: 2021-12-17 | End: 2022-04-24

## 2021-12-20 ENCOUNTER — TELEPHONE (OUTPATIENT)
Dept: FAMILY MEDICINE | Facility: CLINIC | Age: 56
End: 2021-12-20
Payer: COMMERCIAL

## 2021-12-20 DIAGNOSIS — L98.419 SKIN ULCER OF BUTTOCK, UNSPECIFIED ULCER STAGE (H): ICD-10-CM

## 2021-12-20 DIAGNOSIS — L89.309 PRESSURE INJURY OF SKIN OF BUTTOCK, UNSPECIFIED INJURY STAGE, UNSPECIFIED LATERALITY: Primary | ICD-10-CM

## 2021-12-20 DIAGNOSIS — N39.0 CHRONIC UTI (URINARY TRACT INFECTION): ICD-10-CM

## 2021-12-20 DIAGNOSIS — F51.01 PRIMARY INSOMNIA: ICD-10-CM

## 2021-12-20 DIAGNOSIS — L89.609 PRESSURE INJURY OF SKIN OF HEEL, UNSPECIFIED INJURY STAGE, UNSPECIFIED LATERALITY: ICD-10-CM

## 2021-12-20 DIAGNOSIS — R56.9 SEIZURES (H): ICD-10-CM

## 2021-12-20 DIAGNOSIS — N31.9 NEUROGENIC BLADDER: ICD-10-CM

## 2021-12-20 NOTE — TELEPHONE ENCOUNTER
Reason for Call:  Other call back    Detailed comments: Abby from [facility] called with an update. Her urine is nikki in color with a lot of sediment and is foul smelling. She also is having a lot of leaking and was urine soaked when the home care nurse arrived today. Also the patient is needing her wounds checked and was hoping there was a wound care close to [place] that she could go to. You can call Abby at [phone]    Phone Number Patient can be reached at: Other phone number:  [phone]    Best Time: Any    Can we leave a detailed message on this number? YES    Call taken on 12/20/2021 at 1:06 PM by Amanda Taylor

## 2021-12-21 NOTE — TELEPHONE ENCOUNTER
Please notify patient and Home Care order placed for UA to be collected by Home Care and dropped to [facility]. Also referral placed for Wound Care in [facility].   Ivan Baeza MD

## 2021-12-22 RX ORDER — LEVETIRACETAM 750 MG/1
1500 TABLET ORAL 2 TIMES DAILY
Qty: 120 TABLET | Refills: 1 | Status: SHIPPED | OUTPATIENT
Start: 2021-12-22 | End: 2022-02-15

## 2021-12-22 RX ORDER — TRAZODONE HYDROCHLORIDE 50 MG/1
TABLET, FILM COATED ORAL
Qty: 180 TABLET | Refills: 0 | Status: SHIPPED | OUTPATIENT
Start: 2021-12-22 | End: 2022-03-11

## 2021-12-22 NOTE — TELEPHONE ENCOUNTER
Keppra      Last Written Prescription Date:  10/19/21  Last Fill Quantity: 120,   # refills: 2  Last Office Visit: 10/12/21  Future Office visit:       Routing refill request to provider for review/approval because:  Drug not on the [facility], P or [facility] refill protocol or controlled substance    Trazodone  Routing refill request to provider for review/approval because:  Drug interaction warning      Alia HensleyRN

## 2021-12-27 DIAGNOSIS — L89.309 PRESSURE INJURY OF SKIN OF BUTTOCK, UNSPECIFIED INJURY STAGE, UNSPECIFIED LATERALITY: ICD-10-CM

## 2021-12-27 DIAGNOSIS — G82.20 PARAPLEGIA FOLLOWING SPINAL CORD INJURY (H): ICD-10-CM

## 2021-12-27 RX ORDER — TRAMADOL HYDROCHLORIDE 50 MG/1
50 TABLET ORAL EVERY 6 HOURS PRN
Qty: 30 TABLET | Refills: 0 | Status: SHIPPED | OUTPATIENT
Start: 2021-12-27 | End: 2022-01-13

## 2021-12-27 NOTE — TELEPHONE ENCOUNTER
Tramadol      Last Written Prescription Date:  12/1/2021  Last Fill Quantity: 30,   # refills: 0  Last Office Visit: 10/12/2021  Future Office visit:       Routing refill request to provider for review/approval because:  Drug not on the [facility], P or [facility] refill protocol or controlled substance

## 2022-01-07 DIAGNOSIS — F51.01 PRIMARY INSOMNIA: ICD-10-CM

## 2022-01-10 NOTE — TELEPHONE ENCOUNTER
Ambien      Last Written Prescription Date:  10/12/2021  Last Fill Quantity: 30,   # refills: 0  Last Office Visit: 10/12/2021  Future Office visit:       Routing refill request to provider for review/approval because:  Drug not on the [facility], P or [facility] refill protocol or controlled substance

## 2022-01-11 DIAGNOSIS — R60.9 DEPENDENT EDEMA: ICD-10-CM

## 2022-01-11 RX ORDER — ZOLPIDEM TARTRATE 5 MG/1
TABLET ORAL
Qty: 30 TABLET | Refills: 0 | Status: SHIPPED | OUTPATIENT
Start: 2022-01-11 | End: 2022-02-22

## 2022-01-12 DIAGNOSIS — G82.20 PARAPLEGIA FOLLOWING SPINAL CORD INJURY (H): ICD-10-CM

## 2022-01-12 DIAGNOSIS — L89.309 PRESSURE INJURY OF SKIN OF BUTTOCK, UNSPECIFIED INJURY STAGE, UNSPECIFIED LATERALITY: ICD-10-CM

## 2022-01-12 RX ORDER — FUROSEMIDE 20 MG
TABLET ORAL
Qty: 180 TABLET | Refills: 1 | Status: SHIPPED | OUTPATIENT
Start: 2022-01-12 | End: 2022-06-15

## 2022-01-12 NOTE — TELEPHONE ENCOUNTER
Tramadol  Routing refill request to provider for review/approval because:  Drug not on the [facility] refill protocol     Priti Peralta RN           Alert-The patient is alert, awake and responds to voice. The patient is oriented to time, place, and person. The triage nurse is able to obtain subjective information.

## 2022-01-12 NOTE — TELEPHONE ENCOUNTER
Lasix  Prescription approved per [facility] Refill Protocol.    Priti Peralta RN

## 2022-01-13 RX ORDER — TRAMADOL HYDROCHLORIDE 50 MG/1
50 TABLET ORAL EVERY 6 HOURS PRN
Qty: 30 TABLET | Refills: 0 | Status: SHIPPED | OUTPATIENT
Start: 2022-01-13 | End: 2022-02-22

## 2022-01-18 DIAGNOSIS — I82.5Y9 CHRONIC DEEP VEIN THROMBOSIS (DVT) OF PROXIMAL VEIN OF LOWER EXTREMITY, UNSPECIFIED LATERALITY (H): Primary | ICD-10-CM

## 2022-01-18 NOTE — TELEPHONE ENCOUNTER
Lovenox      Last Written Prescription Date:  1/6/2021  Last Fill Quantity: 30,   # refills: 1  Last Office Visit: 10/12/2021  Future Office visit:       Routing refill request to provider for review/approval because:  Drug not on the [facility], P or [facility] refill protocol or controlled substance

## 2022-01-21 ENCOUNTER — TELEPHONE (OUTPATIENT)
Dept: INTERNAL MEDICINE | Facility: CLINIC | Age: 57
End: 2022-01-21
Payer: COMMERCIAL

## 2022-01-21 NOTE — TELEPHONE ENCOUNTER
Prior Authorization Retail Medication Request    Medication/Dose: enoxaparin ANTICOAGULANT (LOVENOX) 60 MG/0.6ML syringe  ICD code (if different than what is on RX):  Chronic deep vein thrombosis (DVT) of proximal vein of lower extremity, unspecified laterality (H) [I82.5Y9]  Previously Tried and Failed:  na  Rationale:  na    Insurance Name:  MEDICA - MEDICA PRIME SOLUTION ([employer])  Insurance ID:  [id number]      Pharmacy Information (if different than what is on RX)  Name:  [facility]  Phone:  [phone]

## 2022-01-26 NOTE — TELEPHONE ENCOUNTER
Central Prior Authorization Team   Phone: [phone]      PA Initiation    Medication: enoxaparin ANTICOAGULANT (LOVENOX) 60 MG/0.6ML syringe  Insurance Company: [employer] - Phone [phone] Fax [phone]  Pharmacy Filling the Rx: [id number] - [place], [place] - [address] [place] [address]  Filling Pharmacy Phone: [phone]  Filling Pharmacy Fax:    Start Date: 1/26/2022    Started PA on CMM and a response of Drug is covered by current benefit plan. No further PA activity needed.  Called and spoke  with  at [employer], got transferred 5 different times (total of 45min) and no one was able to help, found a PA form online, filled out and manually faxed to [phone].

## 2022-01-27 NOTE — TELEPHONE ENCOUNTER
Prior Authorization Approval    Authorization Effective Date: 12/28/2021  Authorization Expiration Date: 1/27/2023  Medication: enoxaparin ANTICOAGULANT (LOVENOX) 60 MG/0.6ML syringe-APPROVED  Approved Dose/Quantity:   Reference #:     Insurance Company: [employer] - Phone [phone] Fax [phone]  Expected CoPay:       CoPay Card Available:      Foundation Assistance Needed:    Which Pharmacy is filling the prescription (Not needed for infusion/clinic administered): [id number] - [place], [place] - [address] [place] [address]  Pharmacy Notified: Yes  Patient Notified: No    Pharmacy will notify patient when medication is ready.

## 2022-02-04 ENCOUNTER — HOSPITAL ENCOUNTER (OUTPATIENT)
Dept: WOUND CARE | Facility: CLINIC | Age: 57
Discharge: HOME | End: 2022-02-04
Attending: FAMILY MEDICINE | Admitting: FAMILY MEDICINE
Payer: MEDICARE

## 2022-02-04 PROCEDURE — G0463 HOSPITAL OUTPT CLINIC VISIT: HCPCS

## 2022-02-04 NOTE — DISCHARGE INSTRUCTIONS
Today I have not seen your wounds in about a year and a half, but they area clean though large and difficult to dress.  Today I did the same cares you have been doing with a slightly different outer dressings.  Continue to fill the wounds with the Aquacel Ag dry.  Then for the right buttock/hip wounds cover them with one of the 3x3 inch Mepilex and one of the 4x4 inch Mepilex.  For the Sacral wound, I covered the wound with a 6x6 inch Mepilex dressing.  I used Medipore tape to boarder all the edges of the foam dressings.  Continue to change these three times weekly.    I will look over past products used and will come up with a new plan of care for when you come back to see me in clinic in two weeks on Friday February the 18 th at 3 pm.    Good luck and I will see you again in two weeks.    Presley Chester RN cwocn [phone]
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
## 2022-02-07 ENCOUNTER — TELEPHONE (OUTPATIENT)
Dept: FAMILY MEDICINE | Facility: CLINIC | Age: 57
End: 2022-02-07
Payer: COMMERCIAL

## 2022-02-07 DIAGNOSIS — I82.5Y9 CHRONIC DEEP VEIN THROMBOSIS (DVT) OF PROXIMAL VEIN OF LOWER EXTREMITY, UNSPECIFIED LATERALITY (H): ICD-10-CM

## 2022-02-07 NOTE — TELEPHONE ENCOUNTER
Reason for Call:  Form, our goal is to have forms completed with 72 hours, however, some forms may require a visit or additional information.    Type of letter, form or note:  Home Health Certification    Who is the form from?: Home care    Where did the form come from: form was faxed in    What clinic location was the form placed at?: [facility]     Where the form was placed: Given to MA/BOB lakes     What number is listed as a contact on the form?: [phone]       Additional comments:     Call taken on 2/7/2022 at 8:40 AM by Valeria Rutledge

## 2022-02-10 DIAGNOSIS — Z53.9 DIAGNOSIS NOT YET DEFINED: Primary | ICD-10-CM

## 2022-02-10 PROCEDURE — G0179 MD RECERTIFICATION HHA PT: HCPCS | Performed by: FAMILY MEDICINE

## 2022-02-11 DIAGNOSIS — R56.9 SEIZURES (H): ICD-10-CM

## 2022-02-11 DIAGNOSIS — F51.01 PRIMARY INSOMNIA: Primary | ICD-10-CM

## 2022-02-15 RX ORDER — LEVETIRACETAM 750 MG/1
1500 TABLET ORAL 2 TIMES DAILY
Qty: 120 TABLET | Refills: 1 | Status: SHIPPED | OUTPATIENT
Start: 2022-02-15 | End: 2022-04-13

## 2022-02-15 RX ORDER — MIRTAZAPINE 15 MG/1
TABLET, FILM COATED ORAL
Qty: 15 TABLET | Refills: 5 | OUTPATIENT
Start: 2022-02-15

## 2022-02-15 NOTE — TELEPHONE ENCOUNTER
I do not see where this medication has been previously ordered.    It is not in the med list.      It should not be taken concurrently with trazodone.    If patient wishes to make medication changes, she should set up an appointment  with the provider of her choice to discuss this.      As I had seen her once, I do not know if I am her provider or was simply covering for her provider.    Javi

## 2022-02-15 NOTE — TELEPHONE ENCOUNTER
Pending Prescriptions:                       Disp   Refills    levETIRAcetam (KEPPRA) 750 MG tablet [Phar*120 ta*1        Sig: TAKE 2 TABLETS (1,500 MG) BY MOUTH 2 TIMES DAILY    Routing refill request to provider for review/approval because:  Drug not on the [facility] refill protocol     levETIRAcetam (KEPPRA) 750 MG tablet 120 tablet 1 12/22/2021  No   Sig - Route: TAKE 2 TABLETS (1,500 MG) BY MOUTH 2 TIMES DAILY - Oral

## 2022-02-15 NOTE — TELEPHONE ENCOUNTER
Pending Prescriptions:                       Disp   Refills    mirtazapine (REMERON) 15 MG tablet [Pharma*15 tab*5        Sig: TAKE 1/2 TABLET BY MOUTH EVERY DAY      Routing refill request to provider for review/approval because:  Drug not active on patient's medication list    Priti Peralta RN

## 2022-02-18 ENCOUNTER — HOSPITAL ENCOUNTER (OUTPATIENT)
Dept: WOUND CARE | Facility: CLINIC | Age: 57
Discharge: HOME | End: 2022-02-18
Admitting: FAMILY MEDICINE
Payer: MEDICARE

## 2022-02-18 PROCEDURE — G0463 HOSPITAL OUTPT CLINIC VISIT: HCPCS

## 2022-02-18 NOTE — DISCHARGE INSTRUCTIONS
Today we will add lidocaine to your wound plan of care with the same plan with the use of the Aquacel Ag and outer foam and extra absorbent pads.    New plan of care is as follows.  1 Cleanse all the wound soap and water, saline or a no rinse wound cleanser and dry well.  2 Apply Lidocaine 2% gel to the wound bed, thin layer for pain control.  3 Fill the wound beds with Aquacel Ag.  4 Cover with a gentle foam adhesive foam dressing and extra tape and absorbent pads.  5 Change the dressing ongoing three times weekly.    I will see you again in two week on Friday March the 4th at 2 pm.  Any concerns or questions call our scheduling department at [phone].    Presley Chester RN cwocn

## 2022-02-21 ENCOUNTER — TELEPHONE (OUTPATIENT)
Dept: FAMILY MEDICINE | Facility: CLINIC | Age: 57
End: 2022-02-21
Payer: COMMERCIAL

## 2022-02-21 DIAGNOSIS — G82.20 PARAPLEGIA FOLLOWING SPINAL CORD INJURY (H): ICD-10-CM

## 2022-02-21 DIAGNOSIS — L89.309 PRESSURE INJURY OF SKIN OF BUTTOCK, UNSPECIFIED INJURY STAGE, UNSPECIFIED LATERALITY: ICD-10-CM

## 2022-02-21 DIAGNOSIS — F51.01 PRIMARY INSOMNIA: ICD-10-CM

## 2022-02-21 NOTE — TELEPHONE ENCOUNTER
Reason for Call:  Form, our goal is to have forms completed with 72 hours, however, some forms may require a visit or additional information.    Type of letter, form or note:  Home Health Certification    Who is the form from?: Home care    Where did the form come from: form was faxed in    What clinic location was the form placed at?: [facility]     Where the form was placed: Given to MA/RN    What number is listed as a contact on the form?: [phone]       Additional comments:     Call taken on 2/21/2022 at 8:35 AM by Valeria Rutledge
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
## 2022-02-21 NOTE — TELEPHONE ENCOUNTER
Patient returned call and informed of providers message below. Patient stating she is unaware of this medication and states she does not recall taking this. We are questioning if pharmacy had this on a auto refill? Celina Guy LPN

## 2022-02-22 RX ORDER — ZOLPIDEM TARTRATE 5 MG/1
TABLET ORAL
Qty: 30 TABLET | Refills: 0 | Status: SHIPPED | OUTPATIENT
Start: 2022-02-22 | End: 2022-03-22

## 2022-02-22 RX ORDER — TRAMADOL HYDROCHLORIDE 50 MG/1
50 TABLET ORAL EVERY 6 HOURS PRN
Qty: 30 TABLET | Refills: 0 | Status: SHIPPED | OUTPATIENT
Start: 2022-02-22 | End: 2022-03-07

## 2022-02-22 NOTE — TELEPHONE ENCOUNTER
Ambien      Last Written Prescription Date:  1/11/2022  Last Fill Quantity: 30,   # refills: 0  Last Office Visit: 10/12/2021  Future Office visit:    Next 5 appointments (look out 90 days)      Mar 04, 2022  2:00 PM  Return Visit with PH WOUND EXAM ROOM  [facility] ) [address] [place] [id number]  [phone]             Routing refill request to provider for review/approval because:  Drug not on the [facility], [facility] or [facility] refill protocol or controlled substance    Tramadol      Last Written Prescription Date:  1/13/2022  Last Fill Quantity: 30,   # refills: 0  Last Office Visit: 10/12/2021  Future Office visit:    Next 5 appointments (look out 90 days)      Mar 04, 2022  2:00 PM  Return Visit with PH WOUND EXAM ROOM  [facility] [address] [place] [id number]  [phone]             Routing refill request to provider for review/approval because:  Drug not on the [facility], [facility] or [facility] refill protocol or controlled substance

## 2022-02-26 ENCOUNTER — HEALTH MAINTENANCE LETTER (OUTPATIENT)
Age: 57
End: 2022-02-26

## 2022-02-28 DIAGNOSIS — Z53.9 DIAGNOSIS NOT YET DEFINED: Primary | ICD-10-CM

## 2022-02-28 PROCEDURE — G0179 MD RECERTIFICATION HHA PT: HCPCS | Performed by: FAMILY MEDICINE

## 2022-03-04 DIAGNOSIS — G82.20 PARAPLEGIA FOLLOWING SPINAL CORD INJURY (H): ICD-10-CM

## 2022-03-04 DIAGNOSIS — L89.309 PRESSURE INJURY OF SKIN OF BUTTOCK, UNSPECIFIED INJURY STAGE, UNSPECIFIED LATERALITY: ICD-10-CM

## 2022-03-04 NOTE — TELEPHONE ENCOUNTER
Pending Prescriptions:                       Disp   Refills    traMADol (ULTRAM) 50 MG tablet [Pharmacy M*30 tab*0        Sig: TAKE 1 TABLET (50 MG) BY MOUTH EVERY 6 HOURS AS           NEEDED FOR SEVERE PAIN    Routing refill request to provider for review/approval because:  Drug not on the [facility] refill protocol     Priti Peralta RN

## 2022-03-07 RX ORDER — TRAMADOL HYDROCHLORIDE 50 MG/1
50 TABLET ORAL EVERY 6 HOURS PRN
Qty: 30 TABLET | Refills: 0 | Status: SHIPPED | OUTPATIENT
Start: 2022-03-07 | End: 2022-04-21

## 2022-03-10 DIAGNOSIS — F51.01 PRIMARY INSOMNIA: ICD-10-CM

## 2022-03-10 DIAGNOSIS — G43.709 CHRONIC MIGRAINE WITHOUT AURA WITHOUT STATUS MIGRAINOSUS, NOT INTRACTABLE: ICD-10-CM

## 2022-03-10 DIAGNOSIS — N39.0 CHRONIC UTI (URINARY TRACT INFECTION): ICD-10-CM

## 2022-03-11 RX ORDER — OXYBUTYNIN CHLORIDE 5 MG/1
TABLET ORAL
Qty: 180 TABLET | Refills: 2 | Status: ON HOLD | OUTPATIENT
Start: 2022-03-11 | End: 2022-09-28

## 2022-03-11 RX ORDER — PROPRANOLOL HYDROCHLORIDE 40 MG/1
TABLET ORAL
Qty: 60 TABLET | Refills: 3 | Status: ON HOLD | OUTPATIENT
Start: 2022-03-11 | End: 2022-04-24

## 2022-03-11 RX ORDER — TRAZODONE HYDROCHLORIDE 50 MG/1
TABLET, FILM COATED ORAL
Qty: 180 TABLET | Refills: 0 | Status: SHIPPED | OUTPATIENT
Start: 2022-03-11 | End: 2022-05-13

## 2022-03-11 NOTE — TELEPHONE ENCOUNTER
Pending Prescriptions:                       Disp   Refills    traZODone (DESYREL) 50 MG tablet [Pharmacy*180 ta*0        Sig: TAKE 2 TABLETS (100MG) BY MOUTH DAILY AT BEDTIME      Routing refill request to provider for review/approval because:  Drug interaction warning    Tracee Steel RN

## 2022-03-11 NOTE — TELEPHONE ENCOUNTER
Inderal  Ditropan  Prescriptionsapproved per [facility] Refill Protocol.    Priti Peralta RN

## 2022-03-14 ENCOUNTER — TELEPHONE (OUTPATIENT)
Dept: FAMILY MEDICINE | Facility: CLINIC | Age: 57
End: 2022-03-14
Payer: COMMERCIAL

## 2022-03-14 DIAGNOSIS — L89.309 PRESSURE INJURY OF SKIN OF BUTTOCK, UNSPECIFIED INJURY STAGE, UNSPECIFIED LATERALITY: Primary | ICD-10-CM

## 2022-03-14 NOTE — TELEPHONE ENCOUNTER
Verbal orders needed for Homecare:    Skilled Nursing:    New pressure ulcer on right heal, need wound care orders.  Would like orders to use'silver antimicrobial wound dressing with silicone adhesive border opti foam gentle AG.  Orders for zinc oxide barrier cream for macerated skin, will need Rx sent to pharmacy ([facility] [place])  Wounds on sacrum, large amount of drainage, do you have suggestion for what to use to soak up the drainage? Usually soaks through clothing.    Joselyn RN - [facility]  Phone: [phone] , leave message and RN will call back for orders.

## 2022-03-15 RX ORDER — ZINC OXIDE
OINTMENT (GRAM) TOPICAL PRN
Qty: 113 G | Refills: 11 | Status: ON HOLD | OUTPATIENT
Start: 2022-03-15 | End: 2022-09-28

## 2022-03-15 NOTE — TELEPHONE ENCOUNTER
Phoned BOB Alves, from [facility] back with following verbal approval:   Ivan Baeza MD  Dyad 1 Triage 13 hours ago (6:23 PM)     RC    OK for verbal orders per Home Care request.   Ivan Baeza MD    Message text      Joselyn is requesting a zinc oxide barrier cream to be sent to [facility] in [place].  With options for this request, will leave for provider to order.    Joselyn is also asking if there are any suggestions for what to use to soak up the drainage.    Will forward for provider review.    Priti Peralta RN

## 2022-03-15 NOTE — TELEPHONE ENCOUNTER
A prescription for Desitin has been sent to the pharmacy.    Not being familiar with the patient or the extent of drainage, I would recommend gauze pads.    Javi

## 2022-03-18 DIAGNOSIS — L89.309 PRESSURE INJURY OF SKIN OF BUTTOCK, UNSPECIFIED INJURY STAGE, UNSPECIFIED LATERALITY: ICD-10-CM

## 2022-03-18 DIAGNOSIS — G43.709 CHRONIC MIGRAINE WITHOUT AURA WITHOUT STATUS MIGRAINOSUS, NOT INTRACTABLE: Primary | ICD-10-CM

## 2022-03-18 RX ORDER — RIZATRIPTAN BENZOATE 10 MG/1
TABLET ORAL
COMMUNITY
Start: 2021-12-01 | End: 2022-03-18

## 2022-03-18 NOTE — TELEPHONE ENCOUNTER
Norco      Last Written Prescription Date:  10/12/2021  Last Fill Quantity: 18,   # refills: 0  Last Office Visit: 10/12/2021  Future Office visit:    Next 5 appointments (look out 90 days)    Mar 23, 2022 10:00 AM  Return Visit with  WOUND EXAM ROOM  [facility] [place] ([facility] ) [address] [place] [phone]  [phone]           Routing refill request to provider for review/approval because:  Drug not on the [facility], [facility] or [facility] refill protocol or controlled substance  Drug not active on patient's medication list

## 2022-03-21 DIAGNOSIS — F51.01 PRIMARY INSOMNIA: ICD-10-CM

## 2022-03-21 RX ORDER — HYDROCODONE BITARTRATE AND ACETAMINOPHEN 5; 325 MG/1; MG/1
1 TABLET ORAL EVERY 6 HOURS PRN
Qty: 18 TABLET | Refills: 0 | Status: SHIPPED | OUTPATIENT
Start: 2022-03-21 | End: 2022-04-15

## 2022-03-21 RX ORDER — RIZATRIPTAN BENZOATE 10 MG/1
TABLET ORAL
Qty: 15 TABLET | Refills: 0 | Status: SHIPPED | OUTPATIENT
Start: 2022-03-21 | End: 2022-04-26

## 2022-03-22 RX ORDER — ZOLPIDEM TARTRATE 5 MG/1
TABLET ORAL
Qty: 30 TABLET | Refills: 0 | Status: SHIPPED | OUTPATIENT
Start: 2022-03-22 | End: 2022-04-21

## 2022-03-22 NOTE — TELEPHONE ENCOUNTER
Routing to covering provider to review. No current PCP listed   Routing refill request to provider for review/approval because:  Drug not on the [facility] refill protocol

## 2022-03-23 ENCOUNTER — HOSPITAL ENCOUNTER (OUTPATIENT)
Dept: WOUND CARE | Facility: CLINIC | Age: 57
Discharge: HOME | End: 2022-03-23
Admitting: FAMILY MEDICINE
Payer: MEDICARE

## 2022-03-23 PROCEDURE — G0463 HOSPITAL OUTPT CLINIC VISIT: HCPCS

## 2022-03-23 NOTE — DISCHARGE INSTRUCTIONS
Today we did the wound cares to both the sacral and right IT wounds.  The IT wound is improved, smaller in all aspects and all clean with new scar tissue forming.  The sacral wound is the same in size but has more granular tissue present this visit.    We will continue with the use of the Lidocaine gel directly to the wound bed, Aquacel Ag to fill the wound beds.  Today we covered the Sacral wound with a large Mepilex foam with an additional absorbent pad secured over the top with tape.  The IT wound we covered with a smaller Mepilex and additional tape.    I will plan to see you again in four weeks on Wednesday April the 20 th at 3 pm here in the Wound and Ostomy clinic.    Any concerns or questions call our scheduling line at [phone].    Thanks for coming in today and I will see you in four weeks.    Presley Chester RN cwocn
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
## 2022-03-28 ENCOUNTER — TELEPHONE (OUTPATIENT)
Dept: INTERNAL MEDICINE | Facility: CLINIC | Age: 57
End: 2022-03-28
Payer: COMMERCIAL

## 2022-03-28 DIAGNOSIS — N39.0 CHRONIC UTI (URINARY TRACT INFECTION): Primary | ICD-10-CM

## 2022-03-28 NOTE — TELEPHONE ENCOUNTER
Joselyn, RN calling from [facility]  PHONE: [phone]    SITUATION:   This morning the patient was complaining of dysuria. The urine is dark and cloudy with sentiment on the bottom.       PATIENT REQUEST:   Request for UA order for homecare.       PLAN:   Routing to PCP or covering provider.        CHRISTINA BelleN, RN, PHN  [facility]  March 28, 2022

## 2022-04-01 NOTE — TELEPHONE ENCOUNTER
jannie RN with home health calling back looking for update . Advised order was placed. She requested the order be faxed to [facility] [phone] as this is where pt is living and they will collect urine and bring to [place] lab.

## 2022-04-04 ENCOUNTER — TELEPHONE (OUTPATIENT)
Dept: FAMILY MEDICINE | Facility: CLINIC | Age: 57
End: 2022-04-04
Payer: COMMERCIAL

## 2022-04-04 NOTE — TELEPHONE ENCOUNTER
Reason for Call:  Form, our goal is to have forms completed with 72 hours, however, some forms may require a visit or additional information.    Type of letter, form or note:  Home Health Certification    Who is the form from?: Home care    Where did the form come from: form was faxed in    What clinic location was the form placed at?: [facility]    Where the form was placed: Given to MA/BOB Adams    What number is listed as a contact on the form?: [phone]       Additional comments: [facility]    Call taken on 4/4/2022 at 5:51 PM by Shonna Aguirre

## 2022-04-08 DIAGNOSIS — Z53.9 DIAGNOSIS NOT YET DEFINED: Primary | ICD-10-CM

## 2022-04-08 PROCEDURE — G0179 MD RECERTIFICATION HHA PT: HCPCS | Performed by: FAMILY MEDICINE

## 2022-04-08 RX ORDER — DEXTRAN 70, AND HYPROMELLOSE 2910 1; 3 MG/ML; MG/ML
1 SOLUTION/ DROPS OPHTHALMIC DAILY PRN
Qty: 30 ML | Refills: 0 | Status: ON HOLD | COMMUNITY
Start: 2022-04-08 | End: 2022-04-25

## 2022-04-12 DIAGNOSIS — E87.6 HYPOKALEMIA: Primary | ICD-10-CM

## 2022-04-12 DIAGNOSIS — R56.9 SEIZURES (H): ICD-10-CM

## 2022-04-13 RX ORDER — LEVETIRACETAM 750 MG/1
1500 TABLET ORAL 2 TIMES DAILY
Qty: 120 TABLET | Refills: 1 | Status: SHIPPED | OUTPATIENT
Start: 2022-04-13 | End: 2022-06-13

## 2022-04-13 NOTE — TELEPHONE ENCOUNTER
Routing refill request to provider for review/approval because:  Drug not on the [facility] refill protocol     ALONSO Obando, RN  [facility]

## 2022-04-14 DIAGNOSIS — L89.309 PRESSURE INJURY OF SKIN OF BUTTOCK, UNSPECIFIED INJURY STAGE, UNSPECIFIED LATERALITY: ICD-10-CM

## 2022-04-14 PROCEDURE — 87086 URINE CULTURE/COLONY COUNT: CPT | Mod: ORL | Performed by: FAMILY MEDICINE

## 2022-04-14 PROCEDURE — 81001 URINALYSIS AUTO W/SCOPE: CPT | Mod: ORL | Performed by: FAMILY MEDICINE

## 2022-04-14 NOTE — TELEPHONE ENCOUNTER
Pending Prescriptions:                       Disp   Refills    potassium chloride ER (KLOR-CON M) 20 MEQ *90 tab*5        Sig: TAKE 1 TABLET BY MOUTH 3 TIMES A DAY    Routing refill request to provider for review/approval because:  Medication is reported/historical

## 2022-04-15 ENCOUNTER — LAB REQUISITION (OUTPATIENT)
Dept: LAB | Facility: CLINIC | Age: 57
End: 2022-04-15
Payer: MEDICARE

## 2022-04-15 DIAGNOSIS — N39.0 URINARY TRACT INFECTION, SITE NOT SPECIFIED: ICD-10-CM

## 2022-04-15 LAB
ALBUMIN UR-MCNC: 30 MG/DL
AMORPH CRY #/AREA URNS HPF: ABNORMAL /HPF
APPEARANCE UR: ABNORMAL
BACTERIA #/AREA URNS HPF: ABNORMAL /HPF
BILIRUB UR QL STRIP: NEGATIVE
COLOR UR AUTO: YELLOW
GLUCOSE UR STRIP-MCNC: NEGATIVE MG/DL
HGB UR QL STRIP: NEGATIVE
KETONES UR STRIP-MCNC: NEGATIVE MG/DL
LEUKOCYTE ESTERASE UR QL STRIP: NEGATIVE
MUCOUS THREADS #/AREA URNS LPF: PRESENT /LPF
NITRATE UR QL: POSITIVE
PH UR STRIP: 8 [PH] (ref 5–7)
RBC URINE: 3 /HPF
SP GR UR STRIP: 1.01 (ref 1–1.03)
UROBILINOGEN UR STRIP-MCNC: NORMAL MG/DL
WBC URINE: 10 /HPF

## 2022-04-15 RX ORDER — HYDROCODONE BITARTRATE AND ACETAMINOPHEN 5; 325 MG/1; MG/1
TABLET ORAL
Qty: 18 TABLET | Refills: 0 | Status: SHIPPED | OUTPATIENT
Start: 2022-04-15 | End: 2022-04-20

## 2022-04-15 NOTE — TELEPHONE ENCOUNTER
Pending Prescriptions:                       Disp   Refills    HYDROcodone-acetaminophen (NORCO) 5-325 MG*18 tab*0        Sig: TAKE 1 TABLET BY MOUTH EVERY 6 HOURS AS NEEDED           FORPAIN    Routing refill request to provider for review/approval because:  Drug not on the [facility] refill protocol     Priti Peralta RN

## 2022-04-17 LAB — BACTERIA UR CULT: NORMAL

## 2022-04-18 DIAGNOSIS — L89.309 PRESSURE INJURY OF SKIN OF BUTTOCK, UNSPECIFIED INJURY STAGE, UNSPECIFIED LATERALITY: ICD-10-CM

## 2022-04-18 RX ORDER — POTASSIUM CHLORIDE 1500 MG/1
TABLET, EXTENDED RELEASE ORAL
Qty: 90 TABLET | Refills: 5 | Status: ON HOLD | OUTPATIENT
Start: 2022-04-18 | End: 2022-09-19

## 2022-04-20 ENCOUNTER — HOSPITAL ENCOUNTER (OUTPATIENT)
Dept: WOUND CARE | Facility: CLINIC | Age: 57
Discharge: HOME | End: 2022-04-20
Attending: FAMILY MEDICINE | Admitting: FAMILY MEDICINE
Payer: MEDICARE

## 2022-04-20 DIAGNOSIS — G82.20 PARAPLEGIA FOLLOWING SPINAL CORD INJURY (H): ICD-10-CM

## 2022-04-20 DIAGNOSIS — L89.309 PRESSURE INJURY OF SKIN OF BUTTOCK, UNSPECIFIED INJURY STAGE, UNSPECIFIED LATERALITY: ICD-10-CM

## 2022-04-20 DIAGNOSIS — L89.94 PRESSURE INJURY, STAGE 4, UNSPECIFIED LOCATION (H): Primary | ICD-10-CM

## 2022-04-20 DIAGNOSIS — F51.01 PRIMARY INSOMNIA: ICD-10-CM

## 2022-04-20 PROCEDURE — G0463 HOSPITAL OUTPT CLINIC VISIT: HCPCS

## 2022-04-20 RX ORDER — HYDROCODONE BITARTRATE AND ACETAMINOPHEN 5; 325 MG/1; MG/1
TABLET ORAL
Qty: 18 TABLET | Refills: 0 | Status: SHIPPED | OUTPATIENT
Start: 2022-04-20 | End: 2022-06-08

## 2022-04-20 RX ORDER — VIT E/LIDOCAINE/ALOE/COLLAGEN 2 %
GEL (GRAM) TOPICAL
Qty: 85 G | Refills: 0 | Status: SHIPPED | OUTPATIENT
Start: 2022-04-20 | End: 2022-04-25

## 2022-04-20 NOTE — TELEPHONE ENCOUNTER
Pending Prescriptions:                       Disp   Refills    HYDROcodone-acetaminophen (NORCO) 5-325 MG*18 tab*0        Sig: TAKE 1 TABLET BY MOUTH EVERY 6 HOURS AS NEEDED FOR           PAIN    Routing refill request to provider for review/approval because:  Drug not on the [facility] refill protocol     Priti Peralta RN

## 2022-04-20 NOTE — DISCHARGE INSTRUCTIONS
Today we assessed and did the cares for both the sacral and right IT wounds on your buttocks.  The sacral wound is about the same in size, with some new granular tissue noted in small amounts.  The right IT wound is smaller and depth is filling in but it remains open.  We will continue with the below plan of care.  I will update the MD and have him fax over the same as below with his signature so any questions about the products in use can be addressed.     Plan of care is as follows for the sacral and right IT pressure ulcers  1 Cleanse the wounds with soap and water, saline or a no rinse wound cleanser and dry well.  2 Apply Lidocaine 2% gel to the wound beds, thin layer for pain control.  3 Fill the wound beds with Aquacel Ag, dry.  4 Cover with a gentle foam adhesive foam dressing.  5 For over flow drainage cover the gentle adhesive dressing with an additional absorptive pad like a maxi pad or portion of a diaper secured with tape.  5 Change the dressing ongoing three times weekly.  Dr VAZQUEZ Baeza/Presley rayan     I have you scheduled to return to see me again in just over two weeks on Thursday May the 5th at 1 pm.  Any concerns or questions call our scheduling department at [phone].     Presley rayan

## 2022-04-20 NOTE — TELEPHONE ENCOUNTER
Routing refill request to provider for review/approval because:  Drug not on the [facility] refill protocol     ALONSO Obando, RN  [facility]

## 2022-04-21 RX ORDER — ZOLPIDEM TARTRATE 5 MG/1
TABLET ORAL
Qty: 30 TABLET | Refills: 0 | Status: SHIPPED | OUTPATIENT
Start: 2022-04-21 | End: 2022-06-10

## 2022-04-21 RX ORDER — TRAMADOL HYDROCHLORIDE 50 MG/1
50 TABLET ORAL EVERY 6 HOURS PRN
Qty: 30 TABLET | Refills: 0 | Status: SHIPPED | OUTPATIENT
Start: 2022-04-21 | End: 2022-06-08

## 2022-04-22 ENCOUNTER — ANESTHESIA EVENT (OUTPATIENT)
Dept: GASTROENTEROLOGY | Facility: CLINIC | Age: 57
DRG: 393 | End: 2022-04-22
Payer: MEDICARE

## 2022-04-22 ENCOUNTER — APPOINTMENT (OUTPATIENT)
Dept: SPEECH THERAPY | Facility: CLINIC | Age: 57
DRG: 393 | End: 2022-04-22
Attending: HOSPITALIST
Payer: MEDICARE

## 2022-04-22 ENCOUNTER — HOSPITAL ENCOUNTER (INPATIENT)
Facility: CLINIC | Age: 57
LOS: 3 days | Discharge: HOME HEALTH | DRG: 393 | End: 2022-04-25
Attending: FAMILY MEDICINE | Admitting: HOSPITALIST
Payer: MEDICARE

## 2022-04-22 ENCOUNTER — TELEPHONE (OUTPATIENT)
Dept: SURGERY | Facility: OTHER | Age: 57
End: 2022-04-22

## 2022-04-22 ENCOUNTER — ANESTHESIA (OUTPATIENT)
Dept: GASTROENTEROLOGY | Facility: CLINIC | Age: 57
DRG: 393 | End: 2022-04-22
Payer: MEDICARE

## 2022-04-22 ENCOUNTER — APPOINTMENT (OUTPATIENT)
Dept: GENERAL RADIOLOGY | Facility: CLINIC | Age: 57
DRG: 393 | End: 2022-04-22
Attending: FAMILY MEDICINE
Payer: MEDICARE

## 2022-04-22 DIAGNOSIS — Z11.4 SCREENING FOR HIV (HUMAN IMMUNODEFICIENCY VIRUS): ICD-10-CM

## 2022-04-22 DIAGNOSIS — Z11.59 NEED FOR HEPATITIS C SCREENING TEST: ICD-10-CM

## 2022-04-22 DIAGNOSIS — J02.9 SORETHROAT: ICD-10-CM

## 2022-04-22 DIAGNOSIS — J69.0 ASPIRATION PNEUMONITIS (H): ICD-10-CM

## 2022-04-22 DIAGNOSIS — K22.2 STRICTURE AND STENOSIS OF ESOPHAGUS: ICD-10-CM

## 2022-04-22 DIAGNOSIS — G47.09 OTHER INSOMNIA: ICD-10-CM

## 2022-04-22 DIAGNOSIS — L89.314 PRESSURE INJURY OF RIGHT BUTTOCK, STAGE 4 (H): ICD-10-CM

## 2022-04-22 DIAGNOSIS — J69.0: Primary | ICD-10-CM

## 2022-04-22 DIAGNOSIS — G43.709 CHRONIC MIGRAINE WITHOUT AURA WITHOUT STATUS MIGRAINOSUS, NOT INTRACTABLE: ICD-10-CM

## 2022-04-22 DIAGNOSIS — Z11.52 ENCOUNTER FOR SCREENING LABORATORY TESTING FOR SEVERE ACUTE RESPIRATORY SYNDROME CORONAVIRUS 2 (SARS-COV-2): ICD-10-CM

## 2022-04-22 DIAGNOSIS — I81 PORTAL VEIN THROMBOSIS: ICD-10-CM

## 2022-04-22 DIAGNOSIS — T18.108A IMPACTED FOREIGN BODY IN ESOPHAGUS, INITIAL ENCOUNTER: ICD-10-CM

## 2022-04-22 DIAGNOSIS — T18.108A FOREIGN BODY IN ESOPHAGUS, INITIAL ENCOUNTER: ICD-10-CM

## 2022-04-22 DIAGNOSIS — K21.9 GASTROESOPHAGEAL REFLUX DISEASE WITHOUT ESOPHAGITIS: ICD-10-CM

## 2022-04-22 LAB
ANION GAP SERPL CALCULATED.3IONS-SCNC: 12 MMOL/L (ref 3–14)
BASOPHILS # BLD AUTO: 0 10E3/UL (ref 0–0.2)
BASOPHILS NFR BLD AUTO: 0 %
BUN SERPL-MCNC: 16 MG/DL (ref 7–30)
CALCIUM SERPL-MCNC: 9.6 MG/DL (ref 8.5–10.1)
CHLORIDE BLD-SCNC: 120 MMOL/L (ref 94–109)
CO2 SERPL-SCNC: 14 MMOL/L (ref 20–32)
CREAT SERPL-MCNC: 0.5 MG/DL (ref 0.52–1.04)
EOSINOPHIL # BLD AUTO: 0 10E3/UL (ref 0–0.7)
EOSINOPHIL NFR BLD AUTO: 0 %
ERYTHROCYTE [DISTWIDTH] IN BLOOD BY AUTOMATED COUNT: 14.8 % (ref 10–15)
GFR SERPL CREATININE-BSD FRML MDRD: >90 ML/MIN/1.73M2
GLUCOSE BLD-MCNC: 108 MG/DL (ref 70–99)
HCT VFR BLD AUTO: 42.6 % (ref 35–47)
HGB BLD-MCNC: 13.3 G/DL (ref 11.7–15.7)
IMM GRANULOCYTES # BLD: 0.2 10E3/UL
IMM GRANULOCYTES NFR BLD: 1 %
LYMPHOCYTES # BLD AUTO: 1.2 10E3/UL (ref 0.8–5.3)
LYMPHOCYTES NFR BLD AUTO: 5 %
MCH RBC QN AUTO: 27.5 PG (ref 26.5–33)
MCHC RBC AUTO-ENTMCNC: 31.2 G/DL (ref 31.5–36.5)
MCV RBC AUTO: 88 FL (ref 78–100)
MONOCYTES # BLD AUTO: 0.9 10E3/UL (ref 0–1.3)
MONOCYTES NFR BLD AUTO: 4 %
NEUTROPHILS # BLD AUTO: 22 10E3/UL (ref 1.6–8.3)
NEUTROPHILS NFR BLD AUTO: 90 %
NRBC # BLD AUTO: 0 10E3/UL
NRBC BLD AUTO-RTO: 0 /100
PLATELET # BLD AUTO: 313 10E3/UL (ref 150–450)
POTASSIUM BLD-SCNC: 4 MMOL/L (ref 3.4–5.3)
RBC # BLD AUTO: 4.84 10E6/UL (ref 3.8–5.2)
SARS-COV-2 RNA RESP QL NAA+PROBE: NEGATIVE
SODIUM SERPL-SCNC: 146 MMOL/L (ref 133–144)
UPPER GI ENDOSCOPY: NORMAL
WBC # BLD AUTO: 24.4 10E3/UL (ref 4–11)

## 2022-04-22 PROCEDURE — 87040 BLOOD CULTURE FOR BACTERIA: CPT | Performed by: FAMILY MEDICINE

## 2022-04-22 PROCEDURE — 250N000011 HC RX IP 250 OP 636: Performed by: NURSE ANESTHETIST, CERTIFIED REGISTERED

## 2022-04-22 PROCEDURE — 87635 SARS-COV-2 COVID-19 AMP PRB: CPT | Performed by: FAMILY MEDICINE

## 2022-04-22 PROCEDURE — 93010 ELECTROCARDIOGRAM REPORT: CPT | Performed by: FAMILY MEDICINE

## 2022-04-22 PROCEDURE — 99285 EMERGENCY DEPT VISIT HI MDM: CPT | Mod: 25

## 2022-04-22 PROCEDURE — 71045 X-RAY EXAM CHEST 1 VIEW: CPT

## 2022-04-22 PROCEDURE — 250N000013 HC RX MED GY IP 250 OP 250 PS 637: Performed by: SPECIALIST

## 2022-04-22 PROCEDURE — 36415 COLL VENOUS BLD VENIPUNCTURE: CPT | Performed by: FAMILY MEDICINE

## 2022-04-22 PROCEDURE — 258N000003 HC RX IP 258 OP 636: Performed by: SPECIALIST

## 2022-04-22 PROCEDURE — 85004 AUTOMATED DIFF WBC COUNT: CPT | Performed by: FAMILY MEDICINE

## 2022-04-22 PROCEDURE — 250N000009 HC RX 250: Performed by: NURSE ANESTHETIST, CERTIFIED REGISTERED

## 2022-04-22 PROCEDURE — 250N000011 HC RX IP 250 OP 636: Performed by: FAMILY MEDICINE

## 2022-04-22 PROCEDURE — 96374 THER/PROPH/DIAG INJ IV PUSH: CPT | Performed by: FAMILY MEDICINE

## 2022-04-22 PROCEDURE — 258N000003 HC RX IP 258 OP 636: Performed by: HOSPITALIST

## 2022-04-22 PROCEDURE — 258N000003 HC RX IP 258 OP 636: Performed by: FAMILY MEDICINE

## 2022-04-22 PROCEDURE — 99285 EMERGENCY DEPT VISIT HI MDM: CPT | Mod: 25 | Performed by: FAMILY MEDICINE

## 2022-04-22 PROCEDURE — 92610 EVALUATE SWALLOWING FUNCTION: CPT | Mod: GN | Performed by: SPEECH-LANGUAGE PATHOLOGIST

## 2022-04-22 PROCEDURE — 250N000011 HC RX IP 250 OP 636: Performed by: HOSPITALIST

## 2022-04-22 PROCEDURE — 120N000001 HC R&B MED SURG/OB

## 2022-04-22 PROCEDURE — 43247 EGD REMOVE FOREIGN BODY: CPT | Performed by: SPECIALIST

## 2022-04-22 PROCEDURE — 93005 ELECTROCARDIOGRAM TRACING: CPT | Performed by: FAMILY MEDICINE

## 2022-04-22 PROCEDURE — 250N000013 HC RX MED GY IP 250 OP 250 PS 637: Performed by: INTERNAL MEDICINE

## 2022-04-22 PROCEDURE — 80048 BASIC METABOLIC PNL TOTAL CA: CPT | Performed by: FAMILY MEDICINE

## 2022-04-22 PROCEDURE — 99223 1ST HOSP IP/OBS HIGH 75: CPT | Mod: AI | Performed by: HOSPITALIST

## 2022-04-22 PROCEDURE — 370N000017 HC ANESTHESIA TECHNICAL FEE, PER MIN: Performed by: SPECIALIST

## 2022-04-22 PROCEDURE — 0DC18ZZ EXTIRPATION OF MATTER FROM UPPER ESOPHAGUS, VIA NATURAL OR ARTIFICIAL OPENING ENDOSCOPIC: ICD-10-PCS | Performed by: SPECIALIST

## 2022-04-22 PROCEDURE — 99222 1ST HOSP IP/OBS MODERATE 55: CPT | Mod: 25 | Performed by: SPECIALIST

## 2022-04-22 PROCEDURE — 96374 THER/PROPH/DIAG INJ IV PUSH: CPT

## 2022-04-22 PROCEDURE — C9803 HOPD COVID-19 SPEC COLLECT: HCPCS | Performed by: FAMILY MEDICINE

## 2022-04-22 PROCEDURE — 99285 EMERGENCY DEPT VISIT HI MDM: CPT | Performed by: FAMILY MEDICINE

## 2022-04-22 RX ORDER — POTASSIUM CHLORIDE 1500 MG/1
20 TABLET, EXTENDED RELEASE ORAL
Status: ON HOLD | COMMUNITY
End: 2022-04-24

## 2022-04-22 RX ORDER — PROPOFOL 10 MG/ML
INJECTION, EMULSION INTRAVENOUS CONTINUOUS PRN
Status: DISCONTINUED | OUTPATIENT
Start: 2022-04-22 | End: 2022-04-22

## 2022-04-22 RX ORDER — AMOXICILLIN 250 MG
1 CAPSULE ORAL 2 TIMES DAILY
Status: DISCONTINUED | OUTPATIENT
Start: 2022-04-22 | End: 2022-04-25 | Stop reason: HOSPADM

## 2022-04-22 RX ORDER — ACETAMINOPHEN 325 MG/1
975 TABLET ORAL EVERY 8 HOURS
Status: DISCONTINUED | OUTPATIENT
Start: 2022-04-22 | End: 2022-04-23

## 2022-04-22 RX ORDER — ZOLPIDEM TARTRATE 5 MG/1
5 TABLET ORAL
Status: COMPLETED | OUTPATIENT
Start: 2022-04-22 | End: 2022-04-22

## 2022-04-22 RX ORDER — DEXTROSE MONOHYDRATE, SODIUM CHLORIDE, AND POTASSIUM CHLORIDE 50; 1.49; 4.5 G/1000ML; G/1000ML; G/1000ML
INJECTION, SOLUTION INTRAVENOUS CONTINUOUS
Status: DISPENSED | OUTPATIENT
Start: 2022-04-22 | End: 2022-04-23

## 2022-04-22 RX ORDER — ERTAPENEM 1 G/1
1 INJECTION, POWDER, LYOPHILIZED, FOR SOLUTION INTRAMUSCULAR; INTRAVENOUS EVERY 24 HOURS
Status: DISCONTINUED | OUTPATIENT
Start: 2022-04-22 | End: 2022-04-22

## 2022-04-22 RX ORDER — METRONIDAZOLE 500 MG/100ML
500 INJECTION, SOLUTION INTRAVENOUS EVERY 6 HOURS
Status: DISCONTINUED | OUTPATIENT
Start: 2022-04-22 | End: 2022-04-23

## 2022-04-22 RX ORDER — ONDANSETRON 4 MG/1
4 TABLET, ORALLY DISINTEGRATING ORAL EVERY 30 MIN PRN
Status: CANCELLED | OUTPATIENT
Start: 2022-04-22

## 2022-04-22 RX ORDER — POLYETHYLENE GLYCOL 3350 17 G/17G
17 POWDER, FOR SOLUTION ORAL DAILY
Status: DISCONTINUED | OUTPATIENT
Start: 2022-04-23 | End: 2022-04-25 | Stop reason: HOSPADM

## 2022-04-22 RX ORDER — LIDOCAINE 40 MG/G
CREAM TOPICAL
Status: DISCONTINUED | OUTPATIENT
Start: 2022-04-22 | End: 2022-04-25 | Stop reason: HOSPADM

## 2022-04-22 RX ORDER — HYDRALAZINE HYDROCHLORIDE 20 MG/ML
2.5-5 INJECTION INTRAMUSCULAR; INTRAVENOUS EVERY 10 MIN PRN
Status: DISCONTINUED | OUTPATIENT
Start: 2022-04-22 | End: 2022-04-22 | Stop reason: HOSPADM

## 2022-04-22 RX ORDER — PROPOFOL 10 MG/ML
INJECTION, EMULSION INTRAVENOUS PRN
Status: DISCONTINUED | OUTPATIENT
Start: 2022-04-22 | End: 2022-04-22

## 2022-04-22 RX ORDER — LIDOCAINE HYDROCHLORIDE 20 MG/ML
INJECTION, SOLUTION INFILTRATION; PERINEURAL PRN
Status: DISCONTINUED | OUTPATIENT
Start: 2022-04-22 | End: 2022-04-22

## 2022-04-22 RX ORDER — ZOLPIDEM TARTRATE 10 MG/1
10 TABLET ORAL
Status: ON HOLD | COMMUNITY
End: 2022-04-24

## 2022-04-22 RX ORDER — ONDANSETRON 2 MG/ML
4 INJECTION INTRAMUSCULAR; INTRAVENOUS EVERY 30 MIN PRN
Status: CANCELLED | OUTPATIENT
Start: 2022-04-22

## 2022-04-22 RX ORDER — MIRTAZAPINE 15 MG/1
7.5 TABLET, FILM COATED ORAL DAILY
Status: ON HOLD | COMMUNITY
Start: 2022-04-11 | End: 2022-04-24

## 2022-04-22 RX ORDER — ONDANSETRON 4 MG/1
4 TABLET, ORALLY DISINTEGRATING ORAL EVERY 6 HOURS PRN
Status: DISCONTINUED | OUTPATIENT
Start: 2022-04-22 | End: 2022-04-25 | Stop reason: HOSPADM

## 2022-04-22 RX ORDER — ACETAMINOPHEN 325 MG/1
650 TABLET ORAL EVERY 4 HOURS PRN
Status: DISCONTINUED | OUTPATIENT
Start: 2022-04-25 | End: 2022-04-25 | Stop reason: HOSPADM

## 2022-04-22 RX ORDER — OXYBUTYNIN CHLORIDE 5 MG/1
10 TABLET ORAL
Status: ON HOLD | COMMUNITY
End: 2022-04-24

## 2022-04-22 RX ORDER — MEPERIDINE HYDROCHLORIDE 25 MG/ML
12.5 INJECTION INTRAMUSCULAR; INTRAVENOUS; SUBCUTANEOUS
Status: CANCELLED | OUTPATIENT
Start: 2022-04-22

## 2022-04-22 RX ORDER — ONDANSETRON 2 MG/ML
4 INJECTION INTRAMUSCULAR; INTRAVENOUS EVERY 6 HOURS PRN
Status: DISCONTINUED | OUTPATIENT
Start: 2022-04-22 | End: 2022-04-25 | Stop reason: HOSPADM

## 2022-04-22 RX ORDER — FENTANYL CITRATE 50 UG/ML
INJECTION, SOLUTION INTRAMUSCULAR; INTRAVENOUS PRN
Status: DISCONTINUED | OUTPATIENT
Start: 2022-04-22 | End: 2022-04-22

## 2022-04-22 RX ORDER — LIDOCAINE 40 MG/G
CREAM TOPICAL
Status: CANCELLED | OUTPATIENT
Start: 2022-04-22

## 2022-04-22 RX ORDER — ALBUTEROL SULFATE 0.83 MG/ML
2.5 SOLUTION RESPIRATORY (INHALATION) EVERY 4 HOURS PRN
Status: DISCONTINUED | OUTPATIENT
Start: 2022-04-22 | End: 2022-04-22 | Stop reason: HOSPADM

## 2022-04-22 RX ORDER — HYDROMORPHONE HYDROCHLORIDE 1 MG/ML
0.2 INJECTION, SOLUTION INTRAMUSCULAR; INTRAVENOUS; SUBCUTANEOUS EVERY 5 MIN PRN
Status: DISCONTINUED | OUTPATIENT
Start: 2022-04-22 | End: 2022-04-22 | Stop reason: HOSPADM

## 2022-04-22 RX ORDER — METOPROLOL TARTRATE 1 MG/ML
1-2 INJECTION, SOLUTION INTRAVENOUS EVERY 5 MIN PRN
Status: DISCONTINUED | OUTPATIENT
Start: 2022-04-22 | End: 2022-04-22 | Stop reason: HOSPADM

## 2022-04-22 RX ORDER — CEFTRIAXONE 2 G/1
2 INJECTION, POWDER, FOR SOLUTION INTRAMUSCULAR; INTRAVENOUS EVERY 24 HOURS
Status: DISCONTINUED | OUTPATIENT
Start: 2022-04-22 | End: 2022-04-24

## 2022-04-22 RX ORDER — SODIUM CHLORIDE 9 MG/ML
INJECTION, SOLUTION INTRAVENOUS CONTINUOUS
Status: CANCELLED | OUTPATIENT
Start: 2022-04-22

## 2022-04-22 RX ORDER — LEVETIRACETAM 750 MG/1
1500 TABLET ORAL
Status: ON HOLD | COMMUNITY
Start: 2020-11-10 | End: 2022-04-24

## 2022-04-22 RX ORDER — ONDANSETRON 2 MG/ML
INJECTION INTRAMUSCULAR; INTRAVENOUS PRN
Status: DISCONTINUED | OUTPATIENT
Start: 2022-04-22 | End: 2022-04-22

## 2022-04-22 RX ORDER — METOPROLOL TARTRATE 1 MG/ML
5 INJECTION, SOLUTION INTRAVENOUS EVERY 6 HOURS
Status: DISCONTINUED | OUTPATIENT
Start: 2022-04-23 | End: 2022-04-23

## 2022-04-22 RX ORDER — BISACODYL 10 MG
10 SUPPOSITORY, RECTAL RECTAL DAILY PRN
Status: DISCONTINUED | OUTPATIENT
Start: 2022-04-22 | End: 2022-04-25 | Stop reason: HOSPADM

## 2022-04-22 RX ORDER — FENTANYL CITRATE 50 UG/ML
25 INJECTION, SOLUTION INTRAMUSCULAR; INTRAVENOUS EVERY 5 MIN PRN
Status: DISCONTINUED | OUTPATIENT
Start: 2022-04-22 | End: 2022-04-22 | Stop reason: HOSPADM

## 2022-04-22 RX ORDER — SODIUM CHLORIDE 9 MG/ML
INJECTION, SOLUTION INTRAVENOUS CONTINUOUS
Status: DISCONTINUED | OUTPATIENT
Start: 2022-04-22 | End: 2022-04-22

## 2022-04-22 RX ORDER — OXYCODONE HYDROCHLORIDE 5 MG/1
5 TABLET ORAL EVERY 4 HOURS PRN
Status: CANCELLED | OUTPATIENT
Start: 2022-04-22

## 2022-04-22 RX ORDER — PROCHLORPERAZINE MALEATE 5 MG
10 TABLET ORAL EVERY 6 HOURS PRN
Status: DISCONTINUED | OUTPATIENT
Start: 2022-04-22 | End: 2022-04-25 | Stop reason: HOSPADM

## 2022-04-22 RX ORDER — PROPRANOLOL HYDROCHLORIDE 160 MG/1
160 CAPSULE, EXTENDED RELEASE ORAL
Status: ON HOLD | COMMUNITY
End: 2022-04-24

## 2022-04-22 RX ORDER — FENTANYL CITRATE 50 UG/ML
25 INJECTION, SOLUTION INTRAMUSCULAR; INTRAVENOUS
Status: CANCELLED | OUTPATIENT
Start: 2022-04-22

## 2022-04-22 RX ORDER — ENOXAPARIN SODIUM 100 MG/ML
40 INJECTION SUBCUTANEOUS
Status: ON HOLD | COMMUNITY
End: 2022-04-23

## 2022-04-22 RX ORDER — DEXTROSE MONOHYDRATE, SODIUM CHLORIDE, AND POTASSIUM CHLORIDE 50; 1.49; 4.5 G/1000ML; G/1000ML; G/1000ML
INJECTION, SOLUTION INTRAVENOUS CONTINUOUS
Status: DISCONTINUED | OUTPATIENT
Start: 2022-04-22 | End: 2022-04-22

## 2022-04-22 RX ADMIN — SENNOSIDES AND DOCUSATE SODIUM 1 TABLET: 50; 8.6 TABLET ORAL at 12:36

## 2022-04-22 RX ADMIN — LIDOCAINE HYDROCHLORIDE 60 MG: 20 INJECTION, SOLUTION INFILTRATION; PERINEURAL at 09:07

## 2022-04-22 RX ADMIN — ZOLPIDEM TARTRATE 5 MG: 5 TABLET ORAL at 22:06

## 2022-04-22 RX ADMIN — METRONIDAZOLE 500 MG: 500 INJECTION, SOLUTION INTRAVENOUS at 09:01

## 2022-04-22 RX ADMIN — POTASSIUM CHLORIDE, DEXTROSE MONOHYDRATE AND SODIUM CHLORIDE: 150; 5; 450 INJECTION, SOLUTION INTRAVENOUS at 15:12

## 2022-04-22 RX ADMIN — POTASSIUM CHLORIDE, DEXTROSE MONOHYDRATE AND SODIUM CHLORIDE: 150; 5; 450 INJECTION, SOLUTION INTRAVENOUS at 12:34

## 2022-04-22 RX ADMIN — PROPOFOL 200 MCG/KG/MIN: 10 INJECTION, EMULSION INTRAVENOUS at 09:08

## 2022-04-22 RX ADMIN — MIDAZOLAM HYDROCHLORIDE 1 MG: 1 INJECTION, SOLUTION INTRAMUSCULAR; INTRAVENOUS at 09:07

## 2022-04-22 RX ADMIN — METRONIDAZOLE 500 MG: 500 INJECTION, SOLUTION INTRAVENOUS at 20:16

## 2022-04-22 RX ADMIN — CEFTRIAXONE SODIUM 2 G: 2 INJECTION, POWDER, FOR SOLUTION INTRAMUSCULAR; INTRAVENOUS at 08:20

## 2022-04-22 RX ADMIN — SUGAMMADEX 200 MG: 100 INJECTION, SOLUTION INTRAVENOUS at 09:45

## 2022-04-22 RX ADMIN — METRONIDAZOLE 500 MG: 500 INJECTION, SOLUTION INTRAVENOUS at 15:12

## 2022-04-22 RX ADMIN — PROPOFOL 200 MG: 10 INJECTION, EMULSION INTRAVENOUS at 09:08

## 2022-04-22 RX ADMIN — ONDANSETRON 4 MG: 2 INJECTION INTRAMUSCULAR; INTRAVENOUS at 09:17

## 2022-04-22 RX ADMIN — SODIUM CHLORIDE: 9 INJECTION, SOLUTION INTRAVENOUS at 08:56

## 2022-04-22 RX ADMIN — FENTANYL CITRATE 50 MCG: 50 INJECTION, SOLUTION INTRAMUSCULAR; INTRAVENOUS at 09:07

## 2022-04-22 RX ADMIN — SODIUM CHLORIDE 1000 ML: 9 INJECTION, SOLUTION INTRAVENOUS at 08:03

## 2022-04-22 RX ADMIN — ROCURONIUM BROMIDE 40 MG: 50 INJECTION, SOLUTION INTRAVENOUS at 09:08

## 2022-04-22 ASSESSMENT — ENCOUNTER SYMPTOMS: SEIZURES: 1

## 2022-04-22 ASSESSMENT — ACTIVITIES OF DAILY LIVING (ADL)
ADLS_ACUITY_SCORE: 6
WEAR_GLASSES_OR_BLIND: NO
FALL_HISTORY_WITHIN_LAST_SIX_MONTHS: NO
WALKING_OR_CLIMBING_STAIRS_DIFFICULTY: YES
TRANSFERRING: 1-->ASSISTANCE (EQUIPMENT/PERSON) NEEDED (NOT DEVELOPMENTALLY APPROPRIATE)
EQUIPMENT_CURRENTLY_USED_AT_HOME: WHEELCHAIR, MANUAL
ADLS_ACUITY_SCORE: 6
DIFFICULTY_EATING/SWALLOWING: NO
DEPENDENT_IADLS:: CLEANING;COOKING;LAUNDRY;SHOPPING;MEAL PREPARATION;TRANSPORTATION
WALKING_OR_CLIMBING_STAIRS: AMBULATION DIFFICULTY, REQUIRES EQUIPMENT
ADLS_ACUITY_SCORE: 6
ADLS_ACUITY_SCORE: 4
CHANGE_IN_FUNCTIONAL_STATUS_SINCE_ONSET_OF_CURRENT_ILLNESS/INJURY: NO
ADLS_ACUITY_SCORE: 4
ADLS_ACUITY_SCORE: 6
ADLS_ACUITY_SCORE: 4
ADLS_ACUITY_SCORE: 8
DRESSING/BATHING_DIFFICULTY: NO
ADLS_ACUITY_SCORE: 6
TOILETING_ISSUES: NO
ADLS_ACUITY_SCORE: 8
ADLS_ACUITY_SCORE: 6
CONCENTRATING,_REMEMBERING_OR_MAKING_DECISIONS_DIFFICULTY: NO
DOING_ERRANDS_INDEPENDENTLY_DIFFICULTY: NO
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
## 2022-04-22 NOTE — CONSULTS
Care Management Initial Consult    General Information  Assessment completed with: Patient, Caregiver, Patient, nurse- [facility] @ [place]  Type of CM/SW Visit: Initial Assessment    Primary Care Provider verified and updated as needed: Yes   Readmission within the last 30 days: no previous admission in last 30 days      Reason for Consult: discharge planning  Advance Care Planning:    Has a POLST       Communication Assessment  Patient's communication style: spoken language (English or Bilingual)    Hearing Difficulty or Deaf: no   Wear Glasses or Blind: no    Cognitive  Cognitive/Neuro/Behavioral: WDL  Level of Consciousness: alert  Arousal Level: opens eyes spontaneously                Living Environment:   People in home: other (see comments) (assisted living)     Current living Arrangements: assisted living  Name of Facility: [facility]   Able to return to prior arrangements: yes       Family/Social Support:  Care provided by: self, homecare agency, other (see comments) (and staff at [facility])  Provides care for: no one, unable/limited ability to care for self  Marital Status:   Parent(s), Facility resident(s)/Staff          Description of Support System: Supportive    Support Assessment: Adequate family and caregiver support    Current Resources:   Patient receiving home care services: Yes  Skilled Home Care Services: Skilled Nursing ([facility] RN for wound care 3x per week)  Community Resources: Transportation Services (Uses [employer])  Equipment currently used at home: wheelchair, manual  Supplies currently used at home:      Employment/Financial:  Employment Status: disabled        Financial Concerns: No concerns identified           Lifestyle & Psychosocial Needs:  Social Determinants of Health     Tobacco Use: Low Risk      Smoking Tobacco Use: Never Smoker     Smokeless Tobacco Use: Never Used   Alcohol Use: Not on file   Financial Resource Strain: Not on file   Food  Insecurity: Not on file   Transportation Needs: Not on file   Physical Activity: Not on file   Stress: Not on file   Social Connections: Not on file   Intimate Partner Violence: Not on file   Depression: Not at risk     PHQ-2 Score: 0   Housing Stability: Not on file       Functional Status:  Prior to admission patient needed assistance:   Dependent ADLs:: Bathing, Dressing, Positioning, Transfers, Wheelchair-independent  Dependent IADLs:: Cleaning, Cooking, Laundry, Shopping, Meal Preparation, Transportation  Assesssment of Functional Status: At functional baseline    Mental Health Status:  Mental Health Status: No Current Concerns       Chemical Dependency Status:  Chemical Dependency Status: No Current Concerns             Values/Beliefs:  Spiritual, Cultural Beliefs, Jain Practices, Values that affect care:            Values/Beliefs Comment: Unknown    Additional Information:  Care Management has been consulted for discharge planning.  Patient lives at [facility] in [place] #[phone].  Writer has visited with patient.  She does plan to return to her [facility] apartment after the hospital stay.  Patient uses a manual wheelchair to mobilize.  Staff assist her with meals, bathing, dressing, positioning, laundry, and cleaning.  Patient stated that she has an [facility] RN ([facility] #[phone]) that sees her 3x per week for wound care.  Writer has called and left a message with their intake department regarding patient being hospitalized.  Awaiting call back.      Discussed transportation.  Patient reports that she uses [employer] #[phone].  Writer has called and spoken with Laura at [facility].  Discussed that patient will need transportation back to [facility] and that the discharge date has not been determined yet.  Laura stated that they do not have any drivers on Sunday, so CM will need to call on Monday for a ride, if patient is ready for discharge then.   "There are no other wheelchair van transport companies that accept this patient's insurance, if patient is ready for discharge on Sunday.  Laura at [facility] is also aware that patient's manual wheelchair is at [facility] and a  will need to  her wheelchair there on day of discharge (which has not been determined yet).      Patient is currently on oxygen in the hospital room, but reports she does not use oxygen at home.  Patient also stated that Dr. Ivan Baeza is her primary doctor.      Writer has spoken with Yeimi, the nurse at [facility].  Discussed that it is possible that patient may be ready for discharge over the weekend, but date of discharge is unknown at this time.  Yeimi stated that \"we can't accept her back over the weekend\".  Writer inquired about the concern with patient returning over the weekend.  \"We do not have a nurse here on the weekend.\"  Writer reminded Yeimi of the law in place that assisted living facilities are required to accept their patients back to the facility, even on weekends.  Yeimi provided the phone number to the on call nurse for this weekend #[phone] and stated that the on call nurse would need to know about the patient's return \"if she answers the phone.\"  Writer indicated that the hope would be that the nurse answers the phone or calls the caller back, being that she is \"on call\".      Care Management will continue to follow for discharge planning.        Angie Buckner, [facility]   [phone]     "

## 2022-04-22 NOTE — ED NOTES
Yeimi ROJAS updated at [facility]. Patient called her mother and updated her.

## 2022-04-22 NOTE — ED NOTES
Patient took a sip of coke and instantly started to Vomit. Her respiratory rate increased to 40/minute and had audible congestion. sats decreased to 86% on RA. Nasal cannula applied and O2 2L started. Sats continued 86-88% and O2 increased to 4L and then switched to Oxymask and O2 titrated to 10L. Sats have maintained 92%. Plan OR. Patient was able to cough up a small piece of chicken.
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
## 2022-04-22 NOTE — DISCHARGE INSTRUCTIONS
Hospital follow up appointment: Please check in at 1:20 for your   appointment with Dr. Baeza.          [facility]    Home Care Following Endoscopy          Activity:  You have just undergone an endoscopic procedure usually performed with conscious sedation.  Do not work or operate machinery (including a car) for at least 12 hours.    I encourage you to walk and attempt to pass this air as soon as possible.    Diet:  Return to the diet you were on before your procedure but eat lightly for the first 12-24 hours.  Drink plenty of water.  Resume any regular medications unless otherwise advised by your physician.  Please begin any new medication prescribed as a result of your procedure as directed by your physician.   If you had any biopsy or polyp removed please refrain from aspirin or aspirin products for 2 days.  If on Coumadin please restart as instructed by your physician.   Pain:  You may take Tylenol as needed for pain.  Expected Recovery:  You can expect some mild abdominal fullness and/or discomfort due to the air used to inflate your intestinal tract. It is also normal to have a mild sore throat after upper endoscopy.    Call Your Physician if You Have:  After Upper Endoscopy:  Shoulder, back or chest pain.  Difficulty breathing or swallowing.  Vomiting blood.    Any questions or concerns about your recovery, please call [phone] or after hours [phone] Nurse Advice Line.    Follow-up Care:

## 2022-04-22 NOTE — ANESTHESIA PROCEDURE NOTES
Airway       Patient location during procedure: OR  Staff -        CRNA: Cristhian Lagunas APRN CRNA       Performed By: CRNA  Consent for Airway        Urgency: elective  Indications and Patient Condition       Indications for airway management: juan pablo-procedural       Induction type:RSI       Mask difficulty assessment: 0 - not attempted    Final Airway Details       Final airway type: endotracheal airway       Successful airway: ETT - single  Endotracheal Airway Details        ETT size (mm): 7.0       Cuffed: yes       Successful intubation technique: video laryngoscopy       VL Blade Size: Leger 3       Grade View of Cords: 1       Adjucts: stylet       Position: Right       Measured from: lips       Secured at (cm): 21       Bite block used: Oral Airway    Post intubation assessment        Placement verified by: capnometry, equal breath sounds and chest rise        Number of attempts at approach: 1       Number of other approaches attempted: 0       Secured with: plastic tape       Ease of procedure: easy       Dentition: Intact and Unchanged    Additional Comments       After intubation I suctioned down the ETT with 18 fr suction cath.  I got moderate tan relatively thin secretions with some thick chunks in this.  A second suctioning 5 minutes later gave no secretions.
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
## 2022-04-22 NOTE — PROGRESS NOTES
"   04/22/22 1600   General Information   Onset of Illness/Injury or Date of Surgery 04/21/22   Referring Physician Dennis Cooper   Patient/Family Therapy Goal Statement (SLP) \"I was so mad.  I just wanted to be able to drink my soda.\"   Pertinent History of Current Problem Patient is a 56 year old woman who was at home alone eating dinner last night when she reports she was eating a piece of chicken and a piece of it went down towards her lungs.  She reports that she tried during the night to remove the chicken by coughing and drinking water and soda.  She reports that by the time morning arrived, she reports she was not able to even initiate a swallow.  She went to the ED and a surgery was performed where 2 pieces of chicken were removed and she is now an inpatient while recovering from surgery.  Her respiratory system is being monitored.  In addition, she has a diagnosis of aspiration-type pneumonia.   General Observations Patient was alert and reclined n her bed.  She was drinking coffee from a thermos, despite the NPO except for ice chips order and she reported that her mother gave her the coffee.  She reports that she is edentulous and does not have her dentures with her in the hospital.  Her speech was slightly slurred.   Past History of Dysphagia none noted   Pain Assessment   Patient Currently in Pain No   Type of Evaluation   Type of Evaluation Swallow Evaluation   Oral Motor   Oral Musculature generally intact   Structural Abnormalities none present   Mucosal Quality good   Dentition (Oral Motor)   Comment, Dentition (Oral Motor) patiet reports she does not have her dentures with her at the hospital   Dentition (Oral Motor) dental appliance/dentures   Dental Appliance/Denture (Oral Motor) upper and lower   Facial Symmetry (Oral Motor)   Facial Symmetry (Oral Motor) WNL   Lip Function (Oral Motor)   Lip Range of Motion (Oral Motor) WNL   Lip Sensitivity (Oral Motor) unable/difficult to assess   Lip " "Strength (Oral Motor) not tested  (patient refused)   Lip Coordination (Oral Motor)   (mild uncoordination noted when initiated OME)   Comment, Lip Function (Oral Motor) mild reduction in coordination of lip function   Tongue Function (Oral Motor)   Tongue ROM (Oral Motor) unable/difficult to assess  (patient refused)   Comment, Tongue Function (Oral Motor) patient refused to comply with this task   Jaw Function (Oral Motor)   Jaw Function (Oral Motor) not tested   Cough/Swallow/Gag Reflex (Oral Motor)   Soft Palate/Velum (Oral Motor) not tested   Gag Reflex (Oral Motor) not tested   Volitional Throat Clear/Cough (Oral Motor) impaired   Volitional Swallow (Oral Motor) mildly delayed   Comment, Cough/Swallow/Gag Reflex (Oral Motor) average 3 second delay in volitional swallow, cough semi-productive   Vocal Quality/Secretion Management (Oral Motor)   Vocal Quality (Oral Motor) breathy  (lacking full respiratory support)   Secretion Management (Oral Motor) WNL   Comment, Vocal Quality/Secretion Management (Oral Motor) voice characterized by breathy quality, likely due to reduced respiratory status   General Swallowing Observations   Comment, General Swallowing Observations Patient independently takes small sips and bites, reporting that she \"is now very careful\".   Respiratory Support (General Swallowing Observations) nasal cannula   Current Diet/Method of Nutritional Intake (General Swallowing Observations, NIS) NPO   Swallowing Evaluation Clinical swallow evaluation   Clinical Swallow Evaluation   Feeding Assistance set up only required   Additional evaluation(s) completed today No   Clinical Swallow Evaluation Textures Trialed thin liquids   Clinical Swallow Eval: Thin Liquid Texture Trial   Mode of Presentation, Thin Liquids cup;straw;self-fed   Volume of Liquid or Food Presented 4 ounces   Oral Phase of Swallow WFL   Pharyngeal Phase of Swallow intact   Diagnostic Statement no onvert s/s of aspiration noted with " thin liquids when taken by edge of cup or via straw.  Independently taking thin sips of liquid.   Clinical Swallow Evaluation: Puree Solid Texture Trial   Mode of Presentation, Puree spoon;self-fed   Volume of Puree Presented 4 ounces pudding   Oral Phase, Puree WFL   Pharyngeal Phase, Puree intact   Diagnostic Statement no onvert s/s of aspiration noted with pureed foods. Swallowed in timely manner and cleared oral cavity with first swallow. .   Clinical Swallow Eval: Minced & Moist   Mode of Presentation spoon;self-fed   Volume Presented 8 bites peaches   Oral Phase WFL   Pharyngeal Phase intact   Diagnostic Statement no difficulties noted with minced and moist food items.  Trials limited due to patient refusing more trials.  Patient also refusing general food textures due to not being hungry.   Esophageal Phase of Swallow   Patient reports or presents with symptoms of esophageal dysphagia No   Swallowing Recommendations   Diet Consistency Recommendations thin liquids (level 0);minced & moist (level 5)   Supervision Level for Intake distant supervision needed   Mode of Delivery Recommendations food moistened;slow rate of intake   Postural Recommendations other (see comments)  (upright to 90 degrees and midline)   Swallowing Maneuver Recommendations other (see comments)  (slower rate of intake, small bites and sips)   Monitoring/Assistance Required (Eating/Swallowing) monitor for cough or change in vocal quality with intake   Recommended Feeding/Eating Techniques (Swallow Eval) encourage use of dentures;maintain upright sitting position for eating;maintain upright posture during/after eating for 30 minutes;minimize distractions during oral intake;set-up and prepare tray   Medication Administration Recommendations, Swallowing (SLP) pills whole and in food carrier   Comment, Swallowing Recommendations [place], patient did not present with overt s/s of swallowing difficulties with thin liquids, pureed foods, and minced  and moist foods.  Patient refused trials with general textures. While mastication is slightly slower than expected, patient takes small bites and masticates her food well before swallowing.  No overt s/s of aspiraiton or penetration during clinical swallow evaluation.  No SOB noted and not change in vocal quality.  Recommend that patient be on a diet of minced and moist foods and thin liquids, pills whole and in food carrier, patient upright for all oral intake and patient be cued to take small bites and sips at a slow rate when eating.   Clinical Impression   Criteria for Skilled Therapeutic Interventions Met (SLP Eval) No problems identified which require skilled intervention   SLP Diagnosis dysphagia - WFLs   Risks & Benefits of therapy have been explained risks/benefits reviewed;participants voiced agreement with care plan;patient   Clinical Impression Comments Patient presents with a mildly delayed overall swallow, possibly due to recovery from surgery and pre-morbid status.  Delay is slight and patient does not present with any overt s/s of aspiration or penetration.  Recommend resuming oral diet of thin liquids and minced and moist foods and pills whole and in a food carrier.   SLP Discharge Planning   SLP Discharge Recommendation home   SLP Brief overview of current status  Completed clinical swallow evaluation and patient presents with a very mild delay in mastication and initiation of swallow and this is likely due to recent surgery.  Recommend diet of minced and moist foods and thin liquids with pills whole and in a food carrier such as applesauce.  Patient needs to be upright to 90 degress for all oral intake.  Distant supervision at meals is also recommended.    Total Evaluation Time   Total Evaluation Time (Minutes) 34   SLP Goals   Therapy Frequency (SLP Eval) one time eval and treatment only   Psychosocial Support   Trust Relationship/Rapport care explained;questions answered       Thank you for this  referral.  Stephanie Mccracken MA, CCC-SLP

## 2022-04-22 NOTE — PROGRESS NOTES
"S-(situation): Patient arrives to room 252 via cart from PACU @ 1100.    B-(background): Aspiration Pneumonia    A-(assessment): /45 (BP Location: Left arm)   Pulse 105   Temp 99.1  F (37.3  C) (Oral)   Resp 22   Ht 1.753 m (5' 9\")   Wt 48.5 kg (107 lb)   LMP  (LMP Unknown)   SpO2 96%   BMI 15.80 kg/m    Patient on 2L NC with O2 in the mid 90s. Denies pain. No difficulty breathing or SOB. SCDs on. Skin intact except for wound to coccyx. Lungs are clear with crackles to bases.      R-(recommendations): Orders reviewed with patient. Will monitor patient per MD orders.     Inpatient nursing criteria listed below were met:    Health care directives status obtained and documented: Yes  SCD's Documented: Yes  Skin issues/needs documented:Yes  Isolation addressed and Signage used: NA  Fall Prevention: Care plan updated Yes Education given and documented Yes  Care Plan initiated and Co-Morbidities added: Yes  Education Assessment documented:Yes  Admission Education Documented: Yes  If present CAUTI/CLABI Education done: NA  New medication patient education completed and documented (Possible Side Effects of Common Medications handout): Yes  Allergies Reviewed: Yes  Admission Medication Reconciliation completed: Yes  Home medications if not able to send immediately home with family stored here: NA  Reminder note placed in discharge instructions regarding home meds: NA  Individualized care needs/preferences addressed and charted: No        "

## 2022-04-22 NOTE — ED TRIAGE NOTES
Pt reports eating chicken last night for supper and reports a piece got stuck. C/o increased SOB through the night. States she has been trying to drink but states she cant swallow it.

## 2022-04-22 NOTE — TELEPHONE ENCOUNTER
Type of surgery: ESOPHAGOGASTRODUODENOSCOPY WITH FOREIGN BODY REMOVAL   Location of surgery: [facility]  Date and time of surgery: 05/22/22  Surgeon: COLEEN  Pre-Op Appt Date: Patient need in the ER  Post-Op Appt Date: tbd    Packet sent out: No  Pre-cert/Authorization completed:  No  Date:
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
## 2022-04-23 ENCOUNTER — HEALTH MAINTENANCE LETTER (OUTPATIENT)
Age: 57
End: 2022-04-23

## 2022-04-23 PROBLEM — R56.9 SEIZURES (H): Status: ACTIVE | Noted: 2019-05-18

## 2022-04-23 PROBLEM — J69.0 ASPIRATION PNEUMONITIS (H): Status: RESOLVED | Noted: 2022-04-22 | Resolved: 2022-04-23

## 2022-04-23 LAB
ANION GAP SERPL CALCULATED.3IONS-SCNC: 5 MMOL/L (ref 3–14)
BUN SERPL-MCNC: 14 MG/DL (ref 7–30)
CALCIUM SERPL-MCNC: 8.6 MG/DL (ref 8.5–10.1)
CHLORIDE BLD-SCNC: 123 MMOL/L (ref 94–109)
CO2 SERPL-SCNC: 16 MMOL/L (ref 20–32)
CREAT SERPL-MCNC: 0.49 MG/DL (ref 0.52–1.04)
ERYTHROCYTE [DISTWIDTH] IN BLOOD BY AUTOMATED COUNT: 15.2 % (ref 10–15)
GFR SERPL CREATININE-BSD FRML MDRD: >90 ML/MIN/1.73M2
GLUCOSE BLD-MCNC: 124 MG/DL (ref 70–99)
GLUCOSE BLDC GLUCOMTR-MCNC: 144 MG/DL (ref 70–99)
HCT VFR BLD AUTO: 32 % (ref 35–47)
HGB BLD-MCNC: 10.3 G/DL (ref 11.7–15.7)
MCH RBC QN AUTO: 27.6 PG (ref 26.5–33)
MCHC RBC AUTO-ENTMCNC: 32.2 G/DL (ref 31.5–36.5)
MCV RBC AUTO: 86 FL (ref 78–100)
PLATELET # BLD AUTO: 193 10E3/UL (ref 150–450)
POTASSIUM BLD-SCNC: 3.6 MMOL/L (ref 3.4–5.3)
PROCALCITONIN SERPL-MCNC: 1.69 NG/ML
RBC # BLD AUTO: 3.73 10E6/UL (ref 3.8–5.2)
SODIUM SERPL-SCNC: 144 MMOL/L (ref 133–144)
WBC # BLD AUTO: 9.6 10E3/UL (ref 4–11)

## 2022-04-23 PROCEDURE — 250N000013 HC RX MED GY IP 250 OP 250 PS 637: Performed by: HOSPITALIST

## 2022-04-23 PROCEDURE — 99232 SBSQ HOSP IP/OBS MODERATE 35: CPT | Performed by: FAMILY MEDICINE

## 2022-04-23 PROCEDURE — 250N000011 HC RX IP 250 OP 636: Performed by: FAMILY MEDICINE

## 2022-04-23 PROCEDURE — 250N000011 HC RX IP 250 OP 636: Performed by: HOSPITALIST

## 2022-04-23 PROCEDURE — 80048 BASIC METABOLIC PNL TOTAL CA: CPT | Performed by: FAMILY MEDICINE

## 2022-04-23 PROCEDURE — C9113 INJ PANTOPRAZOLE SODIUM, VIA: HCPCS | Performed by: HOSPITALIST

## 2022-04-23 PROCEDURE — 258N000003 HC RX IP 258 OP 636: Performed by: HOSPITALIST

## 2022-04-23 PROCEDURE — 250N000013 HC RX MED GY IP 250 OP 250 PS 637: Performed by: FAMILY MEDICINE

## 2022-04-23 PROCEDURE — 120N000001 HC R&B MED SURG/OB

## 2022-04-23 PROCEDURE — 36415 COLL VENOUS BLD VENIPUNCTURE: CPT | Performed by: FAMILY MEDICINE

## 2022-04-23 PROCEDURE — 85027 COMPLETE CBC AUTOMATED: CPT | Performed by: FAMILY MEDICINE

## 2022-04-23 PROCEDURE — 84145 PROCALCITONIN (PCT): CPT | Performed by: FAMILY MEDICINE

## 2022-04-23 RX ORDER — METRONIDAZOLE 500 MG/100ML
500 INJECTION, SOLUTION INTRAVENOUS EVERY 12 HOURS
Status: DISCONTINUED | OUTPATIENT
Start: 2022-04-23 | End: 2022-04-24

## 2022-04-23 RX ORDER — OXYBUTYNIN CHLORIDE 5 MG/1
10 TABLET ORAL DAILY
Status: DISCONTINUED | OUTPATIENT
Start: 2022-04-24 | End: 2022-04-25 | Stop reason: HOSPADM

## 2022-04-23 RX ORDER — ENOXAPARIN SODIUM 100 MG/ML
1 INJECTION SUBCUTANEOUS EVERY 12 HOURS
Status: DISCONTINUED | OUTPATIENT
Start: 2022-04-24 | End: 2022-04-25 | Stop reason: HOSPADM

## 2022-04-23 RX ORDER — LEVETIRACETAM 500 MG/1
1500 TABLET ORAL 2 TIMES DAILY
Status: DISCONTINUED | OUTPATIENT
Start: 2022-04-23 | End: 2022-04-25 | Stop reason: HOSPADM

## 2022-04-23 RX ADMIN — LEVETIRACETAM 1500 MG: 500 TABLET, FILM COATED ORAL at 20:19

## 2022-04-23 RX ADMIN — METRONIDAZOLE 500 MG: 500 INJECTION, SOLUTION INTRAVENOUS at 10:16

## 2022-04-23 RX ADMIN — METRONIDAZOLE 500 MG: 5 INJECTION, SOLUTION INTRAVENOUS at 20:09

## 2022-04-23 RX ADMIN — POTASSIUM CHLORIDE, DEXTROSE MONOHYDRATE AND SODIUM CHLORIDE: 150; 5; 450 INJECTION, SOLUTION INTRAVENOUS at 04:13

## 2022-04-23 RX ADMIN — METRONIDAZOLE 500 MG: 500 INJECTION, SOLUTION INTRAVENOUS at 01:32

## 2022-04-23 RX ADMIN — SENNOSIDES AND DOCUSATE SODIUM 1 TABLET: 50; 8.6 TABLET ORAL at 20:18

## 2022-04-23 RX ADMIN — PANTOPRAZOLE SODIUM 40 MG: 40 INJECTION, POWDER, FOR SOLUTION INTRAVENOUS at 08:59

## 2022-04-23 RX ADMIN — CEFTRIAXONE SODIUM 2 G: 2 INJECTION, POWDER, FOR SOLUTION INTRAMUSCULAR; INTRAVENOUS at 08:56

## 2022-04-23 ASSESSMENT — ACTIVITIES OF DAILY LIVING (ADL)
ADLS_ACUITY_SCORE: 10
ADLS_ACUITY_SCORE: 6
ADLS_ACUITY_SCORE: 12
ADLS_ACUITY_SCORE: 10
ADLS_ACUITY_SCORE: 6
ADLS_ACUITY_SCORE: 10
ADLS_ACUITY_SCORE: 10
ADLS_ACUITY_SCORE: 6
ADLS_ACUITY_SCORE: 6
ADLS_ACUITY_SCORE: 10
ADLS_ACUITY_SCORE: 10
ADLS_ACUITY_SCORE: 8
ADLS_ACUITY_SCORE: 10
ADLS_ACUITY_SCORE: 6
ADLS_ACUITY_SCORE: 10
ADLS_ACUITY_SCORE: 6
ADLS_ACUITY_SCORE: 8

## 2022-04-23 NOTE — PLAN OF CARE
Goal Outcome Evaluation:      Dressing intact on coccyx unable to assess. IV antibiotics given. Vitals stable. Tylenol held due to acetaminophen allergies. Will continue to monitor.

## 2022-04-23 NOTE — PLAN OF CARE
Goal Outcome Evaluation:    Plan of Care Reviewed With: patient     Overall Patient Progress: improving    Outcome Evaluation: Plans to return to [facility]. On room air with O2 in the mid 90s. No SOB or dyspnea on exertion. Lungs are clear and occasional crackles to bases. A&Ox4. Denying pain. Tolerating clear.

## 2022-04-23 NOTE — PROGRESS NOTES
Primary RN from [id number].  Straight cath using Stoma for 900cc foul smelling urine with mucus, pt reports relief.  Ambien given for sleep aid.  Coccyx wound dressing changed on previous shift.  [facility] [place]

## 2022-04-23 NOTE — PLAN OF CARE
Goal Outcome Evaluation:    Plan of Care Reviewed With: patient     Overall Patient Progress: improving    Outcome Evaluation: Ate poorly as ws not impressed with the food. Was straight cathed for 1025ml malodorous dark nikki urine with large amounts of sediment in it. Pt's mother brought her wheelchair in. Has denied pain. Irritable. IV fluids continue. Pt insisted on waiting for her own colostomy  bags to come this afternoon so that she could change the bag herself. Has denied pain. Foam dressings to sacral area, buttocks, and heels are CDI.
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
## 2022-04-24 LAB
ANION GAP SERPL CALCULATED.3IONS-SCNC: 7 MMOL/L (ref 3–14)
BUN SERPL-MCNC: 11 MG/DL (ref 7–30)
CALCIUM SERPL-MCNC: 8.2 MG/DL (ref 8.5–10.1)
CHLORIDE BLD-SCNC: 122 MMOL/L (ref 94–109)
CO2 SERPL-SCNC: 14 MMOL/L (ref 20–32)
CREAT SERPL-MCNC: 0.4 MG/DL (ref 0.52–1.04)
ERYTHROCYTE [DISTWIDTH] IN BLOOD BY AUTOMATED COUNT: 15.2 % (ref 10–15)
GFR SERPL CREATININE-BSD FRML MDRD: >90 ML/MIN/1.73M2
GLUCOSE BLD-MCNC: 96 MG/DL (ref 70–99)
HCT VFR BLD AUTO: 29.6 % (ref 35–47)
HGB BLD-MCNC: 9.4 G/DL (ref 11.7–15.7)
MCH RBC QN AUTO: 27.5 PG (ref 26.5–33)
MCHC RBC AUTO-ENTMCNC: 31.8 G/DL (ref 31.5–36.5)
MCV RBC AUTO: 87 FL (ref 78–100)
PLATELET # BLD AUTO: 188 10E3/UL (ref 150–450)
POTASSIUM BLD-SCNC: 3.4 MMOL/L (ref 3.4–5.3)
RBC # BLD AUTO: 3.42 10E6/UL (ref 3.8–5.2)
SODIUM SERPL-SCNC: 143 MMOL/L (ref 133–144)
WBC # BLD AUTO: 7.6 10E3/UL (ref 4–11)

## 2022-04-24 PROCEDURE — C9113 INJ PANTOPRAZOLE SODIUM, VIA: HCPCS | Performed by: HOSPITALIST

## 2022-04-24 PROCEDURE — 250N000013 HC RX MED GY IP 250 OP 250 PS 637: Performed by: FAMILY MEDICINE

## 2022-04-24 PROCEDURE — 85027 COMPLETE CBC AUTOMATED: CPT | Performed by: FAMILY MEDICINE

## 2022-04-24 PROCEDURE — 250N000013 HC RX MED GY IP 250 OP 250 PS 637: Performed by: INTERNAL MEDICINE

## 2022-04-24 PROCEDURE — 250N000011 HC RX IP 250 OP 636: Performed by: HOSPITALIST

## 2022-04-24 PROCEDURE — 250N000011 HC RX IP 250 OP 636: Performed by: FAMILY MEDICINE

## 2022-04-24 PROCEDURE — 36415 COLL VENOUS BLD VENIPUNCTURE: CPT | Performed by: FAMILY MEDICINE

## 2022-04-24 PROCEDURE — 120N000001 HC R&B MED SURG/OB

## 2022-04-24 PROCEDURE — 250N000013 HC RX MED GY IP 250 OP 250 PS 637: Performed by: HOSPITALIST

## 2022-04-24 PROCEDURE — 99232 SBSQ HOSP IP/OBS MODERATE 35: CPT | Performed by: FAMILY MEDICINE

## 2022-04-24 PROCEDURE — 80048 BASIC METABOLIC PNL TOTAL CA: CPT | Performed by: FAMILY MEDICINE

## 2022-04-24 RX ORDER — METRONIDAZOLE 500 MG/1
500 TABLET ORAL 3 TIMES DAILY
Status: DISCONTINUED | OUTPATIENT
Start: 2022-04-24 | End: 2022-04-25 | Stop reason: HOSPADM

## 2022-04-24 RX ORDER — PROPRANOLOL HYDROCHLORIDE 40 MG/1
20 TABLET ORAL 2 TIMES DAILY
Qty: 60 TABLET | Refills: 3 | Status: SHIPPED | OUTPATIENT
Start: 2022-04-24 | End: 2022-04-25

## 2022-04-24 RX ORDER — ENOXAPARIN SODIUM 100 MG/ML
1 INJECTION SUBCUTANEOUS EVERY 12 HOURS
Qty: 35 ML | Refills: 0 | Status: SHIPPED | OUTPATIENT
Start: 2022-04-25 | End: 2022-04-25

## 2022-04-24 RX ORDER — CEFDINIR 300 MG/1
300 CAPSULE ORAL EVERY 12 HOURS
Qty: 8 CAPSULE | Refills: 0 | Status: SHIPPED | OUTPATIENT
Start: 2022-04-25 | End: 2022-04-29

## 2022-04-24 RX ORDER — PANTOPRAZOLE SODIUM 40 MG/1
40 TABLET, DELAYED RELEASE ORAL
Status: DISCONTINUED | OUTPATIENT
Start: 2022-04-25 | End: 2022-04-25 | Stop reason: HOSPADM

## 2022-04-24 RX ORDER — CEFDINIR 300 MG/1
300 CAPSULE ORAL EVERY 12 HOURS SCHEDULED
Status: DISCONTINUED | OUTPATIENT
Start: 2022-04-24 | End: 2022-04-25 | Stop reason: HOSPADM

## 2022-04-24 RX ORDER — METRONIDAZOLE 500 MG/1
500 TABLET ORAL 3 TIMES DAILY
Qty: 12 TABLET | Refills: 0 | Status: SHIPPED | OUTPATIENT
Start: 2022-04-25 | End: 2022-04-29

## 2022-04-24 RX ORDER — HYDROXYZINE HYDROCHLORIDE 10 MG/1
10 TABLET, FILM COATED ORAL EVERY 6 HOURS PRN
Status: ON HOLD | COMMUNITY
Start: 2022-04-24 | End: 2022-09-28

## 2022-04-24 RX ORDER — PANTOPRAZOLE SODIUM 40 MG/1
40 TABLET, DELAYED RELEASE ORAL
Qty: 30 TABLET | Refills: 0 | Status: SHIPPED | OUTPATIENT
Start: 2022-04-25 | End: 2022-04-27

## 2022-04-24 RX ADMIN — CEFDINIR 300 MG: 300 CAPSULE ORAL at 20:38

## 2022-04-24 RX ADMIN — LEVETIRACETAM 1500 MG: 500 TABLET, FILM COATED ORAL at 20:37

## 2022-04-24 RX ADMIN — SENNOSIDES AND DOCUSATE SODIUM 1 TABLET: 50; 8.6 TABLET ORAL at 20:38

## 2022-04-24 RX ADMIN — METRONIDAZOLE 500 MG: 500 TABLET, FILM COATED ORAL at 20:37

## 2022-04-24 RX ADMIN — PANTOPRAZOLE SODIUM 40 MG: 40 INJECTION, POWDER, FOR SOLUTION INTRAVENOUS at 08:32

## 2022-04-24 RX ADMIN — ENOXAPARIN SODIUM 50 MG: 60 INJECTION, SOLUTION INTRAVENOUS; SUBCUTANEOUS at 08:30

## 2022-04-24 RX ADMIN — CEFDINIR 300 MG: 300 CAPSULE ORAL at 08:28

## 2022-04-24 RX ADMIN — METRONIDAZOLE 500 MG: 500 TABLET, FILM COATED ORAL at 08:28

## 2022-04-24 RX ADMIN — METRONIDAZOLE 500 MG: 500 TABLET, FILM COATED ORAL at 14:39

## 2022-04-24 RX ADMIN — ENOXAPARIN SODIUM 50 MG: 60 INJECTION, SOLUTION INTRAVENOUS; SUBCUTANEOUS at 20:38

## 2022-04-24 RX ADMIN — OXYBUTYNIN CHLORIDE 10 MG: 5 TABLET ORAL at 08:28

## 2022-04-24 RX ADMIN — Medication 1 LOZENGE: at 20:00

## 2022-04-24 RX ADMIN — LEVETIRACETAM 1500 MG: 500 TABLET, FILM COATED ORAL at 08:28

## 2022-04-24 ASSESSMENT — ACTIVITIES OF DAILY LIVING (ADL)
ADLS_ACUITY_SCORE: 8

## 2022-04-24 NOTE — PLAN OF CARE
Goal Outcome Evaluation:    Plan of Care Reviewed With: patient     Overall Patient Progress: improving     Vitals stable. Dressings on coccyx and heels remain intact, heels elevated off bed. Pt decline any ostomy or stoma cares and requested to be left alone to sleep. IV fluids stopped and Pt has no issues with oral intake of fluids. HGB has been decreasing 9.4. Will continue to monitor.
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
## 2022-04-24 NOTE — PROGRESS NOTES
Care Management Follow Up    Length of Stay (days): 2    Expected Discharge Date: 04/25/2022     Concerns to be Addressed: discharge planning       Patient plan of care discussed at interdisciplinary rounds: Yes    Anticipated Discharge Disposition: Assisted Living     Anticipated Discharge Services: Transportation Services,     Continue with [facility] RN for wound care    Anticipated Discharge DME: manual wheelchair      Patient/family educated on Medicare website which has current facility and service quality ratings: no--currently open to services    Education Provided on the Discharge Plan:  yes  Patient/Family in Agreement with the Plan: yes    Referrals Placed by CM/SW: Internal Clinic Care Coordination, Homecare, Transportation  Private pay costs discussed: Not applicable    Additional Information:  Update received during IDT rounds. Informed Pt is anticipated to be medically ready for discharge tomorrow 4/25/22    CM met with the pt to discuss the plan for transfer back to the [facility]. Pt states she is looking to discharge. Requested CM arrange transport with [facility]. Pt has her manual wheelchair in room, states her mother delivered the chair yesterday.     CM called [employer] #[phone] and arranged transport for Monday 4/25 @ 10:30am    Call placed to the Weekend On-Call RN at [facility] #[phone]  Attempted to leave a vm-however the voicemail box was full.     Call placed to Nurse cell # [phone] and spoke with BOB Hyatt to provide update with discharge plan. Olena verbalized agreement.     [facility] RN for wound care 3x per week  CM left a VM to inform of planned discharge:  #[phone]    PLAN: EARLY Discharge 4/25  Return back to [facility] in [place]   #[phone] Fax # [phone]    Resume services through [facility] RN  [facility] #[phone] Fax: [phone]    [facility] @ 10:30[facility]      JUAN Moreno

## 2022-04-24 NOTE — PLAN OF CARE
Goal Outcome Evaluation:    Plan of Care Reviewed With: patient     Overall Patient Progress: improving    Outcome Evaluation: No swallowing difficulty. Tolerating meals. Lungs are clear. No SOB reported. No pain. VSS. Afebrile. Turned and repositioned q2. Plan to [facility] at 1030 tomorrow morning. Dressing to coccyx CDI.

## 2022-04-24 NOTE — ANESTHESIA POSTPROCEDURE EVALUATION
Patient: Felicia Ellison    Procedure: Procedure(s):  ESOPHAGOGASTRODUODENOSCOPY, WITH FOREIGN BODY REMOVAL       Anesthesia Type:  General    Note:  Disposition: Outpatient   Postop Pain Control: Uneventful            Sign Out: Well controlled pain   PONV: No   Neuro/Psych: Uneventful            Sign Out: Acceptable/Baseline neuro status   Airway/Respiratory: Uneventful            Sign Out: Acceptable/Baseline resp. status   CV/Hemodynamics: Uneventful            Sign Out: Acceptable CV status   Other NRE: NONE   DID A NON-ROUTINE EVENT OCCUR? No    Event details/Postop Comments:  Pt was happy with anesthesia care.  No complications. Patient remains on the floor and is doing well.   I will follow up with the pt if needed.            Last vitals:  Vitals Value Taken Time   /67 04/22/22 1050   Temp 98.96  F (37.2  C) 04/22/22 1050   Pulse 108 04/22/22 1051   Resp 21 04/22/22 1051   SpO2 95 % 04/22/22 1051   Vitals shown include unvalidated device data.    Electronically Signed By: VERENA Balbuena CRNA  April 23, 2022  10:07 PM

## 2022-04-25 ENCOUNTER — MEDICAL CORRESPONDENCE (OUTPATIENT)
Dept: HEALTH INFORMATION MANAGEMENT | Facility: CLINIC | Age: 57
End: 2022-04-25
Payer: COMMERCIAL

## 2022-04-25 ENCOUNTER — PATIENT OUTREACH (OUTPATIENT)
Dept: CARE COORDINATION | Facility: CLINIC | Age: 57
End: 2022-04-25
Payer: COMMERCIAL

## 2022-04-25 VITALS
HEART RATE: 77 BPM | TEMPERATURE: 98.4 F | DIASTOLIC BLOOD PRESSURE: 53 MMHG | WEIGHT: 107 LBS | SYSTOLIC BLOOD PRESSURE: 104 MMHG | BODY MASS INDEX: 15.85 KG/M2 | RESPIRATION RATE: 16 BRPM | OXYGEN SATURATION: 96 % | HEIGHT: 69 IN

## 2022-04-25 LAB
ALBUMIN SERPL-MCNC: 2.1 G/DL (ref 3.4–5)
ALP SERPL-CCNC: 128 U/L (ref 40–150)
ALT SERPL W P-5'-P-CCNC: <6 U/L (ref 0–50)
ANION GAP SERPL CALCULATED.3IONS-SCNC: 7 MMOL/L (ref 3–14)
AST SERPL W P-5'-P-CCNC: 5 U/L (ref 0–45)
BASE EXCESS BLDV CALC-SCNC: -11.4 MMOL/L (ref -7.7–1.9)
BILIRUB SERPL-MCNC: 0.3 MG/DL (ref 0.2–1.3)
BUN SERPL-MCNC: 10 MG/DL (ref 7–30)
CALCIUM SERPL-MCNC: 8.2 MG/DL (ref 8.5–10.1)
CHLORIDE BLD-SCNC: 122 MMOL/L (ref 94–109)
CO2 SERPL-SCNC: 13 MMOL/L (ref 20–32)
CREAT SERPL-MCNC: 0.36 MG/DL (ref 0.52–1.04)
GFR SERPL CREATININE-BSD FRML MDRD: >90 ML/MIN/1.73M2
GLUCOSE BLD-MCNC: 101 MG/DL (ref 70–99)
HCO3 BLDV-SCNC: 15 MMOL/L (ref 21–28)
HGB BLD-MCNC: 9 G/DL (ref 11.7–15.7)
O2/TOTAL GAS SETTING VFR VENT: 21 %
PCO2 BLDV: 36 MM HG (ref 40–50)
PH BLDV: 7.23 [PH] (ref 7.32–7.43)
PO2 BLDV: 28 MM HG (ref 25–47)
POTASSIUM BLD-SCNC: 3.5 MMOL/L (ref 3.4–5.3)
PROT SERPL-MCNC: 5.4 G/DL (ref 6.8–8.8)
SODIUM SERPL-SCNC: 142 MMOL/L (ref 133–144)

## 2022-04-25 PROCEDURE — 36415 COLL VENOUS BLD VENIPUNCTURE: CPT | Performed by: FAMILY MEDICINE

## 2022-04-25 PROCEDURE — 85018 HEMOGLOBIN: CPT | Performed by: FAMILY MEDICINE

## 2022-04-25 PROCEDURE — 80053 COMPREHEN METABOLIC PANEL: CPT | Performed by: FAMILY MEDICINE

## 2022-04-25 PROCEDURE — 82803 BLOOD GASES ANY COMBINATION: CPT | Performed by: PEDIATRICS

## 2022-04-25 PROCEDURE — 86803 HEPATITIS C AB TEST: CPT

## 2022-04-25 PROCEDURE — 250N000011 HC RX IP 250 OP 636: Performed by: FAMILY MEDICINE

## 2022-04-25 PROCEDURE — 99238 HOSP IP/OBS DSCHRG MGMT 30/<: CPT | Performed by: PEDIATRICS

## 2022-04-25 PROCEDURE — 250N000013 HC RX MED GY IP 250 OP 250 PS 637: Performed by: FAMILY MEDICINE

## 2022-04-25 PROCEDURE — 87389 HIV-1 AG W/HIV-1&-2 AB AG IA: CPT

## 2022-04-25 PROCEDURE — 250N000013 HC RX MED GY IP 250 OP 250 PS 637: Performed by: PEDIATRICS

## 2022-04-25 PROCEDURE — 36415 COLL VENOUS BLD VENIPUNCTURE: CPT | Performed by: PEDIATRICS

## 2022-04-25 RX ORDER — PROPRANOLOL HYDROCHLORIDE 40 MG/1
TABLET ORAL
Qty: 60 TABLET | Refills: 3 | COMMUNITY
Start: 2022-04-25 | End: 2022-04-27

## 2022-04-25 RX ORDER — ENOXAPARIN SODIUM 100 MG/ML
1 INJECTION SUBCUTANEOUS DAILY
Qty: 35 ML | Refills: 0 | Status: ON HOLD | OUTPATIENT
Start: 2022-04-25 | End: 2022-09-03

## 2022-04-25 RX ORDER — MIRTAZAPINE 15 MG/1
7.5 TABLET, FILM COATED ORAL AT BEDTIME
COMMUNITY
Start: 2022-04-25 | End: 2022-08-10

## 2022-04-25 RX ORDER — DEXTRAN 70, AND HYPROMELLOSE 2910 1; 3 MG/ML; MG/ML
1 SOLUTION/ DROPS OPHTHALMIC
Qty: 30 ML | Refills: 0 | COMMUNITY
Start: 2022-04-25 | End: 2024-01-01

## 2022-04-25 RX ORDER — RIZATRIPTAN BENZOATE 5 MG/1
10 TABLET, ORALLY DISINTEGRATING ORAL ONCE
Status: COMPLETED | OUTPATIENT
Start: 2022-04-25 | End: 2022-04-25

## 2022-04-25 RX ADMIN — LEVETIRACETAM 1500 MG: 500 TABLET, FILM COATED ORAL at 08:11

## 2022-04-25 RX ADMIN — METRONIDAZOLE 500 MG: 500 TABLET, FILM COATED ORAL at 08:12

## 2022-04-25 RX ADMIN — OXYBUTYNIN CHLORIDE 10 MG: 5 TABLET ORAL at 08:12

## 2022-04-25 RX ADMIN — PANTOPRAZOLE SODIUM 40 MG: 40 TABLET, DELAYED RELEASE ORAL at 06:42

## 2022-04-25 RX ADMIN — CEFDINIR 300 MG: 300 CAPSULE ORAL at 08:12

## 2022-04-25 RX ADMIN — ENOXAPARIN SODIUM 50 MG: 60 INJECTION, SOLUTION INTRAVENOUS; SUBCUTANEOUS at 08:13

## 2022-04-25 RX ADMIN — RIZATRIPTAN BENZOATE 10 MG: 5 TABLET, ORALLY DISINTEGRATING ORAL at 09:54

## 2022-04-25 ASSESSMENT — ACTIVITIES OF DAILY LIVING (ADL)
ADLS_ACUITY_SCORE: 8
ADLS_ACUITY_SCORE: 10

## 2022-04-25 NOTE — TELEPHONE ENCOUNTER
Routing refill request to provider for review/approval because:  Drug not on the [facility] refill protocol       Alia HensleyRN

## 2022-04-25 NOTE — PLAN OF CARE
S-(situation): Patient discharged to [facility] via wheelchair via [facility]    B-(background): Aspiration Pneumonia    A-(assessment): Pt's lung sounds are clear. VSS, afebrile this shift. No swallowing difficulty noted this shift. Nurse to nurse report given to Olena at [facility]. Pt states feeling comfort discharging to [facility] today.    R-(recommendations): Discharge instructions reviewed with Patient and Nurse Olena. Listed belongings gathered and returned to patient.         Discharge Nursing Criteria:     Care Plan and Patient education resolved: Yes    New Medications- pt has been educated about purpose and side effects: Yes    Vaccines  Influenza status verified at discharge:  Yes    Intentionally Retained Items (examples: Drains, Wound packing, ureteral stents, mcintyre, PICC line or IV)  Patient was sent home with 2 gauze in wound left in place.   Follow up appointment made for removal:  Wound care to see pt at [facility]. Nurse to nurse report given.    MISC  Prescriptions if needed, hard copies sent with patient  Yes  Home medications returned to patient: NA  Medication Bin checked and emptied on discharge Yes  Patient reports post-discharge pain management plan is effective: Yes

## 2022-04-25 NOTE — PROGRESS NOTES
Clinic Care Coordination Contact  [facility]/Voicemail       Clinical Data: Care Coordinator Outreach  Outreach attempted x 1.  Left message on patient's voicemail advising our team will try reaching her again in 1-2 business days.  Plan: Care Coordinator will try to reach patient again in 1-2 business days.    Amrita Padilla, Casual RN Care Coordinator  [facility]  (Covering for Lead RN Care Coordinator)

## 2022-04-25 NOTE — PLAN OF CARE
Goal Outcome Evaluation:    Plan of Care Reviewed With: patient     Overall Patient Progress: improving     Vitals stable, no C/O from Pt. Pt requested to no be bothered over the night so she can get sleep, Multiple visual checks made without waking up Pt. Will continue to monitor.
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
## 2022-04-25 NOTE — PROGRESS NOTES
Care Management Discharge Note    Discharge Date: 04/25/2022       Discharge Disposition: Assisted Living    Discharge Services: Transportation Services, Other (see comment) ([facility] for wound care)      Discharge Transportation: health plan transportation ([employer])    Private pay costs discussed: Not applicable    Education Provided on the Discharge Plan:  Yes     Persons Notified of Discharge Plans: Patient, Olena- RN at [facility]     Patient/Family in Agreement with the Plan: yes    Handoff Referral Completed: Yes    Additional Information:  Patient is ready for discharge back to her [facility] assisted living apartment today.  [employer] to transport at 1030 today.  Writer has spoken with zaira at [facility] and they plan to resume patient's wound care services on Wednesday, 4/27/22.  Writer has spoken with Olena at [facility].  She is aware of patient's return today and has faxed patient's med list to the nursing station here, per request.        JUAN Seay  [facility]   [phone]

## 2022-04-26 ENCOUNTER — PATIENT OUTREACH (OUTPATIENT)
Dept: CARE COORDINATION | Facility: CLINIC | Age: 57
End: 2022-04-26
Payer: COMMERCIAL

## 2022-04-26 RX ORDER — RIZATRIPTAN BENZOATE 10 MG/1
TABLET ORAL
Qty: 15 TABLET | Refills: 0 | Status: SHIPPED | OUTPATIENT
Start: 2022-04-26 | End: 2022-07-19

## 2022-04-26 NOTE — LETTER
[facility] CARE COORDINATION  [address]   [address]  Tel. [phone] / Fax [phone]    April 26, 2022    Felicia RIOS Terra  [facility]  [address] [address]      Dear Felicia Schneider,    I am a clinic care coordinator who works at [facility] [employer]. I wanted to introduce myself and provide you with my contact information for you to be able to call me with any questions or concerns. Below is a description of clinic care coordination and how I can further assist you.      The clinic care coordination team is made up of a registered nurse,  and community health worker who understand the health care system. The goal of clinic care coordination is to help you manage your health and improve access to the health care system in the most efficient manner. The team can assist you in meeting your health care goals by providing education, coordinating services, strengthening the communication among your providers and supporting you with any resource needs.    Please feel free to contact me at [phone] with any questions or concerns. We are focused on providing you with the highest-quality healthcare experience possible and that all starts with you.     Sincerely,     Malou GUPTA, RN, PHN, CCM  Primary Clinic Care Coordination    [facility]  Primary Care Clinics  [email or web address]   Office: [phone]  Employed by [facility]

## 2022-04-26 NOTE — PROGRESS NOTES
Clinic Care Coordination Contact  [facility]/Voicemail       Clinical Data: Care Coordinator Outreach  Outreach attempted x 2.  Left message on patient's voicemail with call back information and requested return call.  Plan: Care Coordinator will send unable to contact letter with care coordinator contact information via [place]. Care Coordinator will do no further outreaches at this time.      Malou GUPTA, RN, PHN, CCM  Primary Clinic Care Coordination    [facility]  Primary Care Clinics  [email or web address]   Office: [phone]  Employed by [facility] [facility]

## 2022-04-27 ENCOUNTER — OFFICE VISIT (OUTPATIENT)
Dept: FAMILY MEDICINE | Facility: CLINIC | Age: 57
End: 2022-04-27
Payer: COMMERCIAL

## 2022-04-27 VITALS
TEMPERATURE: 97.6 F | SYSTOLIC BLOOD PRESSURE: 108 MMHG | HEART RATE: 86 BPM | OXYGEN SATURATION: 97 % | RESPIRATION RATE: 14 BRPM | DIASTOLIC BLOOD PRESSURE: 66 MMHG

## 2022-04-27 DIAGNOSIS — K21.9 GASTROESOPHAGEAL REFLUX DISEASE WITHOUT ESOPHAGITIS: ICD-10-CM

## 2022-04-27 DIAGNOSIS — Z11.59 NEED FOR HEPATITIS C SCREENING TEST: ICD-10-CM

## 2022-04-27 DIAGNOSIS — Z11.4 SCREENING FOR HIV (HUMAN IMMUNODEFICIENCY VIRUS): ICD-10-CM

## 2022-04-27 DIAGNOSIS — G43.709 CHRONIC MIGRAINE WITHOUT AURA WITHOUT STATUS MIGRAINOSUS, NOT INTRACTABLE: ICD-10-CM

## 2022-04-27 DIAGNOSIS — E43 SEVERE PROTEIN-CALORIE MALNUTRITION (H): ICD-10-CM

## 2022-04-27 DIAGNOSIS — T18.108A FOREIGN BODY IN ESOPHAGUS, INITIAL ENCOUNTER: ICD-10-CM

## 2022-04-27 DIAGNOSIS — J69.0: Primary | ICD-10-CM

## 2022-04-27 DIAGNOSIS — Z23 NEED FOR PROPHYLACTIC VACCINATION AND INOCULATION AGAINST INFLUENZA: ICD-10-CM

## 2022-04-27 LAB
BACTERIA BLD CULT: NO GROWTH
BACTERIA BLD CULT: NO GROWTH

## 2022-04-27 PROCEDURE — 0011A COVID-19,PF,MODERNA (18+ YRS PRIMARY SERIES .5ML): CPT | Performed by: FAMILY MEDICINE

## 2022-04-27 PROCEDURE — 99496 TRANSJ CARE MGMT HIGH F2F 7D: CPT | Performed by: FAMILY MEDICINE

## 2022-04-27 PROCEDURE — 91301 COVID-19,PF,MODERNA (18+ YRS PRIMARY SERIES .5ML): CPT | Performed by: FAMILY MEDICINE

## 2022-04-27 RX ORDER — TOPIRAMATE 25 MG/1
25 TABLET, FILM COATED ORAL DAILY
Qty: 30 TABLET | Refills: 1 | Status: SHIPPED | OUTPATIENT
Start: 2022-04-27 | End: 2022-06-15

## 2022-04-27 RX ORDER — MULTIVIT WITH MINERALS/LUTEIN
1 TABLET ORAL DAILY
Qty: 100 TABLET | Refills: 1 | COMMUNITY
Start: 2022-04-27 | End: 2024-05-30

## 2022-04-27 RX ORDER — PROPRANOLOL HYDROCHLORIDE 40 MG/1
20 TABLET ORAL 2 TIMES DAILY
Qty: 90 TABLET | Refills: 3 | Status: SHIPPED | OUTPATIENT
Start: 2022-04-27 | End: 2022-09-28

## 2022-04-27 RX ORDER — PANTOPRAZOLE SODIUM 40 MG/1
40 TABLET, DELAYED RELEASE ORAL
Qty: 90 TABLET | Refills: 0 | Status: SHIPPED | OUTPATIENT
Start: 2022-04-27 | End: 2022-07-13

## 2022-04-27 ASSESSMENT — PAIN SCALES - GENERAL: PAINLEVEL: MODERATE PAIN (5)

## 2022-04-27 NOTE — PROGRESS NOTES
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 ng/mL Possible early systemic infection or localized infection  Encourage antibiotics only in correct clinical context.  Consider obtaining blood cultures or other relevant cultures.  Recheck PCT in 6-12 hours to ensure baseline low level.  If repeat PCT is rising, consider early systemic infection and consider starting antibiotics.    0.50-1.99 ng/mL Moderate risk of systemic infection.  Recommend antibiotics.  Evaluate culture results and clinical features to target antibacterial therapy.  Obtain blood cultures and other relevant cultures if not done.    If empiric antibiotics were started, recheck PCT in:       2 days to guide antibiotic de-escalation.       Discontinue or de-escalate                            antibiotics when PCT concentration is <80% of      peak or abs PCT <0.5.    If empiric antibiotics were NOT started, recheck PCT in:       6-24 hours to re-evaluate need for antibiotics.     2.00-9.99 g/mL High risk for progression to severe sepsis.  Strongly recommend initiating or continuing antibiotics.  Evaluate culture results and clinical features to target antibacterial  therapy.  Obtain blood cultures and other relevant cultures if not done.  Repeat PCT in 2 days to guide antibiotic de-escalation.  Consider de-escalating antibiotics when PCT concentration is <80% or peak or abs PCT < 0.05.    Greater than or equal to 10 ng/mL Very high likelihood of severe sepsis or septic shock.  Strongly recommend initiating or continuing antibiotics.  Evaluate culture results and clinical features to target antibacterial therapy.  Obtain blood cultures and other relevant cultures if not done.  Repeat PCT in 2 days to guide antibiotic de-escalation.  Consider de-escalating antibiotics when PCT                            concentration is <80% of peak or abs PCT < 1.       WBC Count 04/24/2022 7.6  4.0 - 11.0 10e3/uL Final     RBC Count 04/24/2022 3.42 (A) 3.80 - 5.20 10e6/uL Final     Hemoglobin 04/24/2022 9.4 (A) 11.7 - 15.7 g/dL Final     Hematocrit 04/24/2022 29.6 (A) 35.0 - 47.0 % Final     MCV 04/24/2022 87  78 - 100 fL Final     MCH 04/24/2022 27.5  26.5 - 33.0 pg Final     MCHC 04/24/2022 31.8  31.5 - 36.5 g/dL Final     RDW 04/24/2022 15.2 (A) 10.0 - 15.0 % Final     Platelet Count 04/24/2022 188  150 - 450 10e3/uL Final     Sodium 04/24/2022 143  133 - 144 mmol/L Final     Potassium 04/24/2022 3.4  3.4 - 5.3 mmol/L Final     Chloride 04/24/2022 122 (A) 94 - 109 mmol/L Final     Carbon Dioxide (CO2) 04/24/2022 14 (A) 20 - 32 mmol/L Final     Anion Gap 04/24/2022 7  3 - 14 mmol/L Final     Urea Nitrogen 04/24/2022 11  7 - 30 mg/dL Final     Creatinine 04/24/2022 0.40 (A) 0.52 - 1.04 mg/dL Final     Calcium 04/24/2022 8.2 (A) 8.5 - 10.1 mg/dL Final     Glucose 04/24/2022 96  70 - 99 mg/dL Final     GFR Estimate 04/24/2022 >90  >60 mL/min/1.73m2 Final    Effective December 21, 2021 eGFRcr in adults is calculated using the 2021 CKD-EPI creatinine equation which includes age and gender (Stanley et al., NEJM, DOI: 10.1056/OLQFth0044578)     Sodium 04/25/2022 142  133 - 144 mmol/L Final      Potassium 04/25/2022 3.5  3.4 - 5.3 mmol/L Final     Chloride 04/25/2022 122 (A) 94 - 109 mmol/L Final     Carbon Dioxide (CO2) 04/25/2022 13 (A) 20 - 32 mmol/L Final     Anion Gap 04/25/2022 7  3 - 14 mmol/L Final     Urea Nitrogen 04/25/2022 10  7 - 30 mg/dL Final     Creatinine 04/25/2022 0.36 (A) 0.52 - 1.04 mg/dL Final     Calcium 04/25/2022 8.2 (A) 8.5 - 10.1 mg/dL Final     Glucose 04/25/2022 101 (A) 70 - 99 mg/dL Final     Alkaline Phosphatase 04/25/2022 128  40 - 150 U/L Final     AST 04/25/2022 5  0 - 45 U/L Final     ALT 04/25/2022 <6  0 - 50 U/L Final     Protein Total 04/25/2022 5.4 (A) 6.8 - 8.8 g/dL Final     Albumin 04/25/2022 2.1 (A) 3.4 - 5.0 g/dL Final     Bilirubin Total 04/25/2022 0.3  0.2 - 1.3 mg/dL Final     GFR Estimate 04/25/2022 >90  >60 mL/min/1.73m2 Final    Effective December 21, 2021 eGFRcr in adults is calculated using the 2021 CKD-EPI creatinine equation which includes age and gender (Stanley caal al., NE, DOI: 10.1056/UYCVen3789679)     Hemoglobin 04/25/2022 9.0 (A) 11.7 - 15.7 g/dL Final     pH Venous 04/25/2022 7.23 (A) 7.32 - 7.43 Final     pCO2 Venous 04/25/2022 36 (A) 40 - 50 mm Hg Final     pO2 Venous 04/25/2022 28  25 - 47 mm Hg Final     Bicarbonate Venous 04/25/2022 15 (A) 21 - 28 mmol/L Final     Base Excess/Deficit (+/-) 04/25/2022 -11.4 (A) -7.7 - 1.9 mmol/L Final     FIO2 04/25/2022 21   Final     XR Chest Port 1 View    Result Date: 4/22/2022  CHEST PORTABLE ONE VIEW April 22, 2022 7:37 AM    INDICATION: Shortness of breath, possible aspiration. COMPARISON: 1/6/2020.     IMPRESSION: New interstitial infiltrates in the right mid and lower lung and in the left lower lung consistent with pneumonia or aspiration pneumonitis. Postoperative changes in the lower thoracic and lumbar spine partially visualized. Heart size normal. No pleural effusion or pneumothorax. BAKARI LEAL MD   SYSTEM ID:  [id number]

## 2022-04-28 LAB
HCV AB SERPL QL IA: NONREACTIVE
HIV 1+2 AB+HIV1 P24 AG SERPL QL IA: NONREACTIVE

## 2022-05-05 ENCOUNTER — HOSPITAL ENCOUNTER (OUTPATIENT)
Dept: WOUND CARE | Facility: CLINIC | Age: 57
Discharge: HOME | End: 2022-05-05
Attending: FAMILY MEDICINE | Admitting: FAMILY MEDICINE
Payer: MEDICARE

## 2022-05-05 PROCEDURE — G0463 HOSPITAL OUTPT CLINIC VISIT: HCPCS

## 2022-05-05 NOTE — DISCHARGE INSTRUCTIONS
Today both the wounds have made some small amounts of progress.  Both are slightly smaller in outer size.  The sacral wound has more granular tissue present this visit and the right buttock wound has more scar tissue covering the open wound.    No change to the plan of care for your wounds, continue with the Lidocaine and Aquacel Ag.  Continue to cover both wounds with the gentle foam adhesive dressings and add the portion of diaper to the sacral wound to cover any overflow drainage.    I have you scheduled to return to see me again in two weeks on Thursday May the 19 th at 2 pm    Any concerns or questions call our scheduling line at [phone] and they will assist you.    Presley [place] RN cwocn
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
## 2022-05-12 DIAGNOSIS — F51.01 PRIMARY INSOMNIA: ICD-10-CM

## 2022-05-13 RX ORDER — TRAZODONE HYDROCHLORIDE 50 MG/1
TABLET, FILM COATED ORAL
Qty: 180 TABLET | Refills: 0 | Status: SHIPPED | OUTPATIENT
Start: 2022-05-13 | End: 2022-08-16

## 2022-05-13 NOTE — TELEPHONE ENCOUNTER
Routing refill request to provider for review/approval because:  High drug interaction between trazodone and Mirtazapine  High drug interaction between tramadol and trazodone

## 2022-05-19 ENCOUNTER — HOSPITAL ENCOUNTER (OUTPATIENT)
Dept: WOUND CARE | Facility: CLINIC | Age: 57
Discharge: HOME | End: 2022-05-19
Attending: FAMILY MEDICINE | Admitting: FAMILY MEDICINE
Payer: MEDICARE

## 2022-05-19 PROCEDURE — G0463 HOSPITAL OUTPT CLINIC VISIT: HCPCS

## 2022-05-20 ENCOUNTER — TELEPHONE (OUTPATIENT)
Dept: FAMILY MEDICINE | Facility: CLINIC | Age: 57
End: 2022-05-20
Payer: COMMERCIAL

## 2022-05-20 DIAGNOSIS — G43.709 CHRONIC MIGRAINE WITHOUT AURA WITHOUT STATUS MIGRAINOSUS, NOT INTRACTABLE: Primary | ICD-10-CM

## 2022-05-20 DIAGNOSIS — R11.2 NON-INTRACTABLE VOMITING WITH NAUSEA, UNSPECIFIED VOMITING TYPE: ICD-10-CM

## 2022-05-20 RX ORDER — ONDANSETRON 4 MG/1
4 TABLET, FILM COATED ORAL EVERY 8 HOURS PRN
Qty: 30 TABLET | Refills: 0 | Status: SHIPPED | OUTPATIENT
Start: 2022-05-20 | End: 2022-11-07

## 2022-05-20 RX ORDER — KETOROLAC TROMETHAMINE 30 MG/ML
30 INJECTION, SOLUTION INTRAMUSCULAR; INTRAVENOUS ONCE
Qty: 1 ML | Refills: 0 | Status: SHIPPED | OUTPATIENT
Start: 2022-05-20 | End: 2022-05-20

## 2022-05-20 NOTE — TELEPHONE ENCOUNTER
Letter created to fax for orders. RN faxed.     Homecare is updated.     Dr. Baeza, patient is also wondering if she can get an RX for Zofran?    Will route to provider.     ALONSO Obando, RN  [facility]

## 2022-05-20 NOTE — LETTER
[address] [address]  [phone]        May 20, 2022    Felicia RIOS [address] [address]          Nursing staff at [facility],    Orders per Dr. Felisha MD.       OK for orders per Home Care request.     Signed Prescriptions:                        Disp   Refills     ketorolac (TORADOL) 30 MG/ML injection     1 mL   0         Sig: Inject 1 mL (30 mg) into the muscle once for 1 dose   Authorizing Provider: IVAN MARIE MD         Sincerely,        Ivan Marie M.D.

## 2022-05-20 NOTE — TELEPHONE ENCOUNTER
Ivan Marie MD  Dyad 1 Triage 34 minutes ago (12:24 PM)     RC    OK for verbal orders per Home Care request.     Signed Prescriptions:                        Disp   Refills     ketorolac (TORADOL) 30 MG/ML injection     1 mL   0         Sig: Inject 1 mL (30 mg) into the muscle once for 1 dose   Authorizing Provider: IVAN MARIE MD

## 2022-05-20 NOTE — TELEPHONE ENCOUNTER
Requesting orders and Rx :        - patient has been complaining about a migraine for approximately a week, and her migraine medication does not seem to be working.    Requesting:  Toradol injection to be administered by nursing staff at Assisted Living Facility and Zofran to be used as needed    Please send to:  [facility]    Order for administering injection will also need to be written and sent to [facility]isted [facility]   Joselyn ROJAS  [phone]

## 2022-05-27 ENCOUNTER — TELEPHONE (OUTPATIENT)
Dept: FAMILY MEDICINE | Facility: CLINIC | Age: 57
End: 2022-05-27
Payer: COMMERCIAL

## 2022-05-27 NOTE — TELEPHONE ENCOUNTER
Anjali RN calling from [facility] to follow up on which pharmacy was the lidocaine gel sent to? Informed it is noted in patients chart that it was D/C on 4/25/22. Anjali stating they have still been using this on her wound. She stated she is going to follow up with the Assisted Living. Celina Guy LPN

## 2022-05-29 NOTE — PROGRESS NOTES
Felicia Ellison  Gender: female  : 1965  [facility]  [address]  [facility] [address]  [phone] (home)     Medical Record: [id number]  Pharmacy:    [id number] - [place], [place] - [address]   [facility] - [place] - [address] [facility] - [place], [place] - [address] [facility]  [facility] - [place], [place] - [address] [place] [address] PHARMACY - [place], [place] - [address]  Primary Care Provider: No Ref-Primary, Physician    Parent's names are: Terra, May (mother) and Data Unavailable (father).      [facility]  May 28, 2022     Discharge Phone Call: Attempt call, no answer

## 2022-06-06 ENCOUNTER — TELEPHONE (OUTPATIENT)
Dept: FAMILY MEDICINE | Facility: CLINIC | Age: 57
End: 2022-06-06
Payer: COMMERCIAL

## 2022-06-06 NOTE — TELEPHONE ENCOUNTER
[facility] Anjali RN reporting several wounds that are being treated are possibley infected, yellow/tan pus soaked the bandage, foul smelling.     New wound superior inner thigh 6 cm in length and 0.3 wide bright pink blanching pain level 0.     [phone]    Ok to leave message

## 2022-06-06 NOTE — TELEPHONE ENCOUNTER
Anjali figueroa RN from [facility] is transferred to triage.  She stated she was calling to update provider on wounds.  She stated patient does not have fever or red streaks, but does have pus coming out of her sacral wound.  She stated patient has scheduled visit on Thursday of this week for wound care.  She verbalized that through her assessment, patient may need an appointment prior to Thursday for provider review/ face to face assessment of wound and the possibility of needing antibiotics at this time.  Anjali stated she was unsure if the facility can arrange for a ride tomorrow, but she would call the facility and also speak with patient regarding some availabilities, at this time, open with Dr. Jocelyne MD.      Priti Peralta RN

## 2022-06-06 NOTE — TELEPHONE ENCOUNTER
Ivan Baeza MD  Dyad 1 Triage 38 minutes ago (3:01 PM)     JOHN    OK for verbal orders per Home Care request.   Ivan Baeza MD

## 2022-06-06 NOTE — TELEPHONE ENCOUNTER
Anjali RN called back and states that they are going to keep the appointment on the 9th and they are going to monitor the patients tempeture and wounds. They are not able to get transportation without 48 hours notice.     Anjali also states that they are out of silver cell antimicrobial dressing. They are ordering more but it will not be there in time for WED. They do have the Aquacel antimicrobial dressing on hand.     Requesting a one time verbal order to use what they have for Wed

## 2022-06-07 DIAGNOSIS — G82.20 PARAPLEGIA FOLLOWING SPINAL CORD INJURY (H): ICD-10-CM

## 2022-06-07 DIAGNOSIS — L89.309 PRESSURE INJURY OF SKIN OF BUTTOCK, UNSPECIFIED INJURY STAGE, UNSPECIFIED LATERALITY: ICD-10-CM

## 2022-06-07 NOTE — TELEPHONE ENCOUNTER
Tramadol      Last Written Prescription Date:  4/21/2022  Last Fill Quantity: 30,   # refills: 0  Last Office Visit: 4/27/2022  Future Office visit:    Next 5 appointments (look out 90 days)      Jun 09, 2022  1:00 PM  Return Visit with PH WOUND EXAM ROOM  [facility] [place] ([facility] ) [address] [place] [address]  [phone]             Routing refill request to provider for review/approval because:  Drug not on the [facility], P or [facility] refill protocol or controlled substance

## 2022-06-08 DIAGNOSIS — L89.309 PRESSURE INJURY OF SKIN OF BUTTOCK, UNSPECIFIED INJURY STAGE, UNSPECIFIED LATERALITY: ICD-10-CM

## 2022-06-08 RX ORDER — HYDROCODONE BITARTRATE AND ACETAMINOPHEN 5; 325 MG/1; MG/1
TABLET ORAL
Qty: 18 TABLET | Refills: 0 | Status: SHIPPED | OUTPATIENT
Start: 2022-06-08 | End: 2022-06-17

## 2022-06-08 RX ORDER — TRAMADOL HYDROCHLORIDE 50 MG/1
50 TABLET ORAL EVERY 6 HOURS PRN
Qty: 30 TABLET | Refills: 0 | Status: ON HOLD | OUTPATIENT
Start: 2022-06-08 | End: 2022-09-06

## 2022-06-08 NOTE — TELEPHONE ENCOUNTER
[place]      Last Written Prescription Date:  4/20/2022  Last Fill Quantity: 18,   # refills: 0  Last Office Visit: 4/27/2022  Future Office visit:    Next 5 appointments (look out 90 days)      Jun 09, 2022  1:00 PM  Return Visit with PH WOUND EXAM ROOM  [facility] [place] ([facility] ) [address] [place] [id number]  [phone]             Routing refill request to provider for review/approval because:  Drug not on the [facility], P or [facility] refill protocol or controlled substance

## 2022-06-09 ENCOUNTER — HOSPITAL ENCOUNTER (OUTPATIENT)
Dept: WOUND CARE | Facility: CLINIC | Age: 57
Discharge: HOME | End: 2022-06-09
Admitting: FAMILY MEDICINE
Payer: MEDICARE

## 2022-06-09 PROCEDURE — G0463 HOSPITAL OUTPT CLINIC VISIT: HCPCS

## 2022-06-09 NOTE — DISCHARGE INSTRUCTIONS
Today I assessed the sacral wound, the right IT wound and the new area of concern on your right inner upper thigh which extends onto your right buttock.    The new area appears consistent with intertriginous dermatitis and friction from clothing, it does not appear pressure related.    The sacral and right IT wound were assessed then I spoke with your primary MD Dr Baeza.  We do not notice any signs of infection today.    The drainage is larger in amount and I do not believe the Silvercel works quite as well for absorbency as the Aquacel Ag.      The sacral wound is about the same in outer aspect size and depth though less today appears consistent, increase appears positional.    Wound bed of the sacral wound is more granular and beefy red again this visit.  There are a few small areas off the sacral wound on the periwound skin where there are pin point areas of blood weeping.    The right IT wound remains open, has fair amount of very fragile scar tissue which has the potential to break down and we are including that in the wounds total measurements.  The small wound off the 7 o'clock edge of the IT wound is now full thickness, it is clean but has a firm white center that has the potential to be bone but it was smooth today not ruff.  We will need to monitor this site for changes and potential you will need to see Dr Zavala again for evaluation of osteomyelitis.    No change to the plan of care for the sacral and IT wounds, with the use of the Lidocaine gel 2% and the Aquacel Ag.  You can have your home care use up the remaining Silvercel if you have any, but go back to the Aquacel Ag and we will use it today.    For the new area of concern on the inner right upper thigh, groin fold and upper posterior right buttock , we will apply a zinc oxide based moisture barrier paste.  I will have Dr Baeza write orders and have them faxed to your home care agency.    I have you scheduled to return to see me again in  three weeks on Thursday Sheree the 23rd at 3 pm     Any concerns or questions call our scheduling line at [phone] and they will assist you.     Presley [place] RN cwocn
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
## 2022-06-10 DIAGNOSIS — F51.01 PRIMARY INSOMNIA: ICD-10-CM

## 2022-06-10 RX ORDER — ZOLPIDEM TARTRATE 5 MG/1
TABLET ORAL
Qty: 30 TABLET | Refills: 0 | Status: SHIPPED | OUTPATIENT
Start: 2022-06-10 | End: 2022-07-20

## 2022-06-10 NOTE — TELEPHONE ENCOUNTER
Ambien       Last Written Prescription Date:  4/21/22  Last Fill Quantity: 30,   # refills: 0  Last Office Visit: 4/27/22  Future Office visit:    Next 5 appointments (look out 90 days)    Jun 23, 2022  3:00 PM  Return Visit with PH WOUND EXAM ROOM  [facility] [place] ([facility] ) [address] [place] [id number]  [phone]           Routing refill request to provider for review/approval because:  Drug not on the [facility], [facility] or [facility] refill protocol or controlled substance

## 2022-06-13 ENCOUNTER — TELEPHONE (OUTPATIENT)
Dept: FAMILY MEDICINE | Facility: CLINIC | Age: 57
End: 2022-06-13
Payer: COMMERCIAL

## 2022-06-13 DIAGNOSIS — R60.9 DEPENDENT EDEMA: ICD-10-CM

## 2022-06-13 DIAGNOSIS — G43.709 CHRONIC MIGRAINE WITHOUT AURA WITHOUT STATUS MIGRAINOSUS, NOT INTRACTABLE: ICD-10-CM

## 2022-06-13 DIAGNOSIS — R56.9 SEIZURES (H): ICD-10-CM

## 2022-06-13 RX ORDER — LEVETIRACETAM 750 MG/1
1500 TABLET ORAL 2 TIMES DAILY
Qty: 120 TABLET | Refills: 1 | Status: SHIPPED | OUTPATIENT
Start: 2022-06-13 | End: 2022-08-11

## 2022-06-13 NOTE — TELEPHONE ENCOUNTER
Requested Prescriptions   Pending Prescriptions Disp Refills     levETIRAcetam (KEPPRA) 750 MG tablet [Pharmacy Med Name: LEVETIRACETAM 750MG TABLET] 120 tablet 1     Sig: TAKE 2 TABLETS (1,500 MG) BY MOUTH 2 TIMES DAILY       There is no refill protocol information for this order        Last Written Prescription Date:  4/13/2022  Last Fill Quantity: 120,  # refills: 1   Last office visit: 10/12/2021 with prescribing provider:     Future Office Visit:   Next 5 appointments (look out 90 days)    Jun 23, 2022  3:00 PM  Return Visit with PH WOUND EXAM ROOM  [facility] [place] ([facility] ) [address] [place] [id number] [phone]

## 2022-06-13 NOTE — TELEPHONE ENCOUNTER
Reason for Call:  Form, our goal is to have forms completed with 72 hours, however, some forms may require a visit or additional information.    Type of letter, form or note:  Home Health Certification    Who is the form from?: Home care    Where did the form come from: form was faxed in    What clinic location was the form placed at?: [facility]     Where the form was placed: Given to MA/RN, [facility]    What number is listed as a contact on the form?: [phone]       Additional comments: Certification period from 5/30/22-7/28/2022    Call taken on 6/13/2022 at 9:57 AM by Celina Guy LPN

## 2022-06-15 DIAGNOSIS — L89.309 PRESSURE INJURY OF SKIN OF BUTTOCK, UNSPECIFIED INJURY STAGE, UNSPECIFIED LATERALITY: ICD-10-CM

## 2022-06-15 RX ORDER — FUROSEMIDE 20 MG
40 TABLET ORAL DAILY
COMMUNITY
End: 2022-08-10

## 2022-06-15 RX ORDER — NYSTATIN 100000 [USP'U]/G
1 POWDER TOPICAL PRN
Status: ON HOLD | COMMUNITY
End: 2022-09-03

## 2022-06-15 RX ORDER — SUMATRIPTAN 50 MG/1
50 TABLET, FILM COATED ORAL
Status: ON HOLD | COMMUNITY
End: 2022-09-03

## 2022-06-15 RX ORDER — FUROSEMIDE 20 MG
TABLET ORAL
Qty: 180 TABLET | Refills: 1 | Status: SHIPPED | OUTPATIENT
Start: 2022-06-15 | End: 2022-09-28

## 2022-06-15 RX ORDER — TOPIRAMATE 25 MG/1
TABLET, FILM COATED ORAL
Qty: 30 TABLET | Refills: 1 | Status: SHIPPED | OUTPATIENT
Start: 2022-06-15 | End: 2022-08-11

## 2022-06-15 NOTE — TELEPHONE ENCOUNTER
Pending Prescriptions:                       Disp   Refills    HYDROcodone-acetaminophen (NORCO) 5-325 MG*18 tab*0        Sig: TAKE 1 TABLET BY MOUTH EVERY 6 HOURS AS NEEDED FOR           PAIN        Routing refill request to provider for review/approval because:  Drug not on the [facility] refill protocol

## 2022-06-15 NOTE — TELEPHONE ENCOUNTER
Pending Prescriptions:                       Disp   Refills    furosemide (LASIX) 20 MG tablet [Pharmacy *180 ta*1        Sig: TAKE 2 TABLETS (40MG) BY MOUTH EVERY DAY    topiramate (TOPAMAX) 25 MG tablet [Pharmac*30 tab*1        Sig: TAKE 1 TABLET BY MOUTH EVERY DAY      Routing refill request to provider for review/approval because:  Anti-Seizure Meds Protocol  Failed 06/13/2022 08:15 AM   Protocol Details  Review Authorizing provider's last note.     Normal CBC on file in past 26 months     Creatinine   Date Value Ref Range Status   04/25/2022 0.36 (L) 0.52 - 1.04 mg/dL Final   05/26/2021 0.47 (L) 0.52 - 1.04 mg/dL Final         Tracee Steel RN

## 2022-06-16 ENCOUNTER — TELEPHONE (OUTPATIENT)
Dept: FAMILY MEDICINE | Facility: CLINIC | Age: 57
End: 2022-06-16
Payer: COMMERCIAL

## 2022-06-16 DIAGNOSIS — R56.9 SEIZURES (H): ICD-10-CM

## 2022-06-16 DIAGNOSIS — G82.20: Primary | ICD-10-CM

## 2022-06-16 DIAGNOSIS — N31.9 NEUROGENIC BLADDER: ICD-10-CM

## 2022-06-16 NOTE — TELEPHONE ENCOUNTER
Reason for Call: Request for an order or referral: Home Care    Order or referral being requested: PT and OT re-evaluate for strengthening of upper extremities    Date needed: as soon as possible    Has the patient been seen by the PCP for this problem? YES    Additional comments: Patient is wanting a new wheel chair. Therapy is going to evaluate for this.    Phone number to call with the verbal orders is: [phone]     Best Time:  anytime    Can we leave a detailed message on this number?  YES    Call taken on 6/16/2022 at 12:04 PM by Celina Guy LPN

## 2022-06-17 RX ORDER — HYDROCODONE BITARTRATE AND ACETAMINOPHEN 5; 325 MG/1; MG/1
TABLET ORAL
Qty: 18 TABLET | Refills: 0 | Status: SHIPPED | OUTPATIENT
Start: 2022-06-17 | End: 2022-06-29

## 2022-06-20 DIAGNOSIS — Z53.9 DIAGNOSIS NOT YET DEFINED: Primary | ICD-10-CM

## 2022-06-20 PROCEDURE — G0179 MD RECERTIFICATION HHA PT: HCPCS | Performed by: FAMILY MEDICINE

## 2022-06-21 ENCOUNTER — TELEPHONE (OUTPATIENT)
Dept: FAMILY MEDICINE | Facility: CLINIC | Age: 57
End: 2022-06-21
Payer: COMMERCIAL

## 2022-06-21 NOTE — TELEPHONE ENCOUNTER
Reason for Call:  Form, our goal is to have forms completed with 72 hours, however, some forms may require a visit or additional information.    Type of letter, form or note:  Home Health Certification    Who is the form from?: Home care    Where did the form come from: form was faxed in    What clinic location was the form placed at?: [facility]    Where the form was placed: Given to MA/BOB [facility]    What number is listed as a contact on the form?: [phone]       Additional comments: [facility]    Call taken on 6/21/2022 at 1:25 PM by Shonna Aguirre

## 2022-06-23 DIAGNOSIS — L89.319 PRESSURE INJURY OF SKIN OF RIGHT BUTTOCK, UNSPECIFIED INJURY STAGE: Primary | ICD-10-CM

## 2022-06-24 RX ORDER — LIDOCAINE HYDROCHLORIDE 20 MG/ML
JELLY TOPICAL
Qty: 30 ML | Refills: 3 | Status: ON HOLD | OUTPATIENT
Start: 2022-06-24 | End: 2022-09-03

## 2022-06-24 NOTE — TELEPHONE ENCOUNTER
Pending Prescriptions:                       Disp   Refills    lidocaine (XYLOCAINE) 2 % external gel [Ph*       0        Sig: APPLY THIN LAYER OF JELLY TO AFFECTED AREAS 3 TIMES           PER WEEK    Routing refill request to provider for review/approval because:  Drug not on the [facility] refill protocol     Requested Prescriptions   Pending Prescriptions Disp Refills    lidocaine (XYLOCAINE) 2 % external gel [Pharmacy Med Name: LIDOCAINE 2% JELLY]  0     Sig: APPLY THIN LAYER OF JELLY TO AFFECTED AREAS 3 TIMES PER WEEK        There is no refill protocol information for this order

## 2022-06-28 DIAGNOSIS — L89.309 PRESSURE INJURY OF SKIN OF BUTTOCK, UNSPECIFIED INJURY STAGE, UNSPECIFIED LATERALITY: ICD-10-CM

## 2022-06-29 RX ORDER — HYDROCODONE BITARTRATE AND ACETAMINOPHEN 5; 325 MG/1; MG/1
TABLET ORAL
Qty: 18 TABLET | Refills: 0 | Status: SHIPPED | OUTPATIENT
Start: 2022-06-29 | End: 2022-07-15

## 2022-06-29 NOTE — TELEPHONE ENCOUNTER
Norco      Last Written Prescription Date:  6/17/2022  Last Fill Quantity: 18,   # refills: 0  Last Office Visit: 4/27/2022  Future Office visit:    Next 5 appointments (look out 90 days)      Jun 30, 2022  3:00 PM  Return Visit with PH WOUND EXAM ROOM  [facility] [place] ([facility] ) [address] [place] [id number]  [phone]             Routing refill request to provider for review/approval because:  Drug not on the [facility], [facility] or [facility] refill protocol or controlled substance    Donte Frank RN

## 2022-06-30 ENCOUNTER — HOSPITAL ENCOUNTER (OUTPATIENT)
Dept: WOUND CARE | Facility: CLINIC | Age: 57
Discharge: HOME | End: 2022-06-30
Attending: FAMILY MEDICINE | Admitting: FAMILY MEDICINE
Payer: MEDICARE

## 2022-06-30 PROCEDURE — G0463 HOSPITAL OUTPT CLINIC VISIT: HCPCS

## 2022-06-30 NOTE — DISCHARGE INSTRUCTIONS
Today the sacral wound and the right IT wounds are both slightly improved.  No new concerns noted with those wounds.  The groin folds are well improved though the left side is still rather moist.    I did look at the new toe wounds and they are now all dry and scab covered.  As long as there is no drainage from the toes you can leave them open to air and not apply any ointments or salves.      I will see you again in two weeks on Thursday July the 14 th at 3 pm.    Continue with the same wound plan of care for both wound, with lidocaine and Aquacel Ag. Then the Mepilex bandages, and lastly an extra absorbent pad ontop of the sacral wound for any leak through drainage.    Call with any questions or concerns call our scheduling line at [phone].    Presley Chester RN [email or web address]
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
## 2022-07-11 ENCOUNTER — TELEPHONE (OUTPATIENT)
Dept: FAMILY MEDICINE | Facility: CLINIC | Age: 57
End: 2022-07-11

## 2022-07-11 ENCOUNTER — HOSPITAL ENCOUNTER (EMERGENCY)
Facility: CLINIC | Age: 57
Discharge: NURSING, REHAB OR LONG-TERM CARE | End: 2022-07-11
Attending: PHYSICIAN ASSISTANT | Admitting: PHYSICIAN ASSISTANT
Payer: MEDICARE

## 2022-07-11 VITALS
BODY MASS INDEX: 17.03 KG/M2 | HEART RATE: 60 BPM | TEMPERATURE: 98.3 F | WEIGHT: 115 LBS | HEIGHT: 69 IN | SYSTOLIC BLOOD PRESSURE: 107 MMHG | RESPIRATION RATE: 14 BRPM | DIASTOLIC BLOOD PRESSURE: 50 MMHG | OXYGEN SATURATION: 93 %

## 2022-07-11 DIAGNOSIS — T18.108A FOREIGN BODY IN ESOPHAGUS, INITIAL ENCOUNTER: ICD-10-CM

## 2022-07-11 DIAGNOSIS — Z93.2 ILEOSTOMY STATUS (H): ICD-10-CM

## 2022-07-11 DIAGNOSIS — R15.9 RECTAL LEAKAGE: ICD-10-CM

## 2022-07-11 DIAGNOSIS — K21.9 GASTROESOPHAGEAL REFLUX DISEASE WITHOUT ESOPHAGITIS: ICD-10-CM

## 2022-07-11 DIAGNOSIS — F51.01 PRIMARY INSOMNIA: Primary | ICD-10-CM

## 2022-07-11 PROCEDURE — 99282 EMERGENCY DEPT VISIT SF MDM: CPT | Performed by: PHYSICIAN ASSISTANT

## 2022-07-11 PROCEDURE — 99283 EMERGENCY DEPT VISIT LOW MDM: CPT

## 2022-07-11 NOTE — TELEPHONE ENCOUNTER
Reason for Call:  Home Health Care    Ajnali  with [facility]     Calling with a Concern:  Anjali went to see her today for a visit and noticed that patient has a colostomy bag but patient was leaking stool out of her rectum.  Her rectum was also swollen and red.  [facility]jali [facility] calling needing advice she has never seen anything like this before?    Phone Number Homecare Nurse can be reached at: [phone]    Can we leave a detailed message on this number? YES    Best Time:   Anytime    Call taken on 7/11/2022 at 10:51 AM by Genny Arguello

## 2022-07-11 NOTE — ED TRIAGE NOTES
Brought in  VIA EMS from assisted living with concerns over rectal leakage. EMS reported that pt has a colostomy but Home health nurse noted pt is having leakage from the rectum.     Triage Assessment     Row Name 07/11/22 [id number]       Triage Assessment (Adult)    Airway WDL WDL       Respiratory WDL    Respiratory WDL WDL       Skin Circulation/Temperature WDL    Skin Circulation/Temperature WDL WDL       Cardiac WDL    Cardiac WDL WDL       Cognitive/Neuro/Behavioral WDL    Cognitive/Neuro/Behavioral WDL WDL

## 2022-07-11 NOTE — DISCHARGE INSTRUCTIONS
It was a pleasure working with you today!     Thankfully, your exam was very reassuring today.  I did not notice any problems with your rectal area.  No sign of active infection.  I did review this situation with the general surgeon, Dr. Zavala.  He states that this can be normal to have some mucosal discharge that can happen.  If this continues to be a recurrent issue, then we would need to investigate with an upper GI and small bowel follow-through to look for a fistula.  If this was a one-time occurrence, then Dr. Zavala did not think it was a major issue thankfully.  
11.6
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
## 2022-07-11 NOTE — TELEPHONE ENCOUNTER
Phoned Anjali from [facility].  She stated she was not with patient at this time.  She stated she visited patient at her assisted living facility earlier today.  Anjali stated she is treating a wound on patient and when facilitating the procedure for care, she discovered patient's colostomy bag had been leaking and she was wet from leaking stool out of her rectum all weekend.  Anjali stated she believed patient had not been changed all weekend.  Advised Anjali to contact patient and facility regarding patient to be seen face to face for an assessment with a provider today in ED for symptoms as she described. Will forward to provider [place] for review.    Priti Peralta RN

## 2022-07-13 RX ORDER — MIRTAZAPINE 7.5 MG/1
TABLET, FILM COATED ORAL
Qty: 29 TABLET | Refills: 0 | Status: ON HOLD | OUTPATIENT
Start: 2022-07-13 | End: 2022-09-03

## 2022-07-13 RX ORDER — PANTOPRAZOLE SODIUM 40 MG/1
TABLET, DELAYED RELEASE ORAL
Qty: 90 TABLET | Refills: 0 | Status: ON HOLD | OUTPATIENT
Start: 2022-07-13 | End: 2022-09-19

## 2022-07-13 NOTE — TELEPHONE ENCOUNTER
Pending Prescriptions:                       Disp   Refills    mirtazapine (REMERON) 7.5 MG tablet [Pharm*29 tab*0        Sig: TAKE 1 TABLET BY MOUTH EVERY DAY    Signed Prescriptions:                        Disp   Refills    pantoprazole (PROTONIX) 40 MG EC tablet    90 tab*0        Sig: TAKE 1 TABLET (40 MG) BY MOUTH EVERY MORNING           (BEFOREBREAKFAST)  Authorizing Provider: CHRISSY MARIE  Ordering User: DONTE PARRA      Routing refill request to provider for review/approval because:  Drug not active on patient's medication list    Donte Parra RN

## 2022-07-14 ENCOUNTER — HOSPITAL ENCOUNTER (OUTPATIENT)
Dept: WOUND CARE | Facility: CLINIC | Age: 57
Discharge: HOME | End: 2022-07-14
Admitting: FAMILY MEDICINE
Payer: MEDICARE

## 2022-07-14 PROCEDURE — G0463 HOSPITAL OUTPT CLINIC VISIT: HCPCS

## 2022-07-14 NOTE — DISCHARGE INSTRUCTIONS
Today the wounds are both about the same in size but the sacral wound has lots more new granular tissue filling in the depth slowly, depth was 0.5 cm less today than last visit.  Sacral wound measures 4 cm L x 9 cm W x 2 cm D.  There is undermining from 10 o'clock to 2 o'clock with the deepest area at 1 o'clock of 3 cm D.  The right IT wound measures 2.5 cm L x 1 cm W x 0.1 cm D.    For the Sacral wound we will continue with the current cares with the Lidocaine gel 2 % on the Aquacel Ag and then the Mepilex bandage with a portion of diaper covering the outer Mepilex to catch any overage of drainage.  Change three times weekly.    For the Right Ischial Tuberosity wound we will continue with the Aquacel Ag and the Mepilex changed three times a week also.    There is not a good substitute for the lidocaine gel unfortunately so I will send you home with some extra vials for use till the pharmacy can obtain these again. One vial should last for 2 to 3 dressing changes.     I will see you again in just over three weeks on Friday August the 5 th at 3 pm.    Any concerns between now and then call our scheduling desk at [phone].    Presley Chester RN [email or web address]

## 2022-07-15 DIAGNOSIS — L89.309 PRESSURE INJURY OF SKIN OF BUTTOCK, UNSPECIFIED INJURY STAGE, UNSPECIFIED LATERALITY: ICD-10-CM

## 2022-07-15 DIAGNOSIS — G43.709 CHRONIC MIGRAINE WITHOUT AURA WITHOUT STATUS MIGRAINOSUS, NOT INTRACTABLE: ICD-10-CM

## 2022-07-15 RX ORDER — HYDROCODONE BITARTRATE AND ACETAMINOPHEN 5; 325 MG/1; MG/1
TABLET ORAL
Qty: 18 TABLET | Refills: 0 | Status: SHIPPED | OUTPATIENT
Start: 2022-07-15 | End: 2022-07-22

## 2022-07-15 NOTE — TELEPHONE ENCOUNTER
Requested Prescriptions   Pending Prescriptions Disp Refills     HYDROcodone-acetaminophen (NORCO) 5-325 MG tablet [Pharmacy Med Name: HYDROCODONE/APAP 5MG-325MG TAB] 18 tablet 0     Sig: TAKE 1 TABLET BY MOUTH EVERY 6 HOURS AS NEEDED FOR PAIN       There is no refill protocol information for this order         Last Written Prescription Date:  06/29/2022  Last Fill Quantity: 18,   # refills: 0  Last Office Visit: 04/27/2022  Future Office visit:    Next 5 appointments (look out 90 days)    Aug 05, 2022  3:00 PM  Return Visit with PH WOUND EXAM ROOM  [facility] [place] ([facility] ) [address] [place] [id number] [phone]           Routing refill request to provider for review/approval because:  Drug not on the [facility], [facility] or [facility] refill protocol or controlled substance

## 2022-07-18 ENCOUNTER — TELEPHONE (OUTPATIENT)
Dept: FAMILY MEDICINE | Facility: CLINIC | Age: 57
End: 2022-07-18

## 2022-07-18 NOTE — TELEPHONE ENCOUNTER
Returned call to home care nurse Kathryn.  Was transferred to her voicemail.    Message from her earlier today does not state which dressing they are wondering enquiring about a substitute.    I left voicemail stating the Aquacel Ag (pirmary dressing) can be substituted with any silver calcium alginate dressing.    Also stated if this is not the dressing in question that she can call back, leaving which dressing is in question.      IF on return she states the question is concerning the secondary dressing which we have been using Mepilex gentle adhesive foam dressing.  Please inform her that any silicone based gentle adhesive foam dressing is ok for use.    Presley Chester RN [email or web address]

## 2022-07-18 NOTE — TELEPHONE ENCOUNTER
Reason for Call:  Other appointment    Detailed comments: tito from home care calling to see if dressing can be equivalent of what was ordered please call tito at this number [phone]    Phone Number Patient can be reached at: Other phone number:  [phone]    Best Time: any    Can we leave a detailed message on this number? YES    Call taken on 7/18/2022 at 2:23 PM by Charline Truong

## 2022-07-19 DIAGNOSIS — F51.01 PRIMARY INSOMNIA: ICD-10-CM

## 2022-07-19 RX ORDER — RIZATRIPTAN BENZOATE 10 MG/1
TABLET ORAL
Qty: 15 TABLET | Refills: 0 | Status: SHIPPED | OUTPATIENT
Start: 2022-07-19 | End: 2022-10-19

## 2022-07-19 NOTE — TELEPHONE ENCOUNTER
Routing refill request to provider for review/approval because:  Serotonin Agonist review    Donte Frank RN

## 2022-07-20 RX ORDER — ZOLPIDEM TARTRATE 5 MG/1
TABLET ORAL
Qty: 30 TABLET | Refills: 0 | Status: SHIPPED | OUTPATIENT
Start: 2022-07-20 | End: 2022-08-24

## 2022-07-20 NOTE — TELEPHONE ENCOUNTER
Ambien      Last Written Prescription Date:  6/10/2022  Last Fill Quantity: 30,   # refills: 0  Last Office Visit: 4/27/2022  Future Office visit:    Next 5 appointments (look out 90 days)      Aug 05, 2022  3:00 PM  Return Visit with PH WOUND EXAM ROOM  [facility] [place] ([facility] ) [address] [place] [id number]  [phone]             Routing refill request to provider for review/approval because:  Drug not on the [facility], [facility] or [facility] refill protocol or controlled substance    Donte Frank RN

## 2022-07-21 DIAGNOSIS — L89.309 PRESSURE INJURY OF SKIN OF BUTTOCK, UNSPECIFIED INJURY STAGE, UNSPECIFIED LATERALITY: ICD-10-CM

## 2022-07-22 RX ORDER — HYDROCODONE BITARTRATE AND ACETAMINOPHEN 5; 325 MG/1; MG/1
TABLET ORAL
Qty: 18 TABLET | Refills: 0 | Status: ON HOLD | OUTPATIENT
Start: 2022-07-22 | End: 2022-09-06

## 2022-07-22 NOTE — TELEPHONE ENCOUNTER
Requested Prescriptions   Pending Prescriptions Disp Refills     HYDROcodone-acetaminophen (NORCO) 5-325 MG tablet [Pharmacy Med Name: HYDROCODONE/APAP 5MG-325MG TAB] 18 tablet 0     Sig: TAKE 1 TABLET BY MOUTH EVERY 6 HOURS AS NEEDED FORPAIN       Last Written Prescription Date:  07/15/2022  Last Fill Quantity: 18,   # refills: 0  Last Office Visit: 04/27/2022  Future Office visit:    Next 5 appointments (look out 90 days)    Aug 05, 2022  3:00 PM  Return Visit with PH WOUND EXAM ROOM  [facility] [place] ([facility] ) [address] [place] [id number] [phone]           Routing refill request to provider for review/approval because:  Drug not on the [facility], [facility] or [facility] refill protocol or controlled substance

## 2022-07-28 ENCOUNTER — HOSPITAL ENCOUNTER (EMERGENCY)
Facility: CLINIC | Age: 57
Discharge: HOME | End: 2022-07-28
Attending: FAMILY MEDICINE | Admitting: FAMILY MEDICINE
Payer: MEDICARE

## 2022-07-28 VITALS
OXYGEN SATURATION: 99 % | SYSTOLIC BLOOD PRESSURE: 83 MMHG | TEMPERATURE: 98 F | RESPIRATION RATE: 16 BRPM | DIASTOLIC BLOOD PRESSURE: 47 MMHG | HEART RATE: 86 BPM

## 2022-07-28 DIAGNOSIS — N39.0 URINARY TRACT INFECTION WITHOUT HEMATURIA, SITE UNSPECIFIED: ICD-10-CM

## 2022-07-28 LAB
ALBUMIN UR-MCNC: 30 MG/DL
ANION GAP SERPL CALCULATED.3IONS-SCNC: 6 MMOL/L (ref 3–14)
APPEARANCE UR: ABNORMAL
BASE EXCESS BLDV CALC-SCNC: -9.5 MMOL/L (ref -7.7–1.9)
BASOPHILS # BLD AUTO: 0 10E3/UL (ref 0–0.2)
BASOPHILS NFR BLD AUTO: 0 %
BILIRUB UR QL STRIP: NEGATIVE
BUN SERPL-MCNC: 22 MG/DL (ref 7–30)
CALCIUM SERPL-MCNC: 9.4 MG/DL (ref 8.5–10.1)
CHLORIDE BLD-SCNC: 113 MMOL/L (ref 94–109)
CO2 SERPL-SCNC: 18 MMOL/L (ref 20–32)
COLOR UR AUTO: YELLOW
CREAT SERPL-MCNC: 0.56 MG/DL (ref 0.52–1.04)
EOSINOPHIL # BLD AUTO: 0.1 10E3/UL (ref 0–0.7)
EOSINOPHIL NFR BLD AUTO: 1 %
ERYTHROCYTE [DISTWIDTH] IN BLOOD BY AUTOMATED COUNT: 17.6 % (ref 10–15)
GFR SERPL CREATININE-BSD FRML MDRD: >90 ML/MIN/1.73M2
GLUCOSE BLD-MCNC: 112 MG/DL (ref 70–99)
GLUCOSE UR STRIP-MCNC: NEGATIVE MG/DL
HCO3 BLDV-SCNC: 17 MMOL/L (ref 21–28)
HCT VFR BLD AUTO: 35 % (ref 35–47)
HGB BLD-MCNC: 11.1 G/DL (ref 11.7–15.7)
HGB UR QL STRIP: ABNORMAL
IMM GRANULOCYTES # BLD: 0.1 10E3/UL
IMM GRANULOCYTES NFR BLD: 1 %
KETONES UR STRIP-MCNC: NEGATIVE MG/DL
LEUKOCYTE ESTERASE UR QL STRIP: ABNORMAL
LYMPHOCYTES # BLD AUTO: 2.5 10E3/UL (ref 0.8–5.3)
LYMPHOCYTES NFR BLD AUTO: 21 %
MCH RBC QN AUTO: 26.7 PG (ref 26.5–33)
MCHC RBC AUTO-ENTMCNC: 31.7 G/DL (ref 31.5–36.5)
MCV RBC AUTO: 84 FL (ref 78–100)
MONOCYTES # BLD AUTO: 0.9 10E3/UL (ref 0–1.3)
MONOCYTES NFR BLD AUTO: 8 %
NEUTROPHILS # BLD AUTO: 8.2 10E3/UL (ref 1.6–8.3)
NEUTROPHILS NFR BLD AUTO: 69 %
NITRATE UR QL: POSITIVE
NRBC # BLD AUTO: 0 10E3/UL
NRBC BLD AUTO-RTO: 0 /100
O2/TOTAL GAS SETTING VFR VENT: 21 %
PCO2 BLDV: 36 MM HG (ref 40–50)
PH BLDV: 7.27 [PH] (ref 7.32–7.43)
PH UR STRIP: 8 [PH] (ref 5–7)
PLATELET # BLD AUTO: 169 10E3/UL (ref 150–450)
PO2 BLDV: 25 MM HG (ref 25–47)
POTASSIUM BLD-SCNC: 3.6 MMOL/L (ref 3.4–5.3)
RBC # BLD AUTO: 4.16 10E6/UL (ref 3.8–5.2)
RBC URINE: <1 /HPF
SODIUM SERPL-SCNC: 137 MMOL/L (ref 133–144)
SP GR UR STRIP: 1.01 (ref 1–1.03)
UROBILINOGEN UR STRIP-MCNC: NORMAL MG/DL
WBC # BLD AUTO: 11.8 10E3/UL (ref 4–11)
WBC URINE: 0 /HPF

## 2022-07-28 PROCEDURE — 99283 EMERGENCY DEPT VISIT LOW MDM: CPT

## 2022-07-28 PROCEDURE — 85025 COMPLETE CBC W/AUTO DIFF WBC: CPT | Performed by: FAMILY MEDICINE

## 2022-07-28 PROCEDURE — 36415 COLL VENOUS BLD VENIPUNCTURE: CPT | Performed by: FAMILY MEDICINE

## 2022-07-28 PROCEDURE — 82310 ASSAY OF CALCIUM: CPT | Performed by: FAMILY MEDICINE

## 2022-07-28 PROCEDURE — 99282 EMERGENCY DEPT VISIT SF MDM: CPT | Performed by: FAMILY MEDICINE

## 2022-07-28 PROCEDURE — 82803 BLOOD GASES ANY COMBINATION: CPT | Performed by: FAMILY MEDICINE

## 2022-07-28 PROCEDURE — 87086 URINE CULTURE/COLONY COUNT: CPT | Performed by: FAMILY MEDICINE

## 2022-07-28 PROCEDURE — 81003 URINALYSIS AUTO W/O SCOPE: CPT | Performed by: FAMILY MEDICINE

## 2022-07-28 RX ORDER — SULFAMETHOXAZOLE/TRIMETHOPRIM 800-160 MG
1 TABLET ORAL 2 TIMES DAILY
Qty: 20 TABLET | Refills: 0 | Status: SHIPPED | OUTPATIENT
Start: 2022-07-28 | End: 2022-08-07

## 2022-07-28 NOTE — ED TRIAGE NOTES
Presents via EMS with increased slurred speech. She has a history of slurred speech but today it was more than usual. She reports that she just has a dry mouth. Alert and interactive at this time     Triage Assessment     Row Name 07/28/22 1100       Triage Assessment (Adult)    Airway WDL WDL       Skin Circulation/Temperature WDL    Skin Circulation/Temperature WDL WDL       Cardiac WDL    Cardiac WDL WDL
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
## 2022-07-30 ENCOUNTER — HOSPITAL ENCOUNTER (EMERGENCY)
Facility: CLINIC | Age: 57
Discharge: HOME | End: 2022-07-30
Attending: EMERGENCY MEDICINE | Admitting: EMERGENCY MEDICINE
Payer: MEDICARE

## 2022-07-30 VITALS
RESPIRATION RATE: 18 BRPM | HEIGHT: 69 IN | BODY MASS INDEX: 15.23 KG/M2 | WEIGHT: 102.8 LBS | DIASTOLIC BLOOD PRESSURE: 76 MMHG | OXYGEN SATURATION: 100 % | HEART RATE: 81 BPM | SYSTOLIC BLOOD PRESSURE: 98 MMHG | TEMPERATURE: 98.6 F

## 2022-07-30 DIAGNOSIS — E86.0 DEHYDRATION: ICD-10-CM

## 2022-07-30 DIAGNOSIS — R41.82 ALTERED MENTAL STATUS, UNSPECIFIED ALTERED MENTAL STATUS TYPE: ICD-10-CM

## 2022-07-30 LAB
ALBUMIN SERPL-MCNC: 2.6 G/DL (ref 3.4–5)
ALP SERPL-CCNC: 186 U/L (ref 40–150)
ALT SERPL W P-5'-P-CCNC: 9 U/L (ref 0–50)
ANION GAP SERPL CALCULATED.3IONS-SCNC: 9 MMOL/L (ref 3–14)
AST SERPL W P-5'-P-CCNC: 8 U/L (ref 0–45)
BACTERIA UR CULT: NORMAL
BASOPHILS # BLD AUTO: 0 10E3/UL (ref 0–0.2)
BASOPHILS NFR BLD AUTO: 0 %
BILIRUB SERPL-MCNC: 0.5 MG/DL (ref 0.2–1.3)
BUN SERPL-MCNC: 21 MG/DL (ref 7–30)
CALCIUM SERPL-MCNC: 9 MG/DL (ref 8.5–10.1)
CHLORIDE BLD-SCNC: 114 MMOL/L (ref 94–109)
CO2 SERPL-SCNC: 16 MMOL/L (ref 20–32)
CREAT SERPL-MCNC: 0.62 MG/DL (ref 0.52–1.04)
EOSINOPHIL # BLD AUTO: 0.1 10E3/UL (ref 0–0.7)
EOSINOPHIL NFR BLD AUTO: 0 %
ERYTHROCYTE [DISTWIDTH] IN BLOOD BY AUTOMATED COUNT: 17.6 % (ref 10–15)
FLUAV RNA SPEC QL NAA+PROBE: NEGATIVE
FLUBV RNA RESP QL NAA+PROBE: NEGATIVE
GFR SERPL CREATININE-BSD FRML MDRD: >90 ML/MIN/1.73M2
GLUCOSE BLD-MCNC: 102 MG/DL (ref 70–99)
HCT VFR BLD AUTO: 35.8 % (ref 35–47)
HGB BLD-MCNC: 11.2 G/DL (ref 11.7–15.7)
IMM GRANULOCYTES # BLD: 0.1 10E3/UL
IMM GRANULOCYTES NFR BLD: 1 %
LYMPHOCYTES # BLD AUTO: 1.7 10E3/UL (ref 0.8–5.3)
LYMPHOCYTES NFR BLD AUTO: 14 %
MAGNESIUM SERPL-MCNC: 2 MG/DL (ref 1.6–2.3)
MCH RBC QN AUTO: 26.5 PG (ref 26.5–33)
MCHC RBC AUTO-ENTMCNC: 31.3 G/DL (ref 31.5–36.5)
MCV RBC AUTO: 85 FL (ref 78–100)
MONOCYTES # BLD AUTO: 0.8 10E3/UL (ref 0–1.3)
MONOCYTES NFR BLD AUTO: 7 %
NEUTROPHILS # BLD AUTO: 9.1 10E3/UL (ref 1.6–8.3)
NEUTROPHILS NFR BLD AUTO: 78 %
NRBC # BLD AUTO: 0 10E3/UL
NRBC BLD AUTO-RTO: 0 /100
PHOSPHATE SERPL-MCNC: 2.4 MG/DL (ref 2.5–4.5)
PLATELET # BLD AUTO: 206 10E3/UL (ref 150–450)
POTASSIUM BLD-SCNC: 3.7 MMOL/L (ref 3.4–5.3)
PROT SERPL-MCNC: 8 G/DL (ref 6.8–8.8)
RBC # BLD AUTO: 4.23 10E6/UL (ref 3.8–5.2)
SARS-COV-2 RNA RESP QL NAA+PROBE: NEGATIVE
SODIUM SERPL-SCNC: 139 MMOL/L (ref 133–144)
WBC # BLD AUTO: 11.6 10E3/UL (ref 4–11)

## 2022-07-30 PROCEDURE — 258N000003 HC RX IP 258 OP 636: Performed by: EMERGENCY MEDICINE

## 2022-07-30 PROCEDURE — 83735 ASSAY OF MAGNESIUM: CPT | Performed by: EMERGENCY MEDICINE

## 2022-07-30 PROCEDURE — 87636 SARSCOV2 & INF A&B AMP PRB: CPT | Performed by: EMERGENCY MEDICINE

## 2022-07-30 PROCEDURE — 99284 EMERGENCY DEPT VISIT MOD MDM: CPT | Mod: CS | Performed by: EMERGENCY MEDICINE

## 2022-07-30 PROCEDURE — 85025 COMPLETE CBC W/AUTO DIFF WBC: CPT | Performed by: EMERGENCY MEDICINE

## 2022-07-30 PROCEDURE — 36415 COLL VENOUS BLD VENIPUNCTURE: CPT | Performed by: EMERGENCY MEDICINE

## 2022-07-30 PROCEDURE — 84100 ASSAY OF PHOSPHORUS: CPT | Performed by: EMERGENCY MEDICINE

## 2022-07-30 PROCEDURE — 99283 EMERGENCY DEPT VISIT LOW MDM: CPT | Mod: 25 | Performed by: EMERGENCY MEDICINE

## 2022-07-30 PROCEDURE — C9803 HOPD COVID-19 SPEC COLLECT: HCPCS | Performed by: EMERGENCY MEDICINE

## 2022-07-30 PROCEDURE — 80053 COMPREHEN METABOLIC PANEL: CPT | Performed by: EMERGENCY MEDICINE

## 2022-07-30 PROCEDURE — 96360 HYDRATION IV INFUSION INIT: CPT | Performed by: EMERGENCY MEDICINE

## 2022-07-30 PROCEDURE — 96361 HYDRATE IV INFUSION ADD-ON: CPT | Performed by: EMERGENCY MEDICINE

## 2022-07-30 RX ORDER — SODIUM CHLORIDE 9 MG/ML
INJECTION, SOLUTION INTRAVENOUS CONTINUOUS
Status: DISCONTINUED | OUTPATIENT
Start: 2022-07-30 | End: 2022-07-30 | Stop reason: HOSPADM

## 2022-07-30 RX ADMIN — SODIUM CHLORIDE: 9 INJECTION, SOLUTION INTRAVENOUS at 15:04

## 2022-07-30 RX ADMIN — SODIUM CHLORIDE 1000 ML: 9 INJECTION, SOLUTION INTRAVENOUS at 13:53

## 2022-07-30 RX ADMIN — SODIUM CHLORIDE 500 ML: 9 INJECTION, SOLUTION INTRAVENOUS at 16:13

## 2022-07-30 NOTE — ED NOTES
This nurse and provider spoke with patient about skin assessment on bottom. She declined stating that nursing staff at her assisted living facility regularly do dressing changes on her bottom.

## 2022-07-30 NOTE — ED TRIAGE NOTES
Assisted living facility reports decreased LOC this morning.      Triage Assessment     Row Name 07/30/22 9695       Triage Assessment (Adult)    Airway WDL WDL       Respiratory WDL    Respiratory WDL WDL       Skin Circulation/Temperature WDL    Skin Circulation/Temperature WDL WDL       Cardiac WDL    Cardiac WDL WDL       Peripheral/Neurovascular WDL    Peripheral Neurovascular WDL WDL       Cognitive/Neuro/Behavioral WDL    Cognitive/Neuro/Behavioral WDL arousability;level of consciousness;orientation    Level of Consciousness alert    Arousal Level opens eyes spontaneously    Orientation oriented x 4

## 2022-07-30 NOTE — DISCHARGE INSTRUCTIONS
Your labs showed that you had slightly dehydrated.  Try to increase your fluids.    Please make an appointment to see your primary care provider for follow-up within the next week since you have been in the ED twice.    Return at anytime for concerns.      I hope that you have a good week!!

## 2022-07-31 ENCOUNTER — HOSPITAL ENCOUNTER (EMERGENCY)
Facility: CLINIC | Age: 57
Discharge: HOME | End: 2022-07-31
Payer: MEDICARE
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
## 2022-08-01 ENCOUNTER — MEDICAL CORRESPONDENCE (OUTPATIENT)
Dept: EMERGENCY MEDICINE | Facility: CLINIC | Age: 57
End: 2022-08-01

## 2022-08-08 ENCOUNTER — TELEPHONE (OUTPATIENT)
Dept: FAMILY MEDICINE | Facility: CLINIC | Age: 57
End: 2022-08-08

## 2022-08-08 NOTE — TELEPHONE ENCOUNTER
Reason for Call:  Form, our goal is to have forms completed with 72 hours, however, some forms may require a visit or additional information.    Type of letter, form or note:  Home Health Certification    Who is the form from?: Home care    Where did the form come from: form was faxed in    What clinic location was the form placed at?: [facility]    Where the form was placed: Given to MA/BOB Adams  What number is listed as a contact on the form?: [phone]       Additional comments: [facility]    Call taken on 8/8/2022 at 2:52 PM by Shonna Aguirre

## 2022-08-10 RX ORDER — ZOLPIDEM TARTRATE 10 MG/1
10 TABLET ORAL
Status: ON HOLD | COMMUNITY
End: 2022-09-03

## 2022-08-10 RX ORDER — KETOROLAC TROMETHAMINE 30 MG/ML
30 INJECTION, SOLUTION INTRAMUSCULAR; INTRAVENOUS
Qty: 15 ML | Refills: 0 | COMMUNITY
Start: 2022-08-10 | End: 2022-11-07

## 2022-08-11 DIAGNOSIS — G43.709 CHRONIC MIGRAINE WITHOUT AURA WITHOUT STATUS MIGRAINOSUS, NOT INTRACTABLE: ICD-10-CM

## 2022-08-11 DIAGNOSIS — R56.9 SEIZURES (H): ICD-10-CM

## 2022-08-11 RX ORDER — LEVETIRACETAM 750 MG/1
1500 TABLET ORAL 2 TIMES DAILY
Qty: 120 TABLET | Refills: 1 | Status: SHIPPED | OUTPATIENT
Start: 2022-08-11 | End: 2022-10-12

## 2022-08-11 RX ORDER — TOPIRAMATE 25 MG/1
TABLET, FILM COATED ORAL
Qty: 30 TABLET | Refills: 1 | Status: SHIPPED | OUTPATIENT
Start: 2022-08-11 | End: 2022-09-28

## 2022-08-11 NOTE — TELEPHONE ENCOUNTER
Requested Prescriptions   Pending Prescriptions Disp Refills     levETIRAcetam (KEPPRA) 750 MG tablet [Pharmacy Med Name: LEVETIRACETAM 750MG TABLET] 120 tablet 1     Sig: TAKE 2 TABLETS (1,500 MG) BY MOUTH 2 TIMES DAILY       There is no refill protocol information for this order        Last Written Prescription Date:  6/13/2022  Last Fill Quantity: 120,  # refills: 1   Last office visit: 10/12/2021 with prescribing provider:     Future Office Visit:   Next 5 appointments (look out 90 days)    Aug 16, 2022 11:00 AM  Return Visit with [facility] WOUND EXAM ROOM  [facility] [place] ([facility] ) [address] [place] [id number] [phone]

## 2022-08-16 ENCOUNTER — TELEPHONE (OUTPATIENT)
Dept: FAMILY MEDICINE | Facility: CLINIC | Age: 57
End: 2022-08-16

## 2022-08-16 DIAGNOSIS — F51.01 PRIMARY INSOMNIA: ICD-10-CM

## 2022-08-16 NOTE — TELEPHONE ENCOUNTER
FYI - Status Update    Who is Calling:  Joselyn ROJAS  from [facility]  Phone:  [phone]    Update: patient has a stage 3 pressure ulcer with a large amount of green and purulent drainage.  RN would recommend a wound vac or instructions for care prior to wound care appointment.    Patient was scheduled with wound care today and NO SHOWED her appointment.  Patient was rescheduled for Monday, August 22 @ 11am -  Homecare RN will alert patient and care facility of upcoming wound care appointment.    Does caller want a call/response back: Yes   Secure voicemail at [phone] BOB - Joselyn    [facility] requested that message be routed to both Wound Care RN as well as PCP.    Shonna Aguirre XRO/

## 2022-08-17 RX ORDER — TRAZODONE HYDROCHLORIDE 50 MG/1
TABLET, FILM COATED ORAL
Qty: 180 TABLET | Refills: 0 | Status: SHIPPED | OUTPATIENT
Start: 2022-08-17 | End: 2022-10-14

## 2022-08-17 NOTE — TELEPHONE ENCOUNTER
"   High    Drug-Drug: traZODone and mirtazapine  Serotonergic effects of trazodone and mirtazapine may be additive. The risk of serotonin syndrome/toxicity may be increased. Additive CNS depression may also occur.    Details  Override reason    Press F5 to access options                  traZODone (DESYREL) 50 MG tablet  Prescription. Reordered. Long-term.      mirtazapine (REMERON) 7.5 MG tablet  Prescription. Active. Long-term.  Discontinue      High    Drug-Drug: traMADol and traZODone  Serotonergic effects of this combination may be additive. The risk for serotonin syndrome/toxicity may be increased. Additionally, additive CNS depression may occur.        Requested Prescriptions   Pending Prescriptions Disp Refills    traZODone (DESYREL) 50 MG tablet 180 tablet 0     Sig: Take 2 tablets (100mg) by mouth at bedtime        Serotonin Modulators Passed - 8/16/2022  3:34 PM        Passed - Recent (12 mo) or future (30 days) visit within the authorizing provider's specialty     Patient has had an office visit with the authorizing provider or a provider within the authorizing providers department within the previous 12 mos or has a future within next 30 days. See \"Patient Info\" tab in inbasket, or \"Choose Columns\" in Meds & Orders section of the refill encounter.              Passed - Medication is active on med list        Passed - Patient is age 18 or older        Passed - No active pregnancy on record        Passed - No positive pregnancy test in past 12 months              CHRISTINA CasasN, RN     "

## 2022-08-22 NOTE — TELEPHONE ENCOUNTER
Addendum to message from [facility] fax number is [phone]    Presley Chester RN cwocn      Returned call to home care and spoke with Joselyn directly.     Per home care nurse sacral wound continues to have large amounts of greenish yellow purulent drainage and odor.  Home care questioning if a NPWT (wound vac) would be appropriate.  Yes a wound vac would be appropriate but pt has refused wound vac use for many months.  Was planning on revisiting the topic today in light of the purulent drainage and odor concerns but pt did not show up for her scheduled 11 am appointment.   In the interim I agree there is a need to change the plan of care and I recommend the following.  As this recommendation is coming outside of a clinic visit I will need to send to the PCP for approval and his staff can fax the signed order for the [facility] wound cares if MD is in agreement. I will send to the facility Mesalt gauze for use till home care can order and receive.   Home care nurse in agreement with the plan of care, home care is scheduled for this Wednesday for next facility visit and will update pt with planned change in plan of care and need for her to schedule a follow up visit asap in the Wound and Ostomy clinic.       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   1 Remove old dressing, cleanse wound with soap and water or a no rinse dermal cleanser, dry well with gauze.   2 Apply dry Mesalt gauze, unfolded to single layer, applied so it makes contact with the entire inner aspect of the wound bed.  This is the primary dressing.   3 Sterile gauze to be used to fill in the depth of the wound and undermining till flush with surrounding skin.   4 Cover site with gentle adhesive foam dressing, with additional ABD pad or absorbent portion of a baby diaper place on top of the gentle adhesive foam dressing for additional absorbency needs.   5 Change dressings daily if able. If AL facility is  unable to do dressing changes on days home care is not making visits, request home increase frequency of visits if able, even on a temporary basis.  At least 3 times weekly dressing changes need to be continued.     Presley Chester RN cwocn

## 2022-08-24 DIAGNOSIS — F51.01 PRIMARY INSOMNIA: ICD-10-CM

## 2022-08-24 RX ORDER — ZOLPIDEM TARTRATE 5 MG/1
TABLET ORAL
Qty: 30 TABLET | Refills: 0 | Status: ON HOLD | OUTPATIENT
Start: 2022-08-24 | End: 2022-09-28

## 2022-08-24 NOTE — TELEPHONE ENCOUNTER
Requested Prescriptions   Pending Prescriptions Disp Refills     zolpidem (AMBIEN) 5 MG tablet [Pharmacy Med Name: ZOLPIDEM 5MG TABLET] 30 tablet 0     Sig: TAKE 1 TABLET (5 MG) BY MOUTH NIGHTLY AS NEEDED CRISTAL     Last Written Prescription Date:  07/20/2022  Last Fill Quantity: 30,   # refills: 0  Last Office Visit: 04/27/2022  Future Office visit:       Routing refill request to provider for review/approval because:  Drug not on the [facility], [facility] or [facility] refill protocol or controlled substance

## 2022-09-02 ENCOUNTER — APPOINTMENT (OUTPATIENT)
Dept: CT IMAGING | Facility: CLINIC | Age: 57
DRG: 871 | End: 2022-09-02
Attending: EMERGENCY MEDICINE
Payer: MEDICARE

## 2022-09-02 ENCOUNTER — HOSPITAL ENCOUNTER (INPATIENT)
Facility: CLINIC | Age: 57
LOS: 1 days | Discharge: OTHER INSTITUTION | DRG: 871 | End: 2022-09-03
Attending: EMERGENCY MEDICINE | Admitting: EMERGENCY MEDICINE
Payer: MEDICARE

## 2022-09-02 ENCOUNTER — NURSE TRIAGE (OUTPATIENT)
Dept: FAMILY MEDICINE | Facility: CLINIC | Age: 57
End: 2022-09-02

## 2022-09-02 DIAGNOSIS — R65.21 SHOCK, SEPTIC (H): ICD-10-CM

## 2022-09-02 DIAGNOSIS — R41.82 ALTERED MENTAL STATUS, UNSPECIFIED ALTERED MENTAL STATUS TYPE: ICD-10-CM

## 2022-09-02 DIAGNOSIS — R33.9 URINARY RETENTION: ICD-10-CM

## 2022-09-02 DIAGNOSIS — L89.309 PRESSURE INJURY OF SKIN OF BUTTOCK, UNSPECIFIED INJURY STAGE, UNSPECIFIED LATERALITY: ICD-10-CM

## 2022-09-02 DIAGNOSIS — Z79.01 LONG TERM (CURRENT) USE OF ANTICOAGULANTS: ICD-10-CM

## 2022-09-02 DIAGNOSIS — G82.20 PARAPLEGIA FOLLOWING SPINAL CORD INJURY (H): ICD-10-CM

## 2022-09-02 DIAGNOSIS — L89.159 PRESSURE INJURY OF SKIN OF SACRAL REGION, UNSPECIFIED INJURY STAGE: ICD-10-CM

## 2022-09-02 DIAGNOSIS — R65.21 SEPTIC SHOCK (H): ICD-10-CM

## 2022-09-02 DIAGNOSIS — A41.9 SHOCK, SEPTIC (H): ICD-10-CM

## 2022-09-02 DIAGNOSIS — G82.20 PARAPLEGIA (H): ICD-10-CM

## 2022-09-02 DIAGNOSIS — Z11.52 ENCOUNTER FOR SCREENING LABORATORY TESTING FOR SEVERE ACUTE RESPIRATORY SYNDROME CORONAVIRUS 2 (SARS-COV-2): ICD-10-CM

## 2022-09-02 DIAGNOSIS — A41.9 SEPTIC SHOCK (H): ICD-10-CM

## 2022-09-02 LAB
ALBUMIN SERPL-MCNC: 1.9 G/DL (ref 3.4–5)
ALBUMIN UR-MCNC: 30 MG/DL
ALP SERPL-CCNC: 184 U/L (ref 40–150)
ALT SERPL W P-5'-P-CCNC: <6 U/L (ref 0–50)
ANION GAP SERPL CALCULATED.3IONS-SCNC: 9 MMOL/L (ref 3–14)
APPEARANCE UR: ABNORMAL
AST SERPL W P-5'-P-CCNC: 5 U/L (ref 0–45)
BACTERIA #/AREA URNS HPF: ABNORMAL /HPF
BASE EXCESS BLDV CALC-SCNC: -11.6 MMOL/L (ref -7.7–1.9)
BASOPHILS # BLD AUTO: 0.1 10E3/UL (ref 0–0.2)
BASOPHILS NFR BLD AUTO: 0 %
BILIRUB SERPL-MCNC: 0.5 MG/DL (ref 0.2–1.3)
BILIRUB UR QL STRIP: ABNORMAL
BUN SERPL-MCNC: 28 MG/DL (ref 7–30)
CALCIUM SERPL-MCNC: 9.1 MG/DL (ref 8.5–10.1)
CHLORIDE BLD-SCNC: 120 MMOL/L (ref 94–109)
CO2 SERPL-SCNC: 14 MMOL/L (ref 20–32)
COLOR UR AUTO: YELLOW
CREAT SERPL-MCNC: 0.51 MG/DL (ref 0.52–1.04)
EOSINOPHIL # BLD AUTO: 0.1 10E3/UL (ref 0–0.7)
EOSINOPHIL NFR BLD AUTO: 1 %
ERYTHROCYTE [DISTWIDTH] IN BLOOD BY AUTOMATED COUNT: 20.1 % (ref 10–15)
GFR SERPL CREATININE-BSD FRML MDRD: >90 ML/MIN/1.73M2
GLUCOSE BLD-MCNC: 118 MG/DL (ref 70–99)
GLUCOSE UR STRIP-MCNC: NEGATIVE MG/DL
HCO3 BLDV-SCNC: 14 MMOL/L (ref 21–28)
HCT VFR BLD AUTO: 31.3 % (ref 35–47)
HGB BLD-MCNC: 9.7 G/DL (ref 11.7–15.7)
HGB UR QL STRIP: ABNORMAL
IMM GRANULOCYTES # BLD: 0.2 10E3/UL
IMM GRANULOCYTES NFR BLD: 2 %
KETONES UR STRIP-MCNC: NEGATIVE MG/DL
LACTATE SERPL-SCNC: 1.1 MMOL/L (ref 0.7–2)
LACTATE SERPL-SCNC: 1.3 MMOL/L (ref 0.7–2)
LEUKOCYTE ESTERASE UR QL STRIP: NEGATIVE
LYMPHOCYTES # BLD AUTO: 1.6 10E3/UL (ref 0.8–5.3)
LYMPHOCYTES NFR BLD AUTO: 13 %
MCH RBC QN AUTO: 25.7 PG (ref 26.5–33)
MCHC RBC AUTO-ENTMCNC: 31 G/DL (ref 31.5–36.5)
MCV RBC AUTO: 83 FL (ref 78–100)
MONOCYTES # BLD AUTO: 0.6 10E3/UL (ref 0–1.3)
MONOCYTES NFR BLD AUTO: 5 %
MUCOUS THREADS #/AREA URNS LPF: PRESENT /LPF
NEUTROPHILS # BLD AUTO: 9.6 10E3/UL (ref 1.6–8.3)
NEUTROPHILS NFR BLD AUTO: 79 %
NITRATE UR QL: NEGATIVE
NRBC # BLD AUTO: 0 10E3/UL
NRBC BLD AUTO-RTO: 0 /100
O2/TOTAL GAS SETTING VFR VENT: 0 %
PCO2 BLDV: 31 MM HG (ref 40–50)
PH BLDV: 7.27 [PH] (ref 7.32–7.43)
PH UR STRIP: 7.5 [PH] (ref 5–7)
PLATELET # BLD AUTO: 256 10E3/UL (ref 150–450)
PO2 BLDV: 29 MM HG (ref 25–47)
POTASSIUM BLD-SCNC: 3.3 MMOL/L (ref 3.4–5.3)
PROT SERPL-MCNC: 7.1 G/DL (ref 6.8–8.8)
RBC # BLD AUTO: 3.78 10E6/UL (ref 3.8–5.2)
RBC URINE: 0 /HPF
SARS-COV-2 RNA RESP QL NAA+PROBE: NEGATIVE
SODIUM SERPL-SCNC: 143 MMOL/L (ref 133–144)
SP GR UR STRIP: 1.01 (ref 1–1.03)
UROBILINOGEN UR STRIP-MCNC: NORMAL MG/DL
WBC # BLD AUTO: 12.2 10E3/UL (ref 4–11)
WBC URINE: 5 /HPF

## 2022-09-02 PROCEDURE — 84075 ASSAY ALKALINE PHOSPHATASE: CPT | Performed by: EMERGENCY MEDICINE

## 2022-09-02 PROCEDURE — 36556 INSERT NON-TUNNEL CV CATH: CPT | Performed by: FAMILY MEDICINE

## 2022-09-02 PROCEDURE — 87040 BLOOD CULTURE FOR BACTERIA: CPT | Performed by: EMERGENCY MEDICINE

## 2022-09-02 PROCEDURE — 51102 DRAIN BL W/CATH INSERTION: CPT | Performed by: FAMILY MEDICINE

## 2022-09-02 PROCEDURE — 250N000011 HC RX IP 250 OP 636: Performed by: EMERGENCY MEDICINE

## 2022-09-02 PROCEDURE — 51102 DRAIN BL W/CATH INSERTION: CPT | Performed by: EMERGENCY MEDICINE

## 2022-09-02 PROCEDURE — C9803 HOPD COVID-19 SPEC COLLECT: HCPCS | Performed by: FAMILY MEDICINE

## 2022-09-02 PROCEDURE — 120N000001 HC R&B MED SURG/OB

## 2022-09-02 PROCEDURE — 3E043XZ INTRODUCTION OF VASOPRESSOR INTO CENTRAL VEIN, PERCUTANEOUS APPROACH: ICD-10-PCS | Performed by: EMERGENCY MEDICINE

## 2022-09-02 PROCEDURE — 76942 ECHO GUIDE FOR BIOPSY: CPT | Performed by: FAMILY MEDICINE

## 2022-09-02 PROCEDURE — 51798 US URINE CAPACITY MEASURE: CPT | Performed by: FAMILY MEDICINE

## 2022-09-02 PROCEDURE — 80053 COMPREHEN METABOLIC PANEL: CPT | Performed by: EMERGENCY MEDICINE

## 2022-09-02 PROCEDURE — 87635 SARS-COV-2 COVID-19 AMP PRB: CPT | Performed by: EMERGENCY MEDICINE

## 2022-09-02 PROCEDURE — 81001 URINALYSIS AUTO W/SCOPE: CPT | Performed by: EMERGENCY MEDICINE

## 2022-09-02 PROCEDURE — 96361 HYDRATE IV INFUSION ADD-ON: CPT | Performed by: FAMILY MEDICINE

## 2022-09-02 PROCEDURE — 99291 CRITICAL CARE FIRST HOUR: CPT | Mod: 25 | Performed by: FAMILY MEDICINE

## 2022-09-02 PROCEDURE — 83605 ASSAY OF LACTIC ACID: CPT | Performed by: EMERGENCY MEDICINE

## 2022-09-02 PROCEDURE — 74177 CT ABD & PELVIS W/CONTRAST: CPT | Mod: MG

## 2022-09-02 PROCEDURE — 258N000003 HC RX IP 258 OP 636: Performed by: EMERGENCY MEDICINE

## 2022-09-02 PROCEDURE — 85025 COMPLETE CBC W/AUTO DIFF WBC: CPT | Performed by: EMERGENCY MEDICINE

## 2022-09-02 PROCEDURE — 250N000009 HC RX 250: Performed by: EMERGENCY MEDICINE

## 2022-09-02 PROCEDURE — 99292 CRITICAL CARE ADDL 30 MIN: CPT | Mod: 25 | Performed by: FAMILY MEDICINE

## 2022-09-02 PROCEDURE — 83605 ASSAY OF LACTIC ACID: CPT | Performed by: FAMILY MEDICINE

## 2022-09-02 PROCEDURE — 82803 BLOOD GASES ANY COMBINATION: CPT | Performed by: EMERGENCY MEDICINE

## 2022-09-02 PROCEDURE — 87086 URINE CULTURE/COLONY COUNT: CPT | Performed by: NURSE PRACTITIONER

## 2022-09-02 PROCEDURE — 76942 ECHO GUIDE FOR BIOPSY: CPT | Mod: 26 | Performed by: EMERGENCY MEDICINE

## 2022-09-02 PROCEDURE — G1010 CDSM STANSON: HCPCS

## 2022-09-02 PROCEDURE — 99292 CRITICAL CARE ADDL 30 MIN: CPT | Performed by: FAMILY MEDICINE

## 2022-09-02 PROCEDURE — 36415 COLL VENOUS BLD VENIPUNCTURE: CPT | Performed by: EMERGENCY MEDICINE

## 2022-09-02 PROCEDURE — 96365 THER/PROPH/DIAG IV INF INIT: CPT | Mod: 59 | Performed by: FAMILY MEDICINE

## 2022-09-02 RX ORDER — ONDANSETRON 4 MG/1
4 TABLET, ORALLY DISINTEGRATING ORAL EVERY 8 HOURS PRN
Status: DISCONTINUED | OUTPATIENT
Start: 2022-09-02 | End: 2022-09-03 | Stop reason: HOSPADM

## 2022-09-02 RX ORDER — MULTIPLE VITAMINS W/ MINERALS TAB 9MG-400MCG
1 TAB ORAL DAILY
Status: DISCONTINUED | OUTPATIENT
Start: 2022-09-03 | End: 2022-09-03 | Stop reason: HOSPADM

## 2022-09-02 RX ORDER — HYDROCODONE BITARTRATE AND ACETAMINOPHEN 5; 325 MG/1; MG/1
1 TABLET ORAL EVERY 4 HOURS PRN
Status: DISCONTINUED | OUTPATIENT
Start: 2022-09-02 | End: 2022-09-03 | Stop reason: HOSPADM

## 2022-09-02 RX ORDER — SODIUM CHLORIDE 9 MG/ML
INJECTION, SOLUTION INTRAVENOUS CONTINUOUS
Status: DISCONTINUED | OUTPATIENT
Start: 2022-09-02 | End: 2022-09-03 | Stop reason: HOSPADM

## 2022-09-02 RX ORDER — PROPRANOLOL HYDROCHLORIDE 10 MG/1
20 TABLET ORAL 2 TIMES DAILY
Status: DISCONTINUED | OUTPATIENT
Start: 2022-09-02 | End: 2022-09-03 | Stop reason: HOSPADM

## 2022-09-02 RX ORDER — CLINDAMYCIN PHOSPHATE 600 MG/50ML
600 INJECTION, SOLUTION INTRAVENOUS EVERY 8 HOURS
Status: DISCONTINUED | OUTPATIENT
Start: 2022-09-02 | End: 2022-09-03 | Stop reason: HOSPADM

## 2022-09-02 RX ORDER — HYDROXYZINE HYDROCHLORIDE 10 MG/1
10 TABLET, FILM COATED ORAL EVERY 6 HOURS PRN
Status: DISCONTINUED | OUTPATIENT
Start: 2022-09-02 | End: 2022-09-03 | Stop reason: HOSPADM

## 2022-09-02 RX ORDER — MIRTAZAPINE 7.5 MG/1
7.5 TABLET, FILM COATED ORAL AT BEDTIME
Status: DISCONTINUED | OUTPATIENT
Start: 2022-09-02 | End: 2022-09-03 | Stop reason: HOSPADM

## 2022-09-02 RX ORDER — FUROSEMIDE 40 MG
40 TABLET ORAL DAILY
Status: DISCONTINUED | OUTPATIENT
Start: 2022-09-03 | End: 2022-09-03 | Stop reason: HOSPADM

## 2022-09-02 RX ORDER — LEVETIRACETAM 500 MG/1
1500 TABLET ORAL 2 TIMES DAILY
Status: DISCONTINUED | OUTPATIENT
Start: 2022-09-02 | End: 2022-09-03 | Stop reason: HOSPADM

## 2022-09-02 RX ORDER — KETOROLAC TROMETHAMINE 30 MG/ML
15 INJECTION, SOLUTION INTRAMUSCULAR; INTRAVENOUS EVERY 6 HOURS PRN
Status: DISCONTINUED | OUTPATIENT
Start: 2022-09-02 | End: 2022-09-03 | Stop reason: HOSPADM

## 2022-09-02 RX ORDER — GLIPIZIDE 10 MG/1
1 TABLET ORAL
Status: DISCONTINUED | OUTPATIENT
Start: 2022-09-02 | End: 2022-09-03 | Stop reason: HOSPADM

## 2022-09-02 RX ORDER — IOPAMIDOL 755 MG/ML
500 INJECTION, SOLUTION INTRAVASCULAR ONCE
Status: COMPLETED | OUTPATIENT
Start: 2022-09-02 | End: 2022-09-02

## 2022-09-02 RX ORDER — TRAMADOL HYDROCHLORIDE 50 MG/1
50 TABLET ORAL EVERY 6 HOURS PRN
Status: DISCONTINUED | OUTPATIENT
Start: 2022-09-02 | End: 2022-09-03 | Stop reason: HOSPADM

## 2022-09-02 RX ORDER — PANTOPRAZOLE SODIUM 40 MG/1
40 TABLET, DELAYED RELEASE ORAL
Status: DISCONTINUED | OUTPATIENT
Start: 2022-09-03 | End: 2022-09-03 | Stop reason: HOSPADM

## 2022-09-02 RX ORDER — LIDOCAINE HYDROCHLORIDE 20 MG/ML
JELLY TOPICAL DAILY PRN
Status: DISCONTINUED | OUTPATIENT
Start: 2022-09-02 | End: 2022-09-03 | Stop reason: HOSPADM

## 2022-09-02 RX ORDER — TOPIRAMATE 25 MG/1
25 TABLET, FILM COATED ORAL DAILY
Status: DISCONTINUED | OUTPATIENT
Start: 2022-09-03 | End: 2022-09-03 | Stop reason: HOSPADM

## 2022-09-02 RX ORDER — SODIUM CHLORIDE, SODIUM LACTATE, POTASSIUM CHLORIDE, CALCIUM CHLORIDE 600; 310; 30; 20 MG/100ML; MG/100ML; MG/100ML; MG/100ML
125 INJECTION, SOLUTION INTRAVENOUS CONTINUOUS
Status: DISCONTINUED | OUTPATIENT
Start: 2022-09-03 | End: 2022-09-03 | Stop reason: HOSPADM

## 2022-09-02 RX ORDER — TRAZODONE HYDROCHLORIDE 50 MG/1
100 TABLET, FILM COATED ORAL AT BEDTIME
Status: DISCONTINUED | OUTPATIENT
Start: 2022-09-02 | End: 2022-09-03 | Stop reason: HOSPADM

## 2022-09-02 RX ADMIN — IOPAMIDOL 50 ML: 755 INJECTION, SOLUTION INTRAVENOUS at 18:55

## 2022-09-02 RX ADMIN — SODIUM CHLORIDE: 9 INJECTION, SOLUTION INTRAVENOUS at 22:51

## 2022-09-02 RX ADMIN — SODIUM CHLORIDE 500 ML: 9 INJECTION, SOLUTION INTRAVENOUS at 13:46

## 2022-09-02 RX ADMIN — SODIUM CHLORIDE 60 ML: 9 INJECTION, SOLUTION INTRAVENOUS at 18:55

## 2022-09-02 RX ADMIN — CLINDAMYCIN IN 5 PERCENT DEXTROSE 600 MG: 12 INJECTION, SOLUTION INTRAVENOUS at 23:56

## 2022-09-02 ASSESSMENT — ACTIVITIES OF DAILY LIVING (ADL)
ADLS_ACUITY_SCORE: 35

## 2022-09-02 NOTE — ED PROVIDER NOTES
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 as it is unlikely to do any harm.  Unfortunately we do not have any recent urology history.   Suprapubic catheter placed as above, urine currently draining.   AMS - will check urine when we can get it.  Ct head performed. No acute findings.  Fluid collection in the right buttocks into the right thigh.  Unsure of chronicity of this.  No recent imaging.  Question infection vs hematoma.  Will need surgical consultation.   Decubitus ulcers, uncertain where these are from baseline but they are chronic.  After 1200 mils of urine was drained from the bladder the patient became hypotensive.  Blood cultures were ordered and IV antibiotics and a bag of saline.  Discussed the case with Dr. Sims who is on-call and he felt that the patient should be transferred to a facility with urology consultation available.  She also needs surgical consultation for her decubitus wounds and large fluid collection on her right buttocks.  He felt that the complexity of this case would warrant transfer to higher level of care.  Signed over to Dr. Huff at change of shift.   Patient is a boarder.        I have reviewed the nursing notes.    I have reviewed the findings, diagnosis, plan and need for follow up with the patient.      New Prescriptions    No medications on file       Final diagnoses:   Urinary retention   Altered mental status, unspecified altered mental status type       9/2/2022   [facility] EMERGENCY DEPT     Mello Grossman MD  09/02/22 7107

## 2022-09-02 NOTE — TELEPHONE ENCOUNTER
Spoke with BOB Alves with [facility] who reports the following updates on patients wounds:  1. She has new pressure ulcers on her sacrum that are stage 2.  2. She has an older pressure sore stage 3 - tunneling, purulent green drainage, foul odor  3. Patient has a wound care appointment on 09/13/22.    Patient has been:  A. Laying in bed all day  B. She is refusing meals and cares  C. She is more depressed  D. BP 90/70  P106  T97.1  E. Says her legs hurt but its hard to decipher where it Is because of being a paraplegic     Highly recommended that patient be seen in the ED.  BOB Alves plans to call 911 and send patient to ED non-urgent.    RN reviewed red flag symptoms with patient and when to see emergency care. Patient agreed and understood.     Reason for Disposition    [1] Looks infected (spreading redness, pus) AND [2] large red area (> 2 in. or 5 cm)    [1] Small red streak or spreading redness (< 2 inches or 5 cm) AND [2] no fever    Large sore (> 1 inch or 2.5 cm across)    Additional Information    Negative: Sounds like a life-threatening emergency to the triager    Negative: Followed an injury (i.e., from an abrasion or scratch)    Negative: Wound infection suspected (in traumatic or surgical wound)    Negative: Soft yellow scabs (crusts)    Negative: Followed a burn    Negative: Looks like insect bite    Negative: Looks like chickenpox    Negative: On one side of the lip    Negative: Looks like poison ivy    Negative: Looks like ringworm    Negative: Patient sounds very sick or weak to the triager    Protocols used: SORES-A-AH

## 2022-09-02 NOTE — ED NOTES
Please see MD photo for wound. Redressed with gel gauze to entire buttocks. Pt has been lethargic after fentanyl given per EMS. Pt is quad. This RN attempting to cath pt per abdomen. Unable. Pt normally self caths.

## 2022-09-02 NOTE — ED NOTES
Called Olena from [facility] to get information [phone]. She was unsure where she had her urinary diversion . Also called [facility] . [phone]. She stated that she uses 14 Kinyarwanda catheter which is what we attempted. Bladder scan for over 1000 ml. Updated Migue Mcgovern.

## 2022-09-02 NOTE — ED TRIAGE NOTES
"  Pt arrived via EMS who reports pt has a home care nurse that does wound care Monday, Wednesday and Friday and states pts wound to buttocks has gotten bigger since Wednesday and pt has been \"more out of it\".     "

## 2022-09-03 ENCOUNTER — HOSPITAL ENCOUNTER (INPATIENT)
Facility: HOSPITAL | Age: 57
LOS: 25 days | Discharge: NURSING, REHAB OR LONG-TERM CARE | DRG: 853 | End: 2022-09-28
Attending: INTERNAL MEDICINE | Admitting: INTERNAL MEDICINE
Payer: MEDICARE

## 2022-09-03 ENCOUNTER — APPOINTMENT (OUTPATIENT)
Dept: GENERAL RADIOLOGY | Facility: CLINIC | Age: 57
DRG: 871 | End: 2022-09-03
Attending: FAMILY MEDICINE
Payer: MEDICARE

## 2022-09-03 ENCOUNTER — APPOINTMENT (OUTPATIENT)
Dept: CT IMAGING | Facility: HOSPITAL | Age: 57
DRG: 853 | End: 2022-09-03
Attending: RADIOLOGY
Payer: MEDICARE

## 2022-09-03 VITALS
DIASTOLIC BLOOD PRESSURE: 34 MMHG | HEART RATE: 90 BPM | TEMPERATURE: 97.7 F | RESPIRATION RATE: 9 BRPM | OXYGEN SATURATION: 99 % | SYSTOLIC BLOOD PRESSURE: 83 MMHG

## 2022-09-03 DIAGNOSIS — F51.01 PRIMARY INSOMNIA: ICD-10-CM

## 2022-09-03 DIAGNOSIS — N31.9 NEUROGENIC BLADDER: ICD-10-CM

## 2022-09-03 DIAGNOSIS — R60.9 EDEMA, UNSPECIFIED TYPE: ICD-10-CM

## 2022-09-03 DIAGNOSIS — L02.415 ABSCESS OF RIGHT THIGH: Primary | ICD-10-CM

## 2022-09-03 DIAGNOSIS — I31.39 PERICARDIAL EFFUSION: ICD-10-CM

## 2022-09-03 DIAGNOSIS — I82.403 ACUTE DEEP VEIN THROMBOSIS (DVT) OF BOTH LOWER EXTREMITIES, UNSPECIFIED VEIN (H): ICD-10-CM

## 2022-09-03 DIAGNOSIS — L02.415 ABSCESS OF RIGHT HIP: ICD-10-CM

## 2022-09-03 DIAGNOSIS — E87.6 HYPOKALEMIA: ICD-10-CM

## 2022-09-03 PROBLEM — R65.21 SEPTIC SHOCK (H): Status: ACTIVE | Noted: 2022-09-03

## 2022-09-03 PROBLEM — A41.9 SEPTIC SHOCK (H): Status: ACTIVE | Noted: 2022-09-03

## 2022-09-03 LAB
CREAT SERPL-MCNC: 0.47 MG/DL (ref 0.6–1.1)
GFR SERPL CREATININE-BSD FRML MDRD: >90 ML/MIN/1.73M2
GLUCOSE BLDC GLUCOMTR-MCNC: 106 MG/DL (ref 70–99)
GLUCOSE BLDC GLUCOMTR-MCNC: 107 MG/DL (ref 70–99)
GLUCOSE BLDC GLUCOMTR-MCNC: 146 MG/DL (ref 70–99)
GLUCOSE BLDC GLUCOMTR-MCNC: 146 MG/DL (ref 70–99)
GLUCOSE BLDC GLUCOMTR-MCNC: 147 MG/DL (ref 70–99)
LACTATE SERPL-SCNC: 1.3 MMOL/L (ref 0.7–2)
LEVETIRACETAM SERPL-MCNC: 43.1 ΜG/ML (ref 10–40)

## 2022-09-03 PROCEDURE — 258N000003 HC RX IP 258 OP 636: Performed by: FAMILY MEDICINE

## 2022-09-03 PROCEDURE — 80177 DRUG SCRN QUAN LEVETIRACETAM: CPT | Performed by: NURSE PRACTITIONER

## 2022-09-03 PROCEDURE — 250N000011 HC RX IP 250 OP 636: Performed by: NURSE PRACTITIONER

## 2022-09-03 PROCEDURE — 250N000011 HC RX IP 250 OP 636: Performed by: RADIOLOGY

## 2022-09-03 PROCEDURE — 96368 THER/DIAG CONCURRENT INF: CPT | Performed by: FAMILY MEDICINE

## 2022-09-03 PROCEDURE — 250N000009 HC RX 250: Performed by: NURSE PRACTITIONER

## 2022-09-03 PROCEDURE — 82565 ASSAY OF CREATININE: CPT | Performed by: NURSE PRACTITIONER

## 2022-09-03 PROCEDURE — 272N000431 CT ABDOMEN PERITONEUM ABSCESS DRAIN W CATH PLACE

## 2022-09-03 PROCEDURE — 3E043XZ INTRODUCTION OF VASOPRESSOR INTO CENTRAL VEIN, PERCUTANEOUS APPROACH: ICD-10-PCS | Performed by: NURSE PRACTITIONER

## 2022-09-03 PROCEDURE — 87070 CULTURE OTHR SPECIMN AEROBIC: CPT | Performed by: NURSE PRACTITIONER

## 2022-09-03 PROCEDURE — 87077 CULTURE AEROBIC IDENTIFY: CPT | Performed by: NURSE PRACTITIONER

## 2022-09-03 PROCEDURE — 83605 ASSAY OF LACTIC ACID: CPT | Performed by: NURSE PRACTITIONER

## 2022-09-03 PROCEDURE — 999N000065 XR KUB

## 2022-09-03 PROCEDURE — 272N000452 HC KIT SHRLOCK 5FR POWER PICC TRIPLE LUMEN

## 2022-09-03 PROCEDURE — 49406 IMAGE CATH FLUID PERI/RETRO: CPT

## 2022-09-03 PROCEDURE — 0J9L30Z DRAINAGE OF RIGHT UPPER LEG SUBCUTANEOUS TISSUE AND FASCIA WITH DRAINAGE DEVICE, PERCUTANEOUS APPROACH: ICD-10-PCS | Performed by: RADIOLOGY

## 2022-09-03 PROCEDURE — 96367 TX/PROPH/DG ADDL SEQ IV INF: CPT | Performed by: FAMILY MEDICINE

## 2022-09-03 PROCEDURE — 99291 CRITICAL CARE FIRST HOUR: CPT | Mod: 25 | Performed by: NURSE PRACTITIONER

## 2022-09-03 PROCEDURE — C9113 INJ PANTOPRAZOLE SODIUM, VIA: HCPCS | Performed by: NURSE PRACTITIONER

## 2022-09-03 PROCEDURE — P9016 RBC LEUKOCYTES REDUCED: HCPCS | Performed by: INTERNAL MEDICINE

## 2022-09-03 PROCEDURE — 36620 INSERTION CATHETER ARTERY: CPT | Performed by: INTERNAL MEDICINE

## 2022-09-03 PROCEDURE — 250N000012 HC RX MED GY IP 250 OP 636 PS 637: Performed by: NURSE PRACTITIONER

## 2022-09-03 PROCEDURE — 258N000003 HC RX IP 258 OP 636: Performed by: NURSE PRACTITIONER

## 2022-09-03 PROCEDURE — 250N000011 HC RX IP 250 OP 636: Performed by: FAMILY MEDICINE

## 2022-09-03 PROCEDURE — 200N000001 HC R&B ICU

## 2022-09-03 PROCEDURE — 250N000009 HC RX 250: Performed by: FAMILY MEDICINE

## 2022-09-03 PROCEDURE — 71045 X-RAY EXAM CHEST 1 VIEW: CPT

## 2022-09-03 PROCEDURE — 96361 HYDRATE IV INFUSION ADD-ON: CPT | Performed by: FAMILY MEDICINE

## 2022-09-03 PROCEDURE — 36568 INSJ PICC <5 YR W/O IMAGING: CPT | Mod: 52

## 2022-09-03 PROCEDURE — 96365 THER/PROPH/DIAG IV INF INIT: CPT | Mod: 59 | Performed by: FAMILY MEDICINE

## 2022-09-03 PROCEDURE — 250N000011 HC RX IP 250 OP 636: Performed by: EMERGENCY MEDICINE

## 2022-09-03 PROCEDURE — 96366 THER/PROPH/DIAG IV INF ADDON: CPT | Performed by: FAMILY MEDICINE

## 2022-09-03 RX ORDER — NALOXONE HYDROCHLORIDE 0.4 MG/ML
0.2 INJECTION, SOLUTION INTRAMUSCULAR; INTRAVENOUS; SUBCUTANEOUS
Status: DISCONTINUED | OUTPATIENT
Start: 2022-09-03 | End: 2022-09-28 | Stop reason: HOSPADM

## 2022-09-03 RX ORDER — DEXTROSE MONOHYDRATE 25 G/50ML
25-50 INJECTION, SOLUTION INTRAVENOUS
Status: DISCONTINUED | OUTPATIENT
Start: 2022-09-03 | End: 2022-09-28 | Stop reason: HOSPADM

## 2022-09-03 RX ORDER — ONDANSETRON 4 MG/1
4 TABLET, ORALLY DISINTEGRATING ORAL EVERY 6 HOURS PRN
Status: DISCONTINUED | OUTPATIENT
Start: 2022-09-03 | End: 2022-09-23

## 2022-09-03 RX ORDER — MIRTAZAPINE 7.5 MG/1
7.5 TABLET, FILM COATED ORAL AT BEDTIME
Status: ON HOLD | COMMUNITY
End: 2022-09-16

## 2022-09-03 RX ORDER — CEFTRIAXONE 1 G/1
1 INJECTION, POWDER, FOR SOLUTION INTRAMUSCULAR; INTRAVENOUS ONCE
Status: COMPLETED | OUTPATIENT
Start: 2022-09-03 | End: 2022-09-03

## 2022-09-03 RX ORDER — SODIUM CHLORIDE, SODIUM LACTATE, POTASSIUM CHLORIDE, CALCIUM CHLORIDE 600; 310; 30; 20 MG/100ML; MG/100ML; MG/100ML; MG/100ML
INJECTION, SOLUTION INTRAVENOUS CONTINUOUS
Status: DISCONTINUED | OUTPATIENT
Start: 2022-09-03 | End: 2022-09-04

## 2022-09-03 RX ORDER — ENOXAPARIN SODIUM 100 MG/ML
40 INJECTION SUBCUTANEOUS EVERY 24 HOURS
Status: DISCONTINUED | OUTPATIENT
Start: 2022-09-03 | End: 2022-09-03

## 2022-09-03 RX ORDER — ENOXAPARIN SODIUM 100 MG/ML
60 INJECTION SUBCUTANEOUS DAILY
COMMUNITY
End: 2022-09-28

## 2022-09-03 RX ORDER — FLUMAZENIL 0.1 MG/ML
0.2 INJECTION, SOLUTION INTRAVENOUS
Status: DISCONTINUED | OUTPATIENT
Start: 2022-09-03 | End: 2022-09-28 | Stop reason: HOSPADM

## 2022-09-03 RX ORDER — VANCOMYCIN HYDROCHLORIDE 1 G/200ML
1000 INJECTION, SOLUTION INTRAVENOUS EVERY 12 HOURS
Status: DISCONTINUED | OUTPATIENT
Start: 2022-09-03 | End: 2022-09-04 | Stop reason: DRUGHIGH

## 2022-09-03 RX ORDER — PROCHLORPERAZINE 25 MG
25 SUPPOSITORY, RECTAL RECTAL EVERY 12 HOURS PRN
Status: DISCONTINUED | OUTPATIENT
Start: 2022-09-03 | End: 2022-09-28 | Stop reason: HOSPADM

## 2022-09-03 RX ORDER — ENOXAPARIN SODIUM 100 MG/ML
1 INJECTION SUBCUTANEOUS EVERY 24 HOURS
Status: DISCONTINUED | OUTPATIENT
Start: 2022-09-04 | End: 2022-09-03

## 2022-09-03 RX ORDER — ENOXAPARIN SODIUM 100 MG/ML
0.75 INJECTION SUBCUTANEOUS EVERY 12 HOURS
Status: DISCONTINUED | OUTPATIENT
Start: 2022-09-04 | End: 2022-09-06

## 2022-09-03 RX ORDER — VANCOMYCIN HYDROCHLORIDE 1 G/200ML
1000 INJECTION, SOLUTION INTRAVENOUS EVERY 12 HOURS
Status: DISCONTINUED | OUTPATIENT
Start: 2022-09-03 | End: 2022-09-03 | Stop reason: HOSPADM

## 2022-09-03 RX ORDER — NOREPINEPHRINE BITARTRATE 0.02 MG/ML
.01-.6 INJECTION, SOLUTION INTRAVENOUS CONTINUOUS
Status: DISCONTINUED | OUTPATIENT
Start: 2022-09-03 | End: 2022-09-08

## 2022-09-03 RX ORDER — FENTANYL CITRATE 50 UG/ML
25-50 INJECTION, SOLUTION INTRAMUSCULAR; INTRAVENOUS EVERY 5 MIN PRN
Status: DISCONTINUED | OUTPATIENT
Start: 2022-09-03 | End: 2022-09-04

## 2022-09-03 RX ORDER — ONDANSETRON 2 MG/ML
4 INJECTION INTRAMUSCULAR; INTRAVENOUS EVERY 6 HOURS PRN
Status: DISCONTINUED | OUTPATIENT
Start: 2022-09-03 | End: 2022-09-23

## 2022-09-03 RX ORDER — HEPARIN SODIUM 200 [USP'U]/100ML
1 INJECTION, SOLUTION INTRAVENOUS CONTINUOUS PRN
Status: DISCONTINUED | OUTPATIENT
Start: 2022-09-03 | End: 2022-09-27

## 2022-09-03 RX ORDER — LIDOCAINE 40 MG/G
CREAM TOPICAL
Status: ACTIVE | OUTPATIENT
Start: 2022-09-03 | End: 2022-09-06

## 2022-09-03 RX ORDER — NICOTINE POLACRILEX 4 MG
15-30 LOZENGE BUCCAL
Status: DISCONTINUED | OUTPATIENT
Start: 2022-09-03 | End: 2022-09-28 | Stop reason: HOSPADM

## 2022-09-03 RX ORDER — NALOXONE HYDROCHLORIDE 0.4 MG/ML
0.4 INJECTION, SOLUTION INTRAMUSCULAR; INTRAVENOUS; SUBCUTANEOUS
Status: DISCONTINUED | OUTPATIENT
Start: 2022-09-03 | End: 2022-09-28 | Stop reason: HOSPADM

## 2022-09-03 RX ORDER — NOREPINEPHRINE BITARTRATE 0.02 MG/ML
.01-.6 INJECTION, SOLUTION INTRAVENOUS CONTINUOUS
Status: DISCONTINUED | OUTPATIENT
Start: 2022-09-03 | End: 2022-09-03 | Stop reason: HOSPADM

## 2022-09-03 RX ORDER — PROCHLORPERAZINE MALEATE 10 MG
10 TABLET ORAL EVERY 6 HOURS PRN
Status: DISCONTINUED | OUTPATIENT
Start: 2022-09-03 | End: 2022-09-28 | Stop reason: HOSPADM

## 2022-09-03 RX ORDER — METRONIDAZOLE 500 MG/100ML
500 INJECTION, SOLUTION INTRAVENOUS EVERY 12 HOURS
Status: DISCONTINUED | OUTPATIENT
Start: 2022-09-03 | End: 2022-09-04

## 2022-09-03 RX ORDER — HYDROMORPHONE HCL IN WATER/PF 6 MG/30 ML
.2-.4 PATIENT CONTROLLED ANALGESIA SYRINGE INTRAVENOUS EVERY 4 HOURS PRN
Status: DISCONTINUED | OUTPATIENT
Start: 2022-09-03 | End: 2022-09-11

## 2022-09-03 RX ADMIN — Medication 0.38 MCG/KG/MIN: at 12:47

## 2022-09-03 RX ADMIN — HYDROCORTISONE SODIUM SUCCINATE 100 MG: 100 INJECTION, POWDER, FOR SOLUTION INTRAMUSCULAR; INTRAVENOUS at 18:40

## 2022-09-03 RX ADMIN — CEFEPIME HYDROCHLORIDE 2 G: 2 INJECTION, POWDER, FOR SOLUTION INTRAVENOUS at 10:42

## 2022-09-03 RX ADMIN — PANTOPRAZOLE SODIUM 40 MG: 40 INJECTION, POWDER, FOR SOLUTION INTRAVENOUS at 10:03

## 2022-09-03 RX ADMIN — SODIUM CHLORIDE, POTASSIUM CHLORIDE, SODIUM LACTATE AND CALCIUM CHLORIDE 125 ML/HR: 600; 310; 30; 20 INJECTION, SOLUTION INTRAVENOUS at 01:26

## 2022-09-03 RX ADMIN — VASOPRESSIN 2.4 UNITS/HR: 20 INJECTION INTRAVENOUS at 09:11

## 2022-09-03 RX ADMIN — METRONIDAZOLE 500 MG: 500 INJECTION, SOLUTION INTRAVENOUS at 23:28

## 2022-09-03 RX ADMIN — METRONIDAZOLE 500 MG: 500 INJECTION, SOLUTION INTRAVENOUS at 12:07

## 2022-09-03 RX ADMIN — CEFTRIAXONE 1 G: 1 INJECTION, POWDER, FOR SOLUTION INTRAMUSCULAR; INTRAVENOUS at 03:54

## 2022-09-03 RX ADMIN — SODIUM CHLORIDE, POTASSIUM CHLORIDE, SODIUM LACTATE AND CALCIUM CHLORIDE 1000 ML: 600; 310; 30; 20 INJECTION, SOLUTION INTRAVENOUS at 00:04

## 2022-09-03 RX ADMIN — Medication 0.03 MCG/KG/MIN: at 05:51

## 2022-09-03 RX ADMIN — CEFEPIME HYDROCHLORIDE 2 G: 2 INJECTION, POWDER, FOR SOLUTION INTRAVENOUS at 18:40

## 2022-09-03 RX ADMIN — LEVETIRACETAM 1500 MG: 100 INJECTION, SOLUTION INTRAVENOUS at 01:53

## 2022-09-03 RX ADMIN — Medication 0.22 MCG/KG/MIN: at 17:55

## 2022-09-03 RX ADMIN — INSULIN ASPART 1 UNITS: 100 INJECTION, SOLUTION INTRAVENOUS; SUBCUTANEOUS at 23:48

## 2022-09-03 RX ADMIN — CLINDAMYCIN IN 5 PERCENT DEXTROSE 600 MG: 12 INJECTION, SOLUTION INTRAVENOUS at 06:55

## 2022-09-03 RX ADMIN — VANCOMYCIN HYDROCHLORIDE 1000 MG: 1 INJECTION, SOLUTION INTRAVENOUS at 23:37

## 2022-09-03 RX ADMIN — SODIUM CHLORIDE, POTASSIUM CHLORIDE, SODIUM LACTATE AND CALCIUM CHLORIDE 75 ML/HR: 600; 310; 30; 20 INJECTION, SOLUTION INTRAVENOUS at 10:02

## 2022-09-03 RX ADMIN — VASOPRESSIN 2.4 UNITS/HR: 20 INJECTION INTRAVENOUS at 15:47

## 2022-09-03 RX ADMIN — VANCOMYCIN HYDROCHLORIDE 1000 MG: 1 INJECTION, SOLUTION INTRAVENOUS at 12:07

## 2022-09-03 RX ADMIN — Medication 0.4 MCG/KG/MIN: at 09:04

## 2022-09-03 RX ADMIN — VANCOMYCIN HYDROCHLORIDE 1000 MG: 1 INJECTION, SOLUTION INTRAVENOUS at 03:49

## 2022-09-03 RX ADMIN — HYDROCORTISONE SODIUM SUCCINATE 100 MG: 100 INJECTION, POWDER, FOR SOLUTION INTRAMUSCULAR; INTRAVENOUS at 10:42

## 2022-09-03 RX ADMIN — INSULIN ASPART 1 UNITS: 100 INJECTION, SOLUTION INTRAVENOUS; SUBCUTANEOUS at 16:49

## 2022-09-03 RX ADMIN — INSULIN ASPART 1 UNITS: 100 INJECTION, SOLUTION INTRAVENOUS; SUBCUTANEOUS at 20:20

## 2022-09-03 RX ADMIN — FENTANYL CITRATE 50 MCG: 50 INJECTION, SOLUTION INTRAMUSCULAR; INTRAVENOUS at 11:33

## 2022-09-03 ASSESSMENT — ACTIVITIES OF DAILY LIVING (ADL)
ADLS_ACUITY_SCORE: 35
ADLS_ACUITY_SCORE: 47
ADLS_ACUITY_SCORE: 35
ADLS_ACUITY_SCORE: 47
ADLS_ACUITY_SCORE: 39
ADLS_ACUITY_SCORE: 35
ADLS_ACUITY_SCORE: 35

## 2022-09-03 NOTE — PROCEDURES
ARTERIAL LINE INSERTION PROCEDURE NOTE  (NON-OR)    Procedure Date:  9/3/2022   Performing Physician:  Penny Fraser MD    Pre-Procedure Diagnosis:   SEPTIC SHOCK  Post-Procedure Diagnosis:  Same as Pre-Procedure Diagnosis    Procedure:  Arterial line insertion  Left axillary  Indications:  HEMODYNAMIC SHOCK    Estimated Blood Loss: Minimal   Complications: none    Procedure Details:   The risks, benefits, complications, treatment options, and expected outcomes were discussed with the patient's mother. The risks and potential complications of their problem and purposed procedure include but are not limited to infection, bleeding, pain,  the need for additional procedures, and nerve and vessel injury. The patient/alternate (see above) concurred with the proposed plan, giving informed consent.  The site of the procedure was properly noted/marked. The patient was identified as Felicia Ellison with Date of Birth 1965 and the procedure verified as Arterial Line Insertion.  A Time Out was held and the above information confirmed.    An Arpan test was not indicated. In sterile fashion, the line site was prepped with Chlorhexidine. Strict sterile conditions were maintained,  Cap, mask, and sterile gloves were worn by all participants. Four ml of Lidocaine 1% without epinephrine anesthetic were infiltrated into the skin.  The arterial line was placed in the Left axillary artery percutaneously,  without  difficulty.  The linewas  sutured in place  and an occlusive sterile dressing was applied.  The total number of needle stick attempts was 1.      Condition: critically ill      Penny Fraser MD  Pulmonary and Critical Care Medicine  [facility]  Office: [phone]

## 2022-09-03 NOTE — ED NOTES
Report to BOB Mccray at [facility] ICU. Receiving facility also notified of patient's departure. Report to EMS prior to leaving [facility]. Lashon Ellison, patient's mother updated as well.
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
## 2022-09-03 NOTE — PHARMACY-ADMISSION MEDICATION HISTORY
Pharmacy Note - Admission Medication History    Pertinent Provider Information: Patient was incoherent and unable to reply during attempted interview. PTA med list was completed per active med list received from Loy Casiano, sent by Olena ROJAS.      ______________________________________________________________________    Prior To Admission (PTA) med list completed and updated in EMR.       PTA Med List   Medication Sig Last Dose     enoxaparin ANTICOAGULANT (LOVENOX) 60 MG/0.6ML syringe Inject 60 mg Subcutaneous daily 9/2/2022 at Unknown time     furosemide (LASIX) 20 MG tablet TAKE 2 TABLETS (40MG) BY MOUTH EVERY DAY 9/2/2022 at Unknown time     HYDROcodone-acetaminophen (NORCO) 5-325 MG tablet TAKE 1 TABLET BY MOUTH EVERY 6 HOURS AS NEEDED FORPAIN      hydrOXYzine (ATARAX) 10 MG tablet Take 1 tablet (10 mg) by mouth every 6 hours as needed for itching or other (pain)      hypromellose-dextran (NATURAL BALANCE TEARS) 0.1-0.3 % ophthalmic solution Place 1 drop into both eyes every hour as needed for dry eyes      ketorolac (TORADOL) 30 MG/ML injection Inject 30 mg into the muscle once as needed (migraine)      levETIRAcetam (KEPPRA) 750 MG tablet TAKE 2 TABLETS (1,500 MG) BY MOUTH 2 TIMES DAILY 9/2/2022 at Unknown time     Lidocaine (LIDOCARE) 4 % Patch Place 1 patch onto the skin every 24 hours To prevent lidocaine toxicity, patient should be patch free for 12 hrs daily.      mirtazapine (REMERON) 7.5 MG tablet Take 7.5 mg by mouth At Bedtime Past Week at Unknown time     multivitamin (CENTRUM SILVER) tablet Take 1 tablet by mouth daily 9/2/2022 at Unknown time     ondansetron (ZOFRAN) 4 MG tablet Take 1 tablet (4 mg) by mouth every 8 hours as needed for nausea      oxybutynin (DITROPAN) 5 MG tablet TAKE 2 TABLETS (10MG) BY MOUTH EVERY MORNING 9/2/2022 at Unknown time     pantoprazole (PROTONIX) 40 MG EC tablet TAKE 1 TABLET (40 MG) BY MOUTH EVERY MORNING (BEFOREBREAKFAST) 9/2/2022 at Unknown time     potassium  chloride ER (KLOR-CON M) 20 MEQ CR tablet TAKE 1 TABLET BY MOUTH 3 TIMES A DAY 9/2/2022 at Unknown time     propranolol (INDERAL) 40 MG tablet Take 0.5 tablets (20 mg) by mouth 2 times daily 9/2/2022 at Unknown time     rizatriptan (MAXALT) 10 MG tablet TAKE 1 TAB BY MOUTH ONCE FOR MIGRAINE. MAY REPEAT IN 2 HOURS. MAX OF 3 TABS ONCE DAILY      topiramate (TOPAMAX) 25 MG tablet TAKE 1 TABLET BY MOUTH EVERY DAY 9/2/2022 at Unknown time     traMADol (ULTRAM) 50 MG tablet TAKE 1 TABLET (50 MG) BY MOUTH EVERY 6 HOURS AS NEEDED FOR SEVERE PAIN      traZODone (DESYREL) 50 MG tablet Take 2 tablets (100mg) by mouth at bedtime Past Week at Unknown time     zinc oxide (DESITIN) 40 % external ointment Apply topically as needed for dry skin or irritation      zolpidem (AMBIEN) 5 MG tablet TAKE 1 TABLET (5 MG) BY MOUTH NIGHTLY [facility]        Information source(s): Facility ([facility] medication list/MAR  Method of interview communication: N/A    Summary of Changes to PTA Med List  Discontinued: nystatin, lidocaine gel, sumatriptan   Changed: Enoxaparin, Mirtazapine    Patient was asked about OTC/herbal products specifically.  PTA med list reflects this.    Allergies were reviewed, assessed, and updated with the patient.      Patient did not bring any medications to the hospital and can't retrieve from home. No multi-dose medications are available for use during hospital stay.     The information provided in this note is only as accurate as the sources available at the time of the update(s).    Thank you for the opportunity to participate in the care of this patient.    Ed Mcneil  9/3/2022 12:31 PM

## 2022-09-03 NOTE — PRE-PROCEDURE
GENERAL PRE-PROCEDURE:   Procedure:  Right groin drain  Date/Time:  9/3/2022 11:07 AM    Verbal consent obtained?: Yes    Written consent obtained?: Yes    Risks and benefits: Risks, benefits and alternatives were discussed    Consent given by:  Parent  Patient states understanding of procedure being performed: Yes    Patient's understanding of procedure matches consent: Yes    Procedure consent matches procedure scheduled: Yes    Expected level of sedation:  Moderate  Appropriately NPO:  Yes  ASA Class:  3  Mallampati  :  Grade 3- soft palate visible, posterior pharyngeal wall not visible  Heart:  Normal heart sounds and rate  History & Physical reviewed:  History and physical reviewed and no updates needed  Statement of review:  I have reviewed the lab findings, diagnostic data, medications, and the plan for sedation

## 2022-09-03 NOTE — ED NOTES
Delay in antibiotic administration due to inability to obtain blood cultures, both peripherally and off patient's IV. Lab notified, will be down but has multiple floor draws prior.

## 2022-09-03 NOTE — PROCEDURES
"PICC Line Insertion Procedure Note  Pt. Name: Felicia Ellison  MRN:        [id number]        Procedure Details;     Patient identified with 2 identifiers and \"Time Out\" conducted.  .     Central line insertion bundle followed: hand hygiene performed prior to procedure, site cleansed with cholraprep, hat, mask, sterile gloves, sterile gown worn, patient draped with maximum barrier head to toe drape, sterile field maintained.    The vein was assessed and found to be compressible and of adequate size.     Lidocaine 1% 1 ml administered sq to the insertion site. Medial brachial vein was accessed with ultrasound guidance and followed by introducer without problem. Good blood return noted. 3 attempts to thread guidewire but met resistance at left subclavian area. Procedure aborted and site covered with gauze dressing.      The 8 sharps that are included in the PICC insertion kit were accounted for and disposed of in the sharps container prior to breakdown of the sterile field.        Comments;    Recommend further evaluation and intervention by IR  due to history of complicated vascular access.  (History of DVT RIJ and subclavian vein 12/2014, IR PICC placement right arm 5/2014)    Patient's primary RN notified.          Andreina Sanders BSN,RN,[facility]-BC  Vascular Access - [facility]        "
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
## 2022-09-03 NOTE — PROCEDURES
[facility]    Procedure: Imaging Procedure Note    Date/Time: 9/3/2022 11:47 AM  Performed by: Fer Gutierrez MD  Authorized by: Fer Gutierrez MD       UNIVERSAL PROTOCOL   Site Marked: Yes  Prior Images Obtained and Reviewed:  Yes  Required items: Required blood products, implants, devices and special equipment available    Patient identity confirmed:  Verbally with patient  Patient was reevaluated immediately before administering moderate or deep sedation or anesthesia  Confirmation Checklist:  Patient's identity using two indicators, relevant allergies, procedure was appropriate and matched the consent or emergent situation and correct equipment/implants were available  Time out: Immediately prior to the procedure a time out was called    Universal Protocol: the Joint Commission Universal Protocol was followed    Preparation: Patient was prepped and draped in usual sterile fashion      SEDATION  Patient Sedated: Yes    Vital signs: Vital signs monitored during sedation    See dictated procedure note for full details.    PROCEDURE  Describe Procedure: CT guided right thigh drain placement  Patient Tolerance:  Patient tolerated the procedure well with no immediate complications  Length of time physician/provider present for 1:1 monitoring during sedation: 15

## 2022-09-03 NOTE — ED NOTES
Pt's mom notified of patients admission and [facility] notified. They will notify pt's HHC. Mom concerned that patient may not be doing her medications appropriately. Pt sets up own meds.

## 2022-09-03 NOTE — CONSULTS
Successful CT guided 12F right groin/thigh drain placement with removal of 150 ml of purulent fluid. Sample sent for culture. Flush drain with 10 ml saline TID.

## 2022-09-03 NOTE — ED NOTES
Attempted urethral Ambrose catheter placement after CT scan and direction from Dr. Grossman. Able to partially advance catheter until resistance was met. Dr. Grossman updated.    After patient's Mepilex like dressings were saturated. Did not touch foam or other packing at this time. Mepilex dressings changed. Pt updated regarding ongoing plan including urology follow up and probable suprapubic catheter placement.
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
## 2022-09-03 NOTE — PLAN OF CARE
[facility] - ICU    RN Progress Note:            Pertinent Assessments:      Please refer to flowsheet rows for full assessment     Received at 0830 from [facility] via paramedics. Levophed at maximum 0.4 mcg/kg/min on arrival. Vasopressin added. Arterial line placed by Dr Fraser left upper arm. At 11 am to IR and had right hip area abscess drained and sent for culture/ grey and thick in appearance. PICC attempted PICC but unable, will need to keep femoral triple lumen.         Mobility Level:     Quadriplegic. Turned   Q2h..           Key Events - This Shift:     Able to wean levophed to 0.26 mcg/kg/min. Urine output via suprapubic catheter 400 ml. Yellow-white sediment milked out of narrow tube to allow drainage.              Plan:     Treat infection. Maintain MAP>65. Cultures pending. Pre-antibiotic urine from 9/2/22 at [facility] being run for culture. WOC consult on Monday.

## 2022-09-03 NOTE — PHARMACY-VANCOMYCIN DOSING SERVICE
Pharmacy Vancomycin Initial Note  Date of Service September 3, 2022  Patient's  1965  56 year old, female    Indication: Sepsis and Skin and Soft Tissue Infection    Current estimated CrCl = Estimated Creatinine Clearance: 97.8 mL/min (A) (based on SCr of 0.47 mg/dL (L)).    Creatinine for last 3 days  2022:  1:30 PM Creatinine 0.51 mg/dL  9/3/2022:  9:22 AM Creatinine 0.47 mg/dL    Recent Vancomycin Level(s) for last 3 days  No results found for requested labs within last 72 hours.      Vancomycin IV Administrations (past 72 hours)                   vancomycin (VANCOCIN) 1000 mg in dextrose 5% 200 mL PREMIX (mg) 1,000 mg New Bag 22 1207    vancomycin (VANCOCIN) 1000 mg in dextrose 5% 200 mL PREMIX (mg) 1,000 mg New Bag 22 0349                Nephrotoxins and other renal medications (From now, onward)    Start     Dose/Rate Route Frequency Ordered Stop    22 1200  vancomycin (VANCOCIN) 1000 mg in dextrose 5% 200 mL PREMIX         1,000 mg  200 mL/hr over 1 Hours Intravenous EVERY 12 HOURS 22 1043      22 0900  vasopressin (VASOSTRICT) 20 Units in sodium chloride 0.9 % 100 mL standard conc infusion         2.4 Units/hr  12 mL/hr  Intravenous CONTINUOUS 22 0836      22 0900  norepinephrine (LEVOPHED) 4 mg in  mL infusion PREMIX         0.01-0.6 mcg/kg/min × 46.6 kg  1.7-104.9 mL/hr  Intravenous CONTINUOUS 22 0836            Contrast Orders - past 72 hours (72h ago, onward)    None          [employer] Prediction of Planned Initial Vancomycin Regimen  Regimen: 1000 mg IV every 12 hours.  Start time: 20:00 on 2022  Exposure target: AUC24 (range)400-600 mg/L.hr   AUC24,ss: 529 mg/L.hr  Probability of AUC24 > 400: 76 %  Ctrough,ss: 15.0 mg/L  Probability of Ctrough,ss > 20: 29 %  Probability of nephrotoxicity (Lodise LU ): 10 %    Plan:  1. Start vancomycin  1000 mg IV q12h.  Okay to initiate maintenance dosing 8 hours after initial load.    2. Vancomycin monitoring method: AUC  3. Vancomycin therapeutic monitoring goal: 400-600 mg*h/L  4. Pharmacy will check vancomycin levels as appropriate..    5. Serum creatinine levels will be ordered daily.  NOTE:  pharmacy is aware of patient's quadriplegia (SCr not best indicator of renal fxn) and will monitor vancomycin levels..      Juancarlos Asencio RPH

## 2022-09-03 NOTE — ED PROVIDER NOTES
I assumed patient's care at change of shift from Dr. Grossman.  She has a history of paraplegia and decubitus ulcers and has a neobladder and self caths.  Lives in assisted living at [facility] but unable to care for herself.  She was noted to have urinary retention with over a liter of urine in her neobladder.  They were unable to pass a catheter and Dr. Grossman placed a suprapubic catheter under ultrasound guidance and they got over 1500 cc of urine out.  This was done after discussing with urology.  She then dropped her blood pressure after her neobladder was decompressed.  Fluid resuscitation was begun.  She has multiple allergies to antibiotics as a Dr. Grossman placed her on clindamycin.    White count was 12.3 but her lactic acid is normal at 1.3.  She has a known anion gap metabolic acidosis which has been present on other visits and her values are nearly identical.  He elected not to pursue that any further.    CT of the abdomen showed the significant distention of the neobladder measuring up to 24cm.  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  Multiple perigastric venous collaterals and nonocclusive thrombus in the proximal SMV.  She is on anticoagulation.    Franklin pouch with descending colostomy.  Destructive changes of both hips.  There is a massive peripheral enhancing fluid collection in the right buttock, hip and thigh extending into the distal thigh.  This measures at least 20 x 14 cm.    Urine was surprisingly unremarkable.    Dr. Grossman tried to arrange transfer but there are no facilities that have beds in our hospitalist declined the admission because we do not have urology available which is certainly reasonable.  She will also need wound care.    Plan was to board her in the ED tonight and see if a bed opens up in the morning.  This was when he BP was stable.    She has now been running lower blood pressures after her neobladder was decompressed.  IV fluid  resuscitation underway.    Pressures have remained low in spite of fluid resuscitation.  She is not mentating very well but apparently this is about what she was like when she came in with normal blood pressures also.  She only has 2 small peripheral IVs and it looks like we are going to need to start pressors.  Central line will need to be placed.  She gave verbal consent.    I looked at her neck with bedside ultrasound.  Difficult to delineate her anatomy on the right.  Her neck is so thin and her sternocleidomastoid muscles are tense and difficult to get the probe in between and compress the vessels.  Better images on the left side.    Central line was placed in the left IJ under ultrasound guidance.  Unfortunately, as I was suturing it in place, it became dislodged and pulled out.  Firm constant pressure was applied to stop the bleeding.  Her right-sided anatomy is suboptimal.  I looked at her right groin but again the anatomy is difficult to delineate.  I asked anesthesia to come in and see if they could attempt central line placement so we can start her on pressors.    In the meantime, Dr. Hartley, intensivist from [facility] called and graciously accepted her in transfer.    In spite of her persistent hypotension, she has not been tachycardic and she continues to make urine.    Anesthesia also had difficulty placing a left IJ.  Very difficult to see where the IJ is on the right so we went on the left side again.  The vein was cannulated but again had difficulty passing the wire.  I then looked at her left groin and can actually see femoral artery and vein much better on this side.  We elected to place a triple-lumen catheter in the left femoral vein.    This was done under ultrasound guidance using Seldinger technique on the first attempt.  It was then sutured into place.    Procedure:  After sterile prep and drape, ultrasound was used to localize the left femoral vein.  It was lying just medial and  inferior to the femoral artery.  The left side was selected because the anatomy was difficult to ascertain on the right.  Using ultrasound guidance, the left femoral vein was perked with the needle with return of what appeared to be venous blood.  It was not pulsatile.  Guidewire was placed through the needle and the needle removed.  Skin nick followed by dilator.  Then the previously flushed triple-lumen catheter was advanced over the guidewire to its hub.  It was then sutured into place.  Good blood return.  Sterile dressing applied.  KUB and portable chest x-rays were ordered.      Results for orders placed or performed during the hospital encounter of 09/02/22 (from the past 24 hour(s))   CBC with platelets differential    Narrative    The following orders were created for panel order CBC with platelets differential.  Procedure                               Abnormality         Status                     ---------                               -----------         ------                     CBC with platelets and d...[[id number]]  Abnormal            Final result                 Please view results for these tests on the individual orders.   Comprehensive metabolic panel   Result Value Ref Range    Sodium 143 133 - 144 mmol/L    Potassium 3.3 (L) 3.4 - 5.3 mmol/L    Chloride 120 (H) 94 - 109 mmol/L    Carbon Dioxide (CO2) 14 (L) 20 - 32 mmol/L    Anion Gap 9 3 - 14 mmol/L    Urea Nitrogen 28 7 - 30 mg/dL    Creatinine 0.51 (L) 0.52 - 1.04 mg/dL    Calcium 9.1 8.5 - 10.1 mg/dL    Glucose 118 (H) 70 - 99 mg/dL    Alkaline Phosphatase 184 (H) 40 - 150 U/L    AST 5 0 - 45 U/L    ALT <6 0 - 50 U/L    Protein Total 7.1 6.8 - 8.8 g/dL    Albumin 1.9 (L) 3.4 - 5.0 g/dL    Bilirubin Total 0.5 0.2 - 1.3 mg/dL    GFR Estimate >90 >60 mL/min/1.73m2   Blood gas venous   Result Value Ref Range    pH Venous 7.27 (L) 7.32 - 7.43    pCO2 Venous 31 (L) 40 - 50 mm Hg    pO2 Venous 29 25 - 47 mm Hg    Bicarbonate Venous 14 (L) 21 - 28  mmol/L    Base Excess/Deficit (+/-) -11.6 (L) -7.7 - 1.9 mmol/L    FIO2 0    Lactic acid whole blood   Result Value Ref Range    Lactic Acid 1.3 0.7 - 2.0 mmol/L   CBC with platelets and differential   Result Value Ref Range    WBC Count 12.2 (H) 4.0 - 11.0 10e3/uL    RBC Count 3.78 (L) 3.80 - 5.20 10e6/uL    Hemoglobin 9.7 (L) 11.7 - 15.7 g/dL    Hematocrit 31.3 (L) 35.0 - 47.0 %    MCV 83 78 - 100 fL    MCH 25.7 (L) 26.5 - 33.0 pg    MCHC 31.0 (L) 31.5 - 36.5 g/dL    RDW 20.1 (H) 10.0 - 15.0 %    Platelet Count 256 150 - 450 10e3/uL    % Neutrophils 79 %    % Lymphocytes 13 %    % Monocytes 5 %    % Eosinophils 1 %    % Basophils 0 %    % Immature Granulocytes 2 %    NRBCs per 100 WBC 0 <1 /100    Absolute Neutrophils 9.6 (H) 1.6 - 8.3 10e3/uL    Absolute Lymphocytes 1.6 0.8 - 5.3 10e3/uL    Absolute Monocytes 0.6 0.0 - 1.3 10e3/uL    Absolute Eosinophils 0.1 0.0 - 0.7 10e3/uL    Absolute Basophils 0.1 0.0 - 0.2 10e3/uL    Absolute Immature Granulocytes 0.2 <=0.4 10e3/uL    Absolute NRBCs 0.0 10e3/uL   Head CT w/o contrast    Narrative    EXAM: CT HEAD W/O CONTRAST  LOCATION: [facility]  DATE/TIME: 9/2/2022 7:16 PM    INDICATION: ams  COMPARISON: Head CT angiogram brain MRI 01/06/2020  TECHNIQUE: Routine CT Head without IV contrast. Multiplanar reformats. Dose reduction techniques were used.    FINDINGS:  INTRACRANIAL CONTENTS: No intracranial hemorrhage, extraaxial collection, or mass effect.  No CT evidence of acute infarct. Normal parenchymal attenuation. Normal ventricles and sulci.     VISUALIZED ORBITS/SINUSES/MASTOIDS: No intraorbital abnormality. Moderate chronic mucosal thickening of the left maxillary sinus with associated frothy debris. Mild chronic mucosal thickening of the left sphenoid sinus. No middle ear or mastoid effusion.    BONES/SOFT TISSUES: No acute abnormality. Chronic right medial orbital wall fracture.      Impression    IMPRESSION:  1.  No intracranial  hemorrhage, mass lesions, hydrocephalus or CT evidence for an acute infarct.  2.  Chronic right medial orbital wall fracture.   CT ABDOMEN PELVIS W CONTRAST    Narrative    EXAM: CT ABDOMEN PELVIS W CONTRAST  LOCATION: [facility]  DATE/TIME: 9/2/2022 7:16 PM    INDICATION: Abdominal distension paraplegia difficulty passing catheter to neobladder  COMPARISON: 12/18/2018.  TECHNIQUE: CT scan of the abdomen and pelvis was performed following injection of IV contrast. Multiplanar reformats were obtained. Dose reduction techniques were used.  CONTRAST: 50ml isovue 370    FINDINGS:   LOWER CHEST: Atelectasis.    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 Nonocclusive thrombus in the proximal superior mesenteric vein. Multiple perigastric collateral vessels.    PANCREAS: Normal.    SPLEEN: Normal.    ADRENAL GLANDS: Normal.    KIDNEYS/BLADDER: There is mild right-sided pyelocaliectasis with normal caliber ureter. Dependent stone within the right renal pelvis.    BOWEL: Descending colostomy with Franklin pouch.    LYMPH NODES: Normal.    VASCULATURE: Unremarkable.    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    MUSCULOSKELETAL: There is a very large complex peripherally enhancing fluid collection within the right buttock extending from the iliac crest down into the hip joints and into the right thigh. This is not completely imaged extending further into the   thigh than is seen on the CT scan. Destructive changes in both hips. This fluid collection measures at least 20 x 14 cm scoliosis.      Impression    IMPRESSION:   1.  Significant distention of the neobladder which measures up 24 cm and extends into the right lower abdomen.    2.  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 Multiple perigastric venous collaterals. Nonocclusive thrombus in the proximal  superior mesenteric vein.    3.  Franklin pouch with descending colostomy.    4.  Destructive changes both hips. There is a massive peripherally enhancing fluid collection in the right buttock, hip and thigh extending into the distal thigh. The distal extent of the fluid collection is not imaged with CT. This collection measures   at least 20 x 14 cm.    5.  There is mild dilatation of the right intrarenal collecting system with normal caliber ureter and an element of UPJ obstruction is possible. Dependent stone in the right renal pelvis.    NOTE: ABNORMAL REPORT    1.  THE DICTATION ABOVE DESCRIBES AN ABNORMALITY FOR WHICH FOLLOW-UP IS NEEDED.    UA with Microscopic reflex to Culture    Specimen: Urine, Catheter   Result Value Ref Range    Color Urine Yellow Colorless, Straw, Light Yellow, Yellow    Appearance Urine Turbid (A) Clear    Glucose Urine Negative Negative mg/dL    Bilirubin Urine Small (A) Negative    Ketones Urine Negative Negative mg/dL    Specific Gravity Urine 1.010 1.003 - 1.035    Blood Urine Large (A) Negative    pH Urine 7.5 (H) 5.0 - 7.0    Protein Albumin Urine 30  (A) Negative mg/dL    Urobilinogen Urine Normal Normal, 2.0 mg/dL    Nitrite Urine Negative Negative    Leukocyte Esterase Urine Negative Negative    Bacteria Urine Many (A) None Seen /HPF    Mucus Urine Present (A) None Seen /LPF    RBC Urine 0 <=2 /HPF    WBC Urine 5 <=5 /HPF    Narrative    Urine Culture not indicated   Asymptomatic COVID-19 Virus (Coronavirus) by PCR Nose    Specimen: Nose; Swab   Result Value Ref Range    SARS CoV2 PCR Negative Negative    Narrative    Testing was performed using the effie  SARS-CoV-2 & Influenza A/B Assay on the effie  Christine  System.  This test should be ordered for the detection of SARS-COV-2 in individuals who meet SARS-CoV-2 clinical and/or epidemiological criteria. Test performance is unknown in asymptomatic patients.  This test is for in vitro diagnostic use under the FDA EUA for  laboratories certified under CLIA to perform moderate and/or high complexity testing. This test has not been FDA cleared or approved.  A negative test does not rule out the presence of PCR inhibitors in the specimen or target RNA in concentration below the limit of detection for the assay. The possibility of a false negative should be considered if the patient's recent exposure or clinical presentation suggests COVID-19.  [facility] are certified under the Clinical Laboratory Improvement Amendments of 1988 (CLIA-88) as qualified to perform moderate and/or high complexity laboratory testing.   Blood Culture Arm, Right    Specimen: Arm, Right; Blood    Narrative    Only an Aerobic Blood Culture Bottle was collected, interpret results with caution.     Lactic acid whole blood   Result Value Ref Range    Lactic Acid 1.1 0.7 - 2.0 mmol/L   KUB XR    Narrative    EXAM: XR KUB  LOCATION: [facility]  DATE/TIME: 9/3/2022 5:59 AM    INDICATION: s p femoral line placement  COMPARISON: 01/06/2020      Impression    IMPRESSION: Left-sided femoral catheter has been placed terminating in the midline at the lumbosacral junction. Visualized bowel gas pattern is nonobstructive. Postoperative changes in the thoracolumbar spine. Prior right hip resection. Diffuse   osteopenia. Right lower quadrant bowel anastomotic suture line. Multiple clips over the pelvis.   XR Chest Port 1 View    Narrative    EXAM: XR CHEST PORTABLE 1 VIEW  LOCATION: [facility]  DATE/TIME: 09/03/2022, 6:08 AM    INDICATION: Status post unsuccessful left IJ placement, rule out pneumothorax.  COMPARISON: 04/22/2022.      Impression    IMPRESSION: Negative for pneumothorax. Normal heart size and pulmonary vascularity. Lungs are clear. Generalized osteopenia. Posterior idalia and pedicle screw fixation lower thoracic and lumbar spine.                  (A41.9,  R65.21) Septic shock  (H)  Comment: suspected etiology of her hypotension vs autonomic dysfunction/neurogenic    (R41.82) Altered mental status, unspecified altered mental status type    (R33.9) Urinary retention    (L89.159) Pressure injury of skin of sacral region, unspecified injury stage    Plan: Transfer to [facility] ICU under the care of Dr. Hartley.  Central line has been placed in the left femoral vein and norepinephrine has been started to support her blood pressures which have remained low despite of 3.5 L of crystalloid.  This started after a suprapubic catheter had been placed earlier in the night, because of urinary retention, into her neobladder and over 1200 cc was drained initially.  She is still making urine.  She has not been tachycardic in spite of her hypotension.  She is a paraplegic and may have some autonomic dysfunction as well.  In any event, she has been covered with broad-spectrum antibiotics for possible sepsis as she also has extensive decubitus ulcers and a large fluid collection in her buttock region extending down to the posterior thigh which will also need to be addressed.  Unclear of the chronicity of that or if that could be a source of infection as well.  Her white count was up a bit but her lactic acid was normal.  We are titrating up on her norepinephrine prior to transfer.    We were able to get her MAP up to 65 at the time of transfer       Critical Care Addendum    My initial assessment, based on my review of vital signs, focused history and physical exam, established that Felicia Ellison has severe hypotension and suspicion for sepsis and need for evaluation and early goal-directed therapy, which requires immediate intervention, and therefore she is critically ill.     After the initial assessment, the care team initiated IV fluid administration, initiated medication therapy with IV antibiotic and vasopressors and achieved central venous access to provide stabilization care. Due to  the critical nature of this patient, I reassessed vital signs, physical exam, interpretation of lab results, mental status, neurologic status and respiratory status multiple times prior to her disposition.     Time also spent performing documentation, reviewing test results, coordination of care and and arranging transfer.     Critical care time (excluding teaching time and procedures): 120 minutes.             Ramsey Huff MD  09/03/22 0724

## 2022-09-04 ENCOUNTER — APPOINTMENT (OUTPATIENT)
Dept: CT IMAGING | Facility: HOSPITAL | Age: 57
DRG: 853 | End: 2022-09-04
Attending: NURSE PRACTITIONER
Payer: MEDICARE

## 2022-09-04 LAB
ALBUMIN SERPL-MCNC: 1.5 G/DL (ref 3.5–5)
ALP SERPL-CCNC: 142 U/L (ref 45–120)
ALT SERPL W P-5'-P-CCNC: <9 U/L (ref 0–45)
ANION GAP SERPL CALCULATED.3IONS-SCNC: 5 MMOL/L (ref 5–18)
ANION GAP SERPL CALCULATED.3IONS-SCNC: 5 MMOL/L (ref 5–18)
ANION GAP SERPL CALCULATED.3IONS-SCNC: 8 MMOL/L (ref 5–18)
AST SERPL W P-5'-P-CCNC: 6 U/L (ref 0–40)
BASE EXCESS BLDV CALC-SCNC: -16.4 MMOL/L
BILIRUB SERPL-MCNC: 0.4 MG/DL (ref 0–1)
BUN SERPL-MCNC: 15 MG/DL (ref 8–22)
BUN SERPL-MCNC: 17 MG/DL (ref 8–22)
BUN SERPL-MCNC: 17 MG/DL (ref 8–22)
CALCIUM SERPL-MCNC: 7 MG/DL (ref 8.5–10.5)
CALCIUM SERPL-MCNC: 7.1 MG/DL (ref 8.5–10.5)
CALCIUM SERPL-MCNC: 7.7 MG/DL (ref 8.5–10.5)
CHLORIDE BLD-SCNC: 121 MMOL/L (ref 98–107)
CHLORIDE BLD-SCNC: 122 MMOL/L (ref 98–107)
CHLORIDE BLD-SCNC: 124 MMOL/L (ref 98–107)
CO2 SERPL-SCNC: 11 MMOL/L (ref 22–31)
CO2 SERPL-SCNC: 12 MMOL/L (ref 22–31)
CO2 SERPL-SCNC: 9 MMOL/L (ref 22–31)
CREAT SERPL-MCNC: 0.51 MG/DL (ref 0.6–1.1)
CREAT SERPL-MCNC: 0.51 MG/DL (ref 0.6–1.1)
CREAT SERPL-MCNC: 0.54 MG/DL (ref 0.6–1.1)
ERYTHROCYTE [DISTWIDTH] IN BLOOD BY AUTOMATED COUNT: 20.3 % (ref 10–15)
GFR SERPL CREATININE-BSD FRML MDRD: >90 ML/MIN/1.73M2
GLUCOSE BLD-MCNC: 140 MG/DL (ref 70–125)
GLUCOSE BLD-MCNC: 205 MG/DL (ref 70–125)
GLUCOSE BLD-MCNC: 226 MG/DL (ref 70–125)
GLUCOSE BLDC GLUCOMTR-MCNC: 127 MG/DL (ref 70–99)
GLUCOSE BLDC GLUCOMTR-MCNC: 161 MG/DL (ref 70–99)
GLUCOSE BLDC GLUCOMTR-MCNC: 161 MG/DL (ref 70–99)
GLUCOSE BLDC GLUCOMTR-MCNC: 171 MG/DL (ref 70–99)
GLUCOSE BLDC GLUCOMTR-MCNC: 234 MG/DL (ref 70–99)
HCO3 BLDV-SCNC: 13 MMOL/L (ref 24–30)
HCT VFR BLD AUTO: 25.3 % (ref 35–47)
HGB BLD-MCNC: 7.9 G/DL (ref 11.7–15.7)
MAGNESIUM SERPL-MCNC: 1.6 MG/DL (ref 1.8–2.6)
MCH RBC QN AUTO: 25.7 PG (ref 26.5–33)
MCHC RBC AUTO-ENTMCNC: 31.2 G/DL (ref 31.5–36.5)
MCV RBC AUTO: 82 FL (ref 78–100)
OXYHGB MFR BLDV: 88.9 % (ref 70–75)
PCO2 BLDV: 24 MM HG (ref 35–50)
PH BLDV: 7.26 [PH] (ref 7.35–7.45)
PLATELET # BLD AUTO: 135 10E3/UL (ref 150–450)
PO2 BLDV: 61 MM HG (ref 25–47)
POTASSIUM BLD-SCNC: 2.1 MMOL/L (ref 3.5–5)
POTASSIUM BLD-SCNC: 3 MMOL/L (ref 3.5–5)
POTASSIUM BLD-SCNC: 3 MMOL/L (ref 3.5–5)
POTASSIUM BLD-SCNC: 3.4 MMOL/L (ref 3.5–5)
POTASSIUM BLD-SCNC: 3.9 MMOL/L (ref 3.5–5)
POTASSIUM BLD-SCNC: 3.9 MMOL/L (ref 3.5–5)
PROT SERPL-MCNC: 5.2 G/DL (ref 6–8)
RBC # BLD AUTO: 3.07 10E6/UL (ref 3.8–5.2)
SAO2 % BLDV: 90.9 % (ref 70–75)
SARS-COV-2 RNA RESP QL NAA+PROBE: NEGATIVE
SODIUM SERPL-SCNC: 138 MMOL/L (ref 136–145)
SODIUM SERPL-SCNC: 138 MMOL/L (ref 136–145)
SODIUM SERPL-SCNC: 141 MMOL/L (ref 136–145)
VANCOMYCIN SERPL-MCNC: 29.7 MG/L
WBC # BLD AUTO: 22.6 10E3/UL (ref 4–11)

## 2022-09-04 PROCEDURE — 250N000011 HC RX IP 250 OP 636: Performed by: NURSE PRACTITIONER

## 2022-09-04 PROCEDURE — 250N000009 HC RX 250: Performed by: INTERNAL MEDICINE

## 2022-09-04 PROCEDURE — 250N000013 HC RX MED GY IP 250 OP 250 PS 637: Performed by: THORACIC SURGERY (CARDIOTHORACIC VASCULAR SURGERY)

## 2022-09-04 PROCEDURE — 82805 BLOOD GASES W/O2 SATURATION: CPT | Performed by: INTERNAL MEDICINE

## 2022-09-04 PROCEDURE — 99291 CRITICAL CARE FIRST HOUR: CPT | Performed by: NURSE PRACTITIONER

## 2022-09-04 PROCEDURE — U0005 INFEC AGEN DETEC AMPLI PROBE: HCPCS | Performed by: NURSE PRACTITIONER

## 2022-09-04 PROCEDURE — 85027 COMPLETE CBC AUTOMATED: CPT | Performed by: NURSE PRACTITIONER

## 2022-09-04 PROCEDURE — 84132 ASSAY OF SERUM POTASSIUM: CPT | Performed by: INTERNAL MEDICINE

## 2022-09-04 PROCEDURE — 80053 COMPREHEN METABOLIC PANEL: CPT | Performed by: NURSE PRACTITIONER

## 2022-09-04 PROCEDURE — 80202 ASSAY OF VANCOMYCIN: CPT | Performed by: NURSE PRACTITIONER

## 2022-09-04 PROCEDURE — 84132 ASSAY OF SERUM POTASSIUM: CPT | Performed by: NURSE PRACTITIONER

## 2022-09-04 PROCEDURE — 250N000013 HC RX MED GY IP 250 OP 250 PS 637: Performed by: NURSE PRACTITIONER

## 2022-09-04 PROCEDURE — 258N000003 HC RX IP 258 OP 636: Performed by: INTERNAL MEDICINE

## 2022-09-04 PROCEDURE — 250N000009 HC RX 250: Performed by: NURSE PRACTITIONER

## 2022-09-04 PROCEDURE — 200N000001 HC R&B ICU

## 2022-09-04 PROCEDURE — 258N000003 HC RX IP 258 OP 636: Performed by: NURSE PRACTITIONER

## 2022-09-04 PROCEDURE — 83735 ASSAY OF MAGNESIUM: CPT | Performed by: INTERNAL MEDICINE

## 2022-09-04 PROCEDURE — C9113 INJ PANTOPRAZOLE SODIUM, VIA: HCPCS | Performed by: NURSE PRACTITIONER

## 2022-09-04 PROCEDURE — 84132 ASSAY OF SERUM POTASSIUM: CPT | Performed by: THORACIC SURGERY (CARDIOTHORACIC VASCULAR SURGERY)

## 2022-09-04 PROCEDURE — G1010 CDSM STANSON: HCPCS

## 2022-09-04 PROCEDURE — 250N000011 HC RX IP 250 OP 636: Performed by: INTERNAL MEDICINE

## 2022-09-04 RX ORDER — TRAMADOL HYDROCHLORIDE 50 MG/1
50 TABLET ORAL EVERY 6 HOURS PRN
Status: DISCONTINUED | OUTPATIENT
Start: 2022-09-04 | End: 2022-09-09

## 2022-09-04 RX ORDER — LIDOCAINE 40 MG/G
CREAM TOPICAL
Status: ACTIVE | OUTPATIENT
Start: 2022-09-04 | End: 2022-09-07

## 2022-09-04 RX ORDER — MEROPENEM 1 G/1
1 INJECTION, POWDER, FOR SOLUTION INTRAVENOUS EVERY 8 HOURS
Status: DISCONTINUED | OUTPATIENT
Start: 2022-09-04 | End: 2022-09-05

## 2022-09-04 RX ORDER — POTASSIUM CHLORIDE 1.5 G/1.58G
40 POWDER, FOR SOLUTION ORAL ONCE
Status: COMPLETED | OUTPATIENT
Start: 2022-09-04 | End: 2022-09-04

## 2022-09-04 RX ORDER — POTASSIUM CHLORIDE 7.45 MG/ML
10 INJECTION INTRAVENOUS
Status: COMPLETED | OUTPATIENT
Start: 2022-09-04 | End: 2022-09-04

## 2022-09-04 RX ORDER — POTASSIUM CHLORIDE 1500 MG/1
20 TABLET, EXTENDED RELEASE ORAL ONCE
Status: COMPLETED | OUTPATIENT
Start: 2022-09-04 | End: 2022-09-04

## 2022-09-04 RX ADMIN — INSULIN ASPART 1 UNITS: 100 INJECTION, SOLUTION INTRAVENOUS; SUBCUTANEOUS at 16:00

## 2022-09-04 RX ADMIN — METRONIDAZOLE 500 MG: 500 INJECTION, SOLUTION INTRAVENOUS at 12:06

## 2022-09-04 RX ADMIN — SODIUM CHLORIDE, POTASSIUM CHLORIDE, SODIUM LACTATE AND CALCIUM CHLORIDE: 600; 310; 30; 20 INJECTION, SOLUTION INTRAVENOUS at 01:56

## 2022-09-04 RX ADMIN — POTASSIUM CHLORIDE 10 MEQ: 7.46 INJECTION, SOLUTION INTRAVENOUS at 06:33

## 2022-09-04 RX ADMIN — MEROPENEM 1 G: 1 INJECTION, POWDER, FOR SOLUTION INTRAVENOUS at 21:28

## 2022-09-04 RX ADMIN — SODIUM BICARBONATE: 84 INJECTION, SOLUTION INTRAVENOUS at 07:20

## 2022-09-04 RX ADMIN — HYDROCORTISONE SODIUM SUCCINATE 100 MG: 100 INJECTION, POWDER, FOR SOLUTION INTRAMUSCULAR; INTRAVENOUS at 19:26

## 2022-09-04 RX ADMIN — POTASSIUM CHLORIDE 10 MEQ: 7.46 INJECTION, SOLUTION INTRAVENOUS at 09:43

## 2022-09-04 RX ADMIN — INSULIN ASPART 1 UNITS: 100 INJECTION, SOLUTION INTRAVENOUS; SUBCUTANEOUS at 07:54

## 2022-09-04 RX ADMIN — POTASSIUM CHLORIDE 10 MEQ: 7.46 INJECTION, SOLUTION INTRAVENOUS at 10:45

## 2022-09-04 RX ADMIN — HYDROCORTISONE SODIUM SUCCINATE 100 MG: 100 INJECTION, POWDER, FOR SOLUTION INTRAMUSCULAR; INTRAVENOUS at 03:41

## 2022-09-04 RX ADMIN — PANTOPRAZOLE SODIUM 40 MG: 40 INJECTION, POWDER, FOR SOLUTION INTRAVENOUS at 08:29

## 2022-09-04 RX ADMIN — MEROPENEM 1 G: 1 INJECTION, POWDER, FOR SOLUTION INTRAVENOUS at 14:18

## 2022-09-04 RX ADMIN — VASOPRESSIN 2.4 UNITS/HR: 20 INJECTION INTRAVENOUS at 08:54

## 2022-09-04 RX ADMIN — VASOPRESSIN 2.4 UNITS/HR: 20 INJECTION INTRAVENOUS at 02:19

## 2022-09-04 RX ADMIN — ENOXAPARIN SODIUM 40 MG: 40 INJECTION SUBCUTANEOUS at 19:26

## 2022-09-04 RX ADMIN — ENOXAPARIN SODIUM 40 MG: 40 INJECTION SUBCUTANEOUS at 08:29

## 2022-09-04 RX ADMIN — Medication 0.2 MCG/KG/MIN: at 00:49

## 2022-09-04 RX ADMIN — INSULIN ASPART 2 UNITS: 100 INJECTION, SOLUTION INTRAVENOUS; SUBCUTANEOUS at 12:24

## 2022-09-04 RX ADMIN — POTASSIUM CHLORIDE 20 MEQ: 1500 TABLET, EXTENDED RELEASE ORAL at 22:44

## 2022-09-04 RX ADMIN — POTASSIUM CHLORIDE 10 MEQ: 7.46 INJECTION, SOLUTION INTRAVENOUS at 08:33

## 2022-09-04 RX ADMIN — POTASSIUM CHLORIDE 40 MEQ: 1.5 POWDER, FOR SOLUTION ORAL at 15:46

## 2022-09-04 RX ADMIN — POTASSIUM CHLORIDE 10 MEQ: 7.46 INJECTION, SOLUTION INTRAVENOUS at 11:47

## 2022-09-04 RX ADMIN — HYDROCORTISONE SODIUM SUCCINATE 100 MG: 100 INJECTION, POWDER, FOR SOLUTION INTRAMUSCULAR; INTRAVENOUS at 10:22

## 2022-09-04 RX ADMIN — VASOPRESSIN 2.4 UNITS/HR: 20 INJECTION INTRAVENOUS at 16:13

## 2022-09-04 RX ADMIN — Medication 0.04 MCG/KG/MIN: at 17:14

## 2022-09-04 RX ADMIN — CEFEPIME HYDROCHLORIDE 2 G: 2 INJECTION, POWDER, FOR SOLUTION INTRAVENOUS at 03:41

## 2022-09-04 RX ADMIN — POTASSIUM CHLORIDE 10 MEQ: 7.46 INJECTION, SOLUTION INTRAVENOUS at 07:45

## 2022-09-04 RX ADMIN — SODIUM BICARBONATE: 84 INJECTION, SOLUTION INTRAVENOUS at 20:40

## 2022-09-04 RX ADMIN — CEFEPIME HYDROCHLORIDE 2 G: 2 INJECTION, POWDER, FOR SOLUTION INTRAVENOUS at 10:23

## 2022-09-04 RX ADMIN — HYDROMORPHONE HYDROCHLORIDE 0.2 MG: 0.2 INJECTION, SOLUTION INTRAMUSCULAR; INTRAVENOUS; SUBCUTANEOUS at 08:24

## 2022-09-04 ASSESSMENT — ACTIVITIES OF DAILY LIVING (ADL)
ADLS_ACUITY_SCORE: 43
DEPENDENT_IADLS:: CLEANING;COOKING;LAUNDRY;SHOPPING;MEAL PREPARATION;TRANSPORTATION
ADLS_ACUITY_SCORE: 47
ADLS_ACUITY_SCORE: 43
ADLS_ACUITY_SCORE: 47
ADLS_ACUITY_SCORE: 43

## 2022-09-04 NOTE — PLAN OF CARE
Problem: Plan of Care - These are the overarching goals to be used throughout the patient stay.    Goal: Plan of Care Review/Shift Note  Description: The Plan of Care Review/Shift note should be completed every shift.  The Outcome Evaluation is a brief statement about your assessment that the patient is improving, declining, or no change.  This information will be displayed automatically on your shift note.  Outcome: Ongoing, Progressing     Problem: Risk for Delirium  Goal: Optimal Coping  Outcome: Ongoing, Progressing     Problem: UTI (Urinary Tract Infection)  Goal: Improved Infection Symptoms  9/3/2022 2114 by Sagar Hare RN  Outcome: Ongoing, Progressing  9/3/2022 2113 by Sagar Hare RN  Outcome: Ongoing, Progressing     Problem: Plan of Care - These are the overarching goals to be used throughout the patient stay.    Goal: Absence of Hospital-Acquired Illness or Injury  Intervention: Identify and Manage Fall Risk  Recent Flowsheet Documentation  Taken 9/3/2022 2000 by Sagar Hare RN  Safety Promotion/Fall Prevention: bed alarm on  Taken 9/3/2022 1600 by Sagar Hare RN  Safety Promotion/Fall Prevention: bed alarm on  Intervention: Prevent Skin Injury  Recent Flowsheet Documentation  Taken 9/3/2022 2000 by Sagar Hare RN  Body Position:    turned    left  Taken 9/3/2022 1830 by Sagar Hare RN  Body Position:    turned    right  Taken 9/3/2022 1630 by Sagar Hare RN  Body Position:    turned    left  Taken 9/3/2022 1600 by Sagar Hare RN  Body Position:    turned    supine  Intervention: Prevent and Manage VTE (Venous Thromboembolism) Risk  Recent Flowsheet Documentation  Taken 9/3/2022 2000 by Sagar Hare RN  Activity Management: bedrest  Taken 9/3/2022 1600 by Sagar Hare RN  Activity Management: bedrest  Goal: Optimal Comfort and Wellbeing  Intervention: Provide Person-Centered Care  Recent Flowsheet Documentation  Taken 9/3/2022 2000 by Sagar Hare RN  Trust  Relationship/Rapport: care explained  Taken 9/3/2022 1600 by Sagar Hare, RN  Trust Relationship/Rapport: care explained   Goal Outcome Evaluation:  [facility] - ICU    RN Progress Note:            Pertinent Assessments:      Please refer to flowsheet rows for full assessment     Patient lethargic but easy to arouse. Oriented to self. Vitals stable.          Mobility Level:     Bedrest. Turned and repositioned tolerated well.         Key Events - This Shift:     Levo titrated to 0.20 and vasopressin at 2.4.               Plan:     To keep BP with in limit.
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
## 2022-09-04 NOTE — PROGRESS NOTES
Interventional Radiology - Progress Note  Inpatient - [facility]: Interventional Radiology   [phone]  09/04/2022     Chart Check    Pt with R groin/thigh drain placement by IR on 09/03. Cultures are positive. Pt is receiving Meropenem. Per chart review the drain is providing adequate outputs. Surgery consults is pending. Continue drain care and drain flushes. IR will continue to follow.     Luis Carlos Guzmán PA-C  Interventional Radiology  [phone]
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
## 2022-09-04 NOTE — DISCHARGE INSTRUCTIONS
"[facility] DISCHARGE INSTRUCTIONS:  Wound care  EVERY MWF      Comments: Negative pressure wound therapy plan:   Wound location: sacrum and right hip  , applied 9/26 by [facility]   Change Days: Mon/Wed/Fri   Supplies (including all accessories) used: large Black foam , Adapt barrier ring and Cavilon no sting barrier film   Cleanse with Vashe prior to replacing VAC     Suction setting: -125   Methods used: Window paned all periwound skin with vac drape prior to applying sponge, Bridged trac pad off bony prominences, Spiral cut foam prior to packing into wound  and Placed barrier ring into periwound creases to improve seal         Staff RN to assess integrity of dressing and ensure suction is set at appropriate level every shift.   Date canister. Chart canister output every shift. Change cannister weekly and PRN if full/occluded     Remove foam dressing and replace with BID normal saline moist gauze dressing if:   -a dressing failure which cannot be repaired within 2 hours   -patient is discharging to home without a home pump   -patient is discharging to a facility outside the local area   -if a dressing is a \"Silver Foam\", remove before Radiation Therapy or MRI         The hospital VAC pump is not to be discharged with the patient.?Ensure to disconnect patient from machine prior to discharge. Then,     - If a home KCI VAC pump has been delivered, connect home cannister to dressing tubing then connect cannister to home pump and turn on machine     - If transferring to a nearby facility with a KCI vac, can disconnect and clamp tubing then cover end with glove so can be reconnected within 2 hours           Wound care  EVERY OTHER DAY      Comments: R IT wound   Cleanse wound with Vashe, gently dry.   ApplySilvercel cut to fit wound bed, then cover with Mepilex 4 x 4.   Change every other day               Suprapubic Catheter Discharge Instructions:  You had a suprapubic catheter (SP) tube placed. An SP tube is a type of urinary " catheter (thin, flexible tube) that is placed through a small incision in your lower abdomen into your bladder to drain urine from your bladder into a bag outside of your body. Please follow the below instructions:    Care instructions:  - If you received sedation for your procedure, do not drive or operate heavy machinery for the rest of the day (24 hours).   - Avoid stagnant water such as tub baths, Jacuzzis and pools.  - You may shower beginning the day after your suprapubic catheter was placed. Clean around  the SP tube exit site with soap and water and pat dry under disk.    - Change gauze dressing around suprapubic catheter site daily or as needed to keep the site clean and dry.    - Positioning of the drainage bag and tubing:  *Allow gravity drainage: Do not loop or kink the tubing so urine can flow.  *Keep the drainage bag below the level of your waist.      Follow up:  - Recommend routine exchanges every 4-6 weeks or as recommended by your urologist (either with urology, care facility staff or with [facility]).    - Your first exchange is typically done with IR and with Urologist thereafter.  - If exchange with [facility] is desired, please call [phone] to schedule this appointment.      Call [facility] ([phone]) if you experience the following:  - Suprapubic tube outputs suddenly stop or significantly decrease.  - Your suprapubic tube appears to be falling out, has fallen out, becomes clogged or breaks.   - Significant leaking around the suprapubic tube exit site.    Please seek medical evaluation for:    - Fever (greater than 101 F (38.3C)) and/or chills.  - Purulent (yellow/green/foul smelling) drainage from the suprapubic tube exit site.   - Significant bleeding at the suprapubic tube exit site.   - Significant or worsening pain at the suprapubic tube exit site or abdomen.     - Outputs suddenly stop or significantly reduces.   - Warmth, redness, swelling or tenderness  around the drainage tube(s).
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
## 2022-09-04 NOTE — PLAN OF CARE
Problem: Plan of Care - These are the overarching goals to be used throughout the patient stay.    Goal: Absence of Hospital-Acquired Illness or Injury  Intervention: Prevent Skin Injury    Problem: Plan of Care - These are the overarching goals to be used throughout the patient stay.    Goal: Optimal Comfort and Wellbeing  Outcome: Ongoing, Progressing  Intervention: Monitor Pain and Promote Comfort    Problem: Risk for Delirium  Goal: Optimal Coping  Outcome: Ongoing, Progressing     Problem: UTI (Urinary Tract Infection)  Goal: Improved Infection Symptoms  Outcome: Ongoing, Progressing      [facility] - ICU    RN Progress Note:            Pertinent Assessments:      Please refer to flowsheet rows for full assessment     Afebrile. Abdominal pain around suprapubic catheter resolved after Dilaudid 0.2 mg IV and catheter redressed with 3 drain sponges to decrease movement.          Mobility Level:     Turned Q2h and supported with pillows. Very agreeable to this. Able to feed herself liquids if set up/ uses her right arm weakly.         Key Events - This Shift:     Levophed Off at 1420. Vasopressin remains at 2.4 units/hour. Vancomycin level done and dosing changed to Q48 hours. Meropenem added. 270 ml grey drainage from Right DENIA drain. CT abdomen and pelvis done in am. Seen by Dr Hunter.  K= 2.1 After 6 IV KCL bumps, K= 3.0. Will replace PO this time and hope for better absorption.Enjoying clear liquids/nothing out in ileostomy. Was unable to speak with her mother May because May was dizzy in [place] and in the [place] [facility].              Plan:     Continue plan of care. Try to wean vasopressin next shift.

## 2022-09-04 NOTE — PLAN OF CARE
[facility] - ICU    RN Progress Note:            Pertinent Assessments:      Please refer to flowsheet rows for full assessment     Patient indicated she is hungry and would like to eat.  Discussed multiple times she is nothing by mouth.  Tolerating ICE chips.         Mobility Level:     Needs to be turned every 2 hours.  Requires staff assist of 2 with turns.         Key Events - This Shift:     Started the night with levophed at 0.02 and Vaso at 2.4.  MAPs were in the 80's.  Vaso turned off prior to midnight.  For about 2 hours she tolerated being only on 0.02 of levophed.  At 0130 her BP started to drop.  Levophed was increased to 0.12 and eventually I had to restart the Vasopressin.  Blood pressures are currently stable.               Plan:     Continue to encourage repositioning.  Monitor the skin for breakdown       Farhat Covington RN

## 2022-09-04 NOTE — PHARMACY-VANCOMYCIN DOSING SERVICE
Pharmacy Vancomycin Note  Date of Service 2022  Patient's  1965   56 year old, female    Indication: Abscess, Sepsis and Skin and Soft Tissue Infection  Day of Therapy: 2  Current vancomycin regimen:  1000 mg IV q12h  Current vancomycin monitoring method: AUC  Current vancomycin therapeutic monitoring goal: 400-600 mg*h/L    InsightRX Prediction of Current Vancomycin Regimen  Regimen: 1000 mg IV every 12 hours.  Start time: 14:27 on 2022  Exposure target: AUC24 (range)400-600 mg/L.hr   AUC24,ss: 1002 mg/L.hr  Probability of AUC24 > 400: 100 %  Ctrough,ss: 32.5 mg/L  Probability of Ctrough,ss > 20: 99 %  Probability of nephrotoxicity (Lodise LU ): 49 %    Current estimated CrCl = Estimated Creatinine Clearance: 90.1 mL/min (A) (based on SCr of 0.51 mg/dL (L)).    Creatinine for last 3 days  2022:  1:30 PM Creatinine 0.51 mg/dL  9/3/2022:  9:22 AM Creatinine 0.47 mg/dL  2022:  5:33 AM Creatinine 0.51 mg/dL; 12:13 PM Creatinine 0.51 mg/dL    Recent Vancomycin Levels (past 3 days)  2022: 12:13 PM Vancomycin 29.7 mg/L    Vancomycin IV Administrations (past 72 hours)                   vancomycin (VANCOCIN) 1000 mg in dextrose 5% 200 mL PREMIX (mg) 1,000 mg New Bag 22 2337     1,000 mg New Bag  1207    vancomycin (VANCOCIN) 1000 mg in dextrose 5% 200 mL PREMIX (mg) 1,000 mg New Bag 22 0349                Nephrotoxins and other renal medications (From now, onward)    Start     Dose/Rate Route Frequency Ordered Stop    22 1316  vancomycin place paige - receiving intermittent dosing         1 each Intravenous SEE ADMIN INSTRUCTIONS 22 1317      22 09  vasopressin (VASOSTRICT) 20 Units in sodium chloride 0.9 % 100 mL standard conc infusion         2.4 Units/hr  12 mL/hr  Intravenous CONTINUOUS 22 0836      22 09  norepinephrine (LEVOPHED) 4 mg in  mL infusion PREMIX         0.01-0.6 mcg/kg/min × 46.6 kg  1.7-104.9 mL/hr   Intravenous CONTINUOUS 09/03/22 0836               Contrast Orders - past 72 hours (72h ago, onward)    None          Interpretation of levels and current regimen:  Vancomycin level is reflective of AUC greater than 600    Has serum creatinine changed greater than 50% in last 72 hours: No    Due to supratherapeutic level and patient's quadriplegia, plan to change to trough level monitoring.    Plan:  1. Change vancomycin to intermittent dosing.    2. Vancomycin monitoring method: Trough (Method 2 = manual dose calculation)  3. Vancomycin therapeutic monitoring goal: 15-20 mg/L  4. Pharmacy will check vancomycin level in AM.    Juancarlos Asencio RP

## 2022-09-04 NOTE — PROGRESS NOTES
PROGRESS NOTE    SUBJECTIVE:  Had a right buttock CT guided drain yesterday.  Today, she is sitting in bed and has a normal conversation with me.  She looks much improved.  SP tube bothers her when it moves.     OBJECTIVE:  Temp (24hrs), Av.7  F (36.5  C), Min:96.8  F (36  C), Max:98.6  F (37  C)      Patient Vitals for the past 24 hrs:   BP Temp Temp src Pulse Resp SpO2   22 1000 -- -- -- 86 16 100 %   2245 -- -- -- 83 18 100 %   2230 -- -- -- 89 18 100 %   2215 -- -- -- 79 18 100 %   22 -- -- -- 77 16 100 %   2245 -- -- -- 94 15 100 %   2244 -- -- -- (!) 163 16 100 %   22 -- -- -- 83 20 100 %   2215 -- 97.9  F (36.6  C) Oral 105 17 100 %   22 -- -- -- 87 17 93 %   2245 -- -- -- 80 16 100 %   2230 -- -- -- (!) 49 13 100 %   2215 -- -- -- (!) 43 14 100 %   22 -- -- -- (!) 44 24 100 %   2245 -- -- -- (!) 44 14 100 %   2230 -- -- -- 79 19 90 %   22 0615 -- -- -- 60 15 100 %   22 0600 -- -- -- 71 16 100 %   22 0545 -- -- -- 74 18 100 %   22 0530 -- -- -- 73 16 100 %   22 0515 -- -- -- 79 17 99 %   22 0500 -- -- -- 74 15 100 %   22 0430 -- -- -- 73 14 100 %   22 0415 -- -- -- 74 15 100 %   22 0400 -- 97.1  F (36.2  C) Axillary 77 15 100 %   22 0345 -- -- -- 77 16 99 %   22 0330 -- -- -- 71 12 98 %   22 0315 -- -- -- 67 11 97 %   22 0300 -- -- -- 69 12 98 %   22 0245 -- -- -- 69 13 99 %   22 0230 -- -- -- 71 14 99 %   22 0215 -- -- -- 74 14 100 %   22 0203 -- -- -- 81 15 100 %   22 0200 -- -- -- 82 15 100 %   22 0145 -- -- -- 85 15 100 %   22 0143 -- -- -- 86 14 100 %   22 0140 -- -- -- 78 15 99 %   22 0137 -- -- -- 75 10 100 %   22 0130 -- -- -- 76 13 100 %   22 0115 -- -- -- 80 15 100 %   22 0100 -- -- -- 77 12 100 %    09/04/22 0045 -- -- -- 79 14 100 %   09/04/22 0030 -- -- -- 81 14 100 %   09/04/22 0015 -- -- -- 86 15 100 %   09/04/22 0000 -- 96.8  F (36  C) Axillary 83 13 100 %   09/03/22 2345 -- -- -- 81 15 100 %   09/03/22 2330 -- -- -- 81 21 100 %   09/03/22 2315 -- -- -- 80 19 (!) 75 %   09/03/22 2300 -- -- -- 85 21 95 %   09/03/22 2245 -- -- -- 83 18 96 %   09/03/22 2230 -- -- -- 84 17 99 %   09/03/22 2215 -- -- -- 72 11 98 %   09/03/22 2200 -- -- -- 75 14 97 %   09/03/22 2115 -- -- -- 75 13 98 %   09/03/22 2100 -- -- -- 80 12 100 %   09/03/22 2045 -- -- -- 78 12 100 %   09/03/22 2030 -- -- -- 82 13 99 %   09/03/22 2015 -- -- -- 83 20 100 %   09/03/22 2000 -- 97.3  F (36.3  C) Axillary 80 21 99 %   09/03/22 1945 -- -- -- 80 13 98 %   09/03/22 1930 -- -- -- 84 21 (!) 86 %   09/03/22 1915 -- -- -- 80 24 99 %   09/03/22 1900 -- -- -- 85 19 100 %   09/03/22 1845 -- -- -- 87 25 100 %   09/03/22 1830 -- -- -- 78 24 100 %   09/03/22 1815 -- -- -- 90 14 100 %   09/03/22 1800 -- -- -- 77 11 100 %   09/03/22 1745 -- -- -- 88 14 99 %   09/03/22 1730 -- -- -- 78 12 99 %   09/03/22 1715 -- -- -- 79 11 99 %   09/03/22 1700 -- -- -- 92 14 98 %   09/03/22 1645 -- -- -- 91 13 99 %   09/03/22 1630 -- -- -- 81 11 98 %   09/03/22 1615 -- -- -- 82 12 98 %   09/03/22 1600 -- 97.7  F (36.5  C) Oral 81 10 99 %   09/03/22 1545 -- -- -- 84 12 98 %   09/03/22 1530 -- -- -- 80 10 96 %   09/03/22 1515 -- -- -- 80 11 96 %   09/03/22 1500 -- 98.4  F (36.9  C) Oral 82 11 98 %   09/03/22 1445 -- -- -- 83 12 (!) 82 %   09/03/22 1430 -- -- -- 82 11 96 %   09/03/22 1400 -- -- -- 80 11 98 %   09/03/22 1345 -- -- -- 81 11 100 %   09/03/22 1330 -- -- -- 82 10 99 %   09/03/22 1325 -- -- -- 85 12 100 %   09/03/22 1315 104/49 -- -- 84 11 99 %   09/03/22 1300 114/58 -- -- 89 10 99 %   09/03/22 1245 93/53 -- -- 87 12 100 %   09/03/22 1230 109/51 -- -- 100 12 99 %   09/03/22 1215 98/51 98.6  F (37  C) Oral 93 10 100 %   09/03/22 1200 105/50 -- -- 85 11 100 %    09/03/22 1145 101/58 -- -- 86 13 100 %   09/03/22 1140 97/56 -- -- 83 11 99 %   09/03/22 1135 90/54 -- -- 83 12 99 %   09/03/22 1130 (!) 89/52 -- -- 86 (!) 7 99 %   09/03/22 1125 -- -- -- 84 11 100 %   09/03/22 1115 98/53 -- -- 84 11 96 %   09/03/22 1100 102/61 -- -- 82 12 100 %   09/03/22 1045 95/58 -- -- 82 12 99 %   09/03/22 1030 113/77 -- -- 84 12 100 %       Intake/Output Summary (Last 24 hours) at 9/4/2022 1024  Last data filed at 9/4/2022 1000  Gross per 24 hour   Intake 3363.22 ml   Output 3040 ml   Net 323.22 ml       PHYSICAL EXAM:  Sitting in bed, looks much better than yesterday  Urine clear in SP tube, site is clean      WBC   Date Value Ref Range Status   05/26/2021 5.5 4.0 - 11.0 10e9/L Final   01/11/2020 5.7 4.0 - 11.0 10e9/L Final   01/10/2020 7.4 4.0 - 11.0 10e9/L Final     WBC Count   Date Value Ref Range Status   09/04/2022 22.6 (H) 4.0 - 11.0 10e3/uL Final   09/02/2022 12.2 (H) 4.0 - 11.0 10e3/uL Final   07/30/2022 11.6 (H) 4.0 - 11.0 10e3/uL Final     Hemoglobin   Date Value Ref Range Status   09/04/2022 7.9 (L) 11.7 - 15.7 g/dL Final   09/02/2022 9.7 (L) 11.7 - 15.7 g/dL Final   07/30/2022 11.2 (L) 11.7 - 15.7 g/dL Final   05/26/2021 10.2 (L) 11.7 - 15.7 g/dL Final   01/11/2020 8.9 (L) 11.7 - 15.7 g/dL Final   01/10/2020 8.8 (L) 11.7 - 15.7 g/dL Final     Platelet Count   Date Value Ref Range Status   09/04/2022 135 (L) 150 - 450 10e3/uL Final   09/02/2022 256 150 - 450 10e3/uL Final   07/30/2022 206 150 - 450 10e3/uL Final   05/26/2021 318 150 - 450 10e9/L Final   01/11/2020 98 (L) 150 - 450 10e9/L Final   01/10/2020 74 (L) 150 - 450 10e9/L Final     Recent Labs   Lab Test 09/04/22  0752 09/04/22  0533 09/04/22  0352 09/03/22  1040 09/03/22  0922 09/02/22  1330 07/30/22  1352   NA  --  141  --   --   --  143 139   POTASSIUM  --  2.1*  --   --   --  3.3* 3.7   CHLORIDE  --  124*  --   --   --  120* 114*   CO2  --  9*  --   --   --  14* 16*   BUN  --  15  --   --   --  28 21   CR  --  0.51*   --   --  0.47* 0.51* 0.62   ANIONGAP  --  8  --   --   --  9 9   JOSH  --  7.7*  --   --   --  9.1 9.0   * 140* 127*   < >  --  118* 102*    < > = values in this interval not displayed.           Active Problems:    Septic shock (H)        ASSESSMENT & PLAN: 56 year old female with septic shock with a large right buttock/thigh/hip abscess s/p CT guided drain and also with urinary retention and inability to place a catheter via her stoma and now s/p SP tube placement at outside hospital. I suspect that her septic shock is much more related her to abscess than an UTI.  Can continue as needed irrigation of the SP tube.  If the SP tube clogs, then would need IR to place a catheter (would recommend not going through the stoma).  Discussed that the fact that the SP tube was placed through her stoma in the manner that it was, the stoma/track could have been injured.  I would not recommend removing the SP tube until she follows up as an outpatient (urologist who did her urinary diversion was Dr. Russ Cristobal).  Please call with questions.    Omar Cueva MD

## 2022-09-05 LAB
ABO/RH(D): NORMAL
ALBUMIN SERPL-MCNC: 1.5 G/DL (ref 3.5–5)
ALP SERPL-CCNC: 104 U/L (ref 45–120)
ALT SERPL W P-5'-P-CCNC: <9 U/L (ref 0–45)
ANION GAP SERPL CALCULATED.3IONS-SCNC: 7 MMOL/L (ref 5–18)
ANTIBODY SCREEN: NEGATIVE
AST SERPL W P-5'-P-CCNC: <6 U/L (ref 0–40)
BACTERIA UR CULT: NORMAL
BILIRUB SERPL-MCNC: 0.3 MG/DL (ref 0–1)
BLD PROD TYP BPU: NORMAL
BLOOD COMPONENT TYPE: NORMAL
BUN SERPL-MCNC: 17 MG/DL (ref 8–22)
CALCIUM SERPL-MCNC: 6.9 MG/DL (ref 8.5–10.5)
CHLORIDE BLD-SCNC: 120 MMOL/L (ref 98–107)
CO2 SERPL-SCNC: 13 MMOL/L (ref 22–31)
CODING SYSTEM: NORMAL
CREAT SERPL-MCNC: 0.51 MG/DL (ref 0.6–1.1)
CROSSMATCH: NORMAL
ERYTHROCYTE [DISTWIDTH] IN BLOOD BY AUTOMATED COUNT: 20 % (ref 10–15)
GFR SERPL CREATININE-BSD FRML MDRD: >90 ML/MIN/1.73M2
GLUCOSE BLD-MCNC: 169 MG/DL (ref 70–125)
GLUCOSE BLDC GLUCOMTR-MCNC: 131 MG/DL (ref 70–99)
GLUCOSE BLDC GLUCOMTR-MCNC: 144 MG/DL (ref 70–99)
GLUCOSE BLDC GLUCOMTR-MCNC: 148 MG/DL (ref 70–99)
GLUCOSE BLDC GLUCOMTR-MCNC: 199 MG/DL (ref 70–99)
HCT VFR BLD AUTO: 21.7 % (ref 35–47)
HGB BLD-MCNC: 6.8 G/DL (ref 11.7–15.7)
ISSUE DATE AND TIME: NORMAL
MAGNESIUM SERPL-MCNC: 1.5 MG/DL (ref 1.8–2.6)
MAGNESIUM SERPL-MCNC: 2.8 MG/DL (ref 1.8–2.6)
MCH RBC QN AUTO: 25.2 PG (ref 26.5–33)
MCHC RBC AUTO-ENTMCNC: 31.3 G/DL (ref 31.5–36.5)
MCV RBC AUTO: 80 FL (ref 78–100)
PLATELET # BLD AUTO: 104 10E3/UL (ref 150–450)
POTASSIUM BLD-SCNC: 3.4 MMOL/L (ref 3.5–5)
POTASSIUM BLD-SCNC: 3.4 MMOL/L (ref 3.5–5)
POTASSIUM BLD-SCNC: 3.8 MMOL/L (ref 3.5–5)
PROT SERPL-MCNC: 4.8 G/DL (ref 6–8)
RBC # BLD AUTO: 2.7 10E6/UL (ref 3.8–5.2)
SODIUM SERPL-SCNC: 140 MMOL/L (ref 136–145)
SPECIMEN EXPIRATION DATE: NORMAL
UNIT ABO/RH: NORMAL
UNIT NUMBER: NORMAL
UNIT STATUS: NORMAL
UNIT TYPE ISBT: 600
VANCOMYCIN SERPL-MCNC: 21.5 MG/L
WBC # BLD AUTO: 12.1 10E3/UL (ref 4–11)

## 2022-09-05 PROCEDURE — 83735 ASSAY OF MAGNESIUM: CPT | Performed by: NURSE PRACTITIONER

## 2022-09-05 PROCEDURE — 258N000003 HC RX IP 258 OP 636: Performed by: NURSE PRACTITIONER

## 2022-09-05 PROCEDURE — 258N000003 HC RX IP 258 OP 636: Performed by: INTERNAL MEDICINE

## 2022-09-05 PROCEDURE — 250N000013 HC RX MED GY IP 250 OP 250 PS 637: Performed by: INTERNAL MEDICINE

## 2022-09-05 PROCEDURE — 200N000001 HC R&B ICU

## 2022-09-05 PROCEDURE — 250N000011 HC RX IP 250 OP 636: Performed by: INTERNAL MEDICINE

## 2022-09-05 PROCEDURE — 86923 COMPATIBILITY TEST ELECTRIC: CPT | Performed by: INTERNAL MEDICINE

## 2022-09-05 PROCEDURE — 86901 BLOOD TYPING SEROLOGIC RH(D): CPT | Performed by: INTERNAL MEDICINE

## 2022-09-05 PROCEDURE — 99222 1ST HOSP IP/OBS MODERATE 55: CPT | Performed by: INTERNAL MEDICINE

## 2022-09-05 PROCEDURE — 84132 ASSAY OF SERUM POTASSIUM: CPT | Performed by: INTERNAL MEDICINE

## 2022-09-05 PROCEDURE — 84132 ASSAY OF SERUM POTASSIUM: CPT | Performed by: THORACIC SURGERY (CARDIOTHORACIC VASCULAR SURGERY)

## 2022-09-05 PROCEDURE — 250N000013 HC RX MED GY IP 250 OP 250 PS 637: Performed by: NURSE PRACTITIONER

## 2022-09-05 PROCEDURE — 80202 ASSAY OF VANCOMYCIN: CPT | Performed by: NURSE PRACTITIONER

## 2022-09-05 PROCEDURE — 80053 COMPREHEN METABOLIC PANEL: CPT | Performed by: NURSE PRACTITIONER

## 2022-09-05 PROCEDURE — 99291 CRITICAL CARE FIRST HOUR: CPT | Performed by: INTERNAL MEDICINE

## 2022-09-05 PROCEDURE — 86850 RBC ANTIBODY SCREEN: CPT | Performed by: INTERNAL MEDICINE

## 2022-09-05 PROCEDURE — 85018 HEMOGLOBIN: CPT | Performed by: NURSE PRACTITIONER

## 2022-09-05 PROCEDURE — P9045 ALBUMIN (HUMAN), 5%, 250 ML: HCPCS | Performed by: INTERNAL MEDICINE

## 2022-09-05 PROCEDURE — 85520 HEPARIN ASSAY: CPT | Performed by: INTERNAL MEDICINE

## 2022-09-05 PROCEDURE — C9113 INJ PANTOPRAZOLE SODIUM, VIA: HCPCS | Performed by: NURSE PRACTITIONER

## 2022-09-05 PROCEDURE — 83735 ASSAY OF MAGNESIUM: CPT | Performed by: INTERNAL MEDICINE

## 2022-09-05 PROCEDURE — 250N000009 HC RX 250: Performed by: INTERNAL MEDICINE

## 2022-09-05 PROCEDURE — 250N000011 HC RX IP 250 OP 636: Performed by: NURSE PRACTITIONER

## 2022-09-05 RX ORDER — LEVETIRACETAM 500 MG/1
1000 TABLET ORAL 2 TIMES DAILY
Status: DISCONTINUED | OUTPATIENT
Start: 2022-09-05 | End: 2022-09-11

## 2022-09-05 RX ORDER — POTASSIUM CHLORIDE 29.8 MG/ML
20 INJECTION INTRAVENOUS ONCE
Status: COMPLETED | OUTPATIENT
Start: 2022-09-05 | End: 2022-09-05

## 2022-09-05 RX ORDER — POTASSIUM CHLORIDE 1.5 G/1.58G
40 POWDER, FOR SOLUTION ORAL ONCE
Status: COMPLETED | OUTPATIENT
Start: 2022-09-05 | End: 2022-09-05

## 2022-09-05 RX ORDER — SODIUM BICARBONATE 650 MG/1
650 TABLET ORAL 3 TIMES DAILY
Status: DISCONTINUED | OUTPATIENT
Start: 2022-09-05 | End: 2022-09-08

## 2022-09-05 RX ORDER — MAGNESIUM SULFATE 4 G/50ML
4 INJECTION INTRAVENOUS ONCE
Status: COMPLETED | OUTPATIENT
Start: 2022-09-05 | End: 2022-09-05

## 2022-09-05 RX ORDER — SODIUM BICARBONATE 650 MG/1
650 TABLET ORAL 4 TIMES DAILY
Status: DISCONTINUED | OUTPATIENT
Start: 2022-09-05 | End: 2022-09-05

## 2022-09-05 RX ORDER — MEROPENEM 1 G/1
1 INJECTION, POWDER, FOR SOLUTION INTRAVENOUS EVERY 12 HOURS
Status: DISCONTINUED | OUTPATIENT
Start: 2022-09-06 | End: 2022-09-06

## 2022-09-05 RX ADMIN — MEROPENEM 1 G: 1 INJECTION, POWDER, FOR SOLUTION INTRAVENOUS at 13:47

## 2022-09-05 RX ADMIN — SODIUM BICARBONATE 650 MG: 650 TABLET ORAL at 20:09

## 2022-09-05 RX ADMIN — TRAMADOL HYDROCHLORIDE 50 MG: 50 TABLET ORAL at 14:08

## 2022-09-05 RX ADMIN — SODIUM BICARBONATE 650 MG: 650 TABLET ORAL at 14:04

## 2022-09-05 RX ADMIN — MAGNESIUM SULFATE 4 G: 4 INJECTION INTRAVENOUS at 05:55

## 2022-09-05 RX ADMIN — TRAMADOL HYDROCHLORIDE 50 MG: 50 TABLET ORAL at 03:05

## 2022-09-05 RX ADMIN — VASOPRESSIN 2.4 UNITS/HR: 20 INJECTION INTRAVENOUS at 01:51

## 2022-09-05 RX ADMIN — SODIUM BICARBONATE: 84 INJECTION, SOLUTION INTRAVENOUS at 18:15

## 2022-09-05 RX ADMIN — ALBUMIN (HUMAN) 12.5 G: 12.5 INJECTION, SOLUTION INTRAVENOUS at 08:32

## 2022-09-05 RX ADMIN — POTASSIUM CHLORIDE 20 MEQ: 400 INJECTION, SOLUTION INTRAVENOUS at 19:07

## 2022-09-05 RX ADMIN — MEROPENEM 1 G: 1 INJECTION, POWDER, FOR SOLUTION INTRAVENOUS at 05:06

## 2022-09-05 RX ADMIN — SODIUM BICARBONATE 650 MG: 650 TABLET ORAL at 09:36

## 2022-09-05 RX ADMIN — HYDROCORTISONE SODIUM SUCCINATE 100 MG: 100 INJECTION, POWDER, FOR SOLUTION INTRAMUSCULAR; INTRAVENOUS at 20:06

## 2022-09-05 RX ADMIN — HYDROCORTISONE SODIUM SUCCINATE 100 MG: 100 INJECTION, POWDER, FOR SOLUTION INTRAMUSCULAR; INTRAVENOUS at 13:47

## 2022-09-05 RX ADMIN — HYDROCORTISONE SODIUM SUCCINATE 100 MG: 100 INJECTION, POWDER, FOR SOLUTION INTRAMUSCULAR; INTRAVENOUS at 03:05

## 2022-09-05 RX ADMIN — ENOXAPARIN SODIUM 40 MG: 40 INJECTION SUBCUTANEOUS at 20:05

## 2022-09-05 RX ADMIN — LEVETIRACETAM 1000 MG: 500 TABLET, FILM COATED ORAL at 20:12

## 2022-09-05 RX ADMIN — POTASSIUM CHLORIDE 40 MEQ: 1.5 POWDER, FOR SOLUTION ORAL at 05:06

## 2022-09-05 RX ADMIN — ENOXAPARIN SODIUM 40 MG: 40 INJECTION SUBCUTANEOUS at 08:34

## 2022-09-05 RX ADMIN — PANTOPRAZOLE SODIUM 40 MG: 40 INJECTION, POWDER, FOR SOLUTION INTRAVENOUS at 08:34

## 2022-09-05 ASSESSMENT — ACTIVITIES OF DAILY LIVING (ADL)
ADLS_ACUITY_SCORE: 43
ADLS_ACUITY_SCORE: 43
ADLS_ACUITY_SCORE: 42
ADLS_ACUITY_SCORE: 47
ADLS_ACUITY_SCORE: 42
ADLS_ACUITY_SCORE: 42
ADLS_ACUITY_SCORE: 43
ADLS_ACUITY_SCORE: 42

## 2022-09-05 NOTE — PLAN OF CARE
Problem: Plan of Care - These are the overarching goals to be used throughout the patient stay.    Goal: Plan of Care Review/Shift Note  Description: The Plan of Care Review/Shift note should be completed every shift.  The Outcome Evaluation is a brief statement about your assessment that the patient is improving, declining, or no change.  This information will be displayed automatically on your shift note.  Outcome: Ongoing, Progressing     Problem: Risk for Delirium  Goal: Optimal Coping  Outcome: Ongoing, Progressing     Problem: Plan of Care - These are the overarching goals to be used throughout the patient stay.    Goal: Absence of Hospital-Acquired Illness or Injury  Intervention: Identify and Manage Fall Risk  Recent Flowsheet Documentation  Taken 9/4/2022 2000 by Sagar Hare RN  Safety Promotion/Fall Prevention:    bed alarm on    patient and family education    room door open    room near nurse's station    safety round/check completed  Taken 9/4/2022 1600 by Saagr Hare RN  Safety Promotion/Fall Prevention:    bed alarm on    patient and family education    room door open    room near nurse's station    safety round/check completed  Intervention: Prevent Skin Injury  Recent Flowsheet Documentation  Taken 9/4/2022 2000 by Sagar Hare RN  Body Position:    turned    right  Taken 9/4/2022 1900 by Sagar Hare RN  Body Position:    turned    left  Taken 9/4/2022 1630 by Sagar Hare RN  Body Position:    turned    right  Intervention: Prevent and Manage VTE (Venous Thromboembolism) Risk  Recent Flowsheet Documentation  Taken 9/4/2022 2000 by Sagar Hare RN  Activity Management: bedrest  Taken 9/4/2022 1600 by Sagar Hare RN  Activity Management: bedrest  Goal: Optimal Comfort and Wellbeing  Intervention: Provide Person-Centered Care  Recent Flowsheet Documentation  Taken 9/4/2022 2000 by Sagar Hare RN  Trust Relationship/Rapport:    care explained    emotional support  provided  Taken 9/4/2022 1600 by Sagar Hare, RN  Trust Relationship/Rapport:    care explained    emotional support provided   Goal Outcome Evaluation:      [facility] - ICU    RN Progress Note:            Pertinent Assessments:      Please refer to flowsheet rows for full assessment     Alert and oriented X2. Vitals stable.         Mobility Level:     Bedrest turned and repositioned tolerated well.         Key Events - This Shift:     Low BP Levo restarted. Currently Levo at 0.04 and Vasopressin at 2.4. urine out put 50 ml every 2 hours MD notified.               Plan:     To keep BP WNL. To monitor urine out put.
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
## 2022-09-05 NOTE — PHARMACY-VANCOMYCIN DOSING SERVICE
Pharmacy Vancomycin Note  Date of Service 2022  Patient's  1965   56 year old, female    Indication: Abscess, Sepsis and Skin and Soft Tissue Infection  Day of Therapy: 3  Current vancomycin regimen:  Intermittent dosing  Current vancomycin monitoring method: Trough (Method 2 = manual dose calculation)  Current vancomycin therapeutic monitoring goal: 15-20 mg/L    Current estimated CrCl = Estimated Creatinine Clearance: 98.6 mL/min (A) (based on SCr of 0.51 mg/dL (L)).    Creatinine for last 3 days  9/3/2022:  9:22 AM Creatinine 0.47 mg/dL  2022:  5:33 AM Creatinine 0.51 mg/dL; 12:13 PM Creatinine 0.51 mg/dL;  5:43 PM Creatinine 0.54 mg/dL  2022:  4:05 AM Creatinine 0.51 mg/dL    Recent Vancomycin Levels (past 3 days)  2022: 12:13 PM Vancomycin 29.7 mg/L  2022:  4:05 AM Vancomycin 21.5 mg/L    Vancomycin IV Administrations (past 72 hours)                   vancomycin (VANCOCIN) 1000 mg in dextrose 5% 200 mL PREMIX (mg) 1,000 mg New Bag 22 2337     1,000 mg New Bag  1207    vancomycin (VANCOCIN) 1000 mg in dextrose 5% 200 mL PREMIX (mg) 1,000 mg New Bag 22 0349                Nephrotoxins and other renal medications (From now, onward)    Start     Dose/Rate Route Frequency Ordered Stop    22 1316  vancomycin place paige - receiving intermittent dosing         1 each Intravenous SEE ADMIN INSTRUCTIONS 22 1317      22 0900  vasopressin (VASOSTRICT) 20 Units in sodium chloride 0.9 % 100 mL standard conc infusion         2.4 Units/hr  12 mL/hr  Intravenous CONTINUOUS 22 0836      22 0900  norepinephrine (LEVOPHED) 4 mg in  mL infusion PREMIX         0.01-0.6 mcg/kg/min × 46.6 kg  1.7-104.9 mL/hr  Intravenous CONTINUOUS 22 0836               Contrast Orders - past 72 hours (72h ago, onward)    None          Interpretation of levels and current regimen:  Vancomycin level is reflective of supratherapeutic level    Has serum  creatinine changed greater than 50% in last 72 hours: No, Scr monitoring unreliable due to patient's quadriplegia    Urine output:  diminished urine output    Renal Function: Worsening    Plan:    Will repeat vancomycin level in AM to determine timing of next vancomycin dose.  Discussed plan with Dr. Artis (Infectious Disease), who agreed with the above plan.    Juancarlos Asencio, FatoumataD

## 2022-09-05 NOTE — PLAN OF CARE
"  Problem: Plan of Care - These are the overarching goals to be used throughout the patient stay.    Goal: Plan of Care Review/Shift Note  Description: The Plan of Care Review/Shift note should be completed every shift.  The Outcome Evaluation is a brief statement about your assessment that the patient is improving, declining, or no change.  This information will be displayed automatically on your shift note.  Outcome: Ongoing, Progressing  Flowsheets (Taken 9/5/2022 0522)  Plan of Care Reviewed With: patient  Overall Patient Progress: improving  Goal: Patient-Specific Goal (Individualized)  Description: You can add care plan individualizations to a care plan. Examples of Individualization might be:  \"Parent requests to be called daily at 9am for status\", \"I have a hard time hearing out of my right ear\", or \"Do not touch me to wake me up as it startles me\".  Outcome: Ongoing, Progressing  Goal: Absence of Hospital-Acquired Illness or Injury  Outcome: Ongoing, Progressing  Intervention: Identify and Manage Fall Risk  Recent Flowsheet Documentation  Taken 9/5/2022 0400 by Libby Reid RN  Safety Promotion/Fall Prevention:   bed alarm on   lighting adjusted   room door open   room near nurse's station   room organization consistent   clutter free environment maintained   fall prevention program maintained   increased rounding and observation   increase visualization of patient  Taken 9/5/2022 0000 by Libby Reid RN  Safety Promotion/Fall Prevention:   bed alarm on   lighting adjusted   room door open   room near nurse's station   room organization consistent   clutter free environment maintained   fall prevention program maintained   increased rounding and observation   increase visualization of patient  Intervention: Prevent Skin Injury  Recent Flowsheet Documentation  Taken 9/5/2022 0400 by Libby Reid RN  Body Position:   turned   right  Taken 9/5/2022 0200 by Libby Reid RN  Body Position:   " turned   left  Taken 9/5/2022 0000 by Libby Reid RN  Body Position:   turned   right  Intervention: Prevent and Manage VTE (Venous Thromboembolism) Risk  Recent Flowsheet Documentation  Taken 9/5/2022 0400 by Libby Reid RN  Activity Management:   bedrest   activity adjusted per tolerance  Taken 9/5/2022 0200 by Libby Reid RN  Activity Management:   activity adjusted per tolerance   bedrest  Taken 9/5/2022 0000 by Libby Reid RN  Activity Management: bedrest  Intervention: Prevent Infection  Recent Flowsheet Documentation  Taken 9/5/2022 0400 by Libby Reid RN  Infection Prevention:   environmental surveillance performed   hand hygiene promoted   equipment surfaces disinfected   personal protective equipment utilized   rest/sleep promoted   single patient room provided  Taken 9/5/2022 0000 by Libby Reid RN  Infection Prevention:   environmental surveillance performed   hand hygiene promoted   equipment surfaces disinfected   personal protective equipment utilized   rest/sleep promoted   single patient room provided  Goal: Optimal Comfort and Wellbeing  Outcome: Ongoing, Progressing  Intervention: Monitor Pain and Promote Comfort  Recent Flowsheet Documentation  Taken 9/5/2022 0300 by Libby Reid RN  Pain Management Interventions:   medication (see MAR)   care clustered   emotional support   pillow support provided   relaxation techniques promoted   repositioned  Intervention: Provide Person-Centered Care  Recent Flowsheet Documentation  Taken 9/5/2022 0400 by Libby Reid RN  Trust Relationship/Rapport:   care explained   choices provided   emotional support provided   empathic listening provided   questions answered   questions encouraged   reassurance provided   thoughts/feelings acknowledged  Taken 9/5/2022 0000 by Libby Reid RN  Trust Relationship/Rapport:   care explained   choices provided   emotional support provided   empathic listening provided    questions answered   questions encouraged   reassurance provided   thoughts/feelings acknowledged  Goal: Readiness for Transition of Care  Outcome: Ongoing, Progressing     Problem: Risk for Delirium  Goal: Optimal Coping  Outcome: Ongoing, Progressing  Goal: Improved Behavioral Control  Outcome: Ongoing, Progressing  Goal: Improved Attention and Thought Clarity  Outcome: Ongoing, Progressing  Goal: Improved Sleep  Outcome: Ongoing, Progressing     Problem: UTI (Urinary Tract Infection)  Goal: Improved Infection Symptoms  Outcome: Ongoing, Progressing   Goal Outcome Evaluation:    Plan of Care Reviewed With: patient     Overall Patient Progress: [facility] - ICU    RN Progress Note:            Pertinent Assessments:      Please refer to flowsheet rows for full assessment     MAP above 65 with vaso and levo. Afebrile. Patient emotional at start   Of shift, upset that she has a suprapubic cath.          Mobility Level:     2, WC bound at baseline. Turn q 2 hrs side to side.          Key Events - This Shift:     Patient complains of back pain, repositioned and medicated with Ultram   With relief of pain.               Plan:     Keep MAP above 65, titrate levo prn. Pain control, wound care.          Point of Contact Update YES-OR-NO: No
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
## 2022-09-06 ENCOUNTER — APPOINTMENT (OUTPATIENT)
Dept: INTERVENTIONAL RADIOLOGY/VASCULAR | Facility: HOSPITAL | Age: 57
DRG: 853 | End: 2022-09-06
Attending: INTERNAL MEDICINE
Payer: MEDICARE

## 2022-09-06 LAB
ALBUMIN SERPL-MCNC: 1.8 G/DL (ref 3.5–5)
ALP SERPL-CCNC: 101 U/L (ref 45–120)
ALT SERPL W P-5'-P-CCNC: <9 U/L (ref 0–45)
ANION GAP SERPL CALCULATED.3IONS-SCNC: 5 MMOL/L (ref 5–18)
AST SERPL W P-5'-P-CCNC: <6 U/L (ref 0–40)
BACTERIA ABSC ANAEROBE+AEROBE CULT: ABNORMAL
BILIRUB SERPL-MCNC: 0.5 MG/DL (ref 0–1)
BUN SERPL-MCNC: 16 MG/DL (ref 8–22)
CALCIUM SERPL-MCNC: 7.3 MG/DL (ref 8.5–10.5)
CHLORIDE BLD-SCNC: 114 MMOL/L (ref 98–107)
CO2 SERPL-SCNC: 20 MMOL/L (ref 22–31)
CREAT SERPL-MCNC: 0.41 MG/DL (ref 0.6–1.1)
ERYTHROCYTE [DISTWIDTH] IN BLOOD BY AUTOMATED COUNT: 19 % (ref 10–15)
GFR SERPL CREATININE-BSD FRML MDRD: >90 ML/MIN/1.73M2
GLUCOSE BLD-MCNC: 142 MG/DL (ref 70–125)
GLUCOSE BLDC GLUCOMTR-MCNC: 129 MG/DL (ref 70–99)
GLUCOSE BLDC GLUCOMTR-MCNC: 141 MG/DL (ref 70–99)
GLUCOSE BLDC GLUCOMTR-MCNC: 144 MG/DL (ref 70–99)
GLUCOSE BLDC GLUCOMTR-MCNC: 162 MG/DL (ref 70–99)
GRAM STAIN RESULT: ABNORMAL
GRAM STAIN RESULT: ABNORMAL
HCT VFR BLD AUTO: 25.9 % (ref 35–47)
HGB BLD-MCNC: 8.6 G/DL (ref 11.7–15.7)
LMWH PPP CHRO-ACNC: 0.41 IU/ML
MAGNESIUM SERPL-MCNC: 2.4 MG/DL (ref 1.8–2.6)
MCH RBC QN AUTO: 27 PG (ref 26.5–33)
MCHC RBC AUTO-ENTMCNC: 33.2 G/DL (ref 31.5–36.5)
MCV RBC AUTO: 81 FL (ref 78–100)
PLATELET # BLD AUTO: 65 10E3/UL (ref 150–450)
POTASSIUM BLD-SCNC: 4 MMOL/L (ref 3.5–5)
PROT SERPL-MCNC: 4.9 G/DL (ref 6–8)
RBC # BLD AUTO: 3.18 10E6/UL (ref 3.8–5.2)
SODIUM SERPL-SCNC: 139 MMOL/L (ref 136–145)
VANCOMYCIN SERPL-MCNC: 12 MG/L
WBC # BLD AUTO: 6.4 10E3/UL (ref 4–11)

## 2022-09-06 PROCEDURE — 36573 INSJ PICC RS&I 5 YR+: CPT

## 2022-09-06 PROCEDURE — 258N000003 HC RX IP 258 OP 636: Performed by: INTERNAL MEDICINE

## 2022-09-06 PROCEDURE — P9047 ALBUMIN (HUMAN), 25%, 50ML: HCPCS | Performed by: INTERNAL MEDICINE

## 2022-09-06 PROCEDURE — 250N000009 HC RX 250: Performed by: NURSE PRACTITIONER

## 2022-09-06 PROCEDURE — 250N000009 HC RX 250: Performed by: INTERNAL MEDICINE

## 2022-09-06 PROCEDURE — 250N000011 HC RX IP 250 OP 636: Performed by: INTERNAL MEDICINE

## 2022-09-06 PROCEDURE — C1769 GUIDE WIRE: HCPCS

## 2022-09-06 PROCEDURE — 250N000013 HC RX MED GY IP 250 OP 250 PS 637: Performed by: INTERNAL MEDICINE

## 2022-09-06 PROCEDURE — 200N000001 HC R&B ICU

## 2022-09-06 PROCEDURE — 80202 ASSAY OF VANCOMYCIN: CPT | Performed by: INTERNAL MEDICINE

## 2022-09-06 PROCEDURE — 250N000011 HC RX IP 250 OP 636: Performed by: NURSE PRACTITIONER

## 2022-09-06 PROCEDURE — 85014 HEMATOCRIT: CPT | Performed by: NURSE PRACTITIONER

## 2022-09-06 PROCEDURE — 255N000002 HC RX 255 OP 636: Performed by: INTERNAL MEDICINE

## 2022-09-06 PROCEDURE — 999N000198 HC STATISTIC WOC PT EDUCATION, 16-30 MIN

## 2022-09-06 PROCEDURE — 272N000119 HC CATH CR4

## 2022-09-06 PROCEDURE — 272N000302 HC DEVICE INFLATION CR5

## 2022-09-06 PROCEDURE — 99232 SBSQ HOSP IP/OBS MODERATE 35: CPT | Performed by: INTERNAL MEDICINE

## 2022-09-06 PROCEDURE — 02H633Z INSERTION OF INFUSION DEVICE INTO RIGHT ATRIUM, PERCUTANEOUS APPROACH: ICD-10-PCS | Performed by: RADIOLOGY

## 2022-09-06 PROCEDURE — 99291 CRITICAL CARE FIRST HOUR: CPT | Performed by: INTERNAL MEDICINE

## 2022-09-06 PROCEDURE — C9113 INJ PANTOPRAZOLE SODIUM, VIA: HCPCS | Performed by: NURSE PRACTITIONER

## 2022-09-06 PROCEDURE — 272N000567 HC SHEATH CR4

## 2022-09-06 PROCEDURE — 83735 ASSAY OF MAGNESIUM: CPT | Performed by: INTERNAL MEDICINE

## 2022-09-06 PROCEDURE — C1751 CATH, INF, PER/CENT/MIDLINE: HCPCS

## 2022-09-06 PROCEDURE — 272N000500 HC NEEDLE CR2

## 2022-09-06 PROCEDURE — 80053 COMPREHEN METABOLIC PANEL: CPT | Performed by: NURSE PRACTITIONER

## 2022-09-06 RX ORDER — ENOXAPARIN SODIUM 100 MG/ML
50 INJECTION SUBCUTANEOUS EVERY 12 HOURS
Status: DISCONTINUED | OUTPATIENT
Start: 2022-09-06 | End: 2022-09-09

## 2022-09-06 RX ORDER — ALBUMIN (HUMAN) 12.5 G/50ML
12.5 SOLUTION INTRAVENOUS ONCE
Status: COMPLETED | OUTPATIENT
Start: 2022-09-06 | End: 2022-09-06

## 2022-09-06 RX ORDER — TRAMADOL HYDROCHLORIDE 50 MG/1
50 TABLET ORAL EVERY 6 HOURS PRN
Qty: 30 TABLET | Refills: 0 | Status: SHIPPED | OUTPATIENT
Start: 2022-09-06 | End: 2022-09-28

## 2022-09-06 RX ORDER — CEFTRIAXONE 2 G/1
2 INJECTION, POWDER, FOR SOLUTION INTRAMUSCULAR; INTRAVENOUS EVERY 24 HOURS
Status: DISCONTINUED | OUTPATIENT
Start: 2022-09-06 | End: 2022-09-21

## 2022-09-06 RX ORDER — HYDROCODONE BITARTRATE AND ACETAMINOPHEN 5; 325 MG/1; MG/1
TABLET ORAL
Qty: 18 TABLET | Refills: 0 | Status: SHIPPED | OUTPATIENT
Start: 2022-09-06 | End: 2022-09-28

## 2022-09-06 RX ADMIN — ENOXAPARIN SODIUM 50 MG: 60 INJECTION SUBCUTANEOUS at 20:30

## 2022-09-06 RX ADMIN — VANCOMYCIN HYDROCHLORIDE 750 MG: 5 INJECTION, POWDER, LYOPHILIZED, FOR SOLUTION INTRAVENOUS at 09:07

## 2022-09-06 RX ADMIN — SODIUM BICARBONATE: 84 INJECTION, SOLUTION INTRAVENOUS at 04:46

## 2022-09-06 RX ADMIN — HYDROCORTISONE SODIUM SUCCINATE 100 MG: 100 INJECTION, POWDER, FOR SOLUTION INTRAMUSCULAR; INTRAVENOUS at 11:05

## 2022-09-06 RX ADMIN — MEROPENEM 1 G: 1 INJECTION, POWDER, FOR SOLUTION INTRAVENOUS at 02:34

## 2022-09-06 RX ADMIN — Medication 0.01 MCG/KG/MIN: at 06:34

## 2022-09-06 RX ADMIN — LEVETIRACETAM 1000 MG: 500 TABLET, FILM COATED ORAL at 20:31

## 2022-09-06 RX ADMIN — HYDROCORTISONE SODIUM SUCCINATE 100 MG: 100 INJECTION, POWDER, FOR SOLUTION INTRAMUSCULAR; INTRAVENOUS at 02:35

## 2022-09-06 RX ADMIN — SODIUM BICARBONATE 650 MG: 650 TABLET ORAL at 20:31

## 2022-09-06 RX ADMIN — SODIUM BICARBONATE 650 MG: 650 TABLET ORAL at 13:36

## 2022-09-06 RX ADMIN — PANTOPRAZOLE SODIUM 40 MG: 40 INJECTION, POWDER, FOR SOLUTION INTRAVENOUS at 08:17

## 2022-09-06 RX ADMIN — LEVETIRACETAM 1000 MG: 500 TABLET, FILM COATED ORAL at 08:22

## 2022-09-06 RX ADMIN — ALBUMIN HUMAN 12.5 G: 0.25 SOLUTION INTRAVENOUS at 08:25

## 2022-09-06 RX ADMIN — HYDROCORTISONE SODIUM SUCCINATE 100 MG: 100 INJECTION, POWDER, FOR SOLUTION INTRAMUSCULAR; INTRAVENOUS at 20:30

## 2022-09-06 RX ADMIN — SODIUM BICARBONATE 650 MG: 650 TABLET ORAL at 08:22

## 2022-09-06 RX ADMIN — SODIUM BICARBONATE: 84 INJECTION, SOLUTION INTRAVENOUS at 22:21

## 2022-09-06 RX ADMIN — ENOXAPARIN SODIUM 40 MG: 40 INJECTION SUBCUTANEOUS at 08:19

## 2022-09-06 RX ADMIN — IOHEXOL 5 ML: 350 INJECTION, SOLUTION INTRAVENOUS at 16:22

## 2022-09-06 RX ADMIN — CEFTRIAXONE 2 G: 2 INJECTION, POWDER, FOR SOLUTION INTRAMUSCULAR; INTRAVENOUS at 13:36

## 2022-09-06 ASSESSMENT — ACTIVITIES OF DAILY LIVING (ADL)
ADLS_ACUITY_SCORE: 43

## 2022-09-06 NOTE — PROGRESS NOTES
"INFECTIOUS DISEASE FOLLOW UP NOTE      ASSESSMENT:  1. Large right thigh abscess, due to Group B strep. Percutaneous drain placed 9/3. Gram stain GPC, culture Group B strep. Blood culture negative.  2. Urinary retention. Low suspicion for urinary source. Mixed tulio in UC likely colonization. UA without pyuria.   3. Paraplegia   4. Decubitus wounds --  Large sacral looks clean.   5. Penicillin and levofloxacin allergies. Tolerating meropenem, and doses of cefepime. Received ceftriaxone while inpatient in 2022 without sign of adverse reaction per notes.   6. H/o MRSA in remote past. Not currently seen on clinical specimens.     PLAN:  Ceftriaxone once daily  Duration of treatment will depend on resolution of fluid collection on imaging.       Mamadou Artis MD  [facility]  [phone] [facility]    ______________________________________________________________________    SUBJECTIVE / INTERVAL HISTORY: temps normal. Denies pain. Recalls hives from oral cephalexin, but ok with IV formulation (same as listed for levofloxacin allergy).     ROS: paraplegia. All other systems negative except as listed above.        OBJECTIVE:  /62 (BP Location: Right arm)   Pulse 81   Temp 97.6  F (36.4  C) (Oral)   Resp 13   Ht 1.626 m (5' 4\")   Wt 53.9 kg (118 lb 14.4 oz)   LMP  (LMP Unknown)   SpO2 100%   BMI 20.41 kg/m         Vital Signs  Temp: 97.6  F (36.4  C)  Temp src: Oral  Resp: 13  Pulse: 81  Pulse Rate Source: Monitor  BP: 104/62  BP Location: Right arm    Temp (24hrs), Av  F (36.7  C), Min:97.5  F (36.4  C), Max:98.4  F (36.9  C)      GENERAL:  In no acute distress  EYES: No conjunctival injection  HEAD, EARS, NOSE, MOUTH, AND THROAT: Nontraumatic, mouth without oral ulcers.   RESPIRATORY: Clear anterior  CARDIOVASCULAR: Regular rate and rhythm, normal S1 and S2, no murmurs, rubs, or gallops.  ABDOMEN: Soft, nontender, colostomy, urine tube from abdomen.  Normal " bowel sounds.  MUSCULOSKELETAL: No synovitis. DENIA R thigh with pus noted in bulb.   SKIN/HAIR/NAILS: No rashes, no signs of peripheral emboli. Wound photo noted  NEUROLOGIC: paraplegia   PICC L UE (I thought it is PICC, may be wrong)        Antibiotics:  meropenem 9/4-  Vancomycin 9/3-6  Previous  Cefepime 9/3-4  Flagyl 9/3-4  Clindamycin 9/2-3    Pertinent labs:    Recent Labs   Lab 09/06/22 0456 09/05/22 0405 09/04/22  0533   WBC 6.4 12.1* 22.6*   HGB 8.6* 6.8* 7.9*   HCT 25.9* 21.7* 25.3*   PLT 65* 104* 135*        Recent Labs   Lab 09/06/22 0456 09/05/22 0405 09/04/22  1743    140 138   CO2 20* 13* 12*   BUN 16 17 17        Lab Results   Component Value Date    CRP 98.6 (H) 12/12/2018    CRP 6.1 01/20/2015    CRP 16.2 (H) 01/15/2014         Lab Results   Component Value Date    ALT <9 09/06/2022    AST <6 09/06/2022    ALKPHOS 101 09/06/2022         MICROBIOLOGY DATA:  9/3 abscess gram stain GPC, PMNs, culture Group B strep  9/2 blood negative to date    RADIOLOGY:  CT Abdomen Peritonium Abscess Drainage    Result Date: 9/3/2022  EXAM: 1. PERCUTANEOUS DRAIN PLACEMENT RIGHT UPPER THIGH/GROIN COLLECTION 2. CT GUIDANCE 3. CONSCIOUS SEDATION LOCATION: [facility] DATE/TIME: 9/3/2022 12:19 PM INDICATION: right hip drain placement TECHNIQUE: Dose reduction techniques were used. PROCEDURE: Informed consent obtained. Site marked. Prior images reviewed. Required items made available. Patient identity confirmed verbally and with arm band. Patient reevaluated immediately before administering sedation. Universal protocol was followed. Time out performed. The site was prepped and draped in sterile fashion. 10 mL of 1% lidocaine was infused into the local soft tissues. Using standard technique and under direct CT guidance, a 12 [place] drainage catheter was inserted into the fluid collection.  SPECIMEN: 150 mL of purulent fluid was aspirated and sent to lab for cultures and Gram stain. BLOOD  LOSS: Minimal. The patient tolerated the procedure well. No immediate complications. SEDATION: Versed 0 mg. Fentanyl 50 mcg. The procedure was performed with administration intravenous conscious sedation with appropriate preoperative, intraoperative, and postoperative evaluation. 15 minutes of supervised face to face conscious sedation time was provided by a radiology nurse under my direct supervision.     IMPRESSION: 1.  Successful CT-guided drain placement into a right upper thigh/groin abscess.     XR Chest Port 1 View    Result Date: 9/3/2022  EXAM: XR CHEST PORTABLE 1 VIEW LOCATION: [facility] DATE/TIME: 09/03/2022, 6:08 AM INDICATION: Status post unsuccessful left IJ placement, rule out pneumothorax. COMPARISON: 04/22/2022.     IMPRESSION: Negative for pneumothorax. Normal heart size and pulmonary vascularity. Lungs are clear. Generalized osteopenia. Posterior idalia and pedicle screw fixation lower thoracic and lumbar spine.     CT ABDOMEN PELVIS W CONTRAST    Result Date: 9/2/2022  EXAM: CT ABDOMEN PELVIS W CONTRAST LOCATION: [facility] DATE/TIME: 9/2/2022 7:16 PM INDICATION: Abdominal distension paraplegia difficulty passing catheter to neobladder COMPARISON: 12/18/2018. TECHNIQUE: CT scan of the abdomen and pelvis was performed following injection of IV contrast. Multiplanar reformats were obtained. Dose reduction techniques were used. CONTRAST: 50ml isovue 370 FINDINGS: LOWER CHEST: Atelectasis. 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 Nonocclusive thrombus in the proximal superior mesenteric vein. Multiple perigastric collateral vessels. PANCREAS: Normal. SPLEEN: Normal. ADRENAL GLANDS: Normal. KIDNEYS/BLADDER: There is mild right-sided pyelocaliectasis with normal caliber ureter. Dependent stone within the right renal pelvis. BOWEL: Descending colostomy with Franklin pouch. LYMPH NODES:  Normal. VASCULATURE: Unremarkable. 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 MUSCULOSKELETAL: There is a very large complex peripherally enhancing fluid collection within the right buttock extending from the iliac crest down into the hip joints and into the right thigh. This is not completely imaged extending further into the thigh than is seen on the CT scan. Destructive changes in both hips. This fluid collection measures at least 20 x 14 cm scoliosis.     IMPRESSION: 1.  Significant distention of the neobladder which measures up 24 cm and extends into the right lower abdomen. 2.  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 Multiple perigastric venous collaterals. Nonocclusive thrombus in the proximal superior mesenteric vein. 3.  Franklin pouch with descending colostomy. 4.  Destructive changes both hips. There is a massive peripherally enhancing fluid collection in the right buttock, hip and thigh extending into the distal thigh. The distal extent of the fluid collection is not imaged with CT. This collection measures at least 20 x 14 cm. 5.  There is mild dilatation of the right intrarenal collecting system with normal caliber ureter and an element of UPJ obstruction is possible. Dependent stone in the right renal pelvis. NOTE: ABNORMAL REPORT 1.  THE DICTATION ABOVE DESCRIBES AN ABNORMALITY FOR WHICH FOLLOW-UP IS NEEDED.     CT Abdomen Pelvis w/o Contrast    Result Date: 9/4/2022  EXAM: CT ABDOMEN PELVIS W/O CONTRAST LOCATION: [facility] DATE/TIME: 9/4/2022 9:27 AM INDICATION: F U abscess with drain placement COMPARISON: 09/02/2022 TECHNIQUE: CT scan of the abdomen and pelvis was performed without IV contrast. Multiplanar reformats were obtained. Dose reduction techniques were used. CONTRAST: None. FINDINGS: LOWER CHEST: New small bilateral pleural effusions. HEPATOBILIARY: No significant mass or bile  duct dilatation. Portal vein thrombus not visualized on the current examination without IV contrast, although periportal collateral vessels are noted. Cholecystectomy. PANCREAS: Normal. SPLEEN: Normal. ADRENAL GLANDS: Normal. KIDNEYS/BLADDER: Mild fullness of the renal pelves bilaterally, decreased compared to 09/02/2022. Unchanged nonobstructing calculus in the lower pole right kidney and in the dependent portion of the right renal pelvis. Urinary diversion in the right lower quadrant, which is no longer distended. Ostomy extends to the skin surface. BOWEL: Descending colostomy with Franklin pouch. LYMPH NODES: Normal. VASCULATURE: Unremarkable. PELVIC ORGANS: Unremarkable MUSCULOSKELETAL: Interval placement of percutaneous pigtail drain in the subcutaneous right hip/upper thigh fluid collection. Reliable size comparison limited due to noncontrast technique on the current examination, but the collection has decreased in size. Largest axial component currently 11.7 x 7.4 cm, previously 20 x 14 cm. Osseous destruction in both hips. Thoracolumbar fusion. Diffuse muscular atrophy and osteopenia.     IMPRESSION: 1.  Decreased size of right buttock/thigh fluid collection following percutaneous drain placement. Largest remaining component measures 11.7 x 7.4 cm axially. 2.  New small bilateral pleural effusions. 3.  Neobladder is no longer distended.    Head CT w/o contrast    Result Date: 9/2/2022  EXAM: CT HEAD W/O CONTRAST LOCATION: [facility] DATE/TIME: 9/2/2022 7:16 PM INDICATION: ams COMPARISON: Head CT angiogram brain MRI 01/06/2020 TECHNIQUE: Routine CT Head without IV contrast. Multiplanar reformats. Dose reduction techniques were used. FINDINGS: INTRACRANIAL CONTENTS: No intracranial hemorrhage, extraaxial collection, or mass effect.  No CT evidence of acute infarct. Normal parenchymal attenuation. Normal ventricles and sulci. VISUALIZED ORBITS/SINUSES/MASTOIDS: No intraorbital  abnormality. Moderate chronic mucosal thickening of the left maxillary sinus with associated frothy debris. Mild chronic mucosal thickening of the left sphenoid sinus. No middle ear or mastoid effusion. BONES/SOFT TISSUES: No acute abnormality. Chronic right medial orbital wall fracture.     IMPRESSION: 1.  No intracranial hemorrhage, mass lesions, hydrocephalus or CT evidence for an acute infarct. 2.  Chronic right medial orbital wall fracture.    CT Femur Thigh Bilateral wo Contrast    Result Date: 9/4/2022  EXAM: CT FEMUR THIGH BILATERAL WITHOUT CONTRAST LOCATION: [facility] DATE/TIME: 09/04/2022, 9:28 AM INDICATION: 56-year-old patient with a right hip-buttock abscess. COMPARISON: 09/04/2022 CT abdomen and pelvis. 09/02/2022 CT abdomen and pelvis. TECHNIQUE: Noncontrast. Axial, sagittal and coronal thin-section reconstruction. Dose reduction techniques were used. FINDINGS: BONES AND JOINTS: -Advanced osteopenia. -Resection or resorption of the right medial ilium. Dysplastic right acetabulum. Resorption or resection of the right femoral head and greater trochanter. External rotation of the right femur. 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 Old fracture or osteotomy of the left femoral neck. Old healed fracture of the left femur proximal diaphysis. MUSCLES AND SOFT TISSUES: -Subcutaneous right hip-buttock fluid collection, with percutaneous pigtail catheter drainage. This collection measures 12 x 7 cm in greatest axial dimensions. -Fluid attenuation in the expected regions of the right vastus lateralis and adductor celia regions. The proximal margin of these collections have decreased in size since the 09/02/2022 exam. These collections both extend to the distal margin of the thigh, measuring up to 25 cm in length. -Advanced muscle fatty atrophy. OTHER: -See the dedicated abdomen-pelvis CT for further information.     IMPRESSION: 1.  Decreased size  of the right hip-buttock fluid collection, status post percutaneous drain placement. This collection measures 12 x 7 cm in greatest axial dimensions. 2.  Decreased size of fluid collections (likely contiguous with the above) in the residual right vastus lateralis and adductor celia regions. These collections extend through the distal thigh, and measure roughly 25 cm in length.     KUB XR    Result Date: 9/3/2022  EXAM: XR KUB LOCATION: [facility] DATE/TIME: 9/3/2022 5:59 AM INDICATION: s p femoral line placement COMPARISON: 01/06/2020     IMPRESSION: Left-sided femoral catheter has been placed terminating in the midline at the lumbosacral junction. Visualized bowel gas pattern is nonobstructive. Postoperative changes in the thoracolumbar spine. Prior right hip resection. Diffuse osteopenia. Right lower quadrant bowel anastomotic suture line. Multiple clips over the pelvis.
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
## 2022-09-06 NOTE — TELEPHONE ENCOUNTER
Routing refill request to provider for review/approval because:    Requested Prescriptions   Pending Prescriptions Disp Refills    HYDROcodone-acetaminophen (NORCO) 5-325 MG tablet [Pharmacy Med Name: HYDROCODONE/APAP 5MG-325MG TAB] 18 tablet 0     Sig: TAKE 1 TABLET BY MOUTH EVERY 6 HOURS AS NEEDED FOR PAIN        There is no refill protocol information for this order

## 2022-09-06 NOTE — TELEPHONE ENCOUNTER
Routing refill request to provider for review/approval because:    Requested Prescriptions   Pending Prescriptions Disp Refills    traMADol (ULTRAM) 50 MG tablet [Pharmacy Med Name: TRAMADOL 50MG TABLET] 30 tablet 0     Sig: TAKE 1 TABLET (50 MG) BY MOUTH EVERY 6 HOURS AS NEEDED FOR SEVERE PAIN        There is no refill protocol information for this order

## 2022-09-06 NOTE — PLAN OF CARE
Problem: Plan of Care - These are the overarching goals to be used throughout the patient stay.    Goal: Absence of Hospital-Acquired Illness or Injury  Outcome: Ongoing, Progressing     Problem: Plan of Care - These are the overarching goals to be used throughout the patient stay.    Goal: Optimal Comfort and Wellbeing  Outcome: Ongoing, Progressing  Intervention: Provide Person-Centered Care  Recent Flowsheet Documentation  Taken 9/6/2022 0400 by Michael Gerardo RN  Trust Relationship/Rapport:   care explained   choices provided   emotional support provided   empathic listening provided   questions answered   questions encouraged   reassurance provided   thoughts/feelings acknowledged   Goal Outcome Evaluation:        [facility] - ICU    RN Progress Note:            Pertinent Assessments:      Please refer to flowsheet rows for full assessment     BP maintained with Levo         Mobility Level:     BR overnight         Key Events - This Shift:     Levo titrated down.              Plan:     TBD         Point of Contact Update YES-OR-NO: N/A  If No, reason: NOC  Name: na  Phone Number:na  Summary of Conversation: na

## 2022-09-06 NOTE — PLAN OF CARE
Goal Outcome Evaluation:    .[facility] - ICU    RN Progress Note:            Pertinent Assessments:      Please refer to flowsheet rows for full assessment     Pt more alert thoughout shift. DENIA continues to have large amounts of milky, purulent, brown drainage. Urine output improved after flushing suprapubic cath. Urine is foul smelling, and has thick sediment. No urine between 06-08. 25cc at 10am and then 50-100ml  per 2 hours.          Mobility Level:     Mobility of 2.          Key Events - This Shift:     Gave 1 unit of blood and Albumin and Increased Bicarb Fluids. Was able to titrate Vaso off early in the shift and Levo off at 1800.               Plan:     Continue to monitor Urine Output flushing as needed. Have wound care see patient related to multiple wounds and Ostomy needs.

## 2022-09-06 NOTE — PHARMACY-ANTICOAGULATION SERVICE
Anti Xa LMWH 0.41    Patient on enoxaparin 0.75mg/kg (40mg) subcutaneous q12h     Recommendation: increase dose by 15%    Change dose to 50mg (1mg/kg) subcutaneous q12h    Will follow and check Anti-Xa as appropriate    Awilda Valera RPH

## 2022-09-06 NOTE — PROGRESS NOTES
Progress Note    Assessment/Plan  Patient clinically stable.  A drain in the right thigh and hip area draining well at this point.  Abdomen reported benign.  No significant change.  Reviewed CT scans.  Personally reviewed and reviewed with patient.    Active Problems:    Septic shock (H)      Subjective  Reasonably comfortable.  Discomfort obviously because of positional changes.  Limited pain in the area of drainage or presacral area.  Limited discomfort at drain site.  Objective    Vital signs in last 24 hours  Temp:  [97.5  F (36.4  C)-98.4  F (36.9  C)] 98.2  F (36.8  C)  Pulse:  [68-97] 79  Resp:  [10-21] 16  BP: ()/(62-64) 104/62  MAP:  [63 mmHg-97 mmHg] 79 mmHg  Arterial Line BP: ()/(47-76) 105/59  SpO2:  [84 %-100 %] 100 %  Weight:   [unfilled]    Intake/Output last 3 shifts  I/O last 3 completed shifts:  In: 3150.5 [P.O.:600; I.V.:2250.5]  Out: 1420 [Urine:735; Drains:685]  Intake/Output this shift:  I/O this shift:  In: -   Out: 335 [Urine:175; Drains:160]      Physical Exam  Lower extremities without significant change.  No change in large edematous right hip and upper leg mass.  Drains noted.  Significant purulent drainage.    Pertinent Labs   Lab Results   Component Value Date    WBC 6.4 09/06/2022    HGB 8.6 (L) 09/06/2022    HCT 25.9 (L) 09/06/2022    MCV 81 09/06/2022    PLT 65 (L) 09/06/2022             Pertinent Radiology     [unfilled]        Reji Hunter MD

## 2022-09-06 NOTE — PROGRESS NOTES
"Progress Note    Assessment/Plan  Patient awake alert very concerned today about discussion regarding her \"diabetes.  No other significant change in clinical status at this point.  She is reasonably comfortable.  Drainage right thigh abscess significant.  W OC note reviewed.    Active Problems:    Septic shock (H)      Subjective  As above.  Objective    Vital signs in last 24 hours  Temp:  [97.5  F (36.4  C)-98.4  F (36.9  C)] 98.2  F (36.8  C)  Pulse:  [68-97] 79  Resp:  [10-21] 16  BP: ()/(62-64) 104/62  MAP:  [63 mmHg-97 mmHg] 79 mmHg  Arterial Line BP: ()/(47-76) 105/59  SpO2:  [84 %-100 %] 100 %  Weight:   [unfilled]    Intake/Output last 3 shifts  I/O last 3 completed shifts:  In: 3150.5 [P.O.:600; I.V.:2250.5]  Out: 1420 [Urine:735; Drains:685]  Intake/Output this shift:  I/O this shift:  In: -   Out: 335 [Urine:175; Drains:160]      Physical Exam  Edematous hip upper leg drain without change.  Abdomen benign.  Sacral wounds reviewed per WOCN note and pictures.    Pertinent Labs   Lab Results   Component Value Date    WBC 6.4 09/06/2022    HGB 8.6 (L) 09/06/2022    HCT 25.9 (L) 09/06/2022    MCV 81 09/06/2022    PLT 65 (L) 09/06/2022             Pertinent Radiology     [unfilled]        Reji Hunter MD         "
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
## 2022-09-06 NOTE — PROGRESS NOTES
[facility] note - chart reviewed and recommendations entered.  Will plan in person assessment of wounds on 9/7/22 if possible.    Buttocks - every 5 days  Cleanse wounds with water, gently dry.  Cover wounds with Mepilex sacral dressing.

## 2022-09-06 NOTE — PLAN OF CARE
Problem: Plan of Care - These are the overarching goals to be used throughout the patient stay.    Goal: Plan of Care Review/Shift Note  Description: The Plan of Care Review/Shift note should be completed every shift.  The Outcome Evaluation is a brief statement about your assessment that the patient is improving, declining, or no change.  This information will be displayed automatically on your shift note.  Outcome: Ongoing, Progressing   Goal Outcome Evaluation:  [facility] - ICU    RN Progress Note:            Pertinent Assessments:      Please refer to flowsheet rows for full assessment     Continues to have large amount of serous drainage from right thigh abscess DENIA drain. Afebrile. NSR.          Mobility Level:     Turn and reposition every two hours.          Key Events - This Shift:     Stopped Norepinephrine for about an hour this morning, but had to turn on again. Currently running at 0.03. Albumin given x1.              Plan:     Plan for PICC placement this afternoon in IR.         Point of Contact Update   If No, reason:   Name:May  Phone Number:  Summary of Conversation:

## 2022-09-07 LAB
ALBUMIN SERPL-MCNC: 1.9 G/DL (ref 3.5–5)
ALP SERPL-CCNC: 86 U/L (ref 45–120)
ALT SERPL W P-5'-P-CCNC: <9 U/L (ref 0–45)
ANION GAP SERPL CALCULATED.3IONS-SCNC: 7 MMOL/L (ref 5–18)
AST SERPL W P-5'-P-CCNC: <6 U/L (ref 0–40)
BILIRUB SERPL-MCNC: 0.3 MG/DL (ref 0–1)
BUN SERPL-MCNC: 15 MG/DL (ref 8–22)
CALCIUM SERPL-MCNC: 7 MG/DL (ref 8.5–10.5)
CHLORIDE BLD-SCNC: 109 MMOL/L (ref 98–107)
CO2 SERPL-SCNC: 24 MMOL/L (ref 22–31)
CREAT SERPL-MCNC: 0.42 MG/DL (ref 0.6–1.1)
ERYTHROCYTE [DISTWIDTH] IN BLOOD BY AUTOMATED COUNT: 19.2 % (ref 10–15)
GFR SERPL CREATININE-BSD FRML MDRD: >90 ML/MIN/1.73M2
GLUCOSE BLD-MCNC: 137 MG/DL (ref 70–125)
GLUCOSE BLDC GLUCOMTR-MCNC: 125 MG/DL (ref 70–99)
GLUCOSE BLDC GLUCOMTR-MCNC: 155 MG/DL (ref 70–99)
GLUCOSE BLDC GLUCOMTR-MCNC: 178 MG/DL (ref 70–99)
GLUCOSE BLDC GLUCOMTR-MCNC: 187 MG/DL (ref 70–99)
HCT VFR BLD AUTO: 23.9 % (ref 35–47)
HGB BLD-MCNC: 7.9 G/DL (ref 11.7–15.7)
MAGNESIUM SERPL-MCNC: 2.1 MG/DL (ref 1.8–2.6)
MCH RBC QN AUTO: 27.2 PG (ref 26.5–33)
MCHC RBC AUTO-ENTMCNC: 33.1 G/DL (ref 31.5–36.5)
MCV RBC AUTO: 82 FL (ref 78–100)
PLATELET # BLD AUTO: 57 10E3/UL (ref 150–450)
POTASSIUM BLD-SCNC: 3.6 MMOL/L (ref 3.5–5)
PROT SERPL-MCNC: 4.5 G/DL (ref 6–8)
RBC # BLD AUTO: 2.9 10E6/UL (ref 3.8–5.2)
SODIUM SERPL-SCNC: 140 MMOL/L (ref 136–145)
WBC # BLD AUTO: 4.7 10E3/UL (ref 4–11)

## 2022-09-07 PROCEDURE — 200N000001 HC R&B ICU

## 2022-09-07 PROCEDURE — 250N000011 HC RX IP 250 OP 636: Performed by: NURSE PRACTITIONER

## 2022-09-07 PROCEDURE — 250N000011 HC RX IP 250 OP 636: Performed by: INTERNAL MEDICINE

## 2022-09-07 PROCEDURE — 99291 CRITICAL CARE FIRST HOUR: CPT | Performed by: NURSE PRACTITIONER

## 2022-09-07 PROCEDURE — 85027 COMPLETE CBC AUTOMATED: CPT | Performed by: NURSE PRACTITIONER

## 2022-09-07 PROCEDURE — 250N000013 HC RX MED GY IP 250 OP 250 PS 637: Performed by: NURSE PRACTITIONER

## 2022-09-07 PROCEDURE — C9113 INJ PANTOPRAZOLE SODIUM, VIA: HCPCS | Performed by: NURSE PRACTITIONER

## 2022-09-07 PROCEDURE — 83735 ASSAY OF MAGNESIUM: CPT | Performed by: INTERNAL MEDICINE

## 2022-09-07 PROCEDURE — 99207 PR NO CHARGE LOS: CPT | Performed by: INTERNAL MEDICINE

## 2022-09-07 PROCEDURE — 250N000013 HC RX MED GY IP 250 OP 250 PS 637: Performed by: INTERNAL MEDICINE

## 2022-09-07 PROCEDURE — G0463 HOSPITAL OUTPT CLINIC VISIT: HCPCS

## 2022-09-07 PROCEDURE — 80053 COMPREHEN METABOLIC PANEL: CPT | Performed by: NURSE PRACTITIONER

## 2022-09-07 RX ORDER — POTASSIUM CHLORIDE 1.5 G/1.58G
20 POWDER, FOR SOLUTION ORAL ONCE
Status: COMPLETED | OUTPATIENT
Start: 2022-09-07 | End: 2022-09-07

## 2022-09-07 RX ORDER — ZOLPIDEM TARTRATE 5 MG/1
5 TABLET ORAL
Status: DISCONTINUED | OUTPATIENT
Start: 2022-09-07 | End: 2022-09-11

## 2022-09-07 RX ORDER — HYDROCODONE BITARTRATE AND ACETAMINOPHEN 5; 325 MG/1; MG/1
1 TABLET ORAL EVERY 6 HOURS PRN
Status: DISCONTINUED | OUTPATIENT
Start: 2022-09-07 | End: 2022-09-10

## 2022-09-07 RX ORDER — LANOLIN ALCOHOL/MO/W.PET/CERES
3 CREAM (GRAM) TOPICAL
Status: DISCONTINUED | OUTPATIENT
Start: 2022-09-07 | End: 2022-09-28 | Stop reason: HOSPADM

## 2022-09-07 RX ADMIN — POTASSIUM CHLORIDE 20 MEQ: 1.5 POWDER, FOR SOLUTION ORAL at 06:01

## 2022-09-07 RX ADMIN — PANTOPRAZOLE SODIUM 40 MG: 40 INJECTION, POWDER, FOR SOLUTION INTRAVENOUS at 08:25

## 2022-09-07 RX ADMIN — LEVETIRACETAM 1000 MG: 500 TABLET, FILM COATED ORAL at 08:25

## 2022-09-07 RX ADMIN — LEVETIRACETAM 1000 MG: 500 TABLET, FILM COATED ORAL at 22:01

## 2022-09-07 RX ADMIN — SODIUM BICARBONATE 650 MG: 650 TABLET ORAL at 08:25

## 2022-09-07 RX ADMIN — SODIUM BICARBONATE 650 MG: 650 TABLET ORAL at 13:46

## 2022-09-07 RX ADMIN — HYDROCORTISONE SODIUM SUCCINATE 100 MG: 100 INJECTION, POWDER, FOR SOLUTION INTRAMUSCULAR; INTRAVENOUS at 18:14

## 2022-09-07 RX ADMIN — TRAMADOL HYDROCHLORIDE 50 MG: 50 TABLET ORAL at 06:40

## 2022-09-07 RX ADMIN — ENOXAPARIN SODIUM 50 MG: 60 INJECTION SUBCUTANEOUS at 08:25

## 2022-09-07 RX ADMIN — CEFTRIAXONE 2 G: 2 INJECTION, POWDER, FOR SOLUTION INTRAMUSCULAR; INTRAVENOUS at 13:46

## 2022-09-07 RX ADMIN — HYDROCORTISONE SODIUM SUCCINATE 100 MG: 100 INJECTION, POWDER, FOR SOLUTION INTRAMUSCULAR; INTRAVENOUS at 04:05

## 2022-09-07 RX ADMIN — TRAMADOL HYDROCHLORIDE 50 MG: 50 TABLET ORAL at 22:01

## 2022-09-07 RX ADMIN — HYDROCORTISONE SODIUM SUCCINATE 100 MG: 100 INJECTION, POWDER, FOR SOLUTION INTRAMUSCULAR; INTRAVENOUS at 11:39

## 2022-09-07 RX ADMIN — SODIUM BICARBONATE 650 MG: 650 TABLET ORAL at 22:01

## 2022-09-07 ASSESSMENT — ACTIVITIES OF DAILY LIVING (ADL)
TRANSFERRING: 2-->COMPLETELY DEPENDENT
ADLS_ACUITY_SCORE: 49
DRESSING/BATHING_DIFFICULTY: YES
CHANGE_IN_FUNCTIONAL_STATUS_SINCE_ONSET_OF_CURRENT_ILLNESS/INJURY: YES
ADLS_ACUITY_SCORE: 49
HEARING_DIFFICULTY_OR_DEAF: NO
TOILETING: 1-->ASSISTANCE (EQUIPMENT/PERSON) NEEDED
DRESS: 1-->ASSISTANCE (EQUIPMENT/PERSON) NEEDED (NOT DEVELOPMENTALLY APPROPRIATE)
ADLS_ACUITY_SCORE: 43
ADLS_ACUITY_SCORE: 43
BATHING: 2-->COMPLETELY DEPENDENT (NOT DEVELOPMENTALLY APPROPRIATE)
TOILETING: 1-->ASSISTANCE (EQUIPMENT/PERSON) NEEDED (NOT DEVELOPMENTALLY APPROPRIATE)
DIFFICULTY_COMMUNICATING: NO
ADLS_ACUITY_SCORE: 49
WALKING_OR_CLIMBING_STAIRS: AMBULATION DIFFICULTY, DEPENDENT;TRANSFERRING DIFFICULTY, DEPENDENT;STAIR CLIMBING DIFFICULTY, DEPENDENT
TOILETING_ISSUES: YES
TRANSFERRING: 2-->COMPLETELY DEPENDENT (NOT DEVELOPMENTALLY APPROPRIATE)
ADLS_ACUITY_SCORE: 49
ADLS_ACUITY_SCORE: 43
CONCENTRATING,_REMEMBERING_OR_MAKING_DECISIONS_DIFFICULTY: YES
ADLS_ACUITY_SCORE: 49
DIFFICULTY_EATING/SWALLOWING: NO
ADLS_ACUITY_SCORE: 43
DRESSING/BATHING: BATHING DIFFICULTY, ASSISTANCE 1 PERSON
FALL_HISTORY_WITHIN_LAST_SIX_MONTHS: NO
DRESS: 1-->ASSISTANCE (EQUIPMENT/PERSON) NEEDED
DOING_ERRANDS_INDEPENDENTLY_DIFFICULTY: YES
WEAR_GLASSES_OR_BLIND: NO
ADLS_ACUITY_SCORE: 49
WALKING_OR_CLIMBING_STAIRS_DIFFICULTY: YES

## 2022-09-07 NOTE — PLAN OF CARE
"  Problem: Plan of Care - These are the overarching goals to be used throughout the patient stay.    Goal: Plan of Care Review/Shift Note  Description: The Plan of Care Review/Shift note should be completed every shift.  The Outcome Evaluation is a brief statement about your assessment that the patient is improving, declining, or no change.  This information will be displayed automatically on your shift note.  Outcome: Ongoing, Progressing  Goal: Patient-Specific Goal (Individualized)  Description: You can add care plan individualizations to a care plan. Examples of Individualization might be:  \"Parent requests to be called daily at 9am for status\", \"I have a hard time hearing out of my right ear\", or \"Do not touch me to wake me up as it startles me\".  Outcome: Ongoing, Progressing  Goal: Absence of Hospital-Acquired Illness or Injury  Outcome: Ongoing, Progressing  Intervention: Identify and Manage Fall Risk  Recent Flowsheet Documentation  Taken 9/7/2022 0400 by Libby Reid, RN  Safety Promotion/Fall Prevention:   bed alarm on   clutter free environment maintained   fall prevention program maintained   increased rounding and observation   increase visualization of patient   lighting adjusted   room near nurse's station   room organization consistent   supervised activity  Taken 9/7/2022 0000 by Libby Reid, RN  Safety Promotion/Fall Prevention:   bed alarm on   clutter free environment maintained   fall prevention program maintained   increased rounding and observation   increase visualization of patient   lighting adjusted   room near nurse's station   room organization consistent   supervised activity  Taken 9/6/2022 2000 by Libby Reid, RN  Safety Promotion/Fall Prevention:   bed alarm on   clutter free environment maintained   fall prevention program maintained   increased rounding and observation   increase visualization of patient   lighting adjusted   room near nurse's station   room " organization consistent   supervised activity  Intervention: Prevent Skin Injury  Recent Flowsheet Documentation  Taken 9/7/2022 0400 by Libby Reid RN  Body Position:   turned   right  Taken 9/7/2022 0200 by Libby Reid RN  Body Position:   turned   left  Taken 9/7/2022 0000 by Libby Reid RN  Body Position: refuses positioning  Taken 9/6/2022 2200 by Libby Reid RN  Body Position:   turned   weight shifting  Taken 9/6/2022 2000 by Libby Reid RN  Body Position:   turned   left  Intervention: Prevent and Manage VTE (Venous Thromboembolism) Risk  Recent Flowsheet Documentation  Taken 9/7/2022 0400 by Libby Reid RN  VTE Prevention/Management: patient refused intervention  Activity Management: bedrest  Taken 9/7/2022 0200 by Libby Reid RN  Activity Management: bedrest  Taken 9/7/2022 0000 by Libby Reid RN  VTE Prevention/Management: patient refused intervention  Activity Management: bedrest  Taken 9/6/2022 2200 by Libby Reid RN  Activity Management: bedrest  Taken 9/6/2022 2000 by Libby Reid RN  VTE Prevention/Management: patient refused intervention  Activity Management: bedrest  Intervention: Prevent Infection  Recent Flowsheet Documentation  Taken 9/7/2022 0400 by Libby Reid RN  Infection Prevention:   environmental surveillance performed   equipment surfaces disinfected   personal protective equipment utilized   hand hygiene promoted   single patient room provided   rest/sleep promoted  Taken 9/7/2022 0000 by Libby Reid RN  Infection Prevention:   environmental surveillance performed   equipment surfaces disinfected   personal protective equipment utilized   hand hygiene promoted   single patient room provided   rest/sleep promoted  Taken 9/6/2022 2000 by Libby Reid RN  Infection Prevention:   environmental surveillance performed   equipment surfaces disinfected   personal protective equipment utilized   hand hygiene promoted    single patient room provided   rest/sleep promoted  Goal: Optimal Comfort and Wellbeing  Outcome: Ongoing, Progressing  Intervention: Provide Person-Centered Care  Recent Flowsheet Documentation  Taken 9/7/2022 0400 by Libby Reid RN  Trust Relationship/Rapport:   care explained   choices provided   emotional support provided   empathic listening provided   questions answered   questions encouraged   reassurance provided   thoughts/feelings acknowledged  Taken 9/7/2022 0000 by Libby Reid RN  Trust Relationship/Rapport:   care explained   choices provided   emotional support provided   empathic listening provided   questions answered   questions encouraged   reassurance provided   thoughts/feelings acknowledged  Taken 9/6/2022 2000 by Libby Reid RN  Trust Relationship/Rapport:   care explained   choices provided   emotional support provided   empathic listening provided   questions answered   questions encouraged   reassurance provided   thoughts/feelings acknowledged  Goal: Readiness for Transition of Care  Outcome: Ongoing, Progressing     Problem: Risk for Delirium  Goal: Optimal Coping  Outcome: Ongoing, Progressing  Goal: Improved Behavioral Control  Outcome: Ongoing, Progressing  Goal: Improved Attention and Thought Clarity  Outcome: Ongoing, Progressing  Goal: Improved Sleep  Outcome: Ongoing, Progressing     Problem: UTI (Urinary Tract Infection)  Goal: Improved Infection Symptoms  Outcome: Ongoing, Progressing   Goal Outcome Evaluation:[facility] - ICU    RN Progress Note:            Pertinent Assessments:      Please refer to flowsheet rows for full assessment     Patient denies pain.         Mobility Level:     2, turn q2 hrs.          Key Events - This Shift:     Levophed restarted and titrated to achieve MAP above 65. S/p cath   Irrigated x2 for thick sediment .              Plan:     WOC to see today. Keep MAP above 65         Point of Contact Update  YES-OR-NO: No  If No, reason:   Name:  Phone Number  Summary of Conversation:

## 2022-09-07 NOTE — CONSULTS
[facility]  WO Nurse Inpatient Assessment     Consulted for: Sacrum - unable to take photo due to patient pain with reposition    Patient History (according to provider note(s):        Areas Assessed:      Pressure Injury Location: Sacrum and R IT    Wound type: Pressure Injury     Pressure Injury Stage: 3 or greater, present on admission        Wound base: 100 % granulation tissue     Palpation of the wound bed: normal      Drainage: small     Description of drainage: serosanguinous     Measurements (length x width x depth, in cm) 5.5 cm  x 9 cm  x  1 cm      Tunneling N/A     Undermining N/A  Periwound skin: Intact and Scar tissue      Color: normal and consistent with surrounding tissue      Temperature: normal   Odor: none  Pain: with movement  Pain intervention prior to dressing change: patient tolerated well and slow and gentle cares   Treatment goal: Decrease bioburden in wound bed, may need sharp debridement to jump start healing (if patient has surgery maybe surgeon will be able to assist with this during admission. Otherwise patient can follow up with WO nurses in [place] after discharge). Overall, no s/s that sacral wounds have an infectious process   STATUS: initial assessment  Supplies ordered: ordered Vashe    R IT with scar tissue but a few scattered areas of denudement - partial thickness.    Treatment Plan:     Sacral wound - daily  Cleanse wound with Vashe, gently dry.  Lightly moisten gauze 4 x 4's with Vashe and loosely pack into wound bed.  Cover with ABD pad and secure with tape.    R IT wound - Every 3 days  Sacral wound - daily  Cleanse wound with Vashe, gently dry.  Cover with Mepilex 4 x 4.    Orders: Written    RECOMMEND PRIMARY TEAM ORDER: None, at this time  Education provided: plan of care and wound progress  Discussed plan of care with: Patient and Nurse  WO nurse follow-up plan: weekly  Notify WO if wound(s) deteriorate.  Nursing to notify the  Provider(s) and re-consult the [facility] Nurse if new skin concern.    DATA:     Current support surface: Standard  Low air loss mattress  Containment of urine/stool: Indwelling catheter and Ileostomy pouch  BMI: Body mass index is 21.39 kg/m .   Active diet order: Orders Placed This Encounter      Combination Diet Regular Diet; Moderate Consistent Carb (60 g CHO per Meal) Diet      NPO per Anesthesia Guidelines for Procedure/Surgery Except for: Meds     Output: I/O last 3 completed shifts:  In: 2390.08 [P.O.:275; I.V.:2115.08]  Out: 2575 [Urine:1000; Drains:400; Stool:1175]     Labs: Recent Labs   Lab 09/07/22  0411   ALBUMIN 1.9*   HGB 7.9*   WBC 4.7     Pressure injury risk assessment:   Sensory Perception: 2-->very limited  Moisture: 3-->occasionally moist  Activity: 2-->chairfast  Mobility: 2-->very limited  Nutrition: 3-->adequate  Friction and Shear: 2-->potential problem  Bi Score: 14    Traci Centeno RN CWOCN
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
## 2022-09-07 NOTE — PROGRESS NOTES
Progress Note    Assessment/Plan  Continue right hip and leg pain at the current time.  This and may require more aggressive surgical intervention.  We will probably repeat CAT scan in 48 to 72 hours.  Drain is patent and working well.  Patient's sepsis is resolving.  Patient comfortable at this point.  Progress notes reviewed.  CTs reviewed with radiology.  Upstaging drainage tube may be beneficial.  We will review that with radiology and to place order for upstaging drainage tube.    Active Problems:    Septic shock (H)      Subjective  Patient reasonably comfortable at this time.  Tolerating oral intake.  Without significant pain.  Objective    Vital signs in last 24 hours  Temp:  [97.6  F (36.4  C)-98.2  F (36.8  C)] 97.9  F (36.6  C)  Pulse:  [58-93] 83  Resp:  [10-22] 16  MAP:  [61 mmHg-102 mmHg] 78 mmHg  Arterial Line BP: ()/(44-77) 105/59  SpO2:  [58 %-100 %] 100 %  Weight:   [unfilled]    Intake/Output last 3 shifts  I/O last 3 completed shifts:  In: 2608.72 [I.V.:2608.72]  Out: 2135 [Urine:1050; Drains:510; Stool:575]  Intake/Output this shift:  I/O this shift:  In: 125 [I.V.:125]  Out: 800 [Urine:150; Drains:50; Stool:600]      Physical Exam  Examination reveals the right lower extremity hip drain right lower quadrant pain all essentially unchanged.  Purulent material in drain noted.  Drain is patent.  Presacral area with wounds covered at this time.  W OC notes reviewed.    Pertinent Labs   Lab Results   Component Value Date    WBC 4.7 09/07/2022    HGB 7.9 (L) 09/07/2022    HCT 23.9 (L) 09/07/2022    MCV 82 09/07/2022    PLT 57 (L) 09/07/2022             Pertinent Radiology     [unfilled]        Reji Hunter MD

## 2022-09-07 NOTE — CONSULTS
Interventional Radiology - Pre-Procedure Note:  9/7/2022    Procedure Requested: abscessogram  Requested by: LIAT Michael MD    History and Physical Reviewed: H&P documented within 30 days (by Bernarda Guthrie APRN CNP on 9/3/22).  I have personally reviewed the patient's medical history and have updated the medical record as necessary.    Brief HPI: Felicia Ellison is a 56 year old female with PMHx paraplegia, TBI. Presented from assisted living with imaging showing distended bladder and large fluid collection in right buttock. Underwent bladder decompression where she became hypotensive. CT guided left upper thigh/groin abscess drain placed 9/3.    Dr. Hunter reviewed CT with Dr. Michael. R pelvic drain with residual fluid on CT 9/4 and likely in need of upsizing drainage tube for continued optimal drainage.       IMAGING:  EXAM:  1. PERCUTANEOUS DRAIN PLACEMENT RIGHT UPPER THIGH/GROIN COLLECTION  2. CT GUIDANCE  3. CONSCIOUS SEDATION  LOCATION: [facility]  DATE/TIME: 9/3/2022 12:19 PM     INDICATION: right hip drain placement  TECHNIQUE: Dose reduction techniques were used.     PROCEDURE: Informed consent obtained. Site marked. Prior images reviewed. Required items made available. Patient identity confirmed verbally and with arm band. Patient reevaluated immediately before administering sedation. Universal protocol was   followed. Time out performed. The site was prepped and draped in sterile fashion. 10 mL of 1% lidocaine was infused into the local soft tissues. Using standard technique and under direct CT guidance, a 12 Italian drainage catheter was inserted into the   fluid collection.       SPECIMEN: 150 mL of purulent fluid was aspirated and sent to lab for cultures and Gram stain.     BLOOD LOSS: Minimal.     The patient tolerated the procedure well. No immediate complications.     SEDATION: Versed 0 mg. Fentanyl 50 mcg. The procedure was performed with administration intravenous  "conscious sedation with appropriate preoperative, intraoperative, and postoperative evaluation.     15 minutes of supervised face to face conscious sedation time was provided by a radiology nurse under my direct supervision.                                                                      IMPRESSION:  1.  Successful CT-guided drain placement into a right upper thigh/groin abscess.       NPO: ordered for MN 9/8  ANTICOAGULANTS: Lovenox  ANTIBIOTICS: none indicated for procedure    ALLERGIES  Allergies   Allergen Reactions     Penicillins Anaphylaxis     Patient states it makes her \"climb the walls and hyperactive.\"     Acetaminophen Nausea and Vomiting     Levaquin Rash     Rash only with po Levaquin...able to take IV Levaquin per pt         LABS:  INR   Date Value Ref Range Status   01/06/2020 1.64 (H) 0.86 - 1.14 Final      Hemoglobin   Date Value Ref Range Status   09/07/2022 7.9 (L) 11.7 - 15.7 g/dL Final   05/26/2021 10.2 (L) 11.7 - 15.7 g/dL Final     Platelet Count   Date Value Ref Range Status   09/07/2022 57 (L) 150 - 450 10e3/uL Final   05/26/2021 318 150 - 450 10e9/L Final     Creatinine   Date Value Ref Range Status   09/07/2022 0.42 (L) 0.60 - 1.10 mg/dL Final   05/26/2021 0.47 (L) 0.52 - 1.04 mg/dL Final     Potassium   Date Value Ref Range Status   09/07/2022 3.6 3.5 - 5.0 mmol/L Final   05/26/2021 4.1 3.4 - 5.3 mmol/L Final         EXAM:  /62 (BP Location: Right arm)   Pulse 83   Temp 97.9  F (36.6  C) (Oral)   Resp 15   Ht 1.626 m (5' 4\")   Wt 56.5 kg (124 lb 9.6 oz)   LMP  (LMP Unknown)   SpO2 100%   BMI 21.39 kg/m        ASSESSMENT/PLAN:   Pending AM consent and workup by IR JANNA 9/8:    Tentative abscessogram with potential intervention with sedation as needed.      Tala Kingston APRN CNP  Interventional Radiology    "

## 2022-09-07 NOTE — PLAN OF CARE
[facility] - ICU    RN Progress Note:            Pertinent Assessments:      Please refer to flowsheet rows for full assessment     Off levophed since 0630. Denies pain and shortness of breath. Pleasant and coopertive.         Mobility Level:     aviva         Key Events - This Shift:     Seen by WOC, new orders noted. VSS.               Plan:     Monitor blood pressure, NPO at midnight for IR procedure tomorrow         Point of Contact Update YES-OR-NO: Yes  If No, reason: na  Name:mother  Phone Number:in chart  Summary of Conversation: update on condition, discussed plan for IR procedure

## 2022-09-08 ENCOUNTER — APPOINTMENT (OUTPATIENT)
Dept: INTERVENTIONAL RADIOLOGY/VASCULAR | Facility: HOSPITAL | Age: 57
DRG: 853 | End: 2022-09-08
Attending: RADIOLOGY
Payer: MEDICARE

## 2022-09-08 LAB
ALBUMIN SERPL-MCNC: 1.8 G/DL (ref 3.5–5)
ALP SERPL-CCNC: 84 U/L (ref 45–120)
ALT SERPL W P-5'-P-CCNC: <9 U/L (ref 0–45)
ANION GAP SERPL CALCULATED.3IONS-SCNC: 4 MMOL/L (ref 5–18)
AST SERPL W P-5'-P-CCNC: 10 U/L (ref 0–40)
BACTERIA BLD CULT: NO GROWTH
BILIRUB SERPL-MCNC: 0.3 MG/DL (ref 0–1)
BUN SERPL-MCNC: 18 MG/DL (ref 8–22)
C REACTIVE PROTEIN LHE: 0.4 MG/DL (ref 0–?)
CALCIUM SERPL-MCNC: 6.9 MG/DL (ref 8.5–10.5)
CHLORIDE BLD-SCNC: 109 MMOL/L (ref 98–107)
CO2 SERPL-SCNC: 24 MMOL/L (ref 22–31)
CREAT SERPL-MCNC: 0.4 MG/DL (ref 0.6–1.1)
ERYTHROCYTE [DISTWIDTH] IN BLOOD BY AUTOMATED COUNT: 19.8 % (ref 10–15)
GFR SERPL CREATININE-BSD FRML MDRD: >90 ML/MIN/1.73M2
GLUCOSE BLD-MCNC: 123 MG/DL (ref 70–125)
GLUCOSE BLDC GLUCOMTR-MCNC: 103 MG/DL (ref 70–99)
GLUCOSE BLDC GLUCOMTR-MCNC: 111 MG/DL (ref 70–99)
GLUCOSE BLDC GLUCOMTR-MCNC: 111 MG/DL (ref 70–99)
GLUCOSE BLDC GLUCOMTR-MCNC: 114 MG/DL (ref 70–99)
GLUCOSE BLDC GLUCOMTR-MCNC: 130 MG/DL (ref 70–99)
GLUCOSE BLDC GLUCOMTR-MCNC: 178 MG/DL (ref 70–99)
HCT VFR BLD AUTO: 23.2 % (ref 35–47)
HGB BLD-MCNC: 7.4 G/DL (ref 11.7–15.7)
MAGNESIUM SERPL-MCNC: 2 MG/DL (ref 1.8–2.6)
MCH RBC QN AUTO: 26.8 PG (ref 26.5–33)
MCHC RBC AUTO-ENTMCNC: 31.9 G/DL (ref 31.5–36.5)
MCV RBC AUTO: 84 FL (ref 78–100)
PLATELET # BLD AUTO: 48 10E3/UL (ref 150–450)
POTASSIUM BLD-SCNC: 3.6 MMOL/L (ref 3.5–5)
PROT SERPL-MCNC: 4.4 G/DL (ref 6–8)
RBC # BLD AUTO: 2.76 10E6/UL (ref 3.8–5.2)
SODIUM SERPL-SCNC: 137 MMOL/L (ref 136–145)
WBC # BLD AUTO: 2.4 10E3/UL (ref 4–11)

## 2022-09-08 PROCEDURE — 250N000013 HC RX MED GY IP 250 OP 250 PS 637: Performed by: INTERNAL MEDICINE

## 2022-09-08 PROCEDURE — C1769 GUIDE WIRE: HCPCS

## 2022-09-08 PROCEDURE — 49423 EXCHANGE DRAINAGE CATHETER: CPT

## 2022-09-08 PROCEDURE — C1729 CATH, DRAINAGE: HCPCS

## 2022-09-08 PROCEDURE — 99233 SBSQ HOSP IP/OBS HIGH 50: CPT | Performed by: INTERNAL MEDICINE

## 2022-09-08 PROCEDURE — 120N000001 HC R&B MED SURG/OB

## 2022-09-08 PROCEDURE — 250N000011 HC RX IP 250 OP 636: Performed by: NURSE PRACTITIONER

## 2022-09-08 PROCEDURE — 75984 XRAY CONTROL CATHETER CHANGE: CPT

## 2022-09-08 PROCEDURE — 250N000011 HC RX IP 250 OP 636: Performed by: INTERNAL MEDICINE

## 2022-09-08 PROCEDURE — 80053 COMPREHEN METABOLIC PANEL: CPT | Performed by: NURSE PRACTITIONER

## 2022-09-08 PROCEDURE — 0J9L30Z DRAINAGE OF RIGHT UPPER LEG SUBCUTANEOUS TISSUE AND FASCIA WITH DRAINAGE DEVICE, PERCUTANEOUS APPROACH: ICD-10-PCS | Performed by: RADIOLOGY

## 2022-09-08 PROCEDURE — 86140 C-REACTIVE PROTEIN: CPT | Performed by: INTERNAL MEDICINE

## 2022-09-08 PROCEDURE — 83735 ASSAY OF MAGNESIUM: CPT | Performed by: INTERNAL MEDICINE

## 2022-09-08 PROCEDURE — 99232 SBSQ HOSP IP/OBS MODERATE 35: CPT | Performed by: INTERNAL MEDICINE

## 2022-09-08 PROCEDURE — 250N000009 HC RX 250: Performed by: RADIOLOGY

## 2022-09-08 PROCEDURE — 85014 HEMATOCRIT: CPT | Performed by: NURSE PRACTITIONER

## 2022-09-08 PROCEDURE — C9113 INJ PANTOPRAZOLE SODIUM, VIA: HCPCS | Performed by: NURSE PRACTITIONER

## 2022-09-08 PROCEDURE — 255N000002 HC RX 255 OP 636: Performed by: INTERNAL MEDICINE

## 2022-09-08 PROCEDURE — 99207 PR NO BILLABLE SERVICE THIS VISIT: CPT | Performed by: INTERNAL MEDICINE

## 2022-09-08 RX ORDER — IODIXANOL 320 MG/ML
100 INJECTION, SOLUTION INTRAVASCULAR ONCE
Status: COMPLETED | OUTPATIENT
Start: 2022-09-08 | End: 2022-09-08

## 2022-09-08 RX ORDER — POTASSIUM CHLORIDE 29.8 MG/ML
20 INJECTION INTRAVENOUS ONCE
Status: COMPLETED | OUTPATIENT
Start: 2022-09-08 | End: 2022-09-08

## 2022-09-08 RX ORDER — FUROSEMIDE 10 MG/ML
20 INJECTION INTRAMUSCULAR; INTRAVENOUS EVERY 12 HOURS
Status: DISCONTINUED | OUTPATIENT
Start: 2022-09-08 | End: 2022-09-11

## 2022-09-08 RX ADMIN — HYDROCORTISONE SODIUM SUCCINATE 100 MG: 100 INJECTION, POWDER, FOR SOLUTION INTRAMUSCULAR; INTRAVENOUS at 03:41

## 2022-09-08 RX ADMIN — Medication 3 MG: at 00:55

## 2022-09-08 RX ADMIN — HYDROCODONE BITARTRATE AND ACETAMINOPHEN 1 TABLET: 5; 325 TABLET ORAL at 00:55

## 2022-09-08 RX ADMIN — ZOLPIDEM TARTRATE 5 MG: 5 TABLET ORAL at 00:55

## 2022-09-08 RX ADMIN — FUROSEMIDE 20 MG: 10 INJECTION, SOLUTION INTRAMUSCULAR; INTRAVENOUS at 20:57

## 2022-09-08 RX ADMIN — CEFTRIAXONE 2 G: 2 INJECTION, POWDER, FOR SOLUTION INTRAMUSCULAR; INTRAVENOUS at 14:21

## 2022-09-08 RX ADMIN — IODIXANOL 25 ML: 320 INJECTION, SOLUTION INTRAVASCULAR at 14:03

## 2022-09-08 RX ADMIN — LIDOCAINE HYDROCHLORIDE 10 ML: 10 INJECTION, SOLUTION EPIDURAL; INFILTRATION; INTRACAUDAL; PERINEURAL at 14:00

## 2022-09-08 RX ADMIN — ZOLPIDEM TARTRATE 5 MG: 5 TABLET ORAL at 22:15

## 2022-09-08 RX ADMIN — PANTOPRAZOLE SODIUM 40 MG: 40 INJECTION, POWDER, FOR SOLUTION INTRAVENOUS at 08:30

## 2022-09-08 RX ADMIN — LEVETIRACETAM 1000 MG: 500 TABLET, FILM COATED ORAL at 20:57

## 2022-09-08 RX ADMIN — LEVETIRACETAM 1000 MG: 500 TABLET, FILM COATED ORAL at 08:30

## 2022-09-08 RX ADMIN — POTASSIUM CHLORIDE 20 MEQ: 400 INJECTION, SOLUTION INTRAVENOUS at 08:30

## 2022-09-08 RX ADMIN — SODIUM BICARBONATE 650 MG: 650 TABLET ORAL at 08:30

## 2022-09-08 RX ADMIN — FUROSEMIDE 20 MG: 10 INJECTION, SOLUTION INTRAMUSCULAR; INTRAVENOUS at 10:25

## 2022-09-08 ASSESSMENT — ACTIVITIES OF DAILY LIVING (ADL)
ADLS_ACUITY_SCORE: 49
ADLS_ACUITY_SCORE: 49
ADLS_ACUITY_SCORE: 53
ADLS_ACUITY_SCORE: 49
ADLS_ACUITY_SCORE: 53
ADLS_ACUITY_SCORE: 49
ADLS_ACUITY_SCORE: 53

## 2022-09-08 NOTE — PROGRESS NOTES
"INFECTIOUS DISEASE FOLLOW UP NOTE      ASSESSMENT:  1. Large right thigh abscess, due to Group B strep. Percutaneous drain placed 9/3. Gram stain GPC, culture Group B strep. Blood culture negative. Sepsis resolved now off pressors.   2. Urinary retention. Low suspicion for urinary source. Mixed tulio in UC likely colonization. UA without pyuria.   3. Paraplegia   4. Decubitus wounds --  Large sacral looks clean.   5. Penicillin and levofloxacin allergies. Tolerating meropenem, and doses of cefepime. Received ceftriaxone while inpatient in April 2022 without sign of adverse reaction per notes.   6. H/o MRSA in remote past. Not currently seen on clinical specimens.   7. Thrombocytopenia.     PLAN:  Ceftriaxone once daily  Duration of treatment will depend on resolution of fluid collection on imaging.   Upsize drain when able today. Dr. Hunter following in case this would need debridement.       Mamadou Artis MD  [facility]  [phone] [facility]    ______________________________________________________________________    SUBJECTIVE / INTERVAL HISTORY: doing ok. Tolerating antibiotics.  Reviewed results. Off pressors.     ROS: paraplegia. All other systems negative except as listed above.        OBJECTIVE:  /62 (BP Location: Right arm)   Pulse 65   Temp 97.9  F (36.6  C) (Oral)   Resp 11   Ht 1.626 m (5' 4\")   Wt 55.8 kg (123 lb 1.6 oz)   LMP  (LMP Unknown)   SpO2 98%   BMI 21.13 kg/m               GENERAL:  In no acute distress. Room air.   EYES: No conjunctival injection  HEAD, EARS, NOSE, MOUTH, AND THROAT: Nontraumatic, mouth without oral ulcers.   RESPIRATORY: Clear anterior  CARDIOVASCULAR: Regular rate and rhythm, normal S1 and S2, no murmurs, rubs, or gallops.  ABDOMEN: Soft, nontender, colostomy, urine tube from abdomen.  Normal bowel sounds.  MUSCULOSKELETAL: No synovitis. DENIA R thigh with serous fluid and clotted blood.   SKIN/HAIR/NAILS: No rashes, no " signs of peripheral emboli. Wound photo noted  NEUROLOGIC: paraplegia   PICC R UE        Antibiotics:  Ceftriaxone 9/6-  Previous  Cefepime 9/3-4  Flagyl 9/3-4  Clindamycin 9/2-3  meropenem 9/4-6  Vancomycin 9/3-6    Pertinent labs:    Recent Labs   Lab 09/08/22  0349 09/07/22  0411 09/06/22  0456   WBC 2.4* 4.7 6.4   HGB 7.4* 7.9* 8.6*   HCT 23.2* 23.9* 25.9*   PLT 48* 57* 65*        Recent Labs   Lab 09/08/22  0349 09/07/22  0411 09/06/22  0456    140 139   CO2 24 24 20*   BUN 18 15 16        Lab Results   Component Value Date    CRP 98.6 (H) 12/12/2018    CRP 6.1 01/20/2015    CRP 16.2 (H) 01/15/2014         Lab Results   Component Value Date    ALT <9 09/08/2022    AST 10 09/08/2022    ALKPHOS 84 09/08/2022         MICROBIOLOGY DATA:  9/3 abscess gram stain GPC, PMNs, culture Group B strep  9/2 blood negative     RADIOLOGY:  CT Abdomen Peritonium Abscess Drainage    Result Date: 9/3/2022  EXAM: 1. PERCUTANEOUS DRAIN PLACEMENT RIGHT UPPER THIGH/GROIN COLLECTION 2. CT GUIDANCE 3. CONSCIOUS SEDATION LOCATION: [facility] DATE/TIME: 9/3/2022 12:19 PM INDICATION: right hip drain placement TECHNIQUE: Dose reduction techniques were used. PROCEDURE: Informed consent obtained. Site marked. Prior images reviewed. Required items made available. Patient identity confirmed verbally and with arm band. Patient reevaluated immediately before administering sedation. Universal protocol was followed. Time out performed. The site was prepped and draped in sterile fashion. 10 mL of 1% lidocaine was infused into the local soft tissues. Using standard technique and under direct CT guidance, a 12 Mongolian drainage catheter was inserted into the fluid collection.  SPECIMEN: 150 mL of purulent fluid was aspirated and sent to lab for cultures and Gram stain. BLOOD LOSS: Minimal. The patient tolerated the procedure well. No immediate complications. SEDATION: Versed 0 mg. Fentanyl 50 mcg. The procedure was  performed with administration intravenous conscious sedation with appropriate preoperative, intraoperative, and postoperative evaluation. 15 minutes of supervised face to face conscious sedation time was provided by a radiology nurse under my direct supervision.     IMPRESSION: 1.  Successful CT-guided drain placement into a right upper thigh/groin abscess.     XR Chest Port 1 View    Result Date: 9/3/2022  EXAM: XR CHEST PORTABLE 1 VIEW LOCATION: [facility] DATE/TIME: 09/03/2022, 6:08 AM INDICATION: Status post unsuccessful left IJ placement, rule out pneumothorax. COMPARISON: 04/22/2022.     IMPRESSION: Negative for pneumothorax. Normal heart size and pulmonary vascularity. Lungs are clear. Generalized osteopenia. Posterior idalia and pedicle screw fixation lower thoracic and lumbar spine.     CT ABDOMEN PELVIS W CONTRAST    Result Date: 9/2/2022  EXAM: CT ABDOMEN PELVIS W CONTRAST LOCATION: [facility] DATE/TIME: 9/2/2022 7:16 PM INDICATION: Abdominal distension paraplegia difficulty passing catheter to neobladder COMPARISON: 12/18/2018. TECHNIQUE: CT scan of the abdomen and pelvis was performed following injection of IV contrast. Multiplanar reformats were obtained. Dose reduction techniques were used. CONTRAST: 50ml isovue 370 FINDINGS: LOWER CHEST: Atelectasis. 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 Nonocclusive thrombus in the proximal superior mesenteric vein. Multiple perigastric collateral vessels. PANCREAS: Normal. SPLEEN: Normal. ADRENAL GLANDS: Normal. KIDNEYS/BLADDER: There is mild right-sided pyelocaliectasis with normal caliber ureter. Dependent stone within the right renal pelvis. BOWEL: Descending colostomy with Franklin pouch. LYMPH NODES: Normal. VASCULATURE: Unremarkable. 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  MUSCULOSKELETAL: There is a very large complex peripherally enhancing fluid collection within the right buttock extending from the iliac crest down into the hip joints and into the right thigh. This is not completely imaged extending further into the thigh than is seen on the CT scan. Destructive changes in both hips. This fluid collection measures at least 20 x 14 cm scoliosis.     IMPRESSION: 1.  Significant distention of the neobladder which measures up 24 cm and extends into the right lower abdomen. 2.  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 Multiple perigastric venous collaterals. Nonocclusive thrombus in the proximal superior mesenteric vein. 3.  Franklin pouch with descending colostomy. 4.  Destructive changes both hips. There is a massive peripherally enhancing fluid collection in the right buttock, hip and thigh extending into the distal thigh. The distal extent of the fluid collection is not imaged with CT. This collection measures at least 20 x 14 cm. 5.  There is mild dilatation of the right intrarenal collecting system with normal caliber ureter and an element of UPJ obstruction is possible. Dependent stone in the right renal pelvis. NOTE: ABNORMAL REPORT 1.  THE DICTATION ABOVE DESCRIBES AN ABNORMALITY FOR WHICH FOLLOW-UP IS NEEDED.     CT Abdomen Pelvis w/o Contrast    Result Date: 9/4/2022  EXAM: CT ABDOMEN PELVIS W/O CONTRAST LOCATION: [facility] DATE/TIME: 9/4/2022 9:27 AM INDICATION: F U abscess with drain placement COMPARISON: 09/02/2022 TECHNIQUE: CT scan of the abdomen and pelvis was performed without IV contrast. Multiplanar reformats were obtained. Dose reduction techniques were used. CONTRAST: None. FINDINGS: LOWER CHEST: New small bilateral pleural effusions. HEPATOBILIARY: No significant mass or bile duct dilatation. Portal vein thrombus not visualized on the current examination without IV contrast, although periportal  collateral vessels are noted. Cholecystectomy. PANCREAS: Normal. SPLEEN: Normal. ADRENAL GLANDS: Normal. KIDNEYS/BLADDER: Mild fullness of the renal pelves bilaterally, decreased compared to 09/02/2022. Unchanged nonobstructing calculus in the lower pole right kidney and in the dependent portion of the right renal pelvis. Urinary diversion in the right lower quadrant, which is no longer distended. Ostomy extends to the skin surface. BOWEL: Descending colostomy with Franklin pouch. LYMPH NODES: Normal. VASCULATURE: Unremarkable. PELVIC ORGANS: Unremarkable MUSCULOSKELETAL: Interval placement of percutaneous pigtail drain in the subcutaneous right hip/upper thigh fluid collection. Reliable size comparison limited due to noncontrast technique on the current examination, but the collection has decreased in size. Largest axial component currently 11.7 x 7.4 cm, previously 20 x 14 cm. Osseous destruction in both hips. Thoracolumbar fusion. Diffuse muscular atrophy and osteopenia.     IMPRESSION: 1.  Decreased size of right buttock/thigh fluid collection following percutaneous drain placement. Largest remaining component measures 11.7 x 7.4 cm axially. 2.  New small bilateral pleural effusions. 3.  Neobladder is no longer distended.    Head CT w/o contrast    Result Date: 9/2/2022  EXAM: CT HEAD W/O CONTRAST LOCATION: [facility] DATE/TIME: 9/2/2022 7:16 PM INDICATION: ams COMPARISON: Head CT angiogram brain MRI 01/06/2020 TECHNIQUE: Routine CT Head without IV contrast. Multiplanar reformats. Dose reduction techniques were used. FINDINGS: INTRACRANIAL CONTENTS: No intracranial hemorrhage, extraaxial collection, or mass effect.  No CT evidence of acute infarct. Normal parenchymal attenuation. Normal ventricles and sulci. VISUALIZED ORBITS/SINUSES/MASTOIDS: No intraorbital abnormality. Moderate chronic mucosal thickening of the left maxillary sinus with associated frothy debris. Mild chronic  mucosal thickening of the left sphenoid sinus. No middle ear or mastoid effusion. BONES/SOFT TISSUES: No acute abnormality. Chronic right medial orbital wall fracture.     IMPRESSION: 1.  No intracranial hemorrhage, mass lesions, hydrocephalus or CT evidence for an acute infarct. 2.  Chronic right medial orbital wall fracture.    CT Femur Thigh Bilateral wo Contrast    Result Date: 9/4/2022  EXAM: CT FEMUR THIGH BILATERAL WITHOUT CONTRAST LOCATION: [facility] DATE/TIME: 09/04/2022, 9:28 AM INDICATION: 56-year-old patient with a right hip-buttock abscess. COMPARISON: 09/04/2022 CT abdomen and pelvis. 09/02/2022 CT abdomen and pelvis. TECHNIQUE: Noncontrast. Axial, sagittal and coronal thin-section reconstruction. Dose reduction techniques were used. FINDINGS: BONES AND JOINTS: -Advanced osteopenia. -Resection or resorption of the right medial ilium. Dysplastic right acetabulum. Resorption or resection of the right femoral head and greater trochanter. External rotation of the right femur. 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 Old fracture or osteotomy of the left femoral neck. Old healed fracture of the left femur proximal diaphysis. MUSCLES AND SOFT TISSUES: -Subcutaneous right hip-buttock fluid collection, with percutaneous pigtail catheter drainage. This collection measures 12 x 7 cm in greatest axial dimensions. -Fluid attenuation in the expected regions of the right vastus lateralis and adductor celia regions. The proximal margin of these collections have decreased in size since the 09/02/2022 exam. These collections both extend to the distal margin of the thigh, measuring up to 25 cm in length. -Advanced muscle fatty atrophy. OTHER: -See the dedicated abdomen-pelvis CT for further information.     IMPRESSION: 1.  Decreased size of the right hip-buttock fluid collection, status post percutaneous drain placement. This collection measures 12 x 7 cm  in greatest axial dimensions. 2.  Decreased size of fluid collections (likely contiguous with the above) in the residual right vastus lateralis and adductor celia regions. These collections extend through the distal thigh, and measure roughly 25 cm in length.     IR PICC Vascular    Result Date: 9/6/2022  LOCATION: [facility] DATE: 9/6/2022 PROCEDURE: PERIPHERALLY INSERTED CENTRAL CATHETER (PICC) PLACEMENT. INTERVENTIONAL RADIOLOGIST: Hugo Michael MD. INDICATION: Right thigh abscess. Paraplegia. Decubitus wounds. Unsuccessful bedside PICC placement CONSENT: The procedure, risks and benefits of PICC placement were discussed with the patient  in detail. All questions were answered. Informed consent was given to proceed with the procedure. MODERATE SEDATION: None CONTRAST: Omnipaque 350: 5 cc ANTIBIOTICS: None. ADDITIONAL MEDICATIONS: None. FLUOROSCOPIC TIME: 6 minutes RADIATION DOSE: Air Kerma: 19 mGy COMPLICATIONS: No immediate complications. STERILE BARRIER TECHNIQUE: Maximum sterile barrier technique was used. Cutaneous antisepsis was performed at the operative site with application of 2% chlorhexidine and a full body sterile drape. Prior to the procedure, the  and assistant performed hand hygiene and wore hat, mask, sterile gown, and sterile gloves during the entire procedure. Ultrasound was prepped with a sterile probe cover and sterile gel was used. PROCEDURE/TECHNIQUE:   Using local anesthesia and real-time ultrasound guidance, the right brachial vein was accessed. An 018 guidewire could not be advanced beyond the axillary vein. Over the guidewire the dilator/peel-away sheath of the Bard PICC set were advanced into the brachial vein. A 5 Armenian KMP catheter was advanced to the axillary vein. A central venogram was obtained. Using the KMP catheter, the 018 guidewires manipulated down to the superior vena cava. The 5 Armenian PICC could not be advanced due to the irregular   subclavian stenosis. Through the KMP catheter, the guidewire was exchanged for an 035 Lobo guidewire. A 5 Northern Irish, 25 cm sheath was advanced and placed with the tip at the axillary vein. The right subclavian vein was angioplastied using a 5 mm x 40 mm and less balloon. The 5 Northern Irish Kumpe catheter was used to exchange the guidewire for the 018 guidewire. Sheath was exchanged for the 5 Northern Irish peel-away sheath. Over the guidewire a 5 Northern Irish, dual lumen Bard power PICC was advanced until tip was at the right atrium. PICC was cut to 41 cm. The lumens aspirated and flushed adequately. FINDINGS: Ultrasound demonstrates a patent and compressible right brachial vein. Images were recorded. Right central venogram demonstrates diffuse, irregular moderate stenosis throughout the right subclavian vein. The completion fluoroscopic demonstrates a right upper extremity PICC with its tip at the right atrium.     IMPRESSION:  1.  Successful right upper extremity CT injectable PICC placement. 2.  Diffuse right subclavian vein stenosis. Angioplastied to 5 mm to allow PICC placement..     KUB XR    Result Date: 9/3/2022  EXAM: XR KUB LOCATION: [facility] DATE/TIME: 9/3/2022 5:59 AM INDICATION: s p femoral line placement COMPARISON: 01/06/2020     IMPRESSION: Left-sided femoral catheter has been placed terminating in the midline at the lumbosacral junction. Visualized bowel gas pattern is nonobstructive. Postoperative changes in the thoracolumbar spine. Prior right hip resection. Diffuse osteopenia. Right lower quadrant bowel anastomotic suture line. Multiple clips over the pelvis.

## 2022-09-08 NOTE — PROGRESS NOTES
Patient transferred to room 411 from the ICU at this time. Alert and oriented x4. VSS Complaining of being hungry.  Dietary has been called.  Deb Harris RN

## 2022-09-08 NOTE — PROGRESS NOTES
Interventional Radiology - Pre-Procedure Note:  9/8/2022    Procedure Requested: abscessogram  Requested by: LIAT Michael MD    History and Physical Reviewed: H&P documented within 30 days (by Bernarda Guthrie APRN CNP on 9/3/22).  I have personally reviewed the patient's medical history and have updated the medical record as necessary.    Brief HPI: Felicia Ellison is a 56 year old female with PMHx paraplegia, TBI. Presented from assisted living with imaging showing distended bladder and large fluid collection in right buttock. Underwent bladder decompression where she became hypotensive. CT guided left upper thigh/groin abscess drain placed 9/3.     Dr. Hunter reviewed CT with Dr. Michael. R pelvic drain with residual fluid on CT 9/4 and likely in need of upsizing drainage tube for continued optimal drainage.    DATE OP   9/4 540   9/5 765   9/6 580   9/7 345        IMAGING:  EXAM: CT FEMUR THIGH BILATERAL WITHOUT CONTRAST  LOCATION: [facility]  DATE/TIME: 09/04/2022, 9:28 AM     INDICATION: 56-year-old patient with a right hip-buttock abscess.  COMPARISON: 09/04/2022 CT abdomen and pelvis. 09/02/2022 CT abdomen and pelvis.  TECHNIQUE: Noncontrast. Axial, sagittal and coronal thin-section reconstruction. Dose reduction techniques were used.      FINDINGS:      BONES AND JOINTS:  -Advanced osteopenia.  -Resection or resorption of the right medial ilium. Dysplastic right acetabulum. Resorption or resection of the right femoral head and greater trochanter. External rotation of the right femur.  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 Old fracture or osteotomy of the left femoral neck. Old healed fracture of the left femur proximal diaphysis.     MUSCLES AND SOFT TISSUES:   -Subcutaneous right hip-buttock fluid collection, with percutaneous pigtail catheter drainage. This collection measures 12 x 7 cm in greatest axial dimensions.  -Fluid attenuation in  the expected regions of the right vastus lateralis and adductor celia regions. The proximal margin of these collections have decreased in size since the 09/02/2022 exam. These collections both extend to the distal margin of the   thigh, measuring up to 25 cm in length.  -Advanced muscle fatty atrophy.     OTHER:   -See the dedicated abdomen-pelvis CT for further information.                                                                      IMPRESSION:  1.  Decreased size of the right hip-buttock fluid collection, status post percutaneous drain placement. This collection measures 12 x 7 cm in greatest axial dimensions.  2.  Decreased size of fluid collections (likely contiguous with the above) in the residual right vastus lateralis and adductor celia regions. These collections extend through the distal thigh, and measure roughly 25 cm in length.    EXAM:  1. PERCUTANEOUS DRAIN PLACEMENT RIGHT UPPER THIGH/GROIN COLLECTION  2. CT GUIDANCE  3. CONSCIOUS SEDATION  LOCATION: [facility]  DATE/TIME: 9/3/2022 12:19 PM     INDICATION: right hip drain placement  TECHNIQUE: Dose reduction techniques were used.     PROCEDURE: Informed consent obtained. Site marked. Prior images reviewed. Required items made available. Patient identity confirmed verbally and with arm band. Patient reevaluated immediately before administering sedation. Universal protocol was   followed. Time out performed. The site was prepped and draped in sterile fashion. 10 mL of 1% lidocaine was infused into the local soft tissues. Using standard technique and under direct CT guidance, a 12 Burundian drainage catheter was inserted into the   fluid collection.       SPECIMEN: 150 mL of purulent fluid was aspirated and sent to lab for cultures and Gram stain.     BLOOD LOSS: Minimal.     The patient tolerated the procedure well. No immediate complications.     SEDATION: Versed 0 mg. Fentanyl 50 mcg. The procedure was performed with  "administration intravenous conscious sedation with appropriate preoperative, intraoperative, and postoperative evaluation.     15 minutes of supervised face to face conscious sedation time was provided by a radiology nurse under my direct supervision.                                                                      IMPRESSION:  1.  Successful CT-guided drain placement into a right upper thigh/groin abscess.    NPO: midnight  ANTICOAGULANTS: lovenox  ANTIBIOTICS: none indicated for procedure    ALLERGIES  Allergies   Allergen Reactions     Penicillins Anaphylaxis     Patient states it makes her \"climb the walls and hyperactive.\"     Acetaminophen Nausea and Vomiting     Levaquin Rash     Rash only with po Levaquin...able to take IV Levaquin per pt         LABS:  INR   Date Value Ref Range Status   01/06/2020 1.64 (H) 0.86 - 1.14 Final      Hemoglobin   Date Value Ref Range Status   09/08/2022 7.4 (L) 11.7 - 15.7 g/dL Final   05/26/2021 10.2 (L) 11.7 - 15.7 g/dL Final     Platelet Count   Date Value Ref Range Status   09/08/2022 48 (LL) 150 - 450 10e3/uL Final   05/26/2021 318 150 - 450 10e9/L Final     Creatinine   Date Value Ref Range Status   09/08/2022 0.40 (L) 0.60 - 1.10 mg/dL Final   05/26/2021 0.47 (L) 0.52 - 1.04 mg/dL Final     Potassium   Date Value Ref Range Status   09/08/2022 3.6 3.5 - 5.0 mmol/L Final   05/26/2021 4.1 3.4 - 5.3 mmol/L Final         EXAM:  /62 (BP Location: Right arm)   Pulse 67   Temp 97.6  F (36.4  C)   Resp 10   Ht 1.626 m (5' 4\")   Wt 55.8 kg (123 lb 1.6 oz)   LMP  (LMP Unknown)   SpO2 98%   BMI 21.13 kg/m    General:  Stable.  In no acute distress.    Neuro:  A&O x 3. Moves all extremities equally.  Resp:  Lungs clear to auscultation bilaterally.  Cardio:  S1S2 and reg, without murmur, clicks or rubs  Abdomen:  Soft, non-distended  Drain to DENIA bulb, dressing CDI      Pre-Sedation Assessment:  Mallampati Airway Classification:  II - Faucial pillars and soft palate " may be seen, but uvula is masked by the base of the tongue  Previous reaction to anesthesia/sedation:  No  Sedation plan based on assessment: Moderate (conscious) sedation as needed  ASA Classification: Class 3 - SEVERE SYSTEMIC DISEASE, DEFINITE FUNCTIONAL LIMITATIONS.   Code Status: Full Code intra procedure, per discussion with patient.       ASSESSMENT/PLAN:   Abscessogram with potential drain intervention including likely upsize and with sedation as needed.    Procedure, risks/benefits, details, alternatives, and sedation reviewed with patient and patient verbalized understanding. All questions answered. OK to proceed with above radiology procedure.     VERENA Díaz CNP  Interventional Radiology

## 2022-09-08 NOTE — PLAN OF CARE
[facility] - ICU    RN Progress Note:            Pertinent Assessments:      Please refer to flowsheet rows for full assessment     Patient in with sepsis; etiology involving urosepsis amongst other possible causes (fluid collection and wounds).   Patient is afebrile; BP soft, though SBP> 90 & MAP>65, after sleeping and pain medications just after midnight (unlikely related to sepsis).  Urine is very hazy, even when flushed cath. Minimal urinary output with patent line. Will bladder scan to assess for retention if output does not increase.   Patient reports constant pain and inability to fall asleep without home regimen of Ambien and pain level she had been in prior to Norco. Patient states the pain located in joints & sacral wounds. Established pain goal she set for being able to function and fall asleep is a 4 of 10 on the verbal scale.         Mobility Level:     1         Key Events - This Shift:     Pain better controlled and able to fall asleep. Minimal urinary output.              Plan:     NPO since midnight for DENIA drain switch. Monitor urin output and ensure tube patency. Allow patient to rest.         Point of Contact Update YES-OR-NO: N/A  If No, reason:   Name:  Phone Number:  Summary of Conversation:
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
## 2022-09-08 NOTE — PLAN OF CARE
[facility] - ICU    RN Progress Note:            Pertinent Assessments:      Please refer to flowsheet rows for full assessment     VSS, off levophed since yesterday 0630. NPO for IR drain exchange, eating dinner now.         Mobility Level:     bedrest         Key Events - This Shift:     Transferring to , RN to RN report given to BOB Boyd.              Plan:     Diurese as able, monitor blood pressure.          Point of Contact Update YES-OR-NO: Yes  If No, reason: na  Name:mother  Phone Number:in chart  Summary of Conversation: update on condition and let her know drain exchange went well

## 2022-09-08 NOTE — PROGRESS NOTES
Progress Note    Assessment/Plan  Patient clinically stable.  Sepsis resolving.  I reviewed with radiology yesterday.  Plan is for upgrading size of tube and right hip and lateral thigh abscess.  Reviewed with patient again.  She is hungry and thirsty.  We may still need to proceed with additional surgical intervention for more significant drainage of this large abscess.  She has not yet been down for upstaging of her drain in this location.  Other abdominal drain is stable.  Patient denies significant pain.  Current clinical situation reviewed with her at length.  She is fully aware that she may need additional intervention depending upon results with additional drain placed.    Active Problems:    Septic shock (H)      Subjective  Reasonably comfortable but hungry and thirsty at this point.  Objective    Vital signs in last 24 hours  Temp:  [97.6  F (36.4  C)-98.2  F (36.8  C)] 97.9  F (36.6  C)  Pulse:  [] 65  Resp:  [10-22] 11  MAP:  [61 mmHg-94 mmHg] 72 mmHg  Arterial Line BP: ()/(44-78) 96/55  SpO2:  [93 %-100 %] 98 %  Weight:   [unfilled]    Intake/Output last 3 shifts  I/O last 3 completed shifts:  In: 550 [P.O.:425; I.V.:125]  Out: 1700 [Urine:700; Drains:300; Stool:700]  Intake/Output this shift:  I/O this shift:  In: 25 [P.O.:25]  Out: 360 [Urine:285; Drains:75]      Physical Exam  Exam of the presacral area without change all wounds there are currently covered Mepilex.  Right hip wound with a catheter in the right hip with significant purulent drainage and right lower quadrant drain site to neobladder noted and stable.  She has no significant pain as before.    Pertinent Labs   Lab Results   Component Value Date    WBC 2.4 (L) 09/08/2022    HGB 7.4 (L) 09/08/2022    HCT 23.2 (L) 09/08/2022    MCV 84 09/08/2022    PLT 48 (LL) 09/08/2022             Pertinent Radiology     [unfilled]        Reji Hunter MD

## 2022-09-08 NOTE — PRE-PROCEDURE
GENERAL PRE-PROCEDURE:   Procedure:  Abscesosgram  Date/Time:  9/8/2022 8:49 AM    Written consent obtained?: Yes    Risks and benefits: Risks, benefits and alternatives were discussed    Consent given by:  Patient  Patient states understanding of procedure being performed: Yes    Patient's understanding of procedure matches consent: Yes    Procedure consent matches procedure scheduled: Yes    Expected level of sedation:  Moderate  Appropriately NPO:  Yes  ASA Class:  3  Mallampati  :  Grade 2- soft palate, base of uvula, tonsillar pillars, and portion of posterior pharyngeal wall visible  Lungs:  Lungs clear with good breath sounds bilaterally  Heart:  Normal heart sounds and rate  History & Physical reviewed:  History and physical reviewed and no updates needed  Statement of review:  I have reviewed the lab findings, diagnostic data, medications, and the plan for sedation
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
## 2022-09-08 NOTE — PROGRESS NOTES
Chart/notes reviewed.  Dr. Puga, Intensivist, progress note from this morning reviewed in detail. Notified by ICU team of patient being downgraded and transferring out of ICU.  [facility] will now assume role as primary team while patient hospitalized.

## 2022-09-09 ENCOUNTER — APPOINTMENT (OUTPATIENT)
Dept: ULTRASOUND IMAGING | Facility: HOSPITAL | Age: 57
DRG: 853 | End: 2022-09-09
Attending: INTERNAL MEDICINE
Payer: MEDICARE

## 2022-09-09 LAB
ALBUMIN SERPL-MCNC: 1.9 G/DL (ref 3.5–5)
ALP SERPL-CCNC: 88 U/L (ref 45–120)
ALT SERPL W P-5'-P-CCNC: <9 U/L (ref 0–45)
ANION GAP SERPL CALCULATED.3IONS-SCNC: 7 MMOL/L (ref 5–18)
APTT PPP: 20 SECONDS (ref 22–38)
AST SERPL W P-5'-P-CCNC: 12 U/L (ref 0–40)
BASOPHILS # BLD AUTO: 0 10E3/UL (ref 0–0.2)
BASOPHILS NFR BLD AUTO: 0 %
BILIRUB SERPL-MCNC: 0.2 MG/DL (ref 0–1)
BUN SERPL-MCNC: 18 MG/DL (ref 8–22)
CALCIUM SERPL-MCNC: 7.1 MG/DL (ref 8.5–10.5)
CHLORIDE BLD-SCNC: 107 MMOL/L (ref 98–107)
CO2 SERPL-SCNC: 25 MMOL/L (ref 22–31)
CREAT SERPL-MCNC: 0.41 MG/DL (ref 0.6–1.1)
DAT POLY: NEGATIVE
EOSINOPHIL # BLD AUTO: 0 10E3/UL (ref 0–0.7)
EOSINOPHIL NFR BLD AUTO: 0 %
ERYTHROCYTE [DISTWIDTH] IN BLOOD BY AUTOMATED COUNT: 20.5 % (ref 10–15)
FIBRINOGEN PPP-MCNC: 158 MG/DL (ref 170–490)
FOLATE SERPL-MCNC: 2 NG/ML (ref 4.6–34.8)
GFR SERPL CREATININE-BSD FRML MDRD: >90 ML/MIN/1.73M2
GLUCOSE BLD-MCNC: 141 MG/DL (ref 70–125)
GLUCOSE BLDC GLUCOMTR-MCNC: 141 MG/DL (ref 70–99)
GLUCOSE BLDC GLUCOMTR-MCNC: 157 MG/DL (ref 70–99)
GLUCOSE BLDC GLUCOMTR-MCNC: 89 MG/DL (ref 70–99)
GLUCOSE BLDC GLUCOMTR-MCNC: 89 MG/DL (ref 70–99)
GLUCOSE BLDC GLUCOMTR-MCNC: 93 MG/DL (ref 70–99)
HCT VFR BLD AUTO: 26.1 % (ref 35–47)
HGB BLD-MCNC: 8 G/DL (ref 11.7–15.7)
IMM GRANULOCYTES # BLD: 0.3 10E3/UL
IMM GRANULOCYTES NFR BLD: 4 %
INR PPP: 1.47 (ref 0.85–1.15)
IRON BINDING CAPACITY (ROCHE): 92 UG/DL (ref 240–430)
IRON SATN MFR SERPL: 30 % (ref 15–46)
IRON SERPL-MCNC: 28 UG/DL (ref 37–145)
LDH SERPL L TO P-CCNC: 173 U/L (ref 125–220)
LEVETIRACETAM SERPL-MCNC: 91.8 ΜG/ML (ref 10–40)
LYMPHOCYTES # BLD AUTO: 1.4 10E3/UL (ref 0.8–5.3)
LYMPHOCYTES NFR BLD AUTO: 22 %
MAGNESIUM SERPL-MCNC: 1.8 MG/DL (ref 1.8–2.6)
MCH RBC QN AUTO: 26.8 PG (ref 26.5–33)
MCHC RBC AUTO-ENTMCNC: 30.7 G/DL (ref 31.5–36.5)
MCV RBC AUTO: 88 FL (ref 78–100)
MONOCYTES # BLD AUTO: 0.4 10E3/UL (ref 0–1.3)
MONOCYTES NFR BLD AUTO: 6 %
NEUTROPHILS # BLD AUTO: 4.7 10E3/UL (ref 1.6–8.3)
NEUTROPHILS NFR BLD AUTO: 68 %
PATH REPORT.COMMENTS IMP SPEC: NORMAL
PATH REPORT.COMMENTS IMP SPEC: NORMAL
PATH REPORT.FINAL DX SPEC: NORMAL
PATH REPORT.MICROSCOPIC SPEC OTHER STN: NORMAL
PATH REPORT.RELEVANT HX SPEC: NORMAL
PLATELET # BLD AUTO: 58 10E3/UL (ref 150–450)
POTASSIUM BLD-SCNC: 2.8 MMOL/L (ref 3.5–5)
POTASSIUM BLD-SCNC: 3.3 MMOL/L (ref 3.5–5)
POTASSIUM BLD-SCNC: 3.7 MMOL/L (ref 3.5–5)
PROT SERPL-MCNC: 4.7 G/DL (ref 6–8)
RADIOLOGIST FLAGS: ABNORMAL
RADIOLOGIST FLAGS: ABNORMAL
RBC # BLD AUTO: 2.98 10E6/UL (ref 3.8–5.2)
RETICS # AUTO: 0.09 10E6/UL (ref 0.01–0.11)
RETICS/RBC NFR AUTO: 3 % (ref 0.8–2.7)
SODIUM SERPL-SCNC: 139 MMOL/L (ref 136–145)
SPECIMEN EXPIRATION DATE: NORMAL
VIT B12 SERPL-MCNC: 785 PG/ML (ref 232–1245)
WBC # BLD AUTO: 6.4 10E3/UL (ref 4–11)
WBC # BLD AUTO: NORMAL 10*3/UL

## 2022-09-09 PROCEDURE — 83615 LACTATE (LD) (LDH) ENZYME: CPT | Performed by: INTERNAL MEDICINE

## 2022-09-09 PROCEDURE — 80177 DRUG SCRN QUAN LEVETIRACETAM: CPT | Performed by: INTERNAL MEDICINE

## 2022-09-09 PROCEDURE — 250N000011 HC RX IP 250 OP 636: Performed by: INTERNAL MEDICINE

## 2022-09-09 PROCEDURE — 85060 BLOOD SMEAR INTERPRETATION: CPT | Performed by: PATHOLOGY

## 2022-09-09 PROCEDURE — 83735 ASSAY OF MAGNESIUM: CPT | Performed by: INTERNAL MEDICINE

## 2022-09-09 PROCEDURE — 99233 SBSQ HOSP IP/OBS HIGH 50: CPT | Performed by: INTERNAL MEDICINE

## 2022-09-09 PROCEDURE — 86850 RBC ANTIBODY SCREEN: CPT | Performed by: INTERNAL MEDICINE

## 2022-09-09 PROCEDURE — 85045 AUTOMATED RETICULOCYTE COUNT: CPT | Performed by: INTERNAL MEDICINE

## 2022-09-09 PROCEDURE — 85027 COMPLETE CBC AUTOMATED: CPT | Performed by: INTERNAL MEDICINE

## 2022-09-09 PROCEDURE — 250N000013 HC RX MED GY IP 250 OP 250 PS 637: Performed by: INTERNAL MEDICINE

## 2022-09-09 PROCEDURE — 85610 PROTHROMBIN TIME: CPT | Performed by: INTERNAL MEDICINE

## 2022-09-09 PROCEDURE — 80053 COMPREHEN METABOLIC PANEL: CPT | Performed by: INTERNAL MEDICINE

## 2022-09-09 PROCEDURE — 86901 BLOOD TYPING SEROLOGIC RH(D): CPT | Performed by: INTERNAL MEDICINE

## 2022-09-09 PROCEDURE — C9113 INJ PANTOPRAZOLE SODIUM, VIA: HCPCS | Performed by: INTERNAL MEDICINE

## 2022-09-09 PROCEDURE — 83550 IRON BINDING TEST: CPT | Performed by: INTERNAL MEDICINE

## 2022-09-09 PROCEDURE — 93970 EXTREMITY STUDY: CPT

## 2022-09-09 PROCEDURE — 82607 VITAMIN B-12: CPT | Performed by: INTERNAL MEDICINE

## 2022-09-09 PROCEDURE — 83010 ASSAY OF HAPTOGLOBIN QUANT: CPT | Performed by: INTERNAL MEDICINE

## 2022-09-09 PROCEDURE — 120N000001 HC R&B MED SURG/OB

## 2022-09-09 PROCEDURE — 93970 EXTREMITY STUDY: CPT | Mod: XS

## 2022-09-09 PROCEDURE — 84132 ASSAY OF SERUM POTASSIUM: CPT | Performed by: INTERNAL MEDICINE

## 2022-09-09 PROCEDURE — 86923 COMPATIBILITY TEST ELECTRIC: CPT | Performed by: INTERNAL MEDICINE

## 2022-09-09 PROCEDURE — 86880 COOMBS TEST DIRECT: CPT | Performed by: INTERNAL MEDICINE

## 2022-09-09 PROCEDURE — 82746 ASSAY OF FOLIC ACID SERUM: CPT | Performed by: INTERNAL MEDICINE

## 2022-09-09 PROCEDURE — 85730 THROMBOPLASTIN TIME PARTIAL: CPT | Performed by: INTERNAL MEDICINE

## 2022-09-09 PROCEDURE — 99232 SBSQ HOSP IP/OBS MODERATE 35: CPT | Performed by: INTERNAL MEDICINE

## 2022-09-09 PROCEDURE — 86022 PLATELET ANTIBODIES: CPT | Performed by: INTERNAL MEDICINE

## 2022-09-09 PROCEDURE — 85384 FIBRINOGEN ACTIVITY: CPT | Performed by: INTERNAL MEDICINE

## 2022-09-09 RX ORDER — POTASSIUM CHLORIDE 1500 MG/1
40 TABLET, EXTENDED RELEASE ORAL ONCE
Status: COMPLETED | OUTPATIENT
Start: 2022-09-09 | End: 2022-09-09

## 2022-09-09 RX ORDER — MAGNESIUM SULFATE HEPTAHYDRATE 40 MG/ML
2 INJECTION, SOLUTION INTRAVENOUS ONCE
Status: COMPLETED | OUTPATIENT
Start: 2022-09-09 | End: 2022-09-09

## 2022-09-09 RX ORDER — POTASSIUM CHLORIDE 1500 MG/1
20 TABLET, EXTENDED RELEASE ORAL ONCE
Status: COMPLETED | OUTPATIENT
Start: 2022-09-09 | End: 2022-09-09

## 2022-09-09 RX ORDER — POTASSIUM CHLORIDE 1500 MG/1
40 TABLET, EXTENDED RELEASE ORAL ONCE
Status: DISCONTINUED | OUTPATIENT
Start: 2022-09-09 | End: 2022-09-09

## 2022-09-09 RX ORDER — PANTOPRAZOLE SODIUM 40 MG/1
40 TABLET, DELAYED RELEASE ORAL
Status: DISCONTINUED | OUTPATIENT
Start: 2022-09-10 | End: 2022-09-28 | Stop reason: HOSPADM

## 2022-09-09 RX ORDER — FONDAPARINUX SODIUM 7.5 MG/.6ML
7.5 INJECTION SUBCUTANEOUS DAILY
Status: DISCONTINUED | OUTPATIENT
Start: 2022-09-09 | End: 2022-09-11

## 2022-09-09 RX ADMIN — POTASSIUM CHLORIDE 20 MEQ: 1500 TABLET, EXTENDED RELEASE ORAL at 10:50

## 2022-09-09 RX ADMIN — FUROSEMIDE 20 MG: 10 INJECTION, SOLUTION INTRAMUSCULAR; INTRAVENOUS at 09:06

## 2022-09-09 RX ADMIN — MAGNESIUM SULFATE IN WATER 2 G: 40 INJECTION, SOLUTION INTRAVENOUS at 08:51

## 2022-09-09 RX ADMIN — FONDAPARINUX SODIUM 7.5 MG: 7.5 INJECTION SUBCUTANEOUS at 18:07

## 2022-09-09 RX ADMIN — ZOLPIDEM TARTRATE 5 MG: 5 TABLET ORAL at 21:21

## 2022-09-09 RX ADMIN — CEFTRIAXONE 2 G: 2 INJECTION, POWDER, FOR SOLUTION INTRAMUSCULAR; INTRAVENOUS at 13:04

## 2022-09-09 RX ADMIN — LEVETIRACETAM 1000 MG: 500 TABLET, FILM COATED ORAL at 20:09

## 2022-09-09 RX ADMIN — POTASSIUM CHLORIDE 20 MEQ: 1500 TABLET, EXTENDED RELEASE ORAL at 18:07

## 2022-09-09 RX ADMIN — HYDROCODONE BITARTRATE AND ACETAMINOPHEN 1 TABLET: 5; 325 TABLET ORAL at 10:52

## 2022-09-09 RX ADMIN — HYDROCODONE BITARTRATE AND ACETAMINOPHEN 1 TABLET: 5; 325 TABLET ORAL at 16:12

## 2022-09-09 RX ADMIN — POTASSIUM CHLORIDE 40 MEQ: 1500 TABLET, EXTENDED RELEASE ORAL at 08:51

## 2022-09-09 RX ADMIN — FUROSEMIDE 20 MG: 10 INJECTION, SOLUTION INTRAMUSCULAR; INTRAVENOUS at 21:22

## 2022-09-09 RX ADMIN — LEVETIRACETAM 1000 MG: 500 TABLET, FILM COATED ORAL at 08:46

## 2022-09-09 RX ADMIN — PANTOPRAZOLE SODIUM 40 MG: 40 INJECTION, POWDER, FOR SOLUTION INTRAVENOUS at 08:45

## 2022-09-09 RX ADMIN — Medication 3 MG: at 21:21

## 2022-09-09 ASSESSMENT — ACTIVITIES OF DAILY LIVING (ADL)
ADLS_ACUITY_SCORE: 53
ADLS_ACUITY_SCORE: 53
ADLS_ACUITY_SCORE: 49
ADLS_ACUITY_SCORE: 53
ADLS_ACUITY_SCORE: 49
ADLS_ACUITY_SCORE: 53
ADLS_ACUITY_SCORE: 49
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
## 2022-09-09 NOTE — PROGRESS NOTES
Progress Note    Assessment/Plan  Patient's drain was upstaged yesterday.  Much larger tube.  Draining quite freely today.  Patient clinically without significant change.  Tolerating oral intake.  Record review labs reviewed.  Patient clinically stable with resolution of septic state.  Continue present drain.  Potential operative intervention next week depending upon results with current drainage program.  No potential for discharge immediately.  Reviewed with her.  Active Problems:    Septic shock (H)      Subjective  Reasonably comfortable.  She wants to try to get back home when possible.  Objective    Vital signs in last 24 hours  Temp:  [97.8  F (36.6  C)-98.3  F (36.8  C)] 98  F (36.7  C)  Pulse:  [74-90] 88  Resp:  [12-18] 16  BP: ()/(51-65) 92/55  SpO2:  [96 %-100 %] 98 %  Weight:   [unfilled]    Intake/Output last 3 shifts  I/O last 3 completed shifts:  In: 625 [P.O.:625]  Out: 2590 [Urine:1435; Drains:655; Stool:500]  Intake/Output this shift:  I/O this shift:  In: 240 [P.O.:240]  Out: 850 [Urine:850]      Physical Exam  Patient awake and alert.  Comfortable.  Lungs clear cardiac exam heart rate 80/min.  Lower extremities without change.  Presacral areas with all wounds covered.  Right hip with upstaged quite a large drain now in place with very copious purulent material.  Urostomy draining clear.    Pertinent Labs   Lab Results   Component Value Date    WBC 6.4 09/09/2022    HGB 8.0 (L) 09/09/2022    HCT 26.1 (L) 09/09/2022    MCV 88 09/09/2022    PLT 58 (L) 09/09/2022             Pertinent Radiology     [unfilled]        Reji Hunter MD

## 2022-09-09 NOTE — PLAN OF CARE
Problem: Plan of Care - These are the overarching goals to be used throughout the patient stay.    Goal: Plan of Care Review/Shift Note    Problem: Impaired Wound Healing  Goal: Optimal Wound Healing  Outcome: Ongoing, Progressing   Goal Outcome Evaluation:    Patient alert and oriented. Frustrated today because she wants to go home. ID following. IV antibiotics ordered. Afebrile. S/P catheter draining yellow urine with small amounts of sediment. Dressing and DENIA drain intact to right upper thigh wound. Draining moderate amounts of serous drainage. Colostomy intact and draining. Patient has a grapefruit size ulcer to sacral area. Area cleansed and packed with moisten vashe gauze (3) pieces. Wound bed is pink and clean. Mepilex intact to buttock wound.  WOC following. Ultram administered prior to dressing change per patient request. Awaiting for air mattress overlay. Patient compliant with turning and repositioning every 2 hours. Edematous throughout-IV lasix administered as ordered. PICC line (double lumen) intact and ecchymotic around site. Appetite good. Prefers to eat breakfast/lunch at 11am and then dinner. Snacks between meals. K+ 2.8=initiated protocol and recheck today at 3pm.      Deb Harris RN

## 2022-09-09 NOTE — PLAN OF CARE
Goal Outcome Evaluation:               Problem: Plan of Care - These are the overarching goals to be used throughout the patient stay.    Goal: Plan of Care Review/Shift Note  Description: The Plan of Care Review/Shift note should be completed every shift.  The Outcome Evaluation is a brief statement about your assessment that the patient is improving, declining, or no change.  This information will be displayed automatically on your shift note.  9/9/2022 0751 by Cristóbal Chow RN  Outcome: Ongoing, Not Progressing  9/9/2022 0607 by Cristóbal Chow RN  Outcome: Ongoing, Not Progressing      Pt a&o x 4. Denies any pain. Provided some snacks at midnight. Critical labs with K+ 2.8. MD notified. Will continue to monitor

## 2022-09-10 ENCOUNTER — APPOINTMENT (OUTPATIENT)
Dept: CT IMAGING | Facility: HOSPITAL | Age: 57
DRG: 853 | End: 2022-09-10
Attending: INTERNAL MEDICINE
Payer: MEDICARE

## 2022-09-10 ENCOUNTER — APPOINTMENT (OUTPATIENT)
Dept: INTERVENTIONAL RADIOLOGY/VASCULAR | Facility: HOSPITAL | Age: 57
DRG: 853 | End: 2022-09-10
Attending: RADIOLOGY
Payer: MEDICARE

## 2022-09-10 ENCOUNTER — APPOINTMENT (OUTPATIENT)
Dept: CARDIOLOGY | Facility: HOSPITAL | Age: 57
DRG: 853 | End: 2022-09-10
Attending: INTERNAL MEDICINE
Payer: MEDICARE

## 2022-09-10 LAB
ABO/RH(D): NORMAL
ALBUMIN SERPL-MCNC: 1.8 G/DL (ref 3.5–5)
ALP SERPL-CCNC: 77 U/L (ref 45–120)
ALT SERPL W P-5'-P-CCNC: <9 U/L (ref 0–45)
ANION GAP SERPL CALCULATED.3IONS-SCNC: 5 MMOL/L (ref 5–18)
ANTIBODY SCREEN: NEGATIVE
AST SERPL W P-5'-P-CCNC: 9 U/L (ref 0–40)
BILIRUB SERPL-MCNC: 0.3 MG/DL (ref 0–1)
BUN SERPL-MCNC: 17 MG/DL (ref 8–22)
CALCIUM SERPL-MCNC: 6.9 MG/DL (ref 8.5–10.5)
CHLORIDE BLD-SCNC: 109 MMOL/L (ref 98–107)
CO2 SERPL-SCNC: 26 MMOL/L (ref 22–31)
CREAT SERPL-MCNC: 0.35 MG/DL (ref 0.6–1.1)
ERYTHROCYTE [DISTWIDTH] IN BLOOD BY AUTOMATED COUNT: 21.5 % (ref 10–15)
FIBRINOGEN PPP-MCNC: 269 MG/DL (ref 170–490)
GFR SERPL CREATININE-BSD FRML MDRD: >90 ML/MIN/1.73M2
GLUCOSE BLD-MCNC: 88 MG/DL (ref 70–125)
GLUCOSE BLDC GLUCOMTR-MCNC: 100 MG/DL (ref 70–99)
GLUCOSE BLDC GLUCOMTR-MCNC: 81 MG/DL (ref 70–99)
HCT VFR BLD AUTO: 23.3 % (ref 35–47)
HGB BLD-MCNC: 7.1 G/DL (ref 11.7–15.7)
HOLD SPECIMEN HIT: NORMAL
LVEF ECHO: NORMAL
MAGNESIUM SERPL-MCNC: 1.9 MG/DL (ref 1.8–2.6)
MCH RBC QN AUTO: 27.2 PG (ref 26.5–33)
MCHC RBC AUTO-ENTMCNC: 30.5 G/DL (ref 31.5–36.5)
MCV RBC AUTO: 89 FL (ref 78–100)
PF4 HEPARIN CMPLX AB SER QL: NEGATIVE
PHOSPHATE SERPL-MCNC: 1.1 MG/DL (ref 2.5–4.5)
PLATELET # BLD AUTO: 51 10E3/UL (ref 150–450)
POTASSIUM BLD-SCNC: 3.9 MMOL/L (ref 3.5–5)
PROT SERPL-MCNC: 4.3 G/DL (ref 6–8)
RBC # BLD AUTO: 2.61 10E6/UL (ref 3.8–5.2)
SODIUM SERPL-SCNC: 140 MMOL/L (ref 136–145)
SPECIMEN EXPIRATION DATE: NORMAL
WBC # BLD AUTO: 6 10E3/UL (ref 4–11)

## 2022-09-10 PROCEDURE — 250N000013 HC RX MED GY IP 250 OP 250 PS 637: Performed by: INTERNAL MEDICINE

## 2022-09-10 PROCEDURE — 93306 TTE W/DOPPLER COMPLETE: CPT

## 2022-09-10 PROCEDURE — 99223 1ST HOSP IP/OBS HIGH 75: CPT | Performed by: INTERNAL MEDICINE

## 2022-09-10 PROCEDURE — 250N000011 HC RX IP 250 OP 636: Performed by: INTERNAL MEDICINE

## 2022-09-10 PROCEDURE — 93306 TTE W/DOPPLER COMPLETE: CPT | Mod: 26 | Performed by: INTERNAL MEDICINE

## 2022-09-10 PROCEDURE — 84100 ASSAY OF PHOSPHORUS: CPT | Performed by: INTERNAL MEDICINE

## 2022-09-10 PROCEDURE — 99233 SBSQ HOSP IP/OBS HIGH 50: CPT | Performed by: INTERNAL MEDICINE

## 2022-09-10 PROCEDURE — 80053 COMPREHEN METABOLIC PANEL: CPT | Performed by: INTERNAL MEDICINE

## 2022-09-10 PROCEDURE — 83735 ASSAY OF MAGNESIUM: CPT | Performed by: INTERNAL MEDICINE

## 2022-09-10 PROCEDURE — 0T9B30Z DRAINAGE OF BLADDER WITH DRAINAGE DEVICE, PERCUTANEOUS APPROACH: ICD-10-PCS | Performed by: RADIOLOGY

## 2022-09-10 PROCEDURE — C1729 CATH, DRAINAGE: HCPCS

## 2022-09-10 PROCEDURE — 120N000001 HC R&B MED SURG/OB

## 2022-09-10 PROCEDURE — 272N000194 HC ACCESSORY CR3

## 2022-09-10 PROCEDURE — C1769 GUIDE WIRE: HCPCS

## 2022-09-10 PROCEDURE — 85384 FIBRINOGEN ACTIVITY: CPT | Performed by: INTERNAL MEDICINE

## 2022-09-10 PROCEDURE — 272N000119 HC CATH CR4

## 2022-09-10 PROCEDURE — 85027 COMPLETE CBC AUTOMATED: CPT | Performed by: INTERNAL MEDICINE

## 2022-09-10 PROCEDURE — 51102 DRAIN BL W/CATH INSERTION: CPT

## 2022-09-10 PROCEDURE — G1010 CDSM STANSON: HCPCS

## 2022-09-10 PROCEDURE — 80177 DRUG SCRN QUAN LEVETIRACETAM: CPT | Performed by: INTERNAL MEDICINE

## 2022-09-10 PROCEDURE — 255N000002 HC RX 255 OP 636: Performed by: RADIOLOGY

## 2022-09-10 RX ORDER — OXYCODONE HYDROCHLORIDE 5 MG/1
5 TABLET ORAL EVERY 4 HOURS PRN
Status: DISCONTINUED | OUTPATIENT
Start: 2022-09-10 | End: 2022-09-22

## 2022-09-10 RX ORDER — ACETAMINOPHEN 325 MG/1
650 TABLET ORAL EVERY 4 HOURS PRN
Status: DISCONTINUED | OUTPATIENT
Start: 2022-09-10 | End: 2022-09-27

## 2022-09-10 RX ORDER — FOLIC ACID 1 MG/1
1 TABLET ORAL DAILY
Status: DISCONTINUED | OUTPATIENT
Start: 2022-09-10 | End: 2022-09-28 | Stop reason: HOSPADM

## 2022-09-10 RX ADMIN — IOHEXOL 15 ML: 350 INJECTION, SOLUTION INTRAVENOUS at 19:06

## 2022-09-10 RX ADMIN — ZOLPIDEM TARTRATE 5 MG: 5 TABLET ORAL at 21:32

## 2022-09-10 RX ADMIN — FUROSEMIDE 20 MG: 10 INJECTION, SOLUTION INTRAMUSCULAR; INTRAVENOUS at 10:46

## 2022-09-10 RX ADMIN — Medication 3 MG: at 21:32

## 2022-09-10 RX ADMIN — THIAMINE HCL TAB 100 MG 100 MG: 100 TAB at 14:16

## 2022-09-10 RX ADMIN — HYDROCODONE BITARTRATE AND ACETAMINOPHEN 1 TABLET: 5; 325 TABLET ORAL at 07:02

## 2022-09-10 RX ADMIN — PANTOPRAZOLE SODIUM 40 MG: 40 TABLET, DELAYED RELEASE ORAL at 07:03

## 2022-09-10 RX ADMIN — CEFTRIAXONE 2 G: 2 INJECTION, POWDER, FOR SOLUTION INTRAMUSCULAR; INTRAVENOUS at 14:16

## 2022-09-10 RX ADMIN — LEVETIRACETAM 1000 MG: 500 TABLET, FILM COATED ORAL at 08:34

## 2022-09-10 RX ADMIN — FUROSEMIDE 20 MG: 10 INJECTION, SOLUTION INTRAMUSCULAR; INTRAVENOUS at 21:24

## 2022-09-10 RX ADMIN — HYDROCODONE BITARTRATE AND ACETAMINOPHEN 1 TABLET: 5; 325 TABLET ORAL at 14:16

## 2022-09-10 RX ADMIN — FOLIC ACID 1 MG: 1 TABLET ORAL at 14:16

## 2022-09-10 RX ADMIN — OXYCODONE HYDROCHLORIDE 5 MG: 5 TABLET ORAL at 21:35

## 2022-09-10 RX ADMIN — OXYCODONE HYDROCHLORIDE 5 MG: 5 TABLET ORAL at 16:15

## 2022-09-10 RX ADMIN — FONDAPARINUX SODIUM 7.5 MG: 7.5 INJECTION SUBCUTANEOUS at 08:34

## 2022-09-10 ASSESSMENT — ACTIVITIES OF DAILY LIVING (ADL)
ADLS_ACUITY_SCORE: 53
ADLS_ACUITY_SCORE: 49
ADLS_ACUITY_SCORE: 53
ADLS_ACUITY_SCORE: 49
ADLS_ACUITY_SCORE: 53
ADLS_ACUITY_SCORE: 49
ADLS_ACUITY_SCORE: 53
ADLS_ACUITY_SCORE: 49

## 2022-09-10 NOTE — PLAN OF CARE
Problem: Plan of Care - These are the overarching goals to be used throughout the patient stay.    Goal: Absence of Hospital-Acquired Illness or Injury  Intervention: Identify and Manage Fall Risk  Recent Flowsheet Documentation  Taken 9/10/2022 0800 by Madeleine Melton RN  Safety Promotion/Fall Prevention:   assistive device/personal items within reach   bed alarm on   clutter free environment maintained   fall prevention program maintained  Intervention: Prevent Skin Injury  Recent Flowsheet Documentation  Taken 9/10/2022 1030 by Madeleine Melton RN  Body Position:   right   turned  Taken 9/10/2022 0800 by Madeleine Melton RN  Body Position:   legs elevated   supine, legs elevated  Intervention: Prevent and Manage VTE (Venous Thromboembolism) Risk  Recent Flowsheet Documentation  Taken 9/10/2022 0800 by Madeleine Melton RN  Activity Management: activity adjusted per tolerance  Intervention: Prevent Infection  Recent Flowsheet Documentation  Taken 9/10/2022 0800 by Madeleine Metlon RN  Infection Prevention:   cohorting utilized   equipment surfaces disinfected   environmental surveillance performed   hand hygiene promoted   personal protective equipment utilized   single patient room provided   rest/sleep promoted   Goal Outcome Evaluation:  Patient suprapubic catheter site  dry in late am - cares done-pt found soaking wet this afternoon urine running from site and pt c/o pain at site. Patiente cleaned up-norco given for pain and sent down for CT scan to check. Back in room getting echo. Bed changed due to malfunction when pt left for CT scan.

## 2022-09-10 NOTE — CONSULTS
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 acetabulum. Resorption or resection of the right femoral head and greater trochanter. External rotation of the right femur. 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 Old fracture or osteotomy of the left femoral neck. Old healed fracture of the left femur proximal diaphysis. MUSCLES AND SOFT TISSUES: -Subcutaneous right hip-buttock fluid collection, with percutaneous pigtail catheter drainage. This collection measures 12 x 7 cm in greatest axial dimensions. -Fluid attenuation in the expected regions of the right vastus lateralis and adductor celia regions. The proximal margin of these collections have decreased in size since the 09/02/2022 exam. These collections both extend to the distal margin of the thigh, measuring up to 25 cm in length. -Advanced muscle fatty atrophy. OTHER: -See the dedicated abdomen-pelvis CT for further information.     IMPRESSION: 1.  Decreased size of the right hip-buttock fluid collection, status post percutaneous drain placement. This collection measures 12 x 7 cm in  greatest axial dimensions. 2.  Decreased size of fluid collections (likely contiguous with the above) in the residual right vastus lateralis and adductor celia regions. These collections extend through the distal thigh, and measure roughly 25 cm in length.     IR PICC Vascular    Result Date: 9/6/2022  LOCATION: [facility] DATE: 9/6/2022 PROCEDURE: PERIPHERALLY INSERTED CENTRAL CATHETER (PICC) PLACEMENT. INTERVENTIONAL RADIOLOGIST: Hugo Michael MD. INDICATION: Right thigh abscess. Paraplegia. Decubitus wounds. Unsuccessful bedside PICC placement CONSENT: The procedure, risks and benefits of PICC placement were discussed with the patient  in detail. All questions were answered. Informed consent was given to proceed with the procedure. MODERATE SEDATION: None CONTRAST: Omnipaque 350: 5 cc ANTIBIOTICS: None. ADDITIONAL MEDICATIONS: None. FLUOROSCOPIC TIME: 6 minutes RADIATION DOSE: Air Kerma: 19 mGy COMPLICATIONS: No immediate complications. STERILE BARRIER TECHNIQUE: Maximum sterile barrier technique was used. Cutaneous antisepsis was performed at the operative site with application of 2% chlorhexidine and a full body sterile drape. Prior to the procedure, the  and assistant performed hand hygiene and wore hat, mask, sterile gown, and sterile gloves during the entire procedure. Ultrasound was prepped with a sterile probe cover and sterile gel was used. PROCEDURE/TECHNIQUE:   Using local anesthesia and real-time ultrasound guidance, the right brachial vein was accessed. An 018 guidewire could not be advanced beyond the axillary vein. Over the guidewire the dilator/peel-away sheath of the Bard PICC set were advanced into the brachial vein. A 5 Yi KMP catheter was advanced to the axillary vein. A central venogram was obtained. Using the KMP catheter, the 018 guidewires manipulated down to the superior vena cava. The 5 Yi PICC could not be advanced due to the irregular   subclavian stenosis. Through the KMP catheter, the guidewire was exchanged for an 035 Lobo guidewire. A 5 Barbadian, 25 cm sheath was advanced and placed with the tip at the axillary vein. The right subclavian vein was angioplastied using a 5 mm x 40 mm and less balloon. The 5 Barbadian Kumpe catheter was used to exchange the guidewire for the 018 guidewire. Sheath was exchanged for the 5 Barbadian peel-away sheath. Over the guidewire a 5 Barbadian, dual lumen Bard power PICC was advanced until tip was at the right atrium. PICC was cut to 41 cm. The lumens aspirated and flushed adequately. FINDINGS: Ultrasound demonstrates a patent and compressible right brachial vein. Images were recorded. Right central venogram demonstrates diffuse, irregular moderate stenosis throughout the right subclavian vein. The completion fluoroscopic demonstrates a right upper extremity PICC with its tip at the right atrium.     IMPRESSION:  1.  Successful right upper extremity CT injectable PICC placement. 2.  Diffuse right subclavian vein stenosis. Angioplastied to 5 mm to allow PICC placement..      120 minutes spent on the date of the encounter doing chart review, history and exam, documentation and further activities as noted above.      Signed by: Ronni Suárez MD

## 2022-09-10 NOTE — PROGRESS NOTES
Progress Note    Assessment/Plan  Patient with dysfunction of the lower abdominal tube that goes to the neobladder.  Draining around the catheter.  CT scan indicates probable obstruction.  An informed nursing staff to communicate with interventional radiology for evaluation and probable exchange or replacement of this tube.  The right hip abscess is certainly smaller than previous having had upstaging of the tube.  Patient without other significant clinical change.  I informed her yesterday that is not likely that she would be able to go back to [facility] in the immediate future.  Still have the potential need for surgical intervention depending upon her results with increased drain right hip area.  Presacral without any change.  Continue catheter right thigh abscess at the current time.    Active Problems:    Septic shock (H)      Subjective  No change  Objective    Vital signs in last 24 hours  Temp:  [97.5  F (36.4  C)-98.6  F (37  C)] 98  F (36.7  C)  Pulse:  [76-95] 95  Resp:  [18-20] 18  BP: ()/(52-68) 91/58  SpO2:  [95 %-100 %] 100 %  Weight:   [unfilled]    Intake/Output last 3 shifts  I/O last 3 completed shifts:  In: 940 [P.O.:940]  Out: 435 [Urine:100; Drains:335]  Intake/Output this shift:  No intake/output data recorded.      Physical Exam  Essentially no change and per reports.  Presacral wounds all covered.  Drainage around abdominal site noted.  Will be is assessed by IR.  Legs have not sedated any significant change since admission.    Pertinent Labs   Lab Results   Component Value Date    WBC 6.0 09/10/2022    HGB 7.1 (L) 09/10/2022    HCT 23.3 (L) 09/10/2022    MCV 89 09/10/2022    PLT 51 (L) 09/10/2022             Pertinent Radiology     [unfilled]        Reji Hunter MD
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
## 2022-09-10 NOTE — PROGRESS NOTES
Brief Remote Urology Note    56-year-old female admitted with septic shock (resolved), AMS, left hip-thigh abscess s/p drain 9/3. Urology initially consulted on 9/3 after patient had been transferred from [facility] ER with septic shock. Patient is s/p continent urinary diversion (using ileum) with catheterizable stoma by Dr. Burns in 2013. In the ER, patient had a largely distended bladder on imaging and the ER was not able to catheterize her urinary stoma or via the urethra. [facility] consulted and recommended placement of SP tube, which was done with US guidance via her catheterizable stoma. She became hypotensive immediately follow SP tube placement, was started on pressors and admitted to the ICU. Dr. Cueva with [facility] saw the patient and not further urologic intervention deemed necessary, unless her SP tube were to clog.     Per the hospitalist and RN, patient's SP catheter has not been draining much all day with leaking around the cath. Patient has been off pressors since 9/7 and is stable at this time. Stat CT ordered per IR, which suggests malfunction (plugging) of the catheter as the conduit has become significantly distended from 9/4. Discussed findings with both Dr. De Paz and Dr. Cline with IR. Patient needs urgent placement of an SP tube into the pouch via a new tract, as she has near complete urinary obstruction at this time. Very minimal urine output per floor RN. Plans for IR procedure this evening.     Discussed patient with Dr. Javed De Paz.     Please call with any further questions overnight. Urology will see patient again tomorrow morning.      Charisma Marvin PA-C  [facility]

## 2022-09-10 NOTE — PLAN OF CARE
Problem: UTI (Urinary Tract Infection)  Goal: Improved Infection Symptoms  Outcome: Ongoing, Progressing   Goal Outcome Evaluation:      Afebrile. On rocephin. Suprapubic catheter and abscess drain in place. Alert and oriented x4. Call light and items placed within reach.

## 2022-09-10 NOTE — PRE-PROCEDURE
GENERAL PRE-PROCEDURE:   Procedure:  Urinary catheter placement  Date/Time:  9/10/2022 6:35 PM    Verbal consent obtained?: Yes    Written consent obtained?: Yes    Risks and benefits: Risks, benefits and alternatives were discussed    Consent given by:  Patient  Patient states understanding of procedure being performed: Yes    Patient's understanding of procedure matches consent: Yes    Procedure consent matches procedure scheduled: Yes    Expected level of sedation:  Moderate  Appropriately NPO:  Yes  ASA Class:  2  Mallampati  :  Grade 2- soft palate, base of uvula, tonsillar pillars, and portion of posterior pharyngeal wall visible  Lungs:  Lungs clear with good breath sounds bilaterally  Heart:  Normal heart sounds and rate  History & Physical reviewed:  History and physical reviewed and no updates needed  Statement of review:  I have reviewed the lab findings, diagnostic data, medications, and the plan for sedation

## 2022-09-10 NOTE — PROCEDURES
[facility]    Procedure: IR Procedure Note    Date/Time: 9/10/2022 6:59 PM  Performed by: Jonh Cline MD  Authorized by: Jonh Cline MD       UNIVERSAL PROTOCOL   Site Marked: NA  Prior Images Obtained and Reviewed:  Yes  Required items: Required blood products, implants, devices and special equipment available    Patient identity confirmed:  Verbally with patient, arm band, provided demographic data and hospital-assigned identification number  Patient was reevaluated immediately before administering moderate or deep sedation or anesthesia  Confirmation Checklist:  Patient's identity using two indicators, relevant allergies, procedure was appropriate and matched the consent or emergent situation and correct equipment/implants were available  Time out: Immediately prior to the procedure a time out was called    Universal Protocol: the Joint Commission Universal Protocol was followed    Preparation: Patient was prepped and draped in usual sterile fashion       ANESTHESIA    Anesthesia: Local infiltration  Local Anesthetic:  Lidocaine 1% without epinephrine      SEDATION    Patient Sedated: No    See dictated procedure note for full details.  Findings: Urostomy tube placement as per PACS.    Specimens: none    Complications: None    Condition: Stable    Plan: Interventional Radiology Post-Procedure Note    Procedure: Urostomy tube placement.    Attending: Jonh Cline MD    Findings: Contrast injection shows a long tract of the stoma while the existing catheter enters the diversion pouch through the sidewall of the stoma tract itself. The stoma was able to be traversed and a 16 Fr Karluk tip catheter placed. The existing catheter was left in place at this time.      PROCEDURE    Patient Tolerance:  Patient tolerated the procedure well with no immediate complications  Length of time physician/provider present for 1:1 monitoring during sedation: 0

## 2022-09-10 NOTE — H&P (VIEW-ONLY)
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 greater trochanter. External rotation of the right femur. 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 Old fracture or osteotomy of the left femoral neck. Old healed fracture of the left femur proximal diaphysis. MUSCLES AND SOFT TISSUES: -Subcutaneous right hip-buttock fluid collection, with percutaneous pigtail catheter drainage. This collection measures 12 x 7 cm in greatest axial dimensions. -Fluid attenuation in the expected regions of the right vastus lateralis and adductor celia regions. The proximal margin of these collections have decreased in size since the 09/02/2022 exam. These collections both extend to the distal margin of the thigh, measuring up to 25 cm in length. -Advanced muscle fatty atrophy. OTHER: -See the dedicated abdomen-pelvis CT for further information.     IMPRESSION: 1.  Decreased size of the right hip-buttock fluid collection, status post percutaneous drain placement. This collection measures 12 x 7 cm in  greatest axial dimensions. 2.  Decreased size of fluid collections (likely contiguous with the above) in the residual right vastus lateralis and adductor celia regions. These collections extend through the distal thigh, and measure roughly 25 cm in length.     IR PICC Vascular    Result Date: 9/6/2022  LOCATION: [facility] DATE: 9/6/2022 PROCEDURE: PERIPHERALLY INSERTED CENTRAL CATHETER (PICC) PLACEMENT. INTERVENTIONAL RADIOLOGIST: Hugo Michael MD. INDICATION: Right thigh abscess. Paraplegia. Decubitus wounds. Unsuccessful bedside PICC placement CONSENT: The procedure, risks and benefits of PICC placement were discussed with the patient  in detail. All questions were answered. Informed consent was given to proceed with the procedure. MODERATE SEDATION: None CONTRAST: Omnipaque 350: 5 cc ANTIBIOTICS: None. ADDITIONAL MEDICATIONS: None. FLUOROSCOPIC TIME: 6 minutes RADIATION DOSE: Air Kerma: 19 mGy COMPLICATIONS: No immediate complications. STERILE BARRIER TECHNIQUE: Maximum sterile barrier technique was used. Cutaneous antisepsis was performed at the operative site with application of 2% chlorhexidine and a full body sterile drape. Prior to the procedure, the  and assistant performed hand hygiene and wore hat, mask, sterile gown, and sterile gloves during the entire procedure. Ultrasound was prepped with a sterile probe cover and sterile gel was used. PROCEDURE/TECHNIQUE:   Using local anesthesia and real-time ultrasound guidance, the right brachial vein was accessed. An 018 guidewire could not be advanced beyond the axillary vein. Over the guidewire the dilator/peel-away sheath of the Bard PICC set were advanced into the brachial vein. A 5 Kyrgyz KMP catheter was advanced to the axillary vein. A central venogram was obtained. Using the KMP catheter, the 018 guidewires manipulated down to the superior vena cava. The 5 Kyrgyz PICC could not be advanced due to the irregular   subclavian stenosis. Through the KMP catheter, the guidewire was exchanged for an 035 Lobo guidewire. A 5 Albanian, 25 cm sheath was advanced and placed with the tip at the axillary vein. The right subclavian vein was angioplastied using a 5 mm x 40 mm and less balloon. The 5 Albanian Kumpe catheter was used to exchange the guidewire for the 018 guidewire. Sheath was exchanged for the 5 Albanian peel-away sheath. Over the guidewire a 5 Albanian, dual lumen Bard power PICC was advanced until tip was at the right atrium. PICC was cut to 41 cm. The lumens aspirated and flushed adequately. FINDINGS: Ultrasound demonstrates a patent and compressible right brachial vein. Images were recorded. Right central venogram demonstrates diffuse, irregular moderate stenosis throughout the right subclavian vein. The completion fluoroscopic demonstrates a right upper extremity PICC with its tip at the right atrium.     IMPRESSION:  1.  Successful right upper extremity CT injectable PICC placement. 2.  Diffuse right subclavian vein stenosis. Angioplastied to 5 mm to allow PICC placement..      120 minutes spent on the date of the encounter doing chart review, history and exam, documentation and further activities as noted above.      Signed by: Ronni Suárez MD

## 2022-09-11 ENCOUNTER — APPOINTMENT (OUTPATIENT)
Dept: PHYSICAL THERAPY | Facility: HOSPITAL | Age: 57
DRG: 853 | End: 2022-09-11
Attending: INTERNAL MEDICINE
Payer: MEDICARE

## 2022-09-11 LAB
ALBUMIN MFR UR ELPH: 21 MG/DL
ALBUMIN SERPL-MCNC: 1.8 G/DL (ref 3.5–5)
ALP SERPL-CCNC: 83 U/L (ref 45–120)
ALT SERPL W P-5'-P-CCNC: <9 U/L (ref 0–45)
ANION GAP SERPL CALCULATED.3IONS-SCNC: 5 MMOL/L (ref 5–18)
AST SERPL W P-5'-P-CCNC: 8 U/L (ref 0–40)
BILIRUB SERPL-MCNC: 0.3 MG/DL (ref 0–1)
BUN SERPL-MCNC: 14 MG/DL (ref 8–22)
CALCIUM SERPL-MCNC: 7.1 MG/DL (ref 8.5–10.5)
CHLORIDE BLD-SCNC: 109 MMOL/L (ref 98–107)
CO2 SERPL-SCNC: 27 MMOL/L (ref 22–31)
CREAT SERPL-MCNC: 0.34 MG/DL (ref 0.6–1.1)
CREAT UR-MCNC: 17 MG/DL
ERYTHROCYTE [DISTWIDTH] IN BLOOD BY AUTOMATED COUNT: 22 % (ref 10–15)
GFR SERPL CREATININE-BSD FRML MDRD: >90 ML/MIN/1.73M2
GLUCOSE BLD-MCNC: 92 MG/DL (ref 70–125)
GLUCOSE BLDC GLUCOMTR-MCNC: 119 MG/DL (ref 70–99)
GLUCOSE BLDC GLUCOMTR-MCNC: 86 MG/DL (ref 70–99)
HCT VFR BLD AUTO: 23 % (ref 35–47)
HGB BLD-MCNC: 7 G/DL (ref 11.7–15.7)
LEVETIRACETAM SERPL-MCNC: 59.1 ΜG/ML (ref 10–40)
MCH RBC QN AUTO: 27.5 PG (ref 26.5–33)
MCHC RBC AUTO-ENTMCNC: 30.4 G/DL (ref 31.5–36.5)
MCV RBC AUTO: 90 FL (ref 78–100)
PHOSPHATE SERPL-MCNC: 1.7 MG/DL (ref 2.5–4.5)
PHOSPHATE SERPL-MCNC: 2.5 MG/DL (ref 2.5–4.5)
PLATELET # BLD AUTO: 58 10E3/UL (ref 150–450)
POTASSIUM BLD-SCNC: 3.5 MMOL/L (ref 3.5–5)
PROT SERPL-MCNC: 4.5 G/DL (ref 6–8)
PROT/CREAT 24H UR: 1.24 MG/MG CR
RBC # BLD AUTO: 2.55 10E6/UL (ref 3.8–5.2)
SARS-COV-2 RNA RESP QL NAA+PROBE: NEGATIVE
SODIUM SERPL-SCNC: 141 MMOL/L (ref 136–145)
WBC # BLD AUTO: 5.6 10E3/UL (ref 4–11)

## 2022-09-11 PROCEDURE — 250N000013 HC RX MED GY IP 250 OP 250 PS 637: Performed by: INTERNAL MEDICINE

## 2022-09-11 PROCEDURE — 80053 COMPREHEN METABOLIC PANEL: CPT | Performed by: INTERNAL MEDICINE

## 2022-09-11 PROCEDURE — 97535 SELF CARE MNGMENT TRAINING: CPT | Mod: GP

## 2022-09-11 PROCEDURE — 82525 ASSAY OF COPPER: CPT | Performed by: INTERNAL MEDICINE

## 2022-09-11 PROCEDURE — 250N000011 HC RX IP 250 OP 636: Performed by: INTERNAL MEDICINE

## 2022-09-11 PROCEDURE — 120N000001 HC R&B MED SURG/OB

## 2022-09-11 PROCEDURE — 97162 PT EVAL MOD COMPLEX 30 MIN: CPT | Mod: GP

## 2022-09-11 PROCEDURE — 84156 ASSAY OF PROTEIN URINE: CPT | Performed by: INTERNAL MEDICINE

## 2022-09-11 PROCEDURE — 99233 SBSQ HOSP IP/OBS HIGH 50: CPT | Performed by: INTERNAL MEDICINE

## 2022-09-11 PROCEDURE — 85014 HEMATOCRIT: CPT | Performed by: INTERNAL MEDICINE

## 2022-09-11 PROCEDURE — 250N000009 HC RX 250: Performed by: INTERNAL MEDICINE

## 2022-09-11 PROCEDURE — 84630 ASSAY OF ZINC: CPT | Performed by: INTERNAL MEDICINE

## 2022-09-11 PROCEDURE — 84100 ASSAY OF PHOSPHORUS: CPT | Performed by: INTERNAL MEDICINE

## 2022-09-11 PROCEDURE — 258N000003 HC RX IP 258 OP 636: Performed by: INTERNAL MEDICINE

## 2022-09-11 PROCEDURE — U0005 INFEC AGEN DETEC AMPLI PROBE: HCPCS | Performed by: INTERNAL MEDICINE

## 2022-09-11 RX ORDER — POTASSIUM CHLORIDE 1500 MG/1
20 TABLET, EXTENDED RELEASE ORAL ONCE
Status: COMPLETED | OUTPATIENT
Start: 2022-09-11 | End: 2022-09-11

## 2022-09-11 RX ORDER — BUMETANIDE 0.5 MG/1
0.5 TABLET ORAL DAILY
Status: DISCONTINUED | OUTPATIENT
Start: 2022-09-11 | End: 2022-09-28 | Stop reason: HOSPADM

## 2022-09-11 RX ORDER — ENOXAPARIN SODIUM 100 MG/ML
1 INJECTION SUBCUTANEOUS EVERY 12 HOURS
Status: DISCONTINUED | OUTPATIENT
Start: 2022-09-12 | End: 2022-09-16

## 2022-09-11 RX ADMIN — LEVETIRACETAM 750 MG: 250 TABLET, FILM COATED ORAL at 12:20

## 2022-09-11 RX ADMIN — SODIUM PHOSPHATE, MONOBASIC, MONOHYDRATE 15 MMOL: 276; 142 INJECTION, SOLUTION INTRAVENOUS at 13:47

## 2022-09-11 RX ADMIN — THIAMINE HCL TAB 100 MG 100 MG: 100 TAB at 09:15

## 2022-09-11 RX ADMIN — PANTOPRAZOLE SODIUM 40 MG: 40 TABLET, DELAYED RELEASE ORAL at 06:51

## 2022-09-11 RX ADMIN — POTASSIUM CHLORIDE 20 MEQ: 20 TABLET, EXTENDED RELEASE ORAL at 09:15

## 2022-09-11 RX ADMIN — OXYCODONE HYDROCHLORIDE 5 MG: 5 TABLET ORAL at 21:29

## 2022-09-11 RX ADMIN — Medication 3 MG: at 23:50

## 2022-09-11 RX ADMIN — LEVETIRACETAM 750 MG: 250 TABLET, FILM COATED ORAL at 21:25

## 2022-09-11 RX ADMIN — CEFTRIAXONE 2 G: 2 INJECTION, POWDER, FOR SOLUTION INTRAMUSCULAR; INTRAVENOUS at 14:01

## 2022-09-11 RX ADMIN — FONDAPARINUX SODIUM 7.5 MG: 7.5 INJECTION SUBCUTANEOUS at 09:15

## 2022-09-11 RX ADMIN — FOLIC ACID 1 MG: 1 TABLET ORAL at 09:15

## 2022-09-11 ASSESSMENT — ACTIVITIES OF DAILY LIVING (ADL)
ADLS_ACUITY_SCORE: 53
ADLS_ACUITY_SCORE: 49
ADLS_ACUITY_SCORE: 53

## 2022-09-11 NOTE — PLAN OF CARE
Problem: Impaired Wound Healing  Goal: Optimal Wound Healing  Intervention: Promote Wound Healing  Recent Flowsheet Documentation  Taken 9/11/2022 1224 by Amrita Ely, RN  Activity Management: bedrest  Taken 9/11/2022 0946 by Amrita Ely, RN  Activity Management: bedrest   Goal Outcome Evaluation:        NSR on telemetry. Lasix changed to Bumex. Hgb 7.0 and platelets 58, Hospitalist updated. Patient refusing most repositioning despite education and encouragement. Illeostomy, S/p cath intact. Phosphorus protocol started, IV sodium phosphate running, recheck lab 2-4 hrs after infusion is complete. Potassium 3.5, 1 oral dose given, recheck in AM. Lymphedema therapy started today. Drain to right hip output 90mL cloudy yellow drainage. Covid sample sent to lab and is negative.
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
## 2022-09-11 NOTE — PROGRESS NOTES
Place of Service:  [facility]     Reason for follow up: Hx of continent urinary diversion with catheterizable stoma, s/p SP tube (9/3), now with blocked SP tube    SUBJECTIVE:  Events: no acute events overnight    IR successfully placed a new urostomy tube via the existing stoma tract. UOP 1150cc overnight since placement. Afebrile. Patient feeling okay today.    OBJECTIVE:  PHYSICAL EXAM:  Temp: 98  F (36.7  C) Temp src: Oral BP: 93/43 Pulse: 84   Resp: 20 SpO2: 100 % O2 Device: None (Room air)    General: NAD, alert, cooperative  Head: normocephalic, without abnormality / atraumatic  Abdomen: soft, non tender, non distended. No suprapubic fullness, no suprapubic tenderness. no CVA tenderness. Left colostomy. 2 SP tubes coming out of catheterizable stoma with clear yellow urine in the new SP tube.   Skin: No rashes or lesions  Psychological: alert and oriented, answers questions appropriately    LABS:  Creatinine   Date Value Ref Range Status   09/11/2022 0.34 (L) 0.60 - 1.10 mg/dL Final   05/26/2021 0.47 (L) 0.52 - 1.04 mg/dL Final     WBC   Date Value Ref Range Status   05/26/2021 5.5 4.0 - 11.0 10e9/L Final     WBC Count   Date Value Ref Range Status   09/11/2022 5.6 4.0 - 11.0 10e3/uL Final     Hemoglobin   Date Value Ref Range Status   09/11/2022 7.0 (L) 11.7 - 15.7 g/dL Final   05/26/2021 10.2 (L) 11.7 - 15.7 g/dL Final   ]  Platelet Count   Date Value Ref Range Status   09/11/2022 58 (L) 150 - 450 10e3/uL Final   05/26/2021 318 150 - 450 10e9/L Final       UA:  UA RESULTS:  Recent Labs   Lab Test 09/02/22  2144 12/31/18  1442 09/04/14  1059   COLOR Yellow   < > Yellow   APPEARANCE Turbid*   < > Turbid   URINEGLC Negative   < > Negative   URINEBILI Small*   < > Negative   URINEKETONE Negative   < > Negative   SG 1.010   < > 1.020   UBLD Large*   < > Small*   URINEPH 7.5*   < > 7.0   PROTEIN 30 *   < > 30*   UROBILINOGEN  --   --  0.2   NITRITE Negative   < > Positive*   LEUKEST Negative   < > Large*    RBCU 0   < > O - 2   WBCU 5   < > 10-25*    < > = values in this interval not displayed.       Lab Results: personally reviewed.     ASSESSMENT/PLAN:  Felicia Ellison is being seen by [facility] for a hx of continent urinary diversion with catheterizable stoma, s/p SP tube (9/3), now with blocked SP tube.    - Urology initially consulted 9/3 after an SP tube was placed for acute retention. Patient had developed septic shock immediately follow SP tube placement and was therefore admitted. Septic shock resolved.  - SP tube had been draining well since placement with occasional RN flushes. SP tube output significantly decreased yesterday (9/10) with leaking around insertion site.   - IR successfully placed a new urostomy tube via the existing stoma. UOP has been great since insertion. Continue PRN flushes for decreased output.  - Recommend maintaining SP tube at discharge and follow up with patient's primary urologist (Dr. Russ Burns).   - Urology will sign off at this time. Please reach out with any further questions.     This case was discussed with:  Dr Javed Marvin PA-C  [facility]   [phone]
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
## 2022-09-11 NOTE — PLAN OF CARE
"  Problem: Plan of Care - These are the overarching goals to be used throughout the patient stay.    Goal: Plan of Care Review/Shift Note  Description: The Plan of Care Review/Shift note should be completed every shift.  The Outcome Evaluation is a brief statement about your assessment that the patient is improving, declining, or no change.  This information will be displayed automatically on your shift note.  Outcome: Ongoing, Progressing  Goal: Patient-Specific Goal (Individualized)  Description: You can add care plan individualizations to a care plan. Examples of Individualization might be:  \"Parent requests to be called daily at 9am for status\", \"I have a hard time hearing out of my right ear\", or \"Do not touch me to wake me up as it startles me\".  Outcome: Ongoing, Progressing  Goal: Absence of Hospital-Acquired Illness or Injury  Outcome: Ongoing, Progressing  Intervention: Identify and Manage Fall Risk  Recent Flowsheet Documentation  Taken 9/11/2022 0356 by Eliza Murray RN  Safety Promotion/Fall Prevention:   bed alarm on   safety round/check completed  Taken 9/11/2022 0000 by Eliza Murray RN  Safety Promotion/Fall Prevention:   bed alarm on   safety round/check completed  Intervention: Prevent Skin Injury  Recent Flowsheet Documentation  Taken 9/11/2022 0356 by Eliza Murray RN  Body Position:   supine, legs elevated   supine, head elevated  Taken 9/11/2022 0000 by Eliza Murray RN  Body Position:   supine, legs elevated   supine, head elevated  Intervention: Prevent and Manage VTE (Venous Thromboembolism) Risk  Recent Flowsheet Documentation  Taken 9/11/2022 0356 by Eliza Murray RN  Activity Management: bedrest  Taken 9/11/2022 0000 by Eliza Murray RN  Activity Management: bedrest  Intervention: Prevent Infection  Recent Flowsheet Documentation  Taken 9/11/2022 0356 by Eliza Murray RN  Infection Prevention:   hand hygiene promoted   rest/sleep promoted  Taken 9/11/2022 0000 by Eliza Murray" RN  Infection Prevention:   hand hygiene promoted   rest/sleep promoted  Goal: Optimal Comfort and Wellbeing  Outcome: Ongoing, Progressing  Goal: Readiness for Transition of Care  Outcome: Ongoing, Progressing     Problem: Risk for Delirium  Goal: Optimal Coping  Outcome: Ongoing, Progressing  Goal: Improved Behavioral Control  Outcome: Ongoing, Progressing  Goal: Improved Attention and Thought Clarity  Outcome: Ongoing, Progressing  Goal: Improved Sleep  Outcome: Ongoing, Progressing     Problem: UTI (Urinary Tract Infection)  Goal: Improved Infection Symptoms  Outcome: Ongoing, Progressing     Problem: Malnutrition  Goal: Improved Nutritional Intake  Outcome: Ongoing, Progressing     Problem: Impaired Wound Healing  Goal: Optimal Wound Healing  Outcome: Ongoing, Progressing  Intervention: Promote Wound Healing  Recent Flowsheet Documentation  Taken 9/11/2022 0356 by Eliza Murray RN  Activity Management: bedrest  Taken 9/11/2022 0000 by Eliza Murray, RN  Activity Management: bedrest     Problem: Infection  Goal: Absence of Infection Signs and Symptoms  Outcome: Ongoing, Progressing     Problem: VTE (Venous Thromboembolism)  Goal: VTE (Venous Thromboembolism) Symptom Resolution  Outcome: Ongoing, Progressing     Problem: Fluid Volume Excess  Goal: Fluid Balance  Outcome: Ongoing, Progressing   Goal Outcome Evaluation:     Pt slept well overnight, awake only with cares. Pt denied pain.

## 2022-09-12 ENCOUNTER — APPOINTMENT (OUTPATIENT)
Dept: PHYSICAL THERAPY | Facility: HOSPITAL | Age: 57
DRG: 853 | End: 2022-09-12
Attending: INTERNAL MEDICINE
Payer: MEDICARE

## 2022-09-12 LAB
BASOPHILS # BLD AUTO: 0 10E3/UL (ref 0–0.2)
BASOPHILS NFR BLD AUTO: 0 %
BLD PROD TYP BPU: NORMAL
BLOOD COMPONENT TYPE: NORMAL
CODING SYSTEM: NORMAL
CREAT SERPL-MCNC: 0.33 MG/DL (ref 0.6–1.1)
CROSSMATCH: NORMAL
EOSINOPHIL # BLD AUTO: 0.1 10E3/UL (ref 0–0.7)
EOSINOPHIL NFR BLD AUTO: 2 %
FIBRINOGEN PPP-MCNC: 465 MG/DL (ref 170–490)
GFR SERPL CREATININE-BSD FRML MDRD: >90 ML/MIN/1.73M2
HAPTOGLOB SERPL-MCNC: 141 MG/DL (ref 32–197)
HGB BLD-MCNC: 6.4 G/DL (ref 11.7–15.7)
HGB BLD-MCNC: 6.5 G/DL (ref 11.7–15.7)
IMM GRANULOCYTES # BLD: 0.2 10E3/UL
IMM GRANULOCYTES NFR BLD: 4 %
ISSUE DATE AND TIME: NORMAL
LDH SERPL L TO P-CCNC: 132 U/L (ref 0–250)
LYMPHOCYTES # BLD AUTO: 1.5 10E3/UL (ref 0.8–5.3)
LYMPHOCYTES NFR BLD AUTO: 27 %
MONOCYTES # BLD AUTO: 0.3 10E3/UL (ref 0–1.3)
MONOCYTES NFR BLD AUTO: 5 %
NEUTROPHILS # BLD AUTO: 3.8 10E3/UL (ref 1.6–8.3)
NEUTROPHILS NFR BLD AUTO: 62 %
NRBC # BLD AUTO: 0 10E3/UL
NRBC BLD AUTO-RTO: 0 /100
PHOSPHATE SERPL-MCNC: 2.8 MG/DL (ref 2.5–4.5)
PLATELET # BLD AUTO: 65 10E3/UL (ref 150–450)
POTASSIUM BLD-SCNC: 3.8 MMOL/L (ref 3.5–5)
UNIT ABO/RH: NORMAL
UNIT NUMBER: NORMAL
UNIT STATUS: NORMAL
UNIT TYPE ISBT: 600
WBC # BLD AUTO: 5.6 10E3/UL (ref 4–11)

## 2022-09-12 PROCEDURE — 85018 HEMOGLOBIN: CPT | Performed by: INTERNAL MEDICINE

## 2022-09-12 PROCEDURE — 99233 SBSQ HOSP IP/OBS HIGH 50: CPT | Performed by: INTERNAL MEDICINE

## 2022-09-12 PROCEDURE — 85384 FIBRINOGEN ACTIVITY: CPT | Performed by: INTERNAL MEDICINE

## 2022-09-12 PROCEDURE — 99232 SBSQ HOSP IP/OBS MODERATE 35: CPT | Performed by: INTERNAL MEDICINE

## 2022-09-12 PROCEDURE — P9016 RBC LEUKOCYTES REDUCED: HCPCS | Performed by: INTERNAL MEDICINE

## 2022-09-12 PROCEDURE — 250N000011 HC RX IP 250 OP 636: Performed by: INTERNAL MEDICINE

## 2022-09-12 PROCEDURE — 250N000013 HC RX MED GY IP 250 OP 250 PS 637: Performed by: INTERNAL MEDICINE

## 2022-09-12 PROCEDURE — 84100 ASSAY OF PHOSPHORUS: CPT | Performed by: INTERNAL MEDICINE

## 2022-09-12 PROCEDURE — 83615 LACTATE (LD) (LDH) ENZYME: CPT | Performed by: INTERNAL MEDICINE

## 2022-09-12 PROCEDURE — 84132 ASSAY OF SERUM POTASSIUM: CPT | Performed by: INTERNAL MEDICINE

## 2022-09-12 PROCEDURE — 120N000001 HC R&B MED SURG/OB

## 2022-09-12 PROCEDURE — 85049 AUTOMATED PLATELET COUNT: CPT | Performed by: INTERNAL MEDICINE

## 2022-09-12 PROCEDURE — 82565 ASSAY OF CREATININE: CPT | Performed by: INTERNAL MEDICINE

## 2022-09-12 PROCEDURE — 85004 AUTOMATED DIFF WBC COUNT: CPT | Performed by: INTERNAL MEDICINE

## 2022-09-12 PROCEDURE — 97535 SELF CARE MNGMENT TRAINING: CPT | Mod: GP

## 2022-09-12 RX ORDER — MULTIVITAMIN,THERAPEUTIC
1 TABLET ORAL DAILY
Status: DISCONTINUED | OUTPATIENT
Start: 2022-09-12 | End: 2022-09-28 | Stop reason: HOSPADM

## 2022-09-12 RX ADMIN — ENOXAPARIN SODIUM 50 MG: 60 INJECTION SUBCUTANEOUS at 10:41

## 2022-09-12 RX ADMIN — FOLIC ACID 1 MG: 1 TABLET ORAL at 10:35

## 2022-09-12 RX ADMIN — THIAMINE HCL TAB 100 MG 100 MG: 100 TAB at 10:36

## 2022-09-12 RX ADMIN — OXYCODONE HYDROCHLORIDE 5 MG: 5 TABLET ORAL at 01:17

## 2022-09-12 RX ADMIN — ENOXAPARIN SODIUM 50 MG: 60 INJECTION SUBCUTANEOUS at 21:32

## 2022-09-12 RX ADMIN — PANTOPRAZOLE SODIUM 40 MG: 40 TABLET, DELAYED RELEASE ORAL at 10:36

## 2022-09-12 RX ADMIN — THERA TABS 1 TABLET: TAB at 17:15

## 2022-09-12 RX ADMIN — LEVETIRACETAM 750 MG: 250 TABLET, FILM COATED ORAL at 21:31

## 2022-09-12 RX ADMIN — BUMETANIDE 0.5 MG: 0.5 TABLET ORAL at 10:40

## 2022-09-12 RX ADMIN — ACETAMINOPHEN 650 MG: 325 TABLET, FILM COATED ORAL at 21:30

## 2022-09-12 RX ADMIN — Medication 3 MG: at 21:31

## 2022-09-12 RX ADMIN — CEFTRIAXONE 2 G: 2 INJECTION, POWDER, FOR SOLUTION INTRAMUSCULAR; INTRAVENOUS at 13:49

## 2022-09-12 RX ADMIN — LEVETIRACETAM 750 MG: 250 TABLET, FILM COATED ORAL at 10:35

## 2022-09-12 ASSESSMENT — ACTIVITIES OF DAILY LIVING (ADL)
ADLS_ACUITY_SCORE: 53
ADLS_ACUITY_SCORE: 51
ADLS_ACUITY_SCORE: 53
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
## 2022-09-12 NOTE — PROVIDER NOTIFICATION
There was only about 30 ml urine from her suprapubic catheter since writer started shift at 1500. Pt's abdomen was distended. Bladder scan done and showed 851 ml. [place] [facility] was called and spoke with Dr. Everardo Marvin. Ordered to flush SP, which was done. Pt had urine with lots of cloudy sediments after flushing SP. Total output was 800 ml.

## 2022-09-12 NOTE — PROGRESS NOTES
"CLINICAL NUTRITION SERVICES - ASSESSMENT NOTE     Nutrition Prescription    RECOMMENDATIONS FOR MDs/PROVIDERS TO ORDER:  None    Malnutrition Status:    Severe in context of acute on chronic illness    Recommendations already ordered by Registered Dietitian (RD):  Add ensure enlive tid = 1050 kcal, 60 g protein  Change diet to regular-SSi and BG have been discontinued, pt does not have DM    Future/Additional Recommendations:  Adjust supplement pending intake, weight, tolerance, acceptance, wound healing  Pt will not be able to meet her nutrition needs in her previous living arrangement per hx      EVALUATION OF PROGRESS TOWARDS GOALS      NUTRITION HISTORY  Pt lives in assisted living facility.  Per chart review, RD nutrition diagnosis of severe malnutrition 1/8/2020  Note indicates pt was 5'9\" and 106 lb at that time.  Pt reports she has no appetite and eats \"when I am hungry\". She reports she can go days without eating anything-multiple days a week.   She reports painful abdomen and poor appetite \"ever since that doctor messed with my stomach\" -ileostomy x 10 years.     CURRENT NUTRITION ORDERS  Diet: Moderate consistent CHO diet    Intake/Tolerance: 75% of breakfast and lunch x 2 days. Supper ordered 9/11 but intake not documented  Estimated daily intakes:  677 kcal, 17 g protein over 2/3 meals. 3rd meal ordered at 576 kcal, 28 g protein- intake not documented  1266 kcal, 49 g protein over 2 meals for the day 9/10  Meeting 80% of estimated kcal and 40% of estimated protein needs    Intake < 50% 9/3-9/8  Met > 75% of kcal and 40% of protein needs 9/10    Pt continues to report no appetite and her meals are \"going\". She is very willing to  Take ensure during the day. She has had these in the past and likes them.     LABS  Labs reviewed  Hgb 6.4 (L)  Phos 2.8 improved    MEDICATIONS  Medications reviewed: bumex daily, iv abx, folic acid,protonix, thiamine    ANTHROPOMETRICS  Height: 162.6 cm (5' " "4\"[estimate/confused[)  Admit weight: 46.9 kg (103 lb 6.3 oz)  Most Recent Weight: 54.7 kg (120 lb 9.6 oz) 9/10, down 8 lb with diuresis  IBW: 54.5 kg (if height is accurate)  BMI: Normal BMI (if weight/height is accurate)  Weight History:   09/07/22 56.5 kg (124 lb 9/6 oz)  09/06/22 53.9 kg (118 lb 144 oz)  09/05/22  51.3 kg (113 lb 1.6 oz)  Wt Readings from Last 3 Encounters:   09/10/22 54.7 kg (120 lb 9.6 oz)   07/30/22 46.6 kg (102 lb 12.8 oz)   07/11/22 52.2 kg (115 lb)   4/22/22 107 lb    Dosing Weight: 54.5 kg (IBW)    ASSESSED NUTRITION NEEDS  Estimated Energy Needs: 1526+ kcals/day (28 kcal/kg w/ paraplegia)  Justification: Wound healing  Estimated Protein Needs: 55-65+ grams protein/day (1 - 1.2 grams of pro/kg)  Justification: Wound healing  Estimated Fluid Needs: 1635 mL/day (30 mL/kg)   Justification: Maintenance    PHYSICAL FINDINGS  Per chart,  Skin: St 3 or greater sacrum, rt IT per woc; healing per nursing (Rounds)  Drain, right hip  Edema: +3-+4 extremity, increased  GI: normoactive BS, chronic abdomen pain  Ileostomy: 50 ml yesterday, loose    MALNUTRITION:  % Weight Loss:  None noted (wt increasing d/t fluid accumulation)  % Intake:  </= 50% for >/= 5 days (severe malnutrition)  Subcutaneous Fat Loss:  Severe orbital and bucal loss, moderate to severe thoracic  Muscle Loss:  Moderate temporal loss, moderate deltoid loss, severe clavicle loss  Fluid Retention:  +2-+4 arms and legs (moderate)    Malnutrition Diagnosis: Severe malnutrition   In Context of:  Acute illness or injury    NUTRITION DIAGNOSIS  Malnutrition related to acute on chronic illness as evidenced by intake <=50% for >= 5 days, severe fluid accumulation, severe fat and muscle loss    INTERVENTIONS  Implementation  Add ensure enlive tid = 1050 kcal, 60 g protein  Change diet to regular-SSi and BG have been discontinued, pt does not have DM    Goals  Diet advancement- met  Meet estimated nutrition needs- ongoing, progressing  Wound " healing per woc- ongoing     Monitoring/Evaluation  Progress toward goals will be monitored and evaluated per protocol.

## 2022-09-12 NOTE — PROGRESS NOTES
"INFECTIOUS DISEASE FOLLOW UP NOTE      ASSESSMENT:  1. Large right thigh abscess, due to Group B strep. Percutaneous drain placed 9/3. Gram stain GPC, culture Group B strep. Blood culture negative. Sepsis resolved now off pressors. Drain check 9/8 with minimal residual fluid, drain upsized. CT 9/4 still showed large collection the day after drain placement. CRP is normal 0.4. CT pelvis 9/10 notes that fluid collection is in hip joint space with drainage catheter in joint too, suggestive septic R hip. Effusion decreased in size.   2. Urinary retention. Low suspicion for urinary source. Mixed tulio in UC likely colonization. UA without pyuria.   3. Paraplegia   4. Decubitus wounds --  Large sacral looks clean.   5. Penicillin and levofloxacin allergies. Tolerating meropenem, and doses of cefepime. Received ceftriaxone while inpatient in April 2022 without sign of adverse reaction per notes.   6. H/o MRSA in remote past. Not currently seen on clinical specimens.   7. Thrombocytopenia.     PLAN:  Ceftriaxone once daily, plan on 4-6 weeks IV course  Dr. Hunter following in case this would need debridement. Discussed with Dr. Hunter today. If infection in hip joint itself, may need assistance of orthopedics. Expect Felicia to need high level of care for weeks.       Mamadou Artis MD  [facility]  [phone] [facility] paging    ______________________________________________________________________    SUBJECTIVE / INTERVAL HISTORY: wants to go home. Angry about urinary catheter situation.     ROS: paraplegia. All other systems negative except as listed above.        OBJECTIVE:  BP 98/55 (BP Location: Left arm, Patient Position: Supine)   Pulse 80   Temp 98.2  F (36.8  C)   Resp 16   Ht 1.626 m (5' 4\")   Wt 54.7 kg (120 lb 9.6 oz)   LMP  (LMP Unknown)   SpO2 97%   BMI 20.70 kg/m               GENERAL:  In no acute distress. Room air.   EYES: No conjunctival injection  HEAD, EARS, " NOSE, MOUTH, AND THROAT: Nontraumatic, mouth without oral ulcers.   RESPIRATORY: Clear anterior  CARDIOVASCULAR: Regular rate and rhythm, normal S1 and S2, no murmurs, rubs, or gallops.  ABDOMEN: Soft, nontender, colostomy, urine tube from abdomen.  Normal bowel sounds.  MUSCULOSKELETAL: No synovitis. DENIA R thigh with copious serous fluid  SKIN/HAIR/NAILS: No rashes, no signs of peripheral emboli. Wound photo noted  NEUROLOGIC: paraplegia   PICC R UE        Antibiotics:  Ceftriaxone 9/6-    Previous  Cefepime 9/3-4  Flagyl 9/3-4  Clindamycin 9/2-3  meropenem 9/4-6  Vancomycin 9/3-6    Pertinent labs:    Recent Labs   Lab 09/12/22  0835 09/12/22  0532 09/11/22  0657 09/10/22  0634 09/09/22  0637   WBC 5.6  --  5.6 6.0 6.4   HGB 6.5* 6.4* 7.0* 7.1* 8.0*   HCT  --   --  23.0* 23.3* 26.1*   PLT  --  65* 58* 51* 58*        Recent Labs   Lab 09/11/22  0657 09/10/22  0634 09/09/22  0637    140 139   CO2 27 26 25   BUN 14 17 18        Lab Results   Component Value Date    CRP 0.4 09/08/2022    CRP 98.6 (H) 12/12/2018    CRP 6.1 01/20/2015         Lab Results   Component Value Date    ALT <9 09/11/2022    AST 8 09/11/2022    ALKPHOS 83 09/11/2022         MICROBIOLOGY DATA:  9/3 abscess gram stain GPC, PMNs, culture Group B strep  9/2 blood negative     RADIOLOGY:  CT Abdomen Peritonium Abscess Drainage    Result Date: 9/3/2022  EXAM: 1. PERCUTANEOUS DRAIN PLACEMENT RIGHT UPPER THIGH/GROIN COLLECTION 2. CT GUIDANCE 3. CONSCIOUS SEDATION LOCATION: [facility] DATE/TIME: 9/3/2022 12:19 PM INDICATION: right hip drain placement TECHNIQUE: Dose reduction techniques were used. PROCEDURE: Informed consent obtained. Site marked. Prior images reviewed. Required items made available. Patient identity confirmed verbally and with arm band. Patient reevaluated immediately before administering sedation. Universal protocol was followed. Time out performed. The site was prepped and draped in sterile fashion. 10  mL of 1% lidocaine was infused into the local soft tissues. Using standard technique and under direct CT guidance, a 12 Portuguese drainage catheter was inserted into the fluid collection.  SPECIMEN: 150 mL of purulent fluid was aspirated and sent to lab for cultures and Gram stain. BLOOD LOSS: Minimal. The patient tolerated the procedure well. No immediate complications. SEDATION: Versed 0 mg. Fentanyl 50 mcg. The procedure was performed with administration intravenous conscious sedation with appropriate preoperative, intraoperative, and postoperative evaluation. 15 minutes of supervised face to face conscious sedation time was provided by a radiology nurse under my direct supervision.     IMPRESSION: 1.  Successful CT-guided drain placement into a right upper thigh/groin abscess.     Echocardiogram Complete    Result Date: 9/10/2022  [id number]^ASHLEY^ARIN^[place] [address]  Name: KISHOR PINEDA MRN: [id number] : 1965 Study Date: 09/10/2022 03:21 PM Age: 56 yrs Gender: Female Patient Location: [facility] Reason For Study: CHF Ordering Physician: ARIN RAMIREZ Referring Physician: CODEY WILSON Performed By: ACE  BSA: 1.6 m2 Height: 64 in Weight: 120 lb HR: 92 BP: 91/58 mmHg ______________________________________________________________________________ Procedure Complete Echo Adult. ______________________________________________________________________________ Interpretation Summary  The left ventricle is normal in size. The visual ejection fraction is 55-60%. No regional wall motion abnormalities noted. Regional wall motion abnormalities cannot be excluded due to limited visualization. Diastolic Doppler findings (E/E' ratio and/or other parameters) suggest left ventricular filling pressures are normal. Sinus rhythm was noted. Technically difficult, suboptimal study. Consider cardiac MRI for better imaging.  ______________________________________________________________________________ Left Ventricle The left ventricle is normal in size. There is normal left ventricular wall thickness. Diastolic Doppler findings (E/E' ratio and/or other parameters) suggest left ventricular filling pressures are normal. The visual ejection fraction is 55-60%. Regional wall motion abnormalities cannot be excluded due to limited visualization. No regional wall motion abnormalities noted.  Right Ventricle Normal right ventricle size and systolic function. TAPSE is normal, which is consistent with normal right ventricular systolic function.  Atria The left atrium is not well visualized. Right atrium not well visualized.  Mitral Valve The mitral valve is not well visualized. There is mild (1+) mitral regurgitation. There is no mitral valve stenosis.  Tricuspid Valve The tricuspid valve is not well visualized. There is mild (1+) tricuspid regurgitation.  Aortic Valve The aortic valve is not well visualized. No aortic regurgitation is present. No aortic stenosis is present.  Pulmonic Valve The pulmonic valve is not well visualized.  Vessels The aorta root is normal. Normal size ascending aorta.  Pericardium There is no pericardial effusion.  Rhythm Sinus rhythm was noted. ______________________________________________________________________________ MMode/2D Measurements & Calculations IVSd: 0.77 cm  LVIDd: 3.9 cm LVIDs: 2.6 cm LVPWd: 0.70 cm FS: 32.6 % LV mass(C)d: 81.4 grams LV mass(C)dI: 51.7 grams/m2 Ao root diam: 3.1 cm asc Aorta Diam: 3.2 cm LVOT diam: 2.0 cm LVOT area: 3.1 cm2 RWT: 0.36  Doppler Measurements & Calculations MV E max deanna: 53.8 cm/sec MV A max deanna: 67.9 cm/sec MV E/A: 0.79 MV dec slope: 449.7 cm/sec2 MV dec time: 0.12 sec Ao V2 max: 138.1 cm/sec Ao max P.0 mmHg Ao V2 mean: 94.7 cm/sec Ao mean P.2 mmHg Ao V2 VTI: 25.7 cm MACKENZIE(I,D): 1.9 cm2 MACKENZIE(V,D): 1.8 cm2 LV V1 max P.7 mmHg LV V1 max: 82.0 cm/sec LV V1 VTI: 16.0  cm SV(LVOT): 49.1 ml SI(LVOT): 31.2 ml/m2 PA acc time: 0.09 sec AV Jered Ratio (DI): 0.59  MACKENZIE Index (cm2/m2): 1.2 E/E': 4.4 E/E' av.0 Lateral E/e': 4.4 Medial E/e': 7.5 Peak E' Jered: 12.2 cm/sec  ______________________________________________________________________________ Report approved by: Alexander Huston 09/10/2022 04:57 PM       US Lower Extremity Venous Duplex Bilateral    Result Date: 2022  EXAM: US LOWER EXTREMITY VENOUS DUPLEX BILATERAL LOCATION: [facility] DATE/TIME: 2022 3:47 PM INDICATION: edema, r o dvt COMPARISON: None. TECHNIQUE: Venous Duplex ultrasound of bilateral lower extremities with and without compression, augmentation and duplex. Color flow and spectral Doppler with waveform analysis performed. FINDINGS: Exam includes the common femoral, femoral, popliteal veins as well as segmentally visualized deep calf veins and greater saphenous vein. RIGHT: No deep vein thrombosis. No superficial thrombophlebitis. No popliteal cyst. LEFT: There is deep venous thrombus involving the common femoral vein, the femoral vein in the thigh and the popliteal vein. This appears partially occlusive in the common femoral vein and popliteal vein and completely occlusive throughout the femoral vein in the thigh. There is probable extension into the profunda femoris vein on the left. No superficial thrombus. No popliteal cyst.     IMPRESSION: Left deep venous thrombus involving the common femoral vein, femoral vein in the thigh and popliteal vein with probable extension into the profunda femoris vein but this is suboptimally visualized because of edema and body habitus. The thrombus appears occlusive throughout the femoral vein in the thigh and is nonocclusive in the common femoral and popliteal vein. NOTE: ABNORMAL REPORT THE DICTATION ABOVE DESCRIBES AN ABNORMALITY FOR WHICH FOLLOW-UP IS NEEDED. . [Critical Result: Left lower extremity deep venous thrombosis] Finding was  identified on 9/9/2022 3:54 PM. 1.  The patient's nurse, Shanita, was contacted by me on 9/9/2022 4:00 PM and verbalized understanding of the critical result.     US Upper Extremity Venous Duplex Bilat    Result Date: 9/9/2022  EXAM: US UPPER EXTREMITY VENOUS DUPLEX BILATERAL LOCATION: [facility] DATE/TIME: 9/9/2022 4:00 PM INDICATION: edema, r o DVT COMPARISON: None. TECHNIQUE: Venous Duplex ultrasound of both upper extremities with (when possible) and without compression, augmentation, and duplex. Color flow and spectral Doppler with waveform analysis performed. FINDINGS: Ultrasound includes evaluation of the internal jugular veins, innominate veins, subclavian veins, axillary veins, and brachial veins. The superficial cephalic and basilic veins were also evaluated where seen. RIGHT: There is nonocclusive thrombus adjacent to the PICC catheter in the right brachial vein and right subclavian vein this becomes occlusive in the distal brachial vein. There is nonocclusive thrombus in the right internal jugular vein. No superficial  thrombophlebitis. LEFT: There is occlusive thrombus in the left internal jugular vein and left innominate vein. There is occlusive superficial thrombus in the left cephalic vein.     IMPRESSION: No deep venous thrombosis in the bilateral upper extremities. [Critical Result: Bilateral upper extremity deep venous thrombosis] Finding was identified on 9/9/2022 4:03 PM. 1.  The patient's nurse, Shanita was contacted by me on 9/9/2022 4:05 PM and verbalized understanding of the critical result.     XR Chest Port 1 View    Result Date: 9/3/2022  EXAM: XR CHEST PORTABLE 1 VIEW LOCATION: [facility] DATE/TIME: 09/03/2022, 6:08 AM INDICATION: Status post unsuccessful left IJ placement, rule out pneumothorax. COMPARISON: 04/22/2022.     IMPRESSION: Negative for pneumothorax. Normal heart size and pulmonary vascularity. Lungs are clear.  Generalized osteopenia. Posterior idalia and pedicle screw fixation lower thoracic and lumbar spine.     IR Abscess Tube Change    Result Date: 9/8/2022  LOCATION: [facility] DATE: 9/8/2022 PROCEDURE: ABSCESS TUBE CHECK AND EXCHANGE INTERVENTIONAL RADIOLOGIST: Fer Gutierrez MD INDICATION: Right hip drain placement on 9/3/2022. Plan for exchange/upsize. CONSENT: The risks, benefits and alternatives of an abscess tube check with possible exchange, upsizing and/or removal were discussed with the patient or representative in detail. All questions were answered. Informed consent was given to proceed with the procedure. MODERATE SEDATION: None. CONTRAST: 25 cc Omnipaque ANTIBIOTICS: None. ADDITIONAL MEDICATIONS: None. FLUOROSCOPIC TIME: 0.5 minutes. RADIATION DOSE: Air Kerma: 3 mGy. COMPLICATIONS: No immediate complications. UNIVERSAL PROTOCOL: The operative site was marked and any prior imaging was reviewed. Required items including blood products, implants, devices and special equipment was made available. Patient identity was confirmed either verbally, with demographic information, hospital assigned identification or other identification markers. A timeout was performed immediately prior to the procedure. STERILE BARRIER TECHNIQUE: Maximum sterile barrier technique was used. Cutaneous antisepsis was performed at the operative site with application of 2% chlorhexidine and large sterile drape. Prior to the procedure, the  and assistant performed hand hygiene and wore hat, mask, sterile gown, and sterile gloves during the entire procedure. PROCEDURE:  Multiprojectional  images were obtained. The previous drainage catheter was injected with a small amount of contrast, and multiple images were obtained. Based on the results of the study, the drain was exchanged over a guidewire for a 16 Bengali Fred drainage. The cavity was aggressively irrigated with saline. FINDINGS: Abscessogram  demonstrates minimal residual abscess cavity. After exchange, the drain is appropriately positioned.     IMPRESSION:  Right hip drain check demonstrates appropriate positioning of the drain. There is minimal residual fluid. The drain was exchanged/upsized for a 16 Pakistani Fred drainage. Irrigation of the cavity reveals serosanguineous fluid.    IR Suprapubic Catheter Placment    Result Date: 9/11/2022  [place] [facility] LOCATION: [facility] DATE: 9/10/2022 PROCEDURE: 1. Transstomal urinary catheter placement. INTERVENTIONAL RADIOLOGIST: Jonh Cline MD HISTORY: 56 years-old Female with history of diverting urostomy. The patient had a catheter placed in the emergency room at an outside facility via direct trocar technique through the proximal aspect of the stoma. This catheter is malfunctioning. CONSENT: The risks, benefits and alternatives of the procedure were discussed with the patient  in detail. All questions were answered. Informed consent was given to proceed with the procedure. SEDATION: None.. CONTRAST: 15 mL ANTIBIOTICS: Patient receiving antibiotics ADDITIONAL MEDICATIONS: None. FLUOROSCOPIC TIME: 2.6 RADIATION DOSE: Air Kerma: 73 mGy. COMPLICATIONS: No immediate complications. STERILE BARRIER TECHNIQUE: Maximum sterile barrier technique was used. Cutaneous antisepsis was performed at the operative site with application of 2% chlorhexidine and large sterile drape. Prior to the procedure, the  and assistant performed hand hygiene and wore hat, mask, sterile gown, and sterile gloves during the entire procedure. PROCEDURE/FINDINGS: A  image was obtained. A 5 Pakistani KMP catheter, with the aid of a 0.035 inch angled Glidewire, was advanced through the existing stoma tract into the diversion pouch. It was noted that the pre-existing catheter, while initially within the stoma, traverse through the side wall of the stoma directly into the pouch itself. The position of the  newly placed catheter was confirmed by contrast injection. A 0.035 inch Amplatz wire was advanced through the catheter into the urinary diversion pouch. A 16 Central African Crow tip catheter was advanced over the Amplatz wire. The balloon was inflated and contrast demonstration confirmed its position. The catheter was secured in place. The catheter was attached to a gravity drainage bag.     IMPRESSION: Placement of a transstomal urinary catheter via the existing stoma.     CT ABDOMEN PELVIS W CONTRAST    Result Date: 9/2/2022  EXAM: CT ABDOMEN PELVIS W CONTRAST LOCATION: [facility] DATE/TIME: 9/2/2022 7:16 PM INDICATION: Abdominal distension paraplegia difficulty passing catheter to neobladder COMPARISON: 12/18/2018. TECHNIQUE: CT scan of the abdomen and pelvis was performed following injection of IV contrast. Multiplanar reformats were obtained. Dose reduction techniques were used. CONTRAST: 50ml isovue 370 FINDINGS: LOWER CHEST: Atelectasis. 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 Nonocclusive thrombus in the proximal superior mesenteric vein. Multiple perigastric collateral vessels. PANCREAS: Normal. SPLEEN: Normal. ADRENAL GLANDS: Normal. KIDNEYS/BLADDER: There is mild right-sided pyelocaliectasis with normal caliber ureter. Dependent stone within the right renal pelvis. BOWEL: Descending colostomy with Franklin pouch. LYMPH NODES: Normal. VASCULATURE: Unremarkable. 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 MUSCULOSKELETAL: There is a very large complex peripherally enhancing fluid collection within the right buttock extending from the iliac crest down into the hip joints and into the right thigh. This is not completely imaged extending further into the thigh than is seen on the CT scan. Destructive changes in both hips. This fluid collection measures at least 20 x 14 cm  scoliosis.     IMPRESSION: 1.  Significant distention of the neobladder which measures up 24 cm and extends into the right lower abdomen. 2.  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 Multiple perigastric venous collaterals. Nonocclusive thrombus in the proximal superior mesenteric vein. 3.  Franklin pouch with descending colostomy. 4.  Destructive changes both hips. There is a massive peripherally enhancing fluid collection in the right buttock, hip and thigh extending into the distal thigh. The distal extent of the fluid collection is not imaged with CT. This collection measures at least 20 x 14 cm. 5.  There is mild dilatation of the right intrarenal collecting system with normal caliber ureter and an element of UPJ obstruction is possible. Dependent stone in the right renal pelvis. NOTE: ABNORMAL REPORT 1.  THE DICTATION ABOVE DESCRIBES AN ABNORMALITY FOR WHICH FOLLOW-UP IS NEEDED.     CT Abdomen Pelvis w/o Contrast    Result Date: 9/10/2022  EXAM: CT ABDOMEN PELVIS W/O CONTRAST LOCATION: [facility] DATE/TIME: 9/10/2022 2:53 PM INDICATION: concern for suprapubic catheter malposition, increased abdominal pain. Lower abdominal pain. COMPARISON: 9/4/2022 TECHNIQUE: CT scan of the abdomen and pelvis was performed without IV contrast. Multiplanar reformats were obtained. Dose reduction techniques were used. CONTRAST: None. FINDINGS: LOWER CHEST: Moderate bilateral pleural effusions have increased in size mildly. These appear free-flowing. Associated dependent atelectasis. HEPATOBILIARY: The liver is not well evaluated on this noncontrast exam. No significant interval change in appearance. PANCREAS: Normal. SPLEEN: Mild splenomegaly, unchanged. ADRENAL GLANDS: Normal. KIDNEYS/BLADDER: The kidneys are partially obscured by metal artifact spinal hardware. No hydronephrosis. Cystectomy with urinary diversion. There is a percutaneous catheter in the right abdomen  with tip in the ileal conduit. Significant distention of the conduit has developed since yesterday suggesting malfunction of the catheter. BOWEL: Bowel loops are normal caliber. LYMPH NODES: Not well assessed on this noncontrast exam. VASCULATURE: Unremarkable. PELVIC ORGANS: The uterus is normal in size. MUSCULOSKELETAL: Generalized edema within the subcutaneous adipose tissues has increased mildly from 9/4/2022. A drain has been placed in the right hip joint and the effusion has significantly decreased in size. Deformity and degenerative changes of both  hips. Extensive spinal hardware and associated artifact.     IMPRESSION: 1.  Cystectomy with ileal conduit urinary diversion. The conduit has become significantly distended from 9/4/2022. A percutaneous catheter terminates within the conduit. The distention suggests malfunction of the catheter such as plugging. No hydronephrosis of the kidneys. 2.  Moderate bilateral pleural effusions have increased in size mildly. 3.  Increase in mild generalized subcutaneous edema. 4.  Decrease in size of the right hip joint effusion. A drainage catheter is present within the joint space posteriorly.     CT Abdomen Pelvis w/o Contrast    Result Date: 9/4/2022  EXAM: CT ABDOMEN PELVIS W/O CONTRAST LOCATION: [facility] DATE/TIME: 9/4/2022 9:27 AM INDICATION: F U abscess with drain placement COMPARISON: 09/02/2022 TECHNIQUE: CT scan of the abdomen and pelvis was performed without IV contrast. Multiplanar reformats were obtained. Dose reduction techniques were used. CONTRAST: None. FINDINGS: LOWER CHEST: New small bilateral pleural effusions. HEPATOBILIARY: No significant mass or bile duct dilatation. Portal vein thrombus not visualized on the current examination without IV contrast, although periportal collateral vessels are noted. Cholecystectomy. PANCREAS: Normal. SPLEEN: Normal. ADRENAL GLANDS: Normal. KIDNEYS/BLADDER: Mild fullness of the renal pelves  bilaterally, decreased compared to 09/02/2022. Unchanged nonobstructing calculus in the lower pole right kidney and in the dependent portion of the right renal pelvis. Urinary diversion in the right lower quadrant, which is no longer distended. Ostomy extends to the skin surface. BOWEL: Descending colostomy with Franklin pouch. LYMPH NODES: Normal. VASCULATURE: Unremarkable. PELVIC ORGANS: Unremarkable MUSCULOSKELETAL: Interval placement of percutaneous pigtail drain in the subcutaneous right hip/upper thigh fluid collection. Reliable size comparison limited due to noncontrast technique on the current examination, but the collection has decreased in size. Largest axial component currently 11.7 x 7.4 cm, previously 20 x 14 cm. Osseous destruction in both hips. Thoracolumbar fusion. Diffuse muscular atrophy and osteopenia.     IMPRESSION: 1.  Decreased size of right buttock/thigh fluid collection following percutaneous drain placement. Largest remaining component measures 11.7 x 7.4 cm axially. 2.  New small bilateral pleural effusions. 3.  Neobladder is no longer distended.    Head CT w/o contrast    Result Date: 9/2/2022  EXAM: CT HEAD W/O CONTRAST LOCATION: [facility] DATE/TIME: 9/2/2022 7:16 PM INDICATION: ams COMPARISON: Head CT angiogram brain MRI 01/06/2020 TECHNIQUE: Routine CT Head without IV contrast. Multiplanar reformats. Dose reduction techniques were used. FINDINGS: INTRACRANIAL CONTENTS: No intracranial hemorrhage, extraaxial collection, or mass effect.  No CT evidence of acute infarct. Normal parenchymal attenuation. Normal ventricles and sulci. VISUALIZED ORBITS/SINUSES/MASTOIDS: No intraorbital abnormality. Moderate chronic mucosal thickening of the left maxillary sinus with associated frothy debris. Mild chronic mucosal thickening of the left sphenoid sinus. No middle ear or mastoid effusion. BONES/SOFT TISSUES: No acute abnormality. Chronic right medial orbital wall  fracture.     IMPRESSION: 1.  No intracranial hemorrhage, mass lesions, hydrocephalus or CT evidence for an acute infarct. 2.  Chronic right medial orbital wall fracture.    CT Femur Thigh Bilateral wo Contrast    Result Date: 9/4/2022  EXAM: CT FEMUR THIGH BILATERAL WITHOUT CONTRAST LOCATION: [facility] DATE/TIME: 09/04/2022, 9:28 AM INDICATION: 56-year-old patient with a right hip-buttock abscess. COMPARISON: 09/04/2022 CT abdomen and pelvis. 09/02/2022 CT abdomen and pelvis. TECHNIQUE: Noncontrast. Axial, sagittal and coronal thin-section reconstruction. Dose reduction techniques were used. FINDINGS: BONES AND JOINTS: -Advanced osteopenia. -Resection or resorption of the right medial ilium. Dysplastic right acetabulum. Resorption or resection of the right femoral head and greater trochanter. External rotation of the right femur. 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 Old fracture or osteotomy of the left femoral neck. Old healed fracture of the left femur proximal diaphysis. MUSCLES AND SOFT TISSUES: -Subcutaneous right hip-buttock fluid collection, with percutaneous pigtail catheter drainage. This collection measures 12 x 7 cm in greatest axial dimensions. -Fluid attenuation in the expected regions of the right vastus lateralis and adductor celia regions. The proximal margin of these collections have decreased in size since the 09/02/2022 exam. These collections both extend to the distal margin of the thigh, measuring up to 25 cm in length. -Advanced muscle fatty atrophy. OTHER: -See the dedicated abdomen-pelvis CT for further information.     IMPRESSION: 1.  Decreased size of the right hip-buttock fluid collection, status post percutaneous drain placement. This collection measures 12 x 7 cm in greatest axial dimensions. 2.  Decreased size of fluid collections (likely contiguous with the above) in the residual right vastus lateralis and adductor  celia regions. These collections extend through the distal thigh, and measure roughly 25 cm in length.     IR PICC Vascular    Result Date: 9/6/2022  LOCATION: [facility] DATE: 9/6/2022 PROCEDURE: PERIPHERALLY INSERTED CENTRAL CATHETER (PICC) PLACEMENT. INTERVENTIONAL RADIOLOGIST: Hugo Michael MD. INDICATION: Right thigh abscess. Paraplegia. Decubitus wounds. Unsuccessful bedside PICC placement CONSENT: The procedure, risks and benefits of PICC placement were discussed with the patient  in detail. All questions were answered. Informed consent was given to proceed with the procedure. MODERATE SEDATION: None CONTRAST: Omnipaque 350: 5 cc ANTIBIOTICS: None. ADDITIONAL MEDICATIONS: None. FLUOROSCOPIC TIME: 6 minutes RADIATION DOSE: Air Kerma: 19 mGy COMPLICATIONS: No immediate complications. STERILE BARRIER TECHNIQUE: Maximum sterile barrier technique was used. Cutaneous antisepsis was performed at the operative site with application of 2% chlorhexidine and a full body sterile drape. Prior to the procedure, the  and assistant performed hand hygiene and wore hat, mask, sterile gown, and sterile gloves during the entire procedure. Ultrasound was prepped with a sterile probe cover and sterile gel was used. PROCEDURE/TECHNIQUE:   Using local anesthesia and real-time ultrasound guidance, the right brachial vein was accessed. An 018 guidewire could not be advanced beyond the axillary vein. Over the guidewire the dilator/peel-away sheath of the Bard PICC set were advanced into the brachial vein. A 5 Burmese KMP catheter was advanced to the axillary vein. A central venogram was obtained. Using the KMP catheter, the 018 guidewires manipulated down to the superior vena cava. The 5 Burmese PICC could not be advanced due to the irregular  subclavian stenosis. Through the KMP catheter, the guidewire was exchanged for an 035 Lobo guidewire. A 5 Burmese, 25 cm sheath was advanced and placed with the  tip at the axillary vein. The right subclavian vein was angioplastied using a 5 mm x 40 mm and less balloon. The 5 Belgian Kumpe catheter was used to exchange the guidewire for the 018 guidewire. Sheath was exchanged for the 5 Belgian peel-away sheath. Over the guidewire a 5 Belgian, dual lumen Bard power PICC was advanced until tip was at the right atrium. PICC was cut to 41 cm. The lumens aspirated and flushed adequately. FINDINGS: Ultrasound demonstrates a patent and compressible right brachial vein. Images were recorded. Right central venogram demonstrates diffuse, irregular moderate stenosis throughout the right subclavian vein. The completion fluoroscopic demonstrates a right upper extremity PICC with its tip at the right atrium.     IMPRESSION:  1.  Successful right upper extremity CT injectable PICC placement. 2.  Diffuse right subclavian vein stenosis. Angioplastied to 5 mm to allow PICC placement..     KUB XR    Result Date: 9/3/2022  EXAM: XR KUB LOCATION: [facility] DATE/TIME: 9/3/2022 5:59 AM INDICATION: s p femoral line placement COMPARISON: 01/06/2020     IMPRESSION: Left-sided femoral catheter has been placed terminating in the midline at the lumbosacral junction. Visualized bowel gas pattern is nonobstructive. Postoperative changes in the thoracolumbar spine. Prior right hip resection. Diffuse osteopenia. Right lower quadrant bowel anastomotic suture line. Multiple clips over the pelvis.

## 2022-09-12 NOTE — PLAN OF CARE
Goal Outcome Evaluation:    Plan of Care Reviewed With: patient      Pt alert and oriented times 4. BP was on 99/52 at the beginning of the shift. Later recheck around 1900 was  90/51 with MAP of 67. BP came up to 115/63 at bed time. Pt's SP was clogged. Urologist was called and notified. Flushed per order and had adequate output. Pt had 100 ml cloudy output form DENIA. Ileostomy had good output too. At the beginning of the shift, pt refused to turn. Writer approached again after an hour. Pt's wound was soaked. Dressing changed and turned her. Pt on potassium and phos protocol. Recheck phos at 2230 per protocol and potassium in am. Tele NSR this shift.

## 2022-09-12 NOTE — PLAN OF CARE
"  Problem: Plan of Care - These are the overarching goals to be used throughout the patient stay.    Goal: Plan of Care Review/Shift Note  Description: The Plan of Care Review/Shift note should be completed every shift.  The Outcome Evaluation is a brief statement about your assessment that the patient is improving, declining, or no change.  This information will be displayed automatically on your shift note.  Outcome: Ongoing, Progressing  Goal: Patient-Specific Goal (Individualized)  Description: You can add care plan individualizations to a care plan. Examples of Individualization might be:  \"Parent requests to be called daily at 9am for status\", \"I have a hard time hearing out of my right ear\", or \"Do not touch me to wake me up as it startles me\".  Outcome: Ongoing, Progressing  Goal: Absence of Hospital-Acquired Illness or Injury  Outcome: Ongoing, Progressing  Intervention: Identify and Manage Fall Risk  Recent Flowsheet Documentation  Taken 9/12/2022 0401 by Eliza Murray RN  Safety Promotion/Fall Prevention: bed alarm on  Taken 9/12/2022 0000 by Eliza Murray RN  Safety Promotion/Fall Prevention: bed alarm on  Intervention: Prevent Skin Injury  Recent Flowsheet Documentation  Taken 9/12/2022 0401 by Eliza Murray RN  Body Position: refuses positioning  Taken 9/12/2022 0343 by Eliza Murray RN  Body Position:    turned    left  Taken 9/12/2022 0000 by Eliza Murray RN  Body Position: refuses positioning  Intervention: Prevent and Manage VTE (Venous Thromboembolism) Risk  Recent Flowsheet Documentation  Taken 9/12/2022 0401 by Eliza Murray RN  Activity Management: (refused turning) bedrest  Taken 9/12/2022 0343 by Eliza Murray RN  Activity Management: bedrest  Taken 9/12/2022 0000 by Eliza Murray RN  Activity Management: (refused turning) bedrest  Intervention: Prevent Infection  Recent Flowsheet Documentation  Taken 9/12/2022 0401 by Eliza Murray RN  Infection Prevention: hand hygiene " promoted  Taken 9/12/2022 0000 by Eliza Murray RN  Infection Prevention: hand hygiene promoted  Goal: Optimal Comfort and Wellbeing  Outcome: Ongoing, Progressing  Intervention: Provide Person-Centered Care  Recent Flowsheet Documentation  Taken 9/12/2022 0401 by Eliza Murray RN  Trust Relationship/Rapport: care explained  Taken 9/12/2022 0000 by Eliza Murray RN  Trust Relationship/Rapport: care explained  Goal: Readiness for Transition of Care  Outcome: Ongoing, Progressing     Problem: Risk for Delirium  Goal: Optimal Coping  Outcome: Ongoing, Progressing  Goal: Improved Behavioral Control  Outcome: Ongoing, Progressing  Goal: Improved Attention and Thought Clarity  Outcome: Ongoing, Progressing  Goal: Improved Sleep  Outcome: Ongoing, Progressing     Problem: UTI (Urinary Tract Infection)  Goal: Improved Infection Symptoms  Outcome: Ongoing, Progressing     Problem: Malnutrition  Goal: Improved Nutritional Intake  Outcome: Ongoing, Progressing     Problem: Impaired Wound Healing  Goal: Optimal Wound Healing  Outcome: Ongoing, Progressing  Intervention: Promote Wound Healing  Recent Flowsheet Documentation  Taken 9/12/2022 0401 by Eliza Murray RN  Activity Management: (refused turning) bedrest  Taken 9/12/2022 0343 by Eliza Murray RN  Activity Management: bedrest  Taken 9/12/2022 0000 by Eliza Murray RN  Activity Management: (refused turning) bedrest     Problem: Infection  Goal: Absence of Infection Signs and Symptoms  Outcome: Ongoing, Progressing     Problem: VTE (Venous Thromboembolism)  Goal: VTE (Venous Thromboembolism) Symptom Resolution  Outcome: Ongoing, Progressing     Problem: Fluid Volume Excess  Goal: Fluid Balance  Outcome: Ongoing, Progressing   Goal Outcome Evaluation:  Pt requested melatonin to aide in sleep at 2350, pt complained of pain at that time but it was to early for oxycodone, pt refused tylenol stating it doesn't help anyways. At 0115 pt called and requested pain med at that  time prn oxycodone was given. Pt slept after med given and at reassessment pt denied pain. Pt had 150cc output in her suprapublic this am. Writer flushed drain with 10cc NS and some sediment did come out. Garo on pts right thigh absess was also irrigated and had 25cc brownish output. Pt had a critical lab at 0600, Hgb of 6.4 down from 7.0. Dr Squires was messaged on [employer]. Orders pending.

## 2022-09-12 NOTE — PROGRESS NOTES
"[facility] In[facility] Progress Note    Patient: Felicia Ellison  MRN: [id number]  Date of Service: September 12, 2022         Reason for Visit    Thrombocytopenia  Anemia  Folate deficiency    Assessment and Plan    1.  Thrombocytopenia.  This is multifactorial secondary to sepsis etc.  Not a primary hematological disorder.  Continue with anticoagulation.  No evidence of any DIC at this time.  2.  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  3.  Anemia which is again secondary to her chronic diseases.  4.  Gram-positive sepsis for which she is getting antibiotics and drains.    Cancer Staging  No matching staging information was found for the patient.    ECOG Performance Status: 2-3         History of Present Illness    Ms. Felicia Ellison is a very pleasant 56-year-old woman with a history of recurrent upper extremity and lower extremity DVTs.  We were consulted regarding recommendation for anticoagulation.  Dr. Suárez has recommended Lovenox as long as her platelet count is above 50,000.  Currently her platelet count is improving.  She is also been noted to have some folate deficiency.  This has been replenished.    Review of Systems    Pertinent findings noted in history.    Physical Exam    BP 97/60 (BP Location: Left arm)   Pulse 81   Temp 98.8  F (37.1  C) (Oral)   Resp 16   Ht 1.626 m (5' 4\")   Wt 54.7 kg (120 lb 9.6 oz)   LMP  (LMP Unknown)   SpO2 95%   BMI 20.70 kg/m      GENERAL: no acute distress.   HEENT: pupils are equal, round and reactive. Oral mucosa is moist and intact.  RESP:Chest symmetric. Regular respiratory rate. No stridor.  ABD: Nondistended, soft.  EXTREMITIES: Both upper and lower extremity edema.  Some deformities present.  NEURO: non focal. Alert and oriented x3.   PSYCH: within normal limits. No depression or anxiety.  SKIN: warm dry intact.  Some ecchymosis from phlebotomy sites.  Drain in the right hip.  Noted " to have presacral wounds.      Lab Results    Recent Results (from the past 24 hour(s))   Phosphorus    Collection Time: 09/11/22 10:44 PM   Result Value Ref Range    Phosphorus 2.5 2.5 - 4.5 mg/dL   Potassium    Collection Time: 09/12/22  5:32 AM   Result Value Ref Range    Potassium 3.8 3.5 - 5.0 mmol/L   Creatinine    Collection Time: 09/12/22  5:32 AM   Result Value Ref Range    Creatinine 0.33 (L) 0.60 - 1.10 mg/dL    GFR Estimate >90 >60 mL/min/1.73m2   Hemoglobin    Collection Time: 09/12/22  5:32 AM   Result Value Ref Range    Hemoglobin 6.4 (LL) 11.7 - 15.7 g/dL   Platelet count    Collection Time: 09/12/22  5:32 AM   Result Value Ref Range    Platelet Count 65 (L) 150 - 450 10e3/uL   Phosphorus    Collection Time: 09/12/22  5:32 AM   Result Value Ref Range    Phosphorus 2.8 2.5 - 4.5 mg/dL   Hemoglobin    Collection Time: 09/12/22  8:35 AM   Result Value Ref Range    Hemoglobin 6.5 (LL) 11.7 - 15.7 g/dL   WBC and Differential    Collection Time: 09/12/22  8:35 AM   Result Value Ref Range    WBC Count 5.6 4.0 - 11.0 10e3/uL    % Neutrophils 62 %    % Lymphocytes 27 %    % Monocytes 5 %    % Eosinophils 2 %    % Basophils 0 %    % Immature Granulocytes 4 %    NRBCs per 100 WBC 0 <1 /100    Absolute Neutrophils 3.8 1.6 - 8.3 10e3/uL    Absolute Lymphocytes 1.5 0.8 - 5.3 10e3/uL    Absolute Monocytes 0.3 0.0 - 1.3 10e3/uL    Absolute Eosinophils 0.1 0.0 - 0.7 10e3/uL    Absolute Basophils 0.0 0.0 - 0.2 10e3/uL    Absolute Immature Granulocytes 0.2 <=0.4 10e3/uL    Absolute NRBCs 0.0 10e3/uL   Prepare red blood cells (unit)    Collection Time: 09/12/22 10:30 AM   Result Value Ref Range    Blood Component Type Red Blood Cells     Product Code B1388M83     Unit Status Transfused     Unit Number [id number]     CROSSMATCH Compatible     CODING SYSTEM IRKC881     ISSUE DATE AND TIME [id number]     UNIT ABO/RH A-     UNIT TYPE ISBT 0600    Fibrinogen activity    Collection Time: 09/12/22  6:18 PM   Result  Value Ref Range    Fibrinogen Activity 465 170 - 490 mg/dL        Imaging    Echocardiogram Complete    Result Date: 9/10/2022  [id number]^ASHLEY^ARIN^[place] [address]  Name: KISHOR PINEDA MRN: [id number] : 1965 Study Date: 09/10/2022 03:21 PM Age: 56 yrs Gender: Female Patient [facility] Reason For Study: CHF Ordering Physician: ARIN RAMIREZ Referring Physician: CODEY WILSON Performed By: ACE  BSA: 1.6 m2 Height: 64 in Weight: 120 lb HR: 92 BP: 91/58 mmHg ______________________________________________________________________________ Procedure Complete Echo Adult. ______________________________________________________________________________ Interpretation Summary  The left ventricle is normal in size. The visual ejection fraction is 55-60%. No regional wall motion abnormalities noted. Regional wall motion abnormalities cannot be excluded due to limited visualization. Diastolic Doppler findings (E/E' ratio and/or other parameters) suggest left ventricular filling pressures are normal. Sinus rhythm was noted. Technically difficult, suboptimal study. Consider cardiac MRI for better imaging. ______________________________________________________________________________ Left Ventricle The left ventricle is normal in size. There is normal left ventricular wall thickness. Diastolic Doppler findings (E/E' ratio and/or other parameters) suggest left ventricular filling pressures are normal. The visual ejection fraction is 55-60%. Regional wall motion abnormalities cannot be excluded due to limited visualization. No regional wall motion abnormalities noted.  Right Ventricle Normal right ventricle size and systolic function. TAPSE is normal, which is consistent with normal right ventricular systolic function.  Atria The left atrium is not well visualized. Right atrium not well visualized.  Mitral Valve The mitral  valve is not well visualized. There is mild (1+) mitral regurgitation. There is no mitral valve stenosis.  Tricuspid Valve The tricuspid valve is not well visualized. There is mild (1+) tricuspid regurgitation.  Aortic Valve The aortic valve is not well visualized. No aortic regurgitation is present. No aortic stenosis is present.  Pulmonic Valve The pulmonic valve is not well visualized.  Vessels The aorta root is normal. Normal size ascending aorta.  Pericardium There is no pericardial effusion.  Rhythm Sinus rhythm was noted. ______________________________________________________________________________ MMode/2D Measurements & Calculations IVSd: 0.77 cm  LVIDd: 3.9 cm LVIDs: 2.6 cm LVPWd: 0.70 cm FS: 32.6 % LV mass(C)d: 81.4 grams LV mass(C)dI: 51.7 grams/m2 Ao root diam: 3.1 cm asc Aorta Diam: 3.2 cm LVOT diam: 2.0 cm LVOT area: 3.1 cm2 RWT: 0.36  Doppler Measurements & Calculations MV E max jered: 53.8 cm/sec MV A max jered: 67.9 cm/sec MV E/A: 0.79 MV dec slope: 449.7 cm/sec2 MV dec time: 0.12 sec Ao V2 max: 138.1 cm/sec Ao max P.0 mmHg Ao V2 mean: 94.7 cm/sec Ao mean P.2 mmHg Ao V2 VTI: 25.7 cm MACKENZIE(I,D): 1.9 cm2 MACKENZIE(V,D): 1.8 cm2 LV V1 max P.7 mmHg LV V1 max: 82.0 cm/sec LV V1 VTI: 16.0 cm SV(LVOT): 49.1 ml SI(LVOT): 31.2 ml/m2 PA acc time: 0.09 sec AV Jered Ratio (DI): 0.59  MACKENZIE Index (cm2/m2): 1.2 E/E': 4.4 E/E' av.0 Lateral E/e': 4.4 Medial E/e': 7.5 Peak E' Jered: 12.2 cm/sec  ______________________________________________________________________________ Report approved by: Alexander Huston 09/10/2022 04:57 PM       IR Suprapubic Catheter Placment    Result Date: 2022  [facility] LOCATION: [facility] DATE: 9/10/2022 PROCEDURE: 1. Transstomal urinary catheter placement. INTERVENTIONAL RADIOLOGIST: Jonh Cline MD HISTORY: 56 years-old Female with history of diverting urostomy. The patient had a catheter placed in the emergency room at an outside  facility via direct trocar technique through the proximal aspect of the stoma. This catheter is malfunctioning. CONSENT: The risks, benefits and alternatives of the procedure were discussed with the patient  in detail. All questions were answered. Informed consent was given to proceed with the procedure. SEDATION: None.. CONTRAST: 15 mL ANTIBIOTICS: Patient receiving antibiotics ADDITIONAL MEDICATIONS: None. FLUOROSCOPIC TIME: 2.6 RADIATION DOSE: Air Kerma: 73 mGy. COMPLICATIONS: No immediate complications. STERILE BARRIER TECHNIQUE: Maximum sterile barrier technique was used. Cutaneous antisepsis was performed at the operative site with application of 2% chlorhexidine and large sterile drape. Prior to the procedure, the  and assistant performed hand hygiene and wore hat, mask, sterile gown, and sterile gloves during the entire procedure. PROCEDURE/FINDINGS: A  image was obtained. A 5 Guatemalan KMP catheter, with the aid of a 0.035 inch angled Glidewire, was advanced through the existing stoma tract into the diversion pouch. It was noted that the pre-existing catheter, while initially within the stoma, traverse through the side wall of the stoma directly into the pouch itself. The position of the newly placed catheter was confirmed by contrast injection. A 0.035 inch Amplatz wire was advanced through the catheter into the urinary diversion pouch. A 16 Guatemalan [place] tip catheter was advanced over the Amplatz wire. The balloon was inflated and contrast demonstration confirmed its position. The catheter was secured in place. The catheter was attached to a gravity drainage bag.     IMPRESSION: Placement of a transstomal urinary catheter via the existing stoma.     CT Abdomen Pelvis w/o Contrast    Result Date: 9/10/2022  EXAM: CT ABDOMEN PELVIS W/O CONTRAST LOCATION: [facility] DATE/TIME: 9/10/2022 2:53 PM INDICATION: concern for suprapubic catheter malposition, increased abdominal  pain. Lower abdominal pain. COMPARISON: 9/4/2022 TECHNIQUE: CT scan of the abdomen and pelvis was performed without IV contrast. Multiplanar reformats were obtained. Dose reduction techniques were used. CONTRAST: None. FINDINGS: LOWER CHEST: Moderate bilateral pleural effusions have increased in size mildly. These appear free-flowing. Associated dependent atelectasis. HEPATOBILIARY: The liver is not well evaluated on this noncontrast exam. No significant interval change in appearance. PANCREAS: Normal. SPLEEN: Mild splenomegaly, unchanged. ADRENAL GLANDS: Normal. KIDNEYS/BLADDER: The kidneys are partially obscured by metal artifact spinal hardware. No hydronephrosis. Cystectomy with urinary diversion. There is a percutaneous catheter in the right abdomen with tip in the ileal conduit. Significant distention of the conduit has developed since yesterday suggesting malfunction of the catheter. BOWEL: Bowel loops are normal caliber. LYMPH NODES: Not well assessed on this noncontrast exam. VASCULATURE: Unremarkable. PELVIC ORGANS: The uterus is normal in size. MUSCULOSKELETAL: Generalized edema within the subcutaneous adipose tissues has increased mildly from 9/4/2022. A drain has been placed in the right hip joint and the effusion has significantly decreased in size. Deformity and degenerative changes of both  hips. Extensive spinal hardware and associated artifact.     IMPRESSION: 1.  Cystectomy with ileal conduit urinary diversion. The conduit has become significantly distended from 9/4/2022. A percutaneous catheter terminates within the conduit. The distention suggests malfunction of the catheter such as plugging. No hydronephrosis of the kidneys. 2.  Moderate bilateral pleural effusions have increased in size mildly. 3.  Increase in mild generalized subcutaneous edema. 4.  Decrease in size of the right hip joint effusion. A drainage catheter is present within the joint space posteriorly.        Signed by: Kyle FRANCO  MD Chip, MD    This note has been dictated using voice recognition software. Any grammatical or context distortions are unintentional and inherent to the software

## 2022-09-13 ENCOUNTER — APPOINTMENT (OUTPATIENT)
Dept: INTERVENTIONAL RADIOLOGY/VASCULAR | Facility: HOSPITAL | Age: 57
DRG: 853 | End: 2022-09-13
Attending: NURSE PRACTITIONER
Payer: MEDICARE

## 2022-09-13 ENCOUNTER — APPOINTMENT (OUTPATIENT)
Dept: OCCUPATIONAL THERAPY | Facility: HOSPITAL | Age: 57
DRG: 853 | End: 2022-09-13
Attending: INTERNAL MEDICINE
Payer: MEDICARE

## 2022-09-13 ENCOUNTER — APPOINTMENT (OUTPATIENT)
Dept: PHYSICAL THERAPY | Facility: HOSPITAL | Age: 57
DRG: 853 | End: 2022-09-13
Attending: INTERNAL MEDICINE
Payer: MEDICARE

## 2022-09-13 LAB
ABO/RH(D): NORMAL
ANTIBODY SCREEN: NEGATIVE
HGB BLD-MCNC: 7.4 G/DL (ref 11.7–15.7)
LEVETIRACETAM SERPL-MCNC: 56.6 ΜG/ML (ref 10–40)
MAGNESIUM SERPL-MCNC: 1.7 MG/DL (ref 1.8–2.6)
PHOSPHATE SERPL-MCNC: 3.1 MG/DL (ref 2.5–4.5)
POTASSIUM BLD-SCNC: 3.4 MMOL/L (ref 3.5–5)
SPECIMEN EXPIRATION DATE: NORMAL

## 2022-09-13 PROCEDURE — 0T9B30Z DRAINAGE OF BLADDER WITH DRAINAGE DEVICE, PERCUTANEOUS APPROACH: ICD-10-PCS | Performed by: RADIOLOGY

## 2022-09-13 PROCEDURE — 76080 X-RAY EXAM OF FISTULA: CPT

## 2022-09-13 PROCEDURE — 84132 ASSAY OF SERUM POTASSIUM: CPT | Performed by: INTERNAL MEDICINE

## 2022-09-13 PROCEDURE — 250N000011 HC RX IP 250 OP 636: Performed by: INTERNAL MEDICINE

## 2022-09-13 PROCEDURE — 80177 DRUG SCRN QUAN LEVETIRACETAM: CPT | Performed by: INTERNAL MEDICINE

## 2022-09-13 PROCEDURE — 250N000013 HC RX MED GY IP 250 OP 250 PS 637: Performed by: INTERNAL MEDICINE

## 2022-09-13 PROCEDURE — 86850 RBC ANTIBODY SCREEN: CPT | Performed by: INTERNAL MEDICINE

## 2022-09-13 PROCEDURE — 97112 NEUROMUSCULAR REEDUCATION: CPT | Mod: GO

## 2022-09-13 PROCEDURE — 97161 PT EVAL LOW COMPLEX 20 MIN: CPT | Mod: GP

## 2022-09-13 PROCEDURE — 84100 ASSAY OF PHOSPHORUS: CPT | Performed by: INTERNAL MEDICINE

## 2022-09-13 PROCEDURE — 51705 CHANGE OF BLADDER TUBE: CPT

## 2022-09-13 PROCEDURE — 85018 HEMOGLOBIN: CPT | Performed by: INTERNAL MEDICINE

## 2022-09-13 PROCEDURE — C1729 CATH, DRAINAGE: HCPCS

## 2022-09-13 PROCEDURE — C1769 GUIDE WIRE: HCPCS

## 2022-09-13 PROCEDURE — 83735 ASSAY OF MAGNESIUM: CPT | Performed by: INTERNAL MEDICINE

## 2022-09-13 PROCEDURE — 99207 PR NO CHARGE LOS: CPT | Performed by: INTERNAL MEDICINE

## 2022-09-13 PROCEDURE — 97166 OT EVAL MOD COMPLEX 45 MIN: CPT | Mod: GO

## 2022-09-13 PROCEDURE — 255N000002 HC RX 255 OP 636: Performed by: INTERNAL MEDICINE

## 2022-09-13 PROCEDURE — 99233 SBSQ HOSP IP/OBS HIGH 50: CPT | Performed by: INTERNAL MEDICINE

## 2022-09-13 PROCEDURE — 120N000001 HC R&B MED SURG/OB

## 2022-09-13 RX ORDER — FENTANYL CITRATE 50 UG/ML
25-50 INJECTION, SOLUTION INTRAMUSCULAR; INTRAVENOUS EVERY 5 MIN PRN
Status: DISCONTINUED | OUTPATIENT
Start: 2022-09-13 | End: 2022-09-15

## 2022-09-13 RX ORDER — FLUMAZENIL 0.1 MG/ML
0.2 INJECTION, SOLUTION INTRAVENOUS
Status: DISCONTINUED | OUTPATIENT
Start: 2022-09-13 | End: 2022-09-23 | Stop reason: HOSPADM

## 2022-09-13 RX ORDER — NALOXONE HYDROCHLORIDE 0.4 MG/ML
0.4 INJECTION, SOLUTION INTRAMUSCULAR; INTRAVENOUS; SUBCUTANEOUS
Status: DISCONTINUED | OUTPATIENT
Start: 2022-09-13 | End: 2022-09-23 | Stop reason: HOSPADM

## 2022-09-13 RX ORDER — MAGNESIUM SULFATE HEPTAHYDRATE 40 MG/ML
2 INJECTION, SOLUTION INTRAVENOUS ONCE
Status: COMPLETED | OUTPATIENT
Start: 2022-09-13 | End: 2022-09-13

## 2022-09-13 RX ORDER — NALOXONE HYDROCHLORIDE 0.4 MG/ML
0.2 INJECTION, SOLUTION INTRAMUSCULAR; INTRAVENOUS; SUBCUTANEOUS
Status: DISCONTINUED | OUTPATIENT
Start: 2022-09-13 | End: 2022-09-23 | Stop reason: HOSPADM

## 2022-09-13 RX ORDER — POTASSIUM CHLORIDE 1.5 G/1.58G
40 POWDER, FOR SOLUTION ORAL ONCE
Status: COMPLETED | OUTPATIENT
Start: 2022-09-13 | End: 2022-09-13

## 2022-09-13 RX ADMIN — THERA TABS 1 TABLET: TAB at 08:58

## 2022-09-13 RX ADMIN — MAGNESIUM SULFATE IN WATER 2 G: 40 INJECTION, SOLUTION INTRAVENOUS at 09:07

## 2022-09-13 RX ADMIN — FOLIC ACID 1 MG: 1 TABLET ORAL at 08:58

## 2022-09-13 RX ADMIN — IOHEXOL 80 ML: 350 INJECTION, SOLUTION INTRAVENOUS at 16:08

## 2022-09-13 RX ADMIN — Medication 3 MG: at 21:10

## 2022-09-13 RX ADMIN — POTASSIUM CHLORIDE 40 MEQ: 1.5 POWDER, FOR SOLUTION ORAL at 09:07

## 2022-09-13 RX ADMIN — LEVETIRACETAM 750 MG: 250 TABLET, FILM COATED ORAL at 08:58

## 2022-09-13 RX ADMIN — IOHEXOL 20 ML: 350 INJECTION, SOLUTION INTRAVENOUS at 15:57

## 2022-09-13 RX ADMIN — ENOXAPARIN SODIUM 50 MG: 60 INJECTION SUBCUTANEOUS at 21:04

## 2022-09-13 RX ADMIN — BUMETANIDE 0.5 MG: 0.5 TABLET ORAL at 09:01

## 2022-09-13 RX ADMIN — CEFTRIAXONE 2 G: 2 INJECTION, POWDER, FOR SOLUTION INTRAMUSCULAR; INTRAVENOUS at 14:03

## 2022-09-13 RX ADMIN — ENOXAPARIN SODIUM 50 MG: 60 INJECTION SUBCUTANEOUS at 09:01

## 2022-09-13 RX ADMIN — PANTOPRAZOLE SODIUM 40 MG: 40 TABLET, DELAYED RELEASE ORAL at 06:31

## 2022-09-13 RX ADMIN — LEVETIRACETAM 750 MG: 250 TABLET, FILM COATED ORAL at 21:04

## 2022-09-13 RX ADMIN — THIAMINE HCL TAB 100 MG 100 MG: 100 TAB at 08:58

## 2022-09-13 ASSESSMENT — ACTIVITIES OF DAILY LIVING (ADL)
ADLS_ACUITY_SCORE: 47
ADLS_ACUITY_SCORE: 51
ADLS_ACUITY_SCORE: 47
ADLS_ACUITY_SCORE: 47
ADLS_ACUITY_SCORE: 51
ADLS_ACUITY_SCORE: 51
ADLS_ACUITY_SCORE: 47
ADLS_ACUITY_SCORE: 51
ADLS_ACUITY_SCORE: 51
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
## 2022-09-13 NOTE — PROGRESS NOTES
09/13/22 1305   Quick Adds   Type of Visit Initial Occupational Therapy Evaluation       Present no   Living Environment   People in Home facility resident   Current Living Arrangements assisted living   Home Accessibility wheelchair accessible   Living Environment Comments Pt uses manual chair, assist for all transfers and ADLs.  Pt does state she does some dressing   Self-Care   Current Activity Tolerance fair   Equipment Currently Used at Home wheelchair, manual   Fall history within last six months no   General Information   Onset of Illness/Injury or Date of Surgery 09/03/22   Referring Physician Marlen   Patient/Family Therapy Goal Statement (OT) none stated   Cognitive Status Examination   Cognitive Status Comments TBI at baseline.  Pt follows simple directions for act   Visual Perception   Visual Impairment/Limitations WFL   Range of Motion Comprehensive   General Range of Motion no range of motion deficits identified   Strength Comprehensive (MMT)   Comment, General Manual Muscle Testing (MMT) Assessment Generalized weakness   Bed Mobility   Comment (Bed Mobility) Max A of 2   Balance   Balance Comments Mod A of 2 for sitting   Activities of Daily Living   BADL Assessment/Intervention   (Mod A to don shirt)   Neuromuscular Re-education   Minutes of Treatment 8   Clinical Impression   Criteria for Skilled Therapeutic Interventions Met (OT) Yes, treatment indicated   OT Diagnosis Impaired ADL independence   OT Problem List-Impairments impacting ADL activity tolerance impaired;balance;cognition;sensation;strength;pain   Assessment of Occupational Performance 5 or more Performance Deficits   Planned Therapy Interventions (OT) ADL retraining;bed mobility training;strengthening;neuromuscular re-education   Clinical Decision Making Complexity (OT) moderate complexity   Risk & Benefits of therapy have been explained evaluation/treatment results reviewed;participants included;patient    Clinical Impression Comments Pt seen bedside for OT eval and treatment.  Pt demonstrates decreased independence with ADls and mobility.  Recommend OT to address.  Pt will need 24 hour care in [facility] vs LTC pending level of assist facility can provide.   OT Discharge Planning   OT Discharge Recommendation (DC Rec) Long term care facility   OT Rationale for DC Rec LTC vs shelter with max A for ADLs and mobility.   Total Evaluation Time (Minutes)   Total Evaluation Time (Minutes) 10   OT Goals   Therapy Frequency (OT) 3 times/wk   OT Predicted Duration/Target Date for Goal Attainment 09/20/22   OT Goals Upper Body Bathing;OT Goal 1;OT Goal 2   OT: Upper Body Bathing Minimal assist   OT: Goal 1 Pt will tolerate 10 minutes EOB sitting with Mod A to increase ability to assist with cares   OT: Goal 2 Pt will toelrate 10 minutes light UE ex to increase strength and endurance for return to ADLs.

## 2022-09-13 NOTE — PROGRESS NOTES
Progress Note    Assessment/Plan  Patient remains significantly depressed which would be expected under this current circumstance.  She has had some drainage around her urinary transabdominal catheter.  Right hip has not had significant change.  Presacral area without significant change.  Continue all drainage catheters at the current time.  Reviewed with her.  May have to have radiology review the neobladder drain again.    Active Problems:    Septic shock (H)      Subjective  Without significant pain.  She has been eating but less than a number of days ago.  Bothered with some drainage from her abdominal catheter site.  Right hip site is stable with larger drain in place.  Objective    Vital signs in last 24 hours  Temp:  [97.4  F (36.3  C)-98.8  F (37.1  C)] 97.6  F (36.4  C)  Pulse:  [75-88] 78  Resp:  [16-18] 18  BP: ()/(51-62) 110/55  SpO2:  [95 %-99 %] 99 %  Weight:   [unfilled]    Intake/Output last 3 shifts  I/O last 3 completed shifts:  In: 1380 [P.O.:480]  Out: 2345 [Urine:1600; Drains:145; Stool:600]  Intake/Output this shift:  I/O this shift:  In: -   Out: 100 [Urine:100]      Physical Exam  Exam indicates no significant change in the right buttock hip and thigh region with significant edema throughout.  Drain is in place but with less drainage but still very purulent at this time.  Lower abdominal drain with some drainage around the drain site.  Abdomen otherwise benign.  Bowel sounds present.    Pertinent Labs   Lab Results   Component Value Date    WBC 5.6 09/12/2022    HGB 7.4 (L) 09/13/2022    HCT 23.0 (L) 09/11/2022    MCV 90 09/11/2022    PLT 65 (L) 09/12/2022             Pertinent Radiology     [unfilled]        Reji Hunter MD

## 2022-09-13 NOTE — PROGRESS NOTES
Interventional Radiology - Pre-Procedure/Chart Review Note:  9/13/2022    Procedure Requested: abscessogram and suprapubic catheter check.   Requested by: Dr. Gee    Brief HPI: Felicia Ellison is a 56 year old female  with PMHx paraplegia, TBI. Presented from assisted living with imaging showing distended bladder and large fluid collection in right buttock. S/p CT guided left upper thigh/groin abscess drain placed 9/3 and upsized to 16F on 9/8. S/p suprapubic catheter placement on 9/10/22. Now with concerns on CT of abscess drain side holes outside of abscess cavity. Also notified that patient is leaking profusely from suprapubic catheter insertion site when patient is moved side to side and otherwise has no output.        Culture: 4+ Streptococcus agalactiae (Group B Streptococcus) Abnormal   Flushing: NS 10ml Q8H  Drainage OP:   9/8 625   9/9 280   9/10 255   9/11 190   9/12 120   9/13 50        IMAGING:  EXAM: CT ABDOMEN PELVIS W/O CONTRAST  LOCATION: [facility]  DATE/TIME: 9/10/2022 2:53 PM     INDICATION: concern for suprapubic catheter malposition, increased abdominal pain. Lower abdominal pain.  COMPARISON: 9/4/2022  TECHNIQUE: CT scan of the abdomen and pelvis was performed without IV contrast. Multiplanar reformats were obtained. Dose reduction techniques were used.  CONTRAST: None.     FINDINGS:   LOWER CHEST: Moderate bilateral pleural effusions have increased in size mildly. These appear free-flowing. Associated dependent atelectasis.     HEPATOBILIARY: The liver is not well evaluated on this noncontrast exam. No significant interval change in appearance.     PANCREAS: Normal.     SPLEEN: Mild splenomegaly, unchanged.     ADRENAL GLANDS: Normal.     KIDNEYS/BLADDER: The kidneys are partially obscured by metal artifact spinal hardware. No hydronephrosis. Cystectomy with urinary diversion. There is a percutaneous catheter in the right abdomen with tip in the ileal conduit.  "Significant distention of   the conduit has developed since yesterday suggesting malfunction of the catheter.     BOWEL: Bowel loops are normal caliber.     LYMPH NODES: Not well assessed on this noncontrast exam.     VASCULATURE: Unremarkable.     PELVIC ORGANS: The uterus is normal in size.     MUSCULOSKELETAL: Generalized edema within the subcutaneous adipose tissues has increased mildly from 9/4/2022. A drain has been placed in the right hip joint and the effusion has significantly decreased in size. Deformity and degenerative changes of both   hips. Extensive spinal hardware and associated artifact.                                                                      IMPRESSION:   1.  Cystectomy with ileal conduit urinary diversion. The conduit has become significantly distended from 9/4/2022. A percutaneous catheter terminates within the conduit. The distention suggests malfunction of the catheter such as plugging. No   hydronephrosis of the kidneys.  2.  Moderate bilateral pleural effusions have increased in size mildly.  3.  Increase in mild generalized subcutaneous edema.  4.  Decrease in size of the right hip joint effusion. A drainage catheter is present within the joint space posteriorly.    9/8/22 last abscessogram:   FINDINGS:  Abscessogram demonstrates minimal residual abscess cavity. After exchange, the drain is appropriately positioned.                                                         IMPRESSION:    Right hip drain check demonstrates appropriate positioning of the drain. There is minimal residual fluid. The drain was exchanged/upsized for a 16 [place] drainage. Irrigation of the cavity reveals serosanguineous fluid.    NPO: midnight  ANTICOAGULANTS: lovenox 40mg Q12H  ANTIBIOTICS: IV rocephin    ALLERGIES  Allergies   Allergen Reactions     Penicillins Anaphylaxis     Patient states it makes her \"climb the walls and hyperactive.\"     Acetaminophen Nausea and Vomiting     Levaquin Rash " "    Rash only with po Levaquin...able to take IV Levaquin per pt         LABS:  INR   Date Value Ref Range Status   09/09/2022 1.47 (H) 0.85 - 1.15 Final   01/06/2020 1.64 (H) 0.86 - 1.14 Final      Hemoglobin   Date Value Ref Range Status   09/13/2022 7.4 (L) 11.7 - 15.7 g/dL Final   05/26/2021 10.2 (L) 11.7 - 15.7 g/dL Final   ]  Platelet Count   Date Value Ref Range Status   09/12/2022 65 (L) 150 - 450 10e3/uL Final   05/26/2021 318 150 - 450 10e9/L Final     Creatinine   Date Value Ref Range Status   09/12/2022 0.33 (L) 0.60 - 1.10 mg/dL Final   05/26/2021 0.47 (L) 0.52 - 1.04 mg/dL Final     Potassium   Date Value Ref Range Status   09/13/2022 3.4 (L) 3.5 - 5.0 mmol/L Final   05/26/2021 4.1 3.4 - 5.3 mmol/L Final         EXAM:  /55 (BP Location: Left arm)   Pulse 78   Temp 97.6  F (36.4  C) (Axillary)   Resp 18   Ht 1.626 m (5' 4\")   Wt 54.7 kg (120 lb 9.6 oz)   LMP  (LMP Unknown)   SpO2 99%   BMI 20.70 kg/m    General:  Stable.  In no acute distress.    Neuro:  A&O x 3. Moves all extremities equally.  Resp:  Lungs clear to auscultation bilaterally.  Cardio:  S1S2 and reg, without murmur, clicks or rubs  Hip: right hip drain to DENIA suction; serosanguinous drainage noted in tubing/bulb; no leakage.   : suprapubic catheter with no urine output from large tube and good urine output from small tube; leakage of urine noted with repositioning.     Pre-Sedation Assessment:  Mallampati Airway Classification:  II - Faucial pillars and soft palate may be seen, but uvula is masked by the base of the tongue  Previous reaction to anesthesia/sedation:  No  Sedation plan based on assessment: Moderate (conscious) sedation as needed  ASA Classification: Class 3 - SEVERE SYSTEMIC DISEASE, DEFINITE FUNCTIONAL LIMITATIONS.   Code Status: Full Code intra procedure, per discussion with patient.       ASSESSMENT/PLAN:   Abscessogram with sedation and possible reposition, exchange, and/or removal of tubing and " suprapubic catheter check with exchange as needed.     Procedure, risks/benefits, details, alternatives, and sedation reviewed with patient and verbalized understanding. All questions answered. OK to proceed with above radiology procedure.     VERENA Ferrell CNP  Interventional Radiology
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
## 2022-09-13 NOTE — SUMMARY OF CARE
Pt came from IR, DENIA emptied (95ml), dressing clean, dry and intact. No distress noted. Call light in reach

## 2022-09-13 NOTE — PROGRESS NOTES
Patient Name: Felicia Ellison  Medical Record Number: [id number]  Today's Date: 9/13/2022    Procedure: Abscessogram and suprapubic cath exchange  Proceduralist: DR. Michael    Procedure Start: 1547   Procedure end: 1605  Sedation medications administered: NoneSedation time: 0 minutes    Report given to:BOB Lama  : n/a    Other Notes: Pt arrived to IR room 1 from Pre/post bay 1. Consent reviewed. Pt denies any questions or concerns regarding procedure. Pt positioned supine and monitored per protocol. Pt tolerated procedure without any noted complications. VSS on room air. Pt transferred back to Patient room. .

## 2022-09-13 NOTE — PRE-PROCEDURE
GENERAL PRE-PROCEDURE:   Procedure:  Abscessogram and suprapubic cath exchange  Date/Time:  9/13/2022 3:02 PM    Written consent obtained?: Yes    Risks and benefits: Risks, benefits and alternatives were discussed    Consent given by:  Patient  Patient states understanding of procedure being performed: Yes    Patient's understanding of procedure matches consent: Yes    Procedure consent matches procedure scheduled: Yes    Expected level of sedation:  Moderate  Appropriately NPO:  Yes  ASA Class:  3  Mallampati  :  Grade 2- soft palate, base of uvula, tonsillar pillars, and portion of posterior pharyngeal wall visible  Lungs:  Lungs clear with good breath sounds bilaterally  Heart:  Normal heart sounds and rate  History & Physical reviewed:  History and physical reviewed and no updates needed  Statement of review:  I have reviewed the lab findings, diagnostic data, medications, and the plan for sedation
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
## 2022-09-13 NOTE — PROGRESS NOTES
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 Drains to be assessed by radiology. Continuing to follow.     Mamadou Artis MD

## 2022-09-13 NOTE — PROGRESS NOTES
D/W Dr. Fahrner this morning regarding tube location on the 9/10 CT.  He said the side ports on the tube are at the end to slightly beyond cavity and could be readjusted a few cm back.  D/W IR NP and she will have IR Doctor review that film and determine if tube should be repositioned. Output of tube over 24hrs appears to be 50ml if correctly documented.    Will have RN also flush the current suprapubic tube to see if clogged given on exam this morning urine draining out of whole all over skin and bedding, decreased urine in tube/bag noted.  IR will also look at this tube when they evaluate abscess tube this afternoon.

## 2022-09-14 ENCOUNTER — APPOINTMENT (OUTPATIENT)
Dept: PHYSICAL THERAPY | Facility: HOSPITAL | Age: 57
DRG: 853 | End: 2022-09-14
Attending: INTERNAL MEDICINE
Payer: MEDICARE

## 2022-09-14 DIAGNOSIS — K21.9 GASTROESOPHAGEAL REFLUX DISEASE WITHOUT ESOPHAGITIS: ICD-10-CM

## 2022-09-14 DIAGNOSIS — F51.01 PRIMARY INSOMNIA: Primary | ICD-10-CM

## 2022-09-14 DIAGNOSIS — T18.108A FOREIGN BODY IN ESOPHAGUS, INITIAL ENCOUNTER: ICD-10-CM

## 2022-09-14 DIAGNOSIS — E87.6 HYPOKALEMIA: ICD-10-CM

## 2022-09-14 LAB
ALBUMIN SERPL-MCNC: 1.7 G/DL (ref 3.5–5)
ALP SERPL-CCNC: 91 U/L (ref 45–120)
ALT SERPL W P-5'-P-CCNC: <9 U/L (ref 0–45)
AST SERPL W P-5'-P-CCNC: 10 U/L (ref 0–40)
BILIRUB DIRECT SERPL-MCNC: 0.1 MG/DL
BILIRUB SERPL-MCNC: 0.2 MG/DL (ref 0–1)
COPPER SERPL-MCNC: 51.1 UG/DL
CREAT SERPL-MCNC: 0.35 MG/DL (ref 0.6–1.1)
GFR SERPL CREATININE-BSD FRML MDRD: >90 ML/MIN/1.73M2
HGB BLD-MCNC: 7.7 G/DL (ref 11.7–15.7)
MAGNESIUM SERPL-MCNC: 2.1 MG/DL (ref 1.8–2.6)
POTASSIUM BLD-SCNC: 3.5 MMOL/L (ref 3.5–5)
PROT SERPL-MCNC: 4.7 G/DL (ref 6–8)
SODIUM SERPL-SCNC: 139 MMOL/L (ref 136–145)
ZINC SERPL-MCNC: 32 UG/DL

## 2022-09-14 PROCEDURE — 120N000001 HC R&B MED SURG/OB

## 2022-09-14 PROCEDURE — 99233 SBSQ HOSP IP/OBS HIGH 50: CPT | Performed by: INTERNAL MEDICINE

## 2022-09-14 PROCEDURE — 97535 SELF CARE MNGMENT TRAINING: CPT | Mod: GP | Performed by: PHYSICAL THERAPIST

## 2022-09-14 PROCEDURE — 250N000011 HC RX IP 250 OP 636: Performed by: INTERNAL MEDICINE

## 2022-09-14 PROCEDURE — G0463 HOSPITAL OUTPT CLINIC VISIT: HCPCS

## 2022-09-14 PROCEDURE — 250N000013 HC RX MED GY IP 250 OP 250 PS 637: Performed by: INTERNAL MEDICINE

## 2022-09-14 PROCEDURE — 80076 HEPATIC FUNCTION PANEL: CPT | Performed by: INTERNAL MEDICINE

## 2022-09-14 PROCEDURE — 82565 ASSAY OF CREATININE: CPT | Performed by: INTERNAL MEDICINE

## 2022-09-14 PROCEDURE — 84295 ASSAY OF SERUM SODIUM: CPT | Performed by: INTERNAL MEDICINE

## 2022-09-14 PROCEDURE — 83735 ASSAY OF MAGNESIUM: CPT | Performed by: INTERNAL MEDICINE

## 2022-09-14 PROCEDURE — 84132 ASSAY OF SERUM POTASSIUM: CPT | Performed by: INTERNAL MEDICINE

## 2022-09-14 PROCEDURE — 85018 HEMOGLOBIN: CPT | Performed by: INTERNAL MEDICINE

## 2022-09-14 RX ORDER — LEVETIRACETAM 500 MG/1
500 TABLET ORAL 2 TIMES DAILY
Status: DISCONTINUED | OUTPATIENT
Start: 2022-09-14 | End: 2022-09-28 | Stop reason: HOSPADM

## 2022-09-14 RX ADMIN — Medication 3 MG: at 20:55

## 2022-09-14 RX ADMIN — BUMETANIDE 0.5 MG: 0.5 TABLET ORAL at 08:41

## 2022-09-14 RX ADMIN — LEVETIRACETAM 750 MG: 250 TABLET, FILM COATED ORAL at 08:42

## 2022-09-14 RX ADMIN — OXYCODONE HYDROCHLORIDE 5 MG: 5 TABLET ORAL at 21:14

## 2022-09-14 RX ADMIN — PANTOPRAZOLE SODIUM 40 MG: 40 TABLET, DELAYED RELEASE ORAL at 06:21

## 2022-09-14 RX ADMIN — THERA TABS 1 TABLET: TAB at 08:41

## 2022-09-14 RX ADMIN — CEFTRIAXONE 2 G: 2 INJECTION, POWDER, FOR SOLUTION INTRAMUSCULAR; INTRAVENOUS at 13:55

## 2022-09-14 RX ADMIN — ENOXAPARIN SODIUM 50 MG: 60 INJECTION SUBCUTANEOUS at 20:55

## 2022-09-14 RX ADMIN — FOLIC ACID 1 MG: 1 TABLET ORAL at 08:42

## 2022-09-14 RX ADMIN — OXYCODONE HYDROCHLORIDE 5 MG: 5 TABLET ORAL at 00:26

## 2022-09-14 RX ADMIN — ENOXAPARIN SODIUM 50 MG: 60 INJECTION SUBCUTANEOUS at 08:45

## 2022-09-14 RX ADMIN — THIAMINE HCL TAB 100 MG 100 MG: 100 TAB at 08:42

## 2022-09-14 RX ADMIN — LEVETIRACETAM 500 MG: 500 TABLET, FILM COATED ORAL at 20:55

## 2022-09-14 ASSESSMENT — ACTIVITIES OF DAILY LIVING (ADL)
ADLS_ACUITY_SCORE: 53
ADLS_ACUITY_SCORE: 53
ADLS_ACUITY_SCORE: 51
ADLS_ACUITY_SCORE: 53
ADLS_ACUITY_SCORE: 51
ADLS_ACUITY_SCORE: 53
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
## 2022-09-14 NOTE — PROGRESS NOTES
[facility] Nurse Inpatient Assessment     Consulted for: Sacrum - unable to take photo due to patient pain with reposition    Patient History (according to provider note(s):        Areas Assessed:      Pressure Injury Location: Sacrum and R IT    Wound type: Pressure Injury     Pressure Injury Stage: 3 or greater, present on admission        Wound base: 100 % granulation tissue     Palpation of the wound bed: normal      Drainage: small     Description of drainage: serosanguinous and green-tint - larger volume at this assessment then last and slight maceration noted. Will increase to BID dressing changes.     Measurements (length x width x depth, in cm) 6 cm  x 10 cm  x  1.5 cm   (patient in unusual position for her comfort, difference in wound measurements appears to be positional as overall appearance of the wound is stable)     Tunneling N/A     Undermining N/A  Periwound skin: Intact and Scar tissue      Color: normal and consistent with surrounding tissue      Temperature: normal   Odor: none  Pain: with movement  Pain intervention prior to dressing change: patient tolerated well and slow and gentle cares   Treatment goal: Decrease bioburden in wound bed, may need sharp debridement to jump start healing (if patient has surgery maybe surgeon will be able to assist with this during admission. Otherwise patient can follow up with [facility] nurses in [facility] after discharge). Overall, no s/s that sacral wounds have an infectious process   STATUS: stable  Supplies ordered: ordered Vashe    R IT with scar tissue but a few scattered areas of denudement - partial thickness.    Treatment Plan:     Sacral wound - twice daily  Cleanse wound with Vashe, gently dry.  Lightly moisten gauze 4 x 4's with Vashe and loosely pack into wound bed.  Cover with ABD pad and secure with tape.    R IT wound - Every 3 days  Sacral wound - daily  Cleanse wound with Vashe, gently dry.  Cover with Mepilex 4 x  4.    Orders: Reviewed    RECOMMEND PRIMARY TEAM ORDER: None, at this time  Education provided: plan of care and wound progress  Discussed plan of care with: Patient and Nurse  WOC nurse follow-up plan: weekly  Notify WOC if wound(s) deteriorate.  Nursing to notify the Provider(s) and re-consult the WOC Nurse if new skin concern.    DATA:     Current support surface: Standard  Low air loss mattress  Containment of urine/stool: Indwelling catheter and Ileostomy pouch  BMI: Body mass index is 20.7 kg/m .   Active diet order: Orders Placed This Encounter      Regular Diet Adult     Output: I/O last 3 completed shifts:  In: 240 [P.O.:240]  Out: 2095 [Urine:1150; Drains:345; Stool:600]     Labs:   Recent Labs   Lab 09/14/22  1053 09/14/22  0545 09/13/22  0517 09/12/22  0835 09/10/22  0634 09/09/22  1820 09/09/22  0637 09/08/22  0349   ALBUMIN 1.7*  --   --   --    < >  --    < > 1.8*   HGB  --  7.7*   < > 6.5*   < >  --    < > 7.4*   INR  --   --   --   --   --  1.47*  --   --    WBC  --   --   --  5.6   < >  --    < > 2.4*   CRP  --   --   --   --   --   --   --  0.4    < > = values in this interval not displayed.     Pressure injury risk assessment:   Sensory Perception: 1-->completely limited  Moisture: 3-->occasionally moist  Activity: 1-->bedfast  Mobility: 1-->completely immobile  Nutrition: 3-->adequate  Friction and Shear: 1-->problem  Bi Score: 10    Traci Centeno RN CWOCN

## 2022-09-14 NOTE — PROGRESS NOTES
Progress Note    Assessment/Plan  Patient very depressed about not being able to go back to [facility] yet at this point.  It drainage from right hip is significantly less.  We will get a repeat CT of the pelvis tomorrow.  Will review with Ortho after CT is completed.  No significant pain.  Drainage from urinary catheter stable.  Patient not eating very much at this point.    Active Problems:    Septic shock (H)      Subjective  As above without pain and without much appetite yet at this point.  Very depressed.  Objective    Vital signs in last 24 hours  Temp:  [97.6  F (36.4  C)-98.7  F (37.1  C)] 98.5  F (36.9  C)  Pulse:  [76-88] 82  Resp:  [15-20] 18  BP: ()/(48-63) 96/49  SpO2:  [95 %-100 %] 95 %  Weight:   [unfilled]    Intake/Output last 3 shifts  I/O last 3 completed shifts:  In: 240 [P.O.:240]  Out: 2095 [Urine:1150; Drains:345; Stool:600]  Intake/Output this shift:  I/O this shift:  In: 400 [P.O.:400]  Out: 130 [Urine:100; Drains:30]      Physical Exam  Exam without change abdomen benign drains are essentially unchanged intact drainage of the same urine out of 1 drain purulent material out of the other drain leg and hip without change in edema status.    Pertinent Labs   Lab Results   Component Value Date    WBC 5.6 09/12/2022    HGB 7.7 (L) 09/14/2022    HCT 23.0 (L) 09/11/2022    MCV 90 09/11/2022    PLT 65 (L) 09/12/2022             Pertinent Radiology     [unfilled]        Reji Hunter MD

## 2022-09-15 ENCOUNTER — APPOINTMENT (OUTPATIENT)
Dept: CT IMAGING | Facility: HOSPITAL | Age: 57
DRG: 853 | End: 2022-09-15
Attending: SURGERY
Payer: MEDICARE

## 2022-09-15 ENCOUNTER — APPOINTMENT (OUTPATIENT)
Dept: OCCUPATIONAL THERAPY | Facility: HOSPITAL | Age: 57
DRG: 853 | End: 2022-09-15
Attending: INTERNAL MEDICINE
Payer: MEDICARE

## 2022-09-15 ENCOUNTER — APPOINTMENT (OUTPATIENT)
Dept: PHYSICAL THERAPY | Facility: HOSPITAL | Age: 57
DRG: 853 | End: 2022-09-15
Attending: INTERNAL MEDICINE
Payer: MEDICARE

## 2022-09-15 LAB
CREAT SERPL-MCNC: 0.36 MG/DL (ref 0.6–1.1)
GFR SERPL CREATININE-BSD FRML MDRD: >90 ML/MIN/1.73M2
MAGNESIUM SERPL-MCNC: 1.9 MG/DL (ref 1.8–2.6)
PHOSPHATE SERPL-MCNC: 2.8 MG/DL (ref 2.5–4.5)
PLATELET # BLD AUTO: 76 10E3/UL (ref 150–450)
POTASSIUM BLD-SCNC: 3.6 MMOL/L (ref 3.5–5)

## 2022-09-15 PROCEDURE — 120N000001 HC R&B MED SURG/OB

## 2022-09-15 PROCEDURE — 84132 ASSAY OF SERUM POTASSIUM: CPT | Performed by: INTERNAL MEDICINE

## 2022-09-15 PROCEDURE — 250N000011 HC RX IP 250 OP 636: Performed by: INTERNAL MEDICINE

## 2022-09-15 PROCEDURE — 250N000013 HC RX MED GY IP 250 OP 250 PS 637: Performed by: INTERNAL MEDICINE

## 2022-09-15 PROCEDURE — 83735 ASSAY OF MAGNESIUM: CPT | Performed by: INTERNAL MEDICINE

## 2022-09-15 PROCEDURE — 72193 CT PELVIS W/DYE: CPT | Mod: MG

## 2022-09-15 PROCEDURE — 97535 SELF CARE MNGMENT TRAINING: CPT | Mod: GP

## 2022-09-15 PROCEDURE — 82565 ASSAY OF CREATININE: CPT | Performed by: INTERNAL MEDICINE

## 2022-09-15 PROCEDURE — 99232 SBSQ HOSP IP/OBS MODERATE 35: CPT | Performed by: INTERNAL MEDICINE

## 2022-09-15 PROCEDURE — 99207 PR NO CHARGE LOS: CPT | Performed by: INTERNAL MEDICINE

## 2022-09-15 PROCEDURE — G1010 CDSM STANSON: HCPCS

## 2022-09-15 PROCEDURE — 97110 THERAPEUTIC EXERCISES: CPT | Mod: GO

## 2022-09-15 PROCEDURE — 85049 AUTOMATED PLATELET COUNT: CPT | Performed by: INTERNAL MEDICINE

## 2022-09-15 PROCEDURE — 84100 ASSAY OF PHOSPHORUS: CPT | Performed by: INTERNAL MEDICINE

## 2022-09-15 RX ORDER — IOPAMIDOL 755 MG/ML
100 INJECTION, SOLUTION INTRAVASCULAR ONCE
Status: COMPLETED | OUTPATIENT
Start: 2022-09-15 | End: 2022-09-15

## 2022-09-15 RX ADMIN — CEFTRIAXONE 2 G: 2 INJECTION, POWDER, FOR SOLUTION INTRAMUSCULAR; INTRAVENOUS at 13:50

## 2022-09-15 RX ADMIN — ENOXAPARIN SODIUM 50 MG: 60 INJECTION SUBCUTANEOUS at 22:02

## 2022-09-15 RX ADMIN — LEVETIRACETAM 500 MG: 500 TABLET, FILM COATED ORAL at 22:02

## 2022-09-15 RX ADMIN — IOPAMIDOL 100 ML: 755 INJECTION, SOLUTION INTRAVENOUS at 16:58

## 2022-09-15 RX ADMIN — BUMETANIDE 0.5 MG: 0.5 TABLET ORAL at 09:42

## 2022-09-15 RX ADMIN — OXYCODONE HYDROCHLORIDE 5 MG: 5 TABLET ORAL at 22:14

## 2022-09-15 RX ADMIN — THIAMINE HCL TAB 100 MG 100 MG: 100 TAB at 09:43

## 2022-09-15 RX ADMIN — LEVETIRACETAM 500 MG: 500 TABLET, FILM COATED ORAL at 09:42

## 2022-09-15 RX ADMIN — OXYCODONE HYDROCHLORIDE 5 MG: 5 TABLET ORAL at 17:07

## 2022-09-15 RX ADMIN — PANTOPRAZOLE SODIUM 40 MG: 40 TABLET, DELAYED RELEASE ORAL at 06:51

## 2022-09-15 RX ADMIN — ENOXAPARIN SODIUM 50 MG: 60 INJECTION SUBCUTANEOUS at 09:43

## 2022-09-15 RX ADMIN — THERA TABS 1 TABLET: TAB at 09:42

## 2022-09-15 RX ADMIN — FOLIC ACID 1 MG: 1 TABLET ORAL at 09:43

## 2022-09-15 RX ADMIN — OXYCODONE HYDROCHLORIDE 5 MG: 5 TABLET ORAL at 08:42

## 2022-09-15 ASSESSMENT — ACTIVITIES OF DAILY LIVING (ADL)
ADLS_ACUITY_SCORE: 53

## 2022-09-15 NOTE — PLAN OF CARE
Problem: Plan of Care - These are the overarching goals to be used throughout the patient stay.    Goal: Plan of Care Review/Shift Note  Outcome: Ongoing, Progressing  Plan of Care Reviewed With: patient  Overall Patient Progress: improving     Problem: Pain Acute  Goal: Acceptable Pain Control and Functional Ability  Outcome: Ongoing, Progressing     Problem: Impaired Wound Healing  Goal: Optimal Wound Healing  Outcome: Ongoing, Progressing    Intervention: Promote Wound Healing  Recent Flowsheet Documentation  Taken 9/15/2022 0000 by Steffanie Starr, RN  Activity Management:    bedrest    activity adjusted per tolerance

## 2022-09-15 NOTE — PROGRESS NOTES
Progress Note    Assessment/Plan  Continue present care.  CT pending.  Patient without significant clinical change.  Still with depression.  No significant change in abdomen or legs or drainage catheters.  Will review CT scan and discuss with orthopedist regarding extent of involvement of right hip.    Active Problems:    Septic shock (H)      Subjective  Uncomfortable with movement.  W OC note reviewed.  Unable to take pictures secondary to discomfort.  Denies significant abdominal pain however.  Without significant pain in the right leg and hip area.  Objective    Vital signs in last 24 hours  Temp:  [98.2  F (36.8  C)-99.1  F (37.3  C)] 98.2  F (36.8  C)  Pulse:  [73-84] 73  Resp:  [16-18] 16  BP: ()/(50-58) 103/58  SpO2:  [95 %-98 %] 95 %  Weight:   [unfilled]    Intake/Output last 3 shifts  I/O last 3 completed shifts:  In: 400 [P.O.:400]  Out: 1750 [Urine:1570; Drains:180]  Intake/Output this shift:  No intake/output data recorded.      Physical Exam  Exam indicates benign abdomen the current time.  Drainage catheters are essentially without change.  Bowel sounds are present.  No peritoneal findings.  No evidence of additional infected foci.  Presacral wound is covered.  Abdomen benign.    Pertinent Labs   Lab Results   Component Value Date    WBC 5.6 09/12/2022    HGB 7.7 (L) 09/14/2022    HCT 23.0 (L) 09/11/2022    MCV 90 09/11/2022    PLT 76 (L) 09/15/2022             Pertinent Radiology     [unfilled]        Reji Hunter MD
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
## 2022-09-15 NOTE — PROGRESS NOTES
Spoke with CT staff to ensure patient was on their radar. Staff stated CT to be performed later this afternoon.

## 2022-09-15 NOTE — PLAN OF CARE
Lymphedema Discharge Summary    Reason for therapy discharge:    All goals and outcomes met, no further needs identified.    Progress towards therapy goal(s). See goals on Care Plan in Epic electronic health record for goal details.  Goals met    Therapy recommendation(s):    No further therapy is recommended.    Patient is now in a maintenance phase for the management of their edema and will no longer be actively followed by edema therapists.  Please follow instructions below to assist the patient with use of the compression garment to maintain control of edema:  1. Pt is to wear Tubular compression stockings (cream colored and toeless) labeled size F on bilateral LE's with overlap of extra fabric at feet   2. Completely remove compression for 1 hour each day for skin care (wash and lotion) and skin assessment then re-petey.  3. Nursing to remove if:  *wraps are causing pain/numbness   *wraps become soiled  *wraps become lose or are falling off  *patient has a change in level of alertness or status impacting ability to communicate pain   *there are areas of bunching up and rolling (want to avoid risk of tourniquet effect)    Contact inpatient edema therapy (rehabilitation services) with questions or if there is a change in condition.       Christina Ruelas, PT, DPT  9/15/2022
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
## 2022-09-15 NOTE — PROGRESS NOTES
MAULIK received call from Araceli at [facility] (intake: [phone]).  Patient is currently open to services with [facility] for skilled nursing.    Writer provided Araceli with updates on patient status and possible discharge date.    Araceli reports that if patient is discharged back home their agency is not able to accommodate IV antibiotics and another agency would be needed.  Araceli requests to have update at the number above (confidential line) when discharge plan is known.    Writer provided assigned care manager with this update.      Karmen Aden RN

## 2022-09-15 NOTE — PROGRESS NOTES
"CLINICAL NUTRITION SERVICES - ASSESSMENT NOTE     Nutrition Prescription    RECOMMENDATIONS FOR MDs/PROVIDERS TO ORDER:  None    Malnutrition Status:    Severe in context of acute on chronic illness    Recommendations already ordered by Registered Dietitian (RD):  Continue ensure enlive tid = 1050 kcal, 60 g protein    Future/Additional Recommendations:  Adjust supplement pending intake, weight, tolerance, acceptance, wound healing  Pt will not be able to meet her nutrition needs in her previous living arrangement per hx      NEW FINDINGS  -pt reports only receiving 1 ensure since previous RD visit, consumed % of it. RN present in room said would communicate supplement to other RNs. Nutrition services also notified by RD  -pt ordering 3 meals/day, still with minimal appetite. Was able to tolerate 75% of breakfast 9/14  -medium soft formed stool through ileostomy 9/14 (400 mL)    CURRENT NUTRITION ORDERS  Diet: regular    Intake/Tolerance: 9/14 averaged 560 kcal an 26 gm protein from breakfast and ensure, otherwise only bites of meals    LABS   09/14/22 05:45 09/14/22 10:53 09/15/22 05:16   Sodium 139     Potassium 3.5  3.6   Creatinine 0.35 (L)  0.36 (L)   GFR Estimate >90  >90   Magnesium 2.1  1.9   Phosphorus   2.8   Albumin  1.7 (L)    Protein Total  4.7 (L)    Alkaline Phosphatase  91    ALT  <9    AST  10    Bilirubin Direct  0.1    Bilirubin Total  0.2    Hemoglobin 7.7 (L)     Platelet Count   76 (L)      09/10/22 06:34 09/10/22 07:31 09/10/22 11:45 09/11/22 06:57 09/11/22 07:23 09/11/22 11:11   Glucose 88   92     GLUCOSE BY METER POCT  81 100 (H)  86 119 (H)       MEDICATIONS  Bumex, Rocephin, Folic acid, Keppra, MV, Protonix, Thiamine, Oxycodone    ANTHROPOMETRICS  Height: 162.6 cm (5' 4\"[estimate/confused[)  Admit weight: 46.9 kg (103 lb 6.3 oz)  Most Recent Weight: 54.7 kg (120 lb 9.6 oz) 9/10, down 8 lb with diuresis. Pt -11 lbs fluid since 9/3.  IBW: 54.5 kg (if height is accurate)  BMI: Normal " BMI (if weight/height is accurate)  Weight History:   09/10/22 54.7 kg (120 lbs 9.6 oz)  09/07/22 56.5 kg (124 lb 9.6 oz)  09/06/22 53.9 kg (118 lb 144 oz)  09/05/22  51.3 kg (113 lb 1.6 oz)  4/22/22 107 lb    6.7% wt loss in less than 1 week - significant per ASPEN criteria however pt being diuresed w/ Bumex    Dosing Weight: 54.5 kg (IBW)    ASSESSED NUTRITION NEEDS  Estimated Energy Needs: 1526+ kcals/day (28 kcal/kg w/ paraplegia)  Justification: Wound healing  Estimated Protein Needs: 55-65+ grams protein/day (1 - 1.2 grams of pro/kg)  Justification: Wound healing  Estimated Fluid Needs: 1635 mL/day (30 mL/kg)   Justification: Maintenance    PHYSICAL FINDINGS  Per chart,  Skin: St 3 or greater sacrum, rt IT per woc; healing per nursing  Drain, right hip  Edema: +3-+4 extremity  GI: audible BS, chronic abdomin pain    MALNUTRITION:  % Weight Loss:  None noted (wt d/t fluid accumulation)  % Intake:  </= 50% for >/= 5 days (severe malnutrition)  Subcutaneous Fat Loss:  Severe orbital and bucal loss, moderate to severe thoracic  Muscle Loss:  Moderate temporal loss, moderate deltoid loss, severe clavicle loss  Fluid Retention:  +2-+4 arms and legs (moderate)    Malnutrition Diagnosis: Severe malnutrition   In Context of:  Acute illness or injury    NUTRITION DIAGNOSIS  Malnutrition related to acute on chronic illness as evidenced by intake <=50% for >= 5 days, severe fluid accumulation, severe fat and muscle loss    INTERVENTIONS  Implementation  Continue ensure enlive tid = 1050 kcal, 60 g protein  Encouraged adequate oral intake of foods and supplements as tolerated to meet estimated daily nutrition needs  Discussed importance of adequate nutrition for wound healing  Increase dry wt    Goals  Meet estimated nutrition needs- ongoing, progressing  Wound healing per woc- ongoing (stable appearance, larger drainage)     Monitoring/Evaluation  Progress toward goals will be monitored and evaluated per protocol.

## 2022-09-15 NOTE — PLAN OF CARE
Problem: Plan of Care - These are the overarching goals to be used throughout the patient stay.    Goal: Plan of Care Review/Shift Note  Description: The Plan of Care Review/Shift note should be completed every shift.  The Outcome Evaluation is a brief statement about your assessment that the patient is improving, declining, or no change.  This information will be displayed automatically on your shift note.  Outcome: Ongoing, Progressing   Goal Outcome Evaluation:  Educated pt on treatment plan, pt voiced understanding.     Problem: Plan of Care - These are the overarching goals to be used throughout the patient stay.    Goal: Optimal Comfort and Wellbeing  Intervention: Monitor Pain and Promote Comfort  Recent Flowsheet Documentation  Taken 9/15/2022 0841 by Joselyn Cage RN  Pain Management Interventions:   medication (see MAR)   distraction   emotional support   pillow support provided   rest   repositioned   Pain to LE controlled with PRN oxycodone. Cont. To T&R.    PICC, illeostomy, SP cath, abd tubing/sp, & R hip DENIA lines all intact. Dressing changes to sacral/back area. Mepilex changed to R hip area. See documentation for outputs. Cont. To T&R frequently on air bed.

## 2022-09-16 LAB
CREAT SERPL-MCNC: 0.32 MG/DL (ref 0.6–1.1)
GFR SERPL CREATININE-BSD FRML MDRD: >90 ML/MIN/1.73M2
HGB BLD-MCNC: 7.2 G/DL (ref 11.7–15.7)
LMWH PPP CHRO-ACNC: 0.4 IU/ML
MAGNESIUM SERPL-MCNC: 1.8 MG/DL (ref 1.8–2.6)
PHOSPHATE SERPL-MCNC: 2.9 MG/DL (ref 2.5–4.5)
PLATELET # BLD AUTO: 95 10E3/UL (ref 150–450)
POTASSIUM BLD-SCNC: 3.4 MMOL/L (ref 3.5–5)

## 2022-09-16 PROCEDURE — 84132 ASSAY OF SERUM POTASSIUM: CPT | Performed by: INTERNAL MEDICINE

## 2022-09-16 PROCEDURE — 250N000013 HC RX MED GY IP 250 OP 250 PS 637: Performed by: INTERNAL MEDICINE

## 2022-09-16 PROCEDURE — 85520 HEPARIN ASSAY: CPT | Performed by: INTERNAL MEDICINE

## 2022-09-16 PROCEDURE — 85049 AUTOMATED PLATELET COUNT: CPT | Performed by: INTERNAL MEDICINE

## 2022-09-16 PROCEDURE — 85018 HEMOGLOBIN: CPT | Performed by: INTERNAL MEDICINE

## 2022-09-16 PROCEDURE — 99233 SBSQ HOSP IP/OBS HIGH 50: CPT | Performed by: GENERAL PRACTICE

## 2022-09-16 PROCEDURE — 84100 ASSAY OF PHOSPHORUS: CPT | Performed by: INTERNAL MEDICINE

## 2022-09-16 PROCEDURE — 250N000011 HC RX IP 250 OP 636: Performed by: INTERNAL MEDICINE

## 2022-09-16 PROCEDURE — 250N000013 HC RX MED GY IP 250 OP 250 PS 637: Performed by: NURSE PRACTITIONER

## 2022-09-16 PROCEDURE — 99232 SBSQ HOSP IP/OBS MODERATE 35: CPT | Performed by: INTERNAL MEDICINE

## 2022-09-16 PROCEDURE — 83735 ASSAY OF MAGNESIUM: CPT | Performed by: INTERNAL MEDICINE

## 2022-09-16 PROCEDURE — 250N000013 HC RX MED GY IP 250 OP 250 PS 637: Performed by: GENERAL PRACTICE

## 2022-09-16 PROCEDURE — 99221 1ST HOSP IP/OBS SF/LOW 40: CPT | Performed by: NURSE PRACTITIONER

## 2022-09-16 PROCEDURE — 82565 ASSAY OF CREATININE: CPT | Performed by: INTERNAL MEDICINE

## 2022-09-16 PROCEDURE — 120N000001 HC R&B MED SURG/OB

## 2022-09-16 PROCEDURE — G0463 HOSPITAL OUTPT CLINIC VISIT: HCPCS

## 2022-09-16 RX ORDER — CEFTRIAXONE 2 G/1
2 INJECTION, POWDER, FOR SOLUTION INTRAMUSCULAR; INTRAVENOUS EVERY 24 HOURS
Qty: 580 ML | Refills: 0
Start: 2022-09-16 | End: 2022-09-28

## 2022-09-16 RX ORDER — TRAZODONE HYDROCHLORIDE 50 MG/1
100 TABLET, FILM COATED ORAL AT BEDTIME
Status: DISCONTINUED | OUTPATIENT
Start: 2022-09-16 | End: 2022-09-28 | Stop reason: HOSPADM

## 2022-09-16 RX ORDER — POTASSIUM CHLORIDE 1500 MG/1
40 TABLET, EXTENDED RELEASE ORAL ONCE
Status: COMPLETED | OUTPATIENT
Start: 2022-09-16 | End: 2022-09-16

## 2022-09-16 RX ORDER — NYSTATIN 100000 U/G
OINTMENT TOPICAL 2 TIMES DAILY
Status: DISCONTINUED | OUTPATIENT
Start: 2022-09-16 | End: 2022-09-28 | Stop reason: HOSPADM

## 2022-09-16 RX ORDER — ENOXAPARIN SODIUM 100 MG/ML
60 INJECTION SUBCUTANEOUS EVERY 12 HOURS
Status: DISCONTINUED | OUTPATIENT
Start: 2022-09-16 | End: 2022-09-28 | Stop reason: HOSPADM

## 2022-09-16 RX ORDER — MIRTAZAPINE 7.5 MG/1
TABLET, FILM COATED ORAL
Qty: 29 TABLET | Refills: 0 | OUTPATIENT
Start: 2022-09-16 | End: 2022-09-28

## 2022-09-16 RX ORDER — MIRTAZAPINE 7.5 MG/1
7.5 TABLET, FILM COATED ORAL AT BEDTIME
Status: DISCONTINUED | OUTPATIENT
Start: 2022-09-16 | End: 2022-09-16

## 2022-09-16 RX ADMIN — ENOXAPARIN SODIUM 50 MG: 60 INJECTION SUBCUTANEOUS at 09:05

## 2022-09-16 RX ADMIN — FOLIC ACID 1 MG: 1 TABLET ORAL at 09:05

## 2022-09-16 RX ADMIN — BUMETANIDE 0.5 MG: 0.5 TABLET ORAL at 09:05

## 2022-09-16 RX ADMIN — POTASSIUM CHLORIDE 40 MEQ: 1500 TABLET, EXTENDED RELEASE ORAL at 16:41

## 2022-09-16 RX ADMIN — LEVETIRACETAM 500 MG: 500 TABLET, FILM COATED ORAL at 09:05

## 2022-09-16 RX ADMIN — ENOXAPARIN SODIUM 60 MG: 60 INJECTION SUBCUTANEOUS at 20:33

## 2022-09-16 RX ADMIN — THERA TABS 1 TABLET: TAB at 09:05

## 2022-09-16 RX ADMIN — OXYCODONE HYDROCHLORIDE 5 MG: 5 TABLET ORAL at 09:10

## 2022-09-16 RX ADMIN — TRAZODONE HYDROCHLORIDE 100 MG: 50 TABLET ORAL at 20:28

## 2022-09-16 RX ADMIN — THIAMINE HCL TAB 100 MG 100 MG: 100 TAB at 09:05

## 2022-09-16 RX ADMIN — CEFTRIAXONE 2 G: 2 INJECTION, POWDER, FOR SOLUTION INTRAMUSCULAR; INTRAVENOUS at 20:27

## 2022-09-16 RX ADMIN — NYSTATIN: 100000 OINTMENT TOPICAL at 20:29

## 2022-09-16 RX ADMIN — PANTOPRAZOLE SODIUM 40 MG: 40 TABLET, DELAYED RELEASE ORAL at 06:50

## 2022-09-16 RX ADMIN — LEVETIRACETAM 500 MG: 500 TABLET, FILM COATED ORAL at 20:28

## 2022-09-16 RX ADMIN — OXYCODONE HYDROCHLORIDE 5 MG: 5 TABLET ORAL at 16:41

## 2022-09-16 ASSESSMENT — ACTIVITIES OF DAILY LIVING (ADL)
ADLS_ACUITY_SCORE: 53
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
## 2022-09-16 NOTE — PLAN OF CARE
Problem: Plan of Care - These are the overarching goals to be used throughout the patient stay.    Goal: Plan of Care Review/Shift Note  Description: The Plan of Care Review/Shift note should be completed every shift.  The Outcome Evaluation is a brief statement about your assessment that the patient is improving, declining, or no change.  This information will be displayed automatically on your shift note.  Outcome: Ongoing, Progressing  Flowsheets (Taken 9/16/2022 0544)  Plan of Care Reviewed With: patient  Overall Patient Progress: no change   Goal Outcome Evaluation:    Plan of Care Reviewed With: patient     Overall Patient Progress: no change    Patient continues with IV antibiotic to treat right upper thigh/groin abscess. DENIA drain in place. Drain irrigated with 10 ml normal saline. Receiving Lovenox subcutaneous every 12 hours. Has bilateral upper extremity and left lower extremity blood clots. Low molecular weight Anti-Xa lab drawn at 0300. Results pending. Wound dressings intact to sacrum/coccyx and right IT.
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
## 2022-09-16 NOTE — PLAN OF CARE
Problem: Impaired Wound Healing  Goal: Optimal Wound Healing  Intervention: Promote Wound Healing  Recent Flowsheet Documentation  Taken 9/16/2022 0910 by Steffanie Radford RN  Pain Management Interventions:   medication (see MAR)   emotional support   pillow support provided   repositioned  Activity Management: bedrest     Problem: Anemia  Goal: Anemia Symptom Improvement  Intervention: Monitor and Manage Anemia  Recent Flowsheet Documentation  Taken 9/16/2022 0910 by Steffanie Radford RN  Safety Promotion/Fall Prevention:   assistive device/personal items within reach   bed alarm on   fall prevention program maintained   room near nurse's station   safety round/check completed     Problem: Pain Acute  Goal: Acceptable Pain Control and Functional Ability  Intervention: Prevent or Manage Pain  Recent Flowsheet Documentation  Taken 9/16/2022 0910 by Steffanie Radford RN  Medication Review/Management: medications reviewed   Goal Outcome Evaluation:     Patient has been irritable today yet agreeable and compliant with cares.  Dressings changed to all wounds.  WOC RN assessed and made some adjustments to woc orders.  Tubigrips in place to bilateral lower extremities.  Patient semi-compliant with turning and repositioning.  Allowed approx every 3 hours.  Hospitalist aware of hgb 7.2 and K+ 3.4.  Pain managed with PRN oxycodone.  No other concerns reported at this time.

## 2022-09-16 NOTE — PROGRESS NOTES
INFECTIOUS DISEASE FOLLOW UP NOTE      ASSESSMENT:  1. Large right thigh abscess, due to Group B strep. Percutaneous drain placed 9/3. Gram stain GPC, culture Group B strep. Blood culture negative. Sepsis resolved now off pressors. Drain check 9/8 with minimal residual fluid, drain upsized. CT 9/4 still showed large collection the day after drain placement. CRP is normal 0.4. CT pelvis shows stable size of fluid collection 5.0 x 3.5cm. This is in area of previous R femoral head resection.   2. Multiple surgeries on hips/wounds in the past, including resection of R femoral head around 2012.  3. Urinary retention. Low suspicion for urinary source. Mixed tulio in UC likely colonization. UA without pyuria.   4. Paraplegia   5. Decubitus wounds --  Large sacral looks clean.   6. Penicillin and levofloxacin allergies. Tolerated meropenem, cefepime, ceftriaxone. She recalls she does better with IVs compared to oral antibiotics.   7. H/o MRSA in remote past. Not currently seen on clinical specimens.   8. Thrombocytopenia, improved.     PLAN:  Ceftriaxone once daily, plan on 4-6 weeks IV course, 6 is 10/15/22, may not need this long. IV antibiotics can be done at her residence.    Check CBC with diff, cmp, crp once weekly when on treatment.   Dr. Hunter following in case this would need debridement.   Please call if questions over the weekend. We can check in next week. I will write orders for ceftriaxone, labs, in case she is discharge.     Mamadou Artis MD  [facility]  [phone] [facility] paging    ______________________________________________________________________    SUBJECTIVE / INTERVAL HISTORY: feels pretty good, in good spirits. Tolerating antibiotics.  Discussed results. Reviewed images of latest CT.    ROS: paraplegia. All other systems negative except as listed above.        OBJECTIVE:  /47 (BP Location: Left arm)   Pulse 80   Temp 98  F (36.7  C) (Oral)   Resp 18   " Ht 1.626 m (5' 4\")   Wt 54.7 kg (120 lb 9.6 oz)   LMP  (LMP Unknown)   SpO2 98%   BMI 20.70 kg/m               GENERAL:  In no acute distress. Room air.   EYES: No conjunctival injection  HEAD, EARS, NOSE, MOUTH, AND THROAT: Nontraumatic, mouth without oral ulcers.   RESPIRATORY: Clear anterior  CARDIOVASCULAR: Regular rate and rhythm, normal S1 and S2, no murmurs, rubs, or gallops.  ABDOMEN: Soft, nontender, colostomy, urine tube from abdomen.  Normal bowel sounds.  MUSCULOSKELETAL: No synovitis. DENIA R thigh with small amount serous fluid  SKIN/HAIR/NAILS: No rashes, no signs of peripheral emboli. Wound photo noted  NEUROLOGIC: paraplegia   PICC R UE        Antibiotics:  Ceftriaxone 9/6-    Previous  Cefepime 9/3-4  Flagyl 9/3-4  Clindamycin 9/2-3  meropenem 9/4-6  Vancomycin 9/3-6    Pertinent labs:    Recent Labs   Lab 09/16/22  0657 09/15/22  0516 09/14/22  0545 09/13/22  0517 09/12/22  0835 09/12/22  0532 09/11/22  0657 09/10/22  0634   WBC  --   --   --   --  5.6  --  5.6 6.0   HGB 7.2*  --  7.7* 7.4* 6.5* 6.4* 7.0* 7.1*   HCT  --   --   --   --   --   --  23.0* 23.3*   PLT 95* 76*  --   --   --  65* 58* 51*        Recent Labs   Lab 09/14/22  0545 09/11/22  0657 09/10/22  0634    141 140   CO2  --  27 26   BUN  --  14 17        Lab Results   Component Value Date    CRP 0.4 09/08/2022    CRP 98.6 (H) 12/12/2018    CRP 6.1 01/20/2015         Lab Results   Component Value Date    ALT <9 09/14/2022    AST 10 09/14/2022    ALKPHOS 91 09/14/2022         MICROBIOLOGY DATA:  9/3 abscess gram stain GPC, PMNs, culture Group B strep  9/2 blood negative     RADIOLOGY:  CT Abdomen Peritonium Abscess Drainage    Result Date: 9/3/2022  EXAM: 1. PERCUTANEOUS DRAIN PLACEMENT RIGHT UPPER THIGH/GROIN COLLECTION 2. CT GUIDANCE 3. CONSCIOUS SEDATION LOCATION: [facility] DATE/TIME: 9/3/2022 12:19 PM INDICATION: right hip drain placement TECHNIQUE: Dose reduction techniques were used. PROCEDURE: " Informed consent obtained. Site marked. Prior images reviewed. Required items made available. Patient identity confirmed verbally and with arm band. Patient reevaluated immediately before administering sedation. Universal protocol was followed. Time out performed. The site was prepped and draped in sterile fashion. 10 mL of 1% lidocaine was infused into the local soft tissues. Using standard technique and under direct CT guidance, a 12 Kisw[place]ili drainage catheter was inserted into the fluid collection.  SPECIMEN: 150 mL of purulent fluid was aspirated and sent to lab for cultures and Gram stain. BLOOD LOSS: Minimal. The patient tolerated the procedure well. No immediate complications. SEDATION: Versed 0 mg. Fentanyl 50 mcg. The procedure was performed with administration intravenous conscious sedation with appropriate preoperative, intraoperative, and postoperative evaluation. 15 minutes of supervised face to face conscious sedation time was provided by a radiology nurse under my direct supervision.     IMPRESSION: 1.  Successful CT-guided drain placement into a right upper thigh/groin abscess.     Echocardiogram Complete    Result Date: 9/10/2022  [id number]^ASHLEY^ARIN^[place] [address]  Name: KISHOR PINEDA MRN: [id number] : 1965 Study Date: 09/10/2022 03:21 PM Age: 56 yrs Gender: Female Patient Location: [facility] Reason For Study: CHF Ordering Physician: ARIN RAMIREZ Referring Physician: CODEY WILSON Performed By: ACE  BSA: 1.6 m2 Height: 64 in Weight: 120 lb HR: 92 BP: 91/58 mmHg ______________________________________________________________________________ Procedure Complete Echo Adult. ______________________________________________________________________________ Interpretation Summary  The left ventricle is normal in size. The visual ejection fraction is 55-60%. No regional wall motion abnormalities noted.  Regional wall motion abnormalities cannot be excluded due to limited visualization. Diastolic Doppler findings (E/E' ratio and/or other parameters) suggest left ventricular filling pressures are normal. Sinus rhythm was noted. Technically difficult, suboptimal study. Consider cardiac MRI for better imaging. ______________________________________________________________________________ Left Ventricle The left ventricle is normal in size. There is normal left ventricular wall thickness. Diastolic Doppler findings (E/E' ratio and/or other parameters) suggest left ventricular filling pressures are normal. The visual ejection fraction is 55-60%. Regional wall motion abnormalities cannot be excluded due to limited visualization. No regional wall motion abnormalities noted.  Right Ventricle Normal right ventricle size and systolic function. TAPSE is normal, which is consistent with normal right ventricular systolic function.  Atria The left atrium is not well visualized. Right atrium not well visualized.  Mitral Valve The mitral valve is not well visualized. There is mild (1+) mitral regurgitation. There is no mitral valve stenosis.  Tricuspid Valve The tricuspid valve is not well visualized. There is mild (1+) tricuspid regurgitation.  Aortic Valve The aortic valve is not well visualized. No aortic regurgitation is present. No aortic stenosis is present.  Pulmonic Valve The pulmonic valve is not well visualized.  Vessels The aorta root is normal. Normal size ascending aorta.  Pericardium There is no pericardial effusion.  Rhythm Sinus rhythm was noted. ______________________________________________________________________________ MMode/2D Measurements & Calculations IVSd: 0.77 cm  LVIDd: 3.9 cm LVIDs: 2.6 cm LVPWd: 0.70 cm FS: 32.6 % LV mass(C)d: 81.4 grams LV mass(C)dI: 51.7 grams/m2 Ao root diam: 3.1 cm asc Aorta Diam: 3.2 cm LVOT diam: 2.0 cm LVOT area: 3.1 cm2 RWT: 0.36  Doppler Measurements & Calculations MV E max  jered: 53.8 cm/sec MV A max jered: 67.9 cm/sec MV E/A: 0.79 MV dec slope: 449.7 cm/sec2 MV dec time: 0.12 sec Ao V2 max: 138.1 cm/sec Ao max P.0 mmHg Ao V2 mean: 94.7 cm/sec Ao mean P.2 mmHg Ao V2 VTI: 25.7 cm MACKENZIE(I,D): 1.9 cm2 MACKENZIE(V,D): 1.8 cm2 LV V1 max P.7 mmHg LV V1 max: 82.0 cm/sec LV V1 VTI: 16.0 cm SV(LVOT): 49.1 ml SI(LVOT): 31.2 ml/m2 PA acc time: 0.09 sec AV Jered Ratio (DI): 0.59  MACKENZIE Index (cm2/m2): 1.2 E/E': 4.4 E/E' av.0 Lateral E/e': 4.4 Medial E/e': 7.5 Peak E' Jered: 12.2 cm/sec  ______________________________________________________________________________ Report approved by: Alexander Huston 09/10/2022 04:57 PM       US Lower Extremity Venous Duplex Bilateral    Result Date: 2022  EXAM: US LOWER EXTREMITY VENOUS DUPLEX BILATERAL LOCATION: [facility] DATE/TIME: 2022 3:47 PM INDICATION: edema, r o dvt COMPARISON: None. TECHNIQUE: Venous Duplex ultrasound of bilateral lower extremities with and without compression, augmentation and duplex. Color flow and spectral Doppler with waveform analysis performed. FINDINGS: Exam includes the common femoral, femoral, popliteal veins as well as segmentally visualized deep calf veins and greater saphenous vein. RIGHT: No deep vein thrombosis. No superficial thrombophlebitis. No popliteal cyst. LEFT: There is deep venous thrombus involving the common femoral vein, the femoral vein in the thigh and the popliteal vein. This appears partially occlusive in the common femoral vein and popliteal vein and completely occlusive throughout the femoral vein in the thigh. There is probable extension into the profunda femoris vein on the left. No superficial thrombus. No popliteal cyst.     IMPRESSION: Left deep venous thrombus involving the common femoral vein, femoral vein in the thigh and popliteal vein with probable extension into the profunda femoris vein but this is suboptimally visualized because of edema and body habitus.  The thrombus appears occlusive throughout the femoral vein in the thigh and is nonocclusive in the common femoral and popliteal vein. NOTE: ABNORMAL REPORT THE DICTATION ABOVE DESCRIBES AN ABNORMALITY FOR WHICH FOLLOW-UP IS NEEDED. . [Critical Result: Left lower extremity deep venous thrombosis] Finding was identified on 9/9/2022 3:54 PM. 1.  The patient's nurse, Shanita, was contacted by me on 9/9/2022 4:00 PM and verbalized understanding of the critical result.     US Upper Extremity Venous Duplex Bilat    Result Date: 9/9/2022  EXAM: US UPPER EXTREMITY VENOUS DUPLEX BILATERAL LOCATION: [facility] DATE/TIME: 9/9/2022 4:00 PM INDICATION: edema, r o DVT COMPARISON: None. TECHNIQUE: Venous Duplex ultrasound of both upper extremities with (when possible) and without compression, augmentation, and duplex. Color flow and spectral Doppler with waveform analysis performed. FINDINGS: Ultrasound includes evaluation of the internal jugular veins, innominate veins, subclavian veins, axillary veins, and brachial veins. The superficial cephalic and basilic veins were also evaluated where seen. RIGHT: There is nonocclusive thrombus adjacent to the PICC catheter in the right brachial vein and right subclavian vein this becomes occlusive in the distal brachial vein. There is nonocclusive thrombus in the right internal jugular vein. No superficial  thrombophlebitis. LEFT: There is occlusive thrombus in the left internal jugular vein and left innominate vein. There is occlusive superficial thrombus in the left cephalic vein.     IMPRESSION: No deep venous thrombosis in the bilateral upper extremities. [Critical Result: Bilateral upper extremity deep venous thrombosis] Finding was identified on 9/9/2022 4:03 PM. 1.  The patient's nurse, Shanita was contacted by me on 9/9/2022 4:05 PM and verbalized understanding of the critical result.     XR Chest Port 1 View    Result Date: 9/3/2022  EXAM: XR CHEST PORTABLE  1 VIEW LOCATION: [facility] DATE/TIME: 09/03/2022, 6:08 AM INDICATION: Status post unsuccessful left IJ placement, rule out pneumothorax. COMPARISON: 04/22/2022.     IMPRESSION: Negative for pneumothorax. Normal heart size and pulmonary vascularity. Lungs are clear. Generalized osteopenia. Posterior idalia and pedicle screw fixation lower thoracic and lumbar spine.     IR Abscess Tube Change    Result Date: 9/8/2022  LOCATION: [facility] DATE: 9/8/2022 PROCEDURE: ABSCESS TUBE CHECK AND EXCHANGE INTERVENTIONAL RADIOLOGIST: Fer Gutierrez MD INDICATION: Right hip drain placement on 9/3/2022. Plan for exchange/upsize. CONSENT: The risks, benefits and alternatives of an abscess tube check with possible exchange, upsizing and/or removal were discussed with the patient or representative in detail. All questions were answered. Informed consent was given to proceed with the procedure. MODERATE SEDATION: None. CONTRAST: 25 cc Omnipaque ANTIBIOTICS: None. ADDITIONAL MEDICATIONS: None. FLUOROSCOPIC TIME: 0.5 minutes. RADIATION DOSE: Air Kerma: 3 mGy. COMPLICATIONS: No immediate complications. UNIVERSAL PROTOCOL: The operative site was marked and any prior imaging was reviewed. Required items including blood products, implants, devices and special equipment was made available. Patient identity was confirmed either verbally, with demographic information, hospital assigned identification or other identification markers. A timeout was performed immediately prior to the procedure. STERILE BARRIER TECHNIQUE: Maximum sterile barrier technique was used. Cutaneous antisepsis was performed at the operative site with application of 2% chlorhexidine and large sterile drape. Prior to the procedure, the  and assistant performed hand hygiene and wore hat, mask, sterile gown, and sterile gloves during the entire procedure. PROCEDURE:  Multiprojectional  images were obtained.  The previous drainage catheter was injected with a small amount of contrast, and multiple images were obtained. Based on the results of the study, the drain was exchanged over a guidewire for a 16 [place] citizen Fred drainage. The cavity was aggressively irrigated with saline. FINDINGS: Abscessogram demonstrates minimal residual abscess cavity. After exchange, the drain is appropriately positioned.     IMPRESSION:  Right hip drain check demonstrates appropriate positioning of the drain. There is minimal residual fluid. The drain was exchanged/upsized for a 16 [place] citizen Fred drainage. Irrigation of the cavity reveals serosanguineous fluid.    IR Abscess Tube Check    Result Date: 9/13/2022  LOCATION: [facility] DATE: 9/13/2022 PROCEDURE: ABSCESS TUBE CHECK INTERVENTIONAL RADIOLOGIST: Hugo Michael MD INDICATION: Right hip abscess. Indwelling percutaneous drain.. CONSENT: The procedure, risks and benefits of an abscessogram were discussed with the patient  in detail. All questions were answered. Informed consent was given to proceed with the procedure. MODERATE SEDATION: None. CONTRAST: Omnipaque 350: 20. mL. ANTIBIOTICS: None. ADDITIONAL MEDICATIONS: None. FLUOROSCOPIC TIME: 0.4 minutes RADIATION DOSE: Air Kerma: 4 mGy COMPLICATIONS: No immediate complications. PROCEDURE:  images were obtained. The existing drainage catheter was injected with contrast, and multiple images were obtained. The cavity was irrigated with 50 cc aliquots of normal saline which were then aspirated. Total irrigation volume was approximately 200 cc. Drain was reconnected to a DENIA bulb. FINDINGS: Adequately positioned, indwelling 16 [place] citizen Fred drain within the posterior right hip fluid collection..     IMPRESSION: 1.  Adequately positioned, indwelling 16 [place] citizen drainage catheter. PLAN: Continued DENIA bulb drainage with maintained flushing regimen..     IR Suprapubic Catheter Placment    Result Date: 9/11/2022  [place]  RADIOLOGY LOCATION: [facility] DATE: 9/10/2022 PROCEDURE: 1. Transstomal urinary catheter placement. INTERVENTIONAL RADIOLOGIST: Jonh Cline MD HISTORY: 56 years-old Female with history of diverting urostomy. The patient had a catheter placed in the emergency room at an outside facility via direct trocar technique through the proximal aspect of the stoma. This catheter is malfunctioning. CONSENT: The risks, benefits and alternatives of the procedure were discussed with the patient  in detail. All questions were answered. Informed consent was given to proceed with the procedure. SEDATION: None.. CONTRAST: 15 mL ANTIBIOTICS: Patient receiving antibiotics ADDITIONAL MEDICATIONS: None. FLUOROSCOPIC TIME: 2.6 RADIATION DOSE: Air Kerma: 73 mGy. COMPLICATIONS: No immediate complications. STERILE BARRIER TECHNIQUE: Maximum sterile barrier technique was used. Cutaneous antisepsis was performed at the operative site with application of 2% chlorhexidine and large sterile drape. Prior to the procedure, the  and assistant performed hand hygiene and wore hat, mask, sterile gown, and sterile gloves during the entire procedure. PROCEDURE/FINDINGS: A  image was obtained. A 5 [place] citizen KMP catheter, with the aid of a 0.035 inch angled Glidewire, was advanced through the existing stoma tract into the diversion pouch. It was noted that the pre-existing catheter, while initially within the stoma, traverse through the side wall of the stoma directly into the pouch itself. The position of the newly placed catheter was confirmed by contrast injection. A 0.035 inch Amplatz wire was advanced through the catheter into the urinary diversion pouch. A 16 [place] citizen Mary's [place] tip catheter was advanced over the Amplatz wire. The balloon was inflated and contrast demonstration confirmed its position. The catheter was secured in place. The catheter was attached to a gravity drainage bag.     IMPRESSION: Placement of  a transstomal urinary catheter via the existing stoma.     CT ABDOMEN PELVIS W CONTRAST    Result Date: 9/2/2022  EXAM: CT ABDOMEN PELVIS W CONTRAST LOCATION: [facility] DATE/TIME: 9/2/2022 7:16 PM INDICATION: Abdominal distension paraplegia difficulty passing catheter to neobladder COMPARISON: 12/18/2018. TECHNIQUE: CT scan of the abdomen and pelvis was performed following injection of IV contrast. Multiplanar reformats were obtained. Dose reduction techniques were used. CONTRAST: 50ml isovue 370 FINDINGS: LOWER CHEST: Atelectasis. 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 Nonocclusive thrombus in the proximal superior mesenteric vein. Multiple perigastric collateral vessels. PANCREAS: Normal. SPLEEN: Normal. ADRENAL GLANDS: Normal. KIDNEYS/BLADDER: There is mild right-sided pyelocaliectasis with normal caliber ureter. Dependent stone within the right renal pelvis. BOWEL: Descending colostomy with Franklin pouch. LYMPH NODES: Normal. VASCULATURE: Unremarkable. 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 MUSCULOSKELETAL: There is a very large complex peripherally enhancing fluid collection within the right buttock extending from the iliac crest down into the hip joints and into the right thigh. This is not completely imaged extending further into the thigh than is seen on the CT scan. Destructive changes in both hips. This fluid collection measures at least 20 x 14 cm scoliosis.     IMPRESSION: 1.  Significant distention of the neobladder which measures up 24 cm and extends into the right lower abdomen. 2.  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 Multiple perigastric venous collaterals. Nonocclusive thrombus in the proximal superior mesenteric vein. 3.  Franklin pouch with descending colostomy. 4.  Destructive changes both hips. There is a  massive peripherally enhancing fluid collection in the right buttock, hip and thigh extending into the distal thigh. The distal extent of the fluid collection is not imaged with CT. This collection measures at least 20 x 14 cm. 5.  There is mild dilatation of the right intrarenal collecting system with normal caliber ureter and an element of UPJ obstruction is possible. Dependent stone in the right renal pelvis. NOTE: ABNORMAL REPORT 1.  THE DICTATION ABOVE DESCRIBES AN ABNORMALITY FOR WHICH FOLLOW-UP IS NEEDED.     CT Abdomen Pelvis w/o Contrast    Result Date: 9/10/2022  EXAM: CT ABDOMEN PELVIS W/O CONTRAST LOCATION: [facility] DATE/TIME: 9/10/2022 2:53 PM INDICATION: concern for suprapubic catheter malposition, increased abdominal pain. Lower abdominal pain. COMPARISON: 9/4/2022 TECHNIQUE: CT scan of the abdomen and pelvis was performed without IV contrast. Multiplanar reformats were obtained. Dose reduction techniques were used. CONTRAST: None. FINDINGS: LOWER CHEST: Moderate bilateral pleural effusions have increased in size mildly. These appear free-flowing. Associated dependent atelectasis. HEPATOBILIARY: The liver is not well evaluated on this noncontrast exam. No significant interval change in appearance. PANCREAS: Normal. SPLEEN: Mild splenomegaly, unchanged. ADRENAL GLANDS: Normal. KIDNEYS/BLADDER: The kidneys are partially obscured by metal artifact spinal hardware. No hydronephrosis. Cystectomy with urinary diversion. There is a percutaneous catheter in the right abdomen with tip in the ileal conduit. Significant distention of the conduit has developed since yesterday suggesting malfunction of the catheter. BOWEL: Bowel loops are normal caliber. LYMPH NODES: Not well assessed on this noncontrast exam. VASCULATURE: Unremarkable. PELVIC ORGANS: The uterus is normal in size. MUSCULOSKELETAL: Generalized edema within the subcutaneous adipose tissues has increased mildly from  9/4/2022. A drain has been placed in the right hip joint and the effusion has significantly decreased in size. Deformity and degenerative changes of both  hips. Extensive spinal hardware and associated artifact.     IMPRESSION: 1.  Cystectomy with ileal conduit urinary diversion. The conduit has become significantly distended from 9/4/2022. A percutaneous catheter terminates within the conduit. The distention suggests malfunction of the catheter such as plugging. No hydronephrosis of the kidneys. 2.  Moderate bilateral pleural effusions have increased in size mildly. 3.  Increase in mild generalized subcutaneous edema. 4.  Decrease in size of the right hip joint effusion. A drainage catheter is present within the joint space posteriorly.     CT Abdomen Pelvis w/o Contrast    Result Date: 9/4/2022  EXAM: CT ABDOMEN PELVIS W/O CONTRAST LOCATION: [facility] DATE/TIME: 9/4/2022 9:27 AM INDICATION: F U abscess with drain placement COMPARISON: 09/02/2022 TECHNIQUE: CT scan of the abdomen and pelvis was performed without IV contrast. Multiplanar reformats were obtained. Dose reduction techniques were used. CONTRAST: None. FINDINGS: LOWER CHEST: New small bilateral pleural effusions. HEPATOBILIARY: No significant mass or bile duct dilatation. Portal vein thrombus not visualized on the current examination without IV contrast, although periportal collateral vessels are noted. Cholecystectomy. PANCREAS: Normal. SPLEEN: Normal. ADRENAL GLANDS: Normal. KIDNEYS/BLADDER: Mild fullness of the renal pelves bilaterally, decreased compared to 09/02/2022. Unchanged nonobstructing calculus in the lower pole right kidney and in the dependent portion of the right renal pelvis. Urinary diversion in the right lower quadrant, which is no longer distended. Ostomy extends to the skin surface. BOWEL: Descending colostomy with Franklin pouch. LYMPH NODES: Normal. VASCULATURE: Unremarkable. PELVIC ORGANS: Unremarkable  MUSCULOSKELETAL: Interval placement of percutaneous pigtail drain in the subcutaneous right hip/upper thigh fluid collection. Reliable size comparison limited due to noncontrast technique on the current examination, but the collection has decreased in size. Largest axial component currently 11.7 x 7.4 cm, previously 20 x 14 cm. Osseous destruction in both hips. Thoracolumbar fusion. Diffuse muscular atrophy and osteopenia.     IMPRESSION: 1.  Decreased size of right buttock/thigh fluid collection following percutaneous drain placement. Largest remaining component measures 11.7 x 7.4 cm axially. 2.  New small bilateral pleural effusions. 3.  Neobladder is no longer distended.    Head CT w/o contrast    Result Date: 9/2/2022  EXAM: CT HEAD W/O CONTRAST LOCATION: [facility] DATE/TIME: 9/2/2022 7:16 PM INDICATION: ams COMPARISON: Head CT angiogram brain MRI 01/06/2020 TECHNIQUE: Routine CT Head without IV contrast. Multiplanar reformats. Dose reduction techniques were used. FINDINGS: INTRACRANIAL CONTENTS: No intracranial hemorrhage, extraaxial collection, or mass effect.  No CT evidence of acute infarct. Normal parenchymal attenuation. Normal ventricles and sulci. VISUALIZED ORBITS/SINUSES/MASTOIDS: No intraorbital abnormality. Moderate chronic mucosal thickening of the left maxillary sinus with associated frothy debris. Mild chronic mucosal thickening of the left sphenoid sinus. No middle ear or mastoid effusion. BONES/SOFT TISSUES: No acute abnormality. Chronic right medial orbital wall fracture.     IMPRESSION: 1.  No intracranial hemorrhage, mass lesions, hydrocephalus or CT evidence for an acute infarct. 2.  Chronic right medial orbital wall fracture.    CT Femur Thigh Bilateral wo Contrast    Result Date: 9/4/2022  EXAM: CT FEMUR THIGH BILATERAL WITHOUT CONTRAST LOCATION: [facility] DATE/TIME: 09/04/2022, 9:28 AM INDICATION: 56-year-old patient with a  right hip-buttock abscess. COMPARISON: 09/04/2022 CT abdomen and pelvis. 09/02/2022 CT abdomen and pelvis. TECHNIQUE: Noncontrast. Axial, sagittal and coronal thin-section reconstruction. Dose reduction techniques were used. FINDINGS: BONES AND JOINTS: -Advanced osteopenia. -Resection or resorption of the right medial ilium. Dysplastic right acetabulum. Resorption or resection of the right femoral head and greater trochanter. External rotation of the right femur. 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 Old fracture or osteotomy of the left femoral neck. Old healed fracture of the left femur proximal diaphysis. MUSCLES AND SOFT TISSUES: -Subcutaneous right hip-buttock fluid collection, with percutaneous pigtail catheter drainage. This collection measures 12 x 7 cm in greatest axial dimensions. -Fluid attenuation in the expected regions of the right vastus lateralis and adductor celia regions. The proximal margin of these collections have decreased in size since the 09/02/2022 exam. These collections both extend to the distal margin of the thigh, measuring up to 25 cm in length. -Advanced muscle fatty atrophy. OTHER: -See the dedicated abdomen-pelvis CT for further information.     IMPRESSION: 1.  Decreased size of the right hip-buttock fluid collection, status post percutaneous drain placement. This collection measures 12 x 7 cm in greatest axial dimensions. 2.  Decreased size of fluid collections (likely contiguous with the above) in the residual right vastus lateralis and adductor celia regions. These collections extend through the distal thigh, and measure roughly 25 cm in length.     CT Pelvis Soft Tissue w Contrast    Result Date: 9/15/2022  EXAM: CT PELVIS SOFT TISSUE W CONTRAST LOCATION: [facility] DATE/TIME: 9/15/2022 4:57 PM INDICATION: abscess COMPARISON: 09/10/2022 TECHNIQUE: CT scan of the pelvis was performed with IV contrast. Multiplanar  reformats were obtained. Dose reduction techniques were used. CONTRAST: isovue 370  100ml FINDINGS: OTHER: Visualized portions of the liver, spleen, and pancreas are unremarkable, although incompletely evaluated. KIDNEYS/BLADDER: The kidneys are partially obscured by metal artifact spinal hardware. No hydronephrosis. Cystectomy with urinary diversion. Interval placement of large bore catheter alongside the indwelling catheter into the colonic diversion with decrease in the degree of distention that was seen on the prior CT. BOWEL: Visualized loops are normal caliber. LYMPH NODES: No lymphadenopathy. VASCULATURE: Unremarkable. PELVIC ORGANS: Unremarkable MUSCULOSKELETAL: Right hip fluid collection is similar, with the residual fluid measuring 5.0 x 3.3 cm (2/106), previously 4.7 x 3.6 cm. Catheter tip is positioned along the superolateral aspect of the collection diffuse osteopenia and partially imaged spinal fusion hardware. Chronic hip deformities and diffuse body wall edema. Skin defect overlying the sacrum.     IMPRESSION: 1.  Interval placement of large bore catheter into urinary diversion with resolution of the distention that was seen on the prior CT. 2.  Similar size of right hip fluid collection. Catheter tip is along the superolateral margin.     IR PICC Vascular    Result Date: 9/6/2022  LOCATION: [facility] DATE: 9/6/2022 PROCEDURE: PERIPHERALLY INSERTED CENTRAL CATHETER (PICC) PLACEMENT. INTERVENTIONAL RADIOLOGIST: Hugo Michael MD. INDICATION: Right thigh abscess. Paraplegia. Decubitus wounds. Unsuccessful bedside PICC placement CONSENT: The procedure, risks and benefits of PICC placement were discussed with the patient  in detail. All questions were answered. Informed consent was given to proceed with the procedure. MODERATE SEDATION: None CONTRAST: Omnipaque 350: 5 cc ANTIBIOTICS: None. ADDITIONAL MEDICATIONS: None. FLUOROSCOPIC TIME: 6 minutes RADIATION DOSE: Air Kerma: 19  mGy COMPLICATIONS: No immediate complications. STERILE BARRIER TECHNIQUE: Maximum sterile barrier technique was used. Cutaneous antisepsis was performed at the operative site with application of 2% chlorhexidine and a full body sterile drape. Prior to the procedure, the  and assistant performed hand hygiene and wore hat, mask, sterile gown, and sterile gloves during the entire procedure. Ultrasound was prepped with a sterile probe cover and sterile gel was used. PROCEDURE/TECHNIQUE:   Using local anesthesia and real-time ultrasound guidance, the right brachial vein was accessed. An 018 guidewire could not be advanced beyond the axillary vein. Over the guidewire the dilator/peel-away sheath of the Bard PICC set were advanced into the brachial vein. A 5 Guamanian KMP catheter was advanced to the axillary vein. A central venogram was obtained. Using the KMP catheter, the 018 guidewires manipulated down to the superior vena cava. The 5 Guamanian PICC could not be advanced due to the irregular  subclavian stenosis. Through the KMP catheter, the guidewire was exchanged for an 035 Lobo guidewire. A 5 Guamanian, 25 cm sheath was advanced and placed with the tip at the axillary vein. The right subclavian vein was angioplastied using a 5 mm x 40 mm and less balloon. The 5 Guamanian Kumpe catheter was used to exchange the guidewire for the 018 guidewire. Sheath was exchanged for the 5 Guamanian peel-away sheath. Over the guidewire a 5 Guamanian, dual lumen Bard power PICC was advanced until tip was at the right atrium. PICC was cut to 41 cm. The lumens aspirated and flushed adequately. FINDINGS: Ultrasound demonstrates a patent and compressible right brachial vein. Images were recorded. Right central venogram demonstrates diffuse, irregular moderate stenosis throughout the right subclavian vein. The completion fluoroscopic demonstrates a right upper extremity PICC with its tip at the right atrium.     IMPRESSION:  1.  Successful right  upper extremity CT injectable PICC placement. 2.  Diffuse right subclavian vein stenosis. Angioplastied to 5 mm to allow PICC placement..     KUB XR    Result Date: 9/3/2022  EXAM: XR KUB LOCATION: [facility] DATE/TIME: 9/3/2022 5:59 AM INDICATION: s p femoral line placement COMPARISON: 01/06/2020     IMPRESSION: Left-sided femoral catheter has been placed terminating in the midline at the lumbosacral junction. Visualized bowel gas pattern is nonobstructive. Postoperative changes in the thoracolumbar spine. Prior right hip resection. Diffuse osteopenia. Right lower quadrant bowel anastomotic suture line. Multiple clips over the pelvis.    IR Suprapubic Catheter Change    Result Date: 9/13/2022  EXAM: IR SUPRAPUBIC CATHETER CHANGE LOCATION: [facility] DATE/TIME: 9/13/2022 4:08 PM INDICATION: Sun'aq tip Ambrose catheter through the urostomy site into the colonic diversion. Significant pericatheter leakage. A second, parallel clear tubing into the site. INTERVENTIONAL RADIOLOGIST: Hugo Michael MD. CONSENT: The procedure, risks and benefits suprapubic catheter injection with possible exchange were discussed with the patient  in detail. All questions were answered. Informed consent was given to proceed with the procedure. MODERATE SEDATION: None CONTRAST: Omnipaque 350: 80 cc ANTIBIOTICS: None ADDITIONAL MEDICATIONS: None. FLUOROSCOPIC TIME: 1.6 minutes. RADIATION DOSE: Air Kerma: 26 mGy COMPLICATIONS: No immediate complications. STERILE BARRIER TECHNIQUE: Maximum sterile barrier technique was used. Cutaneous antisepsis was performed at the operative site with application of 2% chlorhexidine and a full body sterile drape. Prior to the procedure, the  and assistant performed hand hygiene and wore hat, mask, sterile gown, and sterile gloves during the entire procedure. PROCEDURE/TECHNIQUE: Contrast was injected through the indwelling 16 B[place]ian Sun'aq tip  Ambrose catheter. The catheter is the proximal aspect of the colonic diversion with the tip abutting a side wall. Retention stitches were cut. Retention balloon was deflated and catheter advanced into a more central position within the colonic divergent. Retention balloon was inflated with 10 cc normal saline. Contrast was through the parallel clear tubing, which has its tip within the superior aspect of the colonic  diversion. Both catheter was secured in place with 2-0 Ethilon stitches. 16 [place] tip catheter was connected to gravity drainage. Clear catheter was capped. FINDINGS: 1. Initial contrast injection through the 16 [place] tip Ambrose catheter demonstrates its tip at the inferior aspect of the colonic diversion with the tip abutting a sidewall. Completion fluoroscopic image demonstrates the advanced catheter with the balloon inflated within the superior aspect of the colonic diversion. 2. Contrast injection through the parallel, cleared tubing demonstrate of the tract courses directly into the superior aspect of the colonic diversion. Tip is within the lumen of the colonic diversion.     IMPRESSION:  1.  Urostomy Ambrose catheter repositioned, as described above. PLAN: Continued gravity drainage.

## 2022-09-16 NOTE — PROGRESS NOTES
[facility] Nurse Inpatient Assessment     Consulted for: Sacrum - unable to take photo due to patient pain with reposition    Patient History (according to provider note(s):        Areas Assessed:      Pressure Injury Location: Sacrum and R IT  Did not assess this visit, previous assessment:  Wound type: Pressure Injury     Pressure Injury Stage: 3 or greater, present on admission        Wound base: 100 % granulation tissue     Palpation of the wound bed: normal      Drainage: small     Description of drainage: serosanguinous and green-tint - larger volume at this assessment then last and slight maceration noted. Will increase to BID dressing changes.     Measurements (length x width x depth, in cm) 6 cm  x 10 cm  x  1.5 cm   (patient in unusual position for her comfort, difference in wound measurements appears to be positional as overall appearance of the wound is stable)     Tunneling N/A     Undermining N/A  Periwound skin: Intact and Scar tissue      Color: normal and consistent with surrounding tissue      Temperature: normal   Odor: none  Pain: with movement  Pain intervention prior to dressing change: patient tolerated well and slow and gentle cares   Treatment goal: Decrease bioburden in wound bed, may need sharp debridement to jump start healing (if patient has surgery maybe surgeon will be able to assist with this during admission. Otherwise patient can follow up with [facility] nurses in [place] after discharge). Overall, no s/s that sacral wounds have an infectious process   STATUS: stable  Supplies ordered: ordered Vashe    R IT - assessed at nurse's request due to increased drainage   Approximately 4x3cm area, irregular, with edges of scar tissue, mid wound bed partial thickness with fibrin and moderate serous drainage  Started Silvercel and Mepilex every other day    Treatment Plan:     Sacral wound - twice daily  Cleanse wound with Vashe, gently dry.  Lightly moisten gauze 4  x 4's with Vashe and loosely pack into wound bed.  Cover with ABD pad and secure with tape.    R IT wound - Every other day  Cleanse wound with Vashe, gently dry.  Apply Silvercel and Cover with Mepilex 4 x 4.    Orders: Reviewed    RECOMMEND PRIMARY TEAM ORDER: None, at this time  Education provided: plan of care and wound progress  Discussed plan of care with: Patient and Nurse  WO nurse follow-up plan: weekly  Notify WOC if wound(s) deteriorate.  Nursing to notify the Provider(s) and re-consult the WOC Nurse if new skin concern.    DATA:     Current support surface: Standard  Low air loss mattress  Containment of urine/stool: Indwelling catheter and Ileostomy pouch  BMI: Body mass index is 20.7 kg/m .   Active diet order: Orders Placed This Encounter      Regular Diet Adult     Output: I/O last 3 completed shifts:  In: 109 [I.V.:109]  Out: 1405 [Urine:1180; Drains:225]     Labs:   Recent Labs   Lab 09/16/22  0657 09/14/22  1053 09/13/22  0517 09/12/22  0835 09/10/22  0634 09/09/22  1820   ALBUMIN  --  1.7*  --   --    < >  --    HGB 7.2*  --    < > 6.5*   < >  --    INR  --   --   --   --   --  1.47*   WBC  --   --   --  5.6   < >  --     < > = values in this interval not displayed.     Pressure injury risk assessment:   Sensory Perception: 2-->very limited  Moisture: 3-->occasionally moist  Activity: 1-->bedfast  Mobility: 2-->very limited  Nutrition: 3-->adequate  Friction and Shear: 1-->problem  Bi Score: 12    Zohreh Conway, BSN, RN, PHN, HNB-BC, CWOCN

## 2022-09-16 NOTE — TELEPHONE ENCOUNTER
Routing refill request to provider for review/approval because:  Medication is reported/historical    Jaycee Owens, CHRISTINAN, RN

## 2022-09-16 NOTE — PROGRESS NOTES
Progress Note    Assessment/Plan  CT scan of yesterday reviewed.  A drain is in good position.  Drainage is essentially unchanged at this point.  Abdominal drain now functioning quite well.  Presacral area without significant change.  Very difficult from a long-term management standpoint.  Surgical intervention for right hip will be reviewed with orthopedics.  Continue present care for the time being.  Reviewed with infectious disease.  Will review with orthopedics.    Active Problems:    Septic shock (H)      Subjective  Depressed with some abdominal discomfort.  Not eating very much.  Objective    Vital signs in last 24 hours  Temp:  [98  F (36.7  C)-99  F (37.2  C)] 98  F (36.7  C)  Pulse:  [80-92] 80  Resp:  [16-18] 18  BP: ()/(46-47) 100/47  SpO2:  [97 %-100 %] 98 %  Weight:   [unfilled]    Intake/Output last 3 shifts  I/O last 3 completed shifts:  In: 109 [I.V.:109]  Out: 1405 [Urine:1180; Drains:225]  Intake/Output this shift:  I/O this shift:  In: -   Out: 105 [Drains:105]      Physical Exam  Abdomen benign.  Drains without significant change.  Purulent drainage right hip drain.  No significant change in edema and appearance of upper leg and hip.  Abdominal drain without change.  Bowel sounds present.    Pertinent Labs   Lab Results   Component Value Date    WBC 5.6 09/12/2022    HGB 7.2 (L) 09/16/2022    HCT 23.0 (L) 09/11/2022    MCV 90 09/11/2022    PLT 95 (L) 09/16/2022             Pertinent Radiology     [unfilled]        Reji Hunter MD

## 2022-09-16 NOTE — PLAN OF CARE
Problem: Plan of Care - These are the overarching goals to be used throughout the patient stay.    Goal: Plan of Care Review/Shift Note  Outcome: Ongoing, Progressing  Flowsheets (Taken 9/15/2022 2357)  Plan of Care Reviewed With: patient  Overall Patient Progress: improving  Goal: Patient-Specific Goal (Individualized)  Outcome: Ongoing, Progressing  Goal: Absence of Hospital-Acquired Illness or Injury  Outcome: Ongoing, Progressing  Intervention: Identify and Manage Fall Risk  Recent Flowsheet Documentation  Taken 9/15/2022 1707 by Basilia Esquivel RN  Safety Promotion/Fall Prevention:    bed alarm on    assistive device/personal items within reach    room door open    room near nurse's station    room organization consistent    safety round/check completed    toileting scheduled    patient and family education  Intervention: Prevent Skin Injury  Recent Flowsheet Documentation  Taken 9/15/2022 2201 by Basilia Esquivel RN  Body Position:    turned    right    supine, legs elevated  Taken 9/15/2022 1707 by Basilia Esquivel RN  Body Position:    refuses positioning    right    left    lower extremity elevated  Taken 9/15/2022 1630 by Basilia Esquivel RN  Body Position: log-rolled  Intervention: Prevent Infection  Recent Flowsheet Documentation  Taken 9/15/2022 1707 by Basilia Esquivel RN  Infection Prevention:    rest/sleep promoted    single patient room provided  Goal: Optimal Comfort and Wellbeing  Outcome: Ongoing, Progressing  Intervention: Monitor Pain and Promote Comfort  Recent Flowsheet Documentation  Taken 9/15/2022 1707 by Basilia Esquivel RN  Pain Management Interventions:    medication (see MAR)    pillow support provided  Intervention: Provide Person-Centered Care  Recent Flowsheet Documentation  Taken 9/15/2022 1707 by Basilia Esquivel RN  Trust Relationship/Rapport:    care explained    choices provided    empathic listening provided    questions answered    reassurance provided     thoughts/feelings acknowledged  Goal: Readiness for Transition of Care  Outcome: Ongoing, Progressing  Flowsheets (Taken 9/15/2022 2357)  Anticipated Changes Related to Illness: inability to care for self  Intervention: Mutually Develop Transition Plan  Recent Flowsheet Documentation  Taken 9/15/2022 2357 by Basilia Esquivel RN  Anticipated Changes Related to Illness: inability to care for self     Problem: UTI (Urinary Tract Infection)  Goal: Improved Infection Symptoms  Outcome: Ongoing, Progressing     Problem: Impaired Wound Healing  Goal: Optimal Wound Healing  Outcome: Ongoing, Progressing  Intervention: Promote Wound Healing  Recent Flowsheet Documentation  Taken 9/15/2022 1707 by Basilia Esquivel, RN  Pain Management Interventions:    medication (see MAR)    pillow support provided     Problem: Infection  Goal: Absence of Infection Signs and Symptoms  Outcome: Ongoing, Progressing     Problem: Pain Acute  Goal: Acceptable Pain Control and Functional Ability  Outcome: Ongoing, Progressing  Intervention: Develop Pain Management Plan  Recent Flowsheet Documentation  Taken 9/15/2022 1707 by Basilia Esquivel RN  Pain Management Interventions:    medication (see MAR)    pillow support provided  Intervention: Prevent or Manage Pain  Recent Flowsheet Documentation  Taken 9/15/2022 1707 by Basilia Esquivel RN  Medication Review/Management: medications reviewed   Goal Outcome Evaluation:    Plan of Care Reviewed With: patient     Overall Patient Progress: improving    Had therapy at beginning of shift. Generalized edema, swelling in arms as well but refused to have elevated on pillows. Legs elevated on pillows with tubigrips on. 115 serous with red sediment out from DENIA on shift (after irrigating with 10 mL normal saline per orders.) 400 mL yellow output with sediment from SP catheter, irrigated on shift. 10 mL out from catheter drain (smaller bag). Had pelvic CT done on shift, let Dr. Gee know that results are  available. Changed moist 4x4 gauze packing & ABD to bottom after cleansing with Vashe. Right drain site pain tolerated with repositioning (refused earlier but later allowed) & PRN Oxycodone given x2 this shift. Changed ileostomy bag (2 piece) d/t having medium formed stool inside. Psych consults pending.

## 2022-09-17 LAB
ABO/RH(D): NORMAL
ANION GAP SERPL CALCULATED.3IONS-SCNC: 3 MMOL/L (ref 5–18)
ANTIBODY SCREEN: NEGATIVE
BLD PROD TYP BPU: NORMAL
BLOOD COMPONENT TYPE: NORMAL
BUN SERPL-MCNC: 11 MG/DL (ref 8–22)
CALCIUM SERPL-MCNC: 7.6 MG/DL (ref 8.5–10.5)
CHLORIDE BLD-SCNC: 110 MMOL/L (ref 98–107)
CO2 SERPL-SCNC: 27 MMOL/L (ref 22–31)
CODING SYSTEM: NORMAL
CREAT SERPL-MCNC: 0.35 MG/DL (ref 0.6–1.1)
CROSSMATCH: NORMAL
ERYTHROCYTE [DISTWIDTH] IN BLOOD BY AUTOMATED COUNT: 21.4 % (ref 10–15)
GFR SERPL CREATININE-BSD FRML MDRD: >90 ML/MIN/1.73M2
GLUCOSE BLD-MCNC: 98 MG/DL (ref 70–125)
HCT VFR BLD AUTO: 22.1 % (ref 35–47)
HGB BLD-MCNC: 6.6 G/DL (ref 11.7–15.7)
HGB BLD-MCNC: 8.8 G/DL (ref 11.7–15.7)
ISSUE DATE AND TIME: NORMAL
LMWH PPP CHRO-ACNC: 0.64 IU/ML
MCH RBC QN AUTO: 28 PG (ref 26.5–33)
MCHC RBC AUTO-ENTMCNC: 29.9 G/DL (ref 31.5–36.5)
MCV RBC AUTO: 94 FL (ref 78–100)
PLATELET # BLD AUTO: 97 10E3/UL (ref 150–450)
POTASSIUM BLD-SCNC: 3.9 MMOL/L (ref 3.5–5)
RBC # BLD AUTO: 2.36 10E6/UL (ref 3.8–5.2)
SODIUM SERPL-SCNC: 140 MMOL/L (ref 136–145)
SPECIMEN EXPIRATION DATE: NORMAL
UNIT ABO/RH: NORMAL
UNIT NUMBER: NORMAL
UNIT STATUS: NORMAL
UNIT TYPE ISBT: 600
WBC # BLD AUTO: 4.2 10E3/UL (ref 4–11)

## 2022-09-17 PROCEDURE — P9016 RBC LEUKOCYTES REDUCED: HCPCS | Performed by: INTERNAL MEDICINE

## 2022-09-17 PROCEDURE — 250N000013 HC RX MED GY IP 250 OP 250 PS 637: Performed by: NURSE PRACTITIONER

## 2022-09-17 PROCEDURE — 250N000011 HC RX IP 250 OP 636: Performed by: INTERNAL MEDICINE

## 2022-09-17 PROCEDURE — 85018 HEMOGLOBIN: CPT | Performed by: INTERNAL MEDICINE

## 2022-09-17 PROCEDURE — 85027 COMPLETE CBC AUTOMATED: CPT | Performed by: GENERAL PRACTICE

## 2022-09-17 PROCEDURE — 82310 ASSAY OF CALCIUM: CPT | Performed by: GENERAL PRACTICE

## 2022-09-17 PROCEDURE — 86901 BLOOD TYPING SEROLOGIC RH(D): CPT | Performed by: INTERNAL MEDICINE

## 2022-09-17 PROCEDURE — 120N000001 HC R&B MED SURG/OB

## 2022-09-17 PROCEDURE — 250N000013 HC RX MED GY IP 250 OP 250 PS 637: Performed by: INTERNAL MEDICINE

## 2022-09-17 PROCEDURE — 86923 COMPATIBILITY TEST ELECTRIC: CPT | Performed by: INTERNAL MEDICINE

## 2022-09-17 PROCEDURE — 85520 HEPARIN ASSAY: CPT | Performed by: INTERNAL MEDICINE

## 2022-09-17 PROCEDURE — 86850 RBC ANTIBODY SCREEN: CPT | Performed by: INTERNAL MEDICINE

## 2022-09-17 PROCEDURE — 99233 SBSQ HOSP IP/OBS HIGH 50: CPT | Performed by: INTERNAL MEDICINE

## 2022-09-17 RX ADMIN — BUMETANIDE 0.5 MG: 0.5 TABLET ORAL at 08:59

## 2022-09-17 RX ADMIN — CEFTRIAXONE 2 G: 2 INJECTION, POWDER, FOR SOLUTION INTRAMUSCULAR; INTRAVENOUS at 20:46

## 2022-09-17 RX ADMIN — THIAMINE HCL TAB 100 MG 100 MG: 100 TAB at 08:52

## 2022-09-17 RX ADMIN — ENOXAPARIN SODIUM 60 MG: 60 INJECTION SUBCUTANEOUS at 20:46

## 2022-09-17 RX ADMIN — THERA TABS 1 TABLET: TAB at 08:51

## 2022-09-17 RX ADMIN — OXYCODONE HYDROCHLORIDE 5 MG: 5 TABLET ORAL at 12:48

## 2022-09-17 RX ADMIN — LEVETIRACETAM 500 MG: 500 TABLET, FILM COATED ORAL at 20:46

## 2022-09-17 RX ADMIN — LEVETIRACETAM 500 MG: 500 TABLET, FILM COATED ORAL at 08:51

## 2022-09-17 RX ADMIN — PANTOPRAZOLE SODIUM 40 MG: 40 TABLET, DELAYED RELEASE ORAL at 06:39

## 2022-09-17 RX ADMIN — ENOXAPARIN SODIUM 60 MG: 60 INJECTION SUBCUTANEOUS at 08:51

## 2022-09-17 RX ADMIN — NYSTATIN: 100000 OINTMENT TOPICAL at 08:51

## 2022-09-17 RX ADMIN — NYSTATIN 1 APPLICATOR: 100000 OINTMENT TOPICAL at 20:46

## 2022-09-17 RX ADMIN — TRAZODONE HYDROCHLORIDE 100 MG: 50 TABLET ORAL at 20:46

## 2022-09-17 RX ADMIN — FOLIC ACID 1 MG: 1 TABLET ORAL at 08:51

## 2022-09-17 ASSESSMENT — ACTIVITIES OF DAILY LIVING (ADL)
ADLS_ACUITY_SCORE: 53
ADLS_ACUITY_SCORE: 51
ADLS_ACUITY_SCORE: 53
ADLS_ACUITY_SCORE: 51
ADLS_ACUITY_SCORE: 53
ADLS_ACUITY_SCORE: 51
ADLS_ACUITY_SCORE: 53
ADLS_ACUITY_SCORE: 53

## 2022-09-17 NOTE — PROGRESS NOTES
SWCM asked RNCM to look into Home IV ABX options for this pt. According to earlier note Olena from the pt [facility] can administer IV ABX via push or by bulb. CM to clarlirify with provider if this is an option and if so confirm with olena that she has the necessary supplies.
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
## 2022-09-17 NOTE — PLAN OF CARE
Goal Outcome Evaluation:        Problem: Plan of Care - These are the overarching goals to be used throughout the patient stay.    Goal: Plan of Care Review/Shift Note  Description: The Plan of Care Review/Shift note should be completed every shift.  The Outcome Evaluation is a brief statement about your assessment that the patient is improving, declining, or no change.  This information will be displayed automatically on your shift note.  Outcome: Ongoing, Progressing   Educated pt on treatment plan, pt voiced understanding.    Problem: Malnutrition  Goal: Improved Nutritional Intake  Outcome: Ongoing, Progressing   Pt ate approx. 75% dinner, currently still drinking her Ensure.     Cont. To T&R pt frequently & provide pillow support. Dressings to sacral/buttocks area changed as well as the R hip.  Illeostomy, sp catheters x2 & DENIA drain to R hip area all c/d/I. See I&O for outputs. Irrigated per orders. Pt continued to have generalized swelling. Remains on IV abx via PICC. Oxycodone PRN admin. For pain to bilat. LE.

## 2022-09-17 NOTE — PLAN OF CARE
"  Problem: Plan of Care - These are the overarching goals to be used throughout the patient stay.    Goal: Plan of Care Review/Shift Note  Description: The Plan of Care Review/Shift note should be completed every shift.  The Outcome Evaluation is a brief statement about your assessment that the patient is improving, declining, or no change.  This information will be displayed automatically on your shift note.  Outcome: Ongoing, Progressing  Goal: Patient-Specific Goal (Individualized)  Description: You can add care plan individualizations to a care plan. Examples of Individualization might be:  \"Parent requests to be called daily at 9am for status\", \"I have a hard time hearing out of my right ear\", or \"Do not touch me to wake me up as it startles me\".  Outcome: Ongoing, Progressing  Goal: Absence of Hospital-Acquired Illness or Injury  Outcome: Ongoing, Progressing  Intervention: Identify and Manage Fall Risk  Recent Flowsheet Documentation  Taken 9/17/2022 0030 by Mary Flores, RN  Safety Promotion/Fall Prevention:   bed alarm on   lighting adjusted   room near nurse's station  Intervention: Prevent Skin Injury  Recent Flowsheet Documentation  Taken 9/17/2022 0330 by Mary Flores RN  Body Position:   turned   right   heels elevated  Taken 9/17/2022 0030 by Mary Flores RN  Body Position: refuses positioning  Intervention: Prevent and Manage VTE (Venous Thromboembolism) Risk  Recent Flowsheet Documentation  Taken 9/17/2022 0030 by Mary Flores RN  Activity Management: bedrest  Intervention: Prevent Infection  Recent Flowsheet Documentation  Taken 9/17/2022 0030 by Mary Flores, RN  Infection Prevention:   rest/sleep promoted   single patient room provided  Goal: Optimal Comfort and Wellbeing  Outcome: Ongoing, Progressing  Intervention: Provide Person-Centered Care  Recent Flowsheet Documentation  Taken 9/17/2022 0030 by Mary Flores, RN  Trust Relationship/Rapport:   choices " provided   emotional support provided   questions encouraged  Goal: Readiness for Transition of Care  Outcome: Ongoing, Progressing     Problem: Risk for Delirium  Goal: Optimal Coping  Outcome: Ongoing, Progressing  Goal: Improved Behavioral Control  Outcome: Ongoing, Progressing  Goal: Improved Attention and Thought Clarity  Outcome: Ongoing, Progressing  Goal: Improved Sleep  Outcome: Ongoing, Progressing     Problem: UTI (Urinary Tract Infection)  Goal: Improved Infection Symptoms  Outcome: Ongoing, Progressing     Problem: Malnutrition  Goal: Improved Nutritional Intake  Outcome: Ongoing, Progressing     Problem: Impaired Wound Healing  Goal: Optimal Wound Healing  Outcome: Ongoing, Progressing  Intervention: Promote Wound Healing  Recent Flowsheet Documentation  Taken 9/17/2022 0030 by Mary Flores, RN  Activity Management: bedrest     Problem: Infection  Goal: Absence of Infection Signs and Symptoms  Outcome: Ongoing, Progressing     Problem: VTE (Venous Thromboembolism)  Goal: VTE (Venous Thromboembolism) Symptom Resolution  Outcome: Ongoing, Progressing     Problem: Fluid Volume Excess  Goal: Fluid Balance  Outcome: Ongoing, Progressing     Problem: Anemia  Goal: Anemia Symptom Improvement  Outcome: Ongoing, Progressing  Intervention: Monitor and Manage Anemia  Recent Flowsheet Documentation  Taken 9/17/2022 0030 by Mary Flores, RN  Safety Promotion/Fall Prevention:   bed alarm on   lighting adjusted   room near nurse's station     Problem: Pain Acute  Goal: Acceptable Pain Control and Functional Ability  Outcome: Ongoing, Progressing   Goal Outcome Evaluation:      Pt irritable this shift. Pt wanting to be left alone refusing most turns. Abscess drain putting out well. Will monitor.

## 2022-09-17 NOTE — PROGRESS NOTES
Progress Note    Assessment/Plan  Patient feels much better today.  Much better mood.  Eating more.  No significant pain today.  Very up from an emotional standpoint compared to the last number of days.  Reviewed with her status of her right hip with drain in place.  She believes it was over 10 years ago that her right hip ball-and-socket were removed.  We will try to find the data and old records.  This is compatible with current finding of significant infection where the drain is located in the right hip location.    Active Problems:    Septic shock (H)      Subjective  As above patient much improved.  Objective    Vital signs in last 24 hours  Temp:  [98.2  F (36.8  C)-99  F (37.2  C)] 98.2  F (36.8  C)  Pulse:  [] 98  Resp:  [16-18] 16  BP: ()/(44-55) 106/53  SpO2:  [95 %-99 %] 98 %  Weight:   [unfilled]    Intake/Output last 3 shifts  I/O last 3 completed shifts:  In: 460 [P.O.:360; I.V.:100]  Out: 1950 [Urine:1520; Drains:430]  Intake/Output this shift:  I/O this shift:  In: 344   Out: 95 [Drains:95]      Physical Exam  Abdomen benign bowel sounds present no peritoneal findings drainage urine clear right hip drain with decreased drain out.  Hip and upper leg edema essentially without change.  Presacral wounds are dressed and no significant change.    Pertinent Labs   Lab Results   Component Value Date    WBC 4.2 09/17/2022    HGB 8.8 (L) 09/17/2022    HCT 22.1 (L) 09/17/2022    MCV 94 09/17/2022    PLT 97 (L) 09/17/2022             Pertinent Radiology     [unfilled]        Reji Hnuter MD

## 2022-09-17 NOTE — PLAN OF CARE
Goal Outcome Evaluation:      Problem: Plan of Care - These are the overarching goals to be used throughout the patient stay.    Goal: Optimal Comfort and Wellbeing  Intervention: Monitor Pain and Promote Comfort  Recent Flowsheet Documentation  Taken 9/17/2022 1248 by Steffanie Radford RN  Pain Management Interventions:   medication (see MAR)   pillow support provided   repositioned   rest     Problem: UTI (Urinary Tract Infection)  Goal: Improved Infection Symptoms  Outcome: Ongoing, Progressing     Problem: Impaired Wound Healing  Goal: Optimal Wound Healing  Intervention: Promote Wound Healing  Recent Flowsheet Documentation  Taken 9/17/2022 1248 by Steffanie Radford, RN  Pain Management Interventions:   medication (see MAR)   pillow support provided   repositioned   rest  Taken 9/17/2022 0900 by Steffanie Radford, RN  Activity Management: bedrest     Cont to turn and repo pt & provide pillow support as much as pt allows.  Dressings to sacral/buttocks area changed.  Colostomy, sp catheters x2 & DENIA drain to R hip area all c/d/I. Dressing changed to SP catheter. See I&O for outputs. Irrigated per orders. Pt continued to have generalized swelling. Remains on IV abx via PICC. Oxycodone PRN admin. For pain to bilat. LE.
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
## 2022-09-18 ENCOUNTER — APPOINTMENT (OUTPATIENT)
Dept: INTERVENTIONAL RADIOLOGY/VASCULAR | Facility: HOSPITAL | Age: 57
DRG: 853 | End: 2022-09-18
Attending: RADIOLOGY
Payer: MEDICARE

## 2022-09-18 LAB
ALBUMIN SERPL-MCNC: 1.6 G/DL (ref 3.5–5)
ALP SERPL-CCNC: 85 U/L (ref 45–120)
ALT SERPL W P-5'-P-CCNC: <9 U/L (ref 0–45)
ANION GAP SERPL CALCULATED.3IONS-SCNC: 3 MMOL/L (ref 5–18)
AST SERPL W P-5'-P-CCNC: 8 U/L (ref 0–40)
BASOPHILS # BLD AUTO: 0 10E3/UL (ref 0–0.2)
BASOPHILS NFR BLD AUTO: 0 %
BILIRUB SERPL-MCNC: 0.3 MG/DL (ref 0–1)
BUN SERPL-MCNC: 11 MG/DL (ref 8–22)
CALCIUM SERPL-MCNC: 7.8 MG/DL (ref 8.5–10.5)
CHLORIDE BLD-SCNC: 111 MMOL/L (ref 98–107)
CO2 SERPL-SCNC: 26 MMOL/L (ref 22–31)
CREAT SERPL-MCNC: 0.34 MG/DL (ref 0.6–1.1)
EOSINOPHIL # BLD AUTO: 0.1 10E3/UL (ref 0–0.7)
EOSINOPHIL NFR BLD AUTO: 3 %
ERYTHROCYTE [DISTWIDTH] IN BLOOD BY AUTOMATED COUNT: 20.3 % (ref 10–15)
GFR SERPL CREATININE-BSD FRML MDRD: >90 ML/MIN/1.73M2
GLUCOSE BLD-MCNC: 94 MG/DL (ref 70–125)
HCT VFR BLD AUTO: 24.3 % (ref 35–47)
HGB BLD-MCNC: 7.7 G/DL (ref 11.7–15.7)
IMM GRANULOCYTES # BLD: 0 10E3/UL
IMM GRANULOCYTES NFR BLD: 1 %
LYMPHOCYTES # BLD AUTO: 0.9 10E3/UL (ref 0.8–5.3)
LYMPHOCYTES NFR BLD AUTO: 25 %
MCH RBC QN AUTO: 28.6 PG (ref 26.5–33)
MCHC RBC AUTO-ENTMCNC: 31.7 G/DL (ref 31.5–36.5)
MCV RBC AUTO: 90 FL (ref 78–100)
MONOCYTES # BLD AUTO: 0.2 10E3/UL (ref 0–1.3)
MONOCYTES NFR BLD AUTO: 5 %
NEUTROPHILS # BLD AUTO: 2.4 10E3/UL (ref 1.6–8.3)
NEUTROPHILS NFR BLD AUTO: 66 %
NRBC # BLD AUTO: 0 10E3/UL
NRBC BLD AUTO-RTO: 0 /100
PLATELET # BLD AUTO: 91 10E3/UL (ref 150–450)
POTASSIUM BLD-SCNC: 3.9 MMOL/L (ref 3.5–5)
PROT SERPL-MCNC: 4.8 G/DL (ref 6–8)
RBC # BLD AUTO: 2.69 10E6/UL (ref 3.8–5.2)
SODIUM SERPL-SCNC: 140 MMOL/L (ref 136–145)
WBC # BLD AUTO: 3.6 10E3/UL (ref 4–11)

## 2022-09-18 PROCEDURE — 99232 SBSQ HOSP IP/OBS MODERATE 35: CPT | Performed by: INTERNAL MEDICINE

## 2022-09-18 PROCEDURE — 250N000013 HC RX MED GY IP 250 OP 250 PS 637: Performed by: INTERNAL MEDICINE

## 2022-09-18 PROCEDURE — C1769 GUIDE WIRE: HCPCS

## 2022-09-18 PROCEDURE — 250N000011 HC RX IP 250 OP 636: Performed by: INTERNAL MEDICINE

## 2022-09-18 PROCEDURE — 85025 COMPLETE CBC W/AUTO DIFF WBC: CPT | Performed by: INTERNAL MEDICINE

## 2022-09-18 PROCEDURE — 80053 COMPREHEN METABOLIC PANEL: CPT | Performed by: INTERNAL MEDICINE

## 2022-09-18 PROCEDURE — 120N000001 HC R&B MED SURG/OB

## 2022-09-18 PROCEDURE — 0T9B30Z DRAINAGE OF BLADDER WITH DRAINAGE DEVICE, PERCUTANEOUS APPROACH: ICD-10-PCS | Performed by: RADIOLOGY

## 2022-09-18 PROCEDURE — 75984 XRAY CONTROL CATHETER CHANGE: CPT

## 2022-09-18 PROCEDURE — C1729 CATH, DRAINAGE: HCPCS

## 2022-09-18 PROCEDURE — 250N000009 HC RX 250: Performed by: NURSE PRACTITIONER

## 2022-09-18 PROCEDURE — 250N000013 HC RX MED GY IP 250 OP 250 PS 637: Performed by: NURSE PRACTITIONER

## 2022-09-18 PROCEDURE — 255N000002 HC RX 255 OP 636: Performed by: RADIOLOGY

## 2022-09-18 RX ADMIN — BUMETANIDE 0.5 MG: 0.5 TABLET ORAL at 09:23

## 2022-09-18 RX ADMIN — OXYCODONE HYDROCHLORIDE 5 MG: 5 TABLET ORAL at 09:27

## 2022-09-18 RX ADMIN — NYSTATIN: 100000 OINTMENT TOPICAL at 21:34

## 2022-09-18 RX ADMIN — THIAMINE HCL TAB 100 MG 100 MG: 100 TAB at 09:23

## 2022-09-18 RX ADMIN — ENOXAPARIN SODIUM 60 MG: 60 INJECTION SUBCUTANEOUS at 09:23

## 2022-09-18 RX ADMIN — FOLIC ACID 1 MG: 1 TABLET ORAL at 09:23

## 2022-09-18 RX ADMIN — ENOXAPARIN SODIUM 60 MG: 60 INJECTION SUBCUTANEOUS at 19:37

## 2022-09-18 RX ADMIN — IOHEXOL 20 ML: 350 INJECTION, SOLUTION INTRAVENOUS at 15:49

## 2022-09-18 RX ADMIN — OXYCODONE HYDROCHLORIDE 5 MG: 5 TABLET ORAL at 17:28

## 2022-09-18 RX ADMIN — PANTOPRAZOLE SODIUM 40 MG: 40 TABLET, DELAYED RELEASE ORAL at 06:30

## 2022-09-18 RX ADMIN — THERA TABS 1 TABLET: TAB at 09:23

## 2022-09-18 RX ADMIN — LIDOCAINE HYDROCHLORIDE 10 ML: 10 INJECTION, SOLUTION INFILTRATION; PERINEURAL at 15:45

## 2022-09-18 RX ADMIN — LEVETIRACETAM 500 MG: 500 TABLET, FILM COATED ORAL at 21:32

## 2022-09-18 RX ADMIN — LEVETIRACETAM 500 MG: 500 TABLET, FILM COATED ORAL at 09:23

## 2022-09-18 RX ADMIN — TRAZODONE HYDROCHLORIDE 100 MG: 50 TABLET ORAL at 21:32

## 2022-09-18 RX ADMIN — NYSTATIN: 100000 OINTMENT TOPICAL at 09:23

## 2022-09-18 RX ADMIN — OXYCODONE HYDROCHLORIDE 5 MG: 5 TABLET ORAL at 23:55

## 2022-09-18 RX ADMIN — CEFTRIAXONE 2 G: 2 INJECTION, POWDER, FOR SOLUTION INTRAMUSCULAR; INTRAVENOUS at 21:35

## 2022-09-18 ASSESSMENT — ACTIVITIES OF DAILY LIVING (ADL)
ADLS_ACUITY_SCORE: 51

## 2022-09-18 NOTE — PLAN OF CARE
Goal Outcome Evaluation:      Problem: Plan of Care - These are the overarching goals to be used throughout the patient stay.    Goal: Absence of Hospital-Acquired Illness or Injury  Intervention: Prevent Skin Injury  Recent Flowsheet Documentation  Taken 9/18/2022 0927 by Steffanie Radford RN  Body Position:   turned   supine, legs elevated   supine, head elevated     Problem: Plan of Care - These are the overarching goals to be used throughout the patient stay.    Goal: Optimal Comfort and Wellbeing  Intervention: Monitor Pain and Promote Comfort  Recent Flowsheet Documentation  Taken 9/18/2022 0927 by Steffanie Radford RN  Pain Management Interventions:   medication (see MAR)   distraction   emotional support   care clustered   massage provided   pillow support provided   premedicated for activity   repositioned   rest     Problem: Malnutrition  Goal: Improved Nutritional Intake  Outcome: Ongoing, Progressing     Problem: Impaired Wound Healing  Goal: Optimal Wound Healing  Intervention: Promote Wound Healing  Recent Flowsheet Documentation  Taken 9/18/2022 0927 by Steffanie Radford RN  Pain Management Interventions:   medication (see MAR)   distraction   emotional support   care clustered   massage provided   pillow support provided   premedicated for activity   repositioned   rest  Activity Management:   bedrest   activity adjusted per tolerance     Cont to turn and repo pt & provide pillow support as much as pt allows.  Patient more compliant and pleasant demeanor this morning. Dressings to sacral/buttocks area changed.  Colostomy, sp catheters x2 & DENIA drain to R hip area all c/d/I. Dressing changed to SP catheter and DENIA drain.  SP catheter dressing saturated with urine. See I&O for outputs. Irrigated per orders. Pt continued to have generalized swelling how every improving to bilateral arms.  Right continues to be more edematous that left.  Remains on IV abx via PICC. Oxycodone PRN for pain to bilateral LE.

## 2022-09-18 NOTE — PROGRESS NOTES
Progress Note    Assessment/Plan  Continue all drains today.  Patient has ongoing drainage in the skin site from the urinary stent.  Ostomy appliance can be applied..  Reviewed this with patient.  Reviewed again the fact the patient is status post major resection right hip in the past.  Ongoing infection is the same location at the current time the drain needs to be remain in place at the current time as this resulted in sepsis.  Patient understanding this.  May have to be discharged to the care facility with drain in place as patient desires return as soon as possible to [place].    Active Problems:    Septic shock (H)      Subjective  Tolerating oral intake eating more.  In very good mood again today.  She has drainage around the urinary drain and this is bothersome for her.  Denies any significant pain abdomen right hip at the current time.  Objective    Vital signs in last 24 hours  Temp:  [97.8  F (36.6  C)-98.9  F (37.2  C)] 97.9  F (36.6  C)  Pulse:  [] 91  Resp:  [18] 18  BP: ()/(50-59) 106/51  SpO2:  [95 %-99 %] 97 %  Weight:   [unfilled]    Intake/Output last 3 shifts  I/O last 3 completed shifts:  In: 944 [P.O.:600]  Out: 1845 [Urine:1650; Drains:195]  Intake/Output this shift:  I/O this shift:  In: 240 [P.O.:240]  Out: 45 [Drains:45]      Physical Exam  Exam indicates sacral area stalk to be covered.  Right hip drain intact small amount of drainage.  Edema of the hip and upper leg without significant change.  Presacral area without evidence of any increase in cellulitis.  Neobladder drain with some drainage around site.    Pertinent Labs   Lab Results   Component Value Date    WBC 3.6 (L) 09/18/2022    HGB 7.7 (L) 09/18/2022    HCT 24.3 (L) 09/18/2022    MCV 90 09/18/2022    PLT 91 (L) 09/18/2022             Pertinent Radiology     [unfilled]        Reji Hunter MD

## 2022-09-18 NOTE — PLAN OF CARE
Problem: Plan of Care - These are the overarching goals to be used throughout the patient stay.    Goal: Plan of Care Review/Shift Note  Description: The Plan of Care Review/Shift note should be completed every shift.  The Outcome Evaluation is a brief statement about your assessment that the patient is improving, declining, or no change.  This information will be displayed automatically on your shift note.  Outcome: Ongoing, Progressing   Goal Outcome Evaluation:  Educated pt on treatment plan, pt voiced understanding.      Problem: Malnutrition  Goal: Improved Nutritional Intake  Outcome: Ongoing, Progressing   Pt was still eating lunch when writer assumed care. Ate 90%. Ordered dinner to save for later.

## 2022-09-18 NOTE — PLAN OF CARE
Problem: UTI (Urinary Tract Infection)  Goal: Improved Infection Symptoms  Outcome: Ongoing, Progressing   Goal Outcome Evaluation:      Supra pubic catheter is patent, oral antibiotics continued, VSS.  Temp: 97.8  F (36.6  C) Temp src: Oral BP: 96/50 Pulse: 91   Resp: 18 SpO2: 95 % O2 Device: None (Room air)       Problem: Pain Acute  Goal: Acceptable Pain Control and Functional Ability  Intervention: Prevent or Manage Pain  Recent Flowsheet Documentation  Taken 9/18/2022 0000 by Dio Morgan RN  Medication Review/Management: medications reviewed   No pain.       Problem: Anemia  Goal: Anemia Symptom Improvement  Intervention: Monitor and Manage Anemia  Recent Flowsheet Documentation  Taken 9/18/2022 0000 by Dio Morgan RN  Safety Promotion/Fall Prevention:     Hemoglobin   Date Value Ref Range Status   09/17/2022 8.8 (L) 11.7 - 15.7 g/dL Final   05/26/2021 10.2 (L) 11.7 - 15.7 g/dL Final       Problem: Plan of Care - These are the overarching goals to be used throughout the patient stay.    Goal: Absence of Hospital-Acquired Illness or Injury  Intervention: Prevent Skin Injury  Recent Flowsheet Documentation  Taken 9/18/2022 0000 by Dio Morgan RN  Body Position:     Patient has a pressure alternating mattress, also turned and repositioned.

## 2022-09-18 NOTE — PROVIDER NOTIFICATION
Patient had c/o feeling bloated in her abdomen.  Dressing around SP catheter is saturated with urine.  Very low output in mcintyre bag.  Irrigated with 20 ml normal per orders.  No return.  Urologist paged.  Dr. Juarez responded and recommended reaching our to IR as they placed the catheter.  IR called and awaiting call back from IR

## 2022-09-18 NOTE — PROGRESS NOTES
Paged urology and spoke about concerns again.  Dr. Juarez stated their team will come up and lay eyes on the patient as soon as able and decide the next steps.

## 2022-09-18 NOTE — PROGRESS NOTES
Pt Returned to in-patient room 411.  Bedside handoff given to primary RN.  Pt indicates pain has decreased with tube change and drainage of urine.
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
## 2022-09-18 NOTE — PROVIDER NOTIFICATION
Spoke with IR and was advised that urology should decide what to do.  Informed IR that urologist had asked for their recommendation.  Urologist paged to speak directly with IR provider.  Charge nurse later spoke with IR again and they stated they would not be dealing with this today.  Patient remains feeling bloated and continues to have leaking from SP site.  Attempted to irrigate with 10 ml.  Minimal return.  Dressing changed, abd and gauze applied.

## 2022-09-19 LAB
ALBUMIN SERPL-MCNC: 1.7 G/DL (ref 3.5–5)
ALP SERPL-CCNC: 88 U/L (ref 45–120)
ALT SERPL W P-5'-P-CCNC: <9 U/L (ref 0–45)
ANION GAP SERPL CALCULATED.3IONS-SCNC: 4 MMOL/L (ref 5–18)
AST SERPL W P-5'-P-CCNC: 10 U/L (ref 0–40)
BASOPHILS # BLD AUTO: 0 10E3/UL (ref 0–0.2)
BASOPHILS NFR BLD AUTO: 0 %
BILIRUB SERPL-MCNC: 0.2 MG/DL (ref 0–1)
BUN SERPL-MCNC: 12 MG/DL (ref 8–22)
CALCIUM SERPL-MCNC: 7.5 MG/DL (ref 8.5–10.5)
CHLORIDE BLD-SCNC: 108 MMOL/L (ref 98–107)
CO2 SERPL-SCNC: 26 MMOL/L (ref 22–31)
CREAT SERPL-MCNC: 0.38 MG/DL (ref 0.6–1.1)
EOSINOPHIL # BLD AUTO: 0.1 10E3/UL (ref 0–0.7)
EOSINOPHIL NFR BLD AUTO: 4 %
ERYTHROCYTE [DISTWIDTH] IN BLOOD BY AUTOMATED COUNT: 20.1 % (ref 10–15)
GFR SERPL CREATININE-BSD FRML MDRD: >90 ML/MIN/1.73M2
GLUCOSE BLD-MCNC: 90 MG/DL (ref 70–125)
HCT VFR BLD AUTO: 25.5 % (ref 35–47)
HGB BLD-MCNC: 7.8 G/DL (ref 11.7–15.7)
IMM GRANULOCYTES # BLD: 0 10E3/UL
IMM GRANULOCYTES NFR BLD: 1 %
LYMPHOCYTES # BLD AUTO: 1.2 10E3/UL (ref 0.8–5.3)
LYMPHOCYTES NFR BLD AUTO: 38 %
MCH RBC QN AUTO: 28.4 PG (ref 26.5–33)
MCHC RBC AUTO-ENTMCNC: 30.6 G/DL (ref 31.5–36.5)
MCV RBC AUTO: 93 FL (ref 78–100)
MONOCYTES # BLD AUTO: 0.2 10E3/UL (ref 0–1.3)
MONOCYTES NFR BLD AUTO: 5 %
NEUTROPHILS # BLD AUTO: 1.7 10E3/UL (ref 1.6–8.3)
NEUTROPHILS NFR BLD AUTO: 52 %
NRBC # BLD AUTO: 0 10E3/UL
NRBC BLD AUTO-RTO: 0 /100
PLATELET # BLD AUTO: 97 10E3/UL (ref 150–450)
POTASSIUM BLD-SCNC: 3.6 MMOL/L (ref 3.5–5)
PROT SERPL-MCNC: 5 G/DL (ref 6–8)
RBC # BLD AUTO: 2.75 10E6/UL (ref 3.8–5.2)
SODIUM SERPL-SCNC: 138 MMOL/L (ref 136–145)
WBC # BLD AUTO: 3.2 10E3/UL (ref 4–11)

## 2022-09-19 PROCEDURE — 250N000011 HC RX IP 250 OP 636: Performed by: INTERNAL MEDICINE

## 2022-09-19 PROCEDURE — 250N000013 HC RX MED GY IP 250 OP 250 PS 637: Performed by: NURSE PRACTITIONER

## 2022-09-19 PROCEDURE — 250N000013 HC RX MED GY IP 250 OP 250 PS 637: Performed by: INTERNAL MEDICINE

## 2022-09-19 PROCEDURE — 80053 COMPREHEN METABOLIC PANEL: CPT | Performed by: INTERNAL MEDICINE

## 2022-09-19 PROCEDURE — 99232 SBSQ HOSP IP/OBS MODERATE 35: CPT | Performed by: INTERNAL MEDICINE

## 2022-09-19 PROCEDURE — 85025 COMPLETE CBC W/AUTO DIFF WBC: CPT | Performed by: INTERNAL MEDICINE

## 2022-09-19 PROCEDURE — 120N000001 HC R&B MED SURG/OB

## 2022-09-19 RX ORDER — POTASSIUM CHLORIDE 1500 MG/1
TABLET, EXTENDED RELEASE ORAL
Qty: 90 TABLET | Refills: 5 | Status: SHIPPED | OUTPATIENT
Start: 2022-09-19 | End: 2022-09-28

## 2022-09-19 RX ORDER — PANTOPRAZOLE SODIUM 40 MG/1
TABLET, DELAYED RELEASE ORAL
Qty: 90 TABLET | Refills: 0 | Status: SHIPPED | OUTPATIENT
Start: 2022-09-19 | End: 2022-11-07

## 2022-09-19 RX ADMIN — NYSTATIN: 100000 OINTMENT TOPICAL at 20:43

## 2022-09-19 RX ADMIN — OXYCODONE HYDROCHLORIDE 5 MG: 5 TABLET ORAL at 17:03

## 2022-09-19 RX ADMIN — ENOXAPARIN SODIUM 60 MG: 60 INJECTION SUBCUTANEOUS at 08:57

## 2022-09-19 RX ADMIN — OXYCODONE HYDROCHLORIDE 5 MG: 5 TABLET ORAL at 20:45

## 2022-09-19 RX ADMIN — CEFTRIAXONE 2 G: 2 INJECTION, POWDER, FOR SOLUTION INTRAMUSCULAR; INTRAVENOUS at 20:39

## 2022-09-19 RX ADMIN — ENOXAPARIN SODIUM 60 MG: 60 INJECTION SUBCUTANEOUS at 20:39

## 2022-09-19 RX ADMIN — NYSTATIN: 100000 OINTMENT TOPICAL at 08:59

## 2022-09-19 RX ADMIN — LEVETIRACETAM 500 MG: 500 TABLET, FILM COATED ORAL at 20:38

## 2022-09-19 RX ADMIN — TRAZODONE HYDROCHLORIDE 100 MG: 50 TABLET ORAL at 20:38

## 2022-09-19 RX ADMIN — LEVETIRACETAM 500 MG: 500 TABLET, FILM COATED ORAL at 08:56

## 2022-09-19 RX ADMIN — THERA TABS 1 TABLET: TAB at 08:56

## 2022-09-19 RX ADMIN — THIAMINE HCL TAB 100 MG 100 MG: 100 TAB at 08:56

## 2022-09-19 RX ADMIN — FOLIC ACID 1 MG: 1 TABLET ORAL at 08:56

## 2022-09-19 RX ADMIN — PANTOPRAZOLE SODIUM 40 MG: 40 TABLET, DELAYED RELEASE ORAL at 06:33

## 2022-09-19 ASSESSMENT — ACTIVITIES OF DAILY LIVING (ADL)
ADLS_ACUITY_SCORE: 51
ADLS_ACUITY_SCORE: 51
ADLS_ACUITY_SCORE: 53
ADLS_ACUITY_SCORE: 51
ADLS_ACUITY_SCORE: 53
ADLS_ACUITY_SCORE: 51
ADLS_ACUITY_SCORE: 53

## 2022-09-19 NOTE — TELEPHONE ENCOUNTER
Routing refill request to provider for review/approval because:  Patient needs to be seen because:  Due for follow up appointment    CHRISTINA CasasN, RN
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
## 2022-09-19 NOTE — PROGRESS NOTES
"INFECTIOUS DISEASE FOLLOW UP NOTE      ASSESSMENT:  1. Large right thigh abscess, due to Group B strep. Percutaneous drain placed 9/3. Gram stain GPC, culture Group B strep. Blood culture negative. Sepsis resolved now off pressors. Drain check 9/8 with minimal residual fluid, drain upsized. CT 9/4 still showed large collection the day after drain placement. CRP is normal 0.4. CT pelvis shows stable size of fluid collection 5.0 x 3.5cm. This is in area of previous R femoral head resection.   2. Multiple surgeries on hips/wounds in the past, including resection of R femoral head around 2012.  3. Urinary retention. Low suspicion for urinary source. Mixed tulio in UC likely colonization. UA without pyuria.   4. Paraplegia   5. Decubitus wounds --  Large sacral looks clean.   6. Penicillin and levofloxacin allergies. Tolerated meropenem, cefepime, ceftriaxone. She recalls she does better with IVs compared to oral antibiotics.   7. H/o MRSA in remote past. Not currently seen on clinical specimens.   8. Thrombocytopenia, improved.     PLAN:  Ceftriaxone once daily, plan on 4-6 weeks IV course, 6 is 10/15/22, may not need this long. IV antibiotics can be done at her residence.    Check CBC with diff, cmp, crp once weekly when on treatment.   Dr. Hunter following in case this would need debridement. Plan for repeat CT scan this week     Justyn Garcia MD  [facility]  Direct messaging: [employer] P[employer]  On-Call ID provider: [phone], option: 9    ______________________________________________________________________    SUBJECTIVE / INTERVAL HISTORY:   First visit by me. Feels well. No complaints. No pain. Plan for repeat CT scan in a few days    ROS: paraplegia. All other systems negative except as listed above.        OBJECTIVE:  /53 (BP Location: Left arm)   Pulse 92   Temp 97.6  F (36.4  C) (Oral)   Resp 16   Ht 1.626 m (5' 4\")   Wt 59.5 kg (131 lb 1.6 oz)   LMP  (LMP Unknown)   " SpO2 97%   BMI 22.50 kg/m       GENERAL:  In no acute distress. Room air.   EYES: No conjunctival injection  HEAD, EARS, NOSE, MOUTH, AND THROAT: Nontraumatic, mouth without oral ulcers.   RESPIRATORY: Clear anterior  CARDIOVASCULAR: Regular rate and rhythm, normal S1 and S2, no murmurs, rubs, or gallops.  ABDOMEN: Soft, nontender, colostomy, urine tube from abdomen.  Normal bowel sounds.  MUSCULOSKELETAL: No synovitis. DENIA R thigh with small amount serous fluid  SKIN/HAIR/NAILS: No rashes, no signs of peripheral emboli. Wound photo noted  NEUROLOGIC: paraplegia   PICC R UE        Antibiotics:  Ceftriaxone 9/6-    Previous  Cefepime 9/3-4  Flagyl 9/3-4  Clindamycin 9/2-3  meropenem 9/4-6  Vancomycin 9/3-6    Pertinent labs:    Recent Labs   Lab 09/19/22  0634 09/18/22  0628 09/17/22  1249 09/17/22  0555   WBC 3.2* 3.6*  --  4.2   HGB 7.8* 7.7* 8.8* 6.6*   HCT 25.5* 24.3*  --  22.1*   PLT 97* 91*  --  97*        Recent Labs   Lab 09/19/22  0634 09/18/22  0628 09/17/22  0555    140 140   CO2 26 26 27   BUN 12 11 11        Lab Results   Component Value Date    CRP 0.4 09/08/2022    CRP 98.6 (H) 12/12/2018    CRP 6.1 01/20/2015         Lab Results   Component Value Date    ALT <9 09/19/2022    AST 10 09/19/2022    ALKPHOS 88 09/19/2022         MICROBIOLOGY DATA:  9/3 abscess gram stain GPC, PMNs, culture Group B strep  9/2 blood negative     RADIOLOGY:  CT Abdomen Peritonium Abscess Drainage    Result Date: 9/3/2022  EXAM: 1. PERCUTANEOUS DRAIN PLACEMENT RIGHT UPPER THIGH/GROIN COLLECTION 2. CT GUIDANCE 3. CONSCIOUS SEDATION LOCATION: [facility] DATE/TIME: 9/3/2022 12:19 PM INDICATION: right hip drain placement TECHNIQUE: Dose reduction techniques were used. PROCEDURE: Informed consent obtained. Site marked. Prior images reviewed. Required items made available. Patient identity confirmed verbally and with arm band. Patient reevaluated immediately before administering sedation. Universal  protocol was followed. Time out performed. The site was prepped and draped in sterile fashion. 10 mL of 1% lidocaine was infused into the local soft tissues. Using standard technique and under direct CT guidance, a 12 [place] Korean drainage catheter was inserted into the fluid collection.  SPECIMEN: 150 mL of purulent fluid was aspirated and sent to lab for cultures and Gram stain. BLOOD LOSS: Minimal. The patient tolerated the procedure well. No immediate complications. SEDATION: Versed 0 mg. Fentanyl 50 mcg. The procedure was performed with administration intravenous conscious sedation with appropriate preoperative, intraoperative, and postoperative evaluation. 15 minutes of supervised face to face conscious sedation time was provided by a radiology nurse under my direct supervision.     IMPRESSION: 1.  Successful CT-guided drain placement into a right upper thigh/groin abscess.     Echocardiogram Complete    Result Date: 9/10/2022  [id number]^ASHLEY^ARIN^[place] [address]  Name: KISHOR PINEDA MRN: [id number] : 1965 Study Date: 09/10/2022 03:21 PM Age: 56 yrs Gender: Female Patient Location: [facility] Reason For Study: CHF Ordering Physician: ARIN RAMIREZ Referring Physician: CODEY WILSON Performed By: ACE  BSA: 1.6 m2 Height: 64 in Weight: 120 lb HR: 92 BP: 91/58 mmHg ______________________________________________________________________________ Procedure Complete Echo Adult. ______________________________________________________________________________ Interpretation Summary  The left ventricle is normal in size. The visual ejection fraction is 55-60%. No regional wall motion abnormalities noted. Regional wall motion abnormalities cannot be excluded due to limited visualization. Diastolic Doppler findings (E/E' ratio and/or other parameters) suggest left ventricular filling pressures are normal. Sinus rhythm was noted.  Technically difficult, suboptimal study. Consider cardiac MRI for better imaging. ______________________________________________________________________________ Left Ventricle The left ventricle is normal in size. There is normal left ventricular wall thickness. Diastolic Doppler findings (E/E' ratio and/or other parameters) suggest left ventricular filling pressures are normal. The visual ejection fraction is 55-60%. Regional wall motion abnormalities cannot be excluded due to limited visualization. No regional wall motion abnormalities noted.  Right Ventricle Normal right ventricle size and systolic function. TAPSE is normal, which is consistent with normal right ventricular systolic function.  Atria The left atrium is not well visualized. Right atrium not well visualized.  Mitral Valve The mitral valve is not well visualized. There is mild (1+) mitral regurgitation. There is no mitral valve stenosis.  Tricuspid Valve The tricuspid valve is not well visualized. There is mild (1+) tricuspid regurgitation.  Aortic Valve The aortic valve is not well visualized. No aortic regurgitation is present. No aortic stenosis is present.  Pulmonic Valve The pulmonic valve is not well visualized.  Vessels The aorta root is normal. Normal size ascending aorta.  Pericardium There is no pericardial effusion.  Rhythm Sinus rhythm was noted. ______________________________________________________________________________ MMode/2D Measurements & Calculations IVSd: 0.77 cm  LVIDd: 3.9 cm LVIDs: 2.6 cm LVPWd: 0.70 cm FS: 32.6 % LV mass(C)d: 81.4 grams LV mass(C)dI: 51.7 grams/m2 Ao root diam: 3.1 cm asc Aorta Diam: 3.2 cm LVOT diam: 2.0 cm LVOT area: 3.1 cm2 RWT: 0.36  Doppler Measurements & Calculations MV E max deanna: 53.8 cm/sec MV A max deanna: 67.9 cm/sec MV E/A: 0.79 MV dec slope: 449.7 cm/sec2 MV dec time: 0.12 sec Ao V2 max: 138.1 cm/sec Ao max P.0 mmHg Ao V2 mean: 94.7 cm/sec Ao mean P.2 mmHg Ao V2 VTI: 25.7 cm MACKENZIE(I,D): 1.9  cm2 MACKENZIE(V,D): 1.8 cm2 LV V1 max P.7 mmHg LV V1 max: 82.0 cm/sec LV V1 VTI: 16.0 cm SV(LVOT): 49.1 ml SI(LVOT): 31.2 ml/m2 PA acc time: 0.09 sec AV Jered Ratio (DI): 0.59  MACKENZIE Index (cm2/m2): 1.2 E/E': 4.4 E/E' av.0 Lateral E/e': 4.4 Medial E/e': 7.5 Peak E' Jered: 12.2 cm/sec  ______________________________________________________________________________ Report approved by: Alexander Huston 09/10/2022 04:57 PM       US Lower Extremity Venous Duplex Bilateral    Result Date: 2022  EXAM: US LOWER EXTREMITY VENOUS DUPLEX BILATERAL LOCATION: [facility] DATE/TIME: 2022 3:47 PM INDICATION: edema, r o dvt COMPARISON: None. TECHNIQUE: Venous Duplex ultrasound of bilateral lower extremities with and without compression, augmentation and duplex. Color flow and spectral Doppler with waveform analysis performed. FINDINGS: Exam includes the common femoral, femoral, popliteal veins as well as segmentally visualized deep calf veins and greater saphenous vein. RIGHT: No deep vein thrombosis. No superficial thrombophlebitis. No popliteal cyst. LEFT: There is deep venous thrombus involving the common femoral vein, the femoral vein in the thigh and the popliteal vein. This appears partially occlusive in the common femoral vein and popliteal vein and completely occlusive throughout the femoral vein in the thigh. There is probable extension into the profunda femoris vein on the left. No superficial thrombus. No popliteal cyst.     IMPRESSION: Left deep venous thrombus involving the common femoral vein, femoral vein in the thigh and popliteal vein with probable extension into the profunda femoris vein but this is suboptimally visualized because of edema and body habitus. The thrombus appears occlusive throughout the femoral vein in the thigh and is nonocclusive in the common femoral and popliteal vein. NOTE: ABNORMAL REPORT THE DICTATION ABOVE DESCRIBES AN ABNORMALITY FOR WHICH FOLLOW-UP IS  NEEDED. . [Critical Result: Left lower extremity deep venous thrombosis] Finding was identified on 9/9/2022 3:54 PM. 1.  The patient's nurse, Shanita, was contacted by me on 9/9/2022 4:00 PM and verbalized understanding of the critical result.     US Upper Extremity Venous Duplex Bilat    Result Date: 9/9/2022  EXAM: US UPPER EXTREMITY VENOUS DUPLEX BILATERAL LOCATION: [facility] DATE/TIME: 9/9/2022 4:00 PM INDICATION: edema, r o DVT COMPARISON: None. TECHNIQUE: Venous Duplex ultrasound of both upper extremities with (when possible) and without compression, augmentation, and duplex. Color flow and spectral Doppler with waveform analysis performed. FINDINGS: Ultrasound includes evaluation of the internal jugular veins, innominate veins, subclavian veins, axillary veins, and brachial veins. The superficial cephalic and basilic veins were also evaluated where seen. RIGHT: There is nonocclusive thrombus adjacent to the PICC catheter in the right brachial vein and right subclavian vein this becomes occlusive in the distal brachial vein. There is nonocclusive thrombus in the right internal jugular vein. No superficial  thrombophlebitis. LEFT: There is occlusive thrombus in the left internal jugular vein and left innominate vein. There is occlusive superficial thrombus in the left cephalic vein.     IMPRESSION: No deep venous thrombosis in the bilateral upper extremities. [Critical Result: Bilateral upper extremity deep venous thrombosis] Finding was identified on 9/9/2022 4:03 PM. 1.  The patient's nurse, Shanita was contacted by me on 9/9/2022 4:05 PM and verbalized understanding of the critical result.     XR Chest Port 1 View    Result Date: 9/3/2022  EXAM: XR CHEST PORTABLE 1 VIEW LOCATION: [facility] DATE/TIME: 09/03/2022, 6:08 AM INDICATION: Status post unsuccessful left IJ placement, rule out pneumothorax. COMPARISON: 04/22/2022.     IMPRESSION: Negative for  pneumothorax. Normal heart size and pulmonary vascularity. Lungs are clear. Generalized osteopenia. Posterior idalia and pedicle screw fixation lower thoracic and lumbar spine.     IR Abscess Tube Change    Result Date: 9/8/2022  LOCATION: [facility] DATE: 9/8/2022 PROCEDURE: ABSCESS TUBE CHECK AND EXCHANGE INTERVENTIONAL RADIOLOGIST: Fer Gutierrez MD INDICATION: Right hip drain placement on 9/3/2022. Plan for exchange/upsize. CONSENT: The risks, benefits and alternatives of an abscess tube check with possible exchange, upsizing and/or removal were discussed with the patient or representative in detail. All questions were answered. Informed consent was given to proceed with the procedure. MODERATE SEDATION: None. CONTRAST: 25 cc Omnipaque ANTIBIOTICS: None. ADDITIONAL MEDICATIONS: None. FLUOROSCOPIC TIME: 0.5 minutes. RADIATION DOSE: Air Kerma: 3 mGy. COMPLICATIONS: No immediate complications. UNIVERSAL PROTOCOL: The operative site was marked and any prior imaging was reviewed. Required items including blood products, implants, devices and special equipment was made available. Patient identity was confirmed either verbally, with demographic information, hospital assigned identification or other identification markers. A timeout was performed immediately prior to the procedure. STERILE BARRIER TECHNIQUE: Maximum sterile barrier technique was used. Cutaneous antisepsis was performed at the operative site with application of 2% chlorhexidine and large sterile drape. Prior to the procedure, the  and assistant performed hand hygiene and wore hat, mask, sterile gown, and sterile gloves during the entire procedure. PROCEDURE:  Multiprojectional  images were obtained. The previous drainage catheter was injected with a small amount of contrast, and multiple images were obtained. Based on the results of the study, the drain was exchanged over a guidewire for a 16 Egyptian Fred drainage. The  cavity was aggressively irrigated with saline. FINDINGS: Abscessogram demonstrates minimal residual abscess cavity. After exchange, the drain is appropriately positioned.     IMPRESSION:  Right hip drain check demonstrates appropriate positioning of the drain. There is minimal residual fluid. The drain was exchanged/upsized for a 16 Slovak Fred drainage. Irrigation of the cavity reveals serosanguineous fluid.    IR Abscess Tube Check    Result Date: 9/13/2022  LOCATION: [facility] DATE: 9/13/2022 PROCEDURE: ABSCESS TUBE CHECK INTERVENTIONAL RADIOLOGIST: Hugo Michael MD INDICATION: Right hip abscess. Indwelling percutaneous drain.. CONSENT: The procedure, risks and benefits of an abscessogram were discussed with the patient  in detail. All questions were answered. Informed consent was given to proceed with the procedure. MODERATE SEDATION: None. CONTRAST: Omnipaque 350: 20. mL. ANTIBIOTICS: None. ADDITIONAL MEDICATIONS: None. FLUOROSCOPIC TIME: 0.4 minutes RADIATION DOSE: Air Kerma: 4 mGy COMPLICATIONS: No immediate complications. PROCEDURE:  images were obtained. The existing drainage catheter was injected with contrast, and multiple images were obtained. The cavity was irrigated with 50 cc aliquots of normal saline which were then aspirated. Total irrigation volume was approximately 200 cc. Drain was reconnected to a DENIA bulb. FINDINGS: Adequately positioned, indwelling 16 Slovak Fred drain within the posterior right hip fluid collection..     IMPRESSION: 1.  Adequately positioned, indwelling 16 Slovak drainage catheter. PLAN: Continued DENIA bulb drainage with maintained flushing regimen..     IR Suprapubic Catheter Placment    Result Date: 9/11/2022  [facility] LOCATION: [facility] DATE: 9/10/2022 PROCEDURE: 1. Transstomal urinary catheter placement. INTERVENTIONAL RADIOLOGIST: Jonh Cline MD HISTORY: 56 years-old Female with history of  diverting urostomy. The patient had a catheter placed in the emergency room at an outside facility via direct trocar technique through the proximal aspect of the stoma. This catheter is malfunctioning. CONSENT: The risks, benefits and alternatives of the procedure were discussed with the patient  in detail. All questions were answered. Informed consent was given to proceed with the procedure. SEDATION: None.. CONTRAST: 15 mL ANTIBIOTICS: Patient receiving antibiotics ADDITIONAL MEDICATIONS: None. FLUOROSCOPIC TIME: 2.6 RADIATION DOSE: Air Kerma: 73 mGy. COMPLICATIONS: No immediate complications. STERILE BARRIER TECHNIQUE: Maximum sterile barrier technique was used. Cutaneous antisepsis was performed at the operative site with application of 2% chlorhexidine and large sterile drape. Prior to the procedure, the  and assistant performed hand hygiene and wore hat, mask, sterile gown, and sterile gloves during the entire procedure. PROCEDURE/FINDINGS: A  image was obtained. A 5 Surinamese KMP catheter, with the aid of a 0.035 inch angled Glidewire, was advanced through the existing stoma tract into the diversion pouch. It was noted that the pre-existing catheter, while initially within the stoma, traverse through the side wall of the stoma directly into the pouch itself. The position of the newly placed catheter was confirmed by contrast injection. A 0.035 inch Amplatz wire was advanced through the catheter into the urinary diversion pouch. A 16 Surinamese Solomon tip catheter was advanced over the Amplatz wire. The balloon was inflated and contrast demonstration confirmed its position. The catheter was secured in place. The catheter was attached to a gravity drainage bag.     IMPRESSION: Placement of a transstomal urinary catheter via the existing stoma.     CT ABDOMEN PELVIS W CONTRAST    Result Date: 9/2/2022  EXAM: CT ABDOMEN PELVIS W CONTRAST LOCATION: [facility] DATE/TIME:  9/2/2022 7:16 PM INDICATION: Abdominal distension paraplegia difficulty passing catheter to neobladder COMPARISON: 12/18/2018. TECHNIQUE: CT scan of the abdomen and pelvis was performed following injection of IV contrast. Multiplanar reformats were obtained. Dose reduction techniques were used. CONTRAST: 50ml isovue 370 FINDINGS: LOWER CHEST: Atelectasis. 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 Nonocclusive thrombus in the proximal superior mesenteric vein. Multiple perigastric collateral vessels. PANCREAS: Normal. SPLEEN: Normal. ADRENAL GLANDS: Normal. KIDNEYS/BLADDER: There is mild right-sided pyelocaliectasis with normal caliber ureter. Dependent stone within the right renal pelvis. BOWEL: Descending colostomy with Franklin pouch. LYMPH NODES: Normal. VASCULATURE: Unremarkable. 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 MUSCULOSKELETAL: There is a very large complex peripherally enhancing fluid collection within the right buttock extending from the iliac crest down into the hip joints and into the right thigh. This is not completely imaged extending further into the thigh than is seen on the CT scan. Destructive changes in both hips. This fluid collection measures at least 20 x 14 cm scoliosis.     IMPRESSION: 1.  Significant distention of the neobladder which measures up 24 cm and extends into the right lower abdomen. 2.  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 Multiple perigastric venous collaterals. Nonocclusive thrombus in the proximal superior mesenteric vein. 3.  Franklin pouch with descending colostomy. 4.  Destructive changes both hips. There is a massive peripherally enhancing fluid collection in the right buttock, hip and thigh extending into the distal thigh. The distal extent of the fluid collection is not imaged with CT. This collection measures at least 20  x 14 cm. 5.  There is mild dilatation of the right intrarenal collecting system with normal caliber ureter and an element of UPJ obstruction is possible. Dependent stone in the right renal pelvis. NOTE: ABNORMAL REPORT 1.  THE DICTATION ABOVE DESCRIBES AN ABNORMALITY FOR WHICH FOLLOW-UP IS NEEDED.     CT Abdomen Pelvis w/o Contrast    Result Date: 9/10/2022  EXAM: CT ABDOMEN PELVIS W/O CONTRAST LOCATION: [facility] DATE/TIME: 9/10/2022 2:53 PM INDICATION: concern for suprapubic catheter malposition, increased abdominal pain. Lower abdominal pain. COMPARISON: 9/4/2022 TECHNIQUE: CT scan of the abdomen and pelvis was performed without IV contrast. Multiplanar reformats were obtained. Dose reduction techniques were used. CONTRAST: None. FINDINGS: LOWER CHEST: Moderate bilateral pleural effusions have increased in size mildly. These appear free-flowing. Associated dependent atelectasis. HEPATOBILIARY: The liver is not well evaluated on this noncontrast exam. No significant interval change in appearance. PANCREAS: Normal. SPLEEN: Mild splenomegaly, unchanged. ADRENAL GLANDS: Normal. KIDNEYS/BLADDER: The kidneys are partially obscured by metal artifact spinal hardware. No hydronephrosis. Cystectomy with urinary diversion. There is a percutaneous catheter in the right abdomen with tip in the ileal conduit. Significant distention of the conduit has developed since yesterday suggesting malfunction of the catheter. BOWEL: Bowel loops are normal caliber. LYMPH NODES: Not well assessed on this noncontrast exam. VASCULATURE: Unremarkable. PELVIC ORGANS: The uterus is normal in size. MUSCULOSKELETAL: Generalized edema within the subcutaneous adipose tissues has increased mildly from 9/4/2022. A drain has been placed in the right hip joint and the effusion has significantly decreased in size. Deformity and degenerative changes of both  hips. Extensive spinal hardware and associated artifact.      IMPRESSION: 1.  Cystectomy with ileal conduit urinary diversion. The conduit has become significantly distended from 9/4/2022. A percutaneous catheter terminates within the conduit. The distention suggests malfunction of the catheter such as plugging. No hydronephrosis of the kidneys. 2.  Moderate bilateral pleural effusions have increased in size mildly. 3.  Increase in mild generalized subcutaneous edema. 4.  Decrease in size of the right hip joint effusion. A drainage catheter is present within the joint space posteriorly.     CT Abdomen Pelvis w/o Contrast    Result Date: 9/4/2022  EXAM: CT ABDOMEN PELVIS W/O CONTRAST LOCATION: [facility] DATE/TIME: 9/4/2022 9:27 AM INDICATION: F U abscess with drain placement COMPARISON: 09/02/2022 TECHNIQUE: CT scan of the abdomen and pelvis was performed without IV contrast. Multiplanar reformats were obtained. Dose reduction techniques were used. CONTRAST: None. FINDINGS: LOWER CHEST: New small bilateral pleural effusions. HEPATOBILIARY: No significant mass or bile duct dilatation. Portal vein thrombus not visualized on the current examination without IV contrast, although periportal collateral vessels are noted. Cholecystectomy. PANCREAS: Normal. SPLEEN: Normal. ADRENAL GLANDS: Normal. KIDNEYS/BLADDER: Mild fullness of the renal pelves bilaterally, decreased compared to 09/02/2022. Unchanged nonobstructing calculus in the lower pole right kidney and in the dependent portion of the right renal pelvis. Urinary diversion in the right lower quadrant, which is no longer distended. Ostomy extends to the skin surface. BOWEL: Descending colostomy with Franklin pouch. LYMPH NODES: Normal. VASCULATURE: Unremarkable. PELVIC ORGANS: Unremarkable MUSCULOSKELETAL: Interval placement of percutaneous pigtail drain in the subcutaneous right hip/upper thigh fluid collection. Reliable size comparison limited due to noncontrast technique on the current examination,  but the collection has decreased in size. Largest axial component currently 11.7 x 7.4 cm, previously 20 x 14 cm. Osseous destruction in both hips. Thoracolumbar fusion. Diffuse muscular atrophy and osteopenia.     IMPRESSION: 1.  Decreased size of right buttock/thigh fluid collection following percutaneous drain placement. Largest remaining component measures 11.7 x 7.4 cm axially. 2.  New small bilateral pleural effusions. 3.  Neobladder is no longer distended.    Head CT w/o contrast    Result Date: 9/2/2022  EXAM: CT HEAD W/O CONTRAST LOCATION: [facility] DATE/TIME: 9/2/2022 7:16 PM INDICATION: ams COMPARISON: Head CT angiogram brain MRI 01/06/2020 TECHNIQUE: Routine CT Head without IV contrast. Multiplanar reformats. Dose reduction techniques were used. FINDINGS: INTRACRANIAL CONTENTS: No intracranial hemorrhage, extraaxial collection, or mass effect.  No CT evidence of acute infarct. Normal parenchymal attenuation. Normal ventricles and sulci. VISUALIZED ORBITS/SINUSES/MASTOIDS: No intraorbital abnormality. Moderate chronic mucosal thickening of the left maxillary sinus with associated frothy debris. Mild chronic mucosal thickening of the left sphenoid sinus. No middle ear or mastoid effusion. BONES/SOFT TISSUES: No acute abnormality. Chronic right medial orbital wall fracture.     IMPRESSION: 1.  No intracranial hemorrhage, mass lesions, hydrocephalus or CT evidence for an acute infarct. 2.  Chronic right medial orbital wall fracture.    CT Femur Thigh Bilateral wo Contrast    Result Date: 9/4/2022  EXAM: CT FEMUR THIGH BILATERAL WITHOUT CONTRAST LOCATION: [facility] DATE/TIME: 09/04/2022, 9:28 AM INDICATION: 56-year-old patient with a right hip-buttock abscess. COMPARISON: 09/04/2022 CT abdomen and pelvis. 09/02/2022 CT abdomen and pelvis. TECHNIQUE: Noncontrast. Axial, sagittal and coronal thin-section reconstruction. Dose reduction techniques were  used. FINDINGS: BONES AND JOINTS: -Advanced osteopenia. -Resection or resorption of the right medial ilium. Dysplastic right acetabulum. Resorption or resection of the right femoral head and greater trochanter. External rotation of the right femur. 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 Old fracture or osteotomy of the left femoral neck. Old healed fracture of the left femur proximal diaphysis. MUSCLES AND SOFT TISSUES: -Subcutaneous right hip-buttock fluid collection, with percutaneous pigtail catheter drainage. This collection measures 12 x 7 cm in greatest axial dimensions. -Fluid attenuation in the expected regions of the right vastus lateralis and adductor celia regions. The proximal margin of these collections have decreased in size since the 09/02/2022 exam. These collections both extend to the distal margin of the thigh, measuring up to 25 cm in length. -Advanced muscle fatty atrophy. OTHER: -See the dedicated abdomen-pelvis CT for further information.     IMPRESSION: 1.  Decreased size of the right hip-buttock fluid collection, status post percutaneous drain placement. This collection measures 12 x 7 cm in greatest axial dimensions. 2.  Decreased size of fluid collections (likely contiguous with the above) in the residual right vastus lateralis and adductor celia regions. These collections extend through the distal thigh, and measure roughly 25 cm in length.     CT Pelvis Soft Tissue w Contrast    Result Date: 9/15/2022  EXAM: CT PELVIS SOFT TISSUE W CONTRAST LOCATION: [facility] DATE/TIME: 9/15/2022 4:57 PM INDICATION: abscess COMPARISON: 09/10/2022 TECHNIQUE: CT scan of the pelvis was performed with IV contrast. Multiplanar reformats were obtained. Dose reduction techniques were used. CONTRAST: isovue 370  100ml FINDINGS: OTHER: Visualized portions of the liver, spleen, and pancreas are unremarkable, although incompletely evaluated.  KIDNEYS/BLADDER: The kidneys are partially obscured by metal artifact spinal hardware. No hydronephrosis. Cystectomy with urinary diversion. Interval placement of large bore catheter alongside the indwelling catheter into the colonic diversion with decrease in the degree of distention that was seen on the prior CT. BOWEL: Visualized loops are normal caliber. LYMPH NODES: No lymphadenopathy. VASCULATURE: Unremarkable. PELVIC ORGANS: Unremarkable MUSCULOSKELETAL: Right hip fluid collection is similar, with the residual fluid measuring 5.0 x 3.3 cm (2/106), previously 4.7 x 3.6 cm. Catheter tip is positioned along the superolateral aspect of the collection diffuse osteopenia and partially imaged spinal fusion hardware. Chronic hip deformities and diffuse body wall edema. Skin defect overlying the sacrum.     IMPRESSION: 1.  Interval placement of large bore catheter into urinary diversion with resolution of the distention that was seen on the prior CT. 2.  Similar size of right hip fluid collection. Catheter tip is along the superolateral margin.     IR PICC Vascular    Result Date: 9/6/2022  LOCATION: [facility] DATE: 9/6/2022 PROCEDURE: PERIPHERALLY INSERTED CENTRAL CATHETER (PICC) PLACEMENT. INTERVENTIONAL RADIOLOGIST: Hugo Michael MD. INDICATION: Right thigh abscess. Paraplegia. Decubitus wounds. Unsuccessful bedside PICC placement CONSENT: The procedure, risks and benefits of PICC placement were discussed with the patient  in detail. All questions were answered. Informed consent was given to proceed with the procedure. MODERATE SEDATION: None CONTRAST: Omnipaque 350: 5 cc ANTIBIOTICS: None. ADDITIONAL MEDICATIONS: None. FLUOROSCOPIC TIME: 6 minutes RADIATION DOSE: Air Kerma: 19 mGy COMPLICATIONS: No immediate complications. STERILE BARRIER TECHNIQUE: Maximum sterile barrier technique was used. Cutaneous antisepsis was performed at the operative site with application of 2% chlorhexidine  and a full body sterile drape. Prior to the procedure, the  and assistant performed hand hygiene and wore hat, mask, sterile gown, and sterile gloves during the entire procedure. Ultrasound was prepped with a sterile probe cover and sterile gel was used. PROCEDURE/TECHNIQUE:   Using local anesthesia and real-time ultrasound guidance, the right brachial vein was accessed. An 018 guidewire could not be advanced beyond the axillary vein. Over the guidewire the dilator/peel-away sheath of the Bard PICC set were advanced into the brachial vein. A 5 [place] KMP catheter was advanced to the axillary vein. A central venogram was obtained. Using the KMP catheter, the 018 guidewires manipulated down to the superior vena cava. The 5 [place] PICC could not be advanced due to the irregular  subclavian stenosis. Through the KMP catheter, the guidewire was exchanged for an 035 Lobo guidewire. A 5 [place], 25 cm sheath was advanced and placed with the tip at the axillary vein. The right subclavian vein was angioplastied using a 5 mm x 40 mm and less balloon. The 5 [place] Kumpe catheter was used to exchange the guidewire for the 018 guidewire. Sheath was exchanged for the 5 [place] peel-away sheath. Over the guidewire a 5 [place], dual lumen Bard power PICC was advanced until tip was at the right atrium. PICC was cut to 41 cm. The lumens aspirated and flushed adequately. FINDINGS: Ultrasound demonstrates a patent and compressible right brachial vein. Images were recorded. Right central venogram demonstrates diffuse, irregular moderate stenosis throughout the right subclavian vein. The completion fluoroscopic demonstrates a right upper extremity PICC with its tip at the right atrium.     IMPRESSION:  1.  Successful right upper extremity CT injectable PICC placement. 2.  Diffuse right subclavian vein stenosis. Angioplastied to 5 mm to allow PICC placement..     KUB XR    Result Date: 9/3/2022  EXAM: XR KUB LOCATION: [facility]  [facility] DATE/TIME: 9/3/2022 5:59 AM INDICATION: s p femoral line placement COMPARISON: 01/06/2020     IMPRESSION: Left-sided femoral catheter has been placed terminating in the midline at the lumbosacral junction. Visualized bowel gas pattern is nonobstructive. Postoperative changes in the thoracolumbar spine. Prior right hip resection. Diffuse osteopenia. Right lower quadrant bowel anastomotic suture line. Multiple clips over the pelvis.    IR Suprapubic Catheter Change    Result Date: 9/18/2022  [place] [facility] LOCATION: [facility] DATE: 9/18/2022 PROCEDURE: FLUOROSCOPIC GUIDED UROSTOMY (SUPRAPUBIC) CATHETER EXCHANGE INTERVENTIONAL RADIOLOGIST: Dennis Branch MD. INDICATION: 56-year-old female with history of cystectomy with right lower quadrant colonic urostomy. The patient is experiencing significant peritubal leakage and abdominal distention. There is concern that the urostomy may be occluded or malposition. MODERATE SEDATION: None. CONTRAST: 20 mL Omnipaque into the urinary bladder. ANTIBIOTICS: None. ADDITIONAL MEDICATIONS: None. FLUOROSCOPIC TIME: 0.7 minutes. RADIATION DOSE: Air Kerma: 24 mGy. COMPLICATIONS: No immediate complications. STERILE BARRIER TECHNIQUE: Maximum sterile barrier technique was used. Cutaneous antisepsis was performed at the operative site with application of 2% chlorhexidine and large sterile drape. Prior to the procedure, the  and assistant performed hand hygiene and wore hat, mask, sterile gown, and sterile gloves during the entire procedure. PROCEDURE:  Under fluoroscopic guidance, the ureterostomy catheter was injected with contrast and images were obtained. The tube was then exchanged over a wire for a new 16 [place]-tip catheter which was positioned under fluoroscopic guidance with its tip in the colonic conduit lumen. The retention balloon was inflated with 10 mL of saline. A post placement contrast injection  "through the catheter was performed. FINDINGS: On physical examination the patient has a right lower quadrant urostomy with a transversing 16 [place] tip catheter. Running parallel to this through the same stoma is a smaller clear tube that is approximately 10-12 [place] in diameter. The contrast injection through the ureterostomy reveals that this tubing is patent however the tubing tip is enveloped within colonic folds providing suboptimal drainage. Following exchange and repositioning the tip of the tube now resides within the main gravity-dependent portion of the pouch. Approximately 1200 mL of urine sediment immediately spontaneously drained through the catheter.     IMPRESSION:  1.  The existing ureterostomy catheter tip was involved within loops of colonic folds within the pouch providing suboptimal drainage. 2.  Following exchange and repositioning the new 16 [place]-tip ureterostomy controls the fluid collection well. 3.  The patient also has a parallel tube entering through the same stoma. At times , parallel tubes entering through the same percutaneous access site can have a propensity to \"wick\" fluid between the tubing and result in leakage.     IR Suprapubic Catheter Change    Result Date: 9/13/2022  EXAM: IR SUPRAPUBIC CATHETER CHANGE LOCATION: [facility] DATE/TIME: 9/13/2022 4:08 PM INDICATION: [place] tip Ambrose catheter through the urostomy site into the colonic diversion. Significant pericatheter leakage. A second, parallel clear tubing into the site. INTERVENTIONAL RADIOLOGIST: Hugo Michael MD. CONSENT: The procedure, risks and benefits suprapubic catheter injection with possible exchange were discussed with the patient  in detail. All questions were answered. Informed consent was given to proceed with the procedure. MODERATE SEDATION: None CONTRAST: Omnipaque 350: 80 cc ANTIBIOTICS: None ADDITIONAL MEDICATIONS: None. FLUOROSCOPIC TIME: 1.6 minutes. RADIATION " DOSE: Air Kerma: 26 mGy COMPLICATIONS: No immediate complications. STERILE BARRIER TECHNIQUE: Maximum sterile barrier technique was used. Cutaneous antisepsis was performed at the operative site with application of 2% chlorhexidine and a full body sterile drape. Prior to the procedure, the  and assistant performed hand hygiene and wore hat, mask, sterile gown, and sterile gloves during the entire procedure. PROCEDURE/TECHNIQUE: Contrast was injected through the indwelling 16 [place] tip Ambrose catheter. The catheter is the proximal aspect of the colonic diversion with the tip abutting a side wall. Retention stitches were cut. Retention balloon was deflated and catheter advanced into a more central position within the colonic divergent. Retention balloon was inflated with 10 cc normal saline. Contrast was through the parallel clear tubing, which has its tip within the superior aspect of the colonic  diversion. Both catheter was secured in place with 2-0 Ethilon stitches. 16 [place] tip catheter was connected to gravity drainage. Clear catheter was capped. FINDINGS: 1. Initial contrast injection through the 16 [place] tip Ambrose catheter demonstrates its tip at the inferior aspect of the colonic diversion with the tip abutting a sidewall. Completion fluoroscopic image demonstrates the advanced catheter with the balloon inflated within the superior aspect of the colonic diversion. 2. Contrast injection through the parallel, cleared tubing demonstrate of the tract courses directly into the superior aspect of the colonic diversion. Tip is within the lumen of the colonic diversion.     IMPRESSION:  1.  Urostomy Ambrose catheter repositioned, as described above. PLAN: Continued gravity drainage.

## 2022-09-19 NOTE — PLAN OF CARE
Problem: Plan of Care - These are the overarching goals to be used throughout the patient stay.    Goal: Plan of Care Review/Shift Note  Description: The Plan of Care Review/Shift note should be completed every shift.  The Outcome Evaluation is a brief statement about your assessment that the patient is improving, declining, or no change.  This information will be displayed automatically on your shift note.  Outcome: Ongoing, Not Progressing   Goal Outcome Evaluation:  Educated pt on treatment plan, pt voiced understanding.     Problem: Malnutrition  Goal: Improved Nutritional Intake  Outcome: Ongoing, Not Progressing   Pt ate approx. 50% dinner & one bottle of Ensure. Taking in an adequate amount of po intake. Ate HS snack.     Pt returned from IR with sp cath exchange. 800cc output. Irrigated sp caths x2 on shift. Smaller clear sp tube output low with 20 ml, newly exchanged sp cath with another 300cc output for a total of 1100cc on shift. DENIA output 40cc, serous fluid noted. Medium amount of stool in colostomy bag. Dressing changed to sacral area. Other dressings remain c/d/I. Cont. To have generalized edema. Pain controlled with PRN oxycodone.

## 2022-09-19 NOTE — PROGRESS NOTES
Place of Service:  [facility]     Reason for follow up: Ileal urinary diversion with recent 1500 ml initial urinary retention, now drained with transstomal SP catheter     SUBJECTIVE:  Events: no acute events overnight    Patient reports some abdominal pain, denies bilateral flank pain, fever, chills.     OBJECTIVE:  PHYSICAL EXAM:  Temp: 98.6  F (37  C) Temp src: Oral BP: 105/52 Pulse: 92   Resp: 16 SpO2: 96 % O2 Device: None (Room air)    General: NAD, alert, cooperative.  Head: normocephalic, without abnormality / atraumatic  Abdomen: soft, tender x 4Q, minimally distended. no suprapubic fullness, no suprapubic tenderness. No bilateral CVA tenderness. 16 Fr SP catheter draining nikki urine, adequate output. 12 Fr parallel catheter with minimal output.   Genitourinary: Deferred.   Psychological: alert and oriented, answers questions appropriately    LABS:  Creatinine   Date Value Ref Range Status   09/19/2022 0.38 (L) 0.60 - 1.10 mg/dL Final   05/26/2021 0.47 (L) 0.52 - 1.04 mg/dL Final     WBC   Date Value Ref Range Status   05/26/2021 5.5 4.0 - 11.0 10e9/L Final     WBC Count   Date Value Ref Range Status   09/19/2022 3.2 (L) 4.0 - 11.0 10e3/uL Final     Hemoglobin   Date Value Ref Range Status   09/19/2022 7.8 (L) 11.7 - 15.7 g/dL Final   05/26/2021 10.2 (L) 11.7 - 15.7 g/dL Final     Platelet Count   Date Value Ref Range Status   09/19/2022 97 (L) 150 - 450 10e3/uL Final   05/26/2021 318 150 - 450 10e9/L Final     Creatinine   Date Value Ref Range Status   09/19/2022 0.38 (L) 0.60 - 1.10 mg/dL Final   05/26/2021 0.47 (L) 0.52 - 1.04 mg/dL Final     UA:  UA RESULTS:  Recent Labs   Lab Test 09/02/22  2144 12/31/18  1442 09/04/14  1059   COLOR Yellow   < > Yellow   APPEARANCE Turbid*   < > Turbid   URINEGLC Negative   < > Negative   URINEBILI Small*   < > Negative   URINEKETONE Negative   < > Negative   SG 1.010   < > 1.020   UBLD Large*   < > Small*   URINEPH 7.5*   < > 7.0   PROTEIN 30 *   < > 30*    UROBILINOGEN  --   --  0.2   NITRITE Negative   < > Positive*   LEUKEST Negative   < > Large*   RBCU 0   < > O - 2   WBCU 5   < > 10-25*    < > = values in this interval not displayed.     Cultures:  Urine 9/2/22: >100,000 CFU/mL Mixture of urogenital tulio     Blood 9/2/22: No growth     Lab Results: personally reviewed.     ASSESSMENT/PLAN:  Felicia Ellison is being seen by [facility] for Ileal urinary diversion with recent 1500 ml initial urinary retention, now transstomal SP catheter, s/p 12 Fr catheter placed at OSH 9/2, additional 16 Fr [place] tip transstomal catheter placed 9/10/22 in IR d/t malfunction of initial cathter, exchange of 16 Fr catheter in IR 9/13/22 d/t significant juan pablo-catheter leaking.     - 16 Fr catheter draining well. Continue PRN flushes to maintain patency. If occludes, please notify IR. Discharge with 16 Fr SP catheter.   - Will discuss with IR possible removal of the 12 Fr catheter.   - Creatinine 0.38.   - Email sent to arrange outpatient follow up with Dr. Cristobal in approx 2-3 weeks. [facility] contact info placed in discharge orders.     Praneeth Brownlee, APRN, CNP  [facility]   [phone]

## 2022-09-19 NOTE — PLAN OF CARE
Problem: Pain Acute  Goal: Acceptable Pain Control and Functional Ability  Outcome: Ongoing, Progressing  Intervention: Prevent or Manage Pain  Recent Flowsheet Documentation  Taken 9/19/2022 0000 by Dio Morgan RN  Medication Review/Management: medications reviewed   PRN Oxycodone given for pain.    Problem: Infection  Goal: Absence of Infection Signs and Symptoms  Outcome: Ongoing, Progressing   Temp: 97.7  F (36.5  C) Temp src: Oral BP: 97/52 Pulse: 94   Resp: 16 SpO2: 99 % O2 Device: None (Room air)       Problem: UTI (Urinary Tract Infection)  Goal: Improved Infection Symptoms  Outcome: Ongoing, Progressing    Supra pubic catheter is patent. Continues on antibiotics.

## 2022-09-19 NOTE — PLAN OF CARE
Problem: Plan of Care - These are the overarching goals to be used throughout the patient stay.    Goal: Absence of Hospital-Acquired Illness or Injury  Intervention: Prevent Skin Injury  Recent Flowsheet Documentation  Taken 9/19/2022 0845 by Ania Melton RN  Body Position:   left   turned   Goal Outcome Evaluation:      Educated patient on preventing skin injury with turning every 2 hours and prn. She refused turning but agreed if nurse comes back in 30 minute she will turn. Keep encouraging turning in bed with assist of 2.   Problem: Plan of Care - These are the overarching goals to be used throughout the patient stay.    Goal: Optimal Comfort and Wellbeing  Intervention: Provide Person-Centered Care  Recent Flowsheet Documentation  Taken 9/19/2022 0845 by Ania Melton RN  Trust Relationship/Rapport:   care explained   choices provided     Problem: Pain Acute  Goal: Acceptable Pain Control and Functional Ability  Intervention: Prevent or Manage Pain  Recent Flowsheet Documentation  Taken 9/19/2022 0845 by Ania Melton RN  Medication Review/Management: medications reviewed   Denies pain at this time. Continue to assess.  Problem: Impaired Wound Healing  Goal: Optimal Wound Healing  Outcome: Ongoing, Progressing     Dressing changes done as ordered see flow sheet. Keep areas clean and dry.

## 2022-09-20 LAB
ALBUMIN SERPL-MCNC: 1.6 G/DL (ref 3.5–5)
ALP SERPL-CCNC: 89 U/L (ref 45–120)
ALT SERPL W P-5'-P-CCNC: <9 U/L (ref 0–45)
ANION GAP SERPL CALCULATED.3IONS-SCNC: 6 MMOL/L (ref 5–18)
AST SERPL W P-5'-P-CCNC: 12 U/L (ref 0–40)
BASOPHILS # BLD AUTO: 0 10E3/UL (ref 0–0.2)
BASOPHILS NFR BLD AUTO: 1 %
BILIRUB SERPL-MCNC: 0.2 MG/DL (ref 0–1)
BUN SERPL-MCNC: 11 MG/DL (ref 8–22)
CALCIUM SERPL-MCNC: 7.5 MG/DL (ref 8.5–10.5)
CHLORIDE BLD-SCNC: 108 MMOL/L (ref 98–107)
CO2 SERPL-SCNC: 24 MMOL/L (ref 22–31)
CREAT SERPL-MCNC: 0.36 MG/DL (ref 0.6–1.1)
EOSINOPHIL # BLD AUTO: 0.1 10E3/UL (ref 0–0.7)
EOSINOPHIL NFR BLD AUTO: 5 %
ERYTHROCYTE [DISTWIDTH] IN BLOOD BY AUTOMATED COUNT: 19.9 % (ref 10–15)
GFR SERPL CREATININE-BSD FRML MDRD: >90 ML/MIN/1.73M2
GLUCOSE BLD-MCNC: 75 MG/DL (ref 70–125)
HCT VFR BLD AUTO: 25.3 % (ref 35–47)
HGB BLD-MCNC: 7.5 G/DL (ref 11.7–15.7)
IMM GRANULOCYTES # BLD: 0 10E3/UL
IMM GRANULOCYTES NFR BLD: 0 %
LYMPHOCYTES # BLD AUTO: 1.1 10E3/UL (ref 0.8–5.3)
LYMPHOCYTES NFR BLD AUTO: 45 %
MCH RBC QN AUTO: 27.5 PG (ref 26.5–33)
MCHC RBC AUTO-ENTMCNC: 29.6 G/DL (ref 31.5–36.5)
MCV RBC AUTO: 93 FL (ref 78–100)
MONOCYTES # BLD AUTO: 0.2 10E3/UL (ref 0–1.3)
MONOCYTES NFR BLD AUTO: 6 %
NEUTROPHILS # BLD AUTO: 1.1 10E3/UL (ref 1.6–8.3)
NEUTROPHILS NFR BLD AUTO: 43 %
NRBC # BLD AUTO: 0 10E3/UL
NRBC BLD AUTO-RTO: 0 /100
PLATELET # BLD AUTO: 101 10E3/UL (ref 150–450)
POTASSIUM BLD-SCNC: 3.7 MMOL/L (ref 3.5–5)
PROT SERPL-MCNC: 4.9 G/DL (ref 6–8)
RBC # BLD AUTO: 2.73 10E6/UL (ref 3.8–5.2)
SODIUM SERPL-SCNC: 138 MMOL/L (ref 136–145)
WBC # BLD AUTO: 2.5 10E3/UL (ref 4–11)

## 2022-09-20 PROCEDURE — 250N000011 HC RX IP 250 OP 636: Performed by: INTERNAL MEDICINE

## 2022-09-20 PROCEDURE — 250N000013 HC RX MED GY IP 250 OP 250 PS 637: Performed by: INTERNAL MEDICINE

## 2022-09-20 PROCEDURE — 99232 SBSQ HOSP IP/OBS MODERATE 35: CPT | Performed by: INTERNAL MEDICINE

## 2022-09-20 PROCEDURE — 250N000013 HC RX MED GY IP 250 OP 250 PS 637: Performed by: NURSE PRACTITIONER

## 2022-09-20 PROCEDURE — 90832 PSYTX W PT 30 MINUTES: CPT | Mod: GT | Performed by: PSYCHOLOGIST

## 2022-09-20 PROCEDURE — 120N000001 HC R&B MED SURG/OB

## 2022-09-20 PROCEDURE — 80053 COMPREHEN METABOLIC PANEL: CPT | Performed by: INTERNAL MEDICINE

## 2022-09-20 PROCEDURE — 85004 AUTOMATED DIFF WBC COUNT: CPT | Performed by: INTERNAL MEDICINE

## 2022-09-20 RX ADMIN — ENOXAPARIN SODIUM 60 MG: 60 INJECTION SUBCUTANEOUS at 08:47

## 2022-09-20 RX ADMIN — LEVETIRACETAM 500 MG: 500 TABLET, FILM COATED ORAL at 20:28

## 2022-09-20 RX ADMIN — NYSTATIN: 100000 OINTMENT TOPICAL at 08:47

## 2022-09-20 RX ADMIN — OXYCODONE HYDROCHLORIDE 5 MG: 5 TABLET ORAL at 11:41

## 2022-09-20 RX ADMIN — NYSTATIN: 100000 OINTMENT TOPICAL at 20:41

## 2022-09-20 RX ADMIN — OXYCODONE HYDROCHLORIDE 5 MG: 5 TABLET ORAL at 16:37

## 2022-09-20 RX ADMIN — OXYCODONE HYDROCHLORIDE 5 MG: 5 TABLET ORAL at 20:41

## 2022-09-20 RX ADMIN — FOLIC ACID 1 MG: 1 TABLET ORAL at 08:46

## 2022-09-20 RX ADMIN — ZOLPIDEM TARTRATE 2.5 MG: 5 TABLET ORAL at 01:11

## 2022-09-20 RX ADMIN — TRAZODONE HYDROCHLORIDE 100 MG: 50 TABLET ORAL at 20:27

## 2022-09-20 RX ADMIN — CEFTRIAXONE 2 G: 2 INJECTION, POWDER, FOR SOLUTION INTRAMUSCULAR; INTRAVENOUS at 20:29

## 2022-09-20 RX ADMIN — THIAMINE HCL TAB 100 MG 100 MG: 100 TAB at 08:47

## 2022-09-20 RX ADMIN — PANTOPRAZOLE SODIUM 40 MG: 40 TABLET, DELAYED RELEASE ORAL at 08:46

## 2022-09-20 RX ADMIN — ENOXAPARIN SODIUM 60 MG: 60 INJECTION SUBCUTANEOUS at 20:28

## 2022-09-20 RX ADMIN — LEVETIRACETAM 500 MG: 500 TABLET, FILM COATED ORAL at 08:46

## 2022-09-20 RX ADMIN — BUMETANIDE 0.5 MG: 0.5 TABLET ORAL at 10:31

## 2022-09-20 RX ADMIN — THERA TABS 1 TABLET: TAB at 08:46

## 2022-09-20 ASSESSMENT — ACTIVITIES OF DAILY LIVING (ADL)
ADLS_ACUITY_SCORE: 53

## 2022-09-20 NOTE — PLAN OF CARE
Goal Outcome Evaluation:       supra pubic catheter site is dry and intact. Draining large amounts of pale yellow  urine 1000cc into collection bag. Continue to monitor and irrigate as needed. Patient comfortable at this time.

## 2022-09-20 NOTE — PROGRESS NOTES
Place of Service:  [facility]     Reason for follow up: Ileal urinary diversion with recent 1500 ml initial urinary retention, now drained with 16 Fr trans-stomal SP catheter and 12 Fr trans-stomal catheter    SUBJECTIVE:  Events: no acute events overnight    Patient reports some abdominal pain, denies bilateral flank pain, fever, chills. RN reports urine saturating dressing around stoma site despite SP catheter irrigation.     OBJECTIVE:  PHYSICAL EXAM:  Temp: 98.3  F (36.8  C) Temp src: Oral BP: 108/56 Pulse: 84   Resp: 16 SpO2: 93 % O2 Device: None (Room air)    General: NAD, alert, cooperative.  Head: normocephalic, without abnormality / atraumatic  Abdomen: soft, tender x 4Q, minimally distended. no suprapubic fullness, no suprapubic tenderness. No bilateral CVA tenderness. 16 Fr SP catheter draining small amount of nikki urine. 12 Fr parallel catheter draining clear nikki urine. Stoma exit site dressing saturated with urine. SP catheter manually irrigated with sterile water and significant mucous aspirated followed by significant urine return. Pt reported complete relief of abdominal pain.   Genitourinary: Deferred.   Psychological: alert and oriented, answers questions appropriately    LABS:  Creatinine   Date Value Ref Range Status   09/20/2022 0.36 (L) 0.60 - 1.10 mg/dL Final   05/26/2021 0.47 (L) 0.52 - 1.04 mg/dL Final     WBC   Date Value Ref Range Status   05/26/2021 5.5 4.0 - 11.0 10e9/L Final     WBC Count   Date Value Ref Range Status   09/20/2022 2.5 (L) 4.0 - 11.0 10e3/uL Final     Hemoglobin   Date Value Ref Range Status   09/20/2022 7.5 (L) 11.7 - 15.7 g/dL Final   05/26/2021 10.2 (L) 11.7 - 15.7 g/dL Final     Platelet Count   Date Value Ref Range Status   09/20/2022 101 (L) 150 - 450 10e3/uL Final   05/26/2021 318 150 - 450 10e9/L Final     Creatinine   Date Value Ref Range Status   09/20/2022 0.36 (L) 0.60 - 1.10 mg/dL Final   05/26/2021 0.47 (L) 0.52 - 1.04 mg/dL Final     UA:  UA  RESULTS:  Recent Labs   Lab Test 09/02/22  2144 12/31/18  1442 09/04/14  1059   COLOR Yellow   < > Yellow   APPEARANCE Turbid*   < > Turbid   URINEGLC Negative   < > Negative   URINEBILI Small*   < > Negative   URINEKETONE Negative   < > Negative   SG 1.010   < > 1.020   UBLD Large*   < > Small*   URINEPH 7.5*   < > 7.0   PROTEIN 30 *   < > 30*   UROBILINOGEN  --   --  0.2   NITRITE Negative   < > Positive*   LEUKEST Negative   < > Large*   RBCU 0   < > O - 2   WBCU 5   < > 10-25*    < > = values in this interval not displayed.     Cultures:  Urine 9/2/22: >100,000 CFU/mL Mixture of urogenital tulio     Blood 9/2/22: No growth     Lab Results: personally reviewed.     ASSESSMENT/PLAN:  Felicia Ellison is being seen by [facility] for Ileal urinary diversion with recent 1500 ml initial urinary retention, now transstomal SP catheter, s/p 12 Fr catheter placed at OSH 9/2, additional 16 Fr [place] tip transstomal catheter placed 9/10/22 in IR d/t malfunction of initial cathter, exchange of 16 Fr catheter in IR 9/13/22 d/t significant juan pablo-catheter leaking.     - 16 Fr catheter patency resumed with manual irrigation/aspiration and leaking at stoma site resolved as well as abdominal pain. Continue PRN flushes to maintain patency (important to aspirate back). If catheter occludes, please notify IR. Discussed catheter position with IR Radiologist (Dr. Clifford) on 9/19. Recommends maintaining both the 16 Fr SP catheter and 12 Fr [place] catheter (possibly 6 weeks from 9/2 placement to allow tract to mature).   - Creatinine 0.36  - Email sent 9/19 to arrange outpatient follow up with Dr. Cristobal in approx 2-3 weeks. [facility] contact info placed in discharge orders.   - Will continue to follow remotely. Please page with questions/concerns.     Praneeth Brownlee, APRN, CNP  [facility]   [phone]

## 2022-09-20 NOTE — PROGRESS NOTES
Progress Note    Assessment/Plan  Continue drainage tubes at this point.  Reviewed with patient.  We will get a new CT scan tomorrow to help determine as to whether or not the patient needs open surgical debridement.  If abscess cavity continue to decrease in output is decreasing it may just continue with the current drainage process.  Reviewed with patient at length.  If we do have to proceed with debridement this would cause a very large wound with probable VAC dressing care.  CT ordered for tomorrow.    Active Problems:    Septic shock (H)      Subjective  Patient quite comfortable without significant change.  Tolerating oral intake.  Abdomen without pain.  Much more comfortable than a few days ago.  Objective    Vital signs in last 24 hours  Temp:  [97.6  F (36.4  C)-98.3  F (36.8  C)] 98.3  F (36.8  C)  Pulse:  [82-92] 84  Resp:  [16] 16  BP: (106-109)/(53-56) 108/56  SpO2:  [93 %-97 %] 93 %  Weight:   [unfilled]    Intake/Output last 3 shifts  I/O last 3 completed shifts:  In: -   Out: 840 [Urine:650; Drains:190]  Intake/Output this shift:  No intake/output data recorded.      Physical Exam  Primarily serous drainage right hip wound drain.  Abdomen is benign bowel sounds active no peritoneal findings.  Urinary catheter stable.  Limited drainage around tube currently.  Presacral areas covered.    Pertinent Labs   Lab Results   Component Value Date    WBC 2.5 (L) 09/20/2022    HGB 7.5 (L) 09/20/2022    HCT 25.3 (L) 09/20/2022    MCV 93 09/20/2022     (L) 09/20/2022             Pertinent Radiology     [unfilled]        Reji Hunter MD

## 2022-09-20 NOTE — PROGRESS NOTES
"CLINICAL NUTRITION SERVICES - REASSESSMENT NOTE     Nutrition Prescription    RECOMMENDATIONS FOR MDs/PROVIDERS TO ORDER:  None    Malnutrition Status:    Severe in context of acute on chronic illness    Recommendations already ordered by Registered Dietitian (RD):  Decrease ensure enlive to 1/day with improved intake and weight gain    Future/Additional Recommendations:  Adjust supplement pending intake, weight, tolerance, acceptance, wound healing  Pt will not be able to meet her nutrition needs in her previous living arrangement per hx      EVALUATION OF PROGRESS TOWARDS GOALS    CURRENT NUTRITION ORDERS  Diet: Regular  Supplement: Ensure enlive tid with meals= 1050 kcal, 60 g protein, meeting 70% of kcal and 100% of protein needs  Intake/Tolerance: Fair, variable, %. Intake not documented yesterday    Ordering 2- 3 meal/day. Yesterday estimate intake 837 kcal, 32 g protein  X 2 days prior pt ordered 2700 kcal, 106 g protein/day (not including supplements)    Pt reports her appetite is improved and she is able to force herself to eat more. She states she is eating most of what she orders 3 x a day and taking 1-2 ensure/day. Pt reports she eats well when she gets foods she likes.  She would like to use her upper dentures but no denture paste available.     LABS  Labs reviewed    MEDICATIONS  Medications reviewed: bumex daily, iv abx, folic acid,protonix, thiamine,  mvi    ANTHROPOMETRICS  Height: 162.6 cm (5' 4\"[estimate/confused[)  Admit weight: 46.9 kg (103 lb 6.3 oz)  Most Recent Weight: 59.4 kg (131 lb 1.6 oz) 9/18- bed zeroed- up 11 lb from week prior with increased intake, +2 generalized edema, +4 Knees  54.7 kg (120 lb 9.6 oz) 9/10, down 8 lb with diuresis  IBW: 54.5 kg (if height is accurate)  BMI: Normal BMI (if weight/height is accurate)  Weight History:   09/07/22 56.5 kg (124 lb 9/6 oz)  09/06/22 53.9 kg (118 lb 144 oz)  09/05/22  51.3 kg (113 lb 1.6 oz)  Wt Readings from Last 3 Encounters: "   09/18/22 59.5 kg (131 lb 1.6 oz)   07/30/22 46.6 kg (102 lb 12.8 oz)   07/11/22 52.2 kg (115 lb)   4/22/22 107 lb    Dosing Weight: 54.5 kg (IBW)    ASSESSED NUTRITION NEEDS  Estimated Energy Needs: 1526+ kcals/day (28 kcal/kg w/ paraplegia)  Justification: Wound healing  Estimated Protein Needs: 65-81 grams protein/day (1.2-1.5 grams of pro/kg)- increased today  Justification: Wound healing  Estimated Fluid Needs: 1635 mL/day (30 mL/kg)   Justification: Maintenance    PHYSICAL FINDINGS  Per chart,  Skin: St 3 or greater sacrum, rt IT per woc; stable per WOC. Hip wound abscess with drain  Edema: +2 generalized, +4 Knee, improved  GI: normoactive BS, chronic abdomen pain  Colostomy : 1-2 soft BM/day    MALNUTRITION:9/8  % Weight Loss:  None noted (wt increasing d/t fluid accumulation)  % Intake:  </= 50% for >/= 5 days (severe malnutrition)  Subcutaneous Fat Loss:  Severe orbital and bucal loss, moderate to severe thoracic  Muscle Loss:  Moderate temporal loss, moderate deltoid loss, severe clavicle loss  Fluid Retention:  +2-+4 arms and legs (moderate)    Malnutrition Diagnosis: Severe malnutrition   In Context of:  Acute illness or injury    NUTRITION DIAGNOSIS  Malnutrition related to acute on chronic illness as evidenced by intake <=50% for >= 5 days, severe fluid accumulation, severe fat and muscle loss    INTERVENTIONS  Implementation  Decrease ensure enlive to 1/day (pt also has stock in room if she wants more) with improved intake and weight gain =350cal, 20 g protein    Goals  Meet estimated nutrition needs- ongoing, progressing  Wound healing per woc- ongoing     Monitoring/Evaluation  Progress toward goals will be monitored and evaluated per protocol.

## 2022-09-20 NOTE — PLAN OF CARE
"  Problem: Plan of Care - These are the overarching goals to be used throughout the patient stay.    Description: The Care Plan Review/Shift Note, Individualized Goals, Hospital-Acquired Illness or Injury, Comfort and Wellbeing, and Transition Planning are the \"Overarching Goals\" and should be updated throughout the hospitalization.  Please hover over the (i) for specific inforamation on each goal topic.    Goal: Plan of Care Review/Shift Note  Description: The Plan of Care Review/Shift note should be completed every shift.  The Outcome Evaluation is a brief statement about your assessment that the patient is improving, declining, or no change.  This information will be displayed automatically on your shift note.  Outcome: Ongoing, Progressing  Flowsheets (Taken 9/20/2022 1503)  Plan of Care Reviewed With: patient  Outcome Evaluation: INtake is improved and pt taking supplements. pt is meeting her estiamted increased nutrition needs with intake.  Overall Patient Progress: improving     Problem: Malnutrition  Goal: Improved Nutritional Intake  Outcome: Ongoing, Progressing     Problem: Impaired Wound Healing  Goal: Optimal Wound Healing  Outcome: Ongoing, Progressing   Goal Outcome Evaluation:    Plan of Care Reviewed With: patient     Overall Patient Progress: improving    Outcome Evaluation: INtake is improved and pt taking supplements. pt is meeting her estiamted increased nutrition needs with intake.      "

## 2022-09-20 NOTE — PLAN OF CARE
"  Problem: Plan of Care - These are the overarching goals to be used throughout the patient stay.    Goal: Absence of Hospital-Acquired Illness or Injury  9/20/2022 0702 by Chanell Castillo RN  Outcome: Ongoing, Progressing  9/19/2022 2301 by Chanell Castillo RN  Outcome: Ongoing, Progressing  Intervention: Identify and Manage Fall Risk  Recent Flowsheet Documentation  Taken 9/20/2022 0115 by Chanell Castillo RN  Safety Promotion/Fall Prevention: bed alarm on  Intervention: Prevent and Manage VTE (Venous Thromboembolism) Risk  Recent Flowsheet Documentation  Taken 9/20/2022 0115 by Chanell Castillo RN  VTE Prevention/Management: compression stockings on     Problem: Plan of Care - These are the overarching goals to be used throughout the patient stay.    Goal: Absence of Hospital-Acquired Illness or Injury  Intervention: Identify and Manage Fall Risk  Recent Flowsheet Documentation  Taken 9/20/2022 0115 by Chanell Castillo RN  Safety Promotion/Fall Prevention: bed alarm on     Problem: Plan of Care - These are the overarching goals to be used throughout the patient stay.    Goal: Absence of Hospital-Acquired Illness or Injury  Intervention: Prevent and Manage VTE (Venous Thromboembolism) Risk  Recent Flowsheet Documentation  Taken 9/20/2022 0115 by Chanell Castillo RN  VTE Prevention/Management: compression stockings on     Problem: Risk for Delirium  Goal: Improved Sleep  9/20/2022 0702 by Chanell Castillo RN  Outcome: Ongoing, Progressing   Goal Outcome Evaluation:      Patient denied any pain or discomfort, but complained of lack of sleep. PRN Ambien was given per orders which seemed effective. Per patient \"I slept well\". AM labs drawn via PICC line.                 "

## 2022-09-20 NOTE — PLAN OF CARE
Problem: Plan of Care - These are the overarching goals to be used throughout the patient stay.    Goal: Absence of Hospital-Acquired Illness or Injury  Intervention: Prevent and Manage VTE (Venous Thromboembolism) Risk  Recent Flowsheet Documentation  Taken 9/19/2022 1630 by Chanell Castillo, RN  VTE Prevention/Management: compression stockings on     Problem: Pain Acute  Goal: Acceptable Pain Control and Functional Ability  Outcome: Ongoing, Progressing   Goal Outcome Evaluation:      Patient dressing done per orders.                  After speaking to someone in CPAP supplies at [facility] I informed Pt that she needs to call [facility] and give them the serial number from the bottom of her machine and they will replace the machine
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
## 2022-09-20 NOTE — PROGRESS NOTES
Psychology Progress Note    Date: September 20, 2022    Time length and type of treatment: 20 minutes (3:51 PM to 4:11 PM), individual therapy    After review of the patient's situation, this visit was changed from an in-person visit to a  video visit via [employer] to reduce the risk of COVID 19 exposure. Patient was informed that policies and procedures that govern in-person sessions would also apply to  video sessions. Patient was also informed that  video sessions would be discontinued when COVID 19 exposure is no longer a concern (as determined by [facility]).     Patient location: [facility]  Provider location: [facility], provider remote location    Patient was in agreement with proceeding with a  video session.      Necessity: This session is medically necessary to address the patient's depressed mood which can interfere with the progress of medical recovery.    Psychotherapeutic Technique: This writer utilized motivational interviewing, active listening, reassurance and support in the context of cognitive behavioral therapy to address the above.      MENTAL STATUS EVALUATION  Grooming: Within normal limits  Attire: Appropriate  Age: Appears Stated  Behavior Towards Examiner: Cooperative  Motor Activity: Patient was reclining in hospital bed  Eye Contact: Appropriate  Mood: Irritable   Affect: full range  Speech/Language: normal  Attention: Normal  Concentration: Sufficient  Thought Process: unremarkable  Thought Content: Clear    Orientation: Fully oriented to person, place, date, and time  Memory: No evidence of impairment.  Judgement: Adequate  Estimated Intelligence: Average  Demonstrated Insight: Adequate  Fund of Knowledge: Adequate    Intervention:   Patient was initially irritable, stating she didn't need to talk to a shrink and denying depression.  She warmed as the session progressed and acknowledged she has limited support, though she identified her  mother as very important to her. She is estranged from her daughter and her son resides in [place]. Patient identified coloring and listening to music as helpful, but indicated she didn't have her phone with her so she couldn't listen to music she likes and she doesn't have coloring materials. She was encouraged to speak with hospital staff about locating coloring supplies for her. She expressed pride in her ability to utilize meditation for pain management and this skill was validated and reinforced.     Progress:   Patient identified coloring and music as helpful coping strategies.    Plan:   Patient is anticipating discharge and reports she is not interested in psychology services. No further follow up is planned.    Diagnosis:    Adjustment Disorder with depressed mood

## 2022-09-20 NOTE — PROGRESS NOTES
INFECTIOUS DISEASE FOLLOW UP NOTE      ASSESSMENT:  1. Large right thigh abscess, due to Group B strep. Percutaneous drain placed 9/3. Gram stain GPC, culture Group B strep. Blood culture negative. Sepsis resolved now off pressors. Drain check 9/8 with minimal residual fluid, drain upsized. CT 9/4 still showed large collection the day after drain placement. CRP is normal 0.4. CT pelvis shows stable size of fluid collection 5.0 x 3.5cm. This is in area of previous R femoral head resection.   2. Multiple surgeries on hips/wounds in the past, including resection of R femoral head around 2012.  3. Urinary retention. Low suspicion for urinary source. Mixed tulio in UC likely colonization. UA without pyuria.   4. Paraplegia   5. Decubitus wounds --  Large sacral looks clean.   6. Penicillin and levofloxacin allergies. Tolerated meropenem, cefepime, ceftriaxone. She recalls she does better with IVs compared to oral antibiotics.   7. H/o MRSA in remote past. Not currently seen on clinical specimens.   8. Thrombocytopenia, improved.     PLAN:  Ceftriaxone once daily, plan on 4-6 weeks IV course, 6 is 10/15/22, may not need this long. IV antibiotics can be done at her residence.    Check CBC with diff, cmp, crp once weekly when on treatment.   Dr. Hunter following in case this would need debridement. Plan for repeat CT scan tomorrow      Repeat cbc with diff tomorrow. If wbc and anc continue to fall, then stop ceftriaxone and start vancomycin.    Justyn Garcia MD  [facility]  Direct messaging: [facility] Paging  On-Call ID provider: [phone], option: [phone]    ______________________________________________________________________    SUBJECTIVE / INTERVAL HISTORY:   No events. Feels okay. No complaints. No pain. Drain in place with drainage.     ROS: paraplegia. All other systems negative except as listed above.        OBJECTIVE:  /56 (BP Location: Left arm)   Pulse 84   Temp 98.3  F (36.8  " C) (Oral)   Resp 16   Ht 1.626 m (5' 4\")   Wt 59.5 kg (131 lb 1.6 oz)   LMP  (LMP Unknown)   SpO2 93%   BMI 22.50 kg/m       GENERAL:  In no acute distress. Room air.   EYES: No conjunctival injection  HEAD, EARS, NOSE, MOUTH, AND THROAT: Nontraumatic, mouth without oral ulcers.   RESPIRATORY: Clear anterior  CARDIOVASCULAR: Regular rate and rhythm, normal S1 and S2, no murmurs, rubs, or gallops.  ABDOMEN: Soft, nontender, colostomy, urine tube from abdomen x 2.  Normal bowel sounds.  MUSCULOSKELETAL: No synovitis. DENIA R thigh with serous fluid  SKIN/HAIR/NAILS: No rashes, no signs of peripheral emboli. Wound photo noted  NEUROLOGIC: paraplegia   PICC R UE        Antibiotics:  Ceftriaxone 9/6-    Previous  Cefepime 9/3-4  Flagyl 9/3-4  Clindamycin 9/2-3  meropenem 9/4-6  Vancomycin 9/3-6    Pertinent labs:    Recent Labs   Lab 09/20/22  0637 09/19/22  0634 09/18/22  0628   WBC 2.5* 3.2* 3.6*   HGB 7.5* 7.8* 7.7*   HCT 25.3* 25.5* 24.3*   * 97* 91*        Recent Labs   Lab 09/20/22  0637 09/19/22  0634 09/18/22  0628    138 140   CO2 24 26 26   BUN 11 12 11        Lab Results   Component Value Date    CRP 0.4 09/08/2022    CRP 98.6 (H) 12/12/2018    CRP 6.1 01/20/2015         Lab Results   Component Value Date    ALT <9 09/20/2022    AST 12 09/20/2022    ALKPHOS 89 09/20/2022         MICROBIOLOGY DATA:  9/3 abscess gram stain GPC, PMNs, culture Group B strep  9/2 blood negative     RADIOLOGY:  CT Abdomen Peritonium Abscess Drainage    Result Date: 9/3/2022  EXAM: 1. PERCUTANEOUS DRAIN PLACEMENT RIGHT UPPER THIGH/GROIN COLLECTION 2. CT GUIDANCE 3. CONSCIOUS SEDATION LOCATION: [facility] DATE/TIME: 9/3/2022 12:19 PM INDICATION: right hip drain placement TECHNIQUE: Dose reduction techniques were used. PROCEDURE: Informed consent obtained. Site marked. Prior images reviewed. Required items made available. Patient identity confirmed verbally and with arm band. Patient " reevaluated immediately before administering sedation. Universal protocol was followed. Time out performed. The site was prepped and draped in sterile fashion. 10 mL of 1% lidocaine was infused into the local soft tissues. Using standard technique and under direct CT guidance, a 12 Albanian drainage catheter was inserted into the fluid collection.  SPECIMEN: 150 mL of purulent fluid was aspirated and sent to lab for cultures and Gram stain. BLOOD LOSS: Minimal. The patient tolerated the procedure well. No immediate complications. SEDATION: Versed 0 mg. Fentanyl 50 mcg. The procedure was performed with administration intravenous conscious sedation with appropriate preoperative, intraoperative, and postoperative evaluation. 15 minutes of supervised face to face conscious sedation time was provided by a radiology nurse under my direct supervision.     IMPRESSION: 1.  Successful CT-guided drain placement into a right upper thigh/groin abscess.     Echocardiogram Complete    Result Date: 9/10/2022  [id number]^ASHLEY^ARIN^[place] [address]  Name: KISHOR PINEDA MRN: [id number] : 1965 Study Date: 09/10/2022 03:21 PM Age: 56 yrs Gender: Female Patient Location: [facility] Reason For Study: CHF Ordering Physician: ARIN RAMIREZ Referring Physician: CODEY WILSON Performed By: ACE  BSA: 1.6 m2 Height: 64 in Weight: 120 lb HR: 92 BP: 91/58 mmHg ______________________________________________________________________________ Procedure Complete Echo Adult. ______________________________________________________________________________ Interpretation Summary  The left ventricle is normal in size. The visual ejection fraction is 55-60%. No regional wall motion abnormalities noted. Regional wall motion abnormalities cannot be excluded due to limited visualization. Diastolic Doppler findings (E/E' ratio and/or other parameters) suggest left  ventricular filling pressures are normal. Sinus rhythm was noted. Technically difficult, suboptimal study. Consider cardiac MRI for better imaging. ______________________________________________________________________________ Left Ventricle The left ventricle is normal in size. There is normal left ventricular wall thickness. Diastolic Doppler findings (E/E' ratio and/or other parameters) suggest left ventricular filling pressures are normal. The visual ejection fraction is 55-60%. Regional wall motion abnormalities cannot be excluded due to limited visualization. No regional wall motion abnormalities noted.  Right Ventricle Normal right ventricle size and systolic function. TAPSE is normal, which is consistent with normal right ventricular systolic function.  Atria The left atrium is not well visualized. Right atrium not well visualized.  Mitral Valve The mitral valve is not well visualized. There is mild (1+) mitral regurgitation. There is no mitral valve stenosis.  Tricuspid Valve The tricuspid valve is not well visualized. There is mild (1+) tricuspid regurgitation.  Aortic Valve The aortic valve is not well visualized. No aortic regurgitation is present. No aortic stenosis is present.  Pulmonic Valve The pulmonic valve is not well visualized.  Vessels The aorta root is normal. Normal size ascending aorta.  Pericardium There is no pericardial effusion.  Rhythm Sinus rhythm was noted. ______________________________________________________________________________ MMode/2D Measurements & Calculations IVSd: 0.77 cm  LVIDd: 3.9 cm LVIDs: 2.6 cm LVPWd: 0.70 cm FS: 32.6 % LV mass(C)d: 81.4 grams LV mass(C)dI: 51.7 grams/m2 Ao root diam: 3.1 cm asc Aorta Diam: 3.2 cm LVOT diam: 2.0 cm LVOT area: 3.1 cm2 RWT: 0.36  Doppler Measurements & Calculations MV E max deanna: 53.8 cm/sec MV A max deanna: 67.9 cm/sec MV E/A: 0.79 MV dec slope: 449.7 cm/sec2 MV dec time: 0.12 sec Ao V2 max: 138.1 cm/sec Ao max P.0 mmHg Ao V2 mean:  94.7 cm/sec Ao mean P.2 mmHg Ao V2 VTI: 25.7 cm MACKENZIE(I,D): 1.9 cm2 MACKENZIE(V,D): 1.8 cm2 LV V1 max P.7 mmHg LV V1 max: 82.0 cm/sec LV V1 VTI: 16.0 cm SV(LVOT): 49.1 ml SI(LVOT): 31.2 ml/m2 PA acc time: 0.09 sec AV Jered Ratio (DI): 0.59  MACKENZIE Index (cm2/m2): 1.2 E/E': 4.4 E/E' av.0 Lateral E/e': 4.4 Medial E/e': 7.5 Peak E' Jered: 12.2 cm/sec  ______________________________________________________________________________ Report approved by: Alexander Huston 09/10/2022 04:57 PM       US Lower Extremity Venous Duplex Bilateral    Result Date: 2022  EXAM: US LOWER EXTREMITY VENOUS DUPLEX BILATERAL LOCATION: [facility] DATE/TIME: 2022 3:47 PM INDICATION: edema, r o dvt COMPARISON: None. TECHNIQUE: Venous Duplex ultrasound of bilateral lower extremities with and without compression, augmentation and duplex. Color flow and spectral Doppler with waveform analysis performed. FINDINGS: Exam includes the common femoral, femoral, popliteal veins as well as segmentally visualized deep calf veins and greater saphenous vein. RIGHT: No deep vein thrombosis. No superficial thrombophlebitis. No popliteal cyst. LEFT: There is deep venous thrombus involving the common femoral vein, the femoral vein in the thigh and the popliteal vein. This appears partially occlusive in the common femoral vein and popliteal vein and completely occlusive throughout the femoral vein in the thigh. There is probable extension into the profunda femoris vein on the left. No superficial thrombus. No popliteal cyst.     IMPRESSION: Left deep venous thrombus involving the common femoral vein, femoral vein in the thigh and popliteal vein with probable extension into the profunda femoris vein but this is suboptimally visualized because of edema and body habitus. The thrombus appears occlusive throughout the femoral vein in the thigh and is nonocclusive in the common femoral and popliteal vein. NOTE: ABNORMAL REPORT THE  DICTATION ABOVE DESCRIBES AN ABNORMALITY FOR WHICH FOLLOW-UP IS NEEDED. . [Critical Result: Left lower extremity deep venous thrombosis] Finding was identified on 9/9/2022 3:54 PM. 1.  The patient's nurse, Shanita, was contacted by me on 9/9/2022 4:00 PM and verbalized understanding of the critical result.     US Upper Extremity Venous Duplex Bilat    Result Date: 9/9/2022  EXAM: US UPPER EXTREMITY VENOUS DUPLEX BILATERAL LOCATION: [facility] DATE/TIME: 9/9/2022 4:00 PM INDICATION: edema, r o DVT COMPARISON: None. TECHNIQUE: Venous Duplex ultrasound of both upper extremities with (when possible) and without compression, augmentation, and duplex. Color flow and spectral Doppler with waveform analysis performed. FINDINGS: Ultrasound includes evaluation of the internal jugular veins, innominate veins, subclavian veins, axillary veins, and brachial veins. The superficial cephalic and basilic veins were also evaluated where seen. RIGHT: There is nonocclusive thrombus adjacent to the PICC catheter in the right brachial vein and right subclavian vein this becomes occlusive in the distal brachial vein. There is nonocclusive thrombus in the right internal jugular vein. No superficial  thrombophlebitis. LEFT: There is occlusive thrombus in the left internal jugular vein and left innominate vein. There is occlusive superficial thrombus in the left cephalic vein.     IMPRESSION: No deep venous thrombosis in the bilateral upper extremities. [Critical Result: Bilateral upper extremity deep venous thrombosis] Finding was identified on 9/9/2022 4:03 PM. 1.  The patient's nurse, Shanita was contacted by me on 9/9/2022 4:05 PM and verbalized understanding of the critical result.     XR Chest Port 1 View    Result Date: 9/3/2022  EXAM: XR CHEST PORTABLE 1 VIEW LOCATION: [facility] DATE/TIME: 09/03/2022, 6:08 AM INDICATION: Status post unsuccessful left IJ placement, rule out  pneumothorax. COMPARISON: 04/22/2022.     IMPRESSION: Negative for pneumothorax. Normal heart size and pulmonary vascularity. Lungs are clear. Generalized osteopenia. Posterior idalia and pedicle screw fixation lower thoracic and lumbar spine.     IR Abscess Tube Change    Result Date: 9/8/2022  LOCATION: [facility] DATE: 9/8/2022 PROCEDURE: ABSCESS TUBE CHECK AND EXCHANGE INTERVENTIONAL RADIOLOGIST: Fer Gutierrez MD INDICATION: Right hip drain placement on 9/3/2022. Plan for exchange/upsize. CONSENT: The risks, benefits and alternatives of an abscess tube check with possible exchange, upsizing and/or removal were discussed with the patient or representative in detail. All questions were answered. Informed consent was given to proceed with the procedure. MODERATE SEDATION: None. CONTRAST: 25 cc Omnipaque ANTIBIOTICS: None. ADDITIONAL MEDICATIONS: None. FLUOROSCOPIC TIME: 0.5 minutes. RADIATION DOSE: Air Kerma: 3 mGy. COMPLICATIONS: No immediate complications. UNIVERSAL PROTOCOL: The operative site was marked and any prior imaging was reviewed. Required items including blood products, implants, devices and special equipment was made available. Patient identity was confirmed either verbally, with demographic information, hospital assigned identification or other identification markers. A timeout was performed immediately prior to the procedure. STERILE BARRIER TECHNIQUE: Maximum sterile barrier technique was used. Cutaneous antisepsis was performed at the operative site with application of 2% chlorhexidine and large sterile drape. Prior to the procedure, the  and assistant performed hand hygiene and wore hat, mask, sterile gown, and sterile gloves during the entire procedure. PROCEDURE:  Multiprojectional  images were obtained. The previous drainage catheter was injected with a small amount of contrast, and multiple images were obtained. Based on the results of the study, the drain  was exchanged over a guidewire for a 16 German Fred drainage. The cavity was aggressively irrigated with saline. FINDINGS: Abscessogram demonstrates minimal residual abscess cavity. After exchange, the drain is appropriately positioned.     IMPRESSION:  Right hip drain check demonstrates appropriate positioning of the drain. There is minimal residual fluid. The drain was exchanged/upsized for a 16 German Fred drainage. Irrigation of the cavity reveals serosanguineous fluid.    IR Abscess Tube Check    Result Date: 9/13/2022  LOCATION: [facility] DATE: 9/13/2022 PROCEDURE: ABSCESS TUBE CHECK INTERVENTIONAL RADIOLOGIST: Hugo Michael MD INDICATION: Right hip abscess. Indwelling percutaneous drain.. CONSENT: The procedure, risks and benefits of an abscessogram were discussed with the patient  in detail. All questions were answered. Informed consent was given to proceed with the procedure. MODERATE SEDATION: None. CONTRAST: Omnipaque 350: 20. mL. ANTIBIOTICS: None. ADDITIONAL MEDICATIONS: None. FLUOROSCOPIC TIME: 0.4 minutes RADIATION DOSE: Air Kerma: 4 mGy COMPLICATIONS: No immediate complications. PROCEDURE:  images were obtained. The existing drainage catheter was injected with contrast, and multiple images were obtained. The cavity was irrigated with 50 cc aliquots of normal saline which were then aspirated. Total irrigation volume was approximately 200 cc. Drain was reconnected to a DENIA bulb. FINDINGS: Adequately positioned, indwelling 16 German Fred drain within the posterior right hip fluid collection..     IMPRESSION: 1.  Adequately positioned, indwelling 16 German drainage catheter. PLAN: Continued DENIA bulb drainage with maintained flushing regimen..     IR Suprapubic Catheter Placment    Result Date: 9/11/2022  [facility] LOCATION: [facility] DATE: 9/10/2022 PROCEDURE: 1. Transstomal urinary catheter placement. INTERVENTIONAL RADIOLOGIST: Jonh  JUANA Cline MD HISTORY: 56 years-old Female with history of diverting urostomy. The patient had a catheter placed in the emergency room at an outside facility via direct trocar technique through the proximal aspect of the stoma. This catheter is malfunctioning. CONSENT: The risks, benefits and alternatives of the procedure were discussed with the patient  in detail. All questions were answered. Informed consent was given to proceed with the procedure. SEDATION: None.. CONTRAST: 15 mL ANTIBIOTICS: Patient receiving antibiotics ADDITIONAL MEDICATIONS: None. FLUOROSCOPIC TIME: 2.6 RADIATION DOSE: Air Kerma: 73 mGy. COMPLICATIONS: No immediate complications. STERILE BARRIER TECHNIQUE: Maximum sterile barrier technique was used. Cutaneous antisepsis was performed at the operative site with application of 2% chlorhexidine and large sterile drape. Prior to the procedure, the  and assistant performed hand hygiene and wore hat, mask, sterile gown, and sterile gloves during the entire procedure. PROCEDURE/FINDINGS: A  image was obtained. A 5 Welsh KMP catheter, with the aid of a 0.035 inch angled Glidewire, was advanced through the existing stoma tract into the diversion pouch. It was noted that the pre-existing catheter, while initially within the stoma, traverse through the side wall of the stoma directly into the pouch itself. The position of the newly placed catheter was confirmed by contrast injection. A 0.035 inch Amplatz wire was advanced through the catheter into the urinary diversion pouch. A 16 Welsh [place] tip catheter was advanced over the Amplatz wire. The balloon was inflated and contrast demonstration confirmed its position. The catheter was secured in place. The catheter was attached to a gravity drainage bag.     IMPRESSION: Placement of a transstomal urinary catheter via the existing stoma.     CT ABDOMEN PELVIS W CONTRAST    Result Date: 9/2/2022  EXAM: CT ABDOMEN PELVIS W CONTRAST LOCATION:  [facility] DATE/TIME: 9/2/2022 7:16 PM INDICATION: Abdominal distension paraplegia difficulty passing catheter to neobladder COMPARISON: 12/18/2018. TECHNIQUE: CT scan of the abdomen and pelvis was performed following injection of IV contrast. Multiplanar reformats were obtained. Dose reduction techniques were used. CONTRAST: 50ml isovue 370 FINDINGS: LOWER CHEST: Atelectasis. 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 Nonocclusive thrombus in the proximal superior mesenteric vein. Multiple perigastric collateral vessels. PANCREAS: Normal. SPLEEN: Normal. ADRENAL GLANDS: Normal. KIDNEYS/BLADDER: There is mild right-sided pyelocaliectasis with normal caliber ureter. Dependent stone within the right renal pelvis. BOWEL: Descending colostomy with Franklin pouch. LYMPH NODES: Normal. VASCULATURE: Unremarkable. 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 MUSCULOSKELETAL: There is a very large complex peripherally enhancing fluid collection within the right buttock extending from the iliac crest down into the hip joints and into the right thigh. This is not completely imaged extending further into the thigh than is seen on the CT scan. Destructive changes in both hips. This fluid collection measures at least 20 x 14 cm scoliosis.     IMPRESSION: 1.  Significant distention of the neobladder which measures up 24 cm and extends into the right lower abdomen. 2.  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 Multiple perigastric venous collaterals. Nonocclusive thrombus in the proximal superior mesenteric vein. 3.  Franklin pouch with descending colostomy. 4.  Destructive changes both hips. There is a massive peripherally enhancing fluid collection in the right buttock, hip and thigh extending into the distal thigh. The distal extent of the fluid collection is  not imaged with CT. This collection measures at least 20 x 14 cm. 5.  There is mild dilatation of the right intrarenal collecting system with normal caliber ureter and an element of UPJ obstruction is possible. Dependent stone in the right renal pelvis. NOTE: ABNORMAL REPORT 1.  THE DICTATION ABOVE DESCRIBES AN ABNORMALITY FOR WHICH FOLLOW-UP IS NEEDED.     CT Abdomen Pelvis w/o Contrast    Result Date: 9/10/2022  EXAM: CT ABDOMEN PELVIS W/O CONTRAST LOCATION: [facility] DATE/TIME: 9/10/2022 2:53 PM INDICATION: concern for suprapubic catheter malposition, increased abdominal pain. Lower abdominal pain. COMPARISON: 9/4/2022 TECHNIQUE: CT scan of the abdomen and pelvis was performed without IV contrast. Multiplanar reformats were obtained. Dose reduction techniques were used. CONTRAST: None. FINDINGS: LOWER CHEST: Moderate bilateral pleural effusions have increased in size mildly. These appear free-flowing. Associated dependent atelectasis. HEPATOBILIARY: The liver is not well evaluated on this noncontrast exam. No significant interval change in appearance. PANCREAS: Normal. SPLEEN: Mild splenomegaly, unchanged. ADRENAL GLANDS: Normal. KIDNEYS/BLADDER: The kidneys are partially obscured by metal artifact spinal hardware. No hydronephrosis. Cystectomy with urinary diversion. There is a percutaneous catheter in the right abdomen with tip in the ileal conduit. Significant distention of the conduit has developed since yesterday suggesting malfunction of the catheter. BOWEL: Bowel loops are normal caliber. LYMPH NODES: Not well assessed on this noncontrast exam. VASCULATURE: Unremarkable. PELVIC ORGANS: The uterus is normal in size. MUSCULOSKELETAL: Generalized edema within the subcutaneous adipose tissues has increased mildly from 9/4/2022. A drain has been placed in the right hip joint and the effusion has significantly decreased in size. Deformity and degenerative changes of both  hips.  Extensive spinal hardware and associated artifact.     IMPRESSION: 1.  Cystectomy with ileal conduit urinary diversion. The conduit has become significantly distended from 9/4/2022. A percutaneous catheter terminates within the conduit. The distention suggests malfunction of the catheter such as plugging. No hydronephrosis of the kidneys. 2.  Moderate bilateral pleural effusions have increased in size mildly. 3.  Increase in mild generalized subcutaneous edema. 4.  Decrease in size of the right hip joint effusion. A drainage catheter is present within the joint space posteriorly.     CT Abdomen Pelvis w/o Contrast    Result Date: 9/4/2022  EXAM: CT ABDOMEN PELVIS W/O CONTRAST LOCATION: [facility] DATE/TIME: 9/4/2022 9:27 AM INDICATION: F U abscess with drain placement COMPARISON: 09/02/2022 TECHNIQUE: CT scan of the abdomen and pelvis was performed without IV contrast. Multiplanar reformats were obtained. Dose reduction techniques were used. CONTRAST: None. FINDINGS: LOWER CHEST: New small bilateral pleural effusions. HEPATOBILIARY: No significant mass or bile duct dilatation. Portal vein thrombus not visualized on the current examination without IV contrast, although periportal collateral vessels are noted. Cholecystectomy. PANCREAS: Normal. SPLEEN: Normal. ADRENAL GLANDS: Normal. KIDNEYS/BLADDER: Mild fullness of the renal pelves bilaterally, decreased compared to 09/02/2022. Unchanged nonobstructing calculus in the lower pole right kidney and in the dependent portion of the right renal pelvis. Urinary diversion in the right lower quadrant, which is no longer distended. Ostomy extends to the skin surface. BOWEL: Descending colostomy with Franklin pouch. LYMPH NODES: Normal. VASCULATURE: Unremarkable. PELVIC ORGANS: Unremarkable MUSCULOSKELETAL: Interval placement of percutaneous pigtail drain in the subcutaneous right hip/upper thigh fluid collection. Reliable size comparison limited due  to noncontrast technique on the current examination, but the collection has decreased in size. Largest axial component currently 11.7 x 7.4 cm, previously 20 x 14 cm. Osseous destruction in both hips. Thoracolumbar fusion. Diffuse muscular atrophy and osteopenia.     IMPRESSION: 1.  Decreased size of right buttock/thigh fluid collection following percutaneous drain placement. Largest remaining component measures 11.7 x 7.4 cm axially. 2.  New small bilateral pleural effusions. 3.  Neobladder is no longer distended.    Head CT w/o contrast    Result Date: 9/2/2022  EXAM: CT HEAD W/O CONTRAST LOCATION: [facility] DATE/TIME: 9/2/2022 7:16 PM INDICATION: ams COMPARISON: Head CT angiogram brain MRI 01/06/2020 TECHNIQUE: Routine CT Head without IV contrast. Multiplanar reformats. Dose reduction techniques were used. FINDINGS: INTRACRANIAL CONTENTS: No intracranial hemorrhage, extraaxial collection, or mass effect.  No CT evidence of acute infarct. Normal parenchymal attenuation. Normal ventricles and sulci. VISUALIZED ORBITS/SINUSES/MASTOIDS: No intraorbital abnormality. Moderate chronic mucosal thickening of the left maxillary sinus with associated frothy debris. Mild chronic mucosal thickening of the left sphenoid sinus. No middle ear or mastoid effusion. BONES/SOFT TISSUES: No acute abnormality. Chronic right medial orbital wall fracture.     IMPRESSION: 1.  No intracranial hemorrhage, mass lesions, hydrocephalus or CT evidence for an acute infarct. 2.  Chronic right medial orbital wall fracture.    CT Femur Thigh Bilateral wo Contrast    Result Date: 9/4/2022  EXAM: CT FEMUR THIGH BILATERAL WITHOUT CONTRAST LOCATION: [facility] DATE/TIME: 09/04/2022, 9:28 AM INDICATION: 56-year-old patient with a right hip-buttock abscess. COMPARISON: 09/04/2022 CT abdomen and pelvis. 09/02/2022 CT abdomen and pelvis. TECHNIQUE: Noncontrast. Axial, sagittal and coronal  thin-section reconstruction. Dose reduction techniques were used. FINDINGS: BONES AND JOINTS: -Advanced osteopenia. -Resection or resorption of the right medial ilium. Dysplastic right acetabulum. Resorption or resection of the right femoral head and greater trochanter. External rotation of the right femur. 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 Old fracture or osteotomy of the left femoral neck. Old healed fracture of the left femur proximal diaphysis. MUSCLES AND SOFT TISSUES: -Subcutaneous right hip-buttock fluid collection, with percutaneous pigtail catheter drainage. This collection measures 12 x 7 cm in greatest axial dimensions. -Fluid attenuation in the expected regions of the right vastus lateralis and adductor celia regions. The proximal margin of these collections have decreased in size since the 09/02/2022 exam. These collections both extend to the distal margin of the thigh, measuring up to 25 cm in length. -Advanced muscle fatty atrophy. OTHER: -See the dedicated abdomen-pelvis CT for further information.     IMPRESSION: 1.  Decreased size of the right hip-buttock fluid collection, status post percutaneous drain placement. This collection measures 12 x 7 cm in greatest axial dimensions. 2.  Decreased size of fluid collections (likely contiguous with the above) in the residual right vastus lateralis and adductor celia regions. These collections extend through the distal thigh, and measure roughly 25 cm in length.     CT Pelvis Soft Tissue w Contrast    Result Date: 9/15/2022  EXAM: CT PELVIS SOFT TISSUE W CONTRAST LOCATION: [facility] DATE/TIME: 9/15/2022 4:57 PM INDICATION: abscess COMPARISON: 09/10/2022 TECHNIQUE: CT scan of the pelvis was performed with IV contrast. Multiplanar reformats were obtained. Dose reduction techniques were used. CONTRAST: isovue 370  100ml FINDINGS: OTHER: Visualized portions of the liver, spleen, and  pancreas are unremarkable, although incompletely evaluated. KIDNEYS/BLADDER: The kidneys are partially obscured by metal artifact spinal hardware. No hydronephrosis. Cystectomy with urinary diversion. Interval placement of large bore catheter alongside the indwelling catheter into the colonic diversion with decrease in the degree of distention that was seen on the prior CT. BOWEL: Visualized loops are normal caliber. LYMPH NODES: No lymphadenopathy. VASCULATURE: Unremarkable. PELVIC ORGANS: Unremarkable MUSCULOSKELETAL: Right hip fluid collection is similar, with the residual fluid measuring 5.0 x 3.3 cm (2/106), previously 4.7 x 3.6 cm. Catheter tip is positioned along the superolateral aspect of the collection diffuse osteopenia and partially imaged spinal fusion hardware. Chronic hip deformities and diffuse body wall edema. Skin defect overlying the sacrum.     IMPRESSION: 1.  Interval placement of large bore catheter into urinary diversion with resolution of the distention that was seen on the prior CT. 2.  Similar size of right hip fluid collection. Catheter tip is along the superolateral margin.     IR PICC Vascular    Result Date: 9/6/2022  LOCATION: [facility] DATE: 9/6/2022 PROCEDURE: PERIPHERALLY INSERTED CENTRAL CATHETER (PICC) PLACEMENT. INTERVENTIONAL RADIOLOGIST: Hugo Michael MD. INDICATION: Right thigh abscess. Paraplegia. Decubitus wounds. Unsuccessful bedside PICC placement CONSENT: The procedure, risks and benefits of PICC placement were discussed with the patient  in detail. All questions were answered. Informed consent was given to proceed with the procedure. MODERATE SEDATION: None CONTRAST: Omnipaque 350: 5 cc ANTIBIOTICS: None. ADDITIONAL MEDICATIONS: None. FLUOROSCOPIC TIME: 6 minutes RADIATION DOSE: Air Kerma: 19 mGy COMPLICATIONS: No immediate complications. STERILE BARRIER TECHNIQUE: Maximum sterile barrier technique was used. Cutaneous antisepsis was performed  at the operative site with application of 2% chlorhexidine and a full body sterile drape. Prior to the procedure, the  and assistant performed hand hygiene and wore hat, mask, sterile gown, and sterile gloves during the entire procedure. Ultrasound was prepped with a sterile probe cover and sterile gel was used. PROCEDURE/TECHNIQUE:   Using local anesthesia and real-time ultrasound guidance, the right brachial vein was accessed. An 018 guidewire could not be advanced beyond the axillary vein. Over the guidewire the dilator/peel-away sheath of the Bard PICC set were advanced into the brachial vein. A 5 [place] KMP catheter was advanced to the axillary vein. A central venogram was obtained. Using the KMP catheter, the 018 guidewires manipulated down to the superior vena cava. The 5 [place] PICC could not be advanced due to the irregular  subclavian stenosis. Through the KMP catheter, the guidewire was exchanged for an 035 Lobo guidewire. A 5 [place], 25 cm sheath was advanced and placed with the tip at the axillary vein. The right subclavian vein was angioplastied using a 5 mm x 40 mm and less balloon. The 5 [place] Kumpe catheter was used to exchange the guidewire for the 018 guidewire. Sheath was exchanged for the 5 [place]ian peel-away sheath. Over the guidewire a 5 [place], dual lumen Bard power PICC was advanced until tip was at the right atrium. PICC was cut to 41 cm. The lumens aspirated and flushed adequately. FINDINGS: Ultrasound demonstrates a patent and compressible right brachial vein. Images were recorded. Right central venogram demonstrates diffuse, irregular moderate stenosis throughout the right subclavian vein. The completion fluoroscopic demonstrates a right upper extremity PICC with its tip at the right atrium.     IMPRESSION:  1.  Successful right upper extremity CT injectable PICC placement. 2.  Diffuse right subclavian vein stenosis. Angioplastied to 5 mm to allow PICC placement..     KUB  XR    Result Date: 9/3/2022  EXAM: XR KUB LOCATION: [facility] DATE/TIME: 9/3/2022 5:59 AM INDICATION: s p femoral line placement COMPARISON: 01/06/2020     IMPRESSION: Left-sided femoral catheter has been placed terminating in the midline at the lumbosacral junction. Visualized bowel gas pattern is nonobstructive. Postoperative changes in the thoracolumbar spine. Prior right hip resection. Diffuse osteopenia. Right lower quadrant bowel anastomotic suture line. Multiple clips over the pelvis.    IR Suprapubic Catheter Change    Result Date: 9/18/2022  [place] [facility] LOCATION: [facility] DATE: 9/18/2022 PROCEDURE: FLUOROSCOPIC GUIDED UROSTOMY (SUPRAPUBIC) CATHETER EXCHANGE INTERVENTIONAL RADIOLOGIST: Dennis Branch MD. INDICATION: 56-year-old female with history of cystectomy with right lower quadrant colonic urostomy. The patient is experiencing significant peritubal leakage and abdominal distention. There is concern that the urostomy may be occluded or malposition. MODERATE SEDATION: None. CONTRAST: 20 mL Omnipaque into the urinary bladder. ANTIBIOTICS: None. ADDITIONAL MEDICATIONS: None. FLUOROSCOPIC TIME: 0.7 minutes. RADIATION DOSE: Air Kerma: 24 mGy. COMPLICATIONS: No immediate complications. STERILE BARRIER TECHNIQUE: Maximum sterile barrier technique was used. Cutaneous antisepsis was performed at the operative site with application of 2% chlorhexidine and large sterile drape. Prior to the procedure, the  and assistant performed hand hygiene and wore hat, mask, sterile gown, and sterile gloves during the entire procedure. PROCEDURE:  Under fluoroscopic guidance, the ureterostomy catheter was injected with contrast and images were obtained. The tube was then exchanged over a wire for a new 16 [place]-tip catheter which was positioned under fluoroscopic guidance with its tip in the colonic conduit lumen. The retention balloon was inflated  "with 10 mL of saline. A post placement contrast injection through the catheter was performed. FINDINGS: On physical examination the patient has a right lower quadrant urostomy with a transversing 16 [place] tip catheter. Running parallel to this through the same stoma is a smaller clear tube that is approximately 10-12 Armenian in diameter. The contrast injection through the ureterostomy reveals that this tubing is patent however the tubing tip is enveloped within colonic folds providing suboptimal drainage. Following exchange and repositioning the tip of the tube now resides within the main gravity-dependent portion of the pouch. Approximately 1200 mL of urine sediment immediately spontaneously drained through the catheter.     IMPRESSION:  1.  The existing ureterostomy catheter tip was involved within loops of colonic folds within the pouch providing suboptimal drainage. 2.  Following exchange and repositioning the new 16 [place]-tip ureterostomy controls the fluid collection well. 3.  The patient also has a parallel tube entering through the same stoma. At times , parallel tubes entering through the same percutaneous access site can have a propensity to \"wick\" fluid between the tubing and result in leakage.     IR Suprapubic Catheter Change    Result Date: 9/13/2022  EXAM: IR SUPRAPUBIC CATHETER CHANGE LOCATION: [facility] DATE/TIME: 9/13/2022 4:08 PM INDICATION: [place] tip Ambrose catheter through the urostomy site into the colonic diversion. Significant pericatheter leakage. A second, parallel clear tubing into the site. INTERVENTIONAL RADIOLOGIST: Hugo Michael MD. CONSENT: The procedure, risks and benefits suprapubic catheter injection with possible exchange were discussed with the patient  in detail. All questions were answered. Informed consent was given to proceed with the procedure. MODERATE SEDATION: None CONTRAST: Omnipaque 350: 80 cc ANTIBIOTICS: None ADDITIONAL " MEDICATIONS: None. FLUOROSCOPIC TIME: 1.6 minutes. RADIATION DOSE: Air Kerma: 26 mGy COMPLICATIONS: No immediate complications. STERILE BARRIER TECHNIQUE: Maximum sterile barrier technique was used. Cutaneous antisepsis was performed at the operative site with application of 2% chlorhexidine and a full body sterile drape. Prior to the procedure, the  and assistant performed hand hygiene and wore hat, mask, sterile gown, and sterile gloves during the entire procedure. PROCEDURE/TECHNIQUE: Contrast was injected through the indwelling 16 [place] tip Ambrose catheter. The catheter is the proximal aspect of the colonic diversion with the tip abutting a side wall. Retention stitches were cut. Retention balloon was deflated and catheter advanced into a more central position within the colonic divergent. Retention balloon was inflated with 10 cc normal saline. Contrast was through the parallel clear tubing, which has its tip within the superior aspect of the colonic  diversion. Both catheter was secured in place with 2-0 Ethilon stitches. 16 [place] tip catheter was connected to gravity drainage. Clear catheter was capped. FINDINGS: 1. Initial contrast injection through the 16 [place] tip Ambrose catheter demonstrates its tip at the inferior aspect of the colonic diversion with the tip abutting a sidewall. Completion fluoroscopic image demonstrates the advanced catheter with the balloon inflated within the superior aspect of the colonic diversion. 2. Contrast injection through the parallel, cleared tubing demonstrate of the tract courses directly into the superior aspect of the colonic diversion. Tip is within the lumen of the colonic diversion.     IMPRESSION:  1.  Urostomy Ambrose catheter repositioned, as described above. PLAN: Continued gravity drainage.

## 2022-09-20 NOTE — PLAN OF CARE
Problem: Plan of Care - These are the overarching goals to be used throughout the patient stay.    Goal: Absence of Hospital-Acquired Illness or Injury  Intervention: Prevent Skin Injury  Recent Flowsheet Documentation  Taken 9/20/2022 0845 by Ania Melton RN  Body Position:   right   turned   supine, legs elevated     Problem: Plan of Care - These are the overarching goals to be used throughout the patient stay.    Goal: Absence of Hospital-Acquired Illness or Injury  Intervention: Prevent and Manage VTE (Venous Thromboembolism) Risk  Recent Flowsheet Documentation  Taken 9/20/2022 0845 by Ania Melton RN  VTE Prevention/Management: compression stockings on  Activity Management: bedrest     Problem: Plan of Care - These are the overarching goals to be used throughout the patient stay.    Goal: Optimal Comfort and Wellbeing  Intervention: Monitor Pain and Promote Comfort  Recent Flowsheet Documentation  Taken 9/20/2022 1141 by Ania Melton RN  Pain Management Interventions: (warm blanket)   medication (see MAR)   pillow support provided   repositioned   other (see comments)     Problem: Impaired Wound Healing  Goal: Optimal Wound Healing  Intervention: Promote Wound Healing  Recent Flowsheet Documentation  Taken 9/20/2022 1141 by Ania Melton RN  Pain Management Interventions: (warm blanket)   medication (see MAR)   pillow support provided   repositioned   other (see comments)  Taken 9/20/2022 0845 by Ania Melton RN  Activity Management: bedrest   Goal Outcome Evaluation:      Dressing change done as ordered. See flow sheet. Supra pubic catheter leaking moderate amount of clear yellow drainage around site. Urology notified and will see patient. Abd pad applied to absorb drainage. Continue to monitor. Supra pubic catheter irrigated without difficulty and draining into bag.

## 2022-09-20 NOTE — CONSULTS
Consult request received.  I attempted to arrange a video visit with the patient at 8:13 am but was informed she was sleeping and I elected to let her rest.  I will reapproach.

## 2022-09-21 ENCOUNTER — APPOINTMENT (OUTPATIENT)
Dept: CT IMAGING | Facility: HOSPITAL | Age: 57
DRG: 853 | End: 2022-09-21
Attending: SURGERY
Payer: MEDICARE

## 2022-09-21 LAB
ALBUMIN SERPL BCG-MCNC: 2 G/DL (ref 3.5–5.2)
ALP SERPL-CCNC: 92 U/L (ref 35–104)
ALT SERPL W P-5'-P-CCNC: 6 U/L (ref 10–35)
ANION GAP SERPL CALCULATED.3IONS-SCNC: 7 MMOL/L (ref 7–15)
AST SERPL W P-5'-P-CCNC: 13 U/L (ref 10–35)
BASOPHILS # BLD AUTO: 0 10E3/UL (ref 0–0.2)
BASOPHILS NFR BLD AUTO: 1 %
BILIRUB SERPL-MCNC: <0.2 MG/DL
BUN SERPL-MCNC: 10.8 MG/DL (ref 6–20)
CALCIUM SERPL-MCNC: 7.5 MG/DL (ref 8.6–10)
CHLORIDE SERPL-SCNC: 108 MMOL/L (ref 98–107)
CREAT SERPL-MCNC: 0.28 MG/DL (ref 0.51–0.95)
DEPRECATED HCO3 PLAS-SCNC: 25 MMOL/L (ref 22–29)
EOSINOPHIL # BLD AUTO: 0.1 10E3/UL (ref 0–0.7)
EOSINOPHIL NFR BLD AUTO: 5 %
ERYTHROCYTE [DISTWIDTH] IN BLOOD BY AUTOMATED COUNT: 19.4 % (ref 10–15)
GFR SERPL CREATININE-BSD FRML MDRD: >90 ML/MIN/1.73M2
GLUCOSE SERPL-MCNC: 83 MG/DL (ref 70–99)
HCT VFR BLD AUTO: 24.3 % (ref 35–47)
HGB BLD-MCNC: 7.4 G/DL (ref 11.7–15.7)
IMM GRANULOCYTES # BLD: 0 10E3/UL
IMM GRANULOCYTES NFR BLD: 1 %
LACTATE SERPL-SCNC: 1.4 MMOL/L (ref 0.7–2)
LYMPHOCYTES # BLD AUTO: 1 10E3/UL (ref 0.8–5.3)
LYMPHOCYTES NFR BLD AUTO: 44 %
MCH RBC QN AUTO: 28.2 PG (ref 26.5–33)
MCHC RBC AUTO-ENTMCNC: 30.5 G/DL (ref 31.5–36.5)
MCV RBC AUTO: 93 FL (ref 78–100)
MONOCYTES # BLD AUTO: 0.1 10E3/UL (ref 0–1.3)
MONOCYTES NFR BLD AUTO: 6 %
NEUTROPHILS # BLD AUTO: 1 10E3/UL (ref 1.6–8.3)
NEUTROPHILS NFR BLD AUTO: 43 %
NRBC # BLD AUTO: 0 10E3/UL
NRBC BLD AUTO-RTO: 0 /100
PLATELET # BLD AUTO: 101 10E3/UL (ref 150–450)
POTASSIUM SERPL-SCNC: 3.6 MMOL/L (ref 3.4–5.3)
PROT SERPL-MCNC: 4.9 G/DL (ref 6.4–8.3)
RBC # BLD AUTO: 2.62 10E6/UL (ref 3.8–5.2)
SODIUM SERPL-SCNC: 140 MMOL/L (ref 136–145)
WBC # BLD AUTO: 2.2 10E3/UL (ref 4–11)

## 2022-09-21 PROCEDURE — 250N000011 HC RX IP 250 OP 636: Performed by: INTERNAL MEDICINE

## 2022-09-21 PROCEDURE — 72193 CT PELVIS W/DYE: CPT | Mod: MG

## 2022-09-21 PROCEDURE — 120N000001 HC R&B MED SURG/OB

## 2022-09-21 PROCEDURE — 250N000013 HC RX MED GY IP 250 OP 250 PS 637: Performed by: INTERNAL MEDICINE

## 2022-09-21 PROCEDURE — 80053 COMPREHEN METABOLIC PANEL: CPT | Performed by: INTERNAL MEDICINE

## 2022-09-21 PROCEDURE — 85025 COMPLETE CBC W/AUTO DIFF WBC: CPT | Performed by: INTERNAL MEDICINE

## 2022-09-21 PROCEDURE — 250N000013 HC RX MED GY IP 250 OP 250 PS 637: Performed by: NURSE PRACTITIONER

## 2022-09-21 PROCEDURE — 258N000003 HC RX IP 258 OP 636: Performed by: INTERNAL MEDICINE

## 2022-09-21 PROCEDURE — 99232 SBSQ HOSP IP/OBS MODERATE 35: CPT | Performed by: INTERNAL MEDICINE

## 2022-09-21 PROCEDURE — 83605 ASSAY OF LACTIC ACID: CPT | Performed by: INTERNAL MEDICINE

## 2022-09-21 PROCEDURE — 99233 SBSQ HOSP IP/OBS HIGH 50: CPT | Performed by: INTERNAL MEDICINE

## 2022-09-21 RX ORDER — CEFAZOLIN SODIUM 1 G/50ML
1250 SOLUTION INTRAVENOUS ONCE
Status: COMPLETED | OUTPATIENT
Start: 2022-09-21 | End: 2022-09-21

## 2022-09-21 RX ORDER — IOPAMIDOL 755 MG/ML
100 INJECTION, SOLUTION INTRAVASCULAR ONCE
Status: COMPLETED | OUTPATIENT
Start: 2022-09-21 | End: 2022-09-21

## 2022-09-21 RX ADMIN — ENOXAPARIN SODIUM 60 MG: 60 INJECTION SUBCUTANEOUS at 08:40

## 2022-09-21 RX ADMIN — OXYCODONE HYDROCHLORIDE 5 MG: 5 TABLET ORAL at 16:14

## 2022-09-21 RX ADMIN — FOLIC ACID 1 MG: 1 TABLET ORAL at 08:40

## 2022-09-21 RX ADMIN — TRAZODONE HYDROCHLORIDE 100 MG: 50 TABLET ORAL at 21:50

## 2022-09-21 RX ADMIN — OXYCODONE HYDROCHLORIDE 5 MG: 5 TABLET ORAL at 20:17

## 2022-09-21 RX ADMIN — BUMETANIDE 0.5 MG: 0.5 TABLET ORAL at 08:40

## 2022-09-21 RX ADMIN — NYSTATIN: 100000 OINTMENT TOPICAL at 21:51

## 2022-09-21 RX ADMIN — ZOLPIDEM TARTRATE 2.5 MG: 5 TABLET ORAL at 21:51

## 2022-09-21 RX ADMIN — LEVETIRACETAM 500 MG: 500 TABLET, FILM COATED ORAL at 08:40

## 2022-09-21 RX ADMIN — VANCOMYCIN HYDROCHLORIDE 1250 MG: 5 INJECTION, POWDER, LYOPHILIZED, FOR SOLUTION INTRAVENOUS at 15:55

## 2022-09-21 RX ADMIN — PANTOPRAZOLE SODIUM 40 MG: 40 TABLET, DELAYED RELEASE ORAL at 06:38

## 2022-09-21 RX ADMIN — NYSTATIN: 100000 OINTMENT TOPICAL at 08:41

## 2022-09-21 RX ADMIN — IOPAMIDOL 100 ML: 755 INJECTION, SOLUTION INTRAVENOUS at 06:32

## 2022-09-21 RX ADMIN — THIAMINE HCL TAB 100 MG 100 MG: 100 TAB at 08:41

## 2022-09-21 RX ADMIN — THERA TABS 1 TABLET: TAB at 08:40

## 2022-09-21 RX ADMIN — ENOXAPARIN SODIUM 60 MG: 60 INJECTION SUBCUTANEOUS at 20:17

## 2022-09-21 RX ADMIN — ZOLPIDEM TARTRATE 2.5 MG: 5 TABLET ORAL at 00:16

## 2022-09-21 RX ADMIN — LEVETIRACETAM 500 MG: 500 TABLET, FILM COATED ORAL at 21:50

## 2022-09-21 ASSESSMENT — ACTIVITIES OF DAILY LIVING (ADL)
ADLS_ACUITY_SCORE: 53
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
## 2022-09-21 NOTE — PLAN OF CARE
Problem: Plan of Care - These are the overarching goals to be used throughout the patient stay.    Goal: Absence of Hospital-Acquired Illness or Injury  Intervention: Identify and Manage Fall Risk  Recent Flowsheet Documentation  Taken 9/20/2022 1615 by Chanell Castillo RN  Safety Promotion/Fall Prevention: bed alarm on     Problem: Plan of Care - These are the overarching goals to be used throughout the patient stay.    Goal: Absence of Hospital-Acquired Illness or Injury  Intervention: Prevent and Manage VTE (Venous Thromboembolism) Risk  Recent Flowsheet Documentation  Taken 9/20/2022 1615 by Chanell Castillo RN  VTE Prevention/Management: compression stockings on  Activity Management: activity adjusted per tolerance     Problem: Anemia  Goal: Anemia Symptom Improvement  Intervention: Monitor and Manage Anemia  Recent Flowsheet Documentation  Taken 9/20/2022 1615 by Chanell Castillo RN  Safety Promotion/Fall Prevention: bed alarm on     Problem: Pain Acute  Goal: Acceptable Pain Control and Functional Ability  Outcome: Ongoing, Progressing   Goal Outcome Evaluation:  Patient received pain medication x 2 per orders for complain of pain around the abdomen which helped. Sacral dressing done per orders. DENIA drain to bulb suction, patent and draining. Suprapubic drain irrigated per orders.

## 2022-09-21 NOTE — PLAN OF CARE
"  Problem: Plan of Care - These are the overarching goals to be used throughout the patient stay.    Goal: Plan of Care Review/Shift Note  Description: The Plan of Care Review/Shift note should be completed every shift.  The Outcome Evaluation is a brief statement about your assessment that the patient is improving, declining, or no change.  This information will be displayed automatically on your shift note.  Outcome: Ongoing, Progressing  Goal: Patient-Specific Goal (Individualized)  Description: You can add care plan individualizations to a care plan. Examples of Individualization might be:  \"Parent requests to be called daily at 9am for status\", \"I have a hard time hearing out of my right ear\", or \"Do not touch me to wake me up as it startles me\".  Outcome: Ongoing, Progressing  Goal: Absence of Hospital-Acquired Illness or Injury  Outcome: Ongoing, Progressing  Intervention: Identify and Manage Fall Risk  Recent Flowsheet Documentation  Taken 9/21/2022 0400 by Mary Flores, RN  Safety Promotion/Fall Prevention:   lighting adjusted   fall prevention program maintained  Taken 9/21/2022 0016 by Mary Flores RN  Safety Promotion/Fall Prevention:   lighting adjusted   fall prevention program maintained  Intervention: Prevent Skin Injury  Recent Flowsheet Documentation  Taken 9/21/2022 0447 by Mary Flores, RN  Body Position:   turned   supine   heels elevated  Taken 9/21/2022 0300 by Mary Flores, RN  Body Position:   tilted   heels elevated  Goal: Optimal Comfort and Wellbeing  Outcome: Ongoing, Progressing  Intervention: Monitor Pain and Promote Comfort  Recent Flowsheet Documentation  Taken 9/21/2022 0016 by Mary Flores, RN  Pain Management Interventions: emotional support  Goal: Readiness for Transition of Care  Outcome: Ongoing, Progressing     Problem: Risk for Delirium  Goal: Optimal Coping  Outcome: Ongoing, Progressing  Goal: Improved Behavioral Control  Outcome: Ongoing, " Progressing  Goal: Improved Attention and Thought Clarity  Outcome: Ongoing, Progressing  Goal: Improved Sleep  Outcome: Ongoing, Progressing     Problem: UTI (Urinary Tract Infection)  Goal: Improved Infection Symptoms  Outcome: Ongoing, Progressing     Problem: Malnutrition  Goal: Improved Nutritional Intake  Outcome: Ongoing, Progressing     Problem: Impaired Wound Healing  Goal: Optimal Wound Healing  Outcome: Ongoing, Progressing  Intervention: Promote Wound Healing  Recent Flowsheet Documentation  Taken 9/21/2022 0016 by Mary Flores, RN  Pain Management Interventions: emotional support     Problem: Infection  Goal: Absence of Infection Signs and Symptoms  Outcome: Ongoing, Progressing     Problem: VTE (Venous Thromboembolism)  Goal: VTE (Venous Thromboembolism) Symptom Resolution  Outcome: Ongoing, Progressing     Problem: Fluid Volume Excess  Goal: Fluid Balance  Outcome: Ongoing, Progressing     Problem: Anemia  Goal: Anemia Symptom Improvement  Outcome: Ongoing, Progressing  Intervention: Monitor and Manage Anemia  Recent Flowsheet Documentation  Taken 9/21/2022 0400 by Mary Flores, RN  Safety Promotion/Fall Prevention:   lighting adjusted   fall prevention program maintained  Taken 9/21/2022 0016 by Mary Flores, RN  Safety Promotion/Fall Prevention:   lighting adjusted   fall prevention program maintained     Problem: Pain Acute  Goal: Acceptable Pain Control and Functional Ability  Outcome: Ongoing, Progressing  Intervention: Develop Pain Management Plan  Recent Flowsheet Documentation  Taken 9/21/2022 0016 by Mary Flores, RN  Pain Management Interventions: emotional support   Goal Outcome Evaluation:      Pt a/o with VSS. Pt is in better spirits / talkative tonight. Cares clustered and ambien given for sleep. Pt NPO since 0200 for CT today. Will monitor.

## 2022-09-21 NOTE — PHARMACY-VANCOMYCIN DOSING SERVICE
Pharmacy Vancomycin Initial Note  Date of Service 2022  Patient's  1965  56 year old, female    Indication: Bone and Joint Infection    Current estimated CrCl = Estimated Creatinine Clearance: 208.2 mL/min (A) (based on SCr of 0.28 mg/dL (L)).    Creatinine for last 3 days  2022:  6:34 AM Creatinine 0.38 mg/dL  2022:  6:37 AM Creatinine 0.36 mg/dL  2022:  6:37 AM Creatinine 0.28 mg/dL    Recent Vancomycin Level(s) for last 3 days  No results found for requested labs within last 72 hours.      Vancomycin IV Administrations (past 72 hours)      No vancomycin orders with administrations in past 72 hours.                Nephrotoxins and other renal medications (From now, onward)    Start     Dose/Rate Route Frequency Ordered Stop    22 0400  vancomycin 750 mg in 0.9% NaCl 250 mL intermittent infusion 750 mg         750 mg  over 60 Minutes Intravenous EVERY 12 HOURS 22 1400      22 1600  vancomycin (VANCOCIN) 1,250 mg in sodium chloride 0.9 % 250 mL intermittent infusion         1,250 mg  over 90 Minutes Intravenous ONCE 22 1358      22 1330  bumetanide (BUMEX) tablet 0.5 mg         0.5 mg Oral DAILY 22 1320            Contrast Orders - past 72 hours (72h ago, onward)    Start     Dose/Rate Route Frequency Stop    22 0630  iopamidol (ISOVUE-370) solution 100 mL         100 mL Intravenous ONCE 22 0632    22 1600  iohexol (OMNIPAQUE) 350 MG/ML injectable solution 100 mL         100 mL Intravenous ONCE 22 1549          [employer] Prediction of Planned Initial Vancomycin Regimen  Loading dose: 1250 mg at 16:00 2022.  Regimen: 750 mg IV every 12 hours.  Start time: 00:00 on 2022  Exposure target: AUC24 (range)400-600 mg/L.hr   AUC24,ss: 298 mg/L.hr  Probability of AUC24 > 400: 22 %  Ctrough,ss: 8.1 mg/L  Probability of Ctrough,ss > 20: 4 %  Probability of nephrotoxicity (Lodise LU 2009): 5 %          Plan:  1. Start  vancomycin  1250 mg IV once followed by 750 mg IV q12h. Previously on Vanco 1000 mg q12h, levels were high. Patient's creatinine clearance is not indicative of renal function. Creat very low due to paraplegia/low muscle mass.  2. Vancomycin monitoring method: AUC  3. Vancomycin therapeutic monitoring goal: 400-600 mg*h/L  4. Pharmacy will check vancomycin levels as appropriate in 1-3 Days.    5. Serum creatinine levels will be ordered daily for the first week of therapy and at least twice weekly for subsequent weeks.      HUGO KAPLAN, [facility]
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
## 2022-09-21 NOTE — PROGRESS NOTES
INFECTIOUS DISEASE FOLLOW UP NOTE      ASSESSMENT:  1. Large right thigh abscess, due to Group B strep. Percutaneous drain placed 9/3. Gram stain GPC, culture Group B strep. Blood culture negative. Sepsis resolved now off pressors. Drain check 9/8 with minimal residual fluid, drain upsized. CT 9/4 still showed large collection the day after drain placement. CRP is normal 0.4. CT pelvis shows stable size of fluid collection 5.0 x 3.5cm. This is in area of previous R femoral head resection.   2. Multiple surgeries on hips/wounds in the past, including resection of R femoral head around 2012.  3. Urinary retention. Low suspicion for urinary source. Mixed tulio in UC likely colonization. UA without pyuria.   4. Paraplegia   5. Decubitus wounds --  Large sacral looks clean.   6. Penicillin and levofloxacin allergies. Tolerated meropenem, cefepime, ceftriaxone. She recalls she does better with IVs compared to oral antibiotics. Leukopenia, possibly from ceftriaxone   7. H/o MRSA in remote past. Not currently seen on clinical specimens.   8. Thrombocytopenia, improved.     PLAN:  -discontinue ceftriaxone due to neutropenia and leukopenia  -start vancomycin     Antibiotics: plan on 4-6 weeks IV course, 6 is 10/15/22, may not need this long. IV antibiotics can be done at her residence.    Check CBC with diff, crp once weekly when on treatment.   Dr. Hunter following in case this would need debridement. No change on repeat CT scan       Justyn Garcia MD  [facility]  Direct messaging: [employer] Paging  On-Call ID provider: [phone], option: [phone]    ______________________________________________________________________    SUBJECTIVE / INTERVAL HISTORY:   No events. Feels okay. No complaints. CT scan today.     ROS: paraplegia. All other systems negative except as listed above.        OBJECTIVE:  /57 (BP Location: Left arm)   Pulse 57   Temp 98.3  F (36.8  C) (Oral)   Resp 16   Ht 1.626 m  "(5' 4\")   Wt 59.5 kg (131 lb 1.6 oz)   LMP  (LMP Unknown)   SpO2 98%   BMI 22.50 kg/m       GENERAL:  In no acute distress. Room air.   EYES: No conjunctival injection  HEAD, EARS, NOSE, MOUTH, AND THROAT: Nontraumatic, mouth without oral ulcers.   RESPIRATORY: Clear anterior  CARDIOVASCULAR: Regular rate and rhythm, normal S1 and S2, no murmurs, rubs, or gallops.  ABDOMEN: Soft, nontender, colostomy, urine tube from abdomen x 2.  Normal bowel sounds.  MUSCULOSKELETAL: No synovitis. DENIA R thigh with serous fluid  SKIN/HAIR/NAILS: No rashes, no signs of peripheral emboli. Wound photo noted  NEUROLOGIC: paraplegia   PICC R UE        Antibiotics:  Vancomycin 9/3-6, 9/21-      Previous  Ceftriaxone 9/6-9/20  Cefepime 9/3-4  Flagyl 9/3-4  Clindamycin 9/2-3  meropenem 9/4-6    Pertinent labs:    Recent Labs   Lab 09/21/22  0637 09/20/22  0637 09/19/22  0634   WBC 2.2* 2.5* 3.2*   HGB 7.4* 7.5* 7.8*   HCT 24.3* 25.3* 25.5*   * 101* 97*        Recent Labs   Lab 09/21/22  0637 09/20/22  0637 09/19/22  0634    138 138   CO2 25 24 26   BUN 10.8 11 12        Lab Results   Component Value Date    CRP 0.4 09/08/2022    CRP 98.6 (H) 12/12/2018    CRP 6.1 01/20/2015         Lab Results   Component Value Date    ALT 6 (L) 09/21/2022    AST 13 09/21/2022    ALKPHOS 92 09/21/2022         MICROBIOLOGY DATA:  9/3 abscess gram stain GPC, PMNs, culture Group B strep  9/2 blood negative     RADIOLOGY:  CT Abdomen Peritonium Abscess Drainage    Result Date: 9/3/2022  EXAM: 1. PERCUTANEOUS DRAIN PLACEMENT RIGHT UPPER THIGH/GROIN COLLECTION 2. CT GUIDANCE 3. CONSCIOUS SEDATION LOCATION: [facility] DATE/TIME: 9/3/2022 12:19 PM INDICATION: right hip drain placement TECHNIQUE: Dose reduction techniques were used. PROCEDURE: Informed consent obtained. Site marked. Prior images reviewed. Required items made available. Patient identity confirmed verbally and with arm band. Patient reevaluated immediately " before administering sedation. Universal protocol was followed. Time out performed. The site was prepped and draped in sterile fashion. 10 mL of 1% lidocaine was infused into the local soft tissues. Using standard technique and under direct CT guidance, a 12 Irish drainage catheter was inserted into the fluid collection.  SPECIMEN: 150 mL of purulent fluid was aspirated and sent to lab for cultures and Gram stain. BLOOD LOSS: Minimal. The patient tolerated the procedure well. No immediate complications. SEDATION: Versed 0 mg. Fentanyl 50 mcg. The procedure was performed with administration intravenous conscious sedation with appropriate preoperative, intraoperative, and postoperative evaluation. 15 minutes of supervised face to face conscious sedation time was provided by a radiology nurse under my direct supervision.     IMPRESSION: 1.  Successful CT-guided drain placement into a right upper thigh/groin abscess.     Echocardiogram Complete    Result Date: 9/10/2022  [id number]^ASHLEY^ARIN^[place] [address]  Name: KISHOR PINEDA KENDAL RIOS MRN: [id number] : 1965 Study Date: 09/10/2022 03:21 PM Age: 56 yrs Gender: Female Patient Location: [facility] Reason For Study: CHF Ordering Physician: ARIN RAMIREZ Referring Physician: CODEY WILSON Performed By: ACE  BSA: 1.6 m2 Height: 64 in Weight: 120 lb HR: 92 BP: 91/58 mmHg ______________________________________________________________________________ Procedure Complete Echo Adult. ______________________________________________________________________________ Interpretation Summary  The left ventricle is normal in size. The visual ejection fraction is 55-60%. No regional wall motion abnormalities noted. Regional wall motion abnormalities cannot be excluded due to limited visualization. Diastolic Doppler findings (E/E' ratio and/or other parameters) suggest left ventricular filling  pressures are normal. Sinus rhythm was noted. Technically difficult, suboptimal study. Consider cardiac MRI for better imaging. ______________________________________________________________________________ Left Ventricle The left ventricle is normal in size. There is normal left ventricular wall thickness. Diastolic Doppler findings (E/E' ratio and/or other parameters) suggest left ventricular filling pressures are normal. The visual ejection fraction is 55-60%. Regional wall motion abnormalities cannot be excluded due to limited visualization. No regional wall motion abnormalities noted.  Right Ventricle Normal right ventricle size and systolic function. TAPSE is normal, which is consistent with normal right ventricular systolic function.  Atria The left atrium is not well visualized. Right atrium not well visualized.  Mitral Valve The mitral valve is not well visualized. There is mild (1+) mitral regurgitation. There is no mitral valve stenosis.  Tricuspid Valve The tricuspid valve is not well visualized. There is mild (1+) tricuspid regurgitation.  Aortic Valve The aortic valve is not well visualized. No aortic regurgitation is present. No aortic stenosis is present.  Pulmonic Valve The pulmonic valve is not well visualized.  Vessels The aorta root is normal. Normal size ascending aorta.  Pericardium There is no pericardial effusion.  Rhythm Sinus rhythm was noted. ______________________________________________________________________________ MMode/2D Measurements & Calculations IVSd: 0.77 cm  LVIDd: 3.9 cm LVIDs: 2.6 cm LVPWd: 0.70 cm FS: 32.6 % LV mass(C)d: 81.4 grams LV mass(C)dI: 51.7 grams/m2 Ao root diam: 3.1 cm asc Aorta Diam: 3.2 cm LVOT diam: 2.0 cm LVOT area: 3.1 cm2 RWT: 0.36  Doppler Measurements & Calculations MV E max deanna: 53.8 cm/sec MV A max deanna: 67.9 cm/sec MV E/A: 0.79 MV dec slope: 449.7 cm/sec2 MV dec time: 0.12 sec Ao V2 max: 138.1 cm/sec Ao max P.0 mmHg Ao V2 mean: 94.7 cm/sec Ao mean  P.2 mmHg Ao V2 VTI: 25.7 cm MACKENZIE(I,D): 1.9 cm2 MACKENZIE(V,D): 1.8 cm2 LV V1 max P.7 mmHg LV V1 max: 82.0 cm/sec LV V1 VTI: 16.0 cm SV(LVOT): 49.1 ml SI(LVOT): 31.2 ml/m2 PA acc time: 0.09 sec AV Jered Ratio (DI): 0.59  MACKENZIE Index (cm2/m2): 1.2 E/E': 4.4 E/E' av.0 Lateral E/e': 4.4 Medial E/e': 7.5 Peak E' Jered: 12.2 cm/sec  ______________________________________________________________________________ Report approved by: Alexander Huston 09/10/2022 04:57 PM       US Lower Extremity Venous Duplex Bilateral    Result Date: 2022  EXAM: US LOWER EXTREMITY VENOUS DUPLEX BILATERAL LOCATION: [facility] DATE/TIME: 2022 3:47 PM INDICATION: edema, r o dvt COMPARISON: None. TECHNIQUE: Venous Duplex ultrasound of bilateral lower extremities with and without compression, augmentation and duplex. Color flow and spectral Doppler with waveform analysis performed. FINDINGS: Exam includes the common femoral, femoral, popliteal veins as well as segmentally visualized deep calf veins and greater saphenous vein. RIGHT: No deep vein thrombosis. No superficial thrombophlebitis. No popliteal cyst. LEFT: There is deep venous thrombus involving the common femoral vein, the femoral vein in the thigh and the popliteal vein. This appears partially occlusive in the common femoral vein and popliteal vein and completely occlusive throughout the femoral vein in the thigh. There is probable extension into the profunda femoris vein on the left. No superficial thrombus. No popliteal cyst.     IMPRESSION: Left deep venous thrombus involving the common femoral vein, femoral vein in the thigh and popliteal vein with probable extension into the profunda femoris vein but this is suboptimally visualized because of edema and body habitus. The thrombus appears occlusive throughout the femoral vein in the thigh and is nonocclusive in the common femoral and popliteal vein. NOTE: ABNORMAL REPORT THE DICTATION ABOVE  DESCRIBES AN ABNORMALITY FOR WHICH FOLLOW-UP IS NEEDED. . [Critical Result: Left lower extremity deep venous thrombosis] Finding was identified on 9/9/2022 3:54 PM. 1.  The patient's nurse, Shanita, was contacted by me on 9/9/2022 4:00 PM and verbalized understanding of the critical result.     US Upper Extremity Venous Duplex Bilat    Result Date: 9/9/2022  EXAM: US UPPER EXTREMITY VENOUS DUPLEX BILATERAL LOCATION: [facility] DATE/TIME: 9/9/2022 4:00 PM INDICATION: edema, r o DVT COMPARISON: None. TECHNIQUE: Venous Duplex ultrasound of both upper extremities with (when possible) and without compression, augmentation, and duplex. Color flow and spectral Doppler with waveform analysis performed. FINDINGS: Ultrasound includes evaluation of the internal jugular veins, innominate veins, subclavian veins, axillary veins, and brachial veins. The superficial cephalic and basilic veins were also evaluated where seen. RIGHT: There is nonocclusive thrombus adjacent to the PICC catheter in the right brachial vein and right subclavian vein this becomes occlusive in the distal brachial vein. There is nonocclusive thrombus in the right internal jugular vein. No superficial  thrombophlebitis. LEFT: There is occlusive thrombus in the left internal jugular vein and left innominate vein. There is occlusive superficial thrombus in the left cephalic vein.     IMPRESSION: No deep venous thrombosis in the bilateral upper extremities. [Critical Result: Bilateral upper extremity deep venous thrombosis] Finding was identified on 9/9/2022 4:03 PM. 1.  The patient's nurse, Shanita was contacted by me on 9/9/2022 4:05 PM and verbalized understanding of the critical result.     XR Chest Port 1 View    Result Date: 9/3/2022  EXAM: XR CHEST PORTABLE 1 VIEW LOCATION: [facility] DATE/TIME: 09/03/2022, 6:08 AM INDICATION: Status post unsuccessful left IJ placement, rule out pneumothorax.  COMPARISON: 04/22/2022.     IMPRESSION: Negative for pneumothorax. Normal heart size and pulmonary vascularity. Lungs are clear. Generalized osteopenia. Posterior idalia and pedicle screw fixation lower thoracic and lumbar spine.     IR Abscess Tube Change    Result Date: 9/8/2022  LOCATION: [facility] DATE: 9/8/2022 PROCEDURE: ABSCESS TUBE CHECK AND EXCHANGE INTERVENTIONAL RADIOLOGIST: Fer Gutierrez MD INDICATION: Right hip drain placement on 9/3/2022. Plan for exchange/upsize. CONSENT: The risks, benefits and alternatives of an abscess tube check with possible exchange, upsizing and/or removal were discussed with the patient or representative in detail. All questions were answered. Informed consent was given to proceed with the procedure. MODERATE SEDATION: None. CONTRAST: 25 cc Omnipaque ANTIBIOTICS: None. ADDITIONAL MEDICATIONS: None. FLUOROSCOPIC TIME: 0.5 minutes. RADIATION DOSE: Air Kerma: 3 mGy. COMPLICATIONS: No immediate complications. UNIVERSAL PROTOCOL: The operative site was marked and any prior imaging was reviewed. Required items including blood products, implants, devices and special equipment was made available. Patient identity was confirmed either verbally, with demographic information, hospital assigned identification or other identification markers. A timeout was performed immediately prior to the procedure. STERILE BARRIER TECHNIQUE: Maximum sterile barrier technique was used. Cutaneous antisepsis was performed at the operative site with application of 2% chlorhexidine and large sterile drape. Prior to the procedure, the  and assistant performed hand hygiene and wore hat, mask, sterile gown, and sterile gloves during the entire procedure. PROCEDURE:  Multiprojectional  images were obtained. The previous drainage catheter was injected with a small amount of contrast, and multiple images were obtained. Based on the results of the study, the drain was exchanged  over a guidewire for a 16 Chadian Fred drainage. The cavity was aggressively irrigated with saline. FINDINGS: Abscessogram demonstrates minimal residual abscess cavity. After exchange, the drain is appropriately positioned.     IMPRESSION:  Right hip drain check demonstrates appropriate positioning of the drain. There is minimal residual fluid. The drain was exchanged/upsized for a 16 Chadian Fred drainage. Irrigation of the cavity reveals serosanguineous fluid.    IR Abscess Tube Check    Result Date: 9/13/2022  LOCATION: [facility] DATE: 9/13/2022 PROCEDURE: ABSCESS TUBE CHECK INTERVENTIONAL RADIOLOGIST: Hugo Michael MD INDICATION: Right hip abscess. Indwelling percutaneous drain.. CONSENT: The procedure, risks and benefits of an abscessogram were discussed with the patient  in detail. All questions were answered. Informed consent was given to proceed with the procedure. MODERATE SEDATION: None. CONTRAST: Omnipaque 350: 20. mL. ANTIBIOTICS: None. ADDITIONAL MEDICATIONS: None. FLUOROSCOPIC TIME: 0.4 minutes RADIATION DOSE: Air Kerma: 4 mGy COMPLICATIONS: No immediate complications. PROCEDURE:  images were obtained. The existing drainage catheter was injected with contrast, and multiple images were obtained. The cavity was irrigated with 50 cc aliquots of normal saline which were then aspirated. Total irrigation volume was approximately 200 cc. Drain was reconnected to a DENIA bulb. FINDINGS: Adequately positioned, indwelling 16 Chadian Fred drain within the posterior right hip fluid collection..     IMPRESSION: 1.  Adequately positioned, indwelling 16 Chadian drainage catheter. PLAN: Continued DENIA bulb drainage with maintained flushing regimen..     IR Suprapubic Catheter Placment    Result Date: 9/11/2022  [facility] LOCATION: [facility] DATE: 9/10/2022 PROCEDURE: 1. Transstomal urinary catheter placement. INTERVENTIONAL RADIOLOGIST: Jonh Cline MD  HISTORY: 56 years-old Female with history of diverting urostomy. The patient had a catheter placed in the emergency room at an outside facility via direct trocar technique through the proximal aspect of the stoma. This catheter is malfunctioning. CONSENT: The risks, benefits and alternatives of the procedure were discussed with the patient  in detail. All questions were answered. Informed consent was given to proceed with the procedure. SEDATION: None.. CONTRAST: 15 mL ANTIBIOTICS: Patient receiving antibiotics ADDITIONAL MEDICATIONS: None. FLUOROSCOPIC TIME: 2.6 RADIATION DOSE: Air Kerma: 73 mGy. COMPLICATIONS: No immediate complications. STERILE BARRIER TECHNIQUE: Maximum sterile barrier technique was used. Cutaneous antisepsis was performed at the operative site with application of 2% chlorhexidine and large sterile drape. Prior to the procedure, the  and assistant performed hand hygiene and wore hat, mask, sterile gown, and sterile gloves during the entire procedure. PROCEDURE/FINDINGS: A  image was obtained. A 5 Spanish KMP catheter, with the aid of a 0.035 inch angled Glidewire, was advanced through the existing stoma tract into the diversion pouch. It was noted that the pre-existing catheter, while initially within the stoma, traverse through the side wall of the stoma directly into the pouch itself. The position of the newly placed catheter was confirmed by contrast injection. A 0.035 inch Amplatz wire was advanced through the catheter into the urinary diversion pouch. A 16 Spanish Iroquois tip catheter was advanced over the Amplatz wire. The balloon was inflated and contrast demonstration confirmed its position. The catheter was secured in place. The catheter was attached to a gravity drainage bag.     IMPRESSION: Placement of a transstomal urinary catheter via the existing stoma.     CT ABDOMEN PELVIS W CONTRAST    Result Date: 9/2/2022  EXAM: CT ABDOMEN PELVIS W CONTRAST LOCATION: [facility] DATE/TIME: 9/2/2022 7:16 PM INDICATION: Abdominal distension paraplegia difficulty passing catheter to neobladder COMPARISON: 12/18/2018. TECHNIQUE: CT scan of the abdomen and pelvis was performed following injection of IV contrast. Multiplanar reformats were obtained. Dose reduction techniques were used. CONTRAST: 50ml isovue 370 FINDINGS: LOWER CHEST: Atelectasis. 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 Nonocclusive thrombus in the proximal superior mesenteric vein. Multiple perigastric collateral vessels. PANCREAS: Normal. SPLEEN: Normal. ADRENAL GLANDS: Normal. KIDNEYS/BLADDER: There is mild right-sided pyelocaliectasis with normal caliber ureter. Dependent stone within the right renal pelvis. BOWEL: Descending colostomy with Franklin pouch. LYMPH NODES: Normal. VASCULATURE: Unremarkable. 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 MUSCULOSKELETAL: There is a very large complex peripherally enhancing fluid collection within the right buttock extending from the iliac crest down into the hip joints and into the right thigh. This is not completely imaged extending further into the thigh than is seen on the CT scan. Destructive changes in both hips. This fluid collection measures at least 20 x 14 cm scoliosis.     IMPRESSION: 1.  Significant distention of the neobladder which measures up 24 cm and extends into the right lower abdomen. 2.  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 Multiple perigastric venous collaterals. Nonocclusive thrombus in the proximal superior mesenteric vein. 3.  Franklin pouch with descending colostomy. 4.  Destructive changes both hips. There is a massive peripherally enhancing fluid collection in the right buttock, hip and thigh extending into the distal thigh. The distal extent of the fluid collection is not imaged  with CT. This collection measures at least 20 x 14 cm. 5.  There is mild dilatation of the right intrarenal collecting system with normal caliber ureter and an element of UPJ obstruction is possible. Dependent stone in the right renal pelvis. NOTE: ABNORMAL REPORT 1.  THE DICTATION ABOVE DESCRIBES AN ABNORMALITY FOR WHICH FOLLOW-UP IS NEEDED.     CT Abdomen Pelvis w/o Contrast    Result Date: 9/10/2022  EXAM: CT ABDOMEN PELVIS W/O CONTRAST LOCATION: [facility] DATE/TIME: 9/10/2022 2:53 PM INDICATION: concern for suprapubic catheter malposition, increased abdominal pain. Lower abdominal pain. COMPARISON: 9/4/2022 TECHNIQUE: CT scan of the abdomen and pelvis was performed without IV contrast. Multiplanar reformats were obtained. Dose reduction techniques were used. CONTRAST: None. FINDINGS: LOWER CHEST: Moderate bilateral pleural effusions have increased in size mildly. These appear free-flowing. Associated dependent atelectasis. HEPATOBILIARY: The liver is not well evaluated on this noncontrast exam. No significant interval change in appearance. PANCREAS: Normal. SPLEEN: Mild splenomegaly, unchanged. ADRENAL GLANDS: Normal. KIDNEYS/BLADDER: The kidneys are partially obscured by metal artifact spinal hardware. No hydronephrosis. Cystectomy with urinary diversion. There is a percutaneous catheter in the right abdomen with tip in the ileal conduit. Significant distention of the conduit has developed since yesterday suggesting malfunction of the catheter. BOWEL: Bowel loops are normal caliber. LYMPH NODES: Not well assessed on this noncontrast exam. VASCULATURE: Unremarkable. PELVIC ORGANS: The uterus is normal in size. MUSCULOSKELETAL: Generalized edema within the subcutaneous adipose tissues has increased mildly from 9/4/2022. A drain has been placed in the right hip joint and the effusion has significantly decreased in size. Deformity and degenerative changes of both  hips. Extensive spinal  hardware and associated artifact.     IMPRESSION: 1.  Cystectomy with ileal conduit urinary diversion. The conduit has become significantly distended from 9/4/2022. A percutaneous catheter terminates within the conduit. The distention suggests malfunction of the catheter such as plugging. No hydronephrosis of the kidneys. 2.  Moderate bilateral pleural effusions have increased in size mildly. 3.  Increase in mild generalized subcutaneous edema. 4.  Decrease in size of the right hip joint effusion. A drainage catheter is present within the joint space posteriorly.     CT Abdomen Pelvis w/o Contrast    Result Date: 9/4/2022  EXAM: CT ABDOMEN PELVIS W/O CONTRAST LOCATION: [facility] DATE/TIME: 9/4/2022 9:27 AM INDICATION: F U abscess with drain placement COMPARISON: 09/02/2022 TECHNIQUE: CT scan of the abdomen and pelvis was performed without IV contrast. Multiplanar reformats were obtained. Dose reduction techniques were used. CONTRAST: None. FINDINGS: LOWER CHEST: New small bilateral pleural effusions. HEPATOBILIARY: No significant mass or bile duct dilatation. Portal vein thrombus not visualized on the current examination without IV contrast, although periportal collateral vessels are noted. Cholecystectomy. PANCREAS: Normal. SPLEEN: Normal. ADRENAL GLANDS: Normal. KIDNEYS/BLADDER: Mild fullness of the renal pelves bilaterally, decreased compared to 09/02/2022. Unchanged nonobstructing calculus in the lower pole right kidney and in the dependent portion of the right renal pelvis. Urinary diversion in the right lower quadrant, which is no longer distended. Ostomy extends to the skin surface. BOWEL: Descending colostomy with Franklin pouch. LYMPH NODES: Normal. VASCULATURE: Unremarkable. PELVIC ORGANS: Unremarkable MUSCULOSKELETAL: Interval placement of percutaneous pigtail drain in the subcutaneous right hip/upper thigh fluid collection. Reliable size comparison limited due to noncontrast  technique on the current examination, but the collection has decreased in size. Largest axial component currently 11.7 x 7.4 cm, previously 20 x 14 cm. Osseous destruction in both hips. Thoracolumbar fusion. Diffuse muscular atrophy and osteopenia.     IMPRESSION: 1.  Decreased size of right buttock/thigh fluid collection following percutaneous drain placement. Largest remaining component measures 11.7 x 7.4 cm axially. 2.  New small bilateral pleural effusions. 3.  Neobladder is no longer distended.    Head CT w/o contrast    Result Date: 9/2/2022  EXAM: CT HEAD W/O CONTRAST LOCATION: [facility] DATE/TIME: 9/2/2022 7:16 PM INDICATION: ams COMPARISON: Head CT angiogram brain MRI 01/06/2020 TECHNIQUE: Routine CT Head without IV contrast. Multiplanar reformats. Dose reduction techniques were used. FINDINGS: INTRACRANIAL CONTENTS: No intracranial hemorrhage, extraaxial collection, or mass effect.  No CT evidence of acute infarct. Normal parenchymal attenuation. Normal ventricles and sulci. VISUALIZED ORBITS/SINUSES/MASTOIDS: No intraorbital abnormality. Moderate chronic mucosal thickening of the left maxillary sinus with associated frothy debris. Mild chronic mucosal thickening of the left sphenoid sinus. No middle ear or mastoid effusion. BONES/SOFT TISSUES: No acute abnormality. Chronic right medial orbital wall fracture.     IMPRESSION: 1.  No intracranial hemorrhage, mass lesions, hydrocephalus or CT evidence for an acute infarct. 2.  Chronic right medial orbital wall fracture.    CT Femur Thigh Bilateral wo Contrast    Result Date: 9/4/2022  EXAM: CT FEMUR THIGH BILATERAL WITHOUT CONTRAST LOCATION: [facility] DATE/TIME: 09/04/2022, 9:28 AM INDICATION: 56-year-old patient with a right hip-buttock abscess. COMPARISON: 09/04/2022 CT abdomen and pelvis. 09/02/2022 CT abdomen and pelvis. TECHNIQUE: Noncontrast. Axial, sagittal and coronal thin-section  reconstruction. Dose reduction techniques were used. FINDINGS: BONES AND JOINTS: -Advanced osteopenia. -Resection or resorption of the right medial ilium. Dysplastic right acetabulum. Resorption or resection of the right femoral head and greater trochanter. External rotation of the right femur. 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 Old fracture or osteotomy of the left femoral neck. Old healed fracture of the left femur proximal diaphysis. MUSCLES AND SOFT TISSUES: -Subcutaneous right hip-buttock fluid collection, with percutaneous pigtail catheter drainage. This collection measures 12 x 7 cm in greatest axial dimensions. -Fluid attenuation in the expected regions of the right vastus lateralis and adductor celia regions. The proximal margin of these collections have decreased in size since the 09/02/2022 exam. These collections both extend to the distal margin of the thigh, measuring up to 25 cm in length. -Advanced muscle fatty atrophy. OTHER: -See the dedicated abdomen-pelvis CT for further information.     IMPRESSION: 1.  Decreased size of the right hip-buttock fluid collection, status post percutaneous drain placement. This collection measures 12 x 7 cm in greatest axial dimensions. 2.  Decreased size of fluid collections (likely contiguous with the above) in the residual right vastus lateralis and adductor celia regions. These collections extend through the distal thigh, and measure roughly 25 cm in length.     CT Pelvis Soft Tissue w Contrast    Result Date: 9/21/2022  EXAM: CT PELVIS SOFT TISSUE W CONTRAST LOCATION: [facility] DATE/TIME: 9/21/2022 6:32 AM INDICATION: Right hip abscess. COMPARISON: 09/15/2022 TECHNIQUE: CT scan of the pelvis was performed with IV contrast. Multiplanar reformats were obtained. Dose reduction techniques were used. CONTRAST: Isovue 370    100ml FINDINGS: PELVIC ORGANS: Status post cystectomy. Redemonstrated right  lower quadrant ileal conduit urinary diversion, with a Ambrose catheter in place. Visualized kidneys are partially secured by streak artifact. No evidence for hydronephrosis. Redemonstrated Franklin's pouch. There is a bowel anastomosis at the level of the cecum. Left lower quadrant colostomy. Otherwise, visualized small bowel and colon appear unremarkable. Mild atherosclerotic calcifications. MUSCULOSKELETAL: Redemonstrated drainage catheter, with tip at the superior aspect of the right hip gas and fluid collection. The collection measures 5.5 x 3.5 cm, not significantly changed. Redemonstrated chronic deformities of the bilateral hips. Status post partial sacrectomy. Redemonstrated decubitus ulcer overlying the sacrum. No lytic change or erosion to suggest acute osteomyelitis. Severe atrophy of the visualized musculature. Diffuse subcutaneous edema of the bilateral flanks and proximal thighs. Spinal fusion hardware, incompletely assessed.     IMPRESSION: 1.  No significant change in right hip 5.5 cm gas and fluid collection, with drainage catheter tip along the superior margin.     CT Pelvis Soft Tissue w Contrast    Result Date: 9/15/2022  EXAM: CT PELVIS SOFT TISSUE W CONTRAST LOCATION: [facility] DATE/TIME: 9/15/2022 4:57 PM INDICATION: abscess COMPARISON: 09/10/2022 TECHNIQUE: CT scan of the pelvis was performed with IV contrast. Multiplanar reformats were obtained. Dose reduction techniques were used. CONTRAST: isovue 370  100ml FINDINGS: OTHER: Visualized portions of the liver, spleen, and pancreas are unremarkable, although incompletely evaluated. KIDNEYS/BLADDER: The kidneys are partially obscured by metal artifact spinal hardware. No hydronephrosis. Cystectomy with urinary diversion. Interval placement of large bore catheter alongside the indwelling catheter into the colonic diversion with decrease in the degree of distention that was seen on the prior CT. BOWEL: Visualized loops  are normal caliber. LYMPH NODES: No lymphadenopathy. VASCULATURE: Unremarkable. PELVIC ORGANS: Unremarkable MUSCULOSKELETAL: Right hip fluid collection is similar, with the residual fluid measuring 5.0 x 3.3 cm (2/106), previously 4.7 x 3.6 cm. Catheter tip is positioned along the superolateral aspect of the collection diffuse osteopenia and partially imaged spinal fusion hardware. Chronic hip deformities and diffuse body wall edema. Skin defect overlying the sacrum.     IMPRESSION: 1.  Interval placement of large bore catheter into urinary diversion with resolution of the distention that was seen on the prior CT. 2.  Similar size of right hip fluid collection. Catheter tip is along the superolateral margin.     IR PICC Vascular    Result Date: 9/6/2022  LOCATION: [facility] DATE: 9/6/2022 PROCEDURE: PERIPHERALLY INSERTED CENTRAL CATHETER (PICC) PLACEMENT. INTERVENTIONAL RADIOLOGIST: Hugo Michael MD. INDICATION: Right thigh abscess. Paraplegia. Decubitus wounds. Unsuccessful bedside PICC placement CONSENT: The procedure, risks and benefits of PICC placement were discussed with the patient  in detail. All questions were answered. Informed consent was given to proceed with the procedure. MODERATE SEDATION: None CONTRAST: Omnipaque 350: 5 cc ANTIBIOTICS: None. ADDITIONAL MEDICATIONS: None. FLUOROSCOPIC TIME: 6 minutes RADIATION DOSE: Air Kerma: 19 mGy COMPLICATIONS: No immediate complications. STERILE BARRIER TECHNIQUE: Maximum sterile barrier technique was used. Cutaneous antisepsis was performed at the operative site with application of 2% chlorhexidine and a full body sterile drape. Prior to the procedure, the  and assistant performed hand hygiene and wore hat, mask, sterile gown, and sterile gloves during the entire procedure. Ultrasound was prepped with a sterile probe cover and sterile gel was used. PROCEDURE/TECHNIQUE:   Using local anesthesia and real-time ultrasound guidance,  the right brachial vein was accessed. An 018 guidewire could not be advanced beyond the axillary vein. Over the guidewire the dilator/peel-away sheath of the Bard PICC set were advanced into the brachial vein. A 5 Turkish KMP catheter was advanced to the axillary vein. A central venogram was obtained. Using the KMP catheter, the 018 guidewires manipulated down to the superior vena cava. The 5 Turkish PICC could not be advanced due to the irregular  subclavian stenosis. Through the KMP catheter, the guidewire was exchanged for an 035 Lobo guidewire. A 5 Turkish, 25 cm sheath was advanced and placed with the tip at the axillary vein. The right subclavian vein was angioplastied using a 5 mm x 40 mm and less balloon. The 5 Turkish Kumpe catheter was used to exchange the guidewire for the 018 guidewire. Sheath was exchanged for the 5 Turkish peel-away sheath. Over the guidewire a 5 Turkish, dual lumen Bard power PICC was advanced until tip was at the right atrium. PICC was cut to 41 cm. The lumens aspirated and flushed adequately. FINDINGS: Ultrasound demonstrates a patent and compressible right brachial vein. Images were recorded. Right central venogram demonstrates diffuse, irregular moderate stenosis throughout the right subclavian vein. The completion fluoroscopic demonstrates a right upper extremity PICC with its tip at the right atrium.     IMPRESSION:  1.  Successful right upper extremity CT injectable PICC placement. 2.  Diffuse right subclavian vein stenosis. Angioplastied to 5 mm to allow PICC placement..     KUB XR    Result Date: 9/3/2022  EXAM: XR KUB LOCATION: [facility] DATE/TIME: 9/3/2022 5:59 AM INDICATION: s p femoral line placement COMPARISON: 01/06/2020     IMPRESSION: Left-sided femoral catheter has been placed terminating in the midline at the lumbosacral junction. Visualized bowel gas pattern is nonobstructive. Postoperative changes in the thoracolumbar spine. Prior right  hip resection. Diffuse osteopenia. Right lower quadrant bowel anastomotic suture line. Multiple clips over the pelvis.    IR Suprapubic Catheter Change    Result Date: 9/18/2022  [facility] LOCATION: [facility] DATE: 9/18/2022 PROCEDURE: FLUOROSCOPIC GUIDED UROSTOMY (SUPRAPUBIC) CATHETER EXCHANGE INTERVENTIONAL RADIOLOGIST: Dennis Branch MD. INDICATION: 56-year-old female with history of cystectomy with right lower quadrant colonic urostomy. The patient is experiencing significant peritubal leakage and abdominal distention. There is concern that the urostomy may be occluded or malposition. MODERATE SEDATION: None. CONTRAST: 20 mL Omnipaque into the urinary bladder. ANTIBIOTICS: None. ADDITIONAL MEDICATIONS: None. FLUOROSCOPIC TIME: 0.7 minutes. RADIATION DOSE: Air Kerma: 24 mGy. COMPLICATIONS: No immediate complications. STERILE BARRIER TECHNIQUE: Maximum sterile barrier technique was used. Cutaneous antisepsis was performed at the operative site with application of 2% chlorhexidine and large sterile drape. Prior to the procedure, the  and assistant performed hand hygiene and wore hat, mask, sterile gown, and sterile gloves during the entire procedure. PROCEDURE:  Under fluoroscopic guidance, the ureterostomy catheter was injected with contrast and images were obtained. The tube was then exchanged over a wire for a new 16 [place]-tip catheter which was positioned under fluoroscopic guidance with its tip in the colonic conduit lumen. The retention balloon was inflated with 10 mL of saline. A post placement contrast injection through the catheter was performed. FINDINGS: On physical examination the patient has a right lower quadrant urostomy with a transversing 16 [place] tip catheter. Running parallel to this through the same stoma is a smaller clear tube that is approximately 10-12 [place] in diameter. The contrast injection through the ureterostomy reveals that  "this tubing is patent however the tubing tip is enveloped within colonic folds providing suboptimal drainage. Following exchange and repositioning the tip of the tube now resides within the main gravity-dependent portion of the pouch. Approximately 1200 mL of urine sediment immediately spontaneously drained through the catheter.     IMPRESSION:  1.  The existing ureterostomy catheter tip was involved within loops of colonic folds within the pouch providing suboptimal drainage. 2.  Following exchange and repositioning the new 16 [place]-tip ureterostomy controls the fluid collection well. 3.  The patient also has a parallel tube entering through the same stoma. At times , parallel tubes entering through the same percutaneous access site can have a propensity to \"wick\" fluid between the tubing and result in leakage.     IR Suprapubic Catheter Change    Result Date: 9/13/2022  EXAM: IR SUPRAPUBIC CATHETER CHANGE LOCATION: [facility] DATE/TIME: 9/13/2022 4:08 PM INDICATION: [place] tip Ambrose catheter through the urostomy site into the colonic diversion. Significant pericatheter leakage. A second, parallel clear tubing into the site. INTERVENTIONAL RADIOLOGIST: Hugo Michael MD. CONSENT: The procedure, risks and benefits suprapubic catheter injection with possible exchange were discussed with the patient  in detail. All questions were answered. Informed consent was given to proceed with the procedure. MODERATE SEDATION: None CONTRAST: Omnipaque 350: 80 cc ANTIBIOTICS: None ADDITIONAL MEDICATIONS: None. FLUOROSCOPIC TIME: 1.6 minutes. RADIATION DOSE: Air Kerma: 26 mGy COMPLICATIONS: No immediate complications. STERILE BARRIER TECHNIQUE: Maximum sterile barrier technique was used. Cutaneous antisepsis was performed at the operative site with application of 2% chlorhexidine and a full body sterile drape. Prior to the procedure, the  and assistant performed hand hygiene and wore hat, " mask, sterile gown, and sterile gloves during the entire procedure. PROCEDURE/TECHNIQUE: Contrast was injected through the indwelling 16 [place] tip Ambrose catheter. The catheter is the proximal aspect of the colonic diversion with the tip abutting a side wall. Retention stitches were cut. Retention balloon was deflated and catheter advanced into a more central position within the colonic divergent. Retention balloon was inflated with 10 cc normal saline. Contrast was through the parallel clear tubing, which has its tip within the superior aspect of the colonic  diversion. Both catheter was secured in place with 2-0 Ethilon stitches. 16 [place] tip catheter was connected to gravity drainage. Clear catheter was capped. FINDINGS: 1. Initial contrast injection through the 16 [place] tip Ambrose catheter demonstrates its tip at the inferior aspect of the colonic diversion with the tip abutting a sidewall. Completion fluoroscopic image demonstrates the advanced catheter with the balloon inflated within the superior aspect of the colonic diversion. 2. Contrast injection through the parallel, cleared tubing demonstrate of the tract courses directly into the superior aspect of the colonic diversion. Tip is within the lumen of the colonic diversion.     IMPRESSION:  1.  Urostomy Ambrose catheter repositioned, as described above. PLAN: Continued gravity drainage.

## 2022-09-21 NOTE — PROGRESS NOTES
Progress Note    Assessment/Plan  Patient reasonably comfortable today.  CT scan noted.  No significant change in results of CT scan.  Right hip drain stable.  Continue drain at this point.  Abdomen benign.  Bowel sounds present.  Observe and continue present care.  CT personally reviewed with radiology as well.  Patient has dead space at right hip area.  We will continue current drainage.  May need aggressive debridement in the future.    Active Problems:    Septic shock (H)      Subjective  Reasonably comfortable  Objective    Vital signs in last 24 hours  Temp:  [98.2  F (36.8  C)-98.6  F (37  C)] 98.3  F (36.8  C)  Pulse:  [57-93] 57  Resp:  [16] 16  BP: ()/(51-61) 109/57  SpO2:  [93 %-98 %] 98 %  Weight:   [unfilled]    Intake/Output last 3 shifts  I/O last 3 completed shifts:  In: 1150 [P.O.:1150]  Out: 2155 [Urine:2000; Drains:155]  Intake/Output this shift:  I/O this shift:  In: -   Out: 40 [Drains:40]      Physical Exam  Abdomen is benign.  Right hip drain with serous drainage currently.  Presacral wounds covered.  No significant change no spreading cellulitis no evidence of necrotizing fasciitis.    Pertinent Labs   Lab Results   Component Value Date    WBC 2.2 (L) 09/21/2022    HGB 7.4 (L) 09/21/2022    HCT 24.3 (L) 09/21/2022    MCV 93 09/21/2022     (L) 09/21/2022             Pertinent Radiology     [unfilled]        Reji Hunter MD

## 2022-09-21 NOTE — PLAN OF CARE
Problem: Plan of Care - These are the overarching goals to be used throughout the patient stay.    Goal: Plan of Care Review/Shift Note  Description: The Plan of Care Review/Shift note should be completed every shift.  The Outcome Evaluation is a brief statement about your assessment that the patient is improving, declining, or no change.  This information will be displayed automatically on your shift note.  Outcome: Ongoing, Progressing  Flowsheets (Taken 9/21/2022 1003)  Plan of Care Reviewed With: patient     Problem: Plan of Care - These are the overarching goals to be used throughout the patient stay.    Goal: Absence of Hospital-Acquired Illness or Injury  Intervention: Prevent Skin Injury  Recent Flowsheet Documentation  Taken 9/21/2022 0840 by Alisa Way, RN  Body Position: turned   Goal Outcome Evaluation:-reposition every 2 hours  Problem: UTI (Urinary Tract Infection)  Goal: Improved Infection Symptoms  Outcome: Ongoing, Progressing-monitoring output  Problem: Malnutrition  Goal: Improved Nutritional Intake  Outcome: Ongoing, Progressing-encourage to eat meals and supplements  Problem: Anemia  Goal: Anemia Symptom Improvement  Outcome: Ongoing, Progressing  Intervention: Monitor and Manage Anemia  Recent Flowsheet Documentation  Taken 9/21/2022 0840 by Alisa Way, RN  Safety Promotion/Fall Prevention: bed alarm on-monitroing daily  Problem: Pain Acute  Goal: Acceptable Pain Control and Functional Ability  Outcome: Ongoing, Progressing-understands pain scale (0-10) and medicate as per mar                Plan of Care Reviewed With: patient

## 2022-09-22 ENCOUNTER — ANESTHESIA EVENT (OUTPATIENT)
Dept: SURGERY | Facility: HOSPITAL | Age: 57
DRG: 853 | End: 2022-09-22
Payer: MEDICARE

## 2022-09-22 ENCOUNTER — HOME INFUSION (PRE-WILLOW HOME INFUSION) (OUTPATIENT)
Dept: PHARMACY | Facility: CLINIC | Age: 57
End: 2022-09-22

## 2022-09-22 LAB
CREAT SERPL-MCNC: 0.27 MG/DL (ref 0.51–0.95)
CRP SERPL-MCNC: 16.5 MG/L
GFR SERPL CREATININE-BSD FRML MDRD: >90 ML/MIN/1.73M2

## 2022-09-22 PROCEDURE — 258N000003 HC RX IP 258 OP 636: Performed by: INTERNAL MEDICINE

## 2022-09-22 PROCEDURE — 250N000013 HC RX MED GY IP 250 OP 250 PS 637: Performed by: INTERNAL MEDICINE

## 2022-09-22 PROCEDURE — 86140 C-REACTIVE PROTEIN: CPT | Performed by: INTERNAL MEDICINE

## 2022-09-22 PROCEDURE — 250N000013 HC RX MED GY IP 250 OP 250 PS 637: Performed by: NURSE PRACTITIONER

## 2022-09-22 PROCEDURE — 250N000011 HC RX IP 250 OP 636: Performed by: INTERNAL MEDICINE

## 2022-09-22 PROCEDURE — 80177 DRUG SCRN QUAN LEVETIRACETAM: CPT | Performed by: INTERNAL MEDICINE

## 2022-09-22 PROCEDURE — 99232 SBSQ HOSP IP/OBS MODERATE 35: CPT | Performed by: INTERNAL MEDICINE

## 2022-09-22 PROCEDURE — 120N000001 HC R&B MED SURG/OB

## 2022-09-22 PROCEDURE — 82565 ASSAY OF CREATININE: CPT | Performed by: STUDENT IN AN ORGANIZED HEALTH CARE EDUCATION/TRAINING PROGRAM

## 2022-09-22 RX ORDER — DIPHENHYDRAMINE HCL 25 MG
25 CAPSULE ORAL EVERY 6 HOURS PRN
Status: DISCONTINUED | OUTPATIENT
Start: 2022-09-22 | End: 2022-09-28 | Stop reason: HOSPADM

## 2022-09-22 RX ORDER — OXYCODONE HYDROCHLORIDE 5 MG/1
5-10 TABLET ORAL EVERY 4 HOURS PRN
Status: DISCONTINUED | OUTPATIENT
Start: 2022-09-22 | End: 2022-09-28 | Stop reason: HOSPADM

## 2022-09-22 RX ADMIN — TRAZODONE HYDROCHLORIDE 100 MG: 50 TABLET ORAL at 21:46

## 2022-09-22 RX ADMIN — OXYCODONE HYDROCHLORIDE 10 MG: 5 TABLET ORAL at 15:47

## 2022-09-22 RX ADMIN — LEVETIRACETAM 500 MG: 500 TABLET, FILM COATED ORAL at 21:46

## 2022-09-22 RX ADMIN — BUMETANIDE 0.5 MG: 0.5 TABLET ORAL at 09:19

## 2022-09-22 RX ADMIN — NYSTATIN: 100000 OINTMENT TOPICAL at 21:47

## 2022-09-22 RX ADMIN — LEVETIRACETAM 500 MG: 500 TABLET, FILM COATED ORAL at 09:19

## 2022-09-22 RX ADMIN — PANTOPRAZOLE SODIUM 40 MG: 40 TABLET, DELAYED RELEASE ORAL at 06:23

## 2022-09-22 RX ADMIN — NYSTATIN: 100000 OINTMENT TOPICAL at 10:12

## 2022-09-22 RX ADMIN — OXYCODONE HYDROCHLORIDE 10 MG: 5 TABLET ORAL at 20:34

## 2022-09-22 RX ADMIN — THERA TABS 1 TABLET: TAB at 09:19

## 2022-09-22 RX ADMIN — ENOXAPARIN SODIUM 60 MG: 60 INJECTION SUBCUTANEOUS at 20:36

## 2022-09-22 RX ADMIN — ZOLPIDEM TARTRATE 2.5 MG: 5 TABLET ORAL at 21:45

## 2022-09-22 RX ADMIN — VANCOMYCIN HYDROCHLORIDE 750 MG: 5 INJECTION, POWDER, LYOPHILIZED, FOR SOLUTION INTRAVENOUS at 04:07

## 2022-09-22 RX ADMIN — FOLIC ACID 1 MG: 1 TABLET ORAL at 09:19

## 2022-09-22 RX ADMIN — THIAMINE HCL TAB 100 MG 100 MG: 100 TAB at 09:19

## 2022-09-22 RX ADMIN — OXYCODONE HYDROCHLORIDE 5 MG: 5 TABLET ORAL at 10:27

## 2022-09-22 RX ADMIN — ENOXAPARIN SODIUM 60 MG: 60 INJECTION SUBCUTANEOUS at 09:19

## 2022-09-22 RX ADMIN — VANCOMYCIN HYDROCHLORIDE 750 MG: 5 INJECTION, POWDER, LYOPHILIZED, FOR SOLUTION INTRAVENOUS at 15:41

## 2022-09-22 RX ADMIN — DIPHENHYDRAMINE HYDROCHLORIDE 25 MG: 25 CAPSULE ORAL at 20:35

## 2022-09-22 RX ADMIN — OXYCODONE HYDROCHLORIDE 5 MG: 5 TABLET ORAL at 10:10

## 2022-09-22 ASSESSMENT — ACTIVITIES OF DAILY LIVING (ADL)
ADLS_ACUITY_SCORE: 49
ADLS_ACUITY_SCORE: 53
ADLS_ACUITY_SCORE: 49
ADLS_ACUITY_SCORE: 53
ADLS_ACUITY_SCORE: 49
ADLS_ACUITY_SCORE: 49

## 2022-09-22 NOTE — PLAN OF CARE
Patient is A&Ox4. She c/o pain at start of shift. Pain is relieved with prn oral pain meds. Patient is turned and repositioned q 2hrs. She does turn onto back soon after repositioning. Dressing on coccyx changed. Wound bed is pink. Moderate amount of serosanguinous drainage from wound. Given prn Ambien before sleep.  Problem: Risk for Delirium  Goal: Improved Attention and Thought Clarity  Outcome: Ongoing, Progressing     Problem: UTI (Urinary Tract Infection)  Goal: Improved Infection Symptoms  Outcome: Ongoing, Progressing     Problem: Impaired Wound Healing  Goal: Optimal Wound Healing  Outcome: Ongoing, Progressing  Intervention: Promote Wound Healing  Recent Flowsheet Documentation  Taken 9/21/2022 1900 by Tricia Krishnan RN  Activity Management: activity adjusted per tolerance  Taken 9/21/2022 1614 by Tricia Krishnan RN  Pain Management Interventions: emotional support  Activity Management: activity adjusted per tolerance     Problem: Pain Acute  Goal: Acceptable Pain Control and Functional Ability  Outcome: Ongoing, Progressing  Intervention: Develop Pain Management Plan  Recent Flowsheet Documentation  Taken 9/21/2022 1614 by Tricia Krishnan RN  Pain Management Interventions: emotional support  Intervention: Prevent or Manage Pain  Recent Flowsheet Documentation  Taken 9/21/2022 1900 by Tricia Krishnan RN  Medication Review/Management: medications reviewed     Problem: Plan of Care - These are the overarching goals to be used throughout the patient stay.    Goal: Absence of Hospital-Acquired Illness or Injury  Intervention: Identify and Manage Fall Risk  Recent Flowsheet Documentation  Taken 9/21/2022 1900 by Tricia Krishnan RN  Safety Promotion/Fall Prevention: bed alarm on  Intervention: Prevent and Manage VTE (Venous Thromboembolism) Risk  Recent Flowsheet Documentation  Taken 9/21/2022 1900 by Tricia Krishnan RN  Activity Management: activity adjusted per tolerance  Taken 9/21/2022 1614 by Eulogio  Tricia, RN  Activity Management: activity adjusted per tolerance  Intervention: Prevent Infection  Recent Flowsheet Documentation  Taken 9/21/2022 1900 by Tricia Krishnan, RN  Infection Prevention: hand hygiene promoted  Goal: Optimal Comfort and Wellbeing  Intervention: Monitor Pain and Promote Comfort  Recent Flowsheet Documentation  Taken 9/21/2022 1614 by Tricia Krishnan, RN  Pain Management Interventions: emotional support  Intervention: Provide Person-Centered Care  Recent Flowsheet Documentation  Taken 9/21/2022 1900 by Tricia Krishnan, RN  Trust Relationship/Rapport: care explained     Problem: Anemia  Goal: Anemia Symptom Improvement  Intervention: Monitor and Manage Anemia  Recent Flowsheet Documentation  Taken 9/21/2022 1900 by Tricia Krishnan, RN  Safety Promotion/Fall Prevention: bed alarm on   Goal Outcome Evaluation:

## 2022-09-22 NOTE — PROGRESS NOTES
Pt has a white pt belongings bag in her closet holding many, many Ensure and cola drinks. She told me that she is drinking Ensure with meals though there are about 20 in that bag.
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
## 2022-09-22 NOTE — PROGRESS NOTES
Care Management Follow Up    Length of Stay (days): 19    Expected Discharge Date: 09/23/2022     Concerns to be Addressed:  discharge planning, care progression     Patient plan of care discussed at interdisciplinary rounds: Yes    Anticipated Discharge Disposition:  Home at Assisted Living Facility     Anticipated Discharge Services:  IV abx infusion  Anticipated Discharge DME:  Possible wound VAC    Patient/family educated on Medicare website which has current facility and service quality ratings:    Education Provided on the Discharge Plan:    Patient/Family in Agreement with the Plan:      Referrals Placed by CM/SW:  Home Infusion  Private pay costs discussed: Not at this time    Additional Information:  Chart reviewed and plan of care discussed with hospitalist.  Surgery is considering options and need to know if pt's staff at her Assisted Living Facility ([facility]) can manage a DENIA drain.  Call placed to [facility] and left message for RN requesting return call.    1:25 PM  Received call back from Sabiha at [facility] in [place], [phone].  She states their staff have not had training and cannot manage a drain. Discussed potential plan for debridement and wound VAC placement.  She states staff has been trained in wound VACs and can manage that.    Also, discussed IV abx infusions.  She states their staff cannot manage IVs, but would be ok for pt to return to [facility] as long as a home care agency manages the infusions.    Received call from [facility] office, stating pt has 100% coverage for IV abx infusions, except for a $3.55 monthly deductible charge.    Contacted Anu Coats from [facility] with updates.  Awaiting response regarding coordination of services.    1:59 PM  Received call from [facility]. They have been provided wound care services 3 times/wk. Per Intake they are having staffing issues and can no longer provide this frequency of visits.  They suggest possibly contacting  [facility] if home care visits for wound care are still needed.    3:29 PM  Met with pt to discuss discharge planning, along with Anu Coats from [facility].  Pt states she has managed home abx infusions herself in the past and feels comfortable managing this again.   Provided update that [facility] will not be able to manage her drain at her facility.  Pt indicates she would be inclined to have surgery and wound VAC placed.  Pt adamant she does not want TCU placement.  Explained update from [facility] not being able to provide wound care services 3x/wk any longer. [facility] recommends contacting [facility].    Call placed to Sabiha at [facility], in coordination with Anu Coats.  Sabiha indicates they would be ok with pt returning as long as all IV management is provided by [facility].  S[facility]ha confirms their staff are trained and can provide wound care if pt has a wound VAC and will not need home care for wound care.  Sabiha would like to know where pt will follow up for wound care visits and states there is a wound center at [facility], which would be closer for pt.    Anu will contact [facility] office and ask them to follow up with [facility] to see if they can provide RN visits for IV infusion/line care, etc rather than wound care.    Theresa Michel RN

## 2022-09-22 NOTE — PROGRESS NOTES
Therapy: IV ABX  Insurance: [employer]/[place] Medicaid    Patient has 100% coverage for IV ABX between their [employer] and [place] Medicaid insurance with a $3.55 monthly deductible.    [facility] in reference to admission date 09/03/2022 to check IV ABX coverage.    Please contact Intake with any questions, [phone] or In Basket pool, [facility] ([phone]).

## 2022-09-22 NOTE — PLAN OF CARE
Occupational Therapy Discharge Summary    Reason for therapy discharge:    P    Progress towards therapy goal(s). See goals on Care Plan in [facility] electronic health record for goal details. Pt has been declining functional adl's.      Therapy recommendation(s):    No further therapy is recommended.    Goal Outcome Evaluation:       pt states she needs her w/c which is not here/declined to sit eob, grooming/pt appears to do what she wants. Will dc patient . Pt will need 24/7 supervision and assist of 1-2 for transfers/adl's

## 2022-09-22 NOTE — PROVIDER NOTIFICATION
Texted Dr Zimmerman:      Total urine output via SP tubes = 50mL. Copius amount of urine bypassed tubes. Bladder scan now = 309 mL.

## 2022-09-22 NOTE — PLAN OF CARE
"  Problem: Plan of Care - These are the overarching goals to be used throughout the patient stay.    Goal: Plan of Care Review/Shift Note  Description: The Plan of Care Review/Shift note should be completed every shift.  The Outcome Evaluation is a brief statement about your assessment that the patient is improving, declining, or no change.  This information will be displayed automatically on your shift note.  Outcome: Ongoing, Progressing  Goal: Patient-Specific Goal (Individualized)  Description: You can add care plan individualizations to a care plan. Examples of Individualization might be:  \"Parent requests to be called daily at 9am for status\", \"I have a hard time hearing out of my right ear\", or \"Do not touch me to wake me up as it startles me\".  Outcome: Ongoing, Progressing  Goal: Absence of Hospital-Acquired Illness or Injury  Outcome: Ongoing, Progressing  Intervention: Identify and Manage Fall Risk  Recent Flowsheet Documentation  Taken 9/22/2022 0140 by Mary Flores, RN  Safety Promotion/Fall Prevention:   lighting adjusted   fall prevention program maintained  Intervention: Prevent Skin Injury  Recent Flowsheet Documentation  Taken 9/22/2022 0140 by Mary Flores, RN  Body Position:   turned   left   heels elevated  Intervention: Prevent and Manage VTE (Venous Thromboembolism) Risk  Recent Flowsheet Documentation  Taken 9/22/2022 0140 by Mary Flores, RN  Activity Management: activity adjusted per tolerance  Goal: Optimal Comfort and Wellbeing  Outcome: Ongoing, Progressing  Goal: Readiness for Transition of Care  Outcome: Ongoing, Progressing     Problem: Risk for Delirium  Goal: Optimal Coping  Outcome: Ongoing, Progressing  Goal: Improved Behavioral Control  Outcome: Ongoing, Progressing  Goal: Improved Attention and Thought Clarity  Outcome: Ongoing, Progressing  Goal: Improved Sleep  Outcome: Ongoing, Progressing     Problem: UTI (Urinary Tract Infection)  Goal: Improved Infection " Symptoms  Outcome: Ongoing, Progressing     Problem: Malnutrition  Goal: Improved Nutritional Intake  Outcome: Ongoing, Progressing     Problem: Impaired Wound Healing  Goal: Optimal Wound Healing  Outcome: Ongoing, Progressing  Intervention: Promote Wound Healing  Recent Flowsheet Documentation  Taken 9/22/2022 0140 by Mary Flores, RN  Activity Management: activity adjusted per tolerance     Problem: Infection  Goal: Absence of Infection Signs and Symptoms  Outcome: Ongoing, Progressing     Problem: VTE (Venous Thromboembolism)  Goal: VTE (Venous Thromboembolism) Symptom Resolution  Outcome: Ongoing, Progressing     Problem: Fluid Volume Excess  Goal: Fluid Balance  Outcome: Ongoing, Progressing     Problem: Anemia  Goal: Anemia Symptom Improvement  Outcome: Ongoing, Progressing  Intervention: Monitor and Manage Anemia  Recent Flowsheet Documentation  Taken 9/22/2022 0140 by Mary Flores, RN  Safety Promotion/Fall Prevention:   lighting adjusted   fall prevention program maintained     Problem: Pain Acute  Goal: Acceptable Pain Control and Functional Ability  Outcome: Ongoing, Progressing   Goal Outcome Evaluation:      Pt alert without c/o pain this shift. Pt wanting to sleep and cares clustered. Suprapubic insert site dressing saturated in urine. Red tube suprapubic draining well. Other suprapubic not putting out urine ; it flushed easily. DENIA irrigated and continuing to producing output. T/R as pt will allow. Will monitor.

## 2022-09-22 NOTE — PROGRESS NOTES
Progress Note    Assessment/Plan  Patient clinically stable with 2 drains in place 1 in the medial bladder and one in the right hip area of abscess and infection.  They are both functioning.  Some drainage around the urinary catheter.  Hip drain is well controlled with small amount of output.  Recent CT reviewed with the patient.  Persistent abscess where the previous hip surgery was done.  No hip joint left in this location.  We will communicate with care facility in order to determine whether or not they will take her back with a drain in that hip location.  The alternative is debridement and placement of a VAC dressing and patient is familiar with this from previous presacral wounds.  Communication will be made to the care facility by charge nurse and discharge planning team with communication to the care facility.    Active Problems:    Septic shock (H)      Subjective  Patient reasonably comfortable.  No significant change in clinical status.  Tolerating oral intake.  Discomfort minimal drain sites.  Small amount of drainage to the abdominal catheter to the neobladder.  Objective    Vital signs in last 24 hours  Temp:  [97.5  F (36.4  C)-98.2  F (36.8  C)] 98.2  F (36.8  C)  Pulse:  [] 86  Resp:  [16-18] 18  BP: ()/(57-72) 113/57  SpO2:  [93 %-99 %] 95 %  Weight:   [unfilled]    Intake/Output last 3 shifts  I/O last 3 completed shifts:  In: 800 [P.O.:800]  Out: 1135 [Urine:1000; Drains:135]  Intake/Output this shift:  No intake/output data recorded.      Physical Exam  Exam indicates no significant change presacral right hip catheter abdominal and right hip location with edema of the right hip and upper leg.  Catheter drainage is limited and serous at this time.    Pertinent Labs   Lab Results   Component Value Date    WBC 2.2 (L) 09/21/2022    HGB 7.4 (L) 09/21/2022    HCT 24.3 (L) 09/21/2022    MCV 93 09/21/2022     (L) 09/21/2022             Pertinent Radiology      [unfilled]        Reji Hunter MD

## 2022-09-23 ENCOUNTER — ANESTHESIA (OUTPATIENT)
Dept: SURGERY | Facility: HOSPITAL | Age: 57
DRG: 853 | End: 2022-09-23
Payer: MEDICARE

## 2022-09-23 LAB
ABO/RH(D): NORMAL
ALBUMIN SERPL BCG-MCNC: 2 G/DL (ref 3.5–5.2)
ALP SERPL-CCNC: 92 U/L (ref 35–104)
ALT SERPL W P-5'-P-CCNC: 6 U/L (ref 10–35)
ANION GAP SERPL CALCULATED.3IONS-SCNC: 8 MMOL/L (ref 7–15)
ANTIBODY SCREEN: NEGATIVE
AST SERPL W P-5'-P-CCNC: 13 U/L (ref 10–35)
BASOPHILS # BLD AUTO: 0 10E3/UL (ref 0–0.2)
BASOPHILS # BLD AUTO: 0 10E3/UL (ref 0–0.2)
BASOPHILS NFR BLD AUTO: 0 %
BASOPHILS NFR BLD AUTO: 0 %
BILIRUB SERPL-MCNC: <0.2 MG/DL
BLD PROD TYP BPU: NORMAL
BLOOD COMPONENT TYPE: NORMAL
BUN SERPL-MCNC: 10.5 MG/DL (ref 6–20)
CALCIUM SERPL-MCNC: 7.6 MG/DL (ref 8.6–10)
CHLORIDE SERPL-SCNC: 108 MMOL/L (ref 98–107)
CODING SYSTEM: NORMAL
CREAT SERPL-MCNC: 0.27 MG/DL (ref 0.51–0.95)
CREAT SERPL-MCNC: 0.31 MG/DL (ref 0.51–0.95)
CROSSMATCH: NORMAL
DEPRECATED HCO3 PLAS-SCNC: 24 MMOL/L (ref 22–29)
EOSINOPHIL # BLD AUTO: 0.2 10E3/UL (ref 0–0.7)
EOSINOPHIL # BLD AUTO: 0.2 10E3/UL (ref 0–0.7)
EOSINOPHIL NFR BLD AUTO: 5 %
EOSINOPHIL NFR BLD AUTO: 6 %
ERYTHROCYTE [DISTWIDTH] IN BLOOD BY AUTOMATED COUNT: 18.6 % (ref 10–15)
ERYTHROCYTE [DISTWIDTH] IN BLOOD BY AUTOMATED COUNT: 19.4 % (ref 10–15)
GFR SERPL CREATININE-BSD FRML MDRD: >90 ML/MIN/1.73M2
GFR SERPL CREATININE-BSD FRML MDRD: >90 ML/MIN/1.73M2
GLUCOSE SERPL-MCNC: 87 MG/DL (ref 70–99)
GRAM STAIN RESULT: NORMAL
GRAM STAIN RESULT: NORMAL
HCT VFR BLD AUTO: 25.6 % (ref 35–47)
HCT VFR BLD AUTO: 32.5 % (ref 35–47)
HGB BLD-MCNC: 10.1 G/DL (ref 11.7–15.7)
HGB BLD-MCNC: 7.7 G/DL (ref 11.7–15.7)
IMM GRANULOCYTES # BLD: 0 10E3/UL
IMM GRANULOCYTES # BLD: 0 10E3/UL
IMM GRANULOCYTES NFR BLD: 0 %
IMM GRANULOCYTES NFR BLD: 1 %
ISSUE DATE AND TIME: NORMAL
LACTATE SERPL-SCNC: 1 MMOL/L (ref 0.7–2)
LEVETIRACETAM SERPL-MCNC: 28.3 ΜG/ML (ref 10–40)
LYMPHOCYTES # BLD AUTO: 1.1 10E3/UL (ref 0.8–5.3)
LYMPHOCYTES # BLD AUTO: 1.3 10E3/UL (ref 0.8–5.3)
LYMPHOCYTES NFR BLD AUTO: 25 %
LYMPHOCYTES NFR BLD AUTO: 37 %
MCH RBC QN AUTO: 27.9 PG (ref 26.5–33)
MCH RBC QN AUTO: 28.9 PG (ref 26.5–33)
MCHC RBC AUTO-ENTMCNC: 30.1 G/DL (ref 31.5–36.5)
MCHC RBC AUTO-ENTMCNC: 31.1 G/DL (ref 31.5–36.5)
MCV RBC AUTO: 93 FL (ref 78–100)
MCV RBC AUTO: 93 FL (ref 78–100)
MONOCYTES # BLD AUTO: 0.2 10E3/UL (ref 0–1.3)
MONOCYTES # BLD AUTO: 0.3 10E3/UL (ref 0–1.3)
MONOCYTES NFR BLD AUTO: 6 %
MONOCYTES NFR BLD AUTO: 6 %
NEUTROPHILS # BLD AUTO: 1.8 10E3/UL (ref 1.6–8.3)
NEUTROPHILS # BLD AUTO: 2.9 10E3/UL (ref 1.6–8.3)
NEUTROPHILS NFR BLD AUTO: 50 %
NEUTROPHILS NFR BLD AUTO: 64 %
NRBC # BLD AUTO: 0 10E3/UL
NRBC # BLD AUTO: 0 10E3/UL
NRBC BLD AUTO-RTO: 0 /100
NRBC BLD AUTO-RTO: 0 /100
PLATELET # BLD AUTO: 124 10E3/UL (ref 150–450)
PLATELET # BLD AUTO: 149 10E3/UL (ref 150–450)
POTASSIUM SERPL-SCNC: 3.5 MMOL/L (ref 3.4–5.3)
PROT SERPL-MCNC: 5.1 G/DL (ref 6.4–8.3)
RBC # BLD AUTO: 2.76 10E6/UL (ref 3.8–5.2)
RBC # BLD AUTO: 3.5 10E6/UL (ref 3.8–5.2)
SODIUM SERPL-SCNC: 140 MMOL/L (ref 136–145)
SPECIMEN EXPIRATION DATE: NORMAL
UNIT ABO/RH: NORMAL
UNIT NUMBER: NORMAL
UNIT STATUS: NORMAL
UNIT TYPE ISBT: 600
VANCOMYCIN SERPL-MCNC: 13.5 UG/ML
WBC # BLD AUTO: 3.4 10E3/UL (ref 4–11)
WBC # BLD AUTO: 4.5 10E3/UL (ref 4–11)

## 2022-09-23 PROCEDURE — P9016 RBC LEUKOCYTES REDUCED: HCPCS | Performed by: ANESTHESIOLOGY

## 2022-09-23 PROCEDURE — 258N000003 HC RX IP 258 OP 636: Performed by: NURSE ANESTHETIST, CERTIFIED REGISTERED

## 2022-09-23 PROCEDURE — 99232 SBSQ HOSP IP/OBS MODERATE 35: CPT | Performed by: INTERNAL MEDICINE

## 2022-09-23 PROCEDURE — 86901 BLOOD TYPING SEROLOGIC RH(D): CPT | Performed by: SURGERY

## 2022-09-23 PROCEDURE — 250N000013 HC RX MED GY IP 250 OP 250 PS 637: Performed by: SURGERY

## 2022-09-23 PROCEDURE — 250N000009 HC RX 250: Performed by: NURSE ANESTHETIST, CERTIFIED REGISTERED

## 2022-09-23 PROCEDURE — 250N000013 HC RX MED GY IP 250 OP 250 PS 637: Performed by: INTERNAL MEDICINE

## 2022-09-23 PROCEDURE — 258N000003 HC RX IP 258 OP 636: Performed by: ANESTHESIOLOGY

## 2022-09-23 PROCEDURE — 250N000011 HC RX IP 250 OP 636: Performed by: INTERNAL MEDICINE

## 2022-09-23 PROCEDURE — 87070 CULTURE OTHR SPECIMN AEROBIC: CPT | Performed by: SURGERY

## 2022-09-23 PROCEDURE — 85004 AUTOMATED DIFF WBC COUNT: CPT | Performed by: INTERNAL MEDICINE

## 2022-09-23 PROCEDURE — 250N000011 HC RX IP 250 OP 636: Performed by: NURSE ANESTHETIST, CERTIFIED REGISTERED

## 2022-09-23 PROCEDURE — 250N000025 HC SEVOFLURANE, PER MIN: Performed by: SURGERY

## 2022-09-23 PROCEDURE — 86923 COMPATIBILITY TEST ELECTRIC: CPT | Performed by: ANESTHESIOLOGY

## 2022-09-23 PROCEDURE — 370N000017 HC ANESTHESIA TECHNICAL FEE, PER MIN: Performed by: SURGERY

## 2022-09-23 PROCEDURE — 36415 COLL VENOUS BLD VENIPUNCTURE: CPT | Performed by: INTERNAL MEDICINE

## 2022-09-23 PROCEDURE — 82565 ASSAY OF CREATININE: CPT | Performed by: SURGERY

## 2022-09-23 PROCEDURE — 250N000009 HC RX 250: Performed by: SURGERY

## 2022-09-23 PROCEDURE — 0JBL0ZZ EXCISION OF RIGHT UPPER LEG SUBCUTANEOUS TISSUE AND FASCIA, OPEN APPROACH: ICD-10-PCS | Performed by: SURGERY

## 2022-09-23 PROCEDURE — 80053 COMPREHEN METABOLIC PANEL: CPT | Performed by: INTERNAL MEDICINE

## 2022-09-23 PROCEDURE — 250N000009 HC RX 250: Performed by: ANESTHESIOLOGY

## 2022-09-23 PROCEDURE — 85025 COMPLETE CBC W/AUTO DIFF WBC: CPT | Performed by: INTERNAL MEDICINE

## 2022-09-23 PROCEDURE — 88304 TISSUE EXAM BY PATHOLOGIST: CPT | Mod: TC | Performed by: SURGERY

## 2022-09-23 PROCEDURE — 120N000001 HC R&B MED SURG/OB

## 2022-09-23 PROCEDURE — 83605 ASSAY OF LACTIC ACID: CPT | Performed by: INTERNAL MEDICINE

## 2022-09-23 PROCEDURE — 258N000003 HC RX IP 258 OP 636: Performed by: INTERNAL MEDICINE

## 2022-09-23 PROCEDURE — 250N000011 HC RX IP 250 OP 636: Performed by: STUDENT IN AN ORGANIZED HEALTH CARE EDUCATION/TRAINING PROGRAM

## 2022-09-23 PROCEDURE — 80202 ASSAY OF VANCOMYCIN: CPT | Performed by: SURGERY

## 2022-09-23 PROCEDURE — 87205 SMEAR GRAM STAIN: CPT | Performed by: SURGERY

## 2022-09-23 PROCEDURE — 360N000075 HC SURGERY LEVEL 2, PER MIN: Performed by: SURGERY

## 2022-09-23 PROCEDURE — 87075 CULTR BACTERIA EXCEPT BLOOD: CPT | Performed by: SURGERY

## 2022-09-23 PROCEDURE — 86850 RBC ANTIBODY SCREEN: CPT | Performed by: SURGERY

## 2022-09-23 PROCEDURE — 999N000141 HC STATISTIC PRE-PROCEDURE NURSING ASSESSMENT: Performed by: SURGERY

## 2022-09-23 PROCEDURE — 710N000009 HC RECOVERY PHASE 1, LEVEL 1, PER MIN: Performed by: SURGERY

## 2022-09-23 PROCEDURE — 272N000001 HC OR GENERAL SUPPLY STERILE: Performed by: SURGERY

## 2022-09-23 RX ORDER — FENTANYL CITRATE 50 UG/ML
25 INJECTION, SOLUTION INTRAMUSCULAR; INTRAVENOUS EVERY 5 MIN PRN
Status: DISCONTINUED | OUTPATIENT
Start: 2022-09-23 | End: 2022-09-23 | Stop reason: HOSPADM

## 2022-09-23 RX ORDER — PROCHLORPERAZINE MALEATE 10 MG
10 TABLET ORAL EVERY 6 HOURS PRN
Status: DISCONTINUED | OUTPATIENT
Start: 2022-09-23 | End: 2022-09-26

## 2022-09-23 RX ORDER — MAGNESIUM HYDROXIDE 1200 MG/15ML
LIQUID ORAL PRN
Status: DISCONTINUED | OUTPATIENT
Start: 2022-09-23 | End: 2022-09-23 | Stop reason: HOSPADM

## 2022-09-23 RX ORDER — HYDROMORPHONE HYDROCHLORIDE 1 MG/ML
0.2 INJECTION, SOLUTION INTRAMUSCULAR; INTRAVENOUS; SUBCUTANEOUS EVERY 5 MIN PRN
Status: DISCONTINUED | OUTPATIENT
Start: 2022-09-23 | End: 2022-09-23 | Stop reason: HOSPADM

## 2022-09-23 RX ORDER — ONDANSETRON 4 MG/1
4 TABLET, ORALLY DISINTEGRATING ORAL EVERY 6 HOURS PRN
Status: DISCONTINUED | OUTPATIENT
Start: 2022-09-23 | End: 2022-09-28 | Stop reason: HOSPADM

## 2022-09-23 RX ORDER — GLYCOPYRROLATE 0.2 MG/ML
INJECTION, SOLUTION INTRAMUSCULAR; INTRAVENOUS PRN
Status: DISCONTINUED | OUTPATIENT
Start: 2022-09-23 | End: 2022-09-23

## 2022-09-23 RX ORDER — PROPOFOL 10 MG/ML
INJECTION, EMULSION INTRAVENOUS PRN
Status: DISCONTINUED | OUTPATIENT
Start: 2022-09-23 | End: 2022-09-23

## 2022-09-23 RX ORDER — SODIUM CHLORIDE, SODIUM LACTATE, POTASSIUM CHLORIDE, CALCIUM CHLORIDE 600; 310; 30; 20 MG/100ML; MG/100ML; MG/100ML; MG/100ML
INJECTION, SOLUTION INTRAVENOUS CONTINUOUS
Status: DISCONTINUED | OUTPATIENT
Start: 2022-09-23 | End: 2022-09-23 | Stop reason: HOSPADM

## 2022-09-23 RX ORDER — ONDANSETRON 4 MG/1
4 TABLET, ORALLY DISINTEGRATING ORAL EVERY 30 MIN PRN
Status: DISCONTINUED | OUTPATIENT
Start: 2022-09-23 | End: 2022-09-23 | Stop reason: HOSPADM

## 2022-09-23 RX ORDER — ONDANSETRON 2 MG/ML
4 INJECTION INTRAMUSCULAR; INTRAVENOUS EVERY 30 MIN PRN
Status: DISCONTINUED | OUTPATIENT
Start: 2022-09-23 | End: 2022-09-23 | Stop reason: HOSPADM

## 2022-09-23 RX ORDER — DEXAMETHASONE SODIUM PHOSPHATE 10 MG/ML
INJECTION, SOLUTION INTRAMUSCULAR; INTRAVENOUS PRN
Status: DISCONTINUED | OUTPATIENT
Start: 2022-09-23 | End: 2022-09-23

## 2022-09-23 RX ORDER — ONDANSETRON 2 MG/ML
4 INJECTION INTRAMUSCULAR; INTRAVENOUS EVERY 6 HOURS PRN
Status: DISCONTINUED | OUTPATIENT
Start: 2022-09-23 | End: 2022-09-28 | Stop reason: HOSPADM

## 2022-09-23 RX ORDER — ACETAMINOPHEN 325 MG/1
975 TABLET ORAL EVERY 8 HOURS
Status: DISCONTINUED | OUTPATIENT
Start: 2022-09-23 | End: 2022-09-23

## 2022-09-23 RX ORDER — ACETAMINOPHEN 325 MG/1
650 TABLET ORAL EVERY 4 HOURS PRN
Status: DISCONTINUED | OUTPATIENT
Start: 2022-09-26 | End: 2022-09-28 | Stop reason: HOSPADM

## 2022-09-23 RX ORDER — HYDROMORPHONE HYDROCHLORIDE 1 MG/ML
0.4 INJECTION, SOLUTION INTRAMUSCULAR; INTRAVENOUS; SUBCUTANEOUS
Status: DISCONTINUED | OUTPATIENT
Start: 2022-09-23 | End: 2022-09-27 | Stop reason: ALTCHOICE

## 2022-09-23 RX ORDER — OXYCODONE HYDROCHLORIDE 5 MG/1
5 TABLET ORAL EVERY 4 HOURS PRN
Status: DISCONTINUED | OUTPATIENT
Start: 2022-09-23 | End: 2022-09-23 | Stop reason: HOSPADM

## 2022-09-23 RX ORDER — LIDOCAINE 40 MG/G
CREAM TOPICAL
Status: DISCONTINUED | OUTPATIENT
Start: 2022-09-23 | End: 2022-09-28 | Stop reason: HOSPADM

## 2022-09-23 RX ORDER — BISACODYL 10 MG
10 SUPPOSITORY, RECTAL RECTAL DAILY PRN
Status: DISCONTINUED | OUTPATIENT
Start: 2022-09-23 | End: 2022-09-28 | Stop reason: HOSPADM

## 2022-09-23 RX ORDER — LIDOCAINE 40 MG/G
CREAM TOPICAL
Status: DISCONTINUED | OUTPATIENT
Start: 2022-09-23 | End: 2022-09-26

## 2022-09-23 RX ORDER — SODIUM CHLORIDE, SODIUM LACTATE, POTASSIUM CHLORIDE, CALCIUM CHLORIDE 600; 310; 30; 20 MG/100ML; MG/100ML; MG/100ML; MG/100ML
INJECTION, SOLUTION INTRAVENOUS CONTINUOUS
Status: ACTIVE | OUTPATIENT
Start: 2022-09-23 | End: 2022-09-24

## 2022-09-23 RX ORDER — HYDROMORPHONE HYDROCHLORIDE 1 MG/ML
0.2 INJECTION, SOLUTION INTRAMUSCULAR; INTRAVENOUS; SUBCUTANEOUS
Status: DISCONTINUED | OUTPATIENT
Start: 2022-09-23 | End: 2022-09-27 | Stop reason: ALTCHOICE

## 2022-09-23 RX ORDER — HYDROMORPHONE HYDROCHLORIDE 2 MG/1
2 TABLET ORAL EVERY 4 HOURS PRN
Status: DISCONTINUED | OUTPATIENT
Start: 2022-09-23 | End: 2022-09-27 | Stop reason: ALTCHOICE

## 2022-09-23 RX ORDER — VANCOMYCIN HYDROCHLORIDE 1 G/200ML
1000 INJECTION, SOLUTION INTRAVENOUS EVERY 12 HOURS
Status: DISCONTINUED | OUTPATIENT
Start: 2022-09-23 | End: 2022-09-26

## 2022-09-23 RX ORDER — POLYETHYLENE GLYCOL 3350 17 G/17G
17 POWDER, FOR SOLUTION ORAL DAILY
Status: DISCONTINUED | OUTPATIENT
Start: 2022-09-24 | End: 2022-09-28 | Stop reason: HOSPADM

## 2022-09-23 RX ORDER — ENOXAPARIN SODIUM 100 MG/ML
40 INJECTION SUBCUTANEOUS EVERY 24 HOURS
Status: DISCONTINUED | OUTPATIENT
Start: 2022-09-24 | End: 2022-09-23 | Stop reason: ALTCHOICE

## 2022-09-23 RX ORDER — AMOXICILLIN 250 MG
1 CAPSULE ORAL 2 TIMES DAILY
Status: DISCONTINUED | OUTPATIENT
Start: 2022-09-23 | End: 2022-09-28 | Stop reason: HOSPADM

## 2022-09-23 RX ORDER — HYDROMORPHONE HYDROCHLORIDE 2 MG/1
4 TABLET ORAL EVERY 4 HOURS PRN
Status: DISCONTINUED | OUTPATIENT
Start: 2022-09-23 | End: 2022-09-27 | Stop reason: ALTCHOICE

## 2022-09-23 RX ADMIN — ZOLPIDEM TARTRATE 2.5 MG: 5 TABLET ORAL at 22:06

## 2022-09-23 RX ADMIN — ALTEPLASE 2 MG: 2.2 INJECTION, POWDER, LYOPHILIZED, FOR SOLUTION INTRAVENOUS at 00:29

## 2022-09-23 RX ADMIN — SODIUM CHLORIDE, POTASSIUM CHLORIDE, SODIUM LACTATE AND CALCIUM CHLORIDE: 600; 310; 30; 20 INJECTION, SOLUTION INTRAVENOUS at 10:27

## 2022-09-23 RX ADMIN — GLYCOPYRROLATE 0.2 MG: 0.2 INJECTION, SOLUTION INTRAMUSCULAR; INTRAVENOUS at 11:06

## 2022-09-23 RX ADMIN — FOLIC ACID 1 MG: 1 TABLET ORAL at 16:31

## 2022-09-23 RX ADMIN — GLYCOPYRROLATE 0.1 MG: 0.2 INJECTION, SOLUTION INTRAMUSCULAR; INTRAVENOUS at 11:18

## 2022-09-23 RX ADMIN — ROCURONIUM BROMIDE 30 MG: 50 INJECTION, SOLUTION INTRAVENOUS at 11:06

## 2022-09-23 RX ADMIN — MIDAZOLAM 1 MG: 1 INJECTION INTRAMUSCULAR; INTRAVENOUS at 10:50

## 2022-09-23 RX ADMIN — DEXAMETHASONE SODIUM PHOSPHATE 10 MG: 10 INJECTION, SOLUTION INTRAMUSCULAR; INTRAVENOUS at 11:06

## 2022-09-23 RX ADMIN — LEVETIRACETAM 500 MG: 500 TABLET, FILM COATED ORAL at 21:26

## 2022-09-23 RX ADMIN — OXYCODONE HYDROCHLORIDE 5 MG: 5 TABLET ORAL at 09:20

## 2022-09-23 RX ADMIN — PHENYLEPHRINE HYDROCHLORIDE 100 MCG: 10 INJECTION INTRAVENOUS at 11:28

## 2022-09-23 RX ADMIN — PHENYLEPHRINE HYDROCHLORIDE 100 MCG: 10 INJECTION INTRAVENOUS at 11:20

## 2022-09-23 RX ADMIN — MIDAZOLAM 1 MG: 1 INJECTION INTRAMUSCULAR; INTRAVENOUS at 10:51

## 2022-09-23 RX ADMIN — SENNOSIDES AND DOCUSATE SODIUM 1 TABLET: 50; 8.6 TABLET ORAL at 21:26

## 2022-09-23 RX ADMIN — ALTEPLASE 2 MG: 2.2 INJECTION, POWDER, LYOPHILIZED, FOR SOLUTION INTRAVENOUS at 03:31

## 2022-09-23 RX ADMIN — PHENYLEPHRINE HYDROCHLORIDE 100 MCG: 10 INJECTION INTRAVENOUS at 11:07

## 2022-09-23 RX ADMIN — HYDROMORPHONE HYDROCHLORIDE 4 MG: 2 TABLET ORAL at 19:46

## 2022-09-23 RX ADMIN — PHENYLEPHRINE HYDROCHLORIDE 100 MCG: 10 INJECTION INTRAVENOUS at 11:30

## 2022-09-23 RX ADMIN — PANTOPRAZOLE SODIUM 40 MG: 40 TABLET, DELAYED RELEASE ORAL at 16:31

## 2022-09-23 RX ADMIN — ALTEPLASE 2 MG: 2.2 INJECTION, POWDER, LYOPHILIZED, FOR SOLUTION INTRAVENOUS at 02:37

## 2022-09-23 RX ADMIN — NYSTATIN: 100000 OINTMENT TOPICAL at 21:27

## 2022-09-23 RX ADMIN — SUGAMMADEX 120 MG: 100 INJECTION, SOLUTION INTRAVENOUS at 11:41

## 2022-09-23 RX ADMIN — PHENYLEPHRINE HYDROCHLORIDE 50 MCG: 10 INJECTION INTRAVENOUS at 11:11

## 2022-09-23 RX ADMIN — ONDANSETRON 4 MG: 2 INJECTION INTRAMUSCULAR; INTRAVENOUS at 11:06

## 2022-09-23 RX ADMIN — PROPOFOL 80 MG: 10 INJECTION, EMULSION INTRAVENOUS at 11:06

## 2022-09-23 RX ADMIN — BUMETANIDE 0.5 MG: 0.5 TABLET ORAL at 16:30

## 2022-09-23 RX ADMIN — VANCOMYCIN HYDROCHLORIDE 1000 MG: 1 INJECTION, SOLUTION INTRAVENOUS at 18:50

## 2022-09-23 RX ADMIN — TRAZODONE HYDROCHLORIDE 100 MG: 50 TABLET ORAL at 22:06

## 2022-09-23 RX ADMIN — LIDOCAINE HYDROCHLORIDE 3 ML: 10 INJECTION, SOLUTION INFILTRATION; PERINEURAL at 11:06

## 2022-09-23 RX ADMIN — PHENYLEPHRINE HYDROCHLORIDE 100 MCG: 10 INJECTION INTRAVENOUS at 11:39

## 2022-09-23 RX ADMIN — VANCOMYCIN HYDROCHLORIDE 750 MG: 5 INJECTION, POWDER, LYOPHILIZED, FOR SOLUTION INTRAVENOUS at 04:12

## 2022-09-23 RX ADMIN — LEVETIRACETAM 500 MG: 500 TABLET, FILM COATED ORAL at 16:30

## 2022-09-23 RX ADMIN — THIAMINE HCL TAB 100 MG 100 MG: 100 TAB at 16:31

## 2022-09-23 RX ADMIN — THERA TABS 1 TABLET: TAB at 16:30

## 2022-09-23 RX ADMIN — PHENYLEPHRINE HYDROCHLORIDE 100 MCG: 10 INJECTION INTRAVENOUS at 11:21

## 2022-09-23 RX ADMIN — GLYCOPYRROLATE 0.1 MG: 0.2 INJECTION, SOLUTION INTRAMUSCULAR; INTRAVENOUS at 11:25

## 2022-09-23 RX ADMIN — PHENYLEPHRINE HYDROCHLORIDE 100 MCG: 10 INJECTION INTRAVENOUS at 11:09

## 2022-09-23 ASSESSMENT — ENCOUNTER SYMPTOMS: SEIZURES: 1

## 2022-09-23 ASSESSMENT — ACTIVITIES OF DAILY LIVING (ADL)
ADLS_ACUITY_SCORE: 49
ADLS_ACUITY_SCORE: 53
ADLS_ACUITY_SCORE: 49
ADLS_ACUITY_SCORE: 53
ADLS_ACUITY_SCORE: 53
ADLS_ACUITY_SCORE: 49
ADLS_ACUITY_SCORE: 49
ADLS_ACUITY_SCORE: 53

## 2022-09-23 NOTE — ANESTHESIA PROCEDURE NOTES
Airway       Patient location during procedure: OR       Procedure Start/Stop Times: 9/23/2022 11:08 AM  Staff -        Performed By: CRNAIndications and Patient Condition       Indications for airway management: juan pablo-procedural       Induction type:intravenous       Mask difficulty assessment: 1 - vent by mask    Final Airway Details       Final airway type: endotracheal airway       Successful airway: ETT - single  Endotracheal Airway Details        ETT size (mm): 7.0       Cuffed: yes       Successful intubation technique: direct laryngoscopy       DL Blade Type: Light 2       Grade View of Cords: 1       Adjucts: stylet       Position: Right       Measured from: lips       Secured at (cm): 21       Bite block used: None    Post intubation assessment        Placement verified by: capnometry, equal breath sounds and chest rise        Number of attempts at approach: 1       Number of other approaches attempted: 0       Secured with: silk tape       Ease of procedure: easy       Dentition: Intact    Medication(s) Administered   Medication Administration Time: 9/23/2022 11:08 AM

## 2022-09-23 NOTE — OR NURSING
Called Dr. Rodgers concerning low BP. This RN was told to put the blood transfusion to gravity and call with update after approx 30 minutes.

## 2022-09-23 NOTE — OR NURSING
Called Dr. Rodgers with an update on BP. Dr. Rodgers present to assess the patient. Dr. Rodgers made aware of increased edema of left arm.

## 2022-09-23 NOTE — ANESTHESIA CARE TRANSFER NOTE
Patient: Felicia Ellison    Procedure: Procedure(s):  INCISION AND DRAINAGE OF RIGHT HIP ABSCESS       Diagnosis: Abscess of hip, right [L02.415]  Diagnosis Additional Information: No value filed.    Anesthesia Type:   General     Note:    Oropharynx: oropharynx clear of all foreign objects  Level of Consciousness: awake  Oxygen Supplementation: face mask  Level of Supplemental Oxygen (L/min / FiO2): 6  Independent Airway: airway patency satisfactory and stable  Dentition: dentition unchanged  Vital Signs Stable: post-procedure vital signs reviewed and stable  Report to RN Given: handoff report given  Patient transferred to: PACU    Handoff Report: Identifed the Patient, Identified the Reponsible Provider, Reviewed the pertinent medical history, Discussed the surgical course, Reviewed Intra-OP anesthesia mangement and issues during anesthesia, Set expectations for post-procedure period and Allowed opportunity for questions and acknowledgement of understanding      Vitals:  Vitals Value Taken Time   /55 9/23/22  1201 p   Temp 97.4 f 9/23/22  1201 p   Pulse 91 9/23/22  1201 p   Resp 16 9/23/22  1201 p   SpO2 100%  9/23/22  1201 p       Electronically Signed By: VERENA Gandhi CRNA  September 23, 2022  12:00 PM

## 2022-09-23 NOTE — PLAN OF CARE
Goal Outcome Evaluation:  Pt had clogged 2 lumen PICC line Alteplase flushing done, effective. Sepsis protocol fired, blood sample sent to lab for Lactic Acid.  Lactic acid is normal. BP was low then rechecked and it went higher. Minimal drainage to suprapubic and no drainage to the DENIA drain.     Problem: Plan of Care - These are the overarching goals to be used throughout the patient stay.    Goal: Absence of Hospital-Acquired Illness or Injury  Intervention: Prevent Skin Injury  Recent Flowsheet Documentation  Taken 9/23/2022 0039 by Adry Valencia RN  Body Position:   legs elevated   heels elevated     Problem: Risk for Delirium  Goal: Optimal Coping  Outcome: Ongoing, Progressing  Goal: Improved Sleep  Outcome: Ongoing, Progressing     Problem: Risk for Delirium  Goal: Improved Sleep  Outcome: Ongoing, Progressing     Problem: Impaired Wound Healing  Goal: Optimal Wound Healing  Intervention: Promote Wound Healing  Recent Flowsheet Documentation  Taken 9/23/2022 0039 by Adry Valencia RN  Activity Management: bedrest     Problem: Pain Acute  Goal: Acceptable Pain Control and Functional Ability  Outcome: Ongoing, Progressing  Intervention: Prevent or Manage Pain  Recent Flowsheet Documentation  Taken 9/23/2022 0039 by Adry Valencia RN  Medication Review/Management: medications reviewed
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
## 2022-09-23 NOTE — PLAN OF CARE
Patient is A&Ox4. She is cooperative with [facility]. Patient's pain is well controlled with increase in pain med dose. Urinary catheter continues to bypass. Dressing  on Sacrum changed. DENIA drained 45 ml clear drainage that was blood tinged. Patient c/o itching on back and shoulders. Lotion applied. Benadryl was given with some relief. Patient will be NPO after midnight.  Problem: Plan of Care - These are the overarching goals to be used throughout the patient stay.    Goal: Optimal Comfort and Wellbeing  Outcome: Ongoing, Progressing  Intervention: Monitor Pain and Promote Comfort  Recent Flowsheet Documentation  Taken 9/22/2022 2157 by Tricia Krishnan RN  Pain Management Interventions: emotional support  Taken 9/22/2022 1632 by Tricia Krishnan RN  Pain Management Interventions:   medication (see MAR)   emotional support  Intervention: Provide Person-Centered Care  Recent Flowsheet Documentation  Taken 9/22/2022 1700 by Tricia Krishnan RN  Trust Relationship/Rapport: care explained     Problem: Risk for Delirium  Goal: Improved Behavioral Control  Outcome: Ongoing, Progressing  Goal: Improved Attention and Thought Clarity  Outcome: Ongoing, Progressing     Problem: Malnutrition  Goal: Improved Nutritional Intake  Outcome: Ongoing, Progressing     Problem: Impaired Wound Healing  Goal: Optimal Wound Healing  Outcome: Ongoing, Progressing  Intervention: Promote Wound Healing  Recent Flowsheet Documentation  Taken 9/22/2022 2157 by Tricia Krishnan RN  Pain Management Interventions: emotional support  Taken 9/22/2022 1900 by Tricia Krishnan, RN  Activity Management: bedrest  Taken 9/22/2022 1632 by Tricia Krishnan, RN  Pain Management Interventions:   medication (see MAR)   emotional support  Activity Management: bedrest     Problem: Fluid Volume Excess  Goal: Fluid Balance  Outcome: Ongoing, Progressing     Problem: Plan of Care - These are the overarching goals to be used throughout the patient stay.    Goal: Absence of  Hospital-Acquired Illness or Injury  Intervention: Identify and Manage Fall Risk  Recent Flowsheet Documentation  Taken 9/22/2022 1900 by Triica Krishnan RN  Safety Promotion/Fall Prevention: bed alarm on  Intervention: Prevent Skin Injury  Recent Flowsheet Documentation  Taken 9/22/2022 1900 by Tricia Krishnan RN  Body Position:   legs elevated   heels elevated  Taken 9/22/2022 1632 by Tricia Krishnan RN  Body Position:   legs elevated   heels elevated  Intervention: Prevent and Manage VTE (Venous Thromboembolism) Risk  Recent Flowsheet Documentation  Taken 9/22/2022 1900 by Tricia Krishnan RN  Activity Management: bedrest  Taken 9/22/2022 1700 by Tricia Krishnan RN  VTE Prevention/Management: compression stockings on  Taken 9/22/2022 1632 by Tricia Krishnan RN  Activity Management: bedrest  Intervention: Prevent Infection  Recent Flowsheet Documentation  Taken 9/22/2022 1900 by Tricia Krishnan RN  Infection Prevention: hand hygiene promoted     Problem: VTE (Venous Thromboembolism)  Goal: VTE (Venous Thromboembolism) Symptom Resolution  Intervention: Prevent or Manage VTE (Venous Thromboembolism)  Recent Flowsheet Documentation  Taken 9/22/2022 1700 by Tricia Krishnan RN  VTE Prevention/Management: compression stockings on     Problem: Anemia  Goal: Anemia Symptom Improvement  Intervention: Monitor and Manage Anemia  Recent Flowsheet Documentation  Taken 9/22/2022 1900 by Tricia Krishnan RN  Safety Promotion/Fall Prevention: bed alarm on     Problem: Pain Acute  Goal: Acceptable Pain Control and Functional Ability  Intervention: Develop Pain Management Plan  Recent Flowsheet Documentation  Taken 9/22/2022 2157 by Tricia Krishnan RN  Pain Management Interventions: emotional support  Taken 9/22/2022 1632 by Tricia Krishnan RN  Pain Management Interventions:   medication (see MAR)   emotional support  Intervention: Prevent or Manage Pain  Recent Flowsheet Documentation  Taken 9/22/2022 1900 by Tricia Krishnan  RN  Medication Review/Management: medications reviewed   Goal Outcome Evaluation:

## 2022-09-23 NOTE — PROGRESS NOTES
INFECTIOUS DISEASE FOLLOW UP NOTE      ASSESSMENT:  1. Large right thigh abscess, due to Group B strep. Percutaneous drain placed 9/3. Gram stain GPC, culture Group B strep. Blood culture negative. Sepsis resolved now off pressors. Drain check 9/8 with minimal residual fluid, drain upsized. CT 9/4 still showed large collection the day after drain placement. CRP is normal 0.4. CT pelvis shows stable size of fluid collection 5.0 x 3.5cm. This is in area of previous R femoral head resection. S/p debridement on 9/23. Cultures and path pending  2. Multiple surgeries on hips/wounds in the past, including resection of R femoral head around 2012.  3. Urinary retention. Low suspicion for urinary source. Mixed tulio in UC likely colonization. UA without pyuria.   4. Paraplegia   5. Decubitus wounds --  Large sacral looks clean.   6. Penicillin and levofloxacin allergies. Tolerated meropenem, cefepime, ceftriaxone. She recalls she does better with IVs compared to oral antibiotics. Leukopenia, possibly from ceftriaxone   7. H/o MRSA in remote past. Not currently seen on clinical specimens.   8. Thrombocytopenia, improved.   9. Leukopenia. Likely due to ceftriaxone. Improved today after stopping ceftriaxone.    PLAN:  -continue vancomycin   -f/up on intra-op cultures/path    Tentative plan for 2 weeks of antibiotics post surgery. F/up on cultures/path to refine plan     Check creatinine, CBC with diff, crp once weekly when on treatment.       Justyn Garcia MD  [facility]  Direct messaging: [facility] PREVENTIVE MEDICINE Paging  On-Call ID provider: [phone], option: 9    ______________________________________________________________________    SUBJECTIVE / INTERVAL HISTORY:   Surgery today. Tolerated well. Will need wound vac. toelerating antibiotics. No acute complaints.    ROS: paraplegia. All other systems negative except as listed above.        OBJECTIVE:  /55   Pulse 78   Temp 98.3  F (36.8  C)   Resp 15  "  Ht 1.626 m (5' 4\")   Wt 57.6 kg (126 lb 14.4 oz)   LMP  (LMP Unknown)   SpO2 100%   BMI 21.78 kg/m       GENERAL:  In no acute distress. Room air.   EYES: No conjunctival injection  HEAD, EARS, NOSE, MOUTH, AND THROAT: Nontraumatic, mouth without oral ulcers.   RESPIRATORY: Clear anterior  CARDIOVASCULAR: Regular rate and rhythm, normal S1 and S2, no murmurs, rubs, or gallops.  ABDOMEN: Soft, nontender, colostomy, urine tube from abdomen x 2.  Normal bowel sounds.  MUSCULOSKELETAL: wound packed and dressed  SKIN/HAIR/NAILS: No rashes, no signs of peripheral emboli. Wound photo noted  NEUROLOGIC: paraplegia   PICC R UE        Antibiotics:  Vancomycin 9/3-6, 9/21-      Previous  Ceftriaxone 9/6-9/20  Cefepime 9/3-4  Flagyl 9/3-4  Clindamycin 9/2-3  meropenem 9/4-6    Pertinent labs:    Recent Labs   Lab 09/23/22 0317 09/21/22  0637 09/20/22  0637   WBC 3.4* 2.2* 2.5*   HGB 7.7* 7.4* 7.5*   HCT 25.6* 24.3* 25.3*   * 101* 101*        Recent Labs   Lab 09/23/22 0317 09/21/22  0637 09/20/22  0637    140 138   CO2 24 25 24   BUN 10.5 10.8 11        Lab Results   Component Value Date    CRP 16.50 (H) 09/22/2022    CRP 0.4 09/08/2022    CRP 98.6 (H) 12/12/2018         Lab Results   Component Value Date    ALT 6 (L) 09/23/2022    AST 13 09/23/2022    ALKPHOS 92 09/23/2022         MICROBIOLOGY DATA:  9/3 abscess gram stain GPC, PMNs, culture Group B strep  9/2 blood negative     RADIOLOGY:  CT Abdomen Peritonium Abscess Drainage    Result Date: 9/3/2022  EXAM: 1. PERCUTANEOUS DRAIN PLACEMENT RIGHT UPPER THIGH/GROIN COLLECTION 2. CT GUIDANCE 3. CONSCIOUS SEDATION LOCATION: [facility] DATE/TIME: 9/3/2022 12:19 PM INDICATION: right hip drain placement TECHNIQUE: Dose reduction techniques were used. PROCEDURE: Informed consent obtained. Site marked. Prior images reviewed. Required items made available. Patient identity confirmed verbally and with arm band. Patient reevaluated " immediately before administering sedation. Universal protocol was followed. Time out performed. The site was prepped and draped in sterile fashion. 10 mL of 1% lidocaine was infused into the local soft tissues. Using standard technique and under direct CT guidance, a 12 Albanian drainage catheter was inserted into the fluid collection.  SPECIMEN: 150 mL of purulent fluid was aspirated and sent to lab for cultures and Gram stain. BLOOD LOSS: Minimal. The patient tolerated the procedure well. No immediate complications. SEDATION: Versed 0 mg. Fentanyl 50 mcg. The procedure was performed with administration intravenous conscious sedation with appropriate preoperative, intraoperative, and postoperative evaluation. 15 minutes of supervised face to face conscious sedation time was provided by a radiology nurse under my direct supervision.     IMPRESSION: 1.  Successful CT-guided drain placement into a right upper thigh/groin abscess.     Echocardiogram Complete    Result Date: 9/10/2022  [id number]^ASHLEY^ARIN^[place] [address]  Name: KISHOR PINEDA KENDAL RIOS MRN: [id number] : 1965 Study Date: 09/10/2022 03:21 PM Age: 56 yrs Gender: Female Patient Location: [facility] Reason For Study: CHF Ordering Physician: ARIN RAMIREZ Referring Physician: CODEY WILSON Performed By: ACE  BSA: 1.6 m2 Height: 64 in Weight: 120 lb HR: 92 BP: 91/58 mmHg ______________________________________________________________________________ Procedure Complete Echo Adult. ______________________________________________________________________________ Interpretation Summary  The left ventricle is normal in size. The visual ejection fraction is 55-60%. No regional wall motion abnormalities noted. Regional wall motion abnormalities cannot be excluded due to limited visualization. Diastolic Doppler findings (E/E' ratio and/or other parameters) suggest left ventricular  filling pressures are normal. Sinus rhythm was noted. Technically difficult, suboptimal study. Consider cardiac MRI for better imaging. ______________________________________________________________________________ Left Ventricle The left ventricle is normal in size. There is normal left ventricular wall thickness. Diastolic Doppler findings (E/E' ratio and/or other parameters) suggest left ventricular filling pressures are normal. The visual ejection fraction is 55-60%. Regional wall motion abnormalities cannot be excluded due to limited visualization. No regional wall motion abnormalities noted.  Right Ventricle Normal right ventricle size and systolic function. TAPSE is normal, which is consistent with normal right ventricular systolic function.  Atria The left atrium is not well visualized. Right atrium not well visualized.  Mitral Valve The mitral valve is not well visualized. There is mild (1+) mitral regurgitation. There is no mitral valve stenosis.  Tricuspid Valve The tricuspid valve is not well visualized. There is mild (1+) tricuspid regurgitation.  Aortic Valve The aortic valve is not well visualized. No aortic regurgitation is present. No aortic stenosis is present.  Pulmonic Valve The pulmonic valve is not well visualized.  Vessels The aorta root is normal. Normal size ascending aorta.  Pericardium There is no pericardial effusion.  Rhythm Sinus rhythm was noted. ______________________________________________________________________________ MMode/2D Measurements & Calculations IVSd: 0.77 cm  LVIDd: 3.9 cm LVIDs: 2.6 cm LVPWd: 0.70 cm FS: 32.6 % LV mass(C)d: 81.4 grams LV mass(C)dI: 51.7 grams/m2 Ao root diam: 3.1 cm asc Aorta Diam: 3.2 cm LVOT diam: 2.0 cm LVOT area: 3.1 cm2 RWT: 0.36  Doppler Measurements & Calculations MV E max deanna: 53.8 cm/sec MV A max deanna: 67.9 cm/sec MV E/A: 0.79 MV dec slope: 449.7 cm/sec2 MV dec time: 0.12 sec Ao V2 max: 138.1 cm/sec Ao max P.0 mmHg Ao V2 mean: 94.7 cm/sec  Ao mean P.2 mmHg Ao V2 VTI: 25.7 cm MACKENZIE(I,D): 1.9 cm2 MACKENZIE(V,D): 1.8 cm2 LV V1 max P.7 mmHg LV V1 max: 82.0 cm/sec LV V1 VTI: 16.0 cm SV(LVOT): 49.1 ml SI(LVOT): 31.2 ml/m2 PA acc time: 0.09 sec AV Jered Ratio (DI): 0.59  MACKENZIE Index (cm2/m2): 1.2 E/E': 4.4 E/E' av.0 Lateral E/e': 4.4 Medial E/e': 7.5 Peak E' Jered: 12.2 cm/sec  ______________________________________________________________________________ Report approved by: Alexander Huston 09/10/2022 04:57 PM       US Lower Extremity Venous Duplex Bilateral    Result Date: 2022  EXAM: US LOWER EXTREMITY VENOUS DUPLEX BILATERAL LOCATION: [facility] DATE/TIME: 2022 3:47 PM INDICATION: edema, r o dvt COMPARISON: None. TECHNIQUE: Venous Duplex ultrasound of bilateral lower extremities with and without compression, augmentation and duplex. Color flow and spectral Doppler with waveform analysis performed. FINDINGS: Exam includes the common femoral, femoral, popliteal veins as well as segmentally visualized deep calf veins and greater saphenous vein. RIGHT: No deep vein thrombosis. No superficial thrombophlebitis. No popliteal cyst. LEFT: There is deep venous thrombus involving the common femoral vein, the femoral vein in the thigh and the popliteal vein. This appears partially occlusive in the common femoral vein and popliteal vein and completely occlusive throughout the femoral vein in the thigh. There is probable extension into the profunda femoris vein on the left. No superficial thrombus. No popliteal cyst.     IMPRESSION: Left deep venous thrombus involving the common femoral vein, femoral vein in the thigh and popliteal vein with probable extension into the profunda femoris vein but this is suboptimally visualized because of edema and body habitus. The thrombus appears occlusive throughout the femoral vein in the thigh and is nonocclusive in the common femoral and popliteal vein. NOTE: ABNORMAL REPORT THE DICTATION  ABOVE DESCRIBES AN ABNORMALITY FOR WHICH FOLLOW-UP IS NEEDED. . [Critical Result: Left lower extremity deep venous thrombosis] Finding was identified on 9/9/2022 3:54 PM. 1.  The patient's nurse, Shanita, was contacted by me on 9/9/2022 4:00 PM and verbalized understanding of the critical result.     US Upper Extremity Venous Duplex Bilat    Result Date: 9/9/2022  EXAM: US UPPER EXTREMITY VENOUS DUPLEX BILATERAL LOCATION: [facility] DATE/TIME: 9/9/2022 4:00 PM INDICATION: edema, r o DVT COMPARISON: None. TECHNIQUE: Venous Duplex ultrasound of both upper extremities with (when possible) and without compression, augmentation, and duplex. Color flow and spectral Doppler with waveform analysis performed. FINDINGS: Ultrasound includes evaluation of the internal jugular veins, innominate veins, subclavian veins, axillary veins, and brachial veins. The superficial cephalic and basilic veins were also evaluated where seen. RIGHT: There is nonocclusive thrombus adjacent to the PICC catheter in the right brachial vein and right subclavian vein this becomes occlusive in the distal brachial vein. There is nonocclusive thrombus in the right internal jugular vein. No superficial  thrombophlebitis. LEFT: There is occlusive thrombus in the left internal jugular vein and left innominate vein. There is occlusive superficial thrombus in the left cephalic vein.     IMPRESSION: No deep venous thrombosis in the bilateral upper extremities. [Critical Result: Bilateral upper extremity deep venous thrombosis] Finding was identified on 9/9/2022 4:03 PM. 1.  The patient's nurse, Shanita was contacted by me on 9/9/2022 4:05 PM and verbalized understanding of the critical result.     XR Chest Port 1 View    Result Date: 9/3/2022  EXAM: XR CHEST PORTABLE 1 VIEW LOCATION: [facility] DATE/TIME: 09/03/2022, 6:08 AM INDICATION: Status post unsuccessful left IJ placement, rule out pneumothorax.  COMPARISON: 04/22/2022.     IMPRESSION: Negative for pneumothorax. Normal heart size and pulmonary vascularity. Lungs are clear. Generalized osteopenia. Posterior idalia and pedicle screw fixation lower thoracic and lumbar spine.     IR Abscess Tube Change    Result Date: 9/8/2022  LOCATION: [facility] DATE: 9/8/2022 PROCEDURE: ABSCESS TUBE CHECK AND EXCHANGE INTERVENTIONAL RADIOLOGIST: Fer Gutierrez MD INDICATION: Right hip drain placement on 9/3/2022. Plan for exchange/upsize. CONSENT: The risks, benefits and alternatives of an abscess tube check with possible exchange, upsizing and/or removal were discussed with the patient or representative in detail. All questions were answered. Informed consent was given to proceed with the procedure. MODERATE SEDATION: None. CONTRAST: 25 cc Omnipaque ANTIBIOTICS: None. ADDITIONAL MEDICATIONS: None. FLUOROSCOPIC TIME: 0.5 minutes. RADIATION DOSE: Air Kerma: 3 mGy. COMPLICATIONS: No immediate complications. UNIVERSAL PROTOCOL: The operative site was marked and any prior imaging was reviewed. Required items including blood products, implants, devices and special equipment was made available. Patient identity was confirmed either verbally, with demographic information, hospital assigned identification or other identification markers. A timeout was performed immediately prior to the procedure. STERILE BARRIER TECHNIQUE: Maximum sterile barrier technique was used. Cutaneous antisepsis was performed at the operative site with application of 2% chlorhexidine and large sterile drape. Prior to the procedure, the  and assistant performed hand hygiene and wore hat, mask, sterile gown, and sterile gloves during the entire procedure. PROCEDURE:  Multiprojectional  images were obtained. The previous drainage catheter was injected with a small amount of contrast, and multiple images were obtained. Based on the results of the study, the drain was exchanged  over a guidewire for a 16 [place] Fred drainage. The cavity was aggressively irrigated with saline. FINDINGS: Abscessogram demonstrates minimal residual abscess cavity. After exchange, the drain is appropriately positioned.     IMPRESSION:  Right hip drain check demonstrates appropriate positioning of the drain. There is minimal residual fluid. The drain was exchanged/upsized for a 16 [place] Fred drainage. Irrigation of the cavity reveals serosanguineous fluid.    IR Abscess Tube Check    Result Date: 9/13/2022  LOCATION: [facility] DATE: 9/13/2022 PROCEDURE: ABSCESS TUBE CHECK INTERVENTIONAL RADIOLOGIST: Hugo Michael MD INDICATION: Right hip abscess. Indwelling percutaneous drain.. CONSENT: The procedure, risks and benefits of an abscessogram were discussed with the patient  in detail. All questions were answered. Informed consent was given to proceed with the procedure. MODERATE SEDATION: None. CONTRAST: Omnipaque 350: 20. mL. ANTIBIOTICS: None. ADDITIONAL MEDICATIONS: None. FLUOROSCOPIC TIME: 0.4 minutes RADIATION DOSE: Air Kerma: 4 mGy COMPLICATIONS: No immediate complications. PROCEDURE:  images were obtained. The existing drainage catheter was injected with contrast, and multiple images were obtained. The cavity was irrigated with 50 cc aliquots of normal saline which were then aspirated. Total irrigation volume was approximately 200 cc. Drain was reconnected to a DENIA bulb. FINDINGS: Adequately positioned, indwelling 16 [place] Fred drain within the posterior right hip fluid collection..     IMPRESSION: 1.  Adequately positioned, indwelling 16 [place] drainage catheter. PLAN: Continued DENIA bulb drainage with maintained flushing regimen..     IR Suprapubic Catheter Placment    Result Date: 9/11/2022  [facility] LOCATION: [facility] DATE: 9/10/2022 PROCEDURE: 1. Transstomal urinary catheter placement. INTERVENTIONAL RADIOLOGIST: Jonh Cline MD  HISTORY: 56 years-old Female with history of diverting urostomy. The patient had a catheter placed in the emergency room at an outside facility via direct trocar technique through the proximal aspect of the stoma. This catheter is malfunctioning. CONSENT: The risks, benefits and alternatives of the procedure were discussed with the patient  in detail. All questions were answered. Informed consent was given to proceed with the procedure. SEDATION: None.. CONTRAST: 15 mL ANTIBIOTICS: Patient receiving antibiotics ADDITIONAL MEDICATIONS: None. FLUOROSCOPIC TIME: 2.6 RADIATION DOSE: Air Kerma: 73 mGy. COMPLICATIONS: No immediate complications. STERILE BARRIER TECHNIQUE: Maximum sterile barrier technique was used. Cutaneous antisepsis was performed at the operative site with application of 2% chlorhexidine and large sterile drape. Prior to the procedure, the  and assistant performed hand hygiene and wore hat, mask, sterile gown, and sterile gloves during the entire procedure. PROCEDURE/FINDINGS: A  image was obtained. A 5 [place] KMP catheter, with the aid of a 0.035 inch angled Glidewire, was advanced through the existing stoma tract into the diversion pouch. It was noted that the pre-existing catheter, while initially within the stoma, traverse through the side wall of the stoma directly into the pouch itself. The position of the newly placed catheter was confirmed by contrast injection. A 0.035 inch Amplatz wire was advanced through the catheter into the urinary diversion pouch. A 16 [place] Elim IRA tip catheter was advanced over the Amplatz wire. The balloon was inflated and contrast demonstration confirmed its position. The catheter was secured in place. The catheter was attached to a gravity drainage bag.     IMPRESSION: Placement of a transstomal urinary catheter via the existing stoma.     CT ABDOMEN PELVIS W CONTRAST    Result Date: 9/2/2022  EXAM: CT ABDOMEN PELVIS W CONTRAST LOCATION: [facility] DATE/TIME: 9/2/2022 7:16 PM INDICATION: Abdominal distension paraplegia difficulty passing catheter to neobladder COMPARISON: 12/18/2018. TECHNIQUE: CT scan of the abdomen and pelvis was performed following injection of IV contrast. Multiplanar reformats were obtained. Dose reduction techniques were used. CONTRAST: 50ml isovue 370 FINDINGS: LOWER CHEST: Atelectasis. 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 Nonocclusive thrombus in the proximal superior mesenteric vein. Multiple perigastric collateral vessels. PANCREAS: Normal. SPLEEN: Normal. ADRENAL GLANDS: Normal. KIDNEYS/BLADDER: There is mild right-sided pyelocaliectasis with normal caliber ureter. Dependent stone within the right renal pelvis. BOWEL: Descending colostomy with Franklin pouch. LYMPH NODES: Normal. VASCULATURE: Unremarkable. 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 MUSCULOSKELETAL: There is a very large complex peripherally enhancing fluid collection within the right buttock extending from the iliac crest down into the hip joints and into the right thigh. This is not completely imaged extending further into the thigh than is seen on the CT scan. Destructive changes in both hips. This fluid collection measures at least 20 x 14 cm scoliosis.     IMPRESSION: 1.  Significant distention of the neobladder which measures up 24 cm and extends into the right lower abdomen. 2.  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 Multiple perigastric venous collaterals. Nonocclusive thrombus in the proximal superior mesenteric vein. 3.  Franklin pouch with descending colostomy. 4.  Destructive changes both hips. There is a massive peripherally enhancing fluid collection in the right buttock, hip and thigh extending into the distal thigh. The distal extent of the fluid collection is not imaged  with CT. This collection measures at least 20 x 14 cm. 5.  There is mild dilatation of the right intrarenal collecting system with normal caliber ureter and an element of UPJ obstruction is possible. Dependent stone in the right renal pelvis. NOTE: ABNORMAL REPORT 1.  THE DICTATION ABOVE DESCRIBES AN ABNORMALITY FOR WHICH FOLLOW-UP IS NEEDED.     CT Abdomen Pelvis w/o Contrast    Result Date: 9/10/2022  EXAM: CT ABDOMEN PELVIS W/O CONTRAST LOCATION: [facility] DATE/TIME: 9/10/2022 2:53 PM INDICATION: concern for suprapubic catheter malposition, increased abdominal pain. Lower abdominal pain. COMPARISON: 9/4/2022 TECHNIQUE: CT scan of the abdomen and pelvis was performed without IV contrast. Multiplanar reformats were obtained. Dose reduction techniques were used. CONTRAST: None. FINDINGS: LOWER CHEST: Moderate bilateral pleural effusions have increased in size mildly. These appear free-flowing. Associated dependent atelectasis. HEPATOBILIARY: The liver is not well evaluated on this noncontrast exam. No significant interval change in appearance. PANCREAS: Normal. SPLEEN: Mild splenomegaly, unchanged. ADRENAL GLANDS: Normal. KIDNEYS/BLADDER: The kidneys are partially obscured by metal artifact spinal hardware. No hydronephrosis. Cystectomy with urinary diversion. There is a percutaneous catheter in the right abdomen with tip in the ileal conduit. Significant distention of the conduit has developed since yesterday suggesting malfunction of the catheter. BOWEL: Bowel loops are normal caliber. LYMPH NODES: Not well assessed on this noncontrast exam. VASCULATURE: Unremarkable. PELVIC ORGANS: The uterus is normal in size. MUSCULOSKELETAL: Generalized edema within the subcutaneous adipose tissues has increased mildly from 9/4/2022. A drain has been placed in the right hip joint and the effusion has significantly decreased in size. Deformity and degenerative changes of both  hips. Extensive spinal  hardware and associated artifact.     IMPRESSION: 1.  Cystectomy with ileal conduit urinary diversion. The conduit has become significantly distended from 9/4/2022. A percutaneous catheter terminates within the conduit. The distention suggests malfunction of the catheter such as plugging. No hydronephrosis of the kidneys. 2.  Moderate bilateral pleural effusions have increased in size mildly. 3.  Increase in mild generalized subcutaneous edema. 4.  Decrease in size of the right hip joint effusion. A drainage catheter is present within the joint space posteriorly.     CT Abdomen Pelvis w/o Contrast    Result Date: 9/4/2022  EXAM: CT ABDOMEN PELVIS W/O CONTRAST LOCATION: [facility] DATE/TIME: 9/4/2022 9:27 AM INDICATION: F U abscess with drain placement COMPARISON: 09/02/2022 TECHNIQUE: CT scan of the abdomen and pelvis was performed without IV contrast. Multiplanar reformats were obtained. Dose reduction techniques were used. CONTRAST: None. FINDINGS: LOWER CHEST: New small bilateral pleural effusions. HEPATOBILIARY: No significant mass or bile duct dilatation. Portal vein thrombus not visualized on the current examination without IV contrast, although periportal collateral vessels are noted. Cholecystectomy. PANCREAS: Normal. SPLEEN: Normal. ADRENAL GLANDS: Normal. KIDNEYS/BLADDER: Mild fullness of the renal pelves bilaterally, decreased compared to 09/02/2022. Unchanged nonobstructing calculus in the lower pole right kidney and in the dependent portion of the right renal pelvis. Urinary diversion in the right lower quadrant, which is no longer distended. Ostomy extends to the skin surface. BOWEL: Descending colostomy with Franklin pouch. LYMPH NODES: Normal. VASCULATURE: Unremarkable. PELVIC ORGANS: Unremarkable MUSCULOSKELETAL: Interval placement of percutaneous pigtail drain in the subcutaneous right hip/upper thigh fluid collection. Reliable size comparison limited due to noncontrast  technique on the current examination, but the collection has decreased in size. Largest axial component currently 11.7 x 7.4 cm, previously 20 x 14 cm. Osseous destruction in both hips. Thoracolumbar fusion. Diffuse muscular atrophy and osteopenia.     IMPRESSION: 1.  Decreased size of right buttock/thigh fluid collection following percutaneous drain placement. Largest remaining component measures 11.7 x 7.4 cm axially. 2.  New small bilateral pleural effusions. 3.  Neobladder is no longer distended.    Head CT w/o contrast    Result Date: 9/2/2022  EXAM: CT HEAD W/O CONTRAST LOCATION: [facility] DATE/TIME: 9/2/2022 7:16 PM INDICATION: ams COMPARISON: Head CT angiogram brain MRI 01/06/2020 TECHNIQUE: Routine CT Head without IV contrast. Multiplanar reformats. Dose reduction techniques were used. FINDINGS: INTRACRANIAL CONTENTS: No intracranial hemorrhage, extraaxial collection, or mass effect.  No CT evidence of acute infarct. Normal parenchymal attenuation. Normal ventricles and sulci. VISUALIZED ORBITS/SINUSES/MASTOIDS: No intraorbital abnormality. Moderate chronic mucosal thickening of the left maxillary sinus with associated frothy debris. Mild chronic mucosal thickening of the left sphenoid sinus. No middle ear or mastoid effusion. BONES/SOFT TISSUES: No acute abnormality. Chronic right medial orbital wall fracture.     IMPRESSION: 1.  No intracranial hemorrhage, mass lesions, hydrocephalus or CT evidence for an acute infarct. 2.  Chronic right medial orbital wall fracture.    CT Femur Thigh Bilateral wo Contrast    Result Date: 9/4/2022  EXAM: CT FEMUR THIGH BILATERAL WITHOUT CONTRAST LOCATION: [facility] DATE/TIME: 09/04/2022, 9:28 AM INDICATION: 56-year-old patient with a right hip-buttock abscess. COMPARISON: 09/04/2022 CT abdomen and pelvis. 09/02/2022 CT abdomen and pelvis. TECHNIQUE: Noncontrast. Axial, sagittal and coronal thin-section  reconstruction. Dose reduction techniques were used. FINDINGS: BONES AND JOINTS: -Advanced osteopenia. -Resection or resorption of the right medial ilium. Dysplastic right acetabulum. Resorption or resection of the right femoral head and greater trochanter. External rotation of the right femur. 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 Old fracture or osteotomy of the left femoral neck. Old healed fracture of the left femur proximal diaphysis. MUSCLES AND SOFT TISSUES: -Subcutaneous right hip-buttock fluid collection, with percutaneous pigtail catheter drainage. This collection measures 12 x 7 cm in greatest axial dimensions. -Fluid attenuation in the expected regions of the right vastus lateralis and adductor celia regions. The proximal margin of these collections have decreased in size since the 09/02/2022 exam. These collections both extend to the distal margin of the thigh, measuring up to 25 cm in length. -Advanced muscle fatty atrophy. OTHER: -See the dedicated abdomen-pelvis CT for further information.     IMPRESSION: 1.  Decreased size of the right hip-buttock fluid collection, status post percutaneous drain placement. This collection measures 12 x 7 cm in greatest axial dimensions. 2.  Decreased size of fluid collections (likely contiguous with the above) in the residual right vastus lateralis and adductor celia regions. These collections extend through the distal thigh, and measure roughly 25 cm in length.     CT Pelvis Soft Tissue w Contrast    Result Date: 9/21/2022  EXAM: CT PELVIS SOFT TISSUE W CONTRAST LOCATION: [facility] DATE/TIME: 9/21/2022 6:32 AM INDICATION: Right hip abscess. COMPARISON: 09/15/2022 TECHNIQUE: CT scan of the pelvis was performed with IV contrast. Multiplanar reformats were obtained. Dose reduction techniques were used. CONTRAST: Isovue 370    100ml FINDINGS: PELVIC ORGANS: Status post cystectomy. Redemonstrated right  lower quadrant ileal conduit urinary diversion, with a Ambrose catheter in place. Visualized kidneys are partially secured by streak artifact. No evidence for hydronephrosis. Redemonstrated Franklin's pouch. There is a bowel anastomosis at the level of the cecum. Left lower quadrant colostomy. Otherwise, visualized small bowel and colon appear unremarkable. Mild atherosclerotic calcifications. MUSCULOSKELETAL: Redemonstrated drainage catheter, with tip at the superior aspect of the right hip gas and fluid collection. The collection measures 5.5 x 3.5 cm, not significantly changed. Redemonstrated chronic deformities of the bilateral hips. Status post partial sacrectomy. Redemonstrated decubitus ulcer overlying the sacrum. No lytic change or erosion to suggest acute osteomyelitis. Severe atrophy of the visualized musculature. Diffuse subcutaneous edema of the bilateral flanks and proximal thighs. Spinal fusion hardware, incompletely assessed.     IMPRESSION: 1.  No significant change in right hip 5.5 cm gas and fluid collection, with drainage catheter tip along the superior margin.     CT Pelvis Soft Tissue w Contrast    Result Date: 9/15/2022  EXAM: CT PELVIS SOFT TISSUE W CONTRAST LOCATION: [facility] DATE/TIME: 9/15/2022 4:57 PM INDICATION: abscess COMPARISON: 09/10/2022 TECHNIQUE: CT scan of the pelvis was performed with IV contrast. Multiplanar reformats were obtained. Dose reduction techniques were used. CONTRAST: isovue 370  100ml FINDINGS: OTHER: Visualized portions of the liver, spleen, and pancreas are unremarkable, although incompletely evaluated. KIDNEYS/BLADDER: The kidneys are partially obscured by metal artifact spinal hardware. No hydronephrosis. Cystectomy with urinary diversion. Interval placement of large bore catheter alongside the indwelling catheter into the colonic diversion with decrease in the degree of distention that was seen on the prior CT. BOWEL: Visualized loops  are normal caliber. LYMPH NODES: No lymphadenopathy. VASCULATURE: Unremarkable. PELVIC ORGANS: Unremarkable MUSCULOSKELETAL: Right hip fluid collection is similar, with the residual fluid measuring 5.0 x 3.3 cm (2/106), previously 4.7 x 3.6 cm. Catheter tip is positioned along the superolateral aspect of the collection diffuse osteopenia and partially imaged spinal fusion hardware. Chronic hip deformities and diffuse body wall edema. Skin defect overlying the sacrum.     IMPRESSION: 1.  Interval placement of large bore catheter into urinary diversion with resolution of the distention that was seen on the prior CT. 2.  Similar size of right hip fluid collection. Catheter tip is along the superolateral margin.     IR PICC Vascular    Result Date: 9/6/2022  LOCATION: [facility] DATE: 9/6/2022 PROCEDURE: PERIPHERALLY INSERTED CENTRAL CATHETER (PICC) PLACEMENT. INTERVENTIONAL RADIOLOGIST: Hugo Michael MD. INDICATION: Right thigh abscess. Paraplegia. Decubitus wounds. Unsuccessful bedside PICC placement CONSENT: The procedure, risks and benefits of PICC placement were discussed with the patient  in detail. All questions were answered. Informed consent was given to proceed with the procedure. MODERATE SEDATION: None CONTRAST: Omnipaque 350: 5 cc ANTIBIOTICS: None. ADDITIONAL MEDICATIONS: None. FLUOROSCOPIC TIME: 6 minutes RADIATION DOSE: Air Kerma: 19 mGy COMPLICATIONS: No immediate complications. STERILE BARRIER TECHNIQUE: Maximum sterile barrier technique was used. Cutaneous antisepsis was performed at the operative site with application of 2% chlorhexidine and a full body sterile drape. Prior to the procedure, the  and assistant performed hand hygiene and wore hat, mask, sterile gown, and sterile gloves during the entire procedure. Ultrasound was prepped with a sterile probe cover and sterile gel was used. PROCEDURE/TECHNIQUE:   Using local anesthesia and real-time ultrasound guidance,  the right brachial vein was accessed. An 018 guidewire could not be advanced beyond the axillary vein. Over the guidewire the dilator/peel-away sheath of the Bard PICC set were advanced into the brachial vein. A 5 Israeli KMP catheter was advanced to the axillary vein. A central venogram was obtained. Using the KMP catheter, the 018 guidewires manipulated down to the superior vena cava. The 5 Israeli PICC could not be advanced due to the irregular  subclavian stenosis. Through the KMP catheter, the guidewire was exchanged for an 035 Lobo guidewire. A 5 Israeli, 25 cm sheath was advanced and placed with the tip at the axillary vein. The right subclavian vein was angioplastied using a 5 mm x 40 mm and less balloon. The 5 Israeli Kumpe catheter was used to exchange the guidewire for the 018 guidewire. Sheath was exchanged for the 5 Israeli peel-away sheath. Over the guidewire a 5 Israeli, dual lumen Bard power PICC was advanced until tip was at the right atrium. PICC was cut to 41 cm. The lumens aspirated and flushed adequately. FINDINGS: Ultrasound demonstrates a patent and compressible right brachial vein. Images were recorded. Right central venogram demonstrates diffuse, irregular moderate stenosis throughout the right subclavian vein. The completion fluoroscopic demonstrates a right upper extremity PICC with its tip at the right atrium.     IMPRESSION:  1.  Successful right upper extremity CT injectable PICC placement. 2.  Diffuse right subclavian vein stenosis. Angioplastied to 5 mm to allow PICC placement..     KUB XR    Result Date: 9/3/2022  EXAM: XR KUB LOCATION: [facility] DATE/TIME: 9/3/2022 5:59 AM INDICATION: s p femoral line placement COMPARISON: 01/06/2020     IMPRESSION: Left-sided femoral catheter has been placed terminating in the midline at the lumbosacral junction. Visualized bowel gas pattern is nonobstructive. Postoperative changes in the thoracolumbar spine. Prior right  hip resection. Diffuse osteopenia. Right lower quadrant bowel anastomotic suture line. Multiple clips over the pelvis.    IR Suprapubic Catheter Change    Result Date: 9/18/2022  [place] [facility] LOCATION: [facility] DATE: 9/18/2022 PROCEDURE: FLUOROSCOPIC GUIDED UROSTOMY (SUPRAPUBIC) CATHETER EXCHANGE INTERVENTIONAL RADIOLOGIST: Dennis Branch MD. INDICATION: 56-year-old female with history of cystectomy with right lower quadrant colonic urostomy. The patient is experiencing significant peritubal leakage and abdominal distention. There is concern that the urostomy may be occluded or malposition. MODERATE SEDATION: None. CONTRAST: 20 mL Omnipaque into the urinary bladder. ANTIBIOTICS: None. ADDITIONAL MEDICATIONS: None. FLUOROSCOPIC TIME: 0.7 minutes. RADIATION DOSE: Air Kerma: 24 mGy. COMPLICATIONS: No immediate complications. STERILE BARRIER TECHNIQUE: Maximum sterile barrier technique was used. Cutaneous antisepsis was performed at the operative site with application of 2% chlorhexidine and large sterile drape. Prior to the procedure, the  and assistant performed hand hygiene and wore hat, mask, sterile gown, and sterile gloves during the entire procedure. PROCEDURE:  Under fluoroscopic guidance, the ureterostomy catheter was injected with contrast and images were obtained. The tube was then exchanged over a wire for a new 16 Afghan Red Devil-tip catheter which was positioned under fluoroscopic guidance with its tip in the colonic conduit lumen. The retention balloon was inflated with 10 mL of saline. A post placement contrast injection through the catheter was performed. FINDINGS: On physical examination the patient has a right lower quadrant urostomy with a transversing 16 Afghan Red Devil tip catheter. Running parallel to this through the same stoma is a smaller clear tube that is approximately 10-12 Afghan in diameter. The contrast injection through the ureterostomy reveals that  "this tubing is patent however the tubing tip is enveloped within colonic folds providing suboptimal drainage. Following exchange and repositioning the tip of the tube now resides within the main gravity-dependent portion of the pouch. Approximately 1200 mL of urine sediment immediately spontaneously drained through the catheter.     IMPRESSION:  1.  The existing ureterostomy catheter tip was involved within loops of colonic folds within the pouch providing suboptimal drainage. 2.  Following exchange and repositioning the new 16 Setswana Lytton-tip ureterostomy controls the fluid collection well. 3.  The patient also has a parallel tube entering through the same stoma. At times , parallel tubes entering through the same percutaneous access site can have a propensity to \"wick\" fluid between the tubing and result in leakage.     IR Suprapubic Catheter Change    Result Date: 9/13/2022  EXAM: IR SUPRAPUBIC CATHETER CHANGE LOCATION: [facility] DATE/TIME: 9/13/2022 4:08 PM INDICATION: [place] tip Ambrose catheter through the urostomy site into the colonic diversion. Significant pericatheter leakage. A second, parallel clear tubing into the site. INTERVENTIONAL RADIOLOGIST: Hugo Michael MD. CONSENT: The procedure, risks and benefits suprapubic catheter injection with possible exchange were discussed with the patient  in detail. All questions were answered. Informed consent was given to proceed with the procedure. MODERATE SEDATION: None CONTRAST: Omnipaque 350: 80 cc ANTIBIOTICS: None ADDITIONAL MEDICATIONS: None. FLUOROSCOPIC TIME: 1.6 minutes. RADIATION DOSE: Air Kerma: 26 mGy COMPLICATIONS: No immediate complications. STERILE BARRIER TECHNIQUE: Maximum sterile barrier technique was used. Cutaneous antisepsis was performed at the operative site with application of 2% chlorhexidine and a full body sterile drape. Prior to the procedure, the  and assistant performed hand hygiene and wore hat, " mask, sterile gown, and sterile gloves during the entire procedure. PROCEDURE/TECHNIQUE: Contrast was injected through the indwelling 16 Guamanian [place] tip Ambrose catheter. The catheter is the proximal aspect of the colonic diversion with the tip abutting a side wall. Retention stitches were cut. Retention balloon was deflated and catheter advanced into a more central position within the colonic divergent. Retention balloon was inflated with 10 cc normal saline. Contrast was through the parallel clear tubing, which has its tip within the superior aspect of the colonic  diversion. Both catheter was secured in place with 2-0 Ethilon stitches. 16 Guamanian Soboba tip catheter was connected to gravity drainage. Clear catheter was capped. FINDINGS: 1. Initial contrast injection through the 16 Guamanian [place] tip Ambrose catheter demonstrates its tip at the inferior aspect of the colonic diversion with the tip abutting a sidewall. Completion fluoroscopic image demonstrates the advanced catheter with the balloon inflated within the superior aspect of the colonic diversion. 2. Contrast injection through the parallel, cleared tubing demonstrate of the tract courses directly into the superior aspect of the colonic diversion. Tip is within the lumen of the colonic diversion.     IMPRESSION:  1.  Urostomy Ambrose catheter repositioned, as described above. PLAN: Continued gravity drainage.

## 2022-09-23 NOTE — OP NOTE
OPERATIVE REPORT    Felicia Ellison   [facility]  Medical Record #:  [id number]  YOB: 1965  Age:  56 year old    PROCEDURE DATE:  9/3/2022 - 9/23/2022    PREOPERATIVE DIAGNOSIS: Abscess right hip joint status post distant resection of head of femur now with chronic drain in wound    POSTOPERATIVE DIAGNOSIS: Same    PROCEDURE: Incision and drainage debridement right hip abscess skin subcutaneous tissue fascia muscle portion of femur 115 x 10 cm with plan for VAC dressing care    OPERATING SURGEON:  Reji Hunter MD    ASSISTANT: Technician    ANESTHESIA: General    DESCRIPTION OF PROCEDURE: With the patient in the left lateral position under general tracheal anesthesia having reviewed the risk benefits complications of surgical mention potential extent of surgical invention with her the right hip was prepped in a usual sterile fashion the previously placed drain is removed.  Electrocautery dissection sharp knife dissection is used for resection debridement of skin subcutaneous tissue fascia and muscle in the area of large right hip abscess.  Multiple bleeders are suture-ligated with 0 Vicryl suture.  This is carried into the area of previous resection of had a right femur.  The abscess cavity is debrided thoroughly throughout.  Residual end of femur is isolated and debrided portion of this removed and portion sent for gram stain aerobic and anaerobic cultures.  Hemostasis was obtained with electrocautery and hemostatic agents.  After completion of the debridement the wound is packed open with Hibiclens and saline solution and plan will be for VAC dressing care.  Estimated blood loss was minimal there were no complications and the patient tolerated the procedure well.  Sponge and counts are correct x2.    EBL:  [unfilled]    SPECIMENS:    ID Type Source Tests Collected by Time Destination   1 : Skin, subcutaneous, fascia, and muscle of right hip Tissue Hip, Right SURGICAL PATHOLOGY  EXAM Reji Chavez MD 9/23/2022 11:27 AM    A : Right Hip Bone  Tissue Hip, Right ANAEROBIC BACTERIAL CULTURE ROUTINE, GRAM STAIN, AEROBIC BACTERIAL CULTURE ROUTINE Reji Chavez MD 9/23/2022 11:46 AM        Reji chavez md  [facility]

## 2022-09-23 NOTE — PHARMACY-VANCOMYCIN DOSING SERVICE
Pharmacy Vancomycin Note  Date of Service 2022  Patient's  1965   56 year old, female    Indication: Bone and Joint Infection  Day of Therapy: 3  Current vancomycin regimen:  750 mg IV q12h  Current vancomycin monitoring method: AUC  Current vancomycin therapeutic monitoring goal: 400-600 mg*h/L    Insight[employer] Prediction of Current Vancomycin Regimen  Regimen: 750 mg IV every 12 hours.  Start time: 18:00 on 2022  Exposure target: AUC24 (range)400-600 mg/L.hr   AUC24,ss: 375 mg/L.hr  Probability of AUC24 > 400: 37 %  Ctrough,ss: 11.2 mg/L  Probability of Ctrough,ss > 20: 0 %  Probability of nephrotoxicity (Lodise LU ): 7 %    Current estimated CrCl = Estimated Creatinine Clearance: 182.1 mL/min (A) (based on SCr of 0.31 mg/dL (L)).    Creatinine for last 3 days  2022:  6:37 AM Creatinine 0.28 mg/dL  2022:  6:30 AM Creatinine 0.27 mg/dL  2022:  3:17 AM Creatinine 0.31 mg/dL    Recent Vancomycin Levels (past 3 days)  2022:  3:50 PM Vancomycin 13.5 ug/mL    Vancomycin IV Administrations (past 72 hours)                   vancomycin 750 mg in 0.9% NaCl 250 mL intermittent infusion 750 mg (mg) 750 mg Given 22 0412     750 mg Given 22 1541     750 mg Given  0407    vancomycin (VANCOCIN) 1,250 mg in sodium chloride 0.9 % 250 mL intermittent infusion (mg) 1,250 mg New Bag 22 1555                Nephrotoxins and other renal medications (From now, onward)    Start     Dose/Rate Route Frequency Ordered Stop    22 1800  vancomycin (VANCOCIN) 1000 mg in dextrose 5% 200 mL PREMIX         1,000 mg  200 mL/hr over 1 Hours Intravenous EVERY 12 HOURS 22 1704      22 1330  bumetanide (BUMEX) tablet 0.5 mg         0.5 mg Oral DAILY 22 1320               Contrast Orders - past 72 hours (72h ago, onward)    Start     Dose/Rate Route Frequency Stop    22 0630  iopamidol (ISOVUE-370) solution 100 mL         100 mL Intravenous ONCE 22 0632           Interpretation of levels and current regimen:  Vancomycin level is reflective of -600    Has serum creatinine changed greater than 50% in last 72 hours: No    Renal Function: Stable    Insight[employer] Prediction of Planned New Vancomycin Regimen  Regimen: 1000 mg IV every 12 hours.  Start time: 18:00 on 09/23/2022  Exposure target: AUC24 (range)400-600 mg/L.hr   AUC24,ss: 498 mg/L.hr  Probability of AUC24 > 400: 89 %  Ctrough,ss: 15.1 mg/L  Probability of Ctrough,ss > 20: 9 %  Probability of nephrotoxicity (Lodise LU 2009): 10 %    Plan:  1. Increase Dose to 1000 mg q12h  2. Vancomycin monitoring method: AUC  3. Vancomycin therapeutic monitoring goal: 400-600 mg*h/L  4. Pharmacy will check vancomycin levels as appropriate in 1-3 Days.  5. Serum creatinine levels will be ordered as indicated.    Jaycee Lozano, [facility]

## 2022-09-23 NOTE — PLAN OF CARE
Problem: Plan of Care - These are the overarching goals to be used throughout the patient stay.    Goal: Absence of Hospital-Acquired Illness or Injury  Outcome: Ongoing, Progressing  Intervention: Identify and Manage Fall Risk  Recent Flowsheet Documentation  Taken 9/23/2022 0900 by Chanell Castillo RN  Safety Promotion/Fall Prevention:   activity supervised   bed alarm on     Problem: Plan of Care - These are the overarching goals to be used throughout the patient stay.    Goal: Absence of Hospital-Acquired Illness or Injury  Intervention: Identify and Manage Fall Risk  Recent Flowsheet Documentation  Taken 9/23/2022 0900 by Chanell Castillo RN  Safety Promotion/Fall Prevention:   activity supervised   bed alarm on     Problem: Infection  Goal: Absence of Infection Signs and Symptoms  Outcome: Ongoing, Progressing     Problem: Anemia  Goal: Anemia Symptom Improvement  Intervention: Monitor and Manage Anemia  Recent Flowsheet Documentation  Taken 9/23/2022 0900 by Chanell Castillo RN  Safety Promotion/Fall Prevention:   activity supervised   bed alarm on     Problem: Pain Acute  Goal: Acceptable Pain Control and Functional Ability  Outcome: Ongoing, Progressing   Goal Outcome Evaluation:      Patient returned to the unit after I & D about 3 pm. She is alert and oriented, denies any pain, VSS. Surgical site covered packed with gauze and covered with ABD PAD. WOC Nurse was informed, they would stated they would need a new consult order for WOC consult.

## 2022-09-23 NOTE — ANESTHESIA POSTPROCEDURE EVALUATION
Patient: Felicia Ellison    Procedure: Procedure(s):  INCISION AND DRAINAGE OF RIGHT HIP ABSCESS       Anesthesia Type:  General    Note:  Disposition: Inpatient   Postop Pain Control: Uneventful            Sign Out: Well controlled pain   PONV: No   Neuro/Psych: Uneventful            Sign Out: Acceptable/Baseline neuro status   Airway/Respiratory: Uneventful            Sign Out: Acceptable/Baseline resp. status; O2 supplementation               Oxygen: Face mask   CV/Hemodynamics: Uneventful            Sign Out: Acceptable CV status; No obvious hypovolemia; No obvious fluid overload   Other NRE: NONE   DID A NON-ROUTINE EVENT OCCUR?            Last vitals:  Vitals Value Taken Time   BP 86/46 09/23/22 1255   Temp 36.4  C (97.5  F) 09/23/22 1230   Pulse 76 09/23/22 1258   Resp 12 09/23/22 1258   SpO2 100 % 09/23/22 1258   Vitals shown include unvalidated device data.    Electronically Signed By: Dennis Rodgers MD  September 23, 2022  1:00 PM
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
## 2022-09-23 NOTE — OR NURSING
The patient states pain in the left upper arm where this RN observed increase edema and pain. The patient states normal feeling in her left hand. The patient denies tingling or numbness in her left hand. The patient's left nail beds return color under 2 sec when pressed. The patient states she has normal strength in her left hand. The patient insists on taking blood pressure on her left wrist instead of her left upper arm. The [facility] has allowed the left wrist to be used for BP.  The [facility] wants an update on BP after the blood transfusion has completed.

## 2022-09-23 NOTE — PROGRESS NOTES
Progress Note    Assessment/Plan  Patient seen regarding her right hip abscess.  Communication made yesterday with a care facility indicating they will not take her back with a drain in place.  As result the only other alternative is open incision and drainage and VAC dressing for this wound care as they will take care of with a wound VAC before.  She has had this in the past on multiple occasions for her pressure sores on the back.  At the current time of this decision making process is reviewed with her and she is fully understanding of this.  She wants to return to [facility].  Her clinical status otherwise has not changed significantly.  She is bothered some with the drainage from her abdominal site from her catheter in her neobladder however.  Drainage from the hip drain site has been minimal.  CT scan reviewed with the patient and personally reviewed with radiology.  Decision made today and plan for debridement today.  CT  Active Problems:    Septic shock (H)      Subjective  Patient still depressed that she is not able to return to [facility] yet.  She has been eating little bit better however.  Nuisance drainage on her abdominal wall from her neobladder catheter.  Without pain otherwise.  Objective    Vital signs in last 24 hours  Temp:  [97.5  F (36.4  C)-98.5  F (36.9  C)] 97.5  F (36.4  C)  Pulse:  [75-93] 91  Resp:  [16-20] 20  BP: ()/(44-56) 111/55  SpO2:  [94 %-100 %] 100 %  Weight:   [unfilled]    Intake/Output last 3 shifts  I/O last 3 completed shifts:  In: -   Out: 310 [Urine:50; Drains:60; Stool:200]  Intake/Output this shift:  I/O this shift:  In: 350 [I.V.:350]  Out: 15 [Blood:15]      Physical Exam  Exam indicates abdomen to be benign.  There is some drainage around the catheter.  Bowel sounds are present.  No evidence of cellulitis.  Right hip drain is stable.  Some output still purulent.  The and the buttock and upper leg are still edematous.  No other significant change from that  standpoint.    Pertinent Labs   Lab Results   Component Value Date    WBC 3.4 (L) 09/23/2022    HGB 7.7 (L) 09/23/2022    HCT 25.6 (L) 09/23/2022    MCV 93 09/23/2022     (L) 09/23/2022             Pertinent Radiology     [unfilled]        Reji Hunter MD

## 2022-09-23 NOTE — PROGRESS NOTES
Writer received a call from the Evening nurse from 9/22/22 to inform me that per Dr. Hunter scheduled Lovenox should be held due to patient has procedure this AM.

## 2022-09-24 LAB
ALBUMIN SERPL BCG-MCNC: 2.3 G/DL (ref 3.5–5.2)
ALP SERPL-CCNC: 104 U/L (ref 35–104)
ALT SERPL W P-5'-P-CCNC: 7 U/L (ref 10–35)
ANION GAP SERPL CALCULATED.3IONS-SCNC: 6 MMOL/L (ref 7–15)
AST SERPL W P-5'-P-CCNC: 13 U/L (ref 10–35)
BASOPHILS # BLD AUTO: 0 10E3/UL (ref 0–0.2)
BASOPHILS NFR BLD AUTO: 1 %
BILIRUB SERPL-MCNC: 0.3 MG/DL
BUN SERPL-MCNC: 7.6 MG/DL (ref 6–20)
CALCIUM SERPL-MCNC: 7.8 MG/DL (ref 8.6–10)
CHLORIDE SERPL-SCNC: 108 MMOL/L (ref 98–107)
CREAT SERPL-MCNC: 0.29 MG/DL (ref 0.51–0.95)
DEPRECATED HCO3 PLAS-SCNC: 24 MMOL/L (ref 22–29)
EOSINOPHIL # BLD AUTO: 0.2 10E3/UL (ref 0–0.7)
EOSINOPHIL NFR BLD AUTO: 5 %
ERYTHROCYTE [DISTWIDTH] IN BLOOD BY AUTOMATED COUNT: 18.4 % (ref 10–15)
GFR SERPL CREATININE-BSD FRML MDRD: >90 ML/MIN/1.73M2
GLUCOSE SERPL-MCNC: 92 MG/DL (ref 70–99)
HCT VFR BLD AUTO: 30.4 % (ref 35–47)
HGB BLD-MCNC: 9.3 G/DL (ref 11.7–15.7)
IMM GRANULOCYTES # BLD: 0 10E3/UL
IMM GRANULOCYTES NFR BLD: 1 %
LYMPHOCYTES # BLD AUTO: 1.5 10E3/UL (ref 0.8–5.3)
LYMPHOCYTES NFR BLD AUTO: 33 %
MCH RBC QN AUTO: 28.4 PG (ref 26.5–33)
MCHC RBC AUTO-ENTMCNC: 30.6 G/DL (ref 31.5–36.5)
MCV RBC AUTO: 93 FL (ref 78–100)
MONOCYTES # BLD AUTO: 0.3 10E3/UL (ref 0–1.3)
MONOCYTES NFR BLD AUTO: 6 %
NEUTROPHILS # BLD AUTO: 2.4 10E3/UL (ref 1.6–8.3)
NEUTROPHILS NFR BLD AUTO: 54 %
NRBC # BLD AUTO: 0 10E3/UL
NRBC BLD AUTO-RTO: 0 /100
PLATELET # BLD AUTO: 174 10E3/UL (ref 150–450)
POTASSIUM SERPL-SCNC: 3.2 MMOL/L (ref 3.4–5.3)
POTASSIUM SERPL-SCNC: 3.9 MMOL/L (ref 3.4–5.3)
PROT SERPL-MCNC: 5.4 G/DL (ref 6.4–8.3)
RBC # BLD AUTO: 3.28 10E6/UL (ref 3.8–5.2)
SODIUM SERPL-SCNC: 138 MMOL/L (ref 136–145)
WBC # BLD AUTO: 4.4 10E3/UL (ref 4–11)

## 2022-09-24 PROCEDURE — 80053 COMPREHEN METABOLIC PANEL: CPT | Performed by: SURGERY

## 2022-09-24 PROCEDURE — 250N000013 HC RX MED GY IP 250 OP 250 PS 637: Performed by: SURGERY

## 2022-09-24 PROCEDURE — 85025 COMPLETE CBC W/AUTO DIFF WBC: CPT | Performed by: SURGERY

## 2022-09-24 PROCEDURE — 120N000001 HC R&B MED SURG/OB

## 2022-09-24 PROCEDURE — 250N000011 HC RX IP 250 OP 636: Performed by: SURGERY

## 2022-09-24 PROCEDURE — 250N000013 HC RX MED GY IP 250 OP 250 PS 637: Performed by: INTERNAL MEDICINE

## 2022-09-24 PROCEDURE — 99232 SBSQ HOSP IP/OBS MODERATE 35: CPT | Performed by: INTERNAL MEDICINE

## 2022-09-24 PROCEDURE — 250N000011 HC RX IP 250 OP 636: Performed by: INTERNAL MEDICINE

## 2022-09-24 PROCEDURE — 84132 ASSAY OF SERUM POTASSIUM: CPT | Performed by: INTERNAL MEDICINE

## 2022-09-24 RX ORDER — POTASSIUM CHLORIDE 1500 MG/1
40 TABLET, EXTENDED RELEASE ORAL ONCE
Status: COMPLETED | OUTPATIENT
Start: 2022-09-24 | End: 2022-09-24

## 2022-09-24 RX ADMIN — POTASSIUM CHLORIDE 40 MEQ: 1500 TABLET, EXTENDED RELEASE ORAL at 17:01

## 2022-09-24 RX ADMIN — VANCOMYCIN HYDROCHLORIDE 1000 MG: 1 INJECTION, SOLUTION INTRAVENOUS at 18:57

## 2022-09-24 RX ADMIN — ENOXAPARIN SODIUM 60 MG: 60 INJECTION SUBCUTANEOUS at 20:57

## 2022-09-24 RX ADMIN — POLYETHYLENE GLYCOL 3350 17 G: 17 POWDER, FOR SOLUTION ORAL at 09:03

## 2022-09-24 RX ADMIN — LEVETIRACETAM 500 MG: 500 TABLET, FILM COATED ORAL at 09:01

## 2022-09-24 RX ADMIN — THERA TABS 1 TABLET: TAB at 09:02

## 2022-09-24 RX ADMIN — LEVETIRACETAM 500 MG: 500 TABLET, FILM COATED ORAL at 20:57

## 2022-09-24 RX ADMIN — NYSTATIN: 100000 OINTMENT TOPICAL at 09:09

## 2022-09-24 RX ADMIN — PANTOPRAZOLE SODIUM 40 MG: 40 TABLET, DELAYED RELEASE ORAL at 06:30

## 2022-09-24 RX ADMIN — BUMETANIDE 0.5 MG: 0.5 TABLET ORAL at 09:05

## 2022-09-24 RX ADMIN — TRAZODONE HYDROCHLORIDE 100 MG: 50 TABLET ORAL at 22:42

## 2022-09-24 RX ADMIN — HYDROMORPHONE HYDROCHLORIDE 2 MG: 2 TABLET ORAL at 13:08

## 2022-09-24 RX ADMIN — HYDROMORPHONE HYDROCHLORIDE 2 MG: 2 TABLET ORAL at 17:10

## 2022-09-24 RX ADMIN — HYDROMORPHONE HYDROCHLORIDE 4 MG: 2 TABLET ORAL at 00:49

## 2022-09-24 RX ADMIN — VANCOMYCIN HYDROCHLORIDE 1000 MG: 1 INJECTION, SOLUTION INTRAVENOUS at 05:21

## 2022-09-24 RX ADMIN — FOLIC ACID 1 MG: 1 TABLET ORAL at 09:02

## 2022-09-24 RX ADMIN — ZOLPIDEM TARTRATE 2.5 MG: 5 TABLET ORAL at 22:42

## 2022-09-24 RX ADMIN — NYSTATIN: 100000 OINTMENT TOPICAL at 20:58

## 2022-09-24 RX ADMIN — THIAMINE HCL TAB 100 MG 100 MG: 100 TAB at 09:02

## 2022-09-24 RX ADMIN — SENNOSIDES AND DOCUSATE SODIUM 1 TABLET: 50; 8.6 TABLET ORAL at 09:02

## 2022-09-24 RX ADMIN — HYDROMORPHONE HYDROCHLORIDE 2 MG: 2 TABLET ORAL at 09:02

## 2022-09-24 RX ADMIN — ENOXAPARIN SODIUM 60 MG: 60 INJECTION SUBCUTANEOUS at 09:02

## 2022-09-24 RX ADMIN — SENNOSIDES AND DOCUSATE SODIUM 1 TABLET: 50; 8.6 TABLET ORAL at 20:57

## 2022-09-24 ASSESSMENT — ACTIVITIES OF DAILY LIVING (ADL)
ADLS_ACUITY_SCORE: 49
ADLS_ACUITY_SCORE: 53
ADLS_ACUITY_SCORE: 49

## 2022-09-24 NOTE — PLAN OF CARE
Problem: Plan of Care - These are the overarching goals to be used throughout the patient stay.    Goal: Absence of Hospital-Acquired Illness or Injury  Intervention: Prevent Skin Injury  Recent Flowsheet Documentation  Taken 9/24/2022 0033 by Adry Valencia RN  Body Position:    turned    left  Taken 9/24/2022 0028 by Adry Valencia RN  Body Position:    turned    left     Problem: Plan of Care - These are the overarching goals to be used throughout the patient stay.    Goal: Absence of Hospital-Acquired Illness or Injury  Intervention: Prevent Infection  Recent Flowsheet Documentation  Taken 9/24/2022 0033 by Adry Valencia RN  Infection Prevention:    single patient room provided    rest/sleep promoted     Problem: Plan of Care - These are the overarching goals to be used throughout the patient stay.    Goal: Optimal Comfort and Wellbeing  Outcome: Ongoing, Progressing     Problem: Risk for Delirium  Goal: Improved Behavioral Control  Outcome: Ongoing, Progressing     Problem: Pain Acute  Goal: Acceptable Pain Control and Functional Ability  Outcome: Ongoing, Progressing  Intervention: Prevent or Manage Pain  Recent Flowsheet Documentation  Taken 9/24/2022 0033 by Adry Valencia RN  Medication Review/Management: medications reviewed   Goal Outcome Evaluation:  Pt c/o pain on incision site on rt hip at 8/10 rate. Given oral Hydromorphone 4 mg. Pt claimed relieved. Suprapubic line were leaking, diaper x2 placed over the insertion site were soaked with urine even the bed sheet and blankets. Drainage bags from suprapubic were empty, flushed with 10 ml NS. LR 1 L was done and discontinued per order. Dressings changed, bed sheets and blankets were changed. Repositioning done. Pt is comfortable and slept good. Dressing to rt hip changed, soaked with drainage and weeping.

## 2022-09-24 NOTE — PLAN OF CARE
Problem: Pain Acute  Goal: Acceptable Pain Control and Functional Ability  9/24/2022 1541 by Chanell Castillo RN  Outcome: Ongoing, Progressing  9/24/2022 1541 by Chanell Castillo RN  Outcome: Ongoing, Progressing     Problem: Anemia  Goal: Anemia Symptom Improvement  9/24/2022 1541 by Chanell Castillo RN  Outcome: Ongoing, Progressing  9/24/2022 1541 by Chanell Castillo RN  Outcome: Ongoing, Progressing     Problem: VTE (Venous Thromboembolism)  Goal: VTE (Venous Thromboembolism) Symptom Resolution  9/24/2022 1541 by Chanell Castillo RN  Outcome: Ongoing, Progressing  9/24/2022 1541 by Chanell Castillo RN  Outcome: Ongoing, Progressing     Problem: Plan of Care - These are the overarching goals to be used throughout the patient stay.    Goal: Optimal Comfort and Wellbeing  9/24/2022 1541 by Chanell Castillo RN  Outcome: Ongoing, Progressing  9/24/2022 1541 by Chanell Castillo RN  Outcome: Ongoing, Progressing     Problem: Plan of Care - These are the overarching goals to be used throughout the patient stay.    Goal: Absence of Hospital-Acquired Illness or Injury  9/24/2022 1541 by Chanell Castillo RN  Outcome: Ongoing, Progressing  9/24/2022 1541 by Chanell Castillo RN  Outcome: Ongoing, Progressing     Problem: Plan of Care - These are the overarching goals to be used throughout the patient stay.    Goal: Plan of Care Review/Shift Note  Description: The Plan of Care Review/Shift note should be completed every shift.  The Outcome Evaluation is a brief statement about your assessment that the patient is improving, declining, or no change.  This information will be displayed automatically on your shift note.  9/24/2022 1541 by Chanell Castillo RN  Outcome: Ongoing, Progressing  9/24/2022 1541 by Chanell Castillo RN  Outcome: Ongoing, Progressing   Goal Outcome Evaluation:      Patient complained of pain on her right hip surgical area and buttocks. Patient medication was given which was effective.  Dressing changes completed per orders and was tolerated. Suprapubic catheter on right groin irrigated and drained.
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
## 2022-09-24 NOTE — PLAN OF CARE
Problem: Plan of Care - These are the overarching goals to be used throughout the patient stay.    Goal: Absence of Hospital-Acquired Illness or Injury  Outcome: Ongoing, Not Progressing  Intervention: Identify and Manage Fall Risk  Recent Flowsheet Documentation  Taken 9/23/2022 1646 by Basilia Esquivel RN  Safety Promotion/Fall Prevention:    bed alarm on    assistive device/personal items within reach    increased rounding and observation    increase visualization of patient    patient and family education    room door open    room near nurse's station    room organization consistent    safety round/check completed    lighting adjusted  Intervention: Prevent Skin Injury  Recent Flowsheet Documentation  Taken 9/23/2022 2130 by Basilia Esquivel, RN  Body Position:    turned    right    supine, legs elevated  Taken 9/23/2022 2030 by Basilia Esquivel RN  Body Position:    turned    supine, legs elevated  Taken 9/23/2022 2000 by Basilia Esquivel RN  Body Position:    turned    left  Intervention: Prevent Infection  Recent Flowsheet Documentation  Taken 9/23/2022 1646 by Basilia Esquivel RN  Infection Prevention:    single patient room provided    rest/sleep promoted     Problem: Infection  Goal: Absence of Infection Signs and Symptoms  Outcome: Ongoing, Not Progressing     Problem: Plan of Care - These are the overarching goals to be used throughout the patient stay.    Goal: Plan of Care Review/Shift Note  Outcome: Ongoing, Progressing  Flowsheets (Taken 9/24/2022 0117)  Plan of Care Reviewed With: patient  Overall Patient Progress: improving  Goal: Patient-Specific Goal (Individualized)  Outcome: Ongoing, Progressing  Goal: Optimal Comfort and Wellbeing  Outcome: Ongoing, Progressing  Intervention: Monitor Pain and Promote Comfort  Recent Flowsheet Documentation  Taken 9/23/2022 1946 by Basilia Esquivel RN  Pain Management Interventions: medication (see MAR)  Taken 9/23/2022 1630 by Basilia Esquivel RN  Pain  Management Interventions:    medication offered but refused    food  Intervention: Provide Person-Centered Care  Recent Flowsheet Documentation  Taken 9/23/2022 1646 by Basilia Esquivel RN  Trust Relationship/Rapport:    care explained    emotional support provided    empathic listening provided  Goal: Readiness for Transition of Care  Outcome: Ongoing, Progressing  Flowsheets (Taken 9/24/2022 0117)  Anticipated Changes Related to Illness: inability to care for self  Intervention: Mutually Develop Transition Plan  Recent Flowsheet Documentation  Taken 9/24/2022 0117 by Basilia Esquivel RN  Anticipated Changes Related to Illness: inability to care for self     Problem: Malnutrition  Goal: Improved Nutritional Intake  Outcome: Ongoing, Progressing     Problem: Impaired Wound Healing  Goal: Optimal Wound Healing  Outcome: Ongoing, Progressing  Intervention: Promote Wound Healing  Recent Flowsheet Documentation  Taken 9/23/2022 1946 by Basilia Esquivel RN  Pain Management Interventions: medication (see MAR)  Taken 9/23/2022 1630 by Basilia Esquivel RN  Pain Management Interventions:    medication offered but refused    food     Problem: Anemia  Goal: Anemia Symptom Improvement  Outcome: Ongoing, Progressing  Intervention: Monitor and Manage Anemia  Recent Flowsheet Documentation  Taken 9/23/2022 1646 by Basilia Esquivel RN  Safety Promotion/Fall Prevention:    bed alarm on    assistive device/personal items within reach    increased rounding and observation    increase visualization of patient    patient and family education    room door open    room near nurse's station    room organization consistent    safety round/check completed    lighting adjusted     Problem: Pain Acute  Goal: Acceptable Pain Control and Functional Ability  Outcome: Ongoing, Progressing  Intervention: Develop Pain Management Plan  Recent Flowsheet Documentation  Taken 9/23/2022 1946 by Basilia Esquivel RN  Pain Management Interventions:  "medication (see MAR)  Taken 9/23/2022 1630 by Basilia Esquivel, RN  Pain Management Interventions:    medication offered but refused    food  Intervention: Prevent or Manage Pain  Recent Flowsheet Documentation  Taken 9/23/2022 1646 by Basilia Esquivel, RN  Medication Review/Management: medications reviewed   Goal Outcome Evaluation:    Plan of Care Reviewed With: patient     Overall Patient Progress: improving    Was tolerating Right hip incisional pain at 5/10. Wanted Tylenol discontinued, \"makes me sick.\" Dr. Albright discontinued Tylenol. Drainage coming from Right hip packing/ABD. Notified Dr. Dillon that [facility] told AM nurse they are unable to place wound vac until Monday. Received dressing change orders from Dr. Dillon, BID wet-to-dry gauze/kerlix packing with Normal saline & ABD's until wound vac placement (did wet-to-dry dressing change this shift). Still need wound vac order. Notified Dr. Albright of suprapubic mcintyre redness & pus around site with leaking since 9/18 per charting, sticky note placed for AM rounding doctor per MD. Flushed suprapubic mcintyre with 10 mL normal saline per order. Hemoglobin up to 10.1 after blood transfusion in OR. Later Right hip pain 8/10, down to 5/10 after given PRN Dilaudid PO 4 mg. Repositioned during shift as allowed. Sacral wet-to-dry dressing changed, somewhat improved, slightly smaller in size. Area of edema on Left arm pointed out by patient at end of shift, good radial pulse, no increased warmth in area, left sticky note for AM rounding MD. LR at 100 mL/hr until 4 am per Dr. Zimmerman. Using incentive spirometer. Lovenox held until AM per surgery per note.         "

## 2022-09-25 LAB
ALBUMIN SERPL BCG-MCNC: 1.9 G/DL (ref 3.5–5.2)
ALP SERPL-CCNC: 96 U/L (ref 35–104)
ALT SERPL W P-5'-P-CCNC: 6 U/L (ref 10–35)
ANION GAP SERPL CALCULATED.3IONS-SCNC: 7 MMOL/L (ref 7–15)
AST SERPL W P-5'-P-CCNC: 12 U/L (ref 10–35)
BASOPHILS # BLD AUTO: 0 10E3/UL (ref 0–0.2)
BASOPHILS NFR BLD AUTO: 0 %
BILIRUB SERPL-MCNC: 0.2 MG/DL
BUN SERPL-MCNC: 7.5 MG/DL (ref 6–20)
CALCIUM SERPL-MCNC: 7.8 MG/DL (ref 8.6–10)
CHLORIDE SERPL-SCNC: 109 MMOL/L (ref 98–107)
CREAT SERPL-MCNC: 0.25 MG/DL (ref 0.51–0.95)
DEPRECATED HCO3 PLAS-SCNC: 24 MMOL/L (ref 22–29)
EOSINOPHIL # BLD AUTO: 0.3 10E3/UL (ref 0–0.7)
EOSINOPHIL NFR BLD AUTO: 8 %
ERYTHROCYTE [DISTWIDTH] IN BLOOD BY AUTOMATED COUNT: 17.8 % (ref 10–15)
GFR SERPL CREATININE-BSD FRML MDRD: >90 ML/MIN/1.73M2
GLUCOSE SERPL-MCNC: 91 MG/DL (ref 70–99)
GLUCOSE SERPL-MCNC: 91 MG/DL (ref 70–99)
HCT VFR BLD AUTO: 26.6 % (ref 35–47)
HGB BLD-MCNC: 8.1 G/DL (ref 11.7–15.7)
IMM GRANULOCYTES # BLD: 0 10E3/UL
IMM GRANULOCYTES NFR BLD: 1 %
LYMPHOCYTES # BLD AUTO: 1.2 10E3/UL (ref 0.8–5.3)
LYMPHOCYTES NFR BLD AUTO: 34 %
MCH RBC QN AUTO: 28 PG (ref 26.5–33)
MCHC RBC AUTO-ENTMCNC: 30.5 G/DL (ref 31.5–36.5)
MCV RBC AUTO: 92 FL (ref 78–100)
MONOCYTES # BLD AUTO: 0.2 10E3/UL (ref 0–1.3)
MONOCYTES NFR BLD AUTO: 7 %
NEUTROPHILS # BLD AUTO: 1.7 10E3/UL (ref 1.6–8.3)
NEUTROPHILS NFR BLD AUTO: 50 %
NRBC # BLD AUTO: 0 10E3/UL
NRBC BLD AUTO-RTO: 0 /100
PLATELET # BLD AUTO: 148 10E3/UL (ref 150–450)
POTASSIUM SERPL-SCNC: 3.7 MMOL/L (ref 3.4–5.3)
PROT SERPL-MCNC: 5 G/DL (ref 6.4–8.3)
RBC # BLD AUTO: 2.89 10E6/UL (ref 3.8–5.2)
SODIUM SERPL-SCNC: 140 MMOL/L (ref 136–145)
WBC # BLD AUTO: 3.4 10E3/UL (ref 4–11)

## 2022-09-25 PROCEDURE — 99207 PR CDG-CUT & PASTE-POTENTIAL IMPACT ON LEVEL: CPT | Performed by: INTERNAL MEDICINE

## 2022-09-25 PROCEDURE — 250N000013 HC RX MED GY IP 250 OP 250 PS 637: Performed by: SURGERY

## 2022-09-25 PROCEDURE — 250N000011 HC RX IP 250 OP 636: Performed by: SURGERY

## 2022-09-25 PROCEDURE — 99232 SBSQ HOSP IP/OBS MODERATE 35: CPT | Performed by: INTERNAL MEDICINE

## 2022-09-25 PROCEDURE — 85025 COMPLETE CBC W/AUTO DIFF WBC: CPT | Performed by: SURGERY

## 2022-09-25 PROCEDURE — 250N000011 HC RX IP 250 OP 636: Performed by: INTERNAL MEDICINE

## 2022-09-25 PROCEDURE — 80053 COMPREHEN METABOLIC PANEL: CPT | Performed by: SURGERY

## 2022-09-25 PROCEDURE — 120N000001 HC R&B MED SURG/OB

## 2022-09-25 RX ADMIN — SENNOSIDES AND DOCUSATE SODIUM 1 TABLET: 50; 8.6 TABLET ORAL at 21:00

## 2022-09-25 RX ADMIN — BUMETANIDE 0.5 MG: 0.5 TABLET ORAL at 08:45

## 2022-09-25 RX ADMIN — VANCOMYCIN HYDROCHLORIDE 1000 MG: 1 INJECTION, SOLUTION INTRAVENOUS at 05:42

## 2022-09-25 RX ADMIN — NYSTATIN: 100000 OINTMENT TOPICAL at 08:46

## 2022-09-25 RX ADMIN — TRAZODONE HYDROCHLORIDE 100 MG: 50 TABLET ORAL at 22:06

## 2022-09-25 RX ADMIN — VANCOMYCIN HYDROCHLORIDE 1000 MG: 1 INJECTION, SOLUTION INTRAVENOUS at 18:23

## 2022-09-25 RX ADMIN — NYSTATIN: 100000 OINTMENT TOPICAL at 21:01

## 2022-09-25 RX ADMIN — THIAMINE HCL TAB 100 MG 100 MG: 100 TAB at 08:44

## 2022-09-25 RX ADMIN — ZOLPIDEM TARTRATE 2.5 MG: 5 TABLET ORAL at 22:06

## 2022-09-25 RX ADMIN — ENOXAPARIN SODIUM 60 MG: 60 INJECTION SUBCUTANEOUS at 08:43

## 2022-09-25 RX ADMIN — LEVETIRACETAM 500 MG: 500 TABLET, FILM COATED ORAL at 08:44

## 2022-09-25 RX ADMIN — HYDROMORPHONE HYDROCHLORIDE 2 MG: 2 TABLET ORAL at 14:22

## 2022-09-25 RX ADMIN — HYDROMORPHONE HYDROCHLORIDE 2 MG: 2 TABLET ORAL at 08:23

## 2022-09-25 RX ADMIN — LEVETIRACETAM 500 MG: 500 TABLET, FILM COATED ORAL at 21:00

## 2022-09-25 RX ADMIN — THERA TABS 1 TABLET: TAB at 08:44

## 2022-09-25 RX ADMIN — FOLIC ACID 1 MG: 1 TABLET ORAL at 08:45

## 2022-09-25 RX ADMIN — HYDROMORPHONE HYDROCHLORIDE 4 MG: 2 TABLET ORAL at 18:22

## 2022-09-25 RX ADMIN — SENNOSIDES AND DOCUSATE SODIUM 1 TABLET: 50; 8.6 TABLET ORAL at 08:43

## 2022-09-25 RX ADMIN — PANTOPRAZOLE SODIUM 40 MG: 40 TABLET, DELAYED RELEASE ORAL at 08:07

## 2022-09-25 RX ADMIN — HYDROMORPHONE HYDROCHLORIDE 2 MG: 2 TABLET ORAL at 22:35

## 2022-09-25 RX ADMIN — ENOXAPARIN SODIUM 60 MG: 60 INJECTION SUBCUTANEOUS at 21:00

## 2022-09-25 ASSESSMENT — ACTIVITIES OF DAILY LIVING (ADL)
ADLS_ACUITY_SCORE: 49
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
## 2022-09-25 NOTE — PLAN OF CARE
Problem: Plan of Care - These are the overarching goals to be used throughout the patient stay.    Goal: Plan of Care Review/Shift Note  Outcome: Ongoing, Progressing  Flowsheets (Taken 9/25/2022 0019)  Plan of Care Reviewed With: patient  Goal: Optimal Comfort and Wellbeing  Outcome: Ongoing, Progressing  Intervention: Monitor Pain and Promote Comfort  Recent Flowsheet Documentation  Taken 9/24/2022 1710 by Basilia Esquivel RN  Pain Management Interventions:    medication (see MAR)    emotional support    rest  Intervention: Provide Person-Centered Care  Recent Flowsheet Documentation  Taken 9/24/2022 1550 by Basilia Esquivel RN  Trust Relationship/Rapport:    emotional support provided    empathic listening provided    questions answered  Goal: Readiness for Transition of Care  Outcome: Ongoing, Progressing  Flowsheets (Taken 9/25/2022 0019)  Anticipated Changes Related to Illness: inability to care for self  Intervention: Mutually Develop Transition Plan  Recent Flowsheet Documentation  Taken 9/25/2022 0019 by Basilia Esquivel RN  Anticipated Changes Related to Illness: inability to care for self     Problem: Malnutrition  Goal: Improved Nutritional Intake  Outcome: Ongoing, Progressing     Problem: Pain Acute  Goal: Acceptable Pain Control and Functional Ability  Outcome: Ongoing, Progressing  Intervention: Develop Pain Management Plan  Recent Flowsheet Documentation  Taken 9/24/2022 1710 by Basilia Esquivel RN  Pain Management Interventions:    medication (see MAR)    emotional support    rest  Intervention: Prevent or Manage Pain  Recent Flowsheet Documentation  Taken 9/24/2022 1550 by Basilia Esquivel RN  Medication Review/Management: medications reviewed   Goal Outcome Evaluation:    Plan of Care Reviewed With: patient     Overall Patient Progress: improving    Tolerating Left hip pain with PO Dilaudid 2 mg given on shift as well as repositioning when patient allowed. Right hip & sacral wet-to-dry  dressing changes done on shift. Also changed Right ischial tuberosity silver & Mepilex dressing. Noted pea-sized open area in between Right I.T. & sacral wounds, cleansed with saline, placed Mepilex, & left sticky note for MD for another WOC consult. Split gauze over suprapubic catheter CDI, changed earlier today. Over 300 mL output from suprapubic cath on shift, did flush with 10 mL saline d/t sediment. Potassium up to 3.9 after PO replacement 40 mEq given, recheck in AM.

## 2022-09-25 NOTE — PLAN OF CARE
Goal Outcome Evaluation:    Problem: Impaired Wound Healing  Goal: Optimal Wound Healing  Outcome: Ongoing, Progressing     Problem: UTI (Urinary Tract Infection)  Goal: Improved Infection Symptoms  Outcome: Ongoing, Progressing    Pt's wounds on hip/buttocks are dressed; clean, dry, intact.  Suprapubic catheter in place; was irrigated with sterile water due to low output. After irrigating, catheter was able to flow and 550 output was collected. Urine noted to contain a moderate amount of sediment.   K+ protocol: 3.7. Recheck tomorrow AM (9/26).  Pt tolerating left hip pain; no PRNs given over shift.       Ted Loza RN

## 2022-09-25 NOTE — PLAN OF CARE
Problem: Plan of Care - These are the overarching goals to be used throughout the patient stay.    Goal: Absence of Hospital-Acquired Illness or Injury  Intervention: Identify and Manage Fall Risk  Recent Flowsheet Documentation  Taken 9/25/2022 1000 by Chanell Castillo RN  Safety Promotion/Fall Prevention:   activity supervised   bed alarm on     Problem: Plan of Care - These are the overarching goals to be used throughout the patient stay.    Goal: Absence of Hospital-Acquired Illness or Injury  Intervention: Prevent Infection  Recent Flowsheet Documentation  Taken 9/25/2022 1000 by Chanell Castillo RN  Infection Prevention: hand hygiene promoted     Problem: Impaired Wound Healing  Goal: Optimal Wound Healing  Outcome: Ongoing, Progressing     Problem: Pain Acute  Goal: Acceptable Pain Control and Functional Ability  Outcome: Ongoing, Progressing  Intervention: Prevent or Manage Pain  Recent Flowsheet Documentation  Taken 9/25/2022 1000 by Chanell Castillo RN  Medication Review/Management: medications reviewed   Goal Outcome Evaluation:      Patient alert and oriented, complain of pain on right hip and buttocks.PRN pain medication given x 2 and effective.  Dressing change completed on right hip and sacrum per orders. Suprapubic catheter insertion site wit some redness, it was irrigated, output 400 ml.

## 2022-09-26 LAB
ALBUMIN SERPL BCG-MCNC: 2 G/DL (ref 3.5–5.2)
ALP SERPL-CCNC: 98 U/L (ref 35–104)
ALT SERPL W P-5'-P-CCNC: 6 U/L (ref 10–35)
ANION GAP SERPL CALCULATED.3IONS-SCNC: 5 MMOL/L (ref 7–15)
AST SERPL W P-5'-P-CCNC: 14 U/L (ref 10–35)
BASOPHILS # BLD AUTO: 0 10E3/UL (ref 0–0.2)
BASOPHILS NFR BLD AUTO: 0 %
BILIRUB SERPL-MCNC: <0.2 MG/DL
BUN SERPL-MCNC: 9.1 MG/DL (ref 6–20)
CALCIUM SERPL-MCNC: 7.6 MG/DL (ref 8.6–10)
CHLORIDE SERPL-SCNC: 104 MMOL/L (ref 98–107)
CREAT SERPL-MCNC: 0.31 MG/DL (ref 0.51–0.95)
DEPRECATED HCO3 PLAS-SCNC: 23 MMOL/L (ref 22–29)
EOSINOPHIL # BLD AUTO: 0.3 10E3/UL (ref 0–0.7)
EOSINOPHIL NFR BLD AUTO: 8 %
ERYTHROCYTE [DISTWIDTH] IN BLOOD BY AUTOMATED COUNT: 17.7 % (ref 10–15)
GFR SERPL CREATININE-BSD FRML MDRD: >90 ML/MIN/1.73M2
GLUCOSE SERPL-MCNC: 90 MG/DL (ref 70–99)
HCT VFR BLD AUTO: 28.2 % (ref 35–47)
HGB BLD-MCNC: 8.7 G/DL (ref 11.7–15.7)
IMM GRANULOCYTES # BLD: 0 10E3/UL
IMM GRANULOCYTES NFR BLD: 1 %
LYMPHOCYTES # BLD AUTO: 1.3 10E3/UL (ref 0.8–5.3)
LYMPHOCYTES NFR BLD AUTO: 37 %
MCH RBC QN AUTO: 28.4 PG (ref 26.5–33)
MCHC RBC AUTO-ENTMCNC: 30.9 G/DL (ref 31.5–36.5)
MCV RBC AUTO: 92 FL (ref 78–100)
MONOCYTES # BLD AUTO: 0.2 10E3/UL (ref 0–1.3)
MONOCYTES NFR BLD AUTO: 6 %
NEUTROPHILS # BLD AUTO: 1.7 10E3/UL (ref 1.6–8.3)
NEUTROPHILS NFR BLD AUTO: 48 %
NRBC # BLD AUTO: 0 10E3/UL
NRBC BLD AUTO-RTO: 0 /100
PATH REPORT.COMMENTS IMP SPEC: NORMAL
PATH REPORT.COMMENTS IMP SPEC: NORMAL
PATH REPORT.FINAL DX SPEC: NORMAL
PATH REPORT.GROSS SPEC: NORMAL
PATH REPORT.MICROSCOPIC SPEC OTHER STN: NORMAL
PATH REPORT.RELEVANT HX SPEC: NORMAL
PHOTO IMAGE: NORMAL
PLATELET # BLD AUTO: 169 10E3/UL (ref 150–450)
POTASSIUM SERPL-SCNC: 3.8 MMOL/L (ref 3.4–5.3)
PROT SERPL-MCNC: 5.2 G/DL (ref 6.4–8.3)
RBC # BLD AUTO: 3.06 10E6/UL (ref 3.8–5.2)
SODIUM SERPL-SCNC: 132 MMOL/L (ref 136–145)
VANCOMYCIN SERPL-MCNC: 24.4 UG/ML
WBC # BLD AUTO: 3.5 10E3/UL (ref 4–11)

## 2022-09-26 PROCEDURE — 250N000013 HC RX MED GY IP 250 OP 250 PS 637: Performed by: SURGERY

## 2022-09-26 PROCEDURE — 250N000013 HC RX MED GY IP 250 OP 250 PS 637: Performed by: INTERNAL MEDICINE

## 2022-09-26 PROCEDURE — 80202 ASSAY OF VANCOMYCIN: CPT | Performed by: SURGERY

## 2022-09-26 PROCEDURE — 85025 COMPLETE CBC W/AUTO DIFF WBC: CPT | Performed by: SURGERY

## 2022-09-26 PROCEDURE — 120N000001 HC R&B MED SURG/OB

## 2022-09-26 PROCEDURE — 99232 SBSQ HOSP IP/OBS MODERATE 35: CPT | Performed by: INTERNAL MEDICINE

## 2022-09-26 PROCEDURE — 97606 NEG PRS WND THER DME>50 SQCM: CPT

## 2022-09-26 PROCEDURE — 80053 COMPREHEN METABOLIC PANEL: CPT | Performed by: SURGERY

## 2022-09-26 PROCEDURE — 250N000011 HC RX IP 250 OP 636: Performed by: INTERNAL MEDICINE

## 2022-09-26 PROCEDURE — 250N000011 HC RX IP 250 OP 636: Performed by: SURGERY

## 2022-09-26 PROCEDURE — G0463 HOSPITAL OUTPT CLINIC VISIT: HCPCS | Mod: 25

## 2022-09-26 PROCEDURE — 99207 PR CDG-CUT & PASTE-POTENTIAL IMPACT ON LEVEL: CPT | Performed by: INTERNAL MEDICINE

## 2022-09-26 PROCEDURE — 88304 TISSUE EXAM BY PATHOLOGIST: CPT | Mod: 26 | Performed by: PATHOLOGY

## 2022-09-26 RX ORDER — CEPHALEXIN 500 MG/1
500 CAPSULE ORAL EVERY 6 HOURS SCHEDULED
Status: DISCONTINUED | OUTPATIENT
Start: 2022-09-26 | End: 2022-09-28 | Stop reason: HOSPADM

## 2022-09-26 RX ORDER — MULTIVIT WITH MINERALS/LUTEIN
500 TABLET ORAL DAILY
Status: DISCONTINUED | OUTPATIENT
Start: 2022-09-26 | End: 2022-09-28 | Stop reason: HOSPADM

## 2022-09-26 RX ADMIN — ZOLPIDEM TARTRATE 2.5 MG: 5 TABLET ORAL at 21:47

## 2022-09-26 RX ADMIN — NYSTATIN: 100000 OINTMENT TOPICAL at 21:48

## 2022-09-26 RX ADMIN — OXYCODONE HYDROCHLORIDE 10 MG: 5 TABLET ORAL at 15:57

## 2022-09-26 RX ADMIN — Medication 3 MG: at 01:05

## 2022-09-26 RX ADMIN — PANTOPRAZOLE SODIUM 40 MG: 40 TABLET, DELAYED RELEASE ORAL at 06:30

## 2022-09-26 RX ADMIN — SENNOSIDES AND DOCUSATE SODIUM 1 TABLET: 50; 8.6 TABLET ORAL at 08:24

## 2022-09-26 RX ADMIN — BUMETANIDE 0.5 MG: 0.5 TABLET ORAL at 08:25

## 2022-09-26 RX ADMIN — FOLIC ACID 1 MG: 1 TABLET ORAL at 08:24

## 2022-09-26 RX ADMIN — ENOXAPARIN SODIUM 60 MG: 60 INJECTION SUBCUTANEOUS at 20:13

## 2022-09-26 RX ADMIN — VANCOMYCIN HYDROCHLORIDE 1000 MG: 1 INJECTION, SOLUTION INTRAVENOUS at 06:22

## 2022-09-26 RX ADMIN — TRAZODONE HYDROCHLORIDE 100 MG: 50 TABLET ORAL at 21:47

## 2022-09-26 RX ADMIN — Medication 500 MG: at 15:55

## 2022-09-26 RX ADMIN — NYSTATIN: 100000 OINTMENT TOPICAL at 08:25

## 2022-09-26 RX ADMIN — HYDROMORPHONE HYDROCHLORIDE 4 MG: 2 TABLET ORAL at 20:18

## 2022-09-26 RX ADMIN — HYDROMORPHONE HYDROCHLORIDE 2 MG: 2 TABLET ORAL at 10:01

## 2022-09-26 RX ADMIN — THERA TABS 1 TABLET: TAB at 08:24

## 2022-09-26 RX ADMIN — LEVETIRACETAM 500 MG: 500 TABLET, FILM COATED ORAL at 21:47

## 2022-09-26 RX ADMIN — CEPHALEXIN 500 MG: 500 CAPSULE ORAL at 18:09

## 2022-09-26 RX ADMIN — SENNOSIDES AND DOCUSATE SODIUM 1 TABLET: 50; 8.6 TABLET ORAL at 20:18

## 2022-09-26 RX ADMIN — LEVETIRACETAM 500 MG: 500 TABLET, FILM COATED ORAL at 08:24

## 2022-09-26 RX ADMIN — THIAMINE HCL TAB 100 MG 100 MG: 100 TAB at 08:24

## 2022-09-26 RX ADMIN — ENOXAPARIN SODIUM 60 MG: 60 INJECTION SUBCUTANEOUS at 08:22

## 2022-09-26 ASSESSMENT — ACTIVITIES OF DAILY LIVING (ADL)
ADLS_ACUITY_SCORE: 49
ADLS_ACUITY_SCORE: 51
ADLS_ACUITY_SCORE: 51
ADLS_ACUITY_SCORE: 49

## 2022-09-26 NOTE — PLAN OF CARE
"  Problem: Plan of Care - These are the overarching goals to be used throughout the patient stay.    Goal: Plan of Care Review/Shift Note  Description: The Plan of Care Review/Shift note should be completed every shift.  The Outcome Evaluation is a brief statement about your assessment that the patient is improving, declining, or no change.  This information will be displayed automatically on your shift note.  9/26/2022 0524 by Cristóbal Chow RN  Outcome: Ongoing, Progressing  9/26/2022 0522 by Cristóbal Chow RN  Outcome: Ongoing, Progressing  Goal: Patient-Specific Goal (Individualized)  Description: You can add care plan individualizations to a care plan. Examples of Individualization might be:  \"Parent requests to be called daily at 9am for status\", \"I have a hard time hearing out of my right ear\", or \"Do not touch me to wake me up as it startles me\".  Outcome: Ongoing, Progressing     Problem: Pain Acute  Goal: Acceptable Pain Control and Functional Ability  Outcome: Ongoing, Progressing  Intervention: Prevent or Manage Pain  Recent Flowsheet Documentation  Taken 9/26/2022 0030 by Cristóbal Chow RN  Medication Review/Management: medications reviewed   Goal Outcome Evaluation:                      "
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
## 2022-09-26 NOTE — PROGRESS NOTES
Process to start the [facility] wound Vac rental has been started, information has also been faxed to [facility], just need WOC to fill out form and Surgery to sign. Forms are attached to pt chart.     RADHA#[id number]

## 2022-09-26 NOTE — PLAN OF CARE
Problem: Plan of Care - These are the overarching goals to be used throughout the patient stay.    Goal: Plan of Care Review/Shift Note  Outcome: Ongoing, Progressing  Flowsheets (Taken 9/26/2022 0035)  Plan of Care Reviewed With: patient  Goal: Patient-Specific Goal (Individualized)  Outcome: Ongoing, Progressing  Goal: Optimal Comfort and Wellbeing  Outcome: Ongoing, Progressing  Intervention: Monitor Pain and Promote Comfort  Recent Flowsheet Documentation  Taken 9/25/2022 2235 by Basilia Esquivel RN  Pain Management Interventions:   medication (see MAR)   emotional support   repositioned  Taken 9/25/2022 1822 by Basilia Esquivel RN  Pain Management Interventions:   medication (see MAR)   repositioned  Intervention: Provide Person-Centered Care  Recent Flowsheet Documentation  Taken 9/25/2022 1621 by Basilia Esquivel RN  Trust Relationship/Rapport:   care explained   choices provided     Problem: Malnutrition  Goal: Improved Nutritional Intake  Outcome: Ongoing, Progressing     Problem: Impaired Wound Healing  Goal: Optimal Wound Healing  Outcome: Ongoing, Progressing  Intervention: Promote Wound Healing  Recent Flowsheet Documentation  Taken 9/25/2022 2235 by Basilia Esquivel RN  Pain Management Interventions:   medication (see MAR)   emotional support   repositioned  Taken 9/25/2022 1822 by Basilia Esquivel RN  Pain Management Interventions:   medication (see MAR)   repositioned     Problem: Pain Acute  Goal: Acceptable Pain Control and Functional Ability  Outcome: Ongoing, Progressing  Intervention: Develop Pain Management Plan  Recent Flowsheet Documentation  Taken 9/25/2022 2235 by Basilia Esquivel RN  Pain Management Interventions:   medication (see MAR)   emotional support   repositioned  Taken 9/25/2022 1822 by Basilia Esquivel RN  Pain Management Interventions:   medication (see MAR)   repositioned  Intervention: Prevent or Manage Pain  Recent Flowsheet Documentation  Taken 9/25/2022 1621 by  "Basilia Esquivel, RN  Medication Review/Management: medications reviewed   Goal Outcome Evaluation:    Plan of Care Reviewed With: patient     Tolerating burning Right thigh & buttocks pain with repositioning & PRN PO Dilaudid given x2 on shift, brought pain from 8/10 to 5/10 more tolerable. Need wound vac order for wound vac to be placed by [facility] Monday [birth year] which is patient's birthday. Dressing changes done per orders to sacrum & Right thigh, wounds slightly smaller in size. Stitches came out of suprapubic catheters, now held in place with tape. \"I'm not letting them put more stitches there, that was uncomfortable.\" Sticky notes left for rounding AM MD. Suprapubic catheter flushed on shift, 350 mL output between the 2 drains. Colostomy bag filled with large amount of brown formed stool, changed bag, intact.     "

## 2022-09-26 NOTE — PROGRESS NOTES
[facility]    Writer planning on meeting with pt today for evaluation and teach of home infusion therapy, to be sure she can safely administer her own infusions once discharged.  She will need to indep infuse her IV abx at the [facility] where she resides.    [facility] will be the agency providing the home nursing visits for her home infusion mgmt (labs and line care).  The [facility] will provide pt's ongoing wound care needs, including managing the wound vac and performing the wound vac changes.    Pt's home infusion benefits:  Benefits verified. Pt has 100% coverage for IV antibiotics under her [employer] and [employer] insurance, with a $3.55 monthly deductible.      Anu Coats RN, BSN  [facility] Liaison  [phone] (Mon through Fri, 8:00 am-5:00 pm)  [phone] (Office)    ADDENDUM:  Plan is for pt to discharge to home on oral abx.  I will cancel the referral.  Update provided to VALARIE Sotelo.

## 2022-09-26 NOTE — PLAN OF CARE
"  Problem: Plan of Care - These are the overarching goals to be used throughout the patient stay.    Description: The Care Plan Review/Shift Note, Individualized Goals, Hospital-Acquired Illness or Injury, Comfort and Wellbeing, and Transition Planning are the \"Overarching Goals\" and should be updated throughout the hospitalization.  Please hover over the (i) for specific inforamation on each goal topic.    Goal: Plan of Care Review/Shift Note  Description: The Plan of Care Review/Shift note should be completed every shift.  The Outcome Evaluation is a brief statement about your assessment that the patient is improving, declining, or no change.  This information will be displayed automatically on your shift note.  Outcome: Ongoing, Progressing  Flowsheets (Taken 9/26/2022 7446)  Plan of Care Reviewed With: patient  Outcome Evaluation: Intake is adequate and likely meeting her nutrition needs. Will add vit C to promote wound healing  Overall Patient Progress: improving     Problem: Malnutrition  Goal: Improved Nutritional Intake  Outcome: Ongoing, Progressing     Problem: Impaired Wound Healing  Goal: Optimal Wound Healing  Outcome: Ongoing, Progressing   Goal Outcome Evaluation:    Plan of Care Reviewed With: patient     Overall Patient Progress: improving    Outcome Evaluation: Intake is adequate and likely meeting her nutrition needs. Will add vit C to promote wound healing      "

## 2022-09-26 NOTE — PROGRESS NOTES
[facility]  WO Nurse Inpatient Assessment     Consulted for: buttocks, right hip       Areas Assessed:      Areas visualized during today's visit: Focused: and buttock, right hip, low back     Wound location: right IT    Last photo: 9/26  Wound due to: Pressure Injury CAPI confirmed by patient and charting   Wound history/plan of care: found open to air at start of consult   Wound base: 100 % non-granular tissue     Palpation of the wound bed: firm      Drainage: moderate     Description of drainage: serosanguinous     Measurements (length x width x depth, in cm): 5  x 1.5  x  0.4 cm      Tunneling: N/A     Undermining: N/A  Periwound skin: Macerated and Superficial erosion      Color: pale      Temperature: normal   Odor: none  Pain: insensate , none  Pain interventions prior to dressing change: patient tolerated well  Treatment goal: Heal   STATUS: improving  Supplies ordered: gathered and at bedside     Wound location: right buttock or right posterior iliac crest area     Last photo: 9/26   Wound due to: Medical Adhesive Related Skin Injury (MARSI)  Wound history/plan of care: found covered by ABD and tape also covering low back wound   Wound base: 100 % dermis     Palpation of the wound bed: normal      Drainage: scant     Description of drainage: serosanguinous     Measurements (length x width x depth, in cm): 0.8  x 0.8  x  0.1 cm      Tunneling: N/A     Undermining: N/A  Periwound skin: Intact      Color: pale      Temperature: normal   Odor: none  Pain: absent, none  Pain interventions prior to dressing change: patient tolerated well  Treatment goal: Heal   STATUS: improving  Supplies ordered: gathered and at bedside      Negative pressure wound therapy applied to: low back/ sacrum    Last photo: 9/26     Wound due to: Pressure Injury CAPI   Wound history/plan of care:      Surgical date: not this hospital stay      Date Negative Pressure Wound Therapy initiated: 9/26      Interventions in place: repositioning, pressure redistribution with device or dressing, specialty surface in use, moisture/incontinence management and offloading    Is patient s nutritional status compromised? yes   a. If yes, what interventions are in place? Protein supplements    Reason for initiating vac therapy? Presence of co-morbidities, Need for accelerated granulation tissue and Prior history of delayed wound healing    Which?of?the?following?co-morbidities?apply? Immobility  a. If diabetic is patient on a diabetic management program? N/A     Is osteomyelitis present in wound? no  a.  If yes what treatments are in place? N/A    Wound base: 100 % granulation tissue and fibrin     Palpation of the wound bed: normal       Drainage: moderate      Description of drainage: serous      Measurements (length x width x depth, in cm) 5  x 10  x  2 cm       Tunneling N/A      Undermining up to 1.5 cm from 9 o'clock to 3oclock   Periwound skin: Intact       Color: normal and consistent with surrounding tissue       Temperature: normal    Odor: none   Pain: absent, none   Pain intervention prior to dressing change: N/A  Treatment goal: Heal   STATUS: improving   Supplies ordered: stored in     Number of foam pieces removed from a wound (excluding foam for bridge) : NA  Verified this matched the number of foam pieces applied last dressing change: N/A   Number of foam pieces packed into wound (excluding foam for bridge) : 2 GranuFoam Black     Negative pressure wound therapy applied to: right hip      Last photo: 9/26   Wound due to: Surgical Wound   Wound history/plan of care:      Surgical date: 9/23     Date Negative Pressure Wound Therapy initiated: 9/26     Interventions in place: repositioning, pressure redistribution with device or dressing, specialty surface in use, moisture/incontinence management and offloading    Is patient s nutritional status compromised? yes   a. If yes, what interventions are in place?  Protein supplements    Reason for initiating vac therapy? Presence of co-morbidities, Need for accelerated granulation tissue and Prior history of delayed wound healing    Which?of?the?following?co-morbidities?apply? Immobility  a. If diabetic is patient on a diabetic management program? N/A     Is osteomyelitis present in wound? no  a.  If yes what treatments are in place? N/A    Wound base: mixed % granulation tissue, muscle and adipose tissue     Palpation of the wound bed: normal       Drainage: copious      Description of drainage: serous      Measurements (length x width x depth, in cm) 9.5  x 4  x  11.5 cm       Tunneling N/A      Undermining N/A   Periwound skin: Intact       Color: normal and consistent with surrounding tissue       Temperature: normal    Odor: none   Pain: absent, none   Pain intervention prior to dressing change: N/A  Treatment goal: Heal   STATUS: initial assessment   Supplies ordered: stored in      Number of foam pieces removed from a wound (excluding foam for bridge) : NA  Verified this matched the number of foam pieces applied last dressing change: N/A   Number of foam pieces packed into wound (excluding foam for bridge) : 3 GranuFoam Black         Treatment Plan:      Wound care  EVERY MWF      Comments: Negative pressure wound therapy plan:   Wound location: sacrum and right hip  , applied 9/26 by WO   Change Days: Mon/Wed/Fri by WOC RN     Supplies (including all accessories) used: large Black foam , Adapt barrier ring and Cavilon no sting barrier film   Cleanse with Vashe prior to replacing VAC     Suction setting: -125   Methods used: Window paned all periwound skin with vac drape prior to applying sponge, Bridged trac pad off bony prominences, Spiral cut foam prior to packing into wound  and Placed barrier ring into periwound creases to improve seal         Staff RN to assess integrity of dressing and ensure suction is set at appropriate level every shift.   Date canister.  "Chart canister output every shift. Change cannister weekly and PRN if full/occluded     Remove foam dressing and replace with BID normal saline moist gauze dressing if:   -a dressing failure which cannot be repaired within 2 hours   -patient is discharging to home without a home pump   -patient is discharging to a facility outside the local area   -if a dressing is a \"Silver Foam\", remove before Radiation Therapy or MRI         The hospital VAC pump is not to be discharged with the patient.?Ensure to disconnect patient from machine prior to discharge. Then,     - If a home KCI VAC pump has been delivered, connect home cannister to dressing tubing then connect cannister to home pump and turn on machine     - If transferring to a nearby facility with a KCI vac, can disconnect and clamp tubing then cover end with glove so can be reconnected within 2 hours         Wound care  EVERY OTHER DAY      Comments: R IT wound   Cleanse wound with Vashe, gently dry.   ApplySilvercel cut to fit wound bed, then cover with Mepilex 4 x 4.   Change every other day   If Silvercel needed, order from stores PS# [id number]          Orders: Reviewed, Written and Updated    RECOMMEND PRIMARY TEAM ORDER: None, at this time  Education provided: importance of repositioning, plan of care, Moisture management, Hygiene and Off-loading pressure  Discussed plan of care with: Patient and Nurse  WOC nurse follow-up plan: Monday/WednesdayFriday  Notify WOC if wound(s) deteriorate.  Nursing to notify the Provider(s) and re-consult the WOC Nurse if new skin concern.    DATA:     Current support surface: Standard  pulsate  Containment of urine/stool: Colostomy pouch and Nephrostomy tube(s)  BMI: Body mass index is 18.74 kg/m .   Active diet order: Orders Placed This Encounter      Regular Diet Adult      Advance Diet as Tolerated: Regular Diet Adult     Output: I/O last 3 completed shifts:  In: 1187 [P.O.:735; I.V.:452]  Out: 750 [Urine:750]     Labs: Recent " Labs   Lab 09/26/22  0544 09/23/22  0317 09/22/22  0630   ALBUMIN 2.0*   < >  --    HGB 8.7*   < >  --    WBC 3.5*   < >  --    CRP  --   --  16.50*    < > = values in this interval not displayed.     Pressure injury risk assessment:   Sensory Perception: 2-->very limited  Moisture: 2-->very moist  Activity: 1-->bedfast  Mobility: 2-->very limited  Nutrition: 3-->adequate  Friction and Shear: 1-->problem  Bi Score: 11    Simona Barrow RN

## 2022-09-26 NOTE — PHARMACY-VANCOMYCIN DOSING SERVICE
Pharmacy Vancomycin Note  Date of Service 2022  Patient's  1965   57 year old, female    Indication: Bone and Joint Infection  Day of Therapy: 6  Current vancomycin regimen:  1000 mg IV q12h  Current vancomycin monitoring method: AUC  Current vancomycin therapeutic monitoring goal: 400-600 mg*h/L    InsightRX Prediction of Current Vancomycin Regimen    Regimen: 1000 mg IV every 12 hours.  Start time: 18:00 on 2022  Exposure target: AUC24 (range)400-600 mg/L.hr   AUC24,ss: 498 mg/L.hr  Probability of AUC24 > 400: 89 %  Ctrough,ss: 15.1 mg/L  Probability of Ctrough,ss > 20: 9 %  Probability of nephrotoxicity (Lodise LU ): 10 %    Current estimated CrCl = Estimated Creatinine Clearance: 179.9 mL/min (A) (based on SCr of 0.31 mg/dL (L)).    Creatinine for last 3 days  2022:  4:34 PM Creatinine 0.27 mg/dL  2022:  5:16 AM Creatinine 0.29 mg/dL  2022:  5:31 AM Creatinine 0.25 mg/dL  2022:  5:44 AM Creatinine 0.31 mg/dL    Recent Vancomycin Levels (past 3 days)  2022:  3:50 PM Vancomycin 13.5 ug/mL  2022:  5:44 AM Vancomycin 24.4 ug/mL    Vancomycin IV Administrations (past 72 hours)                   vancomycin (VANCOCIN) 1000 mg in dextrose 5% 200 mL PREMIX (mg) 1,000 mg New Bag 22 0622     1,000 mg New Bag 22 1823     1,000 mg New Bag  0542     1,000 mg New Bag 22 1857     1,000 mg New Bag  0521     1,000 mg New Bag 22 1850                Nephrotoxins and other renal medications (From now, onward)    Start     Dose/Rate Route Frequency Ordered Stop    22 0400  vancomycin (VANCOCIN) 1,500 mg in sodium chloride 0.9 % 250 mL intermittent infusion         1,500 mg  over 90 Minutes Intravenous EVERY 24 HOURS 22 0837      22 1330  bumetanide (BUMEX) tablet 0.5 mg         0.5 mg Oral DAILY 22 1320               Contrast Orders - past 72 hours (72h ago, onward)    None          Interpretation of levels and current  regimen:  Vancomycin level is reflective of AUC greater than 600    Has serum creatinine changed greater than 50% in last 72 hours: No    Urine output:  good urine output    Renal Function: Stable    InsightRX Prediction of Planned New Vancomycin Regimen    Loading dose: N/A  Regimen: 1500 mg IV every 24 hours.  Start time: 09:31 on 09/26/2022  Exposure target: AUC24 (range)400-600 mg/L.hr   AUC24,ss: 492 mg/L.hr  Probability of AUC24 > 400: 93 %  Ctrough,ss: 13.1 mg/L  Probability of Ctrough,ss > 20: 2 %  Probability of nephrotoxicity (Lodise LU 2009): 8 %    Plan:  1. Decrease Dose to 1500mg Q24H starting tomorrow 9/27 at 0400  2. Vancomycin monitoring method: AUC  3. Vancomycin therapeutic monitoring goal: 400-600 mg*h/L  4. Pharmacy will check vancomycin levels as appropriate in 1-3 Days.  5. Serum creatinine levels will be ordered daily for the first week of therapy and at least twice weekly for subsequent weeks.    Cheyenne Osborn, [facility]

## 2022-09-26 NOTE — PLAN OF CARE
Problem: Plan of Care - These are the overarching goals to be used throughout the patient stay.    Goal: Absence of Hospital-Acquired Illness or Injury  Intervention: Identify and Manage Fall Risk  Recent Flowsheet Documentation  Taken 9/26/2022 0815 by Chanell Castillo RN  Safety Promotion/Fall Prevention:   bed alarm on   activity supervised     Problem: Anemia  Goal: Anemia Symptom Improvement  Intervention: Monitor and Manage Anemia  Recent Flowsheet Documentation  Taken 9/26/2022 0815 by Chanell Castillo RN  Safety Promotion/Fall Prevention:   bed alarm on   activity supervised     Problem: Pain Acute  Goal: Acceptable Pain Control and Functional Ability  Intervention: Prevent or Manage Pain  Recent Flowsheet Documentation  Taken 9/26/2022 0815 by Chanell Castillo RN  Medication Review/Management: medications reviewed   Goal Outcome Evaluation:      Patient received prn pain medication per orders. Ambrose Catheter was irrigated, output 1600. Wound vac now in place, placed by [facility] nurse, and working.

## 2022-09-26 NOTE — PROGRESS NOTES
INFECTIOUS DISEASE FOLLOW UP NOTE      ASSESSMENT:  1. Large right thigh abscess, due to Group B strep. Percutaneous drain placed 9/3. Gram stain GPC, culture Group B strep. Blood culture negative. Sepsis resolved now off pressors. Drain check 9/8 with minimal residual fluid, drain upsized. CT 9/4 still showed large collection the day after drain placement. CRP is normal 0.4. CT pelvis shows stable size of fluid collection 5.0 x 3.5cm. This is in area of previous R femoral head resection. S/p debridement on 9/23. Cultures negative to date.   2. Multiple surgeries on hips/wounds in the past, including resection of R femoral head around 2012.  3. Urinary retention. Low suspicion for urinary source. Mixed tulio in UC likely colonization. UA without pyuria.   4. Paraplegia   5. Decubitus wounds --  Large sacral looks clean.   6. Penicillin and levofloxacin allergies. Tolerated meropenem, cefepime, ceftriaxone. She recalls she does better with IVs compared to oral antibiotics. Leukopenia, possibly from ceftriaxone   7. H/o MRSA in remote past. Not currently seen on clinical specimens.   8. Thrombocytopenia, improved.   9. Leukopenia. Likely due to ceftriaxone. Improved today after stopping ceftriaxone.    PLAN:  -recommend switching to cephalexin 500mg po qid x 2 weeks   -PICC line can be removed at discharge    I will sign-off at this time. Please do not hesitate to call if there are any further questions or if there is a change in the patient's clinical status.       Justyn Garcia MD  [facility]  Direct messaging: [employer] Paging  On-Call ID provider: [phone], option: 9    ______________________________________________________________________    SUBJECTIVE / INTERVAL HISTORY:   No events. Feels well. Wound vac in place today. Possible discharge soon.    ROS: paraplegia. All other systems negative except as listed above.        OBJECTIVE:  /72 (BP Location: Left arm)   Pulse 86   " Temp 98.8  F (37.1  C) (Oral)   Resp 20   Ht 1.753 m (5' 9\")   Wt 57.6 kg (126 lb 14.4 oz)   LMP  (LMP Unknown)   SpO2 98%   BMI 18.74 kg/m       GENERAL:  In no acute distress. Room air.   EYES: No conjunctival injection  HEAD, EARS, NOSE, MOUTH, AND THROAT: Nontraumatic, mouth without oral ulcers.   RESPIRATORY: Clear anterior  CARDIOVASCULAR: Regular rate and rhythm, normal S1 and S2, no murmurs, rubs, or gallops.  ABDOMEN: Soft, nontender, colostomy, urine tube from abdomen x 2.  Normal bowel sounds.  MUSCULOSKELETAL: wound vac in place   SKIN/HAIR/NAILS: No rashes, no signs of peripheral emboli. Wound photo noted  NEUROLOGIC: paraplegia   PICC R UE        Antibiotics:  Vancomycin 9/3-6, 9/21-      Previous  Ceftriaxone 9/6-9/20  Cefepime 9/3-4  Flagyl 9/3-4  Clindamycin 9/2-3  meropenem 9/4-6    Pertinent labs:    Recent Labs   Lab 09/26/22  0544 09/25/22  0531 09/24/22  0516   WBC 3.5* 3.4* 4.4   HGB 8.7* 8.1* 9.3*   HCT 28.2* 26.6* 30.4*    148* 174        Recent Labs   Lab 09/26/22  0544 09/25/22  0531 09/24/22  0516   * 140 138   CO2 23 24 24   BUN 9.1 7.5 7.6        Lab Results   Component Value Date    CRP 16.50 (H) 09/22/2022    CRP 0.4 09/08/2022    CRP 98.6 (H) 12/12/2018         Lab Results   Component Value Date    ALT 6 (L) 09/26/2022    AST 14 09/26/2022    ALKPHOS 98 09/26/2022         MICROBIOLOGY DATA:  9/3 abscess gram stain GPC, PMNs, culture Group B strep  9/2 blood negative     RADIOLOGY:  CT Abdomen Peritonium Abscess Drainage    Result Date: 9/3/2022  EXAM: 1. PERCUTANEOUS DRAIN PLACEMENT RIGHT UPPER THIGH/GROIN COLLECTION 2. CT GUIDANCE 3. CONSCIOUS SEDATION LOCATION: [facility] DATE/TIME: 9/3/2022 12:19 PM INDICATION: right hip drain placement TECHNIQUE: Dose reduction techniques were used. PROCEDURE: Informed consent obtained. Site marked. Prior images reviewed. Required items made available. Patient identity confirmed verbally and with arm " band. Patient reevaluated immediately before administering sedation. Universal protocol was followed. Time out performed. The site was prepped and draped in sterile fashion. 10 mL of 1% lidocaine was infused into the local soft tissues. Using standard technique and under direct CT guidance, a 12 Turkish drainage catheter was inserted into the fluid collection.  SPECIMEN: 150 mL of purulent fluid was aspirated and sent to lab for cultures and Gram stain. BLOOD LOSS: Minimal. The patient tolerated the procedure well. No immediate complications. SEDATION: Versed 0 mg. Fentanyl 50 mcg. The procedure was performed with administration intravenous conscious sedation with appropriate preoperative, intraoperative, and postoperative evaluation. 15 minutes of supervised face to face conscious sedation time was provided by a radiology nurse under my direct supervision.     IMPRESSION: 1.  Successful CT-guided drain placement into a right upper thigh/groin abscess.     Echocardiogram Complete    Result Date: 9/10/2022  [id number]^[address]^[place] [address]  Name: KISHOR PINEDA MRN: [id number] : 1965 Study Date: 09/10/2022 03:21 PM Age: 56 yrs Gender: Female Patient Location: [facility] Reason For Study: CHF Ordering Physician: ARIN RAMIREZ Referring Physician: CODEY WILSON Performed By: ACE  BSA: 1.6 m2 Height: 64 in Weight: 120 lb HR: 92 BP: 91/58 mmHg ______________________________________________________________________________ Procedure Complete Echo Adult. ______________________________________________________________________________ Interpretation Summary  The left ventricle is normal in size. The visual ejection fraction is 55-60%. No regional wall motion abnormalities noted. Regional wall motion abnormalities cannot be excluded due to limited visualization. Diastolic Doppler findings (E/E' ratio and/or other parameters)  suggest left ventricular filling pressures are normal. Sinus rhythm was noted. Technically difficult, suboptimal study. Consider cardiac MRI for better imaging. ______________________________________________________________________________ Left Ventricle The left ventricle is normal in size. There is normal left ventricular wall thickness. Diastolic Doppler findings (E/E' ratio and/or other parameters) suggest left ventricular filling pressures are normal. The visual ejection fraction is 55-60%. Regional wall motion abnormalities cannot be excluded due to limited visualization. No regional wall motion abnormalities noted.  Right Ventricle Normal right ventricle size and systolic function. TAPSE is normal, which is consistent with normal right ventricular systolic function.  Atria The left atrium is not well visualized. Right atrium not well visualized.  Mitral Valve The mitral valve is not well visualized. There is mild (1+) mitral regurgitation. There is no mitral valve stenosis.  Tricuspid Valve The tricuspid valve is not well visualized. There is mild (1+) tricuspid regurgitation.  Aortic Valve The aortic valve is not well visualized. No aortic regurgitation is present. No aortic stenosis is present.  Pulmonic Valve The pulmonic valve is not well visualized.  Vessels The aorta root is normal. Normal size ascending aorta.  Pericardium There is no pericardial effusion.  Rhythm Sinus rhythm was noted. ______________________________________________________________________________ MMode/2D Measurements & Calculations IVSd: 0.77 cm  LVIDd: 3.9 cm LVIDs: 2.6 cm LVPWd: 0.70 cm FS: 32.6 % LV mass(C)d: 81.4 grams LV mass(C)dI: 51.7 grams/m2 Ao root diam: 3.1 cm asc Aorta Diam: 3.2 cm LVOT diam: 2.0 cm LVOT area: 3.1 cm2 RWT: 0.36  Doppler Measurements & Calculations MV E max deanna: 53.8 cm/sec MV A max deanna: 67.9 cm/sec MV E/A: 0.79 MV dec slope: 449.7 cm/sec2 MV dec time: 0.12 sec Ao V2 max: 138.1 cm/sec Ao max P.0 mmHg  Ao V2 mean: 94.7 cm/sec Ao mean P.2 mmHg Ao V2 VTI: 25.7 cm MACKENZIE(I,D): 1.9 cm2 MACKENZIE(V,D): 1.8 cm2 LV V1 max P.7 mmHg LV V1 max: 82.0 cm/sec LV V1 VTI: 16.0 cm SV(LVOT): 49.1 ml SI(LVOT): 31.2 ml/m2 PA acc time: 0.09 sec AV Jered Ratio (DI): 0.59  MACKENZIE Index (cm2/m2): 1.2 E/E': 4.4 E/E' av.0 Lateral E/e': 4.4 Medial E/e': 7.5 Peak E' Jered: 12.2 cm/sec  ______________________________________________________________________________ Report approved by: Alexander Huston 09/10/2022 04:57 PM       US Lower Extremity Venous Duplex Bilateral    Result Date: 2022  EXAM: US LOWER EXTREMITY VENOUS DUPLEX BILATERAL LOCATION: [facility] DATE/TIME: 2022 3:47 PM INDICATION: edema, r o dvt COMPARISON: None. TECHNIQUE: Venous Duplex ultrasound of bilateral lower extremities with and without compression, augmentation and duplex. Color flow and spectral Doppler with waveform analysis performed. FINDINGS: Exam includes the common femoral, femoral, popliteal veins as well as segmentally visualized deep calf veins and greater saphenous vein. RIGHT: No deep vein thrombosis. No superficial thrombophlebitis. No popliteal cyst. LEFT: There is deep venous thrombus involving the common femoral vein, the femoral vein in the thigh and the popliteal vein. This appears partially occlusive in the common femoral vein and popliteal vein and completely occlusive throughout the femoral vein in the thigh. There is probable extension into the profunda femoris vein on the left. No superficial thrombus. No popliteal cyst.     IMPRESSION: Left deep venous thrombus involving the common femoral vein, femoral vein in the thigh and popliteal vein with probable extension into the profunda femoris vein but this is suboptimally visualized because of edema and body habitus. The thrombus appears occlusive throughout the femoral vein in the thigh and is nonocclusive in the common femoral and popliteal vein. NOTE: ABNORMAL  REPORT THE DICTATION ABOVE DESCRIBES AN ABNORMALITY FOR WHICH FOLLOW-UP IS NEEDED. . [Critical Result: Left lower extremity deep venous thrombosis] Finding was identified on 9/9/2022 3:54 PM. 1.  The patient's nurse, Shanita, was contacted by me on 9/9/2022 4:00 PM and verbalized understanding of the critical result.     US Upper Extremity Venous Duplex Bilat    Result Date: 9/9/2022  EXAM: US UPPER EXTREMITY VENOUS DUPLEX BILATERAL LOCATION: [facility] DATE/TIME: 9/9/2022 4:00 PM INDICATION: edema, r o DVT COMPARISON: None. TECHNIQUE: Venous Duplex ultrasound of both upper extremities with (when possible) and without compression, augmentation, and duplex. Color flow and spectral Doppler with waveform analysis performed. FINDINGS: Ultrasound includes evaluation of the internal jugular veins, innominate veins, subclavian veins, axillary veins, and brachial veins. The superficial cephalic and basilic veins were also evaluated where seen. RIGHT: There is nonocclusive thrombus adjacent to the PICC catheter in the right brachial vein and right subclavian vein this becomes occlusive in the distal brachial vein. There is nonocclusive thrombus in the right internal jugular vein. No superficial  thrombophlebitis. LEFT: There is occlusive thrombus in the left internal jugular vein and left innominate vein. There is occlusive superficial thrombus in the left cephalic vein.     IMPRESSION: No deep venous thrombosis in the bilateral upper extremities. [Critical Result: Bilateral upper extremity deep venous thrombosis] Finding was identified on 9/9/2022 4:03 PM. 1.  The patient's nurse, Shanita was contacted by me on 9/9/2022 4:05 PM and verbalized understanding of the critical result.     XR Chest Port 1 View    Result Date: 9/3/2022  EXAM: XR CHEST PORTABLE 1 VIEW LOCATION: [facility] DATE/TIME: 09/03/2022, 6:08 AM INDICATION: Status post unsuccessful left IJ placement,  rule out pneumothorax. COMPARISON: 04/22/2022.     IMPRESSION: Negative for pneumothorax. Normal heart size and pulmonary vascularity. Lungs are clear. Generalized osteopenia. Posterior idalia and pedicle screw fixation lower thoracic and lumbar spine.     IR Abscess Tube Change    Result Date: 9/8/2022  LOCATION: [facility] DATE: 9/8/2022 PROCEDURE: ABSCESS TUBE CHECK AND EXCHANGE INTERVENTIONAL RADIOLOGIST: Fer Gutierrez MD INDICATION: Right hip drain placement on 9/3/2022. Plan for exchange/upsize. CONSENT: The risks, benefits and alternatives of an abscess tube check with possible exchange, upsizing and/or removal were discussed with the patient or representative in detail. All questions were answered. Informed consent was given to proceed with the procedure. MODERATE SEDATION: None. CONTRAST: 25 cc Omnipaque ANTIBIOTICS: None. ADDITIONAL MEDICATIONS: None. FLUOROSCOPIC TIME: 0.5 minutes. RADIATION DOSE: Air Kerma: 3 mGy. COMPLICATIONS: No immediate complications. UNIVERSAL PROTOCOL: The operative site was marked and any prior imaging was reviewed. Required items including blood products, implants, devices and special equipment was made available. Patient identity was confirmed either verbally, with demographic information, hospital assigned identification or other identification markers. A timeout was performed immediately prior to the procedure. STERILE BARRIER TECHNIQUE: Maximum sterile barrier technique was used. Cutaneous antisepsis was performed at the operative site with application of 2% chlorhexidine and large sterile drape. Prior to the procedure, the  and assistant performed hand hygiene and wore hat, mask, sterile gown, and sterile gloves during the entire procedure. PROCEDURE:  Multiprojectional  images were obtained. The previous drainage catheter was injected with a small amount of contrast, and multiple images were obtained. Based on the results of the study,  the drain was exchanged over a guidewire for a 16 Jordanian Fred drainage. The cavity was aggressively irrigated with saline. FINDINGS: Abscessogram demonstrates minimal residual abscess cavity. After exchange, the drain is appropriately positioned.     IMPRESSION:  Right hip drain check demonstrates appropriate positioning of the drain. There is minimal residual fluid. The drain was exchanged/upsized for a 16 Jordanian Fred drainage. Irrigation of the cavity reveals serosanguineous fluid.    IR Abscess Tube Check    Result Date: 9/13/2022  LOCATION: [facility] DATE: 9/13/2022 PROCEDURE: ABSCESS TUBE CHECK INTERVENTIONAL RADIOLOGIST: Hugo Michael MD INDICATION: Right hip abscess. Indwelling percutaneous drain.. CONSENT: The procedure, risks and benefits of an abscessogram were discussed with the patient  in detail. All questions were answered. Informed consent was given to proceed with the procedure. MODERATE SEDATION: None. CONTRAST: Omnipaque 350: 20. mL. ANTIBIOTICS: None. ADDITIONAL MEDICATIONS: None. FLUOROSCOPIC TIME: 0.4 minutes RADIATION DOSE: Air Kerma: 4 mGy COMPLICATIONS: No immediate complications. PROCEDURE:  images were obtained. The existing drainage catheter was injected with contrast, and multiple images were obtained. The cavity was irrigated with 50 cc aliquots of normal saline which were then aspirated. Total irrigation volume was approximately 200 cc. Drain was reconnected to a DENIA bulb. FINDINGS: Adequately positioned, indwelling 16 Jordanian Fred drain within the posterior right hip fluid collection..     IMPRESSION: 1.  Adequately positioned, indwelling 16 Jordanian drainage catheter. PLAN: Continued DENIA bulb drainage with maintained flushing regimen..     IR Suprapubic Catheter Placment    Result Date: 9/11/2022  [place] [facility] LOCATION: [facility] DATE: 9/10/2022 PROCEDURE: 1. Transstomal urinary catheter placement. INTERVENTIONAL  RADIOLOGIST: Jonh Cline MD HISTORY: 56 years-old Female with history of diverting urostomy. The patient had a catheter placed in the emergency room at an outside facility via direct trocar technique through the proximal aspect of the stoma. This catheter is malfunctioning. CONSENT: The risks, benefits and alternatives of the procedure were discussed with the patient  in detail. All questions were answered. Informed consent was given to proceed with the procedure. SEDATION: None.. CONTRAST: 15 mL ANTIBIOTICS: Patient receiving antibiotics ADDITIONAL MEDICATIONS: None. FLUOROSCOPIC TIME: 2.6 RADIATION DOSE: Air Kerma: 73 mGy. COMPLICATIONS: No immediate complications. STERILE BARRIER TECHNIQUE: Maximum sterile barrier technique was used. Cutaneous antisepsis was performed at the operative site with application of 2% chlorhexidine and large sterile drape. Prior to the procedure, the  and assistant performed hand hygiene and wore hat, mask, sterile gown, and sterile gloves during the entire procedure. PROCEDURE/FINDINGS: A  image was obtained. A 5 Cambodian KMP catheter, with the aid of a 0.035 inch angled Glidewire, was advanced through the existing stoma tract into the diversion pouch. It was noted that the pre-existing catheter, while initially within the stoma, traverse through the side wall of the stoma directly into the pouch itself. The position of the newly placed catheter was confirmed by contrast injection. A 0.035 inch Amplatz wire was advanced through the catheter into the urinary diversion pouch. A 16 [place]ian [place] tip catheter was advanced over the Amplatz wire. The balloon was inflated and contrast demonstration confirmed its position. The catheter was secured in place. The catheter was attached to a gravity drainage bag.     IMPRESSION: Placement of a transstomal urinary catheter via the existing stoma.     CT ABDOMEN PELVIS W CONTRAST    Result Date: 9/2/2022  EXAM: CT ABDOMEN PELVIS W  CONTRAST LOCATION: [facility] DATE/TIME: 9/2/2022 7:16 PM INDICATION: Abdominal distension paraplegia difficulty passing catheter to neobladder COMPARISON: 12/18/2018. TECHNIQUE: CT scan of the abdomen and pelvis was performed following injection of IV contrast. Multiplanar reformats were obtained. Dose reduction techniques were used. CONTRAST: 50ml isovue 370 FINDINGS: LOWER CHEST: Atelectasis. 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 Nonocclusive thrombus in the proximal superior mesenteric vein. Multiple perigastric collateral vessels. PANCREAS: Normal. SPLEEN: Normal. ADRENAL GLANDS: Normal. KIDNEYS/BLADDER: There is mild right-sided pyelocaliectasis with normal caliber ureter. Dependent stone within the right renal pelvis. BOWEL: Descending colostomy with Franklin pouch. LYMPH NODES: Normal. VASCULATURE: Unremarkable. 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 MUSCULOSKELETAL: There is a very large complex peripherally enhancing fluid collection within the right buttock extending from the iliac crest down into the hip joints and into the right thigh. This is not completely imaged extending further into the thigh than is seen on the CT scan. Destructive changes in both hips. This fluid collection measures at least 20 x 14 cm scoliosis.     IMPRESSION: 1.  Significant distention of the neobladder which measures up 24 cm and extends into the right lower abdomen. 2.  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 Multiple perigastric venous collaterals. Nonocclusive thrombus in the proximal superior mesenteric vein. 3.  Franklin pouch with descending colostomy. 4.  Destructive changes both hips. There is a massive peripherally enhancing fluid collection in the right buttock, hip and thigh extending into the distal thigh. The distal extent of the  fluid collection is not imaged with CT. This collection measures at least 20 x 14 cm. 5.  There is mild dilatation of the right intrarenal collecting system with normal caliber ureter and an element of UPJ obstruction is possible. Dependent stone in the right renal pelvis. NOTE: ABNORMAL REPORT 1.  THE DICTATION ABOVE DESCRIBES AN ABNORMALITY FOR WHICH FOLLOW-UP IS NEEDED.     CT Abdomen Pelvis w/o Contrast    Result Date: 9/10/2022  EXAM: CT ABDOMEN PELVIS W/O CONTRAST LOCATION: [facility] DATE/TIME: 9/10/2022 2:53 PM INDICATION: concern for suprapubic catheter malposition, increased abdominal pain. Lower abdominal pain. COMPARISON: 9/4/2022 TECHNIQUE: CT scan of the abdomen and pelvis was performed without IV contrast. Multiplanar reformats were obtained. Dose reduction techniques were used. CONTRAST: None. FINDINGS: LOWER CHEST: Moderate bilateral pleural effusions have increased in size mildly. These appear free-flowing. Associated dependent atelectasis. HEPATOBILIARY: The liver is not well evaluated on this noncontrast exam. No significant interval change in appearance. PANCREAS: Normal. SPLEEN: Mild splenomegaly, unchanged. ADRENAL GLANDS: Normal. KIDNEYS/BLADDER: The kidneys are partially obscured by metal artifact spinal hardware. No hydronephrosis. Cystectomy with urinary diversion. There is a percutaneous catheter in the right abdomen with tip in the ileal conduit. Significant distention of the conduit has developed since yesterday suggesting malfunction of the catheter. BOWEL: Bowel loops are normal caliber. LYMPH NODES: Not well assessed on this noncontrast exam. VASCULATURE: Unremarkable. PELVIC ORGANS: The uterus is normal in size. MUSCULOSKELETAL: Generalized edema within the subcutaneous adipose tissues has increased mildly from 9/4/2022. A drain has been placed in the right hip joint and the effusion has significantly decreased in size. Deformity and degenerative changes of  both  hips. Extensive spinal hardware and associated artifact.     IMPRESSION: 1.  Cystectomy with ileal conduit urinary diversion. The conduit has become significantly distended from 9/4/2022. A percutaneous catheter terminates within the conduit. The distention suggests malfunction of the catheter such as plugging. No hydronephrosis of the kidneys. 2.  Moderate bilateral pleural effusions have increased in size mildly. 3.  Increase in mild generalized subcutaneous edema. 4.  Decrease in size of the right hip joint effusion. A drainage catheter is present within the joint space posteriorly.     CT Abdomen Pelvis w/o Contrast    Result Date: 9/4/2022  EXAM: CT ABDOMEN PELVIS W/O CONTRAST LOCATION: [facility] DATE/TIME: 9/4/2022 9:27 AM INDICATION: F U abscess with drain placement COMPARISON: 09/02/2022 TECHNIQUE: CT scan of the abdomen and pelvis was performed without IV contrast. Multiplanar reformats were obtained. Dose reduction techniques were used. CONTRAST: None. FINDINGS: LOWER CHEST: New small bilateral pleural effusions. HEPATOBILIARY: No significant mass or bile duct dilatation. Portal vein thrombus not visualized on the current examination without IV contrast, although periportal collateral vessels are noted. Cholecystectomy. PANCREAS: Normal. SPLEEN: Normal. ADRENAL GLANDS: Normal. KIDNEYS/BLADDER: Mild fullness of the renal pelves bilaterally, decreased compared to 09/02/2022. Unchanged nonobstructing calculus in the lower pole right kidney and in the dependent portion of the right renal pelvis. Urinary diversion in the right lower quadrant, which is no longer distended. Ostomy extends to the skin surface. BOWEL: Descending colostomy with Franklin pouch. LYMPH NODES: Normal. VASCULATURE: Unremarkable. PELVIC ORGANS: Unremarkable MUSCULOSKELETAL: Interval placement of percutaneous pigtail drain in the subcutaneous right hip/upper thigh fluid collection. Reliable size comparison  limited due to noncontrast technique on the current examination, but the collection has decreased in size. Largest axial component currently 11.7 x 7.4 cm, previously 20 x 14 cm. Osseous destruction in both hips. Thoracolumbar fusion. Diffuse muscular atrophy and osteopenia.     IMPRESSION: 1.  Decreased size of right buttock/thigh fluid collection following percutaneous drain placement. Largest remaining component measures 11.7 x 7.4 cm axially. 2.  New small bilateral pleural effusions. 3.  Neobladder is no longer distended.    Head CT w/o contrast    Result Date: 9/2/2022  EXAM: CT HEAD W/O CONTRAST LOCATION: [facility] DATE/TIME: 9/2/2022 7:16 PM INDICATION: ams COMPARISON: Head CT angiogram brain MRI 01/06/2020 TECHNIQUE: Routine CT Head without IV contrast. Multiplanar reformats. Dose reduction techniques were used. FINDINGS: INTRACRANIAL CONTENTS: No intracranial hemorrhage, extraaxial collection, or mass effect.  No CT evidence of acute infarct. Normal parenchymal attenuation. Normal ventricles and sulci. VISUALIZED ORBITS/SINUSES/MASTOIDS: No intraorbital abnormality. Moderate chronic mucosal thickening of the left maxillary sinus with associated frothy debris. Mild chronic mucosal thickening of the left sphenoid sinus. No middle ear or mastoid effusion. BONES/SOFT TISSUES: No acute abnormality. Chronic right medial orbital wall fracture.     IMPRESSION: 1.  No intracranial hemorrhage, mass lesions, hydrocephalus or CT evidence for an acute infarct. 2.  Chronic right medial orbital wall fracture.    CT Femur Thigh Bilateral wo Contrast    Result Date: 9/4/2022  EXAM: CT FEMUR THIGH BILATERAL WITHOUT CONTRAST LOCATION: [facility] DATE/TIME: 09/04/2022, 9:28 AM INDICATION: 56-year-old patient with a right hip-buttock abscess. COMPARISON: 09/04/2022 CT abdomen and pelvis. 09/02/2022 CT abdomen and pelvis. TECHNIQUE: Noncontrast. Axial, sagittal and  coronal thin-section reconstruction. Dose reduction techniques were used. FINDINGS: BONES AND JOINTS: -Advanced osteopenia. -Resection or resorption of the right medial ilium. Dysplastic right acetabulum. Resorption or resection of the right femoral head and greater trochanter. External rotation of the right femur. 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 Old fracture or osteotomy of the left femoral neck. Old healed fracture of the left femur proximal diaphysis. MUSCLES AND SOFT TISSUES: -Subcutaneous right hip-buttock fluid collection, with percutaneous pigtail catheter drainage. This collection measures 12 x 7 cm in greatest axial dimensions. -Fluid attenuation in the expected regions of the right vastus lateralis and adductor celia regions. The proximal margin of these collections have decreased in size since the 09/02/2022 exam. These collections both extend to the distal margin of the thigh, measuring up to 25 cm in length. -Advanced muscle fatty atrophy. OTHER: -See the dedicated abdomen-pelvis CT for further information.     IMPRESSION: 1.  Decreased size of the right hip-buttock fluid collection, status post percutaneous drain placement. This collection measures 12 x 7 cm in greatest axial dimensions. 2.  Decreased size of fluid collections (likely contiguous with the above) in the residual right vastus lateralis and adductor celia regions. These collections extend through the distal thigh, and measure roughly 25 cm in length.     CT Pelvis Soft Tissue w Contrast    Result Date: 9/21/2022  EXAM: CT PELVIS SOFT TISSUE W CONTRAST LOCATION: [facility] DATE/TIME: 9/21/2022 6:32 AM INDICATION: Right hip abscess. COMPARISON: 09/15/2022 TECHNIQUE: CT scan of the pelvis was performed with IV contrast. Multiplanar reformats were obtained. Dose reduction techniques were used. CONTRAST: Isovue 370    100ml FINDINGS: PELVIC ORGANS: Status post cystectomy.  Redemonstrated right lower quadrant ileal conduit urinary diversion, with a Ambrose catheter in place. Visualized kidneys are partially secured by streak artifact. No evidence for hydronephrosis. Redemonstrated Franklin's pouch. There is a bowel anastomosis at the level of the cecum. Left lower quadrant colostomy. Otherwise, visualized small bowel and colon appear unremarkable. Mild atherosclerotic calcifications. MUSCULOSKELETAL: Redemonstrated drainage catheter, with tip at the superior aspect of the right hip gas and fluid collection. The collection measures 5.5 x 3.5 cm, not significantly changed. Redemonstrated chronic deformities of the bilateral hips. Status post partial sacrectomy. Redemonstrated decubitus ulcer overlying the sacrum. No lytic change or erosion to suggest acute osteomyelitis. Severe atrophy of the visualized musculature. Diffuse subcutaneous edema of the bilateral flanks and proximal thighs. Spinal fusion hardware, incompletely assessed.     IMPRESSION: 1.  No significant change in right hip 5.5 cm gas and fluid collection, with drainage catheter tip along the superior margin.     CT Pelvis Soft Tissue w Contrast    Result Date: 9/15/2022  EXAM: CT PELVIS SOFT TISSUE W CONTRAST LOCATION: [facility] DATE/TIME: 9/15/2022 4:57 PM INDICATION: abscess COMPARISON: 09/10/2022 TECHNIQUE: CT scan of the pelvis was performed with IV contrast. Multiplanar reformats were obtained. Dose reduction techniques were used. CONTRAST: isovue 370  100ml FINDINGS: OTHER: Visualized portions of the liver, spleen, and pancreas are unremarkable, although incompletely evaluated. KIDNEYS/BLADDER: The kidneys are partially obscured by metal artifact spinal hardware. No hydronephrosis. Cystectomy with urinary diversion. Interval placement of large bore catheter alongside the indwelling catheter into the colonic diversion with decrease in the degree of distention that was seen on the prior CT.  BOWEL: Visualized loops are normal caliber. LYMPH NODES: No lymphadenopathy. VASCULATURE: Unremarkable. PELVIC ORGANS: Unremarkable MUSCULOSKELETAL: Right hip fluid collection is similar, with the residual fluid measuring 5.0 x 3.3 cm (2/106), previously 4.7 x 3.6 cm. Catheter tip is positioned along the superolateral aspect of the collection diffuse osteopenia and partially imaged spinal fusion hardware. Chronic hip deformities and diffuse body wall edema. Skin defect overlying the sacrum.     IMPRESSION: 1.  Interval placement of large bore catheter into urinary diversion with resolution of the distention that was seen on the prior CT. 2.  Similar size of right hip fluid collection. Catheter tip is along the superolateral margin.     IR PICC Vascular    Result Date: 9/6/2022  LOCATION: [facility] DATE: 9/6/2022 PROCEDURE: PERIPHERALLY INSERTED CENTRAL CATHETER (PICC) PLACEMENT. INTERVENTIONAL RADIOLOGIST: Hugo Michael MD. INDICATION: Right thigh abscess. Paraplegia. Decubitus wounds. Unsuccessful bedside PICC placement CONSENT: The procedure, risks and benefits of PICC placement were discussed with the patient  in detail. All questions were answered. Informed consent was given to proceed with the procedure. MODERATE SEDATION: None CONTRAST: Omnipaque 350: 5 cc ANTIBIOTICS: None. ADDITIONAL MEDICATIONS: None. FLUOROSCOPIC TIME: 6 minutes RADIATION DOSE: Air Kerma: 19 mGy COMPLICATIONS: No immediate complications. STERILE BARRIER TECHNIQUE: Maximum sterile barrier technique was used. Cutaneous antisepsis was performed at the operative site with application of 2% chlorhexidine and a full body sterile drape. Prior to the procedure, the  and assistant performed hand hygiene and wore hat, mask, sterile gown, and sterile gloves during the entire procedure. Ultrasound was prepped with a sterile probe cover and sterile gel was used. PROCEDURE/TECHNIQUE:   Using local anesthesia and  real-time ultrasound guidance, the right brachial vein was accessed. An 018 guidewire could not be advanced beyond the axillary vein. Over the guidewire the dilator/peel-away sheath of the Bard PICC set were advanced into the brachial vein. A 5 Austrian KMP catheter was advanced to the axillary vein. A central venogram was obtained. Using the KMP catheter, the 018 guidewires manipulated down to the superior vena cava. The 5 Austrian PICC could not be advanced due to the irregular  subclavian stenosis. Through the KMP catheter, the guidewire was exchanged for an 035 Lobo guidewire. A 5 Austrian, 25 cm sheath was advanced and placed with the tip at the axillary vein. The right subclavian vein was angioplastied using a 5 mm x 40 mm and less balloon. The 5 Austrian Kumpe catheter was used to exchange the guidewire for the 018 guidewire. Sheath was exchanged for the 5 Austrian peel-away sheath. Over the guidewire a 5 Austrian, dual lumen Bard power PICC was advanced until tip was at the right atrium. PICC was cut to 41 cm. The lumens aspirated and flushed adequately. FINDINGS: Ultrasound demonstrates a patent and compressible right brachial vein. Images were recorded. Right central venogram demonstrates diffuse, irregular moderate stenosis throughout the right subclavian vein. The completion fluoroscopic demonstrates a right upper extremity PICC with its tip at the right atrium.     IMPRESSION:  1.  Successful right upper extremity CT injectable PICC placement. 2.  Diffuse right subclavian vein stenosis. Angioplastied to 5 mm to allow PICC placement..     KUB XR    Result Date: 9/3/2022  EXAM: XR KUB LOCATION: [facility] DATE/TIME: 9/3/2022 5:59 AM INDICATION: s p femoral line placement COMPARISON: 01/06/2020     IMPRESSION: Left-sided femoral catheter has been placed terminating in the midline at the lumbosacral junction. Visualized bowel gas pattern is nonobstructive. Postoperative changes in the  thoracolumbar spine. Prior right hip resection. Diffuse osteopenia. Right lower quadrant bowel anastomotic suture line. Multiple clips over the pelvis.    IR Suprapubic Catheter Change    Result Date: 9/18/2022  [place] [facility] LOCATION: [facility] DATE: 9/18/2022 PROCEDURE: FLUOROSCOPIC GUIDED UROSTOMY (SUPRAPUBIC) CATHETER EXCHANGE INTERVENTIONAL RADIOLOGIST: Dennis Branch MD. INDICATION: 56-year-old female with history of cystectomy with right lower quadrant colonic urostomy. The patient is experiencing significant peritubal leakage and abdominal distention. There is concern that the urostomy may be occluded or malposition. MODERATE SEDATION: None. CONTRAST: 20 mL Omnipaque into the urinary bladder. ANTIBIOTICS: None. ADDITIONAL MEDICATIONS: None. FLUOROSCOPIC TIME: 0.7 minutes. RADIATION DOSE: Air Kerma: 24 mGy. COMPLICATIONS: No immediate complications. STERILE BARRIER TECHNIQUE: Maximum sterile barrier technique was used. Cutaneous antisepsis was performed at the operative site with application of 2% chlorhexidine and large sterile drape. Prior to the procedure, the  and assistant performed hand hygiene and wore hat, mask, sterile gown, and sterile gloves during the entire procedure. PROCEDURE:  Under fluoroscopic guidance, the ureterostomy catheter was injected with contrast and images were obtained. The tube was then exchanged over a wire for a new 16 [place]-tip catheter which was positioned under fluoroscopic guidance with its tip in the colonic conduit lumen. The retention balloon was inflated with 10 mL of saline. A post placement contrast injection through the catheter was performed. FINDINGS: On physical examination the patient has a right lower quadrant urostomy with a transversing 16 [place] tip catheter. Running parallel to this through the same stoma is a smaller clear tube that is approximately 10-12 [place] in diameter. The contrast injection through  "the ureterostomy reveals that this tubing is patent however the tubing tip is enveloped within colonic folds providing suboptimal drainage. Following exchange and repositioning the tip of the tube now resides within the main gravity-dependent portion of the pouch. Approximately 1200 mL of urine sediment immediately spontaneously drained through the catheter.     IMPRESSION:  1.  The existing ureterostomy catheter tip was involved within loops of colonic folds within the pouch providing suboptimal drainage. 2.  Following exchange and repositioning the new 16 [place]tip ureterostomy controls the fluid collection well. 3.  The patient also has a parallel tube entering through the same stoma. At times , parallel tubes entering through the same percutaneous access site can have a propensity to \"wick\" fluid between the tubing and result in leakage.     IR Suprapubic Catheter Change    Result Date: 9/13/2022  EXAM: IR SUPRAPUBIC CATHETER CHANGE LOCATION: [facility] DATE/TIME: 9/13/2022 4:08 PM INDICATION: [place] tip Ambrose catheter through the urostomy site into the colonic diversion. Significant pericatheter leakage. A second, parallel clear tubing into the site. INTERVENTIONAL RADIOLOGIST: Hugo Michael MD. CONSENT: The procedure, risks and benefits suprapubic catheter injection with possible exchange were discussed with the patient  in detail. All questions were answered. Informed consent was given to proceed with the procedure. MODERATE SEDATION: None CONTRAST: Omnipaque 350: 80 cc ANTIBIOTICS: None ADDITIONAL MEDICATIONS: None. FLUOROSCOPIC TIME: 1.6 minutes. RADIATION DOSE: Air Kerma: 26 mGy COMPLICATIONS: No immediate complications. STERILE BARRIER TECHNIQUE: Maximum sterile barrier technique was used. Cutaneous antisepsis was performed at the operative site with application of 2% chlorhexidine and a full body sterile drape. Prior to the procedure, the  and assistant " performed hand hygiene and wore hat, mask, sterile gown, and sterile gloves during the entire procedure. PROCEDURE/TECHNIQUE: Contrast was injected through the indwelling 16 [place] tip Ambrose catheter. The catheter is the proximal aspect of the colonic diversion with the tip abutting a side wall. Retention stitches were cut. Retention balloon was deflated and catheter advanced into a more central position within the colonic divergent. Retention balloon was inflated with 10 cc normal saline. Contrast was through the parallel clear tubing, which has its tip within the superior aspect of the colonic  diversion. Both catheter was secured in place with 2-0 Ethilon stitches. 16 [place] tip catheter was connected to gravity drainage. Clear catheter was capped. FINDINGS: 1. Initial contrast injection through the 16 [place] tip Ambrose catheter demonstrates its tip at the inferior aspect of the colonic diversion with the tip abutting a sidewall. Completion fluoroscopic image demonstrates the advanced catheter with the balloon inflated within the superior aspect of the colonic diversion. 2. Contrast injection through the parallel, cleared tubing demonstrate of the tract courses directly into the superior aspect of the colonic diversion. Tip is within the lumen of the colonic diversion.     IMPRESSION:  1.  Urostomy Ambrose catheter repositioned, as described above. PLAN: Continued gravity drainage.

## 2022-09-27 PROBLEM — G89.21 CHRONIC PAIN DUE TO TRAUMA: Status: ACTIVE | Noted: 2021-03-04

## 2022-09-27 PROBLEM — L02.415 ABSCESS OF RIGHT HIP: Status: ACTIVE | Noted: 2022-09-27

## 2022-09-27 PROBLEM — E87.6 HYPOKALEMIA: Status: ACTIVE | Noted: 2021-03-04

## 2022-09-27 PROBLEM — F32.A DEPRESSIVE DISORDER: Status: ACTIVE | Noted: 2021-03-04

## 2022-09-27 LAB
ALBUMIN SERPL BCG-MCNC: 2.1 G/DL (ref 3.5–5.2)
ALP SERPL-CCNC: 93 U/L (ref 35–104)
ALT SERPL W P-5'-P-CCNC: 7 U/L (ref 10–35)
ANION GAP SERPL CALCULATED.3IONS-SCNC: 9 MMOL/L (ref 7–15)
AST SERPL W P-5'-P-CCNC: 16 U/L (ref 10–35)
BASOPHILS # BLD AUTO: 0 10E3/UL (ref 0–0.2)
BASOPHILS NFR BLD AUTO: 0 %
BILIRUB SERPL-MCNC: <0.2 MG/DL
BUN SERPL-MCNC: 8.5 MG/DL (ref 6–20)
CALCIUM SERPL-MCNC: 7.7 MG/DL (ref 8.6–10)
CHLORIDE SERPL-SCNC: 108 MMOL/L (ref 98–107)
CREAT SERPL-MCNC: 0.28 MG/DL (ref 0.51–0.95)
DEPRECATED HCO3 PLAS-SCNC: 24 MMOL/L (ref 22–29)
EOSINOPHIL # BLD AUTO: 0.3 10E3/UL (ref 0–0.7)
EOSINOPHIL NFR BLD AUTO: 10 %
ERYTHROCYTE [DISTWIDTH] IN BLOOD BY AUTOMATED COUNT: 17.4 % (ref 10–15)
GFR SERPL CREATININE-BSD FRML MDRD: >90 ML/MIN/1.73M2
GLUCOSE SERPL-MCNC: 92 MG/DL (ref 70–99)
HCT VFR BLD AUTO: 26.9 % (ref 35–47)
HGB BLD-MCNC: 8.2 G/DL (ref 11.7–15.7)
IMM GRANULOCYTES # BLD: 0 10E3/UL
IMM GRANULOCYTES NFR BLD: 1 %
LYMPHOCYTES # BLD AUTO: 1.4 10E3/UL (ref 0.8–5.3)
LYMPHOCYTES NFR BLD AUTO: 41 %
MCH RBC QN AUTO: 27.9 PG (ref 26.5–33)
MCHC RBC AUTO-ENTMCNC: 30.5 G/DL (ref 31.5–36.5)
MCV RBC AUTO: 92 FL (ref 78–100)
MONOCYTES # BLD AUTO: 0.2 10E3/UL (ref 0–1.3)
MONOCYTES NFR BLD AUTO: 6 %
NEUTROPHILS # BLD AUTO: 1.4 10E3/UL (ref 1.6–8.3)
NEUTROPHILS NFR BLD AUTO: 42 %
NRBC # BLD AUTO: 0 10E3/UL
NRBC BLD AUTO-RTO: 0 /100
PLATELET # BLD AUTO: 161 10E3/UL (ref 150–450)
POTASSIUM SERPL-SCNC: 3.6 MMOL/L (ref 3.4–5.3)
PROT SERPL-MCNC: 5.1 G/DL (ref 6.4–8.3)
RBC # BLD AUTO: 2.94 10E6/UL (ref 3.8–5.2)
SODIUM SERPL-SCNC: 141 MMOL/L (ref 136–145)
WBC # BLD AUTO: 3.3 10E3/UL (ref 4–11)

## 2022-09-27 PROCEDURE — 82040 ASSAY OF SERUM ALBUMIN: CPT | Performed by: INTERNAL MEDICINE

## 2022-09-27 PROCEDURE — 99232 SBSQ HOSP IP/OBS MODERATE 35: CPT | Performed by: INTERNAL MEDICINE

## 2022-09-27 PROCEDURE — 80053 COMPREHEN METABOLIC PANEL: CPT | Performed by: INTERNAL MEDICINE

## 2022-09-27 PROCEDURE — 250N000013 HC RX MED GY IP 250 OP 250 PS 637: Performed by: INTERNAL MEDICINE

## 2022-09-27 PROCEDURE — 250N000011 HC RX IP 250 OP 636: Performed by: SURGERY

## 2022-09-27 PROCEDURE — 85004 AUTOMATED DIFF WBC COUNT: CPT | Performed by: INTERNAL MEDICINE

## 2022-09-27 PROCEDURE — 250N000013 HC RX MED GY IP 250 OP 250 PS 637: Performed by: SURGERY

## 2022-09-27 PROCEDURE — 120N000001 HC R&B MED SURG/OB

## 2022-09-27 RX ADMIN — LEVETIRACETAM 500 MG: 500 TABLET, FILM COATED ORAL at 21:25

## 2022-09-27 RX ADMIN — OXYCODONE HYDROCHLORIDE 10 MG: 5 TABLET ORAL at 17:08

## 2022-09-27 RX ADMIN — SENNOSIDES AND DOCUSATE SODIUM 1 TABLET: 50; 8.6 TABLET ORAL at 20:37

## 2022-09-27 RX ADMIN — BUMETANIDE 0.5 MG: 0.5 TABLET ORAL at 08:25

## 2022-09-27 RX ADMIN — CEPHALEXIN 500 MG: 500 CAPSULE ORAL at 05:04

## 2022-09-27 RX ADMIN — FOLIC ACID 1 MG: 1 TABLET ORAL at 08:25

## 2022-09-27 RX ADMIN — OXYCODONE HYDROCHLORIDE 10 MG: 5 TABLET ORAL at 21:28

## 2022-09-27 RX ADMIN — CEPHALEXIN 500 MG: 500 CAPSULE ORAL at 11:16

## 2022-09-27 RX ADMIN — SENNOSIDES AND DOCUSATE SODIUM 1 TABLET: 50; 8.6 TABLET ORAL at 08:25

## 2022-09-27 RX ADMIN — PANTOPRAZOLE SODIUM 40 MG: 40 TABLET, DELAYED RELEASE ORAL at 06:57

## 2022-09-27 RX ADMIN — Medication 500 MG: at 08:25

## 2022-09-27 RX ADMIN — CEPHALEXIN 500 MG: 500 CAPSULE ORAL at 18:01

## 2022-09-27 RX ADMIN — THIAMINE HCL TAB 100 MG 100 MG: 100 TAB at 08:25

## 2022-09-27 RX ADMIN — TRAZODONE HYDROCHLORIDE 100 MG: 50 TABLET ORAL at 21:26

## 2022-09-27 RX ADMIN — ZOLPIDEM TARTRATE 2.5 MG: 5 TABLET ORAL at 21:25

## 2022-09-27 RX ADMIN — CEPHALEXIN 500 MG: 500 CAPSULE ORAL at 00:16

## 2022-09-27 RX ADMIN — ENOXAPARIN SODIUM 60 MG: 60 INJECTION SUBCUTANEOUS at 20:38

## 2022-09-27 RX ADMIN — NYSTATIN: 100000 OINTMENT TOPICAL at 21:26

## 2022-09-27 RX ADMIN — THERA TABS 1 TABLET: TAB at 08:25

## 2022-09-27 RX ADMIN — NYSTATIN: 100000 OINTMENT TOPICAL at 08:28

## 2022-09-27 RX ADMIN — LEVETIRACETAM 500 MG: 500 TABLET, FILM COATED ORAL at 08:25

## 2022-09-27 RX ADMIN — HYDROMORPHONE HYDROCHLORIDE 2 MG: 2 TABLET ORAL at 09:48

## 2022-09-27 RX ADMIN — ENOXAPARIN SODIUM 60 MG: 60 INJECTION SUBCUTANEOUS at 08:24

## 2022-09-27 ASSESSMENT — ACTIVITIES OF DAILY LIVING (ADL)
ADLS_ACUITY_SCORE: 49
ADLS_ACUITY_SCORE: 47
ADLS_ACUITY_SCORE: 44
ADLS_ACUITY_SCORE: 49
ADLS_ACUITY_SCORE: 44
ADLS_ACUITY_SCORE: 49
ADLS_ACUITY_SCORE: 49
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
## 2022-09-27 NOTE — PROGRESS NOTES
Care Management Follow Up    Length of Stay (days): 24    Expected Discharge Date: 09/28/2022     Concerns to be Addressed:   Wound vac needs to be placed.    Patient plan of care discussed at interdisciplinary rounds: Yes    Anticipated Discharge Disposition:  Back to [facility]     Anticipated Discharge Services:  [facility] will provide ongoing wound care, including managing the wound vac and performing the dressing changes.  Anticipated Discharge DME: wound vac and supplies     Patient/family educated on Medicare website which has current facility and service quality ratings:    Education Provided on the Discharge Plan:    Patient/Family in Agreement with the Plan:      Referrals Placed by CM/SW:    Private pay costs discussed: Not applicable    Additional Information:  Plan is to discharge back to [facility] tomorrow at 1100 still waiting on [facility] for the approval for the wound vac. All paperwork is in and forms signed just need the auth from [facility] that it is complete. [facility] stretcher has been set up due to pt ulcer and inability to sit in WC for the drive home. CM will follow for updates from [facility] and give wound vac to pt once approved.      Deb Grande RN

## 2022-09-27 NOTE — PROGRESS NOTES
[facility] RN Consult Note    Today's Visit: Patient's sacrum pressure injury is stage 3, see 9/26 consult note by [facility] Nurse Simona Barrow for full assessment of wound.  CHRISTINA BeckerN, RN, PHN, HNB-BC, CWOCN

## 2022-09-27 NOTE — PLAN OF CARE
Problem: Plan of Care - These are the overarching goals to be used throughout the patient stay.    Goal: Absence of Hospital-Acquired Illness or Injury  Outcome: Ongoing, Progressing  Intervention: Identify and Manage Fall Risk  Recent Flowsheet Documentation  Taken 9/27/2022 0800 by Chanell Castillo RN  Safety Promotion/Fall Prevention: activity supervised       Problem: Pain Acute  Goal: Acceptable Pain Control and Functional Ability  Intervention: Prevent or Manage Pain  Recent Flowsheet Documentation  Taken 9/27/2022 0800 by Chanell Castillo RN  Medication Review/Management: medications reviewed      Patient received pain medication x 1 this shift. Wound vac in place on right hip and sacral area. Patient looking forward to returning to her previous living arrangement.

## 2022-09-27 NOTE — PLAN OF CARE
"Patient is A&O. She is pleasant and cooperative with cares. Patient was repositioned several times with assist of 2. Wound vac dressing intact and wound vac running at 125 mm/hg. Suprapubic catheter drained 750 ml total. Given prn Oxycodone at start of shift for pain at a level of 8/10. Pt stated, \"pain medicine did not help a lot.\" She rated pain reassess at 5/10. Given prn Dilaudid with much more relief. Pain reassess down to 3/10.    Problem: Plan of Care - These are the overarching goals to be used throughout the patient stay.    Goal: Optimal Comfort and Wellbeing  Outcome: Ongoing, Progressing  Intervention: Monitor Pain and Promote Comfort  Recent Flowsheet Documentation  Taken 9/26/2022 1557 by Tricia Krishnan, RN  Pain Management Interventions: medication (see MAR)  Intervention: Provide Person-Centered Care  Recent Flowsheet Documentation  Taken 9/26/2022 1900 by Tricia Krishnan RN  Trust Relationship/Rapport: care explained     Problem: UTI (Urinary Tract Infection)  Goal: Improved Infection Symptoms  Outcome: Ongoing, Progressing     Problem: Impaired Wound Healing  Goal: Optimal Wound Healing  Outcome: Ongoing, Progressing  Intervention: Promote Wound Healing  Recent Flowsheet Documentation  Taken 9/26/2022 1900 by Tricia Krishnan, RN  Activity Management: activity adjusted per tolerance  Taken 9/26/2022 1557 by Tricia Krishnan, RN  Pain Management Interventions: medication (see MAR)     Problem: Plan of Care - These are the overarching goals to be used throughout the patient stay.    Goal: Absence of Hospital-Acquired Illness or Injury  Intervention: Identify and Manage Fall Risk  Recent Flowsheet Documentation  Taken 9/26/2022 1900 by Tricia Krishnan, RN  Safety Promotion/Fall Prevention:   bed alarm on   activity supervised  Intervention: Prevent Skin Injury  Recent Flowsheet Documentation  Taken 9/26/2022 1900 by Tricia Krishnan, RN  Body Position: supine, legs elevated  Intervention: Prevent and Manage " VTE (Venous Thromboembolism) Risk  Recent Flowsheet Documentation  Taken 9/26/2022 1900 by Tricia Krishnan RN  VTE Prevention/Management: compression stockings on  Activity Management: activity adjusted per tolerance  Intervention: Prevent Infection  Recent Flowsheet Documentation  Taken 9/26/2022 1900 by Tricia Krishnan RN  Infection Prevention: single patient room provided     Problem: VTE (Venous Thromboembolism)  Goal: VTE (Venous Thromboembolism) Symptom Resolution  Intervention: Prevent or Manage VTE (Venous Thromboembolism)  Recent Flowsheet Documentation  Taken 9/26/2022 1900 by Tricia Krishnan RN  VTE Prevention/Management: compression stockings on     Problem: Anemia  Goal: Anemia Symptom Improvement  Intervention: Monitor and Manage Anemia  Recent Flowsheet Documentation  Taken 9/26/2022 1900 by Tricia Krishnan RN  Safety Promotion/Fall Prevention:   bed alarm on   activity supervised     Problem: Pain Acute  Goal: Acceptable Pain Control and Functional Ability  Intervention: Develop Pain Management Plan  Recent Flowsheet Documentation  Taken 9/26/2022 1557 by Tricia Krishnan RN  Pain Management Interventions: medication (see MAR)  Intervention: Prevent or Manage Pain  Recent Flowsheet Documentation  Taken 9/26/2022 1900 by Tricia Krishnan RN  Medication Review/Management: medications reviewed   Goal Outcome Evaluation:

## 2022-09-27 NOTE — PLAN OF CARE
Problem: Plan of Care - These are the overarching goals to be used throughout the patient stay.    Goal: Plan of Care Review/Shift Note  Description: The Plan of Care Review/Shift note should be completed every shift.  The Outcome Evaluation is a brief statement about your assessment that the patient is improving, declining, or no change.  This information will be displayed automatically on your shift note.  Outcome: Ongoing, Progressing     Problem: Plan of Care - These are the overarching goals to be used throughout the patient stay.    Goal: Absence of Hospital-Acquired Illness or Injury  Outcome: Ongoing, Progressing     Problem: Plan of Care - These are the overarching goals to be used throughout the patient stay.    Goal: Absence of Hospital-Acquired Illness or Injury  Intervention: Prevent and Manage VTE (Venous Thromboembolism) Risk  Recent Flowsheet Documentation  Taken 9/27/2022 0012 by Skylar Crowe RN  Activity Management: activity adjusted per tolerance   Goal Outcome Evaluation:      Pt was alert , calm, colostomy bag was almost falling out with bm pushing through, bag was replaced, bm is formed and brown, pt complained of little pain but had pain meds on previous shift and did not have any with me.  vitals are stable. No drainage of double mcintyre, wound vac is draining at 125.                  TRANSFER - OUT REPORT:    Verbal report given to Cedric, RN(name) on Aysha Garcia  being transferred to [facility](unit) for routine progression of care       Report consisted of patients Situation, Background, Assessment and   Recommendations(SBAR). Information from the following report(s) SBAR was reviewed with the receiving nurse. Lines:   Peripheral IV 10/14/17 Right Forearm (Active)   Site Assessment Clean, dry, & intact 10/14/2017  1:33 PM   Phlebitis Assessment 0 10/14/2017  1:33 PM   Infiltration Assessment 0 10/14/2017  1:33 PM   Dressing Status Clean, dry, & intact 10/14/2017  1:33 PM   Dressing Type Tape;Transparent 10/14/2017  1:33 PM   Hub Color/Line Status Pink;Capped;Flushed;Patent 10/14/2017  1:33 PM   Action Taken Blood drawn 10/14/2017  1:33 PM       Peripheral IV 10/14/17 Left Antecubital (Active)   Site Assessment Clean, dry, & intact 10/14/2017  1:33 PM   Phlebitis Assessment 0 10/14/2017  1:33 PM   Infiltration Assessment 0 10/14/2017  1:33 PM   Dressing Status Clean, dry, & intact 10/14/2017  1:33 PM   Dressing Type Tape;Transparent 10/14/2017  1:33 PM   Hub Color/Line Status Pink;Patent; Flushed 10/14/2017  1:33 PM   Action Taken Blood drawn 10/14/2017  1:33 PM        Opportunity for questions and clarification was provided.

## 2022-09-28 VITALS
RESPIRATION RATE: 18 BRPM | BODY MASS INDEX: 18.8 KG/M2 | SYSTOLIC BLOOD PRESSURE: 107 MMHG | HEART RATE: 87 BPM | HEIGHT: 69 IN | OXYGEN SATURATION: 94 % | TEMPERATURE: 98.9 F | WEIGHT: 126.9 LBS | DIASTOLIC BLOOD PRESSURE: 61 MMHG

## 2022-09-28 LAB — BACTERIA TISS BX CULT: NO GROWTH

## 2022-09-28 PROCEDURE — 250N000013 HC RX MED GY IP 250 OP 250 PS 637: Performed by: SURGERY

## 2022-09-28 PROCEDURE — 250N000011 HC RX IP 250 OP 636: Performed by: SURGERY

## 2022-09-28 PROCEDURE — 250N000013 HC RX MED GY IP 250 OP 250 PS 637: Performed by: INTERNAL MEDICINE

## 2022-09-28 PROCEDURE — 99239 HOSP IP/OBS DSCHRG MGMT >30: CPT | Performed by: INTERNAL MEDICINE

## 2022-09-28 RX ORDER — CEPHALEXIN 500 MG/1
500 CAPSULE ORAL EVERY 6 HOURS
Qty: 48 CAPSULE | Refills: 0 | Status: SHIPPED | OUTPATIENT
Start: 2022-09-28 | End: 2022-10-10

## 2022-09-28 RX ORDER — DIPHENHYDRAMINE HCL 25 MG
25 CAPSULE ORAL EVERY 6 HOURS PRN
Qty: 20 CAPSULE | Refills: 0 | Status: SHIPPED | OUTPATIENT
Start: 2022-09-28 | End: 2024-01-01

## 2022-09-28 RX ORDER — AMOXICILLIN 250 MG
1 CAPSULE ORAL 2 TIMES DAILY
Qty: 60 TABLET | Refills: 0 | Status: SHIPPED | OUTPATIENT
Start: 2022-09-28 | End: 2022-10-28

## 2022-09-28 RX ORDER — OXYCODONE HYDROCHLORIDE 5 MG/1
5 TABLET ORAL EVERY 4 HOURS PRN
Qty: 20 TABLET | Refills: 0 | Status: SHIPPED | OUTPATIENT
Start: 2022-09-28 | End: 2022-09-30

## 2022-09-28 RX ORDER — ASCORBIC ACID 500 MG
500 TABLET ORAL DAILY
Qty: 30 TABLET | Refills: 0 | Status: SHIPPED | OUTPATIENT
Start: 2022-09-28 | End: 2022-10-28

## 2022-09-28 RX ORDER — ACETAMINOPHEN 325 MG/1
650 TABLET ORAL EVERY 4 HOURS PRN
Qty: 30 TABLET | Refills: 1 | Status: SHIPPED | OUTPATIENT
Start: 2022-09-28 | End: 2023-02-27

## 2022-09-28 RX ORDER — POTASSIUM CHLORIDE 1500 MG/1
20 TABLET, EXTENDED RELEASE ORAL DAILY
Qty: 30 TABLET | Refills: 0 | Status: SHIPPED | OUTPATIENT
Start: 2022-09-28 | End: 2023-01-23

## 2022-09-28 RX ORDER — NYSTATIN 100000 U/G
OINTMENT TOPICAL 2 TIMES DAILY
Qty: 15 G | Refills: 1 | Status: SHIPPED | OUTPATIENT
Start: 2022-09-28 | End: 2022-10-05

## 2022-09-28 RX ORDER — ZOLPIDEM TARTRATE 5 MG/1
2.5 TABLET ORAL
Qty: 10 TABLET | Refills: 0 | Status: SHIPPED | OUTPATIENT
Start: 2022-09-28 | End: 2022-11-07

## 2022-09-28 RX ORDER — BUMETANIDE 0.5 MG/1
0.5 TABLET ORAL DAILY
Qty: 30 TABLET | Refills: 0 | Status: SHIPPED | OUTPATIENT
Start: 2022-09-28 | End: 2022-11-07

## 2022-09-28 RX ORDER — FOLIC ACID 1 MG/1
1 TABLET ORAL DAILY
Qty: 30 TABLET | Refills: 0 | Status: SHIPPED | OUTPATIENT
Start: 2022-09-28 | End: 2022-10-28

## 2022-09-28 RX ORDER — LANOLIN ALCOHOL/MO/W.PET/CERES
100 CREAM (GRAM) TOPICAL DAILY
Qty: 30 TABLET | Refills: 0 | Status: SHIPPED | OUTPATIENT
Start: 2022-09-28 | End: 2022-10-28

## 2022-09-28 RX ORDER — ENOXAPARIN SODIUM 100 MG/ML
60 INJECTION SUBCUTANEOUS EVERY 12 HOURS
Qty: 36 ML | Refills: 0 | Status: SHIPPED | OUTPATIENT
Start: 2022-09-28 | End: 2022-10-28

## 2022-09-28 RX ORDER — POLYETHYLENE GLYCOL 3350 17 G/17G
17 POWDER, FOR SOLUTION ORAL DAILY
Qty: 510 G | Refills: 1 | Status: SHIPPED | OUTPATIENT
Start: 2022-09-28 | End: 2023-02-27

## 2022-09-28 RX ADMIN — OXYCODONE HYDROCHLORIDE 5 MG: 5 TABLET ORAL at 11:22

## 2022-09-28 RX ADMIN — THERA TABS 1 TABLET: TAB at 08:48

## 2022-09-28 RX ADMIN — SENNOSIDES AND DOCUSATE SODIUM 1 TABLET: 50; 8.6 TABLET ORAL at 08:48

## 2022-09-28 RX ADMIN — BUMETANIDE 0.5 MG: 0.5 TABLET ORAL at 08:48

## 2022-09-28 RX ADMIN — FOLIC ACID 1 MG: 1 TABLET ORAL at 08:48

## 2022-09-28 RX ADMIN — OXYCODONE HYDROCHLORIDE 5 MG: 5 TABLET ORAL at 01:09

## 2022-09-28 RX ADMIN — NYSTATIN: 100000 OINTMENT TOPICAL at 08:57

## 2022-09-28 RX ADMIN — PANTOPRAZOLE SODIUM 40 MG: 40 TABLET, DELAYED RELEASE ORAL at 07:11

## 2022-09-28 RX ADMIN — THIAMINE HCL TAB 100 MG 100 MG: 100 TAB at 08:48

## 2022-09-28 RX ADMIN — CEPHALEXIN 500 MG: 500 CAPSULE ORAL at 11:22

## 2022-09-28 RX ADMIN — ENOXAPARIN SODIUM 60 MG: 60 INJECTION SUBCUTANEOUS at 08:47

## 2022-09-28 RX ADMIN — CEPHALEXIN 500 MG: 500 CAPSULE ORAL at 05:26

## 2022-09-28 RX ADMIN — Medication 500 MG: at 08:48

## 2022-09-28 RX ADMIN — LEVETIRACETAM 500 MG: 500 TABLET, FILM COATED ORAL at 08:48

## 2022-09-28 RX ADMIN — CEPHALEXIN 500 MG: 500 CAPSULE ORAL at 01:10

## 2022-09-28 ASSESSMENT — ACTIVITIES OF DAILY LIVING (ADL)
ADLS_ACUITY_SCORE: 44

## 2022-09-28 NOTE — PLAN OF CARE
Pt is A/O x4, pain in the back, pain controlled with prn oxycodone 5 mg, repositioning, only 1 catheter drained 200 ml and the other had no drainage, new colostomy bag pt on at 5;30 am, pt was awake most part of the night watching TV. Catheters were flushed, no further complains of pain, BP is low 98/53. Pt is waiting to be discharged today.      Problem: Pain Acute  Goal: Acceptable Pain Control and Functional Ability  Outcome: Ongoing, Progressing  Intervention: Develop Pain Management Plan  Recent Flowsheet Documentation  Taken 9/28/2022 0100 by Skylar Crowe RN  Pain Management Interventions: medication (see MAR)  Intervention: Prevent or Manage Pain  Recent Flowsheet Documentation  Taken 9/28/2022 0100 by Skylar Crowe RN  Medication Review/Management: medications reviewed     Problem: Infection  Goal: Absence of Infection Signs and Symptoms  Outcome: Ongoing, Progressing     Problem: Impaired Wound Healing  Goal: Optimal Wound Healing  Intervention: Promote Wound Healing  Recent Flowsheet Documentation  Taken 9/28/2022 0100 by Skylar Crowe RN  Pain Management Interventions: medication (see MAR)  Activity Management: bedrest     Problem: Impaired Wound Healing  Goal: Optimal Wound Healing  Outcome: Ongoing, Progressing  Intervention: Promote Wound Healing  Recent Flowsheet Documentation  Taken 9/28/2022 0100 by Skylar Crowe RN  Pain Management Interventions: medication (see MAR)  Activity Management: bedrest   Goal Outcome Evaluation:

## 2022-09-28 NOTE — PLAN OF CARE
"  Problem: Plan of Care - These are the overarching goals to be used throughout the patient stay.    Goal: Plan of Care Review/Shift Note  Description: The Plan of Care Review/Shift note should be completed every shift.  The Outcome Evaluation is a brief statement about your assessment that the patient is improving, declining, or no change.  This information will be displayed automatically on your shift note.  Outcome: Met  Goal: Patient-Specific Goal (Individualized)  Description: You can add care plan individualizations to a care plan. Examples of Individualization might be:  \"Parent requests to be called daily at 9am for status\", \"I have a hard time hearing out of my right ear\", or \"Do not touch me to wake me up as it startles me\".  Outcome: Met  Goal: Absence of Hospital-Acquired Illness or Injury  Outcome: Met  Intervention: Identify and Manage Fall Risk  Recent Flowsheet Documentation  Taken 9/28/2022 0900 by Desiree Sykes RN  Safety Promotion/Fall Prevention:   bed alarm on   clutter free environment maintained   lighting adjusted   mobility aid in reach   nonskid shoes/slippers when out of bed   room near nurse's station   room door open  Intervention: Prevent Skin Injury  Recent Flowsheet Documentation  Taken 9/28/2022 0900 by Desiree Sykes RN  Body Position:   position maintained   weight shifting  Intervention: Prevent and Manage VTE (Venous Thromboembolism) Risk  Recent Flowsheet Documentation  Taken 9/28/2022 0900 by Desiree Sykes RN  Activity Management:   bedrest   activity adjusted per tolerance  Goal: Optimal Comfort and Wellbeing  Outcome: Met  Intervention: Provide Person-Centered Care  Recent Flowsheet Documentation  Taken 9/28/2022 0900 by Desiree Sykes RN  Trust Relationship/Rapport:   care explained   questions answered   thoughts/feelings acknowledged  Goal: Readiness for Transition of Care  Outcome: Met     Problem: Pain Acute  Goal: Acceptable Pain Control and " Functional Ability  Outcome: Met  Intervention: Prevent or Manage Pain  Recent Flowsheet Documentation  Taken 9/28/2022 0900 by Desiree Sykes, RN  Medication Review/Management: medications reviewed     Problem: Impaired Wound Healing  Goal: Optimal Wound Healing  Outcome: Met  Intervention: Promote Wound Healing  Recent Flowsheet Documentation  Taken 9/28/2022 0900 by Desiree Sykes, RN  Activity Management:   bedrest   activity adjusted per tolerance  Pt d/cing back to [facility]. Wound vac changed to smaller vac at discharge. Oxycode for pain prior to discharge. All drains and colostomy checked before d/cing.     Goal Outcome Evaluation:

## 2022-09-28 NOTE — PROGRESS NOTES
Care Management Discharge Note    Discharge Date: 09/28/2022       Discharge Disposition:  Back to skilled nursing    Discharge Services:  [facility] for nursing    Discharge DME:  Wound vac and supplies    Discharge Transportation: health plan transportation at 1100    Private pay costs discussed: transportation costs    PAS Confirmation Code:    Patient/family educated on Medicare website which has current facility and service quality ratings:      Education Provided on the Discharge Plan:    Persons Notified of Discharge Plans: yes  Patient/Family in Agreement with the Plan:      Handoff Referral Completed: Yes    Additional Information:  Pt is adequate for discharge. She was given her home wound vac and instructions. All paperwork was signed and sent back to [facility] stretcher transport set up due to pt ulcer. No further CM needs at this time.        Deb Grande RN

## 2022-09-28 NOTE — DISCHARGE SUMMARY
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 technique through the proximal aspect of the stoma. This catheter is malfunctioning.    CONSENT: The risks, benefits and alternatives of the procedure were discussed with the patient  in detail. All questions were answered. Informed consent was given to proceed with the procedure.    SEDATION: None..    CONTRAST: 15 mL  ANTIBIOTICS: Patient receiving antibiotics  ADDITIONAL MEDICATIONS: None.    FLUOROSCOPIC TIME: 2.6   RADIATION DOSE: Air Kerma: 73 mGy.    COMPLICATIONS: No immediate complications.    STERILE BARRIER TECHNIQUE: Maximum sterile barrier technique was used. Cutaneous antisepsis was performed at the operative site with application of 2% chlorhexidine and large sterile drape. Prior to the procedure, the  and assistant performed   hand hygiene and wore hat, mask, sterile gown, and sterile gloves during the entire procedure.    PROCEDURE/FINDINGS:   A  image was obtained. A 5 Nepali KMP catheter, with the aid of a 0.035 inch angled  Glidewire, was advanced through the existing stoma tract into the diversion pouch. It was noted that the pre-existing catheter, while initially within the stoma,   traverse through the side wall of the stoma directly into the pouch itself.    The position of the newly placed catheter was confirmed by contrast injection. A 0.035 inch Amplatz wire was advanced through the catheter into the urinary diversion pouch. A 16 Japanese [place] tip catheter was advanced over the Amplatz wire. The balloon   was inflated and contrast demonstration confirmed its position. The catheter was secured in place. The catheter was attached to a gravity drainage bag.      Impression    IMPRESSION:  Placement of a transstomal urinary catheter via the existing stoma.         IR Abscess Tube Check    Narrative    LOCATION: [facility]  DATE: 9/13/2022    PROCEDURE: ABSCESS TUBE CHECK    INTERVENTIONAL RADIOLOGIST: Hugo Michael MD    INDICATION: Right hip abscess. Indwelling percutaneous drain..    CONSENT: The procedure, risks and benefits of an abscessogram were discussed with the patient  in detail. All questions were answered. Informed consent was given to proceed with the procedure.    MODERATE SEDATION: None.    CONTRAST: Omnipaque 350: 20. mL.  ANTIBIOTICS: None.  ADDITIONAL MEDICATIONS: None.    FLUOROSCOPIC TIME: 0.4 minutes  RADIATION DOSE: Air Kerma: 4 mGy    COMPLICATIONS: No immediate complications.    PROCEDURE:  images were obtained. The existing drainage catheter was injected with contrast, and multiple images were obtained. The cavity was irrigated with 50 cc aliquots of normal saline which were then aspirated. Total irrigation volume was   approximately 200 cc. Drain was reconnected to a DENIA bulb.    FINDINGS: Adequately positioned, indwelling 16 Japanese Fred drain within the posterior right hip fluid collection..      Impression    IMPRESSION:  1.  Adequately positioned, indwelling 16 Japanese  drainage catheter.    PLAN: Continued DENIA bulb drainage with maintained flushing regimen..     IR Suprapubic Catheter Change    Narrative    EXAM: IR SUPRAPUBIC CATHETER CHANGE  LOCATION: [facility]  DATE/TIME: 9/13/2022 4:08 PM    INDICATION: [place] tip Ambrose catheter through the urostomy site into the colonic diversion. Significant pericatheter leakage. A second, parallel clear tubing into the site.    INTERVENTIONAL RADIOLOGIST: Hugo Michael MD.    CONSENT: The procedure, risks and benefits suprapubic catheter injection with possible exchange were discussed with the patient  in detail. All questions were answered. Informed consent was given to proceed with the procedure.    MODERATE SEDATION: None    CONTRAST: Omnipaque 350: 80 cc  ANTIBIOTICS: None  ADDITIONAL MEDICATIONS: None.    FLUOROSCOPIC TIME: 1.6 minutes.   RADIATION DOSE: Air Kerma: 26 mGy    COMPLICATIONS: No immediate complications.    STERILE BARRIER TECHNIQUE: Maximum sterile barrier technique was used. Cutaneous antisepsis was performed at the operative site with application of 2% chlorhexidine and a full body sterile drape. Prior to the procedure, the  and assistant   performed hand hygiene and wore hat, mask, sterile gown, and sterile gloves during the entire procedure.    PROCEDURE/TECHNIQUE: Contrast was injected through the indwelling 16 [place] tip Ambrose catheter. The catheter is the proximal aspect of the colonic diversion with the tip abutting a side wall. Retention stitches were cut. Retention balloon was   deflated and catheter advanced into a more central position within the colonic divergent. Retention balloon was inflated with 10 cc normal saline. Contrast was through the parallel clear tubing, which has its tip within the superior aspect of the colonic   diversion. Both catheter was secured in place with 2-0 Ethilon stitches. 16 [place] tip catheter was connected to gravity drainage.  Clear catheter was capped.    FINDINGS:  1. Initial contrast injection through the 16 Taiwanese [place] tip Ambrose catheter demonstrates its tip at the inferior aspect of the colonic diversion with the tip abutting a sidewall. Completion fluoroscopic image demonstrates the advanced catheter with   the balloon inflated within the superior aspect of the colonic diversion.  2. Contrast injection through the parallel, cleared tubing demonstrate of the tract courses directly into the superior aspect of the colonic diversion. Tip is within the lumen of the colonic diversion.      Impression    IMPRESSION:    1.  Urostomy Ambrose catheter repositioned, as described above.    PLAN: Continued gravity drainage.       CT Pelvis Soft Tissue w Contrast    Narrative    EXAM: CT PELVIS SOFT TISSUE W CONTRAST  LOCATION: [facility]  DATE/TIME: 9/15/2022 4:57 PM    INDICATION: abscess  COMPARISON: 09/10/2022  TECHNIQUE: CT scan of the pelvis was performed with IV contrast. Multiplanar reformats were obtained. Dose reduction techniques were used.  CONTRAST: isovue 370  100ml    FINDINGS:    OTHER: Visualized portions of the liver, spleen, and pancreas are unremarkable, although incompletely evaluated.    KIDNEYS/BLADDER: The kidneys are partially obscured by metal artifact spinal hardware. No hydronephrosis. Cystectomy with urinary diversion. Interval placement of large bore catheter alongside the indwelling catheter into the colonic diversion with   decrease in the degree of distention that was seen on the prior CT.    BOWEL: Visualized loops are normal caliber.    LYMPH NODES: No lymphadenopathy.    VASCULATURE: Unremarkable.    PELVIC ORGANS: Unremarkable    MUSCULOSKELETAL: Right hip fluid collection is similar, with the residual fluid measuring 5.0 x 3.3 cm (2/106), previously 4.7 x 3.6 cm. Catheter tip is positioned along the superolateral aspect of the collection diffuse osteopenia and partially imaged    spinal fusion hardware. Chronic hip deformities and diffuse body wall edema. Skin defect overlying the sacrum.       Impression    IMPRESSION:   1.  Interval placement of large bore catheter into urinary diversion with resolution of the distention that was seen on the prior CT.    2.  Similar size of right hip fluid collection. Catheter tip is along the superolateral margin.       IR Suprapubic Catheter Change    Narrative    [facility]  LOCATION: [facility]  DATE: 9/18/2022    PROCEDURE: FLUOROSCOPIC GUIDED UROSTOMY (SUPRAPUBIC) CATHETER EXCHANGE    INTERVENTIONAL RADIOLOGIST: Dennis Branch MD.    INDICATION: 56-year-old female with history of cystectomy with right lower quadrant colonic urostomy. The patient is experiencing significant peritubal leakage and abdominal distention. There is concern that the urostomy may be occluded or malposition.    MODERATE SEDATION: None.    CONTRAST: 20 mL Omnipaque into the urinary bladder.  ANTIBIOTICS: None.  ADDITIONAL MEDICATIONS: None.    FLUOROSCOPIC TIME: 0.7 minutes.  RADIATION DOSE: Air Kerma: 24 mGy.    COMPLICATIONS: No immediate complications.    STERILE BARRIER TECHNIQUE: Maximum sterile barrier technique was used. Cutaneous antisepsis was performed at the operative site with application of 2% chlorhexidine and large sterile drape. Prior to the procedure, the  and assistant performed   hand hygiene and wore hat, mask, sterile gown, and sterile gloves during the entire procedure.    PROCEDURE:    Under fluoroscopic guidance, the ureterostomy catheter was injected with contrast and images were obtained.     The tube was then exchanged over a wire for a new 16 [place]-tip catheter which was positioned under fluoroscopic guidance with its tip in the colonic conduit lumen. The retention balloon was inflated with 10 mL of saline. A post placement   contrast injection through the catheter was performed.    FINDINGS:  On  "physical examination the patient has a right lower quadrant urostomy with a transversing 16 Iraqi [place] tip catheter. Running parallel to this through the same stoma is a smaller clear tube that is approximately 10-12 Iraqi in diameter.    The contrast injection through the ureterostomy reveals that this tubing is patent however the tubing tip is enveloped within colonic folds providing suboptimal drainage.    Following exchange and repositioning the tip of the tube now resides within the main gravity-dependent portion of the pouch. Approximately 1200 mL of urine sediment immediately spontaneously drained through the catheter.      Impression    IMPRESSION:    1.  The existing ureterostomy catheter tip was involved within loops of colonic folds within the pouch providing suboptimal drainage.  2.  Following exchange and repositioning the new 16 Iraqi [place]-tip ureterostomy controls the fluid collection well.  3.  The patient also has a parallel tube entering through the same stoma. At times , parallel tubes entering through the same percutaneous access site can have a propensity to \"wick\" fluid between the tubing and result in leakage.       CT Pelvis Soft Tissue w Contrast    Narrative    EXAM: CT PELVIS SOFT TISSUE W CONTRAST  LOCATION: [facility]  DATE/TIME: 9/21/2022 6:32 AM    INDICATION: Right hip abscess.  COMPARISON: 09/15/2022  TECHNIQUE: CT scan of the pelvis was performed with IV contrast. Multiplanar reformats were obtained. Dose reduction techniques were used.  CONTRAST: Isovue 370    100ml    FINDINGS:    PELVIC ORGANS: Status post cystectomy. Redemonstrated right lower quadrant ileal conduit urinary diversion, with a Ambrose catheter in place. Visualized kidneys are partially secured by streak artifact. No evidence for hydronephrosis. Redemonstrated   Franklin's pouch. There is a bowel anastomosis at the level of the cecum. Left lower quadrant colostomy. Otherwise, " visualized small bowel and colon appear unremarkable. Mild atherosclerotic calcifications.    MUSCULOSKELETAL: Redemonstrated drainage catheter, with tip at the superior aspect of the right hip gas and fluid collection. The collection measures 5.5 x 3.5 cm, not significantly changed. Redemonstrated chronic deformities of the bilateral hips.   Status post partial sacrectomy. Redemonstrated decubitus ulcer overlying the sacrum. No lytic change or erosion to suggest acute osteomyelitis. Severe atrophy of the visualized musculature. Diffuse subcutaneous edema of the bilateral flanks and proximal   thighs. Spinal fusion hardware, incompletely assessed.      Impression    IMPRESSION:  1.  No significant change in right hip 5.5 cm gas and fluid collection, with drainage catheter tip along the superior margin.     Echocardiogram Complete     Value    LVEF  55-60%    Narrative    [id number]  [id number]  [id number]  [id number]^ASHLEY^ARIN^[place] [address]     Name: KISHOR PINEDA  MRN: [id number]  : 1965  Study Date: 09/10/2022 03:21 PM  Age: 56 yrs  Gender: Female  Patient Location: [facility]  Reason For Study: CHF  Ordering Physician: ARIN RAMIREZ  Referring Physician: CODEY WILSON  Performed By: ACE     BSA: 1.6 m2  Height: 64 in  Weight: 120 lb  HR: 92  BP: 91/58 mmHg  ______________________________________________________________________________  Procedure  Complete Echo Adult.  ______________________________________________________________________________  Interpretation Summary     The left ventricle is normal in size.  The visual ejection fraction is 55-60%.  No regional wall motion abnormalities noted.  Regional wall motion abnormalities cannot be excluded due to limited  visualization.  Diastolic Doppler findings (E/E' ratio and/or other parameters) suggest left  ventricular filling pressures are normal.  Sinus rhythm was noted.  Technically  difficult, suboptimal study. Consider cardiac MRI for better  imaging.  ______________________________________________________________________________  Left Ventricle  The left ventricle is normal in size. There is normal left ventricular wall  thickness. Diastolic Doppler findings (E/E' ratio and/or other parameters)  suggest left ventricular filling pressures are normal. The visual ejection  fraction is 55-60%. Regional wall motion abnormalities cannot be excluded due  to limited visualization. No regional wall motion abnormalities noted.     Right Ventricle  Normal right ventricle size and systolic function. TAPSE is normal, which is  consistent with normal right ventricular systolic function.     Atria  The left atrium is not well visualized. Right atrium not well visualized.     Mitral Valve  The mitral valve is not well visualized. There is mild (1+) mitral  regurgitation. There is no mitral valve stenosis.     Tricuspid Valve  The tricuspid valve is not well visualized. There is mild (1+) tricuspid  regurgitation.     Aortic Valve  The aortic valve is not well visualized. No aortic regurgitation is present.  No aortic stenosis is present.     Pulmonic Valve  The pulmonic valve is not well visualized.     Vessels  The aorta root is normal. Normal size ascending aorta.     Pericardium  There is no pericardial effusion.     Rhythm  Sinus rhythm was noted.  ______________________________________________________________________________  MMode/2D Measurements & Calculations  IVSd: 0.77 cm     LVIDd: 3.9 cm  LVIDs: 2.6 cm  LVPWd: 0.70 cm  FS: 32.6 %  LV mass(C)d: 81.4 grams  LV mass(C)dI: 51.7 grams/m2  Ao root diam: 3.1 cm  asc Aorta Diam: 3.2 cm  LVOT diam: 2.0 cm  LVOT area: 3.1 cm2  RWT: 0.36     Doppler Measurements & Calculations  MV E max deanna: 53.8 cm/sec  MV A max deanna: 67.9 cm/sec  MV E/A: 0.79  MV dec slope: 449.7 cm/sec2  MV dec time: 0.12 sec  Ao V2 max: 138.1 cm/sec  Ao max P.0 mmHg  Ao V2 mean: 94.7  cm/sec  Ao mean P.2 mmHg  Ao V2 VTI: 25.7 cm  MACKENZIE(I,D): 1.9 cm2  MACKENZIE(V,D): 1.8 cm2  LV V1 max P.7 mmHg  LV V1 max: 82.0 cm/sec  LV V1 VTI: 16.0 cm  SV(LVOT): 49.1 ml  SI(LVOT): 31.2 ml/m2  PA acc time: 0.09 sec  AV Jered Ratio (DI): 0.59     MACKENZIE Index (cm2/m2): 1.2  E/E': 4.4  E/E' av.0  Lateral E/e': 4.4  Medial E/e': 7.5  Peak E' Jered: 12.2 cm/sec     ______________________________________________________________________________  Report approved by: Alexander Huston 09/10/2022 04:57 PM               Discharge Medications   Discharge Medication List as of 2022  4:26 PM      START taking these medications    Details   acetaminophen (TYLENOL) 325 MG tablet Take 2 tablets (650 mg) by mouth every 4 hours as needed for other, mild pain, fever or headaches (For optimal non-opioid multimodal pain management to improve pain control.), Disp-30 tablet, R-1, E-Prescribe      bumetanide (BUMEX) 0.5 MG tablet Take 1 tablet (0.5 mg) by mouth daily for 30 days, Disp-30 tablet, R-0, E-Prescribe      cephALEXin (KEFLEX) 500 MG capsule Take 1 capsule (500 mg) by mouth every 6 hours for 12 days, Disp-48 capsule, R-0, E-Prescribe      diphenhydrAMINE (BENADRYL) 25 MG capsule Take 1 capsule (25 mg) by mouth every 6 hours as needed for itching, Disp-20 capsule, R-0, E-Prescribe      folic acid (FOLVITE) 1 MG tablet Take 1 tablet (1 mg) by mouth daily for 30 days, Disp-30 tablet, R-0, E-Prescribe      nystatin (MYCOSTATIN) 376882 UNIT/GM external ointment Apply topically 2 times daily for 7 daysDisp-15 g, [id number]      oxyCODONE (ROXICODONE) 5 MG tablet Take 1 tablet (5 mg) by mouth every 4 hours as needed for moderate to severe pain, Disp-20 tablet, R-0, Local Print      polyethylene glycol (MIRALAX) 17 GM/Dose powder Take 17 g by mouth daily, Disp-510 g, R-1, E-Prescribe      senna-docusate (SENOKOT-S/PERICOLACE) 8.6-50 MG tablet Take 1 tablet by mouth 2 times daily for 30 days, Disp-60 tablet, R-0,  E-Prescribe      thiamine (B-1) 100 MG tablet Take 1 tablet (100 mg) by mouth daily for 30 days, Disp-30 tablet, R-0, E-Prescribe      vitamin C (ASCORBIC ACID) 500 MG tablet Take 1 tablet (500 mg) by mouth daily for 30 days, Disp-30 tablet, R-0, E-Prescribe         CONTINUE these medications which have CHANGED    Details   enoxaparin ANTICOAGULANT (LOVENOX) 60 MG/0.6ML syringe Inject 0.6 mLs (60 mg) Subcutaneous every 12 hours for 30 days, Disp-36 mL, R-0, E-Prescribe      potassium chloride ER (KLOR-CON M) 20 MEQ CR tablet Take 1 tablet (20 mEq) by mouth daily, Disp-30 tablet, R-0, E-Prescribe      zolpidem (AMBIEN) 5 MG tablet Take 0.5 tablets (2.5 mg) by mouth nightly as needed for sleep, Disp-10 tablet, R-0, Local Print         CONTINUE these medications which have NOT CHANGED    Details   hypromellose-dextran (NATURAL BALANCE TEARS) 0.1-0.3 % ophthalmic solution Place 1 drop into both eyes every hour as needed for dry eyes, Disp-30 mL, R-0, Historical      ketorolac (TORADOL) 30 MG/ML injection Inject 30 mg into the muscle once as needed (migraine), Disp-15 mL, R-0, Historical      levETIRAcetam (KEPPRA) 750 MG tablet TAKE 2 TABLETS (1,500 MG) BY MOUTH 2 TIMES DAILY, Disp-120 tablet, R-1, E-Prescribe      Lidocaine (LIDOCARE) 4 % Patch Place 1 patch onto the skin every 24 hours To prevent lidocaine toxicity, patient should be patch free for 12 hrs daily.Historical      multivitamin (CENTRUM SILVER) tablet Take 1 tablet by mouth daily, Disp-100 tablet, R-1, OTC      ondansetron (ZOFRAN) 4 MG tablet Take 1 tablet (4 mg) by mouth every 8 hours as needed for nausea, Disp-30 tablet, R-0, E-Prescribe      pantoprazole (PROTONIX) 40 MG EC tablet TAKE 1 TABLET (40 MG) BY MOUTH EVERY MORNING (BEFORE BREAKFAST), Disp-90 tablet, R-0, E-Prescribe      rizatriptan (MAXALT) 10 MG tablet TAKE 1 TAB BY MOUTH ONCE FOR MIGRAINE. MAY REPEAT IN 2 HOURS. MAX OF 3 TABS ONCE DAILY, Disp-15 tablet, R-0, E-Prescribe      traZODone  "(DESYREL) 50 MG tablet Take 2 tablets (100mg) by mouth at bedtime, Disp-180 tablet, R-0, E-Prescribe         STOP taking these medications       cefTRIAXone (ROCEPHIN) 2 GM vial Comments:   Reason for Stopping:         furosemide (LASIX) 20 MG tablet Comments:   Reason for Stopping:         HYDROcodone-acetaminophen (NORCO) 5-325 MG tablet Comments:   Reason for Stopping:         hydrOXYzine (ATARAX) 10 MG tablet Comments:   Reason for Stopping:         mirtazapine (REMERON) 7.5 MG tablet Comments:   Reason for Stopping:         oxybutynin (DITROPAN) 5 MG tablet Comments:   Reason for Stopping:         propranolol (INDERAL) 40 MG tablet Comments:   Reason for Stopping:         topiramate (TOPAMAX) 25 MG tablet Comments:   Reason for Stopping:         traMADol (ULTRAM) 50 MG tablet Comments:   Reason for Stopping:         zinc oxide (DESITIN) 40 % external ointment Comments:   Reason for Stopping:             Allergies   Allergies   Allergen Reactions     Penicillins Anaphylaxis     Patient states it makes her \"climb the walls and hyperactive.\"     Acetaminophen Nausea and Vomiting     Levaquin Rash     Rash only with po Levaquin...able to take IV Levaquin per pt     "

## 2022-09-28 NOTE — PLAN OF CARE
Patient is A&Ox4. She is pleasant and cooperative with cares. Wound vac dressing is intact. Suprapubic catheters draining yellow urine with white mucus. Patient c/o pain in low back and hips at a level of 9/10. Pain is relieved with prn oral pain meds. Patient repositioned several times during the shift. Ate 100% of dinner.  Problem: Risk for Delirium  Goal: Improved Behavioral Control  Outcome: Ongoing, Progressing  Goal: Improved Attention and Thought Clarity  Outcome: Ongoing, Progressing     Problem: UTI (Urinary Tract Infection)  Goal: Improved Infection Symptoms  Outcome: Ongoing, Progressing     Problem: Impaired Wound Healing  Goal: Optimal Wound Healing  Outcome: Ongoing, Progressing  Intervention: Promote Wound Healing  Recent Flowsheet Documentation  Taken 9/27/2022 1900 by Tricia Krishnan RN  Activity Management:   bedrest   activity adjusted per tolerance     Problem: Plan of Care - These are the overarching goals to be used throughout the patient stay.    Goal: Absence of Hospital-Acquired Illness or Injury  Intervention: Identify and Manage Fall Risk  Recent Flowsheet Documentation  Taken 9/27/2022 1900 by Tricia Krishnan RN  Safety Promotion/Fall Prevention: activity supervised  Intervention: Prevent Skin Injury  Recent Flowsheet Documentation  Taken 9/27/2022 1900 by Tricia Krishnan RN  Body Position:   turned   left  Intervention: Prevent and Manage VTE (Venous Thromboembolism) Risk  Recent Flowsheet Documentation  Taken 9/27/2022 1900 by Tricia Krishnan RN  VTE Prevention/Management: compression stockings on  Activity Management:   bedrest   activity adjusted per tolerance  Intervention: Prevent Infection  Recent Flowsheet Documentation  Taken 9/27/2022 1900 by Tricia Krishnan RN  Infection Prevention: single patient room provided  Goal: Optimal Comfort and Wellbeing  Intervention: Provide Person-Centered Care  Recent Flowsheet Documentation  Taken 9/27/2022 1900 by Tricia Krishnan RN  Trust  Relationship/Rapport: choices provided     Problem: VTE (Venous Thromboembolism)  Goal: VTE (Venous Thromboembolism) Symptom Resolution  Intervention: Prevent or Manage VTE (Venous Thromboembolism)  Recent Flowsheet Documentation  Taken 9/27/2022 1900 by Tricia Krishnan, RN  VTE Prevention/Management: compression stockings on     Problem: Anemia  Goal: Anemia Symptom Improvement  Intervention: Monitor and Manage Anemia  Recent Flowsheet Documentation  Taken 9/27/2022 1900 by Tricia Krishnan, RN  Safety Promotion/Fall Prevention: activity supervised     Problem: Pain Acute  Goal: Acceptable Pain Control and Functional Ability  Intervention: Prevent or Manage Pain  Recent Flowsheet Documentation  Taken 9/27/2022 1900 by Tricia Krishnan, RN  Medication Review/Management: medications reviewed   Goal Outcome Evaluation:

## 2022-09-30 ENCOUNTER — PATIENT OUTREACH (OUTPATIENT)
Dept: CARE COORDINATION | Facility: CLINIC | Age: 57
End: 2022-09-30

## 2022-09-30 ENCOUNTER — TELEPHONE (OUTPATIENT)
Dept: FAMILY MEDICINE | Facility: CLINIC | Age: 57
End: 2022-09-30

## 2022-09-30 DIAGNOSIS — L02.415 ABSCESS OF RIGHT HIP: ICD-10-CM

## 2022-09-30 LAB — BACTERIA TISS BX CULT: NORMAL

## 2022-09-30 RX ORDER — OXYCODONE HYDROCHLORIDE 5 MG/1
5 TABLET ORAL EVERY 4 HOURS PRN
Qty: 4 TABLET | Refills: 0 | Status: SHIPPED | OUTPATIENT
Start: 2022-09-30 | End: 2022-10-03

## 2022-09-30 NOTE — TELEPHONE ENCOUNTER
Please advise in the absence of her provider. Celina Guy LPN    FYI - Status Update:   REQUEST PARTIAL REFILL OF THE OXYCODONE    Who is Calling: nurse, Loy Landis formerly [facility]    Update: Nurse stating patient was discharged on 9/28/22. Patients prescription on her oxycodone received was a card of 20 tablets. Patient was given 1 tablet and now the nurse states the remainder of the prescription of 19 tablets is missing at their facility. They are presently investigating. She is asking if the provider can send a prescription for 4 tablets of oxycodone to the [facility] for the patient while they are completing their investigation.    Does caller want a call/response back: Yes, Saibha can be reached at [phone]

## 2022-09-30 NOTE — PROGRESS NOTES
Clinic Care Coordination Contact  [facility]/Voicemail       Clinical Data: Care Coordinator Outreach  Outreach attempted x 2.  Left message on patient's voicemail with call back information and requested return call.  Plan: Care Coordinator will do no further outreaches at this time.    JUANITA Uriarte  [phone]  [facility]

## 2022-10-01 ENCOUNTER — HOSPITAL ENCOUNTER (EMERGENCY)
Facility: CLINIC | Age: 57
Discharge: HOME | End: 2022-10-01
Attending: EMERGENCY MEDICINE | Admitting: EMERGENCY MEDICINE
Payer: MEDICARE

## 2022-10-01 ENCOUNTER — APPOINTMENT (OUTPATIENT)
Dept: CT IMAGING | Facility: CLINIC | Age: 57
End: 2022-10-01
Attending: EMERGENCY MEDICINE
Payer: MEDICARE

## 2022-10-01 VITALS
TEMPERATURE: 98.3 F | HEART RATE: 72 BPM | RESPIRATION RATE: 16 BRPM | OXYGEN SATURATION: 98 % | SYSTOLIC BLOOD PRESSURE: 113 MMHG | DIASTOLIC BLOOD PRESSURE: 60 MMHG

## 2022-10-01 DIAGNOSIS — T83.010A SUPRAPUBIC CATHETER DYSFUNCTION, INITIAL ENCOUNTER (H): ICD-10-CM

## 2022-10-01 LAB
ALBUMIN UR-MCNC: 30 MG/DL
ANION GAP SERPL CALCULATED.3IONS-SCNC: 5 MMOL/L (ref 3–14)
APPEARANCE UR: ABNORMAL
BACTERIA #/AREA URNS HPF: ABNORMAL /HPF
BASOPHILS # BLD AUTO: 0 10E3/UL (ref 0–0.2)
BASOPHILS NFR BLD AUTO: 1 %
BILIRUB UR QL STRIP: NEGATIVE
BUN SERPL-MCNC: 7 MG/DL (ref 7–30)
CALCIUM SERPL-MCNC: 8.1 MG/DL (ref 8.5–10.1)
CHLORIDE BLD-SCNC: 114 MMOL/L (ref 94–109)
CO2 SERPL-SCNC: 26 MMOL/L (ref 20–32)
COLOR UR AUTO: YELLOW
CREAT SERPL-MCNC: 0.35 MG/DL (ref 0.52–1.04)
EOSINOPHIL # BLD AUTO: 0.4 10E3/UL (ref 0–0.7)
EOSINOPHIL NFR BLD AUTO: 7 %
ERYTHROCYTE [DISTWIDTH] IN BLOOD BY AUTOMATED COUNT: 16.8 % (ref 10–15)
GFR SERPL CREATININE-BSD FRML MDRD: >90 ML/MIN/1.73M2
GLUCOSE BLD-MCNC: 91 MG/DL (ref 70–99)
GLUCOSE UR STRIP-MCNC: NEGATIVE MG/DL
HCT VFR BLD AUTO: 36 % (ref 35–47)
HGB BLD-MCNC: 11.5 G/DL (ref 11.7–15.7)
HGB UR QL STRIP: NEGATIVE
IMM GRANULOCYTES # BLD: 0 10E3/UL
IMM GRANULOCYTES NFR BLD: 1 %
KETONES UR STRIP-MCNC: NEGATIVE MG/DL
LEUKOCYTE ESTERASE UR QL STRIP: ABNORMAL
LYMPHOCYTES # BLD AUTO: 1.8 10E3/UL (ref 0.8–5.3)
LYMPHOCYTES NFR BLD AUTO: 34 %
MCH RBC QN AUTO: 28.1 PG (ref 26.5–33)
MCHC RBC AUTO-ENTMCNC: 31.9 G/DL (ref 31.5–36.5)
MCV RBC AUTO: 88 FL (ref 78–100)
MONOCYTES # BLD AUTO: 0.4 10E3/UL (ref 0–1.3)
MONOCYTES NFR BLD AUTO: 6 %
MUCOUS THREADS #/AREA URNS LPF: PRESENT /LPF
NEUTROPHILS # BLD AUTO: 2.8 10E3/UL (ref 1.6–8.3)
NEUTROPHILS NFR BLD AUTO: 51 %
NITRATE UR QL: POSITIVE
NRBC # BLD AUTO: 0 10E3/UL
NRBC BLD AUTO-RTO: 0 /100
PH UR STRIP: 7 [PH] (ref 5–7)
PLATELET # BLD AUTO: 323 10E3/UL (ref 150–450)
POTASSIUM BLD-SCNC: 3.3 MMOL/L (ref 3.4–5.3)
RBC # BLD AUTO: 4.09 10E6/UL (ref 3.8–5.2)
RBC URINE: 25 /HPF
SODIUM SERPL-SCNC: 145 MMOL/L (ref 133–144)
SP GR UR STRIP: 1.01 (ref 1–1.03)
TRANSITIONAL EPI: 2 /HPF
UROBILINOGEN UR STRIP-MCNC: NORMAL MG/DL
WBC # BLD AUTO: 5.4 10E3/UL (ref 4–11)
WBC CLUMPS #/AREA URNS HPF: PRESENT /HPF
WBC URINE: >182 /HPF
YEAST #/AREA URNS HPF: ABNORMAL /HPF

## 2022-10-01 PROCEDURE — G1010 CDSM STANSON: HCPCS

## 2022-10-01 PROCEDURE — 80048 BASIC METABOLIC PNL TOTAL CA: CPT | Performed by: EMERGENCY MEDICINE

## 2022-10-01 PROCEDURE — 81001 URINALYSIS AUTO W/SCOPE: CPT | Performed by: EMERGENCY MEDICINE

## 2022-10-01 PROCEDURE — 51702 INSERT TEMP BLADDER CATH: CPT | Performed by: EMERGENCY MEDICINE

## 2022-10-01 PROCEDURE — 85025 COMPLETE CBC W/AUTO DIFF WBC: CPT | Performed by: EMERGENCY MEDICINE

## 2022-10-01 PROCEDURE — 99284 EMERGENCY DEPT VISIT MOD MDM: CPT | Mod: 25 | Performed by: EMERGENCY MEDICINE

## 2022-10-01 PROCEDURE — 87086 URINE CULTURE/COLONY COUNT: CPT | Performed by: EMERGENCY MEDICINE

## 2022-10-01 PROCEDURE — 99282 EMERGENCY DEPT VISIT SF MDM: CPT | Performed by: EMERGENCY MEDICINE

## 2022-10-01 PROCEDURE — 36415 COLL VENOUS BLD VENIPUNCTURE: CPT | Performed by: EMERGENCY MEDICINE

## 2022-10-01 RX ORDER — LIDOCAINE HYDROCHLORIDE 20 MG/ML
JELLY TOPICAL ONCE
Status: DISCONTINUED | OUTPATIENT
Start: 2022-10-01 | End: 2022-10-01 | Stop reason: HOSPADM

## 2022-10-01 ASSESSMENT — ACTIVITIES OF DAILY LIVING (ADL)
ADLS_ACUITY_SCORE: 35

## 2022-10-01 NOTE — ED NOTES
Bed: ED07  Expected date: 10/1/22  Expected time: 11:40 AM  Means of arrival: Ambulance  Comments:
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
## 2022-10-01 NOTE — ED NOTES
Patient's suprapubic catheter that was replaced not initially draining, irrigated at this time and is now starting to drain.

## 2022-10-01 NOTE — DISCHARGE INSTRUCTIONS
-The suprapubic catheters were not functional.  This was causing excessive distention to the neobladder resulting in both abdominal pain and excessive leakage around the stomal opening.  Successfully placed a 16 Maltese Ambrose catheter resulted in immediate decompression of the bladder.  The 10 cc balloon was filled with 20 cc of saline for retention.    -Please contact urology next week to see if the second suprapubic catheter can be removed.    -The catheter placed today is in proper position and functional.  We did note in the emergency room that she has mucus in her urine that then will plug up the catheter and requires occasional irrigation to get the flow.  Medically advised to monitor for any signs for decreased flow or no flow.  This can be a sign for recurrent mucous obstructing the tip of the catheter and would recommend irrigation over exchange of the catheter.

## 2022-10-01 NOTE — ED TRIAGE NOTES
Pt presents by EMS with concerns of surgical site pain. Pt comes from [facility].  Nurse from [facility] sent pt in due to increased pain and drainage.      Triage Assessment     Row Name 10/01/22 1148       Triage Assessment (Adult)    Airway WDL WDL       Respiratory WDL    Respiratory WDL WDL       Skin Circulation/Temperature WDL    Skin Circulation/Temperature WDL WDL       Cardiac WDL    Cardiac WDL WDL       Peripheral/Neurovascular WDL    Peripheral Neurovascular WDL WDL       Cognitive/Neuro/Behavioral WDL    Cognitive/Neuro/Behavioral WDL WDL

## 2022-10-03 DIAGNOSIS — L02.415 ABSCESS OF RIGHT HIP: ICD-10-CM

## 2022-10-03 NOTE — TELEPHONE ENCOUNTER
Routing refill request to provider for review/approval because:  Drug not on the FM, P or [facility] refill protocol or controlled substance    CHRISTINA CasasN, RN

## 2022-10-04 ENCOUNTER — TELEPHONE (OUTPATIENT)
Dept: EMERGENCY MEDICINE | Facility: CLINIC | Age: 57
End: 2022-10-04

## 2022-10-04 LAB — BACTERIA UR CULT: ABNORMAL

## 2022-10-04 RX ORDER — OXYCODONE HYDROCHLORIDE 5 MG/1
5 TABLET ORAL EVERY 4 HOURS PRN
Qty: 20 TABLET | Refills: 0 | Status: SHIPPED | OUTPATIENT
Start: 2022-10-04 | End: 2022-10-12

## 2022-10-04 RX ORDER — CIPROFLOXACIN 500 MG/1
500 TABLET, FILM COATED ORAL 2 TIMES DAILY
Qty: 14 TABLET | Refills: 0 | Status: SHIPPED | OUTPATIENT
Start: 2022-10-04 | End: 2022-10-04

## 2022-10-04 RX ORDER — CIPROFLOXACIN 500 MG/1
500 TABLET, FILM COATED ORAL 2 TIMES DAILY
Qty: 12 TABLET | Refills: 0 | Status: SHIPPED | OUTPATIENT
Start: 2022-10-04 | End: 2022-10-10

## 2022-10-04 NOTE — TELEPHONE ENCOUNTER
"[facility] () Emergency Department Lab result notification    [place] ED lab result protocol used  Urine culture    Reason for call  Notify of lab results, assess symptoms,  review ED providers recommendations/discharge instructions (if necessary) and advise per ED lab result f/u protocol    Lab Result (including Rx patient on, if applicable)  Final Urine Culture Report on 10/04/22  [facility] Emergency Dept discharge antibiotic prescribed: None; Per [place]'s discharge summary dated 9/28/22: \"ID following and recommend stopping ceftriaxione due to neutropenia and leukopenia and starting IV vanco for 4-6 week course. But ID seen again and sent out on Oal cephalexin for 2weeks\"  #1. Bacteria, >100,000 CFU/ML Pseudomonas aeruginosa,  is [NOT TESTED] to antibiotic.   Recommendations in treatment per [facility] ED lab result Urine Culture protocol.    Information table from Emergency Dept Provider visit on 10/01/22  Symptoms reported at ED visit (Chief complaint, HPI) Chief Complaint   Patient presents with     Post-op Problem      HPI  Felicia Ellison is a 57 year old female who presents with abdominal pain, leaking around the fistula opening for the suprapubic catheter is placed in a neobladder.  She has 2 catheters neither of which are draining effectively.  She was recently hospitalized at [facility] for septic shock and pressure injury stage III of the right hip required placement of a permanent vacuum drain.  No fever reported.     Significant Medical hx, if applicable (i.e. CKD, diabetes) Neurogenic bladder, urinary retention, chronic UTI   Allergies Allergies   Allergen Reactions     Penicillins Anaphylaxis     Patient states it makes her \"climb the walls and hyperactive.\"     Acetaminophen Nausea and Vomiting     Levaquin Rash     Rash only with po Levaquin...able to take IV Levaquin per pt      Weight, if applicable Wt Readings from Last 2 Encounters:   09/22/22 " 57.6 kg (126 lb 14.4 oz)   07/30/22 46.6 kg (102 lb 12.8 oz)      Coumadin/Warfarin [Yes /No] NO   Creatinine Level (mg/dl) Creatinine   Date Value Ref Range Status   10/01/2022 0.35 (L) 0.52 - 1.04 mg/dL Final   05/26/2021 0.47 (L) 0.52 - 1.04 mg/dL Final      Creatinine clearance (ml/min), if applicable Serum creatinine: 0.35 mg/dL (L) 10/01/22 1258  Estimated creatinine clearance: 159.3 mL/min (A)   ED providers Impression and Plan (applicable information) Andrés is 49 years of age.  History for flaccid paraplegia.  Presents with nonfunctioning suprapubic catheters.  2 catheters traverse the suprapubic opening.  One is more clear and rigid the other is a red Ambrose 16 [place].  CT confirmed a distended neobladder.  Observation noted no drainage through the catheters.    Under sterile technique we exchanged the 16 [place]an Ambrose catheter with a similar catheter that was 16 [place]an with a 10 cc balloon.  We inflated the balloon with 20 cc.  Noted immediate decompression of the distended abdomen due to the distended neobladder.  Catheter is draining urine.  There was some mucus and debris in the urine-UC sent.      I have reviewed the nursing notes.     I have reviewed the findings, diagnosis, plan and need for follow up with the patient.            New Prescriptions     No medications on file         Final diagnoses:   Suprapubic catheter dysfunction, initial encounter (H)         10/1/2022   [facility] EMERGENCY DEPT     Mello Ruelas DO  10/01/22 1700     ED diagnosis  Suprapubic catheter dysfunction, initial encounter (H)   ED provider  Mello Ruelas DO       RN Assessment (Patient s current Symptoms), include time called.   11:20 AM - call to [facility] to speak with nursing regarding final urine culture showing bacterial growth - patient on Keflex x 12 days started on 9/28 from ED hospital discharge for sepsis   Patient reside at [facility]  Phone #:  [phone]  [phone]  Left voicemail message requesting a call back to [facility] ED Lab Result RN at [phone].  RN is available every day between 9 a.m. and 5:30 p.m.        Padmini So RN  [facility]  Emergency Dept Lab Result RN  Ph# [phone]     Copy of Lab result   Urine Culture  Order: [id number] - Reflex for Order [id number]   Status: Final result     Visible to patient: Yes (not seen)    Specimen Information: Urine, Catheter         3 Result Notes    Culture >100,000 CFU/mL Pseudomonas aeruginosa Abnormal             Resulting Agency: [facility]       Susceptibility      Pseudomonas aeruginosa (1)    Antibiotic Interpretation Sensitivity  Method Status    Piperacillin/Tazobactam Resistant >256 ug/mL KATHLEEN Final    Ceftazidime Resistant >=64 ug/mL KATHLEEN Final    Cefepime Susceptible 8 ug/mL KATHLEEN Final    Meropenem Susceptible 1 ug/mL KATHLEEN Final    Amikacin Susceptible <=2 ug/mL KATHLEEN Final    Gentamicin Susceptible <=1 ug/mL KATHLEEN Final    Tobramycin Susceptible <=1 ug/mL KATHLEEN Final    Ciprofloxacin Susceptible <=0.25 ug/mL KATHLEEN Final    Levofloxacin Susceptible 0.5 ug/mL KATHLEEN Final      Susceptibility Comments    =         Specimen Collected: 10/01/22  3:02 PM Last Resulted: 10/04/22  9:39 AM

## 2022-10-04 NOTE — TELEPHONE ENCOUNTER
"[facility] Emergency Department/Urgent Care Lab result notification     Patient/parent Name  Nurse    RN Assessment (Patient s current Symptoms), include time called.   12:08 PM return call from [place] Nurse - patient uses [facility] - they are requesting treatment for UTI  Fax #:  [phone]  Genitourinary symptoms:  Doing fine no complaints vital signs good  - pain at suprapubic site but improved  Fever:  No  Antibiotics:  Attempt to confirm patient antibiotic treatment -nursing also was unclear and said 'if its in the discharge work then yes she is\"      Lab result (if applicable):  Final Urine Culture Report on 10/04/22  [facility] Emergency Dept discharge antibiotic prescribed: None; Per Judie's discharge summary dated 9/28/22: \"ID following and recommend stopping ceftriaxione due to neutropenia and leukopenia and starting IV vanco for 4-6 week course. But ID seen again and sent out on Oal cephalexin for 2weeks\"  #1. Bacteria, >100,000 CFU/ML Pseudomonas aeruginosa,  is [NOT TESTED] to antibiotic.   Recommendations in treatment per [facility] ED lab result Urine Culture protocol.    [place] Emergency Department Provider Name & Recommendations  S-(situation): 57 ur F paraplegia - final urine culture 100K CFU Pseudomonas with hx of neurogenic bladder & recent neobladder x 2 suprapubic catheters - Keflex x 2 weeks started 9/28 and possible IV Vanco 4-6 weeks (stopped?) - after hospital discharge for sepis r/t to hip ulcer/abscess - seeking treatment recommendations or add on for susceptible fluoroquinolone options with consideration to Levaquin allergy (rash) and need for extended treatment due to structurally abnormal urinary tract  B-(background): see above - allergy Levaquin - Rash - states \"able to take IV levaquin\" - may have tolerated cipro in the past see record stopped prior to admission  A-(assessment): see above - afebrile - doing well per " nursing - no complaints regarding genitourinary symptoms - mild suprapubic discomfort  R-(recommendations): ED consult for antibiotic add on of antibiotic    Consultation Time:  10/4/2022  12:25 PM   Consulted with [facility] () Emergency Department Provider, Omar Mtz MD.   Emergency Dept Provider Recommendations:  Watch for allergic reaction or rash - Cipro 500 twice daily x 7     RN Recommendations/Instructions per [place] ED lab result protocol  Patient nurse notified of lab result and treatment recommendations. Add on Rx for Ciprofloxacin (Cipro) 500 mg tablet, 1 tablet (500 mg) by mouth 2 times daily for 7 days. sent to [Pharmacy - [facility], [place], [place] - requesting order fax to [facility]].  Lab results faxed to facility 10/4/2022 12:49 PM    Writer will note rx sent in error to [place] pharmacy - called and cancelled RX      Padmini So RN  [facility] [employer]  Emergency Dept Lab Result [facility]# [phone]     Copy of Lab result   Urine Culture  Order: [id number]   Collected 10/1/2022  3:02 PM     Status: Final result     Visible to patient: Yes (not seen)    Specimen Information: Urine, Catheter         4 Result Notes    Culture >100,000 CFU/mL Pseudomonas aeruginosa Abnormal             Resulting Agency: [facility]       Susceptibility     Pseudomonas aeruginosa     KATHLEEN     Amikacin <=2 ug/mL Susceptible     Cefepime 8 ug/mL Susceptible     Ceftazidime >=64 ug/mL Resistant     Ciprofloxacin <=0.25 ug/mL Susceptible     Gentamicin <=1 ug/mL Susceptible     Levofloxacin 0.5 ug/mL Susceptible     Meropenem 1 ug/mL Susceptible     Piperacillin/Tazobactam >256 ug/mL Resistant     Tobramycin <=1 ug/mL Susceptible                Susceptibility Comments    Pseudomonas aeruginosa   =         Specimen Collected: 10/01/22  3:02 PM Last Resulted: 10/04/22  9:39 AM

## 2022-10-04 NOTE — TELEPHONE ENCOUNTER
Pending Prescriptions:                       Disp   Refills    oxyCODONE (ROXICODONE) 5 MG tablet [Pharma*4 tabl*0        Sig: Take 1 tablet (5 mg) by mouth every 4 hours as needed           for moderate to severe pain      Routing refill request to provider for review/approval because:  Drug not on the G refill protocol     Tracee Steel RN

## 2022-10-05 RX ORDER — OXYCODONE HYDROCHLORIDE 5 MG/1
5 TABLET ORAL EVERY 4 HOURS PRN
Qty: 4 TABLET | Refills: 0 | OUTPATIENT
Start: 2022-10-05

## 2022-10-06 NOTE — PROGRESS NOTES
Received a call from [facility]/ stating that they need update for the authorization form. Note patient discharged to home on 9/28/2022 with wound V.A.C (RADHA [id number]).  Spoke with intake at [facility] ([phone]) who noted that they have everything they need for this patient and the case has been closed.     Mary Huerta RN

## 2022-10-11 DIAGNOSIS — L02.415 ABSCESS OF RIGHT HIP: ICD-10-CM

## 2022-10-11 NOTE — TELEPHONE ENCOUNTER
Requested Prescriptions   Pending Prescriptions Disp Refills    oxyCODONE (ROXICODONE) 5 MG tablet [Pharmacy Med Name: OXYCODONE 5MG TABLET] 20 tablet 0     Sig: Take 1 tablet (5 mg) by mouth every 4 hours as needed for moderate to severe pain       There is no refill protocol information for this order            CHRISTINA GregoryN, RN

## 2022-10-12 ENCOUNTER — TELEPHONE (OUTPATIENT)
Dept: UROLOGY | Facility: CLINIC | Age: 57
End: 2022-10-12

## 2022-10-12 DIAGNOSIS — R56.9 SEIZURES (H): ICD-10-CM

## 2022-10-12 DIAGNOSIS — F51.01 PRIMARY INSOMNIA: ICD-10-CM

## 2022-10-12 DIAGNOSIS — G43.709 CHRONIC MIGRAINE WITHOUT AURA WITHOUT STATUS MIGRAINOSUS, NOT INTRACTABLE: ICD-10-CM

## 2022-10-12 RX ORDER — LEVETIRACETAM 750 MG/1
1500 TABLET ORAL 2 TIMES DAILY
Qty: 120 TABLET | Refills: 1 | Status: SHIPPED | OUTPATIENT
Start: 2022-10-12 | End: 2023-01-17

## 2022-10-12 RX ORDER — OXYCODONE HYDROCHLORIDE 5 MG/1
5 TABLET ORAL EVERY 4 HOURS PRN
Qty: 10 TABLET | Refills: 0 | Status: SHIPPED | OUTPATIENT
Start: 2022-10-12 | End: 2022-11-07

## 2022-10-12 NOTE — TELEPHONE ENCOUNTER
Requested Prescriptions   Pending Prescriptions Disp Refills    levETIRAcetam (KEPPRA) 750 MG tablet [Pharmacy Med Name: LEVETIRACETAM 750MG TABLET] 120 tablet 1     Sig: TAKE 2 TABLETS (1,500 MG) BY MOUTH 2 TIMES DAILY       There is no refill protocol information for this order          CHRISTINA CasasN, RN

## 2022-10-12 NOTE — TELEPHONE ENCOUNTER
[facility] Call Center    Phone Message    May a detailed message be left on voicemail: yes     Reason for Call: Other: . Penny- nurse at [facility] is calling, pt was seen in the ED for cath issues, pt is needing a catheter change, she had bladder reconstruction surgery, which is now failing, writer unsure what type of an appt to schedule, please call Penny, thank you     Action Taken: Message routed to:  Other: uro    Travel Screening: Not Applicable

## 2022-10-12 NOTE — TELEPHONE ENCOUNTER
Writer received a message from Jaycee Ash and was advised [facility] cannot accommodate the pts needs and to route the message to the U, please call Penny thank you

## 2022-10-13 DIAGNOSIS — L89.319 PRESSURE INJURY OF SKIN OF RIGHT BUTTOCK, UNSPECIFIED INJURY STAGE: Primary | ICD-10-CM

## 2022-10-14 RX ORDER — TOPIRAMATE 25 MG/1
TABLET, FILM COATED ORAL
Qty: 30 TABLET | Refills: 1 | Status: SHIPPED | OUTPATIENT
Start: 2022-10-14 | End: 2022-11-07

## 2022-10-14 RX ORDER — TRAZODONE HYDROCHLORIDE 50 MG/1
TABLET, FILM COATED ORAL
Qty: 180 TABLET | Refills: 0 | Status: SHIPPED | OUTPATIENT
Start: 2022-10-14 | End: 2022-11-07

## 2022-10-14 NOTE — TELEPHONE ENCOUNTER
"Requested Prescriptions   Pending Prescriptions Disp Refills    topiramate (TOPAMAX) 25 MG tablet [Pharmacy Med Name: TOPIRAMATE 25MG TABLET] 30 tablet 1     Sig: TAKE 1 TABLET BY MOUTH EVERY DAY       Anti-Seizure Meds Protocol  Failed - 10/12/2022  9:18 AM        Failed - Review Authorizing provider's last note.      Refer to last progress notes: confirm request is for original authorizing provider (cannot be through other providers).          Failed - Normal CBC on file in past 26 months     Recent Labs   Lab Test 10/01/22  1258   WBC 5.4   RBC 4.09   HGB 11.5*   HCT 36.0                    Failed - Medication is active on med list        Passed - Recent (12 mo) or future (30 days) visit within the authorizing provider's specialty     Patient has had an office visit with the authorizing provider or a provider within the authorizing providers department within the previous 12 mos or has a future within next 30 days. See \"Patient Info\" tab in inbasket, or \"Choose Columns\" in Meds & Orders section of the refill encounter.              Passed - Normal ALT or AST on file in past 26 months     Recent Labs   Lab Test 09/27/22  0342   ALT 7*     Recent Labs   Lab Test 09/27/22  0342   AST 16             Passed - Normal platelet count on file in past 26 months     Recent Labs   Lab Test 10/01/22  1258                  Passed - No active pregnancy on record        Passed - No positive pregnancy test in last 12 months          traZODone (DESYREL) 50 MG tablet [Pharmacy Med Name: TRAZODONE 50MG TABLET] 180 tablet 0     Sig: Take 2 tablets (100mg) by mouth at bedtime       Serotonin Modulators Passed - 10/12/2022  9:18 AM        Passed - Recent (12 mo) or future (30 days) visit within the authorizing provider's specialty     Patient has had an office visit with the authorizing provider or a provider within the authorizing providers department within the previous 12 mos or has a future within next 30 days. See " "\"Patient Info\" tab in inbasket, or \"Choose Columns\" in Meds & Orders section of the refill encounter.              Passed - Medication is active on med list        Passed - Patient is age 18 or older        Passed - No active pregnancy on record        Passed - No positive pregnancy test in past 12 months               ALONSO Gregory, RN          "

## 2022-10-15 NOTE — TELEPHONE ENCOUNTER
"Pending Prescriptions:                       Disp   Refills    Gauze Pads & Dressings (DERMACEA NON-WOVEN*200 ea*0        Sig: USE AS DIRECTED    Wound Dressings (MEPILEX BORDER FLEX LITE)*5 each 0        Sig: USE AS DIRECTED Border 4X4 [id number] BX5    Adhesive Tape (PAPER TAPE 1\"X10YD) TAPE [P*10 each0        Sig: USE TO SECURE ABD PAD TO WOUND    Gauze Pads & Dressings (CURITY ABDOMINAL) *18 each0        Sig: COVER WOUND AND SECURE WITHTAPE    Wound Cleansers (VASHE WOUND THERAPY) SOLN*250 mL 0        Sig: MOISTEN 4X4 GAUZE PAD AND LOOSELY PACK INTO WOUND      Routing refill request to provider for review/approval because:  Drug not on the G refill protocol     Requested Prescriptions   Pending Prescriptions Disp Refills    Gauze Pads & Dressings (DERMACEA NON-WOVEN SPONGES) 4\"X4\" PADS [Pharmacy Med Name: 4X4 NON WOVEN SPONGES] 200 each 0     Sig: USE AS DIRECTED       There is no refill protocol information for this order       Wound Dressings (MEPILEX BORDER FLEX LITE) PADS [Pharmacy Med Name: MEPILEX BORDER 4X4 218940 BX5] 5 each 0     Sig: USE AS DIRECTED Border 4X4 [id number] BX5       There is no refill protocol information for this order       Adhesive Tape (PAPER TAPE 1\"X10YD) TAPE [Pharmacy Med Name: PV PAPER TAPE 1\" X 10 YARDS] 10 each 0     Sig: USE TO SECURE ABD PAD TO WOUND       There is no refill protocol information for this order       Gauze Pads & Dressings (CURITY ABDOMINAL) 8\"X10\" PADS [Pharmacy Med Name: CURITY ABD PAD 8X10 18] 18 each 0     Sig: COVER WOUND AND SECURE WITHTAPE       There is no refill protocol information for this order       Wound Cleansers (VASHE WOUND THERAPY) SOLN [Pharmacy Med Name: VASHE WOUND 250ML CS12] 250 mL 0     Sig: MOISTEN 4X4 GAUZE PAD AND LOOSELY PACK INTO WOUND       There is no refill protocol information for this order          Donte Frank RN    "

## 2022-10-17 DIAGNOSIS — G43.709 CHRONIC MIGRAINE WITHOUT AURA WITHOUT STATUS MIGRAINOSUS, NOT INTRACTABLE: ICD-10-CM

## 2022-10-17 RX ORDER — SODIUM CHLOR/HYPOCHLOROUS ACID 0.033 %
SOLUTION, IRRIGATION IRRIGATION
Qty: 250 ML | Refills: 0 | Status: SHIPPED | OUTPATIENT
Start: 2022-10-17

## 2022-10-17 RX ORDER — NON-ADHERENT BANDAGE 8"X10"
BANDAGE TOPICAL
Qty: 18 EACH | Refills: 0 | Status: ON HOLD | OUTPATIENT
Start: 2022-10-17 | End: 2023-02-22

## 2022-10-17 RX ORDER — PROPYLENE GLYCOL/PEG 400 0.3 %-0.4%
DROPS OPHTHALMIC (EYE)
Qty: 10 EACH | Refills: 0 | Status: ON HOLD | OUTPATIENT
Start: 2022-10-17 | End: 2023-02-22

## 2022-10-17 RX ORDER — CATHETER 14 FR
EACH MISCELLANEOUS
Qty: 200 EACH | Refills: 0 | Status: ON HOLD | OUTPATIENT
Start: 2022-10-17 | End: 2023-02-22

## 2022-10-18 ENCOUNTER — VIRTUAL VISIT (OUTPATIENT)
Dept: INTERNAL MEDICINE | Facility: CLINIC | Age: 57
End: 2022-10-18
Payer: COMMERCIAL

## 2022-10-18 DIAGNOSIS — Z91.199 NO-SHOW FOR APPOINTMENT: Primary | ICD-10-CM

## 2022-10-18 DIAGNOSIS — Z12.4 CERVICAL CANCER SCREENING: ICD-10-CM

## 2022-10-18 DIAGNOSIS — Z12.31 VISIT FOR SCREENING MAMMOGRAM: ICD-10-CM

## 2022-10-18 ASSESSMENT — PATIENT HEALTH QUESTIONNAIRE - PHQ9: SUM OF ALL RESPONSES TO PHQ QUESTIONS 1-9: 7

## 2022-10-18 NOTE — PROGRESS NOTES
Multiple attempts at contacting patient has been unsuccessful.  I have tried using both Veracode as well as the Epic platform.    I last saw this patient about a year ago, I would recommend a in person visit for her follow-up needs.    Javi          This patient was a no show for this scheduled appointment.

## 2022-10-19 ENCOUNTER — TELEPHONE (OUTPATIENT)
Dept: FAMILY MEDICINE | Facility: CLINIC | Age: 57
End: 2022-10-19

## 2022-10-19 DIAGNOSIS — T83.010S SUPRAPUBIC CATHETER DYSFUNCTION, SEQUELA: Primary | ICD-10-CM

## 2022-10-19 DIAGNOSIS — N31.9 NEUROGENIC BLADDER: ICD-10-CM

## 2022-10-19 DIAGNOSIS — R33.9 URINARY RETENTION: ICD-10-CM

## 2022-10-19 RX ORDER — RIZATRIPTAN BENZOATE 10 MG/1
TABLET ORAL
Qty: 15 TABLET | Refills: 0 | Status: SHIPPED | OUTPATIENT
Start: 2022-10-19 | End: 2022-11-07

## 2022-10-19 NOTE — TELEPHONE ENCOUNTER
Writer called and spoke with Penny. Penny states she is newer to [facility] where the patient resides and does not know much about pt history.    Upon chart review, pt was hospitalized on 9/3/22 where she had a SP cath placed at the direction of Dr. Rodas. Dr. Russ Cristobal did her original urinary diversion surgery. Writer spoke with Penny and stated she should follow with the original surgeon. Ph. Number to [facility] given per Search for Dr. Cristobal. Pt is very complex urologically and would benefit from keeping care with original provider.    Ines FRANCO RN Urology 10/19/2022 10:20 AM

## 2022-10-19 NOTE — TELEPHONE ENCOUNTER
Order/Referral Request    Who is requesting: patient    Orders being requested: urology referral    Reason service is needed/diagnosis: recent ED VISIT, SP cath was replaced. Due to leaking. Needing to follow up with urology  ( Dr. Cristobal)    When are orders needed by: as soon as possible     Has this been discussed with Provider: No    Does patient have a preference on a Group/Provider/Facility? Yes, [facility], Dr. Cristobal    Does patient have an appointment scheduled?: No    Where to send orders: Fax [phone]    Please return call to nurse at [facility]. When referral has been sent. Phone number [phone], okay to leave detailed message if know answer.

## 2022-10-21 DIAGNOSIS — L02.415 ABSCESS OF RIGHT HIP: ICD-10-CM

## 2022-10-21 NOTE — TELEPHONE ENCOUNTER
Nurse from [facility] calling to see if patient can get med refilled. Patient is requesting a refill on oxycodone, she missed her virtual visit on 10/18 due to having a migraine and we do not have her correct number. Writer encouraged patient to establish care and ensure she makes her appointment in person.  She is requesting it for a few days until she can be seen next week. Please review.    CHRISTINA MooreN, RN

## 2022-10-24 ENCOUNTER — HOSPITAL ENCOUNTER (EMERGENCY)
Facility: CLINIC | Age: 57
Discharge: HOME | End: 2022-10-24
Attending: EMERGENCY MEDICINE | Admitting: EMERGENCY MEDICINE
Payer: MEDICARE

## 2022-10-24 VITALS
BODY MASS INDEX: 18.74 KG/M2 | TEMPERATURE: 97.9 F | RESPIRATION RATE: 20 BRPM | SYSTOLIC BLOOD PRESSURE: 112 MMHG | HEART RATE: 99 BPM | OXYGEN SATURATION: 97 % | HEIGHT: 69 IN | DIASTOLIC BLOOD PRESSURE: 66 MMHG

## 2022-10-24 DIAGNOSIS — K56.0 PARALYTIC ILEUS (H): ICD-10-CM

## 2022-10-24 DIAGNOSIS — Z96.0 URINARY CATHETER IN PLACE: ICD-10-CM

## 2022-10-24 PROCEDURE — 99283 EMERGENCY DEPT VISIT LOW MDM: CPT | Performed by: EMERGENCY MEDICINE

## 2022-10-24 PROCEDURE — 99282 EMERGENCY DEPT VISIT SF MDM: CPT | Performed by: EMERGENCY MEDICINE

## 2022-10-24 RX ORDER — PROPRANOLOL HYDROCHLORIDE 40 MG/1
20 TABLET ORAL 2 TIMES DAILY
COMMUNITY
Start: 2022-10-12 | End: 2022-11-07

## 2022-10-24 RX ORDER — MIRTAZAPINE 7.5 MG/1
7.5 TABLET, FILM COATED ORAL DAILY
COMMUNITY
Start: 2022-10-12 | End: 2022-11-07

## 2022-10-24 RX ORDER — OXYBUTYNIN CHLORIDE 5 MG/1
10 TABLET ORAL DAILY
COMMUNITY
Start: 2022-10-12 | End: 2022-11-07

## 2022-10-24 ASSESSMENT — ACTIVITIES OF DAILY LIVING (ADL)
ADLS_ACUITY_SCORE: 35

## 2022-10-24 NOTE — DISCHARGE INSTRUCTIONS
Continue to irrigate catheter as needed.  If unable to to flush, then need to return for more immediate care through urology.

## 2022-10-24 NOTE — ED NOTES
Bed: ED08  Expected date:   Expected time:   Means of arrival:   Comments:  EMS- Leaking Suprapubic cath

## 2022-10-24 NOTE — ED TRIAGE NOTES
"  Patient brought to ED by EMS from [facility] with concerns for leaking suprapubic catheter. She also has a note from the nursing staff to set up with 'Wound Care\" as she has a wound vac in place. Sanjana Alonzo RN    "

## 2022-10-24 NOTE — ED NOTES
Pt has 2 suprapubic caths, one had 400cc out and the other had 50cc. Pt states staff irrigated them prior to arriving in the ED.
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
## 2022-10-24 NOTE — ED NOTES
Sabiha ROJAS from [facility] called for an update on pt. She reports pt has her urology F/U November 10th. Writer asked pt if she would like to see if we can schedule her an appointment with Dr. Lopez for an earlier date, pt refused.

## 2022-10-25 ENCOUNTER — MYC MEDICAL ADVICE (OUTPATIENT)
Dept: FAMILY MEDICINE | Facility: CLINIC | Age: 57
End: 2022-10-25

## 2022-10-25 DIAGNOSIS — Z51.5 HOSPICE CARE PATIENT: Primary | ICD-10-CM

## 2022-10-25 RX ORDER — OXYCODONE HYDROCHLORIDE 5 MG/1
5 TABLET ORAL EVERY 4 HOURS PRN
Qty: 10 TABLET | Refills: 0 | OUTPATIENT
Start: 2022-10-25

## 2022-10-26 RX ORDER — LIDOCAINE 4 G/G
PATCH TOPICAL
Qty: 5 PATCH | Refills: 0 | Status: SHIPPED | OUTPATIENT
Start: 2022-10-26 | End: 2024-05-30

## 2022-10-26 NOTE — TELEPHONE ENCOUNTER
Pending Prescriptions:                       Disp   Refills    Lidocaine (LIDOCARE) 4 % Patch [Pharmacy M*5 patch0        Sig: APPLY PATCH TO AFFECTED AREA. CUT PATCH TO FIT           SIZEAFFECTED. MAY USE FOR 12 HOURS AND OFF FOR 12           HOURS.    Routing refill request to provider for review/approval because:  Medication is reported/historical

## 2022-10-30 ENCOUNTER — HEALTH MAINTENANCE LETTER (OUTPATIENT)
Age: 57
End: 2022-10-30

## 2022-11-02 ENCOUNTER — TELEPHONE (OUTPATIENT)
Dept: FAMILY MEDICINE | Facility: CLINIC | Age: 57
End: 2022-11-02

## 2022-11-02 NOTE — TELEPHONE ENCOUNTER
OK for workin for virtual appointment next week. Please call patient  to schedule time.  Electronically signed by Ivan Baeza MD

## 2022-11-02 NOTE — TELEPHONE ENCOUNTER
Reason for Call:  Appointment Request    Patient requesting this type of appt: Chronic Diease Management/Medication/Follow-Up    Requested provider: Dr. Baeza    Reason patient unable to be scheduled: Not within requested timeframe    When does patient want to be seen/preferred time: Same day    Comments: homecare requesting a medication appointment as soon as possible. They are ok with virtual or in person    Could we send this information to you in [facility] or would you prefer to receive a phone call?:   Patient would prefer a phone call   Okay to leave a detailed message?: Yes at Other phone number:  [phone] BOB YOO    Call taken on 11/2/2022 at 5:07 PM by Fernanda Saxena

## 2022-11-03 NOTE — TELEPHONE ENCOUNTER
Apppointment notes updated. Closing encounter.    Left message for staff, requested call back to confirm.    Appointment scheduled, need to confirm time and phone number with patient and staff at [facility]    Shonna ECHAVARRIAO/

## 2022-11-07 ENCOUNTER — VIRTUAL VISIT (OUTPATIENT)
Dept: FAMILY MEDICINE | Facility: CLINIC | Age: 57
End: 2022-11-07
Payer: COMMERCIAL

## 2022-11-07 DIAGNOSIS — F51.01 PRIMARY INSOMNIA: ICD-10-CM

## 2022-11-07 DIAGNOSIS — G43.709 CHRONIC MIGRAINE WITHOUT AURA WITHOUT STATUS MIGRAINOSUS, NOT INTRACTABLE: ICD-10-CM

## 2022-11-07 DIAGNOSIS — R33.9 URINARY RETENTION: Primary | ICD-10-CM

## 2022-11-07 PROCEDURE — 99214 OFFICE O/P EST MOD 30 MIN: CPT | Mod: 95 | Performed by: FAMILY MEDICINE

## 2022-11-07 RX ORDER — MIRTAZAPINE 7.5 MG/1
7.5 TABLET, FILM COATED ORAL AT BEDTIME
Qty: 30 TABLET | Refills: 3 | Status: SHIPPED | OUTPATIENT
Start: 2022-11-07 | End: 2023-01-17

## 2022-11-07 RX ORDER — OXYBUTYNIN CHLORIDE 5 MG/1
10 TABLET ORAL DAILY
Qty: 90 TABLET | Refills: 1 | Status: SHIPPED | OUTPATIENT
Start: 2022-11-07 | End: 2023-01-17

## 2022-11-07 RX ORDER — TRAZODONE HYDROCHLORIDE 50 MG/1
100 TABLET, FILM COATED ORAL AT BEDTIME
Qty: 180 TABLET | Refills: 1 | Status: SHIPPED | OUTPATIENT
Start: 2022-11-07 | End: 2023-05-12

## 2022-11-07 RX ORDER — PROPRANOLOL HYDROCHLORIDE 40 MG/1
20 TABLET ORAL 2 TIMES DAILY
Qty: 90 TABLET | Refills: 1 | Status: SHIPPED | OUTPATIENT
Start: 2022-11-07 | End: 2023-05-12

## 2022-11-07 RX ORDER — ZOLPIDEM TARTRATE 5 MG/1
2.5 TABLET ORAL
Qty: 15 TABLET | Refills: 3 | Status: SHIPPED | OUTPATIENT
Start: 2022-11-07 | End: 2023-03-08

## 2022-11-07 RX ORDER — TOPIRAMATE 25 MG/1
25 TABLET, FILM COATED ORAL DAILY
Qty: 90 TABLET | Refills: 1 | Status: SHIPPED | OUTPATIENT
Start: 2022-11-07 | End: 2023-02-27

## 2022-11-07 RX ORDER — RIZATRIPTAN BENZOATE 10 MG/1
TABLET ORAL
Qty: 15 TABLET | Refills: 3 | Status: SHIPPED | OUTPATIENT
Start: 2022-11-07 | End: 2023-01-01
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
## 2022-11-09 ENCOUNTER — TELEPHONE (OUTPATIENT)
Dept: FAMILY MEDICINE | Facility: CLINIC | Age: 57
End: 2022-11-09

## 2022-11-09 DIAGNOSIS — L89.319 PRESSURE INJURY OF SKIN OF RIGHT BUTTOCK, UNSPECIFIED INJURY STAGE: Primary | ICD-10-CM

## 2022-11-09 DIAGNOSIS — L02.415 ABSCESS OF RIGHT HIP: ICD-10-CM

## 2022-11-09 RX ORDER — OXYCODONE HYDROCHLORIDE 5 MG/1
5 TABLET ORAL 2 TIMES DAILY PRN
Qty: 20 TABLET | Refills: 0 | Status: SHIPPED | OUTPATIENT
Start: 2022-11-09 | End: 2022-11-25

## 2022-11-09 NOTE — TELEPHONE ENCOUNTER
Medication Question or Refill  PATIENT REQUESTING oxyCODONE (ROXICODONE) 5 MG tablet REFILL    Contacts       Type Contact Phone/Fax    11/09/2022 10:18 AM CST Phone (Incoming) PENNY FLORES  [facility] [phone]          What medication are you calling about (include dose and sig)?: oxyCODONE (ROXICODONE) 5 MG tablet    Controlled Substance Agreement on file:   CSA -- Patient Level:    CSA: None found at the patient level.       Who prescribed the medication?: Dr. Caldwell    Do you need a refill? Yes:     When did you use the medication last? 11/7/22    Patient offered an appointment? No    Do you have any questions or concerns?  Yes: patient thought at last visit Her oxycodone was going to be filled. The Nurse wanted it to let you know they had the medication to the patient.    Preferred Pharmacy:   [id number] - [facility] [address] [place] [id number]  Phone: [phone] Fax: [phone]      Could we send this information to you in [employer] or would you prefer to receive a phone call?:  Penny mcpherson [facility] would prefer a phone call   Okay to leave a detailed message?: Yes at Other phone number: Penny mcpherson [facility]. [phone]
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
## 2022-11-18 ENCOUNTER — HOSPITAL ENCOUNTER (OUTPATIENT)
Dept: WOUND CARE | Facility: CLINIC | Age: 57
Discharge: HOME | End: 2022-11-18
Attending: EMERGENCY MEDICINE | Admitting: EMERGENCY MEDICINE
Payer: MEDICARE

## 2022-11-18 DIAGNOSIS — R11.2 NON-INTRACTABLE VOMITING WITH NAUSEA: ICD-10-CM

## 2022-11-18 DIAGNOSIS — G43.709 CHRONIC MIGRAINE WITHOUT AURA WITHOUT STATUS MIGRAINOSUS, NOT INTRACTABLE: ICD-10-CM

## 2022-11-18 DIAGNOSIS — K56.0 PARALYTIC ILEUS (H): ICD-10-CM

## 2022-11-18 PROCEDURE — 97605 NEG PRS WND THER DME<=50SQCM: CPT

## 2022-11-18 PROCEDURE — G0463 HOSPITAL OUTPT CLINIC VISIT: HCPCS | Mod: 25

## 2022-11-18 NOTE — DISCHARGE INSTRUCTIONS
Today we did the wound care to the right hip , right sacral wound and right IT wound with the vac being used on the sacral and right IT.  The sacral wound is well improved and appears to be benefiting from the wound vac.  I am glad to see you agreed to the vac and I think this is your best options.  The sacral wound is improved since I saw you last.  The right IT is about the same.  The right hip is new to me but it is all granular and appears to be healing well, wont need the vac on it too much longer.    I will plan to see you about once every three to four weeks to eval.     I have you scheduled again to see me again in four weeks on Friday Dec the 16th at 1 pm.  We will schedule for two hours but may only use about an hour or a little longer, extra time is for any new issues and troubles with the vac seal.    Call with any concerns. [phone]  The supplies your nurse from the facility made a note about will need to be sent to your primary provider as I am not an MD nor PA or NP.    Presley Chester RN [email or web address]
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
## 2022-11-22 RX ORDER — ONDANSETRON 4 MG/1
4 TABLET, FILM COATED ORAL EVERY 8 HOURS PRN
Qty: 30 TABLET | Refills: 0 | Status: SHIPPED | OUTPATIENT
Start: 2022-11-22 | End: 2023-02-27

## 2022-11-22 NOTE — TELEPHONE ENCOUNTER
"Requested Prescriptions   Pending Prescriptions Disp Refills    ondansetron (ZOFRAN) 4 MG tablet [Pharmacy Med Name: ONDANSETRON 4MG TABLET] 30 tablet 0     Sig: Take 1 tablet (4 mg) by mouth every 8 hours as needed for nausea        Antivertigo/Antiemetic Agents Failed - 11/18/2022  1:42 PM        Failed - Medication is active on med list        Passed - Recent (12 mo) or future (30 days) visit within the authorizing provider's specialty     Patient has had an office visit with the authorizing provider or a provider within the authorizing providers department within the previous 12 mos or has a future within next 30 days. See \"Patient Info\" tab in inbasket, or \"Choose Columns\" in Meds & Orders section of the refill encounter.              Passed - Patient is 18 years of age or older               "

## 2022-11-24 DIAGNOSIS — L89.319 PRESSURE INJURY OF SKIN OF RIGHT BUTTOCK, UNSPECIFIED INJURY STAGE: ICD-10-CM

## 2022-11-24 DIAGNOSIS — L02.415 ABSCESS OF RIGHT HIP: ICD-10-CM

## 2022-11-25 RX ORDER — OXYCODONE HYDROCHLORIDE 5 MG/1
5 TABLET ORAL 2 TIMES DAILY PRN
Qty: 20 TABLET | Refills: 0 | Status: SHIPPED | OUTPATIENT
Start: 2022-11-25 | End: 2022-12-12

## 2022-11-25 NOTE — TELEPHONE ENCOUNTER
Requested Prescriptions   Pending Prescriptions Disp Refills    oxyCODONE (ROXICODONE) 5 MG tablet [Pharmacy Med Name: OXYCODONE 5MG TABLET] 20 tablet 0     Sig: Take 1 tablet (5 mg) by mouth 2 times daily as needed for moderate to severe pain (with dressing changes)       There is no refill protocol information for this order           Tracee Grande RN

## 2022-12-09 DIAGNOSIS — L02.415 ABSCESS OF RIGHT HIP: ICD-10-CM

## 2022-12-09 DIAGNOSIS — L89.319 PRESSURE INJURY OF SKIN OF RIGHT BUTTOCK, UNSPECIFIED INJURY STAGE: ICD-10-CM

## 2022-12-12 RX ORDER — OXYCODONE HYDROCHLORIDE 5 MG/1
5 TABLET ORAL 2 TIMES DAILY PRN
Qty: 20 TABLET | Refills: 0 | Status: SHIPPED | OUTPATIENT
Start: 2022-12-12 | End: 2022-12-30

## 2022-12-12 NOTE — TELEPHONE ENCOUNTER
Pending Prescriptions:                       Disp   Refills    oxyCODONE (ROXICODONE) 5 MG tablet [Pharma*20 tab*0        Sig: TAKE 1 TABLET (5 MG) BY MOUTH 2 TIMES DAILY AS NEEDED           FOR MODERATE TO SEVERE PAIN (WITH DRESSING           CHANGES)    Routing refill request to provider for review/approval because:  Drug not on the [facility] refill protocol   Requested Prescriptions   Pending Prescriptions Disp Refills    oxyCODONE (ROXICODONE) 5 MG tablet [Pharmacy Med Name: OXYCODONE 5MG TABLET] 20 tablet 0     Sig: TAKE 1 TABLET (5 MG) BY MOUTH 2 TIMES DAILY AS NEEDED FOR MODERATE TO SEVERE PAIN (WITH DRESSING CHANGES)       There is no refill protocol information for this order

## 2022-12-16 ENCOUNTER — HOSPITAL ENCOUNTER (OUTPATIENT)
Dept: WOUND CARE | Facility: CLINIC | Age: 57
Discharge: HOME | End: 2022-12-16
Attending: FAMILY MEDICINE | Admitting: FAMILY MEDICINE
Payer: MEDICARE

## 2022-12-16 PROCEDURE — 97605 NEG PRS WND THER DME<=50SQCM: CPT

## 2022-12-16 NOTE — DISCHARGE INSTRUCTIONS
Today the wounds all appear to be improving well.  We re applied the vac dressing to the sacral and right hip wounds and covered with ischial tuberosity with Silvercel and a foam dressing.  We will have you and the AL continue with the three times weekly dressing changes.  The right hip wound soon will be fully closed and you will not need to have the vac attached there.    I will see you again in four weeks on Friday January the 23rd at 1 pm for a 2 hour scheduled visit, likely we will be done in just over an hour but I like the extra time if we run into any troubles with the vac or the wounds in general.    Call with any concerns [phone] and they will assist you.    Presley Chester RN cwocn

## 2022-12-16 NOTE — PROGRESS NOTES
[facility]     Start of Care in [facility]: 11/18/2022, initial resumption of visit, see Wound History for details.  Referring Doctor: Ivan Baeza MD  Primary Care Provider: No Ref-Primary, Physician   Wound Location: Sacral, Right ischial tuberosity, Right hip.     Wound Clinic Visit: Initial return visit today 11/18/22 for wound vac dressing cares.     Reason for Visit: Wound assessment and cares.     Subjective:  On arrival the pt reports the following.  Still is living at [facility] in [place] and their nursing staff is performing the wound vac dressing changes.  Per pt no home care services in place currently.      Wound History:   Pt well known to me from visits over many years to the Wound and Ostomy clinic for chronic pressure injuries.  First visit to Wound and Ostomy clinic was November of 2020 for Right IT, Sacral, left trochanter and right heel pressure injuries.  She missed a few follow up appointments initially but was mostly coming into clinic every 3 to 4 weeks for evaluation and recommendations.  She resumed visit to the Wound and Ostomy clinic on 11/18/22 for wound vac dressing changes and assessment after note being seen since 7/14/22.  At some time between her visits to clinic she was admitted to [facility] in [place] for continent urinary diversion issues and an abscess of the right hip and a wound vac was recommended for her sacral, IT and hip wounds.  NPWT was started and pt was discharged back to her AL in [place].  Nurse at her AL is doing wound cares three times weekly as ordered.         Past Medical History:      Patient Active Problem List   Diagnosis     Migraine headache     Urinary retention     GERD (gastroesophageal reflux disease)     Flaccid paraplegia (H)     Decubitus ulcer of buttock     Pressure ulcer of heel     Poor appetite     Nausea     Generalized weakness     Burn     Health Care Home     Paraplegia  following spinal cord injury (H)     ACP (advance care planning)     Osteomyelitis of hip (right periacetabular infection with osteomyelitis)     Severe protein-calorie malnutrition (H)     Microcytic anemia     Neurogenic bladder     Anxiety     Insomnia     Chronic UTI (urinary tract infection)     Skin ulcer of buttock (bilateral ischial tuberosity ulcers)     CARDIOVASCULAR SCREENING; LDL GOAL LESS THAN 160     Open wound of foot except toes with complication     LLQ abdominal pain     Paralytic ileus (H)     Chest wall pain     Decubitus skin ulcer     S/P flap graft     Pancreatitis     Pericardial effusion     Hospice care patient     Seizures (H)     AMS (altered mental status)     Admission for hospice care     Odynophagia     Portal vein thrombosis     Foreign body in esophagus, initial encounter     Impacted foreign body in esophagus, initial encounter     Aspiration pneumonia of right lung due to regurgitated food, unspecified part of lung (H)     Septic shock (H)     Depressive disorder     Colostomy present (H)     Chronic pain due to trauma     Hypokalemia     Abscess of right hip     Stage 3 skin ulcer of sacral region (H)     Pressure injury of right hip, stage 3 (H)             Tobacco Use:       Tobacco Use         Smoking status: Never      Smokeless tobacco: Never      Diabetic: No  HgbA1C:           Hemoglobin A1C   Date Value Ref Range Status   05/26/2021 5.1 0 - 5.6 % Final       Comment:       Normal <5.7% Prediabetes 5.7-6.4%  Diabetes 6.5% or higher - adopted from ADA   consensus guidelines.      Checks Blood Glucose?:  NA Average Readings: NA     Personal/social history:  Pt lives in the [facility] in [facility], no current home care services in place.     Objective:   Current treatment plan: NPWT to sacral and right hip wounds changed MWF.  Silvercel to the right IT covered/secured with gentle adhesive foam dressing, changed MWF.  Last changed: 12/14/22 at AL     Wound  #1 Sacral   Stage/tissue depth: stage 3 pressure injury  2.5 cm L x 5.4 cm W x 1.3 cm D  Tunneling: none noted  Undermining: from 9 o'clock to 3 o'clock 2.5 cm max at 12 o'clock, was noted 2 cm D at 2 o'clock.  Wound bed type/amount: approximately 95 % pale granular tissue and 5 % red nongranular tissue; NA fluctuant  Wound Edges: open  Periwound: scar tissue, intact skin with scattered blanchable erythema  Drainage: large amounts serosanguinous  Odor: none after cleansing  Pain: yes burning pain  Photo from today's visit 12/16/22.    Photo from 11/18/22, initial resumption of visits to the Wound and Ostomy clinic.      Photo from 7/14/22, last visit to Wound and Ostomy clinic till resumed on 11/18/22.     Photo from 2/4/22, initial return visit to the Wound and Ostomy clinic.     Wound #2 Right Ischial Tuberosity   Stage/tissue depth: stage 3 pressure injury  1 cm L x 2.5 cm W x 0.4 cm D  Tunneling: none   Undermining: none  Wound bed type/amount: approximately 20 % scar tissue and 80 % granular tissue; NA fluctuant  Wound Edges: open  Periwound: Scattered areas of blanchable erythema and dry skin.  Drainage: small to moderate amounts sanguinous   Odor: no  Pain: none  Photo from today's visit 12/16/22.    Photo from 11/18/22, initial resumption of visits to the Wound and Ostomy clinic.      Photo's from 7/14/22, last visit to Wound and Ostomy clinic till resumed on 11/18/22.     Photo from 2/4/22, initial return visit to the Wound and Ostomy clinic.     Wound #3 Right hip, abscess s/p I&D   Stage/tissue depth: full thickness  0.2 cm L x 0.2 cm W x 0 cm D  Tunneling: none   Undermining: none  Wound bed type/amount: 100 % granular tissue; NA fluctuant  Wound Edges: flush with wound bed and surrounding skin  Periwound: Scattered areas of blanchable erythema and dry skin.  Drainage: scant amounts serosanguinous  Odor: no  Pain: none  Photo from today's visit 12/16/22.    Photo from 11/18/22, initial assessment since  wound presented and was irrigated and debrided by MD.     Dorsalis Pedal Pulse: Not assessed this visit.  Posterior Tibial Pulse: Not assessed this visit.  Hair growth: Not assessed this visit  Capillary Refill: Not assessed this visit  Feet/toes color: Not assessed this visit  Nails: Not assessed this visit  R Leg: Edema Not assessed this visit. Ankle circumference NA cm. Calf circumference NA cm.  L Leg: Edema Not assessed this visit. Ankle circumference NA cm. Calf circumference NA cm.     Mobility: wheelchair bound  Current offloading/footwear: regular shoes/boots  Sensation: paraplegic, no sensation from the breast bone distally     Other callusing/areas of concern: none noted.     Diet: Regular     Discussed/Education: plan of care with rationale;  pt is unable to do self wound cares, nursing facility to perform cares for pt as directed.     Assessment:  Today all wounds were assessed and care provided.  The sacral wound continues to improve slowly with the use of NPWT.  Some odor noted prior to cleansing but resolved after cleansing.  No signs of infection with the sacral wound.  The right IT wound is smaller in some aspects today but slightly deeper, drainage more sanguinous today and no new concerns noted.  The right hip wound is nearly closed, two small dots of granular tissue with no depth within scar and skin fold of abscess site.     Factors impacting wound healing:   Poor nutrition: inadequate supply of protein, carbohydrates, fatty acids, and trace elements essential for all phases of wound healing.  Pt is taking a daily protein supplement drink and is aware of need for increased protein in diet for wound healing.  Delayed healing as part of normal aging process  Reduced Blood Supply: inadequate perfusion to heal wound.  Medication: NA  Chemotherapy: suppresses the immune system and inflammatory response, NA  Radiotherapy: increases production of free radical which damage cells, NA  Psychological  stress: None noted  Obesity: decreases tissue perfusion, NA  Infection: prolongs inflammatory phase, uses vital nutrients, impairs epithelialization and releases toxins, none noted.  Underlying Disease: paraplegic  Maceration: reduces wound tensile strength and inhibits epithelial migration, none noted this visit  Patient compliance, appears motivated to heal  Unrelieved pressure, pt has pressure reduction cushion in wheelchair and lays in bed daily during the day for full pressure relief, .  Immobility, limited  Substance abuse: NA     Plan:  Today we re applied the wound vac to the sacral wound and the right hip wound.  The wounds were window paned with KCi drape and filled with black KCI foam.  I used one very small piece of black foam into the right hip wound and one continuous piece of black foam to fill sacral wound and bridge to the right hip site.  All sealed in with KCI drape and attached to negative pressure 125 mmHg continuous, achieved with no leakages noted.  The right IT wound we will continue to fill the wound with Silvercel and cover with a gentle adhesive foam dressing.  Pt will continue with the MWF dressing changes at her AL, likely soon the right hip wound will be closed and no longer needing vac.      Topical care: Wound/surrounding skin cleansed with wound cleanser and gauze. Patted dry. Wound cares were KCI wound vac to sacral and right hip.  Silvercel to the right IT covered with Mepilex gentle adhesive foam dressing.     Additional recommendations: None at this time     The following discharge instructions were reviewed with and sent home with the patient:  See AVS     The following supplies were sent home with the patient:  Remains of the wound cleanser and Silvercel opened and not used up today.     Return visit: 1/13/23     Verbal, written, & demonstrative education provided.  Face to face time: approximately 40 minutes  Procedure: approximately 25 minute application of NPWT to sacral and  right hip wounds.     Presley Chester RN cwocn,  [phone]

## 2022-12-23 ENCOUNTER — TELEPHONE (OUTPATIENT)
Dept: FAMILY MEDICINE | Facility: CLINIC | Age: 57
End: 2022-12-23

## 2022-12-23 NOTE — TELEPHONE ENCOUNTER
Penny, an LPN from [facility], is calling and is asking for orders to change patient's suprapubic catheter and to have orders faxed as soon as possible.   She stated patient's suprapubic catheter keeps plugging up even after flushing it out many times.  She stated she will need a provider's orders to change the catheter.    Please call Penny at [phone] or [phone]  Please fax to [phone]    Will forward to provider team in PCP absence.    Priti Peralta RN

## 2022-12-23 NOTE — LETTER
[address] [address]  Phone: [phone]  Fax: [phone]    December 23, 2022        Felicia Ellison  [address] [address]          To whom it may concern:    RE: Felicia Ellison    Please change suprapubic catheter urgently due to symptoms of plugging catheter.       Please contact me for questions or concerns.      Sincerely,        Ivan Baeza MD

## 2022-12-23 NOTE — TELEPHONE ENCOUNTER
Called and spoke with Penny and informed her that the order was just faxed over to them.   Penny Jeronimo, MA

## 2022-12-27 NOTE — PLAN OF CARE
Pt A & O x3 disorientated to situation. VSS on 4L nasal cannula. Denies pain. Tachypnea and shallow breathing present, accessory muscle use present. Pt has a difficult time coughing on her own. Pt C/O of increased SOB/more labored breathing than earlier in the day. MD notified and stopped IVFs and ordered BiPAP. BiPAP helped pt rest and sleep for a couple hours. DD2 w/ thin liquids, total feed. Paraplegic braces to bilateral ankles present. TELE: NSR. Turn and repo every 2 hrs. Colostomy and ileal conduit patent. LS diminished, fine crackles LLL/rhonchi RLL. Dressing CDI. L PICC patent. Generalized edema +2. Morning labs collected. Will continue to monitor.    s/p R TKA

## 2022-12-28 ENCOUNTER — TELEPHONE (OUTPATIENT)
Dept: FAMILY MEDICINE | Facility: CLINIC | Age: 57
End: 2022-12-28

## 2022-12-28 DIAGNOSIS — L02.415 ABSCESS OF RIGHT HIP: ICD-10-CM

## 2022-12-28 DIAGNOSIS — L89.319 PRESSURE INJURY OF SKIN OF RIGHT BUTTOCK, UNSPECIFIED INJURY STAGE: ICD-10-CM

## 2022-12-28 NOTE — TELEPHONE ENCOUNTER
Order/Referral Request  New annual orders  Who is requesting:     Orders being requested: new annual orders    Reason service is needed/diagnosis:     When are orders needed by: asap    Has this been discussed with Provider: Yes    Does patient have a preference on a Group/Provider/Facility? [facility][1]    Does patient have an appointment scheduled?: No    Where to send orders: Fax [phone]    Could we send this information to you in [facility] or would you prefer to receive a phone call?:   Phone number [phone] secure line    Put in [facility] Rn folder to go over Medication

## 2022-12-29 ENCOUNTER — TELEPHONE (OUTPATIENT)
Dept: FAMILY MEDICINE | Facility: CLINIC | Age: 57
End: 2022-12-29

## 2022-12-29 NOTE — TELEPHONE ENCOUNTER
RNs cannot do this and a provider must address. Routing to provider pool.     Jaycee Owens, CHRISTINAN, RN

## 2022-12-30 RX ORDER — OXYCODONE HYDROCHLORIDE 5 MG/1
5 TABLET ORAL 2 TIMES DAILY PRN
Qty: 20 TABLET | Refills: 0 | Status: SHIPPED | OUTPATIENT
Start: 2022-12-30 | End: 2023-01-19

## 2022-12-30 NOTE — TELEPHONE ENCOUNTER
Pending Prescriptions:                       Disp   Refills    oxyCODONE (ROXICODONE) 5 MG tablet [Pharma*20 tab*0        Sig: TAKE 1 TABLET (5 MG) BY MOUTH 2 TIMES DAILY AS NEEDED           FOR MODERATE TO SEVERE PAIN (WITH DRESSING           CHANGES)    Routing refill request to provider for review/approval because:  Drug not on the [facility] refill protocol   Requested Prescriptions   Pending Prescriptions Disp Refills    oxyCODONE (ROXICODONE) 5 MG tablet [Pharmacy Med Name: OXYCODONE 5MG TABLET] 20 tablet 0     Sig: TAKE 1 TABLET (5 MG) BY MOUTH 2 TIMES DAILY AS NEEDED FOR MODERATE TO SEVERE PAIN (WITH DRESSING CHANGES)       There is no refill protocol information for this order

## 2023-01-01 ENCOUNTER — HOSPITAL ENCOUNTER (OUTPATIENT)
Dept: WOUND CARE | Facility: CLINIC | Age: 58
Discharge: HOME | End: 2023-09-01
Attending: FAMILY MEDICINE | Admitting: FAMILY MEDICINE
Payer: MEDICARE

## 2023-01-01 ENCOUNTER — TELEPHONE (OUTPATIENT)
Dept: FAMILY MEDICINE | Facility: CLINIC | Age: 58
End: 2023-01-01
Payer: COMMERCIAL

## 2023-01-01 ENCOUNTER — HOSPITAL ENCOUNTER (OUTPATIENT)
Dept: WOUND CARE | Facility: CLINIC | Age: 58
Discharge: HOME | End: 2023-06-30
Attending: FAMILY MEDICINE | Admitting: FAMILY MEDICINE
Payer: MEDICARE

## 2023-01-01 ENCOUNTER — TELEPHONE (OUTPATIENT)
Dept: FAMILY MEDICINE | Facility: CLINIC | Age: 58
End: 2023-01-01

## 2023-01-01 ENCOUNTER — HOSPITAL ENCOUNTER (OUTPATIENT)
Dept: WOUND CARE | Facility: CLINIC | Age: 58
Discharge: HOME | End: 2023-11-17
Attending: FAMILY MEDICINE | Admitting: FAMILY MEDICINE
Payer: MEDICARE

## 2023-01-01 ENCOUNTER — HOSPITAL ENCOUNTER (OUTPATIENT)
Dept: WOUND CARE | Facility: CLINIC | Age: 58
Discharge: HOME | End: 2023-12-08
Attending: STUDENT IN AN ORGANIZED HEALTH CARE EDUCATION/TRAINING PROGRAM | Admitting: STUDENT IN AN ORGANIZED HEALTH CARE EDUCATION/TRAINING PROGRAM
Payer: MEDICARE

## 2023-01-01 ENCOUNTER — HOSPITAL ENCOUNTER (OUTPATIENT)
Dept: WOUND CARE | Facility: CLINIC | Age: 58
Discharge: HOME | End: 2023-10-13
Attending: FAMILY MEDICINE | Admitting: FAMILY MEDICINE
Payer: MEDICARE

## 2023-01-01 ENCOUNTER — LAB REQUISITION (OUTPATIENT)
Dept: LAB | Facility: CLINIC | Age: 58
End: 2023-01-01
Payer: MEDICARE

## 2023-01-01 ENCOUNTER — HOSPITAL ENCOUNTER (OUTPATIENT)
Dept: WOUND CARE | Facility: CLINIC | Age: 58
Discharge: HOME | End: 2023-08-04
Attending: FAMILY MEDICINE | Admitting: FAMILY MEDICINE
Payer: MEDICARE

## 2023-01-01 ENCOUNTER — HOSPITAL ENCOUNTER (OUTPATIENT)
Dept: WOUND CARE | Facility: CLINIC | Age: 58
Discharge: HOME | End: 2023-07-21
Attending: FAMILY MEDICINE | Admitting: FAMILY MEDICINE
Payer: MEDICARE

## 2023-01-01 ENCOUNTER — HOSPITAL ENCOUNTER (OUTPATIENT)
Dept: WOUND CARE | Facility: CLINIC | Age: 58
Discharge: HOME | End: 2023-09-22
Attending: FAMILY MEDICINE | Admitting: FAMILY MEDICINE
Payer: MEDICARE

## 2023-01-01 ENCOUNTER — HEALTH MAINTENANCE LETTER (OUTPATIENT)
Age: 58
End: 2023-01-01

## 2023-01-01 ENCOUNTER — HOSPITAL ENCOUNTER (OUTPATIENT)
Dept: WOUND CARE | Facility: CLINIC | Age: 58
Discharge: HOME | End: 2023-06-09
Admitting: FAMILY MEDICINE
Payer: MEDICARE

## 2023-01-01 ENCOUNTER — HOSPITAL ENCOUNTER (OUTPATIENT)
Dept: WOUND CARE | Facility: CLINIC | Age: 58
Discharge: HOME | End: 2023-12-29
Attending: STUDENT IN AN ORGANIZED HEALTH CARE EDUCATION/TRAINING PROGRAM | Admitting: STUDENT IN AN ORGANIZED HEALTH CARE EDUCATION/TRAINING PROGRAM
Payer: MEDICARE

## 2023-01-01 ENCOUNTER — HOSPITAL ENCOUNTER (OUTPATIENT)
Dept: WOUND CARE | Facility: CLINIC | Age: 58
Discharge: HOME | End: 2023-10-27
Attending: FAMILY MEDICINE | Admitting: FAMILY MEDICINE
Payer: MEDICARE

## 2023-01-01 ENCOUNTER — TELEPHONE (OUTPATIENT)
Dept: ADMISSION | Facility: CLINIC | Age: 58
End: 2023-01-01
Payer: COMMERCIAL

## 2023-01-01 DIAGNOSIS — E86.0 DEHYDRATION: ICD-10-CM

## 2023-01-01 DIAGNOSIS — L89.319 PRESSURE INJURY OF SKIN OF RIGHT BUTTOCK, UNSPECIFIED INJURY STAGE: ICD-10-CM

## 2023-01-01 DIAGNOSIS — R56.9 SEIZURES (H): ICD-10-CM

## 2023-01-01 DIAGNOSIS — K21.9 GASTROESOPHAGEAL REFLUX DISEASE WITHOUT ESOPHAGITIS: Primary | ICD-10-CM

## 2023-01-01 DIAGNOSIS — G43.709 CHRONIC MIGRAINE WITHOUT AURA WITHOUT STATUS MIGRAINOSUS, NOT INTRACTABLE: ICD-10-CM

## 2023-01-01 DIAGNOSIS — E44.1 MILD PROTEIN-CALORIE MALNUTRITION (H): ICD-10-CM

## 2023-01-01 DIAGNOSIS — L02.415 ABSCESS OF RIGHT HIP: ICD-10-CM

## 2023-01-01 DIAGNOSIS — R53.1 WEAKNESS: ICD-10-CM

## 2023-01-01 DIAGNOSIS — G43.709 CHRONIC MIGRAINE WITHOUT AURA WITHOUT STATUS MIGRAINOSUS, NOT INTRACTABLE: Primary | ICD-10-CM

## 2023-01-01 DIAGNOSIS — M86.9: Primary | ICD-10-CM

## 2023-01-01 DIAGNOSIS — R33.9 URINARY RETENTION: ICD-10-CM

## 2023-01-01 DIAGNOSIS — F51.01 PRIMARY INSOMNIA: ICD-10-CM

## 2023-01-01 LAB
ANION GAP SERPL CALCULATED.3IONS-SCNC: 11 MMOL/L (ref 7–15)
BUN SERPL-MCNC: 26.9 MG/DL (ref 6–20)
CALCIUM SERPL-MCNC: 8.3 MG/DL (ref 8.6–10)
CHLORIDE SERPL-SCNC: 115 MMOL/L (ref 98–107)
CHOLEST SERPL-MCNC: 113 MG/DL
CREAT SERPL-MCNC: 0.43 MG/DL (ref 0.51–0.95)
CRP SERPL-MCNC: 16.59 MG/L
DEPRECATED HCO3 PLAS-SCNC: 16 MMOL/L (ref 22–29)
ERYTHROCYTE [DISTWIDTH] IN BLOOD BY AUTOMATED COUNT: 18.2 % (ref 10–15)
GFR SERPL CREATININE-BSD FRML MDRD: >90 ML/MIN/1.73M2
GLUCOSE SERPL-MCNC: 91 MG/DL (ref 70–99)
HCT VFR BLD AUTO: 26 % (ref 35–47)
HDLC SERPL-MCNC: 32 MG/DL
HGB BLD-MCNC: 7.6 G/DL (ref 11.7–15.7)
LDLC SERPL CALC-MCNC: 64 MG/DL
MCH RBC QN AUTO: 24.9 PG (ref 26.5–33)
MCHC RBC AUTO-ENTMCNC: 29.2 G/DL (ref 31.5–36.5)
MCV RBC AUTO: 85 FL (ref 78–100)
NONHDLC SERPL-MCNC: 81 MG/DL
PLATELET # BLD AUTO: 241 10E3/UL (ref 150–450)
POTASSIUM SERPL-SCNC: 3.9 MMOL/L (ref 3.4–5.3)
RBC # BLD AUTO: 3.05 10E6/UL (ref 3.8–5.2)
SODIUM SERPL-SCNC: 142 MMOL/L (ref 136–145)
TRIGL SERPL-MCNC: 87 MG/DL
TSH SERPL DL<=0.005 MIU/L-ACNC: 2.49 UIU/ML (ref 0.3–4.2)
WBC # BLD AUTO: 5 10E3/UL (ref 4–11)

## 2023-01-01 PROCEDURE — G0463 HOSPITAL OUTPT CLINIC VISIT: HCPCS

## 2023-01-01 PROCEDURE — P9603 ONE-WAY ALLOW PRORATED MILES: HCPCS | Mod: ORL | Performed by: STUDENT IN AN ORGANIZED HEALTH CARE EDUCATION/TRAINING PROGRAM

## 2023-01-01 PROCEDURE — 84443 ASSAY THYROID STIM HORMONE: CPT | Mod: ORL | Performed by: STUDENT IN AN ORGANIZED HEALTH CARE EDUCATION/TRAINING PROGRAM

## 2023-01-01 PROCEDURE — G0463 HOSPITAL OUTPT CLINIC VISIT: HCPCS | Mod: 25

## 2023-01-01 PROCEDURE — 97606 NEG PRS WND THER DME>50 SQCM: CPT

## 2023-01-01 PROCEDURE — 97605 NEG PRS WND THER DME<=50SQCM: CPT

## 2023-01-01 PROCEDURE — 17250 CHEM CAUT OF GRANLTJ TISSUE: CPT

## 2023-01-01 PROCEDURE — 80048 BASIC METABOLIC PNL TOTAL CA: CPT | Mod: ORL | Performed by: STUDENT IN AN ORGANIZED HEALTH CARE EDUCATION/TRAINING PROGRAM

## 2023-01-01 PROCEDURE — 36415 COLL VENOUS BLD VENIPUNCTURE: CPT | Mod: ORL | Performed by: STUDENT IN AN ORGANIZED HEALTH CARE EDUCATION/TRAINING PROGRAM

## 2023-01-01 PROCEDURE — 80061 LIPID PANEL: CPT | Mod: ORL | Performed by: STUDENT IN AN ORGANIZED HEALTH CARE EDUCATION/TRAINING PROGRAM

## 2023-01-01 PROCEDURE — 86140 C-REACTIVE PROTEIN: CPT | Mod: ORL | Performed by: STUDENT IN AN ORGANIZED HEALTH CARE EDUCATION/TRAINING PROGRAM

## 2023-01-01 PROCEDURE — 85027 COMPLETE CBC AUTOMATED: CPT | Mod: ORL | Performed by: STUDENT IN AN ORGANIZED HEALTH CARE EDUCATION/TRAINING PROGRAM

## 2023-01-01 RX ORDER — LEVETIRACETAM 750 MG/1
TABLET ORAL
Qty: 112 TABLET | Refills: 0 | Status: SHIPPED | OUTPATIENT
Start: 2023-01-01 | End: 2024-01-01

## 2023-01-01 RX ORDER — OXYCODONE HYDROCHLORIDE 5 MG/1
TABLET ORAL
Qty: 20 TABLET | Refills: 0 | Status: SHIPPED | OUTPATIENT
Start: 2023-01-01 | End: 2023-01-01

## 2023-01-01 RX ORDER — MULTIVITAMIN WITH FOLIC ACID 400 MCG
1 TABLET ORAL DAILY
Qty: 100 TABLET | Refills: 4 | Status: SHIPPED | OUTPATIENT
Start: 2023-01-01 | End: 2024-05-30

## 2023-01-01 RX ORDER — TOPIRAMATE 25 MG/1
TABLET, FILM COATED ORAL
Qty: 30 TABLET | Refills: 3 | Status: SHIPPED | OUTPATIENT
Start: 2023-01-01 | End: 2024-05-30

## 2023-01-01 RX ORDER — OXYBUTYNIN CHLORIDE 5 MG/1
TABLET ORAL
Qty: 90 TABLET | Refills: 1 | Status: SHIPPED | OUTPATIENT
Start: 2023-01-01 | End: 2024-05-30

## 2023-01-01 RX ORDER — MULTIVIT-MIN/IRON/FOLIC ACID/K 18-600-40
1 CAPSULE ORAL DAILY
Qty: 28 TABLET | Refills: 0 | Status: SHIPPED | OUTPATIENT
Start: 2023-01-01 | End: 2024-05-30

## 2023-01-01 RX ORDER — RIZATRIPTAN BENZOATE 10 MG/1
TABLET ORAL
Qty: 15 TABLET | Refills: 3 | Status: SHIPPED | OUTPATIENT
Start: 2023-01-01 | End: 2024-05-30

## 2023-01-01 RX ORDER — TRAZODONE HYDROCHLORIDE 50 MG/1
100 TABLET, FILM COATED ORAL AT BEDTIME
Qty: 56 TABLET | Refills: 0 | Status: SHIPPED | OUTPATIENT
Start: 2023-01-01 | End: 2024-01-01

## 2023-01-01 RX ORDER — TOPIRAMATE 25 MG/1
TABLET, FILM COATED ORAL
Qty: 32 TABLET | Refills: 0 | OUTPATIENT
Start: 2023-01-01

## 2023-01-01 RX ORDER — MIRTAZAPINE 7.5 MG/1
7.5 TABLET, FILM COATED ORAL AT BEDTIME
Qty: 28 TABLET | Refills: 0 | Status: SHIPPED | OUTPATIENT
Start: 2023-01-01 | End: 2024-05-30

## 2023-01-01 RX ORDER — OXYCODONE HYDROCHLORIDE 5 MG/1
TABLET ORAL
Qty: 20 TABLET | Refills: 0 | Status: SHIPPED | OUTPATIENT
Start: 2023-01-01 | End: 2024-01-01

## 2023-01-01 RX ORDER — PROPRANOLOL HYDROCHLORIDE 40 MG/1
TABLET ORAL
Qty: 28 TABLET | Refills: 0 | Status: SHIPPED | OUTPATIENT
Start: 2023-01-01 | End: 2024-01-01

## 2023-01-01 RX ORDER — LEVETIRACETAM 750 MG/1
TABLET ORAL
Qty: 112 TABLET | Refills: 0 | Status: SHIPPED | OUTPATIENT
Start: 2023-01-01 | End: 2023-01-01

## 2023-01-01 RX ORDER — PANTOPRAZOLE SODIUM 40 MG/1
TABLET, DELAYED RELEASE ORAL
Qty: 28 TABLET | Refills: 0 | Status: SHIPPED | OUTPATIENT
Start: 2023-01-01 | End: 2024-05-30

## 2023-01-03 ENCOUNTER — HOSPITAL ENCOUNTER (EMERGENCY)
Facility: CLINIC | Age: 58
Discharge: HOME | End: 2023-01-03
Attending: EMERGENCY MEDICINE
Payer: MEDICARE

## 2023-01-03 ENCOUNTER — TELEPHONE (OUTPATIENT)
Dept: FAMILY MEDICINE | Facility: CLINIC | Age: 58
End: 2023-01-03

## 2023-01-03 ENCOUNTER — HOSPITAL ENCOUNTER (OUTPATIENT)
Dept: WOUND CARE | Facility: CLINIC | Age: 58
Discharge: HOME | End: 2023-01-03
Attending: FAMILY MEDICINE
Payer: MEDICARE

## 2023-01-03 VITALS
OXYGEN SATURATION: 95 % | SYSTOLIC BLOOD PRESSURE: 122 MMHG | HEART RATE: 75 BPM | RESPIRATION RATE: 18 BRPM | DIASTOLIC BLOOD PRESSURE: 78 MMHG | TEMPERATURE: 98.2 F

## 2023-01-03 DIAGNOSIS — T14.8XXA OPEN WOUND: ICD-10-CM

## 2023-01-03 PROCEDURE — 99283 EMERGENCY DEPT VISIT LOW MDM: CPT | Performed by: EMERGENCY MEDICINE

## 2023-01-03 PROCEDURE — 250N000013 HC RX MED GY IP 250 OP 250 PS 637: Performed by: EMERGENCY MEDICINE

## 2023-01-03 RX ORDER — PANTOPRAZOLE SODIUM 40 MG/1
40 TABLET, DELAYED RELEASE ORAL
COMMUNITY
Start: 2022-12-13 | End: 2023-01-17

## 2023-01-03 RX ORDER — OXYCODONE AND ACETAMINOPHEN 5; 325 MG/1; MG/1
1 TABLET ORAL ONCE
Status: COMPLETED | OUTPATIENT
Start: 2023-01-03 | End: 2023-01-03

## 2023-01-03 RX ORDER — FUROSEMIDE 20 MG
40 TABLET ORAL DAILY
COMMUNITY
Start: 2022-12-13 | End: 2023-02-15

## 2023-01-03 RX ADMIN — OXYCODONE HYDROCHLORIDE AND ACETAMINOPHEN 1 TABLET: 5; 325 TABLET ORAL at 16:03

## 2023-01-03 ASSESSMENT — ACTIVITIES OF DAILY LIVING (ADL)
ADLS_ACUITY_SCORE: 35
ADLS_ACUITY_SCORE: 35
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
## 2023-01-03 NOTE — TELEPHONE ENCOUNTER
Reason for Call:  Other appointment    Detailed comments: Penny calling from [facility] in [place] stating that Felicia Schneider has 2 wounds. One of the wounds healed but recently opened up and needs to figure out what she should be doing for care. please call Penny at [phone]    Phone Number Patient can be reached at:      Best Time: any    Can we leave a detailed message on this number? YES     Call taken on 1/3/2023 at 12:43 PM by Desirae Glasgow

## 2023-01-03 NOTE — ED TRIAGE NOTES
PT brought via ambulance.  Wound and Wound Vac canister is full and not working properly.  Wound specialist contacted to address.  Pt has cath, emptied 1000L upon arrival.  Wound is discharging fluid below packing site.  Pump full and shut off.     Triage Assessment     Row Name 01/03/23 1421       Triage Assessment (Adult)    Airway WDL WDL       Respiratory WDL    Respiratory WDL WDL       Skin Circulation/Temperature WDL    Skin Circulation/Temperature WDL X  wound vac leaking       Cardiac WDL    Cardiac WDL WDL       Peripheral/Neurovascular WDL    Peripheral Neurovascular WDL WDL       Cognitive/Neuro/Behavioral WDL    Cognitive/Neuro/Behavioral WDL WDL

## 2023-01-05 ENCOUNTER — NURSE TRIAGE (OUTPATIENT)
Dept: NURSING | Facility: CLINIC | Age: 58
End: 2023-01-05

## 2023-01-05 NOTE — TELEPHONE ENCOUNTER
Nurse Triage SBAR    Is this a 2nd Level Triage? NO    Situation: Viktoriacelsa from [facility] called needing orders.    Background: Diaz states she faxed orders over on the 30 th for wound vac therapy to .    Assessment: Diaz states orders were sent over about a week ago for wound vac therapy but were never faxed back.    Recommendation: Diaz states she will fax another order over that will need to be signed and faxed back to her at [facility] . Her contact number is [phone] ex [phone].    Reason for Disposition    Nursing judgment    Additional Information    Negative: Nursing judgment    Negative: Nursing judgment    Negative: Nursing judgment    Protocols used: INFORMATION ONLY CALL - NO TRIAGE-A-OH

## 2023-01-05 NOTE — TELEPHONE ENCOUNTER
End up seeing the pt in the ED for same issue later that same day, issues addressed.    Presley Chester RN [email or web address]

## 2023-01-06 NOTE — TELEPHONE ENCOUNTER
Orders have been received, called to let agency know that PCP is out of office until Monday, January 16    Shonna Aguirre XRO/

## 2023-01-09 NOTE — PROGRESS NOTES
[facility].  Visit today 1/3/23 done in the Emergency Department per the request of Dr Art Krishnan MD     Start of Care in [facility]: 11/18/2022, initial resumption of visit, see Wound History for details.  Referring Doctor: Ivan Baeza MD  Primary Care Provider: No Ref-Primary, Physician   Wound Location: Sacral, Right ischial tuberosity, Right hip.     Wound Clinic Visit: NA this visit is being performed in the ED where the pt is currently presenting.     Reason for Visit: Wound assessment and cares for new concern, see Wound history for more details.     Subjective:  Pt is very frustrated today on initially contact, not happy her AL facility sent her into the ED.  States her pants are soaking wet due to large amount of drainage coming from right hip abscess site.  See Wound History below for updates since last [facility] nurse visit in clinic on 12/16/22, the wound      Wound History:   Pt well known to me from visits over many years to the Wound and Ostomy clinic for chronic pressure injuries.  First visit to Wound and Ostomy clinic was November of 2020 for Right IT, Sacral, left trochanter and right heel pressure injuries.  She missed a few follow up appointments initially but was mostly coming into clinic every 3 to 4 weeks for evaluation and recommendations.  She resumed visit to the Wound and Ostomy clinic on 11/18/22 for wound vac dressing changes and assessment after note being seen since 7/14/22.  At some time between her visits to clinic she was admitted to [facility] in [place] for continent urinary diversion issues and an abscess of the right hip and a wound vac was recommended for her sacral, IT and hip wounds.  NPWT was started and pt was discharged back to her AL in [place].  Nurse at her AL is doing wound cares three times weekly as ordered.  Right hip wound update, at last [facility] nurse visit 12/16/22 in clinic this wound was nearly  healed, no depth and only a few mm diameter of fully granular tissue present for a wound bed.  Just prior to being called to the ED today I received a pt Telephone call from her AL facility stating new wound, which was closed has reopened and has large amounts of drainage, this is newly noted today 1/3/23 by AL staff, found when the pt was noted to be very wet.  Facility per the pt felt the changed was significant enough to send her to the ED.  (It is noted after my assessment in the ED of her re opened right trochanter abscess that the facility was correct in sending her to the ED today as is there is noted a significant, drastic and fast deterioration of the site.      Past Medical History:        Patient Active Problem List   Diagnosis    Migraine headache    Urinary retention    GERD (gastroesophageal reflux disease)    Flaccid paraplegia (H)    Decubitus ulcer of buttock    Pressure ulcer of heel    Poor appetite    Nausea    Generalized weakness    Burn    Health Care Home    Paraplegia following spinal cord injury (H)    ACP (advance care planning)    Osteomyelitis of hip (right periacetabular infection with osteomyelitis)    Severe protein-calorie malnutrition (H)    Microcytic anemia    Neurogenic bladder    Anxiety    Insomnia    Chronic UTI (urinary tract infection)    Skin ulcer of buttock (bilateral ischial tuberosity ulcers)    CARDIOVASCULAR SCREENING; LDL GOAL LESS THAN 160    Open wound of foot except toes with complication    LLQ abdominal pain    Paralytic ileus (H)    Chest wall pain    Decubitus skin ulcer    S/P flap graft    Pancreatitis    Pericardial effusion    Hospice care patient    Seizures (H)    AMS (altered mental status)    Admission for hospice care    Odynophagia    Portal vein thrombosis    Foreign body in esophagus, initial encounter    Impacted foreign body in esophagus, initial encounter    Aspiration pneumonia of right lung due to regurgitated food, unspecified part of lung  (H)    Septic shock (H)    Depressive disorder    Colostomy present (H)    Chronic pain due to trauma    Hypokalemia    Abscess of right hip    Stage 3 skin ulcer of sacral region (H)    Pressure injury of right hip, stage 3 (H)             Tobacco Use:     Tobacco Use          Smoking status: Never      Smokeless tobacco: Never      Diabetic: No  HgbA1C:                 Hemoglobin A1C   Date Value Ref Range Status   05/26/2021 5.1 0 - 5.6 % Final       Comment:       Normal <5.7% Prediabetes 5.7-6.4%  Diabetes 6.5% or higher - adopted from ADA   consensus guidelines.      Checks Blood Glucose?:  NA Average Readings: NA     Personal/social history:  Pt lives in the [facility] in [facility], no current home care services in place.     Objective:   Current treatment plan: NPWT to sacral wound changed MWF.  Silvercel to the right IT covered/secured with gentle adhesive foam dressing, changed MWF.  The right trochanter wound was closed with scar tissue and being left open to air.  Last changed: 1/2/23 wound vac dressing to sacral wound, Silvercel to right IT done at AL on schedule.     Wound #1 Sacral   Stage/tissue depth: stage 3 pressure injury  - Wound not assessed and no photo's taken this visit 1/3/23 in ED at [facility], see Assessment for details.  - Below information in italics is from my last clinic assessment with the pt on 12/16/22.  2.5 cm L x 5.4 cm W x 1.3 cm D  Tunneling: none noted  Undermining: from 9 o'clock to 3 o'clock 2.5 cm max at 12 o'clock, was noted 2 cm D at 2 o'clock.  Wound bed type/amount: approximately 95 % pale granular tissue and 5 % red nongranular tissue; NA fluctuant  Wound Edges: open  Periwound: scar tissue, intact skin with scattered blanchable erythema  Drainage: large amounts serosanguinous  Odor: none after cleansing  Pain: yes burning pain  Photo from 12/16/22.     Photo from 11/18/22, initial resumption of visits to the Wound and Ostomy clinic.      Photo from  7/14/22, last visit to Wound and Ostomy clinic till resumed on 11/18/22.     Wound #2 Right Ischial Tuberosity   Stage/tissue depth: stage 3 pressure injury  - Wound not assessed and no photo's taken this visit 1/3/23 in ED at [facility], see Assessment for details.  - Below information in italics is from my last clinic assessment with the pt on 12/16/22.  1 cm L x 2.5 cm W x 0.4 cm D  Tunneling: none   Undermining: none  Wound bed type/amount: approximately 20 % scar tissue and 80 % granular tissue; NA fluctuant  Wound Edges: open  Periwound: Scattered areas of blanchable erythema and dry skin.  Drainage: small to moderate amounts sanguinous   Odor: no  Pain: none  Photo from 12/16/22.     Photo from 11/18/22, initial resumption of visits to the Wound and Ostomy clinic.      Photo's from 7/14/22, last visit to Wound and Ostomy clinic till resumed on 11/18/22.     Wound #3 Right hip, abscess s/p I&D   Stage/tissue depth: full thickness  1.5 cm L x 1.5 cm W x 9.7 cm D  Tunneling: none noted today  Undermining: none noted today  Wound bed type/amount: as able to visualize, 100 % red nongranular tissue, see Assessment for details as unable to visualize majority of the inner wound bed; NA fluctuant  Wound Edges: open  Periwound: Scattered areas of blanchable erythema.  Drainage: initially large to copious amounts purulent yellow thick drainage, then serosanguinous in large amounts once NPWT was applied.  Odor: no  Pain: none  Photo from today's visit in ED 1/3/23.    Photo from clinic 12/16/22.     Photo from 11/18/22, initial assessment since wound presented and was irrigated and debrided by MD.     Dorsalis Pedal Pulse: Not assessed this visit.  Posterior Tibial Pulse: Not assessed this visit.  Hair growth: Not assessed this visit  Capillary Refill: Not assessed this visit  Feet/toes color: Not assessed this visit  Nails: Not assessed this visit  R Leg: Edema Not assessed this visit. Ankle circumference  NA cm. Calf circumference NA cm.  L Leg: Edema Not assessed this visit. Ankle circumference NA cm. Calf circumference NA cm.     Mobility: wheelchair bound  Current offloading/footwear: regular shoes/boots  Sensation: paraplegic, no sensation from the breast bone distally     Other callusing/areas of concern: none noted.     Diet: Regular     Discussed/Education: plan of care with rationale;  pt is unable to do self wound cares, nursing facility to perform cares for pt as directed.     Assessment:  Wound assessment and cares performed by [facility] nurses Presley Chester RN and Jessica VALVERDE RN.    Received call from ED staff requesting assessment and cares for this pt.  She presented to the ED from her AL with re opened and highly draining wound of the right trochanter, which was the result of an abscess which was surgically debrided and vac placed.  At my last visit in clinic approximately 3 weeks ago the site as nearly closed, fully filled in with granular tissue and only a small opening remaining.  At some point since my last visit the right trochanter wound fully closed and was being left open to air.  NPWT with 3M KCI vac has continued to the sacral wound and bridge to the right side (bridge in place is not directly on the right trochanter wound site but acceptably close.  Pt reports this am noted her pants were very wet, her NPWT to the sacral wound was loosened from the body and had alarmed, is not functioning on initial assessment.  NPWT was resumed with new canister to the sacral wound, dressing needed only minimal reenforcement to regain continues 125 mmHg negative pressure.  The right trochanter wound has re opened, was able to confirm with past pictures and today's assessment the this is directly over the former site, not a new wound.  The site was pouring copious amounts of purulent yellow foul drainage initially.  The site was irritated with saline and syringe and dried with gauze, drainage reduced to large in  volume and was serosanguinous with some sanguinous drainage also present and clotting.  After cleansing no odor was noted at the site but was somewhat masked by the odor of the pt's pants which are very wet with drainage.  These were dried as able.  The newly re opened right trochanter wound is a large core of damage, wide opening that feels equally wide to palpation till you reach max depth of 9.7 cm.  I probed the site well in all directions along the walls of the wound and was unable to locate any tracts or tunneling.  No bone palpated.  Per ED care staff no signs of infection with lab work today and no symptoms or concerns that warrant admission to inpatient status.     Factors impacting wound healing:   Poor nutrition: inadequate supply of protein, carbohydrates, fatty acids, and trace elements essential for all phases of wound healing.  Pt is taking a daily protein supplement drink and is aware of need for increased protein in diet for wound healing.  Delayed healing as part of normal aging process  Reduced Blood Supply: inadequate perfusion to heal wound.  Medication: NA  Chemotherapy: suppresses the immune system and inflammatory response, NA  Radiotherapy: increases production of free radical which damage cells, NA  Psychological stress: None noted  Obesity: decreases tissue perfusion, NA  Infection: prolongs inflammatory phase, uses vital nutrients, impairs epithelialization and releases toxins, none noted.  Underlying Disease: paraplegic  Maceration: reduces wound tensile strength and inhibits epithelial migration, none noted this visit  Patient compliance, appears motivated to heal  Unrelieved pressure, pt has pressure reduction cushion in wheelchair and lays in bed daily during the day for full pressure relief, .  Immobility, limited  Substance abuse: NA     Plan:  Today I was able to add in the right trochanter wound to the Vac set up of the sacral wound to one 3M KCI NPWT unit.  I only needed to  modify and the bridge of the sacral dressing.  I window paned the right trochanter wound with no sting skin prep and KCI drape, protected all periwound skin with drape from bridged foam.  I filled the right trochanter wound with one continuous piece of black KCI foam with end coming out to connect to the sacral wounds vac bridge, the two sites were connected by black foam and secured with KCI drape, tract pad attached and negative pressure achieved at 125 mmHg continuous to both wound.  Note approximately 100 ml of serosanguinous drainage flowed rapidly from the right trochanter wound.  After approximately 5 minutes the drainage reduced to moderate in amounts, negative pressure remained consistent with no leakages noted.       Topical care: Wound/surrounding skin cleansed with wound cleanser and gauze. Patted dry. Wound cares were KCI wound vac repair of sacral dressing and incorporation of the sacral dressing with the newly re opened right trochanter wound wound vac dressing.  No cars to the right IT dressing was still in place from yesterday     Additional recommendations: None at this time     The following discharge instructions were reviewed with and sent home with the patient:  See AVS     The following supplies were sent home with the patient:  Remains of the wound cleanser and Silvercel opened and not used up today.     Return visit: 1/9/23     Verbal, written, & demonstrative education provided.  Face to face time: approximately 10 minutes  Procedure: approximately 20 minutes addition of right trochanter wound to Sacral existing NPWT unit.     Presley Chester RN cwocn,  [phone]

## 2023-01-13 ENCOUNTER — HOSPITAL ENCOUNTER (OUTPATIENT)
Dept: WOUND CARE | Facility: CLINIC | Age: 58
Discharge: HOME | End: 2023-01-13
Admitting: FAMILY MEDICINE
Payer: MEDICARE

## 2023-01-13 PROCEDURE — 97606 NEG PRS WND THER DME>50 SQCM: CPT

## 2023-01-13 NOTE — PROGRESS NOTES
[facility].     Start of Care in [facility]: 11/18/2022, initial resumption of visit, see Wound History for details.  Referring Doctor: Ivan Baeza MD  Primary Care Provider: No Ref-Primary, Physician   Wound Location: Sacral, Right ischial tuberosity, Right hip.     Wound Clinic Visit: Clinic follow up after ED visit 1/3/23     Reason for Visit: Ongoing visits for wound assessments and cares     Subjective:  Pt was unable to make it to her visit schedule earlier this week Monday with [facility] nurse as she was not feeling well.  Facility did dressing changes for her on Monday and Wednesday this week.  No new concerns noted.  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       Wound History:   Pt well known to me from visits over many years to the Wound and Ostomy clinic for chronic pressure injuries.  First visit to Wound and Ostomy clinic was November of 2020 for Right IT, Sacral, left trochanter and right heel pressure injuries.  She missed a few follow up appointments initially but was mostly coming into clinic every 3 to 4 weeks for evaluation and recommendations.  She resumed visit to the Wound and Ostomy clinic on 11/18/22 for wound vac dressing changes and assessment after note being seen since 7/14/22.  At some time between her visits to clinic she was admitted to [facility] in [place] for continent urinary diversion issues and an abscess of the right hip and a wound vac was recommended for her sacral, IT and hip wounds.  NPWT was started and pt was discharged back to her AL in [place].  Nurse at her AL is doing wound cares three times weekly as ordered.  As of my last clinic visit with the pt on 12/16/22 the right hip wound was nearly healed, no depth and only small open aspect all granular tissue.  It was reported the wound did fully close soon after that  assessment.  On 1/3/23 the pt's facility sent her to the ED for the right hip, it had re opened and was draining copious amounts of purulent drainage.  I was able to see the pt in the ED and was able to add the right hip wound into the intact sacral wound vac set up.     Past Medical History:        Patient Active Problem List   Diagnosis     Migraine headache     Urinary retention     GERD (gastroesophageal reflux disease)     Flaccid paraplegia (H)     Decubitus ulcer of buttock     Pressure ulcer of heel     Poor appetite     Nausea     Generalized weakness     Burn     Health Care Home     Paraplegia following spinal cord injury (H)     ACP (advance care planning)     Osteomyelitis of hip (right periacetabular infection with osteomyelitis)     Severe protein-calorie malnutrition (H)     Microcytic anemia     Neurogenic bladder     Anxiety     Insomnia     Chronic UTI (urinary tract infection)     Skin ulcer of buttock (bilateral ischial tuberosity ulcers)     CARDIOVASCULAR SCREENING; LDL GOAL LESS THAN 160     Open wound of foot except toes with complication     LLQ abdominal pain     Paralytic ileus (H)     Chest wall pain     Decubitus skin ulcer     S/P flap graft     Pancreatitis     Pericardial effusion     Hospice care patient     Seizures (H)     AMS (altered mental status)     Admission for hospice care     Odynophagia     Portal vein thrombosis     Foreign body in esophagus, initial encounter     Impacted foreign body in esophagus, initial encounter     Aspiration pneumonia of right lung due to regurgitated food, unspecified part of lung (H)     Septic shock (H)     Depressive disorder     Colostomy present (H)     Chronic pain due to trauma     Hypokalemia     Abscess of right hip     Stage 3 skin ulcer of sacral region (H)     Pressure injury of right hip, stage 3 (H)             Tobacco Use:     Tobacco Use          Smoking status: Never      Smokeless tobacco: Never      Diabetic: No  HgbA1C:                  Hemoglobin A1C   Date Value Ref Range Status   05/26/2021 5.1 0 - 5.6 % Final       Comment:       Normal <5.7% Prediabetes 5.7-6.4%  Diabetes 6.5% or higher - adopted from ADA   consensus guidelines.      Checks Blood Glucose?:  NA Average Readings: NA     Personal/social history:  Pt lives in the [facility] in [facility], no current home care services in place.     Objective:   Current treatment plan: NPWT to sacral and right trochanter wounds changed MWF.  [place] to the right IT covered/secured with gentle adhesive foam dressing, changed MWF.    Last changed: 1/11/23 at her AL facility     Wound #1 Sacral   Stage/tissue depth: stage 4 pressure injury, stage updated today 1/13/23 as bone is visible and palpable in the wound bed.   3.2 cm L x 7.8 cm W x 1.4 cm D  Tunneling: none noted  Undermining: from 9 o'clock to 3 o'clock 3.3 cm max at 12 o'clock.  Wound bed type/amount: approximately 20 % pale granular tissue, 10 % palpable and visible white bone, 10 % yellow slough, 10 % grey eschar and 50 % red nongranular tissue; NA fluctuant  Wound Edges: open  Periwound: scar tissue, intact skin with scattered blanchable erythema  Drainage: large amounts serosanguinous  Odor: foul odor on removal of vac, none noted after cleansing, see Assessment for details  Pain: denied any pain today.  Photo's from today's visit 1/13/23.    Photo from 12/16/22.     Photo from 11/18/22, initial resumption of visits to the Wound and Ostomy clinic.      Wound #2 Right Ischial Tuberosity   Stage/tissue depth: stage 3 pressure injury  3.5 cm L x 1 cm W x 0.3 cm D  Tunneling: none   Undermining: none  Wound bed type/amount: approximately 20 % scar tissue and 80 % granular tissue; NA fluctuant  Wound Edges: open  Periwound: Scattered areas of blanchable erythema and dry skin.  New area to the 5 o'clock edge appears adhesive trauma related, partial thickness, 0.6 cm L x 1.5 cm w x 0 cm D, all clean, scant serous  drainage.  Drainage:  moderate amounts serosanguinous  Odor: no  Pain: none  Photo from today's visit 1/13/23.    Photo from 12/16/22.     Photo from 11/18/22, initial resumption of visits to the Wound and Ostomy clinic.      Wound #3 Right hip, abscess s/p I&D   Stage/tissue depth: full thickness  2.5 cm L x 0.6 cm W x 8.7 cm D  Tunneling: none noted today  Undermining: none noted today  Wound bed type/amount: as able to visualize, 100 % granular, see Assessment for details; NA fluctuant  Wound Edges: open  Periwound: Scattered areas of blanchable erythema.  Drainage: small to moderate amounts sanguinous and moderate to large amounts serosanguinous.  Odor: none noted  Pain: none  Photo from today's visit 1/13/23.    Photo from ED 1/3/23.     Photo from clinic 12/16/22.     Photo from 11/18/22, initial assessment since wound presented and was irrigated and debrided by MD.     Dorsalis Pedal Pulse: Not assessed this visit.  Posterior Tibial Pulse: Not assessed this visit.  Hair growth: Not assessed this visit  Capillary Refill: Not assessed this visit  Feet/toes color: Not assessed this visit  Nails: Not assessed this visit  R Leg: Edema Not assessed this visit. Ankle circumference NA cm. Calf circumference NA cm.  L Leg: Edema Not assessed this visit. Ankle circumference NA cm. Calf circumference NA cm.     Mobility: wheelchair bound  Current offloading/footwear: regular shoes/boots  Sensation: paraplegic, no sensation from the breast bone distally     Other callusing/areas of concern: none noted.     Diet: Regular     Discussed/Education: plan of care with rationale;  pt is unable to do self wound cares, nursing facility to perform cares for pt as directed.     Assessment:  Pt arrived with wound vac inactive, she states it just failed in route to her appointment.  The vac dressing was removed the right trochanter and the sacral wounds, large adhesive island dressing was removed from the right IT.  Foul odor noted  with dressing removal, most appears to be coming from the sacral wound.  Mostly resolved after cleansing.  The sacral wound has deteriorated since I last saw the pt and it appears not within the immediate last few days.  Removed moderate amounts of loose slough from undermining with cleansing and probing with gauze and cotton applicators.  Noted today slough scattered in many ears, the inner wound wall on the 4 to 5 o'clock aspect has 1 cm x 1 cm area of tan grey eschar.  Appears negative pressure has been not getting well to the wound bed but pt states vac has been staying intact most of the time.  Newly noted today bone palpable and visible, this horizontal strip in the center of the wound bed 0.1 cm L x 1 cm W flush with the wound bed.  The pale granular tissue the wound was noted for the last few visits is mostly gone.  The Right IT wound is larger in outer aspect, about the same in depth and in wound bed tissues present. There is a new small denuded spot to the 5 o'clock aspect of the wound bed noted today clean with no depth, appears adhesive related.  The right trochanter has improved since re opening on 1/3/23.  Filling in with granular tissue, 1 cm less in depth, outer aspect is more elongated like it originally was, in the ED it was more like a distinct open hole.  No odor noted with this wound, periwound skin doing well.      Factors impacting wound healing:   Poor nutrition: inadequate supply of protein, carbohydrates, fatty acids, and trace elements essential for all phases of wound healing.  Pt is taking a daily protein supplement drink and is aware of need for increased protein in diet for wound healing.  Delayed healing as part of normal aging process  Reduced Blood Supply: inadequate perfusion to heal wound.  Medication: NA  Chemotherapy: suppresses the immune system and inflammatory response, NA  Radiotherapy: increases production of free radical which damage cells, NA  Psychological stress: None  noted  Obesity: decreases tissue perfusion, NA  Infection: prolongs inflammatory phase, uses vital nutrients, impairs epithelialization and releases toxins, none noted.  Underlying Disease: paraplegic  Maceration: reduces wound tensile strength and inhibits epithelial migration, none noted this visit  Patient compliance, appears motivated to heal  Unrelieved pressure, pt has pressure reduction cushion in wheelchair and lays in bed daily during the day for full pressure relief, .  Immobility, limited  Substance abuse: NA     Plan:  Today we did the same plan of care with bridging the sacral and right trochanter wounds to one wound vac, 125 mmHg continuous negative pressure with one piece of black KCI foam placed into the Sacral wound, and one continuous piece of black KCI foam to fill the right Trochanter wound and used as bridge for the two wounds.  Cavilon no sting skin prep and drape were applied to all intact skin that would have contact with black foam bridge.  All foams sealed in with KCI drape and tract pad attached and negative pressure achieved.  I added ABD pads over the vac dressing per pt request to absorb any leaking drainage from the wound or from her anal area.  The right IT wound and periwound we applied Aquacel Ag and secured with Mepilex gentle adhesive dressing.  Pt will continue with the three time weekly AL wound cares and will return to the Wound and Ostomy clinic in three weeks.  Though the sacral wound has some new concerns, since being on the wound vac it has improved and been more viable and consistently progressing till this past few weeks.  I believe if the vac dressing can be adequately kept intact between three time weekly changes it is still the best option.  Unclear what caused the right trochanter wound to break down recently, if it continues to progress we will continue with the NPWT, if any stagnation or deterioration will have pt evaluated by surgery.     Topical care:  Wound/surrounding skin cleansed with wound cleanser and gauze. Patted dry. Wound cares as listed in Plan   Additional recommendations: None at this time     The following discharge instructions were reviewed with and sent home with the patient:  See AVS     The following supplies were sent home with the patient:  Remains of the [place] IO Ag opened and not used up today.     Return visit: 2/3/23     Verbal, written, & demonstrative education provided.  Face to face time: approximately 45 minutes  Procedure: approximately 25 minutes wound vac dressing change to right trochanter and sacral wounds.     Presley Chester RN cwocn,  [phone]

## 2023-01-13 NOTE — DISCHARGE INSTRUCTIONS
Today we did the wound cares to your sacral wound, right trochanter, right ischial tuberosity and the small periwound of the right IT.  The sacral wound measured today 3.2 cm L x 7.8 cm W x 1.4 cm D, there remains tunneling from approximately 9 o'clock to 3 o'clock with a max depth of 3.3 cm at the 12 o'clock aspect.  The right trochanter wound measured 2.5 cm L x 0.6 cm W x 8.7 cm D.  The right IT wound measured 3.5 cm L x 1 cm W x 0.3 cm D.  The small wound just off the right IT which appears tape trauma related is 0.6 cm L x 1.5 cm W x 0 cm D.    We will have you continue with the same plan of care for all the wounds with the wound vac to the right trochanter and sacrum changed Three times weekly, MWF.    I have you scheduled to re turn to see me again in three weeks on Friday February the 3rd at 2 pm.  The visit will last about 1 1/4 hour.  Any concerns or if you need to reschedule date or time call our schedule [phone].    Thanks for coming in today and see you in three weeks.    Presley Chester RN cwocn

## 2023-01-14 DIAGNOSIS — R56.9 SEIZURES (H): ICD-10-CM

## 2023-01-16 NOTE — TELEPHONE ENCOUNTER
Pending Prescriptions:                       Disp   Refills    levETIRAcetam (KEPPRA) 750 MG tablet [Phar*120 ta*1        Sig: TAKE 2 TABLETS (1,500 MG) BY MOUTH 2 TIMES DAILY      Routing refill request to provider for review/approval because:  Drug not on the [facility] refill protocol     Ines Martin RN on 1/16/2023 at 4:15 PM

## 2023-01-17 RX ORDER — LEVETIRACETAM 750 MG/1
1500 TABLET ORAL 2 TIMES DAILY
Qty: 120 TABLET | Refills: 1 | Status: SHIPPED | OUTPATIENT
Start: 2023-01-17 | End: 2023-03-15

## 2023-01-18 ENCOUNTER — TELEPHONE (OUTPATIENT)
Dept: FAMILY MEDICINE | Facility: CLINIC | Age: 58
End: 2023-01-18
Payer: COMMERCIAL

## 2023-01-18 NOTE — TELEPHONE ENCOUNTER
Reason for Call:  Form, our goal is to have forms completed with 72 hours, however, some forms may require a visit or additional information.    Type of letter, form or note:  mileage Acceptation form    Who is the form from?: [employer] (if other please explain)    Where did the form come from: we should have at facility    What clinic location was the form placed at?: N/A    Where the form was placed: NA    What number is listed as a contact on the form?: [phone]       Additional comments: ASSISTING  Living NEEDs this form before appointment so Felicia can have paid for transportation over 60 miles.    Call taken on 1/18/2023 at 12:04 PM by Dorothea Womack

## 2023-01-19 DIAGNOSIS — L02.415 ABSCESS OF RIGHT HIP: ICD-10-CM

## 2023-01-19 DIAGNOSIS — E87.6 HYPOKALEMIA: ICD-10-CM

## 2023-01-19 DIAGNOSIS — L89.319 PRESSURE INJURY OF SKIN OF RIGHT BUTTOCK, UNSPECIFIED INJURY STAGE: ICD-10-CM

## 2023-01-19 RX ORDER — OXYCODONE HYDROCHLORIDE 5 MG/1
5 TABLET ORAL 2 TIMES DAILY PRN
Qty: 20 TABLET | Refills: 0 | Status: SHIPPED | OUTPATIENT
Start: 2023-01-19 | End: 2023-02-05

## 2023-01-19 NOTE — TELEPHONE ENCOUNTER
I called [facility] and the guerline maria gal will get pre-op rescheduled from [place] to their location.      Also noted if this pre-op tomorrow is for the surgery on 2-22-23 it is over 30 days away, pre-ops to be done within 30 days.     [facility] will contact pt's  residence [facility] for rescheduling.     Clare MCKEON MA

## 2023-01-19 NOTE — TELEPHONE ENCOUNTER
Pre-op scheduled here at [place] tomorrow 1-20-23.   I wonder if pre-op can be done closer to pt's home of [place],   [place] is about 70 miles.    [place] is about 15 miles away.   PCP is at the [facility].    Appt appears to have been scheduled by [facility] yesterday.  Surgery scheduled 2-22-23 at [facility] in [place].    Clare MCKEON MA @ [facility]

## 2023-01-23 RX ORDER — POTASSIUM CHLORIDE 1500 MG/1
TABLET, EXTENDED RELEASE ORAL
Qty: 30 TABLET | Refills: 0 | Status: SHIPPED | OUTPATIENT
Start: 2023-01-23 | End: 2023-03-16

## 2023-01-23 NOTE — TELEPHONE ENCOUNTER
Pending Prescriptions:                       Disp   Refills    KLOR-CON 20 MEQ CR tablet [Pharmacy Med Na*30 tab*0        Sig: TAKE 1 TABLET BY MOUTH EVERY DAY      Routing refill request to provider for review/approval because:  Labs out of range:  potassium    Ines Martin RN on 1/23/2023 at 3:56 PM

## 2023-02-01 ENCOUNTER — OFFICE VISIT (OUTPATIENT)
Dept: FAMILY MEDICINE | Facility: CLINIC | Age: 58
End: 2023-02-01
Payer: COMMERCIAL

## 2023-02-01 VITALS
TEMPERATURE: 97.5 F | RESPIRATION RATE: 16 BRPM | WEIGHT: 110 LBS | OXYGEN SATURATION: 100 % | HEART RATE: 98 BPM | SYSTOLIC BLOOD PRESSURE: 118 MMHG | BODY MASS INDEX: 16.24 KG/M2 | DIASTOLIC BLOOD PRESSURE: 70 MMHG

## 2023-02-01 DIAGNOSIS — Z01.818 PREOP GENERAL PHYSICAL EXAM: Primary | ICD-10-CM

## 2023-02-01 DIAGNOSIS — N31.9 NEUROGENIC BLADDER: ICD-10-CM

## 2023-02-01 DIAGNOSIS — N39.0 CHRONIC UTI (URINARY TRACT INFECTION): ICD-10-CM

## 2023-02-01 DIAGNOSIS — R33.9 URINARY RETENTION: ICD-10-CM

## 2023-02-01 LAB
ANION GAP SERPL CALCULATED.3IONS-SCNC: 9 MMOL/L (ref 7–15)
BUN SERPL-MCNC: 15.7 MG/DL (ref 6–20)
CALCIUM SERPL-MCNC: 8.9 MG/DL (ref 8.6–10)
CHLORIDE SERPL-SCNC: 108 MMOL/L (ref 98–107)
CREAT SERPL-MCNC: 0.38 MG/DL (ref 0.51–0.95)
DEPRECATED HCO3 PLAS-SCNC: 20 MMOL/L (ref 22–29)
ERYTHROCYTE [DISTWIDTH] IN BLOOD BY AUTOMATED COUNT: 19.4 % (ref 10–15)
GFR SERPL CREATININE-BSD FRML MDRD: >90 ML/MIN/1.73M2
GLUCOSE SERPL-MCNC: 87 MG/DL (ref 70–99)
HCT VFR BLD AUTO: 37.3 % (ref 35–47)
HGB BLD-MCNC: 10.9 G/DL (ref 11.7–15.7)
MCH RBC QN AUTO: 25.5 PG (ref 26.5–33)
MCHC RBC AUTO-ENTMCNC: 29.2 G/DL (ref 31.5–36.5)
MCV RBC AUTO: 87 FL (ref 78–100)
PLATELET # BLD AUTO: 303 10E3/UL (ref 150–450)
POTASSIUM SERPL-SCNC: 3.9 MMOL/L (ref 3.4–5.3)
RBC # BLD AUTO: 4.27 10E6/UL (ref 3.8–5.2)
SODIUM SERPL-SCNC: 137 MMOL/L (ref 136–145)
WBC # BLD AUTO: 7.4 10E3/UL (ref 4–11)

## 2023-02-01 PROCEDURE — 80048 BASIC METABOLIC PNL TOTAL CA: CPT | Performed by: FAMILY MEDICINE

## 2023-02-01 PROCEDURE — 85027 COMPLETE CBC AUTOMATED: CPT | Performed by: FAMILY MEDICINE

## 2023-02-01 PROCEDURE — 36415 COLL VENOUS BLD VENIPUNCTURE: CPT | Performed by: FAMILY MEDICINE

## 2023-02-01 PROCEDURE — 99214 OFFICE O/P EST MOD 30 MIN: CPT | Performed by: FAMILY MEDICINE
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
## 2023-02-01 NOTE — PATIENT INSTRUCTIONS
For informational purposes only. Not to replace the advice of your health care provider. Copyright   2003,  [facility]. All rights reserved. Clinically reviewed by Johana Hodge MD. [employer] [id number] - REV .  Preparing for Your Surgery  Getting started  A nurse will call you to review your health history and instructions. They will give you an arrival time based on your scheduled surgery time. Please be ready to share:    Your doctor's clinic name and phone number    Your medical, surgical, and anesthesia history    A list of allergies and sensitivities    A list of medicines, including herbal treatments and over-the-counter drugs    Whether the patient has a legal guardian (ask how to send us the papers in advance)  Please tell us if you're pregnant--or if there's any chance you might be pregnant. Some surgeries may injure a fetus (unborn baby), so they require a pregnancy test. Surgeries that are safe for a fetus don't always need a test, and you can choose whether to have one.   If you have a child who's having surgery, please ask for a copy of Preparing for Your Child's Surgery.    Preparing for surgery    Within 10 to 30 days of surgery: Have a pre-op exam (sometimes called an H&P, or History and Physical). This can be done at a clinic or pre-operative center.  ? If you're having a , you may not need this exam. Talk to your care team.    At your pre-op exam, talk to your care team about all medicines you take. If you need to stop any medicines before surgery, ask when to start taking them again.  ? We do this for your safety. Many medicines can make you bleed too much during surgery. Some change how well surgery (anesthesia) drugs work.    Call your insurance company to let them know you're having surgery. (If you don't have insurance, call [phone].)    Call your clinic if there's any change in your health. This includes signs of a cold or flu (sore throat, runny nose,  cough, rash, fever). It also includes a scrape or scratch near the surgery site.    If you have questions on the day of surgery, call your hospital or surgery center.  Eating and drinking guidelines  For your safety: Unless your surgeon tells you otherwise, follow the guidelines below.    Eat and drink as usual until 8 hours before you arrive for surgery. After that, no food or milk.    Drink clear liquids until 2 hours before you arrive. These are liquids you can see through, like water, Gatorade, and Propel Water. They also include plain black coffee and tea (no cream or milk), candy, and breath mints. You can spit out gum when you arrive.    If you drink alcohol: Stop drinking it the night before surgery.    If your care team tells you to take medicine on the morning of surgery, it's okay to take it with a sip of water.  Preventing infection    Shower or bathe the night before and morning of your surgery. Follow the instructions your clinic gave you. (If no instructions, use regular soap.)    Don't shave or clip hair near your surgery site. We'll remove the hair if needed.    Don't smoke or vape the morning of surgery. You may chew nicotine gum up to 2 hours before surgery. A nicotine patch is okay.  ? Note: Some surgeries require you to completely quit smoking and nicotine. Check with your surgeon.    Your care team will make every effort to keep you safe from infection. We will:  ? Clean our hands often with soap and water (or an alcohol-based hand rub).  ? Clean the skin at your surgery site with a special soap that kills germs.  ? Give you a special gown to keep you warm. (Cold raises the risk of infection.)  ? Wear special hair covers, masks, gowns and gloves during surgery.  ? Give antibiotic medicine, if prescribed. Not all surgeries need antibiotics.  What to bring on the day of surgery    Photo ID and insurance card    Copy of your health care directive, if you have one    Glasses and hearing aids (bring  cases)  ? You can't wear contacts during surgery    Inhaler and eye drops, if you use them (tell us about these when you arrive)    CPAP machine or breathing device, if you use them    A few personal items, if spending the night    If you have . . .  ? A pacemaker, ICD (cardiac defibrillator) or other implant: Bring the ID card.  ? An implanted stimulator: Bring the remote control.  ? A legal guardian: Bring a copy of the certified (court-stamped) guardianship papers.  Please remove any jewelry, including body piercings. Leave jewelry and other valuables at home.  If you're going home the day of surgery    You must have a responsible adult drive you home. They should stay with you overnight as well.    If you don't have someone to stay with you, and you aren't safe to go home alone, we may keep you overnight. Insurance often won't pay for this.  After surgery  If it's hard to control your pain or you need more pain medicine, please call your surgeon's office.  Questions?   If you have any questions for your care team, list them here: _________________________________________________________________________________________________________________________________________________________________________ ____________________________________ ____________________________________ ____________________________________    For informational purposes only. Not to replace the advice of your health care provider. Copyright   2003, 2019 [employer]. All rights reserved. Clinically reviewed by Johana Hodge MD. [employer] [id number] - REV 12/22.  Preparing for Your Surgery  Getting started  A nurse will call you to review your health history and instructions. They will give you an arrival time based on your scheduled surgery time. Please be ready to share:    Your doctor's clinic name and phone number    Your medical, surgical, and anesthesia history    A list of allergies and sensitivities    A list of medicines, including  herbal treatments and over-the-counter drugs    Whether the patient has a legal guardian (ask how to send us the papers in advance)  Please tell us if you're pregnant--or if there's any chance you might be pregnant. Some surgeries may injure a fetus (unborn baby), so they require a pregnancy test. Surgeries that are safe for a fetus don't always need a test, and you can choose whether to have one.   If you have a child who's having surgery, please ask for a copy of Preparing for Your Child's Surgery.    Preparing for surgery    Within 10 to 30 days of surgery: Have a pre-op exam (sometimes called an H&P, or History and Physical). This can be done at a clinic or pre-operative center.  ? If you're having a , you may not need this exam. Talk to your care team.    At your pre-op exam, talk to your care team about all medicines you take. If you need to stop any medicines before surgery, ask when to start taking them again.  ? We do this for your safety. Many medicines can make you bleed too much during surgery. Some change how well surgery (anesthesia) drugs work.    Call your insurance company to let them know you're having surgery. (If you don't have insurance, call [phone].)    Call your clinic if there's any change in your health. This includes signs of a cold or flu (sore throat, runny nose, cough, rash, fever). It also includes a scrape or scratch near the surgery site.    If you have questions on the day of surgery, call your hospital or surgery center.  Eating and drinking guidelines  For your safety: Unless your surgeon tells you otherwise, follow the guidelines below.    Eat and drink as usual until 8 hours before you arrive for surgery. After that, no food or milk.    Drink clear liquids until 2 hours before you arrive. These are liquids you can see through, like water, Gatorade, and Propel Water. They also include plain black coffee and tea (no cream or milk), candy, and breath mints. You can  spit out gum when you arrive.    If you drink alcohol: Stop drinking it the night before surgery.    If your care team tells you to take medicine on the morning of surgery, it's okay to take it with a sip of water.  Preventing infection    Shower or bathe the night before and morning of your surgery. Follow the instructions your clinic gave you. (If no instructions, use regular soap.)    Don't shave or clip hair near your surgery site. We'll remove the hair if needed.    Don't smoke or vape the morning of surgery. You may chew nicotine gum up to 2 hours before surgery. A nicotine patch is okay.  ? Note: Some surgeries require you to completely quit smoking and nicotine. Check with your surgeon.    Your care team will make every effort to keep you safe from infection. We will:  ? Clean our hands often with soap and water (or an alcohol-based hand rub).  ? Clean the skin at your surgery site with a special soap that kills germs.  ? Give you a special gown to keep you warm. (Cold raises the risk of infection.)  ? Wear special hair covers, masks, gowns and gloves during surgery.  ? Give antibiotic medicine, if prescribed. Not all surgeries need antibiotics.  What to bring on the day of surgery    Photo ID and insurance card    Copy of your health care directive, if you have one    Glasses and hearing aids (bring cases)  ? You can't wear contacts during surgery    Inhaler and eye drops, if you use them (tell us about these when you arrive)    CPAP machine or breathing device, if you use them    A few personal items, if spending the night    If you have . . .  ? A pacemaker, ICD (cardiac defibrillator) or other implant: Bring the ID card.  ? An implanted stimulator: Bring the remote control.  ? A legal guardian: Bring a copy of the certified (court-stamped) guardianship papers.  Please remove any jewelry, including body piercings. Leave jewelry and other valuables at home.  If you're going home the day of surgery    You  must have a responsible adult drive you home. They should stay with you overnight as well.    If you don't have someone to stay with you, and you aren't safe to go home alone, we may keep you overnight. Insurance often won't pay for this.  After surgery  If it's hard to control your pain or you need more pain medicine, please call your surgeon's office.  Questions?   If you have any questions for your care team, list them here: _________________________________________________________________________________________________________________________________________________________________________ ____________________________________ ____________________________________ ____________________________________    How to Take Your Medication Before Surgery  - HOLD (do not take) your LASIX (FUROSEMIDE) on the morning of surgery.   - STOP taking all vitamins and herbal supplements 14 days before surgery.
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
## 2023-02-03 ENCOUNTER — HOSPITAL ENCOUNTER (OUTPATIENT)
Dept: WOUND CARE | Facility: CLINIC | Age: 58
Discharge: HOME | End: 2023-02-03
Admitting: FAMILY MEDICINE
Payer: MEDICARE

## 2023-02-03 PROCEDURE — 97606 NEG PRS WND THER DME>50 SQCM: CPT

## 2023-02-03 PROCEDURE — G0463 HOSPITAL OUTPT CLINIC VISIT: HCPCS

## 2023-02-03 NOTE — PROGRESS NOTES
[facility].     Start of Care in [facility]ound [facility]: 11/18/2022, initial resumption of visit, see Wound History for details.  Referring Doctor: Ivan Baeza MD  Primary Care Provider: No Ref-Primary, Physician   Wound Location: Sacral, Right ischial tuberosity, Right hip.     Wound Clinic Visit: Ongoing follow up     Reason for Visit: Wound assessments and cares     Subjective:  On arrival today 2/3/23 alone, the pt reports the following: She has been scheduled to have her continent urinary diversion surgery end of February.  She continues to have three time weekly wound vac dressing changes done at her AL facility by their staff.       Wound History:   Pt well known to me from visits over many years to the Wound and Ostomy clinic for chronic pressure injuries.  First visit to Wound and Ostomy clinic was November of 2020 for Right IT, Sacral, left trochanter and right heel pressure injuries.  She missed a few follow up appointments initially but was mostly coming into clinic every 3 to 4 weeks for evaluation and recommendations.  She resumed visit to the Wound and Ostomy clinic on 11/18/22 for wound vac dressing changes and assessment after note being seen since 7/14/22.  At some time between her visits to clinic she was admitted to [facility] in [place] for continent urinary diversion issues and an abscess of the right hip and a wound vac was recommended for her sacral, IT and hip wounds.  NPWT was started and pt was discharged back to her AL in [place].  Nurse at her AL is doing wound cares three times weekly as ordered.  As of my last clinic visit with the pt on 12/16/22 the right hip wound was nearly healed, no depth and only small open aspect all granular tissue.  It was reported the wound did fully close soon after that assessment.  On 1/3/23 the pt's facility sent her to the ED for the right hip, it had re opened and was draining copious amounts of  purulent drainage.  I was able to see the pt in the ED and was able to add the right hip wound into the intact sacral wound vac set up.     Past Medical History:        Patient Active Problem List   Diagnosis     Migraine headache     Urinary retention     GERD (gastroesophageal reflux disease)     Flaccid paraplegia (H)     Decubitus ulcer of buttock     Pressure ulcer of heel     Poor appetite     Nausea     Generalized weakness     Burn     Health Care Home     Paraplegia following spinal cord injury (H)     ACP (advance care planning)     Osteomyelitis of hip (right periacetabular infection with osteomyelitis)     Severe protein-calorie malnutrition (H)     Microcytic anemia     Neurogenic bladder     Anxiety     Insomnia     Chronic UTI (urinary tract infection)     Skin ulcer of buttock (bilateral ischial tuberosity ulcers)     CARDIOVASCULAR SCREENING; LDL GOAL LESS THAN 160     Open wound of foot except toes with complication     LLQ abdominal pain     Paralytic ileus (H)     Chest wall pain     Decubitus skin ulcer     S/P flap graft     Pancreatitis     Pericardial effusion     Hospice care patient     Seizures (H)     AMS (altered mental status)     Admission for hospice care     Odynophagia     Portal vein thrombosis     Foreign body in esophagus, initial encounter     Impacted foreign body in esophagus, initial encounter     Aspiration pneumonia of right lung due to regurgitated food, unspecified part of lung (H)     Septic shock (H)     Depressive disorder     Colostomy present (H)     Chronic pain due to trauma     Hypokalemia     Abscess of right hip     Stage 3 skin ulcer of sacral region (H)     Pressure injury of right hip, stage 3 (H)             Tobacco Use:     Tobacco Use          Smoking status: Never      Smokeless tobacco: Never      Diabetic: No  HgbA1C:                 Hemoglobin A1C   Date Value Ref Range Status   05/26/2021 5.1 0 - 5.6 % Final       Comment:       Normal <5.7%  Prediabetes 5.7-6.4%  Diabetes 6.5% or higher - adopted from ADA   consensus guidelines.      Checks Blood Glucose?:  NA Average Readings: NA     Personal/social history:  Pt lives in the [facility] in [facility], no current home care services in place.     Objective:   Current treatment plan: NPWT to sacral and right trochanter wounds changed MWF.  Silvercel to the right IT covered/secured with gentle adhesive foam dressing, changed MWF.    Last changed: 2/1/23 at her AL facility     Wound #1 Sacral   Stage/tissue depth: stage 4 pressure injury, stage updated 1/13/23 as bone became visible and palpable in the wound bed.   3.5 cm L x 8 cm W x 1.8 cm D  Tunneling: none noted  Undermining: from 9 o'clock to 3 o'clock 2.8 cm max at 12 o'clock.  Wound bed type/amount: approximately 20 % pale granular tissue, 10 % palpable and visible white bone, 20 % grey to black eschar and 50 % red nongranular tissue; NA fluctuant  Wound Edges: closed at the 1 o'clock to 2 o'clock edge with eschar  Periwound: deep purple black intact skin 1 cm wide at 1 o'clock scattered blanchable erythema.  Drainage: large amounts serosanguinous  Odor: foul odor on removal of vac, none noted after cleansing, see Assessment for details  Pain: denied any pain today.    Photo's from today's visit 2/3/23.    Photo's from 1/13/23.     Photo from 11/18/22, initial resumption of visits to the Wound and Ostomy clinic.      Wound #2 Right Ischial Tuberosity   Stage/tissue depth: stage 3 pressure injury  2.5 cm L x 1.5 cm W x 0.3 cm D  Tunneling: none   Undermining: none  Wound bed type/amount: approximately 30 % scar tissue and 70 % red nongranular tissue; NA fluctuant  Wound Edges: open  Periwound: Scattered areas of blanchable erythema and dry skin.  New area to the 5 o'clock edge appears adhesive trauma related, partial thickness, 0.6 cm L x 1.5 cm w x 0 cm D, all clean, scant serous drainage.  Drainage:  moderate amounts  serosanguinous  Odor: no  Pain: none  Photo from today's visit 2/3/23.    Photo from 1/13/23.     Photo from 11/18/22, initial resumption of visits to the Wound and Ostomy clinic.      Wound #3 Right hip, abscess s/p I&D   Stage/tissue depth: full thickness  2.2 cm L x 0.9 cm W x 7.8  cm D  Tunneling: to the 12 o'clock at approximately a 45 degree angle 8.6 cm deep  Undermining: none noted today  Wound bed type/amount: as able to visualize, 100 % granular but some areas are very pale, see Assessment for details; NA fluctuant  Wound Edges: open  Periwound: Scattered areas of blanchable erythema.  Drainage: small to moderate amounts sanguinous and moderate to large amounts serosanguinous.  Odor: none noted  Pain: none  Photo from today's visit 2/3/23.     Photo from ED 1/3/23.     Photo from clinic 12/16/22.     Photo from 11/18/22, initial assessment since wound presented and was irrigated and debrided by MD.     Dorsalis Pedal Pulse: Not assessed this visit.  Posterior Tibial Pulse: Not assessed this visit.  Hair growth: Not assessed this visit  Capillary Refill: Not assessed this visit  Feet/toes color: Not assessed this visit  Nails: Not assessed this visit  R Leg: Edema Not assessed this visit. Ankle circumference NA cm. Calf circumference NA cm.  L Leg: Edema Not assessed this visit. Ankle circumference NA cm. Calf circumference NA cm.     Mobility: wheelchair bound  Current offloading/footwear: regular shoes/boots  Sensation: paraplegic, no sensation from the breast bone distally     Other callusing/areas of concern: none noted.     Diet: Regular     Discussed/Education: plan of care with rationale;  pt is unable to do self wound cares, nursing facility to perform cares for pt as directed.     Assessment:  Wounds assessed and cared for by [facility] nurses Presley HORVATH and Jessica VALVERDE RN cwocn  Pt arrived with wound vac not functioning, I set it to run again and was able to regain negative pressure at ordered level continuous.     The wound dressings were removed, all black foam from right trochanter and sacral wound were removed.  The wounds were cleansed with saline, foul odor is noted from the sacral wound, strong prior to cleansing and still present but much more mild after cleansing.    The Sacral wound: appears to have deteriorated again this visit and I feel that surgery needs to evaluate the wound for potential surgical debridement.  The inner edge at 1 to 3 o'clock is turning necrotic though with friction I am still able to get blood to weep from the tissue.  The bone in the center is now visible in large area, directly in the center of the wound bed, 2 cm L x 1 cm W.     The Right IT wound: is about the same, is clean, moist and not scr covering as of this time.    The Right trochanter wound: is about the same, the depth did fill in but greater depth to the 12 o'clock area is noted at an angle, is 8.6 cm D.  No odor noted with the trochanter wound.     Factors impacting wound healing:   Poor nutrition: inadequate supply of protein, carbohydrates, fatty acids, and trace elements essential for all phases of wound healing.  Pt is taking a daily protein supplement drink and is aware of need for increased protein in diet for wound healing.  Delayed healing as part of normal aging process  Reduced Blood Supply: inadequate perfusion to heal wound.  Medication: NA  Chemotherapy: suppresses the immune system and inflammatory response, NA  Radiotherapy: increases production of free radical which damage cells, NA  Psychological stress: None noted  Obesity: decreases tissue perfusion, NA  Infection: prolongs inflammatory phase, uses vital nutrients, impairs epithelialization and releases toxins, none noted, concern for osteomyelitis due to visible bone in the sacral wound.  Underlying Disease: paraplegic  Maceration: reduces wound tensile strength and inhibits epithelial migration, none noted this visit  Patient compliance, appears motivated  to heal  Unrelieved pressure, pt has pressure reduction cushion in wheelchair and lays in bed daily during the day for full pressure relief, .  Immobility, limited  Substance abuse: NA     Plan:  Today we did the same plan of care with bridging the sacral and right trochanter wounds to one wound vac, 125 mmHg continuous negative pressure with one continuous piece of black KCI foam used to fill the Sacral wound, bridge and fill to the Right Trochanter.  Additional piece of black foam placed on bridge and under tract pad for better seal.  Cavilon no sting skin prep and drape were applied to all intact skin that would have contact with black foam bridge.  All foams sealed in with KCI drape and tract pad attached and negative pressure achieved.  I added ABD pads over the vac dressing per pt request to absorb any leaking drainage from the wound or from her anal area.  The right IT wound and periwound we applied Aquacel Ag and secured with Mepilex gentle adhesive dressing.  Pt will continue with the three time weekly AL wound cares and will return to the Wound and Ostomy clinic in three weeks.  Pt states the vac is keeping seal most of the time, but the wound on the Sacrum continues to deteriorate.  I will send message requesting Dr Zavala see the pt for evaluation.  He has seen her in the past but was not part of the right trochanter abscess surgery which was done at [facility] as [facility] had no beds available.  The pt does not have any follow ups scheduled with [facility] surgery team.  If MD is agreeable to seeing the pt I recommend we schedule here for as soon a visit as possible.     Topical care: Wound/surrounding skin cleansed with wound cleanser and gauze. Patted dry. Wound cares as listed in Plan   Additional recommendations: None at this time     The following discharge instructions were reviewed with and sent home with the patient:  See AVS     The following supplies were sent home with the patient:  None  today     Return visit: TBD     Verbal, written, & demonstrative education provided.  Face to face time: approximately 25 minutes  Procedure: approximately 20 minutes wound vac dressing change to right trochanter and sacral wounds.     Presley Chester RN [facility],  [phone]

## 2023-02-03 NOTE — DISCHARGE INSTRUCTIONS
Today I have some concerns about the sacral wound and I would like to have Dr Zavala one of our surgeons for some options.  You've seen him before and I will ask him if we can schedule you back with him in the next two weeks.  We will call our with the date and time options to come see Dr Zavala, that would be in the [facility] on the East end of the building.      After he see's you we will schedule you back with me for more visits.    The right hip wound is still deep but filling in some.    Measurements today are:  Sacral 3.5 cm L x 8 cm W x 1.8 cm D, undermining is present from 9 o'clock to 3 o'clock with a max depth of 2.8 cn at 12 o'clock.  The right buttock, ischial tuberosity wound is 2.5 cm L x 1.5 cm w x 0.3 cm D.  The right hip trochanter wound is 2.2 cm L x 0.9 cm W x 2.8 cm D, with tunneling to 12 o'clock inner edge at approximately a 45 degree angle.    We will be calling you to schedule with Dr Zavala once I get his ok.  Presley Chester RN cwocn

## 2023-02-04 DIAGNOSIS — L02.415 ABSCESS OF RIGHT HIP: ICD-10-CM

## 2023-02-04 DIAGNOSIS — L89.319 PRESSURE INJURY OF SKIN OF RIGHT BUTTOCK, UNSPECIFIED INJURY STAGE: ICD-10-CM

## 2023-02-05 RX ORDER — OXYCODONE HYDROCHLORIDE 5 MG/1
TABLET ORAL
Qty: 20 TABLET | Refills: 0 | Status: SHIPPED | OUTPATIENT
Start: 2023-02-05 | End: 2023-02-20

## 2023-02-15 DIAGNOSIS — R60.9 DEPENDENT EDEMA: Primary | ICD-10-CM

## 2023-02-15 RX ORDER — FUROSEMIDE 20 MG
40 TABLET ORAL DAILY
Qty: 180 TABLET | Refills: 4 | Status: SHIPPED | OUTPATIENT
Start: 2023-02-15 | End: 2023-05-03

## 2023-02-15 NOTE — TELEPHONE ENCOUNTER
Lasix      Last Written Prescription Date:  Patient reproted  Last Fill Quantity: ,   # refills:   Last Office Visit: 2-1-2023  Future Office visit:       Routing refill request to provider for review/approval because:  Medication is reported/historical

## 2023-02-20 DIAGNOSIS — L89.319 PRESSURE INJURY OF SKIN OF RIGHT BUTTOCK, UNSPECIFIED INJURY STAGE: ICD-10-CM

## 2023-02-20 DIAGNOSIS — L02.415 ABSCESS OF RIGHT HIP: ICD-10-CM

## 2023-02-20 RX ORDER — OXYCODONE HYDROCHLORIDE 5 MG/1
TABLET ORAL
Qty: 20 TABLET | Refills: 0 | Status: SHIPPED | OUTPATIENT
Start: 2023-02-20 | End: 2023-03-08

## 2023-02-21 ENCOUNTER — ANESTHESIA EVENT (OUTPATIENT)
Dept: SURGERY | Facility: CLINIC | Age: 58
DRG: 698 | End: 2023-02-21
Payer: MEDICARE

## 2023-02-21 ASSESSMENT — ENCOUNTER SYMPTOMS: SEIZURES: 1

## 2023-02-22 ENCOUNTER — MEDICAL CORRESPONDENCE (OUTPATIENT)
Dept: HEALTH INFORMATION MANAGEMENT | Facility: CLINIC | Age: 58
End: 2023-02-22

## 2023-02-22 ENCOUNTER — HOSPITAL ENCOUNTER (INPATIENT)
Facility: CLINIC | Age: 58
LOS: 1 days | Discharge: NURSING, REHAB OR LONG-TERM CARE | DRG: 698 | End: 2023-02-25
Attending: UROLOGY | Admitting: UROLOGY
Payer: MEDICARE

## 2023-02-22 ENCOUNTER — ANESTHESIA (OUTPATIENT)
Dept: SURGERY | Facility: CLINIC | Age: 58
DRG: 698 | End: 2023-02-22
Payer: MEDICARE

## 2023-02-22 DIAGNOSIS — E53.8 FOLATE DEFICIENCY: Primary | ICD-10-CM

## 2023-02-22 PROCEDURE — 370N000017 HC ANESTHESIA TECHNICAL FEE, PER MIN: Performed by: UROLOGY

## 2023-02-22 PROCEDURE — 250N000012 HC RX MED GY IP 250 OP 636 PS 637: Performed by: ANESTHESIOLOGY

## 2023-02-22 PROCEDURE — 258N000003 HC RX IP 258 OP 636

## 2023-02-22 PROCEDURE — 258N000003 HC RX IP 258 OP 636: Performed by: ANESTHESIOLOGY

## 2023-02-22 PROCEDURE — 0T2BX0Z CHANGE DRAINAGE DEVICE IN BLADDER, EXTERNAL APPROACH: ICD-10-PCS | Performed by: UROLOGY

## 2023-02-22 PROCEDURE — P9045 ALBUMIN (HUMAN), 5%, 250 ML: HCPCS | Performed by: ANESTHESIOLOGY

## 2023-02-22 PROCEDURE — 999N000141 HC STATISTIC PRE-PROCEDURE NURSING ASSESSMENT: Performed by: UROLOGY

## 2023-02-22 PROCEDURE — 250N000011 HC RX IP 250 OP 636: Performed by: NURSE ANESTHETIST, CERTIFIED REGISTERED

## 2023-02-22 PROCEDURE — 710N000010 HC RECOVERY PHASE 1, LEVEL 2, PER MIN: Performed by: UROLOGY

## 2023-02-22 PROCEDURE — 0TJB8ZZ INSPECTION OF BLADDER, VIA NATURAL OR ARTIFICIAL OPENING ENDOSCOPIC: ICD-10-PCS | Performed by: UROLOGY

## 2023-02-22 PROCEDURE — 250N000013 HC RX MED GY IP 250 OP 250 PS 637: Performed by: UROLOGY

## 2023-02-22 PROCEDURE — 272N000001 HC OR GENERAL SUPPLY STERILE: Performed by: UROLOGY

## 2023-02-22 PROCEDURE — 258N000003 HC RX IP 258 OP 636: Performed by: NURSE ANESTHETIST, CERTIFIED REGISTERED

## 2023-02-22 PROCEDURE — 250N000011 HC RX IP 250 OP 636: Performed by: ANESTHESIOLOGY

## 2023-02-22 PROCEDURE — 258N000001 HC RX 258: Performed by: UROLOGY

## 2023-02-22 PROCEDURE — 250N000009 HC RX 250: Performed by: NURSE ANESTHETIST, CERTIFIED REGISTERED

## 2023-02-22 PROCEDURE — 250N000011 HC RX IP 250 OP 636

## 2023-02-22 PROCEDURE — C1769 GUIDE WIRE: HCPCS | Performed by: UROLOGY

## 2023-02-22 PROCEDURE — G0378 HOSPITAL OBSERVATION PER HR: HCPCS

## 2023-02-22 PROCEDURE — P9045 ALBUMIN (HUMAN), 5%, 250 ML: HCPCS | Performed by: NURSE ANESTHETIST, CERTIFIED REGISTERED

## 2023-02-22 PROCEDURE — 99213 OFFICE O/P EST LOW 20 MIN: CPT | Performed by: PHYSICIAN ASSISTANT

## 2023-02-22 PROCEDURE — 258N000003 HC RX IP 258 OP 636: Performed by: PHYSICIAN ASSISTANT

## 2023-02-22 PROCEDURE — 360N000075 HC SURGERY LEVEL 2, PER MIN: Performed by: UROLOGY

## 2023-02-22 RX ORDER — NALOXONE HYDROCHLORIDE 0.4 MG/ML
0.4 INJECTION, SOLUTION INTRAMUSCULAR; INTRAVENOUS; SUBCUTANEOUS
Status: DISCONTINUED | OUTPATIENT
Start: 2023-02-22 | End: 2023-02-25 | Stop reason: HOSPADM

## 2023-02-22 RX ORDER — FENTANYL CITRATE 0.05 MG/ML
25 INJECTION, SOLUTION INTRAMUSCULAR; INTRAVENOUS EVERY 5 MIN PRN
Status: DISCONTINUED | OUTPATIENT
Start: 2023-02-22 | End: 2023-02-22 | Stop reason: HOSPADM

## 2023-02-22 RX ORDER — NALOXONE HYDROCHLORIDE 0.4 MG/ML
0.2 INJECTION, SOLUTION INTRAMUSCULAR; INTRAVENOUS; SUBCUTANEOUS
Status: DISCONTINUED | OUTPATIENT
Start: 2023-02-22 | End: 2023-02-25 | Stop reason: HOSPADM

## 2023-02-22 RX ORDER — PROPRANOLOL HYDROCHLORIDE 20 MG/1
20 TABLET ORAL 2 TIMES DAILY
Status: DISCONTINUED | OUTPATIENT
Start: 2023-02-22 | End: 2023-02-25 | Stop reason: HOSPADM

## 2023-02-22 RX ORDER — SODIUM CHLORIDE, SODIUM LACTATE, POTASSIUM CHLORIDE, CALCIUM CHLORIDE 600; 310; 30; 20 MG/100ML; MG/100ML; MG/100ML; MG/100ML
INJECTION, SOLUTION INTRAVENOUS CONTINUOUS
Status: DISCONTINUED | OUTPATIENT
Start: 2023-02-22 | End: 2023-02-22 | Stop reason: HOSPADM

## 2023-02-22 RX ORDER — HYDROMORPHONE HCL IN WATER/PF 6 MG/30 ML
0.2 PATIENT CONTROLLED ANALGESIA SYRINGE INTRAVENOUS EVERY 5 MIN PRN
Status: DISCONTINUED | OUTPATIENT
Start: 2023-02-22 | End: 2023-02-22 | Stop reason: HOSPADM

## 2023-02-22 RX ORDER — NON-ADHERENT BANDAGE 8"X10"
BANDAGE TOPICAL
COMMUNITY
End: 2023-03-06

## 2023-02-22 RX ORDER — TRAZODONE HYDROCHLORIDE 100 MG/1
100 TABLET ORAL AT BEDTIME
Status: DISCONTINUED | OUTPATIENT
Start: 2023-02-22 | End: 2023-02-25 | Stop reason: HOSPADM

## 2023-02-22 RX ORDER — LEVETIRACETAM 750 MG/1
1500 TABLET ORAL 2 TIMES DAILY
Status: DISCONTINUED | OUTPATIENT
Start: 2023-02-22 | End: 2023-02-25 | Stop reason: HOSPADM

## 2023-02-22 RX ORDER — APREPITANT 40 MG/1
40 CAPSULE ORAL ONCE
Status: DISCONTINUED | OUTPATIENT
Start: 2023-02-22 | End: 2023-02-22

## 2023-02-22 RX ORDER — ENOXAPARIN SODIUM 100 MG/ML
60 INJECTION SUBCUTANEOUS EVERY 12 HOURS
COMMUNITY
End: 2024-01-01

## 2023-02-22 RX ORDER — ONDANSETRON 4 MG/1
4 TABLET, ORALLY DISINTEGRATING ORAL EVERY 30 MIN PRN
Status: DISCONTINUED | OUTPATIENT
Start: 2023-02-22 | End: 2023-02-22 | Stop reason: HOSPADM

## 2023-02-22 RX ORDER — CLINDAMYCIN PHOSPHATE 900 MG/50ML
900 INJECTION, SOLUTION INTRAVENOUS SEE ADMIN INSTRUCTIONS
Status: DISCONTINUED | OUTPATIENT
Start: 2023-02-22 | End: 2023-02-22 | Stop reason: HOSPADM

## 2023-02-22 RX ORDER — MIRTAZAPINE 7.5 MG/1
7.5 TABLET, FILM COATED ORAL AT BEDTIME
Status: DISCONTINUED | OUTPATIENT
Start: 2023-02-22 | End: 2023-02-25 | Stop reason: HOSPADM

## 2023-02-22 RX ORDER — SODIUM CHLORIDE 9 MG/ML
INJECTION, SOLUTION INTRAVENOUS CONTINUOUS
Status: ACTIVE | OUTPATIENT
Start: 2023-02-22 | End: 2023-02-23

## 2023-02-22 RX ORDER — OXYCODONE HYDROCHLORIDE 5 MG/1
5 TABLET ORAL EVERY 6 HOURS PRN
Status: DISCONTINUED | OUTPATIENT
Start: 2023-02-22 | End: 2023-02-25 | Stop reason: HOSPADM

## 2023-02-22 RX ORDER — FUROSEMIDE 40 MG
40 TABLET ORAL DAILY
Status: DISCONTINUED | OUTPATIENT
Start: 2023-02-22 | End: 2023-02-24

## 2023-02-22 RX ORDER — EPHEDRINE SULFATE 50 MG/ML
INJECTION, SOLUTION INTRAMUSCULAR; INTRAVENOUS; SUBCUTANEOUS PRN
Status: DISCONTINUED | OUTPATIENT
Start: 2023-02-22 | End: 2023-02-22

## 2023-02-22 RX ORDER — CLINDAMYCIN PHOSPHATE 900 MG/50ML
900 INJECTION, SOLUTION INTRAVENOUS
Status: DISCONTINUED | OUTPATIENT
Start: 2023-02-22 | End: 2023-02-22 | Stop reason: HOSPADM

## 2023-02-22 RX ORDER — APREPITANT 40 MG/1
40 CAPSULE ORAL DAILY
Status: DISCONTINUED | OUTPATIENT
Start: 2023-02-22 | End: 2023-02-22 | Stop reason: HOSPADM

## 2023-02-22 RX ORDER — ONDANSETRON 2 MG/ML
4 INJECTION INTRAMUSCULAR; INTRAVENOUS EVERY 30 MIN PRN
Status: DISCONTINUED | OUTPATIENT
Start: 2023-02-22 | End: 2023-02-22 | Stop reason: HOSPADM

## 2023-02-22 RX ORDER — POTASSIUM CHLORIDE 1500 MG/1
20 TABLET, EXTENDED RELEASE ORAL DAILY
Status: DISCONTINUED | OUTPATIENT
Start: 2023-02-23 | End: 2023-02-25 | Stop reason: HOSPADM

## 2023-02-22 RX ORDER — LIDOCAINE 40 MG/G
CREAM TOPICAL
Status: DISCONTINUED | OUTPATIENT
Start: 2023-02-22 | End: 2023-02-25 | Stop reason: HOSPADM

## 2023-02-22 RX ORDER — FENTANYL CITRATE 0.05 MG/ML
25 INJECTION, SOLUTION INTRAMUSCULAR; INTRAVENOUS
Status: DISCONTINUED | OUTPATIENT
Start: 2023-02-22 | End: 2023-02-22 | Stop reason: HOSPADM

## 2023-02-22 RX ORDER — TOPIRAMATE 25 MG/1
25 TABLET, FILM COATED ORAL DAILY
Status: DISCONTINUED | OUTPATIENT
Start: 2023-02-23 | End: 2023-02-25 | Stop reason: HOSPADM

## 2023-02-22 RX ADMIN — ALBUMIN HUMAN: 0.05 INJECTION, SOLUTION INTRAVENOUS at 10:59

## 2023-02-22 RX ADMIN — Medication 5 MG: at 11:15

## 2023-02-22 RX ADMIN — PHENYLEPHRINE HYDROCHLORIDE 100 MCG: 10 INJECTION INTRAVENOUS at 11:01

## 2023-02-22 RX ADMIN — MIDAZOLAM 1 MG: 1 INJECTION INTRAMUSCULAR; INTRAVENOUS at 10:49

## 2023-02-22 RX ADMIN — Medication 5 MG: at 11:10

## 2023-02-22 RX ADMIN — LEVETIRACETAM 1500 MG: 750 TABLET, FILM COATED ORAL at 20:10

## 2023-02-22 RX ADMIN — Medication 10 MG: at 11:23

## 2023-02-22 RX ADMIN — ALBUMIN HUMAN 12.5 G: 0.05 INJECTION, SOLUTION INTRAVENOUS at 13:35

## 2023-02-22 RX ADMIN — CLINDAMYCIN PHOSPHATE 900 MG: 900 INJECTION, SOLUTION INTRAVENOUS at 09:48

## 2023-02-22 RX ADMIN — SODIUM CHLORIDE: 9 INJECTION, SOLUTION INTRAVENOUS at 18:33

## 2023-02-22 RX ADMIN — APREPITANT 40 MG: 40 CAPSULE ORAL at 08:45

## 2023-02-22 RX ADMIN — TRAZODONE HYDROCHLORIDE 100 MG: 100 TABLET ORAL at 22:19

## 2023-02-22 RX ADMIN — Medication 5 MG: at 11:19

## 2023-02-22 RX ADMIN — MIRTAZAPINE 7.5 MG: 7.5 TABLET, FILM COATED ORAL at 22:19

## 2023-02-22 RX ADMIN — SODIUM CHLORIDE, POTASSIUM CHLORIDE, SODIUM LACTATE AND CALCIUM CHLORIDE: 600; 310; 30; 20 INJECTION, SOLUTION INTRAVENOUS at 10:41

## 2023-02-22 RX ADMIN — PHENYLEPHRINE HYDROCHLORIDE 100 MCG: 10 INJECTION INTRAVENOUS at 11:30

## 2023-02-22 RX ADMIN — GENTAMICIN SULFATE 240 MG: 40 INJECTION, SOLUTION INTRAMUSCULAR; INTRAVENOUS at 09:01

## 2023-02-22 ASSESSMENT — ENCOUNTER SYMPTOMS: ORTHOPNEA: 0

## 2023-02-22 ASSESSMENT — ACTIVITIES OF DAILY LIVING (ADL)
ADLS_ACUITY_SCORE: 39
ADLS_ACUITY_SCORE: 35
ADLS_ACUITY_SCORE: 39
ADLS_ACUITY_SCORE: 37
ADLS_ACUITY_SCORE: 35
ADLS_ACUITY_SCORE: 39

## 2023-02-22 NOTE — ANESTHESIA CARE TRANSFER NOTE
Patient: Felicia Ellison    Procedure: Procedure(s):  FLEXIBLE CYSTOSCOPY, SUPRAPUBIC CATHETER EXCHANGE       Diagnosis: Neurogenic dysfunction of the urinary bladder [N31.9]  Diagnosis Additional Information: No value filed.    Anesthesia Type:   MAC     Note:    Oropharynx: oropharynx clear of all foreign objects and spontaneously breathing  Level of Consciousness: awake  Oxygen Supplementation: nasal cannula  Level of Supplemental Oxygen (L/min / FiO2): 3  Independent Airway: airway patency satisfactory and stable    Vital Signs Stable: post-procedure vital signs reviewed and stable  Report to RN Given: handoff report given  Patient transferred to: PACU    Handoff Report: Identifed the Patient, Identified the Reponsible Provider, Reviewed the pertinent medical history, Discussed the surgical course, Reviewed Intra-OP anesthesia mangement and issues during anesthesia, Set expectations for post-procedure period and Allowed opportunity for questions and acknowledgement of understanding      Vitals:  Vitals Value Taken Time   BP     Temp     Pulse     Resp     SpO2         Electronically Signed By: VERENA Krishna CRNA  February 22, 2023  11:43 AM

## 2023-02-22 NOTE — ANESTHESIA POSTPROCEDURE EVALUATION
Patient: Felicia Ellison    Procedure: Procedure(s):  FLEXIBLE CYSTOSCOPY, SUPRAPUBIC CATHETER EXCHANGE       Anesthesia Type:  MAC    Note:     Postop Pain Control: Uneventful            Sign Out: Well controlled pain   PONV: No   Neuro/Psych: Uneventful            Sign Out: Acceptable/Baseline neuro status   Airway/Respiratory: Uneventful            Sign Out: Acceptable/Baseline resp. status   CV/Hemodynamics: Uneventful            Sign Out: Acceptable CV status; No obvious hypovolemia; No obvious fluid overload   Other NRE: NONE   DID A NON-ROUTINE EVENT OCCUR? No    Event details/Postop Comments:  In PACU BP initially in the mid 90's. Overtime slowly drifted to the mid 80's.  Patient given an addition 250 ml 5% albumen with minimal change. Patient alert, with appropriate conversation. Patient with multiple sacral ulcers in different stages. Concern with low BP in set up for sepsis with poor nutritional status. Case discussed with Dr. Burns. Will admit patient for observation/ hospitalist consult and wound care consult.            Last vitals:  Vitals Value Taken Time   BP 94/53 02/22/23 1430   Temp 36.5  C (97.7  F) 02/22/23 1410   Pulse 78 02/22/23 1438   Resp 20 02/22/23 1438   SpO2 97 % 02/22/23 1438   Vitals shown include unvalidated device data.    Electronically Signed By: Mamadou Kebede MD  February 22, 2023  2:39 PM

## 2023-02-22 NOTE — OP NOTE
Procedure Date: 02/22/2023    NAME OF PROCEDURE:  Flexible cystoscopy through her catheterizable suprapubic tract, suprapubic catheter exchange.    PREOPERATIVE DIAGNOSES:  History of neurogenic bladder, status post cystectomy and creation of a continent urinary diversion with a catheterizable stoma, previously doing self-cath, none since September with an indwelling catheter since that time.    POSTOPERATIVE DIAGNOSIS:  Widely patent catheterizable tract and continent urinary diversion.  No stones.    DESCRIPTION OF PROCEDURE:  Informed consent was obtained from the patient.  She was brought to the operating room on a cart, given intravenous sedation on the cart.  Her existing SP catheter was removed and sterile prep and drape were applied.  Surgical timeout was taken.  Flexible cystoscope was introduced into her catheterizable tract, and this appeared widely patent and the scope passed easily into her continent diversion.  Inspection of the continent diversion was performed, revealed no stones.  There was a little bit of loose floating mucus.  The diversion was distended somewhat with the irrigation fluid.  I then placed a Super Stiff guidewire through the scope into the continent diversion and removed the scope.  A 16-Serbian Councill catheter was then placed easily over the guidewire into the continent diversion.  Balloon was inflated with 10 mL sterile water, and the guidewire was removed and the catheter was placed to bag drainage.  I did irrigate the catheter prior to placing it to drainage to make sure it was irrigating well and this worked fine.  She was taken to the recovery room in stable condition.  We will plan on leaving this catheter in until she can secure her self-cathing supplies again.  Probably, we will have her use 16-Serbian self caths doing it four times a day.  She had previously done this without difficulty for many, many years.    Russ Cristobal MD        D: 02/22/2023   T: 02/22/2023   MT:  [facility]    Name:     KISHOR PINEDA  MRN:      [id number]        Account:        [id number]   :      1965           Procedure Date: 2023     Document: [id number]    cc:  Russ Cristobal MD

## 2023-02-22 NOTE — OR NURSING
Report called to Karlene ROJAS. Patient transferred to [facility] on cart. Belongings and personal wheelchair accompanied patient. No prescriptions. Kailey her personal care attendant already went home.

## 2023-02-22 NOTE — PROGRESS NOTES
"PTA medications updated by Medication Scribe prior to surgery via phone call with patient (last doses completed by Nurse)     Medication history sources: Patient and MAR ([facility])  In the past week, patient estimated taking medication this percent of the time: Greater than 90%  Adherence assessment: N/A Not Observed    Significant changes made to the medication list:  None      Additional medication history information:   None    Medication reconciliation completed by provider prior to medication history? No    Time spent in this activity: 35 minutes    The information provided in this note is only as accurate as the sources available at the time of update(s)    Prior to Admission medications    Medication Sig Last Dose Taking? Auth Provider Long Term End Date   acetaminophen (TYLENOL) 325 MG tablet Take 2 tablets (650 mg) by mouth every 4 hours as needed for other, mild pain, fever or headaches (For optimal non-opioid multimodal pain management to improve pain control.) Unknown at prn Yes Cass Mccoy MD     diphenhydrAMINE (BENADRYL) 25 MG capsule Take 1 capsule (25 mg) by mouth every 6 hours as needed for itching Unknown at prn Yes Cass Mccoy MD     enoxaparin ANTICOAGULANT (LOVENOX) 60 MG/0.6ML syringe Inject 60 mg Subcutaneous every 12 hours More than a month at Unknown Yes Reported, Patient No    furosemide (LASIX) 20 MG tablet Take 2 tablets (40 mg) by mouth daily Unknown at Unknown Yes Ivan Baeza MD Yes    Gauze Pads & Dressings (CURITY ABDOMINAL) 8\"X10\" PADS COVER WOUND AND SECURE WITH TAPE Unknown Yes Reported, Patient     hypromellose-dextran (NATURAL BALANCE TEARS) 0.1-0.3 % ophthalmic solution Place 1 drop into both eyes every hour as needed for dry eyes Unknown at prn Yes Tc Reyes MD KLOR-CON 20 MEQ CR tablet TAKE 1 TABLET BY MOUTH EVERY DAY 2/22/2023 at am Yes Ivan Baeza MD No    levETIRAcetam (KEPPRA) 750 MG tablet TAKE 2 TABLETS (1,500 MG) BY MOUTH 2 " TIMES DAILY 2/22/2023 at am Yes Wilder Caldwell DO Yes    Lidocaine (LIDOCARE) 4 % Patch APPLY PATCH TO AFFECTED AREA. CUT PATCH TO FIT SIZEAFFECTED. MAY USE FOR 12 HOURS AND OFF FOR 12 HOURS. Unknown at prn Yes Ivan Baeza MD     mirtazapine (REMERON) 7.5 MG tablet TAKE 1 TABLET (7.5 MG) BY MOUTH AT BEDTIME 2/21/2023 at pm Yes Ivan Baeza MD Yes    multivitamin (CENTRUM SILVER) tablet Take 1 tablet by mouth daily 2/22/2023 at am Yes Ivan Baeza MD No    ondansetron (ZOFRAN) 4 MG tablet TAKE 1 TABLET (4 MG) BY MOUTH EVERY 8 HOURS AS NEEDED FOR NAUSEA Unknown at prn Yes Ivan Baeza MD     oxybutynin (DITROPAN) 5 MG tablet TAKE 2 TABLETS (10 MG) BY MOUTH DAILY Unknown at Unknown Yes Ivan Baeza MD No    oxyCODONE (ROXICODONE) 5 MG tablet TAKE 1 TAB BY MOUTH 2 TIMES DAILY AS NEEDED W/DRESSING CHANGES FOR MOD-SEVERE PAIN Unknown at prn Yes Ivan Baeza MD     pantoprazole (PROTONIX) 40 MG EC tablet TAKE 1 TABLET (40 MG) BY MOUTH EVERY MORNING (BEFOREBREAKFAST) Unknown at Unknown Yes Ivan Baeza MD     polyethylene glycol (MIRALAX) 17 GM/Dose powder Take 17 g by mouth daily Unknown at Unknown Yes Cass Mccoy MD     propranolol (INDERAL) 40 MG tablet Take 0.5 tablets (20 mg) by mouth 2 times daily 2/22/2023 at am Yes Ivan Baeza MD Yes    rizatriptan (MAXALT) 10 MG tablet TAKE 1 TAB BY MOUTH ONCE FOR MIGRAINE. MAY REPEAT IN2 HOURS. MAX OF 3 TABS ONCEDAILY Unknown at prn Yes Ivan Baeza MD     topiramate (TOPAMAX) 25 MG tablet Take 1 tablet (25 mg) by mouth daily 2/22/2023 at am Yes Ivan Baeza MD Yes    traZODone (DESYREL) 50 MG tablet Take 2 tablets (100 mg) by mouth At Bedtime 2/21/2023 at pm Yes Ivan Baeza MD Yes    zolpidem (AMBIEN) 5 MG tablet Take 0.5 tablets (2.5 mg) by mouth nightly as needed for sleep Unknown at prn Yes Ivan Baeza MD No    Wound Cleansers (VASHE WOUND THERAPY) SOLN MOISTEN 4X4 GAUZE PAD AND LOOSELY PACK INTO WOUND   Chambers,  MD Ivan     Wound Dressings (MEPILEX BORDER FLEX LITE) PADS USE AS DIRECTED Border 4X4 [id number]   Chambers, MD Ivan     Lidocaine-Collagen-Aloe Vera (REGENECARE) 2 % GEL THIN LAYER DAILY FOR PAIN   Chambers, MD Ivan Yes 4/25/22     Medication history completed by:    Reagan Barney CPhT  Medication [facility]
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
## 2023-02-22 NOTE — OR NURSING
SKin check complete, pt observed to have wound vac in place, wounds observed on right and left gluteal cheek, cleaned and redressed with Mepilex. Multiple other areas of redness cleaned and barrier applied.

## 2023-02-22 NOTE — OR NURSING
contacted regarding pt and possible issues involving being a vulnerable adult and potential for having unmet care needs. WOC nurse contacted regarding suspected new injuries, WOC stated they are unable to intervene because pt is being followed on an outpatient basis. Concerns regarding pt's wounds and status relayed to surgical team. Left message for [facility], vulnerable adult form through [facility] filled out.

## 2023-02-22 NOTE — OR NURSING
Dr. Kebede notified of hypotension 88/46. Order for Albumin 5% 250mL. Patient alert and talking, HR 73.

## 2023-02-22 NOTE — BRIEF OP NOTE
[facility] Urology Brief Operative Note    Pre-operative diagnosis: Neurogenic dysfunction of the urinary bladder [N31.9], S/P cystectomy and continent urinary diversion   Post-operative diagnosis: Same   Procedure: Procedure(s):  FLEXIBLE CYSTOSCOPY, SUPRAPUBIC CATHETER EXCHANGE   Surgeon: Russ Cristobal MD, MD   Assistant(s): OR staff   Anesthesia: Local anesthesia with sedation   Estimated blood loss: None   Total IV fluids: (See anesthesia record)   Blood transfusion: No transfusion was given during surgery   Total urine output: Not measured   Drains: Ambrose catheter   Specimens: None   Implants: None   Findings: Widely patent catheterizable tract to 18 Occitan scope, a oittle mucous in continent diversion, no stones.     Complications: None   Condition: Stable   Comments: See dictated operative report for full details

## 2023-02-22 NOTE — DISCHARGE INSTRUCTIONS
Same Day Surgery Discharge Instructions for  Sedation and General Anesthesia     It's not unusual to feel dizzy, light-headed or faint for up to 24 hours after surgery or while taking pain medication.  If you have these symptoms: sit for a few minutes before standing and have someone assist you when you get up to walk or use the bathroom.    You should rest and relax for the next 24 hours. We recommend you make arrangements to have an adult stay with you for at least 24 hours after your discharge.  Avoid hazardous and strenuous activity.    DO NOT DRIVE any vehicle or operate mechanical equipment for 24 hours following the end of your surgery.  Even though you may feel normal, your reactions may be affected by the medication you have received.    Do not drink alcoholic beverages for 24 hours following surgery.     Slowly progress to your regular diet as you feel able. It's not unusual to feel nauseated and/or vomit after receiving anesthesia.  If you develop these symptoms, drink clear liquids (apple juice, ginger ale, broth, 7-up, etc. ) until you feel better.  If your nausea and vomiting persists for 24 hours, please notify your surgeon.      All narcotic pain medications, along with inactivity and anesthesia, can cause constipation. Drinking plenty of liquids and increasing fiber intake will help.    For any questions of a medical nature, call your surgeon.    Do not make important decisions for 24 hours.    If you had general anesthesia, you may have a sore throat for a couple of days related to the breathing tube used during surgery.  You may use Cepacol lozenges to help with this discomfort.  If it worsens or if you develop a fever, contact your surgeon.     If you feel your pain is not well managed with the pain medications prescribed by your surgeon, please contact your surgeon's office to let them know so they can address your concerns.     **If you have questions or concerns about your procedure,   call  Dr. Cristobal at [phone]**
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
## 2023-02-22 NOTE — INTERVAL H&P NOTE
"I have reviewed the surgical (or preoperative) H&P that is linked to this encounter, and examined the patient. There are no significant changes    Clinical Conditions Present on Arrival:  Clinically Significant Risk Factors Present on Admission                  # Drug Induced Coagulation Defect: home medication list includes an anticoagulant medication   # Cachexia: Estimated body mass index is 16.98 kg/m  as calculated from the following:    Height as of this encounter: 1.753 m (5' 9\").    Weight as of this encounter: 52.2 kg (115 lb).       "

## 2023-02-23 LAB
ANION GAP SERPL CALCULATED.3IONS-SCNC: 7 MMOL/L (ref 7–15)
BUN SERPL-MCNC: 12.6 MG/DL (ref 6–20)
CALCIUM SERPL-MCNC: 7.8 MG/DL (ref 8.6–10)
CHLORIDE SERPL-SCNC: 111 MMOL/L (ref 98–107)
CREAT SERPL-MCNC: 0.4 MG/DL (ref 0.51–0.95)
DEPRECATED HCO3 PLAS-SCNC: 23 MMOL/L (ref 22–29)
GFR SERPL CREATININE-BSD FRML MDRD: >90 ML/MIN/1.73M2
GLUCOSE BLDC GLUCOMTR-MCNC: 92 MG/DL (ref 70–99)
GLUCOSE BLDC GLUCOMTR-MCNC: 94 MG/DL (ref 70–99)
GLUCOSE SERPL-MCNC: 96 MG/DL (ref 70–99)
HGB BLD-MCNC: 7.3 G/DL (ref 11.7–15.7)
HGB BLD-MCNC: 7.6 G/DL (ref 11.7–15.7)
IRON BINDING CAPACITY (ROCHE): 86 UG/DL (ref 240–430)
IRON SATN MFR SERPL: 15 % (ref 15–46)
IRON SERPL-MCNC: 13 UG/DL (ref 37–145)
POTASSIUM SERPL-SCNC: 3.6 MMOL/L (ref 3.4–5.3)
SODIUM SERPL-SCNC: 141 MMOL/L (ref 136–145)

## 2023-02-23 PROCEDURE — 82607 VITAMIN B-12: CPT | Performed by: PHYSICIAN ASSISTANT

## 2023-02-23 PROCEDURE — 85018 HEMOGLOBIN: CPT | Performed by: PHYSICIAN ASSISTANT

## 2023-02-23 PROCEDURE — G0378 HOSPITAL OBSERVATION PER HR: HCPCS

## 2023-02-23 PROCEDURE — 36415 COLL VENOUS BLD VENIPUNCTURE: CPT | Performed by: PHYSICIAN ASSISTANT

## 2023-02-23 PROCEDURE — G0463 HOSPITAL OUTPT CLINIC VISIT: HCPCS

## 2023-02-23 PROCEDURE — 83550 IRON BINDING TEST: CPT | Performed by: PHYSICIAN ASSISTANT

## 2023-02-23 PROCEDURE — 82728 ASSAY OF FERRITIN: CPT | Performed by: PHYSICIAN ASSISTANT

## 2023-02-23 PROCEDURE — 82746 ASSAY OF FOLIC ACID SERUM: CPT | Performed by: PHYSICIAN ASSISTANT

## 2023-02-23 PROCEDURE — 82962 GLUCOSE BLOOD TEST: CPT

## 2023-02-23 PROCEDURE — 99213 OFFICE O/P EST LOW 20 MIN: CPT | Performed by: PHYSICIAN ASSISTANT

## 2023-02-23 PROCEDURE — 80048 BASIC METABOLIC PNL TOTAL CA: CPT | Performed by: PHYSICIAN ASSISTANT

## 2023-02-23 PROCEDURE — 250N000013 HC RX MED GY IP 250 OP 250 PS 637: Performed by: UROLOGY

## 2023-02-23 RX ADMIN — LEVETIRACETAM 1500 MG: 750 TABLET, FILM COATED ORAL at 08:45

## 2023-02-23 RX ADMIN — LEVETIRACETAM 1500 MG: 750 TABLET, FILM COATED ORAL at 20:04

## 2023-02-23 RX ADMIN — TRAZODONE HYDROCHLORIDE 100 MG: 100 TABLET ORAL at 22:09

## 2023-02-23 RX ADMIN — TOPIRAMATE 25 MG: 25 TABLET, FILM COATED ORAL at 08:42

## 2023-02-23 RX ADMIN — OXYCODONE HYDROCHLORIDE 5 MG: 5 TABLET ORAL at 08:42

## 2023-02-23 RX ADMIN — OXYCODONE HYDROCHLORIDE 5 MG: 5 TABLET ORAL at 22:15

## 2023-02-23 RX ADMIN — POTASSIUM CHLORIDE 20 MEQ: 1500 TABLET, EXTENDED RELEASE ORAL at 08:42

## 2023-02-23 RX ADMIN — MIRTAZAPINE 7.5 MG: 7.5 TABLET, FILM COATED ORAL at 22:09

## 2023-02-23 ASSESSMENT — ACTIVITIES OF DAILY LIVING (ADL)
ADLS_ACUITY_SCORE: 47
ADLS_ACUITY_SCORE: 37
DEPENDENT_IADLS:: CLEANING;COOKING;LAUNDRY;SHOPPING;MEAL PREPARATION;MEDICATION MANAGEMENT;MONEY MANAGEMENT;TRANSPORTATION;INCONTINENCE
ADLS_ACUITY_SCORE: 47
ADLS_ACUITY_SCORE: 37
ADLS_ACUITY_SCORE: 45
ADLS_ACUITY_SCORE: 37

## 2023-02-23 NOTE — PROGRESS NOTES
Observation Goals:    -Return to baseline mental status - AOx4    -Hypercapnia, hypoventilation or hypoxia resolved for at least 2 hours without supplemental oxygen - patient on room air with O2 sat WNL.    -Deficits in sensation, mobility or coordination have resolved if spinal or regional anesthesia was used - patient known paraplegic, no sensation on lower extremities.

## 2023-02-23 NOTE — PROGRESS NOTES
[facility]    Urology Progress Note     Assessment & Plan   Felicia Ellison is a 57 year old female who was admitted on 2/22/2023. POD 1 s/p cystoscopy with suprapubic catheter change with Dr. Cristobal, admitted postoperatively with hypotension (seems to be her baseline).     Plan:   -ok for discharge from urology standpoint  -Per Dr. Cristobal- We will plan on leaving this catheter in until she can secure her self-cathing supplies again.  Probably, we will have her use 16-[place] self caths doing it four times a day.  She had previously done this without difficulty for many, many years.    Vera Xiao PA-C  [facility]   [email or web address]  Text Page (7:30am to 4:30pm)      Interval History   No events overnight  SPT draining clear urine this AM     AVSS    Physical Exam   Temp: 98.2  F (36.8  C) Temp src: Axillary BP: 101/52 Pulse: 72   Resp: 16 SpO2: 98 % O2 Device: None (Room air) Oxygen Delivery: 2 LPM  Vitals:    02/22/23 0822   Weight: 52.2 kg (115 lb)     Vital Signs with Ranges  Temp:  [97.4  F (36.3  C)-98.6  F (37  C)] 98.2  F (36.8  C)  Pulse:  [67-80] 72  Resp:  [11-20] 16  BP: ()/(41-59) 101/52  SpO2:  [96 %-100 %] 98 %  I/O last 3 completed shifts:  In: 2170 [P.O.:820; I.V.:400]  Out: 550 [Urine:550]    Alert and oriented  Breathing unlabored  Abdomen soft, nontender, no rebound or guarding  SPT draining clear urine  Extremities warm and well perfused, no edema      Medications       [Held by provider] furosemide  40 mg Oral Daily     levETIRAcetam  1,500 mg Oral BID     mirtazapine  7.5 mg Oral At Bedtime     potassium chloride ER  20 mEq Oral Daily     [Held by provider] propranolol  20 mg Oral BID     sodium chloride (PF)  3 mL Intracatheter Q8H     topiramate  25 mg Oral Daily     traZODone  100 mg Oral At Bedtime       Data   Results for orders placed or performed during the hospital encounter of 02/22/23 (from the past 24 hour(s))    Glucose by meter   Result Value Ref Range    GLUCOSE BY METER POCT 92 70 - 99 mg/dL   Glucose by meter   Result Value Ref Range    GLUCOSE BY METER POCT 94 70 - 99 mg/dL   Basic metabolic panel   Result Value Ref Range    Sodium 141 136 - 145 mmol/L    Potassium 3.6 3.4 - 5.3 mmol/L    Chloride 111 (H) 98 - 107 mmol/L    Carbon Dioxide (CO2) 23 22 - 29 mmol/L    Anion Gap 7 7 - 15 mmol/L    Urea Nitrogen 12.6 6.0 - 20.0 mg/dL    Creatinine 0.40 (L) 0.51 - 0.95 mg/dL    Calcium 7.8 (L) 8.6 - 10.0 mg/dL    Glucose 96 70 - 99 mg/dL    GFR Estimate >90 >60 mL/min/1.73m2   Hemoglobin   Result Value Ref Range    Hemoglobin 7.6 (L) 11.7 - 15.7 g/dL
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
## 2023-02-23 NOTE — CONSULTS
Care Management Initial Consult    General Information  Assessment completed with: Patient, Caregiver, Felicia  Type of CM/SW Visit: Initial Assessment    Primary Care Provider verified and updated as needed: Yes   Readmission within the last 30 days: no previous admission in last 30 days      Reason for Consult: discharge planning  Advance Care Planning:            Communication Assessment  Patient's communication style: spoken language (English or Bilingual)             Cognitive  Cognitive/Neuro/Behavioral: WDL                      Living Environment:   People in home: other (see comments)     Current living Arrangements: assisted living  Name of Facility: [facility]   Able to return to prior arrangements: yes       Family/Social Support:  Care provided by: self, other (see comments)  Provides care for: no one, unable/limited ability to care for self  Marital Status: Single             Description of Support System:           Current Resources:   Patient receiving home care services: Yes  Skilled Home Care Services: Skilled Nursing, Home Health Aid  Community Resources: Home Care  Equipment currently used at home: wheelchair, power  Supplies currently used at home: Wound Care Supplies    Employment/Financial:  Employment Status: disabled        Financial Concerns: No concerns identified           Lifestyle & Psychosocial Needs:  Social Determinants of Health     Tobacco Use: Low Risk      Smoking Tobacco Use: Never     Smokeless Tobacco Use: Never     Passive Exposure: Not on file   Alcohol Use: Not on file   Financial Resource Strain: Not on file   Food Insecurity: Not on file   Transportation Needs: Not on file   Physical Activity: Not on file   Stress: Not on file   Social Connections: Not on file   Intimate Partner Violence: Not on file   Depression: Not at risk     PHQ-2 Score: 0   Housing Stability: Not on file       Functional Status:  Prior to admission patient needed assistance:   Dependent ADLs::  "Bathing, Dressing  Dependent IADLs:: Cleaning, Cooking, Laundry, Shopping, Meal Preparation, Medication Management, Money Management, Transportation, Incontinence       Mental Health Status:  Mental Health Status: No Current Concerns (can be rude)       Chemical Dependency Status:                Values/Beliefs:  Spiritual, Cultural Beliefs, Episcopal Practices, Values that affect care: no               Additional Information:  Writer met with patient. She really did not want to speak to writer. She told writer\" What to I care\" to being giving an Medicare Outpatient Observation Notice. Writer asked if patient was discharging and patient told writer\"I guess I am staying another day\". Writer asked about a ride home and patient told writer,\"I use [id number]\" Writer found number [phone]. Writer called and spoke to the person who answered. \"we are booked tomorrow. Our  sat there and then she was admitted, that is a waste of their time. If you call in the morning, we will fit her in. She has to be dressed, papers in hand , we'll come and get her.\"   Writer set up ride for tomorrow via [facility] transport, soonest available for out of town was 4:30pm. Will call in morning for ride from her normal ride service.      Ava Taylor RN        "

## 2023-02-23 NOTE — PROGRESS NOTES
"/62 (BP Location: Right arm)   Pulse 84   Temp 98.1  F (36.7  C) (Oral)   Resp 16   Ht 1.753 m (5' 9\")   Wt 52.2 kg (115 lb)   LMP  (LMP Unknown)   SpO2 100%   BMI 16.98 kg/m   .    Assessment: Patient is alert and oriented x4, hypotension but patient asymptomatic. Urostomy in place, adequate output. Coloscopy in place, bag changed this shift d/t bag exploding. Wound cares completed by wound care, wound vac removed by [facility] nurse. Plan to return to care facility tomorrow.     Pain management: patient reported 7/10 pain in head, PRN oxycodone given per MAR.     Brittanie Vyas RN on 2/23/2023 at 6:43 PM    "
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
## 2023-02-23 NOTE — PROGRESS NOTES
Shift: 2209-9511  Orientation/Cognitive: AOx4  Observation Goals (Met/ Not Met): met.  Mobility Level/Assist Equipment: paraplegic. A1 when on bed, A2 when lifting - wheelchair bound.  Fall Risk (Y/N): yes  Behavior Concerns: calm and cooperative  Pain Management: denies pain  Tele/VS/O2: vitally stable and on room air; BP is soft, BP between 87-90 systolic and 41-47 diastolic. Denies dizziness nor lightheadedness, with good UO.  ABNL Lab/BG: Hgb and BMP - pending collection.  Diet: regular  Bowel/Bladder: incontinent; suprapubic catheter exchanged 2/22/23 and colostomy bag on LLQ  Skin Concerns: open wound on bilateral buttock's cheek and on right hip - with wound VACC patient's device. All wound covered with dressing - UTV  Drains/Devices: right hip - with wound VAC patient's device; PIV - infusing with NS 75ml/hr  Tests/Procedures for next shift: awaiting WOC consult.  Anticipated DC date & active delays: TBD      Observation Goals:    -Return to baseline mental status - AOx4    -Hypercapnia, hypoventilation or hypoxia resolved for at least 2 hours without supplemental oxygen - patient on room air with O2 sat WNL.    -Deficits in sensation, mobility or coordination have resolved if spinal or regional anesthesia was used - patient known paraplegic, no sensation on lower extremities.

## 2023-02-24 ENCOUNTER — TELEPHONE (OUTPATIENT)
Dept: WOUND CARE | Facility: CLINIC | Age: 58
End: 2023-02-24
Payer: COMMERCIAL

## 2023-02-24 LAB
ABO/RH(D): NORMAL
ALBUMIN SERPL BCG-MCNC: 2.4 G/DL (ref 3.5–5.2)
ALP SERPL-CCNC: 125 U/L (ref 35–104)
ALT SERPL W P-5'-P-CCNC: <5 U/L (ref 10–35)
ANION GAP SERPL CALCULATED.3IONS-SCNC: 7 MMOL/L (ref 7–15)
ANTIBODY SCREEN: NEGATIVE
AST SERPL W P-5'-P-CCNC: 10 U/L (ref 10–35)
BILIRUB DIRECT SERPL-MCNC: <0.2 MG/DL (ref 0–0.3)
BILIRUB SERPL-MCNC: <0.2 MG/DL
BLD PROD TYP BPU: NORMAL
BLOOD COMPONENT TYPE: NORMAL
BUN SERPL-MCNC: 13.1 MG/DL (ref 6–20)
CALCIUM SERPL-MCNC: 7.8 MG/DL (ref 8.6–10)
CHLORIDE SERPL-SCNC: 114 MMOL/L (ref 98–107)
CODING SYSTEM: NORMAL
CREAT SERPL-MCNC: 0.4 MG/DL (ref 0.51–0.95)
CROSSMATCH: NORMAL
DEPRECATED HCO3 PLAS-SCNC: 21 MMOL/L (ref 22–29)
ERYTHROCYTE [DISTWIDTH] IN BLOOD BY AUTOMATED COUNT: 18.2 % (ref 10–15)
FERRITIN SERPL-MCNC: 195 NG/ML (ref 11–328)
FOLATE SERPL-MCNC: 2.9 NG/ML (ref 4.6–34.8)
GFR SERPL CREATININE-BSD FRML MDRD: >90 ML/MIN/1.73M2
GLUCOSE SERPL-MCNC: 85 MG/DL (ref 70–99)
HCT VFR BLD AUTO: 24.1 % (ref 35–47)
HGB BLD-MCNC: 6.8 G/DL (ref 11.7–15.7)
HGB BLD-MCNC: 8.8 G/DL (ref 11.7–15.7)
ISSUE DATE AND TIME: NORMAL
MCH RBC QN AUTO: 23.5 PG (ref 26.5–33)
MCHC RBC AUTO-ENTMCNC: 28.2 G/DL (ref 31.5–36.5)
MCV RBC AUTO: 83 FL (ref 78–100)
PLATELET # BLD AUTO: 184 10E3/UL (ref 150–450)
POTASSIUM SERPL-SCNC: 3.9 MMOL/L (ref 3.4–5.3)
PROT SERPL-MCNC: 6 G/DL (ref 6.4–8.3)
RBC # BLD AUTO: 2.89 10E6/UL (ref 3.8–5.2)
SODIUM SERPL-SCNC: 142 MMOL/L (ref 136–145)
SPECIMEN EXPIRATION DATE: NORMAL
UNIT ABO/RH: NORMAL
UNIT NUMBER: NORMAL
UNIT STATUS: NORMAL
UNIT TYPE ISBT: 600
VIT B12 SERPL-MCNC: 316 PG/ML (ref 232–1245)
WBC # BLD AUTO: 2.6 10E3/UL (ref 4–11)

## 2023-02-24 PROCEDURE — 80053 COMPREHEN METABOLIC PANEL: CPT | Performed by: PHYSICIAN ASSISTANT

## 2023-02-24 PROCEDURE — 86850 RBC ANTIBODY SCREEN: CPT | Performed by: INTERNAL MEDICINE

## 2023-02-24 PROCEDURE — 85018 HEMOGLOBIN: CPT | Performed by: PHYSICIAN ASSISTANT

## 2023-02-24 PROCEDURE — P9016 RBC LEUKOCYTES REDUCED: HCPCS | Performed by: HOSPITALIST

## 2023-02-24 PROCEDURE — 250N000013 HC RX MED GY IP 250 OP 250 PS 637: Performed by: HOSPITALIST

## 2023-02-24 PROCEDURE — 36415 COLL VENOUS BLD VENIPUNCTURE: CPT | Performed by: HOSPITALIST

## 2023-02-24 PROCEDURE — 36415 COLL VENOUS BLD VENIPUNCTURE: CPT | Performed by: INTERNAL MEDICINE

## 2023-02-24 PROCEDURE — 86901 BLOOD TYPING SEROLOGIC RH(D): CPT | Performed by: INTERNAL MEDICINE

## 2023-02-24 PROCEDURE — 99233 SBSQ HOSP IP/OBS HIGH 50: CPT | Performed by: HOSPITALIST

## 2023-02-24 PROCEDURE — 999N000197 HC STATISTIC WOC PT EDUCATION, 0-15 MIN

## 2023-02-24 PROCEDURE — 250N000013 HC RX MED GY IP 250 OP 250 PS 637: Performed by: UROLOGY

## 2023-02-24 PROCEDURE — 82248 BILIRUBIN DIRECT: CPT | Performed by: HOSPITALIST

## 2023-02-24 PROCEDURE — 85048 AUTOMATED LEUKOCYTE COUNT: CPT | Performed by: PHYSICIAN ASSISTANT

## 2023-02-24 PROCEDURE — 36430 TRANSFUSION BLD/BLD COMPNT: CPT

## 2023-02-24 PROCEDURE — 85018 HEMOGLOBIN: CPT | Performed by: HOSPITALIST

## 2023-02-24 PROCEDURE — 86923 COMPATIBILITY TEST ELECTRIC: CPT | Performed by: HOSPITALIST

## 2023-02-24 PROCEDURE — 120N000001 HC R&B MED SURG/OB

## 2023-02-24 PROCEDURE — G0378 HOSPITAL OBSERVATION PER HR: HCPCS

## 2023-02-24 RX ORDER — FUROSEMIDE 20 MG
20 TABLET ORAL DAILY
Status: DISCONTINUED | OUTPATIENT
Start: 2023-02-24 | End: 2023-02-25 | Stop reason: HOSPADM

## 2023-02-24 RX ORDER — FOLIC ACID 1 MG/1
1 TABLET ORAL DAILY
Status: DISCONTINUED | OUTPATIENT
Start: 2023-02-24 | End: 2023-02-25 | Stop reason: HOSPADM

## 2023-02-24 RX ADMIN — FOLIC ACID 1 MG: 1 TABLET ORAL at 13:42

## 2023-02-24 RX ADMIN — TRAZODONE HYDROCHLORIDE 100 MG: 100 TABLET ORAL at 22:42

## 2023-02-24 RX ADMIN — LEVETIRACETAM 1500 MG: 750 TABLET, FILM COATED ORAL at 20:23

## 2023-02-24 RX ADMIN — TOPIRAMATE 25 MG: 25 TABLET, FILM COATED ORAL at 09:14

## 2023-02-24 RX ADMIN — MIRTAZAPINE 7.5 MG: 7.5 TABLET, FILM COATED ORAL at 21:48

## 2023-02-24 RX ADMIN — POTASSIUM CHLORIDE 20 MEQ: 1500 TABLET, EXTENDED RELEASE ORAL at 09:14

## 2023-02-24 RX ADMIN — FUROSEMIDE 20 MG: 20 TABLET ORAL at 13:42

## 2023-02-24 RX ADMIN — OXYCODONE HYDROCHLORIDE 5 MG: 5 TABLET ORAL at 21:48

## 2023-02-24 RX ADMIN — OXYCODONE HYDROCHLORIDE 5 MG: 5 TABLET ORAL at 13:42

## 2023-02-24 RX ADMIN — LEVETIRACETAM 1500 MG: 750 TABLET, FILM COATED ORAL at 09:14

## 2023-02-24 ASSESSMENT — ACTIVITIES OF DAILY LIVING (ADL)
ADLS_ACUITY_SCORE: 47

## 2023-02-24 NOTE — PROGRESS NOTES
Shift: [id number]  Orientation/Cognitive: AOx4  Observation Goals (Met/ Not Met): met.  Mobility Level/Assist Equipment: paraplegic. A1 when on bed, A2 when lifting - wheelchair bound.  Fall Risk (Y/N): yes  Behavior Concerns: calm and cooperative  Pain Management: denies pain  Tele/VS/O2: vitally stable and on room air.  ABNL Lab/BG: CBC c PC, ABO/RH type and screen, and BMP - pending collection.  Diet: regular  Bowel/Bladder: incontinent; suprapubic catheter exchanged 2/22/23 and colostomy bag on LLQ  Skin Concerns: open wound on sacral and on right hip. Wound re-dressed on right hip due to saturation 75% soaked with serous drainage. Patient on low air loss mattress.  Drains/Devices:  PIV - SL.  Tests/Procedures for next shift: none  Anticipated DC date & active delays: TBD    At 0655, noticed a large amount of urine leakage around suprapubic stoma. Will relay this to the Urology by AM nurse.    ______________  Observation Goals:    -Return to baseline mental status - AOx4    -Hypercapnia, hypoventilation or hypoxia resolved for at least 2 hours without supplemental oxygen - patient on room air with O2 sat WNL.    -Deficits in sensation, mobility or coordination have resolved if spinal or regional anesthesia was used - patient known paraplegic, no sensation on lower extremities.

## 2023-02-24 NOTE — PROGRESS NOTES
Observation goals: Delayed Recovery from Anesthesia  PRIOR TO DISCHARGE         -Return to baseline mental status - Not met  -Hypercapnia, hypoventilation or hypoxia resolved for at least 2 hours without supplemental oxygen - O2 sats WNL, on RA  -Deficits in sensation, mobility or coordination have resolved if spinal or regional anesthesia was used - No sensation on LE, pt known paraplegic    Notify Provider when observation discharge goals have been met and patient is ready for discharge.
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
## 2023-02-24 NOTE — PROGRESS NOTES
Dressing changed on right hip wound dressing as per order. Right hip old dressing was 75% soaked and serous drainage. Patient constantly refused to have her suprapubic stoma and dressing seen and checked, writer explained the need to check and changed the dressing if necessary, but she said it's fine.

## 2023-02-24 NOTE — PROGRESS NOTES
MD Notification    Notified Person: MD    Notified Person Name: KATHERINE ESCALONA     Notification Date/Time: 2/24/23 0049    Notification Interaction: amcom    Purpose of Notification: 5518 B.M.  Just FYI, saw under PA's note to transfuse Hgb if <7. Can you pls put this in the order? thanks Charity RN *[phone]    Orders Received: received callback: doctor wants to be re-page if Hgb re-check is <7 for one time Hgb transfusion.    Comments:

## 2023-02-24 NOTE — PROGRESS NOTES
"[facility]    Urology Progress Note     Assessment & Plan   Felicia Ellison is a 57 year old female who was admitted on 2/22/2023. POD 2 s/p cystoscopy with suprapubic catheter change with Dr. Cristobal, admitted postoperatively with hypotension (seems to be her baseline).     Interval History:  SPT stopped draining sometime over the last 24 hours and patient is bothered by significant leakage around SPT - she is holding maxi pads to the site to help manage this. She is frustrated that she is not discharging today.     I irrigated the suprapubic catheter at bedside for return of sediment - after this cleared, 400cc clear yellow urine flowed freely from catheter.     Plan:   - Continue suprapubic catheter. Please irrigate catheter for any concerns for not draining or increase in leakage around catheter.   - OK for discharge from urology perspective when medically cleared. Appreciate IM assistance with pressures, anemia.   - Per Dr. Cristobal- We will plan on leaving this catheter in until she can secure her self-cathing supplies again.  Probably, we will have her use 16-Peruvian self caths doing it four times a day.  She had previously done this without difficulty for many years.    Jina Chavez PA-C  [facility]  Pager: [phone]  Office: [phone]    OBJECTIVE:          BP 95/54   Pulse 85   Temp 98.6  F (37  C) (Oral)   Resp 16   Ht 1.753 m (5' 9\")   Wt 52.2 kg (115 lb)   LMP  (LMP Unknown)   SpO2 99%   BMI 16.98 kg/m      Intake/Output Summary (Last 24 hours) at 2/24/2023 1242  Last data filed at 2/23/2023 1525  Gross per 24 hour   Intake --   Output 275 ml   Net -275 ml            General appearance shows no deformaties and good grooming in no acute distress.  EYES: no icterus  HEAD, EARS, NOSE, MOUTH, AND THROAT: atraumatic, normocephalic  CARDIAC: skin well perfused  RESPIRATORY: breathing unlabored  ABDOMEN: soft, non tender.    : SPT in place, not " draining. Significant leakage clear urine around SPT prior to irrigations.   SKIN/HAIR/NAILS: no visible rashes  NEUROLOGIC: no focal deficits  PSYCHIATRIC: Speech, mood, and affect normal      Jina Chavez PA-C  [facility]
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
## 2023-02-24 NOTE — PROGRESS NOTES
MD Notification    Notified Person: MD    Notified Person Name:       KATHERINE ESCALONA     Notification Date/Time: 2/24/23 0023    Notification Interaction: amcom    Purpose of Notification: 5518 B.M.  Just FYI, saw under PA's note to transfuse Hgb if <7. pt's current Hgb at 7.3. Can you pls put this in the order. Also, pls order blood typing and cross match. thanks Charity ROJAS *[phone]    Orders Received: ABO/Rh type and screen ordered and scheduled at 0600 on 2/24/23.    Comments:

## 2023-02-24 NOTE — CONSULTS
Care Management Follow Up    Length of Stay (days): 0    Expected Discharge Date: 02/24/2023     Concerns to be Addressed:       Patient plan of care discussed at interdisciplinary rounds: Yes    Anticipated Discharge Disposition:       Anticipated Discharge Services:    Anticipated Discharge DME:      Patient/family educated on Medicare website which has current facility and service quality ratings:    Education Provided on the Discharge Plan:    Patient/Family in Agreement with the Plan:  TBD    Referrals Placed by CM/SW:    Private pay costs discussed: Not applicable    Additional Information:  Writer is following for discharge planning. [employer] at [phone] can pick patient if patient discharges tomorrow. They wanted to know by the end of today. Sunday they would not be able to as they have no drivers.   Updated patient's [facility] on patient staying.. Nurse Francis gave writer the on-call number for a nurse to call tomorrow in case patient discharges tomorrow. It is [phone].    Ava Taylor RN

## 2023-02-24 NOTE — PROGRESS NOTES
"[facility] Nurse Inpatient Assessment      Consulted for:  Buttock and right hip wounds    S-(situation): chart check requested in sticky note by [facility] team member     B-(background): consult completed yesterday 2/23 with full details in consult note     A-(assessment): assessment complete through chart review     R-(recommendations): continue current plan of care written 2/23 in nursing care summary, [facility] routine follow up weekly       Simona PAZ   1st: [employer], call \"Simona Barrow\" or call Group \"[facility] Nurse\"   (2nd option: leave a voicemail at Dept. Office Number: [phone]. Messages checked occasionally M-F)      "

## 2023-02-25 VITALS
WEIGHT: 115 LBS | DIASTOLIC BLOOD PRESSURE: 67 MMHG | OXYGEN SATURATION: 98 % | RESPIRATION RATE: 18 BRPM | SYSTOLIC BLOOD PRESSURE: 108 MMHG | TEMPERATURE: 97.5 F | HEIGHT: 69 IN | HEART RATE: 87 BPM | BODY MASS INDEX: 17.03 KG/M2

## 2023-02-25 LAB
ALBUMIN SERPL BCG-MCNC: 2.5 G/DL (ref 3.5–5.2)
ALP SERPL-CCNC: 143 U/L (ref 35–104)
ALT SERPL W P-5'-P-CCNC: <5 U/L (ref 10–35)
ANION GAP SERPL CALCULATED.3IONS-SCNC: 9 MMOL/L (ref 7–15)
AST SERPL W P-5'-P-CCNC: 11 U/L (ref 10–35)
BILIRUB SERPL-MCNC: 0.2 MG/DL
BUN SERPL-MCNC: 12.2 MG/DL (ref 6–20)
CALCIUM SERPL-MCNC: 8.2 MG/DL (ref 8.6–10)
CHLORIDE SERPL-SCNC: 107 MMOL/L (ref 98–107)
CREAT SERPL-MCNC: 0.41 MG/DL (ref 0.51–0.95)
DEPRECATED HCO3 PLAS-SCNC: 23 MMOL/L (ref 22–29)
ERYTHROCYTE [DISTWIDTH] IN BLOOD BY AUTOMATED COUNT: 18.6 % (ref 10–15)
GFR SERPL CREATININE-BSD FRML MDRD: >90 ML/MIN/1.73M2
GLUCOSE SERPL-MCNC: 85 MG/DL (ref 70–99)
HCT VFR BLD AUTO: 32.3 % (ref 35–47)
HGB BLD-MCNC: 9.7 G/DL (ref 11.7–15.7)
MCH RBC QN AUTO: 25.6 PG (ref 26.5–33)
MCHC RBC AUTO-ENTMCNC: 30 G/DL (ref 31.5–36.5)
MCV RBC AUTO: 85 FL (ref 78–100)
PLATELET # BLD AUTO: 232 10E3/UL (ref 150–450)
POTASSIUM SERPL-SCNC: 3.7 MMOL/L (ref 3.4–5.3)
PROT SERPL-MCNC: 6.9 G/DL (ref 6.4–8.3)
RBC # BLD AUTO: 3.79 10E6/UL (ref 3.8–5.2)
SODIUM SERPL-SCNC: 139 MMOL/L (ref 136–145)
WBC # BLD AUTO: 4.5 10E3/UL (ref 4–11)

## 2023-02-25 PROCEDURE — 85027 COMPLETE CBC AUTOMATED: CPT | Performed by: HOSPITALIST

## 2023-02-25 PROCEDURE — 99232 SBSQ HOSP IP/OBS MODERATE 35: CPT | Performed by: INTERNAL MEDICINE

## 2023-02-25 PROCEDURE — 80053 COMPREHEN METABOLIC PANEL: CPT | Performed by: HOSPITALIST

## 2023-02-25 PROCEDURE — 36415 COLL VENOUS BLD VENIPUNCTURE: CPT | Performed by: HOSPITALIST

## 2023-02-25 PROCEDURE — 250N000013 HC RX MED GY IP 250 OP 250 PS 637: Performed by: UROLOGY

## 2023-02-25 PROCEDURE — 999N000147 HC STATISTIC PT IP EVAL DEFER

## 2023-02-25 PROCEDURE — 250N000013 HC RX MED GY IP 250 OP 250 PS 637: Performed by: HOSPITALIST

## 2023-02-25 RX ORDER — FOLIC ACID 1 MG/1
1 TABLET ORAL DAILY
Qty: 30 TABLET | Refills: 3 | Status: SHIPPED | OUTPATIENT
Start: 2023-02-26 | End: 2024-05-30

## 2023-02-25 RX ADMIN — POTASSIUM CHLORIDE 20 MEQ: 1500 TABLET, EXTENDED RELEASE ORAL at 08:56

## 2023-02-25 RX ADMIN — FOLIC ACID 1 MG: 1 TABLET ORAL at 08:56

## 2023-02-25 RX ADMIN — LEVETIRACETAM 1500 MG: 750 TABLET, FILM COATED ORAL at 08:55

## 2023-02-25 RX ADMIN — TOPIRAMATE 25 MG: 25 TABLET, FILM COATED ORAL at 08:56

## 2023-02-25 RX ADMIN — FUROSEMIDE 20 MG: 20 TABLET ORAL at 08:56

## 2023-02-25 ASSESSMENT — ACTIVITIES OF DAILY LIVING (ADL)
ADLS_ACUITY_SCORE: 47
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
## 2023-02-25 NOTE — PROGRESS NOTES
Orientation/Cognitive:  A&OX4  Observation Goals (Met/ Not Met): Met  Mobility Level/Assist Equipment: AX2 w/lift  Fall Risk (Y/N): Yes  Behavior Concerns: Agitated  Pain Management: Did not complain of pain  Tele/VS/O2: VSS on RA  ABNL Lab/BG: Hgb 8.8  Diet: Regular  Bowel/Bladder: Suprapubic catheter, Incontinent,   Skin Concerns: Sacral wound, right hip, BLE wounds  Drains/Devices: PIV SL  Tests/Procedures for next shift: None  Anticipated DC date & active delays: 2/25/23

## 2023-02-25 NOTE — PROGRESS NOTES
Pt discharged to [facility]. Pt refused to change dressing on sacral wound per plan of care.

## 2023-02-25 NOTE — PROGRESS NOTES
"Care Management Discharge Note    Discharge Date: 02/25/2023       Discharge Disposition:  Back to [facility]    Discharge Services:  Per [facility]    Discharge DME:  none    Discharge Transportation:      Private pay costs discussed: Not applicable      Education Provided on the Discharge Plan:    Persons Notified of Discharge Plans:  Patient/Family in Agreement with the Plan:  Yes, \"I want to go home!\"    Handoff Referral Completed: Yes    Additional Information:  Writer noted that patient had a discharge order. Writer immediately called patient's transportation company, [employer] . They will dispatch a  out and they will be able to pick patient up at 3:15pm. Writer told to  at door #2. AVS faxed to [facility].        Ava Taylor RN        "

## 2023-02-25 NOTE — PLAN OF CARE
Goal Outcome Evaluation:         Neuro- A&O  Most Recent Vitals- Temp: 99  F (37.2  C) Temp src: Oral BP: 115/66 Pulse: 82   Resp: 16 SpO2: 100 % O2 Device: None (Room air)   Tele/Cardiac- NA  Resp- RA  Activity- up with lift   Pain- PRN administered  Drips- NA  Drains/Tubes- mcintyre irrigated  Skin- wound  GI/- colostomy bag  Aggression Color- Green  COVID status- negative  Plan- TBD  Misc- Patient A&O pain managed with PRN, patient w/c bound baseline paraplegic, decline cares at time, prefers to stay with her pants on, turn and repo as she allows. SP leaks at times dressing changed irrigated X1 at this shift. Plan to go home pending .     Sangeeta Dozier RN

## 2023-02-25 NOTE — PLAN OF CARE
Physical Therapy: Orders received. Chart reviewed and discussed with care team.? Physical Therapy not indicated due to pt uses lift at baseline at [facility], plans to return to [facility] with assist with all need. No acute skilled physical therapy needs at this time. Defer discharge recommendations to medical team.? Will complete orders.     If status changes, please re-consult PT.

## 2023-02-25 NOTE — PROGRESS NOTES
Orientation/Cognitive:  A&OX4  Observation Goals (Met/ Not Met): Not met  Mobility Level/Assist Equipment: AX2 w/lift  Fall Risk (Y/N): Yes  Behavior Concerns: None  Pain Management: Oxycodone for pain  Tele/VS/O2: VSS on RA  ABNL Lab/BG: Hgb 6.8  Diet: Regular  Bowel/Bladder: Suprapubic catheter, Incontinent,   Skin Concerns: Sacral wound, right hip, BLE wounds  Drains/Devices: PIV SL  Tests/Procedures for next shift: None  Anticipated DC date & active delays: TBD

## 2023-02-25 NOTE — PROGRESS NOTES
UROLOGY BRIEF NOTE    Chart reviewed. Suprapubic tube with adequate outputs.   Hgb stabilized. BP improved.    OK for discharge from urology standpoint once cleared by IM.    Tracee Gurrola PA-C  [facility]   [email or web address]   Text Page (7:30am to 4:30pm)

## 2023-02-25 NOTE — PROGRESS NOTES
Orientation/Cognitive:  A/O x4   Observation Goals (Met/ Not Met): Inpatient   Mobility Level/Assist Equipment: Ax2/lift, T&R. Wheelchair bound at baseline   Fall Risk (Y/N): Yes    Behavior Concerns: None  Pain Management: Denies   Tele/VS/O2: VSS on RA, ex soft BP   ABNL Lab/BG: Hgb 6.8, 8.8, 1U RBC given yesterday   Diet: Regular   Bowel/Bladder: Suprapubic cath patent    Skin Concerns: Chidi buttocks and right hip wounds, cares per WOC   Drains/Devices: PIV SL, Suprapubic cath, colostomy   Tests/Procedures for next shift: None   Anticipated DC date & active delays: TBD

## 2023-02-27 ENCOUNTER — OFFICE VISIT (OUTPATIENT)
Dept: SURGERY | Facility: CLINIC | Age: 58
End: 2023-02-27
Payer: COMMERCIAL

## 2023-02-27 ENCOUNTER — PATIENT OUTREACH (OUTPATIENT)
Dept: CARE COORDINATION | Facility: CLINIC | Age: 58
End: 2023-02-27

## 2023-02-27 VITALS — DIASTOLIC BLOOD PRESSURE: 72 MMHG | TEMPERATURE: 96.5 F | SYSTOLIC BLOOD PRESSURE: 110 MMHG

## 2023-02-27 DIAGNOSIS — S31.819A BUTTOCK WOUND, RIGHT, INITIAL ENCOUNTER: ICD-10-CM

## 2023-02-27 DIAGNOSIS — L98.429 STAGE 3 SKIN ULCER OF SACRAL REGION (H): Primary | ICD-10-CM

## 2023-02-27 DIAGNOSIS — S71.001A OPEN WOUND OF RIGHT HIP, INITIAL ENCOUNTER: ICD-10-CM

## 2023-02-27 PROCEDURE — 99214 OFFICE O/P EST MOD 30 MIN: CPT | Performed by: SPECIALIST

## 2023-02-27 ASSESSMENT — PAIN SCALES - GENERAL: PAINLEVEL: NO PAIN (0)

## 2023-02-27 NOTE — PROGRESS NOTES
Follow-up for sacral wound and right hip wound     Subjective:   Patient is a 57-year-old paraplegic white female who is had a longstanding history of a sacral wound.  She states that healed on and off.  At last reopened about a 3 years ago.  She was last seen by me in 2020 and present was started to her sacral wound.  Since then she was transition to a VAC last September.  She also developed a new right hip and ischial tuberosity wounds which she now follows up.  She is using silver cell on the hip wounds.  She was sent to me for possible debridement.      Objective:  B/P: 110/72, T: 96.5, P: Data Unavailable, R: Data Unavailable  Back: 5x9x2 cm stage 3 sacral decubitus.  100% granulation  Right hip: 2 wounds 100% granulation 1.5x0.7x7cm .  Post - [id number]    Lab Results   Component Value Date    ALBUMIN 2.5 02/25/2023    ALBUMIN 1.6 09/20/2022    ALBUMIN 2.7 05/26/2021       Assessement/plan:  This is a 57-year-old paraplegic lady with a stage III sacral decubitus.  She has also had multiple flaps to the area.  The wound itself is clean and there is no need for debridement.  The hip wounds are also clean.  We will continue with the VAC to the sacral wound and silver cell to the hip wounds.  Continue nutritional support.  She can follow-up with the wound nurse as scheduled.      Marin Zavala MD, FACS

## 2023-02-27 NOTE — PROGRESS NOTES
JUANITA created a new program for Primary Care-Care Coordination for patient who is established within the [facility] system and is eligible for Clinic Care Coordination.    General acute hospital    Background: Transitional Care Management program identified per system criteria and reviewed by General acute hospital team for possible outreach.    Assessment: Upon chart review, [facility] Team member will not proceed with patient outreach related to this episode of Transitional Care Management program due to reason below:    Patient has a follow up appointment with an appropriate provider today for hospital discharge     Patient has an appointment today with an MD in Surgery from [facility]. Patient's appointment today is appropriate for ED/Hospital follow-up and chief complaint/diagnosis. No [facility] outreach call needed at this time.     Plan: Transitional Care Management episode addressed appropriately per reason noted above.      JUANITA Nielsen  [phone]  [facility]    *Connected Care Resource Team does NOT follow patient ongoing. Referrals are identified based on internal discharge reports and the outreach is to ensure patient has an understanding of their discharge instructions.

## 2023-02-27 NOTE — LETTER
2/27/2023         RE: Felicia Ellison  [facility]  [address]  [facility] [address]        Dear Colleague,    Thank you for referring your patient, Felicia Ellison, to the [facility]. Please see a copy of my visit note below.    Follow-up for sacral wound and right hip wound     Subjective:   Patient is a 57-year-old paraplegic white female who is had a longstanding history of a sacral wound.  She states that healed on and off.  At last reopened about a 3 years ago.  She was last seen by me in 2020 and present was started to her sacral wound.  Since then she was transition to a VAC last September.  She also developed a new right hip and ischial tuberosity wounds which she now follows up.  She is using silver cell on the hip wounds.  She was sent to me for possible debridement.      Objective:  B/P: 110/72, T: 96.5, P: Data Unavailable, R: Data Unavailable  Back: 5x9x2 cm stage 3 sacral decubitus.  100% granulation  Right hip: 2 wounds 100% granulation 1.5x0.7x7cm .  Post - [id number]    Lab Results   Component Value Date    ALBUMIN 2.5 02/25/2023    ALBUMIN 1.6 09/20/2022    ALBUMIN 2.7 05/26/2021       Assessement/plan:  This is a 57-year-old paraplegic lady with a stage III sacral decubitus.  She has also had multiple flaps to the area.  The wound itself is clean and there is no need for debridement.  The hip wounds are also clean.  We will continue with the VAC to the sacral wound and silver cell to the hip wounds.  Continue nutritional support.  She can follow-up with the wound nurse as scheduled.      Marin Zavala MD, FACS                Again, thank you for allowing me to participate in the care of your patient.        Sincerely,        Marin Zavala MD

## 2023-02-27 NOTE — PROGRESS NOTES
WOUND CARE     Patient seen per the order of Dr. Zavala.     Wound Vac:  Location: Sacrum, right hip, right buttock    The previous wound vac dressing was clean, dry, and intact. Previous dressing was removed noting a small amount of drainage present in canister.      Action & Treatment order per doctor: Wound(s) cleansed with Microklenz. Cavilon barrier film was applied to surrounding skin and wound was window paned with drape to protect surrounding skin. Three pieces of  black foam inserted into wound to make contact directly with the wound bed. Bridge was created from wound to right hip. Entire area was sealed with drape and a hole was cut over bridge to attach tract pad. Tract pad tubing was attached to canister and continuous negative pressure started and set at 125mmHg. Patient tolerated procedure well. No concerns noted.      Patient verbalized understanding of treatment plan.     Follow up: with [facility] nurse as needed.      Kailey Murillo RN on 2/27/2023 at 4:45 PM

## 2023-02-27 NOTE — DISCHARGE SUMMARY
"[facility] Discharge Summary    Felicia Ellison MRN# [id number]   Age: 57 year old YOB: 1965     Date of Admission:  2/22/2023  Date of Discharge::  2/25/2023  3:51 PM  Admitting Physician:  Russ Cristobal MD  Discharge Physician:  Russ Cristobal MD, MD     Home clinic: [facility]          Admission Diagnoses:   Neurogenic bladder  Status post cystectomy and continent urinary diversion          Discharge Diagnosis:   same          Procedures:   Procedure(s): Cystoscopy             Medications Prior to Admission:     No medications prior to admission.             Discharge Medications:     Discharge Medication List as of 2/25/2023  2:57 PM      START taking these medications    Details   folic acid (FOLVITE) 1 MG tablet Take 1 tablet (1 mg) by mouth daily, Disp-30 tablet, R-3, E-Prescribe         CONTINUE these medications which have NOT CHANGED    Details   acetaminophen (TYLENOL) 325 MG tablet Take 2 tablets (650 mg) by mouth every 4 hours as needed for other, mild pain, fever or headaches (For optimal non-opioid multimodal pain management to improve pain control.), Disp-30 tablet, R-1, E-Prescribe      diphenhydrAMINE (BENADRYL) 25 MG capsule Take 1 capsule (25 mg) by mouth every 6 hours as needed for itching, Disp-20 capsule, R-0, E-Prescribe      enoxaparin ANTICOAGULANT (LOVENOX) 60 MG/0.6ML syringe Inject 60 mg Subcutaneous every 12 hours, Historical      furosemide (LASIX) 20 MG tablet Take 2 tablets (40 mg) by mouth daily, Disp-180 tablet, R-4, E-Prescribe      Gauze Pads & Dressings (CURITY ABDOMINAL) 8\"X10\" PADS COVER WOUND AND SECURE WITH TAPE, Historical      hypromellose-dextran (NATURAL BALANCE TEARS) 0.1-0.3 % ophthalmic solution Place 1 drop into both eyes every hour as needed for dry eyes, Disp-30 mL, R-0, Historical      KLOR-CON 20 MEQ CR tablet TAKE 1 TABLET BY MOUTH EVERY DAY, Disp-30 tablet, R-0, E-Prescribe      levETIRAcetam " (KEPPRA) 750 MG tablet TAKE 2 TABLETS (1,500 MG) BY MOUTH 2 TIMES DAILY, Disp-120 tablet, R-1, E-Prescribe      Lidocaine (LIDOCARE) 4 % Patch APPLY PATCH TO AFFECTED AREA. CUT PATCH TO FIT SIZEAFFECTED. MAY USE FOR 12 HOURS AND OFF FOR 12 HOURS.Disp-5 patch, M-[id number]-Nzyisjeiw      mirtazapine (REMERON) 7.5 MG tablet TAKE 1 TABLET (7.5 MG) BY MOUTH AT BEDTIME, Disp-90 tablet, R-1, E-Prescribe      multivitamin (CENTRUM SILVER) tablet Take 1 tablet by mouth daily, Disp-100 tablet, R-1, OTC      ondansetron (ZOFRAN) 4 MG tablet TAKE 1 TABLET (4 MG) BY MOUTH EVERY 8 HOURS AS NEEDED FOR NAUSEA, Disp-30 tablet, R-0, E-Prescribe      oxybutynin (DITROPAN) 5 MG tablet TAKE 2 TABLETS (10 MG) BY MOUTH DAILY, Disp-90 tablet, R-1, E-Prescribe      oxyCODONE (ROXICODONE) 5 MG tablet TAKE 1 TAB BY MOUTH 2 TIMES DAILY AS NEEDED W/DRESSING CHANGES FOR MOD-SEVERE PAIN, Disp-20 tablet, R-0, E-Prescribe      pantoprazole (PROTONIX) 40 MG EC tablet TAKE 1 TABLET (40 MG) BY MOUTH EVERY MORNING (BEFOREBREAKFAST), Disp-90 tablet, R-1, E-Prescribe      polyethylene glycol (MIRALAX) 17 GM/Dose powder Take 17 g by mouth daily, Disp-510 g, R-1, E-Prescribe      propranolol (INDERAL) 40 MG tablet Take 0.5 tablets (20 mg) by mouth 2 times daily, Disp-90 tablet, R-1, E-Prescribe      rizatriptan (MAXALT) 10 MG tablet TAKE 1 TAB BY MOUTH ONCE FOR MIGRAINE. MAY REPEAT IN2 HOURS. MAX OF 3 TABS ONCEDAILY, Disp-15 tablet, R-3, E-Prescribe      topiramate (TOPAMAX) 25 MG tablet Take 1 tablet (25 mg) by mouth daily, Disp-90 tablet, R-1, E-Prescribe      traZODone (DESYREL) 50 MG tablet Take 2 tablets (100 mg) by mouth At Bedtime, Disp-180 tablet, R-1, E-Prescribe      Wound Cleansers (VASHE WOUND THERAPY) SOLN MOISTEN 4X4 GAUZE PAD AND LOOSELY PACK INTO WOUND, Disp-250 mL, R-0, E-PrescribePrescription not previously filled at [facility]. Please authorize a new RX for this patient. Thank you.      Wound Dressings (MEPILEX BORDER FLEX LITE) PADS USE AS  DIRECTED Border 4X4 [id number], Disp-5 each, R-0, E-PrescribePrescription not previously filled at [facility]. Please authorize a new RX for this patient. Thank you.      zolpidem (AMBIEN) 5 MG tablet Take 0.5 tablets (2.5 mg) by mouth nightly as needed for sleep, Disp-15 tablet, R-3, E-Prescribe                   Consultations:   Consultation during this admission received from hospitalist and [facility]          Brief History of Illness:   Reason for admission requiring a surgical or invasive procedure:   Continent urinary diversion   The patient underwent the following procedure(s):   Cystoscopy   There were no immediate complications during this procedure.    Please refer to the full operative summary for details.  She would very much like to get rid of her indwelling catheter and resume self cath           Hospital Course:   The patient's hospital course was unremarkable.  She recovered as anticipated and experienced no post-operative complications.           Discharge Instructions and Follow-Up:   Discharge diet: Regular   Discharge activity: Activity as tolerated   Discharge follow-up: Follow up with primary care provider in 2-4 weeks   Wound care: As per [facility]           Discharge Disposition:   Discharged to assisted living      Attestation:  I have reviewed today's vital signs, notes, medications, labs and imaging.  Amount of time performed on this discharge summary: 10 minutes.    Russ Cristobal MD, MD

## 2023-02-28 ENCOUNTER — PATIENT OUTREACH (OUTPATIENT)
Dept: CARE COORDINATION | Facility: CLINIC | Age: 58
End: 2023-02-28
Payer: COMMERCIAL

## 2023-02-28 NOTE — PROGRESS NOTES
Clinic Care Coordination Contact  [facility]/Voicemail       Clinical Data: Care Coordinator Outreach  Outreach attempted x 1.  Left message on patient's voicemail with call back information and requested return call.  Plan: Care Coordinator will try to reach patient again in 1-2 business days.    JUANITA Knapp  [facility] Care Coordination  [facility]  [phone]

## 2023-02-28 NOTE — LETTER
[facility] CARE COORDINATION  [address] [place] [address]    March 2, 2023    Felicia RIOS [facility]  [address]  [place] [address]      Dear Felicia Schneider,        I am a clinic community health worker who works with Ivan Baeza MD with the [facility]. I wanted to introduce myself and provide you with my contact information for you to be able to call me with any questions or concerns. Below is a description of clinic care coordination and how I can further assist you.       The clinic care coordination team is made up of a registered nurse, , financial resource worker and community health worker who understand the health care system. The goal of clinic care coordination is to help you manage your health and improve access to the health care system. Our team works alongside your provider to assist you in determining your health and social needs. We can help you obtain health care and community resources, providing you with necessary information and education. We can work with you through any barriers and develop a care plan that helps coordinate and strengthen the communication between you and your care team.    Please feel free to contact me with any questions or concerns regarding care coordination and what we can offer.      We are focused on providing you with the highest-quality healthcare experience possible.    Sincerely,     JUANITA Knapp  [facility] Care Coordination  [facility]  [phone]

## 2023-03-02 NOTE — PROGRESS NOTES
Clinic Care Coordination Contact  Community Health Worker Initial Outreach        Patient accepts CC: No, patient stated she has no needs at this time. Patient will be sent Care Coordination introduction letter for future reference.     JUANITA Knapp  [facility] Care Coordination  [facility]  [phone]

## 2023-03-06 DIAGNOSIS — L02.415 ABSCESS OF RIGHT HIP: Primary | ICD-10-CM

## 2023-03-06 RX ORDER — NON-ADHERENT BANDAGE 8"X10"
BANDAGE TOPICAL
Qty: 18 EACH | Refills: 1 | Status: SHIPPED | OUTPATIENT
Start: 2023-03-06

## 2023-03-08 ENCOUNTER — HOSPITAL ENCOUNTER (OUTPATIENT)
Dept: WOUND CARE | Facility: CLINIC | Age: 58
Discharge: HOME | End: 2023-03-08
Attending: FAMILY MEDICINE | Admitting: FAMILY MEDICINE
Payer: MEDICARE

## 2023-03-08 DIAGNOSIS — L02.415 ABSCESS OF RIGHT HIP: ICD-10-CM

## 2023-03-08 DIAGNOSIS — F51.01 PRIMARY INSOMNIA: ICD-10-CM

## 2023-03-08 DIAGNOSIS — L89.319 PRESSURE INJURY OF SKIN OF RIGHT BUTTOCK, UNSPECIFIED INJURY STAGE: ICD-10-CM

## 2023-03-08 PROCEDURE — 97606 NEG PRS WND THER DME>50 SQCM: CPT

## 2023-03-08 PROCEDURE — G0463 HOSPITAL OUTPT CLINIC VISIT: HCPCS

## 2023-03-08 RX ORDER — ZOLPIDEM TARTRATE 5 MG/1
TABLET ORAL
Qty: 15 TABLET | Refills: 3 | Status: SHIPPED | OUTPATIENT
Start: 2023-03-08 | End: 2023-05-10

## 2023-03-08 RX ORDER — OXYCODONE HYDROCHLORIDE 5 MG/1
TABLET ORAL
Qty: 20 TABLET | Refills: 0 | Status: SHIPPED | OUTPATIENT
Start: 2023-03-08 | End: 2023-03-22

## 2023-03-08 NOTE — DISCHARGE INSTRUCTIONS
Today the wound all looked good but I noted a new site that appears very fresh, it is located to the outer side of the right ischial tuberosity wound within the buttock/thigh fold.  This new site is partial thickness, looks more like an abrasion than a pressure injury.  For the new site cover with a Mepilex dressing three times weekly.    For the right ischial tuberosity wound continue to cover with Aquacel Ag or Silvercel and an adhesive gauze pad or gentle adhesive foam dressing.    The right hip and sacral wounds on the wound vac look good again.  The visible and palpable bone in the center is now all covered with new granular tissue.  Continue with the wound vac to the right hip and sacrum.    I will see you again in just over three weeks on Friday March the 31 st at 4 pm.    Call with any questions or concerns [phone].    Presley Chester RN cwocn

## 2023-03-09 NOTE — PROGRESS NOTES
[facility] Wound Clinic [facility].     Start of Care in [facility] Wound Clinic: 11/18/2022, initial resumption of visit, see Wound History for details.  Referring Doctor: Ivan Baeza MD  Primary Care Provider: No Ref-Primary, Physician   Wound Location: Sacral, Right ischial tuberosity, Right hip.     Wound Clinic Visit: Ongoing follow up     Reason for Visit: Wound assessments and cares     Subjective:  On arrival today 3/9/23:  Had revision of her continent urinary diversion and has indwelling cath in place for now.  No new changes to her wounds, she did see Dr Zavala and no plan of care change made, sacral wound was not debrided during her hospital stay for her urinary diversion.  Continues to have cares performed at her AL by one of the nurses at the facility, no home care involved.       Wound History:   Pt well known to me from visits over many years to the Wound and Ostomy clinic for chronic pressure injuries.  Initial visit November of 2020 for Right IT, Sacral, left trochanter and right heel pressure injuries.  She missed a few follow up appointments initially but was mostly coming into clinic every 3 to 4 weeks for evaluation and recommendations.  After a hospitalization at [facility] in [place] due to urinary diversion concerns and a right hip abscess she resumed visit to the Wound and Ostomy clinic on 11/18/22 for wound vac dressing changes and assessment. The right hip wound improved and as of my visit with her on 12/16/22 was nearly healed, no depth and only small open aspect all granular tissue.  It was reported the wound did fully close soon after that assessment per AL staff.  On 1/3/23 the pt's facility sent her to the ED for the right hip, it had re opened and was draining copious amounts of purulent drainage.  I was able to see the pt in the ED and was able to add the right hip wound into the intact sacral wound vac set up.  At my last clinic visit with the pt on  2/3/23 the sacral wound was not in good condition, lots of slough, eschar on the edges and bone visible and palpable in the center.  I made arrangement with Dr Zavala's approval to have her seen in Specialty for potential debridement but appointment was approximately four weeks out.  She was hospitalized at [facility] for revision of her urinary diversion 2/22/23 - 2/25/23 and was seen by [facility] nursing, the photo's from that assessment showed the sacral wound looking much improved.  No debriding was needed.  She did see Dr Zavala on 2/27/23 and the sacral wound at that time was improving well with no need for debridement.       Past Medical History:        Patient Active Problem List   Diagnosis     Migraine headache     Urinary retention     GERD (gastroesophageal reflux disease)     Flaccid paraplegia (H)     Decubitus ulcer of buttock     Pressure ulcer of heel     Poor appetite     Nausea     Generalized weakness     Burn     Health Care Home     Paraplegia following spinal cord injury (H)     ACP (advance care planning)     Osteomyelitis of hip (right periacetabular infection with osteomyelitis)     Severe protein-calorie malnutrition (H)     Microcytic anemia     Neurogenic bladder     Anxiety     Insomnia     Chronic UTI (urinary tract infection)     Skin ulcer of buttock (bilateral ischial tuberosity ulcers)     CARDIOVASCULAR SCREENING; LDL GOAL LESS THAN 160     Open wound of foot except toes with complication     LLQ abdominal pain     Paralytic ileus (H)     Chest wall pain     Decubitus skin ulcer     S/P flap graft     Pancreatitis     Pericardial effusion     Hospice care patient     Seizures (H)     AMS (altered mental status)     Admission for hospice care     Odynophagia     Portal vein thrombosis     Foreign body in esophagus, initial encounter     Impacted foreign body in esophagus, initial encounter     Aspiration pneumonia of right lung due to regurgitated food, unspecified part of lung (H)      Septic shock (H)     Depressive disorder     Colostomy present (H)     Chronic pain due to trauma     Hypokalemia     Abscess of right hip     Stage 4 skin ulcer of sacral region (H)     Pressure injury of right hip, stage 4 (H)             Tobacco Use:     Tobacco Use       Smoking status: Never      Smokeless tobacco: Never      Diabetic: No  HgbA1C:           Hemoglobin A1C   Date Value Ref Range Status   05/26/2021 5.1 0 - 5.6 % Final       Comment:       Normal <5.7% Prediabetes 5.7-6.4%  Diabetes 6.5% or higher - adopted from ADA   consensus guidelines.   Checks Blood Glucose?:  NA Average Readings: NA    Personal/social history:  Pt lives in the [facility] in [place], no current home care services in place.     Objective:   Current treatment plan: NPWT to sacral and right trochanter wounds changed MWF.  Silvercel to the right IT covered/secured with gentle adhesive foam dressing, changed MWF.  New wound to right buttock/posterior thigh fold open to air on arrival today 3/8/23.  Last changed: 3/6/23 at her AL facility     Wound #1 Sacral   Stage/tissue depth: stage 4 pressure injury, stage updated 1/13/23 as bone became visible and palpable in the wound bed.   4.5 cm L x 9.5 cm W x 2 cm D  Tunneling: none noted  Undermining: from 9 o'clock to 2 o'clock 2.8 cm max at 12 o'clock. No change.  Wound bed type/amount: approximately 60 % pale granular tissue, 20 % scattered  20 % red nongranular tissue; NA fluctuant  Wound Edges: opne  Periwound: wnl, no significant erythema and no denudement  Drainage: large amounts serosanguinous  Odor: small amount of foul odor on removal of vac, none noted after cleansing, see Assessment for details  Pain: denied any pain today.    Photo from today's visit 3/8/23.      Photo's from 2/3/23.       Photo's from 1/13/23.     Photo from 11/18/22, initial resumption of visits to the Wound and Ostomy clinic.      Wound #2 Right Ischial Tuberosity   Stage/tissue depth: stage 3 pressure  injury  3.8 cm L x 1.2 cm W x 0.2 cm D  Tunneling: none   Undermining: none  Wound bed type/amount: approximately 20 % scar tissue and 80 % red nongranular tissue; NA fluctuant  Wound Edges: open  Periwound: Scattered areas of blanchable erythema and dry skin.    Odor: none noted  Pain: none  Photo from today's visit 3/8/23.    Photo from 2/3/23.     Photo from 11/18/22, initial resumption of visits to the Wound and Ostomy clinic.      Wound #3 Right hip, abscess s/p I&D   Stage/tissue depth: full thickness  1.5 cm L x 0.7 cm W x 8 cm D  Tunneling: none noted  Undermining: none noted today  Wound bed type/amount: as able to visualize, 100 % granular tissue, see Assessment for details; NA fluctuant  Wound Edges: open  Periwound: Scattered areas of blanchable erythema.  Drainage: small to moderate amounts serosanguinous.   Odor: none noted  Pain: none    Photo's from today's visit 3/8/23.    Photo from 2/3/23.     Photo from ED 1/3/23.     Photo from 12/16/22.     Photo from 11/18/22, initial assessment since wound presented and was irrigated and debrided by MD.     Wound #4 Right buttock/posterior thigh fold, appears friction not pressure related, newly noted in clinic 3/8/23.  Stage/tissue depth: partial thickness  4 cm L x 0.4 cm W x 0.1 cm D  Tunneling: none  Undermining: none  Wound bed type/amount: 100 % red nongranular tissue; Not fluctuant  Wound Edges: open where there is depth  Periwound: wnl  Drainage: moderate amounts sanguinous  Odor: no  Pain: no  Photo from today's visit 3/8/23, initial assessment.    Dorsalis Pedal Pulse: Not assessed this visit.  Posterior Tibial Pulse: Not assessed this visit.  Hair growth: Not assessed this visit  Capillary Refill: Not assessed this visit  Feet/toes color: Not assessed this visit  Nails: Not assessed this visit  R Leg: Edema Not assessed this visit. Ankle circumference NA cm. Calf circumference NA cm.  L Leg: Edema Not assessed this visit. Ankle circumference NA cm.  Calf circumference NA cm.     Mobility: wheelchair bound  Current offloading/footwear: regular shoes/boots  Sensation: paraplegic, no sensation from sternum distally     Other callusing/areas of concern: none noted.     Diet: Regular     Discussed/Education: plan of care with rationale;  pt is unable to do self wound cares, nursing facility to perform cares for pt as directed.     Assessment:  Wounds assessed and cared for by [facility] nurses Presley HORVATH and Jessica VALVERDE RN cwocn  Pt arrived with wound vac not functioning, was off but appears foams intact but not compressed, I set it to run again and was able to regain negative pressure at ordered level continuous.    The wound dressings were removed, all black foam from right trochanter and sacral wound were removed.  The wounds were cleansed with saline, small amount foul odor is noted from the sacral wound, strong prior to cleansing and still present but much more mild after cleansing.     The Sacral wound: Has improved but new grey slough is present.  There is large amounts of new granular tissue growth noted, which now is covering the once visible and palpable bone in the center of the wound bed.  I am unable to palpate or see any bone today.  The size of the wound is large in width but positioning of this wound alters is shape and size significantly and overall the size appears about the same.  The wound edges no longer have any eschar present.     The Right IT wound:  Is larger than last visit but remains clean and there is noted scar tissue still present advancing into the red nongranular tissue.  Note the new wound today is further to the right laterally than noted adhesive periwound trauma of the right IT.     The Right trochanter wound: Is smaller in outer aspects but depth is about the same. I do not find any tunneling today, the depth is at directly a 90 degree angle to the body.    New wound to the right buttock/posterior thigh skin fold.  On removal of the pt's pants  the site was noted to be open to air and bleeding, small to moderate amounts of sanguinous drainage.  The site appears like a friction related wound or skin splitting.  Pt is not aware of any traumas, aggressive cares or transfers.  Site is clean, partial thickness, complicated by intertriginous contact and pressure.  No signs of infection noted.       Factors impacting wound healing:   Poor nutrition: inadequate supply of protein, carbohydrates, fatty acids, and trace elements essential for all phases of wound healing.  Pt is taking a daily protein supplement drink and is aware of need for increased protein in diet for wound healing.  Delayed healing as part of normal aging process  Reduced Blood Supply: inadequate perfusion to heal wound.  Medication: NA  Chemotherapy: suppresses the immune system and inflammatory response, NA  Radiotherapy: increases production of free radical which damage cells, NA  Psychological stress: None noted  Obesity: decreases tissue perfusion, NA  Infection: prolongs inflammatory phase, uses vital nutrients, impairs epithelialization and releases toxins, none noted this visit.   Underlying Disease: paraplegic  Maceration: reduces wound tensile strength and inhibits epithelial migration, none noted this visit  Patient compliance, appears motivated to heal  Unrelieved pressure, pt has pressure reduction cushion in wheelchair and lays in bed daily during the day for full pressure relief.    Immobility, limited  Substance abuse: NA     Plan:  Today we did the same plan of care with bridging the sacral and right trochanter wounds to one wound vac, 125 mmHg continuous negative pressure with one continuous piece of black KCI foam used to fill the Sacral wound, bridge.  Separate piece of black foam used to fill to the Right Trochanter.  Cavilon no sting skin prep and drape were applied to all intact skin that would have contact with black foam bridge.  All foams sealed in with KCI drape and  tract pad attached and negative pressure achieved.  I added ABD pads over the vac dressing per pt request to absorb any leaking drainage from the wound or from her anal area.  The right IT wound and periwound we applied Silvercel and secured with Mepilex gentle adhesive dressing.  The new right buttock/posterior thigh fold wound I covered with Mepilex gentle adhesive foam dressing and recommend to change with each vac change.  Pt will continue with the three time weekly AL wound cares and will return to the Wound and Ostomy clinic in approximately three weeks.    Topical care: Wound/surrounding skin cleansed with wound cleanser and gauze. Patted dry. Wound cares as listed in Plan   Additional recommendations: None at this time     The following discharge instructions were reviewed with and sent home with the patient:  See AVS     The following supplies were sent home with the patient:  Remains of the Cavilon no sting skin prep and Silvercel opened but not used up today.      Return visit: TBD     Verbal, written, & demonstrative education provided.  Face to face time: approximately 20 minutes  Procedure: approximately 25 minutes wound vac dressing change to right trochanter and sacral wounds.     Presley Chester RN cwocn,  [phone]

## 2023-03-14 DIAGNOSIS — G43.709 CHRONIC MIGRAINE WITHOUT AURA WITHOUT STATUS MIGRAINOSUS, NOT INTRACTABLE: ICD-10-CM

## 2023-03-14 DIAGNOSIS — R33.9 URINARY RETENTION: ICD-10-CM

## 2023-03-14 DIAGNOSIS — F51.01 PRIMARY INSOMNIA: ICD-10-CM

## 2023-03-14 DIAGNOSIS — E87.6 HYPOKALEMIA: ICD-10-CM

## 2023-03-14 DIAGNOSIS — R56.9 SEIZURES (H): ICD-10-CM

## 2023-03-15 RX ORDER — LEVETIRACETAM 750 MG/1
1500 TABLET ORAL 2 TIMES DAILY
Qty: 120 TABLET | Refills: 1 | Status: SHIPPED | OUTPATIENT
Start: 2023-03-15 | End: 2023-05-12

## 2023-03-15 NOTE — TELEPHONE ENCOUNTER
Requested Prescriptions   Pending Prescriptions Disp Refills    levETIRAcetam (KEPPRA) 750 MG tablet [Pharmacy Med Name: LEVETIRACETAM 750MG TABLET] 120 tablet 1     Sig: TAKE 2 TABLETS (1,500 MG) BY MOUTH 2 TIMES DAILY       There is no refill protocol information for this order

## 2023-03-16 DIAGNOSIS — E53.8 FOLATE DEFICIENCY: ICD-10-CM

## 2023-03-16 RX ORDER — TRAZODONE HYDROCHLORIDE 50 MG/1
100 TABLET, FILM COATED ORAL AT BEDTIME
Qty: 180 TABLET | Refills: 1 | OUTPATIENT
Start: 2023-03-16

## 2023-03-16 RX ORDER — TOPIRAMATE 25 MG/1
25 TABLET, FILM COATED ORAL DAILY
Qty: 90 TABLET | Refills: 1 | OUTPATIENT
Start: 2023-03-16

## 2023-03-16 RX ORDER — PROPRANOLOL HYDROCHLORIDE 40 MG/1
TABLET ORAL
Qty: 90 TABLET | Refills: 1 | OUTPATIENT
Start: 2023-03-16

## 2023-03-16 RX ORDER — OXYBUTYNIN CHLORIDE 5 MG/1
10 TABLET ORAL DAILY
Qty: 90 TABLET | Refills: 1 | OUTPATIENT
Start: 2023-03-16

## 2023-03-16 RX ORDER — POTASSIUM CHLORIDE 1500 MG/1
TABLET, EXTENDED RELEASE ORAL
Qty: 90 TABLET | Refills: 1 | Status: SHIPPED | OUTPATIENT
Start: 2023-03-16 | End: 2023-05-03

## 2023-03-17 RX ORDER — FOLIC ACID 1 MG/1
1 TABLET ORAL DAILY
Qty: 30 TABLET | Refills: 3 | OUTPATIENT
Start: 2023-03-17

## 2023-03-22 DIAGNOSIS — L89.319 PRESSURE INJURY OF SKIN OF RIGHT BUTTOCK, UNSPECIFIED INJURY STAGE: ICD-10-CM

## 2023-03-22 DIAGNOSIS — L02.415 ABSCESS OF RIGHT HIP: ICD-10-CM

## 2023-03-22 RX ORDER — OXYCODONE HYDROCHLORIDE 5 MG/1
TABLET ORAL
Qty: 20 TABLET | Refills: 0 | Status: SHIPPED | OUTPATIENT
Start: 2023-03-22 | End: 2023-04-04

## 2023-03-25 NOTE — PROGRESS NOTES
Brief Palliative Care Team Note.    Pt A&O x3 and decisional. Palliative Care Team  met with Felicia who clearly states she would want to return to the hospital for future cares. Felicia also stated she would want to be a FULL code during discussion with ICU team (Dr Petersen). I contacted Dr Mejía, hospitalist who will be caring for Felicia when she transfers out of the ICU, he agrees that further discussion with the Palliative Care team is not necessary at this time considering her goals are clearly restorative. Consult cancelled, please do not hesitate to contact our team if future needs arise. Thank you.    Shonna ALBARADO, MATTHEW  Pager: [phone]  Palliative Medicine  May 23, 2019     decreased bre/decreased step length/decreased weight-shifting ability

## 2023-03-31 ENCOUNTER — HOSPITAL ENCOUNTER (OUTPATIENT)
Dept: WOUND CARE | Facility: CLINIC | Age: 58
Discharge: HOME | End: 2023-03-31
Attending: FAMILY MEDICINE | Admitting: FAMILY MEDICINE
Payer: MEDICARE

## 2023-03-31 PROCEDURE — G0463 HOSPITAL OUTPT CLINIC VISIT: HCPCS

## 2023-03-31 PROCEDURE — 97606 NEG PRS WND THER DME>50 SQCM: CPT

## 2023-03-31 NOTE — DISCHARGE INSTRUCTIONS
Today the right hip wound is still 8 cm deep and we need to order you white foam for that wound instead of the black foam, have the nurse still use small piece of the black foam in the right hip till the white foam arrives, we will then use longer pieces of the White form to reach the deepest depth and promote new tissue growth from the inside out.  The sacral wound is improved some in the tissue, more healthy today and less concerning, no need for debriding at this time.  The right ischial tuberosity wound is about the same but more scar tissue is within the wound bed so that's an improvement.  The wound we saw last visit on there right thigh, buttock fold  is now nearly healed, no dressing needed.    I will see you again in two weeks on Friday April the 14 th at 2 pm.    Call with any concerns between now and then [phone].    Presley Chester RN cwocn

## 2023-03-31 NOTE — PROGRESS NOTES
[facility] .     Start of Care in [facility]: 11/18/2022, initial resumption of visit, see Wound History for details.  Referring Doctor: Ivan Baeza MD  Primary Care Provider: No Ref-Primary, Physician   Wound Location: Sacral, Right ischial tuberosity, Right hip.     Wound Clinic Visit: Ongoing follow up     Reason for Visit: Wound assessments and cares     Subjective:  On arrival today 3/31/23:  Her revised continent urinary diversion and has indwelling cath in place for now still as she is waiting for the correct sized straight cath delivery to arrive.  No new changes to her wounds that is is aware of, cares continue to be performed at her AL by one of the nurses at the facility, no home care involved.       Wound History:   Pt well known to me from visits over many years to the Wound and Ostomy clinic for chronic pressure injuries.  Initial visit November of 2020 for Right IT, Sacral, left trochanter and right heel pressure injuries.  She missed a few follow up appointments initially but was mostly coming into clinic every 3 to 4 weeks for evaluation and recommendations.  After a hospitalization at [facility] in [place] due to urinary diversion concerns and a right hip abscess she resumed visit to the Wound and Ostomy clinic on 11/18/22 for wound vac dressing changes and assessment. The right hip wound improved and as of my visit with her on 12/16/22 was nearly healed, no depth and only small open aspect all granular tissue.  It was reported the wound did fully close soon after that assessment per AL staff.  On 1/3/23 the pt's facility sent her to the ED for the right hip, it had re opened and was draining copious amounts of purulent drainage.  I was able to see the pt in the ED and was able to add the right hip wound into the intact sacral wound vac set up.  At my last clinic visit with the pt on 2/3/23 the sacral wound was not in good condition, lots  of slough, eschar on the edges and bone visible and palpable in the center.  I made arrangement with Dr Zavala's approval to have her seen in Specialty for potential debridement but appointment was approximately four weeks out.  She was hospitalized at [facility] for revision of her urinary diversion 2/22/23 - 2/25/23 and was seen by [facility] nursing, the photo's from that assessment showed the sacral wound looking much improved.  No debriding was needed.  She did see Dr Zavala on 2/27/23 and the sacral wound at that time was improving well with no need for debridement.       Past Medical History:        Patient Active Problem List   Diagnosis    Migraine headache    Urinary retention    GERD (gastroesophageal reflux disease)    Flaccid paraplegia (H)    Decubitus ulcer of buttock    Pressure ulcer of heel    Poor appetite    Nausea    Generalized weakness    Burn    Health Care Home    Paraplegia following spinal cord injury (H)    ACP (advance care planning)    Osteomyelitis of hip (right periacetabular infection with osteomyelitis)    Severe protein-calorie malnutrition (H)    Microcytic anemia    Neurogenic bladder    Anxiety    Insomnia    Chronic UTI (urinary tract infection)    Skin ulcer of buttock (bilateral ischial tuberosity ulcers)    CARDIOVASCULAR SCREENING; LDL GOAL LESS THAN 160    Open wound of foot except toes with complication    LLQ abdominal pain    Paralytic ileus (H)    Chest wall pain    Decubitus skin ulcer    S/P flap graft    Pancreatitis    Pericardial effusion    Hospice care patient    Seizures (H)    AMS (altered mental status)    Admission for hospice care    Odynophagia    Portal vein thrombosis    Foreign body in esophagus, initial encounter    Impacted foreign body in esophagus, initial encounter    Aspiration pneumonia of right lung due to regurgitated food, unspecified part of lung (H)    Septic shock (H)    Depressive disorder    Colostomy present (H)    Chronic pain due to trauma     Hypokalemia    Abscess of right hip    Stage 4 skin ulcer of sacral region (H)    Pressure injury of right hip, stage 4 (H)             Tobacco Use:     Tobacco Use       Smoking status: Never      Smokeless tobacco: Never      Diabetic: No  HgbA1C:                 Hemoglobin A1C   Date Value Ref Range Status   05/26/2021 5.1 0 - 5.6 % Final       Comment:       Normal <5.7% Prediabetes 5.7-6.4%  Diabetes 6.5% or higher - adopted from ADA   consensus guidelines.   Checks Blood Glucose?:  NA Average Readings: NA     Personal/social history:  Pt lives in the [facility] in [facility], no current home care services in place.     Objective:   Current treatment plan: NPWT to sacral and right trochanter wounds changed MWF.  Silvercel to the right IT covered/secured with gentle adhesive foam dressing, changed MWF.   Last changed: 3/29/23 at her AL facility     Wound #1 Sacral   Stage/tissue depth: stage 4 pressure injury, stage updated 1/13/23 as bone became visible and palpable in the wound bed.   5 cm L x 8.5 cm W x 2.5 cm D  Tunneling: none noted  Undermining: from 9 o'clock to 2 o'clock 3.3 cm max at 12 o'clock.   Wound bed type/amount: approximately 60 % pale to pink granular tissue, 20 % scattered white nonviable tissue and  20 % red nongranular tissue; NA fluctuant  Wound Edges: opne  Periwound: wnl, no significant erythema and small denuded, skin stripped spot to the left lateral off 9 o'clock wound edge 1 cm L x 1.3 cm W x 0 cm D, 100 % clean red nongranular tissue.    Drainage: large amounts serosanguinous  Odor: small amount of foul odor on removal of vac, none noted after cleansing, see Assessment for details  Pain: denied any pain today.    Photo's from today's visit 3/31/23, photo to the left - at rest, photo to the right - woc is holding superior wound edge up to visualize undermining.      Photo from 3/8/23.       Photo's from 2/3/23.     Photo from 11/18/22, initial resumption of visits to the Wound and  Ostomy clinic.      Wound #2 Right Ischial Tuberosity   Stage/tissue depth: stage 3 pressure injury  3.1 cm L x 1 cm W x 0.1 cm D  Tunneling: none   Undermining: none  Wound bed type/amount: approximately 60 % scar tissue and 40 % granular tissue; NA fluctuant  Wound Edges: open  Periwound: Scattered areas of blanchable erythema and dry skin.    Odor: none noted  Pain: none  No photo taken this visit 3/31/23, woc nurse error.      Photo from 3/8/23.     Photo from 2/3/23.     Photo from 11/18/22, initial resumption of visits to the Wound and Ostomy clinic.      Wound #3 Right hip, abscess s/p I&D   Stage/tissue depth: full thickness  1 cm L x 0.7 cm W x 8 cm D  Tunneling: none noted  Undermining: none noted today  Wound bed type/amount: as able to visualize, 100 % granular tissue, see Assessment for details; NA fluctuant  Wound Edges: open  Periwound: Scar tissue wnl  Drainage: small to moderate amounts serosanguinous. See Assessment for details.    Odor: none noted  Pain: none  Photo from today's visit 3/31/23.      Photo's from 3/8/23.     Photo from ED 1/3/23.     Photo from 12/16/22.     Photo from 11/18/22, initial assessment since wound presented and was irrigated and debrided by MD.     Wound #4 Right buttock/posterior thigh fold, appears friction not pressure related, newly noted in clinic 3/8/23.  Stage/tissue depth: partial thickness  2.5 cm L x 0.1 cm W x 0 cm D  Tunneling: none  Undermining: none  Wound bed type/amount: approximately 70 % red nongranular tissue and 30 % newly epithelialized; Not fluctuant  Wound Edges: NA no depth  Periwound: wnl  Drainage: none noted  Odor: no  Pain: no  Photo from today's visit 3/31/23.    Photo from 3/8/23, initial assessment of newly noted site.     Dorsalis Pedal Pulse: Not assessed this visit.  Posterior Tibial Pulse: Not assessed this visit.  Hair growth: Not assessed this visit  Capillary Refill: Not assessed this visit  Feet/toes color: Not assessed this  visit  Nails: Not assessed this visit  R Leg: Edema Not assessed this visit. Ankle circumference NA cm. Calf circumference NA cm.  L Leg: Edema Not assessed this visit. Ankle circumference NA cm. Calf circumference NA cm.     Mobility: wheelchair bound  Current offloading/footwear: regular shoes/boots  Sensation: paraplegic, no sensation from sternum distally     Other callusing/areas of concern: none noted.     Diet: Regular     Discussed/Education: plan of care with rationale;  pt is unable to do self wound cares, nursing facility to perform cares for pt as directed.     Assessment:  Pt arrived again today with wound vac not functioning, was off but appears foams intact but not compressed, I set it to run again and was able to regain negative pressure at ordered level continuous.    The wound dressings were removed, all black foam from right trochanter and sacral wound were removed.  Note there was only approximately 2 cm of black foam inserted into the right trochanter wound, this is appropriate as though the wound is deeper, we now need white foam to fill the depth.  The wounds were cleansed with saline, small amount foul odor is noted from the sacral wound, strong prior to cleansing and mostly resolved after cleansing.     The Sacral wound: Has a better appearance to the tissue present, no longer dusky, still no bone palpable or visible.  The measurements are larger in some areas, smaller in others, these variances in size appear mostly related to positioning of hips and legs during assessment.    The Right IT wound: Is slowly improving, now is granular tissue wound bed with scar tissue advancing.     The Right trochanter wound:  Is smaller on the outer aspect.  With initial assessment and probing found resistance with applicator at approximately 3 cm D, this with pressure opened and wound drained additional serosanguinous drainage in moderate amounts then stopped.  Depth is very narrow but still measured 8 cm  deep, no change.  We no longer can full fill the depth with black foam as unable to see depth and at risk of leaving some black foam behind when removed.      New wound to the right buttock/posterior thigh skin fold noted last visit is nearly healed. No longer needs any dressing, no drainage, not fully re epithelialized but no depth, no concerns noted today.    Factors impacting wound healing:   Poor nutrition: inadequate supply of protein, carbohydrates, fatty acids, and trace elements essential for all phases of wound healing.  Pt is taking a daily protein supplement drink and is aware of need for increased protein in diet for wound healing.  Delayed healing as part of normal aging process  Reduced Blood Supply: inadequate perfusion to heal wound.  Medication: NA  Chemotherapy: suppresses the immune system and inflammatory response, NA  Radiotherapy: increases production of free radical which damage cells, NA  Psychological stress: None noted  Obesity: decreases tissue perfusion, NA  Infection: prolongs inflammatory phase, uses vital nutrients, impairs epithelialization and releases toxins, none noted this visit.   Underlying Disease: paraplegic  Maceration: reduces wound tensile strength and inhibits epithelial migration, none noted this visit  Patient compliance, appears motivated to heal  Unrelieved pressure, pt has pressure reduction cushion in wheelchair and lays in bed daily during the day for full pressure relief.    Immobility, limited  Substance abuse: NA     Plan:  Today we did the same basic plan of care with bridging the sacral and right trochanter wounds to one wound vac, 125 mmHg continuous negative pressure.  I used once continuous piece black foam to fill the Sacral wound and bridge to the right hip (just above the trochanter wound).  An additonal piece of black foam was used to fill 2 cm of the depth of the trochanter wound and bridge to the sacral bridge.  Cavilon no sting skin prep and drape  were applied to all intact skin that would have contact with black foam bridge.  All foams sealed in with KCI drape and tract pad attached and negative pressure achieved.  I added ABD pads over the vac dressing per pt request to absorb any leaking drainage from the wound or from her anal area.  The right IT wound and periwound we applied Silvercel and secured with Mepilex gentle adhesive dressing.  I will order pt White 3M KCI foam for use in the right trochanter, till it arrives will continue to solely fill only top 2 to 3 cm of the wounds depth with black foam.   Pt will continue with the three time weekly AL wound cares and will return to the Wound and Ostomy clinic in two weeks.     Topical care: Wound/surrounding skin cleansed with wound cleanser and gauze. Patted dry. Wound cares as listed in Plan   Additional recommendations: None at this time     The following discharge instructions were reviewed with and sent home with the patient:  See AVS     The following supplies were sent home with the patient:  Remains of the Cavilon no sting skin prep and Silvercel opened but not used up today.      Return visit: TBD     Verbal, written, & demonstrative education provided.  Face to face time: approximately 30 minutes  Procedure: approximately 30 minutes wound vac dressing change to right trochanter and sacral wounds.     Presley Chester RN cwocn,  [phone]

## 2023-04-04 DIAGNOSIS — L89.319 PRESSURE INJURY OF SKIN OF RIGHT BUTTOCK, UNSPECIFIED INJURY STAGE: ICD-10-CM

## 2023-04-04 DIAGNOSIS — L02.415 ABSCESS OF RIGHT HIP: ICD-10-CM

## 2023-04-04 RX ORDER — OXYCODONE HYDROCHLORIDE 5 MG/1
TABLET ORAL
Qty: 20 TABLET | Refills: 0 | Status: SHIPPED | OUTPATIENT
Start: 2023-04-05 | End: 2023-05-18

## 2023-04-12 ENCOUNTER — TELEPHONE (OUTPATIENT)
Dept: WOUND CARE | Facility: CLINIC | Age: 58
End: 2023-04-12
Payer: COMMERCIAL

## 2023-04-12 NOTE — TELEPHONE ENCOUNTER
Reason for Call:  Other call back    Detailed comments: called pt to RS appt 4/14 with Presley as Presley will be out. Offered Monday 4/17 at 9am with Jessica for 2 hours per [place] and pt stated she can only come in afternoons. I know there is some coordinating that needs to happen for this appts. Please let us know more available options and we can call pt to offer. Thank you    Phone Number Patient can be reached at: Home number on file [phone] (home)    Best Time: any    Can we leave a detailed message on this number? YES    Call taken on 4/12/2023 at 3:42 PM by Catrachita Chacon

## 2023-04-13 DIAGNOSIS — G43.709 CHRONIC MIGRAINE WITHOUT AURA WITHOUT STATUS MIGRAINOSUS, NOT INTRACTABLE: ICD-10-CM

## 2023-04-13 NOTE — TELEPHONE ENCOUNTER
Topiramate 25mg tablet     Last Written Prescription Date:  11/07/2022  Last Fill Quantity: 90 tablets,   # refills: 1  Last Office Visit: 02/01/2023  Future Office visit: 05/03/2023 Dr. Baeza    Routing refill request to provider for review/approval because:  Medication is reported/historical    La [place]ell CMA

## 2023-04-13 NOTE — TELEPHONE ENCOUNTER
Attempted to call patient, no answer. Left voicemail and requested patient call back at [phone] to be placed on the specialty RN schedule 4/14/23.     Leticia Desir, RN   [facility]

## 2023-04-13 NOTE — TELEPHONE ENCOUNTER
When patient returns call please let her know that she will have to be seen at 11:30 am Friday, speciality RN's will not have sufficient coverage after that, otherwise she can see Jessica the [facility] nurse Monday at 9 am.     Kailey Murillo, RN on 4/13/2023 at 1:18 PM

## 2023-04-14 RX ORDER — PROPRANOLOL HYDROCHLORIDE 40 MG/1
20 TABLET ORAL 2 TIMES DAILY
Qty: 90 TABLET | Refills: 1 | OUTPATIENT
Start: 2023-04-14

## 2023-04-14 NOTE — TELEPHONE ENCOUNTER
Patient having Penny at [facility] do her VAC changes today & Monday. Scheduled with Presley next Wednesday x 120min.    Desirae Soler RN on 4/14/2023 at 9:16 AM

## 2023-04-21 DIAGNOSIS — N31.9 NEUROGENIC BLADDER: Primary | ICD-10-CM

## 2023-04-28 ENCOUNTER — TELEPHONE (OUTPATIENT)
Dept: FAMILY MEDICINE | Facility: CLINIC | Age: 58
End: 2023-04-28
Payer: COMMERCIAL

## 2023-04-28 NOTE — TELEPHONE ENCOUNTER
[employer] calling for wound vac order. No documentation of fax received.     New fax number given. [phone].    Alia Hensley,CHRISTINAN, RN

## 2023-05-03 ENCOUNTER — OFFICE VISIT (OUTPATIENT)
Dept: FAMILY MEDICINE | Facility: CLINIC | Age: 58
End: 2023-05-03
Payer: COMMERCIAL

## 2023-05-03 VITALS
SYSTOLIC BLOOD PRESSURE: 90 MMHG | OXYGEN SATURATION: 100 % | DIASTOLIC BLOOD PRESSURE: 60 MMHG | HEART RATE: 77 BPM | TEMPERATURE: 98 F | WEIGHT: 110 LBS | BODY MASS INDEX: 16.24 KG/M2 | RESPIRATION RATE: 10 BRPM

## 2023-05-03 DIAGNOSIS — Z87.19 HX SBO: Primary | ICD-10-CM

## 2023-05-03 DIAGNOSIS — J69.0 ASPIRATION PNEUMONITIS (H): ICD-10-CM

## 2023-05-03 PROCEDURE — 99214 OFFICE O/P EST MOD 30 MIN: CPT | Performed by: FAMILY MEDICINE

## 2023-05-03 ASSESSMENT — PATIENT HEALTH QUESTIONNAIRE - PHQ9
10. IF YOU CHECKED OFF ANY PROBLEMS, HOW DIFFICULT HAVE THESE PROBLEMS MADE IT FOR YOU TO DO YOUR WORK, TAKE CARE OF THINGS AT HOME, OR GET ALONG WITH OTHER PEOPLE: NOT DIFFICULT AT ALL
SUM OF ALL RESPONSES TO PHQ QUESTIONS 1-9: 9
SUM OF ALL RESPONSES TO PHQ QUESTIONS 1-9: 9

## 2023-05-03 NOTE — PROGRESS NOTES
Assessment & Plan     Hx SBO  Ms. 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 Severe upper abdominal pain that had been worsening over the past 3 days prior to admission which was associated with intractable nausea/vomiting. She also noted increasing shortness of breath in the last 48 hours or so, occurred after nausea and vomiting started.      In the emergency room, hypoxic with sats in the mid 80s that improved with nasal cannula. CTA of chest negative for PE but demonstrated bilateral effusions consistent with pneumonia. Treated with ceftriaxone. CT of her abdomen and pelvis revealed SBO. Acute care surgery consulted and recommended conservative management with NG tube. TPN initiated 4/6/23 due to malnutrition and inability to take in orals. Discontinued 4/8/23. Acute care surgery performed a small bowel follow through study on 4/7/23. Appeared that SBO resolved. On 4/7, urology was consulted for SP catheter exchange. Sediment dislodged with irrigation and symptoms improved. She did well overnight plan to discharge her back to skilled nursing facility today 4/10.    She presents today without complaint. She is back at her Assisted Living, tolerating usual diet. She denies SOB or cough. Her only concern is ongoing SP catheter placement which she believed was going to be self cath in [place] bladder formed by Dr. Cristobal 2 months ago. Recommend follow up with Urology.     Aspiration pneumonitis (H)  Ms. 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 Severe upper abdominal pain that had been worsening over the past 3 days prior to admission which was associated with intractable nausea/vomiting. She also noted increasing shortness of breath in the last 48 hours or so, occurred after nausea and vomiting started.      In the emergency room, hypoxic with sats in the mid 80s that improved with nasal cannula. CTA of chest negative for PE but demonstrated bilateral effusions consistent with pneumonia. Treated with ceftriaxone. CT of her abdomen and pelvis revealed SBO. Acute care surgery consulted and recommended conservative management with NG tube. TPN initiated 4/6/23 due to malnutrition and inability to take in orals. Discontinued 4/8/23. Acute care surgery performed a small bowel follow through study on 4/7/23. Appeared that SBO resolved. On 4/7, urology was consulted for SP catheter exchange. Sediment dislodged with irrigation and symptoms improved. She did well overnight plan to discharge her back to skilled nursing facility today 4/10.    She presents today without complaint. She is back at her Assisted Living, tolerating usual diet. She denies SOB or cough. Lungs clear. Her only concern is ongoing SP catheter placement which she believed was going to be self cath in [place] bladder formed by Dr. Cristobal 2 months ago. Recommend follow up with Urology.      Review of prior external note(s) from - CareEverywhere information from [facility] REQUIRED  Post Medication Reconciliation Status:  Discharge medications reconciled and changed, see notes/orders     Follow-up Visit   Expected date:  Nov 03, 2023 (Approximate)      Follow Up Appointment Details:     Follow-up with whom?: Me    Follow-Up for what?: Adult Preventive    How?:  In Person                      Ivan Baeza MD  [facility]    Itzel Duarte is a 57 year old, presenting for the following health issues:  Hospital F/U         View : No data to display.              [facility]       Hospital Follow-up Visit:    Hospital/Nursing Home/IP Rehab Facility: [facility]   Date of Admission: 4/4/2023  Date of Discharge: 4/10/2023  Reason(s) for Admission: Aspiration Pneumonia     Was your hospitalization related to COVID-19? No   Problems taking medications regularly:  None  Medication changes since discharge: None  Problems adhering to non-medication therapy:  None    Summary of hospitalization:  CareEverywhere information obtained and reviewed  Diagnostic Tests/Treatments reviewed.  Follow up needed: none  Other Healthcare Providers Involved in Patient s Care:         None  Update since discharge: improved. Discharge Summary  Hospital Medicine Service    Patient Name: Felicia Ellison     MRN: [id number]   YOB: 1965 Age: 57 year old   Primary Physician:  Ivan Baeza MD Phone: [phone]   Admitting Physician:  Mamadou Child MD Admission Date:4/4/2023   Discharging Physician:  Zohreh Toledo MD Discharge Date: 04/10/23      Discharge Diagnoses   Principal Problem (Resolved):  SBO (small bowel obstruction) (HCC)  Active Problems:  Colostomy present (HCC)  Pressure injury of sacral region, stage 3 (HCC)  Depressive disorder  Generalized weakness  Gastroesophageal reflux disease without esophagitis  Neurogenic bladder  Flaccid paraplegia (HCC)  Paraplegia following spinal cord injury (HCC)  Pressure injury of right hip, stage 3 (HCC)  Seizure disorder (HCC)  Moderate protein-calorie malnutrition (HCC)  Suprapubic catheter (HCC)  Other iron deficiency anemia  Migraine  Resolved Problems:  Aspiration pneumonia of both lower lobes due to vomit (HCC)  Acute respiratory failure with hypoxia (HCC)  Severe sepsis without septic shock  (HCC)  Hypokalemia  Nausea and vomiting    Follow-Up Recommendations for the Outpatient Clinician   Follow-up with PCP  Follow-up with wound care appointment scheduled for 4/14    Hospital Course   Ms. 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 Severe upper abdominal pain that had been worsening over the past 3 days prior to admission which was associated with intractable nausea/vomiting. She also noted increasing shortness of breath in the last 48 hours or so, occurred after nausea and vomiting started.      In the emergency room, hypoxic with sats in the mid 80s that improved with nasal cannula. CTA of chest negative for PE but demonstrated bilateral effusions consistent with pneumonia. Treated with ceftriaxone. CT of her abdomen and pelvis revealed SBO. Acute care surgery consulted and recommended conservative management with NG tube. TPN initiated 4/6/23 due to malnutrition and inability to take in orals. Discontinued 4/8/23. Acute care surgery performed a small bowel follow through study on 4/7/23. Appeared that SBO resolved. On 4/7, urology was consulted for SP catheter exchange. Sediment dislodged with irrigation and symptoms improved. She did well overnight plan to discharge her back to skilled nursing facility today 4/10.    Non-Severe (Moderate) Malnutrition    Monitoring intake, labs and weight. Agree with dietitian plan.    Disposition and Discharge Plan   Medications:    Current Discharge Medication List     New Prescriptions   Details   ascorbic acid 500 MG tablet  Commonly known as: Vitamin C      Dose: 500 mg  Start taking on: April 11, 2023  500 mg, Oral,  ONCE DAILY    senna-docusate 8.6-50 MG oral tablet  Commonly known as: Senokot-S      Dose: 2 Tablet  2 Tablets, Oral, 2 TIMES DAILY AS NEEDED    vitamin D3 (cholecalciferol) 25 mcg (1000 units) tablet      Dose: 25 mcg  25 mcg, Oral, ONCE DAILY, 1 unit of Vitamin D equals 0.025 mcg of Vitamin D    zinc sulfate 220 (50 Zn) MG capsule  Commonly known as: Zincate      Dose: 220 mg  Start taking on: April 11, 2023  220 mg, Oral, ONCE DAILY      Changed Prescriptions   Details   zolpidem 5 MG tablet  Commonly known as: Ambien      Dose: 2.5 mg  2.5 mg, Oral, AT BEDTIME AS NEEDED  What changed: reasons to take this      Continued   Details   acetaminophen 325 MG tablet  Commonly known as: TYLENOL      Dose: 650 mg  650 mg, Oral, EVERY 4 HOURS AS NEEDED, Limit acetaminophen to 4000 mg per day from all sources.    diphenhydrAMINE 25 MG capsule  Commonly known as: Benadryl      Dose: 25 mg  25 mg, Oral, EVERY 6 HOURS AS NEEDED    folic acid 1 MG tablet      Dose: 1 mg  1 mg, Oral, ONCE DAILY    levETIRAcetam 750 MG tablet  Commonly known as: Keppra      Dose: 1,500 mg  1,500 mg, Oral, 2 TIMES DAILY    lidocaine 4 % patch  Commonly known as: Aspercreme, Salonpas      Dose: 1 Patch  1 Patch, Transdermal, EVERY 24 HOURS AS NEEDED, Remove patch(es) after 12 hours.    Maxalt 10 MG tablet  Generic drug: rizatriptan      Dose: 10 mg  10 mg, Oral, ONCE AS NEEDED, May repeat after 2 hours if needed; maximum of 30 mg per 24 hours.    mirtazapine 7.5 MG tablet  Commonly known as: Remeron      Dose: 7.5 mg  7.5 mg, Oral, AT BEDTIME    multiple vitamin with minerals tablet      Dose: 1 Tablet  1 Tablet, Oral, ONCE DAILY    ondansetron 4 MG disintegrating tablet  Commonly known as: Zofran ODT      Dose: 4 mg  4 mg, Oral, EVERY 8 HOURS AS NEEDED    oxybutynin 5 MG tablet  Commonly known as: Ditropan      Dose: 10 mg  10 mg, Oral, ONCE DAILY    oxyCODONE 5 MG immediate release tablet  Commonly known as: Roxicodone      Dose: 5 mg  5 mg,  Oral, 2 TIMES DAILY AS NEEDED    pantoprazole 40 MG delayed-release tablet  Commonly known as: Protonix      Dose: 40 mg  40 mg, Oral, EVERY MORNING BEFORE BREAKFAST, Do not crush.    propranolol 40 MG tablet  Commonly known as: Inderal      Dose: 20 mg  20 mg, Oral, 2 TIMES DAILY    topiramate 25 MG tablet  Commonly known as: Topamax      Dose: 25 mg  25 mg, Oral, ONCE DAILY, Do not crush.    traZODone 50 MG tablet  Commonly known as: Desyrel      Dose: 100 mg  100 mg, Oral, AT BEDTIME      You might also be taking other medications not listed above. If you have questions about any of your other medications, talk to the person who prescribed them or your Primary Care Provider.         Stopped   furosemide 20 MG tablet  Commonly known as: Lasix    MiraLax 17 g packet  Generic drug: polyethylene glycol 3350    potassium chloride CR 20 MEQ tablet  Commonly known as: Tiff Lehman        Disposition:  Felicia Schneider was discharged from [facility]  General Medicine to skilled nursing facility. Felicia Schneider was seen and examined on the date of discharge.     Patient Instructions / Education:  Please see After Visit Summary    No future appointments.    Referrals & Outpatient Orders:  Discharge Procedure Orders   Follow-up With: Primary Care; Referral Type: Hospital Follow-up   Referral Priority: Routine Referral Type: Office Visit   Number of Visits Requested: 1 Expiration Date: 10/10/23     Discharge Diet for Patient     Order Specific Question Answer Comments   Home Diet No Restrictions     Discharge Code Status-Full Code     Order Specific Question Answer Comments   Discharge code status Full Code     Discharge agencies   Order Comments: Use Admission Standing Orders     Order Specific Question Answer Comments   Admit To: Skilled Nursing Facility     [facility]     Order Specific Question Answer Comments   Referral instuctions Evaluate and Treat     [facility]  "    Order Specific Question Answer Comments   Referral instuctions Evaluate and Treat     [facility] Physical [facility]     Order Specific Question Answer Comments   Referral instuctions Evaluate and Treat     Activity as tolerated     Up with assistance     Vital signs per facility protocol   Order Comments: Vital signs per facility protocol     Weigh Patient per nursing facility protocol   Order Comments: Weigh patient per nursing protocol     Wound care   Order Comments: Left and Right Ischium   Remove current dressings   Clean with saline or wound cleanser and gauze   Cut a piece of Exufiber Ag to fit in both wound bases   Cover with Mepilex bordered foam dressing   Change every other day or prn for increased drainage     Mcintyre catheter care   Scheduling Instructions: - With current indwelling mcintyre, RN staff may manually irrigate qshift or more frequently to maintain patency and minimize leakage around the catheter  - Continue Ditropan PRN for bladder spasms   - Pt will continue following with Dr. Cristobal's office at [facility] and should schedule this one discharged from facility.     Condition on Discharge   Vital Signs: Blood pressure 89/51, pulse 81, temperature 36.9  C (98.4  F), temperature source Oral, resp. rate 12, height 1.753 m (5' 9\"), weight 43.7 kg (96 lb 5.5 oz), SpO2 95 %.  Physical Exam   General Appearance: Chronically ill, thin female. resting in bed in NAD.   CV: Regular   Resp: Unlabored respirations, room air  GI/ABD: Active bowel sounds. No rebound tenderness. +Ostomy with output.  Skin: Warm, dry, and well-perfused. Wound vac to right hip/sacral area.   : SP catheter place  Neuro: +Flaccid paraplegia. Alert, oriented x4 speech clear  Psych: Mood and affect flat congruent, eye contact fair speech nonpressured    Code Status:Full Code    Hospitalization Data and Events   Recent Labs:  Recent Results (from the past 24 hour(s))   VITAMIN D TOTAL   Result Value Ref Range   Vitamin D Total 20 (L) 27 " - 80 ng/mL   Narrative   [facility] Reference Range   Deficiency <10 ng/mL  Insufficiency 10-26 ng/mL  Sufficiency 27-80 ng/mL  Toxicity >80 ng/mL    COMPREHENSIVE METABOLIC PANEL   Result Value Ref Range   Sodium 136 134 - 143 mEq/L   Potassium 3.5 3.4 - 5.1 mEq/L   Chloride 110 99 - 110 mEq/L   Carbon Dioxide 20 19 - 29 mEq/L   Anion Gap 6.0 3.0 - 15.0 mEq/L   Blood Urea Nitrogen 15 5 - 24 mg/dL   Creatinine 0.45 0.40 - 1.00 mg/dL   Glomerular Filtration Rate 111 >60 mL/min/1.73 m*2   Calcium 8.0 (L) 8.4 - 10.5 mg/dL   Glucose 105 (H) 70 - 99 mg/dL   Protein, Total 6.0 6.0 - 8.0 g/dL   Albumin 2.2 (L) 3.5 - 5.0 g/dL   Alkaline Phosphatase 113 40 - 150 IU/L   Aspartate Aminotransferase 9 (L) 10 - 40 IU/L   Alanine Aminotransferase 8 6 - 31 IU/L   Bilirubin, Total 0.1 (L) 0.2 - 1.2 mg/dL   Narrative   Current ADA criteria for Glucose:   Normal: 70-99 mg/dL  Impaired Fasting Glucose: 100-125 mg/dL  Diabetes Mellitus: at or above 126 mg/dL  The diagnosis of diabetes must be confirmed on a subsequent day by measuring Fasting Plasma Glucose, 2-hr PG or random plasma glucose (if symptoms are present).     Blood Culture   Date Value Ref Range Status   01/03/2020 Report Final   01/03/2020 Report Final     Consultants:  IP CONSULT TO   IP CONSULT TO WOUND/OSTOMY NURSE  PHARMACIST MEDICATION RECONCILIATION  IP CONSULT TO NUTRITION SERVICES  IP CONSULT ACUTE CARE SURGERY  IP CONSULT TO NUTRITION SERVICES    Procedures:   None    Imaging:   Results for orders placed or performed during the hospital encounter of 04/04/23   1. OS CT CHEST ABDOMEN PELVIS   Narrative   The actual exam was performed at [facility] on 4/3/2023.    This exam does not have a report residing in this EMR. Please check for a   scanned in report, Care Everywhere Outside Records, or call the performing   location for the report.    This order was placed into the system and automatically finalized on   4/4/2023.   2. XR ABDOMEN 1 VIEW    Narrative   This document is currently in Final Status    Exam Accession# [id number]    XR ABDOMEN 1 VIEW     CLINICAL HISTORY: Surgical bowel obstruction study, 8 hour;     COMPARISON: CT of the chest abdomen and pelvis performed on 4/3/2023.    FINDINGS:   Enteric tube with the distal tip projecting at the level of the gastric body. Contrast is noted within multiple loops of bowel throughout the entire abdomen no contrast visualized at the level of the rectum at this point.    Surgical changes with spinal stabilization hardware traversing the thoracolumbar spine.    IMPRESSION:   Oral contrast is visualized throughout multiple loops of bowel with no definitive contrast visualized at the level of the rectum.    Electronically Signed: Yves Ernst MD 4/7/2023 5:53 PM    3. XR ABDOMEN 1 VIEW   Narrative   This document is currently in Preliminary Status    Exam Accession# [id number]    XR ABDOMEN 1 VIEW    HISTORY: Surgical bowel obstruction study, 24 hour.     COMPARISON: 04/07/2023.    IMPRESSION: Contrast material has largely passed. Small amount remains in the colon.    Dictated By: Mamadou Borrego MD 4/8/2023 9:56 AM  Edited By: WILLIAM 4/8/2023 10:01 AM      Plan of care communicated with patient           Patient Active Problem List   Diagnosis     Migraine headache     Urinary retention     GERD (gastroesophageal reflux disease)     Flaccid paraplegia (H)     Decubitus ulcer of buttock     Pressure ulcer of heel     Poor appetite     Nausea     Generalized weakness     Burn     Health Care Home     Paraplegia following spinal cord injury (H)     ACP (advance care planning)     Osteomyelitis of hip (right periacetabular infection with osteomyelitis)     Severe protein-calorie malnutrition (H)     Microcytic anemia     Neurogenic bladder     Anxiety     Insomnia     Chronic UTI (urinary tract infection)     Skin ulcer of buttock (bilateral ischial tuberosity ulcers)     CARDIOVASCULAR SCREENING; LDL GOAL LESS THAN  "160     Open wound of foot except toes with complication     LLQ abdominal pain     Paralytic ileus (H)     Chest wall pain     Decubitus skin ulcer     S/P flap graft     Pancreatitis     Pericardial effusion     Hospice care patient     Seizures (H)     AMS (altered mental status)     Admission for hospice care     Odynophagia     Portal vein thrombosis     Foreign body in esophagus, initial encounter     Impacted foreign body in esophagus, initial encounter     Aspiration pneumonia of right lung due to regurgitated food, unspecified part of lung (H)     Septic shock (H)     Depressive disorder     Colostomy present (H)     Chronic pain due to trauma     Hypokalemia     Abscess of right hip     Stage 3 skin ulcer of sacral region (H)     Pressure injury of right hip, stage 3 (H)     Current Outpatient Medications   Medication Sig Dispense Refill     diphenhydrAMINE (BENADRYL) 25 MG capsule Take 1 capsule (25 mg) by mouth every 6 hours as needed for itching 20 capsule 0     enoxaparin ANTICOAGULANT (LOVENOX) 60 MG/0.6ML syringe Inject 60 mg Subcutaneous every 12 hours       folic acid (FOLVITE) 1 MG tablet Take 1 tablet (1 mg) by mouth daily 30 tablet 3     Gauze Pads & Dressings (CURITY ABDOMINAL) 8\"X10\" PADS COVER WOUND AND SECURE WITH TAPE 18 each 1     hypromellose-dextran (NATURAL BALANCE TEARS) 0.1-0.3 % ophthalmic solution Place 1 drop into both eyes every hour as needed for dry eyes 30 mL 0     levETIRAcetam (KEPPRA) 750 MG tablet TAKE 2 TABLETS (1,500 MG) BY MOUTH 2 TIMES DAILY 120 tablet 1     Lidocaine (LIDOCARE) 4 % Patch APPLY PATCH TO AFFECTED AREA. CUT PATCH TO FIT SIZEAFFECTED. MAY USE FOR 12 HOURS AND OFF FOR 12 HOURS. 5 patch 0     mirtazapine (REMERON) 7.5 MG tablet TAKE 1 TABLET (7.5 MG) BY MOUTH AT BEDTIME 90 tablet 1     multivitamin (CENTRUM SILVER) tablet Take 1 tablet by mouth daily 100 tablet 1     oxybutynin (DITROPAN) 5 MG tablet TAKE 2 TABLETS (10 MG) BY MOUTH DAILY 90 tablet 1     " oxyCODONE (ROXICODONE) 5 MG tablet TAKE 1 TAB BY MOUTH 2 TIMESDAILY AS NEEDED W/DRESSING CHANGES FOR MOD-SEVERE PAIN 20 tablet 0     propranolol (INDERAL) 40 MG tablet Take 0.5 tablets (20 mg) by mouth 2 times daily 90 tablet 1     rizatriptan (MAXALT) 10 MG tablet TAKE 1 TAB BY MOUTH ONCE FOR MIGRAINE. MAY REPEAT IN2 HOURS. MAX OF 3 TABS ONCEDAILY 15 tablet 3     traZODone (DESYREL) 50 MG tablet Take 2 tablets (100 mg) by mouth At Bedtime 180 tablet 1     Wound Cleansers (VASHE WOUND THERAPY) SOLN MOISTEN 4X4 GAUZE PAD AND LOOSELY PACK INTO WOUND 250 mL 0     Wound Dressings (MEPILEX BORDER FLEX LITE) PADS USE AS DIRECTED Border 4X4 [id number] BX5 5 each 0     zolpidem (AMBIEN) 5 MG tablet TAKE 1/2 TABLET (2.5 MG) BY MOUTH NIGHTLY AS NEEDED FOR SLEEP 15 tablet 3             Review of Systems   CONSTITUTIONAL: NEGATIVE for fever, chills, change in weight  ENT/MOUTH: NEGATIVE for ear, mouth and throat problems  RESP: NEGATIVE for significant cough or SOB  CV: NEGATIVE for chest pain, palpitations or peripheral edema  ROS otherwise negative      Objective    BP 90/60   Pulse 77   Temp 98  F (36.7  C)   Resp 10   Wt 49.9 kg (110 lb)   LMP  (LMP Unknown)   SpO2 100%   BMI 16.24 kg/m    Body mass index is 16.24 kg/m .  Physical Exam      General Appearance: Chronically ill, thin female. resting in bed in NAD.   CV: Regular   Resp: Unlabored respirations, room air  GI/ABD: Active bowel sounds. No rebound tenderness. +Ostomy with output.  Skin: Warm, dry, and well-perfused. Wound vac to right hip/sacral area.   : SP catheter place  Neuro: +Flaccid paraplegia. Alert, oriented x4 speech clear  Psych: Mood and affect flat congruent, eye contact fair speech nonpressured    Admission on 02/22/2023, Discharged on 02/25/2023   Component Date Value Ref Range Status     Sodium 02/23/2023 141  136 - 145 mmol/L Final     Potassium 02/23/2023 3.6  3.4 - 5.3 mmol/L Final     Chloride 02/23/2023 111 (H)  98 - 107 mmol/L Final      Carbon Dioxide (CO2) 02/23/2023 23  22 - 29 mmol/L Final     Anion Gap 02/23/2023 7  7 - 15 mmol/L Final     Urea Nitrogen 02/23/2023 12.6  6.0 - 20.0 mg/dL Final     Creatinine 02/23/2023 0.40 (L)  0.51 - 0.95 mg/dL Final     Calcium 02/23/2023 7.8 (L)  8.6 - 10.0 mg/dL Final     Glucose 02/23/2023 96  70 - 99 mg/dL Final     GFR Estimate 02/23/2023 >90  >60 mL/min/1.73m2 Final    eGFR calculated using 2021 CKD-EPI equation.     Hemoglobin 02/23/2023 7.6 (L)  11.7 - 15.7 g/dL Final     GLUCOSE BY METER POCT 02/23/2023 92  70 - 99 mg/dL Final     GLUCOSE BY METER POCT 02/23/2023 94  70 - 99 mg/dL Final    Dr/RN Notified     Hemoglobin 02/23/2023 7.3 (L)  11.7 - 15.7 g/dL Final     Iron 02/23/2023 13 (L)  37 - 145 ug/dL Final     Iron Binding Capacity 02/23/2023 86 (L)  240 - 430 ug/dL Final     Iron Sat Index 02/23/2023 15  15 - 46 % Final     Ferritin 02/23/2023 195  11 - 328 ng/mL Final     Vitamin B12 02/23/2023 316  232 - 1,245 pg/mL Final     Folic Acid 02/23/2023 2.9 (L)  4.6 - 34.8 ng/mL Final     Sodium 02/24/2023 142  136 - 145 mmol/L Final     Potassium 02/24/2023 3.9  3.4 - 5.3 mmol/L Final     Chloride 02/24/2023 114 (H)  98 - 107 mmol/L Final     Carbon Dioxide (CO2) 02/24/2023 21 (L)  22 - 29 mmol/L Final     Anion Gap 02/24/2023 7  7 - 15 mmol/L Final     Urea Nitrogen 02/24/2023 13.1  6.0 - 20.0 mg/dL Final     Creatinine 02/24/2023 0.40 (L)  0.51 - 0.95 mg/dL Final     Calcium 02/24/2023 7.8 (L)  8.6 - 10.0 mg/dL Final     Glucose 02/24/2023 85  70 - 99 mg/dL Final     GFR Estimate 02/24/2023 >90  >60 mL/min/1.73m2 Final    eGFR calculated using 2021 CKD-EPI equation.     WBC Count 02/24/2023 2.6 (L)  4.0 - 11.0 10e3/uL Final     RBC Count 02/24/2023 2.89 (L)  3.80 - 5.20 10e6/uL Final     Hemoglobin 02/24/2023 6.8 (LL)  11.7 - 15.7 g/dL Final     Hematocrit 02/24/2023 24.1 (L)  35.0 - 47.0 % Final     MCV 02/24/2023 83  78 - 100 fL Final     MCH 02/24/2023 23.5 (L)  26.5 - 33.0 pg Final     MCHC  02/24/2023 28.2 (L)  31.5 - 36.5 g/dL Final     RDW 02/24/2023 18.2 (H)  10.0 - 15.0 % Final     Platelet Count 02/24/2023 184  150 - 450 10e3/uL Final     ABO/RH(D) 02/24/2023 A NEG   Final     Antibody Screen 02/24/2023 Negative  Negative Final     SPECIMEN EXPIRATION DATE 02/24/2023 [id number]   Final     Blood Component Type 02/24/2023 Red Blood Cells   Final     Product Code 02/24/2023 [id number]   Final     Unit Status 02/24/2023 Transfused   Final     Unit Number 02/24/2023 [id number]   Final     CROSSMATCH 02/24/2023 Compatible   Final     CODING SYSTEM 02/24/2023 [id number]   Final     ISSUE DATE AND TIME 02/24/2023 [id number]   Final     UNIT ABO/RH 02/24/2023 A-   Final     UNIT TYPE ISBT 02/24/2023 0600   Final     Hemoglobin 02/24/2023 8.8 (L)  11.7 - 15.7 g/dL Final     Protein Total 02/24/2023 6.0 (L)  6.4 - 8.3 g/dL Final     Albumin 02/24/2023 2.4 (L)  3.5 - 5.2 g/dL Final     Bilirubin Total 02/24/2023 <0.2  <=1.2 mg/dL Final     Alkaline Phosphatase 02/24/2023 125 (H)  35 - 104 U/L Final     AST 02/24/2023 10  10 - 35 U/L Final     ALT 02/24/2023 <5 (L)  10 - 35 U/L Final     Bilirubin Direct 02/24/2023 <0.20  0.00 - 0.30 mg/dL Final     WBC Count 02/25/2023 4.5  4.0 - 11.0 10e3/uL Final     RBC Count 02/25/2023 3.79 (L)  3.80 - 5.20 10e6/uL Final     Hemoglobin 02/25/2023 9.7 (L)  11.7 - 15.7 g/dL Final     Hematocrit 02/25/2023 32.3 (L)  35.0 - 47.0 % Final     MCV 02/25/2023 85  78 - 100 fL Final     MCH 02/25/2023 25.6 (L)  26.5 - 33.0 pg Final     MCHC 02/25/2023 30.0 (L)  31.5 - 36.5 g/dL Final     RDW 02/25/2023 18.6 (H)  10.0 - 15.0 % Final     Platelet Count 02/25/2023 232  150 - 450 10e3/uL Final     Sodium 02/25/2023 139  136 - 145 mmol/L Final     Potassium 02/25/2023 3.7  3.4 - 5.3 mmol/L Final     Chloride 02/25/2023 107  98 - 107 mmol/L Final     Carbon Dioxide (CO2) 02/25/2023 23  22 - 29 mmol/L Final     Anion Gap 02/25/2023 9  7 - 15 mmol/L Final     Urea Nitrogen  02/25/2023 12.2  6.0 - 20.0 mg/dL Final     Creatinine 02/25/2023 0.41 (L)  0.51 - 0.95 mg/dL Final     Calcium 02/25/2023 8.2 (L)  8.6 - 10.0 mg/dL Final     Glucose 02/25/2023 85  70 - 99 mg/dL Final     Alkaline Phosphatase 02/25/2023 143 (H)  35 - 104 U/L Final     AST 02/25/2023 11  10 - 35 U/L Final     ALT 02/25/2023 <5 (L)  10 - 35 U/L Final     Protein Total 02/25/2023 6.9  6.4 - 8.3 g/dL Final     Albumin 02/25/2023 2.5 (L)  3.5 - 5.2 g/dL Final     Bilirubin Total 02/25/2023 0.2  <=1.2 mg/dL Final     GFR Estimate 02/25/2023 >90  >60 mL/min/1.73m2 Final    eGFR calculated using 2021 CKD-EPI equation.                   Answers for HPI/ROS submitted by the patient on 5/3/2023  If you checked off any problems, how difficult have these problems made it for you to do your work, take care of things at home, or get along with other people?: Not difficult at all  PHQ9 TOTAL SCORE: 9

## 2023-05-10 ENCOUNTER — HOSPITAL ENCOUNTER (OUTPATIENT)
Dept: WOUND CARE | Facility: CLINIC | Age: 58
Discharge: HOME | End: 2023-05-10
Attending: FAMILY MEDICINE | Admitting: FAMILY MEDICINE
Payer: MEDICARE

## 2023-05-10 DIAGNOSIS — F51.01 PRIMARY INSOMNIA: ICD-10-CM

## 2023-05-10 PROCEDURE — G0463 HOSPITAL OUTPT CLINIC VISIT: HCPCS

## 2023-05-10 PROCEDURE — 97605 NEG PRS WND THER DME<=50SQCM: CPT

## 2023-05-10 RX ORDER — ZOLPIDEM TARTRATE 5 MG/1
5 TABLET ORAL
Qty: 30 TABLET | Refills: 3 | Status: SHIPPED | OUTPATIENT
Start: 2023-05-10 | End: 2024-05-30

## 2023-05-10 NOTE — PROGRESS NOTES
[facility].     Start of Care in [facility]: 11/18/2022, initial resumption of visit, see Wound History for details.  Referring Doctor: Ivan Baeza MD  Primary Care Provider: No Ref-Primary, Physician   Wound Location: Sacral, Right ischial tuberosity, Right hip.  New wounds noted to the left buttock today 5/10/23.       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 No new concerns with her wounds, has been told the right hip wound is nearly healed.  Pt was not aware of new wounds to her left buttock.      Wound History:   Pt well known to me from visits over many years to the Wound and Ostomy clinic for chronic pressure injuries.  Initial visit November of 2020 for Right IT, Sacral, left trochanter and right heel pressure injuries.  She missed a few follow up appointments initially but was mostly coming into clinic every 3 to 4 weeks for evaluation and recommendations.  After a hospitalization at [facility] in [place] due to urinary diversion concerns and a right hip abscess she resumed visit to the Wound and Ostomy clinic on 11/18/22 for wound vac dressing changes and assessment. The right hip wound improved and as of my visit with her on 12/16/22 was nearly healed, no depth and only small open aspect all granular tissue.  It was reported the wound did fully close soon after that assessment per AL staff.  On 1/3/23 the pt's facility sent her to the ED for the right hip, it had re opened and was draining copious amounts of purulent drainage.  I was able to see the pt in the ED and was able to add the right hip wound into the intact sacral wound vac set up.  At my last clinic visit with the pt on 2/3/23 the sacral wound was not in  good condition, lots of slough, eschar on the edges and bone visible and palpable in the center.  I made arrangement with Dr Zavala's approval to have her seen in Specialty for potential debridement but appointment was approximately four weeks out. She was hospitalized at [facility] for revision of her urinary diversion 2/22/23 - 2/25/23 and was seen by [facility] nursing, the photo's from that assessment showed the sacral wound looking much improved.  No debriding was needed. She did see Dr Zavala on 2/27/23 and the sacral wound at that time was improving well with no need for debridement.  New wounds noted 5/10/23 to left buttock, IT and mid buttock.      Past Medical History:        Patient Active Problem List   Diagnosis    Migraine headache    Urinary retention    GERD (gastroesophageal reflux disease)    Flaccid paraplegia (H)    Decubitus ulcer of buttock    Pressure ulcer of heel    Poor appetite    Nausea    Generalized weakness    Burn    Health Care Home    Paraplegia following spinal cord injury (H)    ACP (advance care planning)    Osteomyelitis of hip (right periacetabular infection with osteomyelitis)    Severe protein-calorie malnutrition (H)    Microcytic anemia    Neurogenic bladder    Anxiety    Insomnia    Chronic UTI (urinary tract infection)    Skin ulcer of buttock (bilateral ischial tuberosity ulcers)    CARDIOVASCULAR SCREENING; LDL GOAL LESS THAN 160    Open wound of foot except toes with complication    LLQ abdominal pain    Paralytic ileus (H)    Chest wall pain    Decubitus skin ulcer    S/P flap graft    Pancreatitis    Pericardial effusion    Hospice care patient    Seizures (H)    AMS (altered mental status)    Admission for hospice care    Odynophagia    Portal vein thrombosis    Foreign body in esophagus, initial encounter    Impacted foreign body in esophagus, initial encounter    Aspiration pneumonia of right lung due to regurgitated food, unspecified part of lung (H)    Septic shock  (H)    Depressive disorder    Colostomy present (H)    Chronic pain due to trauma    Hypokalemia    Abscess of right hip    Stage 4 skin ulcer of sacral region (H)    Pressure injury of right hip, stage 4 (H)             Tobacco Use:     Tobacco Use       Smoking status: Never      Smokeless tobacco: Never      Diabetic: No  HgbA1C:                 Hemoglobin A1C   Date Value Ref Range Status   05/26/2021 5.1 0 - 5.6 % Final       Comment:       Normal <5.7% Prediabetes 5.7-6.4%  Diabetes 6.5% or higher - adopted from ADA   consensus guidelines.   Checks Blood Glucose?:  NA Average Readings: NA     Personal/social history:  Pt lives in the [facility] in [place] [facility] [place], no current home care services in place.     Objective:   Current treatment plan: NPWT to sacral and right trochanter wounds changed MWF.  Silvercel to the right IT covered/secured with gentle adhesive foam dressing, changed MWF. Left buttock wounds are covered with Isalnd type dressing.  Last changed: 5/8/23 at her AL facility     Wound #1 Sacral   Stage/tissue depth: stage 4 pressure injury, stage updated 1/13/23 as bone became visible and palpable in the wound bed.   3.55 cm L x 7cm W x 1.5 cm D  Tunneling: none noted  Undermining: from 10 o'clock to 2 o'clock 2.5 cm max at 12 o'clock.   Wound bed type/amount: approximately 10 % black to grey surface granular tissue, somewhat caused by build up of black foam shards, 50 % pale to pink granular tissue, 20 % scattered white nonviable tissue and  20 % red nongranular tissue; NA fluctuant  Wound Edges: opne  Periwound: scattered pale blanchable erythema  Drainage: large amounts serosanguinous  Odor: small amount of foul odor on removal of vac, none noted after cleansing, see Assessment for details  Pain: denied any pain today.  Photo from today's visit 5/10/23.      Photo's from 3/31/23, photo to the left - at rest, photo to the right - woc is holding superior wound edge up to visualize undermining.        Photo's from 2/3/23.     Photo from 11/18/22, initial resumption of visits to the Wound and Ostomy clinic.      Wound #2 Right Ischial Tuberosity   Stage/tissue depth: stage 3 pressure injury  4.1 cm L x 0.5 cm W x 0.3 cm D  Tunneling: none   Undermining: none  Wound bed type/amount: approximately 50 % scar tissue and 50 % granular tissue; NA fluctuant  Wound Edges: open  Periwound: Scattered areas of blanchable erythema and dry skin.    Odor: none noted  Pain: none  Photo from today's visit 5/10/23.     Photo from 2/3/23.     Photo from 11/18/22, initial resumption of visits to the Wound and Ostomy clinic.      Wound #3 Right hip, abscess s/p I&D   Stage/tissue depth: full thickness  0.5 cm L x 0.3 cm W x 0.5 cm D  Tunneling: none noted  Undermining: none noted today  Wound bed type/amount: 100 % granular tissue; NA fluctuant  Wound Edges: open  Periwound: Scar tissue wnl  Drainage: small to moderate amounts serosanguinous. See Assessment for details.    Odor: none noted  Pain: none  No photo taken this visit 5/10/23, c nurse error.    Photo from 3/31/23.       Photo's from 3/8/23.     Photo from ED 1/3/23.     Photo from 12/16/22.     Photo from 11/18/22, initial assessment since wound presented and was irrigated and debrided by MD.     Wound #4 Right buttock/posterior thigh fold, appears friction not pressure related, newly noted in clinic 3/8/23.  Stage/tissue depth: partial thickness  Two areas noted today, medial (within the fold itself) and lateral (not directly within the skin fold)  - Medial site within fold 3 cm L x 1.1 cm W x 0 cm D  - Lateral site not within fold 0.9 cm L x 0.8 cm W x 0 cm D  Tunneling: none  Undermining: none  Wound bed type/amount: both sites are 100 % red nongranular tissue; Not fluctuant  Wound Edges: NA no depth  Periwound: wnl  Drainage: small amounts serous  Odor: no  Pain: no  Photo from today's visit 5/10/23.    Photo from 3/31/23.     Photo from 3/8/23, initial assessment  of newly noted site.    Wound #5 Left mid buttock, new wound first noted in clinic 5/10/23.  Appears more consistent with shearing injury related to adhesive removal and not pressure but can not confirm.  Stage/tissue depth: partial thickness  4.4 cm L x 4.5 cm W x 0 cm D  Tunneling: none noted  Undermining: none noted  Wound bed type/amount: 100 % red nongranular tissue; Not fluctuant  Wound Edges: NA no depth  Periwound: to the superior two sites which appear to also have been denuded shearing but are now epithelialized hyperpigmented areas.   Drainage: small to moderate amounts serous  Odor: no  Pain: no  Photo from today's visit 5/10/23, initial assessment of new wound site.    Wound #6 Left Ischial tuberosity, new wound first noted in clinic 5/10/23.  Stage/tissue depth: full thickness, see Assessment for details.  Presumed stage 3 pressure injury located on scar tissue.   2 cm L x 3 cm W x 0 cm D  Tunneling: none  Undermining: none  Wound bed type/amount: 100 % hypergranular tissue that protrudes approximately 3 mm max; Not fluctuant  Wound Edges: closed with hypergranular tissue  Periwound: to the distal, 1 cm L x 3 cm W area of intact skin with blanchable erythema  Drainage: moderate to large amounts serous   Odor: no  Pain: no  Photo from today's visit 5/10/23, initial assessment of new wound site.     Dorsalis Pedal Pulse: Not assessed this visit.  Posterior Tibial Pulse: Not assessed this visit.  Hair growth: Not assessed this visit  Capillary Refill: Not assessed this visit  Feet/toes color: Not assessed this visit  Nails: Not assessed this visit  R Leg: Edema Not assessed this visit. Ankle circumference NA cm. Calf circumference NA cm.  L Leg: Edema Not assessed this visit. Ankle circumference NA cm. Calf circumference NA cm.     Mobility: wheelchair bound  Current offloading/footwear: regular shoes/boots  Sensation: paraplegic, no sensation from sternum distally     Other callusing/areas of concern:  none noted.     Diet: Regular     Discussed/Education: plan of care with rationale;  pt is unable to do self wound cares, nursing facility to perform cares for pt as directed.     Assessment:  Pt arrived today with wound vac functioning at 125 mmHg continuous.  The wound dressings were removed, the black foam within the sacral wound was removed, the black foam of the right trochanter wound was laid over the wound bed, none noted within.    All the wounds were cleansed with saline, small amount foul odor is noted from the sacral wound, strong prior to cleansing and mostly resolved after cleansing.     The Sacral wound:  has some dark tissue within but good portion was removed with cleansing and blunt debridement with gauze and saline.  Some decrease in size, no new concerns.     The Right IT wound: is about the same as is the right buttock skin fold wound sites.     The Right trochanter wound:  Has improved well, with very hard probing I was unable to probe any depth greater than 0.5 cm and the wound bed is all clean.    Two new sites noted today 5/10/23, Left mid buttock and left IT.    The mid buttock wound on the Left is consistent with skin being stripped with removal of adhesive drape or dressing, likely the KCI drape used as some point.  Two smaller areas above the open wound appear to have also been open at one time but are now re epithelialized but hyperpigmented.    The left IT wound is more consistent with pressure due to location.  It appears to be over old scar tissue and has hypergranular tissue present, is thusly confirmed as full thickness but unsure if it was ever to a stage 4 level.  Will chart as stage 3 as can not confirm any deeper.  Site is all clean but hypergranular and moist.      Factors impacting wound healing:   Poor nutrition: inadequate supply of protein, carbohydrates, fatty acids, and trace elements essential for all phases of wound healing.  Pt is taking a daily protein supplement  drink and is aware of need for increased protein in diet for wound healing.  Delayed healing as part of normal aging process  Reduced Blood Supply: inadequate perfusion to heal wound.  Medication: NA  Chemotherapy: suppresses the immune system and inflammatory response, NA  Radiotherapy: increases production of free radical which damage cells, NA  Psychological stress: None noted  Obesity: decreases tissue perfusion, NA  Infection: prolongs inflammatory phase, uses vital nutrients, impairs epithelialization and releases toxins, none noted this visit.   Underlying Disease: paraplegic  Maceration: reduces wound tensile strength and inhibits epithelial migration, none noted this visit  Patient compliance, appears motivated to heal  Unrelieved pressure, pt has pressure reduction cushion in wheelchair and lays in bed daily during the day for full pressure relief.    Immobility, limited  Substance abuse: NA     Plan:  For the Sacral wound with we continue with the NPWT with the 3M KCI vac, changed three times weekly, bridging to the right flank of hip (not over the now nearly healed right trochanter wound).    For the right IT and right skin fold wound we will continue with the Aquacel Ag to the sites covered with Mepilex gentle adhesive foam dressing, changed three times weekly.     The right hip wound is healing very well, unable to locate any depth greater than 0.5 cm, no longer needs the wound vac.  We will discontinue the vac on the right hip, change to the application of Aquacel Ag and cover with adhesive gauze or foam pad, change three times weekly.     Fore the new left IT and left mid buttocks wounds will have the pt's care givers do Aquacel Ag to the open sites and covered with Mepilex or similar gentle adhesive foam dressing, changed three times weekly.  May have to consider application of silver nitrate to the left IT wound if the hypergranular tissue does not lay flat and allow new scar tissue to  advance.    Topical care: Wound/surrounding skin cleansed with wound cleanser and gauze. Patted dry. Wound cares as listed in Plan   Additional recommendations: None at this time     The following discharge instructions were reviewed with and sent home with the patient:  See AVS     The following supplies were sent home with the patient:  Remains of the Cavilon no sting skin prep and Aquacel Ag opened but not used up today.      Return visit: 6/9/23     Verbal, written, & demonstrative education provided.  Face to face time: approximately 30 minutes  Procedure: approximately 30 minutes wound vac dressing change to the sacral wound.     Presley Chester RN cwocn,  [phone]

## 2023-05-10 NOTE — DISCHARGE INSTRUCTIONS
Today we looked at the pre existing wound and the new wound which I am seeing for the first time today (on the left ischial tuberosity).    The right hip wound is healing very well, unable to locate any depth greater than 0.5 cm, no longer needs the wound vac.  We will discontinue the vac on the right hip, change to the application of Silvercel or Aquacel Ag and cover with adhesive gauze or foam pad, change three times weekly.    The sacral wound is also improving, some darker tissue but is mixed with some remains of black foam also, we were able to cleanser most of that darker tissue.  The wound is improving slowly but steadily.  We will continue with the wound vac to the sacral wound, changing three times weekly.    The right IT and right buttock/groin wounds are about the same, some improvement noted.    The new wound on the left ischial tuberosity of your buttocks is new to me.  It is a full thickness wound, currently wound stage as a stage 3 pressure injury but not sure if it went deeper at one time.    The new wound, the right IT and buttock/groin wound, can all be covered with Silvercel or Aquacel Ag, then covered with a adhesive gauze or foam pad and changed three times weekly.    I will see you again in just over four weeks on Friday June the 9th at 1 pm.  Call with any concerns or questions between now and that follow up appointment.  [phone].    Presley Chester RN cwocn

## 2023-05-12 DIAGNOSIS — F51.01 PRIMARY INSOMNIA: ICD-10-CM

## 2023-05-12 DIAGNOSIS — G43.709 CHRONIC MIGRAINE WITHOUT AURA WITHOUT STATUS MIGRAINOSUS, NOT INTRACTABLE: ICD-10-CM

## 2023-05-12 DIAGNOSIS — R33.9 URINARY RETENTION: ICD-10-CM

## 2023-05-12 DIAGNOSIS — R56.9 SEIZURES (H): ICD-10-CM

## 2023-05-12 RX ORDER — OXYBUTYNIN CHLORIDE 5 MG/1
10 TABLET ORAL DAILY
Qty: 90 TABLET | Refills: 1 | Status: SHIPPED | OUTPATIENT
Start: 2023-05-12 | End: 2023-01-01

## 2023-05-12 RX ORDER — PROPRANOLOL HYDROCHLORIDE 40 MG/1
TABLET ORAL
Qty: 90 TABLET | Refills: 1 | Status: SHIPPED | OUTPATIENT
Start: 2023-05-12 | End: 2023-01-01

## 2023-05-12 RX ORDER — TRAZODONE HYDROCHLORIDE 50 MG/1
100 TABLET, FILM COATED ORAL AT BEDTIME
Qty: 180 TABLET | Refills: 1 | Status: SHIPPED | OUTPATIENT
Start: 2023-05-12 | End: 2023-01-01

## 2023-05-12 RX ORDER — LEVETIRACETAM 750 MG/1
1500 TABLET ORAL 2 TIMES DAILY
Qty: 120 TABLET | Refills: 1 | Status: SHIPPED | OUTPATIENT
Start: 2023-05-12 | End: 2023-01-01

## 2023-05-12 NOTE — TELEPHONE ENCOUNTER
"Pending Prescriptions:                       Disp   Refills    levETIRAcetam (KEPPRA) 750 MG tablet [Phar*120 ta*1        Sig: TAKE 2 TABLETS (1,500 MG) BY MOUTH 2 TIMES DAILY    Signed Prescriptions:                        Disp   Refills    propranolol (INDERAL) 40 MG tablet         90 tab*1        Sig: TAKE 1/2 TABLET (20 MG) BY MOUTH 2 TIMES DAILY  Authorizing Provider: CHRISSY MARIE  Ordering User: ACACIA CAMPBELL    Routing refill request to provider for review/approval because:  Drug [place] [facility] [place]    PHQ-9 score:        5/3/2023     2:33 PM   PHQ   PHQ-9 Total Score 9   Q9: Thoughts of better off dead/self-harm past 2 weeks Not at all         Requested Prescriptions   Pending Prescriptions Disp Refills    levETIRAcetam (KEPPRA) 750 MG tablet [Pharmacy Med Name: LEVETIRACETAM 750MG TABLET] 120 tablet 1     Sig: TAKE 2 TABLETS (1,500 MG) BY MOUTH 2 TIMES DAILY       There is no refill protocol information for this order      Signed Prescriptions Disp Refills    propranolol (INDERAL) 40 MG tablet 90 tablet 1     Sig: TAKE 1/2 TABLET (20 MG) BY MOUTH 2 TIMES DAILY       Beta-Blockers Protocol Passed - 5/12/2023 10:08 AM        Passed - Blood pressure under 140/90 in past 12 months     BP Readings from Last 3 Encounters:   05/03/23 90/60   02/27/23 110/72   02/25/23 108/67                 Passed - Patient is age 6 or older        Passed - Recent (12 mo) or future (30 days) visit within the authorizing provider's specialty     Patient has had an office visit with the authorizing provider or a provider within the authorizing providers department within the previous 12 mos or has a future within next 30 days. See \"Patient Info\" tab in inbasket, or \"Choose Columns\" in Meds & Orders section of the refill encounter.              Passed - Medication is active on med list                   "

## 2023-05-17 DIAGNOSIS — L02.415 ABSCESS OF RIGHT HIP: ICD-10-CM

## 2023-05-17 DIAGNOSIS — L89.319 PRESSURE INJURY OF SKIN OF RIGHT BUTTOCK, UNSPECIFIED INJURY STAGE: ICD-10-CM

## 2023-05-18 RX ORDER — OXYCODONE HYDROCHLORIDE 5 MG/1
TABLET ORAL
Qty: 20 TABLET | Refills: 0 | Status: SHIPPED | OUTPATIENT
Start: 2023-05-18 | End: 2023-01-01

## 2023-06-06 NOTE — TELEPHONE ENCOUNTER
Routing refill request to provider for review/approval because:    Requested Prescriptions   Pending Prescriptions Disp Refills    topiramate (TOPAMAX) 25 MG tablet [Pharmacy Med Name: TOPIRAMATE 25 MG TABLET] 1 tablet 0     Sig: TAKE 1 TABLET BY MOUTH ONCE DAILY       Anti-Seizure Meds Protocol  Failed - 6/2/2023  2:09 PM        Failed - Review Authorizing provider's last note.      Refer to last progress notes: confirm request is for original authorizing provider (cannot be through other providers).          Failed - Normal CBC on file in past 26 months     Recent Labs   Lab Test 02/25/23  0924   WBC 4.5   RBC 3.79*   HGB 9.7*   HCT 32.3*                      Failed - Medication is active on med list

## 2023-06-09 NOTE — DISCHARGE INSTRUCTIONS
Today the wounds on your buttock are all well improved or healed.    The areas in the right skin fold of the buttock and upper thigh are healed.  The wounds on the left mid buttock are also healed.    The right ischial tuberosity wound is improved, smaller in size and remains all clean tissue.  The left ischial tuberosity wound is also improved, smaller in size but remains all hypergranular tissue.  We used Silver nitrate on the left IT today to try to get the hypergranular tissue to lay back flat.  Your care givers will notice the left IT has a black surface which is normal after the silver nitrate, it may slough off soon or may stay blackish grey for a few days to weeks.    The Sacral wound looks really good, nearly all granular tissue, no slough, no odor, no signs of infection.  The old wound on your right hip is all closed with scar tissue and appears durable with no signs of inner abscess.    We will have you with your care giver staff assistance continue with the three times weekly dressing changes to the sacral wound with the KCI wound vac.  We will have you with your care giver staff assistance continue with the three times weekly dressing changes to the Right and Left IT wounds, applying the Aquacel Ag to the wounds, covering with gentle adhesive foam or gauze pads.        I will see you again in three weeks on Friday 6/30/23 at 3 pm.  Call with any concerns or questions prior to that visit [phone].    Presley Chester RN cwocn

## 2023-06-09 NOTE — PROGRESS NOTES
.[facility].     Start of Care in [facility]: 11/18/2022, initial resumption of visit, see Wound History for details.  Referring Doctor: Ivan Baeza MD  Primary Care Provider: No Ref-Primary, Physician   Wound Location: Sacral, Right Ischial Tuberosity, Right hip.  Left buttock mid and Left Ischial Tuberosity newly noted 5/10/23.       Wound Clinic Visit: Ongoing follow up     Reason for Visit: Wound assessments and cares     Subjective:  On arrival today 6/9/23 alone, reports the following:  No new concerns with the wound vac, reports from nursing at facility is the wounds are looking good.  She is now back to use of her continent urinary diversion, intermittent catching on more frequent schedule than normal to allow for bladder re training, having some leakage but minimal between cathing.       Wound History:   Pt well known to me from visits over many years to the Wound and Ostomy clinic for chronic pressure injuries.  Initial visit November of 2020 for Right IT, Sacral, left trochanter and right heel pressure injuries.  She missed a few follow up appointments initially but was mostly coming into clinic every 3 to 4 weeks for evaluation and recommendations.  After a hospitalization at [facility] in [place] due to urinary diversion concerns and a right hip abscess she resumed visit to the Wound and Ostomy clinic on 11/18/22 for wound vac dressing changes and assessment. The right hip wound improved and as of my visit with her on 12/16/22 was nearly healed, no depth and only small open aspect all granular tissue.  It was reported the wound did fully close soon after that assessment per AL staff.  On 1/3/23 the pt's facility sent her to the ED for the right hip, it had re opened and was draining copious amounts of purulent drainage.  I was able to see the pt in the ED and was able to add the right hip wound into the intact sacral wound vac set up.  At my  last clinic visit with the pt on 2/3/23 the sacral wound was not in good condition, lots of slough, eschar on the edges and bone visible and palpable in the center.  I made arrangement with Dr Zavala's approval to have her seen in Specialty for potential debridement but appointment was approximately four weeks out. She was hospitalized at [facility] for revision of her urinary diversion 2/22/23 - 2/25/23 and was seen by [facility] nursing, the photo's from that assessment showed the sacral wound looking much improved.  No debriding was needed. She did see Dr Zavala on 2/27/23 and the sacral wound at that time was improving well with no need for debridement.  New wounds noted 5/10/23 to left buttock, IT and mid buttock.     Past Medical History:  Patient Active Problem List   Diagnosis     Migraine headache     Urinary retention     GERD (gastroesophageal reflux disease)     Flaccid paraplegia (H)     Decubitus ulcer of buttock     Pressure ulcer of heel     Poor appetite     Nausea     Generalized weakness     Burn     Health Care Home     Paraplegia following spinal cord injury (H)     ACP (advance care planning)     Osteomyelitis of hip (right periacetabular infection with osteomyelitis)     Severe protein-calorie malnutrition (H)     Microcytic anemia     Neurogenic bladder     Anxiety     Insomnia     Chronic UTI (urinary tract infection)     Skin ulcer of buttock (bilateral ischial tuberosity ulcers)     CARDIOVASCULAR SCREENING; LDL GOAL LESS THAN 160     Open wound of foot except toes with complication     LLQ abdominal pain     Paralytic ileus (H)     Chest wall pain     Decubitus skin ulcer     S/P flap graft     Pancreatitis     Pericardial effusion     Hospice care patient     Seizures (H)     AMS (altered mental status)     Admission for hospice care     Odynophagia     Portal vein thrombosis     Foreign body in esophagus, initial encounter     Impacted foreign body in esophagus, initial encounter      Aspiration pneumonia of right lung due to regurgitated food, unspecified part of lung (H)     Septic shock (H)     Depressive disorder     Colostomy present (H)     Chronic pain due to trauma     Hypokalemia     Abscess of right hip     Stage 3 skin ulcer of sacral region (H)     Pressure injury of right hip, stage 3 (H)              Tobacco Use:     Tobacco Use      Smoking status: Never      Smokeless tobacco: Never    Vaping Use      Vaping status: Never Used     Dibetic: No  HgbA1C:   Hemoglobin A1C   Date Value Ref Range Status   05/26/2021 5.1 0 - 5.6 % Final     Comment:     Normal <5.7% Prediabetes 5.7-6.4%  Diabetes 6.5% or higher - adopted from ADA   consensus guidelines.     Checks Blood Glucose?:  NA Average Readings: NA     Personal/social history:  Pt lives in the [facility] in [facility], no current home care services in place.     Objective:   Current treatment plan: Sacral wound, NPWT changed MWF.  Right trochanter wound, now open to air.  Right IT, Left IT and Right buttock/thigh fold and Left mid buttock wounds, Aquacel covered/secured with Mepilex gentle adhesive foam dressing or similar island style gauze dressing.  Last changed: 6/7/23 at her AL facility     Wound #1 Sacral   Stage/tissue depth: stage 4 pressure injury, stage updated 1/13/23 as bone became visible and palpable in the wound bed.   3.5 cm L x 8.2 cm W x 1.5 cm D  Tunneling: none noted  Undermining: from 10 o'clock to 2 o'clock 2.5 cm max at 12 o'clock.   Wound bed type/amount: approximately 70 % granular tissue and 30 % red nongranular tissue: NA fluctuant  Wound Edges: open  Periwound: scattered pale blanchable erythema  Drainage: large amounts serosanguinous  Odor: none noted this visit.  Pain: denied any pain today.  Photo from today's visit 6/9/23.    Photo from 5/10/23.      Photo's from 3/31/23, photo to the left - at rest, photo to the right - woc is holding superior wound edge up to visualize undermining.       Photo's  from 2/3/23.     Photo from 11/18/22, initial resumption of visits to the Wound and Ostomy clinic.      Wound #2 Right Ischial Tuberosity   Stage/tissue depth: stage 3 pressure injury  2 cm L x 0.5 cm W x 0.2 cm D  Tunneling: none   Undermining: none  Wound bed type/amount: approximately 50 % scar tissue and 50 % granular tissue; NA fluctuant  Wound Edges: open  Periwound: Scattered areas of blanchable erythema and dry skin.    Drainage:small to moderate amounts serosanguinous  Odor: none noted  Pain: none  Photo from today's visit 6/9/23.    Photo from 5/10/23.     Photo from 2/3/23.     Photo from 11/18/22, initial resumption of visits to the Wound and Ostomy clinic.      Wound #3 Right hip, abscess s/p I&D   Stage/tissue depth: full thickness  0 cm L x 0 cm W x 0 cm D  Tunneling: none noted  Undermining: none noted today  Wound bed type/amount: 100 % scar tissue covered; NA fluctuant  Wound Edges: NA  Periwound: Scar tissue wnl  Drainage: none  Odor: none noted  Pain: none  Photo from today's visit 6/9/23.    Photo from 3/31/23.     Photo from ED 1/3/23.     Photo from 12/16/22.     Photo from 11/18/22, initial assessment since wound presented and was irrigated and debrided by MD.     Wound #4 Right buttock/posterior thigh fold, appears friction not pressure related, newly noted in clinic 3/8/23.  Stage/tissue depth: partial thickness  Two areas noted today, medial (within the fold itself) and lateral (not directly within the skin fold)  - Medial site within fold 0 cm L x 0 cm W x 0 cm D  - Lateral site not within fold 0 cm L x 0 cm W x 0 cm D  Tunneling: none  Undermining: none  Wound bed type/amount: both sites are 100 % reepithelialized; Not fluctuant  Wound Edges: NA   Periwound: wnl  Drainage: none  Odor: no  Pain: no  Photo from today's visit 6/9/23.    Photo from 5/10/23.    Photo from 3/31/23.     Photo from 3/8/23, initial assessment of newly noted site.    Wound #5 Left mid buttock, new wound first  noted in clinic 5/10/23.  Appears more consistent with shearing injury related to adhesive removal and not pressure but can not confirm.  Stage/tissue depth: partial thickness  0 cm L x 0 cm W x 0 cm D  Tunneling: none noted  Undermining: none noted  Wound bed type/amount: 100 % reepithelialized; Not fluctuant  Wound Edges: NA   Periwound: pale blanchable scattered areas of erythema    Drainage: none  Odor: no  Pain: no  Photo from today's visit 6/9/23.    Photo from 5/10/23, initial assessment of new wound site.    Wound #6 Left Ischial tuberosity, new wound first noted in clinic 5/10/23.  Stage/tissue depth: full thickness, see Assessment for details.  Presumed stage 3 pressure injury located on scar tissue.   1.2 cm L x 1.6 cm W x 0 cm D  Tunneling: none  Undermining: none  Wound bed type/amount: 100 % hypergranular tissue that protrudes approximately 3 mm max; Not fluctuant  Wound Edges: closed with hypergranular tissue  Periwound: scattered pale blanchable erythema  Drainage: moderate to large amounts serous   Odor: no  Pain: no    Photo's from today's visit 6/9/23.  Before and after silver nitrate treatement.    Photo from 5/10/23, initial assessment of new wound site.     Dorsalis Pedal Pulse: Not assessed this visit.  Posterior Tibial Pulse: Not assessed this visit.  Hair growth: Not assessed this visit  Capillary Refill: Not assessed this visit  Feet/toes color: Not assessed this visit  Nails: Not assessed this visit  R Leg: Edema Not assessed this visit. Ankle circumference NA cm. Calf circumference NA cm.  L Leg: Edema Not assessed this visit. Ankle circumference NA cm. Calf circumference NA cm.     Mobility: wheelchair bound  Current offloading/footwear: regular shoes/boots  Sensation: paraplegic, no sensation from sternum distally     Other callusing/areas of concern: none noted.     Diet: Regular     Discussed/Education: plan of care with rationale;  pt is unable to do self wound cares, nursing  facility to perform cares for pt as directed.     Assessment:  Pt arrived today with wound vac functioning at 125 mmHg continuous. But when the pt was turned noted the black foam was not compressed and dressing was loose, bridge no longer connected to foam in sacral wound.  The wound dressings were removed, the black foam within the sacral wound and the piece used to bridge to the right upper hip was removed    All the wounds were cleansed with saline, no odor was noted today prior to or after cleansing, foam removed had some odor but no wound odor noted.     The Sacral wound:  The increase in width measured above appears positional, changes from visit to visit and with manipulation of the right leg will increase or decrease in width.  The wound has improved very well, no longer any eschar no dark tissue or slough, unable to visualize or palpate any bone in the center where it was once present.  Lots of beefy red moist granular tissue present in both the open visible aspect and in the tunneled area to the superior.  Clean beefy red nongranular tissue scattered about the wound bed where granular tissue has not fully covered.  Wound bed now has a more flat normal contour, more level and even.     The Right IT wound: is smaller, but remains open.  The Right lower buttock/thigh fold wounds once located just inferior and lateral to the Right IT are again closed, fully reepithelialized.     The Right trochanter wound:  Is fully scar closed, no open wound.  Site feels normal scar tissue to palpation, no discoloration of the site formerly open which has abscess twice.    Two new sites noted  5/10/23, Left mid buttock and Left IT.    - The mid buttock wound is closed, reepithelialized with no signs of concern, remains slightly hyperpigmented but wnl no tissue breakdown noted.    - The left IT wound is improved but remains all hypergranular tissue.  Site is smaller in length and width but still protrudes 0.3 cm all  hypergranular and very moist.  Will need silver nitrate treatment to close.      Factors impacting wound healing:   Poor nutrition: inadequate supply of protein, carbohydrates, fatty acids, and trace elements essential for all phases of wound healing.  Pt is taking a daily protein supplement drink and is aware of need for increased protein in diet for wound healing.  She stated today 6/9/23 that she is planning to find some protein powders to mix with drinks and foods.    Delayed healing as part of normal aging process  Reduced Blood Supply: inadequate perfusion to heal wound.  Appears adequate.  Medication: NA  Chemotherapy: suppresses the immune system and inflammatory response, NA  Radiotherapy: increases production of free radical which damage cells, NA  Psychological stress: None noted  Obesity: decreases tissue perfusion, NA  Infection: prolongs inflammatory phase, uses vital nutrients, impairs epithelialization and releases toxins, none noted this visit.   Underlying Disease: paraplegic  Maceration: reduces wound tensile strength and inhibits epithelial migration, none noted this visit  Patient compliance, appears motivated to heal  Unrelieved pressure, pt has pressure reduction cushion in wheelchair and lays in bed daily during the day for full pressure relief.    Immobility, limited  Substance abuse: NA     Plan:  For the Sacral wound with we continue with the NPWT with the 3M KCI vac, changed three times weekly, bridged today to the right upper buttock.    For the Right IT and Left IT wounds cares will remain the same, apply Aquacel Ag to the sites coverwith Mepilex gentle adhesive foam dressing, changed three times weekly.  I performed silver nitrate treatment on the left IT hypergranular tissue today.      The Right trochanter, Right thigh/buttock skin fold wounds and Left mid buttocks wounds all now closed are to be left open to air and monitored each visit.    Topical care: Wound/surrounding skin  cleansed with wound cleanser and gauze. Patted dry. Wound cares as listed in Plan   Additional recommendations: None at this time     The following discharge instructions were reviewed with and sent home with the patient:  See AVS     The following supplies were sent home with the patient:  Remains of the Cavilon no sting skin prep and Aquacel Ag opened but not used up today.      Return visit: 6/30/23     Verbal, written, & demonstrative education provided.  Face to face time: approximately 35 minutes  Procedure: approximately 15 minutes wound vac dressing change to the sacral wound and less than 1 minute application of silver nitrate to the hypergranular tissue of the Left IT.     Presley Chester RN cwocn,  [phone]

## 2023-06-30 NOTE — DISCHARGE INSTRUCTIONS
Today all the wound sites appear to be improving.  The sacral wound has some dusky areas again but this has gone back and forth from dark to good red in a few dressing changes so we will just continue to monitor its progress.    The vac canister in place on arrival was full, there was not another canister or foam kit in with your supplies today.  We do stock the foam kits and we did change the vac dressing, putting new dressing in place to last over the weekend.  We do not stock the home vac unit canister at the hospital or clinic so we had to use the full one.  We were able to get a seal and negative pressure to 125 mmHg, but the machine will likely alarm and shut down frequently till you get back home because of the full canister.  As you as you get to [place] change the canister out and get a fresh one in place.    No changes to the plan for any of the wounds today.    I will see you again in three weeks on Friday July the 21 st at 3 pm.  Call with any concerns or questions, [phone].    Presley Chester RN cwocn

## 2023-06-30 NOTE — PROGRESS NOTES
.[facility].     Start of Care in [facility]: 11/18/2022, initial resumption of visit, see Wound History for details.  Referring Doctor: Ivan Baeza MD  Primary Care Provider: No Ref-Primary, Physician   Wound Location: Sacral, Right Ischial Tuberosity, Right thigh/buttock fold, Right hip (healed).  Left buttock mid (healed) and Left Ischial Tuberosity newly noted 5/10/23.       Wound Clinic Visit: Ongoing follow up     Reason for Visit: Wound assessments and cares     Subjective:  On arrival today 6/30/23 alone, the pt reports the following:  Her vac has been alarming during her transport to clinic, otherwise no recent concerns with the machine or the thee time weekly dressing changes in AL facility.     Wound History:   Pt well known to me from visits over many years to the Wound and Ostomy clinic for chronic pressure injuries.  Initial visit November of 2020 for Right IT, Sacral, left trochanter and right heel pressure injuries.  She missed a few follow up appointments initially but was mostly coming into clinic every 3 to 4 weeks for evaluation and recommendations.  After a hospitalization at [facility] in [place] due to urinary diversion concerns and a right hip abscess she resumed visit to the Wound and Ostomy clinic on 11/18/22 for wound vac dressing changes and assessment. The right hip wound improved and as of my visit with her on 12/16/22 was nearly healed, no depth and only small open aspect all granular tissue.  It was reported the wound did fully close soon after that assessment per AL staff.  On 1/3/23 the pt's facility sent her to the ED for the right hip, it had re opened and was draining copious amounts of purulent drainage.  I was able to see the pt in the ED and was able to add the right hip wound into the intact sacral wound vac set up.  At my last clinic visit with the pt on 2/3/23 the sacral wound was not in good condition, lots of  slough, eschar on the edges and bone visible and palpable in the center.  I made arrangement with Dr Zavala's approval to have her seen in Specialty for potential debridement but appointment was approximately four weeks out. She was hospitalized at [facility] for revision of her urinary diversion 2/22/23 - 2/25/23 and was seen by [facility] nursing, the photo's from that assessment showed the sacral wound looking much improved.  No debriding was needed. She did see Dr Zavala on 2/27/23 and the sacral wound at that time was improving well with no need for debridement.  New wounds noted 5/10/23 to left buttock, Left IT and mid buttock.     Past Medical History:  Patient Active Problem List   Diagnosis    Migraine headache    Urinary retention    GERD (gastroesophageal reflux disease)    Flaccid paraplegia (H)    Decubitus ulcer of buttock    Pressure ulcer of heel    Poor appetite    Nausea    Generalized weakness    Burn    Health Care Home    Paraplegia following spinal cord injury (H)    ACP (advance care planning)    Osteomyelitis of hip (right periacetabular infection with osteomyelitis)    Severe protein-calorie malnutrition (H)    Microcytic anemia    Neurogenic bladder    Anxiety    Insomnia    Chronic UTI (urinary tract infection)    Skin ulcer of buttock (bilateral ischial tuberosity ulcers)    CARDIOVASCULAR SCREENING; LDL GOAL LESS THAN 160    Open wound of foot except toes with complication    LLQ abdominal pain    Paralytic ileus (H)    Chest wall pain    Decubitus skin ulcer    S/P flap graft    Pancreatitis    Pericardial effusion    Hospice care patient    Seizures (H)    AMS (altered mental status)    Admission for hospice care    Odynophagia    Portal vein thrombosis    Foreign body in esophagus, initial encounter    Impacted foreign body in esophagus, initial encounter    Aspiration pneumonia of right lung due to regurgitated food, unspecified part of lung (H)    Septic shock (H)    Depressive disorder     Colostomy present (H)    Chronic pain due to trauma    Hypokalemia    Abscess of right hip    Stage 3 skin ulcer of sacral region (H)    Pressure injury of right hip, stage 3 (H)              Tobacco Use:     Tobacco Use      Smoking status: Never      Smokeless tobacco: Never     Dibetic: No  HgbA1C:   Hemoglobin A1C   Date Value Ref Range Status   05/26/2021 5.1 0 - 5.6 % Final     Comment:     Normal <5.7% Prediabetes 5.7-6.4%  Diabetes 6.5% or higher - adopted from ADA   consensus guidelines.     Checks Blood Glucose?:  NA Average Readings: NA     Personal/social history:  Pt lives in the [facility] in [place], no current home care services in place.     Objective:   Current treatment plan: Sacral wound, NPWT changed MWF.  Right trochanter wound, now open to air.  Right IT, Left IT and Right buttock/thigh fold and Left mid buttock wounds, Aquacel covered/secured with Mepilex gentle adhesive foam dressing or similar island style gauze dressing.  Last changed: 6/28/23 at her AL facility.     Wound #1 Sacral   Stage/tissue depth: stage 4 pressure injury, stage updated 1/13/23 as bone became visible and palpable in the wound bed.   5.1 cm L x 10.5 cm W x 2 cm D  Tunneling: none noted  Undermining: from 10 o'clock to 2 o'clock 2 cm max at 12 o'clock.   Wound bed type/amount: approximately 20 % scattered dry/yellow slough, 50 % granular tissue and 30 % red nongranular tissue: NA fluctuant  Wound Edges: open  Periwound: scattered pale blanchable erythema  Drainage: large amounts serosanguinous  Odor: odor with removal of dressing, none noted after cleansing.    Pain: denied any pain today.    Photo's from today's visit 6/30/23.    Photo from 6/9/23.    Photo from 5/10/23.      Photo's from 3/31/23, photo to the left - at rest, photo to the right - woc is holding superior wound edge up to visualize undermining.     Photo from 11/18/22, initial resumption of visits to the Wound and Ostomy clinic.      Wound #2 Right  Ischial Tuberosity   Stage/tissue depth: stage 3 pressure injury  3.1 cm L x 1 cm W x 0.3 cm D  Tunneling: none   Undermining: none  Wound bed type/amount: approximately 10 % scar tissue and 90 % granular tissue; NA fluctuant  Wound Edges: open  Periwound: Scattered areas of blanchable erythema and dry skin.    Drainage:small to moderate amounts serosanguinous  Odor: none noted  Pain: none  Photo from today's visit 6/30/23.    Photo from 6/9/23.    Photo from 2/3/23.     Photo from 11/18/22, initial resumption of visits to the Wound and Ostomy clinic.      Wound #3 Right hip, abscess s/p I&D   Stage/tissue depth: full thickness  0 cm L x 0 cm W x 0 cm D  Tunneling: none noted  Undermining: none noted today  Wound bed type/amount: 100 % scar tissue covered; NA fluctuant  Wound Edges: NA  Periwound: Scar tissue wnl  Drainage: none  Odor: none noted  Pain: none  Photo from today's visit 6/30/23.    Photo from 6/9/23.     Photo from ED 1/3/23.     Photo from 12/16/22.     Photo from 11/18/22, initial assessment since wound presented and was irrigated and debrided by MD.     Wound #4 Right buttock/posterior thigh fold, appears friction not pressure related, newly noted in clinic 3/8/23.  Stage/tissue depth: partial thickness  One are this visit, reopened with body manipulation for cares.  1.5 cm L x 0.2 cm W x 0 cm D  Tunneling: none  Undermining: none  Wound bed type/amount: 100 % red nongranular tissue; Not fluctuant  Wound Edges: NA   Periwound: wnl  Drainage: small amounts sanguinous  Odor: no  Pain: no  Photo from today's visit 6/30/23.    Photo from 6/9/23.    Photo from 3/31/23.     Photo from 3/8/23, initial assessment of newly noted site.    Wound #5 Left mid buttock, new wound first noted in clinic 5/10/23.  Appears more consistent with shearing injury related to adhesive removal and not pressure but can not confirm.  Stage/tissue depth: partial thickness  0 cm L x 0 cm W x 0 cm D  Tunneling: none  noted  Undermining: none noted  Wound bed type/amount: 100 % epithelialized; Not fluctuant  Wound Edges: NA   Periwound: pale blanchable scattered areas of erythema    Drainage: none  Odor: no  Pain: no  No photo taken this visit 6/30/23, [facility] nurse error.    Photo from 6/9/23.    Photo from 5/10/23, initial assessment of new wound site.    Wound #6 Left Ischial tuberosity, new wound first noted in clinic 5/10/23.  Stage/tissue depth: full thickness, see Assessment for details.  Presumed stage 3 pressure injury located on scar tissue.   1.7 cm L x 0.7 cm W x 0 cm D  Tunneling: none  Undermining: none  Wound bed type/amount: approximately 20 % scar tissue and 80 % granular tissue; Not fluctuant  Wound Edges: closed with hypergranular tissue  Periwound: scattered pale blanchable erythema  Drainage: small amount serosanguinous   Odor: no  Pain: no  Photo from today's visit 6/30/23.      Photo's from 6/9/23.  Before and after silver nitrate treatement.    Photo from 5/10/23, initial assessment of new wound site.     Dorsalis Pedal Pulse: Not assessed this visit.  Posterior Tibial Pulse: Not assessed this visit.  Hair growth: Not assessed this visit  Capillary Refill: Not assessed this visit  Feet/toes color: Not assessed this visit  Nails: Not assessed this visit  R Leg: Edema Not assessed this visit. Ankle circumference NA cm. Calf circumference NA cm.  L Leg: Edema Not assessed this visit. Ankle circumference NA cm. Calf circumference NA cm.     Mobility: wheelchair bound  Current offloading/footwear: regular shoes/boots  Sensation: paraplegic, no sensation from sternum distally     Other callusing/areas of concern: none noted.     Diet: Regular     Discussed/Education: plan of care with rationale;  pt is unable to do self wound cares, nursing facility to perform cares for pt as directed.     Assessment:  Specialty RN Desirae SHUKLA assisted with cares today.   Pt arrived today with wound vac running but at 50 mmHg and not 125  mmHg, machine is not hold negative pressure due to full canister.  With some manipulation of the machine and canister able to regain 125 mmHg but with alarms of vac canister full needing to restart the machine to resume negative pressure.  Pt did not have a canister nor black granufoam wound dressing kits with her today.  We do not stock the canisters but do stock the vac dressing.  Determination had to be made on appropriate cares today prior to vac change, see Plan for details and rational.  The wound dressings were removed, the black foam within the sacral wound and the piece used to bridge to the right upper hip was removed    All the wounds were cleansed with saline.  The sacral wound had foul odor on removal of the dressing, after soap and water was additionally used to cleanse and saline rinsed the wound odor was resolved.     The Sacral wound:  The wound has increased in size in all aspects.  The length and width increase appears mainly with the partial thickness breakdown of the wound edges and immediate periwound skin, not an erosion of the full thickness main wound, at least at this time.  The wound bed also has a a return of grayish yellow slough.  We have seen this fluctuation in the past as noted in the pictures, no debriding has been done in the past as the slough would resolve and good granular tissue returns.  The consistency, or lack of, continuous negative pressure is normally the cause.  The wound has shown signs of rapid deterioration and rapid improvement so we will continue with the plan of care and continue to monitor.     The Right IT wound: is larger this visit, more moist, no significant periwound erythema noted or excessive moisture.  The Right lower buttock/thigh fold wounds once located just inferior and lateral to the Right IT were initially closed. To assess the site I parted the fold gently and it appeared very fragile but no drainage, with saline and non woven gauze dabbing to  cleanse the site opened and bleed, needing brief amount of pressure to stop.  The site is clean, this reoccurring site remains fragile but partial thickness.     The Right trochanter wound:  Remains scar closed, no open wound.  Site feels normal scar tissue to palpation, no discoloration of the site formerly open which has abscessed twice.    Left Mid buttock: Remains closed, all epithelialized but fragile loose skin, can avoid placing vac dressing over the site to protect.    Left IT wound: Remains open, increase in length but decrease in depth.  No need for silver nitrate today, no hypergranular tissue, scattered strips of new scar tissue separate the three small sized open granular sites.      Factors impacting wound healing:   Poor nutrition: inadequate supply of protein, carbohydrates, fatty acids, and trace elements essential for all phases of wound healing.  Pt is taking a daily protein supplement drink and is aware of need for increased protein in diet for wound healing.  She has focused on protein supplement drinks and snack confirmed today 6/30/23.  Delayed healing as part of normal aging process  Reduced Blood Supply: inadequate perfusion to heal wound.  Appears adequate.  Medication: NA  Chemotherapy: suppresses the immune system and inflammatory response, NA  Radiotherapy: increases production of free radical which damage cells, NA  Psychological stress: None noted  Obesity: decreases tissue perfusion, NA  Infection: prolongs inflammatory phase, uses vital nutrients, impairs epithelialization and releases toxins, none noted this visit.   Underlying Disease: paraplegic  Maceration: reduces wound tensile strength and inhibits epithelial migration, none noted this visit  Patient compliance, appears motivated to heal  Unrelieved pressure, pt has pressure reduction cushion in wheelchair and lays in bed daily during the day for full pressure relief.    Immobility, limited  Substance abuse: NA     Plan:  For  the open Right IT, Left IT and Right inner thigh fold we will continue to cover with Aquacel Ag and Mepilex gentle adhesive dressings, to be changed three times weekly.    For the Sacral wound today.  The vac is not functioning well but we are able to keep a seal at 125 mmHg but the machine will alarm and possibly shut down, needing restart to regain negative pressure.   The pt's AL does not have round the clock nursing, and no nursing coverage for wound cares over the weekend.  Had daily wet to dry dressing changes daily been an option over the weekend I would have recommended wet to dry ns damp gauze with ABD pad changed daily and re application of the Vac on Monday when nursing is available.  As daily cares are not an option we determine most appropriate plan was to re apply a new vac dressing, have pt restart the machine as needed till she gets back to her AL.  She is able and willing to change the canister out when she gets home, does have supplies in her room.  One continuous piece of black KCI foam was used to fill the wound bed and bridge to the right upper buttock.  KCI drape used to protect periwound skin from bridged foam and to seal in dressing.  Tract pad attached and negative pressure achieved.  While leaving the clinic the machine alarmed and pt correctly restarted the machine and negative pressure was regained.  She states understanding with importance of keeping the negative pressure ongoing as able and to get new canister on and machine back running without alarms.  If negative pressure fails over the weekend only option is emergent visit for wound cares to ED or urgent care is no nursing is available to come to her AL.    Topical care: Wound/surrounding skin cleansed with wound cleanser and gauze. Patted dry. Wound cares as listed in Plan   Additional recommendations: None at this time     The following discharge instructions were reviewed with and sent home with the patient:  See AVS     The  following supplies were sent home with the patient:  Remains of the Cavilon no sting skin prep and Aquacel Ag opened but not used up today.      Return visit: 7/21/23     Verbal, written, & demonstrative education provided.  Face to face time: approximately 340 minutes  Procedure: approximately 10 minutes wound vac dressing change to the sacral wound.     Presley Chester RN cwocn,  [phone]

## 2023-07-05 NOTE — TELEPHONE ENCOUNTER
Keppra  Routing refill request to provider for review/approval because:  Drug not on the [facility] refill protocol         Ditropan  Prescription approved per [facility] Refill Protocol.    Priti Peralta RN

## 2023-07-20 NOTE — TELEPHONE ENCOUNTER
Oxycodone      Last Written Prescription Date:  7-7-2023  Last Fill Quantity: 20,   # refills: 0  Last Office Visit: 5-3-2023  Future Office visit:    Next 5 appointments (look out 90 days)    Jul 21, 2023  3:00 PM  Return Visit with Presley Chester, RN, PH SPECIALTY RN  [facility] [address] [place] [address] [id number] [phone]           Routing refill request to provider for review/approval because:  Drug not on the [facility], [facility] or [facility] refill protocol or controlled substance

## 2023-07-21 NOTE — DISCHARGE INSTRUCTIONS
Today the sacral wound is improved, but had some odor prior to cleansing with the more aggressive chlorhexidine soap and water.  No odor noted after the cleansing and rinsing with saline.  The left IT wound is closed today and the right thigh/buttock fold site is also healed.    I think your vac may be getting loose in the fitting between canister and the machine.  If it gives you any concerns or does not seem to be working right, leaking when the dressing appears well placed, call me and we will request K[employer] exchange for a new machine.    I will see you again in two weeks on Friday August the 4 th at 3 pm.  Have your staff continue with the same plan of care for the wounds and I will see you in two weeks.    Call with any concerns or questions [phone].    Presley Chester RN cwocn

## 2023-07-21 NOTE — PROGRESS NOTES
[facility].     Start of Care in [facility]: 11/18/2022, initial resumption of visit, see Wound History for details.  Referring Doctor: Ivan Baeza MD  Primary Care Provider: No Ref-Primary, Physician   Wound Location: Sacral, Right Ischial Tuberosity, Right thigh/buttock fold, Right hip (healed).  Left buttock mid (healed) and Left Ischial Tuberosity newly noted 5/10/23.       Wound Clinic Visit: Ongoing follow up     Reason for Visit: Wound assessments and cares     Subjective:  On arrival today 7/21/23 alone, the pt reports the following:  No new concerns noted, vac has been functioning ok, occasional alarms but not on a regular basis.  AL nurse continue to perform the wound vac and other buttock wound changes MWF.       Wound History:   Pt well known to me from visits over many years to the Wound and Ostomy clinic for chronic pressure injuries.  Initial visit November of 2020 for Right IT, Sacral, left trochanter and right heel pressure injuries.  She missed a few follow up appointments initially but was mostly coming into clinic every 3 to 4 weeks for evaluation and recommendations.  After a hospitalization at [facility] in [place] due to urinary diversion concerns and a right hip abscess she resumed visit to the Wound and Ostomy clinic on 11/18/22 for wound vac dressing changes and assessment. The right hip wound improved and as of my visit with her on 12/16/22 was nearly healed, no depth and only small open aspect all granular tissue.  It was reported the wound did fully close soon after that assessment per AL staff.  On 1/3/23 the pt's facility sent her to the ED for the right hip, it had re opened and was draining copious amounts of purulent drainage.  I was able to see the pt in the ED and was able to add the right hip wound into the intact sacral wound vac set up.  At my last clinic visit with the pt on 2/3/23 the sacral wound was not in good  condition, lots of slough, eschar on the edges and bone visible and palpable in the center.  I made arrangement with Dr Zavala's approval to have her seen in Specialty for potential debridement but appointment was approximately four weeks out. She was hospitalized at [facility] for revision of her urinary diversion 2/22/23 - 2/25/23 and was seen by [facility] nursing, the photo's from that assessment showed the sacral wound looking much improved.  No debriding was needed. She did see Dr Zavala on 2/27/23 and the sacral wound at that time was improving well with no need for debridement.  New wounds noted 5/10/23 to left buttock, Left IT and mid buttock.  Left mid buttock closed as of 6/9/23, left IT closed as of today's visit 7/21/23.    Past Medical History:  Patient Active Problem List   Diagnosis    Migraine headache    Urinary retention    GERD (gastroesophageal reflux disease)    Flaccid paraplegia (H)    Decubitus ulcer of buttock    Pressure ulcer of heel    Poor appetite    Nausea    Generalized weakness    Burn    Health Care Home    Paraplegia following spinal cord injury (H)    ACP (advance care planning)    Osteomyelitis of hip (right periacetabular infection with osteomyelitis)    Severe protein-calorie malnutrition (H)    Microcytic anemia    Neurogenic bladder    Anxiety    Insomnia    Chronic UTI (urinary tract infection)    Skin ulcer of buttock (bilateral ischial tuberosity ulcers)    CARDIOVASCULAR SCREENING; LDL GOAL LESS THAN 160    Open wound of foot except toes with complication    LLQ abdominal pain    Paralytic ileus (H)    Chest wall pain    Decubitus skin ulcer    S/P flap graft    Pancreatitis    Pericardial effusion    Hospice care patient    Seizures (H)    AMS (altered mental status)    Admission for hospice care    Odynophagia    Portal vein thrombosis    Foreign body in esophagus, initial encounter    Impacted foreign body in esophagus, initial encounter    Aspiration pneumonia of right  lung due to regurgitated food, unspecified part of lung (H)    Septic shock (H)    Depressive disorder    Colostomy present (H)    Chronic pain due to trauma    Hypokalemia    Abscess of right hip    Stage 3 skin ulcer of sacral region (H)    Pressure injury of right hip, stage 3 (H)              Tobacco Use:     Tobacco Use      Smoking status: Never      Smokeless tobacco: Never     Dibetic: No  HgbA1C:   Hemoglobin A1C   Date Value Ref Range Status   05/26/2021 5.1 0 - 5.6 % Final     Comment:     Normal <5.7% Prediabetes 5.7-6.4%  Diabetes 6.5% or higher - adopted from ADA   consensus guidelines.     Checks Blood Glucose?:  NA Average Readings: NA     Personal/social history:  Pt lives in the [facility] in [facility], no current home care services in place.     Objective:   Current treatment plan: Sacral wound, NPWT changed MWF.  Right IT, Left IT and Right buttock/thigh fold: Aquacel Ag covered with Mepilex changed MWF.  Last changed: 7/19/23 at her AL facility.     Wound #1 Sacral   Stage/tissue depth: stage 4 pressure injury, stage updated 1/13/23 as bone became visible and palpable in the wound bed.   4 cm L x 9.5 cm W x 2 cm D   Tunneling: none noted  Undermining: from 10 o'clock to 2 o'clock 2 cm max at 12 o'clock. No change  Wound bed type/amount: approximately 30 % scattered yellow and grey slough, 40 % granular tissue and 30 % red nongranular tissue: NA fluctuant  Wound Edges: open  Periwound: scattered pale blanchable erythema  Drainage: large amounts serosanguinous  Odor: odor with removal of dressing, none noted after cleansing.    Pain: denied any pain today.  Photo's from today's visit 7/21/23.    Photo from 6/9/23.    Photo from 5/10/23.     Photo from 11/18/22, initial resumption of visits to the Wound and Ostomy clinic.      Wound #2 Right Ischial Tuberosity   Stage/tissue depth: stage 3 pressure injury  2.3 cm L x 0.7 cm W x 0.1 cm D   Tunneling: none   Undermining: none  Wound bed type/amount:  approximately 25 % scar tissue and 75 % granular tissue; NA fluctuant  Wound Edges: open  Periwound: Scattered areas of blanchable erythema and dry skin, scar tissue.    Drainage: small to moderate amounts serosanguinous  Odor: none noted  Pain: none  Photo from today's visit 7/21/23.    Photo from 6/9/23.     Photo from 11/18/22, initial resumption of visits to the Wound and Ostomy clinic.      Wound #3 Right buttock/posterior thigh fold, appears friction not pressure related, newly noted in clinic 3/8/23, has closed and reopened a few times.   Stage/tissue depth: partial thickness  0 cm L x 0 cm W x 0 cm D  Tunneling: none  Undermining: none  Wound bed type/amount: 100 % reepithelialized; Not fluctuant  Wound Edges: NA   Periwound: wnl  Drainage: none  Odor: no  Pain: no  Photo from today's visit 7/21/23.    Photo from 6/30/23.     Photo from 3/8/23, initial assessment of newly noted site.    Wound #4 Left Ischial tuberosity, new wound first noted in clinic 5/10/23.  Stage/tissue depth: full thickness, see Assessment for details.  Presumed stage 3 pressure injury located on scar tissue.   0 cm L x 0 cm W x 0 cm D  Tunneling: none  Undermining: none  Wound bed type/amount: 100 % intact but partially hyperpigmented and all very fragile scar tissue Not fluctuant  Wound Edges: closed with hypergranular tissue  Periwound: scattered pale blanchable erythema  Drainage: small amount serosanguinous   Odor: no  Pain: no  Photo from today's visit 7/21/23.      Photo's from 6/9/23.  Before and after silver nitrate treatement.    Photo from 5/10/23, initial assessment of new wound site.     Dorsalis Pedal Pulse: Not assessed this visit.  Posterior Tibial Pulse: Not assessed this visit.  Hair growth: Not assessed this visit  Capillary Refill: Not assessed this visit  Feet/toes color: Not assessed this visit  Nails: Not assessed this visit  R Leg: Edema Not assessed this visit. Ankle circumference NA cm. Calf circumference NA  cm.  L Leg: Edema Not assessed this visit. Ankle circumference NA cm. Calf circumference NA cm.     Mobility: wheelchair bound  Current offloading/footwear: regular shoes/boots  Sensation: paraplegic, no sensation from sternum distally     Other callusing/areas of concern:   - Right hip, closed deep ulcer with reoccurring abscess.  Remains scar closed  - Left mid buttock, closed partial thickness wound related to shearing and potentially adhesive removal trauma.     Diet: Regular     Discussed/Education: plan of care with rationale;  pt is unable to do self wound cares, nursing facility to perform cares for pt as directed.     Assessment:  [facility] nurse Jessica VALVERDE RN cwocn present for assist with positioning, cares and assessment today.  Pt arrived today with Sacral wound vac running at the correct 125 mmHg continuous negative pressure.  The wound dressings were removed, the black foam within the sacral wound and the piece used to bridge to the right upper hip was removed    All the wounds were cleansed with saline.  The sacral wound had foul odor on removal of the dressing, after soap and water was additionally used to cleanse and saline rinsed the wound odor was resolved.     The Sacral wound:  The wound has decreased in size today, again this may be somewhat positional.  The wound bed continues to have times where it will become dusky but regains its granular appearance the following visit.  There remains some slough present and some dusky areas of grey slough but large amounts of good granular tissue present.      The Right IT wound: is also improved this visit, smaller in size and appears to be scar closing slowly.  The Right lower buttock/thigh fold wounds once located just inferior and lateral to the Right IT is closed again today but site does remain fragile.     The Right trochanter wound:  Remains scar closed, no open wound.  Site feels normal scar tissue to palpation, no discoloration of the site formerly open  which has abscessed twice.  Will remove from list today as has been healed but will add to the Areas of Concern to Monitor list.    Left Mid buttock: Remains closed, all epithelialized but fragile loose skin, can avoid placing vac dressing over the site to protect.  Will also remove this from the wound list today but will add to the Areas of Concern to Monitor list    Left IT wound: Is newly closed but very fragile.  No open tissue but newly scar covered tissue is mostly hyperpigmented.  No drainage noted     Factors impacting wound healing:   Poor nutrition: inadequate supply of protein, carbohydrates, fatty acids, and trace elements essential for all phases of wound healing.  Pt is taking a daily protein supplement drink and is aware of need for increased protein in diet for wound healing.  She has focused on protein supplement drinks and snack confirmed again today 7/21/23.   Delayed healing as part of normal aging process  Reduced Blood Supply: inadequate perfusion to heal wound.  Appears adequate.  Medication: NA  Chemotherapy: suppresses the immune system and inflammatory response, NA  Radiotherapy: increases production of free radical which damage cells, NA  Psychological stress: None noted  Obesity: decreases tissue perfusion, NA  Infection: prolongs inflammatory phase, uses vital nutrients, impairs epithelialization and releases toxins, none noted this visit.   Underlying Disease: paraplegic  Maceration: reduces wound tensile strength and inhibits epithelial migration, none noted this visit  Patient compliance, appears motivated to heal  Unrelieved pressure, pt has pressure reduction cushion in wheelchair and lays in bed daily during the day for full pressure relief.    Immobility, limited  Substance abuse: NA     Plan:  Today the sacral wound is improved, but had some odor prior to cleansing with the more aggressive chlorhexidine soap and water.  No odor noted after the cleansing and rinsing with  saline.  The left IT wound is closed today and the right thigh/buttock fold site is also healed.     The vac may be getting loose in the fitting between canister and the machine.  If it gives her any concerns or does not seem to be working right, leaking when the dressing appears well placed, I instructed the pt to call me and we will request KCI exchange for a new machine.  This could also be done by her AL nursing staff.    Instructed to continue with the MWF wound vac dressing changes to the sacral wound.  All dressings to be changed MWF  Continue to cover the right IT wound with Aquacel Ag and Mepilex boarder flex gentle adhesive dressing.  Continue to cover the fragile but closed site to the right buttock/thigh fold with Mepilex boarder flex gentle adhesive dressing.  Continue to cover the left IT closed but fragile scar tissue with Mepilex boarder flex gentle adhesive dressing.    Topical care: Wound/surrounding skin cleansed with wound cleanser and gauze. Patted dry. Wound cares as listed in Plan   Additional recommendations: None at this time     The following discharge instructions were reviewed with and sent home with the patient:  See AVS     The following supplies were sent home with the patient:  Remains of the Cavilon no sting skin prep and Aquacel Ag opened but not used up today.      Return visit: 8/4/23     Verbal, written, & demonstrative education provided.  Face to face time: approximately 30 minutes  Procedure: approximately 25 minutes wound vac dressing change to the sacral wound, was slowed by leakages and needs to repair.     Presley Chester RN [facility],  [phone]

## 2023-08-04 NOTE — DISCHARGE INSTRUCTIONS
Wound and Ostomy clinic [facility] RN visit note discharge instructions.  The below wound care instructions will be sent to Dr Fisher for signature and will be faxed on to [facility] at [phone].    The right ischial tuberosity wound is improved.  The left ischial tuberosity wound has re opened since our last visit but has no depth and no signs of infection.  The sacral wound has made some progress but again has some areas of each present on the wound edge and inner wound bed.    We will continue with the same plan of care for the wounds as listed below.    I will have the pt return to the Wound and Ostomy clinic in four weeks on Friday September the 1 st at 4 pm.  Any concerns or questions between now and then call our scheduling line at [phone] and they will assist you to schedule a sooner visit.    Wound cares as follows.  - Cleanse all wounds with soap and water, saline or a no rinse wound cleanser and dry well with gauze.    For the Right and left ischial tuberosity pressure injuries.  - Apply Aquacel Ag to the wound bed.  - Cover with gentle adhesive or foam dressings.  - Change dressing Mondays, Wednesdays and Fridays.    For the Sacral pressure injury.  - Negative pressure wound therapy with 3M KCI wound vac, settings at 125 mmHg continuous negative pressure.  - Fill wound bed with black 3M KCI granufoam, bridge dressing to the right lateral hip or upper buttock.  - Seal in foams and protect skin under bridging with 3M KCI adhesive drape.  - Attach tract pad and  to tubing of canister.  - Change dressing Mondays, Wednesdays and Fridays.  - Change the canister once weekly and prn for full or failing canister.    Dr VAZQUEZ Baeza MD per Presley Chester RN cwocn

## 2023-08-04 NOTE — PROGRESS NOTES
[facility].     Start of Care in [facility]: 11/18/2022, initial resumption of visit, see Wound History for details.  Referring Doctor: Ivan Baeza MD  Primary Care Provider: No Ref-Primary, Physician   Wound Location: Sacral, Right Ischial Tuberosity, Right thigh/buttock fold, Right hip (healed).  Left buttock mid (healed) and Left Ischial Tuberosity newly noted 5/10/23.       Wound Clinic Visit: Ongoing follow up     Reason for Visit: Wound assessments and cares     Subjective:  On arrival today 8/4/23 alone, the pt reports the following:  No new concerns noted, vac has had occasional alarms but overall is working ok.  AL nurse continue to perform the wound vac and other buttock wound changes MWF.       Wound History:   Pt well known to me from visits over many years to the Wound and Ostomy clinic for chronic pressure injuries.  Initial visit November of 2020 for Right IT, Sacral, left trochanter and right heel pressure injuries.  She missed a few follow up appointments initially but was mostly coming into clinic every 3 to 4 weeks for evaluation and recommendations.  After a hospitalization at [facility] in [place] due to urinary diversion concerns and a right hip abscess she resumed visit to the Wound and Ostomy clinic on 11/18/22 for wound vac dressing changes and assessment. The right hip wound improved and as of my visit with her on 12/16/22 was nearly healed, no depth and only small open aspect all granular tissue.  It was reported the wound did fully close soon after that assessment per AL staff.  On 1/3/23 the pt's facility sent her to the ED for the right hip, it had re opened and was draining copious amounts of purulent drainage.  I was able to see the pt in the ED and was able to add the right hip wound into the intact sacral wound vac set up.  At my last clinic visit with the pt on 2/3/23 the sacral wound was not in good condition, lots of  slough, eschar on the edges and bone visible and palpable in the center.  I made arrangement with Dr Zavala's approval to have her seen in Specialty for potential debridement but appointment was approximately four weeks out. She was hospitalized at [facility] for revision of her urinary diversion 2/22/23 - 2/25/23 and was seen by [facility] nursing, the photo's from that assessment showed the sacral wound looking much improved.  No debriding was needed. She did see Dr Zavala on 2/27/23 and the sacral wound at that time was improving well with no need for debridement.  New wounds noted 5/10/23 to left buttock, Left IT and mid buttock.  Left mid buttock closed as of 6/9/23 and remains closed, left IT closed as of 7/21/23 visit, reopen as of today 8/4/23.    Past Medical History:  Patient Active Problem List   Diagnosis    Migraine headache    Urinary retention    GERD (gastroesophageal reflux disease)    Flaccid paraplegia (H)    Decubitus ulcer of buttock    Pressure ulcer of heel    Poor appetite    Nausea    Generalized weakness    Burn    Health Care Home    Paraplegia following spinal cord injury (H)    ACP (advance care planning)    Osteomyelitis of hip (right periacetabular infection with osteomyelitis)    Severe protein-calorie malnutrition (H)    Microcytic anemia    Neurogenic bladder    Anxiety    Insomnia    Chronic UTI (urinary tract infection)    Skin ulcer of buttock (bilateral ischial tuberosity ulcers)    CARDIOVASCULAR SCREENING; LDL GOAL LESS THAN 160    Open wound of foot except toes with complication    LLQ abdominal pain    Paralytic ileus (H)    Chest wall pain    Decubitus skin ulcer    S/P flap graft    Pancreatitis    Pericardial effusion    Hospice care patient    Seizures (H)    AMS (altered mental status)    Admission for hospice care    Odynophagia    Portal vein thrombosis    Foreign body in esophagus, initial encounter    Impacted foreign body in esophagus, initial encounter    Aspiration  pneumonia of right lung due to regurgitated food, unspecified part of lung (H)    Septic shock (H)    Depressive disorder    Colostomy present (H)    Chronic pain due to trauma    Hypokalemia    Abscess of right hip    Stage 3 skin ulcer of sacral region (H)    Pressure injury of right hip, stage 3 (H)              Tobacco Use:     Tobacco Use      Smoking status: Never      Smokeless tobacco: Never     Dibetic: No  HgbA1C:   Hemoglobin A1C   Date Value Ref Range Status   05/26/2021 5.1 0 - 5.6 % Final     Comment:     Normal <5.7% Prediabetes 5.7-6.4%  Diabetes 6.5% or higher - adopted from ADA   consensus guidelines.     Checks Blood Glucose?:  NA Average Readings: NA     Personal/social history:  Pt lives in the [facility] in [facility], no current home care services in place.     Objective:   Current treatment plan: Sacral wound, NPWT changed MWF.  Right IT, Left IT and Right buttock/thigh fold: Aquacel Ag covered with Mepilex changed MWF.  Last changed: 8/2/23 at her AL facility.     Wound #1 Sacral   Stage/tissue depth: stage 4 pressure injury, stage updated 1/13/23 as bone became visible and palpable in the wound bed.   Total wound site 3.4 cm L x 9 cm W x 2.1 cm D   - open hold of the wound 3.4 cm L x 8 cm W x 2.1 cm D  Tunneling: none noted  Undermining: from 10 o'clock to 2 o'clock 3.4 cm max at 12 o'clock.   Wound bed type/amount: approximately 20 % scattered yellow and grey slough, 50 % red granular tissue, 20 % pale granular tissue and 10 % red nongranular tissue: NA fluctuant  Wound Edges: open  Periwound: scattered pale blanchable erythema, scattered area of partial small denudement to the left lateral.   Drainage: large amounts serosanguinous  Odor: odor with removal of dressing, none noted after cleansing.    Pain: denied any pain today.  Photo from today's visit 8/4/23.    Photo's from 7/21/23.    Photo from 6/9/23.     Photo from 11/18/22, initial resumption of visits to the Wound and Ostomy  clinic.      Wound #2 Right Ischial Tuberosity   Stage/tissue depth: stage 3 pressure injury  1.8 cm L x 0.5 cm W x 0.1 cm D   Tunneling: none   Undermining: none  Wound bed type/amount: approximately 25 % scar tissue and 75 % granular tissue; NA fluctuant  Wound Edges: open  Periwound: Scattered areas of blanchable erythema and dry skin, scar tissue.    Drainage: small amounts serosanguinous  Odor: none noted  Pain: none  Photo from today's visit 8/4/23.    Photo from 7/21/23.     Photo from 11/18/22, initial resumption of visits to the Wound and Ostomy clinic.      Wound #3 Right buttock/posterior thigh fold, appears friction not pressure related, newly noted in clinic 3/8/23, has closed and reopened a few times.   Stage/tissue depth: partial thickness  0 cm L x 0 cm W x 0 cm D  Tunneling: none  Undermining: none  Wound bed type/amount: 100 % reepithelialized; Not fluctuant  Wound Edges: NA   Periwound: wnl  Drainage: none  Odor: no  Pain: no  Photo from today's visit 8/4/23.    Photo from 7/21/23.     Photo from 3/8/23, initial assessment of newly noted site.    Wound #4 Left Ischial tuberosity, new wound first noted in clinic 5/10/23.  Stage/tissue depth: full thickness, see Assessment for details.  Unstageable currently, Presumed stage 3 pressure injury due to location on old scar tissue.   1.2 cm L x 0.5 cm W x 0.1 cm D  Tunneling: none  Undermining: none  Wound bed type/amount: approximately 80 % red nongranular tissue and 20 % yellow/white slough: Not fluctuant  Wound Edges: closed where slough is located  Periwound: scattered pale blanchable erythema  Drainage: small amount serosanguinous   Odor: no  Pain: no  Photo from today's visit 8/4/23, before and after blunt debriding with gauze and saline.      Photo's from 6/9/23.  Before and after silver nitrate treatement.    Photo from 5/10/23, initial assessment of new wound site.     Dorsalis Pedal Pulse: Not assessed this visit.  Posterior Tibial Pulse: Not  assessed this visit.  Hair growth: Not assessed this visit  Capillary Refill: Not assessed this visit  Feet/toes color: Not assessed this visit  Nails: Not assessed this visit  R Leg: Edema Not assessed this visit. Ankle circumference NA cm. Calf circumference NA cm.  L Leg: Edema Not assessed this visit. Ankle circumference NA cm. Calf circumference NA cm.     Mobility: wheelchair bound  Current offloading/footwear: regular shoes/boots  Sensation: paraplegic, no sensation from sternum distally     Other callusing/areas of concern:   - Right hip, closed deep ulcer with reoccurring abscess.  Remains scar closed  - Left mid buttock, closed partial thickness wound related to shearing and potentially adhesive removal trauma.     Diet: Regular     Discussed/Education: plan of care with rationale;  pt is unable to do self wound cares, nursing facility to perform cares for pt as directed.     Assessment:  Specialty nurse Kailey WHEELER RN present for assist with positioning, cares and assessment today.  Pt arrived today with Sacral wound vac running at the correct 125 mmHg continuous negative pressure.  The wound dressings were removed, the black foam within the sacral wound and the piece used to bridge to the right upper hip was removed    All the wounds were cleansed with saline.  The sacral wound had foul odor on removal of the dressing, after soap and water was additionally used to cleanse and saline rinsed the wound odor was resolved.     The Sacral wound:  The wound is about the same, the opening is smaller and may be positional.  The total area is noted now to include denuded partial thickness breakdown to the right lateral wound edges which would now be considered part of the wound.  Odor more faint with initial removal but still present.  Unable to palpate any bone or visualize any bone today.     The Right IT wound: is improved, smaller in size, scar closing slowly.  The Right lower buttock/thigh fold wounds once  located just inferior and lateral to the Right IT remains closed but few scab present indicate recently did have some open tissue but is fully intact today.     Left IT wound: has reopened, is mostly clean after blunt debriding today. No other concerns noted.       Factors impacting wound healing:   Poor nutrition: inadequate supply of protein, carbohydrates, fatty acids, and trace elements essential for all phases of wound healing.  Pt is taking a daily protein supplement drink and is aware of need for increased protein in diet for wound healing.  She has focused on protein supplement drinks and snack confirmed again today 7/21/23.   Delayed healing as part of normal aging process  Reduced Blood Supply: inadequate perfusion to heal wound.  Appears adequate.  Medication: NA  Chemotherapy: suppresses the immune system and inflammatory response, NA  Radiotherapy: increases production of free radical which damage cells, NA  Psychological stress: None noted  Obesity: decreases tissue perfusion, NA  Infection: prolongs inflammatory phase, uses vital nutrients, impairs epithelialization and releases toxins, none noted this visit.   Underlying Disease: paraplegic  Maceration: reduces wound tensile strength and inhibits epithelial migration, none noted this visit  Patient compliance, appears motivated to heal  Unrelieved pressure, pt has pressure reduction cushion in wheelchair and lays in bed daily during the day for full pressure relief.    Immobility, limited  Substance abuse: NA     Plan:  Wound and Ostomy clinic WOC RN visit note discharge instructions.  The below wound care instructions will be sent to Dr Fisher for signature and will be faxed on to [facility] at [phone].    The right ischial tuberosity wound is improved.  The left ischial tuberosity wound has re opened since our last visit but has no depth and no signs of infection.  The sacral wound has made some progress but again has some areas of each  present on the wound edge and inner wound bed.    We will continue with the same plan of care for the wounds as listed below.    I will have the pt return to the Wound and Ostomy clinic in four weeks on Friday September the 1 st at 4 pm.  Any concerns or questions between now and then call our scheduling line at [phone] and they will assist you to schedule a sooner visit.    Wound cares as follows.  - Cleanse all wounds with soap and water, saline or a no rinse wound cleanser and dry well with gauze.    For the Right and left ischial tuberosity pressure injuries.  - Apply Aquacel Ag to the wound bed.  - Cover with gentle adhesive or foam dressings.  - Change dressing Mondays, Wednesdays and Fridays.    For the Sacral pressure injury.  - Negative pressure wound therapy with 3M KCI wound vac, settings at 125 mmHg continuous negative pressure.  - Fill wound bed with black 3M KCI granufoam, bridge dressing to the right lateral hip or upper buttock.  - Seal in foams and protect skin under bridging with 3M KCI adhesive drape.  - Attach tract pad and  to tubing of canister.  - Change dressing Mondays, Wednesdays and Fridays.  - Change the canister once weekly and prn for full or failing canister.    Topical care: Wound/surrounding skin cleansed with wound cleanser and gauze. Patted dry. Wound cares as listed in Plan   Additional recommendations: None at this time     The following discharge instructions were reviewed with and sent home with the patient:  See AVS     The following supplies were sent home with the patient:  None today     Return visit: 9/1/23     Verbal, written, & demonstrative education provided.  Face to face time: approximately 45 minutes  Procedure: approximately 15 minutes wound vac dressing change to the sacral wound.     Presley Chester RN cwocn,  [phone]

## 2023-08-11 NOTE — TELEPHONE ENCOUNTER
Oxycodone      Last Written Prescription Date:  7/20/2023  Last Fill Quantity: 20,   # refills: 0  Last Office Visit: 5-3-2023  Future Office visit:       Routing refill request to provider for review/approval because:  Drug not on the [facility], P or [facility] refill protocol or controlled substance

## 2023-08-31 NOTE — TELEPHONE ENCOUNTER
Oxycodone       Last Written Prescription Date:  8-  Last Fill Quantity: 20,   # refills: 0  Last Office Visit: 5-3-23  Future Office visit:       Routing refill request to provider for review/approval because:  Drug not on the [facility], P or [facility] refill protocol or controlled substance

## 2023-09-01 NOTE — TELEPHONE ENCOUNTER
Mariposa, RN with [facility] is calling in needing orders from Presley with wound care.  Patient is able to be seen by Home Care now M/W/F.  Patient has an appointment with Presley today.  Mariposa is needing home care wound care orders faxed to her at [phone] for them to follow specific orders.     She also wants to ensure that Presley is aware that patient now has an odor from one of the 3 wounds.

## 2023-09-01 NOTE — PROGRESS NOTES
[facility].     Start of Care in [facility]: 11/18/2022, initial resumption of visit, see Wound History for details.  Referring Doctor: Ivan Baeza MD  Primary Care Provider: No Ref-Primary, Physician   Wound Location: Sacral, Right Ischial Tuberosity, Right thigh/buttock fold, Right hip (healed).  Left buttock mid (healed) and Left Ischial Tuberosity newly noted 5/10/23.       Wound Clinic Visit: Ongoing follow up     Reason for Visit: Wound assessments and cares     Subjective:  On arrival today 9/1/23 alone, the pt reports the following:  New home care agency [facility], left message that they would be taking over pt's wound cares cares and need orders faxed today.  Also report odor was noted with the sacral wound at last dressing change.  Per pt home care did SOC this past Wednesday and did vac and conventional wound cares as we have been doing.  She noted some odor since last visit but no where near as bad as she has experienced with the wound in the past year.  No other new concerns, continues to have difficulty sleeping      Wound History:   Pt well known to me from visits over many years to the Wound and Ostomy clinic for chronic pressure injuries.  Initial visit November of 2020 for Right IT, Sacral, left trochanter and right heel pressure injuries.  She missed a few follow up appointments initially but was mostly coming into clinic every 3 to 4 weeks for evaluation and recommendations.  After a hospitalization at [facility] in [place] due to urinary diversion concerns and a right hip abscess she resumed visit to the Wound and Ostomy clinic on 11/18/22 for wound vac dressing changes and assessment. The right hip wound improved and as of my visit with her on 12/16/22 was nearly healed, no depth and only small open aspect all granular tissue.  It was reported the wound did fully close soon after that assessment per AL staff.  On 1/3/23 the  pt's facility sent her to the ED for the right hip, it had re opened and was draining copious amounts of purulent drainage.  I was able to see the pt in the ED and was able to add the right hip wound into the intact sacral wound vac set up.  At my last clinic visit with the pt on 2/3/23 the sacral wound was not in good condition, lots of slough, eschar on the edges and bone visible and palpable in the center.  I made arrangement with Dr Zavala's approval to have her seen in Specialty for potential debridement but appointment was approximately four weeks out. She was hospitalized at [facility] for revision of her urinary diversion 2/22/23 - 2/25/23 and was seen by [facility] nursing, the photo's from that assessment showed the sacral wound looking much improved.  No debriding was needed. She did see Dr Zavala on 2/27/23 and the sacral wound at that time was improving well with no need for debridement.  New wounds noted 5/10/23 to left buttock, Left IT and mid buttock.  Left mid buttock closed as of 6/9/23 and remains closed, left IT closed as of 7/21/23 visit, reopen as of 8/4/23.    Past Medical History:  Patient Active Problem List   Diagnosis    Migraine headache    Urinary retention    GERD (gastroesophageal reflux disease)    Flaccid paraplegia (H)    Decubitus ulcer of buttock    Pressure ulcer of heel    Poor appetite    Nausea    Generalized weakness    Burn    Health Care Home    Paraplegia following spinal cord injury (H)    ACP (advance care planning)    Osteomyelitis of hip (right periacetabular infection with osteomyelitis)    Severe protein-calorie malnutrition (H)    Microcytic anemia    Neurogenic bladder    Anxiety    Insomnia    Chronic UTI (urinary tract infection)    Skin ulcer of buttock (bilateral ischial tuberosity ulcers)    CARDIOVASCULAR SCREENING; LDL GOAL LESS THAN 160    Open wound of foot except toes with complication    LLQ abdominal pain    Paralytic ileus (H)    Chest wall pain    Decubitus  skin ulcer    S/P flap graft    Pancreatitis    Pericardial effusion    Hospice care patient    Seizures (H)    AMS (altered mental status)    Admission for hospice care    Odynophagia    Portal vein thrombosis    Foreign body in esophagus, initial encounter    Impacted foreign body in esophagus, initial encounter    Aspiration pneumonia of right lung due to regurgitated food, unspecified part of lung (H)    Septic shock (H)    Depressive disorder    Colostomy present (H)    Chronic pain due to trauma    Hypokalemia    Abscess of right hip    Stage 3 skin ulcer of sacral region (H)    Pressure injury of right hip, stage 3 (H)              Tobacco Use:     Tobacco Use      Smoking status: Never      Smokeless tobacco: Never     Dibetic: No  HgbA1C:   Hemoglobin A1C   Date Value Ref Range Status   05/26/2021 5.1 0 - 5.6 % Final     Comment:     Normal <5.7% Prediabetes 5.7-6.4%  Diabetes 6.5% or higher - adopted from ADA   consensus guidelines.     Checks Blood Glucose?:  NA Average Readings: NA     Personal/social history:  Pt lives in the [facility] in [facility], [facility] 8/30/23.     Objective:   Current treatment plan: Sacral wound, NPWT changed MWF.  Right IT, Left IT and Right buttock/thigh fold: Aquacel Ag covered with Mepilex changed MWF.  Last changed: 8/30/23 at her AL facility by [facility].     Wound #1 Sacral   Stage/tissue depth: stage 4 pressure injury, stage updated 1/13/23 as bone became visible and palpable in the wound bed.   Total wound site 3.4 cm L x 8 cm W x 1.6 cm D  - open hold of the wound 3.4 cm L x 6.8 cm W x 1.6 cm D  Tunneling: none noted  Undermining: from 10 o'clock to 2 o'clock 2.9 cm max at 12 o'clock.   Wound bed type/amount: approximately < 1 % yellow slough, 20 % pale red nongranular tissue and 80 % granular tissue: NA fluctuant  Wound Edges: open  Periwound: scattered pale blanchable erythema, scattered area of partial small denudement to the left lateral  2 cm L x 1 cm W, scant sanguinous weeping stopped with cleansing and mild pressure.   Drainage: large amounts serosanguinous  Odor: odor with removal of dressing, none noted after cleansing with saline today.    Pain: denied any pain today.    Photo's from today's visit 9/1/23, note pt is in left side lying position in all photo's.    Photo from 8/4/23.     Photo from 11/18/22, initial resumption of visits to the Wound and Ostomy clinic.      Wound #2 Right Ischial Tuberosity   Stage/tissue depth: stage 3 pressure injury  1.8 cm L x 0.5 cm W x 0.1 cm D, no change in size  Tunneling: none   Undermining: none  Wound bed type/amount: approximately 40 % scar tissue and 60 % granular tissue; NA fluctuant  Wound Edges: open  Periwound: Scattered areas of blanchable erythema and dry skin, scar tissue.    Drainage: small amounts serosanguinous  Odor: none noted  Pain: none  Photo from today's visit 9/1/23.  Note pt is in left side lying position.    Photo from 8/4/23.     Photo from 11/18/22, initial resumption of visits to the Wound and Ostomy clinic.      Wound #3 Right buttock/posterior thigh fold, appears friction not pressure related, newly noted in clinic 3/8/23, has closed and reopened a few times.   Stage/tissue depth: partial thickness  0 cm L x 0 cm W x 0 cm D  Tunneling: none  Undermining: none  Wound bed type/amount: 100 % epithelialized; Not fluctuant  Wound Edges: NA   Periwound: wnl  Drainage: none  Odor: no  Pain: no  Photo from today's visit 9/1/23.  Note pt is in left side lying position.    Photo from 8/4/23.     Photo from 3/8/23, initial assessment of newly noted site.    Wound #4 Left Ischial tuberosity, new wound first noted in clinic 5/10/23.  Stage/tissue depth: full thickness, see Assessment for details.  Unstageable currently, Presumed stage 3 pressure injury due to location on old scar tissue.   0.5 cm L x 0.5 cm W x 0.1 cm D  Tunneling: none  Undermining: none  Wound bed type/amount:  approximately 20 % fragile waxy appearing scar tissue and 80 % granular tissue: Not fluctuant  Wound Edges: open where there is depth remaining  Periwound: scattered pale blanchable erythema and waxy scar tissue  Drainage: small amount serosanguinous   Odor: no  Pain: no  Photo from today's visit 9/1/23.  Note pt is in left side lying position.    Photo from 8/4/23, before and after blunt debriding with gauze and saline.    Photo from 5/10/23, initial assessment of new wound site.     Dorsalis Pedal Pulse: Not assessed this visit.  Posterior Tibial Pulse: Not assessed this visit.  Hair growth: Not assessed this visit  Capillary Refill: Not assessed this visit  Feet/toes color: Not assessed this visit  Nails: Not assessed this visit  R Leg: Edema Not assessed this visit. Ankle circumference NA cm. Calf circumference NA cm.  L Leg: Edema Not assessed this visit. Ankle circumference NA cm. Calf circumference NA cm.     Mobility: wheelchair bound  Current offloading/footwear: regular shoes/boots  Sensation: paraplegic, no sensation from sternum distally     Other callusing/areas of concern:    - Right hip, closed deep ulcer with reoccurring abscess.  Remains scar closed  - Left mid buttock, closed partial thickness wound related to shearing and potentially adhesive removal trauma.  Remains intact 2.1 cm L x 2.5 cm W x 0 cm D site of blanchable erythema somewhat hyperpigmented. No drainage.     Diet: Regular     Discussed/Education: plan of care with rationale;  pt is unable to do self wound cares, nursing facility to perform cares for pt as directed and now Home Care is involved again.     Assessment:  Pt arrived today with Sacral wound vac running at the correct 125 mmHg continuous negative pressure.  The wound dressings were removed, the black foam within the sacral wound and the piece used to bridge to the right upper hip was removed    All the wounds were cleansed with saline.  The sacral wound had mild foul odor on  removal of the dressing, after cleansing with saline only the wound odor was resolved.     The Sacral wound:  Wound is improved today.  In the many months I have cared for this wound I have been surprised how quickly it appears to deteriorate in appearance, wound bed will become dusky and eschar and slough will return, odor will return greatly.  Then within a week or two the wound will rebound, without antibiotic therapy or other interventions, and return to the healthy beefy red granular tissue present today.        The Right IT wound: Is the same in size but appears to have less open tissue and more newly advancing scar tissue forming.   The Right lower buttock/thigh fold wounds once located just inferior and lateral to the Right IT remains closed, appears less fragile today, no scabs and no moisture build up in the fold.  Left IT wound: Has improved.  The tissue to the left lateral, open last visit, is closed today but is a waxy appearing and likely more fragile than normal newly formed scar tissue     Factors impacting wound healing:   Poor nutrition: inadequate supply of protein, carbohydrates, fatty acids, and trace elements essential for all phases of wound healing.  Pt is taking a daily protein supplement drink and is aware of need for increased protein in diet for wound healing.  She has focused on protein supplement drinks and snack confirmed 9/1/23.   Delayed healing as part of normal aging process  Reduced Blood Supply: inadequate perfusion to heal wound.  Appears adequate.  Medication: NA  Chemotherapy: suppresses the immune system and inflammatory response, NA  Radiotherapy: increases production of free radical which damage cells, NA  Psychological stress: None noted  Obesity: decreases tissue perfusion, NA  Infection: prolongs inflammatory phase, uses vital nutrients, impairs epithelialization and releases toxins, none noted this visit.   Underlying Disease: paraplegic  Maceration: reduces wound  tensile strength and inhibits epithelial migration, none noted this visit  Patient compliance, appears motivated to heal  Unrelieved pressure, pt has pressure reduction cushion in wheelchair and lays in bed daily during the day for full pressure relief.    Immobility, limited  Substance abuse: NA     Plan:  9/1/23 Today all of the wound sites, sacral/Left and right Ischial tuberosities, are all improved or stable.    The sacral wound had odor prior to cleansing but none afterwards, cleansed only with saline.  The sacral wound is again improved, the eschar and slough has resolved and now the wound bed is again mostly granular tissue.    The right IT is the same in size but more scar tissue advancing.  The left IT is smaller in size but is over old scar tissue so is a delicate area that we will need to monitor closely.    The left mid buttock old wound remains closed but hyperpigmented, and just to the upper side of the left mid buttock wound is a area of skin stripping caused by the adhesive Vac drape.  I covered with stripped skin with gauze and then covered with drape for protection.    I faxed the newly signed orders from Dr Baeza to [facility] at [phone] attention Mariposa, at the end of our visit today prior to the pt leaving.  A copy of that order is also being sent home with her for the AL.  A full AVS and a copy of the new orders signed is also being sent with the pt for her to keep in her room for home care to see updates or to confirm if fax is not received.    Wound cares as follows.  - Cleanse all wounds with soap and water, saline or a no rinse wound cleanser and dry well with gauze.     For the Right and left ischial tuberosity pressure injuries.  - Apply Aquacel Ag to the wound bed.  - Cover with gentle adhesive or foam dressings.  - Change dressing Mondays, Wednesdays and Fridays.     For the Sacral pressure injury.  - Negative pressure wound therapy with 3M KCI wound vac, settings at  125 mmHg continuous negative pressure.  - Fill wound bed with black 3M KCI granufoam, bridge dressing to the right lateral hip or upper buttock.  - Seal in foams and protect skin under bridging with 3M KCI adhesive drape.  - Attach tract pad and  to tubing of canister.  - Change dressing Mondays, Wednesdays and Fridays.  - Change the canister once weekly and prn for full or failing canister.   Dr VAZQUEZ Baeza MD per Presley Chester RN cwocn     Topical care: Wound/surrounding skin cleansed with wound cleanser and gauze. Patted dry. Wound cares as listed in Plan   Additional recommendations: None at this time     The following discharge instructions were reviewed with and sent home with the patient:  See AVS     The following supplies were sent home with the patient:  None today     Return visit: 9/22/23     Verbal, written, & demonstrative education provided.  Face to face time: approximately 50 minutes  Procedure: approximately 15 minutes wound vac dressing change to the sacral wound.     Presley Chester RN cwocn,  [phone]

## 2023-09-01 NOTE — DISCHARGE INSTRUCTIONS
Today all of the wound sites, sacral/Left and right Ischial tuberosities, are all improved or stable.    The sacral wound had odor prior to cleansing but none afterwards, cleansed only with saline.  The sacral wound is again improved, the eschar and slough has resolved and now the wound bed is again mostly granular tissue.    The right IT is the same in size but more scar tissue advancing.  The left IT is smaller in size but is over old scar tissue so is a delicate area that we will need to monitor closely.    The left mid buttock old wound remains closed but hyperpigmented, and just to the upper side of the left mid buttock wound is a area of skin stripping caused by the adhesive Vac drape.  I covered with stripped skin with gauze and then covered with drape for protection.    I will fax the newly signed orders from Dr Baeza to [facility] at [phone] attention Mariposa, today after our visit.  A copy of that order is also being sent home with your for your AL and to keep in your room for home care to see updates.    I will see you again in three weeks on Friday September the 22nd at 2 pm.  Call with any questions or concerns.  [phone].    Presley Chester RN cwocn

## 2023-09-08 NOTE — TELEPHONE ENCOUNTER
Mariposa with [facility] calling to update WOC RN about patient. She states patient has about a  Wasn't sure 1/4 to 1/3 of necrotizing tissue in wound and she was not sure if patient should follow up with WOC RN sooner. She also mentioned that the wound care notes do not mention anything about the undermining and tunneling and was wondering if [facility] would like something specific for this.     Can call Mariposa back at [phone]    CHRISTINA CasasN, RN

## 2023-09-09 NOTE — DISCHARGE INSTRUCTIONS
Requesting medication refill. Rx requested:  Requested Prescriptions     Pending Prescriptions Disp Refills    losartan (COZAAR) 25 MG tablet [Pharmacy Med Name: LOSARTAN POTASSIUM 25 MG TAB] 90 tablet 1     Sig: TAKE 1 TABLET BY MOUTH EVERY DAY         Last Office Visit:   9/1/2023      Next Visit Date:  Future Appointments   Date Time Provider [facility] [address]   3/1/2024 12:15 PM Rahel Azevedo MD [facility]               Last refill 06/07/2023. Today both the wounds have made some small amounts of progress again.  One new area of concern to the right side of the sacral wound there is a small area of redness that blanches when pressed so is not currently a wound be needs to be monitored.     No change to the plan of care for your wounds, continue with the Lidocaine and Aquacel Ag.  We had to use Silvercel instead of Aquacel as we were out of Aquacel Ag here today.  Continue to cover both wounds with the gentle foam adhesive dressings and add the portion of diaper to the sacral wound to cover any overflow drainage.     I have you scheduled to return to see me again in three weeks on Thursday June the 9 th at 1 pm     Any concerns or questions call our scheduling line at [phone] and they will assist you.     Presley Chester RN cwocn

## 2023-09-11 NOTE — TELEPHONE ENCOUNTER
Mariposa from Home Care calling back to update that she also got PT orders for weekly debridement and that she and PT will see patient on Wednesday of this week.    CHRISTINA MooreN, RN

## 2023-09-22 NOTE — DISCHARGE INSTRUCTIONS
Today the main wound, on your sacral area of the buttocks looks great.  Smaller in size, all clean and mostly granular tissue.  Looks like home care is doing a great job with the vac dressing changes.  We will not change the plan of care for the Sacral wound, continue with the three times weekly vac dressing changes, bridging the wounds tract pad to the right upper buttocks.  Settings to remain at 125 mmHg continuous negative pressure.    The right and left IT wounds are also improve, we will continue applying the Aquacel Ag to the sites and cover with gentle adhesive foam dressings, changing three times weekly with the vac dressing changes.    The right mid buttock has a small abrasion, partial thickness with no depth.  That site can also have the Aquacel Ag and gentle adhesive foam dressing applied, changing three times weekly.    You continue to have periodic skin stripping of the left mid buttock.  Today I was able to avoid covering those sites with the vac drape, after the vac dressing was applied I covered the area with a gentle adhesive foam dressing.  That can be discontinued once those sites heal.    I will plan to see you again in three weeks on Friday October the 13 th at 2 pm.  Call with any questions or concerns [phone].    Presley Chester RN cwocn

## 2023-09-24 NOTE — PROGRESS NOTES
[facility].     Start of Care in [facility]: 11/18/2022, initial resumption of visit, see Wound History for details.  Referring Doctor: Ivan Baeza MD  Primary Care Provider: No Ref-Primary, Physician   Wound Location: Sacral, Right Ischial Tuberosity, Right thigh/buttock fold, Right hip (healed).  Left buttock mid (healed) and Left Ischial Tuberosity newly noted 5/10/23.       Wound Clinic Visit: Ongoing follow up     Reason for Visit: Wound assessments and cares     Subjective:  On arrival today 9/22/23 alone, the pt reports the following:  New home care agency [facility] is doing the three times weekly wound vac cares, machine has been working mostly well but she did shut down for period of time due to alarms for full canisters, non on had at that moments, pt very frustrated.  Note some additional stress and frustration due to change in clinic location voiced during visit today.       Wound History:   Pt well known to me from visits over many years to the Wound and Ostomy clinic for chronic pressure injuries.  Initial visit November of 2020 for Right IT, Sacral, left trochanter and right heel pressure injuries.  She missed a few follow up appointments initially but was mostly coming into clinic every 3 to 4 weeks for evaluation and recommendations.  After a hospitalization at [facility] in [place] due to urinary diversion concerns and a right hip abscess she resumed visit to the Wound and Ostomy clinic on 11/18/22 for wound vac dressing changes and assessment. The right hip wound improved and as of my visit with her on 12/16/22 was nearly healed, no depth and only small open aspect all granular tissue.  It was reported the wound did fully close soon after that assessment per AL staff.  On 1/3/23 the pt's facility sent her to the ED for the right hip, it had re opened and was draining copious amounts of purulent drainage.  I was able to see the  pt in the ED and was able to add the right hip wound into the intact sacral wound vac set up.  At my last clinic visit with the pt on 2/3/23 the sacral wound was not in good condition, lots of slough, eschar on the edges and bone visible and palpable in the center.  I made arrangement with Dr Zavala's approval to have her seen in Specialty for potential debridement but appointment was approximately four weeks out. She was hospitalized at [facility] for revision of her urinary diversion 2/22/23 - 2/25/23 and was seen by [facility] nursing, the photo's from that assessment showed the sacral wound looking much improved.  No debriding was needed. She did see Dr Zavala on 2/27/23 and the sacral wound at that time was improving well with no need for debridement.  New wounds noted 5/10/23 to left buttock, Left IT and mid buttock.  Left mid buttock closed as of 6/9/23 and remains closed, left IT closed as of 7/21/23 visit, reopen as of 8/4/23.    Past Medical History:  Patient Active Problem List   Diagnosis    Migraine headache    Urinary retention    GERD (gastroesophageal reflux disease)    Flaccid paraplegia (H)    Decubitus ulcer of buttock    Pressure ulcer of heel    Poor appetite    Nausea    Generalized weakness    Burn    Health Care Home    Paraplegia following spinal cord injury (H)    ACP (advance care planning)    Osteomyelitis of hip (right periacetabular infection with osteomyelitis)    Severe protein-calorie malnutrition (H)    Microcytic anemia    Neurogenic bladder    Anxiety    Insomnia    Chronic UTI (urinary tract infection)    Skin ulcer of buttock (bilateral ischial tuberosity ulcers)    CARDIOVASCULAR SCREENING; LDL GOAL LESS THAN 160    Open wound of foot except toes with complication    LLQ abdominal pain    Paralytic ileus (H)    Chest wall pain    Decubitus skin ulcer    S/P flap graft    Pancreatitis    Pericardial effusion    Hospice care patient    Seizures (H)    AMS (altered mental status)     Admission for hospice care    Odynophagia    Portal vein thrombosis    Foreign body in esophagus, initial encounter    Impacted foreign body in esophagus, initial encounter    Aspiration pneumonia of right lung due to regurgitated food, unspecified part of lung (H)    Septic shock (H)    Depressive disorder    Colostomy present (H)    Chronic pain due to trauma    Hypokalemia    Abscess of right hip    Stage 3 skin ulcer of sacral region (H)    Pressure injury of right hip, stage 3 (H)              Tobacco Use:     Tobacco Use      Smoking status: Never      Smokeless tobacco: Never     Dibetic: No  HgbA1C:   Hemoglobin A1C   Date Value Ref Range Status   05/26/2021 5.1 0 - 5.6 % Final     Comment:     Normal <5.7% Prediabetes 5.7-6.4%  Diabetes 6.5% or higher - adopted from ADA   consensus guidelines.     Checks Blood Glucose?:  NA Average Readings: NA     Personal/social history:  Pt lives in the [facility] in [facility], [facility] 8/30/23.     Objective:   Current treatment plan: Sacral wound, NPWT changed MWF.  Right IT, Left IT and Right buttock/thigh fold: Aquacel Ag covered with Mepilex changed MWF.  Last changed: 9/20/23 at her AL facility by [facility].     Wound #1 Sacral   Stage/tissue depth: stage 4 pressure injury, stage updated 1/13/23 as bone became visible and palpable in the wound bed.   Total wound site 2.2 cm L x 6.7 cm W x 1.6 cm D  - open hold of the wound 2.2 cm L x 5 cm W x 1.6 cm D  Tunneling: none noted  Undermining: from 10 o'clock to 2 o'clock: - 10 o'clock 1 cm - 12 o'clock 1.8 cm - 1 o'clock 2.3 cm - 2 o'clock 1 cm  Wound bed type/amount: approximately < 1 % yellow slough, 20 % pale red nongranular tissue and 80 % granular tissue: NA fluctuant  Wound Edges: open  Periwound: scar tissue, scattered pale blanchable erythema, left lateral buttock five skin tears approximately 4 cm to the 7 o'clock wound edge in area of 2.5 cm L x 5 cm W x 0.1 cm D, each is clean red  nongranular tissue, small amount of sanguinous drainage with removal of drape over the site.   Drainage: moderate to large amounts serosanguinous from main wound  Odor: small odor with removal of dressing, none noted after cleansing with saline today.    Pain: denied any pain today.  Photo's from today's visit 9/22/23, note pt is in left side lying position.      Photo's from 9/1/23, note pt is in left side lying position in all photo's.    Photo from 8/4/23.     Photo from 11/18/22, initial resumption of visits to the Wound and Ostomy clinic.      Wound #2 Right Ischial Tuberosity   Stage/tissue depth: stage 3 pressure injury  1 cm L x 0.5 cm W x 0 cm D, see Assessment for details  Tunneling: none   Undermining: none  Wound bed type/amount: approximately 40 % scar tissue and 60 % granular tissue; NA fluctuant  Wound Edges: open  Periwound: Scattered areas of blanchable erythema and dry skin, scar tissue.    Drainage: small amounts serosanguinous  Odor: none noted  Pain: none  Photo from today's visit 9/22/23, note pt is in left side lying position.    Photo from 9/1/23.  Note pt is in left side lying position.    Photo from 8/4/23.     Photo from 11/18/22, initial resumption of visits to the Wound and Ostomy clinic.      Wound #3 Right buttock/posterior thigh fold, appears friction not pressure related, newly noted in clinic 3/8/23, has closed and reopened a few times.   Stage/tissue depth: partial thickness  0 cm L x 0 cm W x 0 cm D  Tunneling: none  Undermining: none  Wound bed type/amount: 100 % epithelialized; Not fluctuant  Wound Edges: NA   Periwound: wnl  Drainage: none  Odor: no  Pain: no  Photo from today's visit 9/22/23, note pt is in left side lying position.    Photo from 9/1/23.  Note pt is in left side lying position.    Photo from 8/4/23.     Photo from 3/8/23, initial assessment of newly noted site.    Wound #4 Left Ischial tuberosity, newly noted in clinic 5/10/23.  Stage/tissue depth: full  thickness, see Assessment for details.  Unstageable currently, Presumed stage 3 pressure injury due to location on old scar tissue.   0.5 cm L x 0.5 cm W x 0.1 cm D  Tunneling: none  Undermining: none  Wound bed type/amount: 100 % granular tissue: Not fluctuant  Wound Edges: open where there is depth remaining  Periwound: scattered pale blanchable erythema and waxy scar tissue, yellow and need monitoring as has high potential to reopen.   Drainage: small amount serosanguinous   Odor: no  Pain: no  Photo from today's visit 9/22/23, note pt is in left side lying position.    Photo from 9/1/23.  Note pt is in left side lying position.    Photo from 8/4/23, before and after blunt debriding with gauze and saline.    Photo from 5/10/23, initial assessment of new wound site.    Wound #5 Right buttock, new abrasion site first seen in clinic 9/22/23.  Stage/tissue depth: partial thickness  1 cm L x 0.5 cm W x 0 cm D  Tunneling: none  Undermining: none  Wound bed type/amount: approximately 20 % newly epithelialized and 80 % red nongranular tissue: Not fluctuant  Wound Edges: NA no depth  Periwound: dry scabs of lateral similar abrasion resolving  Drainage: scant serous on dressing removed.  Odor: no  Pain: no  Photo from today's visit 9/22/23 initial [facility] nurse assessment of site, note pt is in left side lying position.    Dorsalis Pedal Pulse: Not assessed this visit.  Posterior Tibial Pulse: Not assessed this visit.  Hair growth: Not assessed this visit  Capillary Refill: Not assessed this visit  Feet/toes color: Not assessed this visit  Nails: Not assessed this visit  R Leg: Edema Not assessed this visit. Ankle circumference NA cm. Calf circumference NA cm.  L Leg: Edema Not assessed this visit. Ankle circumference NA cm. Calf circumference NA cm.     Mobility: wheelchair bound  Current offloading/footwear: regular shoes/boots  Sensation: paraplegic, no sensation from sternum distally     Other callusing/areas of concern:     - Right hip, closed deep ulcer with reoccurring abscess.  Remains scar closed     Diet: Regular     Discussed/Education: plan of care with rationale;  pt is unable to do self wound cares, nursing facility to perform cares for pt as directed and now Home Care is involved again.     Assessment:  VADIM Sargent assist [facility] nurse with cares and positioning today 9/22/23.  Pt arrived today with Sacral wound vac running at the correct 125 mmHg continuous negative pressure.  The wound dressings were removed, the black foam within the sacral wound and the piece used to bridge to the right upper hip was removed    All the wounds were cleansed with saline.  The sacral wound had small amount of foul odor on removal of the dressing, after cleansing with saline only the wound odor was resolved.     The Sacral wound:  Wound is well improved, shows good signs of contraction of the wound in total.  Vac in place was very well applied and intact with appropriate amount of foam.  Wound bed is mostly granular tissue with some small amount of slough noted on the right lateral less deep aspect of the site.     The Right IT wound: Continues to slowly improve but the divot of the wound has scar tissue growing down into the site, with open center.  The divot edges will remain fragile and can be an additional source of prominent tissue ridge making it high risk to break down.    The Right lower buttock/thigh fold wounds once located just inferior and lateral to the Right IT remains closed again this visit, appears less fragile also.    Left IT wound: Has improved.  The tissue to the left lateral, open last visit, more of the same waxy appearing scar tissue is surrounding the site, appears very fragile.    New site on the Right mid buttock abrasion site or could have been adhesive removal trauma, is starting to have new epithelial tissue growth in the site.      Factors impacting wound healing:   Poor nutrition: inadequate supply of protein,  carbohydrates, fatty acids, and trace elements essential for all phases of wound healing.  Pt is taking a daily protein supplement drink and is aware of need for increased protein in diet for wound healing.  She has focused on protein supplement drinks and snack confirmed 9/1/23.   Delayed healing as part of normal aging process  Reduced Blood Supply: inadequate perfusion to heal wound.  Appears adequate.  Medication: NA  Chemotherapy: suppresses the immune system and inflammatory response, NA  Radiotherapy: increases production of free radical which damage cells, NA  Psychological stress: Noted today with change in location of the Wound and Ostomy clinic offices to Special[facility].  Pt does not like change and was visibly more nervous and distant at initial arrival and start of visit.  Discussed pt does not like elevators and likes to stop at the pharmacy to shop on her way out (a ritual that lessens the stress and exertion of coming into clinic every few weeks).  I instructed the pt to continue to enter at the main lobby on the [address] [place] [address] or [facility], she will be directed down the lisa to the [facility] registration and waiting lobby where she will check in and I will come get her.  After visits I do assist her to the main lobby and pharmacy and will continue to do so.  Pt will specify this routine with transportation services.  Obesity: decreases tissue perfusion, NA  Infection: prolongs inflammatory phase, uses vital nutrients, impairs epithelialization and releases toxins, none noted this visit.   Underlying Disease: paraplegic  Maceration: reduces wound tensile strength and inhibits epithelial migration, none noted this visit  Patient compliance, appears motivated to heal  Unrelieved pressure, pt has pressure reduction cushion in wheelchair and lays in bed daily during the day for full pressure relief.    Immobility, limited  Substance abuse: NA     Plan:  No change in the plan of care, I did  today added stoma powder to the skin tears of the left buttock periwound of the sacral wound, then Cavilon no sting skin prep.  I avoided covering the site with the Vac drape, after vac was applied I covered the sites with a Mepilex gentle adhesive foam dressing.  These should resolve soon with avoidance of the vac dressing drape.  Continued covering with the Mepilex as needed, I don't believe the stoma powder or Cavilon will be needed on going.    Wound cares as follows.  - Cleanse all wounds with soap and water, saline or a no rinse wound cleanser and dry well with gauze.     For the Right and left ischial tuberosity pressure injuries, right buttock abrasion.  - Apply Aquacel Ag to the wound bed.  - Cover with gentle adhesive or foam dressings.  - Change dressing Mondays, Wednesdays and Fridays.     For the Sacral pressure injury.  - Negative pressure wound therapy with 3M KCI wound vac, settings at 125 mmHg continuous negative pressure.  - Fill wound bed with black 3M KCI granufoam, bridge dressing to the right lateral hip or upper buttock.  - Seal in foams and protect skin under bridging with 3M KCI adhesive drape.  - Attach tract pad and  to tubing of canister.  - Change dressing Mondays, Wednesdays and Fridays.  - Change the canister once weekly and prn for full or failing canister.    Topical care: Wound/surrounding skin cleansed with wound cleanser and gauze. Patted dry. Wound cares as listed in Plan   Additional recommendations: None at this time     The following discharge instructions were reviewed with and sent home with the patient:  See AVS     The following supplies were sent home with the patient:  Remains of the Aquacel Ag opened and not used up today plus few extra Mepilex foam dressings.     Return visit: 10/13/23     Verbal, written, & demonstrative education provided.  Face to face time: approximately 35 minutes  Procedure: approximately 15 minutes wound vac dressing change to the sacral  wound.     Presley Chester RN [facility],  [phone]

## 2023-10-13 NOTE — DISCHARGE INSTRUCTIONS
Today the three open wounds you currently have on your buttocks are all improved.  Only open sites today are the sacral wound, the right and left ischial tuberosity wounds.    The sacral wound is all clean, no odor, no returning eschar or slough, wound bed is approximately 80 % granular and 20 % red nongranular tissue.  No signs of infection.  The denuded skin to the left of the sacral wound from the adhesive drape in the past, is all closed today but fragile.  Continue with the wound vac at 125 mmHg continuous negative pressure, changed three times weekly.    The left and right IT wounds are improved, smaller and the right is nearly all scar covered with only scattered evitable granular tissue.  Continue with the same plan of care for the IT wounds, cover with Aquacel Ag and secure with a gentle adhesive foam dressing, change three times weekly along with the wound vac.    The right lower buttock/thigh fold former wound site remains closed and can be left open to air.  The area of the right mid buttock noticed last visit as a scattered abrasion like wound is healed, fully re epithelialized with skin.      I will plan to see you again in clinic in two weeks on Friday October the 27 th at 2 pm  Call with any concerns or questions [phone].  Same number if you have reschedule the date or time.    Presley Chester RN [email or web address]

## 2023-10-13 NOTE — PROGRESS NOTES
[facility].     Start of Care in [facility]ound Clinic: 11/18/2022, initial resumption of visit, see Wound History for details.  Referring Doctor: Ivan Baeza MD  Primary Care Provider: No Ref-Primary, Physician   Wound Location: Sacral, Right Ischial Tuberosity, Right thigh/buttock fold, Right hip (healed).  Left buttock mid (healed) and Left Ischial Tuberosity newly noted 5/10/23.       Wound Clinic Visit: Ongoing follow up     Reason for Visit: Wound assessments and cares     Subjective:  On arrival today 10/13/23 alone, the pt reports the following:  No new concerns noted, having occasional alarms from the vac unit, mostly canister issue.  Reports concern her colostomy appliance is leaking, can smell odor, would like assessed and changed if needed.  Home Care is still making three time weekly visits for wound cares.       Wound History:   Pt well known to me from visits over many years to the Wound and Ostomy clinic for chronic pressure injuries.  Initial visit November of 2020 for Right IT, Sacral, left trochanter and right heel pressure injuries.  She missed a few follow up appointments initially but was mostly coming into clinic every 3 to 4 weeks for evaluation and recommendations.  After a hospitalization at [facility] in [place] due to urinary diversion concerns and a right hip abscess she resumed visit to the Wound and Ostomy clinic on 11/18/22 for wound vac dressing changes and assessment. The right hip wound improved and as of my visit with her on 12/16/22 was nearly healed, no depth and only small open aspect all granular tissue.  It was reported the wound did fully close soon after that assessment per AL staff.  On 1/3/23 the pt's facility sent her to the ED for the right hip, it had re opened and was draining copious amounts of purulent drainage.  I was able to see the pt in the ED and was able to add the right hip wound into the intact sacral wound  vac set up.  At my last clinic visit with the pt on 2/3/23 the sacral wound was not in good condition, lots of slough, eschar on the edges and bone visible and palpable in the center.  I made arrangement with Dr Zavala's approval to have her seen in Specialty for potential debridement but appointment was approximately four weeks out. She was hospitalized at [facility] for revision of her urinary diversion 2/22/23 - 2/25/23 and was seen by [facility] nursing, the photo's from that assessment showed the sacral wound looking much improved.  No debriding was needed. She did see Dr Zavala on 2/27/23 and the sacral wound at that time was improving well with no need for debridement.  New wounds noted 5/10/23 to left buttock, Left IT and mid buttock.  Left mid buttock closed as of 6/9/23 and remains closed, left IT closed as of 7/21/23 visit, reopen as of 8/4/23.    Past Medical History:  Patient Active Problem List   Diagnosis    Migraine headache    Urinary retention    GERD (gastroesophageal reflux disease)    Flaccid paraplegia (H)    Decubitus ulcer of buttock    Pressure ulcer of heel    Poor appetite    Nausea    Generalized weakness    Burn    Health Care Home    Paraplegia following spinal cord injury (H)    ACP (advance care planning)    Osteomyelitis of hip (right periacetabular infection with osteomyelitis)    Severe protein-calorie malnutrition (H24)    Microcytic anemia    Neurogenic bladder    Anxiety    Insomnia    Chronic UTI (urinary tract infection)    Skin ulcer of buttock (bilateral ischial tuberosity ulcers)    CARDIOVASCULAR SCREENING; LDL GOAL LESS THAN 160    Open wound of foot except toes with complication    LLQ abdominal pain    Paralytic ileus (H)    Chest wall pain    Decubitus skin ulcer    S/P flap graft    Pancreatitis    Pericardial effusion    Hospice care patient    Seizures (H)    AMS (altered mental status)    Admission for hospice care    Odynophagia    Portal vein thrombosis    Foreign body  in esophagus, initial encounter    Impacted foreign body in esophagus, initial encounter    Aspiration pneumonia of right lung due to regurgitated food, unspecified part of lung (H)    Septic shock (H)    Depressive disorder    Colostomy present (H)    Chronic pain due to trauma    Hypokalemia    Abscess of right hip    Stage 3 skin ulcer of sacral region (H)    Pressure injury of right hip, stage 3 (H)              Tobacco Use:     Tobacco Use      Smoking status: Never      Smokeless tobacco: Never     Dibetic: No  HgbA1C:   Hemoglobin A1C   Date Value Ref Range Status   05/26/2021 5.1 0 - 5.6 % Final     Comment:     Normal <5.7% Prediabetes 5.7-6.4%  Diabetes 6.5% or higher - adopted from ADA   consensus guidelines.     Checks Blood Glucose?:  NA Average Readings: NA     Personal/social history:  Pt lives in the [facility] in [facility] 8/30/23.     Objective:   Current treatment plan: Sacral wound, NPWT changed MWF.  Right IT, Left IT and Right buttock/thigh fold: Aquacel Ag covered with Mepilex changed MWF.  Last changed: 10/11/23 at her AL facility by [facility].     Wound #1 Sacral   Stage/tissue depth: stage 4 pressure injury, stage updated 1/13/23 as bone became visible and palpable in the wound bed.   Total wound site 1.7 cm L x 7 cm W x 1.3 cm D   - open hold of the wound 1.7 cm L x 4.55 cm W x 1.3 cm D  Tunneling: none noted  Undermining: from 10 o'clock to 2 o'clock: - 10 o'clock 2.2 cm - 12 o'clock 2.5 cm - 1 o'clock 2.3 cm - 2 o'clock 1 cm  Wound bed type/amount: approximately 20 % pale red nongranular tissue and 80 % granular tissue: NA fluctuant  Wound Edges: open  Periwound: scar tissue, scattered pale blanchable erythema, left lateral buttock skin tears from last visit have healed.  Drainage:  moderate to large amounts serosanguinous from main wound  Odor: no odor noted prior to dressing removal, during dressing removal or after cleansing the wound  Pain: denied  any pain today.     Photo's from today's visit 10/13/23, note pt is in left side lying position. Photo's L - R, Sacral wound at rest, Sacral being held open, left mid buttock former skin tears.    Photo's from 9/22/23, note pt is in left side lying position.      Photo's from 9/1/23, note pt is in left side lying position in all photo's.     Photo from 11/18/22, initial resumption of visits to the Wound and Ostomy clinic.      Wound #2 Right Ischial Tuberosity   Stage/tissue depth: stage 3 pressure injury  2 cm L x 0.5 cm W x 0 cm D, see Assessment for details  Tunneling: none   Undermining: none  Wound bed type/amount: approximately 80 % scar tissue and 20 % granular tissue scattered within intact scar tissue; NA fluctuant  Wound Edges: open  Periwound: Scattered areas of blanchable erythema and dry skin, scar tissue.    Drainage: small amounts serosanguinous   Odor: none noted  Pain: none  Photo from today's visit 10/13/23, note pt is in left side lying position.    Photo from 9/22/23, note pt is in left side lying position.    Photo from 8/4/23.     Photo from 11/18/22, initial resumption of visits to the Wound and Ostomy clinic.      Wound #3 Right buttock/posterior thigh fold, appears friction not pressure related, newly noted in clinic 3/8/23, has closed and reopened a few times.   Stage/tissue depth: partial thickness  0 cm L x 0 cm W x 0 cm D  Tunneling: none  Undermining: none  Wound bed type/amount: 100 % epithelialized; Not fluctuant  Wound Edges: NA   Periwound: wnl  Drainage: none   Odor: no  Pain: no  Photo from today's visit 10/13/23, note pt is in left side lying position.    Photo from 9/22/23, note pt is in left side lying position.     Photo from 3/8/23, initial assessment of newly noted site.    Wound #4 Left Ischial tuberosity, newly noted in clinic 5/10/23.  Stage/tissue depth: full thickness  0.5 cm L x 0.2 cm W x 0 cm D  Tunneling: none  Undermining: none  Wound bed type/amount: 100 %  granular tissue: Not fluctuant  Wound Edges: Flush with wound bed and surrounding scar tissue.  Periwound: scattered pale blanchable erythema and waxy scar tissue, yellow and need monitoring as has high potential to reopen, less fragile appearing today 10/13/23..   Drainage: small amount serosanguinous   Odor: no  Pain: no  Photo from today's visit 10/13/23, note pt is in left side lying position.    Photo from 9/22/23, note pt is in left side lying position.    Photo from 5/10/23, initial assessment of new wound site.    Wound #5 Right buttock, new abrasion site first seen in clinic 9/22/23.  Stage/tissue depth: partial thickness  0 cm L x 0 cm W x 0 cm D  Tunneling: none  Undermining: none  Wound bed type/amount: 100 % reepithelialized: Not fluctuant  Wound Edges: NA   Periwound: old scar tissue, scattered blanchable erythema  Drainage: none  Odor: no  Pain: no  Photo from today's visit 10/13/23, note pt is in left side lying position.    Photo from 9/22/23 initial [facility] nurse assessment of site, note pt is in left side lying position.    Dorsalis Pedal Pulse: Not assessed this visit.  Posterior Tibial Pulse: Not assessed this visit.  Hair growth: Not assessed this visit  Capillary Refill: Not assessed this visit  Feet/toes color: Not assessed this visit  Nails: Not assessed this visit  R Leg: Edema Not assessed this visit. Ankle circumference NA cm. Calf circumference NA cm.  L Leg: Edema Not assessed this visit. Ankle circumference NA cm. Calf circumference NA cm.     Mobility: wheelchair bound  Current offloading/footwear: regular shoes/boots  Sensation: paraplegic, no sensation from sternum distally     Other callusing/areas of concern:    - Right hip, closed deep ulcer with reoccurring abscess.  Remains scar closed     Diet: Regular     Discussed/Education: plan of care with rationale;  pt is unable to do self wound cares, nursing facility to perform cares for pt as directed and now Home Care is involved  again.     Assessment:  Pt arrived today with Sacral wound vac running at the correct 125 mmHg continuous negative pressure.  The wound dressings were removed, the black foam within the sacral wound and the piece used to bridge to the right upper hip was removed.  All the wounds were cleansed with saline.    The sacral wound had no odors noted this visit.     The Sacral wound:  Wound is improved, outer aspect is erinn, wound bed mostly granular tissue.  The undermining is deeper in the 10 and 12 o'clock aspect but stable in the 1 and 2 o'clock areas, no noted deterioration, may be deeper in aspects due to outer wound opening contacting some.     The Right IT wound: Is nearly all scar closed, measures slightly larger due to some areas of scattered granular tissue not fully scar covered but overall is improving towards closure well.    The Right lower buttock/thigh fold remains intact..    Left IT wound: Is improved, smaller and less waxy appearing surrounding scar tissue.     Right mid buttock abrasion site or could have been adhesive removal trauma, is now full reepithelialized    Concerning colostomy.  Pt colostomy appliance is starting to lift from her body and odor is noted.  Pt uses Coloplast one piece disposable Jimmie appliances #[id number], precut 1 1/8 th inch soft convex skin barrier wafer.  Pt reports she uses no rings or paste.  She had one additional [place] disposable pouch.  Old leaking appliance removed, stoma cleanse with warm water and dried well.  Stoma is pink to red, is approximately 1 inch circular and protrudes approximately 2 cm. Lumen is centered, MCJ intact and peristomal skin wnl, no breakdown, denudement or erythema present.  New appliance applied with no concerns.     Factors impacting wound healing:   Poor nutrition: inadequate supply of protein, carbohydrates, fatty acids, and trace elements essential for all phases of wound healing.  Pt is taking a daily protein supplement drink and is aware  of need for increased protein in diet for wound healing.  She has focused on protein supplement drinks and snack.   Delayed healing as part of normal aging process  Reduced Blood Supply: inadequate perfusion to heal wound.  Appears adequate.  Medication: NA  Chemotherapy: suppresses the immune system and inflammatory response, NA  Radiotherapy: increases production of free radical which damage cells, NA  Psychological stress: None noted  Obesity: decreases tissue perfusion, NA  Infection: prolongs inflammatory phase, uses vital nutrients, impairs epithelialization and releases toxins, none noted this visit.  Underlying Disease: paraplegic  Maceration: reduces wound tensile strength and inhibits epithelial migration, none noted this visit  Patient compliance, appears motivated to heal  Unrelieved pressure, pt has pressure reduction cushion in wheelchair and lays in bed daily during the day for full pressure relief.    Immobility, limited  Substance abuse: NA     Plan:  We will have the home care agency continue with the same plan of care for the three remaining open wound sites.    Wound cares as follows.  - Cleanse all wounds with soap and water, saline or a no rinse wound cleanser and dry well with gauze.     For the Right and left ischial tuberosity pressure injuries.  - Apply Aquacel Ag to the wound bed.  - Cover with gentle adhesive or foam dressings.  - Change dressing Mondays, Wednesdays and Fridays.     For the Sacral pressure injury.  - Negative pressure wound therapy with 3M KCI wound vac, settings at 125 mmHg continuous negative pressure.  - Fill wound bed with black 3M KCI granufoam, bridge dressing to the right lateral hip or upper buttock.  - Seal in foams and protect skin under bridging with 3M KCI adhesive drape.  - Attach tract pad and  to tubing of canister.  - Change dressing Mondays, Wednesdays and Fridays.  - Change the canister once weekly and prn for full or failing canister.    Topical  care: Wound/surrounding skin cleansed with wound cleanser and gauze. Patted dry. Wound cares as listed in Plan   Additional recommendations: None at this time     The following discharge instructions were reviewed with and sent home with the patient:  See AVS      The following supplies were sent home with the patient:  None this visit     Return visit: 10/27/23     Verbal, written, & demonstrative education provided.  Face to face time: approximately 35 minutes  Procedure: approximately 10 minutes wound vac dressing change to the sacral wound.     Presley Chester RN [facility],  [phone]

## 2023-10-27 NOTE — DISCHARGE INSTRUCTIONS
Today again all three open wound sites are improving well.  The sacral wound is erinn slowly and is all clean and healthy in appearance.  Continue with the wound vac at 125 mmHg continuous negative pressure, changed three times weekly.    The left IT wound is closed but the scar tissue remains very fragile and waxy.  The right IT wound is partially scar covered but some open tissue remains.    Continue with the same plan of care for the IT wounds, cover with Aquacel Ag and secure with a gentle adhesive foam dressing, change three times weekly along with the wound vac.     I will plan to see you again in clinic in three weeks on Friday November the 17 th at 3 pm  Call with any concerns or questions [phone].  Same number if you have reschedule the date or time.    Presley Chester RN [email or web address]

## 2023-10-27 NOTE — PROGRESS NOTES
[facility].     Start of Care in [facility]: 11/18/2022, initial resumption of visit, see Wound History for details.  Referring Doctor: Ivan Baeza MD  Primary Care Provider: No Ref-Primary, Physician   Wound Location: Sacral, Right Ischial Tuberosity, Right thigh/buttock fold, Right hip (healed).  Left buttock mid (healed) and Left Ischial Tuberosity newly noted 5/10/23.       Wound Clinic Visit: Scheduled follow up.     Reason for Visit: Wound assessments and cares     Subjective:  On arrival today 10/27/23 alone, the pt reports the following:  No new concerns noted.  Machine was having low battery alarm on arrival here to the facility so she shut down.  Home care continues to make three time weekly visits for NPWT dressing change.       Wound History:   Pt well known to me from visits over many years to the Wound and Ostomy clinic for chronic pressure injuries.  Initial visit November of 2020 for Right IT, Sacral, left trochanter and right heel pressure injuries.  She missed a few follow up appointments initially but was mostly coming into clinic every 3 to 4 weeks for evaluation and recommendations.  After a hospitalization at [facility] in [place] due to urinary diversion concerns and a right hip abscess she resumed visit to the Wound and Ostomy clinic on 11/18/22 for wound vac dressing changes and assessment. The right hip wound improved and as of my visit with her on 12/16/22 was nearly healed, no depth and only small open aspect all granular tissue.  It was reported the wound did fully close soon after that assessment per AL staff.  On 1/3/23 the pt's facility sent her to the ED for the right hip, it had re opened and was draining copious amounts of purulent drainage.  I was able to see the pt in the ED and was able to add the right hip wound into the intact sacral wound vac set up.  At my last clinic visit with the pt on 2/3/23 the sacral wound  was not in good condition, lots of slough, eschar on the edges and bone visible and palpable in the center.  I made arrangement with Dr Zavala's approval to have her seen in Specialty for potential debridement but appointment was approximately four weeks out. She was hospitalized at [facility] for revision of her urinary diversion 2/22/23 - 2/25/23 and was seen by [facility] nursing, the photo's from that assessment showed the sacral wound looking much improved.  No debriding was needed. She did see Dr Zavala on 2/27/23 and the sacral wound at that time was improving well with no need for debridement.  New wounds noted 5/10/23 to left buttock, Left IT and mid buttock.  Left mid buttock closed as of 6/9/23 and remains closed, left IT closed as of 7/21/23 visit, reopen as of 8/4/23, closed but very waxy scar tissue noted today 10/27/23.    Past Medical History:  Patient Active Problem List   Diagnosis    Migraine headache    Urinary retention    GERD (gastroesophageal reflux disease)    Flaccid paraplegia (H)    Decubitus ulcer of buttock    Pressure ulcer of heel    Poor appetite    Nausea    Generalized weakness    Burn    Health Care Home    Paraplegia following spinal cord injury (H)    ACP (advance care planning)    Osteomyelitis of hip (right periacetabular infection with osteomyelitis)    Severe protein-calorie malnutrition (H24)    Microcytic anemia    Neurogenic bladder    Anxiety    Insomnia    Chronic UTI (urinary tract infection)    Skin ulcer of buttock (bilateral ischial tuberosity ulcers)    CARDIOVASCULAR SCREENING; LDL GOAL LESS THAN 160    Open wound of foot except toes with complication    LLQ abdominal pain    Paralytic ileus (H)    Chest wall pain    Decubitus skin ulcer    S/P flap graft    Pancreatitis    Pericardial effusion    Hospice care patient    Seizures (H)    AMS (altered mental status)    Admission for hospice care    Odynophagia    Portal vein thrombosis    Foreign body in esophagus,  initial encounter    Impacted foreign body in esophagus, initial encounter    Aspiration pneumonia of right lung due to regurgitated food, unspecified part of lung (H)    Septic shock (H)    Depressive disorder    Colostomy present (H)    Chronic pain due to trauma    Hypokalemia    Abscess of right hip    Stage 3 skin ulcer of sacral region (H)    Pressure injury of right hip, stage 3 (H)              Tobacco Use:     Tobacco Use      Smoking status: Never      Smokeless tobacco: Never     Dibetic: No  HgbA1C:   Hemoglobin A1C   Date Value Ref Range Status   05/26/2021 5.1 0 - 5.6 % Final     Comment:     Normal <5.7% Prediabetes 5.7-6.4%  Diabetes 6.5% or higher - adopted from ADA   consensus guidelines.     Checks Blood Glucose?:  NA Average Readings: NA     Personal/social history:  Pt lives in the [facility] in [place], [facility] 8/30/23.     Objective:   Current treatment plan: Sacral wound, NPWT at 125 mmHg continuous negative pressure changed MWF.  Right IT, Left IT and Right buttock/thigh fold: Aquacel Ag covered with Mepilex changed MWF.  Last changed: 10/25/23 at her AL facility by [facility].     Wound #1 Sacral   Stage/tissue depth: stage 4 pressure injury, stage updated 1/13/23 as bone became visible and palpable in the wound bed.   Total wound site 2 cm L x 6.5 cm W x 1.3 cm D   - open hold of the wound 1.5 cm L x 3.8 cm W x 1.3 cm D  Tunneling: none noted  Undermining: from 10 o'clock to 2 o'clock: - 10 o'clock 2.1 cm - 12 o'clock 2.5 cm - 1 o'clock 2.1 cm, new site noted today at 6 o'clock of 0.5 cm D  Wound bed type/amount: approximately 20 % pale red nongranular tissue and 80 % granular tissue: NA fluctuant  Wound Edges: open  Periwound: scar tissue, scattered pale blanchable erythema, left lateral buttock skin tears from last visit have healed.  Drainage:  moderate to large amounts serosanguinous from main wound  Odor: no odor noted prior to dressing removal, during  dressing removal or after cleansing the wound  Pain: denied any pain today.    Photo's from today's visit 10/27/23, note pt is in left side lying position. Photo's L - R, Sacral wound at rest, Sacral being held open.      Photo's from 10/13/23, note pt is in left side lying position. Photo's L - R, Sacral wound at rest, Sacral being held open, left mid buttock former skin tears.      Photo's from 9/1/23, note pt is in left side lying position in all photo's.     Photo from 11/18/22, initial resumption of visits to the Wound and Ostomy clinic.      Wound #2 Right Ischial Tuberosity   Stage/tissue depth: stage 3 pressure injury  3.1 cm L x 0.7 cm W x 0 cm D, see Assessment for details  Tunneling: none   Undermining: none  Wound bed type/amount: approximately 80 % scar tissue and 20 % granular tissue scattered within intact scar tissue; NA fluctuant  Wound Edges: open  Periwound: Scattered areas of blanchable erythema and dry skin, scar tissue.    Drainage: small amounts serosanguinous   Odor: none noted  Pain: none  Photo from today's visit 10/27/23, note pt is in left side lying position.    Photo from 10/13/23, note pt is in left side lying position.    Photo from 8/4/23.     Photo from 11/18/22, initial resumption of visits to the Wound and Ostomy clinic.      Wound #3 Right buttock/posterior thigh fold, appears friction not pressure related, newly noted in clinic 3/8/23, has closed and reopened a few times.   Stage/tissue depth: partial thickness  0 cm L x 0 cm W x 0 cm D  Tunneling: none  Undermining: none  Wound bed type/amount: 100 % epithelialized; Not fluctuant  Wound Edges: NA   Periwound: wnl  Drainage: none   Odor: no  Pain: no  Photo from today's visit 10/27/23, note pt is in left side lying position.    Photo from 10/13/23, note pt is in left side lying position.     Photo from 3/8/23, initial assessment of newly noted site.    Wound #4 Left Ischial tuberosity, newly noted in clinic  5/10/23.  Stage/tissue depth: full thickness  0 cm L x 0 cm W x 0 cm D  Tunneling: none  Undermining: none  Wound bed type/amount: 100 % waxy appearing scar tissue: Not fluctuant  Wound Edges: NA no open wound  Periwound: scattered pale blanchable erythema.  Drainage: none   Odor: no  Pain: no  Photo from today's visit 10/27/23, note pt is in left side lying position.    Photo from 10/13/23, note pt is in left side lying position.    Photo from 5/10/23, initial assessment of new wound site.    Wound #5 Right buttock, new abrasion site first seen in clinic 9/22/23.  Stage/tissue depth: partial thickness  0 cm L x 0 cm W x 0 cm D  Tunneling: none  Undermining: none  Wound bed type/amount: 100 % reepithelialized: Not fluctuant  Wound Edges: NA   Periwound: old scar tissue, scattered blanchable erythema  Drainage: none  Odor: no  Pain: no  No photo taken this visit 10/27/23.  No change, remains closed.     Photo from 10/13/23, note pt is in left side lying position.    Photo from 9/22/23 initial [facility] nurse assessment of site, note pt is in left side lying position.    Dorsalis Pedal Pulse: Not assessed this visit.  Posterior Tibial Pulse: Not assessed this visit.  Hair growth: Not assessed this visit  Capillary Refill: Not assessed this visit  Feet/toes color: Not assessed this visit  Nails: Not assessed this visit  R Leg: Edema Not assessed this visit. Ankle circumference NA cm. Calf circumference NA cm.  L Leg: Edema Not assessed this visit. Ankle circumference NA cm. Calf circumference NA cm.     Mobility: wheelchair bound  Current offloading/footwear: regular shoes/boots  Sensation: paraplegic, no sensation from sternum distally     Other callusing/areas of concern:    - Right hip, closed deep ulcer with reoccurring abscess.  Remains scar closed     Diet: Regular     Discussed/Education: plan of care with rationale;  pt is unable to do self wound cares, nursing facility to perform cares for pt as directed and now  Home Care is involved again.     Assessment:  Pt arrived today with Sacral wound vac is turned off, when turned on appears battery is at 1/2 strength when not running  The wound dressings were removed, the one black foam within the sacral wound and the piece used to bridge to the right upper hip were removed.  All the wounds were cleansed with saline.    The sacral wound had no odors noted this visit.     The Sacral wound:  Continues to slowly improve, less progress this past few weeks than noted at the last few visits.  Lu but not filling much this past two weeks.  No odor, no signs of infection, no eschar or slough, unable to directly visualize or palpate bone.  No periwound skin concerns noted this visit.     The Right IT wound: Measures longer but is still mostly scar covered with scattered areas of raw tissue still present, no signs of infection.    The Right lower buttock/thigh fold remains intact.    Left IT wound: Is closed but scar tissue has fragile and waxy appearance still, feels normal to palpation. No drainage, no signs of infection    Right mid buttock abrasion site or could have been adhesive removal trauma, remains full epithelialized.    Ostomy not assessed this visit no new concerns per pt.     Factors impacting wound healing:   Poor nutrition: inadequate supply of protein, carbohydrates, fatty acids, and trace elements essential for all phases of wound healing.  Pt is taking a daily protein supplement drink and is aware of need for increased protein in diet for wound healing.  She has focused on protein supplement drinks and snack.   Delayed healing as part of normal aging process  Reduced Blood Supply: inadequate perfusion to heal wound.  Appears adequate.  Medication: NA  Chemotherapy: suppresses the immune system and inflammatory response, NA  Radiotherapy: increases production of free radical which damage cells, NA  Psychological stress: None noted  Obesity: decreases tissue  perfusion, NA  Infection: prolongs inflammatory phase, uses vital nutrients, impairs epithelialization and releases toxins, none noted this visit.  Underlying Disease: paraplegic  Maceration: reduces wound tensile strength and inhibits epithelial migration, none noted this visit  Patient compliance, appears motivated to heal  Unrelieved pressure, pt has pressure reduction cushion in wheelchair and lays in bed daily during the day for full pressure relief.    Immobility, limited  Substance abuse: NA     Plan:  We will continue with the same plan of care for all the open wound sites.  Sacral vac dressing today used one piece black foam to fill the wound bed, two pieces black foam to bridge to the right hip area  NPWT at 125 mmHg continuous negative pressure achieved, battery showed half strength when running.    For the left and right IT wounds we continue to cover the sites with Aqaucel Ag and gentle adhesive foam dressing, changing three times weekly.  At next visit can consider stopping the dressing to the left IT but as fragile as it appears I feel we are best to keep with covering for protection and for absorbency of drainage if it reopens.    Wound cares as follows.  - Cleanse all wounds with soap and water, saline or a no rinse wound cleanser and dry well with gauze.     For the Right and left ischial tuberosity pressure injuries.  - Apply Aquacel Ag to the wound bed.  - Cover with gentle adhesive or foam dressings.  - Change dressing Mondays, Wednesdays and Fridays.     For the Sacral pressure injury.  - Negative pressure wound therapy with [employer] wound vac, settings at 125 mmHg continuous negative pressure.  - Fill wound bed with black [employer] KCI granufoam, bridge dressing to the right lateral hip or upper buttock.  - Seal in foams and protect skin under bridging with 3M [employer] adhesive drape.  - Attach tract pad and  to tubing of canister.  - Change dressing Mondays, Wednesdays and Fridays.  - Change the  canister once weekly and prn for full or failing canister.    Topical care: Wound/surrounding skin cleansed with wound cleanser and gauze. Patted dry. Wound cares as listed in Plan   Additional recommendations: None at this time     The following discharge instructions were reviewed with and sent home with the patient:  See AVS      The following supplies were sent home with the patient:  None this visit     Return visit: 11/17/23     Verbal, written, & demonstrative education provided.  Face to face time: approximately 35 minutes  Procedure: approximately 10 minutes wound vac dressing change to the sacral wound.     Presley Chester RN [facility],  [phone]

## 2023-11-13 NOTE — TELEPHONE ENCOUNTER
Reason for Call:  Other call back    Detailed comments: Mariposa RN with [facility] calling to let Presley RN know that she obtained a physicians order for white foam to undermining wound bed and black foam on top. The first dressing with this was done today 11/13/23    Phone Number Patient can be reached at: Other phone number:  [phone]  Best Time: any    Can we leave a detailed message on this number? YES    Call taken on 11/13/2023 at 2:31 PM by Catrachita Chacon

## 2023-11-17 NOTE — DISCHARGE INSTRUCTIONS
Today your sacral wound is well improved and I now see why the home care agency started the use of the white KCI foam in the wound bed.  Unfortunately you do not have any white foam with you today so we had to use a poor alternative called Optifoam.  See the note below so your home care nurses understand what they will be seeing in the wound.  We do not stock White foam because it is seldom used here, all of our clinic pt's bring it with if needed.    The right IT wound is about the same.  The left IT wound has reopened on the fragile waxy scar tissue.  Though open the rest of the area where the fragile waxy appearing scar tissue was present last visit, has improved and most of that is normal scar tissue at this time.  Continue with the Aquacel Ag for the right and left IT wounds.    No new sites noted.    As we discussed I will send a message to Dr Felisha BENJAMIN here concerning your request to have instructions not to be transferred with a aviva lift.  As use of some sort of lift is required in nearly all facilities to protect their staff and you from injury, I do not know if he will agree to make any formal order regarding the issues.  I understand how the use of a sling lift can cause you additional pain due to the rods in your back.    Possibly a referral for physical or occupational therapy wound be a benefit to see if there an alternative to a normal sling lift that would be more comfortable long term while safe for you and the staff.    Concerning white KCI 3M foam.  I will order some to keep on hand here from [facility].  Our surgeons do not use inpatient but we can stock a small amount for emergency use here in clinic.  The Optifoam I used today is a white foam but not moist nor appropriate for long term use with a vac.  I have used it in the past for a single use while I was in home care when KCI 3M white foam was not available but would prefer not to have to use it again on you.  The alternative of using solely  black foam in the wound bed was more of a risk due to possible tearing and retention of foam at times of dressing changes so I opted for the white Optifoam.    I have you scheduled to return on Friday Dec the 8th at 3 pm.    Call with any questions or concerns between now and then.    Presley Chester RN cwocn     Abdomen soft, non-tender, no guarding.

## 2023-11-20 NOTE — PROGRESS NOTES
[facility].     Start of Care in [facility]: 11/18/2022, initial resumption of visit, see Wound History for details.  Referring Doctor: Ivan Baeza MD  Primary Care Provider: No Ref-Primary, Physician   Wound Location: Sacral, Right Ischial Tuberosity, Right thigh/buttock fold, Right hip (healed).  Left buttock mid (healed) and Left Ischial Tuberosity.       Wound Clinic Visit: Scheduled follow up.     Reason for Visit: Wound assessments and cares     Subjective:  On arrival today 11/17/23 alone, the pt reports the following:  Reports home care did change to the use of white KCI foam for undermining, she has not had any concerns related to this.  Requests a letter from [facility] nurse or provider is [facility] nurse unable, stating no [place] lift sling transfers, this is due to her spinal surgical rods and the pain these cause her when she is transferred.       Wound History:   Pt well known to me from visits over many years to the Wound and Ostomy clinic for chronic pressure injuries.  Initial visit November of 2020 for Right IT, Sacral, left trochanter and right heel pressure injuries.  She missed a few follow up appointments initially but was mostly coming into clinic every 3 to 4 weeks for evaluation and recommendations.  After a hospitalization at [facility] in [place] due to urinary diversion concerns and a right hip abscess she resumed visit to the Wound and Ostomy clinic on 11/18/22 for wound vac dressing changes and assessment. The right hip wound improved and as of my visit with her on 12/16/22 was nearly healed, no depth and only small open aspect all granular tissue.  It was reported the wound did fully close soon after that assessment per AL staff.  On 1/3/23 the pt's facility sent her to the ED for the right hip, it had re opened and was draining copious amounts of purulent drainage.  I was able to see the pt in the ED and was able to add the right hip  wound into the intact sacral wound vac set up.  At my last clinic visit with the pt on 2/3/23 the sacral wound was not in good condition, lots of slough, eschar on the edges and bone visible and palpable in the center.  I made arrangement with Dr Zavala's approval to have her seen in Specialty for potential debridement but appointment was approximately four weeks out. She was hospitalized at [facility] for revision of her urinary diversion 2/22/23 - 2/25/23 and was seen by [facility] nursing, the photo's from that assessment showed the sacral wound looking much improved.  No debriding was needed. She did see Dr Zavala on 2/27/23 and the sacral wound at that time was improving well with no need for debridement.  New wounds noted 5/10/23 to left buttock, Left IT and mid buttock.  Left mid buttock closed as of 6/9/23 and remains closed.  Left IT has repeatedly closed and opened, when closed often is covered with very waxy appearing fragile scar tissue, reopen as of today's visit.     Past Medical History:  Patient Active Problem List   Diagnosis    Migraine headache    Urinary retention    GERD (gastroesophageal reflux disease)    Flaccid paraplegia (H)    Decubitus ulcer of buttock    Pressure ulcer of heel    Poor appetite    Nausea    Generalized weakness    Burn    Health Care Home    Paraplegia following spinal cord injury (H)    ACP (advance care planning)    Osteomyelitis of hip (right periacetabular infection with osteomyelitis)    Severe protein-calorie malnutrition (H24)    Microcytic anemia    Neurogenic bladder    Anxiety    Insomnia    Chronic UTI (urinary tract infection)    Skin ulcer of buttock (bilateral ischial tuberosity ulcers)    CARDIOVASCULAR SCREENING; LDL GOAL LESS THAN 160    Open wound of foot except toes with complication    LLQ abdominal pain    Paralytic ileus (H)    Chest wall pain    Decubitus skin ulcer    S/P flap graft    Pancreatitis    Pericardial effusion    Hospice care patient     Seizures (H)    AMS (altered mental status)    Admission for hospice care    Odynophagia    Portal vein thrombosis    Foreign body in esophagus, initial encounter    Impacted foreign body in esophagus, initial encounter    Aspiration pneumonia of right lung due to regurgitated food, unspecified part of lung (H)    Septic shock (H)    Depressive disorder    Colostomy present (H)    Chronic pain due to trauma    Hypokalemia    Abscess of right hip    Stage 3 skin ulcer of sacral region (H)    Pressure injury of right hip, stage 3 (H)              Tobacco Use:     Tobacco Use      Smoking status: Never      Smokeless tobacco: Never     Dibetic: No  HgbA1C:   Hemoglobin A1C   Date Value Ref Range Status   05/26/2021 5.1 0 - 5.6 % Final     Comment:     Normal <5.7% Prediabetes 5.7-6.4%  Diabetes 6.5% or higher - adopted from ADA   consensus guidelines.     Checks Blood Glucose?:  NA Average Readings: NA     Personal/social history:  Pt lives in the [facility] in [facility], [facility] 8/30/23.     Objective:   Current treatment plan: Sacral wound, NPWT at 125 mmHg continuous negative pressure changed MWF, with white foam in undermining and black foam on top and to bridge to right hip.  Right IT and Left IT: Aquacel Ag covered with Mepilex changed MWF.  Last changed: 11/15/23 at her AL facility by [facility].     Wound #1 Sacral   Stage/tissue depth: stage 4 pressure injury, stage updated 1/13/23 as bone became visible and palpable in the wound bed.   Total wound site 2.5 cm L x 6.5 cm W x 1.3 cm D   - open hold of the wound 1.5 cm L x 4.5 cm W x 1.1 cm D  Tunneling: none noted  Undermining: around entire perimeter !2 o'clock 2.4 cm, 3 o'clock 1.3 cm , 6 o'clock 1.1 cm, 9 o'clock 1.2 cm, deepest at approximately 1 o'clock 2.6 cm.   Wound bed type/amount: approximately 20 % pale red nongranular tissue and 80 % granular tissue: NA fluctuant  Wound Edges: open  Periwound: scar tissue, scattered pale  blanchable erythema, left lateral buttock skin tears from last visit have healed.  Drainage:  moderate to large amounts serosanguinous from main wound  Odor: no odor noted prior to dressing removal, during dressing removal or after cleansing the wound  Pain: denied any pain today.  Photo's from today's visit   Photo from today's visit 11/17/23, note pt is in left side lying position.       Photo's from 10/27/23, note pt is in left side lying position. Photo's L - R, Sacral wound at rest, Sacral being held open.      Photo's from 9/1/23, note pt is in left side lying position in all photo's.     Photo from 11/18/22, initial resumption of visits to the Wound and Ostomy clinic.      Wound #2 Right Ischial Tuberosity   Stage/tissue depth: stage 3 pressure injury  3.1 cm L x 0.5 cm W x 0.1 cm D, see Assessment for details  Tunneling: none   Undermining: none  Wound bed type/amount: approximately 5% scar tissue and 50 % granular tissue; NA fluctuant  Wound Edges: open  Periwound: Scattered areas of blanchable erythema and dry skin, scar tissue.    Drainage: small amounts serosanguinous   Odor: none noted  Pain: none  Photo from today's visit 11/17/23, note pt is in left side lying position.    Photo from 10/27/23, note pt is in left side lying position.    Photo from 8/4/23.     Photo from 11/18/22, initial resumption of visits to the Wound and Ostomy clinic.      Wound #3 Right buttock/posterior thigh fold, appears friction not pressure related, newly noted in clinic 3/8/23, has closed and reopened a few times.   Stage/tissue depth: partial thickness  0 cm L x 0 cm W x 0 cm D  Tunneling: none  Undermining: none  Wound bed type/amount: 100 % epithelialized; Not fluctuant  Wound Edges: NA   Periwound: wnl  Drainage: none   Odor: no  Pain: no  Photo from today's visit 11/17/23, note pt is in left side lying position.    Photo from 10/27/23, note pt is in left side lying position.     Photo from 3/8/23, initial assessment of  newly noted site.    Wound #4 Left Ischial tuberosity, newly noted in clinic 5/10/23.  Stage/tissue depth: full thickness  0.4 cm L x 0.4 cm W x 0 cm D  Tunneling: none  Undermining: none  Wound bed type/amount: approximately 60 % fragile scar tissue and 40 % red nongranular tissue, two dots each approximately 1 mm in diameter: Not fluctuant  Wound Edges: NA no depth  Periwound: scattered pale blanchable erythema.  Drainage: scant serosanguinous  Odor: no  Pain: no    Photo from today's visit 11/17/23, note pt is in left side lying position.  L photo at rest, R phot holing fold open.    Photo from 10/27/23, note pt is in left side lying position.    Photo from 5/10/23, initial assessment of new wound site.    Wound #5 Right buttock, new abrasion site first seen in clinic 9/22/23.  Stage/tissue depth: partial thickness  0 cm L x 0 cm W x 0 cm D  Tunneling: none  Undermining: none  Wound bed type/amount: 100 % reepithelialized: Not fluctuant  Wound Edges: NA   Periwound: old scar tissue, scattered blanchable erythema  Drainage: none  Odor: no  Pain: no  No photo taken this visit 11/17/23.  No change, remains closed.     Photo from 10/13/23, note pt is in left side lying position.    Photo from 9/22/23 initial [facility] nurse assessment of site, note pt is in left side lying position.    Dorsalis Pedal Pulse: Not assessed this visit.  Posterior Tibial Pulse: Not assessed this visit.  Hair growth: Not assessed this visit  Capillary Refill: Not assessed this visit  Feet/toes color: Not assessed this visit  Nails: Not assessed this visit  R Leg: Edema Not assessed this visit. Ankle circumference NA cm. Calf circumference NA cm.  L Leg: Edema Not assessed this visit. Ankle circumference NA cm. Calf circumference NA cm.     Mobility: wheelchair bound  Current offloading/footwear: regular shoes/boots  Sensation: paraplegic, no sensation from sternum distally     Other callusing/areas of concern:    - Right hip, closed deep ulcer  with reoccurring abscess.  Remains scar closed     Diet: Regular     Discussed/Education: plan of care with rationale;  pt is unable to do self wound cares, nursing facility to perform cares for pt as directed and now Home Care is involved again.     Assessment:  Pt on arrival has wound vac running at set 125 mmHg continuous negative pressure.  Sticker has written 3 white foams and 1 black foam in wound bed.  The dressing was removed, one black foam in the wound and 3 white foams.  Was able to confirm all foams out visually with manipulation of the wound, all inner tissue visible but very tight now.  All the wounds were cleansed with saline.    The sacral wound had no odors noted this visit.     The Sacral wound:  The site is unchanged significantly but the inner wound bed is erinn and undermining very difficult to visualize without firm inner wound is mostly granular tissue, scant amounts of black eschar on edge, was able to bluntly debride the eschar.       The Right IT wound: No change in size, only half fully intact scar tissue, which is less than last visit.     The Right lower buttock/thigh fold remains intact.    Left IT wound: initially appeared closed, but after cleansing the site and lifting the small skin fold, two small dots of open tissue are present, scar around them remains waxy and yellow, but scar tissue further out is improved, less yellow, more durable in appearance.     Right mid buttock abrasion site or could have been adhesive removal trauma, remains full epithelialized.    Ostomy not assessed this visit no new concerns per pt.  She is running low on ostomy supplies and Barrett samples provided to cover for the interim.       Factors impacting wound healing:   Poor nutrition: inadequate supply of protein, carbohydrates, fatty acids, and trace elements essential for all phases of wound healing.  Pt is taking a daily protein supplement drink and is aware of need for increased protein in diet  for wound healing.  She has focused on protein supplement drinks and snack.   Delayed healing as part of normal aging process  Reduced Blood Supply: inadequate perfusion to heal wound.  Appears adequate.  Medication: NA  Chemotherapy: suppresses the immune system and inflammatory response, NA  Radiotherapy: increases production of free radical which damage cells, NA  Psychological stress: None noted  Obesity: decreases tissue perfusion, NA  Infection: prolongs inflammatory phase, uses vital nutrients, impairs epithelialization and releases toxins, none noted this visit.  Underlying Disease: paraplegic  Maceration: reduces wound tensile strength and inhibits epithelial migration, none noted this visit  Patient compliance, appears motivated to heal  Unrelieved pressure, pt has pressure reduction cushion in wheelchair and lays in bed daily during the day for full pressure relief.    Immobility, limited  Substance abuse: NA     Plan:  Pt was sent to clinic with one black granufoam kit and one canister but no white foam.  We do not stock White KCI foam.  We used one piece of white Medline Optifoam to fill undermining from 12 to 12 o'clock.   And used one piece black foam to fill hole of the wound, used one long piece black foam with small circular piece of black foam to bridge to the right hip area.  Negative pressure achieved with no alarms of concerns at 125 mmHg continuous.   The Optifoam I used today is a white foam but not moist nor appropriate for long term use with a vac.  I have used it in the past for a single use when KCI 3M white foam was not available but would prefer not.  The alternative of using solely black foam in the wound bed was more of a risk due to possible tearing and retention of foam at times of dressing changes so I opted for the white Optifoam.      Continue with the Aquacel Ag for the right and left IT wounds.    As we discussed I will send a message to Dr Felisha BENJAMIN here concerning her  request to have instructions not to be transferred with a aviva lift.  As use of some sort of lift is required in nearly all facilities to protect their staff and you from injury, I do not know if he will agree to make any formal order regarding the issues.  I understand how the use of a sling lift can cause you additional pain due to the rods in your back.    Possibly a referral for physical or occupational therapy wound be a benefit to see if there an alternative to a normal sling lift that would be more comfortable long term while safe for you and the staff.    Concerning white KCI 3M foam.  I will order some to keep on hand here from [employer].  Our surgeons do not use inpatient but we can stock a small amount for emergency use here in clinic.    Topical care: Wound/surrounding skin cleansed with wound cleanser and gauze. Patted dry. Wound cares as listed in Plan   Additional recommendations: None at this time     The following discharge instructions were reviewed with and sent home with the patient:  See AVS      The following supplies were sent home with the patient:  None this visit     Return visit: 12/8/23     Verbal, written, & demonstrative education provided.  Face to face time: approximately 35 minutes  Procedure: approximately 10 minutes wound vac dressing change to the sacral wound.     Presley Chester RN [facility],  [phone]

## 2023-11-29 NOTE — TELEPHONE ENCOUNTER
3m needs to know patient's wound measurements from the end of October.    They were referred to medical records.    Ines Martin RN on 11/29/2023 at 12:39 PM

## 2023-12-08 NOTE — PROGRESS NOTES
[facility].     Start of Care in [facility]: 11/18/2022, initial resumption of visit, see Wound History for details.  Referring Doctor: Ivan Baeza MD  Primary Care Provider: No Ref-Primary, Physician   Wound Location: Sacral, Right Ischial Tuberosity, Right thigh/buttock fold, Right hip (healed).  Left buttock mid (healed) and Left Ischial Tuberosity.       Wound Clinic Visit: Scheduled follow up.     Reason for Visit: Wound assessments and cares     Subjective:  On arrival today 12/8/23 alone, the pt reports the following:  No new issues or concerns.  Continues to struggle with having to [facility] transfer, no provider has been willing to order not to use. Home care still making visits three times weekly.       Wound History:   Pt well known to me from visits over many years to the Wound and Ostomy clinic for chronic pressure injuries.  Initial visit November of 2020 for Right IT, Sacral, left trochanter and right heel pressure injuries.  She missed a few follow up appointments initially but was mostly coming into clinic every 3 to 4 weeks for evaluation and recommendations.  After a hospitalization at [facility] in [place] due to urinary diversion concerns and a right hip abscess she resumed visit to the Wound and Ostomy clinic on 11/18/22 for wound vac dressing changes and assessment. The right hip wound improved and as of my visit with her on 12/16/22 was nearly healed, no depth and only small open aspect all granular tissue.  It was reported the wound did fully close soon after that assessment per AL staff.  On 1/3/23 the pt's facility sent her to the ED for the right hip, it had re opened and was draining copious amounts of purulent drainage.  I was able to see the pt in the ED and was able to add the right hip wound into the intact sacral wound vac set up.  At my last clinic visit with the pt on 2/3/23 the sacral wound was not in good condition, lots  of slough, eschar on the edges and bone visible and palpable in the center.  I made arrangement with Dr Zavala's approval to have her seen in Specialty for potential debridement but appointment was approximately four weeks out. She was hospitalized at [facility] for revision of her urinary diversion 2/22/23 - 2/25/23 and was seen by [facility] nursing, the photo's from that assessment showed the sacral wound looking much improved.  No debriding was needed. She did see Dr Zavala on 2/27/23 and the sacral wound at that time was improving well with no need for debridement.  New wounds noted 5/10/23 to left buttock, Left IT and mid buttock.  Left mid buttock closed as of 6/9/23 and remains closed.  Left IT has repeatedly closed and opened, when closed often is covered with very waxy appearing fragile scar tissue, reopen as of today's visit.     Past Medical History:  Patient Active Problem List   Diagnosis    Migraine headache    Urinary retention    GERD (gastroesophageal reflux disease)    Flaccid paraplegia (H)    Decubitus ulcer of buttock    Pressure ulcer of heel    Poor appetite    Nausea    Generalized weakness    Burn    Health Care Home    Paraplegia following spinal cord injury (H)    ACP (advance care planning)    Osteomyelitis of hip (right periacetabular infection with osteomyelitis)    Severe protein-calorie malnutrition (H24)    Microcytic anemia    Neurogenic bladder    Anxiety    Insomnia    Chronic UTI (urinary tract infection)    Skin ulcer of buttock (bilateral ischial tuberosity ulcers)    CARDIOVASCULAR SCREENING; LDL GOAL LESS THAN 160    Open wound of foot except toes with complication    LLQ abdominal pain    Paralytic ileus (H)    Chest wall pain    Decubitus skin ulcer    S/P flap graft    Pancreatitis    Pericardial effusion    Hospice care patient    Seizures (H)    AMS (altered mental status)    Admission for hospice care    Odynophagia    Portal vein thrombosis    Foreign body in esophagus,  initial encounter    Impacted foreign body in esophagus, initial encounter    Aspiration pneumonia of right lung due to regurgitated food, unspecified part of lung (H)    Septic shock (H)    Depressive disorder    Colostomy present (H)    Chronic pain due to trauma    Hypokalemia    Abscess of right hip    Stage 3 skin ulcer of sacral region (H)    Pressure injury of right hip, stage 3 (H)              Tobacco Use:     Tobacco Use      Smoking status: Never      Smokeless tobacco: Never     Dibetic: No  HgbA1C:   Hemoglobin A1C   Date Value Ref Range Status   05/26/2021 5.1 0 - 5.6 % Final     Comment:     Normal <5.7% Prediabetes 5.7-6.4%  Diabetes 6.5% or higher - adopted from ADA   consensus guidelines.     Checks Blood Glucose?:  NA Average Readings: NA     Personal/social history:  Pt lives in the [facility] in [facility] 8/30/23.     Objective:   Current treatment plan: Sacral wound, NPWT at 125 mmHg continuous negative pressure changed MWF, with white foam in undermining and black foam on top and to bridge to right hip.  Right IT and Left IT: Aquacel Ag covered with Mepilex changed MWF.  Last changed: 11/15/23 at her AL facility by [facility].     Wound #1 Sacral   Stage/tissue depth: stage 4 pressure injury, stage updated 1/13/23 as bone became visible and palpable in the wound bed.   Total wound site 2 cm L x 6 cm W x 1.5 cm D   - open hold of the wound 2 cm L x 4.1 cm W x 1.5 cm D  Tunneling: none noted  Undermining: around entire perimeter 12 o'clock 3.4 cm, 3 o'clock 1.5 cm , 6 o'clock 1 cm, 9 o'clock 1 cm, deepest at approximately 1 o'clock 3.5 cm.   Wound bed type/amount: approximately 20 % pale red nongranular tissue, 70 % granular tissue and 10 % bruised new scar tissue: NA fluctuant  Wound Edges: open  Periwound: scar tissue, scattered pale blanchable erythema, left lateral buttock skin tears from last visit have healed.  Drainage:  moderate amounts serosanguinous  from main wound  Odor: no odor noted prior to dressing removal, during dressing removal or after cleansing the wound  Pain: denied any pain today.    Photo's from today's visit 12/8/23, note pt is in left side lying position.    Photo from 11/17/23, note pt is in left side lying position.       Photo's from 9/1/23, note pt is in left side lying position in all photo's.     Photo from 11/18/22, initial resumption of visits to the Wound and Ostomy clinic.      Wound #2 Right Ischial Tuberosity   Stage/tissue depth: stage 3 pressure injury  2.5 cm L x 0.5 cm W x 0.1 cm D, see Assessment for details  Tunneling: none   Undermining: none  Wound bed type/amount: approximately 50 % scar tissue and 50 % granular tissue; NA fluctuant  Wound Edges: open  Periwound: Scattered areas of blanchable erythema and dry skin, scar tissue.    Drainage: small amounts serosanguinous   Odor: none noted  Pain: none  Photo from today's visit 12/8/23, note pt is in left side lying position.    Photo from 11/17/23, note pt is in left side lying position.     Photo from 11/18/22, initial resumption of visits to the Wound and Ostomy clinic.      Wound #3 Right buttock/posterior thigh fold, appears friction not pressure related, newly noted in clinic 3/8/23, has closed and reopened a few times.   Stage/tissue depth: partial thickness  0 cm L x 0 cm W x 0 cm D  Tunneling: none  Undermining: none  Wound bed type/amount: 100 % epithelialized; Not fluctuant  Wound Edges: NA   Periwound: wnl  Drainage: none   Odor: no  Pain: no  Photo from today's visit 12/8/23, note pt is in left side lying position.    Photo from 11/17/23, note pt is in left side lying position.     Photo from 3/8/23, initial assessment of newly noted site.    Wound #4 Left Ischial tuberosity, newly noted in clinic 5/10/23.  Stage/tissue depth: full thickness  0.2 cm L x 0.2 cm W x 0 cm D  Tunneling: none  Undermining: none  Wound bed type/amount: open tissue is 100 % red  nongranular tissue: Not fluctuant  Wound Edges: NA no depth  Periwound: scattered pale blanchable erythema and return of the more waxy and fragile appearing scar tissue.  Drainage: scant serosanguinous  Odor: no  Pain: no  Photo from today's visit 12/8/23, note pt is in left side lying position.      Photo from 11/17/23, note pt is in left side lying position.  L photo at rest, R phot holing fold open.    Photo from 10/27/23, note pt is in left side lying position.    Photo from 5/10/23, initial assessment of new wound site.    Wound #5 Right buttock, new abrasion site first seen in clinic 9/22/23.  Stage/tissue depth: partial thickness  0 cm L x 0 cm W x 0 cm D  Tunneling: none  Undermining: none  Wound bed type/amount: 100 % reepithelialized: Not fluctuant  Wound Edges: NA   Periwound: old scar tissue, scattered blanchable erythema  Drainage: none  Odor: no  Pain: no  No photo taken this visit 12/8/23.  No change, remains closed.     Photo from 10/13/23, note pt is in left side lying position.    Photo from 9/22/23 initial [facility] nurse assessment of site, note pt is in left side lying position.    Dorsalis Pedal Pulse: Not assessed this visit.  Posterior Tibial Pulse: Not assessed this visit.  Hair growth: Not assessed this visit  Capillary Refill: Not assessed this visit  Feet/toes color: Not assessed this visit  Nails: Not assessed this visit  R Leg: Edema Not assessed this visit. Ankle circumference NA cm. Calf circumference NA cm.  L Leg: Edema Not assessed this visit. Ankle circumference NA cm. Calf circumference NA cm.     Mobility: wheelchair bound  Current offloading/footwear: regular shoes/boots  Sensation: paraplegic, no sensation from sternum distally     Other callusing/areas of concern:    - Right hip, closed deep ulcer with reoccurring abscess.  Remains scar closed     Diet: Regular     Discussed/Education: plan of care with rationale;  pt is unable to do self wound cares, nursing facility to perform  cares for pt as directed and now Home Care is involved again.     Assessment:  Pt on arrival has wound vac running at set 125 mmHg continuous negative pressure.  Sticker has written 3 white foams and 1 black foam in wound bed.  The dressing was removed, one black foam in the wound and 3 white foams.  Was able to confirm all foams out visually with manipulation of the wound, all inner tissue visible but very tight now.  All the wounds were cleansed with saline.    The sacral wound had no odors noted this visit.     The Sacral wound:  Continues to slowly contract and improve, some increase in depth and depth of undermining today.       The Right IT wound: Smaller in length, no other significant changes noted.    The Right lower buttock/thigh fold remains intact.    Left IT wound: Has one small dot of open tissue within fragile waxy appearing scar tissue.  No new concern other than the fragile scar tissue looked more durable and normal last visit.    Right mid buttock abrasion site or could have been adhesive removal trauma, small new site noted today with removal of the drape, 1 cm L x 0.3 cm W x 0 cm D, 100 % red nongranular tissue with small amount of serosanguinous drainage.    Ostomy not assessed this visit no new concerns per pt.  She is running low on ostomy supplies and Jimmie samples provided to cover for the interim.       Factors impacting wound healing:   Poor nutrition: inadequate supply of protein, carbohydrates, fatty acids, and trace elements essential for all phases of wound healing.  Pt is taking a daily protein supplement drink and is aware of need for increased protein in diet for wound healing.  She has focused on protein supplement drinks and snack.   Delayed healing as part of normal aging process  Reduced Blood Supply: inadequate perfusion to heal wound.  Appears adequate.  Medication: NA  Chemotherapy: suppresses the immune system and inflammatory response, NA  Radiotherapy: increases production  of free radical which damage cells, NA  Psychological stress: None noted  Obesity: decreases tissue perfusion, NA  Infection: prolongs inflammatory phase, uses vital nutrients, impairs epithelialization and releases toxins, none noted this visit.  Underlying Disease: paraplegic  Maceration: reduces wound tensile strength and inhibits epithelial migration, none noted this visit  Patient compliance, appears motivated to heal  Unrelieved pressure, pt has pressure reduction cushion in wheelchair and lays in bed daily during the day for full pressure relief.    Immobility, limited  Substance abuse: NA     Plan:  Pt was sent to clinic with only a new vac canister today, no granufoam or white foam kits.  We do not stock White KCI foam.  We used one piece of white Medline Optifoam to fill undermining from 12 to 12 o'clock and to fill in entire wound bed.  Used one long piece black foam with small circular piece of black foam to bridge to the right hip area.  Negative pressure achieved with no alarms of concerns at 125 mmHg continuous.   The Optifoam I used today is a white foam but not moist nor appropriate for long term use with a vac.  I have used it in the past for a single use when KCI 3M white foam was not available but would prefer not.  The alternative of using solely black foam in the wound bed was more of a risk due to possible tearing and retention of foam at times of dressing changes so I opted for the white Optifoam.  Sent message back with pt to remind facility to send along white foam with each clinic visit.     Continue with the Aquacel Ag for the right and left IT wounds.    Topical care: Wound/surrounding skin cleansed with wound cleanser and gauze. Patted dry. Wound cares as listed in Plan   Additional recommendations: None at this time     The following discharge instructions were reviewed with and sent home with the patient:  See AVS      The following supplies were sent home with the patient:  None this  visit     Return visit: 12/29/23     Verbal, written, & demonstrative education provided.  Face to face time: approximately 35 minutes  Procedure: approximately 10 minutes wound vac dressing change to the sacral wound.     Presley Chester RN [facility],  [phone]

## 2023-12-08 NOTE — DISCHARGE INSTRUCTIONS
Today your sacral wound continues to improve slowly.  Unfortunately the facility forgot to send the white KCI foam with you today so we had to use Optifoam like we did last time.    We do not stock the white foam so be sure to have a package sent with you along with a black foam kit and wound vac canister.     The right IT wound is about the same and the left IT wound is about the same, no better no worse  Continue with the Aquacel Ag for the right and left IT wounds.     Small area of skin stripping noted when the KCI drape came off your left buttock, we covered the small site with a Mepilex bandage     I have you scheduled to return on Friday Dec the 29 th at 10 am.  Call with any concerns or questions.  [phone]     Presley Chester RN cwocn

## 2023-12-29 NOTE — DISCHARGE INSTRUCTIONS
Today all the wounds appear to be improving or are at least stable.  The right buttock/thigh fold partial thickness site is re opened today, no depth but has open tissue with some drainage.    The sacral wound looks good but there is a small blood clot at the far right inferior edge of the wound that is new and may need to be trimmed off in the future if it does not resolve and become any type of eschar.    I see the note on your yellow folder from [place] to Bring White Foam, but I looked all through your bag and could not find it.   I had to use the Optifoam today which is not as good as the white KCI foam but more safe than filling the undermining in with black foam that may get stuck in the nonviable areas.    We did change the canister today so we are returning you back to [place] with one, they sent two along today, appreciated.    I will see you again in three weeks on Friday January the 19 th at 1 pm.  Call with any questions or concerns [phone].    Presley Chester RN cwocn

## 2023-12-29 NOTE — PROGRESS NOTES
[facility].     Start of Care in [facility]ound Clinic: 11/18/2022, initial resumption of visit, see Wound History for details.  Referring Doctor: Ivan Baeza MD  Primary Care Provider: No Ref-Primary, Physician   Wound Location: Sacral, Right Ischial Tuberosity, Right thigh/buttock fold, Right hip (healed).  Left buttock mid (healed) and Left Ischial Tuberosity.       Wound Clinic Visit: Scheduled follow up.     Reason for Visit: Wound assessments and cares     Subjective:  On arrival today 12/29/23 alone, the pt reports the following:  No new issues noted or reported from home care nursing related to the wounds or vac.  Wound History:   Pt well known to me from visits over many years to the Wound and Ostomy clinic for chronic pressure injuries.  Initial visit November of 2020 for Right IT, Sacral, left trochanter and right heel pressure injuries.  She missed a few follow up appointments initially but was mostly coming into clinic every 3 to 4 weeks for evaluation and recommendations.  After a hospitalization at [facility] in [place] due to urinary diversion concerns and a right hip abscess she resumed visit to the Wound and Ostomy clinic on 11/18/22 for wound vac dressing changes and assessment. The right hip wound improved and as of my visit with her on 12/16/22 was nearly healed, no depth and only small open aspect all granular tissue.  It was reported the wound did fully close soon after that assessment per AL staff.  On 1/3/23 the pt's facility sent her to the ED for the right hip, it had re opened and was draining copious amounts of purulent drainage.  I was able to see the pt in the ED and was able to add the right hip wound into the intact sacral wound vac set up.  At my last clinic visit with the pt on 2/3/23 the sacral wound was not in good condition, lots of slough, eschar on the edges and bone visible and palpable in the center.  I made arrangement with  Dr Zavala's approval to have her seen in Specialty for potential debridement but appointment was approximately four weeks out. She was hospitalized at [facility] for revision of her urinary diversion 2/22/23 - 2/25/23 and was seen by [facility] nursing, the photo's from that assessment showed the sacral wound looking much improved.  No debriding was needed. She did see Dr Zavala on 2/27/23 and the sacral wound at that time was improving well with no need for debridement.  New wounds noted 5/10/23 to left buttock, Left IT and mid buttock.  Left mid buttock closed as of 6/9/23 and remains closed.  Left IT has repeatedly closed and opened, when closed often is covered with very waxy appearing fragile scar tissue, reopen as of today's visit.     Past Medical History:  Patient Active Problem List   Diagnosis    Migraine headache    Urinary retention    GERD (gastroesophageal reflux disease)    Flaccid paraplegia (H)    Decubitus ulcer of buttock    Pressure ulcer of heel    Poor appetite    Nausea    Generalized weakness    Burn    Health Care Home    Paraplegia following spinal cord injury (H)    ACP (advance care planning)    Osteomyelitis of hip (right periacetabular infection with osteomyelitis)    Severe protein-calorie malnutrition (H24)    Microcytic anemia    Neurogenic bladder    Anxiety    Insomnia    Chronic UTI (urinary tract infection)    Skin ulcer of buttock (bilateral ischial tuberosity ulcers)    CARDIOVASCULAR SCREENING; LDL GOAL LESS THAN 160    Open wound of foot except toes with complication    LLQ abdominal pain    Paralytic ileus (H)    Chest wall pain    Decubitus skin ulcer    S/P flap graft    Pancreatitis    Pericardial effusion    Hospice care patient    Seizures (H)    AMS (altered mental status)    Admission for hospice care    Odynophagia    Portal vein thrombosis    Foreign body in esophagus, initial encounter    Impacted foreign body in esophagus, initial encounter    Aspiration pneumonia of  right lung due to regurgitated food, unspecified part of lung (H)    Septic shock (H)    Depressive disorder    Colostomy present (H)    Chronic pain due to trauma    Hypokalemia    Abscess of right hip    Stage 3 skin ulcer of sacral region (H)    Pressure injury of right hip, stage 3 (H)              Tobacco Use:     Tobacco Use      Smoking status: Never      Smokeless tobacco: Never     Dibetic: No  HgbA1C:   Hemoglobin A1C   Date Value Ref Range Status   05/26/2021 5.1 0 - 5.6 % Final     Comment:     Normal <5.7% Prediabetes 5.7-6.4%  Diabetes 6.5% or higher - adopted from ADA   consensus guidelines.     Checks Blood Glucose?:  NA Average Readings: NA     Personal/social history:  Pt lives in the [facility] in [facility] 8/30/23.     Objective:   Current treatment plan: Sacral wound, NPWT at 125 mmHg continuous negative pressure changed MWF, with white foam in undermining and black foam on top and to bridge to right hip.  Right IT and Left IT: Aquacel Ag covered with Mepilex changed MWF.  Last changed: 12/27/23 at her AL facility by [facility].     Wound #1 Sacral   Stage/tissue depth: stage 4 pressure injury, stage updated 1/13/23 as bone became visible and palpable in the wound bed.   Total wound site 1.8 cm L x 5.5 cm W x 1.5 cm D   - open hold of the wound 1.4 cm L x 3.5 cm W x 1.5 cm D  Tunneling: none noted  Undermining: around entire perimeter 12 o'clock 2.6 cm, 3 o'clock 1.5 cm , 6 o'clock 1 cm, 9 o'clock 1 cm, deepest at approximately 1 o'clock 3 cm.   Wound bed type/amount: approximately 20 % pale red nongranular tissue, 70 % granular tissue and 10 % bruised scar/granular tissue tissue: NA fluctuant  Wound Edges: open  Periwound: scar tissue, scattered pale blanchable erythema, left lateral buttock skin tears are again present but dry.   Drainage: moderate amounts serosanguinous from main wound  Odor: no odor noted prior to dressing removal, during dressing removal  or after cleansing the wound  Pain: denied any pain today.    Photo's from today's visit 12/29/23.  Note pt is in left side lying position.       Photo's from 12/8/23, note pt is in left side lying position.      Photo's from 9/1/23, note pt is in left side lying position in all photo's.     Photo from 11/18/22, initial resumption of visits to the Wound and Ostomy clinic.      Wound #2 Right Ischial Tuberosity   Stage/tissue depth: stage 3 pressure injury  2.2 cm L x 0.3 cm W x 0.1 cm D, see Assessment for details  Tunneling: none   Undermining: none  Wound bed type/amount: approximately 80 % scar tissue and 20 % granular tissue; NA fluctuant  Wound Edges: open  Periwound: Scattered areas of blanchable erythema and dry skin, scar tissue.    Drainage: small amounts serosanguinous   Odor: none noted  Pain: none  Photo from today's visit 12/29/23.  Note pt is in left side lying position.     Photo from 12/8/23, note pt is in left side lying position.     Photo from 11/18/22, initial resumption of visits to the Wound and Ostomy clinic.      Wound #3 Right buttock/posterior thigh fold, appears friction not pressure related, newly noted in clinic 3/8/23, has closed and reopened a few times.  Newly reopened as of 12/29/23.  Stage/tissue depth: partial thickness  4 cm L x 1 cm W x 0 cm D  Tunneling: none  Undermining: none  Wound bed type/amount: 100 % moist red nongranular tissue; Not fluctuant  Wound Edges: NA no depth  Periwound: wnl  Drainage: small amounts serosanguinous  Odor: no  Pain: no  Photo from today's visit 12/29/23.  Note pt is in left side lying position.     Photo from 12/8/23, note pt is in left side lying position.     Photo from 3/8/23, initial assessment of newly noted site.    Wound #4 Left Ischial tuberosity, newly noted in clinic 5/10/23.  Stage/tissue depth: full thickness  0.1 cm L x 0.1 cm W x 0 cm D  Tunneling: none  Undermining: none  Wound bed type/amount: open tissue is 100 % red nongranular  tissue: Not fluctuant  Wound Edges: NA no depth  Periwound: scattered pale blanchable erythema, waxy and fragile appearing scar tissue, new intact flat bruise to the distal beyond scar tissue 0.4 cm in diameter.    Drainage: scant serosanguinous  Odor: no  Pain: no  Photo from today's visit 12/29/23.  Note pt is in left side lying position.     Photo from 12/8/23, note pt is in left side lying position.      Photo's from 11/17/23, note pt is in left side lying position.  L photo at rest, R phot holing fold open.    Photo from 5/10/23, initial assessment of new wound site.    Dorsalis Pedal Pulse: Not assessed this visit.  Posterior Tibial Pulse: Not assessed this visit.  Hair growth: Not assessed this visit  Capillary Refill: Not assessed this visit  Feet/toes color: Not assessed this visit  Nails: Not assessed this visit  R Leg: Edema Not assessed this visit. Ankle circumference NA cm. Calf circumference NA cm.  L Leg: Edema Not assessed this visit. Ankle circumference NA cm. Calf circumference NA cm.     Mobility: wheelchair bound  Current offloading/footwear: regular shoes/boots  Sensation: paraplegic, no sensation from sternum distally     Other callusing/areas of concern:    - Right hip, closed deep ulcer with reoccurring abscess.  Remains scar closed     Diet: Regular     Discussed/Education: plan of care with rationale;  pt is unable to do self wound cares, nursing facility to perform cares for pt as directed and now Home Care is involved again.     Assessment:  Pt on arrival has wound vac running at set 125 mmHg continuous negative pressure.  Sticker has written 3 white foams and 1 black foam in wound bed.  The dressing was removed, one black foam in the wound and 3 white foams.  Was able to confirm all foams out visually with manipulation of the wound, all inner tissue visible but very tight now.  All the wounds were cleansed with saline.    The sacral wound had no odors noted this visit.     The Sacral  wound:  Continues to slowly contract and improve, some increase in depths of undermining today also.       The Right IT wound: Also continues to improve slowly, more scar tissue present in the area leaving only scattered open granular tissue remaining.    The Right lower buttock/thigh fold has reopened since last visit, is partial thickness again but is larger than in the paste    Left IT wound: Has one small dot of open tissue within fragile waxy appearing scar tissue, smaller again this visit.  New site of 4 mm diameter bruising noted, no blistering or open tissue, is flat but newly noted and needs monitoring for any opening or issues    Ostomy not assessed this visit no new concerns per pt.  She is running low on ostomy supplies and [place] samples provided to cover for the interim.       Factors impacting wound healing:   Poor nutrition: inadequate supply of protein, carbohydrates, fatty acids, and trace elements essential for all phases of wound healing.  Pt is taking a daily protein supplement drink and is aware of need for increased protein in diet for wound healing.  She has focused on protein supplement drinks and snack.   Delayed healing as part of normal aging process  Reduced Blood Supply: inadequate perfusion to heal wound.  Appears adequate.  Medication: NA  Chemotherapy: suppresses the immune system and inflammatory response, NA  Radiotherapy: increases production of free radical which damage cells, NA  Psychological stress: None noted  Obesity: decreases tissue perfusion, NA  Infection: prolongs inflammatory phase, uses vital nutrients, impairs epithelialization and releases toxins, none noted this visit.  Underlying Disease: paraplegic  Maceration: reduces wound tensile strength and inhibits epithelial migration, none noted this visit  Patient compliance, appears motivated to heal  Unrelieved pressure, pt has pressure reduction cushion in wheelchair and lays in bed daily during the day for full pressure  relief.    Immobility, limited  Substance abuse: NA     Plan:  Pt was sent to clinic with only a new vac canisters today, no granufoam or white foam kits.  There was a post it note attached to her yellow folder from the AL stating, Bring White Foam but I looked all through her bags today and no white KCI foam present.  We do not stock White KCI foam.  We used one piece of white Medline Optifoam to fill undermining from 12 to 12 o'clock and to fill in entire wound bed.  Used one long piece black foam with small circular piece of black foam to bridge to the right hip area.  Negative pressure achieved with no alarms of concerns at 125 mmHg continuous.   The Optifoam I used today is a white foam but not moist nor appropriate for long term use with a vac.  I have used it in the past for a single use when KCI 3M white foam was not available but would prefer not.  The alternative of using solely black foam in the wound bed was more of a risk due to possible tearing and retention of foam at times of dressing changes so I opted for the white Optifoam.  Sent message back with pt again to remind facility to send along white foam with each clinic visit.     Continue with the Aquacel Ag for the right and left IT wounds and to the newly reopened right buttock/thigh fold partial thickness wound.     Topical care: Wound/surrounding skin cleansed with wound cleanser and gauze. Patted dry. Wound cares as listed in Plan   Additional recommendations: None at this time     The following discharge instructions were reviewed with and sent home with the patient:  See AVS      The following supplies were sent home with the patient:  None this visit     Return visit: 1/19/24     Verbal, written, & demonstrative education provided.  Face to face time: approximately 40 minutes  Procedure: approximately 15 minutes wound vac dressing change to the sacral wound.     Presley Chester RN [facility],  [phone]

## 2024-01-01 ENCOUNTER — TELEPHONE (OUTPATIENT)
Dept: WOUND CARE | Facility: CLINIC | Age: 59
End: 2024-01-01

## 2024-01-01 ENCOUNTER — TELEPHONE (OUTPATIENT)
Dept: WOUND CARE | Facility: CLINIC | Age: 59
End: 2024-01-01
Payer: COMMERCIAL

## 2024-01-01 ENCOUNTER — HOSPITAL ENCOUNTER (OUTPATIENT)
Dept: WOUND CARE | Facility: CLINIC | Age: 59
Discharge: HOME | End: 2024-03-29
Attending: STUDENT IN AN ORGANIZED HEALTH CARE EDUCATION/TRAINING PROGRAM | Admitting: STUDENT IN AN ORGANIZED HEALTH CARE EDUCATION/TRAINING PROGRAM
Payer: MEDICARE

## 2024-01-01 ENCOUNTER — HOSPITAL ENCOUNTER (OUTPATIENT)
Dept: WOUND CARE | Facility: CLINIC | Age: 59
Discharge: HOME | End: 2024-05-10
Attending: FAMILY MEDICINE | Admitting: FAMILY MEDICINE
Payer: MEDICARE

## 2024-01-01 ENCOUNTER — HOSPITAL ENCOUNTER (OUTPATIENT)
Dept: WOUND CARE | Facility: CLINIC | Age: 59
Discharge: HOME | End: 2024-02-12
Attending: STUDENT IN AN ORGANIZED HEALTH CARE EDUCATION/TRAINING PROGRAM | Admitting: STUDENT IN AN ORGANIZED HEALTH CARE EDUCATION/TRAINING PROGRAM
Payer: MEDICARE

## 2024-01-01 ENCOUNTER — HOSPITAL ENCOUNTER (OUTPATIENT)
Facility: CLINIC | Age: 59
Setting detail: OBSERVATION
End: 2024-05-29
Attending: EMERGENCY MEDICINE | Admitting: EMERGENCY MEDICINE
Payer: MEDICARE

## 2024-01-01 ENCOUNTER — HOSPITAL ENCOUNTER (EMERGENCY)
Facility: CLINIC | Age: 59
Discharge: HOME | End: 2024-05-25
Attending: EMERGENCY MEDICINE | Admitting: EMERGENCY MEDICINE
Payer: MEDICARE

## 2024-01-01 ENCOUNTER — HOSPITAL ENCOUNTER (OUTPATIENT)
Dept: WOUND CARE | Facility: CLINIC | Age: 59
Discharge: HOME | End: 2024-03-22
Attending: STUDENT IN AN ORGANIZED HEALTH CARE EDUCATION/TRAINING PROGRAM | Admitting: STUDENT IN AN ORGANIZED HEALTH CARE EDUCATION/TRAINING PROGRAM
Payer: MEDICARE

## 2024-01-01 ENCOUNTER — HOSPITAL ENCOUNTER (OUTPATIENT)
Dept: WOUND CARE | Facility: CLINIC | Age: 59
Discharge: HOME | End: 2024-04-19
Attending: STUDENT IN AN ORGANIZED HEALTH CARE EDUCATION/TRAINING PROGRAM | Admitting: STUDENT IN AN ORGANIZED HEALTH CARE EDUCATION/TRAINING PROGRAM
Payer: MEDICARE

## 2024-01-01 ENCOUNTER — TELEPHONE (OUTPATIENT)
Dept: FAMILY MEDICINE | Facility: CLINIC | Age: 59
End: 2024-01-01
Payer: COMMERCIAL

## 2024-01-01 ENCOUNTER — OFFICE VISIT (OUTPATIENT)
Dept: FAMILY MEDICINE | Facility: CLINIC | Age: 59
End: 2024-01-01
Payer: COMMERCIAL

## 2024-01-01 ENCOUNTER — HOSPITAL ENCOUNTER (EMERGENCY)
Dept: WOUND CARE | Facility: CLINIC | Age: 59
Discharge: HOME | End: 2024-05-28
Attending: EMERGENCY MEDICINE
Payer: MEDICARE

## 2024-01-01 ENCOUNTER — HOSPITAL ENCOUNTER (OUTPATIENT)
Dept: WOUND CARE | Facility: CLINIC | Age: 59
Discharge: HOME | End: 2024-04-05
Attending: STUDENT IN AN ORGANIZED HEALTH CARE EDUCATION/TRAINING PROGRAM | Admitting: STUDENT IN AN ORGANIZED HEALTH CARE EDUCATION/TRAINING PROGRAM
Payer: MEDICARE

## 2024-01-01 ENCOUNTER — HOSPITAL ENCOUNTER (OUTPATIENT)
Dept: WOUND CARE | Facility: CLINIC | Age: 59
Discharge: HOME | End: 2024-05-24
Attending: FAMILY MEDICINE | Admitting: FAMILY MEDICINE
Payer: MEDICARE

## 2024-01-01 ENCOUNTER — HOSPITAL ENCOUNTER (OUTPATIENT)
Dept: WOUND CARE | Facility: CLINIC | Age: 59
Discharge: HOME | End: 2024-01-19
Attending: STUDENT IN AN ORGANIZED HEALTH CARE EDUCATION/TRAINING PROGRAM | Admitting: STUDENT IN AN ORGANIZED HEALTH CARE EDUCATION/TRAINING PROGRAM
Payer: MEDICARE

## 2024-01-01 ENCOUNTER — HOSPITAL ENCOUNTER (OUTPATIENT)
Dept: WOUND CARE | Facility: CLINIC | Age: 59
Discharge: HOME | End: 2024-03-15
Attending: STUDENT IN AN ORGANIZED HEALTH CARE EDUCATION/TRAINING PROGRAM | Admitting: STUDENT IN AN ORGANIZED HEALTH CARE EDUCATION/TRAINING PROGRAM
Payer: MEDICARE

## 2024-01-01 ENCOUNTER — HOSPITAL ENCOUNTER (OUTPATIENT)
Dept: WOUND CARE | Facility: CLINIC | Age: 59
Discharge: HOME | End: 2024-05-03
Attending: FAMILY MEDICINE | Admitting: FAMILY MEDICINE
Payer: MEDICARE

## 2024-01-01 ENCOUNTER — HOSPITAL ENCOUNTER (OUTPATIENT)
Dept: WOUND CARE | Facility: CLINIC | Age: 59
Discharge: HOME | End: 2024-04-26
Attending: FAMILY MEDICINE | Admitting: FAMILY MEDICINE
Payer: MEDICARE

## 2024-01-01 ENCOUNTER — TELEPHONE (OUTPATIENT)
Dept: FAMILY MEDICINE | Facility: CLINIC | Age: 59
End: 2024-01-01

## 2024-01-01 ENCOUNTER — HOSPITAL ENCOUNTER (OUTPATIENT)
Dept: WOUND CARE | Facility: CLINIC | Age: 59
Discharge: HOME | End: 2024-04-12
Attending: STUDENT IN AN ORGANIZED HEALTH CARE EDUCATION/TRAINING PROGRAM | Admitting: STUDENT IN AN ORGANIZED HEALTH CARE EDUCATION/TRAINING PROGRAM
Payer: MEDICARE

## 2024-01-01 ENCOUNTER — HOSPITAL ENCOUNTER (OUTPATIENT)
Dept: WOUND CARE | Facility: CLINIC | Age: 59
Discharge: HOME | End: 2024-04-30
Attending: FAMILY MEDICINE | Admitting: FAMILY MEDICINE
Payer: MEDICARE

## 2024-01-01 ENCOUNTER — TELEPHONE (OUTPATIENT)
Dept: ADMISSION | Facility: CLINIC | Age: 59
End: 2024-01-01
Payer: COMMERCIAL

## 2024-01-01 ENCOUNTER — HEALTH MAINTENANCE LETTER (OUTPATIENT)
Age: 59
End: 2024-01-01

## 2024-01-01 VITALS
RESPIRATION RATE: 28 BRPM | DIASTOLIC BLOOD PRESSURE: 59 MMHG | BODY MASS INDEX: 14.18 KG/M2 | SYSTOLIC BLOOD PRESSURE: 79 MMHG | OXYGEN SATURATION: 92 % | HEART RATE: 100 BPM | TEMPERATURE: 97 F | WEIGHT: 96 LBS

## 2024-01-01 VITALS
OXYGEN SATURATION: 98 % | RESPIRATION RATE: 18 BRPM | BODY MASS INDEX: 14.77 KG/M2 | DIASTOLIC BLOOD PRESSURE: 65 MMHG | SYSTOLIC BLOOD PRESSURE: 104 MMHG | WEIGHT: 100 LBS | HEART RATE: 72 BPM | TEMPERATURE: 97.4 F

## 2024-01-01 VITALS
WEIGHT: 100 LBS | SYSTOLIC BLOOD PRESSURE: 96 MMHG | HEART RATE: 74 BPM | TEMPERATURE: 97.9 F | DIASTOLIC BLOOD PRESSURE: 60 MMHG | BODY MASS INDEX: 14.77 KG/M2 | RESPIRATION RATE: 12 BRPM | OXYGEN SATURATION: 99 %

## 2024-01-01 DIAGNOSIS — L02.415 ABSCESS OF RIGHT HIP: ICD-10-CM

## 2024-01-01 DIAGNOSIS — F11.20 CONTINUOUS OPIOID DEPENDENCE (H): ICD-10-CM

## 2024-01-01 DIAGNOSIS — Z76.89 ENCOUNTER TO ESTABLISH CARE WITH NEW DOCTOR: Primary | ICD-10-CM

## 2024-01-01 DIAGNOSIS — Z53.9 DIAGNOSIS NOT YET DEFINED: Primary | ICD-10-CM

## 2024-01-01 DIAGNOSIS — R10.84 GENERALIZED ABDOMINAL PAIN: ICD-10-CM

## 2024-01-01 DIAGNOSIS — R56.9 SEIZURES (H): ICD-10-CM

## 2024-01-01 DIAGNOSIS — N39.0 CHRONIC UTI (URINARY TRACT INFECTION): Primary | ICD-10-CM

## 2024-01-01 DIAGNOSIS — R41.82 ALTERED MENTAL STATUS, UNSPECIFIED ALTERED MENTAL STATUS TYPE: ICD-10-CM

## 2024-01-01 DIAGNOSIS — R53.1 WEAKNESS: ICD-10-CM

## 2024-01-01 DIAGNOSIS — L89.319 PRESSURE INJURY OF SKIN OF RIGHT BUTTOCK, UNSPECIFIED INJURY STAGE: ICD-10-CM

## 2024-01-01 PROCEDURE — G0463 HOSPITAL OUTPT CLINIC VISIT: HCPCS

## 2024-01-01 PROCEDURE — 97605 NEG PRS WND THER DME<=50SQCM: CPT

## 2024-01-01 PROCEDURE — 99283 EMERGENCY DEPT VISIT LOW MDM: CPT | Performed by: EMERGENCY MEDICINE

## 2024-01-01 PROCEDURE — 99285 EMERGENCY DEPT VISIT HI MDM: CPT | Performed by: EMERGENCY MEDICINE

## 2024-01-01 PROCEDURE — 96374 THER/PROPH/DIAG INJ IV PUSH: CPT | Performed by: EMERGENCY MEDICINE

## 2024-01-01 PROCEDURE — 99222 1ST HOSP IP/OBS MODERATE 55: CPT | Mod: AI | Performed by: PEDIATRICS

## 2024-01-01 PROCEDURE — G0378 HOSPITAL OBSERVATION PER HR: HCPCS

## 2024-01-01 PROCEDURE — G0180 MD CERTIFICATION HHA PATIENT: HCPCS | Performed by: FAMILY MEDICINE

## 2024-01-01 PROCEDURE — 99285 EMERGENCY DEPT VISIT HI MDM: CPT | Mod: 25 | Performed by: EMERGENCY MEDICINE

## 2024-01-01 PROCEDURE — 99214 OFFICE O/P EST MOD 30 MIN: CPT | Performed by: FAMILY MEDICINE

## 2024-01-01 PROCEDURE — 250N000013 HC RX MED GY IP 250 OP 250 PS 637: Performed by: PEDIATRICS

## 2024-01-01 PROCEDURE — 96375 TX/PRO/DX INJ NEW DRUG ADDON: CPT | Performed by: EMERGENCY MEDICINE

## 2024-01-01 PROCEDURE — 96376 TX/PRO/DX INJ SAME DRUG ADON: CPT | Performed by: EMERGENCY MEDICINE

## 2024-01-01 PROCEDURE — 97602 WOUND(S) CARE NON-SELECTIVE: CPT

## 2024-01-01 PROCEDURE — 51702 INSERT TEMP BLADDER CATH: CPT | Performed by: EMERGENCY MEDICINE

## 2024-01-01 PROCEDURE — 99282 EMERGENCY DEPT VISIT SF MDM: CPT | Performed by: EMERGENCY MEDICINE

## 2024-01-01 PROCEDURE — 250N000011 HC RX IP 250 OP 636: Performed by: EMERGENCY MEDICINE

## 2024-01-01 PROCEDURE — 99238 HOSP IP/OBS DSCHRG MGMT 30/<: CPT | Performed by: PEDIATRICS

## 2024-01-01 RX ORDER — ONDANSETRON 4 MG/1
4 TABLET, ORALLY DISINTEGRATING ORAL EVERY 6 HOURS PRN
Status: DISCONTINUED | OUTPATIENT
Start: 2024-01-01 | End: 2024-01-01

## 2024-01-01 RX ORDER — GLIPIZIDE 10 MG/1
1-2 TABLET ORAL
Status: DISCONTINUED | OUTPATIENT
Start: 2024-01-01 | End: 2024-01-01 | Stop reason: HOSPADM

## 2024-01-01 RX ORDER — ONDANSETRON 2 MG/ML
4 INJECTION INTRAMUSCULAR; INTRAVENOUS EVERY 6 HOURS PRN
Status: DISCONTINUED | OUTPATIENT
Start: 2024-01-01 | End: 2024-01-01

## 2024-01-01 RX ORDER — MINERAL OIL/HYDROPHIL PETROLAT
OINTMENT (GRAM) TOPICAL
Status: DISCONTINUED | OUTPATIENT
Start: 2024-01-01 | End: 2024-01-01

## 2024-01-01 RX ORDER — TRAZODONE HYDROCHLORIDE 100 MG/1
100 TABLET ORAL AT BEDTIME
COMMUNITY
Start: 2024-01-01 | End: 2024-05-30

## 2024-01-01 RX ORDER — OLANZAPINE 5 MG/1
5 TABLET, ORALLY DISINTEGRATING ORAL EVERY 6 HOURS PRN
Status: DISCONTINUED | OUTPATIENT
Start: 2024-01-01 | End: 2024-01-01 | Stop reason: HOSPADM

## 2024-01-01 RX ORDER — LEVETIRACETAM 750 MG/1
750 TABLET ORAL 2 TIMES DAILY
COMMUNITY
Start: 2024-01-01 | End: 2024-05-30

## 2024-01-01 RX ORDER — GLIPIZIDE 10 MG/1
1-2 TABLET ORAL
Status: DISCONTINUED | OUTPATIENT
Start: 2024-01-01 | End: 2024-01-01

## 2024-01-01 RX ORDER — GABAPENTIN 300 MG/1
600 CAPSULE ORAL AT BEDTIME
COMMUNITY
Start: 2024-01-01 | End: 2024-05-30

## 2024-01-01 RX ORDER — OXYCODONE HYDROCHLORIDE 5 MG/1
5 TABLET ORAL AT BEDTIME
COMMUNITY
Start: 2024-01-01 | End: 2024-05-30

## 2024-01-01 RX ORDER — NALOXONE HYDROCHLORIDE 0.4 MG/ML
0.2 INJECTION, SOLUTION INTRAMUSCULAR; INTRAVENOUS; SUBCUTANEOUS
Status: DISCONTINUED | OUTPATIENT
Start: 2024-01-01 | End: 2024-01-01 | Stop reason: HOSPADM

## 2024-01-01 RX ORDER — AMOXICILLIN 250 MG
2 CAPSULE ORAL 2 TIMES DAILY PRN
COMMUNITY
Start: 2023-04-10 | End: 2024-05-30

## 2024-01-01 RX ORDER — HYDROMORPHONE HYDROCHLORIDE 1 MG/ML
0.5 INJECTION, SOLUTION INTRAMUSCULAR; INTRAVENOUS; SUBCUTANEOUS
Status: DISCONTINUED | OUTPATIENT
Start: 2024-01-01 | End: 2024-01-01

## 2024-01-01 RX ORDER — SULFAMETHOXAZOLE/TRIMETHOPRIM 800-160 MG
1 TABLET ORAL 2 TIMES DAILY
COMMUNITY
Start: 2023-01-01 | End: 2024-05-30

## 2024-01-01 RX ORDER — BISACODYL 10 MG
10 SUPPOSITORY, RECTAL RECTAL
Status: DISCONTINUED | OUTPATIENT
Start: 2024-05-31 | End: 2024-01-01

## 2024-01-01 RX ORDER — MORPHINE SULFATE 20 MG/ML
10 SOLUTION ORAL
Status: DISCONTINUED | OUTPATIENT
Start: 2024-01-01 | End: 2024-01-01 | Stop reason: HOSPADM

## 2024-01-01 RX ORDER — ATROPINE SULFATE 10 MG/ML
2 SOLUTION/ DROPS OPHTHALMIC EVERY 4 HOURS PRN
Status: DISCONTINUED | OUTPATIENT
Start: 2024-01-01 | End: 2024-01-01

## 2024-01-01 RX ORDER — ATROPINE SULFATE 10 MG/ML
2 SOLUTION/ DROPS OPHTHALMIC EVERY 4 HOURS PRN
Status: DISCONTINUED | OUTPATIENT
Start: 2024-01-01 | End: 2024-01-01 | Stop reason: HOSPADM

## 2024-01-01 RX ORDER — GLYCOPYRROLATE 0.2 MG/ML
0.2 INJECTION, SOLUTION INTRAMUSCULAR; INTRAVENOUS EVERY 4 HOURS PRN
Status: DISCONTINUED | OUTPATIENT
Start: 2024-01-01 | End: 2024-01-01

## 2024-01-01 RX ORDER — MORPHINE SULFATE 20 MG/ML
5 SOLUTION ORAL
Status: DISCONTINUED | OUTPATIENT
Start: 2024-01-01 | End: 2024-01-01 | Stop reason: HOSPADM

## 2024-01-01 RX ORDER — HYDROCORTISONE 10 MG/G
CREAM TOPICAL 2 TIMES DAILY PRN
COMMUNITY
End: 2024-05-30

## 2024-01-01 RX ORDER — HYDROMORPHONE HYDROCHLORIDE 1 MG/ML
0.5 INJECTION, SOLUTION INTRAMUSCULAR; INTRAVENOUS; SUBCUTANEOUS ONCE
Status: COMPLETED | OUTPATIENT
Start: 2024-01-01 | End: 2024-01-01

## 2024-01-01 RX ORDER — PROPRANOLOL HYDROCHLORIDE 20 MG/1
20 TABLET ORAL 2 TIMES DAILY
COMMUNITY
Start: 2024-01-01 | End: 2024-05-30

## 2024-01-01 RX ORDER — MORPHINE SULFATE 2 MG/ML
2 INJECTION, SOLUTION INTRAMUSCULAR; INTRAVENOUS
Status: DISCONTINUED | OUTPATIENT
Start: 2024-01-01 | End: 2024-01-01

## 2024-01-01 RX ORDER — SENNOSIDES 8.6 MG
1 TABLET ORAL 2 TIMES DAILY PRN
Status: DISCONTINUED | OUTPATIENT
Start: 2024-01-01 | End: 2024-01-01

## 2024-01-01 RX ORDER — NALOXONE HYDROCHLORIDE 0.4 MG/ML
0.2 INJECTION, SOLUTION INTRAMUSCULAR; INTRAVENOUS; SUBCUTANEOUS
Status: DISCONTINUED | OUTPATIENT
Start: 2024-01-01 | End: 2024-01-01

## 2024-01-01 RX ORDER — NALOXONE HYDROCHLORIDE 0.4 MG/ML
0.1 INJECTION, SOLUTION INTRAMUSCULAR; INTRAVENOUS; SUBCUTANEOUS
Status: DISCONTINUED | OUTPATIENT
Start: 2024-01-01 | End: 2024-01-01 | Stop reason: HOSPADM

## 2024-01-01 RX ORDER — ACETAMINOPHEN 650 MG/1
650 SUPPOSITORY RECTAL EVERY 6 HOURS PRN
Status: DISCONTINUED | OUTPATIENT
Start: 2024-01-01 | End: 2024-01-01 | Stop reason: HOSPADM

## 2024-01-01 RX ORDER — NALOXONE HYDROCHLORIDE 0.4 MG/ML
0.1 INJECTION, SOLUTION INTRAMUSCULAR; INTRAVENOUS; SUBCUTANEOUS
Status: DISCONTINUED | OUTPATIENT
Start: 2024-01-01 | End: 2024-01-01

## 2024-01-01 RX ORDER — MORPHINE SULFATE 2 MG/ML
1 INJECTION, SOLUTION INTRAMUSCULAR; INTRAVENOUS
Status: DISCONTINUED | OUTPATIENT
Start: 2024-01-01 | End: 2024-01-01

## 2024-01-01 RX ORDER — MINERAL OIL/HYDROPHIL PETROLAT
OINTMENT (GRAM) TOPICAL
Status: DISCONTINUED | OUTPATIENT
Start: 2024-01-01 | End: 2024-01-01 | Stop reason: HOSPADM

## 2024-01-01 RX ADMIN — HYDROMORPHONE HYDROCHLORIDE 0.5 MG: 1 INJECTION, SOLUTION INTRAMUSCULAR; INTRAVENOUS; SUBCUTANEOUS at 10:54

## 2024-01-01 RX ADMIN — HYDROMORPHONE HYDROCHLORIDE 0.5 MG: 1 INJECTION, SOLUTION INTRAMUSCULAR; INTRAVENOUS; SUBCUTANEOUS at 08:59

## 2024-01-01 RX ADMIN — HYDROMORPHONE HYDROCHLORIDE 0.5 MG: 1 INJECTION, SOLUTION INTRAMUSCULAR; INTRAVENOUS; SUBCUTANEOUS at 09:28

## 2024-01-01 RX ADMIN — MORPHINE SULFATE 10 MG: 100 SOLUTION ORAL at 00:58

## 2024-01-01 RX ADMIN — MORPHINE SULFATE 2 MG: 2 INJECTION, SOLUTION INTRAMUSCULAR; INTRAVENOUS at 13:16

## 2024-01-01 RX ADMIN — MORPHINE SULFATE 2 MG: 2 INJECTION, SOLUTION INTRAMUSCULAR; INTRAVENOUS at 14:54

## 2024-01-01 RX ADMIN — MORPHINE SULFATE 2 MG: 2 INJECTION, SOLUTION INTRAMUSCULAR; INTRAVENOUS at 14:18

## 2024-01-01 RX ADMIN — MORPHINE SULFATE 5 MG: 100 SOLUTION ORAL at 17:04

## 2024-01-01 RX ADMIN — MORPHINE SULFATE 5 MG: 100 SOLUTION ORAL at 18:45

## 2024-01-01 RX ADMIN — MORPHINE SULFATE 2 MG: 2 INJECTION, SOLUTION INTRAMUSCULAR; INTRAVENOUS at 15:40

## 2024-01-01 RX ADMIN — MORPHINE SULFATE 2 MG: 2 INJECTION, SOLUTION INTRAMUSCULAR; INTRAVENOUS at 12:12

## 2024-01-01 ASSESSMENT — COLUMBIA-SUICIDE SEVERITY RATING SCALE - C-SSRS
6. HAVE YOU EVER DONE ANYTHING, STARTED TO DO ANYTHING, OR PREPARED TO DO ANYTHING TO END YOUR LIFE?: NO
2. HAVE YOU ACTUALLY HAD ANY THOUGHTS OF KILLING YOURSELF IN THE PAST MONTH?: NO
2. HAVE YOU ACTUALLY HAD ANY THOUGHTS OF KILLING YOURSELF IN THE PAST MONTH?: NO
1. IN THE PAST MONTH, HAVE YOU WISHED YOU WERE DEAD OR WISHED YOU COULD GO TO SLEEP AND NOT WAKE UP?: NO
1. IN THE PAST MONTH, HAVE YOU WISHED YOU WERE DEAD OR WISHED YOU COULD GO TO SLEEP AND NOT WAKE UP?: NO
6. HAVE YOU EVER DONE ANYTHING, STARTED TO DO ANYTHING, OR PREPARED TO DO ANYTHING TO END YOUR LIFE?: NO

## 2024-01-01 ASSESSMENT — ACTIVITIES OF DAILY LIVING (ADL)
ADLS_ACUITY_SCORE: 39
ADLS_ACUITY_SCORE: 43
ADLS_ACUITY_SCORE: 43
ADLS_ACUITY_SCORE: 39
ADLS_ACUITY_SCORE: 39
ADLS_ACUITY_SCORE: 43
ADLS_ACUITY_SCORE: 39
ADLS_ACUITY_SCORE: 43
DEPENDENT_IADLS:: CLEANING;COOKING;LAUNDRY;SHOPPING;MEAL PREPARATION;MEDICATION MANAGEMENT;MONEY MANAGEMENT;TRANSPORTATION;INCONTINENCE
ADLS_ACUITY_SCORE: 39
ADLS_ACUITY_SCORE: 43
ADLS_ACUITY_SCORE: 43
ADLS_ACUITY_SCORE: 39
ADLS_ACUITY_SCORE: 43
ADLS_ACUITY_SCORE: 39
ADLS_ACUITY_SCORE: 43
ADLS_ACUITY_SCORE: 39
ADLS_ACUITY_SCORE: 43

## 2024-01-01 ASSESSMENT — PATIENT HEALTH QUESTIONNAIRE - PHQ9
10. IF YOU CHECKED OFF ANY PROBLEMS, HOW DIFFICULT HAVE THESE PROBLEMS MADE IT FOR YOU TO DO YOUR WORK, TAKE CARE OF THINGS AT HOME, OR GET ALONG WITH OTHER PEOPLE: NOT DIFFICULT AT ALL
SUM OF ALL RESPONSES TO PHQ QUESTIONS 1-9: 8
SUM OF ALL RESPONSES TO PHQ QUESTIONS 1-9: 8

## 2024-01-08 NOTE — TELEPHONE ENCOUNTER
Home Care RN needing wound Care Notes to be faxed over from 12/29/23.       Looks like specialty faxed it this morning but home care nurse said they never received it. Can this be re-faxed?    Fax #:  [phone]    Asya Aguirre RN on 1/8/2024 at 11:20 AM

## 2024-01-08 NOTE — TELEPHONE ENCOUNTER
Reason for Call:  Other appointment     Detailed comments: Mariposa calling from [facility] to see if we can send her wound measurements and office note from 12/29. please fax to: [phone] Any questions please call phone [phone]     Phone Number Patient can be reached at: Home number on file [phone] (home)    Best Time: any      Can we leave a detailed message on this number? YES    Call taken on 1/8/2024 at 8:01 AM by Desirae Glasgow

## 2024-01-19 NOTE — DISCHARGE INSTRUCTIONS
Today we did the wound cares to your sacral wound, right IT, left IT and right buttock/thigh fold reoccurring wound site.    For the sacral wound I applied the 3M KCI wound vac with one piece of the white Optifoam bandaging (your facility did not send any white KCI foam with again today) and one piece black KCI foam to fill the wound bed and two pieced black KCI foam to bridge the wound to the right upper buttock hip area.    We will have your home care continue with the three times weekly wound vac dressing changes to the Sacral wound with the use of the white KCI foam to fill the wound bed and areas of undermining.    For the Right IT, Left IT and Right buttock/thigh fold we will have the home care continue with the application of Aquacel Ag or a silver alginate, covered/secured with a gentle adhesive foam dressing, all changed three times weekly.    I will see you again in clinic in just under 3 weeks on Thursday February the 8th at 2 pm.  Call yourself or have your Assisted Living or Home Care staff call with any questions or concerns or to reschedule your next Wound and Ostomy clinic appointment date or time.  [phone].    Presley Chester RN [email or web address]

## 2024-01-19 NOTE — PROGRESS NOTES
[facility]ound [facility].     Start of Care in [facility] Wound Clinic: 11/18/2022, initial resumption of visit, see Wound History for details.  Referring Doctor: Ivan Baeza MD  Primary Care Provider: No Ref-Primary, Physician   Wound Location: Sacral, Right Ischial Tuberosity, Right thigh/buttock fold, Right hip (healed).  Left buttock mid (healed) and Left Ischial Tuberosity.       Wound Clinic Visit: Scheduled follow up.     Reason for Visit: Wound assessments and cares     Subjective:  On arrival today 1/19/24 alone, the pt reports the following:  No new concerns with the wounds, no reports of new concerns from staff at AL or Home Care.  Pt reports she has gained some weight, intentional, approximately 4 lbs, has been focusing on high protein and supplements to help increased weight.  She shut down her 3M KCI wound vac in route today due to alarms stating canister obstructed.      Wound History:   Pt well known to me from visits over many years to the Wound and Ostomy clinic for chronic pressure injuries.  Initial visit November of 2020 for Right IT, Sacral, left trochanter and right heel pressure injuries.  She missed a few follow up appointments initially but was mostly coming into clinic every 3 to 4 weeks for evaluation and recommendations.  After a hospitalization at [facility] in [place] due to urinary diversion concerns and a right hip abscess she resumed visit to the Wound and Ostomy clinic on 11/18/22 for wound vac dressing changes and assessment. The right hip wound improved and as of my visit with her on 12/16/22 was nearly healed, no depth and only small open aspect all granular tissue.  It was reported the wound did fully close soon after that assessment per AL staff.  On 1/3/23 the pt's facility sent her to the ED for the right hip, it had re opened and was draining copious amounts of purulent drainage.  I was able to see the pt in the ED and was able to add the  right hip wound into the intact sacral wound vac set up.  At my last clinic visit with the pt on 2/3/23 the sacral wound was not in good condition, lots of slough, eschar on the edges and bone visible and palpable in the center.  I made arrangement with Dr Zavala's approval to have her seen in Specialty for potential debridement but appointment was approximately four weeks out. She was hospitalized at [facility] for revision of her urinary diversion 2/22/23 - 2/25/23 and was seen by [facility] nursing, the photo's from that assessment showed the sacral wound looking much improved.  No debriding was needed. She did see Dr Zavala on 2/27/23 and the sacral wound at that time was improving well with no need for debridement.  New wounds noted 5/10/23 to left buttock, Left IT and mid buttock.  Left mid buttock closed as of 6/9/23 and remains closed.  Left IT has repeatedly closed and opened, when closed often is covered with very waxy appearing fragile scar tissue.    Past Medical History:  Patient Active Problem List   Diagnosis    Migraine headache    Urinary retention    GERD (gastroesophageal reflux disease)    Flaccid paraplegia (H)    Decubitus ulcer of buttock    Pressure ulcer of heel    Poor appetite    Nausea    Generalized weakness    Burn    Health Care Home    Paraplegia following spinal cord injury (H)    ACP (advance care planning)    Osteomyelitis of hip (right periacetabular infection with osteomyelitis)    Severe protein-calorie malnutrition (H24)    Microcytic anemia    Neurogenic bladder    Anxiety    Insomnia    Chronic UTI (urinary tract infection)    Skin ulcer of buttock (bilateral ischial tuberosity ulcers)    CARDIOVASCULAR SCREENING; LDL GOAL LESS THAN 160    Open wound of foot except toes with complication    LLQ abdominal pain    Paralytic ileus (H)    Chest wall pain    Decubitus skin ulcer    S/P flap graft    Pancreatitis    Pericardial effusion    Hospice care patient    Seizures (H)    AMS  (altered mental status)    Admission for hospice care    Odynophagia    Portal vein thrombosis    Foreign body in esophagus, initial encounter    Impacted foreign body in esophagus, initial encounter    Aspiration pneumonia of right lung due to regurgitated food, unspecified part of lung (H)    Septic shock (H)    Depressive disorder    Colostomy present (H)    Chronic pain due to trauma    Hypokalemia    Abscess of right hip    Stage 3 skin ulcer of sacral region (H)    Pressure injury of right hip, stage 3 (H)              Tobacco Use:     Tobacco Use      Smoking status: Never      Smokeless tobacco: Never     Dibetic: No  HgbA1C:   Hemoglobin A1C   Date Value Ref Range Status   05/26/2021 5.1 0 - 5.6 % Final     Comment:     Normal <5.7% Prediabetes 5.7-6.4%  Diabetes 6.5% or higher - adopted from ADA   consensus guidelines.     Checks Blood Glucose?:  NA Average Readings: NA     Personal/social history:  Pt lives in the [facility] in [facility] 8/30/23.     Objective:   Current treatment plan: Sacral wound, NPWT at 125 mmHg continuous negative pressure changed MWF, with white foam in undermining and black foam on top and to bridge to right hip.  Right IT and Left IT: Aquacel Ag covered with Mepilex changed MWF.  Last changed: 1/17/24 at her AL facility by [facility].      Wound #1 Sacral   Stage/tissue depth: stage 4 pressure injury, stage updated 1/13/23 as bone became visible and palpable in the wound bed.   Total wound site 2.1 cm L x 6 cm W x 1.1 cm D   - open hold of the wound 1.5 cm L x 4 cm W x 1.2 cm D  Tunneling: none noted  Undermining: around entire perimeter 12 o'clock 3 cm, 3 o'clock 0.5 cm , 6 o'clock 1 cm, 9 o'clock 1.2 cm.  Wound bed type/amount: approximately 100 % granular tissue some of which is very pale, other areas beefy red: NA fluctuant  Wound Edges: open  Periwound: scar tissue, scattered pale blanchable erythema, left lateral buttock skin tears are  resolved.   Drainage: moderate amounts serosanguinous from main wound  Odor: odor noted prior to dressing removal, during dressing removal, resolved after cleansing with saline.   Pain: denied any pain today.    Photo's from today's visit 1/19/24, note pt is in left side lying position.      Photo's from 12/29/23.  Note pt is in left side lying position.       Photo's from 9/1/23, note pt is in left side lying position in all photo's.     Photo from 11/18/22, initial resumption of visits to the Wound and Ostomy clinic.      Wound #2 Right Ischial Tuberosity   Stage/tissue depth: stage 3 pressure injury  3 cm L x 0.4 cm W x 0.1 cm D  Tunneling: none   Undermining: none  Wound bed type/amount: approximately 20 % scar tissue and 80 % granular tissue; NA fluctuant  Wound Edges: open  Periwound: Scattered areas of blanchable erythema and dry skin, scar tissue.    Drainage: small amounts serosanguinous   Odor: none noted  Pain: none  Photo from today's visit 1/19/24, note pt is in left side lying position.    Photo from 12/29/23.  Note pt is in left side lying position.      Photo from 11/18/22, initial resumption of visits to the Wound and Ostomy clinic.      Wound #3 Right buttock/posterior thigh fold, appears friction not pressure related, newly noted in clinic 3/8/23, has closed and reopened a few times.  Newly reopened as of 12/29/23.  Stage/tissue depth: partial thickness  5 cm L x 1 cm W x 0 cm D  Tunneling: none  Undermining: none  Wound bed type/amount: 100 % moist red nongranular tissue; Not fluctuant  Wound Edges: NA no depth  Periwound: wnl  Drainage: small amounts serosanguinous   Odor: no  Pain: no  Photo from today's visit 1/19/24, note pt is in left side lying position.    Photo from 12/29/23.  Note pt is in left side lying position.      Photo from 3/8/23, initial assessment of newly noted site.    Wound #4 Left Ischial tuberosity, newly noted in clinic 5/10/23.  Stage/tissue depth: full thickness  0 cm L  x 0 cm W x 0 cm D  Tunneling: none  Undermining: none  Wound bed type/amount: no open wound today, area of distinct red blanchable erythema present in picture below is all reepithelialized but fragile, waxy scar tissue remains: Not fluctuant  Wound Edges: NA no depth  Periwound: scattered pale blanchable erythema, waxy and fragile appearing scar tissue, intact flat bruise to the distal beyond scar tissue noted last visit 12/29/23 is unchanged 0.4 cm in diameter.    Drainage: None  Odor: no  Pain: no  Photo from today's visit 1/19/24, note pt is in left side lying position.    Photo from 12/29/23.  Note pt is in left side lying position.       Photo's from 11/17/23, note pt is in left side lying position.  L photo at rest, R phot holing fold open.    Photo from 5/10/23, initial assessment of new wound site.    Dorsalis Pedal Pulse: Not assessed this visit.  Posterior Tibial Pulse: Not assessed this visit.  Hair growth: Not assessed this visit  Capillary Refill: Not assessed this visit  Feet/toes color: Not assessed this visit  Nails: Not assessed this visit  R Leg: Edema Not assessed this visit. Ankle circumference NA cm. Calf circumference NA cm.  L Leg: Edema Not assessed this visit. Ankle circumference NA cm. Calf circumference NA cm.     Mobility: wheelchair bound  Current offloading/footwear: regular shoes/boots  Sensation: paraplegic, no sensation from sternum distally     Other callusing/areas of concern:    - Right hip, closed deep ulcer with reoccurring abscess.  Remains scar closed.     Diet: Regular     Discussed/Education: plan of care with rationale;  pt is unable to do self wound cares, Home Care is performing wound cares three times weekly at AL  .     Assessment:  Pt on arrival has the wound vac off, when in exam chair restarted machine and achieved 125 mmHg continuous negative pressure.  Stopped and machine.  Sticker on canister tubing states 2 white foams and 1 black foam used in wound bed at  1/17/24 dressing change.  The dressing was removed, one black foam in the wound and 2 white foams and all adhesive drape.  Was able to confirm all foams out visually with manipulation of the wound, all inner tissue visible but remains very tight.  All the wounds were cleansed with saline.       The Sacral wound:  Had no odors after cleaning, mild foul odor on initial removal.  Wound bed is now all granular with some very pale areas remaining but granular.  The bruising noted last visit has resolved.  Some decrease in depth.  There is a noted increase in length and width today but it appears positional.  Undermining at 1 o'clock which was the deepest site at 3 cm has reduced, but 12 o'clock undermining has increased and is now the deepest area at 3 cm D.     The Right IT wound: Is larger in length and less scar tissue present, no other noted change, remains clean.    The Right lower buttock/thigh fold was reopened as of last visit after being closed for many weeks, today 1/19/24 the site is still open with some increased length, no depth, less moist.    Left IT wound: Has no open wounds today.  Waxy appearing scar tissue is all intact.  The site of 4 mm diameter bruising noted last visit is unchanged in size, somewhat less dark, paler, no blistering or open tissue, is flat.  To the medial of the waxy scar tissue there is a distinct dot of what appears to be recently denuded partial thickness epidermis which is now fully reepithelialized but remains hyperpigmented, no drainage, no signs of infection.    Ostomy:  Not assessed this visit no new concerns per pt.  She is running low on ostomy supplies and [facility] samples provided to cover for the interim.       Factors impacting wound healing:   Poor nutrition: inadequate supply of protein, carbohydrates, fatty acids, and trace elements essential for all phases of wound healing.  Pt is taking a daily protein supplement drink and is aware of need for increased protein in diet  for wound healing.  She has focused on protein supplement drinks and snack. Four pound increase in body weight reported 1/19/24 per pt.   Delayed healing as part of normal aging process  Reduced Blood Supply: inadequate perfusion to heal wound.  Appears adequate.  Medication: NA  Chemotherapy: suppresses the immune system and inflammatory response, NA  Radiotherapy: increases production of free radical which damage cells, NA  Psychological stress: None noted  Obesity: decreases tissue perfusion, NA  Infection: prolongs inflammatory phase, uses vital nutrients, impairs epithelialization and releases toxins, none noted this visit.  Underlying Disease: paraplegic  Maceration: reduces wound tensile strength and inhibits epithelial migration, none noted this visit  Patient compliance, appears motivated to heal  Unrelieved pressure, pt has pressure reduction cushion in wheelchair and lays in bed daily during the day for full pressure relief.    Immobility, limited  Substance abuse: NA     Plan:  The facility today failed to send along a 3M KCI black granufoam dressing change kit nor 3M KCI white foam.  They did send one canister.  We do stock the granufaom kits but not we do not stock White KCI foam.  We used one piece of white Medline Optifoam to fill undermining from 12 to 12 o'clock and to fill in entire wound bed.  Added one piece black granufoam into wound be and used one long piece black foam with small circular piece of black foam to bridge to the right hip area.  Negative pressure achieved with no alarms of concerns at 125 mmHg continuous.   The Optifoam I used today is a white foam but not moist nor appropriate for long term use with a vac.  I have used it in the past for a single use when KCI 3M white foam was not available but would prefer not.  The alternative of using solely black foam in the wound bed was more of a risk due to possible tearing and retention of foam at times of dressing changes so I opted for  the white Optifoam.    We will continue with the NPWT for the Sacral wound changed three times weekly.  We will also continue with the Aquacel Ag for the right and left IT wounds and to the reopened right buttock/thigh fold partial thickness wound.     Canister today was changed, was performing poorly with alarms, was approximately 1/3 full.     Topical care: Wound/surrounding skin cleansed with wound cleanser and gauze. Patted dry. Wound cares as listed in Plan   Additional recommendations: None at this time     The following discharge instructions were reviewed with and sent home with the patient:  See AVS      The following supplies were sent home with the patient:  None this visit      Return visit: 2/8/24     Verbal, written, & demonstrative education provided.  Face to face time: approximately 45 minutes  Procedure: approximately 10 minutes wound vac dressing change to the sacral wound.     Presley Chester RN [facility],  [phone]

## 2024-02-09 NOTE — TELEPHONE ENCOUNTER
"Mariposa from [facility] calling to request fax number to send over new wound care orders (gave her UL Back fax [phone] number) and also to update Presley they are not using white foam but black foam and underneath are using a 3M collagen dressing which they will send with pt for her appt   Future Appointments 2/9/2024 - 8/7/2024        Date Visit Type Length Department Provider     2/12/2024  2:00 PM RETURN WOUND NURSE 60 min [facility] WOUND CARE Presley Chester, RN    Location Instructions:     From [address]: Exit at [address] on [address] side of [place]. Turn right on [address]. Turn left at stoplight on [address]. [facility] will be in view two blocks ahead. Please follow the signage to utilize the \"Specialty Care\" entrance and parking lot when entering campus.                        "

## 2024-02-12 NOTE — PROGRESS NOTES
[facility].     Start of Care in [facility]: 11/18/2022, initial resumption of visit, see Wound History for details.  Referring Doctor: Ivan Baeza MD  Primary Care Provider: No Ref-Primary, Physician   Wound Location: Sacral, Right Ischial Tuberosity, Right thigh/buttock fold, Right hip (healed).  Left buttock mid (healed) and Left Ischial Tuberosity.       Wound Clinic Visit: Rescheduled follow up.     Reason for Visit: Wound assessments and cares     Subjective:  On arrival today 2/12/24 alone, the pt reports the following:  Missed scheduled visit last week due to transportation issues.  No other new concerns or changes noted.  Home care continues to make three times weekly visits, see Assessment for details of updated info faxed from pt's home care agency related to change in the plan of care for the sacral wound.  Pt shut down machine in route to clinic today due to alarms, not sure what the issue was but was annoying.       Wound History:   Pt well known to me from visits over many years to the Wound and Ostomy clinic for chronic pressure injuries.  Initial visit November of 2020 for Right IT, Sacral, left trochanter and right heel pressure injuries.  She missed a few follow up appointments initially but was mostly coming into clinic every 3 to 4 weeks for evaluation and recommendations.  After a hospitalization at [facility] in [place] due to urinary diversion concerns and a right hip abscess she resumed visit to the Wound and Ostomy clinic on 11/18/22 for wound vac dressing changes and assessment. The right hip wound improved and as of my visit with her on 12/16/22 was nearly healed, no depth and only small open aspect all granular tissue.  It was reported the wound did fully close soon after that assessment per AL staff.  On 1/3/23 the pt's facility sent her to the ED for the right hip, it had re opened and was draining copious amounts of  purulent drainage.  I was able to see the pt in the ED and was able to add the right hip wound into the intact sacral wound vac set up.  At my last clinic visit with the pt on 2/3/23 the sacral wound was not in good condition, lots of slough, eschar on the edges and bone visible and palpable in the center.  I made arrangement with Dr Zavala's approval to have her seen in Specialty for potential debridement but appointment was approximately four weeks out. She was hospitalized at [facility] for revision of her urinary diversion 2/22/23 - 2/25/23 and was seen by [facility] nursing, the photo's from that assessment showed the sacral wound looking much improved.  No debriding was needed. She did see Dr Zavala on 2/27/23 and the sacral wound at that time was improving well with no need for debridement.  New wounds noted 5/10/23 to left buttock, Left IT and mid buttock.  Left mid buttock closed as of 6/9/23 and remains closed.  Left IT has repeatedly closed and opened, when closed often is covered with very waxy appearing fragile scar tissue.    Past Medical History:  Patient Active Problem List   Diagnosis    Migraine headache    Urinary retention    GERD (gastroesophageal reflux disease)    Flaccid paraplegia (H)    Decubitus ulcer of buttock    Pressure ulcer of heel    Poor appetite    Nausea    Generalized weakness    Burn    Health Care Home    Paraplegia following spinal cord injury (H)    ACP (advance care planning)    Osteomyelitis of hip (right periacetabular infection with osteomyelitis)    Severe protein-calorie malnutrition (H24)    Microcytic anemia    Neurogenic bladder    Anxiety    Insomnia    Chronic UTI (urinary tract infection)    Skin ulcer of buttock (bilateral ischial tuberosity ulcers)    CARDIOVASCULAR SCREENING; LDL GOAL LESS THAN 160    Open wound of foot except toes with complication    LLQ abdominal pain    Paralytic ileus (H)    Chest wall pain    Decubitus skin ulcer    S/P flap graft     Pancreatitis    Pericardial effusion    Hospice care patient    Seizures (H)    AMS (altered mental status)    Admission for hospice care    Odynophagia    Portal vein thrombosis    Foreign body in esophagus, initial encounter    Impacted foreign body in esophagus, initial encounter    Aspiration pneumonia of right lung due to regurgitated food, unspecified part of lung (H)    Septic shock (H)    Depressive disorder    Colostomy present (H)    Chronic pain due to trauma    Hypokalemia    Abscess of right hip    Stage 3 skin ulcer of sacral region (H)    Pressure injury of right hip, stage 3 (H)              Tobacco Use:     Tobacco Use      Smoking status: Never      Smokeless tobacco: Never     Dibetic: No  HgbA1C:   Hemoglobin A1C   Date Value Ref Range Status   05/26/2021 5.1 0 - 5.6 % Final     Comment:     Normal <5.7% Prediabetes 5.7-6.4%  Diabetes 6.5% or higher - adopted from ADA   consensus guidelines.     Checks Blood Glucose?:  NA Average Readings: NA     Personal/social history:  Pt lives in the [facility] in [facility], [facility] 8/30/23.     Objective:   Current treatment plan: Sacral wound, NPWT at 125 mmHg continuous negative pressure changed MWF, plan of care changed as of 2/5/24 per home care fax, no longer using white KCI foam, using collagen dressing (Promogran Yu) to undermining areas.  Right IT and Left IT: Gentle adhesive foam dressing changed MW.  Last changed: 2/9/24 at her AL facility by [facility].      Wound #1 Sacral   Stage/tissue depth: stage 4 pressure injury, stage updated 1/13/23 as bone became visible and palpable in the wound bed.   Total wound site  3 cm L x 7 cm W x 1 cm D   - open hold of the wound 2.5 cm L x 5.5 cm W x 1 cm D  Tunneling: none noted  Undermining: around entire perimeter 12 o'clock 1.8 cm, 3 o'clock 0.5 cm , 6 o'clock 0.8 cm, 9 o'clock 0.5 cm.  Wound bed type/amount: approximately 100 % granular tissue some of which is pale, other  areas beefy red: NA fluctuant  Wound Edges: open  Periwound: scar tissue, scattered pale blanchable erythema, left lateral buttock skin tears are resolved.   Drainage: moderate amounts serosanguinous  Odor: odor noted prior to dressing removal, during dressing removal, resolved after cleansing with saline.   Pain: denied any pain today.    Photo's from today's visit 2/12/24, note pt is in right side lying position.        Photo's from 1/19/24, note pt is in left side lying position.      Photo's from 9/1/23, note pt is in left side lying position in all photo's.     Photo from 11/18/22, initial resumption of visits to the Wound and Ostomy clinic.      Wound #2 Right Ischial Tuberosity   Stage/tissue depth: stage 3 pressure injury  3 cm L x 0.6 cm W x 0.1 cm D  Tunneling: none   Undermining: none  Wound bed type/amount: approximately 40 % scar tissue and 60 % granular tissue; NA fluctuant  Wound Edges: open  Periwound: Scattered areas of blanchable erythema and dry skin, scar tissue.    Drainage: small amounts serosanguinous  Odor: none noted  Pain: none  Photo from today's visit 2/12/24, pt is in left side lying position.      Photo from 11/18/22, initial resumption of visits to the Wound and Ostomy clinic.      Wound #3 Right buttock/posterior thigh fold, appears friction not pressure related, newly noted in clinic 3/8/23, has closed and reopened a few times.  Newly reopened as of 12/29/23.  Stage/tissue depth: partial thickness  3.5 cm L x 0.5 cm W x 0 cm D  Tunneling: none  Undermining: none  Wound bed type/amount: 100 % moist red nongranular tissue; Not fluctuant  Wound Edges: NA no depth  Periwound: wnl  Drainage: small amounts serosanguinous   Odor: no  Pain: no  Photo from today's visit 2/12/24, pt is in left side lying position.      Photo from 1/19/24, note pt is in left side lying position.     Photo from 3/8/23, initial assessment of newly noted site.    Wound #4 Left Ischial tuberosity, newly noted in  clinic 5/10/23.  Stage/tissue depth: full thickness  0 cm L x 0 cm W x 0 cm D  Tunneling: none  Undermining: none  Wound bed type/amount: 100 % intact skin and scar tissue with some dry hyperpigmented sites: Not fluctuant  Wound Edges: NA   Periwound:  Scattered pale blanchable erythema, waxy and fragile appearing scar tissue, intact flat bruise to the distal beyond scar tissue has resolved.   Drainage: None   Odor: no  Pain: no  Photo from today's visit 2/12/24.    Photo from 1/19/24, note pt is in left side lying position.    Photo from 5/10/23, initial assessment of new wound site.    Dorsalis Pedal Pulse: Not assessed this visit.  Posterior Tibial Pulse: Not assessed this visit.  Hair growth: Not assessed this visit  Capillary Refill: Not assessed this visit  Feet/toes color: Not assessed this visit  Nails: Not assessed this visit  R Leg: Edema Not assessed this visit. Ankle circumference NA cm. Calf circumference NA cm.  L Leg: Edema Not assessed this visit. Ankle circumference NA cm. Calf circumference NA cm.     Mobility: wheelchair bound  Current offloading/footwear: regular shoes/boots  Sensation: paraplegic, no sensation from sternum distally     Other callusing/areas of concern:    - Right hip, closed deep ulcer with reoccurring abscess.  Remains scar closed.     Diet: Regular     Discussed/Education: plan of care with rationale;  pt is unable to do self wound cares, Home Care is performing wound cares three times weekly at AL  .     Assessment:  Pt on arrival has the wound vac turned off, once in left side lying position with wound vac dressing exposed, started machine, achieved negative pressure continuous 125 mmHg with no alarms.  Dressing was removed, one piece black foam in the wound bed and one piece black foam used to bridge to the left hip area.  Visibly can confirm all dressings were removed.  All the wounds were cleansed with saline.       The Sacral wound:  Had no odors after cleaning, small  amount foul odor on initial removal.  Wound bed remains all granular tissue with some pale areas remaining.  The wound itself is larger, appears more positional but may also have expanded the opening as the undermining has improved.  Reduction in the measured area of the undermining noted at 3 of the 4 locations, 4th site is unchanged.    No wound edges or periwound concerns this visit.      The Right IT wound: Is larger in width but has more scar tissue present, no other noted change, remains clean.    The Right lower buttock/thigh fold has remained open the last three visits to clinic including today, some decrease in length and width.  Moisture and drainage is small in amounts.    Left IT wound: Has no open wounds today. Scattered areas of fragile scar and epithelial tissue, intact but hyperpigmented.       Ostomy:  Not assessed this visit no new concerns per pt.  She is running low on ostomy supplies, [place] one piece samples given for back up today per pt states still having supply issues for ostomy.      Factors impacting wound healing:   Poor nutrition: inadequate supply of protein, carbohydrates, fatty acids, and trace elements essential for all phases of wound healing.  Pt is taking a daily protein supplement drink and is aware of need for increased protein in diet for wound healing.  She has focused on protein supplement drinks and snack. Four pound increase in body weight reported 1/19/24 per pt.   Delayed healing as part of normal aging process  Reduced Blood Supply: inadequate perfusion to heal wound.  Appears adequate.  Medication: NA  Chemotherapy: suppresses the immune system and inflammatory response, NA  Radiotherapy: increases production of free radical which damage cells, NA  Psychological stress: None noted  Obesity: decreases tissue perfusion, NA  Infection: prolongs inflammatory phase, uses vital nutrients, impairs epithelialization and releases toxins, antimicrobial dressing included in  sacral wound as of 2/5/24.  Underlying Disease: paraplegic  Maceration: reduces wound tensile strength and inhibits epithelial migration, none noted this visit  Patient compliance, appears motivated to heal  Unrelieved pressure, pt has pressure reduction cushion in wheelchair and lays in bed daily during the day for full pressure relief.    Immobility, limited  Substance abuse: NA     Plan:  We will continue with the NPWT for the Sacral wound changed three times weekly with the changes obtained by home care.  No longer using white KCI foam, adding now Promogran Yu to the undermining areas of the sacral wound.  Then one full piece of black foam to fill the wound bed, two pieces black foam used to bridge the tract pad to the right hip, upper buttock area. KCi drape used to protect intact skin from foam used to bridge with small amount of additional windowpane drape added.  NPWT achieved continuous 125 mmHg.   Canister today was changed by the AL facility staff just prior to the pt coming to clinic.     New orders from home care are not clear, appears using only foam dressings to cover the remaining open sites, to promote wound healing and to protect the intact left IT site.  This is currently appropriate.     As pt has home care with a signing off provider for wound recommendations, I will have the pt return less frequently, every 4 weeks for now.  Wounds are progress, stable with some improvement, no new sites and no signs of infection.    Topical care: Wound/surrounding skin cleansed with wound cleanser and gauze. Patted dry. Wound cares as listed in Plan   Additional recommendations: None at this time     The following discharge instructions were reviewed with and sent home with the patient:  See AVS      The following supplies were sent home with the patient:  None this visit      Return visit: 3/15/24     Verbal, written, & demonstrative education provided.  Face to face time: approximately 40  minutes  Procedure: approximately 10 minutes wound vac dressing change to the sacral wound.     Presley Chester RN cwocn,  [phone]

## 2024-02-12 NOTE — DISCHARGE INSTRUCTIONS
Today your sacral wound looks good.  The facility sent white foam today but according to the new orders from Home Care, the white foam has been discontinued and they are using a collagen based product called Promogran Yu.  They did not send with Promogran Uy but we do stock it so we did add it to the sacral wounds undermining then filled the wound bed with one piece of black foam.    The other sites all look ok.  The sacral site is improved with the undermining reducing well and the wound being all good clean and mostly granular tissues.    The facility did not send along their normal folder and paper work so I will print and extra copy of this sheet to give the upcoming appointment schedule.    I will see you again in about four weeks on Friday March the 15 th.  Home care is doing a great job with the cares and my input is no longer needed as much as when the AL facility was doing the cares.    I will see you Friday March 15 th at 1 pm, call with any concerns or questions before then [phone].  Presley Chester RN cwocn

## 2024-02-26 NOTE — TELEPHONE ENCOUNTER
Reason for Call:  Other Plan of care    Detailed comments: Mariposa, home care nurse, would like to speak to Presley regarding Felicia's plan of care.    Phone Number Patient can be reached at: Other phone number:  [phone] *    Best Time:     Can we leave a detailed message on this number? YES    Call taken on 2/26/2024 at 11:45 AM by Kourtney Garcia

## 2024-03-15 NOTE — ANESTHESIA CARE TRANSFER NOTE
Patient: Felicia Ellison    Procedure: Procedure(s):  ESOPHAGOGASTRODUODENOSCOPY, WITH FOREIGN BODY REMOVAL       Diagnosis: Foreign body in esophagus, initial encounter [T18.108A]  Aspiration pneumonitis (H) [J69.0]  Diagnosis Additional Information: No value filed.    Anesthesia Type:   General     Note:    Oropharynx: oropharynx clear of all foreign objects and spontaneously breathing  Level of Consciousness: drowsy  Oxygen Supplementation: face mask    Independent Airway: airway patency satisfactory and stable  Dentition: dentition unchanged  Vital Signs Stable: post-procedure vital signs reviewed and stable  Report to RN Given: handoff report given  Patient transferred to: PACU    Handoff Report: Identifed the Patient, Identified the Reponsible Provider, Reviewed the pertinent medical history, Discussed the surgical course, Reviewed Intra-OP anesthesia mangement and issues during anesthesia, Set expectations for post-procedure period and Allowed opportunity for questions and acknowledgement of understanding      Vitals:  Vitals Value Taken Time   BP 86/53 04/22/22 1000   Temp 98.6  F (37  C) 04/22/22 1003   Pulse 105 04/22/22 1003   Resp 22 04/22/22 1003   SpO2 100 % 04/22/22 1003   Vitals shown include unvalidated device data.    Electronically Signed By: VERENA Jolley CRNA  April 22, 2022  10:04 AM   Patient presents due to chest pain, midsterum, second occurrence. 1st time was \"a couple weeks ago.\" Describes pain as pressure and radiating up to right neck. Associated to SOB and fatigue.

## 2024-03-15 NOTE — PROGRESS NOTES
[facility].     Start of Care in [facility]: 11/18/2022, initial resumption of visit, see Wound History for details.  Referring Doctor: Ivan Baeza MD (was formerly pt's PCP but not seen in many many months as of 3/15/24)  Primary Care Provider: VAZQUEZ Cuellar MD in [place] who pt has been seeing most recently for primary care.     Wound Location: Sacral, Right Ischial Tuberosity, Right thigh/buttock fold, Right hip (healed).  Left buttock mid (healed) and Left Ischial Tuberosity.  New wound left hip 3/15/24.     Wound Clinic Visit: Rescheduled follow up.     Reason for Visit: Wound assessments and cares     Subjective:  On arrival today 3/15/24 alone, the pt reports the following:  Pt's home care agency [facility], discharged her on 3/11/24.  New home care agency started [facility] on 3/13/24.  Per the pt the new company stated they would not be able to come to see her 3 times weekly due to no available staff.  Pt was instructed to ask if it was possible to increase clinic visits so home care can stay on board but less than 3 times weekly.  Pt states both home care agencies questioned if her current AL situation was appropriate for her complex medial issues.  Pt feels that they are trying to force her to move into a nursing home.   She also reports she has a new wound on her left hip that was noticed by the AL staff within last week, site is being dressed by AL currently but was done by new home care on their admission 3/13.       Wound History:   Pt well known to me from visits over many years to the Wound and Ostomy clinic for chronic pressure injuries.  Initial visit November of 2020 for Right IT, Sacral, left trochanter and right heel pressure injuries.  She missed a few follow up appointments initially but was mostly coming into clinic every 3 to 4 weeks for evaluation and recommendations.  After a hospitalization at [facility] in  [place] due to urinary diversion concerns and a right hip abscess she resumed visit to the Wound and Ostomy clinic on 11/18/22 for wound vac dressing changes and assessment. The right hip wound improved and as of my visit with her on 12/16/22 was nearly healed, no depth and only small open aspect all granular tissue.  It was reported the wound did fully close soon after that assessment per AL staff.  On 1/3/23 the pt's facility sent her to the ED for the right hip, it had re opened and was draining copious amounts of purulent drainage.  I was able to see the pt in the ED and was able to add the right hip wound into the intact sacral wound vac set up.  At my last clinic visit with the pt on 2/3/23 the sacral wound was not in good condition, lots of slough, eschar on the edges and bone visible and palpable in the center.  I made arrangement with Dr Zavala's approval to have her seen in Specialty for potential debridement but appointment was approximately four weeks out. She was hospitalized at [facility] for revision of her urinary diversion 2/22/23 - 2/25/23 and was seen by [facility] nursing, the photo's from that assessment showed the sacral wound looking much improved.  No debriding was needed. She did see Dr Zavala on 2/27/23 and the sacral wound at that time was improving well with no need for debridement.  New wounds noted 5/10/23 to left buttock, Left IT and mid buttock.  Left mid buttock closed as of 6/9/23 and remains closed.  Left IT has repeatedly closed and opened, when closed often is covered with very waxy appearing fragile scar tissue.  New wound to the left hip, first assessed in clinic 3/15/24, per pt was first noted by AL staff two weeks prior, deep wound, facility and home cares have been doing wet to dry dressing changes MWF.     Past Medical History:  Patient Active Problem List   Diagnosis    Migraine headache    Urinary retention    GERD (gastroesophageal reflux disease)    Flaccid paraplegia (H)     Decubitus ulcer of buttock    Pressure ulcer of heel    Poor appetite    Nausea    Generalized weakness    Burn    Health Care Home    Paraplegia following spinal cord injury (H)    ACP (advance care planning)    Osteomyelitis of hip (right periacetabular infection with osteomyelitis)    Severe protein-calorie malnutrition (H24)    Microcytic anemia    Neurogenic bladder    Anxiety    Insomnia    Chronic UTI (urinary tract infection)    Skin ulcer of buttock (bilateral ischial tuberosity ulcers)    CARDIOVASCULAR SCREENING; LDL GOAL LESS THAN 160    Open wound of foot except toes with complication    LLQ abdominal pain    Paralytic ileus (H)    Chest wall pain    Decubitus skin ulcer    S/P flap graft    Pancreatitis    Pericardial effusion    Hospice care patient    Seizures (H)    AMS (altered mental status)    Admission for hospice care    Odynophagia    Portal vein thrombosis    Foreign body in esophagus, initial encounter    Impacted foreign body in esophagus, initial encounter    Aspiration pneumonia of right lung due to regurgitated food, unspecified part of lung (H)    Septic shock (H)    Depressive disorder    Colostomy present (H)    Chronic pain due to trauma    Hypokalemia    Abscess of right hip    Stage 3 skin ulcer of sacral region (H)    Pressure injury of right hip, stage 3 (H)                 Tobacco Use:     Tobacco Use      Smoking status: Never      Smokeless tobacco: Never     Diabetic: NA  HgbA1C:   Hemoglobin A1C   Date Value Ref Range Status   05/26/2021 5.1 0 - 5.6 % Final     Comment:     Normal <5.7% Prediabetes 5.7-6.4%  Diabetes 6.5% or higher - adopted from ADA   consensus guidelines.     Checks Blood Glucose?:  NA Average Readings: NA     Personal/social history:  Pt lives in the [facility] in [facility], [facility] 8/30/23 and discharged pt 3/11/24.  Home care agency [facility] admitted pt 3/13/24, questionable if they will be able to keep the pt long term, current  as of today's revisit to clinic 3/15/24, new HC agency can not do 3 times weekly vac changes, see Assessment and Plan below for details.      Objective:   Current treatment plan:   - Sacral wound, NPWT at 125 mmHg continuous negative pressure changed MWF, plan of care changed as of 2/5/24 per home care fax, no longer using white KCI foam, using collagen dressing (Promogran Yu) to undermining areas.    - Right IT and Left IT: Gentle adhesive foam dressing changed MWF.    - New Left hip full thickness ulcer, damp to dry dressing changes covered with Mepilex gentle adhesive foam dressing, changing MWF's per pt.  Last changed: 3/13/24 per pt all wound changed by new home care agency [facility].      Wound #1 Sacral   Stage/tissue depth: stage 4 pressure injury, stage updated 1/13/23 as bone became visible and palpable in the wound bed.   Total wound site 2.7 cm L x 7.1 cm W x 1 cm D   - open hold of the wound 2.7 cm L x 6 cm W x 1 cm D  Tunneling: none noted  Undermining: around entire perimeter 12 o'clock 2.2 cm, 3 o'clock 0.5 cm , 6 o'clock 0.4 cm, 9 o'clock 0.5 cm, 10 o'clock 2 cm  Wound bed type/amount: approximately 100 % granular tissue some of which is pale, other areas beefy red: NA fluctuant  Wound Edges: open  Periwound: scar tissue, scattered pale blanchable erythema, left lateral buttock has scattered dry denudement noted.  Drainage: moderate amounts serosanguinous  Odor: odor noted prior to dressing removal, during dressing removal, resolved after cleansing with saline.   Pain: denied any pain today.    Photo's from today's visit 3/15/24, note pt is in right side lying position.      Photo's from 2/12/24, note pt is in left side lying position.         Photo's from 9/1/23, note pt is in left side lying position in all photo's.     Photo from 11/18/22, initial resumption of visits to the Wound and Ostomy clinic.      Wound #2 Right Ischial Tuberosity   Stage/tissue depth: stage 3 pressure injury  3.5  cm L x 0.4 cm W x 0.1 cm D  Tunneling: none   Undermining: none  Wound bed type/amount: approximately 60 % scar tissue and 40 % granular tissue; NA fluctuant  Wound Edges: open  Periwound: Scattered areas of blanchable erythema and dry skin, scar tissue.    Drainage: small amounts serosanguinous  Odor: none noted  Pain: none  Photo from today's visit 3/15/24, note pt is in left side lying position.    Photo from 2/12/24, pt is in left side lying position.      Photo from 11/18/22, initial resumption of visits to the Wound and Ostomy clinic.      Wound #3 Right buttock/posterior thigh fold, appears friction not pressure related, newly noted in clinic 3/8/23, has closed and reopened a few times.  Newly reopened as of 12/29/23.  Stage/tissue depth: partial thickness  5.5 cm L x 0.5 cm W x 0 cm D  Tunneling: none  Undermining: none  Wound bed type/amount: 100 % moist red nongranular tissue; Not fluctuant  Wound Edges: NA no depth  Periwound: wnl  Drainage: small amounts serosanguinous   Odor: no  Pain: no  Photo from today's visit 3/15/24, note pt is in left side lying position.    Photo from 2/12/24, pt is in left side lying position.       Photo from 3/8/23, initial assessment of newly noted site.     Wound #4 Left Ischial tuberosity, newly noted in clinic 5/10/23.  Stage/tissue depth: full thickness  4.5 cm L x 6 cm W x 0 cm D  Tunneling: none  Undermining: none  Wound bed type/amount: of the entire area, all blanchable erythema with scattered denuded skin (approximately 40 % of the total area) with scant amounts or serous drainage: Not fluctuant  Wound Edges: NA   Periwound:  Scattered pale blanchable erythema, waxy and fragile appearing scar tissue, intact flat bruise to the distal beyond scar tissue has resolved.   Drainage: None   Odor: no  Pain: no  Photo from today's visit 3/15/24, note pt is in left side lying position.    Photo from 2/12/24.     Photo from 5/10/23, initial assessment of new wound  site.    Wound #5 Left trochanter, first assessed in clinic 3/15/24, per pt was first noted by AL approximately 2 weeks prior.  Stage/tissue depth: stage 4 pressure injury  1.6 cm L x 1.2 cm W x 2.1 cm D  Tunnelin o'clock to 5 o'clock 2.2 cm deepest at 10 o'clock, all rest is 0.1 cm - 1.5 cm D  Underminin to 9 o'clock 0.5 cm max  Wound bed type/amount: 100 % red nongranular tissue; Not fluctuant  Wound Edges: open  Periwound: blanchable erythema  Drainage: moderate to large amounts serosanguinous  Odor: none noted  Pain: yes with cares 5/10 per pt  Photo from today's visit 3/15/24, initial assessment of new wound site, note pt is in right side lying position.    Dorsalis Pedal Pulse: Not assessed this visit.  Posterior Tibial Pulse: Not assessed this visit.  Hair growth: Not assessed this visit  Capillary Refill: Not assessed this visit  Feet/toes color: Not assessed this visit  Nails: Not assessed this visit  R Leg: Edema Not assessed this visit. Ankle circumference NA cm. Calf circumference NA cm.  L Leg: Edema Not assessed this visit. Ankle circumference NA cm. Calf circumference NA cm.     Mobility: wheelchair bound  Current offloading/footwear: regular shoes/boots  Sensation: paraplegic, no sensation from sternum distally     Other callusing/areas of concern:    - Right hip, closed deep ulcer with reoccurring abscess.  Remains scar closed.     Diet: Regular     Discussed/Education: plan of care with rationale;  pt is unable to do self wound cares, Home Care is performing wound cares three times weekly at AL  .     Assessment:  Pt on arrival has the wound vac turned off, once in left side lying position with wound vac dressing exposed, started machine, achieved negative pressure continuous 125 mmHg with no alarms.  Dressing was removed, one piece black foam in the wound bed and one piece black foam used to bridge to the left hip area.  Visibly can confirm all dressings were removed.  All the wounds were  cleansed with saline.       The Sacral wound:  Continues to slowly produce more granular tissue, no new concerns noted,      The Right IT wound: Is larger in length but has more scar tissue present again, no other noted change, remains clean.     The Right lower buttock/thigh fold has remained open the last four visits to clinic including today, less drainage, is drying out and slowly reepithelializing.     Left IT wound: Has no open wounds today. Scattered erythema and punctate dots of denudement present but no distinct wounds.      New as of 3/15/24 clinic visit - Left trochanter stage 4 pressure injury.  Wound is deep, all clean, there is scattered undermining and tunneling present.  Periwound has distinct blanchable erythema present, no signs of infection but concerning considering how deep her former right trochanter wound became in time we will monitor this wound closely.      Ostomy:  Not assessed this visit no new concerns per pt.      Factors impacting wound healing:   Poor nutrition: inadequate supply of protein, carbohydrates, fatty acids, and trace elements essential for all phases of wound healing.  Pt is taking a daily protein supplement drink and is aware of need for increased protein in diet for wound healing.  She has focused on protein supplement drinks and snack. Four pound increase in body weight reported 1/19/24 per pt.   Delayed healing as part of normal aging process  Reduced Blood Supply: inadequate perfusion to heal wound.  Appears adequate.  Medication: NA  Chemotherapy: suppresses the immune system and inflammatory response, NA  Radiotherapy: increases production of free radical which damage cells, NA  Psychological stress: None noted  Obesity: decreases tissue perfusion, NA  Infection: prolongs inflammatory phase, uses vital nutrients, impairs epithelialization and releases toxins, antimicrobial dressing included in sacral wound as of 2/5/24.  Underlying Disease: paraplegic  Maceration:  reduces wound tensile strength and inhibits epithelial migration, none noted this visit  Patient compliance, appears motivated to heal  Unrelieved pressure, pt has pressure reduction cushion in wheelchair and lays in bed daily during the day for full pressure relief.    Immobility, limited  Substance abuse: NA     Plan:  We will be able to accommodate once weekly visits to the Wound and Ostomy clinic for wound cares including NPWT changes.  We will stick with Fridays for her clinic visits and have scheduled her up for more visits today.  If home care, new agency [facility], can see pt consistently for cares Mondays and Wednesdays, we can keep the NPWT going.    IF home care can not accommodate twice weekly visits we will have to reconsider sacral and all wounds, dressing plan of care.  Until there is a change we will continue with the NPWT for the Sacral wound changed three times weekly, M and W by home care and Friday in clinic.    We will continue with the same plan of care for the right IT, right buttock/thigh fold wounds, Aqaucel Ag covered with Mepilex and changed MWF.  For the new left trochanter wound we will change the plan of care, will start to fill the wound bed with Aquacel Ag, covered with Mepilex gentle adhesive dressing and change MWF.     Topical care: Wound/surrounding skin cleansed with wound cleanser and gauze. Patted dry. Wound cares as listed in Plan   Additional recommendations: None at this time     The following discharge instructions were reviewed with and sent home with the patient:  See AVS      The following supplies were sent home with the patient:  None this visit      Return visit: 3/22/24     Verbal, written, & demonstrative education provided.  Face to face time: approximately 75 minutes  Procedure: approximately 10 minutes wound vac dressing change to the sacral wound.     Presley Chester RN cwocn  [phone]

## 2024-03-15 NOTE — DISCHARGE INSTRUCTIONS
Today the new wound on your left hip is a deep pressure injury, it would stage at a stage 4 as it is through the adipose tissue layer and into the limited muscle over the hip.  For now we will fill that wound with Aquacel Ag and cover it with the Mepilex gentle adhesive foam dressing.    The sacral wound is about the same and we will continue with the wound vac, Promogran Yu applied to the wound bed, then filling the wound bed with black foam and bridging to the right hip.  Negative pressure continuous at 125 mmHg and changing the dressing three times weekly.    The left IT, right IT and right buttock/thigh skin fold wounds remain and also about the same as list visit, continue with the three times weekly dressing changes with the Aquacel Ag and covered with a gentle adhesive foam dressing.    Since your having issues with home care we will increase your visits here to clinic to once weekly, your scheduled for the next four weeks in clinic with me, see your appointment list for all dates and times.  We do have the problem with orders again however.  Since you no longer are seen by Dr Felisha BENJAMIN as your primary MD, we do not have an in house provider who can sign off on wound cares orders for you.  Our wound and ostomy clinic here is nurse based, so the referring MD, who was Dr Baeza, is the one who has to sign off on any recommendations we make.  We can not get orders from outside providers as they are not within our system so home care will need to obtain orders based on our recommendations, or come up with a new plan.    If home care is not able to come out to see you at least twice weekly, with your third weekly dressing changes scheduled here in clinic, we will need to come up with a different plan.  We are only able to fit you in regularly weekly for wound vac cares due to the high volume of referrals and appointments are made here weekly.  We will see how things progress and adapt as needed.  Call with  any questions or concerns [phone].       Presley Chester RN cwocn

## 2024-03-22 NOTE — DISCHARGE INSTRUCTIONS
Today we did the wound vac dressing change and the wound cares to the right ischial tuberosity of your buttock and your left hip.  The left IT wound is all closed and no longer needs a dressing.    The sacral wound is slowly improving, some necrotic tissue on the lower edge which we were able to remove some of today,.    This has come and gone in the past without need for surgical interventions so we will continue to monitor and call surgery in if needed.  No change to the sacral wound cares with the Yu and wound vac 3 time weekly.    The right IT wound is well improved but a small opening of raw tissue remains within the new scar tissue.  No change to the plan of care for the right IT.    The right buttock skin fold is also closed but very fragile.    The left hip wound is about the same in size, remains all clean, depth of the tunneling from 9 o'clock to 5 o'clock has improved but is still 1.8 cm D at 10 o'clock.    I have you schedule for the next three Friday's in clinic and we can extend those Friday visits weekly if needed.  Any changes to home care coming out Mondays and Wednesdays for the wound vac dressing change, the right IT and left hip dressing changes, let me know ASAP as we may need to make a change to the plan of care if the vac can not be done three times weekly.    [phone].    Presley Chester RN [facility]  [phone]

## 2024-03-29 NOTE — PATIENT INSTRUCTIONS
Today we did the wound vac dressing change to the sacral wound and the wound cares to the right IT and right buttock/thigh fold.  The left IT wound remains closed today but fragile.    Continue with the same plan of care for the sacral wound with the Yu to the wound bed and the wound vac over the top.    There is a fair amount of surface eschar today and if it is not improved next week we will schedule you to see Dr Zavala to see if a surgical debridement if needed.      No change to plan for the right IT wound either today, continue with the Aquacel Ag and Mepilex foam dressings.  For the newer left hip (trochanter) wound, it is well improved, I would continue with the Aqaucel Ag still and the Mepilex outer dressing.    Measurements today were:  Sacral , stage 4 pressure ulcer, 3 cm L x 6.7 cm W x 1.8 cm D, tunneling all around the interior.    Tunneling present 12 o'clock - 2.4 cm D, 3 o'clock 1.4 cm D. 6 o'clock 0.3 cm D, 9 o'clock 0.5 cm D, and 10 o'clock 2.1 cm D.    Right IT 2 cm L x 0.4 cm W x 0.1 cm D    Right buttock/thigh fold, two scabs covering are of 1.5 cm L x 0.3 cm W x 0 cm D.    Left  trochanter stage 4 pressure injury 1.4 cm L x 1.8 cm W x 1.1 cm D, tunneling present now only at 11 o'clock of 0.5 cm D.    I will see you again next week Friday, we have you scheduled to return on the next three Fridays. See your appointment list for all upcoming appointment dates and times, and for measurements from today's visit.    Presley Chester RN cwocn

## 2024-03-29 NOTE — PROGRESS NOTES
"[facility].     Start of Care in [facility]: 11/18/2022, initial resumption of visit, see Wound History for details.  Referring Doctor: Ivan Baeza MD (was formerly pt's PCP but not seen in many many months as of 3/15/24)  Primary Care Provider: VAZQUEZ Cuellar MD in [place] who pt has been seeing most recently for primary care.     Wound Location: Sacral, Right Ischial Tuberosity, Right thigh/buttock fold, Right hip (healed).  Left buttock mid (healed) and Left Ischial Tuberosity.  New wound left hip 3/15/24.     Wound Clinic Visit:  Scheduled follow up.     Reason for Visit: Wound assessments and cares     Subjective:  On arrival today 3/22/24 alone, the pt reports the following:  Pt continues to receive cares from [facility] home care agency [facility] on Mondays and Wednesdays, no change at this time.  She is concerned about potentially not getting home care so per her statement \"I just have to keep my mouth shut when my home care nurses are there\".  No other newly reported concerns or questions.     Wound History:   Pt well known to me from visits over many years to the Wound and Ostomy clinic for chronic pressure injuries.  Initial visit November of 2020 for Right IT, Sacral, left trochanter and right heel pressure injuries.  She missed a few follow up appointments initially but was mostly coming into clinic every 3 to 4 weeks for evaluation and recommendations.  After a hospitalization at [facility] in [place] due to urinary diversion concerns and a right hip abscess she resumed visit to the Wound and Ostomy clinic on 11/18/22 for wound vac dressing changes and assessment. The right hip wound improved and as of my visit with her on 12/16/22 was nearly healed, no depth and only small open aspect all granular tissue.  It was reported the wound did fully close soon after that assessment per AL staff.  On 1/3/23 the pt's facility sent her to the ED for the " right hip, it had re opened and was draining copious amounts of purulent drainage.  I was able to see the pt in the ED and was able to add the right hip wound into the intact sacral wound vac set up.  At my last clinic visit with the pt on 2/3/23 the sacral wound was not in good condition, lots of slough, eschar on the edges and bone visible and palpable in the center.  I made arrangement with Dr Zavala's approval to have her seen in Specialty for potential debridement but appointment was approximately four weeks out. She was hospitalized at [facility] for revision of her urinary diversion 2/22/23 - 2/25/23 and was seen by [facility] nursing, the photo's from that assessment showed the sacral wound looking much improved.  No debriding was needed. She did see Dr Zavala on 2/27/23 and the sacral wound at that time was improving well with no need for debridement.  New wounds noted 5/10/23 to left buttock, Left IT and mid buttock.  Left mid buttock closed as of 6/9/23 and remains closed.  Left IT has repeatedly closed and opened, when closed often is covered with very waxy appearing fragile scar tissue.  New wound to the left hip, first assessed in clinic 3/15/24, per pt was first noted by AL staff two weeks prior, deep wound, facility and home cares have been doing wet to dry dressing changes MW.     Past Medical History:  Patient Active Problem List   Diagnosis    Migraine headache    Urinary retention    GERD (gastroesophageal reflux disease)    Flaccid paraplegia (H)    Decubitus ulcer of buttock    Pressure ulcer of heel    Poor appetite    Nausea    Generalized weakness    Burn    Health Care Home    Paraplegia following spinal cord injury (H)    ACP (advance care planning)    Osteomyelitis of hip (right periacetabular infection with osteomyelitis)    Severe protein-calorie malnutrition (H24)    Microcytic anemia    Neurogenic bladder    Anxiety    Insomnia    Chronic UTI (urinary tract infection)    Skin ulcer of  buttock (bilateral ischial tuberosity ulcers)    CARDIOVASCULAR SCREENING; LDL GOAL LESS THAN 160    Open wound of foot except toes with complication    LLQ abdominal pain    Paralytic ileus (H)    Chest wall pain    Decubitus skin ulcer    S/P flap graft    Pancreatitis    Pericardial effusion    Hospice care patient    Seizures (H)    AMS (altered mental status)    Admission for hospice care    Odynophagia    Portal vein thrombosis    Foreign body in esophagus, initial encounter    Impacted foreign body in esophagus, initial encounter    Aspiration pneumonia of right lung due to regurgitated food, unspecified part of lung (H)    Septic shock (H)    Depressive disorder    Colostomy present (H)    Chronic pain due to trauma    Hypokalemia    Abscess of right hip    Stage 3 skin ulcer of sacral region (H)    Pressure injury of right hip, stage 3 (H)                 Tobacco Use:     Tobacco Use      Smoking status: Never      Smokeless tobacco: Never     Diabetic: NA  HgbA1C:   Hemoglobin A1C   Date Value Ref Range Status   05/26/2021 5.1 0 - 5.6 % Final     Comment:     Normal <5.7% Prediabetes 5.7-6.4%  Diabetes 6.5% or higher - adopted from ADA   consensus guidelines.     Checks Blood Glucose?:  NA Average Readings: NA     Personal/social history:  Pt lives in the [facility] in [place], [facility] 8/30/23 and discharged pt 3/11/24.  Home care agency [facility] admitted pt 3/13/24, currently coming out Mondays and Wednesdays, they are unable to accommodate three times weekly so Friday visits will be done in clinic for the time being.     Objective:   Current treatment plan:   - Sacral wound, NPWT at 125 mmHg continuous negative pressure changed MWF, plan of care changed as of 2/5/24 per home care fax, no longer using white KCI foam, using collagen dressing (Promogran Yu) to undermining areas.    - Right IT, right buttock thick fold and Left IT: Gentle adhesive foam dressing changed MWF with  Aqaucel Ag.  Currently no dressing needed to the left IT.     - New Left hip full thickness ulcer, Aquacel Ag strip to fill wound bed, covered with Mepilex gentle adhesive foam dressing, changing MWF's.  Last changed: 3/20/24 per pt all wound changed by new home care agency [facility].      Wound #1 Sacral   Stage/tissue depth: stage 4 pressure injury, stage updated 1/13/23 as bone became visible and palpable in the wound bed.   Total wound site 2.2 cm L x 6 cm W x 1.2 cm D   - open hold of the wound 1.6 cm L x 5 cm W x 1.2 cm D  Tunneling: none noted  Undermining: around entire perimeter 12 o'clock 2.5 cm, 3 o'clock 1.4 cm, 6 o'clock 0.4 cm, 9 o'clock 0.5 cm, 10 o'clock 2.6 cm  Wound bed type/amount: approximately 90 % granular tissue and 10 % moist black eschar, see photo's for location of the eschar today: NA fluctuant  Wound Edges: open  Periwound: scar tissue, scattered pale blanchable erythema, left lateral buttock has scattered dry denudement noted.  Drainage: moderate amounts serosanguinous  Odor: odor noted prior to dressing removal, during dressing removal, resolved after cleansing with saline.   Pain: denied any pain today.    Photo's from today's visit 3/22/24, note pt is in left side lying position.      Photo's from 3/15/24, note pt is in right side lying position.       Photo's from 9/1/23, note pt is in left side lying position in all photo's.     Photo from 11/18/22, initial resumption of visits to the Wound and Ostomy clinic.      Wound #2 Right Ischial Tuberosity   Stage/tissue depth: stage 3 pressure injury  1.8 cm L x 0.3 cm W x 0.1 cm D  Tunneling: none   Undermining: none  Wound bed type/amount: approximately 80 % scar tissue and 20 % granular tissue; NA fluctuant  Wound Edges: open  Periwound: Scattered areas of blanchable erythema and dry skin, scar tissue.    Drainage: small amounts serosanguinous  Odor: none noted  Pain: none  Photo from today's visit 3/22/24, note pt is in left side  lying position.    Photo from 3/15/24, note pt is in left side lying position.      Photo from 22, initial resumption of visits to the Wound and Ostomy clinic.      Wound #3 Right buttock/posterior thigh fold, appears friction not pressure related, newly noted in clinic 3/8/23, has closed and reopened a few times.  Newly reopened as of 23.  Stage/tissue depth: partial thickness  0.8 cm L x 0.1 cm W x 0 cm D  Tunneling: none  Undermining: none  Wound bed type/amount: 100 % moist red nongranular tissue; Not fluctuant  Wound Edges: NA no depth  Periwound: wnl  Drainage: scant amounts serosanguinous   Odor: no  Pain: no  Photo from today's visit 3/22/24, note pt is in left side lying position.    Photo from 3/15/24, note pt is in left side lying position.     Photo from 3/8/23, initial assessment of newly noted site.     Wound #4 Left Ischial tuberosity, newly noted in clinic 5/10/23.  Stage/tissue depth: full thickness  0 cm L x 0 cm W x 0 cm D  Tunneling: none  Undermining: none  Wound bed type/amount: 100 % scar and epithelialized: Not fluctuant  Wound Edges: NA   Periwound:  Scattered pale blanchable erythema, waxy and fragile appearing scar tissue.  Drainage: None   Odor: no  Pain: no    Photo's from today's visit 3/22/24, note pt is in left side lying position.    Photo from 3/15/24, note pt is in left side lying position.     Photo from 5/10/23, initial assessment of new wound site.    Wound #5 Left trochanter, first assessed in clinic 3/15/24, per pt was first noted by AL approximately 2 weeks prior.  Stage/tissue depth: stage 4 pressure injury  1.6 cm L x 1 cm W x 1.9 cm D  Tunnelin o'clock to 5 o'clock 1.8 cm deepest at 10 o'clock, all rest is 0.1 cm - 1 cm D  Underminin to 9 o'clock 0 cm, has fully resolved.  Wound bed type/amount: 100 % red nongranular tissue; Not fluctuant  Wound Edges: open  Periwound: blanchable erythema  Drainage: moderate to large amounts serosanguinous  Odor: none  noted  Pain: yes with cares 5/10 per pt  Photo from today's visit 3/22/24, note pt is in right side lying position.    Photo from 3/15/24, initial assessment of new wound site, note pt is in right side lying position.    Dorsalis Pedal Pulse: Not assessed this visit.  Posterior Tibial Pulse: Not assessed this visit.  Hair growth: Not assessed this visit  Capillary Refill: Not assessed this visit  Feet/toes color: Not assessed this visit  Nails: Not assessed this visit  R Leg: Edema Not assessed this visit. Ankle circumference NA cm. Calf circumference NA cm.  L Leg: Edema Not assessed this visit. Ankle circumference NA cm. Calf circumference NA cm.     Mobility: wheelchair bound  Current offloading/footwear: regular shoes/boots  Sensation: paraplegic, no sensation from sternum distally     Other callusing/areas of concern:    - Right hip, closed deep ulcer with reoccurring abscess.  Remains scar closed.     Diet: Regular     Discussed/Education: plan of care with rationale;  pt is unable to do self wound cares, Home Care is performing wound cares three times weekly at AL  .     Assessment:  Pt on arrival has the wound vac running at goal of negative pressure continuous 125 mmHg.  Dressing was removed, one piece black foam in the wound bed and one piece black foam used to bridge to the left hip area.  Visibly can confirm all dressings were removed.  All the wounds were cleansed with saline.       The Sacral wound:  Continues to slowly produce more granular tissue, new return of eschar noted, soft black located on the inferior most edge and some in inferior center of the wound bed.  No there new concerns noted.  The change in outer measurements appears more positional and some what related to wound contract, as opposed to significant scar closure.      The Right IT wound: Is smaller in all measurements today, is clean with more scar tissue today as the clean granular tissue continues to be stable and allowing scar to  close over.     The Right lower buttock/thigh fold has remained open the last five visits to clinic including today, less drainage, is drying out smaller and appears to be healing well with only scant amounts of any remaining drainage.     Left IT wound: Has no open wounds today. Scattered erythema and punctate dots of denudement present last visit have mostly resolved.      New as of 3/15/24 clinic visit - Left trochanter stage 4 pressure injury.  Wound has improved some.  Remains clean, some decrease in size but no granular tissue noted yet, improvement in size may be more contraction and reduction if localized edema from when wound initially opened.       Ostomy:  Not assessed this visit no new concerns per pt.      Factors impacting wound healing:   Poor nutrition: inadequate supply of protein, carbohydrates, fatty acids, and trace elements essential for all phases of wound healing.  Pt is taking a daily protein supplement drink and is aware of need for increased protein in diet for wound healing.  She has focused on protein supplement drinks and snack. Four pound increase in body weight reported 1/19/24 per pt.   Delayed healing as part of normal aging process  Reduced Blood Supply: inadequate perfusion to heal wound.  Appears adequate.  Medication: NA  Chemotherapy: suppresses the immune system and inflammatory response, NA  Radiotherapy: increases production of free radical which damage cells, NA  Psychological stress: None noted  Obesity: decreases tissue perfusion, NA  Infection: prolongs inflammatory phase, uses vital nutrients, impairs epithelialization and releases toxins, antimicrobial dressing included in sacral wound as of 2/5/24.  Underlying Disease: paraplegic  Maceration: reduces wound tensile strength and inhibits epithelial migration, none noted this visit  Patient compliance, appears motivated to heal  Unrelieved pressure, pt has pressure reduction cushion in wheelchair and lays in bed daily  during the day for full pressure relief.    Immobility, limited  Substance abuse: NA     Plan:  We will be able to accommodate once weekly visits to the Wound and Ostomy clinic for wound cares including NPWT changes as planned and home care continues per pt to make Mon Wednesday changes.      We will continue with the same plan of care for the right IT, right buttock/thigh fold and Left Trochanter wounds, Aqaucel Ag covered with Mepilex and changed MWF.     Topical care: Wound/surrounding skin cleansed with wound cleanser and gauze. Patted dry. Wound cares as listed in Plan   Additional recommendations: None at this time     The following discharge instructions were reviewed with and sent home with the patient:  See AVS      The following supplies were sent home with the patient:  None this visit      Return visit: 3/29/24     Verbal, written, & demonstrative education provided.  Face to face time: approximately 50 minutes  Procedure: approximately 10 minutes wound vac dressing change to the sacral wound.     Presley Chester RN [facility]  [phone]

## 2024-03-29 NOTE — PROVIDER NOTIFICATION
"Discussed with Dr. Way during rounds that pt more alert. Stating \"No & I'm fine\". Still no spontaneous or withdrawal to pain in UE. She stated to get MRI. Showed neobladder catheter in continuously for patient stated ok for placement.      ADDENDUM 1215: MRI checklist obtained from daughter via phone. Flying squad RNs came to bedside because no drainage from the continuous catheter from neobladder. They assisted placing a new one, orders placed for recommendations to prevent clogging. Updated Dr. Way.   " yes

## 2024-03-29 NOTE — PROGRESS NOTES
[facility].     Start of Care in [facility]: 11/18/2022, initial resumption of visit, see Wound History for details.  Referring Doctor: Ivan Baeza MD (was formerly pt's PCP but not seen in many many months as of 3/15/24)  Primary Care Provider: VAZQUEZ Cuellar MD in [place] who pt has been seeing most recently for primary care.     Wound Location: Sacral, Right Ischial Tuberosity, Right thigh/buttock fold, Right hip (healed).  Left buttock mid (healed) and Left Ischial Tuberosity.  New wound left hip 3/15/24.     Wound Clinic Visit:  Scheduled follow up.     Reason for Visit: Wound assessments and cares     Subjective:  On arrival today 3/29/24 with her mom, they report the following:  Pt continues to receive cares from [facility] home care agency [facility] on Mondays and Wednesdays, no change at this time.  She has not been told of any potential changes to their visit schedule or ability to continue to provide cares twice weekly as of this time.      Wound History:   Pt well known to me from visits over many years to the Wound and Ostomy clinic for chronic pressure injuries.  Initial visit November of 2020 for Right IT, Sacral, left trochanter and right heel pressure injuries.  She missed a few follow up appointments initially but was mostly coming into clinic every 3 to 4 weeks for evaluation and recommendations.  After a hospitalization at [facility] in [place] due to urinary diversion concerns and a right hip abscess she resumed visit to the Wound and Ostomy clinic on 11/18/22 for wound vac dressing changes and assessment. The right hip wound improved and as of my visit with her on 12/16/22 was nearly healed, no depth and only small open aspect all granular tissue.  It was reported the wound did fully close soon after that assessment per AL staff.  On 1/3/23 the pt's facility sent her to the ED for the right hip, it had re opened and was draining copious  amounts of purulent drainage.  I was able to see the pt in the ED and was able to add the right hip wound into the intact sacral wound vac set up.  At my last clinic visit with the pt on 2/3/23 the sacral wound was not in good condition, lots of slough, eschar on the edges and bone visible and palpable in the center.  I made arrangement with Dr Zavala's approval to have her seen in Specialty for potential debridement but appointment was approximately four weeks out. She was hospitalized at [facility] for revision of her urinary diversion 2/22/23 - 2/25/23 and was seen by [facility] nursing, the photo's from that assessment showed the sacral wound looking much improved.  No debriding was needed. She did see Dr Zavala on 2/27/23 and the sacral wound at that time was improving well with no need for debridement.  New wounds noted 5/10/23 to left buttock, Left IT and mid buttock.  Left mid buttock closed as of 6/9/23 and remains closed.  Left IT has repeatedly closed and opened, when closed often is covered with very waxy appearing fragile scar tissue.  New wound to the left hip, first assessed in clinic 3/15/24, per pt was first noted by AL staff two weeks prior, deep wound, facility and home cares have been doing wet to dry dressing changes MW.     Past Medical History:  Patient Active Problem List   Diagnosis    Migraine headache    Urinary retention    GERD (gastroesophageal reflux disease)    Flaccid paraplegia (H)    Decubitus ulcer of buttock    Pressure ulcer of heel    Poor appetite    Nausea    Generalized weakness    Burn    Health Care Home    Paraplegia following spinal cord injury (H)    ACP (advance care planning)    Osteomyelitis of hip (right periacetabular infection with osteomyelitis)    Severe protein-calorie malnutrition (H24)    Microcytic anemia    Neurogenic bladder    Anxiety    Insomnia    Chronic UTI (urinary tract infection)    Skin ulcer of buttock (bilateral ischial tuberosity ulcers)     CARDIOVASCULAR SCREENING; LDL GOAL LESS THAN 160    Open wound of foot except toes with complication    LLQ abdominal pain    Paralytic ileus (H)    Chest wall pain    Decubitus skin ulcer    S/P flap graft    Pancreatitis    Pericardial effusion    Hospice care patient    Seizures (H)    AMS (altered mental status)    Admission for hospice care    Odynophagia    Portal vein thrombosis    Foreign body in esophagus, initial encounter    Impacted foreign body in esophagus, initial encounter    Aspiration pneumonia of right lung due to regurgitated food, unspecified part of lung (H)    Septic shock (H)    Depressive disorder    Colostomy present (H)    Chronic pain due to trauma    Hypokalemia    Abscess of right hip    Stage 3 skin ulcer of sacral region (H)    Pressure injury of right hip, stage 3 (H)                 Tobacco Use:     Tobacco Use      Smoking status: Never      Smokeless tobacco: Never     Diabetic: NA  HgbA1C:   Hemoglobin A1C   Date Value Ref Range Status   05/26/2021 5.1 0 - 5.6 % Final     Comment:     Normal <5.7% Prediabetes 5.7-6.4%  Diabetes 6.5% or higher - adopted from ADA   consensus guidelines.     Checks Blood Glucose?:  NA Average Readings: NA     Personal/social history:  Pt lives in the [facility] in [facility] 8/30/23 and discharged pt 3/11/24.  Home care agency [facility] admitted pt 3/13/24, currently coming out Mondays and Wednesdays, they are unable to accommodate three times weekly so Friday visits will be done in clinic for the time being.     Objective:   Current treatment plan:   - Sacral wound, NPWT at 125 mmHg continuous negative pressure changed MW, plan of care changed as of 2/5/24 per home care fax, no longer using white KCI foam, using collagen dressing (Promogran Yu) to undermining areas.    - Right IT, right buttock thick fold and Left Trochanter wounds: Gentle adhesive foam dressing changed MWF with Aqaucel Ag.  Currently no dressing  needed to the left IT.     Last changed: 3/27/24 per pt all wound changed by [facility] home care agency [facility].      Wound #1 Sacral   Stage/tissue depth: stage 4 pressure injury, stage updated 1/13/23 as bone became visible and palpable in the wound bed.   Total wound site 3 cm L x 6.7 cm W x 1.8 cm D   - open hold of the wound 2 cm L x 5.6 cm W x 1.8 cm D  Tunneling: none noted  Undermining: around entire perimeter 12 o'clock 2.4 cm, 3 o'clock 1.4 cm, 6 o'clock 0.3 cm, 9 o'clock 0.5 cm, 10 o'clock 2.1 cm  Wound bed type/amount: approximately 60 % granular tissue and 40 % moist black eschar, see photo's for location of the eschar today: NA fluctuant  Wound Edges: open  Periwound: scar tissue, scattered pale blanchable erythema, left lateral buttock has scattered dry denudement noted.  Drainage: moderate amounts serosanguinous  Odor: odor noted prior to dressing removal, during dressing removal, resolved after cleansing with saline.   Pain: denied any pain today.    Photo's from today's visit 3/29/24, note pt is in the left side lying position.        Photo's from 3/22/24, note pt is in left side lying position.       Photo's from 9/1/23, note pt is in left side lying position in all photo's.     Photo from 11/18/22, initial resumption of visits to the Wound and Ostomy clinic.      Wound #2 Right Ischial Tuberosity   Stage/tissue depth: stage 3 pressure injury  2 cm L x 0.4 cm W x 0.1 cm D  Tunneling: none   Undermining: none  Wound bed type/amount: approximately 80 % scar tissue and 20 % granular tissue; NA fluctuant  Wound Edges: open  Periwound: Scattered areas of blanchable erythema and moist areas and also areas of dry skin, scar tissue.    Drainage: small amounts serosanguinous  Odor: none noted  Pain: none  Photo from today's visit 3/29/24, note pt is in left side lying position.    Photo from 3/22/24, note pt is in left side lying position.     Photo from 11/18/22, initial resumption of visits to the Wound  and Ostomy clinic.      Wound #3 Right buttock/posterior thigh fold, appears friction not pressure related, newly noted in clinic 3/8/23, has closed and reopened a few times.  Newly reopened as of 23.  Stage/tissue depth: partial thickness  1.5 cm L x 0.3 cm W x 0 cm D  Tunneling: none  Undermining: none  Wound bed type/amount: approximately 30 % reepithelialized, 30 % dry scab of drainage and 40 % moist scab of drainage; Not fluctuant  Wound Edges: NA no depth  Periwound: wnl  Drainage: scant amounts serosanguinous   Odor: no  Pain: no  Photo from today's visit 3/29/24, note pt is in left side lying position.    Photo from 3/22/24, note pt is in left side lying position.     Photo from 3/8/23, initial assessment of newly noted site.     Wound #4 Left Ischial tuberosity, newly noted in clinic 5/10/23.  Stage/tissue depth: full thickness  0 cm L x 0 cm W x 0 cm D  Tunneling: none  Undermining: none  Wound bed type/amount: 100 % scar and epithelialized: Not fluctuant  Wound Edges: NA   Periwound:  Scattered pale blanchable erythema, waxy and fragile appearing scar tissue.  Drainage: None   Odor: no  Pain: no  Photo from today's visit 3/29/24, note pt is in left side lying position.      Photo's from 3/22/24, note pt is in left side lying position.     Photo from 5/10/23, initial assessment of new wound site.    Wound #5 Left trochanter, first assessed in clinic 3/15/24, per pt was first noted by AL approximately 2 weeks prior.  Stage/tissue depth: stage 4 pressure injury  1.4 cm L x 1.8 cm W x 1.1 cm D  Tunneling: currently only located at 10 o'clock of 0.5 cm D, (last visit was present from 9 o'clock to 5 o'clock 1.8 cm deepest at 10 o'clock)  Underminin to 9 o'clock 0 cm, has fully resolved.  Wound bed type/amount: approximately 50 % granular tissue and 50 % red nongranular tissue; Not fluctuant  Wound Edges: open  Periwound: blanchable erythema  Drainage: moderate amounts serosanguinous and  sanguinous  Odor: none not  Pain: yes with cares 5/10 per pt  Photo from today's visit 3/29/24, note pt is in right side lying position.    Photo from 3/22/24, note pt is in right side lying position.    Photo from 3/15/24, initial assessment of new wound site, note pt is in right side lying position.    Dorsalis Pedal Pulse: Not assessed this visit.  Posterior Tibial Pulse: Not assessed this visit.  Hair growth: Not assessed this visit  Capillary Refill: Not assessed this visit  Feet/toes color: Not assessed this visit  Nails: Not assessed this visit  R Leg: Edema Not assessed this visit. Ankle circumference NA cm. Calf circumference NA cm.  L Leg: Edema Not assessed this visit. Ankle circumference NA cm. Calf circumference NA cm.     Mobility: wheelchair bound  Current offloading/footwear: regular shoes/boots  Sensation: paraplegic, no sensation from sternum distally     Other callusing/areas of concern:    - Right hip, closed deep ulcer with reoccurring abscess.  Remains scar closed.     Diet: Regular     Discussed/Education: plan of care with rationale;  pt is unable to do self wound cares, Home Care is performing wound cares three times weekly at AL  .     Assessment:  Pt on arrival has the wound vac turned off, per pt and her mom it was alarming on the way here and they shut is down.  The seat of the pt's pants are wet with what appears to be rectal and or vaginal drainage.  Though the vac is not running with negative pressure, the vac dressing is intact and no drainage is leaking out from under the drape.  The right IT and right thigh/buttock fold sites are covered with dry gentle adhesive foam dressing.    The wound dressing was bridge last time by home care to the left hip upper buttock instead of the right, did not appear to cause any problems for the wound or periwound skin (eschar proliferation has nothing to do with the placement of the bridge).   Dressing was removed, one piece black foam in the wound  bed and one piece black foam used to bridge to the left hip area.  Visibly can confirm all dressings were removed.  All the wounds were cleansed with soap and water today, rinsed with water and dried well with gauze..      The Sacral wound:  At last visit small amounts of new soft moist black eschar was present on the inferior wound edge and inferior center of the inner wound bed.  Today that area on the inferior edge and wound bed has resolved.  New eschar noted today on tissue which appeared to be intact with scar tissue last visit.  Located from approximately 7 o'clock to 12 o'clock, upon the the outer rim of the deeper inner wound bed hold, eschar is between 1 - 1.5 cm wide and 6 cm in length.  The wound dressing on arrival was bridge to the left hip (normally we have bridged to the right hip) the eschar was covered with foam appropriately and the skin further out from the eschar was covered appropriately with protective drape.  No odor with the new eschar.  The rapid appearance of such a large area of new eschar is not consistent with this wounds history. New eschar has appeared in the past but in small to moderate amounts, started in small areas and slowly spread then would resolve quickly.  The quick appearing eschar over one week could be a positional or traumatic transfer related or infection (but no other symptoms correlated with infection at this time).  Rest of the wound is about the same, no deterioration of the tunneling areas, stable in depths.  Center all granular beefy red, periwound skin in stable condition.    The Right IT wound: Continues to evolve and change, remains mostly scar covered, more moist today, larger in length again, no depth, remains narrow.     The Right lower buttock/thigh fold has remained open the last five visits to clinic including today.  Now the site consists of two scabs, one moist, one dry, both in area of intact skin with elevated moisture to the fold.     Left IT wound:  Has no open wounds today. Scattered erythema and punctate dots of denudement present in recent past visits is resolved, all dry.      New as of 3/15/24 clinic visit - Left trochanter stage 4 pressure injury.  Wound has improved.  Remains clean, some decrease in size, new granular tissue present, undermining has resolved and tunneling is limited to one location and has less total depth than last visit.      Ostomy:  Not assessed this visit no new concerns per pt.      Factors impacting wound healing:   Poor nutrition: inadequate supply of protein, carbohydrates, fatty acids, and trace elements essential for all phases of wound healing.  Pt is taking a daily protein supplement drink and is aware of need for increased protein in diet for wound healing.  She has focused on protein supplement drinks and snack. Four pound increase in body weight reported 1/19/24 per pt.   Delayed healing as part of normal aging process  Reduced Blood Supply: inadequate perfusion to heal wound.  Appears adequate.  Medication: NA  Chemotherapy: suppresses the immune system and inflammatory response, NA  Radiotherapy: increases production of free radical which damage cells, NA  Psychological stress: None noted  Obesity: decreases tissue perfusion, NA  Infection: prolongs inflammatory phase, uses vital nutrients, impairs epithelialization and releases toxins, antimicrobial dressing included in sacral wound as of 2/5/24.  Underlying Disease: paraplegic  Maceration: reduces wound tensile strength and inhibits epithelial migration, none noted this visit  Patient compliance, appears motivated to heal  Unrelieved pressure, pt has pressure reduction cushion in wheelchair and lays in bed daily during the day for full pressure relief.    Immobility, limited  Substance abuse: NA     Plan:  We will be able to accommodate once weekly visits to the Wound and Ostomy clinic for wound cares including NPWT changes and all wound cares/assessments as planned  and home care continues per pt to make Monday and Wednesday changes.      We will continue with the same plan of care for the right IT, right buttock/thigh fold and Left Trochanter wounds, Aqaucel Ag covered with Mepilex and changed MWF.  IF the eschar on the sacral wound, (which has appeared and resolved in the past multiple times without surgical interventions and no significant change in the plan of care) does not improve or resolve within next few visits, or gets worse, will have pt seen by general surgery for debridement.    The bridging of the vac dressing to the right hip today     Today one piece Promogran Yu applied to the wound bed of the sacral wound, one piece black KCI foam cut to size and shape was used to fill the wound bed, one piece black foam used to cover the outer wound bed edges and to bridge to the right hip additional half thickness circular cut piece of black foam used at end of bridge for tract pad to be applied upon.  All outer skin exposed to black foam was protected with KCI drape, all foams sealed in with KCI drape, tract pad attached over hole cut in foam bridge and negative pressure achieved at 125 mmHg continuous negative pressure.  Canister in place was less than 1/3 full with drainage, canister was not changed today 3/29/24.     Topical care: Wound/surrounding skin cleansed with wound cleanser and gauze. Patted dry. Wound cares as listed in Plan   Additional recommendations: None at this time     The following discharge instructions were reviewed with and sent home with the patient:  See AVS      The following supplies were sent home with the patient:  None this visit      Return visit: 4/5/24     Verbal, written, & demonstrative education provided.  Face to face time: approximately 65 minutes  Procedure: approximately 10 minutes wound vac dressing change to the sacral wound.     Presley Chester RN cwocn  [phone]

## 2024-04-01 NOTE — TELEPHONE ENCOUNTER
Reason for Call:  Other Future office visit notes - asking them to be faxed to [phone]    Detailed comments: Onur from [facility] is calling, she doesn't need a call back, she found after visit summary notes in Felicia's residence. She is asking that in the future the after visit notes can be faxed to [facility] so they are aware of the care that needs to be done. Fax number is [phone]    Phone Number Patient can be reached at: Other phone number:  [phone] *    Best Time:     Can we leave a detailed message on this number? YES    Call taken on 4/1/2024 at 10:17 AM by Kourtney Garcia

## 2024-04-05 NOTE — PATIENT INSTRUCTIONS
Today we did the wound vac dressing change to the sacral wound and the wound cares to the right IT and left trochanter wounds.  We will continue with the same plan of care for the three sites as listed below.   The left IT wound remains closed today but fragile.  The right buttock/thigh fold has healed again.  The sacral wound had eschar on the surface of the upper aspect last visit and that resolved, but the tunneling had loose eschar initially and large amount of slough.  I debrided the eschar and some of the more loose slough today and will do more if needed next time.  Again if the slough and or eschar become a greater issue we will schedule you with one of our general surgeons to due debriding to clean tissue.  But your wound has developed eschar and slough many times and each time it debrided on its own with the wound vac in place before surgery could see you.    See your appointment list for all upcoming appointment dates and times, we have you scheduled with me the next three Fridays for cares in clinic.      Wound cares:  - Cleanse all wounds with soap and water, saline or a no rinse wound cleanser and dry well.  Sacral wound, continue to apply Promogran Yu dry to the tunneling areas, fill wound bed with black KCI foam, bridge to left or right side as needed.  NPWT 3M KCI vac to be set at 125 mmHg continuous negative pressure, dressing changed 3 times weekly Mon Wed and Fri.    Right IT and Left Trochanter wounds, fill wound beds with small amount of Aquacel Ag dry, cover with gentle adhesive foam dressings. dressing changed 3 times weekly Mon Wed and Fri    I will fax a copy of this document to your home care agency as they requested.     Presley Chester RN cwn  [phone].

## 2024-04-05 NOTE — PROGRESS NOTES
[facility].     Start of Care in [facility]: 11/18/2022, initial resumption of visit, see Wound History for details.  Referring Doctor: Ivan Baeza MD (was formerly pt's PCP but not seen in many many months as of 3/15/24)  Primary Care Provider: VAZQUEZ Cuellar MD in [place] who pt has been seeing most recently for primary care.     Wound Location: Sacral, Right Ischial Tuberosity, Right thigh/buttock fold, Right hip (healed).  Left buttock mid (healed) and Left Ischial Tuberosity.  New wound left hip 3/15/24.     Wound Clinic Visit:  Scheduled follow up.     Reason for Visit: Wound assessments and cares     Subjective:  On arrival today 4/5/24 alone, she reports the following:  Pt continues to receive cares from [facility] home care [facility] on Mondays and Wednesdays, no change at this time.  No new issues noted per home care to the pt concerning her wounds.      Wound History:   Pt well known to me from visits over many years to the Wound and Ostomy clinic for chronic pressure injuries.  Initial visit November of 2020 for Right IT, Sacral, left trochanter and right heel pressure injuries.  She missed a few follow up appointments initially but was mostly coming into clinic every 3 to 4 weeks for evaluation and recommendations.  After a hospitalization at [facility] in [place] due to urinary diversion concerns and a right hip abscess she resumed visit to the Wound and Ostomy clinic on 11/18/22 for wound vac dressing changes and assessment. The right hip wound improved and as of my visit with her on 12/16/22 was nearly healed, no depth and only small open aspect all granular tissue.  It was reported the wound did fully close soon after that assessment per AL staff.  On 1/3/23 the pt's facility sent her to the ED for the right hip, it had re opened and was draining copious amounts of purulent drainage.  I was able to see the pt in the ED and was able to  add the right hip wound into the intact sacral wound vac set up.  At my last clinic visit with the pt on 2/3/23 the sacral wound was not in good condition, lots of slough, eschar on the edges and bone visible and palpable in the center.  I made arrangement with Dr Zavala's approval to have her seen in Specialty for potential debridement but appointment was approximately four weeks out. She was hospitalized at [facility] for revision of her urinary diversion 2/22/23 - 2/25/23 and was seen by [facility] nursing, the photo's from that assessment showed the sacral wound looking much improved.  No debriding was needed. She did see Dr Zavala on 2/27/23 and the sacral wound at that time was improving well with no need for debridement.  New wounds noted 5/10/23 to left buttock, Left IT and mid buttock.  Left mid buttock closed as of 6/9/23 and remains closed.  Left IT has repeatedly closed and opened, when closed often is covered with very waxy appearing fragile scar tissue.  New wound to the left hip, first assessed in clinic 3/15/24, per pt was first noted by AL staff two weeks prior, deep wound, facility and home cares have been doing wet to dry dressing changes MW.     Past Medical History:  Patient Active Problem List   Diagnosis    Migraine headache    Urinary retention    GERD (gastroesophageal reflux disease)    Flaccid paraplegia (H)    Decubitus ulcer of buttock    Pressure ulcer of heel    Poor appetite    Nausea    Generalized weakness    Burn    Health Care Home    Paraplegia following spinal cord injury (H)    ACP (advance care planning)    Osteomyelitis of hip (right periacetabular infection with osteomyelitis)    Severe protein-calorie malnutrition (H24)    Microcytic anemia    Neurogenic bladder    Anxiety    Insomnia    Chronic UTI (urinary tract infection)    Skin ulcer of buttock (bilateral ischial tuberosity ulcers)    CARDIOVASCULAR SCREENING; LDL GOAL LESS THAN 160    Open wound of foot except toes with  complication    LLQ abdominal pain    Paralytic ileus (H)    Chest wall pain    Decubitus skin ulcer    S/P flap graft    Pancreatitis    Pericardial effusion    Hospice care patient    Seizures (H)    AMS (altered mental status)    Admission for hospice care    Odynophagia    Portal vein thrombosis    Foreign body in esophagus, initial encounter    Impacted foreign body in esophagus, initial encounter    Aspiration pneumonia of right lung due to regurgitated food, unspecified part of lung (H)    Septic shock (H)    Depressive disorder    Colostomy present (H)    Chronic pain due to trauma    Hypokalemia    Abscess of right hip    Stage 3 skin ulcer of sacral region (H)    Pressure injury of right hip, stage 3 (H)                 Tobacco Use:     Tobacco Use      Smoking status: Never      Smokeless tobacco: Never     Diabetic: NA  HgbA1C:   Hemoglobin A1C   Date Value Ref Range Status   05/26/2021 5.1 0 - 5.6 % Final     Comment:     Normal <5.7% Prediabetes 5.7-6.4%  Diabetes 6.5% or higher - adopted from ADA   consensus guidelines.     Checks Blood Glucose?:  NA Average Readings: NA     Personal/social history:  Pt lives in the [facility] in [place], [facility] SOC 8/30/23 and discharged pt 3/11/24.  [facility] care agency [facility] admitted pt 3/13/24, currently coming out Mondays and Wednesdays, they are unable to accommodate three times weekly so Friday visits will be done in clinic for the time being.   fax is [phone]      Objective:   Current treatment plan:   - Sacral wound, NPWT at 125 mmHg continuous negative pressure changed MWF, plan of care changed changed 2/5/24 by former home care agency, adding Promogran Yu to undermining areas then the vac dressing as planned.    - Right IT, right buttock thick fold and Left Trochanter wounds: Gentle adhesive foam dressing changed MWF with Aqaucel Ag.  Currently no dressing needed to the left IT.     Last changed: 4/3/24 per pt all wound changed  by [facility].      Wound #1 Sacral   Stage/tissue depth: stage 4 pressure injury, stage updated 1/13/23 as bone became visible and palpable in the wound bed.   Total wound site 3 cm L x 6.7 cm W x 1.7 cm D   - open hold of the wound 2.5 cm L x 6 cm W x 1.7 cm D  Tunneling: none noted  Undermining: around entire perimeter 12 o'clock 1.7 cm, 3 o'clock 0.5 cm, 6 o'clock 0.5 cm, 9 o'clock 0.7 cm, 10 o'clock 1.5 cm  Wound bed type/amount: approximately 40 % granular tissue, 20 % red nongranular tissue and 40 % yellow to tan fibrinous slough (mostly located within undermining areas from 8-4 o'clock, greatest amounts at 10-12 o'clock): NA fluctuant  Wound Edges: open   Periwound: scar tissue, scattered pale blanchable erythema, 12 o'clock periwound skin has distinct area of blanchable erythema   Drainage: moderate amounts serosanguinous  Odor: odor noted prior to dressing removal, during dressing removal, resolved after cleansing with saline.   Pain: denied any pain today.    Photo's from today's visit 4/5/24, note pt is in left side lying position.  First photo is prior to debriding, all other photo's after debriding, see Assessment for details.      Photo's from 3/29/24, note pt is in the left side lying position.        Photo's from 3/22/24, note pt is in left side lying position.     Photo from 11/18/22, initial resumption of visits to the Wound and Ostomy clinic.      Wound #2 Right Ischial Tuberosity   Stage/tissue depth: stage 3 pressure injury  2 cm L x 0.4 cm W x 0.1 cm D, no change in size  Tunneling: none   Undermining: none  Wound bed type/amount: approximately 90 % scar tissue and 10 % granular tissue; NA fluctuant  Wound Edges: open  Periwound: Scattered areas of blanchable erythema and moist areas and also areas of dry skin, scar tissue.    Drainage: small amounts serosanguinous  Odor: none noted  Pain: none    Photo's from today's visit 4/5/24, note pt is in left side lying  position.    Photo from 3/29/24, note pt is in left side lying position.     Photo from 11/18/22, initial resumption of visits to the Wound and Ostomy clinic.      Wound #3 Right buttock/posterior thigh fold, appears friction not pressure related, newly noted in clinic 3/8/23, has closed and reopened a few times.  Newly reopened as of 12/29/23.  Stage/tissue depth: partial thickness  0 cm L x 0 cm W x 0 cm D  Tunneling: none  Undermining: none  Wound bed type/amount: 100 % reepithelialized but fragile; Not fluctuant  Wound Edges: NA no depth  Periwound: wnl  Drainage: none   Odor: no  Pain: no  Photo from today's visit 4/5/24, note pt is in left side lying position.    Photo from 3/29/24, note pt is in left side lying position     Photo from 3/8/23, initial assessment of newly noted site.     Wound #4 Left Ischial tuberosity, newly noted in clinic 5/10/23.  Stage/tissue depth: full thickness  0 cm L x 0 cm W x 0 cm D  Tunneling: none  Undermining: none  Wound bed type/amount: 100 % scar and epithelialized: Not fluctuant  Wound Edges: NA   Periwound:  Scattered pale blanchable erythema, less waxy appearance but remains very fragile.  Drainage: None   Odor: no  Pain: no  Photo from today's visit 4/5/24, note pt is in left side lying position.    Photo from 3/29/24, note pt is in left side lying position.     Photo from 5/10/23, initial assessment of new wound site.    Wound #5 Left trochanter, first assessed in clinic 3/15/24, per pt was first noted by AL approximately 2 weeks prior.  Stage/tissue depth: stage 4 pressure injury  1.1 cm L x 1 cm W x 0.8 cm D  Tunneling: currently only located at 10 o'clock of 0.3 cm D  Undermining:  none any longer  Wound bed type/amount: approximately 50 % granular tissue and 50 % red nongranular tissue; Not fluctuant  Wound Edges: open  Periwound: blanchable erythema, darker today encircles the wound, is between 0.2 and 0.4 cm W  Drainage: moderate amounts serosanguinous and  sanguinous  Odor: none not  Pain: yes with cares 5/10 per pt    Photo's from today's visit 4/5/24, in photo's the pts head is to the right and feet to the left.    Photo from 3/29/24, note pt is in right side lying position.    Photo from 3/15/24, initial assessment of new wound site.    Dorsalis Pedal Pulse: Not assessed this visit.  Posterior Tibial Pulse: Not assessed this visit.  Hair growth: Not assessed this visit  Capillary Refill: Not assessed this visit  Feet/toes color: Not assessed this visit  Nails: Not assessed this visit  R Leg: Edema Not assessed this visit. Ankle circumference NA cm. Calf circumference NA cm.  L Leg: Edema Not assessed this visit. Ankle circumference NA cm. Calf circumference NA cm.     Mobility: wheelchair bound  Current offloading/footwear: regular shoes/boots  Sensation: paraplegic, no sensation from sternum distally     Other callusing/areas of concern:    - Right hip, closed deep ulcer with reoccurring abscess.  Remains scar closed.      Diet: Regular     Discussed/Education: plan of care with rationale;  pt is unable to do self wound cares, Home Care is performing wound cares three times weekly at AL.     Assessment:  Pt on arrival has the Sacral wound vac running at correct settings.  The NPWT dressing was in place is bridged to the right hip/upper buttock  The left trochanter and right IT wounds covered with gentle foam adhesive dressings.    Dressing was removed from Sacral wound: one piece black foam in the wound bed and one piece black foam used to bridge to the left hip area.  Visibly can confirm all dressings were removed.  All the wounds were cleansed with wound cleanser today dried well with gauze..      The Sacral wound:    New eschar noted last visit on the superior edge has resolved, new eschar is present from within the undermining areas.  Initially there was a long piece of black moist fibrinous eschar hanging from the 9 o'clock inner edge, this was removed with  sterile iris scissors, additional debulking of the slough present in the undermining done.  The wounds undermining is less in the superior aspect but it appears more related to the new developed slough present in the those areas, most notably from 10 to 12 o'clock.  Total size of the wound is about the same today, no other new concerns noted.    The Right IT wound: is slowly scar closing but due to irregular contour of the scar and fold of her buttock at this area, moisture continues to trap within and slow full closure.      The Right lower buttock/thigh fold was open the last five visits.  Today it is again closed, fully reepithelialized, site has been reoccurring in same location always partial thickness     Left IT wound: remains closed, less waxy appearance to the area, remains fragile but has been closed for a few visits without breakdown.     New as of 3/15/24 clinic visit - Left trochanter stage 4 pressure injury.  Wound has improved.  A little smaller in size, small tunnel remains at 10 o'clock but on 3 mm deep.  Wound bed is still only partially granulating and some of the deepest aspects are not granular but clean red nongranular tissue.        Ostomy:  Not assessed this visit no new concerns per pt.      Factors impacting wound healing:   Poor nutrition: inadequate supply of protein, carbohydrates, fatty acids, and trace elements essential for all phases of wound healing.  Pt is taking a daily protein supplement drink and is aware of need for increased protein in diet for wound healing.  She has focused on protein supplement drinks and snack. Four pound increase in body weight reported 1/19/24 per pt.   Delayed healing as part of normal aging process  Reduced Blood Supply: inadequate perfusion to heal wound.  Appears adequate.  Medication: NA  Chemotherapy: suppresses the immune system and inflammatory response, NA  Radiotherapy: increases production of free radical which damage cells, NA  Psychological  stress: None noted  Obesity: decreases tissue perfusion, NA  Infection: prolongs inflammatory phase, uses vital nutrients, impairs epithelialization and releases toxins, antimicrobial dressing included in sacral wound as of 2/5/24.  Underlying Disease: paraplegic  Maceration: reduces wound tensile strength and inhibits epithelial migration, none noted this visit  Patient compliance, appears motivated to heal  Unrelieved pressure, pt has pressure reduction cushion in wheelchair and lays in bed daily during the day for full pressure relief.    Immobility, limited  Substance abuse: NA     Plan:  We will be able to accommodate once weekly visits to the Wound and Ostomy clinic for wound cares including NPWT changes and all wound cares/assessments as planned and home care continues per pt to make Monday and Wednesday changes.      We will continue with the same plan of care for the right IT and Left Trochanter wounds, Aqaucel Ag covered with Mepilex and changed MWF.  Right buttock fold can be left open but will likely be somewhat covered by the right IT dressing.    Sacral wound:  Today one piece Promogran Yu applied to the wound bed, one piece black KCI foam cut to size and shape was used to fill the wound bed, one piece black foam used to cover the outer wound bed edges and to bridge to the right hip additional half thickness circular cut piece of black foam used at end of bridge for tract pad to be applied upon.  All outer skin exposed to black foam was protected with KCI drape, all foams sealed in with KCI drape, tract pad attached over hole cut in foam bridge and negative pressure achieved at 125 mmHg continuous negative pressure.  Canister in place was approximately 1/4 full with drainage, canister was not changed today 4/5/24.     Topical care: Wound/surrounding skin cleansed with wound cleanser and gauze. Patted dry. Wound cares as listed in Plan   Additional recommendations: None at this time     The  following discharge instructions were reviewed with and sent home with the patient:  See AVS fill fax to Home care as requested from each visit.     The following supplies were sent home with the patient:  Provided the pt with a few extra [employer] ostomy appliances today, she states she is running low, was told by home care that there had ordered her more but those have not arrive yet.     Return visit: 4/12/24     Verbal, written, & demonstrative education provided.  Face to face time: approximately 70 minutes  Procedure: approximately 10 minutes wound vac dressing change to the sacral wound and 3 minutes of conservative sharp debridement of eschar present, slough was only debulked not debrided.     Presley Chester RN [facility]  [phone]

## 2024-04-08 NOTE — TELEPHONE ENCOUNTER
"Reason for Call:  Other appointment    Detailed comments: Patient calling because she had a little incident with one of the wound care staff at [facility]. She is saying that they \"didn't like her attitude\" and are going to discharge her from wound care services. Please call patient     Phone Number Patient can be reached at: Home number on file [phone] (home)    Best Time: any    Can we leave a detailed message on this number? YES    Call taken on 4/8/2024 at 12:59 PM by Desirae Glasgow    "

## 2024-04-09 NOTE — TELEPHONE ENCOUNTER
Returned pt's call, spoke with Felicia directly via phone today 4/9/24 at 1450.    S - Returned pt's call concerning her message home care will be discharging her due to an incident yesterday 4/8/24.    B - Pt who is a resident at [facility] in [place], she is receiving NPWT dressing changes three times weekly for a stage 4 pressure injury of the sacrum.  Pt was coming to wound clinic once every 3 to 4 weeks for evalustion, with home care services through [facility] doing the 3 times weekly MWF NPWT dressing changes and other wound cares at the AL.  (Note - The AL does not perform any type of wound cares for the pt any longer, has not for many months).  They discharged the pt with warning on 3/11/24.  A new home care agency, [facility], took over pt's cares on 3/13/24 but they were only able to provide twice weekly visits for wound cares, Monday and Wednesday's.  To keep the Vac in place with scheduled three times weekly changes, pt was scheduled here in clinic for once weekly visits on Fridays for wound cares.      A - Spoke with pt today 4/9/24 concerning her message called in yesterday afternoon after my departure from the clinic.  She provides the following updates today.  She was seen by a nurse with [facility] yesterday as scheduled for NPWT change.  During the visit Felicia reports she made a request concerning lights in the room, she did not feel the nurse adequately responded to her concerns and pt states 'I gave her a piece of my mind'.  No further details provided other than the nurse packed up her supplies and left.  Pt has since been informed that the home care agency [facility] has informed her she will get printed letter informing her they intend to discharge home on Monday 4/22/23.  That in the letter there will be three options for home care services in this area.      P - Pt is currently scheduled to see me again in clinic this Friday 4/12/24.  Pt should have  visit from home care tomorrow 4/10 for wound cares, then again Mon 4/15 and Wed 4/17 with return visit scheduled with me Fri 4/19/24.  Pt also has scheduled visit to reestablish primary cares with MD VAZQUEZ Baeza here at [facility] 4/19.  We will have to see if pt will be able to have home cares resumed by a new service.  We may be able to accommodate twice weekly visits in clinic but three times weekly vac dressing changes would be more appropriate than twice.  Without home care we may need to look at alternative wound care options, unclear if AL will be willing to re assume any wound cares, be them NPWT or conventional.  Will update in progress note at next visit scheduled this week Fri 4/12.  Did instruct the pt today that if home care for any reason does not come to see her tomorrow Wednesday and thusly wound cares are not performed she needs to call the Wound and Ostomy clinic ASAP to make new plan moving forward.  Pt verbalized understanding of plan and her need to contact us as directed.     Presley Chester RN cwocn

## 2024-04-10 NOTE — TELEPHONE ENCOUNTER
Reason for Call:  Other - requesting visit notes    Detailed comments: Laura Francis from [facility] is calling to request that visit notes be faxed for the dates of March 21st through April 3rd for patient with wound measurements.    Phone number of [facility] Rep: (Laura Francis) [phone], ext #: [phone]. Fax number: [phone], Attn: Laura Francis.    Call taken on 4/10/2024 at 10:52 AM by Ryley Neal

## 2024-04-12 NOTE — PATIENT INSTRUCTIONS
Today we did the sacral wound cares, the right IT wound and left trochanter wounds.  The left IT and the right buttock/thigh fold wounds have reopened since I saw you last but are all clean.  We will continue with the same plan of care.  We booked some extra visits in starting Tues 4/20 so we can fit you in twice a week for wound cares.  We are unable to accommodate three times weekly visits and can only do the twice weekly visits for a few weeks.  We need you to use the information on new home care agencies to start to try to find someone to assist with your cares.  When your here to see Dr Baeza on the 19th be sure to discuss it with him also so he can get new orders entered as needed for  or a care coordinator.    I will see you next week, see your list of printed upcoming appointments for details.    Presley Chester RN cwocn

## 2024-04-12 NOTE — PROGRESS NOTES
[facility].     Start of Care in [facility]: 11/18/2022, initial resumption of visit, see Wound History for details.  Referring Doctor: Ivan Baeza MD original referral 12/21/21, updated referral 10/24/22 Dr Scott BENJAMIN ED provider.    Primary Care Provider: VAZQUEZ Cuellar MD in [place] currently as of 4/12/24 but per pt has not seen recently and is changing back to Dr Ivan Baeaz, appointment to reestablish primary cares 4/19/24.    Wound Location: Sacral, Right Ischial Tuberosity, Right thigh/buttock fold, Right hip (healed).  Left buttock mid (healed) and Left Ischial Tuberosity (reopened 4/12/24).  New wound Left Trochanter 3/15/24.     Wound Clinic Visit:  Scheduled follow up.     Reason for Visit: Wound assessments and cares     Subjective:  On arrival today 4/12/24 alone, she reports the following:  Not initially but longterm through our visit today the pt reported she was bleeding from her left wrist and requested a band aid,.  With questioning she reports when she was unloading from her transport vehicle at the hospital entrance today, that she slid forward out of her wheelchair, landing on the concrete ground.  States it was due to her being left on the aviva sling which is slippery.  She reports she was assisted back into the chair by hospital staff and , denies any injuries other than an abrasion described below in Assessment and Other Areas of Concern to monitor.    Also - Pt continues to receive cares from new home care agency [facility] but will be discharged on Wed 4/17/24.  She has not had any luck with getting new care agency set up.  She is waiting on new SW referral through the [facility] per the pt, will be reestablishing primary care with Dr VAZQUEZ Baeza here at [facility] next week and will mention to the MD need for new SW to try to obtain home care.  Per the pt the AL will not do any wound cares, no scheduled cares and prn  cares, like for vac failures.    Wound History:   Pt well known to me from visits over many years to the Wound and Ostomy clinic for chronic pressure injuries.  Initial visit November of 2020 for Right IT, Sacral, left trochanter and right heel pressure injuries.  She missed a few follow up appointments initially but was mostly coming into clinic every 3 to 4 weeks for evaluation and recommendations.  After a hospitalization at [facility] in [place] due to urinary diversion concerns and a right hip abscess she resumed visit to the Wound and Ostomy clinic on 11/18/22 for wound vac dressing changes and assessment. The right hip wound improved and as of my visit with her on 12/16/22 was nearly healed, no depth and only small open aspect all granular tissue.  It was reported the wound did fully close soon after that assessment per AL staff.  On 1/3/23 the pt's facility sent her to the ED for the right hip, it had re opened and was draining copious amounts of purulent drainage.  I was able to see the pt in the ED and was able to add the right hip wound into the intact sacral wound vac set up.  At my last clinic visit with the pt on 2/3/23 the sacral wound was not in good condition, lots of slough, eschar on the edges and bone visible and palpable in the center.  I made arrangement with Dr Zavala's approval to have her seen in Specialty for potential debridement but appointment was approximately four weeks out. She was hospitalized at [facility] for revision of her urinary diversion 2/22/23 - 2/25/23 and was seen by [facility] nursing, the photo's from that assessment showed the sacral wound looking much improved.  No debriding was needed. She did see Dr Zavala on 2/27/23 and the sacral wound at that time was improving well with no need for debridement.  New wounds noted 5/10/23 to left buttock, Left IT and mid buttock.  Left mid buttock closed as of 6/9/23 and remains closed.  Left IT has repeatedly closed and opened, when  closed often is covered with very waxy appearing fragile scar tissue.  New wound to the left hip, first assessed in clinic 3/15/24, per pt was first noted by AL staff two weeks prior, deep wound, home cares were doing wet to dry dressing changes MWF up till clinic presentation.      Past Medical History:  Patient Active Problem List   Diagnosis    Migraine headache    Urinary retention    GERD (gastroesophageal reflux disease)    Flaccid paraplegia (H)    Decubitus ulcer of buttock    Pressure ulcer of heel    Poor appetite    Nausea    Generalized weakness    Burn    Health Care Home    Paraplegia following spinal cord injury (H)    ACP (advance care planning)    Osteomyelitis of hip (right periacetabular infection with osteomyelitis)    Severe protein-calorie malnutrition (H24)    Microcytic anemia    Neurogenic bladder    Anxiety    Insomnia    Chronic UTI (urinary tract infection)    Skin ulcer of buttock (bilateral ischial tuberosity ulcers)    CARDIOVASCULAR SCREENING; LDL GOAL LESS THAN 160    Open wound of foot except toes with complication    LLQ abdominal pain    Paralytic ileus (H)    Chest wall pain    Decubitus skin ulcer    S/P flap graft    Pancreatitis    Pericardial effusion    Hospice care patient    Seizures (H)    AMS (altered mental status)    Admission for hospice care    Odynophagia    Portal vein thrombosis    Foreign body in esophagus, initial encounter    Impacted foreign body in esophagus, initial encounter    Aspiration pneumonia of right lung due to regurgitated food, unspecified part of lung (H)    Septic shock (H)    Depressive disorder    Colostomy present (H)    Chronic pain due to trauma    Hypokalemia    Abscess of right hip    Stage 3 skin ulcer of sacral region (H)    Pressure injury of right hip, stage 3 (H)                 Tobacco Use:     Tobacco Use      Smoking status: Never      Smokeless tobacco: Never     Diabetic: NA  HgbA1C:   Hemoglobin A1C   Date Value Ref Range  Status   05/26/2021 5.1 0 - 5.6 % Final     Comment:     Normal <5.7% Prediabetes 5.7-6.4%  Diabetes 6.5% or higher - adopted from ADA   consensus guidelines.     Checks Blood Glucose?:  NA Average Readings: NA     Personal/social history:  Pt lives in the [facility] in [facility], [facility] 8/30/23 and discharged pt 3/11/24.  [facility] care agency [facility] admitted pt 3/13/24,  fax is [phone].  [facility] notified pt intent to discharge pt 4/17/24.  No replacement home care services set up as of today 4/12/24.      Objective:   Current treatment plan:   - Sacral wound, NPWT at 125 mmHg continuous negative pressure changed MWF, Promogran Yu to undermining areas then the vac dressing as planned.    - Right IT, right buttock thick fold and Left Trochanter wounds: Gentle adhesive foam dressing changed MWF with Aqaucel Ag.    - On arrival today 4/12/24, due to reopening, there is a gentle adhesive foam dressing over the Left IT and right buttock.posterior thigh fold.  Last changed: all dressings 4/10/24 by home care agency [facility].      Wound #1 Sacral   Stage/tissue depth: stage 4 pressure injury, stage updated 1/13/23 as bone became visible and palpable in the wound bed.   3.2 cm L x 6.5 cm W x 1.5 cm D (note measure site all as one from now on due to changes in appearance 4/12/24)  Tunneling: none noted  Undermining: around entire perimeter 12 o'clock 1.8 cm, 3 o'clock 0.8 cm, 6 o'clock 0.3 cm, 9 o'clock 0 cm, 10 o'clock 1 cm, 11 o'clock 1.5 cm.  Wound bed type/amount: approximately 50 % granular tissue, 30 % red nongranular tissue and 20 % yellow to tan fibrinous slough (located within undermining areas from 9 - 12 o'clock): NA fluctuant  Wound Edges: open   Periwound: scar tissue, scattered pale blanchable erythema, new skin denudement sites appear like medical adhesive skin injuries:  - right buttock x's 2, superior 0.5 cm L x 0.8 cm W x 0 cm D, inferior 2.5 cm L x 0.8 cm W x 0 cm D,  both are 100 % red nongranular tissue located over old scar tissue, scant amounts serous drainage.  - left buttock 2 cm L x 2 cm W x 0.1 cm D, four tears within tact skin, partial thickness, all open tissue is red nongranular, small amounts sanguinous drainage.   Drainage: moderate amounts serosanguinous from the main wound  Odor: odor noted prior to dressing removal, during dressing removal, resolved after cleansing with saline.   Pain: denied any pain today.      Photo's from today's visit 4/12/24, note pt is in left side lying position.  L- R the bottom two phot;s show the right and left buttock periwound skin new skin stripping noted today.       Photo's from 4/5/24, note pt is in left side lying position.  First photo is prior to debriding, all other photo's after debriding, see Assessment for details.      Photo's from 3/29/24, note pt is in the left side lying position.        Photo's from 3/22/24, note pt is in left side lying position.     Photo from 11/18/22, initial resumption of visits to the Wound and Ostomy clinic.      Wound #2 Right Ischial Tuberosity   Stage/tissue depth: stage 3 pressure injury  2 cm L x 0.4 cm W x 0.1 cm D, no change in size  Tunneling: none   Undermining: none  Wound bed type/amount: approximately 90 % scar tissue and 10 % granular tissue, no change; NA fluctuant  Wound Edges: open  Periwound: Scattered areas of blanchable erythema and moist areas and also areas of dry skin, scar tissue.    Drainage: scant amounts serosanguinous  Odor: none noted  Pain: none  Photo from today's visit 4/12/24, note pt is in left side lying position.      Photo's from 4/5/24, note pt is in left side lying position.     Photo from 11/18/22, initial resumption of visits to the Wound and Ostomy clinic.      Wound #3 Right buttock/posterior thigh fold, appears friction not pressure related, newly noted in clinic 3/8/23, has closed and reopened multiple times.    Stage/tissue depth: partial thickness  5  cm L x 1 cm W x 0 cm D  Tunneling: none  Undermining: none  Wound bed type/amount: 100 % red nongranular tissue; Not fluctuant  Wound Edges: NA no depth  Periwound: wnl  Drainage: small amounts sanguinous  Odor: no  Pain: no    Photo's from today's visit 4/12/24, note pt is in left side lying position.    Photo from 4/5/24, note pt is in left side lying position.     Photo from 3/8/23, initial assessment of newly noted site.     Wound #4 Left Ischial tuberosity, newly noted in clinic 5/10/23.  Stage/tissue depth: full thickness  1 cm L x 2.5 cm W x 0 cm D  Tunneling: none  Undermining: none  Wound bed type/amount: 100 % red nongranular tissue within old scar tissue: Not fluctuant  Wound Edges: NA   Periwound:  Scattered pale blanchable erythema, less waxy appearance but remains very fragile.  Drainage: small amounts serous  Odor: no  Pain: no  Photo from today's visit 4/12/24, note pt is in left side lying position.    Photo from 4/5/24, note pt is in left side lying position.     Photo from 5/10/23, initial assessment of new wound site.    Wound #5 Left trochanter, first assessed in clinic 3/15/24, per pt was first noted by AL approximately 2 weeks prior.  Stage/tissue depth: stage 4 pressure injury  0.6 cm L x 0.5 cm W x 1 cm D  Tunneling: appears resolved at 10 o'clock of 0 cm D today  Undermining:  none any longer  Wound bed type/amount: approximately 50 % granular tissue and 50 % red nongranular tissue; Not fluctuant  Wound Edges: open but have localized edema noted today  Periwound: blanchable erythema, paler today and no longer fully encircles the wound  Drainage: moderate amounts serosanguinous and sanguinous  Odor: none not  Pain: yes with cares 5/10 per pt  Photo from today's visit 4/12/24, note pt is in right side lying position.      Photo's from 4/5/24, in photo's the pts head is to the right and feet to the left.    Photo from 3/15/24, initial assessment of new wound site.    Dorsalis Pedal Pulse: Not  assessed this visit.  Posterior Tibial Pulse: Not assessed this visit.  Hair growth: Not assessed this visit  Capillary Refill: Not assessed this visit  Feet/toes color: Not assessed this visit  Nails: Not assessed this visit  R Leg: Edema Not assessed this visit. Ankle circumference NA cm. Calf circumference NA cm.  L Leg: Edema Not assessed this visit. Ankle circumference NA cm. Calf circumference NA cm.     Mobility: wheelchair bound  Current offloading/footwear: regular shoes/boots  Sensation: paraplegic, no sensation from sternum distally     Other callusing/areas of concern:    - Right hip, closed deep ulcer with reoccurring abscess.  Remains scar closed.   - New abrasion related to fall 4/12/24.  Left wrist, 0.5 cm L x 0.2 cm W x 0 cm D distinct abrasion 100 % partial thickness red nongranular tissue with 2.5 cm L x 3 cm W area of scattered dots of denudement with no drainage, small amount of sanguinous drainage from distinct abrasion.  Photo from today's visit 4/12/24, date of injury.     Diet: Regular     Discussed/Education: plan of care with rationale;  pt is unable to do self wound cares, Home Care is performing wound cares three times weekly at AL.     Assessment:  Pt on arrival has the Sacral wound vac running at correct settings.  The NPWT dressing was in place is bridged to the Left hip/upper buttock  The left trochanter, right IT and left IT wounds and right buttock posterior thigh fold wound covered with gentle foam adhesive dressings.    Dressing was removed from Sacral wound: one piece black foam in the wound bed and one piece black foam used to bridge to the left hip area.  Visibly can confirm all dressings were removed.  All the wounds were cleansed with wound cleanser today dried well with gauze.      The Sacral wound:    The wound bed has improved well in the slough and eschar present last visit.  Still some remains in the undermining to the left lateral but less and no longer any eschar,  just fibrinous slough.  Periwound skin stripping again present on the left and right buttocks inferior to the sacral wound, both sites consistent with medical adhesive related skin injuries.  Right side skin stripped sites are within old scar tissue and left is partial thickness on skin.      The Right IT wound: is unchanged today.       The Right lower buttock/thigh fold, has been closed for the last few visits but today is back open, remains partial thickness directly in the deepest aspect of the fold, no depth but bleeding easily.     Left IT wound: area has reopened but to the right of where it was last open.  Site is over scar tissue but has no depth, is all clean.     New as of 3/15/24 clinic visit - Left trochanter stage 4 pressure injury.  Wound has improved. Depth is greater today but wound edges are noted for localized edema so the edge is higher, depth feels about the same, no tunneling or undermining present today.      Left wrist:  Traumatic wound from fall today 4/12 out of wheelchair.  Partial thickness abrasion, cleansed with saline and dried well, no signs of infection, stopped bleeding with cleansing and drying with gauze.      Ostomy:  Not assessed this visit, per pt continues to have trouble getting adequate amounts of ostomy appliances through home care, gave pt a few today to cover for this upcoming week.      Factors impacting wound healing:   Poor nutrition: inadequate supply of protein, carbohydrates, fatty acids, and trace elements essential for all phases of wound healing.  Pt is taking a daily protein supplement drink and is aware of need for increased protein in diet for wound healing.  She has focused on protein supplement drinks and snack. Four pound increase in body weight reported 1/19/24 per pt.   Delayed healing as part of normal aging process  Reduced Blood Supply: inadequate perfusion to heal wound.  Appears adequate.  Medication: NA  Chemotherapy: suppresses the immune system  and inflammatory response, NA  Radiotherapy: increases production of free radical which damage cells, NA  Psychological stress: None noted  Obesity: decreases tissue perfusion, NA  Infection: prolongs inflammatory phase, uses vital nutrients, impairs epithelialization and releases toxins, antimicrobial dressing included in sacral wound as of 2/5/24.  Underlying Disease: paraplegic  Maceration: reduces wound tensile strength and inhibits epithelial migration, none noted this visit  Patient compliance, appears motivated to heal  Unrelieved pressure, pt has pressure reduction cushion in wheelchair and lays in bed daily during the day for full pressure relief.    Immobility, limited  Substance abuse: NA     Plan:  We will continue with the same plan of care.  We have pt scheduled for visit next Friday the 19 th, home to see and discharge the following Monday 4/22.  Scheduled for clinic visit Fri 4/26, Tuesday 4/30 and Fri 5/3/24.  Will extend ongoing twice weekly as needed for now.  We are unable to accommodate three times weekly visits and can only do the twice weekly visits for a few weeks.  Pt instructed we need her to use the information on new home care agencies to start to try to find someone to assist with your cares.  When she is here to see Dr Baeza on the 19th instructed pt to be sure to discuss it with him also so he can get new orders entered as needed for  or a care coordinator.  I will also update Dr Baeza prior to his visit with the pt on the 19th of the concerns related to no available care giver for wound care other than the Wound and Ostomy clinic currently.  All prn cares pt would need to be seen in the ED.    We will continue with the same plan of care for the Right IT and Left Trochanter wounds and will resume the same to the Left IT and Right buttock/posterior thigh fold wound as both have reopened: Aqaucel Ag covered with Mepilex and changed MWF.    Sacral wound:  We will  continue with the wound vac 3 times weekly as long as able, then twice weekly till options are exhausted on home care visits.  - Stoma powder and Cavilon no sting skin prep applied to the left and right buttock medial adhesive related skin injuries to dry and protect then covered with the [employer]CI drape used to seal in the wound vac foams.  - One piece Promogran Yu applied to the wound bed, one continuous piece black KCI foam used to fill the wound bed and bridge to the right upper buttock.  - All outer skin exposed to black foam was protected with KCI drape, all foams sealed in with KCI drape, tract pad attached over hole cut in foam bridge.  - Negative pressure achieved at 125 mmHg continuous negative pressure.  Canister in place was approximately 1/2 full with drainage, canister was not changed today 4/12/24.     Topical care: Wound/surrounding skin cleansed with wound cleanser and gauze. Patted dry. Wound cares as listed in Plan   Additional recommendations: None at this time     The following discharge instructions were reviewed with and sent home with the patient:  See AVS will have faxed to Home care as requested from each visit this week, they will be discharging the pt next week.     The following supplies were sent home with the patient:  Provided the pt with a few extra [employer] ostomy appliances today, she states she is running low, was told by home care that there had ordered her more but those have not arrive yet.     Return visit: 4/19/24     Verbal, written, & demonstrative education provided.  Face to face time: approximately 60 minutes  Procedure: approximately 15 minutes wound vac dressing change to the sacral wound.     Presley Chester RN [facility]  [phone]

## 2024-04-15 NOTE — TELEPHONE ENCOUNTER
Most recent discharge instructions were faxed to the number listed below.     Leticia Desir RN   [facility]

## 2024-04-15 NOTE — TELEPHONE ENCOUNTER
[facility] called back today regarding patient and notes. Rep from [facility] states he will be sending a fax over to the wound care team.

## 2024-04-15 NOTE — TELEPHONE ENCOUNTER
Reason for Call:  Other call back    Detailed comments: home care nurse called and asked that the new orders and notes from last visit on 04/12/24 to be faxed over to [phone]    Call taken on 4/15/2024 at 8:42 AM by Brenda Padilla

## 2024-04-15 NOTE — TELEPHONE ENCOUNTER
[facility] nurse called [facility] to provided wound measurements/info, when speaking with  Laura Francis she stated they have all the info they need. Did inform her patient will be establishing primary care on Friday 4/19/24, once this is done will have order for VAC signed and faxed.

## 2024-04-19 PROBLEM — F11.20 CONTINUOUS OPIOID DEPENDENCE (H): Status: ACTIVE | Noted: 2024-01-01

## 2024-04-19 NOTE — PATIENT INSTRUCTIONS
Today the wounds on your buttocks are all improved with only two small denuded new sites from the vac adhesive on the right buttock.    The sacral wound is improved, all the slough debrided over this past week so it is all clean tissue with some new scar tissue noted running along the inner bottom edge.  We will continue with the wound vac for now, see the scheduled visits plan below for the vac and other wound care visits.    The right IT wound is improved slightly but still open.  The left IT wound is also still open but no deeper, no larger.  The right buttock/thigh fold wound which reopened as of last visit is still open but smaller and mostly drying out and growing new skin tissue to close.  The left hip wound is improved, smaller and growing new tissue well.    Your scheduled for your last visit with home care on Monday of next week 4/22/24.    Your scheduled to see me again on Friday of next week 4/26/24.  Then I have you scheduled for twice weekly visits on Tuesday and Friday the two following weeks.  Check your list of appointments for details on dates and times of each visit.    We will only be able to do twice weekly visits here to change the vac.    Hopefully you will be able to establish a home care agency to come to do more of the dressing changes at your assisted living.  Best would be three times weekly for home care with you coming into clinic every few weeks for follow up visits.  But we can accommodate twice weekly visits for awhile while you look into new home care options.    Call with any questions or concerns [phone].    Presley Chester RN [email or web address]

## 2024-04-19 NOTE — PROGRESS NOTES
[facility].     Start of Care in [facility]: 11/18/2022, initial resumption of visit, see Wound History for details.  Referring Doctor: Ivan Baeza MD original referral 12/21/21, updated referral 10/24/22 Dr Scott BENJAMIN ED provider.    Primary Care Provider: VAZQUEZ Cuellar MD in [place] currently as of 4/12/24 but per pt has not seen recently and is changing back to Dr Ivan Baeza, appointment to reestablish primary cares 4/19/24.    Wound Location: Sacral, Right Ischial Tuberosity, Right thigh/buttock fold, Right hip (healed).  Left buttock mid (healed) and Left Ischial Tuberosity (reopened 4/12/24).  New wound Left Trochanter 3/15/24.     Wound Clinic Visit:  Scheduled follow up.     Reason for Visit: Wound assessments and cares     Subjective:  On arrival today 4/19/24 alone, she reports the following:  No new updates concerning home care, is scheduled to be discharged from current HC services with [facility] on Monday of next week 4/22/24, [facility] is assisting pt with trying to access new home care services.  The left wrist wound she suffered when falling out of her wheelchair last week outside the hospital has scabbed over and no new concerns noted.      Wound History:   Pt well known to me from visits over many years to the Wound and Ostomy clinic for chronic pressure injuries.  Initial visit November of 2020 for Right IT, Sacral, left trochanter and right heel pressure injuries.  She missed a few follow up appointments initially but was mostly coming into clinic every 3 to 4 weeks for evaluation and recommendations.  After a hospitalization at [facility] in [place] due to urinary diversion concerns and a right hip abscess she resumed visit to the Wound and Ostomy clinic on 11/18/22 for wound vac dressing changes and assessment. The right hip wound improved and as of my visit with her on 12/16/22 was nearly healed, no depth and only small open aspect all  granular tissue.  It was reported the wound did fully close soon after that assessment per [facility] staff.  On 1/3/23 the pt's facility sent her to the ED for the right hip, it had re opened and was draining copious amounts of purulent drainage.  I was able to see the pt in the ED and was able to add the right hip wound into the intact sacral wound vac set up.  At my last clinic visit with the pt on 2/3/23 the sacral wound was not in good condition, lots of slough, eschar on the edges and bone visible and palpable in the center.  I made arrangement with Dr Zavala's approval to have her seen in Specialty for potential debridement but appointment was approximately four weeks out. She was hospitalized at [facility] for revision of her urinary diversion 2/22/23 - 2/25/23 and was seen by [facility] nursing, the photo's from that assessment showed the sacral wound looking much improved.  No debriding was needed. She did see Dr Zavala on 2/27/23 and the sacral wound at that time was improving well with no need for debridement.  New wounds noted 5/10/23 to left buttock, Left IT and mid buttock.  Left mid buttock closed as of 6/9/23 and remains closed.  Left IT has repeatedly closed and opened, when closed often is covered with very waxy appearing fragile scar tissue.  New wound to the left hip, first assessed in clinic 3/15/24, per pt was first noted by [facility] staff two weeks prior, deep wound, home cares were doing wet to dry dressing changes MWF up till clinic presentation.      Past Medical History:  Patient Active Problem List   Diagnosis    Migraine headache    Urinary retention    GERD (gastroesophageal reflux disease)    Flaccid paraplegia (H)    Decubitus ulcer of buttock    Pressure ulcer of heel    Poor appetite    Nausea    Generalized weakness    Burn    Health Care Home    Paraplegia following spinal cord injury (H)    ACP (advance care planning)    Osteomyelitis of hip (right periacetabular infection with osteomyelitis)     Severe protein-calorie malnutrition (H24)    Microcytic anemia    Neurogenic bladder    Anxiety    Insomnia    Chronic UTI (urinary tract infection)    Skin ulcer of buttock (bilateral ischial tuberosity ulcers)    CARDIOVASCULAR SCREENING; LDL GOAL LESS THAN 160    Open wound of foot except toes with complication    LLQ abdominal pain    Paralytic ileus (H)    Chest wall pain    Decubitus skin ulcer    S/P flap graft    Pancreatitis    Pericardial effusion    Hospice care patient    Seizures (H)    AMS (altered mental status)    Admission for hospice care    Odynophagia    Portal vein thrombosis    Foreign body in esophagus, initial encounter    Impacted foreign body in esophagus, initial encounter    Aspiration pneumonia of right lung due to regurgitated food, unspecified part of lung (H)    Septic shock (H)    Depressive disorder    Colostomy present (H)    Chronic pain due to trauma    Hypokalemia    Abscess of right hip    Stage 3 skin ulcer of sacral region (H)    Pressure injury of right hip, stage 3 (H)                 Tobacco Use:     Tobacco Use      Smoking status: Never      Smokeless tobacco: Never     Diabetic: NA  HgbA1C:   Hemoglobin A1C   Date Value Ref Range Status   05/26/2021 5.1 0 - 5.6 % Final     Comment:     Normal <5.7% Prediabetes 5.7-6.4%  Diabetes 6.5% or higher - adopted from ADA   consensus guidelines.     Checks Blood Glucose?:  NA Average Readings: NA     Personal/social history:  Pt lives in the [facility] in [facility] 8/30/23 and discharged pt 3/11/24.  [facility] care agency [facility] admitted pt 3/13/24,  fax is [phone].  [facility] notified pt intent to discharge pt 4/17/24.  No replacement home care services set up as of today 4/12/24.      Objective:   Current treatment plan:   - Sacral wound, NPWT at 125 mmHg continuous negative pressure changed MWF, Promogran Yu to undermining areas then the vac dressing as planned.    - Right and Left IT,  right buttock thick fold and Left Trochanter wounds: Aquacel Ag and Gentle adhesive foam dressing changed MWF.   Last changed: all dressings 4/17/24 by home care agency [facility].      Wound #1 Sacral   Stage/tissue depth: stage 4 pressure injury, stage updated 1/13/23 as bone became visible and palpable in the wound bed.   2.7 cm L x 6.7 cm W x 1 cm D  Tunneling: none noted  Undermining: around entire perimeter 12 o'clock 2.3 cm, 3 o'clock 0.3 cm, 6 o'clock 0.5 cm, 9 o'clock 0 cm, 10 o'clock - 11 o'clock 2.3 cm.  Wound bed type/amount: approximately 70 % granular tissue, 20 % red nongranular tissue and 10 % scar tissue: NA fluctuant  Wound Edges: open   Periwound: scar tissue, scattered pale blanchable erythema, new skin denudement sites appear like medical adhesive skin injuries:  - right buttock x's 3, superior 1 cm L x 1.8 cm W x 0 cm D, middle vertically 0.3 cm L x 0.3 cm W x 0 cm D, inferior 1 cm L x 0.5 cm W x 0 cm D, all sites are 100 % red nongranular tissue located over old scar tissue, scant amounts serous drainage.  - left buttock 0 cm L x 0 cm W x 0 cm D, site has reepithelialized, hyperpigmented intact skin present, no drainage no moisture.   Drainage: moderate amounts serosanguinous from the main wound  Odor: odor noted prior to dressing removal, during dressing removal, resolved after cleansing with soap and water.   Pain: denied any pain today.      Photo's from today's visit 4/19/24.  Pt is in left side lying position.        Photo's from 4/12/24, note pt is in left side lying position.  L- R the bottom two phot;s show the right and left buttock periwound skin new skin stripping noted today.       Photo's from 3/22/24, note pt is in left side lying position.     Photo from 11/18/22, initial resumption of visits to the Wound and Ostomy clinic.      Wound #2 Right Ischial Tuberosity   Stage/tissue depth: stage 3 pressure injury  1.5 cm L x 0.5 cm W x 0.1 cm D  Tunneling: none   Undermining:  none  Wound bed type/amount: approximately 20 % scar tissue and 80 % granular tissue; NA fluctuant  Wound Edges: open  Periwound: Scattered areas of blanchable erythema and moist areas and also areas of dry skin, scar tissue.    Drainage: small amounts serosanguinous  Odor: none noted  Pain: none  Photo from today's visit 4/19/24.  Pt is in left side lying position.      Photo's from 4/5/24, note pt is in left side lying position.     Photo from 11/18/22, initial resumption of visits to the Wound and Ostomy clinic.      Wound #3 Right buttock/posterior thigh fold, appears friction not pressure related, newly noted in clinic 3/8/23, has closed and reopened multiple times.    Stage/tissue depth: partial thickness  1.7 cm L x 0.3 cm W x 0 cm D  Tunneling: none  Undermining: none  Wound bed type/amount: approximately 20 % epithelial tissue and 80 % red nongranular tissue; Not fluctuant  Wound Edges: NA no depth  Periwound: wnl  Drainage: scant amounts sanguinous  Odor: no  Pain: no  Photo from today's visit 4/19/24.  Pt is in left side lying position.      Photo's from 4/12/24, note pt is in left side lying position.     Photo from 3/8/23, initial assessment of newly noted site.     Wound #4 Left Ischial tuberosity, newly noted in clinic 5/10/23.  Stage/tissue depth: full thickness  1 cm L x 2.2 cm W x 0 cm D  Tunneling: none  Undermining: none  Wound bed type/amount: approximately 30 % new scar tissue and 70 % pink to red granular tissue: Not fluctuant  Wound Edges: NA   Periwound:  Scattered pale blanchable erythema, less waxy appearance but remains very fragile.  Drainage: small amounts serous  Odor: no  Pain: no  Photo from today's visit 4/19/24.  Pt is in left side lying position.    Photo from 4/12/24, note pt is in left side lying position.     Photo from 5/10/23, initial assessment of new wound site.    Wound #5 Left trochanter, first assessed in clinic 3/15/24, per pt was first noted by AL approximately 2 weeks  prior.  Stage/tissue depth: stage 4 pressure injury  0.8 cm L x 0.1 cm W x 0.7 cm D  Tunneling: none, had tunneling at 10 o'clock but has resolved.   Undermining:  none any longer  Wound bed type/amount: as able to visualize approximately 50 % granular tissue and 50 % red nongranular tissue; Not fluctuant  Wound Edges: open but have localized edema noted today  Periwound: blanchable erythema, paler today and no longer fully encircles the wound  Drainage: moderate amounts serosanguinous and sanguinous  Odor: none not  Pain: yes with cares 5/10 per pt  Photo from today's visit 4/19/24.  Pt is supine.    Photo from 4/12/24, note pt is in right side lying position.    Photo from 3/15/24, initial assessment of new wound site.    Dorsalis Pedal Pulse: Not assessed this visit.  Posterior Tibial Pulse: Not assessed this visit.  Hair growth: Not assessed this visit  Capillary Refill: Not assessed this visit  Feet/toes color: Not assessed this visit  Nails: Not assessed this visit  R Leg: Edema Not assessed this visit. Ankle circumference NA cm. Calf circumference NA cm.  L Leg: Edema Not assessed this visit. Ankle circumference NA cm. Calf circumference NA cm.     Mobility: wheelchair bound  Current offloading/footwear: regular shoes/boots  Sensation: paraplegic, no sensation from sternum distally     Other callusing/areas of concern:    - Right hip, closed deep ulcer with reoccurring abscess.  Remains scar closed.   - New abrasion related to fall 4/12/24.  Left wrist, 0.3 cm L x 0.1 cm W x 0 cm D distinct abrasion 100 % partial thickness scab of dried serosanguinous drainage, no current drainage.  Photo from today's visit 4/19/24.     Photo from 4/12/24, date of injury.     Diet: Regular     Discussed/Education: plan of care with rationale;  pt is unable to do self wound cares, Home Care is performing wound cares three times weekly at [facility].     Assessment:  Pt on arrival has the Sacral wound vac running at correct  settings.  The NPWT dressing was in place is bridged to the Right hip/upper buttock  The left trochanter, right IT and left IT wounds and right buttock posterior thigh fold wound covered with gentle foam adhesive dressings.    Dressing was removed from Sacral wound: one piece black foam in the wound bed and one piece black foam used to bridge to the left hip area.  Visibly can confirm all dressings were removed.  All the wounds were cleansed with wound cleanser today dried well with gauze.      The Sacral wound:    The wound bed has is all clean with some scar tissue appearing on the inner lower edge.  No eschar or slough present any longer, no significant odor from the wound today.  The periwound skin has still denuded adhesive related trauma to the right side but the left has healed.    The Right IT wound: is larger today, more moist, less scar tissue.      The Right lower buttock/thigh fold, remains open but is smaller and slowly reepithelializing.     Left IT wound: is a little smaller, remains open, scant depth and granular tissue present    Left trochanter stage 4 pressure injury.  Is about the same, no significant change, no return of any tunneling and depth slightly less but no other noted changes.    Left wrist:  Traumatic wound from fall 4/12 out of wheelchair.  Partial thickness abrasion moist initially.  Today the site has fully dried up and is scar closed.      Ostomy:  Not assessed this visit, per pt continues to have trouble getting adequate amounts of ostomy appliances through home care.     Factors impacting wound healing:   Poor nutrition: inadequate supply of protein, carbohydrates, fatty acids, and trace elements essential for all phases of wound healing.  Pt is taking a daily protein supplement drink and is aware of need for increased protein in diet for wound healing.  She has focused on protein supplement drinks and snack. Four pound increase in body weight reported 1/19/24 per pt.   Delayed  healing as part of normal aging process  Reduced Blood Supply: inadequate perfusion to heal wound.  Appears adequate.  Medication: NA  Chemotherapy: suppresses the immune system and inflammatory response, NA  Radiotherapy: increases production of free radical which damage cells, NA  Psychological stress: None noted  Obesity: decreases tissue perfusion, NA  Infection: prolongs inflammatory phase, uses vital nutrients, impairs epithelialization and releases toxins, antimicrobial dressing included in sacral wound as of 2/5/24.  Underlying Disease: paraplegic  Maceration: reduces wound tensile strength and inhibits epithelial migration, none noted this visit  Patient compliance, appears motivated to heal  Unrelieved pressure, pt has pressure reduction cushion in wheelchair and lays in bed daily during the day for full pressure relief.    Immobility, limited  Substance abuse: NA     Plan:  We will continue with the same plan of care.  Home to see and discharge pt on Monday 4/22.  Scheduled for clinic visit Fri 4/26, Tuesday 4/30 and Fri 5/3/24.  Will extend ongoing twice weekly as needed for now.  We are unable to accommodate three times weekly visits and can only do the twice weekly visits for a few weeks.  She is scheduled to see Dr Baeza today to reestablish [facility], reminded her to discuss her home care issues and potential need for  help if facility is not helping at any time.    We will continue with the same plan of care for the Right IT and Left Trochanter wounds and will resume the same to the Left IT and Right buttock/posterior thigh fold wound as both have reopened: Aqaucel Ag covered with Mepilex.  Frequency of dressing changes will start to be twice weekly starting 4/22/24.    Sacral wound:  Will decrease to twice weekly starting next week as no home care available and clinic can not accommodate three times weekly visits.   - Stoma powder and Cavilon no sting skin prep applied to the left and right  buttock medial adhesive related skin injuries to dry and protect then covered with the KCI drape used to seal in the wound vac foams.  - One piece Promogran Yu applied to the wound bed, one continuous piece black KCI foam used to fill the wound bed and bridge to the right upper buttock.  - All outer skin exposed to black foam was protected with KCI drape, all foams sealed in with KCI drape, tract pad attached over hole cut in foam bridge.  - Negative pressure achieved at 125 mmHg continuous negative pressure.  Canister in place was approximately 3/4 th's full with drainage, canister was changed today 4/19/24.     Topical care: Wound/surrounding skin cleansed with wound cleanser and gauze. Patted dry. Wound cares as listed in Plan   Additional recommendations: None at this time     The following discharge instructions were reviewed with and sent home with the patient:  See AVS     The following supplies were sent home with the patient:  None this visit     Return visit: 4/26/24     Verbal, written, & demonstrative education provided.  Face to face time: approximately 50 minutes  Procedure: approximately 10 minutes wound vac dressing change to the sacral wound.     Presley Chester RN [facility]  [phone]
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
## 2024-04-26 NOTE — PATIENT INSTRUCTIONS
Today we did the wound vac dressing change and the wound cares to there right and left IT wounds, there right buttock thigh fold and left trochanter wound.    Without home care involved currently we have you scheduled for Tuesday and Friday clinic visits to do your wound vac and regular bandage changes.    As soon as you can get set up with a new home care agency we will go back to three times weekly bandages changes including the vac.    If you can not find a home care agency to perform 2 to 3 times weekly wound vac changes long term we may need to come up with a different plan of care.    Check your appointment list for all upcoming appointments with me next week and the week after.    Call with any concerns or questions.  [phone]    Presley Chester RN [email or web address]

## 2024-04-30 NOTE — TELEPHONE ENCOUNTER
If she is regularly catheterizing her own urine, or has an indwelling Ambrose catheter, likely she is colonized.  We would only get a UA if she has symptoms of pain, fever, nausea or vomiting etc. not just due to smell

## 2024-04-30 NOTE — PATIENT INSTRUCTIONS
Today all the wounds on your buttocks look good and the left hip wound is unchanged.    We will continue with the current plan for you to come to clinic twice weekly for wound vac dressing change and all conventional dressing changes.    If the home care that is coming out to meet you tomorrow can take you on as a pt that would be best.  We would increase back to three times weekly for wound cares, MWF.  You wound continue to come to the clinic every 2 to 3 weeks and as needed for any new concerns.    Until home care confirms they can take you on as a pt, and determine how often weekly than can see you, we will continue to have you come into clinic twice a week.  Your scheduled for this week Friday 5/3/24, next week Tuesday 5/7 and Friday 5/10 to return to clinic, all appointments are scheduled for 2 pm.  Home care can call Dr Baeza office and they will get the wound cares instructions from me to have faxed to the new agency if your admitted.    Any new concerns or questions call our scheduling line at [phone] and they will assist you.    Presley Chester RN [email or web address]

## 2024-04-30 NOTE — TELEPHONE ENCOUNTER
RN Triage    Patient Contact    Attempt # 1    Was call answered?  No.  Left message on voicemail with information to call me back.    Please relay providers note below.    Asya Aguirre RN on 4/30/2024 at 5:21 PM

## 2024-04-30 NOTE — TELEPHONE ENCOUNTER
"  General Call      Reason for Call:  Patient stopped in and requested a urine Lab order. Per patient \"funky smell to urine\"    What are your questions or concerns:  Patient will be back on Friday May 4th.    Date of last appointment with provider: 4/30 Wound Care.    Could we send this information to you in [employer] or would you prefer to receive a phone call?:   Patient would prefer a phone call   Okay to leave a detailed message?: Yes at Home number on file [phone] (home)   "

## 2024-05-01 NOTE — TELEPHONE ENCOUNTER
Phoned patient and informed her of documentation per Dr. Jasmin MD as noted below.      Patient stated she lives in a facility and her only transportation is [employer].  She stated she has a scheduled visit with wound care on Friday 5/3/2024 and could see provider on that day.  She stated it takes 2-3 days for [employer] to schedule a ride.      Patient verbalized that [employer] is her only transportation option at this time and could not come into the clinic today when offered a visit with PCP in a open clinic visit this afternoon.  Patient stated she also does not use My Chart and is unable to set up virtual or E-visit.     Patient states she has some pain and foul smell with urination.    Advised patient to call 911 for worsening symptoms.  Patient stated understanding.    Will forward to PCP for review.    Priti Peralta RN

## 2024-05-01 NOTE — TELEPHONE ENCOUNTER
Phoned patient back and informed her of documentation per Dr. Felisha MD as stated below.  Patient stated understanding.  Helped patient schedule lab visit for Friday 5/3/2024.  Encouraged and advised patient to seek urgent/ emergency care/ call 911 for worsening symptoms.  Patient stated understanding.    Priti Peralta RN

## 2024-05-01 NOTE — TELEPHONE ENCOUNTER
She doesn't need another clinic visit. I placed order for UA. I will treat based off of the UA results.  Ivan Baeza MD

## 2024-05-02 NOTE — TELEPHONE ENCOUNTER
Called and left detailed message with approval of home care orders as requested and Chronic wound staging per wound care.    Elba Villarreal RN on 5/2/2024 at 11:52 AM

## 2024-05-02 NOTE — TELEPHONE ENCOUNTER
OK for verbal orders per Home Care request. Chronic wound staging per Wound Care.  Ivan Baeza MD

## 2024-05-02 NOTE — PROGRESS NOTES
[facility]     Start of Care in [facility]: 11/18/2022, initial resumption of visit, see Wound History for details.  Referring Doctor: Ivan Baeza MD original referral 12/21/21, updated referral 10/24/22 Dr Scott BENJAMIN ED provider.    Primary Care Provider: VAZQUEZ Cuellar MD in [place] currently as of 4/12/24 but per pt has not seen recently and is changing back to Dr Ivan Baeza, appointment to reestablish primary cares 4/19/24.    Wound Location: Sacral, Right Ischial Tuberosity, Right thigh/buttock fold, Right hip (healed).  Left buttock mid (healed) and Left Ischial Tuberosity (reopened 4/12/24).  New wound Left Trochanter 3/15/24.     Wound Clinic Visit:  Scheduled follow up.     Reason for Visit: Wound assessments and cares     Subjective:  On arrival today 4/30/24 alone, she reports the following:  She has a new home care agency coming out to see her tomorrow Wednesday, not sure of the name or what services they may be able to provide.  No concerns with the vac being on for longer period of time with this week being the reduction to twice weekly dressing changes due to no home care assist.     Wound History:   Pt well known to me from visits over many years to the Wound and Ostomy clinic for chronic pressure injuries.  Initial visit November of 2020 for Right IT, Sacral, left trochanter and right heel pressure injuries.  She missed a few follow up appointments initially but was mostly coming into clinic every 3 to 4 weeks for evaluation and recommendations.  After a hospitalization at [facility] in [place] due to urinary diversion concerns and a right hip abscess she resumed visit to the Wound and Ostomy clinic on 11/18/22 for wound vac dressing changes and assessment. The right hip wound improved and as of my visit with her on 12/16/22 was nearly healed, no depth and only small open aspect all granular tissue.  It was reported the wound did fully  close soon after that assessment per AL staff.  On 1/3/23 the pt's facility sent her to the ED for the right hip, it had re opened and was draining copious amounts of purulent drainage.  I was able to see the pt in the ED and was able to add the right hip wound into the intact sacral wound vac set up.  At my last clinic visit with the pt on 2/3/23 the sacral wound was not in good condition, lots of slough, eschar on the edges and bone visible and palpable in the center.  I made arrangement with Dr Zavala's approval to have her seen in Specialty for potential debridement but appointment was approximately four weeks out. She was hospitalized at [facility] for revision of her urinary diversion 2/22/23 - 2/25/23 and was seen by [facility] nursing, the photo's from that assessment showed the sacral wound looking much improved.  No debriding was needed. She did see Dr Zavala on 2/27/23 and the sacral wound at that time was improving well with no need for debridement.  New wounds noted 5/10/23 to left buttock, Left IT and mid buttock.  Left mid buttock closed as of 6/9/23 and remains closed.  Left IT has repeatedly closed and opened, when closed often is covered with very waxy appearing fragile scar tissue.  New wound to the left hip, first assessed in clinic 3/15/24, per pt was first noted by AL staff two weeks prior, deep wound, home cares were doing wet to dry dressing changes MWF up till clinic presentation.      Past Medical History:  Patient Active Problem List   Diagnosis    Migraine headache    Urinary retention    GERD (gastroesophageal reflux disease)    Flaccid paraplegia (H)    Decubitus ulcer of buttock    Pressure ulcer of heel    Poor appetite    Nausea    Generalized weakness    Burn    Health Care Home    Paraplegia following spinal cord injury (H)    ACP (advance care planning)    Osteomyelitis of hip (right periacetabular infection with osteomyelitis)    Severe protein-calorie malnutrition (H24)    Microcytic  anemia    Neurogenic bladder    Anxiety    Insomnia    Chronic UTI (urinary tract infection)    Skin ulcer of buttock (bilateral ischial tuberosity ulcers)    CARDIOVASCULAR SCREENING; LDL GOAL LESS THAN 160    Open wound of foot except toes with complication    LLQ abdominal pain    Paralytic ileus (H)    Chest wall pain    Decubitus skin ulcer    S/P flap graft    Pancreatitis    Pericardial effusion    Hospice care patient    Seizures (H)    AMS (altered mental status)    Admission for hospice care    Odynophagia    Portal vein thrombosis    Foreign body in esophagus, initial encounter    Impacted foreign body in esophagus, initial encounter    Aspiration pneumonia of right lung due to regurgitated food, unspecified part of lung (H)    Septic shock (H)    Depressive disorder    Colostomy present (H)    Chronic pain due to trauma    Hypokalemia    Abscess of right hip    Stage 3 skin ulcer of sacral region (H)    Pressure injury of right hip, stage 3 (H)    Continuous opioid dependence (H)                 Tobacco Use:     Tobacco Use      Smoking status: Never      Smokeless tobacco: Never     Diabetic: NA  HgbA1C:   Hemoglobin A1C   Date Value Ref Range Status   05/26/2021 5.1 0 - 5.6 % Final     Comment:     Normal <5.7% Prediabetes 5.7-6.4%  Diabetes 6.5% or higher - adopted from ADA   consensus guidelines.     Checks Blood Glucose?:  NA Average Readings: NA     Personal/social history:  Pt lives in the [facility] in [facility], they do not provide any wound cares.  Had home care services [facility] 8/30/23 - 3/11/24.  [facility] 3/13/24 - 4/22/24.  Pt's AL is assisting with search for new home care agency, new agency of unknown name to see pt for intake interview 5/1/24.     Objective:   Current treatment plan:   - Sacral wound, NPWT at 125 mmHg continuous negative pressure changed twice weekly as of 4/22/24, Promogran Yu to undermining areas then the vac dressing as planned.    -  Right and Left IT, right buttock thick fold and Left Trochanter wounds: Aquacel Ag and Gentle adhesive foam dressing changed twice weekly as of 4/22/24.   Last changed: all dressings 4/26/24 by [facility] nurse in clinic.     Wound #1 Sacral   Stage/tissue depth: stage 4 pressure injury, stage updated 1/13/23 as bone became visible and palpable in the wound bed.   2.2 cm L x 8 cm W x 1.4 cm D  Tunneling: none noted  Undermining: around entire perimeter 12 o'clock 2.3 cm, 3 o'clock 0.5 cm, 6 o'clock 0.3 cm, 9 o'clock 0 cm, 10 o'clock - 11 o'clock 2.1 cm.  Wound bed type/amount: approximately 90 % granular tissue and 10 % scar tissue: NA fluctuant  Wound Edges: approximately 20 % closed with epibole along the inferior edge  Periwound: scar tissue, scattered pale blanchable erythema, skin denudement sites appear like medical adhesive skin injuries:  - right buttock 1.5 cm L x 1.5 cm W x 0 cm D, wound is 100 % red nongranular tissue located over old scar tissue, small amounts sanguinous drainage.  Drainage: moderate amounts serosanguinous from the main wound  Odor: mild odor noted prior to dressing removal, during dressing removal, resolved after cleansing with soap and water.   Pain: denied any pain today.    Photo's from today's visit 4/30/24, pt is in left side lying position.        Photo's from 4/26/24, pt is in left side lying position.      Photo's from 3/22/24, note pt is in left side lying position.     Photo from 11/18/22, initial resumption of visits to the Wound and Ostomy clinic.      Wound #2 Right Ischial Tuberosity   Stage/tissue depth: stage 3 pressure injury  0.8 cm L x 0.1 cm W x 0.1 cm D  Tunneling: none   Undermining: none  Wound bed type/amount: 100 % granular tissue; NA fluctuant  Wound Edges: open  Periwound: scar tissue.    Drainage: small amounts serosanguinous  Odor: none noted  Pain: none  Photo from today's visit 4/30/24, pt is in left side lying position.    Photo's from 4/26/24, pt is in left  side lying position.     Photo from 11/18/22, initial resumption of visits to the Wound and Ostomy clinic.      Wound #3 Right buttock/posterior thigh fold, appears friction not pressure related, newly noted in clinic 3/8/23, has closed and reopened multiple times.    Stage/tissue depth: partial thickness  1.5 cm L x 0.5 cm W x 0 cm D  Tunneling: none  Undermining: none  Wound bed type/amount: 100 % red nongranular tissue; Not fluctuant  Wound Edges: NA no depth  Periwound: wnl  Drainage: small amounts sanguinous  Odor: no  Pain: no  Photo from today's visit 4/30/24, pt is in left side lying position.    Photo from 4/26/24, pt is in left side lying position.     Photo from 3/8/23, initial assessment of newly noted site.     Wound #4 Left Ischial tuberosity, newly noted in clinic 5/10/23.  Stage/tissue depth: full thickness  0.8 cm L x 0.5 cm W x 0 cm D  Tunneling: none  Undermining: none  Wound bed type/amount: 100 % pink to red granular tissue: Not fluctuant  Wound Edges: NA   Periwound:  Scattered pale blanchable erythema, less waxy appearance but remains very fragile.  Drainage: small amounts serous  Odor: no  Pain: no  Photo from today's visit 4/30/24, pt is in left side lying position.    Photo from 4/26/24, pt is in left side lying position.     Photo from 5/10/23, initial assessment of new wound site.    Wound #5 Left trochanter, first assessed in clinic 3/15/24, per pt was first noted by AL approximately 2 weeks prior.  Stage/tissue depth: stage 4 pressure injury  0.8 cm L x 0.6 cm W x 0.8 cm D  Tunneling: none, had tunneling at 10 o'clock but has resolved.   Undermining:  none any longer  Wound bed type/amount: as able to visualize approximately 70 % granular tissue and 30 % very thin and fragile new dry scar tissue; Not fluctuant  Wound Edges: open   Periwound: scant blanchable erythema  Drainage: moderate amounts serous and serosanguinous.  Odor: none not  Pain: yes with cares 4/10 per pt  Photo from  today's visit 4/30/24, pt is supine.    Photo from 4/26/24, pt is supine.    Photo from 3/15/24, initial assessment of new wound site.    Dorsalis Pedal Pulse: Not assessed this visit.  Posterior Tibial Pulse: Not assessed this visit.  Hair growth: Not assessed this visit  Capillary Refill: Not assessed this visit  Feet/toes color: Not assessed this visit  Nails: Not assessed this visit  R Leg: Edema Not assessed this visit. Ankle circumference NA cm. Calf circumference NA cm.  L Leg: Edema Not assessed this visit. Ankle circumference NA cm. Calf circumference NA cm.     Mobility: wheelchair bound  Current offloading/footwear: regular shoes/boots  Sensation: paraplegic, no sensation from sternum distally     Other callusing/areas of concern:    - Right hip, closed deep ulcer with reoccurring abscess.  Remains scar closed.      Diet: Regular     Discussed/Education: plan of care with rationale;  pt is unable to do self wound cares, Home Care is performing wound cares three times weekly at AL.     Assessment:  Pt on arrival has the Sacral wound vac running at correct settings.  The NPWT dressing was in place is bridged to the right hip/upper buttock  The left trochanter, right IT and left IT wounds and right buttock posterior thigh fold wound covered with gentle foam adhesive dressings.    Dressing was removed from Sacral wound: one piece black foam in the wound bed and one piece black foam used to bridge to the left hip area.  Visibly can confirm all dressings were removed.  Odor was mild today but again I felt it best to wash the pt's entire buttocks and upper posterior thighs with Chlorhexidine soap and water, then rinsed with water and again with saline, saline bullets used to rinse the sacral wound.  No odor noted after cares    The Sacral wound:    The eschar present last visit has debrided itself, no new concerns noted, vac held up well with longer wear time.    The Right IT wound: is much improved, mostly scar  closed now with small open aspect, less moist overall.     The Right lower buttock/thigh fold, also improved well since last visit, smaller, drying out, most the most superior aspect is the only open spot noted today, the inferior area has red dry flaky intact skin but no open or moist tissue.    Left IT wound: is a about the same today, less periwound moisture but wound itself about the same.    Left trochanter stage 4 pressure injury.  No change, slightly deeper and some new very fragile and likely nonviable scar tissue present to the anterior of the wound.      Left wrist:  Traumatic wound from fall 4/12 out of wheelchair.  Partial thickness abrasion moist initially.  Remains healed, will remove from list of skin sites to monitor.       Ostomy:  Not assessed this visit, per pt continues to have trouble getting adequate amounts of ostomy appliances through home care.     Factors impacting wound healing:   Poor nutrition: inadequate supply of protein, carbohydrates, fatty acids, and trace elements essential for all phases of wound healing.  Pt is taking a daily protein supplement drink and is aware of need for increased protein in diet for wound healing.  She has focused on protein supplement drinks and snack. Four pound increase in body weight reported 1/19/24 per pt.   Delayed healing as part of normal aging process  Reduced Blood Supply: inadequate perfusion to heal wound.  Appears adequate.  Medication: NA  Chemotherapy: suppresses the immune system and inflammatory response, NA  Radiotherapy: increases production of free radical which damage cells, NA  Psychological stress: None noted  Obesity: decreases tissue perfusion, NA  Infection: prolongs inflammatory phase, uses vital nutrients, impairs epithelialization and releases toxins, antimicrobial dressing included in sacral wound as of 2/5/24.  Underlying Disease: paraplegic  Maceration: reduces wound tensile strength and inhibits epithelial migration, none  noted this visit  Patient compliance, appears motivated to heal  Unrelieved pressure, pt has pressure reduction cushion in wheelchair and lays in bed daily during the day for full pressure relief.    Immobility, limited  Substance abuse: NA     Plan:  We will continue with the same plan of care.  Scheduled for clinic visits Fri 5/3/24, then Tue 5/7 and Fri 5/10 next week.  We will wait till home care can evaluate pt and are able to start services before we alter our plans for her to return to clinic.  We are unable to accommodate three times weekly visits and can only do the twice weekly visits for a few weeks.    We will continue with the same plan of care for the Right and Left IT, Left Trochanter and Right buttock/posterior thigh fold wounds: Aqaucel Ag covered with Mepilex.  Frequency of dressing changes twice weekly started 4/22/24 with home cares discharge.    Sacral wound:    - Stoma powder and Cavilon no sting skin prep applied to the right buttock medial adhesive related skin injuries to dry and protect then covered with the KCI drape used to seal in the wound vac foams.  - One piece Promogran Yu applied to the wound bed, one continuous piece black KCI foam used to fill the wound bed and bridge to the right upper buttock.  - All outer skin exposed to black foam was protected with KCI drape, all foams sealed in with KCI drape, tract pad attached over hole cut in foam bridge.  - Negative pressure achieved at 125 mmHg continuous negative pressure.  Canister in place was approximately 3/4 th's full with drainage, canister was changed today 4/30/24.     Topical care: Wound/surrounding skin cleansed with wound cleanser and gauze. Patted dry. Wound cares as listed in Plan   Additional recommendations: None at this time     The following discharge instructions were reviewed with and sent home with the patient:  See AVS     The following supplies were sent home with the patient:  None this visit     Return  visit: 5/3/24     Verbal, written, & demonstrative education provided.  Face to face time: approximately 45 minutes  Procedure: approximately 10 minutes wound vac dressing change to the sacral wound.     Presley Chester RN [facility]  [phone]

## 2024-05-03 NOTE — PATIENT INSTRUCTIONS
No change to the current plan of care.  With home care on board again we will go back to three times weekly dressing changes for all the wounds.  They will need to contact Dr Baeza for orders, his team will work off my notes to make new orders for home care.    I will see you again next week Friday and we have you scheduled the following Friday also.  See your appointment list for all upcoming scheduled visits.  Call with any new concerns.    [phone].  Presley Chester RN [email or web address]

## 2024-05-03 NOTE — TELEPHONE ENCOUNTER
----- Message from Ivan Baeza MD sent at 5/3/2024  2:47 PM CDT -----  Please call with results. Your test results fall within the expected range(s) or remain unchanged from previous results.  Please continue with your current treatment plan.  No antibiotic therapy indicated.       Ivan Baeza MD

## 2024-05-03 NOTE — LETTER
May 7, 2024      Felicia Ellison  [facility]  [address] [address]        Dear ,    We are writing to inform you of your test results.    Your test results fall within the expected range(s) or remain unchanged from previous results.  Please continue with current treatment plan.    Resulted Orders   UA Macroscopic with reflex to Microscopic and Culture - Lab Collect   Result Value Ref Range    Color Urine Yellow Colorless, Straw, Light Yellow, Yellow    Appearance Urine Cloudy (A) Clear    Glucose Urine Negative Negative mg/dL    Bilirubin Urine Negative Negative    Ketones Urine Negative Negative mg/dL    Specific Gravity Urine 1.010 1.003 - 1.035    Blood Urine Negative Negative    pH Urine 7.0 5.0 - 7.0    Protein Albumin Urine 100 (A) Negative mg/dL    Urobilinogen Urine Normal Normal, 2.0 mg/dL    Nitrite Urine Negative Negative    Leukocyte Esterase Urine Trace (A) Negative    Bacteria Urine Few (A) None Seen /HPF    RBC Urine 0 <=2 /HPF    WBC Urine 7 (H) <=5 /HPF    Narrative    Urine Culture not indicated       If you have any questions or concerns, please call the clinic at the number listed above.       Sincerely,    Ivan Baeza MD

## 2024-05-03 NOTE — PROGRESS NOTES
[facility].     Start of Care in [facility]: 11/18/2022, initial resumption of visit, see Wound History for details.  Referring Doctor: Ivan Baeza MD original referral 12/21/21, updated referral 10/24/22 Dr Scott BENJAMIN ED provider.    Primary Care Provider: VAZQUEZ Cuellar MD in [place] currently as of 4/12/24 but per pt has not seen recently and is changing back to Dr Ivan Baeza, appointment to reestablish primary cares 4/19/24.    Wound Location: Sacral, Right Ischial Tuberosity, Right thigh/buttock fold, Right hip (healed).  Left buttock mid (healed) and Left Ischial Tuberosity (reopened 4/12/24).  New wound Left Trochanter 3/15/24.     Wound Clinic Visit:  Scheduled follow up.     Reason for Visit: Wound assessments and cares     Subjective:  On arrival today 5/3/24 alone, she reports the following:  New home care agency came out on Wednesday of this week, they did all wound cares per last instructions.  Per the pt they will be coming twice weekly on Monday and Wednesday's for wound cares starting Mon 5/6/24, she will need to continue with weekly Friday visits here in clinic.  Pt felt new agency nurse was very nice and respectful and she is happy to have new team starting up.       Wound History:   Pt well known to me from visits over many years to the Wound and Ostomy clinic for chronic pressure injuries.  Initial visit November of 2020 for Right IT, Sacral, left trochanter and right heel pressure injuries.  She missed a few follow up appointments initially but was mostly coming into clinic every 3 to 4 weeks for evaluation and recommendations.  After a hospitalization at [facility] in [place] due to urinary diversion concerns and a right hip abscess she resumed visit to the Wound and Ostomy clinic on 11/18/22 for wound vac dressing changes and assessment. The right hip wound improved and as of my visit with her on 12/16/22 was nearly healed, no depth  and only small open aspect all granular tissue.  It was reported the wound did fully close soon after that assessment per AL staff.  On 1/3/23 the pt's facility sent her to the ED for the right hip, it had re opened and was draining copious amounts of purulent drainage.  I was able to see the pt in the ED and was able to add the right hip wound into the intact sacral wound vac set up.  At my last clinic visit with the pt on 2/3/23 the sacral wound was not in good condition, lots of slough, eschar on the edges and bone visible and palpable in the center.  I made arrangement with Dr Zavala's approval to have her seen in Specialty for potential debridement but appointment was approximately four weeks out. She was hospitalized at [facility] for revision of her urinary diversion 2/22/23 - 2/25/23 and was seen by [facility] nursing, the photo's from that assessment showed the sacral wound looking much improved.  No debriding was needed. She did see Dr Zavala on 2/27/23 and the sacral wound at that time was improving well with no need for debridement.  New wounds noted 5/10/23 to left buttock, Left IT and mid buttock.  Left mid buttock closed as of 6/9/23 and remains closed.  Left IT has repeatedly closed and opened, when closed often is covered with very waxy appearing fragile scar tissue.  New wound to the left hip, first assessed in clinic 3/15/24, per pt was first noted by AL staff two weeks prior, deep wound, home cares were doing wet to dry dressing changes MWF up till clinic presentation.      Past Medical History:  Patient Active Problem List   Diagnosis    Migraine headache    Urinary retention    GERD (gastroesophageal reflux disease)    Flaccid paraplegia (H)    Decubitus ulcer of buttock    Pressure ulcer of heel    Poor appetite    Nausea    Generalized weakness    Burn    Health Care Home    Paraplegia following spinal cord injury (H)    ACP (advance care planning)    Osteomyelitis of hip (right periacetabular  infection with osteomyelitis)    Severe protein-calorie malnutrition (H24)    Microcytic anemia    Neurogenic bladder    Anxiety    Insomnia    Chronic UTI (urinary tract infection)    Skin ulcer of buttock (bilateral ischial tuberosity ulcers)    CARDIOVASCULAR SCREENING; LDL GOAL LESS THAN 160    Open wound of foot except toes with complication    LLQ abdominal pain    Paralytic ileus (H)    Chest wall pain    Decubitus skin ulcer    S/P flap graft    Pancreatitis    Pericardial effusion    Hospice care patient    Seizures (H)    AMS (altered mental status)    Admission for hospice care    Odynophagia    Portal vein thrombosis    Foreign body in esophagus, initial encounter    Impacted foreign body in esophagus, initial encounter    Aspiration pneumonia of right lung due to regurgitated food, unspecified part of lung (H)    Septic shock (H)    Depressive disorder    Colostomy present (H)    Chronic pain due to trauma    Hypokalemia    Abscess of right hip    Stage 3 skin ulcer of sacral region (H)    Pressure injury of right hip, stage 3 (H)    Continuous opioid dependence (H)                 Tobacco Use:     Tobacco Use      Smoking status: Never      Smokeless tobacco: Never     Diabetic: NA  HgbA1C:   Hemoglobin A1C   Date Value Ref Range Status   05/26/2021 5.1 0 - 5.6 % Final     Comment:     Normal <5.7% Prediabetes 5.7-6.4%  Diabetes 6.5% or higher - adopted from ADA   consensus guidelines.     Checks Blood Glucose?:  NA Average Readings: NA     Personal/social history:  Pt lives in the [facility] in [facility] [facility], they do not provide any wound cares.  Had home care services [facility] 8/30/23 - 3/11/24.  [facility] 3/13/24 - 4/22/24.  [facility] 5/1/24, will do Mon and Wed visits for wound cares starting 5/6/24.      Objective:   Current treatment plan:   - Sacral wound,  Promogran Yu to undermining areas then NPWT at 125 mmHg continuous negative pressure changed twice  weekly as of 4/22/24, will increase to 3 times weekly 5/6/24.   - Right and Left IT, Right Buttock/thigh fold and Left Trochanter wounds: Aquacel Ag and Gentle adhesive foam dressing changed twice weekly as of 4/22/24, will increase to 3 times weekly 5/6/24.   Last changed: all dressings 5/1/24 by [facility] home care agency nurse.     Wound #1 Sacral   Stage/tissue depth: stage 4 pressure injury, stage updated 1/13/23 as bone became visible and palpable in the wound bed.   2.4 cm L x 8 cm W x 1.4 cm D  Tunneling: none noted  Undermining: around entire perimeter 12 o'clock 2.1 cm, 3 o'clock 0.5 cm, 6 o'clock 0.3 cm, 9 o'clock 0 cm, 10 o'clock - 11 o'clock 2 cm.  Wound bed type/amount: approximately 90 % granular tissue. 1 % soft moist black eschar and 10 % scar tissue: NA fluctuant  Wound Edges: approximately 20 % closed with epibole along the inferior edge  Periwound: scar tissue, scattered pale blanchable erythema, skin denudement sites appear like medical adhesive skin injuries:  - right buttock 1 cm L x 1 cm W x 0 cm D, wound is 100 % red nongranular tissue located over old scar tissue, small amounts sanguinous drainage.  Drainage: moderate amounts serosanguinous from the main wound  Odor: mild odor noted prior to dressing removal, during dressing removal, resolved after cleansing with soap and water.   Pain: denied any pain today.    Photo's from today's visit 5/3/24, pt is in left side lying position.      Photo's from 4/30/24, pt is in left side lying position.      Photo's from 3/22/24, note pt is in left side lying position.     Photo from 11/18/22, initial resumption of visits to the Wound and Ostomy clinic.      Wound #2 Right Ischial Tuberosity   Stage/tissue depth: stage 3 pressure injury  0.5 cm L x 0.3 cm W x 0.1 cm D  Tunneling: none   Undermining: none  Wound bed type/amount: 100 % granular tissue; NA fluctuant  Wound Edges: open  Periwound: scar tissue.    Drainage: small amounts serosanguinous  Odor:  none noted  Pain: none    Photo's from today's visit 5/3/24, pt is in left side lying position.    Photo from 4/30/24, pt is in left side lying position.     Photo from 11/18/22, initial resumption of visits to the Wound and Ostomy clinic.      Wound #3 Right buttock/posterior thigh fold, appears friction not pressure related, newly noted in clinic 3/8/23, has closed and reopened multiple times.    Stage/tissue depth: partial thickness  0.5 cm L x 0.2 cm W x 0 cm D  Tunneling: none  Undermining: none  Wound bed type/amount: 100 % red nongranular tissue; Not fluctuant  Wound Edges: NA no depth  Periwound: wnl  Drainage: small amounts sanguinous  Odor: no  Pain: no  Photo from today's visit 5/3/24, pt is in left side lying position.    Photo from 4/30/24, pt is in left side lying position.     Photo from 3/8/23, initial assessment of newly noted site.     Wound #4 Left Ischial tuberosity, newly noted in clinic 5/10/23.  Stage/tissue depth: full thickness  0.8 cm L x 0.8 cm W x 0 cm D  Tunneling: none  Undermining: none  Wound bed type/amount: 100 % pink to red dry granular tissue: Not fluctuant  Wound Edges: NA   Periwound:  Scattered pale blanchable erythema.  Drainage: small amounts serous  Odor: no  Pain: no  Photo from today's visit 5/3/24, pt is in left side lying position.    Photo from 4/30/24, pt is in left side lying position.     Photo from 5/10/23, initial assessment of new wound site.    Wound #5 Left trochanter, first assessed in clinic 3/15/24, per pt was first noted by AL approximately 2 weeks prior.  Stage/tissue depth: stage 4 pressure injury  0.8 cm L x 0.7 cm W x 0.6 cm D  Tunneling: none, had tunneling at 10 o'clock but has resolved.   Undermining:  none any longer  Wound bed type/amount: as able to visualize approximately 70 % granular tissue and 30 % very thin and fragile new dry scar tissue; Not fluctuant  Wound Edges: open   Periwound: scant blanchable erythema  Drainage: moderate amounts  serous and serosanguinous.  Odor: none not  Pain: yes with cares 4/10 per pt  Photo from today's visit 5/3/24, pt is supine.    Photo from 4/30/24, pt is supine.    Photo from 3/15/24, initial assessment of new wound site.    Dorsalis Pedal Pulse: Not assessed this visit.  Posterior Tibial Pulse: Not assessed this visit.  Hair growth: Not assessed this visit  Capillary Refill: Not assessed this visit  Feet/toes color: Not assessed this visit  Nails: Not assessed this visit  R Leg: Edema Not assessed this visit. Ankle circumference NA cm. Calf circumference NA cm.  L Leg: Edema Not assessed this visit. Ankle circumference NA cm. Calf circumference NA cm.     Mobility: wheelchair bound  Current offloading/footwear: regular shoes/boots  Sensation: paraplegic, no sensation from sternum distally     Other callusing/areas of concern:    - Right hip, closed deep ulcer with reoccurring abscess.  Remains scar closed.      Diet: Regular     Discussed/Education: plan of care with rationale;  pt is unable to do self wound cares, Home Care is performing wound cares three times weekly at AL.     Assessment:  Pt on arrival has the Sacral wound vac running at correct settings.  The NPWT dressing was in place is bridged to the right hip/upper buttock  The left trochanter, right IT and left IT wounds and right buttock posterior thigh fold wound covered with gentle foam adhesive dressings.    Dressing was removed from Sacral wound: one piece black foam in the wound bed and one piece black foam used to bridge to the left hip area.  Visibly can confirm all dressings were removed.  Odor was mild today but again I felt it best to wash the pt's entire buttocks and upper posterior thighs with Chlorhexidine soap and water, then rinsed with water and again with saline, saline bullets used to rinse the sacral wound.  No odor noted after cares    The Sacral wound:    Overall no significant change, small new area of eschar noted to the 3 o'clock  inner wound edge, the eschar in this wound has come and gone with no surgical interventions needed and is not of concern today.    The Right IT wound: is again improved but remains open in small amount.  Is within a fold so likely will be slow to close fully and prone to re open.     The Right lower buttock/thigh fold, also improved well since last visit, smaller, drying out.    Left IT wound: is a about the same today, no changes noted.    Left trochanter stage 4 pressure injury.  About the same also this visit, is clean and had some reduction in depth today.      Ostomy:  Not assessed this visit, per pt continues to have trouble getting adequate amounts of ostomy appliances through home care.     Factors impacting wound healing:   Poor nutrition: inadequate supply of protein, carbohydrates, fatty acids, and trace elements essential for all phases of wound healing.  Pt is taking a daily protein supplement drink and is aware of need for increased protein in diet for wound healing.  She has focused on protein supplement drinks and snack. Four pound increase in body weight reported 1/19/24 per pt.   Delayed healing as part of normal aging process  Reduced Blood Supply: inadequate perfusion to heal wound.  Appears adequate.  Medication: NA  Chemotherapy: suppresses the immune system and inflammatory response, NA  Radiotherapy: increases production of free radical which damage cells, NA  Psychological stress: None noted  Obesity: decreases tissue perfusion, NA  Infection: prolongs inflammatory phase, uses vital nutrients, impairs epithelialization and releases toxins, antimicrobial dressing included in sacral wound as of 2/5/24.  Underlying Disease: paraplegic  Maceration: reduces wound tensile strength and inhibits epithelial migration, none noted this visit  Patient compliance, appears motivated to heal  Unrelieved pressure, pt has pressure reduction cushion in wheelchair and lays in bed daily during the day for full  pressure relief.    Immobility, limited  Substance abuse: NA     Plan:  We will continue with the same plan of care.  Scheduled for weekly clinic visits on Fridays only now, canceled Tuesday visits with home care coming on board next Monday.  Will increase frequency of wound dressing changes back to three times weekly.    We will continue with the same plan of care for the Right and Left IT, Left Trochanter and Right buttock/posterior thigh fold wounds: Aqaucel Ag covered with Mepilex.  MWF changes starting Mon 5/6    Sacral wound:    - Stoma powder and Cavilon no sting skin prep applied to the right buttock medial adhesive related skin injuries to dry and protect then covered with the KCI drape used to seal in the wound vac foams.  - One piece Promogran Yu applied to the wound bed, one continuous piece black KCI foam used to fill the wound bed and bridge to the right upper buttock.  - All outer skin exposed to black foam was protected with KCI drape, all foams sealed in with KCI drape, tract pad attached over hole cut in foam bridge.  - Negative pressure achieved at 125 mmHg continuous negative pressure.  Canister in place was approximately 3/4 th's full with drainage, canister was changed today 5/3/24.     Topical care: Wound/surrounding skin cleansed with wound cleanser and gauze. Patted dry. Wound cares as listed in Plan   Additional recommendations: None at this time     The following discharge instructions were reviewed with and sent home with the patient:  See AVS     The following supplies were sent home with the patient:  None this visit     Return visit: 5/10/24     Verbal, written, & demonstrative education provided.  Face to face time: approximately 55 minutes  Procedure: approximately 10 minutes wound vac dressing change to the sacral wound.     Presley Chester RN [facility]  [phone]

## 2024-05-06 NOTE — TELEPHONE ENCOUNTER
RN TRIAGE CALL:    Patient Contact    Attempt # 2    Was call answered?  No.  Left message on voicemail with information to call any one of the triage nurses back regarding some test results.    Priti Peralta RN

## 2024-05-07 NOTE — TELEPHONE ENCOUNTER
RN TRIAGE CALL:    Patient Contact    Attempt # 3    Was call answered?  No.  Left message on voicemail with information to call me back.    Third attempt to call patient to attempt to give lab results, per provider note pasted below. Unable to get a hold of patient by phone. Please mail her a letter with results/result note from UA done on 5/3/24.    Juju Ledezma, CHRISTINAN, RN

## 2024-05-09 NOTE — TELEPHONE ENCOUNTER
Please notify patient, home care. I placed STANDING orders for UA every 2 weeks as needed.  Orders Placed This Encounter    UA Macroscopic with reflex to Microscopic and Culture - Lab Collect     Standing Status:   Standing     Number of Occurrences:   24     Standing Expiration Date:   5/9/2025     Ivan Baeza MD

## 2024-05-09 NOTE — TELEPHONE ENCOUNTER
Desirae with [facility] calling to see if PCP would place a UA for patient to do tomorrow when they are in clinic for wound care. She notes that patient is prone to UTIs and urine has a very strong odor so she is thinking patient has a UTI. Denies any other symptoms.     Also wondering if PCP can place order for pressure relief mattress. Patient has multiple stage 2 and 3 pressure ulcers and is unable to reposition due to being paraplegic.     Patient resides at [facility] and any information can be relayed back to them at [phone].     Jaycee Owens, CHRISTINAN, RN

## 2024-05-09 NOTE — TELEPHONE ENCOUNTER
RN called [facility] and relayed message of standing UA orders to RN. They are requesting this order be faxed to them at [phone].     CHRISTINA CasasN, RN

## 2024-05-09 NOTE — TELEPHONE ENCOUNTER
Please address other request:    Also wondering if PCP can place order for pressure relief mattress. Patient has multiple stage 2 and 3 pressure ulcers and is unable to reposition due to being paraplegic.     CHRISTINA CasasN, RN

## 2024-05-10 NOTE — PROGRESS NOTES
[facility].     Start of Care in [facility]: 11/18/2022, initial resumption of visit, see Wound History for details.  Referring Doctor: Ivan Baeza MD original referral 12/21/21, updated referral 10/24/22 Dr Scott BENJAMIN ED provider.    Primary Care Provider: VAZQUEZ Cuellar MD in [place] currently as of 4/12/24 but per pt has not seen recently and is changing back to Dr Ivan Baeza, appointment to reestablish primary cares 4/19/24.    Wound Location: Sacral, Right Ischial Tuberosity, Right thigh/buttock fold, Right hip (healed).  Left buttock mid (healed) and Left Ischial Tuberosity (reopened 4/12/24).  New wound Left Trochanter 3/15/24.     Wound Clinic Visit:  Scheduled follow up.     Reason for Visit: Wound assessments and cares     Subjective:  On arrival today 5/10/24 alone, she reports the following:  [facility] has made her visits as planned on Monday and Wednesday for wound cares.  No concerns, pt felt the new nurses were very nice and did a good job.  See Assessment and plan concerning the APM mattress discussion and pt's concerns.  Pt also reports she had an US scan of her bladder for general follow up and polyps were found, she will be having a procedure to address/diagnose but no set date as of this time.         Wound History:   Pt well known to me from visits over many years to the Wound and Ostomy clinic for chronic pressure injuries.  Initial visit November of 2020 for Right IT, Sacral, left trochanter and right heel pressure injuries.  She missed a few follow up appointments initially but was mostly coming into clinic every 3 to 4 weeks for evaluation and recommendations.  After a hospitalization at [facility] in [place] due to urinary diversion concerns and a right hip abscess she resumed visit to the Wound and Ostomy clinic on 11/18/22 for wound vac dressing changes and assessment. The right hip wound improved and as of my visit  with her on 12/16/22 was nearly healed, no depth and only small open aspect all granular tissue.  It was reported the wound did fully close soon after that assessment per AL staff.  On 1/3/23 the pt's facility sent her to the ED for the right hip, it had re opened and was draining copious amounts of purulent drainage.  I was able to see the pt in the ED and was able to add the right hip wound into the intact sacral wound vac set up.  At my last clinic visit with the pt on 2/3/23 the sacral wound was not in good condition, lots of slough, eschar on the edges and bone visible and palpable in the center.  I made arrangement with Dr Zavala's approval to have her seen in Specialty for potential debridement but appointment was approximately four weeks out. She was hospitalized at [facility] for revision of her urinary diversion 2/22/23 - 2/25/23 and was seen by [facility] nursing, the photo's from that assessment showed the sacral wound looking much improved.  No debriding was needed. She did see Dr Zavala on 2/27/23 and the sacral wound at that time was improving well with no need for debridement.  New wounds noted 5/10/23 to left buttock, Left IT and mid buttock.  Left mid buttock closed as of 6/9/23 and remains closed.  Left IT has repeatedly closed and opened, when closed often is covered with very waxy appearing fragile scar tissue.  New wound to the left hip, first assessed in clinic 3/15/24, per pt was first noted by AL staff two weeks prior, deep wound, home cares were doing wet to dry dressing changes MWF up till clinic presentation.      Past Medical History:  Patient Active Problem List   Diagnosis    Migraine headache    Urinary retention    GERD (gastroesophageal reflux disease)    Flaccid paraplegia (H)    Decubitus ulcer of buttock    Pressure ulcer of heel    Poor appetite    Nausea    Generalized weakness    Burn    Health Care Home    Paraplegia following spinal cord injury (H)    ACP (advance care planning)     Osteomyelitis of hip (right periacetabular infection with osteomyelitis)    Severe protein-calorie malnutrition (H24)    Microcytic anemia    Neurogenic bladder    Anxiety    Insomnia    Chronic UTI (urinary tract infection)    Skin ulcer of buttock (bilateral ischial tuberosity ulcers)    CARDIOVASCULAR SCREENING; LDL GOAL LESS THAN 160    Open wound of foot except toes with complication    LLQ abdominal pain    Paralytic ileus (H)    Chest wall pain    Decubitus skin ulcer    S/P flap graft    Pancreatitis    Pericardial effusion    Hospice care patient    Seizures (H)    AMS (altered mental status)    Admission for hospice care    Odynophagia    Portal vein thrombosis    Foreign body in esophagus, initial encounter    Impacted foreign body in esophagus, initial encounter    Aspiration pneumonia of right lung due to regurgitated food, unspecified part of lung (H)    Septic shock (H)    Depressive disorder    Colostomy present (H)    Chronic pain due to trauma    Hypokalemia    Abscess of right hip    Stage 3 skin ulcer of sacral region (H)    Pressure injury of right hip, stage 3 (H)    Continuous opioid dependence (H)                 Tobacco Use:     Tobacco Use      Smoking status: Never      Smokeless tobacco: Never     Diabetic: NA  HgbA1C:   Hemoglobin A1C   Date Value Ref Range Status   05/26/2021 5.1 0 - 5.6 % Final     Comment:     Normal <5.7% Prediabetes 5.7-6.4%  Diabetes 6.5% or higher - adopted from ADA   consensus guidelines.     Checks Blood Glucose?:  NA Average Readings: NA     Personal/social history:  Pt lives in the [facility] in [facility], they do not provide any wound cares.  Had home care services [facility] 8/30/23 - 3/11/24.  [facility] 3/13/24 - 4/22/24.  [facility] SOC 5/1/24, scheduled for Mon and Wed visits for wound cares.     Objective:   Current treatment plan:   - Sacral wound,  Promogran Yu to undermining areas then NPWT at 125 mmHg continuous  negative pressure changed 3 times weekly.   - Right and Left IT, Right Buttock/thigh fold and Left Trochanter wounds: Aquacel Ag and Gentle adhesive foam dressing changed 3 times weekly.   Last changed: all dressings 5/8/24 by [facility] nurse.      Wound #1 Sacral   Stage/tissue depth: stage 4 pressure injury, stage updated 1/13/23 as bone became visible and palpable in the wound bed.   2.4 cm L x 7 cm W x 1 cm D  Tunneling: none noted  Undermining: around entire perimeter 12 o'clock 2.2 cm, 3 o'clock 0.5 cm, 6 o'clock 0.5 cm, 10 o'clock - 11 o'clock 1.8 cm.  Wound bed type/amount: approximately 90 % granular tissue. 1 % soft moist black eschar newly noted in the center of the wound bed and 10 % scar tissue: NA fluctuant  Wound Edges: approximately 20 % closed with epibole along the inferior edge  Periwound: scar tissue, scattered pale blanchable erythema, skin denudement sites are all currently healed.  Drainage: moderate amounts serosanguinous from the main wound  Odor: mild odor noted prior to dressing removal, during dressing removal, resolved after cleansing with wound cleanser and saline.   Pain: denied any pain today.      Photo's from today's visit 5/10/24, pt is in left side lying position.      Photo's from 5/3/24, pt is in left side lying position.      Photo's from 3/22/24, note pt is in left side lying position.     Photo from 11/18/22, initial resumption of visits to the Wound and Ostomy clinic.      Wound #2 Right Ischial Tuberosity   Stage/tissue depth: stage 3 pressure injury  0.5 cm L x 0.2 cm W x 0 cm D  Tunneling: none   Undermining: none  Wound bed type/amount: 100 % granular tissue; NA fluctuant  Wound Edges: open  Periwound: scar tissue.    Drainage: small amounts serosanguinous  Odor: none noted  Pain: none    Photo's from today's visit 5/10/24, pt is in left side lying position.      Photo's from 5/3/24, pt is in left side lying position.     Photo from 11/18/22, initial resumption  of visits to the Wound and Ostomy clinic.      Wound #3 Right buttock/posterior thigh fold, appears friction not pressure related, newly noted in clinic 3/8/23, has closed and reopened multiple times.    Stage/tissue depth: partial thickness  0.9 cm L x 0.4 cm W x 0 cm D  Tunneling: none  Undermining: none  Wound bed type/amount: 100 % red nongranular tissue; Not fluctuant  Wound Edges: NA no depth  Periwound: wnl  Drainage: small amounts serosanguinous  Odor: no  Pain: no  Photo from today's visit 5/10/24, pt is in left side lying position.    Photo from 5/3/24, pt is in left side lying position.     Photo from 3/8/23, initial assessment of newly noted site.     Wound #4 Left Ischial tuberosity, newly noted in clinic 5/10/23.  Stage/tissue depth: full thickness  0.4 cm L x 0.6 cm W x 0 cm D  Tunneling: none  Undermining: none  Wound bed type/amount: 100 % granular tissue: Not fluctuant  Wound Edges: NA   Periwound:  Scattered pale blanchable erythema.  Drainage: small amounts serosanguinous  Odor: no  Pain: no  Photo from today's visit 5/10/24, pt is in left side lying position.    Photo from 5/3/24, pt is in left side lying position.     Photo from 5/10/23, initial assessment of new wound site.    Wound #5 Left trochanter, first assessed in clinic 3/15/24, per pt was first noted by AL approximately 2 weeks prior.  Stage/tissue depth: stage 4 pressure injury  0.7 cm L x 0.5 cm W x 0.6 cm D  Tunneling: none, had tunneling at 10 o'clock but has resolved.   Undermining:  none any longer  Wound bed type/amount: as able to visualize approximately 100 % granular tissue; Not fluctuant  Wound Edges: open   Periwound: scant blanchable erythema  Drainage: small amounts serosanguinous.  Odor: none not  Pain: yes with cares 4/10 per pt  Photo from today's visit 5/10/24, pt is supine.    Photo from 5/3/24, pt is supine.    Photo from 3/15/24, initial assessment of new wound site.    Dorsalis Pedal Pulse: Not assessed this  visit.  Posterior Tibial Pulse: Not assessed this visit.  Hair growth: Not assessed this visit  Capillary Refill: Not assessed this visit  Feet/toes color: Not assessed this visit  Nails: Not assessed this visit  R Leg: Edema Not assessed this visit. Ankle circumference NA cm. Calf circumference NA cm.  L Leg: Edema Not assessed this visit. Ankle circumference NA cm. Calf circumference NA cm.     Mobility: wheelchair bound  Current offloading/footwear: regular shoes/boots  Sensation: paraplegic, no sensation from sternum distally     Other callusing/areas of concern:    - Right hip, closed deep ulcer with reoccurring abscess.  Remains scar closed.      Diet: Regular     Discussed/Education: plan of care with rationale;  pt is unable to do self wound cares, Home Care is performing wound cares three times weekly at AL.     Assessment:  Pt on arrival has the Sacral wound vac is shut off, pt stopped in route to clinic today as stated it was alarming.  Was unable to obtain negative pressure at desired setting once transferred into the exam chair.  The NPWT dressing was in place is bridged to the right hip/upper buttock  The left trochanter, right IT and left IT wounds and right buttock posterior thigh fold wound covered with gentle foam adhesive dressings.    Dressing was removed from Sacral wound: one piece black foam in the wound bed which seemed too small, was loose within the wound bed on removal.  One piece black foam used to bridge to the right hip area.  Visibly can confirm all dressings were removed.  Odor was mild today, none noted after cleansing with wound cleanser and rinsing with saline..    Concerning mattress.  [facility] home care agency requested prescription from Dr Baeza to start process to obtain and alternating pressure mattress for the pt.  MD send questions to [facility] nurse, in my reply I added that in the past pt did have a alternating pressure mattress but she felt it was not comfortable and she could  not feel any alternating pressure, so she returned it and currently is on a stagnant air mattress on a hospital bed (per her today).  We discussed today that per the status of her wounds, that she would qualify for a full alternating pressure mattress, group 2 low air loss.  Informed her an air overly with pump does not benefit the wounds significantly, but a low air loss group 2 full APM mattress would benefit her wounds.  It would have to replace her current stagnant air mattress.  She will consider agreeing to seek new APM mattress but at this time does not wish to change, will follow up at next visit.      The Sacral wound:    Measurements are less today but wound has a history of being very positional and difficult to duplicate position at each visit.  Eschar noted new last visit within the 3 o'clock wound edge has resolved, few small moist dusky to light black dots noted today in the center of the wound bed.    The Right IT wound: Small spot remains open but clean, site is within a fold of scar tissue and tends to hold moisture.     The Right lower buttock/thigh fold:  This has not improved, is worse today in size but has limited drainage, no depth.    Left IT wound: Is improved today, only small open site all clean with no depth.    Left trochanter stage 4 pressure injury.  About the same also this visit, is clean no change in size of significance, no change in depth of 0.6 cm.      Ostomy:  Not assessed this visit, per pt continues to have trouble getting adequate amounts of ostomy appliances through home care.     Factors impacting wound healing:   Poor nutrition: inadequate supply of protein, carbohydrates, fatty acids, and trace elements essential for all phases of wound healing.  Pt is taking a daily protein supplement drink and is aware of need for increased protein in diet for wound healing.  She has focused on protein supplement drinks and snack. Four pound increase in body weight reported 1/19/24 per  pt.   Delayed healing as part of normal aging process  Reduced Blood Supply: inadequate perfusion to heal wound.  Appears adequate.  Medication: NA  Chemotherapy: suppresses the immune system and inflammatory response, NA  Radiotherapy: increases production of free radical which damage cells, NA  Psychological stress: None noted  Obesity: decreases tissue perfusion, NA  Infection: prolongs inflammatory phase, uses vital nutrients, impairs epithelialization and releases toxins, antimicrobial dressing included in sacral wound as of 2/5/24.  Underlying Disease: paraplegic  Maceration: reduces wound tensile strength and inhibits epithelial migration, none noted this visit  Patient compliance, appears motivated to heal  Unrelieved pressure, pt has pressure reduction cushion in wheelchair and lays in bed daily during the day for full pressure relief.    Immobility, limited  Substance abuse: NA     Plan:  We will continue with the same plan of care.  Scheduled for weekly clinic visits on Fridays only now, set through the Fri June 7th.  Note the 7th visit will be with co [facility] nurse Jessica VALVERDE RN as I will be out of the office that day.      We will continue with the same plan of care for the Right and Left IT, Left Trochanter and Right buttock/posterior thigh fold wounds: Aqaucel Ag covered with Mepilex.  MWF changes.    Sacral wound:    - Stoma powder and Cavilon no sting skin prep applied to the periwound medial adhesive related skin injuries as needed when present,  - One piece Promogran Yu applied to the wound bed, one continuous piece black [employer] foam used to fill the wound bed and bridge to the right upper buttock.  - All outer skin exposed to black foam was protected with KCI drape, all foams sealed in with [employer] drape, tract pad attached over hole cut in foam bridge.  - Negative pressure achieved at 125 mmHg continuous negative pressure.  Canister in place was approximately 3/4 th's full with drainage, canister was  changed today 5/10/24.     Topical care: Wound/surrounding skin cleansed with wound cleanser and gauze. Patted dry. Wound cares as listed in Plan   Additional recommendations: None at this time     The following discharge instructions were reviewed with and sent home with the patient:  See AVS     The following supplies were sent home with the patient:  None this visit     Return visit: 5/17/24     Verbal, written, & demonstrative education provided.  Face to face time: approximately 55 minutes  Procedure: approximately 12 minutes wound vac dressing change to the sacral wound.     Presley Chester RN [facility]  [phone]

## 2024-05-10 NOTE — PATIENT INSTRUCTIONS
Today we did the wound cares to your sacral wound, right and left ischial tuberosity wounds, right thigh/buttock fold wound and the left trochanter wound.  No change to the plan of care for these wounds, all are improved or stable.    Consider what we talked about today concerning having Dr Baeza order you an alternating pressure mattress to go on your hospital bed.  Your wounds qualify you for insurance coverage of a alternating pressure group 2 low air loss mattress.  This would be a full mattress and not an overlay.  I know in the past you had a similar mattress and did not like it but it would be a benefit for your current wounds and would help limit new wounds from developing and or getting more severe.  Consider the mattress and I'll update Dr Baeza if you wish to proceed with the ordering process.    I have you scheduled to return to clinic on Fridays for the next four weeks.  The last visit currently scheduled is on Sheree the 7th at 2 pm.  That visit is with my co [facility] nurse Jessica rayan.    I will remind you closer to that date, she see's pt's in the lower level of the [facility]ty [facility] Center, so you can check in on the lower level to see her, you enter on the lower level or if you come in the main lobby entrance, you'll need to take the elevator for level 1 to the lower level.    Any troubles or concerns call the Specialty Care Scheduling line at [phone].    Presley rayan

## 2024-05-10 NOTE — TELEPHONE ENCOUNTER
Reason for Call:  Form, our goal is to have forms completed with 72 hours, however, some forms may require a visit or additional information.    Type of letter, form or note:  Home Health Certification    Who is the form from?: Home care    Where did the form come from: form was faxed in    What clinic location was the form placed at?: [facility]    Where the form was placed: Given to physician    What number is listed as a contact on the form?: [phone]       Additional comments: [place]    Call taken on 5/10/2024 at 2:50 PM by Shonna Aguirre

## 2024-05-13 NOTE — TELEPHONE ENCOUNTER
Called [employer] and wound measurements were provided per DONA Negrete RN's note dated 5/3/24.     Leticia Desir RN   [facility]

## 2024-05-13 NOTE — TELEPHONE ENCOUNTER
Reason for Call:  Other wound measurements    Detailed comments: Laura Francis from [employer] wound vac company is requesting wound measurements for the dates of  April 20 to May 3rd. She is requesting these measurements so that supplies can continue to be ordered for Felicia. You can call her at [phone] or fax the information to [phone]    Phone Number Patient can be reached at: Other phone number:  [phone] *    Best Time:     Can we leave a detailed message on this number?     Call taken on 5/13/2024 at 9:25 AM by Kourtney Garcia

## 2024-05-24 NOTE — PROGRESS NOTES
[facility].     Start of Care in [facility]: 11/18/2022, initial resumption of visit, see Wound History for details.  Referring Doctor: Ivan Baeza MD original referral 12/21/21, updated referral 10/24/22 Dr Scott BENJAMIN ED provider.    Primary Care Provider: VAZQUEZ Cuellar MD in [place] currently as of 4/12/24 but per pt has not seen recently and is changing back to Dr Ivan Baeza, appointment to reestablish primary cares 4/19/24.    Wound Location: Sacral, Right Ischial Tuberosity, Right thigh/buttock fold, Right hip (healed).  Left buttock mid (healed) and Left Ischial Tuberosity (reopened 4/12/24).  New wound Left Trochanter 3/15/24.     Wound Clinic Visit:  Scheduled follow up.     Reason for Visit: Wound assessments and cares     Subjective:  On arrival today 5/24/24 alone, she reports the following:  [facility] has made her visits as planned on Monday and Wednesday for wound cares.  She continues to not want an APM mattress at this time.  She reports last week Fri the AL forgot to set up transportation, and home care was unable to come out to do the wound vac change.  She can not reports any concerns passed on by home care concerning her wounds with the prolonged wear time of the dressing from 5/15 - 5/20.    Wound History:   Pt well known to me from visits over many years to the Wound and Ostomy clinic for chronic pressure injuries.  Initial visit November of 2020 for Right IT, Sacral, left trochanter and right heel pressure injuries.  She missed a few follow up appointments initially but was mostly coming into clinic every 3 to 4 weeks for evaluation and recommendations.  After a hospitalization at [facility] in [place] due to urinary diversion concerns and a right hip abscess she resumed visit to the Wound and Ostomy clinic on 11/18/22 for wound vac dressing changes and assessment. The right hip wound improved and as of my visit with her on  12/16/22 was nearly healed, no depth and only small open aspect all granular tissue.  It was reported the wound did fully close soon after that assessment per AL staff.  On 1/3/23 the pt's facility sent her to the ED for the right hip, it had re opened and was draining copious amounts of purulent drainage.  I was able to see the pt in the ED and was able to add the right hip wound into the intact sacral wound vac set up.  At my last clinic visit with the pt on 2/3/23 the sacral wound was not in good condition, lots of slough, eschar on the edges and bone visible and palpable in the center.  I made arrangement with Dr Zavala's approval to have her seen in Specialty for potential debridement but appointment was approximately four weeks out. She was hospitalized at [facility] for revision of her urinary diversion 2/22/23 - 2/25/23 and was seen by [facility] nursing, the photo's from that assessment showed the sacral wound looking much improved.  No debriding was needed. She did see Dr Zavala on 2/27/23 and the sacral wound at that time was improving well with no need for debridement.  New wounds noted 5/10/23 to left buttock, Left IT and mid buttock.  Left mid buttock closed as of 6/9/23 and remains closed.  Left IT has repeatedly closed and opened, when closed often is covered with very waxy appearing fragile scar tissue.  New wound to the left hip, first assessed in clinic 3/15/24, per pt was first noted by AL staff two weeks prior, deep wound, home cares were doing wet to dry dressing changes MWF up till clinic presentation.      Past Medical History:  Patient Active Problem List   Diagnosis    Migraine headache    Urinary retention    GERD (gastroesophageal reflux disease)    Flaccid paraplegia (H)    Decubitus ulcer of buttock    Pressure ulcer of heel    Poor appetite    Nausea    Generalized weakness    Burn    Health Care Home    Paraplegia following spinal cord injury (H)    ACP (advance care planning)     Osteomyelitis of hip (right periacetabular infection with osteomyelitis)    Severe protein-calorie malnutrition (H24)    Microcytic anemia    Neurogenic bladder    Anxiety    Insomnia    Chronic UTI (urinary tract infection)    Skin ulcer of buttock (bilateral ischial tuberosity ulcers)    CARDIOVASCULAR SCREENING; LDL GOAL LESS THAN 160    Open wound of foot except toes with complication    LLQ abdominal pain    Paralytic ileus (H)    Chest wall pain    Decubitus skin ulcer    S/P flap graft    Pancreatitis    Pericardial effusion    Hospice care patient    Seizures (H)    AMS (altered mental status)    Admission for hospice care    Odynophagia    Portal vein thrombosis    Foreign body in esophagus, initial encounter    Impacted foreign body in esophagus, initial encounter    Aspiration pneumonia of right lung due to regurgitated food, unspecified part of lung (H)    Septic shock (H)    Depressive disorder    Colostomy present (H)    Chronic pain due to trauma    Hypokalemia    Abscess of right hip    Stage 3 skin ulcer of sacral region (H)    Pressure injury of right hip, stage 3 (H)    Continuous opioid dependence (H)                 Tobacco Use:     Tobacco Use      Smoking status: Never      Smokeless tobacco: Never     Diabetic: NA  HgbA1C:   Hemoglobin A1C   Date Value Ref Range Status   05/26/2021 5.1 0 - 5.6 % Final     Comment:     Normal <5.7% Prediabetes 5.7-6.4%  Diabetes 6.5% or higher - adopted from ADA   consensus guidelines.     Checks Blood Glucose?:  NA Average Readings: NA     Personal/social history:  Pt lives in the [facility] in [facility], they do not provide any wound cares.  Had home care services [facility] 8/30/23 - 3/11/24.  [facility] 3/13/24 - 4/22/24.  [facility] 5/1/24, scheduled for Mon and Wed visits for wound cares.     Objective:   Current treatment plan:   - Sacral wound,  Promogran Yu to undermining areas then NPWT at 125 mmHg continuous  negative pressure changed 3 times weekly.   - Right and Left IT, Right Buttock/thigh fold and Left Trochanter wounds: Aquacel Ag and Gentle adhesive foam dressing changed 3 times weekly.   Last changed: all dressings 5/22/24 by [facility] nurse.      Wound #1 Sacral   Stage/tissue depth: stage 4 pressure injury, stage updated 1/13/23 as bone became visible and palpable in the wound bed.   2.1 cm L x 5.6 cm W x 1 cm D  Tunneling: none noted  Undermining: around entire perimeter 12 o'clock 2.4 cm, 3 o'clock 0.5 cm, 6 o'clock 0.5 cm, 10 o'clock - 11 o'clock 2 cm.  Wound bed type/amount: approximately 80 % pink to red granular tissue, 10 % dusky gray granular tissue and 10 % scar tissue: NA fluctuant  Wound Edges: approximately 20 % closed with epibole along the inferior edge  Periwound: scar tissue, scattered pale blanchable erythema, skin denudement of the right 0.5 cm L x 0.5 cm W x 0 cm D, scant serosanguinous weeping  Drainage: moderate amounts serosanguinous from the main wound  Odor: no significant odor noted today, prior to nor after cleansing.   Pain: denied any pain today.      Photo's from today's visit 5/24/24, pt is in left side lying position.        Photo's from 5/10/24, pt is in left side lying position.      Photo's from 3/22/24, note pt is in left side lying position.     Photo from 11/18/22, initial resumption of visits to the Wound and Ostomy clinic.      Wound #2 Right Ischial Tuberosity   Stage/tissue depth: stage 3 pressure injury  As of today  into two open sites  by scar tissue.  - Lateral superior 0.4 cm L x 0.2 cm W x 0.1 cm D, wound bed is 100 % pale pink nongranular tissue, small amounts serous drainage.  - Medial inferior 0.7 cm L x 1.5 cm W x 0.1 cm D, wound bed is 100 % pale pink nongranular tissue, small to moderate amounts serous drainage.  Tunneling: none   Undermining: none  Wound bed type/amount: 100 % granular tissue; NA fluctuant  Wound Edges:  open  Periwound: scar tissue.    Drainage: as listed above for each site  Odor: none noted  Pain: none  Photo from today's visit 5/24/24, pt is in left side lying position.      Photo's from 5/10/24, pt is in left side lying position.     Photo from 11/18/22, initial resumption of visits to the Wound and Ostomy clinic.      Wound #3 Right buttock/posterior thigh fold, appears friction not pressure related, newly noted in clinic 3/8/23, has closed and reopened multiple times.    Stage/tissue depth: partial thickness  1 cm L x 0.2 cm W x 0 cm D  Tunneling: none  Undermining: none  Wound bed type/amount: approximately 50 % still moist red nongranular tissue and 50 % newly epithelialized; Not fluctuant  Wound Edges: NA no depth  Periwound: wnl  Drainage: scant amounts serosanguinous  Odor: no  Pain: no  Photo from today's visit 5/24/24, pt is in left side lying position.    Photo from 5/10/24, pt is in left side lying position.     Photo from 3/8/23, initial assessment of newly noted site.     Wound #4 Left Ischial tuberosity, newly noted in clinic 5/10/23.  Stage/tissue depth: full thickness  0.2 cm L x 0.2 cm W x 0 cm D  Tunneling: none  Undermining: none  Wound bed type/amount: 100 % dry red granular tissue: Not fluctuant  Wound Edges: NA   Periwound:  Scattered pale blanchable erythema.  Drainage: none  Odor: no  Pain: no  Photo from today's visit 5/24/24, pt is in left side lying position.    Photo from 5/10/24, pt is in left side lying position.     Photo from 5/10/23, initial assessment of new wound site.    Wound #5 Left trochanter, first assessed in clinic 3/15/24, per pt was first noted by AL approximately 2 weeks prior.  Stage/tissue depth: stage 4 pressure injury  0.4 cm L x 0.4 cm W x 0 cm D  Tunneling: none, had tunneling at 10 o'clock but has resolved.   Undermining:  none any longer  Wound bed type/amount: 100 % dry flaky scar tissue; Not fluctuant  Wound Edges: open   Periwound: hyperpigmented areas of  past erythema  Drainage: none  Odor: none not  Pain: yes with cares 1/10 per pt  Photo from today's visit 5/24/24, pt is supine.    Photo from 5/3/24, pt is supine.    Photo from 3/15/24, initial assessment of new wound site.    Dorsalis Pedal Pulse: Not assessed this visit.  Posterior Tibial Pulse: Not assessed this visit.  Hair growth: Not assessed this visit  Capillary Refill: Not assessed this visit  Feet/toes color: Not assessed this visit  Nails: Not assessed this visit  R Leg: Edema Not assessed this visit. Ankle circumference NA cm. Calf circumference NA cm.  L Leg: Edema Not assessed this visit. Ankle circumference NA cm. Calf circumference NA cm.     Mobility: wheelchair bound  Current offloading/footwear: regular shoes/boots  Sensation: paraplegic, no sensation from sternum distally     Other callusing/areas of concern:    - Right hip, closed deep ulcer with reoccurring abscess.  Remains scar closed.      Diet: Regular     Discussed/Education: plan of care with rationale;  pt is unable to do self wound cares, Home Care is performing wound cares three times weekly at [facility].     Assessment:  Pt on arrival has the Sacral wound vac is running at desired 125 mmHg continuous negative pressure.    The NPWT dressing was in place is bridged to the right hip/upper buttock  The left trochanter, right IT and left IT wounds and right buttock posterior thigh fold wound covered with gentle foam adhesive dressings.    Concerning mattress. We discussed at our last visit that she would qualify for a group 2 low air loss APM mattress.  Pt had one in the past and did not like it, returned it.  She at this time still does not want to pursue process of obtaining one.    The Sacral wound:    Dressing was removed from Sacral wound: two pieces black foam in the wound bed.  One piece black foam used to bridge to the right hip area.  Visibly can confirm all dressings were removed.  No significant odor noted today.  Wound has  contracted some make two area of the undermining greater in two areas.  Wound bed has a dusky gray area appears like a bruise on scar tissue, no other concerns noted, periwound skin has new small adhesive related skin trauma to the right lateral about 3 cm from wound edge.      The Right IT wound: Deteriorated again, two open sites, appears related to the scar tissue trapping the moisture within the fold.  No signs of infection or other concerns.     The Right lower buttock/thigh fold:  Small area remains open, scattered semi moist red nongranular tissue with no depth within area of erythema and newly epithelialized tissue.  Scant remaining drainage.      Left IT wound: Is improved again today, smaller open site but not fully resolved, remains fragile.    Left trochanter stage 4 pressure injury.  On initial assessment appeared to be covered with thin dry scab which was flaky.  With cleansing noted scab came off, small divot of intact but dry scar tissue is covering the formerly open site. With probing was unable to find any open aspect, only dry fragile flaky scar tissue present.        Ostomy:  Not assessed this visit, per pt continues to have trouble getting adequate amounts of ostomy appliances through home care.     Factors impacting wound healing:   Poor nutrition: inadequate supply of protein, carbohydrates, fatty acids, and trace elements essential for all phases of wound healing.  Pt is taking a daily protein supplement drink and is aware of need for increased protein in diet for wound healing.  She has focused on protein supplement drinks and snack. Four pound increase in body weight reported 1/19/24 per pt.   Delayed healing as part of normal aging process  Reduced Blood Supply: inadequate perfusion to heal wound.  Appears adequate.  Medication: NA  Chemotherapy: suppresses the immune system and inflammatory response, NA  Radiotherapy: increases production of free radical which damage cells,  NA  Psychological stress: None noted  Obesity: decreases tissue perfusion, NA  Infection: prolongs inflammatory phase, uses vital nutrients, impairs epithelialization and releases toxins, antimicrobial dressing included in sacral wound as of 2/5/24.  Underlying Disease: paraplegic  Maceration: reduces wound tensile strength and inhibits epithelial migration, none noted this visit  Patient compliance, appears motivated to heal  Unrelieved pressure, pt has pressure reduction cushion in wheelchair and lays in bed daily during the day for full pressure relief.    Immobility, limited  Substance abuse: NA     Plan:  Today we did the wound cares to the sacral wound, right and left ischial tuberosity wounds, right thigh/buttock fold wound and assessed the left trochanter wound.   No change to the plan of care for the current wounds that are open.    For the left trochanter wound. Recommend to stop the use of any type of wound dressing, keep area clean and dry, avoid trauma and pressure.  Recommend to cover with gentle foam adhesive dressing if it opens and contact the Wound and Ostomy clinic for recommendations.      We will continue to use the wound vac on the sacral wound.  - Applying Promogran Yu to the wound bed dry, then applying the wound vac, fill the wound bed with black foam and bridge to the right upper buttock or lower back/flank.  - today I used one continuous piece of black KCI foam to fill the wound beds depth and undermining and to bridge to the right hip upper buttock.    Wound vac at 125 mmHg continuous negative pressure, changed three times weekly.  We changed the canister today 5/24/23 in clinic, to be done once weekly and prn for full or faulty canister.      For the Right and Left IT wounds, and the right lower buttock posterior thigh fold wounds, cover open wounds with Aquacel Ag and cover with a gentle adhesive foam dressings, change three times weekly.    Printed two copies of AVS today, for pt  and facility.  Instructed her in the printed AVS to be madi to look at the first two pages for dates and times of your upcoming appointments.  Next visit is scheduled with me here Friday 5/31/24 at 3 pm  We also have you scheduled for the following Fri 6/7 (but that visit will be with co [facility] nurse Jessica VALVERDE RN cwocn in the lower level of the [facility]ty [facility].     Topical care: Wound/surrounding skin cleansed with wound cleanser and gauze. Patted dry. Wound cares as listed in Plan   Additional recommendations: None at this time     The following discharge instructions were reviewed with and sent home with the patient:  See AVS     The following supplies were sent home with the patient:  None this visit     Return visit: 5/31/24     Verbal, written, & demonstrative education provided.  Face to face time: approximately 50 minutes  Procedure: approximately 10 minutes wound vac dressing change to the sacral wound.     Presley penalozaocn  [phone]

## 2024-05-24 NOTE — PATIENT INSTRUCTIONS
Today we did the wound cares to your sacral wound, right and left ischial tuberosity wounds, right thigh/buttock fold wound and assessed the left trochanter wound.   No change to the plan of care for the current wounds that are open.    The right IT wound is a little larger, moisture in the scar fold appears to have been the main cause.  Remains clean with no signs of infection.  The right buttock thigh skin fold site is improved, still has small open area with no depth and was pretty well dry today.  The left IT wound still has small open center of raw tissue but otherwise is all closed.  The sacral wound is slowly closing and scar tissue is advancing down into the lower area of the wound bed, small amount of returned necrotic tissue but as in the past we will monitor it since its debrided out on its own with out surgical interventions in the paste.    We will continue to use the wound vac on the sacral wound.  - Applying Promogran Yu to the wound bed dry, then applying the wound vac, fill the wound bed with black foam and bridge to the right upper buttock or lower back/flank.  Wound vac at 125 mmHg continuous negative pressure, changed three times weekly.    For the Right and Left IT wounds, and the right lower buttock posterior thigh fold wounds, cover open wounds with Aquacel Ag and cover with a gentle adhesive foam dressings, change three times weekly.    The left trochanter wound is all closed today, no open tissue, no signs of infection.  At this time we can stop applying any bandage, leave open to air and keep clean and dry.  Any new drainage or open wound noted, cover with a gentle adhesive foam dressing and change daily.    See the first two pages of your after visit summary today for dates and times of your upcoming appointments.  Next visit is scheduled with me here Friday 5/31/24 at 3 pm  We also have you scheduled for the following Fri 6/7 and next Fri 6/14, see the list for times.     Any troubles  or concerns call the Specialty Care Scheduling line at [phone].     Presley Chester RN cwocn

## 2024-05-25 NOTE — ED TRIAGE NOTES
"Pt arrived via EMS from [facility]. Staff at [place] report that she is \"not herself\" and usually helps when they use the [place] lift, but was not able to assist today. Pt states, \"I help when I want to, I feel fine.\"      Triage Assessment (Adult)       Row Name 05/25/24 2199          Triage Assessment    Airway WDL WDL        Respiratory WDL    Respiratory WDL WDL        Skin Circulation/Temperature WDL    Skin Circulation/Temperature WDL WDL        Cardiac WDL    Cardiac WDL WDL        Peripheral/Neurovascular WDL    Peripheral Neurovascular WDL WDL        Cognitive/Neuro/Behavioral WDL    Cognitive/Neuro/Behavioral WDL WDL                     "
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
## 2024-05-25 NOTE — ED NOTES
Voicemail left on [facility] nurse line that pt will be returning to assisted living facility.

## 2024-05-28 PROBLEM — Z98.890 HISTORY OF URINARY DIVERSION PROCEDURE: Status: ACTIVE | Noted: 2024-01-01

## 2024-05-28 PROBLEM — Z51.5 COMFORT MEASURES ONLY STATUS: Status: ACTIVE | Noted: 2019-01-09

## 2024-05-28 PROBLEM — L97.129: Status: ACTIVE | Noted: 2024-01-01

## 2024-05-28 PROBLEM — Z93.3 COLOSTOMY PRESENT (H): Status: ACTIVE | Noted: 2021-03-04

## 2024-05-28 PROBLEM — L89.213: Status: ACTIVE | Noted: 2022-09-29

## 2024-05-28 PROBLEM — R10.84 GENERALIZED ABDOMINAL PAIN: Status: ACTIVE | Noted: 2024-01-01

## 2024-05-28 PROBLEM — L98.429: Status: ACTIVE | Noted: 2022-09-29

## 2024-05-28 NOTE — MEDICATION SCRIBE - ADMISSION MEDICATION HISTORY
"Medication Scribe Admission Medication History    Admission medication history is complete. The information provided in this note is only as accurate as the sources available at the time of the update.    Information Source(s): Facility (TCU/NH/) medication list/MAR via N/A    Pertinent Information: Patient resides in assisted living of [facility] and manages her own medications. Facility provided current medication list with unknown last doses. Patient's daughter and facility has no knowledge of last doses and patient is not able to provide information at the time. Facility believes that patient has not been taking her medications the last few days.     Changes made to PTA medication list:  Added: Melatonin 5 mg   Hydrocortisone 1% cream   Senakot-S 50-8.6 mg   Added per MAR   Deleted: Diphenhydramine 25 mg   Natural Tears Eye drops   Changed: None    Allergies reviewed with patient and updates made in EHR: yes using MAR     Medication History Completed By: JOSE FRANKLIN 5/28/2024 2:08 PM    PTA Med List   Medication Sig Last Dose    D3-1000 25 MCG (1000 UT) tablet TAKE 1 TABLET BY MOUTH ONCE DAILY Unknown at Unknown    folic acid (FOLVITE) 1 MG tablet Take 1 tablet (1 mg) by mouth daily Unknown at Unknown    gabapentin (NEURONTIN) 300 MG capsule Take 600 mg by mouth at bedtime 2 capsules (600 mg) at bedtime Unknown at Unknown    Gauze Pads & Dressings (CURITY ABDOMINAL) 8\"X10\" PADS COVER WOUND AND SECURE WITH TAPE NA at NA    hydrocortisone 1 % CREA cream Place rectally 2 times daily as needed for itching or rash Unknown at Unknown    levETIRAcetam (KEPPRA) 750 MG tablet Take 1 tablet (750 mg) by mouth 2 times daily Unknown at Unknown    melatonin 5 MG tablet Take 5 mg by mouth nightly as needed for sleep Unknown at Unknown    mirtazapine (REMERON) 7.5 MG tablet TAKE 1 TABLET BY MOUTH AT BEDTIME Unknown at Unknown    oxybutynin (DITROPAN) 5 MG tablet TAKE 2 TABLETS BY MOUTH ONCE DAILY (Patient taking " differently: Take 10 mg by mouth 2 times daily 2 tablets) Unknown at Unknown    oxyCODONE (ROXICODONE) 5 MG tablet Take 1 tablet (5 mg) by mouth at bedtime Unknown at PRN    pantoprazole (PROTONIX) 40 MG EC tablet TAKE 1 TABLET BY MOUTH ONCE DAILY IN THE MORNING (BEFORE BREAKFAST) (Patient taking differently: Take 40 mg by mouth daily) Unknown at Unknown    propranolol (INDERAL) 20 MG tablet Take 20 mg by mouth 2 times daily Unknown at Unknown    rizatriptan (MAXALT) 10 MG tablet TAKE 1 TAB BY MOUTH ONCE FOR MIGRAINE. MAY REPEAT IN2 HOURS. MAX OF 3 TABS ONCEDAILY Unknown at PRN    senna-docusate (SENOKOT-S/PERICOLACE) 8.6-50 MG tablet Take 2 tablets by mouth 2 times daily as needed for constipation Unknown at PRN    sulfamethoxazole-trimethoprim (BACTRIM DS) 800-160 MG tablet Take 1 tablet by mouth 2 times daily Recurrent bacterial cystitis Unknown at Unknown    topiramate (TOPAMAX) 25 MG tablet TAKE 1 TABLET BY MOUTH ONCE DAILY (Patient taking differently: Take 25 mg by mouth daily) Unknown at Unknown    traZODone (DESYREL) 100 MG tablet Take 100 mg by mouth at bedtime Unknown at Unknown    Wound Cleansers (VASHE WOUND THERAPY) SOLN MOISTEN 4X4 GAUZE PAD AND LOOSELY PACK INTO WOUND NA at NA    Wound Dressings (MEPILEX BORDER FLEX LITE) PADS USE AS DIRECTED Border 4X4 [id number] NA at NA    zolpidem (AMBIEN) 5 MG tablet Take 1 tablet (5 mg) by mouth nightly as needed for sleep Unknown at Unknown

## 2024-05-28 NOTE — CONSULTS
Care Management Initial Consult    General Information  Assessment completed with: Care Team Member, Children, Caregiver, dgtr Arlette  Type of CM/SW Visit: CM Role Introduction    Primary Care Provider verified and updated as needed: Yes   Readmission within the last 30 days:        Reason for Consult: end of life/hospice, discharge planning  Advance Care Planning: Advance Care Planning Reviewed: present on chart        Communication Assessment  Patient's communication style: spoken language (English or Bilingual)         Cognitive  Cognitive/Neuro/Behavioral:  Not responding                      Living Environment:   People in home: facility resident     Current living Arrangements: assisted living  Name of Facility: [facility]   Able to return to prior arrangements: yes       Family/Social Support:  Care provided by: homecare agency, other (see comments) ([facility] staff)  Provides care for: no one, unable/limited ability to care for self  Marital Status:   Parent(s), Children, Facility resident(s)/Staff          Description of Support System: Involved, Supportive    Support Assessment: Adequate family and caregiver support    Current Resources:   Patient receiving home care services: Yes  Skilled Home Care Services: Skilled Nursing, Home Health Aid  Community Resources: County Programs, Home Care  Equipment currently used at home: wheelchair, power, grab bar, tub/shower, grab bar, toilet, shower chair  Supplies currently used at home: Wound Care Supplies    Employment/Financial:  Employment Status: disabled        Financial Concerns: none   Referral to Financial Worker: No     Does the patient's insurance plan have a 3 day qualifying hospital stay waiver?  No    Lifestyle & Psychosocial Needs:  Social Determinants of Health     Food Insecurity: Not on file   Depression: Not at risk (4/19/2024)    PHQ-2     PHQ-2 Score: 0   Housing Stability: Not on file   Tobacco Use: Low Risk  (4/19/2024)    Patient History      Smoking Tobacco Use: Never     Smokeless Tobacco Use: Never     Passive Exposure: Not on file   Financial Resource Strain: Not on file   Alcohol Use: Not on file   Transportation Needs: Not on file   Physical Activity: Not on file   Interpersonal Safety: Low Risk  (4/19/2024)    Interpersonal Safety     Do you feel physically and emotionally safe where you currently live?: Yes     Within the past 12 months, have you been hit, slapped, kicked or otherwise physically hurt by someone?: No     Within the past 12 months, have you been humiliated or emotionally abused in other ways by your partner or ex-partner?: No   Stress: Not on file   Social Connections: Not on file   Health Literacy: Not on file       Functional Status:  Prior to admission patient needed assistance:   Dependent ADLs:: Bathing, Dressing  Dependent IADLs:: Cleaning, Cooking, Laundry, Shopping, Meal Preparation, Medication Management, Money Management, Transportation, Incontinence       Mental Health Status:  Mental Health Status: No Current Concerns       Chemical Dependency Status:  Chemical Dependency Status: No Current Concerns             Values/Beliefs:  Spiritual, Cultural Beliefs, [employer] Practices, Values that affect care: no               Additional Information:  Consult received from ED provider to assist with arranging hospice for patient.     Patient not responsive at this time. Initial assessment information gathered from patient's daughter, Arlette, and [facility] RN, Adair.    Patient resides at [facility] and has lived there for many years. Patient is wheelchair bound at baseline. Patient normally self caths.  Adair states patient was in the ED last Friday, for altered mental status. Staff noticed that patient wasn't acting herself. Today, Adair states patient was less alert than usual and staff noticed a change in patient's condition. Patient ws sent back to the ED for evaluation.     Daughter, Arlette [phone], is  patient's primary contact. Patient's mother, May [phone], is also involved with patient. Arlette had told provider that she does not want extensive workup and is interested in hospice for patient.     Discussed options and plan with Arlette. Arlette discussed options with grandmother, May, and Adair at [place]. Arlette called CM back and stated that if patient becomes stable, the plan will be for patient to discharge back to [facility]. Patient was receiving [facility] and this would transition to [facility].     CM spoke with ED charge nurse, Sanjana. Sanjana states patient's blood pressure is dropping and will have MD re-evaluate if patient is stable enough for transfer back to [place].     Update 7080 - CM spoke with Christine, at [facility] [phone]. Christine states they are able to open patient to hospice tomorrow. Christine states she will talk with [facility], Adair, about what patient's cares will entail and medications.    Received a call back from Christine, at [facility]. Christine states after discussion with Adair at [facility] it was determined that they are NOT able to meet patient's needs back at the [facility]. CM will connect with daughter, Arlette, and determine SNF options.     At this time, patient is actively dying, per Sanjana, ED charge nurse. She does not have a safe disposition at this time. Updated Sanjana regarding patient not being able to discharge back to [facility].  Sanjana will talk with provider to update.    Update 1600 - Patient is being admitted OBS. Spoke with daughter, Arlette, by phone. She voiced that [facility] is not able to manage patient's cares. Discussed alternative placement options. Arlette requests [facility] (Main Phone: [phone] Admissions Phone: [phone] Fax: [phone]) if bed is available. Referral sent to Jan. [facility] to open at facility if patient remains in their service area.     JUAN Torres  [facility]  [phone]/ [facility] [phone]  Care Management

## 2024-05-28 NOTE — PROGRESS NOTES
[facility].     Start of Care in [facility]: 11/18/2022, initial resumption of visit, see Wound History for details.  Referring Doctor: Ivan Baeza MD original referral 12/21/21, updated referral 10/24/22 Dr cSott BENJAMIN ED provider.    Primary Care Provider: VAZQUEZ Cuellar MD in [place] currently as of 4/12/24 but per pt has not seen recently and is changing back to Dr Ivan Baeza, appointment to reestablish primary cares 4/19/24.    Wound Location: Sacral, Right Ischial Tuberosity, Right thigh/buttock fold, Right hip (healed).  Left buttock mid (healed) and Left Ischial Tuberosity (reopened 4/12/24).  New wound Left Trochanter 3/15/24.     Wound Clinic Visit:  Scheduled follow up.     Reason for Visit: Wound assessments and cares     Subjective:  On arrival today 5/28/24 alone, she reports the following:  ER called and request assistance for wound VAC due to machine making noise since she has arrived to ER.   Patient comes in from assisted living with altered mental status.  Per Nurse Sanjana HYMAN RN patient has been unresponsive and potentially being admitted to hospice.     Wound History:   Pt well known to me from visits over many years to the Wound and Ostomy clinic for chronic pressure injuries.  Initial visit November of 2020 for Right IT, Sacral, left trochanter and right heel pressure injuries.  She missed a few follow up appointments initially but was mostly coming into clinic every 3 to 4 weeks for evaluation and recommendations.  After a hospitalization at [facility] in [place] due to urinary diversion concerns and a right hip abscess she resumed visit to the Wound and Ostomy clinic on 11/18/22 for wound vac dressing changes and assessment. The right hip wound improved and as of my visit with her on 12/16/22 was nearly healed, no depth and only small open aspect all granular tissue.  It was reported the wound did fully close soon after that  assessment per AL staff.  On 1/3/23 the pt's facility sent her to the ED for the right hip, it had re opened and was draining copious amounts of purulent drainage.  I was able to see the pt in the ED and was able to add the right hip wound into the intact sacral wound vac set up.  At my last clinic visit with the pt on 2/3/23 the sacral wound was not in good condition, lots of slough, eschar on the edges and bone visible and palpable in the center.  I made arrangement with Dr Zavala's approval to have her seen in Specialty for potential debridement but appointment was approximately four weeks out. She was hospitalized at [facility] for revision of her urinary diversion 2/22/23 - 2/25/23 and was seen by [facility] nursing, the photo's from that assessment showed the sacral wound looking much improved.  No debriding was needed. She did see Dr Zavala on 2/27/23 and the sacral wound at that time was improving well with no need for debridement.  New wounds noted 5/10/23 to left buttock, Left IT and mid buttock.  Left mid buttock closed as of 6/9/23 and remains closed.  Left IT has repeatedly closed and opened, when closed often is covered with very waxy appearing fragile scar tissue.  New wound to the left hip, first assessed in clinic 3/15/24, per pt was first noted by AL staff two weeks prior, deep wound, home cares were doing wet to dry dressing changes MWF up till clinic presentation.      Past Medical History:  Patient Active Problem List   Diagnosis    Migraine headache    Urinary retention    GERD (gastroesophageal reflux disease)    Flaccid paraplegia (H)    Decubitus ulcer of buttock    Pressure ulcer of heel    Poor appetite    Nausea    Generalized weakness    Burn    Health Care Home    Paraplegia following spinal cord injury (H)    ACP (advance care planning)    Osteomyelitis of hip (right periacetabular infection with osteomyelitis)    Severe protein-calorie malnutrition (H24)    Microcytic anemia    Neurogenic  bladder    Anxiety    Insomnia    Chronic UTI (urinary tract infection)    Skin ulcer of buttock (bilateral ischial tuberosity ulcers)    CARDIOVASCULAR SCREENING; LDL GOAL LESS THAN 160    Open wound of foot except toes with complication    LLQ abdominal pain    Paralytic ileus (H)    Chest wall pain    Decubitus skin ulcer    S/P flap graft    Pancreatitis    Pericardial effusion    Hospice care patient    Seizures (H)    AMS (altered mental status)    Admission for hospice care    Odynophagia    Portal vein thrombosis    Foreign body in esophagus, initial encounter    Impacted foreign body in esophagus, initial encounter    Aspiration pneumonia of right lung due to regurgitated food, unspecified part of lung (H)    Septic shock (H)    Depressive disorder    Colostomy present (H)    Chronic pain due to trauma    Hypokalemia    Abscess of right hip    Stage 3 skin ulcer of sacral region (H)    Pressure injury of right hip, stage 3 (H)    Continuous opioid dependence (H)                 Tobacco Use:     Tobacco Use      Smoking status: Never      Smokeless tobacco: Never     Diabetic: NA  HgbA1C:   Hemoglobin A1C   Date Value Ref Range Status   05/26/2021 5.1 0 - 5.6 % Final     Comment:     Normal <5.7% Prediabetes 5.7-6.4%  Diabetes 6.5% or higher - adopted from ADA   consensus guidelines.     Checks Blood Glucose?:  NA Average Readings: NA     Personal/social history:  Pt lives in the [facility] in [facility], they do not provide any wound cares.  Had home care services [facility] 8/30/23 - 3/11/24.  [facility] 3/13/24 - 4/22/24.  [facility] 5/24/24, scheduled for Mon and Wed visits for wound cares.     Objective:   Current treatment plan:   - Sacral wound,  Promogran Yu to undermining areas then NPWT at 125 mmHg continuous negative pressure changed 3 times weekly.   - Right and Left IT, Right Buttock/thigh fold wounds: Aquacel Ag and Gentle adhesive foam dressing changed 3  times weekly.   - Left Trochanter - open to air  Last changed: all dressings 5/24/24 by [facility] nurse at [facility].      Wound #1 Sacral   Stage/tissue depth: stage 4 pressure injury, stage updated 1/13/23 as bone became visible and palpable in the wound bed.   2.1 cm L x 5.6 cm W x 1 cm D- not measured today  Tunneling: none noted  Undermining: around entire perimeter 12 o'clock 2.4 cm, 3 o'clock 0.5 cm, 6 o'clock 0.5 cm, 10 o'clock - 11 o'clock 2 cm. Not measured today  Wound bed type/amount: approximately 80 % pink to red granular tissue, slough, and 10 % scar tissue: NA fluctuant  Wound Edges: approximately 20 % closed with epibole along the inferior edge  Periwound: scar tissue, scattered pale blanchable erythema, few dried scabs to the right lateral, inferior periskin.    Drainage: moderate amounts serosanguinous from the main wound  Odor: no significant odor noted today, prior to or after cleansing.   Pain: moaning, noted increase moaning with added pressure to wound.    Photos from today's visit 5/28/24      Photo's from 5/24/24, pt is in left side lying position.        Photo's from 5/10/24, pt is in left side lying position.      Photo's from 3/22/24, note pt is in left side lying position.     Photo from 11/18/22, initial resumption of visits to the Wound and Ostomy clinic.      Wound #2 Right Ischial Tuberosity   Stage/tissue depth: stage 3 pressure injury  As of today  into two open sites  by scar tissue.  - Lateral superior 0.4 cm L x 0.2 cm W x 0.1 cm D, wound bed is 100 % pale pink nongranular tissue, small amounts serous drainage.  - Medial inferior 0.7 cm L x 1.5 cm W x 0.1 cm D, wound bed is 100 % pale pink nongranular tissue, small to moderate amounts serous drainage. Note measured today 5/28/24  Tunneling: none   Undermining: none  Wound bed type/amount: 100 % granular tissue; NA fluctuant  Wound Edges: open  Periwound: scar tissue.    Drainage: as listed above for each  site  Odor: none noted  Pain: none   Photo from today's visit 5/28/24  Photo from 5/24/24, pt is in left side lying position.      Photo's from 5/10/24, pt is in left side lying position.     Photo from 11/18/22, initial resumption of visits to the Wound and Ostomy clinic.      Wound #3 Right buttock/posterior thigh fold, appears friction not pressure related, newly noted in clinic 3/8/23, has closed and reopened multiple times.    Not assessed today. 5/28/24  Stage/tissue depth: partial thickness  1 cm L x 0.2 cm W x 0 cm D  Tunneling: none  Undermining: none  Wound bed type/amount: approximately 50 % still moist red nongranular tissue and 50 % newly epithelialized; Not fluctuant  Wound Edges: NA no depth  Periwound: wnl  Drainage: scant amounts serosanguinous  Odor: no  Pain: no  Photo from 5/24/24, pt is in left side lying position.    Photo from 5/10/24, pt is in left side lying position.     Photo from 3/8/23, initial assessment of newly noted site.     Wound #4 Left Ischial tuberosity, newly noted in clinic 5/10/23.  Stage/tissue depth: full thickness  0 cm L x 0 cm W x 0 cm D  Tunneling: none  Undermining: none  Wound bed type/amount: 100 % scarred over : Not fluctuant  Wound Edges: NA   Periwound:  Scattered pale blanchable erythema.  Drainage: none  Odor: no  Pain: no  Photo from5/24/24, pt is in left side lying position.    Photo from 5/10/24, pt is in left side lying position.     Photo from 5/10/23, initial assessment of new wound site.    Wound #5 Left trochanter, first assessed in clinic 3/15/24, per pt was first noted by AL approximately 2 weeks prior.  Stage/tissue depth: stage 4 pressure injury  0.4 cm L x 0.4 cm W x 0 cm D  Tunneling: none, had tunneling at 10 o'clock but has resolved.   Undermining:  none any longer  Wound bed type/amount: 100 % dry flaky scar tissue; Not fluctuant  Wound Edges: open   Periwound: hyperpigmented areas of past erythema  Drainage: none  Odor: none not  Pain: yes with  cares 1/10 per pt  Photo from 5/24/24, pt is supine.    Photo from 5/3/24, pt is supine.    Photo from 3/15/24, initial assessment of new wound site.    Dorsalis Pedal Pulse: Not assessed this visit.  Posterior Tibial Pulse: Not assessed this visit.  Hair growth: Not assessed this visit  Capillary Refill: Not assessed this visit  Feet/toes color: Not assessed this visit  Nails: Not assessed this visit  R Leg: Edema Not assessed this visit. Ankle circumference NA cm. Calf circumference NA cm.  L Leg: Edema Not assessed this visit. Ankle circumference NA cm. Calf circumference NA cm.     Mobility: wheelchair bound  Current offloading/footwear: regular shoes/boots  Sensation: paraplegic, no sensation from sternum distally     Other callusing/areas of concern:    - Right hip, closed deep ulcer with reoccurring abscess.  Remains scar closed.      Diet: Regular     Discussed/Education: plan of care with rationale;  pt is unable to do self wound cares, Home Care is performing wound cares three times weekly at AL.     Assessment:  Main sacral wound- on arrival to ER room VAC is at 0 mmHG. Turned off VAC, removed 1 foam from wound bed plus bridge.  Wound bed is mostly pale granular tissue with slough tissue to the inferior undermining pocket.  Michelle edges are moist with hypopigmentation and scant denudment at 12 o clock.  Approximately 2-3 cm to the right lateral and distally noted to have dried sanguineous scabs.  When comparing photos from previous WO visit wound bed has more granular tissue, does not have the dark, non viable tissue.      Right Ischial Tuberosity- wound bed is viable with open wound edges and intact periskin. Periskin is dry and peeling.    Left Ischial tuberosity-  Scarred over, but remains very fragile scar tissue.  no open areas noted.     Right buttock/posterior thigh and left Trochanter not assessed today.      Note WOC nurse did not complete full assessment on all wounds due to comfort measures.   Patient is unresponsive and moaning goal is comfort, completed cares but no measurements obtained to the two main wounds.  Observed all other areas, but did not take measurement or pictures.          Ostomy:  Assessed stoma through ostomy appliance. Stoma is moist and viable.  No output at time of assessment.  Abdomen is distended and firm, questioning small bowel obstruction.       Factors impacting wound healing:   Poor nutrition: inadequate supply of protein, carbohydrates, fatty acids, and trace elements essential for all phases of wound healing.  Pt is taking a daily protein supplement drink and is aware of need for increased protein in diet for wound healing.  She has focused on protein supplement drinks and snack. Four pound increase in body weight reported 1/19/24 per pt.   Delayed healing as part of normal aging process  Reduced Blood Supply: inadequate perfusion to heal wound.  Appears adequate.  Medication: NA  Chemotherapy: suppresses the immune system and inflammatory response, NA  Radiotherapy: increases production of free radical which damage cells, NA  Psychological stress: None noted  Obesity: decreases tissue perfusion, NA  Infection: prolongs inflammatory phase, uses vital nutrients, impairs epithelialization and releases toxins, antimicrobial dressing included in sacral wound as of 2/5/24.  Underlying Disease: paraplegic  Maceration: reduces wound tensile strength and inhibits epithelial migration, none noted this visit  Patient compliance, appears motivated to heal  Unrelieved pressure, pt has pressure reduction cushion in wheelchair and lays in bed daily during the day for full pressure relief.    Immobility, limited  Substance abuse: NA     Plan: Today we did the wound cares to the sacral wound and  right tuberosity wounds. All other wounds are left open to air, did not see drainage or open wounds to other sites, but was very quick observation due keeping patient comfortable.     With patient  potentially moving to hospice educated Nurse Sanjana SMITH that the VAC could be discontinued and could change to Wet to dry, but for now will continue with Wound Vac only, held off on use of Yu Promgran into wound bed prior to VAC placement, will continue with just black foam at this time.      Today I used one piece of black KCI foam to fill the wound beds depth and undermining and one additional piece to bridge to the right hip upper buttock.    Wound vac at 125 mmHg continuous negative pressure, changed three times weekly.  Canister had approximately 25-50 ml of serous drainage, did not change, but provided addition canister if starts to have issues.      For the left trochanter and right lower buttock posterior thigh fold wounds - continue to keep open to air.       For the Right Ischial tuberosity-cover open wounds with Aquacel Ag and cover with a gentle adhesive foam dressings, change three times weekly.       Topical care: Wound/surrounding skin cleansed with wound cleanser and gauze. Patted dry. Wound cares as listed in Plan     Sacral Wound: Lightly pack black foam into wound bed. Cover with VAC drape.  Make sure to apply drape under bridge.  Connect Bridge to wound bed and secure with drape.  Apply Trac suction pad. VAC should be running at 125 mmG. Change three times per week.  IF patient admits to hospice okay to discontinue VAC and start wet to dry followed by Sacral foam adhesive dressing and change daily    Right IT wounds lightly place Aquacel Ag into wound bed and cover with a gentle adhesive foam dressings, change three times weekly.    Left IT and right lower buttock posterior- leave open to air.     Additional recommendations: None at this time     The following discharge instructions were reviewed with and sent home with the patient:  Not provided this visit.      The following supplies were sent home with the patient:  KCI Ready VAC canister     Return visit: as needed     Verbal, written, &  demonstrative education provided.  Face to face time: approximately 50 minutes  Procedure: approximately 10 minutes wound vac dressing change to the sacral wound.     Penny Anguiano RN CWOCN  [phone]
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
## 2024-05-28 NOTE — ED NOTES
Wound care is complete and patient continues to moan and grunt. She does not verbally respond to questions. Sanjana Alonzo RN
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
## 2024-05-28 NOTE — ED NOTES
Patient's daughter is concerned about urine output. Patient usually self-caths. Dr. East into assess. Sanjana Alonzo RN

## 2024-05-28 NOTE — ED NOTES
Patient's tshirt and sheet sent with patient's daughter, Arlette. ALL of her jewelry remains on her person when transferred to medical floor. Sanjana Alonzo RN

## 2024-05-28 NOTE — ED TRIAGE NOTES
Pt presents by EMS with concerns of altered mental status.  Pt presents from [facility] with concerns that pt had lost weight, increased pain, and nauseated.  EMS reports that [facility] spoke with family about pt's need for additional care.       Triage Assessment (Adult)       Row Name 05/28/24 0814          Triage Assessment    Airway WDL WDL        Respiratory WDL    Respiratory WDL WDL        Skin Circulation/Temperature WDL    Skin Circulation/Temperature WDL WDL        Cardiac WDL    Cardiac WDL WDL        Peripheral/Neurovascular WDL    Peripheral Neurovascular WDL WDL

## 2024-05-28 NOTE — ED NOTES
Calls made to patient's daughter, Arlette and her  Maurice at [facility]. Sanjana Alonzo RN

## 2024-05-28 NOTE — ED NOTES
Patient's daughter, Arlette and her mother Shaniqua are present at bedside talking with Dr. East. Sanjana Alonzo RN

## 2024-05-28 NOTE — ED NOTES
Dr. East called to room to assess patient. Respiratory status is getting worse, BP is dropping. Patient's daughter, Arlette here to discuss plan of care. Sanjana Alonzo RN

## 2024-05-28 NOTE — ED NOTES
ED Nursing criteria listed below was addressed during verbal handoff: Mariposa ROJAS    Abnormal vitals: Yes; End of life  Abnormal results: No  Med Reconciliation completed: No  Meds given in ED: Yes; Multiple doses of pain meds.   Any Overdue Meds: No  Core Measures: No  Isolation: No  Special needs: Yes; End of life; wounds  Skin assessment: Yes- See WOC nurse notes    Observation Patient  Education provided: Yes

## 2024-05-28 NOTE — ED NOTES
updated. Eliza will check with Hospice provider to see when care could be initiated. Sanjana Alonzo RN

## 2024-05-28 NOTE — PROGRESS NOTES
S-(situation): Patient registered to Observation. Patient arrived to room 257 via cart from ED.    B-(background): Daughter Amanda got a call yesterday, 5/27,from [facility], staffs who did the wound vac care, that patient is unresponsive. This morning Amanda received a call from [facility] that patient was unresponsive and daughter was asked if okay to send to hospital and patient arrived in ED via EMS this morning.     A-(assessment): Patient is unresponsive but still has respirations of 20cpm. Ambrose catheter on via suprapubic site, output is brown and malodorous. No output on her colostomy. Wound vac is working at 125mmHg. Moans during turn and repo. Noted a huge, open  (hole) on her coccyx. Sacral foam dressing applied.    R-(recommendations): Orders and observation goals reviewed with Amanda pappas    Nursing Observation criteria listed below was met:    Skin issues/needs documented:Yes  Isolation needs addressed and Signage up: NA  Fall Prevention: Education given and documented: Yes  Education Assessment documented:Yes  Admission Education Documented: Yes  New medication patient education completed and documented (Possible Side Effects of Common Medications handout): Yes  OBS video/handout Reviewed & Documented: Yes  Allergies Reviewed: Yes  Medication Reconciliation Complete: Yes  Home medications if not able to send immediately home with family stored here: NA  Reminder note placed in discharge instructions of home meds: NA  Patient has discharge needs (If yes, please explain):  On comfort cares  Patient discharge preferences addressed and charted on white board:  Yes  Provider notified that patient has arrived to the unit: Yes

## 2024-05-28 NOTE — ED NOTES
Alice momin placed on patient. Daughter remains at bedside, refreshments provided. Sanjana Alonzo RN

## 2024-05-28 NOTE — ED NOTES
Jessica from wound care is here to change her wound vac and perform wound care. Assisted with cares. Patient is moaning and does not answer questions, moans. She does react to pain with wound packing. Sanjana Alonzo RN

## 2024-05-28 NOTE — ED NOTES
Dr. East into talk with patient's daughter regarding OBS care and transfer to medical floor for comfort cares. Sanjana Alonzo RN
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
## 2024-05-29 NOTE — DEATH PRONOUNCEMENT
MD DEATH PRONOUNCEMENT    Called to pronounce Felicia Ellison dead.    Physical Exam: Unresponsive to noxious stimuli, Spontaneous respirations absent, Breath sounds absent, Carotid pulse absent, and Heart sounds absent    Patient was pronounced dead at 1:09 AM, May 29, 2024.    Preliminary Cause of Death: encephalopathy, possible infection.     Principal Problem:    Comfort measures only status  Active Problems:    Decubitus ulcer of buttock    Paraplegia following spinal cord injury (H)    Skin ulcer of buttock (bilateral ischial tuberosity ulcers)    AMS (altered mental status)    Colostomy present (H)    Stage 3 skin ulcer of sacral region (H)    Generalized abdominal pain    History of urinary diversion procedure    Ulcer of trochanteric region of left hip (H)       Infectious disease present?: NO    Communicable disease present? (examples: HIV, chicken pox, TB, Ebola, CJD) :  NO    Multi-drug resistant organism present? (example: MRSA): NO    Please consider an autopsy if any of the following exist:  NO Unexpected or unexplained death during or following any dental, medical, or surgical diagnostic treatment procedures.   NO Death of mother at or up to seven days after delivery.     NO All  and pediatric deaths.     NO Death where the cause is sufficiently obscure to delay completion of the death certificate.   NO Deaths in which autopsy would confirm a suspected illness/condition that would affect surviving family members or recipients of transplanted organs.     The following deaths must be reported to the 's Office:  NO A death that may be due entirely or in part to any factors other than natural disease (recent surgery, recent trauma, suspected abuse/neglect).   NO A death that may be an accident, suicide, or homicide.     NO Any sudden, unexpected death in which there is no prior history of significant heart disease or any other condition associated with sudden death.   NO A death under  suspicious, unusual, or unexpected circumstances.    NO Any death which is apparently due to natural causes but in which the  does not have a personal physician familiar with the patient s medical history, social, or environmental situation or the circumstances of the terminal event.   NO Any death apparently due to Sudden Infant Death Syndrome.     NO Deaths that occur during, in association with, or as consequences of a diagnostic, therapeutic, or anesthetic procedure.   NO Any death in which a fracture of a major bone has occurred within the past (6) six months.   NO A death of persons note seen by their physician within 120 days of demise.     NO Any death in which the  was an inmate of a public institution or was in the custody of Law Enforcement personnel.   NO  All unexpected deaths of children   NO Solid organ donors   NO Unidentified bodies   NO Deaths of persons whose bodies are to be cremated or otherwise disposed of so that the bodies will later be unavailable for examination;   NO Deaths unattended by a physician outside of a licensed healthcare facility or licensed residential hospice program   NO Deaths occurring within 24 hours of arrival to a health care facility if death is unexpected.    NO Deaths associated with the decedent s employment.   NO Deaths attributed to acts of terrorism.   NO Any death in which there is uncertainty as to whether it is a medical examiner s care should be discussed with the medical investigator.        Body disposition: Body released to the  home.

## 2024-05-29 NOTE — PROGRESS NOTES
Pt passed away at 0109.  Respirations ceased no pulse.  Dr. Goridllo notified. Pronounced patients death per camera/computer.  [facility] notified.

## 2024-05-29 NOTE — PROGRESS NOTES
4-hr shift note ([id number])    Patient on comfort care. Provided a calm and quite environment. Patient is calm with eyes close in bed. Daughter refused to touch and reposition the patient and told patient moaned with previous repositioning.
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
## 2024-11-03 NOTE — TELEPHONE ENCOUNTER
Home [facility], Anahi states that she received a fax from provider with all necessary documentation. No further questions or concerns.  Penny Jimenez, RN, BSN       No

## 2025-02-09 NOTE — OR NURSING
Zach resides at [facility] [phone]. Paraplegic, w/c bound, uses aviva lift. A&Ox3. Will sign own consent. Staff member will be with her. Transportation is LISNR  [phone].   MEDICATIONS  (STANDING):  OLANZapine 10 milliGRAM(s) Oral at bedtime    MEDICATIONS  (PRN):  LORazepam     Tablet 2 milliGRAM(s) Oral every 6 hours PRN agitation  LORazepam     Tablet 0.5 milliGRAM(s) Oral every 12 hours PRN anxiety  LORazepam   Injectable 2 milliGRAM(s) IntraMuscular once PRN agitation  melatonin. 5 milliGRAM(s) Oral at bedtime PRN Insomnia

## (undated) DEVICE — BLADE KNIFE SURG 10 371110

## (undated) DEVICE — PREP SKIN SCRUB TRAY 4461A

## (undated) DEVICE — CUSTOM PACK MINOR SBA5BMNHEA

## (undated) DEVICE — CATH FOLEY COUNCIL 18FR 5ML LATEX 0196SI18

## (undated) DEVICE — REMOVER NAIL POLISH PAD MDS090780

## (undated) DEVICE — DRAPE OR LAPAROTOMY KC 89228*

## (undated) DEVICE — SPONGE LAP 18X18" X8435

## (undated) DEVICE — Device

## (undated) DEVICE — PLATE GROUNDING ADULT W/CORD 9165L

## (undated) DEVICE — DRSG GAUZE 2X2" TRAY 1806

## (undated) DEVICE — SOL NACL 0.9% IRRIG 1000ML BOTTLE 2F7124

## (undated) DEVICE — DRSG ABDOMINAL 07 1/2X8" 7197D

## (undated) DEVICE — SUTURE VICRYL+ 4-0 UNDYED PS-2 VCP496H

## (undated) DEVICE — LINEN TOWEL PACK X5 5464

## (undated) DEVICE — SOL WATER IRRIG 1000ML BOTTLE 2F7114

## (undated) DEVICE — PAD CHUX UNDERPAD 23X24" 7136

## (undated) DEVICE — GLOVE PROTEXIS MICRO 6.5  2D73PM65

## (undated) DEVICE — SU VICRYL 3-0 SH 27" J316H

## (undated) DEVICE — DRSG KERLIX 4 1/2"X4YDS ROLL 6715

## (undated) DEVICE — GLOVE PROTEXIS W/NEU-THERA 8.0  2D73TE80

## (undated) DEVICE — DRSG ABDOMINAL 12X16"

## (undated) DEVICE — GLOVE PROTEXIS BLUE W/NEU-THERA 6.5  2D73EB65

## (undated) DEVICE — GOWN IMPERVIOUS BREATHABLE SMART LG 89015

## (undated) DEVICE — PACK MINOR SBA15MIFSE

## (undated) DEVICE — PACK CYSTOSCOPY SBA15CYFSI

## (undated) DEVICE — SOL WATER IRRIG 3000ML BAG 2B7117

## (undated) DEVICE — ESU PENCIL SMOKE EVAC W/ROCKER SWITCH 0703-047-000

## (undated) DEVICE — DRAPE LAP W/ARMBOARD 29410

## (undated) DEVICE — LASER FIBER HOLMIUM FLEXIVA 365UM M0068403920

## (undated) DEVICE — SYR BULB IRRIG 50ML LATEX FREE 0035280

## (undated) DEVICE — TUBING SUCTION 12"X1/4" N612

## (undated) DEVICE — MANIFOLD NEPTUNE 4 PORT 700-20

## (undated) DEVICE — SUTURE VICRYL+ 0 27IN CT-1 UND VCP260H

## (undated) DEVICE — GUIDEWIRE AMPLATZ SUPER STIFF .035 X 145CM M0066401080

## (undated) DEVICE — DRSG KERLIX FLUFFS X5

## (undated) DEVICE — SOL NACL 0.9% IRRIG 1000ML BOTTLE 07138-09

## (undated) DEVICE — DRSG GAUZE 4X4" 2187

## (undated) DEVICE — SYR BULB IRRIG DOVER 60 ML LATEX FREE 67000

## (undated) DEVICE — GLOVE BIOGEL PI ULTRATOUCH G SZ 7.5 42175

## (undated) DEVICE — SUCTION MANIFOLD NEPTUNE 2 SYS 1 PORT 702-025-000

## (undated) DEVICE — DECANTER VIAL 2006S

## (undated) DEVICE — CATH FOLEY COUNCIL 16FR 5ML LATEX 0196L16

## (undated) RX ORDER — ONDANSETRON 2 MG/ML
INJECTION INTRAMUSCULAR; INTRAVENOUS
Status: DISPENSED
Start: 2023-02-22

## (undated) RX ORDER — GLYCOPYRROLATE 0.2 MG/ML
INJECTION INTRAMUSCULAR; INTRAVENOUS
Status: DISPENSED
Start: 2022-09-23

## (undated) RX ORDER — LIDOCAINE HYDROCHLORIDE 20 MG/ML
INJECTION, SOLUTION EPIDURAL; INFILTRATION; INTRACAUDAL; PERINEURAL
Status: DISPENSED
Start: 2017-02-18

## (undated) RX ORDER — FENTANYL CITRATE 50 UG/ML
INJECTION, SOLUTION INTRAMUSCULAR; INTRAVENOUS
Status: DISPENSED
Start: 2017-04-11

## (undated) RX ORDER — LIDOCAINE HYDROCHLORIDE 40 MG/ML
SOLUTION TOPICAL
Status: DISPENSED
Start: 2018-12-20

## (undated) RX ORDER — CLINDAMYCIN PHOSPHATE 900 MG/50ML
INJECTION, SOLUTION INTRAVENOUS
Status: DISPENSED
Start: 2017-08-30

## (undated) RX ORDER — FENTANYL CITRATE 50 UG/ML
INJECTION, SOLUTION INTRAMUSCULAR; INTRAVENOUS
Status: DISPENSED
Start: 2017-08-30

## (undated) RX ORDER — CLINDAMYCIN PHOSPHATE 900 MG/50ML
INJECTION, SOLUTION INTRAVENOUS
Status: DISPENSED
Start: 2017-02-18

## (undated) RX ORDER — LIDOCAINE HYDROCHLORIDE 20 MG/ML
INJECTION, SOLUTION EPIDURAL; INFILTRATION; INTRACAUDAL; PERINEURAL
Status: DISPENSED
Start: 2017-08-30

## (undated) RX ORDER — PROPOFOL 10 MG/ML
INJECTION, EMULSION INTRAVENOUS
Status: DISPENSED
Start: 2017-02-18

## (undated) RX ORDER — DIPHENHYDRAMINE HYDROCHLORIDE 50 MG/ML
INJECTION INTRAMUSCULAR; INTRAVENOUS
Status: DISPENSED
Start: 2023-02-22

## (undated) RX ORDER — ONDANSETRON 2 MG/ML
INJECTION INTRAMUSCULAR; INTRAVENOUS
Status: DISPENSED
Start: 2017-08-30

## (undated) RX ORDER — LIDOCAINE HYDROCHLORIDE 10 MG/ML
INJECTION, SOLUTION INFILTRATION; PERINEURAL
Status: DISPENSED
Start: 2022-09-08

## (undated) RX ORDER — ONDANSETRON 2 MG/ML
INJECTION INTRAMUSCULAR; INTRAVENOUS
Status: DISPENSED
Start: 2017-02-18

## (undated) RX ORDER — CEFTAZIDIME 1 G/1
INJECTION, POWDER, FOR SOLUTION INTRAMUSCULAR; INTRAVENOUS
Status: DISPENSED
Start: 2017-04-11

## (undated) RX ORDER — LIDOCAINE HYDROCHLORIDE 10 MG/ML
INJECTION, SOLUTION EPIDURAL; INFILTRATION; INTRACAUDAL; PERINEURAL
Status: DISPENSED
Start: 2020-01-04

## (undated) RX ORDER — LIDOCAINE HYDROCHLORIDE 10 MG/ML
INJECTION, SOLUTION INFILTRATION; PERINEURAL
Status: DISPENSED
Start: 2022-09-03

## (undated) RX ORDER — LIDOCAINE HYDROCHLORIDE 10 MG/ML
INJECTION, SOLUTION INFILTRATION; PERINEURAL
Status: DISPENSED
Start: 2022-09-13

## (undated) RX ORDER — FENTANYL CITRATE 50 UG/ML
INJECTION, SOLUTION INTRAMUSCULAR; INTRAVENOUS
Status: DISPENSED
Start: 2022-09-08

## (undated) RX ORDER — FENTANYL CITRATE 50 UG/ML
INJECTION, SOLUTION INTRAMUSCULAR; INTRAVENOUS
Status: DISPENSED
Start: 2022-04-22

## (undated) RX ORDER — LIDOCAINE HYDROCHLORIDE 10 MG/ML
INJECTION, SOLUTION EPIDURAL; INFILTRATION; INTRACAUDAL; PERINEURAL
Status: DISPENSED
Start: 2022-09-23

## (undated) RX ORDER — CLINDAMYCIN PHOSPHATE 900 MG/50ML
INJECTION, SOLUTION INTRAVENOUS
Status: DISPENSED
Start: 2023-02-22

## (undated) RX ORDER — DEXAMETHASONE SODIUM PHOSPHATE 10 MG/ML
INJECTION, SOLUTION INTRAMUSCULAR; INTRAVENOUS
Status: DISPENSED
Start: 2022-09-23

## (undated) RX ORDER — FENTANYL CITRATE 50 UG/ML
INJECTION, SOLUTION INTRAMUSCULAR; INTRAVENOUS
Status: DISPENSED
Start: 2017-02-18

## (undated) RX ORDER — FENTANYL CITRATE 50 UG/ML
INJECTION, SOLUTION INTRAMUSCULAR; INTRAVENOUS
Status: DISPENSED
Start: 2017-02-22

## (undated) RX ORDER — APREPITANT 40 MG/1
CAPSULE ORAL
Status: DISPENSED
Start: 2023-02-22

## (undated) RX ORDER — FENTANYL CITRATE 50 UG/ML
INJECTION, SOLUTION INTRAMUSCULAR; INTRAVENOUS
Status: DISPENSED
Start: 2022-09-13

## (undated) RX ORDER — BUPIVACAINE HYDROCHLORIDE AND EPINEPHRINE 5; 5 MG/ML; UG/ML
INJECTION, SOLUTION EPIDURAL; INTRACAUDAL; PERINEURAL
Status: DISPENSED
Start: 2017-02-18

## (undated) RX ORDER — LIDOCAINE HYDROCHLORIDE 10 MG/ML
INJECTION, SOLUTION EPIDURAL; INFILTRATION; INTRACAUDAL; PERINEURAL
Status: DISPENSED
Start: 2023-02-22

## (undated) RX ORDER — OXYCODONE HYDROCHLORIDE 5 MG/1
TABLET ORAL
Status: DISPENSED
Start: 2017-08-30

## (undated) RX ORDER — FENTANYL CITRATE 50 UG/ML
INJECTION, SOLUTION INTRAMUSCULAR; INTRAVENOUS
Status: DISPENSED
Start: 2022-09-23

## (undated) RX ORDER — PROPOFOL 10 MG/ML
INJECTION, EMULSION INTRAVENOUS
Status: DISPENSED
Start: 2022-04-22

## (undated) RX ORDER — FENTANYL CITRATE 50 UG/ML
INJECTION, SOLUTION INTRAMUSCULAR; INTRAVENOUS
Status: DISPENSED
Start: 2022-09-10

## (undated) RX ORDER — LIDOCAINE HYDROCHLORIDE 10 MG/ML
INJECTION, SOLUTION INFILTRATION; PERINEURAL
Status: DISPENSED
Start: 2018-12-20

## (undated) RX ORDER — LIDOCAINE HYDROCHLORIDE 10 MG/ML
INJECTION, SOLUTION INFILTRATION; PERINEURAL
Status: DISPENSED
Start: 2022-09-18

## (undated) RX ORDER — DEXAMETHASONE SODIUM PHOSPHATE 4 MG/ML
INJECTION, SOLUTION INTRA-ARTICULAR; INTRALESIONAL; INTRAMUSCULAR; INTRAVENOUS; SOFT TISSUE
Status: DISPENSED
Start: 2023-02-22

## (undated) RX ORDER — LIDOCAINE HYDROCHLORIDE 10 MG/ML
INJECTION, SOLUTION EPIDURAL; INFILTRATION; INTRACAUDAL; PERINEURAL
Status: DISPENSED
Start: 2022-09-06

## (undated) RX ORDER — FENTANYL CITRATE 50 UG/ML
INJECTION, SOLUTION INTRAMUSCULAR; INTRAVENOUS
Status: DISPENSED
Start: 2023-02-22

## (undated) RX ORDER — ONDANSETRON 2 MG/ML
INJECTION INTRAMUSCULAR; INTRAVENOUS
Status: DISPENSED
Start: 2022-09-23

## (undated) RX ORDER — FENTANYL CITRATE 50 UG/ML
INJECTION, SOLUTION INTRAMUSCULAR; INTRAVENOUS
Status: DISPENSED
Start: 2022-09-03

## (undated) RX ORDER — PROPOFOL 10 MG/ML
INJECTION, EMULSION INTRAVENOUS
Status: DISPENSED
Start: 2017-08-30

## (undated) RX ORDER — PROPOFOL 10 MG/ML
INJECTION, EMULSION INTRAVENOUS
Status: DISPENSED
Start: 2022-09-23

## (undated) RX ORDER — PROPOFOL 10 MG/ML
INJECTION, EMULSION INTRAVENOUS
Status: DISPENSED
Start: 2023-02-22

## (undated) RX ORDER — LIDOCAINE HYDROCHLORIDE 10 MG/ML
INJECTION, SOLUTION EPIDURAL; INFILTRATION; INTRACAUDAL; PERINEURAL
Status: DISPENSED
Start: 2018-12-19

## (undated) RX ORDER — EPHEDRINE SULFATE 50 MG/ML
INJECTION, SOLUTION INTRAMUSCULAR; INTRAVENOUS; SUBCUTANEOUS
Status: DISPENSED
Start: 2023-02-22

## (undated) RX ORDER — LIDOCAINE HYDROCHLORIDE 10 MG/ML
INJECTION, SOLUTION INFILTRATION; PERINEURAL
Status: DISPENSED
Start: 2017-02-18

## (undated) RX ORDER — LIDOCAINE HYDROCHLORIDE 10 MG/ML
INJECTION, SOLUTION EPIDURAL; INFILTRATION; INTRACAUDAL; PERINEURAL
Status: DISPENSED
Start: 2022-09-10